# Patient Record
Sex: MALE | Employment: OTHER | URBAN - METROPOLITAN AREA
[De-identification: names, ages, dates, MRNs, and addresses within clinical notes are randomized per-mention and may not be internally consistent; named-entity substitution may affect disease eponyms.]

---

## 2024-03-05 ENCOUNTER — HOSPITAL ENCOUNTER (INPATIENT)
Facility: HOSPITAL | Age: 74
LOS: 7 days | Discharge: HOME HEALTH | DRG: 066 | End: 2024-03-13
Attending: EMERGENCY MEDICINE | Admitting: INTERNAL MEDICINE
Payer: COMMERCIAL

## 2024-03-05 ENCOUNTER — APPOINTMENT (OUTPATIENT)
Dept: NON INVASIVE DIAGNOSTICS | Facility: HOSPITAL | Age: 74
DRG: 066 | End: 2024-03-05
Payer: COMMERCIAL

## 2024-03-05 ENCOUNTER — APPOINTMENT (EMERGENCY)
Dept: RADIOLOGY | Facility: HOSPITAL | Age: 74
DRG: 066 | End: 2024-03-05
Payer: COMMERCIAL

## 2024-03-05 ENCOUNTER — APPOINTMENT (EMERGENCY)
Dept: CT IMAGING | Facility: HOSPITAL | Age: 74
DRG: 066 | End: 2024-03-05
Payer: COMMERCIAL

## 2024-03-05 DIAGNOSIS — R55 SYNCOPE: Primary | ICD-10-CM

## 2024-03-05 DIAGNOSIS — I63.9 CVA (CEREBRAL VASCULAR ACCIDENT) (HCC): ICD-10-CM

## 2024-03-05 DIAGNOSIS — I48.0 PAF (PAROXYSMAL ATRIAL FIBRILLATION) (HCC): ICD-10-CM

## 2024-03-05 PROBLEM — E11.9 DIABETES (HCC): Status: ACTIVE | Noted: 2024-03-05

## 2024-03-05 PROBLEM — D72.829 LEUKOCYTOSIS: Status: ACTIVE | Noted: 2024-03-05

## 2024-03-05 PROBLEM — R74.8 ABNORMAL CPK: Status: ACTIVE | Noted: 2024-03-05

## 2024-03-05 PROBLEM — R79.89 ELEVATED LACTIC ACID LEVEL: Status: ACTIVE | Noted: 2024-03-05

## 2024-03-05 LAB
2HR DELTA HS TROPONIN: 1 NG/L
4HR DELTA HS TROPONIN: 2 NG/L
ALBUMIN SERPL BCP-MCNC: 3.8 G/DL (ref 3.5–5)
ALP SERPL-CCNC: 80 U/L (ref 34–104)
ALT SERPL W P-5'-P-CCNC: 18 U/L (ref 7–52)
ANION GAP SERPL CALCULATED.3IONS-SCNC: 9 MMOL/L
AORTIC ROOT: 3.4 CM
APTT PPP: 29 SECONDS (ref 23–37)
ASCENDING AORTA: 2.7 CM
AST SERPL W P-5'-P-CCNC: 23 U/L (ref 13–39)
ATRIAL RATE: 72 BPM
BASOPHILS # BLD AUTO: 0.04 THOUSANDS/ÂΜL (ref 0–0.1)
BASOPHILS NFR BLD AUTO: 0 % (ref 0–1)
BILIRUB SERPL-MCNC: 0.8 MG/DL (ref 0.2–1)
BNP SERPL-MCNC: 49 PG/ML (ref 0–100)
BSA FOR ECHO PROCEDURE: 2.66 M2
BUN SERPL-MCNC: 16 MG/DL (ref 5–25)
CALCIUM SERPL-MCNC: 9.5 MG/DL (ref 8.4–10.2)
CARDIAC TROPONIN I PNL SERPL HS: 10 NG/L
CARDIAC TROPONIN I PNL SERPL HS: 11 NG/L
CARDIAC TROPONIN I PNL SERPL HS: 12 NG/L
CHLORIDE SERPL-SCNC: 105 MMOL/L (ref 96–108)
CK SERPL-CCNC: 355 U/L (ref 39–308)
CO2 SERPL-SCNC: 25 MMOL/L (ref 21–32)
CREAT SERPL-MCNC: 0.97 MG/DL (ref 0.6–1.3)
DOP CALC LVOT AREA: 3.46
DOP CALC LVOT DIAMETER: 2.1 CM
E WAVE DECELERATION TIME: 307 MS
E/A RATIO: 0.82
EOSINOPHIL # BLD AUTO: 0.06 THOUSAND/ÂΜL (ref 0–0.61)
EOSINOPHIL NFR BLD AUTO: 1 % (ref 0–6)
ERYTHROCYTE [DISTWIDTH] IN BLOOD BY AUTOMATED COUNT: 12.4 % (ref 11.6–15.1)
FLUAV RNA RESP QL NAA+PROBE: NEGATIVE
FLUBV RNA RESP QL NAA+PROBE: NEGATIVE
FRACTIONAL SHORTENING: 34 (ref 28–44)
GFR SERPL CREATININE-BSD FRML MDRD: 76 ML/MIN/1.73SQ M
GLUCOSE SERPL-MCNC: 140 MG/DL (ref 65–140)
GLUCOSE SERPL-MCNC: 151 MG/DL (ref 65–140)
GLUCOSE SERPL-MCNC: 160 MG/DL (ref 65–140)
HCT VFR BLD AUTO: 46.3 % (ref 36.5–49.3)
HGB BLD-MCNC: 15.5 G/DL (ref 12–17)
IMM GRANULOCYTES # BLD AUTO: 0.03 THOUSAND/UL (ref 0–0.2)
IMM GRANULOCYTES NFR BLD AUTO: 0 % (ref 0–2)
INR PPP: 1 (ref 0.84–1.19)
INTERVENTRICULAR SEPTUM IN DIASTOLE (PARASTERNAL SHORT AXIS VIEW): 1.3 CM
INTERVENTRICULAR SEPTUM: 1.3 CM (ref 0.6–1.1)
LA/AORTA RATIO 2D: 1.18
LAAS-AP2: 21.8 CM2
LACTATE SERPL-SCNC: 1.5 MMOL/L (ref 0.5–2)
LACTATE SERPL-SCNC: 2.1 MMOL/L (ref 0.5–2)
LEFT ATRIUM SIZE: 4 CM
LEFT INTERNAL DIMENSION IN SYSTOLE: 2.9 CM (ref 2.1–4)
LEFT VENTRICULAR INTERNAL DIMENSION IN DIASTOLE: 4.4 CM (ref 3.5–6)
LEFT VENTRICULAR POSTERIOR WALL IN END DIASTOLE: 1.4 CM
LEFT VENTRICULAR STROKE VOLUME: 53 ML
LVSV (TEICH): 53 ML
LYMPHOCYTES # BLD AUTO: 2.55 THOUSANDS/ÂΜL (ref 0.6–4.47)
LYMPHOCYTES NFR BLD AUTO: 24 % (ref 14–44)
MCH RBC QN AUTO: 31.8 PG (ref 26.8–34.3)
MCHC RBC AUTO-ENTMCNC: 33.5 G/DL (ref 31.4–37.4)
MCV RBC AUTO: 95 FL (ref 82–98)
MONOCYTES # BLD AUTO: 0.67 THOUSAND/ÂΜL (ref 0.17–1.22)
MONOCYTES NFR BLD AUTO: 6 % (ref 4–12)
MV E'TISSUE VEL-SEP: 9 CM/S
MV PEAK A VEL: 1 M/S
MV PEAK E VEL: 82 CM/S
MV STENOSIS PRESSURE HALF TIME: 90 MS
MV VALVE AREA P 1/2 METHOD: 2.44
NEUTROPHILS # BLD AUTO: 7.16 THOUSANDS/ÂΜL (ref 1.85–7.62)
NEUTS SEG NFR BLD AUTO: 69 % (ref 43–75)
NRBC BLD AUTO-RTO: 0 /100 WBCS
P AXIS: 65 DEGREES
PLATELET # BLD AUTO: 285 THOUSANDS/UL (ref 149–390)
PMV BLD AUTO: 10.4 FL (ref 8.9–12.7)
POTASSIUM SERPL-SCNC: 4.2 MMOL/L (ref 3.5–5.3)
PR INTERVAL: 176 MS
PROCALCITONIN SERPL-MCNC: 0.06 NG/ML
PROT SERPL-MCNC: 7.8 G/DL (ref 6.4–8.4)
PROTHROMBIN TIME: 13.8 SECONDS (ref 11.6–14.5)
QRS AXIS: 18 DEGREES
QRSD INTERVAL: 74 MS
QT INTERVAL: 386 MS
QTC INTERVAL: 422 MS
RBC # BLD AUTO: 4.88 MILLION/UL (ref 3.88–5.62)
RSV RNA RESP QL NAA+PROBE: NEGATIVE
SARS-COV-2 RNA RESP QL NAA+PROBE: NEGATIVE
SL CV LV EF: 65
SL CV PED ECHO LEFT VENTRICLE DIASTOLIC VOLUME (MOD BIPLANE) 2D: 85 ML
SL CV PED ECHO LEFT VENTRICLE SYSTOLIC VOLUME (MOD BIPLANE) 2D: 32 ML
SODIUM SERPL-SCNC: 139 MMOL/L (ref 135–147)
T WAVE AXIS: -5 DEGREES
TRICUSPID ANNULAR PLANE SYSTOLIC EXCURSION: 2.8 CM
VENTRICULAR RATE: 72 BPM
WBC # BLD AUTO: 10.51 THOUSAND/UL (ref 4.31–10.16)

## 2024-03-05 PROCEDURE — 71045 X-RAY EXAM CHEST 1 VIEW: CPT

## 2024-03-05 PROCEDURE — 83605 ASSAY OF LACTIC ACID: CPT | Performed by: EMERGENCY MEDICINE

## 2024-03-05 PROCEDURE — 84145 PROCALCITONIN (PCT): CPT | Performed by: EMERGENCY MEDICINE

## 2024-03-05 PROCEDURE — 83880 ASSAY OF NATRIURETIC PEPTIDE: CPT | Performed by: EMERGENCY MEDICINE

## 2024-03-05 PROCEDURE — 99285 EMERGENCY DEPT VISIT HI MDM: CPT

## 2024-03-05 PROCEDURE — 82550 ASSAY OF CK (CPK): CPT | Performed by: EMERGENCY MEDICINE

## 2024-03-05 PROCEDURE — 85025 COMPLETE CBC W/AUTO DIFF WBC: CPT | Performed by: EMERGENCY MEDICINE

## 2024-03-05 PROCEDURE — 96360 HYDRATION IV INFUSION INIT: CPT

## 2024-03-05 PROCEDURE — 85730 THROMBOPLASTIN TIME PARTIAL: CPT | Performed by: EMERGENCY MEDICINE

## 2024-03-05 PROCEDURE — 82948 REAGENT STRIP/BLOOD GLUCOSE: CPT

## 2024-03-05 PROCEDURE — C8929 TTE W OR WO FOL WCON,DOPPLER: HCPCS

## 2024-03-05 PROCEDURE — 93005 ELECTROCARDIOGRAM TRACING: CPT

## 2024-03-05 PROCEDURE — 85610 PROTHROMBIN TIME: CPT | Performed by: EMERGENCY MEDICINE

## 2024-03-05 PROCEDURE — 99285 EMERGENCY DEPT VISIT HI MDM: CPT | Performed by: EMERGENCY MEDICINE

## 2024-03-05 PROCEDURE — 99223 1ST HOSP IP/OBS HIGH 75: CPT | Performed by: STUDENT IN AN ORGANIZED HEALTH CARE EDUCATION/TRAINING PROGRAM

## 2024-03-05 PROCEDURE — 80053 COMPREHEN METABOLIC PANEL: CPT | Performed by: EMERGENCY MEDICINE

## 2024-03-05 PROCEDURE — 36415 COLL VENOUS BLD VENIPUNCTURE: CPT | Performed by: EMERGENCY MEDICINE

## 2024-03-05 PROCEDURE — 84484 ASSAY OF TROPONIN QUANT: CPT | Performed by: EMERGENCY MEDICINE

## 2024-03-05 PROCEDURE — 70450 CT HEAD/BRAIN W/O DYE: CPT

## 2024-03-05 PROCEDURE — 93010 ELECTROCARDIOGRAM REPORT: CPT | Performed by: INTERNAL MEDICINE

## 2024-03-05 PROCEDURE — 87040 BLOOD CULTURE FOR BACTERIA: CPT | Performed by: EMERGENCY MEDICINE

## 2024-03-05 PROCEDURE — 93306 TTE W/DOPPLER COMPLETE: CPT | Performed by: INTERNAL MEDICINE

## 2024-03-05 PROCEDURE — 0241U HB NFCT DS VIR RESP RNA 4 TRGT: CPT | Performed by: EMERGENCY MEDICINE

## 2024-03-05 RX ORDER — DAPAGLIFLOZIN 10 MG/1
TABLET, FILM COATED ORAL DAILY
COMMUNITY

## 2024-03-05 RX ORDER — INSULIN LISPRO 100 [IU]/ML
1-5 INJECTION, SOLUTION INTRAVENOUS; SUBCUTANEOUS
Status: DISCONTINUED | OUTPATIENT
Start: 2024-03-05 | End: 2024-03-13 | Stop reason: HOSPADM

## 2024-03-05 RX ORDER — LISINOPRIL 10 MG/1
10 TABLET ORAL DAILY
Status: DISCONTINUED | OUTPATIENT
Start: 2024-03-05 | End: 2024-03-13 | Stop reason: HOSPADM

## 2024-03-05 RX ORDER — GLIPIZIDE 10 MG/1
10 TABLET, FILM COATED, EXTENDED RELEASE ORAL DAILY
COMMUNITY

## 2024-03-05 RX ORDER — ENOXAPARIN SODIUM 100 MG/ML
40 INJECTION SUBCUTANEOUS DAILY
Status: DISCONTINUED | OUTPATIENT
Start: 2024-03-05 | End: 2024-03-10

## 2024-03-05 RX ADMIN — PERFLUTREN 0.6 ML/MIN: 6.52 INJECTION, SUSPENSION INTRAVENOUS at 15:35

## 2024-03-05 RX ADMIN — LISINOPRIL 10 MG: 10 TABLET ORAL at 16:14

## 2024-03-05 RX ADMIN — ENOXAPARIN SODIUM 40 MG: 40 INJECTION SUBCUTANEOUS at 16:14

## 2024-03-05 RX ADMIN — SODIUM CHLORIDE 1000 ML: 0.9 INJECTION, SOLUTION INTRAVENOUS at 14:01

## 2024-03-05 RX ADMIN — INSULIN LISPRO 1 UNITS: 100 INJECTION, SOLUTION INTRAVENOUS; SUBCUTANEOUS at 16:52

## 2024-03-05 NOTE — H&P
[facility]  H&P  Name: Drew Santos 73 y.o. male I MRN: [id number]  Unit/Bed#: ED 21 I Date of Admission: 3/5/2024   Date of Service: 3/5/2024 I Hospital Day: 0      Assessment/Plan   * Syncope  Assessment & Plan  73-year-old male past medical history of type 2 diabetes on glipizide was found down in the bathroom by his wife incontinent of urine and stool.  Patient remembers going to the bathroom but does not have anything afterwards  Bleeding injuries  CT head negative  Obs admit for syncope  Tele monitoring   Echo ordered   Will have neurology evaluate whether seizure workup needed     Abnormal CPK  Assessment & Plan  Mild CPK elevation to 355 in the setting of being found down  Was given 1L NS   Hold statin    Leukocytosis  Assessment & Plan  WBC 10.5 with normal diff   Covid/rvp negative   CXR negative   No other infectious signs or symptoms at this time    Diabetes (HCC)  Assessment & Plan    Lab Results   Component Value Date    HGBA1C 7.7 (H) 11/29/2023   Type 2 DM  on glipizide and dapagliflozin, hold  sliding scale TID     Elevated lactic acid level  Assessment & Plan  Lactate 2.1   Received 1 liter NS in the ED   Repeat until negative            VTE Pharmacologic Prophylaxis: VTE Score: 4 Moderate Risk (Score 3-4) - Pharmacological DVT Prophylaxis Ordered: enoxaparin (Lovenox).  Code Status: Level 1 - Full Code full  Discussion with family: Updated  (wife) at bedside.    Anticipated Length of Stay: Patient will be admitted on an inpatient basis with an anticipated length of stay of greater than 2 midnights secondary to syncope.    Total Time Spent on Date of Encounter in care of patient: 55 mins. This time was spent on one or more of the following: performing physical exam; counseling and coordination of care; obtaining or reviewing history; documenting in the medical record; reviewing/ordering tests, medications or procedures; communicating with other healthcare  professionals and discussing with patient's family/caregivers.    Chief Complaint: syncope    History of Present Illness:  Drew Santos is a 73 y.o. male with a PMH of diabetes type 2, HTN who presents with syncopal episode at home. Wife also at bedside to provide history. He felt in his usual state of health and woke up this morning but did not eat breakfast.  He went downstairs to the restroom and his wife did not hear from him for the next 3 hours and found him down on the floor.  She cannot help him up so they called EMS.  She did check his blood sugar and it was reportedly normal. He was found incontinent of bowel and bladder. Reported recent increase in his home glipizide otherwise no other home medication changes. No prodromal symptoms such as chest pain, palpitations, shortness of breath.  No previous episode of syncope or seizure. No tongue biting or other injuries reported. He does report new onset tremor in the last 24-48 hours but no other symptoms. Wife also described an episode one year ago where he had new onset L sided weakness (which resolved on its own with PT - reportedly no stroke hx.)    Review of Systems:  10 pt review of systems reviewed with patient and pertinent positive/negatives as per HPI.      Past Medical and Surgical History:   Past Medical History:   Diagnosis Date    Diabetes mellitus (HCC)     Hypertension        History reviewed. No pertinent surgical history.    Meds/Allergies:  Prior to Admission medications    Not on File     I have reviewed home medications with patient personally.    Allergies: No Known Allergies    Social History:  Marital Status: /Civil Union   Occupation: none  Patient Pre-hospital Living Situation: Home  Patient Pre-hospital Level of Mobility: walks  Patient Pre-hospital Diet Restrictions: none  Substance Use History:   Social History     Substance and Sexual Activity   Alcohol Use Not Currently     Social History     Tobacco Use   Smoking Status  Never   Smokeless Tobacco Never     Social History     Substance and Sexual Activity   Drug Use Never       Family History:  History reviewed. No pertinent family history.    Physical Exam:     Vitals:   Blood Pressure: 143/81 (03/05/24 1445)  Pulse: 81 (03/05/24 1445)  Temperature: 97.7 °F (36.5 °C) (03/05/24 1153)  Respirations: 22 (03/05/24 1445)  SpO2: 98 % (03/05/24 1445)    Physical Exam   NAD  Nonlabored on RA  RRR, no murmurs, no carotid bruit  NTND abd  Aaox3,no focal deficits or tremor noted.    Additional Data:     Lab Results:  Results from last 7 days   Lab Units 03/05/24  1228   WBC Thousand/uL 10.51*   HEMOGLOBIN g/dL 15.5   HEMATOCRIT % 46.3   PLATELETS Thousands/uL 285   NEUTROS PCT % 69   LYMPHS PCT % 24   MONOS PCT % 6   EOS PCT % 1     Results from last 7 days   Lab Units 03/05/24  1228   SODIUM mmol/L 139   POTASSIUM mmol/L 4.2   CHLORIDE mmol/L 105   CO2 mmol/L 25   BUN mg/dL 16   CREATININE mg/dL 0.97   ANION GAP mmol/L 9   CALCIUM mg/dL 9.5   ALBUMIN g/dL 3.8   TOTAL BILIRUBIN mg/dL 0.80   ALK PHOS U/L 80   ALT U/L 18   AST U/L 23   GLUCOSE RANDOM mg/dL 140     Results from last 7 days   Lab Units 03/05/24  1228   INR  1.00             Results from last 7 days   Lab Units 03/05/24  1228   LACTIC ACID mmol/L 2.1*   PROCALCITONIN ng/ml 0.06       Lines/Drains:  Invasive Devices       Peripheral Intravenous Line  Duration             Peripheral IV 03/05/24 Left Antecubital <1 day                        Imaging: Reviewed radiology reports from this admission including: CT head  XR chest 1 view portable   Final Result by Kb Shaver MD (03/05 1791)      No acute cardiopulmonary disease.            Workstation performed: [id number]         CT head without contrast   Final Result by Michael Hendricks MD (03/05 1410)      No acute intracranial abnormality.                  Workstation performed: [id number]             EKG and Other Studies Reviewed on Admission:   EKG: NSR. HR 80.    ** Please Note:  This note has been constructed using a voice recognition system. **

## 2024-03-05 NOTE — ASSESSMENT & PLAN NOTE
WBC 10.5 with normal diff   Covid/rvp negative   CXR negative   No other infectious signs or symptoms at this time

## 2024-03-05 NOTE — ASSESSMENT & PLAN NOTE
73-year-old male past medical history of type 2 diabetes on glipizide was found down in the bathroom by his wife incontinent of urine and stool.  Patient remembers going to the bathroom but does not have anything afterwards  Bleeding injuries  CT head negative  Obs admit for syncope  Tele monitoring   Echo ordered   Will have neurology evaluate whether seizure workup needed

## 2024-03-05 NOTE — ED PROVIDER NOTES
History  Chief Complaint   Patient presents with    Weakness - Generalized     Pt BIB EMS from home. Pt was found in basement bathroom on the floor by his wife. Pt stated he lowered himself on the ground and felt weak and defecated himself     HPI patient is a 73-year-old male reports well last night and well this morning.  Apparently was in the bathroom and then the next thing he knows he was on the floor.  Patient does not think he fell to the floor but does not know how he got to the floor.  Patient reports the next thing he knows he was on the floor and he felt wet apparently he was incontinent of stool and urine.  Patient denies any symptoms now.  Denies any headache.  Denies any focal weakness.  Patient reports he was weak on the floor and unable to get up.  Past medical history diabetes, ambulatory dysfunction  Family history noncontributory  Social history, lives with his wife at home, no history of drug abuse    None       Past Medical History:   Diagnosis Date    Diabetes mellitus (HCC)     Hypertension        History reviewed. No pertinent surgical history.    History reviewed. No pertinent family history.  I have reviewed and agree with the history as documented.    E-Cigarette/Vaping    E-Cigarette Use Never User      E-Cigarette/Vaping Substances    Nicotine No     THC No     CBD No     Flavoring No     Other No     Unknown No      Social History     Tobacco Use    Smoking status: Never    Smokeless tobacco: Never   Vaping Use    Vaping status: Never Used   Substance Use Topics    Alcohol use: Not Currently    Drug use: Never       Review of Systems   Constitutional:  Negative for diaphoresis, fatigue and fever.   HENT:  Negative for congestion, ear pain, nosebleeds and sore throat.    Eyes:  Negative for photophobia, pain, discharge and visual disturbance.   Respiratory:  Negative for cough, choking, chest tightness, shortness of breath and wheezing.    Cardiovascular:  Negative for chest pain and  palpitations.   Gastrointestinal:  Negative for abdominal distention, abdominal pain, diarrhea and vomiting.   Genitourinary:  Negative for dysuria, flank pain and frequency.   Musculoskeletal:  Negative for back pain, gait problem and joint swelling.   Skin:  Negative for color change and rash.   Neurological:  Positive for syncope. Negative for dizziness and headaches.   Psychiatric/Behavioral:  Negative for behavioral problems and confusion. The patient is not nervous/anxious.    All other systems reviewed and are negative.      Physical Exam  Physical Exam  Vitals and nursing note reviewed.   Constitutional:       Appearance: He is well-developed.   HENT:      Head: Normocephalic.      Right Ear: External ear normal.      Left Ear: External ear normal.      Nose: Nose normal.      Mouth/Throat:      Mouth: Mucous membranes are moist.      Pharynx: Oropharynx is clear.   Eyes:      General: Lids are normal.      Extraocular Movements: Extraocular movements intact.      Pupils: Pupils are equal, round, and reactive to light.   Cardiovascular:      Rate and Rhythm: Normal rate and regular rhythm.      Pulses: Normal pulses.      Heart sounds: Normal heart sounds.   Pulmonary:      Effort: Pulmonary effort is normal. No respiratory distress.      Breath sounds: Normal breath sounds.   Abdominal:      General: Abdomen is flat. Bowel sounds are normal.      Tenderness: There is no abdominal tenderness.   Musculoskeletal:         General: No deformity. Normal range of motion.      Cervical back: Normal range of motion and neck supple.   Skin:     General: Skin is warm and dry.   Neurological:      General: No focal deficit present.      Mental Status: He is alert and oriented to person, place, and time.   Psychiatric:         Mood and Affect: Mood normal.         Vital Signs  ED Triage Vitals [03/05/24 1153]   Temperature Pulse Respirations Blood Pressure SpO2   97.7 °F (36.5 °C) 78 20 132/94 91 %      Temp src Heart  Rate Source Patient Position - Orthostatic VS BP Location FiO2 (%)   -- Monitor Lying Right arm --      Pain Score       No Pain           Vitals:    03/05/24 1600 03/05/24 1614 03/05/24 1630 03/05/24 1700   BP: (!) 155/117 (!) 177/83 168/82 146/74   Pulse: 97  89 90   Patient Position - Orthostatic VS:             Visual Acuity  Visual Acuity      Flowsheet Row Most Recent Value   L Pupil Size (mm) 3   R Pupil Size (mm) 3            ED Medications  Medications   enoxaparin (LOVENOX) subcutaneous injection 40 mg (40 mg Subcutaneous Given 3/5/24 1614)   insulin lispro (HumALOG/ADMELOG) 100 units/mL subcutaneous injection 1-5 Units (1 Units Subcutaneous Given 3/5/24 1652)   lisinopril (ZESTRIL) tablet 10 mg (10 mg Oral Given 3/5/24 1614)   sodium chloride 0.9 % bolus 1,000 mL (0 mL Intravenous Stopped 3/5/24 1648)   perflutren lipid microsphere (DEFINITY) injection (0.6 mL/min Intravenous Given 3/5/24 1535)       Diagnostic Studies  Results Reviewed       Procedure Component Value Units Date/Time    HS Troponin I 4hr [[id number]]  (Normal) Collected: 03/05/24 1625    Lab Status: Final result Specimen: Blood from Arm, Left Updated: 03/05/24 1656     hs TnI 4hr 12 ng/L      Delta 4hr hsTnI 2 ng/L     Urinalysis with reflex to microscopic [[id number]]     Lab Status: No result Specimen: Urine     Fingerstick Glucose (POCT) [[id number]]  (Abnormal) Collected: 03/05/24 1646    Lab Status: Final result Specimen: Blood Updated: 03/05/24 1648     POC Glucose 151 mg/dl     Lactic acid 2 Hours [[id number]]  (Normal) Collected: 03/05/24 1547    Lab Status: Final result Specimen: Blood from Arm, Left Updated: 03/05/24 1620     LACTIC ACID 1.5 mmol/L     Narrative:      Result may be elevated if tourniquet was used during collection.    HS Troponin I 2hr [[id number]]  (Normal) Collected: 03/05/24 1425    Lab Status: Final result Specimen: Blood from Arm, Right Updated: 03/05/24 1457     hs TnI 2hr 11 ng/L      Delta 2hr hsTnI 1 ng/L      Blood culture #2 [[id number]] Collected: 03/05/24 1425    Lab Status: In process Specimen: Blood from Arm, Right Updated: 03/05/24 1429    FLU/RSV/COVID - if FLU/RSV clinically relevant [[id number]]  (Normal) Collected: 03/05/24 1228    Lab Status: Final result Specimen: Nares from Nose Updated: 03/05/24 1332     SARS-CoV-2 Negative     INFLUENZA A PCR Negative     INFLUENZA B PCR Negative     RSV PCR Negative    Narrative:      FOR PEDIATRIC PATIENTS - copy/paste COVID Guidelines URL to browser: [email or web address]    SARS-CoV-2 assay is a Nucleic Acid Amplification assay intended for the  qualitative detection of nucleic acid from SARS-CoV-2 in nasopharyngeal  swabs. Results are for the presumptive identification of SARS-CoV-2 RNA.    Positive results are indicative of infection with SARS-CoV-2, the virus  causing COVID-19, but do not rule out bacterial infection or co-infection  with other viruses. Laboratories within the [place] and its  territories are required to report all positive results to the appropriate  public health authorities. Negative results do not preclude SARS-CoV-2  infection and should not be used as the sole basis for treatment or other  patient management decisions. Negative results must be combined with  clinical observations, patient history, and epidemiological information.  This test has not been FDA cleared or approved.    This test has been authorized by FDA under an Emergency Use Authorization  (EUA). This test is only authorized for the duration of time the  declaration that circumstances exist justifying the authorization of the  emergency use of an in vitro diagnostic tests for detection of SARS-CoV-2  virus and/or diagnosis of COVID-19 infection under section 564(b)(1) of  the Act, 21 U.S.C. 360bbb-3(b)(1), unless the authorization is terminated  or revoked sooner. The test has been validated but independent review by  FDA  and CLIA is pending.    Test performed using [employer] GeneXpert: This RT-PCR assay targets N2,  a region unique to SARS-CoV-2. A conserved region in the E-gene was chosen  for pan-Sarbecovirus detection which includes SARS-CoV-2.    According to CMS-2020-01-R, this platform meets the definition of high-throughput technology.    Lactic acid [[id number]]  (Abnormal) Collected: 03/05/24 1228    Lab Status: Final result Specimen: Blood from Arm, Left Updated: 03/05/24 1321     LACTIC ACID 2.1 mmol/L     Narrative:      Result may be elevated if tourniquet was used during collection.    Procalcitonin [[id number]]  (Normal) Collected: 03/05/24 1228    Lab Status: Final result Specimen: Blood from Arm, Left Updated: 03/05/24 1320     Procalcitonin 0.06 ng/ml     HS Troponin 0hr (reflex protocol) [[id number]]  (Normal) Collected: 03/05/24 1228    Lab Status: Final result Specimen: Blood from Arm, Left Updated: 03/05/24 1317     hs TnI 0hr 10 ng/L     B-Type Natriuretic Peptide(BNP) [[id number]]  (Normal) Collected: 03/05/24 1228    Lab Status: Final result Specimen: Blood from Arm, Left Updated: 03/05/24 1315     BNP 49 pg/mL     Comprehensive metabolic panel [[id number]] Collected: 03/05/24 1228    Lab Status: Final result Specimen: Blood from Arm, Left Updated: 03/05/24 1312     Sodium 139 mmol/L      Potassium 4.2 mmol/L      Chloride 105 mmol/L      CO2 25 mmol/L      ANION GAP 9 mmol/L      BUN 16 mg/dL      Creatinine 0.97 mg/dL      Glucose 140 mg/dL      Calcium 9.5 mg/dL      AST 23 U/L      ALT 18 U/L      Alkaline Phosphatase 80 U/L      Total Protein 7.8 g/dL      Albumin 3.8 g/dL      Total Bilirubin 0.80 mg/dL      eGFR 76 ml/min/1.73sq m     Narrative:      National Kidney Disease Foundation guidelines for Chronic Kidney Disease (CKD):     Stage 1 with normal or high GFR (GFR > 90 mL/min/1.73 square meters)    Stage 2 Mild CKD (GFR = 60-89 mL/min/1.73 square meters)    Stage 3A Moderate CKD (GFR = 45-59  mL/min/1.73 square meters)    Stage 3B Moderate CKD (GFR = 30-44 mL/min/1.73 square meters)    Stage 4 Severe CKD (GFR = 15-29 mL/min/1.73 square meters)    Stage 5 End Stage CKD (GFR <15 mL/min/1.73 square meters)  Note: GFR calculation is accurate only with a steady state creatinine    CK [[id number]]  (Abnormal) Collected: 03/05/24 1228    Lab Status: Final result Specimen: Blood from Arm, Left Updated: 03/05/24 1311     Total  U/L     Protime-INR [[id number]]  (Normal) Collected: 03/05/24 1228    Lab Status: Final result Specimen: Blood from Arm, Left Updated: 03/05/24 1310     Protime 13.8 seconds      INR 1.00    APTT [[id number]]  (Normal) Collected: 03/05/24 1228    Lab Status: Final result Specimen: Blood from Arm, Left Updated: 03/05/24 1310     PTT 29 seconds     CBC and differential [[id number]]  (Abnormal) Collected: 03/05/24 1228    Lab Status: Final result Specimen: Blood from Arm, Left Updated: 03/05/24 1254     WBC 10.51 Thousand/uL      RBC 4.88 Million/uL      Hemoglobin 15.5 g/dL      Hematocrit 46.3 %      MCV 95 fL      MCH 31.8 pg      MCHC 33.5 g/dL      RDW 12.4 %      MPV 10.4 fL      Platelets 285 Thousands/uL      nRBC 0 /100 WBCs      Neutrophils Relative 69 %      Immat GRANS % 0 %      Lymphocytes Relative 24 %      Monocytes Relative 6 %      Eosinophils Relative 1 %      Basophils Relative 0 %      Neutrophils Absolute 7.16 Thousands/µL      Immature Grans Absolute 0.03 Thousand/uL      Lymphocytes Absolute 2.55 Thousands/µL      Monocytes Absolute 0.67 Thousand/µL      Eosinophils Absolute 0.06 Thousand/µL      Basophils Absolute 0.04 Thousands/µL     Blood culture #1 [[id number]] Collected: 03/05/24 1228    Lab Status: In process Specimen: Blood from Arm, Left Updated: 03/05/24 1251                   XR chest 1 view portable   Final Result by Kb Shaver MD (03/05 1509)      No acute cardiopulmonary disease.            Workstation performed: [id number]         CT head without  contrast   Final Result by Michael Hendricks MD (03/05 1410)      No acute intracranial abnormality.                  Workstation performed: [id number]         MRI inpatient order    (Results Pending)              Procedures  ECG 12 Lead Documentation Only    Date/Time: 3/5/2024 5:15 PM    Performed by: Boyd Bellamy MD  Authorized by: Boyd Bellamy MD    Indications / Diagnosis:  Possible syncope  ECG reviewed by me, the ED Provider: yes    Patient location:  ED  Previous ECG:     Previous ECG:  Unavailable  Interpretation:     Interpretation: non-specific    Quality:     Tracing quality:  Limited by artifact  Rate:     ECG rate:  72    ECG rate assessment: normal    Rhythm:     Rhythm: sinus rhythm    Comments:      Baseline artifact, nonspecific ST-T wave changes no ST elevations,           ED Course     Chest x-ray: Chest x-ray showed a normal cardiac silhouette, no pneumothorax no infiltrates, No sign of pathology, interpreted by me, I was the primary .          Identification of Seniors at Risk      Flowsheet Row Most Recent Value   (ISAR) Identification of Seniors at Risk    Before the illness or injury that brought you to the Emergency, did you need someone to help you on a regular basis? 0 Filed at: 03/05/2024 1208   In the last 24 hours, have you needed more help than usual? 0 Filed at: 03/05/2024 1208   Have you been hospitalized for one or more nights during the past 6 months? 0 Filed at: 03/05/2024 1208   In general, do you see well? 0 Filed at: 03/05/2024 1208   In general, do you have serious problems with your memory? 0 Filed at: 03/05/2024 1208   Do you take more than three different medications every day? 1 Filed at: 03/05/2024 1208   ISAR Score 1 Filed at: 03/05/2024 1208           Stroke Assessment       Row Name 03/05/24 1205             NIH Stroke Scale    Interval --      Level of Consciousness (1a.) 0      LOC Questions (1b.) 0      LOC Commands (1c.) 0      Best Gaze (2.) 0      Visual  (3.) 0      Facial Palsy (4.) 0      Motor Arm, Left (5a.) 0      Motor Arm, Right (5b.) 0      Motor Leg, Left (6a.) 0      Motor Leg, Right (6b.) 0      Limb Ataxia (7.) 0      Sensory (8.) 0      Best Language (9.) 0      Dysarthria (10.) 0      Extinction and Inattention (11.) (Formerly Neglect) 0      Total 0                    Flowsheet Row Most Recent Value   Thrombolytic Decision Options    Thrombolytic Decision Patient not a candidate.   Patient is not a candidate options Symptoms resolved/clearly non disabling.          Diagnostic testing showed a negative flu and negative RSV and negative COVID test  Lactate was minimally elevated at 2.1 I believe secondary The patient being on the floor and somewhat dehydrated, no source of infection was found  Prolactin was normal  Car troponin was 10 no sign of cardiac ischemia  BNP was 49 no sign of heart failure  Electrolytes within normal limits normal BUN/creatinine sign of renal dysfunction  CK was 355 consistent with the patient lying on the floor minimally elevated patient received IV hydration for this.  White count was minimally elevated 10.5 nonspecific finding hemoglobin was 15 no sign of anemia.    CT scan of the brain showed no acute pathology CT was required because The patient did fall in the bathroom and also had no recollection of how he got to the ground          SBIRT 20yo+      Flowsheet Row Most Recent Value   Initial Alcohol Screen: US AUDIT-C     1. How often do you have a drink containing alcohol? 0 Filed at: 03/05/2024 1208   2. How many drinks containing alcohol do you have on a typical day you are drinking?  0 Filed at: 03/05/2024 1208   3a. Male UNDER 65: How often do you have five or more drinks on one occasion? 0 Filed at: 03/05/2024 1208   Audit-C Score 0 Filed at: 03/05/2024 1208   NIR: How many times in the past year have you...    Used an illegal drug or used a prescription medication for non-medical reasons? Never Filed at:  03/05/2024 1208                      Medical Decision Making  Medical decision making 73-year-old male presents from EMS found on the floor in his bathroom by his wife incontinent of urine and stool.  Patient told EMS he did not know how he got to the floor they assumed he lowered himself down but the patient does not remember lowering himself to the floor.  Patient member standing in the bathroom and then being on the floor and being wet with urine and stool.  I believe the patient had a syncopal episode and then fell to the floor.  There is no obvious sign of trauma.  I believe the patient had syncope and will require further evaluation and monitoring.  No sign of anemia or blood loss no sign of acute trauma.  Normal CT of the brain no sign of injury to the brain or stroke.  At this point I believe the patient had syncope and requires cardiac monitoring and further evaluation.  Discussed with hospital service they agree.    Amount and/or Complexity of Data Reviewed  Labs: ordered.  Radiology: ordered.    Risk  Decision regarding hospitalization.             Disposition  Final diagnoses:   Syncope     Time reflects when diagnosis was documented in both MDM as applicable and the Disposition within this note       Time User Action Codes Description Comment    3/5/2024  1:51 PM Boyd Bellamy Add [R55] Syncope           ED Disposition       ED Disposition   Admit    Condition   Stable    Date/Time   Tue Mar 5, 2024 2926    Comment   Case was discussed with hospitalist and the patient's admission status was agreed to be Admission Status: observation status to the service of Dr. Borges .               Follow-up Information    None         Patient's Medications    No medications on file       No discharge procedures on file.    PDMP Review       None            ED Provider  Electronically Signed by             Boyd Bellamy MD  03/05/24 2226       Boyd Bellamy MD  03/05/24 6054

## 2024-03-05 NOTE — ASSESSMENT & PLAN NOTE
Lab Results   Component Value Date    HGBA1C 7.7 (H) 11/29/2023   Type 2 DM  on glipizide and dapagliflozin, hold  sliding scale TID

## 2024-03-06 ENCOUNTER — APPOINTMENT (OUTPATIENT)
Dept: CT IMAGING | Facility: HOSPITAL | Age: 74
DRG: 066 | End: 2024-03-06
Payer: COMMERCIAL

## 2024-03-06 LAB
ALBUMIN SERPL BCP-MCNC: 3.3 G/DL (ref 3.5–5)
ALP SERPL-CCNC: 64 U/L (ref 34–104)
ALT SERPL W P-5'-P-CCNC: 15 U/L (ref 7–52)
ANION GAP SERPL CALCULATED.3IONS-SCNC: 7 MMOL/L
AST SERPL W P-5'-P-CCNC: 21 U/L (ref 13–39)
BASOPHILS # BLD AUTO: 0.05 THOUSANDS/ÂΜL (ref 0–0.1)
BASOPHILS NFR BLD AUTO: 1 % (ref 0–1)
BILIRUB SERPL-MCNC: 0.73 MG/DL (ref 0.2–1)
BUN SERPL-MCNC: 17 MG/DL (ref 5–25)
CALCIUM ALBUM COR SERPL-MCNC: 9.3 MG/DL (ref 8.3–10.1)
CALCIUM SERPL-MCNC: 8.7 MG/DL (ref 8.4–10.2)
CHLORIDE SERPL-SCNC: 109 MMOL/L (ref 96–108)
CO2 SERPL-SCNC: 22 MMOL/L (ref 21–32)
CREAT SERPL-MCNC: 0.97 MG/DL (ref 0.6–1.3)
EOSINOPHIL # BLD AUTO: 0.2 THOUSAND/ÂΜL (ref 0–0.61)
EOSINOPHIL NFR BLD AUTO: 2 % (ref 0–6)
ERYTHROCYTE [DISTWIDTH] IN BLOOD BY AUTOMATED COUNT: 12.4 % (ref 11.6–15.1)
GFR SERPL CREATININE-BSD FRML MDRD: 76 ML/MIN/1.73SQ M
GLUCOSE P FAST SERPL-MCNC: 136 MG/DL (ref 65–99)
GLUCOSE SERPL-MCNC: 135 MG/DL (ref 65–140)
GLUCOSE SERPL-MCNC: 136 MG/DL (ref 65–140)
GLUCOSE SERPL-MCNC: 154 MG/DL (ref 65–140)
GLUCOSE SERPL-MCNC: 185 MG/DL (ref 65–140)
GLUCOSE SERPL-MCNC: 189 MG/DL (ref 65–140)
HCT VFR BLD AUTO: 40.1 % (ref 36.5–49.3)
HGB BLD-MCNC: 13.3 G/DL (ref 12–17)
IMM GRANULOCYTES # BLD AUTO: 0.03 THOUSAND/UL (ref 0–0.2)
IMM GRANULOCYTES NFR BLD AUTO: 0 % (ref 0–2)
LYMPHOCYTES # BLD AUTO: 3.12 THOUSANDS/ÂΜL (ref 0.6–4.47)
LYMPHOCYTES NFR BLD AUTO: 34 % (ref 14–44)
MAGNESIUM SERPL-MCNC: 1.8 MG/DL (ref 1.9–2.7)
MCH RBC QN AUTO: 31.2 PG (ref 26.8–34.3)
MCHC RBC AUTO-ENTMCNC: 33.2 G/DL (ref 31.4–37.4)
MCV RBC AUTO: 94 FL (ref 82–98)
MONOCYTES # BLD AUTO: 0.82 THOUSAND/ÂΜL (ref 0.17–1.22)
MONOCYTES NFR BLD AUTO: 9 % (ref 4–12)
NEUTROPHILS # BLD AUTO: 4.84 THOUSANDS/ÂΜL (ref 1.85–7.62)
NEUTS SEG NFR BLD AUTO: 54 % (ref 43–75)
NRBC BLD AUTO-RTO: 0 /100 WBCS
PLATELET # BLD AUTO: 235 THOUSANDS/UL (ref 149–390)
PMV BLD AUTO: 10 FL (ref 8.9–12.7)
POTASSIUM SERPL-SCNC: 4 MMOL/L (ref 3.5–5.3)
PROCALCITONIN SERPL-MCNC: 0.08 NG/ML
PROT SERPL-MCNC: 6.6 G/DL (ref 6.4–8.4)
RBC # BLD AUTO: 4.26 MILLION/UL (ref 3.88–5.62)
SODIUM SERPL-SCNC: 138 MMOL/L (ref 135–147)
WBC # BLD AUTO: 9.06 THOUSAND/UL (ref 4.31–10.16)

## 2024-03-06 PROCEDURE — 99222 1ST HOSP IP/OBS MODERATE 55: CPT | Performed by: STUDENT IN AN ORGANIZED HEALTH CARE EDUCATION/TRAINING PROGRAM

## 2024-03-06 PROCEDURE — 97163 PT EVAL HIGH COMPLEX 45 MIN: CPT

## 2024-03-06 PROCEDURE — 99232 SBSQ HOSP IP/OBS MODERATE 35: CPT | Performed by: INTERNAL MEDICINE

## 2024-03-06 PROCEDURE — 82948 REAGENT STRIP/BLOOD GLUCOSE: CPT

## 2024-03-06 PROCEDURE — 83735 ASSAY OF MAGNESIUM: CPT | Performed by: STUDENT IN AN ORGANIZED HEALTH CARE EDUCATION/TRAINING PROGRAM

## 2024-03-06 PROCEDURE — 97110 THERAPEUTIC EXERCISES: CPT

## 2024-03-06 PROCEDURE — 85025 COMPLETE CBC W/AUTO DIFF WBC: CPT | Performed by: STUDENT IN AN ORGANIZED HEALTH CARE EDUCATION/TRAINING PROGRAM

## 2024-03-06 PROCEDURE — 97167 OT EVAL HIGH COMPLEX 60 MIN: CPT

## 2024-03-06 PROCEDURE — 84145 PROCALCITONIN (PCT): CPT | Performed by: STUDENT IN AN ORGANIZED HEALTH CARE EDUCATION/TRAINING PROGRAM

## 2024-03-06 PROCEDURE — 70496 CT ANGIOGRAPHY HEAD: CPT

## 2024-03-06 PROCEDURE — 70498 CT ANGIOGRAPHY NECK: CPT

## 2024-03-06 PROCEDURE — 80053 COMPREHEN METABOLIC PANEL: CPT | Performed by: STUDENT IN AN ORGANIZED HEALTH CARE EDUCATION/TRAINING PROGRAM

## 2024-03-06 RX ADMIN — IOHEXOL 85 ML: 350 INJECTION, SOLUTION INTRAVENOUS at 12:56

## 2024-03-06 RX ADMIN — INSULIN LISPRO 1 UNITS: 100 INJECTION, SOLUTION INTRAVENOUS; SUBCUTANEOUS at 12:40

## 2024-03-06 RX ADMIN — ENOXAPARIN SODIUM 40 MG: 40 INJECTION SUBCUTANEOUS at 09:19

## 2024-03-06 RX ADMIN — LISINOPRIL 10 MG: 10 TABLET ORAL at 09:18

## 2024-03-06 NOTE — OCCUPATIONAL THERAPY NOTE
"          Occupational Therapy Evaluation        Patient Name: Drew Santos  Today's Date: 3/6/2024         03/06/24 0826   OT Last Visit   OT Visit Date 03/06/24   Note Type   Note type Evaluation   Pain Assessment   Pain Assessment Tool 0-10   Pain Score No Pain   Restrictions/Precautions   Weight Bearing Precautions Per Order No   Braces or Orthoses Other (Comment)  (none at baseline)   Other Precautions Chair Alarm;Bed Alarm;Telemetry;Fall Risk   Home Living   Type of Home House   Home Layout Two level;Bed/bath upstairs;Stairs to enter with rails  (6 BELLE/ full lfight to second floor)   Bathroom Shower/Tub Tub/shower unit   Bathroom Toilet Standard   Bathroom Equipment   (none at baseline)   Bathroom Accessibility Accessible   Home Equipment Other (Comment)   Additional Comments ambulatory without AD   Prior Function   Level of [place] Independent with ADLs;Independent with functional mobility   Lives With Spouse   Receives Help From Family   IADLs Independent with driving;Independent with meal prep;Independent with medication management   Falls in the last 6 months 1 to 4  (1 PTA)   Vocational Retired   Lifestyle   Autonomy Patient reported independent  with ADLs/ IADLs, ambulatory without AD, Patient lives with family in a 2 story house, 6 BELLE and full flight to second floor bedroom.   Reciprocal Relationships Supportive Family   Service to Others Retired   Intrinsic Gratification \"physically active\"   General   Family/Caregiver Present No   ADL   Where Assessed   (ADL levels based on functional performance during OT eval)   Eating Assistance 7  Independent   Grooming Assistance 5  Supervision/Setup   UB Bathing Assistance 5  Supervision/Setup   LB Bathing Assistance 4  Minimal Assistance   UB Dressing Assistance 5  Supervision/Setup   LB Dressing Assistance 4  Minimal Assistance   Toileting Assistance  4  Minimal Assistance   Functional Assistance 4  Minimal Assistance   Bed Mobility   Supine to Sit 3  " "Moderate assistance   Additional items Assist x 1;HOB elevated;Verbal cues;LE management   Transfers   Sit to Stand 3  Moderate assistance   Additional items Assist x 1;Increased time required;Verbal cues   Stand to Sit 3  Moderate assistance   Additional items Assist x 1;Armrests;Increased time required;Verbal cues   Functional Mobility   Functional Mobility 3  Moderate assistance   Additional Comments assist of 1/ unsteady/ no LOB/ ambulated short distance in room with assist of 1   Additional items Rolling walker   Balance   Static Sitting Fair +   Dynamic Sitting Fair   Static Standing Fair -   Dynamic Standing Poor +   Activity Tolerance   Activity Tolerance Patient limited by fatigue  (\"better than yesterday- when I couldnt walk\")   Medical Staff Made Aware Pt seen as a co-eval with PT due to the patient's co-morbidities, clinically unstable presentation, and present impairments which are a regression from the patient's baseline.   RUE Assessment   RUE Assessment WNL   LUE Assessment   LUE Assessment WNL   Hand Function   Gross Motor Coordination Functional   Fine Motor Coordination Functional   Sensation   Light Touch No apparent deficits  (BUEs)   Vision-Basic Assessment   Current Vision Wears glasses only for reading   Psychosocial   Psychosocial (WDL) X   Patient Behaviors/Mood Flat affect   Ability to Express Feelings Able to express   Ability to Express Needs Able to express   Ability to Express Thoughts Able to express   Ability to Understand Others Understands   Cognition   Overall Cognitive Status Impaired  (questionable)   Arousal/Participation Alert;Responsive   Attention Within functional limits   Orientation Level Oriented to person;Oriented to place;Oriented to situation;Disoriented to time   Memory Decreased recall of recent events   Following Commands Follows all commands and directions without difficulty   Assessment   Limitation Decreased ADL status;Decreased endurance;Decreased self-care " trans;Decreased high-level ADLs   Prognosis Good   Assessment Patient is a 73 y.o. male seen for OT evaluation s/p admit to [facility] on 3/5/2024 w/Syncope. Commorbidities affecting patient's functional performance at time of assessment include: Syncope, abnormal CPK, leukocytosis, Diabetes, elevated lactic acid level.  Orders placed for OT evaluation and treatment.  Performed at least two patient identifiers during session including name and wristband.  Prior to admission, Patient reported independent with ADLs/ IADLs, ambulatory without AD, Patient lives with family in a 2 story house, 6 BELLE and full flight to second floor bedroom.  Personal factors affecting patient at time of initial evaluation include: limited caregiver support, limited insight into deficits, decreased initiation and engagement, difficulty performing ADLs, and difficulty performing IADLs. Upon evaluation, patient requires minimal  assist for UB ADLs, maximal assist for LB ADLs, transfers and functional ambulation in room and bathroom with minimal  assist x 2,  with the use of Rolling Walker.  Occupational performance is affected by the following deficits: orientation, decreased functional use of BUEs, dynamic sit/ stand balance deficit with poor standing tolerance time for self care and functional mobility, decreased activity tolerance, and postural control and postural alignment deficit, requiring external assistance to complete transitional movements.  Patient to benefit from continued Occupational Therapy treatment while in the hospital to address deficits as defined above and maximize level of functional independence with ADLs and functional mobility. Occupational Performance areas to address include: bathing/ shower, dressing, toilet hygiene, transfer to all surfaces, functional mobility, health maintenance, and Leisure Participation. From OT standpoint, recommendation at time of d/c would be Level 2 Rehab.   Plan   Treatment  Interventions ADL retraining;Functional transfer training;Endurance training;Patient/family training;Equipment evaluation/education;Compensatory technique education;Continued evaluation;Energy conservation;Activityengagement   Goal Expiration Date 03/20/24   Discharge Recommendation   Rehab Resource Intensity Level, OT II (Moderate Resource Intensity)   AM-PAC Daily Activity Inpatient   Lower Body Dressing 2   Bathing 2   Toileting 3   Upper Body Dressing 3   Grooming 3   Eating 4   Daily Activity Raw Score 17   Daily Activity Standardized Score (Calc for Raw Score >=11) 37.26   AM-PAC Applied Cognition Inpatient   Following a Speech/Presentation 4   Understanding Ordinary Conversation 4   Taking Medications 2   Remembering Where Things Are Placed or Put Away 2   Remembering List of 4-5 Errands 2   Taking Care of Complicated Tasks 1   Applied Cognition Raw Score 15   Applied Cognition Standardized Score 33.54   Barthel Index   Feeding 10   Bathing 0   Grooming Score 0   Dressing Score 5   Bladder Score 5   Bowels Score 5   Toilet Use Score 5   Transfers (Bed/Chair) Score 5   Mobility (Level Surface) Score 0   Stairs Score 0   Barthel Index Score 35         1 - Patient will verbalize and demonstrate use of energy conservation/ deep breathing technique and work simplification skills during functional activity with no verbal cues.    2 - Patient will verbalize and demonstrate good body mechanics and joint protection techniques during  ADLs/ IADLs with no verbal cues.    3 - Patient will increase OOB/ sitting tolerance to 2-4 hours per day for increased participation in self care and leisure tasks with no s/s of exertion.    4 - Patient will increase standing tolerance time to 5  minutes with unilateral UE support to complete sink level ADLs@ mod I level.    5 - Patient will increase sitting tolerance at edge of bed to 20 minutes to complete UB ADLs @ set up assist level.    6 - Patient will transfer bed to Chair /  toilet at Set up assist level with AD as indicated.     7 - Patient will complete UB ADLs with set up assist.     8 - Patient will complete LB ADLs with min assist with the use of adaptive equipment.     9 - Patient will complete toileting hygiene with set up assist/ supervision for thoroughness    10 - Patient/ Family  will demonstrate competency with UE Home Exercise Program.

## 2024-03-06 NOTE — ASSESSMENT & PLAN NOTE
73-year-old male past medical history of type 2 diabetes on glipizide was found down in the bathroom by his wife incontinent of urine and stool.  EKG without any acute findings  Telemetry without any acute abnormalities  Echo revealed normal EF with no acute abnormalities  Neurology consulted  Planning for MRI brain, CTA head and neck  Continue telemetry monitoring

## 2024-03-06 NOTE — PHYSICAL THERAPY NOTE
Physical Therapy Evaluation     Patient's Name: Drew Santos    Admitting Diagnosis  Syncope [R55]  Weakness [R53.1]    Problem List  Patient Active Problem List   Diagnosis    Syncope    Elevated lactic acid level    Diabetes (HCC)    Leukocytosis    Abnormal CPK     Past Medical History  Past Medical History:   Diagnosis Date    Diabetes mellitus (HCC)     Hypertension      Past Surgical History  History reviewed. No pertinent surgical history.     03/06/24 0849   PT Last Visit   PT Visit Date 03/06/24   Note Type   Note type Evaluation and Treatment   Pain Assessment   Pain Assessment Tool 0-10   Pain Score No Pain   Restrictions/Precautions   Weight Bearing Precautions Per Order No   Braces or Orthoses Other (Comment)  (none per patient)   Other Precautions Cognitive;Chair Alarm;Bed Alarm;Fall Risk   Home Living   Type of Home House   Home Layout Two level;Bed/bath upstairs;Stairs to enter with rails  (6 BELLE; flight of stairs to the 2nd floor)   Bathroom Shower/Tub Tub/shower unit   Bathroom Toilet Standard   Bathroom Equipment Other (Comment)  (none per patient)   Bathroom Accessibility Accessible   Home Equipment Other (Comment)  (none per patient)   Additional Comments Pt ambulates without an AD.   Prior Function   Level of [place] Independent with functional mobility;Independent with ADLs;Independent with IADLS   Lives With Spouse   Receives Help From Family   IADLs Independent with driving;Independent with meal prep;Independent with medication management   Falls in the last 6 months 1 to 4  (1 fall, PTA)   Vocational Retired   General   Family/Caregiver Present No   Cognition   Overall Cognitive Status Impaired   Arousal/Participation Alert   Attention Within functional limits   Orientation Level Oriented to person;Oriented to place;Oriented to situation;Disoriented to time   Memory Decreased recall of recent events   Following Commands Follows one step commands without difficulty   Comments Pt  "agreeable to PT.   Subjective   Subjective \"I haven't walked yet.\"   RLE Assessment   RLE Assessment X   Strength RLE   RLE Overall Strength 4-/5   LLE Assessment   LLE Assessment X   Strength LLE   LLE Overall Strength 4-/5   Light Touch   RLE Light Touch Grossly intact   LLE Light Touch Grossly intact   Bed Mobility   Supine to Sit 3  Moderate assistance   Additional items Assist x 1;HOB elevated;Bedrails;Increased time required;Verbal cues;LE management   Transfers   Sit to Stand 3  Moderate assistance   Additional items Assist x 1;Increased time required;Verbal cues   Stand to Sit 3  Moderate assistance   Additional items Assist x 1;Increased time required;Verbal cues;Armrests   Ambulation/Elevation   Gait pattern Decreased toe off;Decreased heel strike;Decreased hip extension;Short stride;Shuffling   Gait Assistance 3  Moderate assist   Additional items Assist x 1;Verbal cues   Assistive Device Rolling walker   Distance 40 feet   Balance   Static Sitting Fair   Dynamic Sitting Fair -   Static Standing Poor +   Dynamic Standing Poor   Ambulatory Poor   Endurance Deficit   Endurance Deficit Yes   Endurance Deficit Description decreased activity tolerance   Activity Tolerance   Activity Tolerance Patient tolerated treatment well   Medical Staff Made Aware OT Joann  (Co-evaluation performed with OT secondary to complex medical condition of patient and regression of functional status from baseline. PT/OT goals were addressed separately.)   Nurse Made Aware SMITH Nava   Assessment   Prognosis Good   Problem List Decreased strength;Decreased endurance;Impaired balance;Decreased mobility;Decreased cognition   Assessment Pt is 73 year old male seen for PT evaluation s/p admit to [facility] on 3/5/2024 with Syncope. PT consulted to assess pt's functional mobility and discharge needs. Order placed for PT evaluation and treatment, with activity as tolerated order. Comorbidities affecting pt's physical performance at " time of assessment include elevated lactic acid level, diabetes, leukocytosis, and abnormal CPK. Prior to hospitalization, pt was independent with all functional mobility without an AD. Pt ambulates household and community distances. Pt resides with his spouse, in a two level house with six steps to enter. Personal factors affecting pt at time of initial evaluation include lives in a two story house, stairs to enter home, ambulating with an assistive device, difficulty ambulating household distances, inability to ambulate community distances, inability to navigate level surfaces without external assistance, unable to perform dynamic tasks in the community, positive fall history, difficulty performing ADLs, and inability to perform IADLs. Please find objective findings from PT assessment regarding body systems outlined above with impairments and limitations including weakness, impaired balance, decreased endurance, gait deviations, decreased activity tolerance, decreased functional mobility tolerance, fall risk, and decreased cognition. The following objective measures were performed on initial evaluation Barthel Index: 35/100, Modified Smith: 4 (moderate/severe disability), and AM-PAC 6-Clicks: 12/24. Pt's clinical presentation is currently unstable/unpredictable seen in pt's presentation of need for ongoing medical management/monitoring, pt is a fall risk, pt is currently requiring use of a RW for safe ambulation, and pt requires cues and assist for safety with functional mobility. Pt to benefit from continued PT treatment to address deficits as defined above and maximize pt's level of function and independence with mobility. From a PT standpoint, recommendation at time of discharge would be level 2, moderate resource intensity in order to facilitate return to prior level of function.   Barriers to Discharge Inaccessible home environment;Decreased caregiver support   Goals   STG Expiration Date 03/16/24   Short  Term Goal #1 In 10 days: Increase bilateral LE strength 1/2 grade to facilitate independent mobility, Perform all bed mobility tasks modified independent to decrease caregiver burden, Perform all transfers modified independent to improve independence, Ambulate > 150 ft. with least restrictive assistive device modified independent w/o LOB and w/ normalized gait pattern 100% of the time, Navigate 12 stairs modified independent with unilateral handrail to facilitate return to previous living environment, and Increase all balance 1/2 grade to decrease risk for falls   PT Treatment Day 1   Plan   Treatment/Interventions Functional transfer training;LE strengthening/ROM;Elevations;Therapeutic exercise;Endurance training;Cognitive reorientation;Patient/family training;Bed mobility;Gait training;Spoke to nursing;OT   PT Frequency 3-5x/wk   Discharge Recommendation   Rehab Resource Intensity Level, PT II (Moderate Resource Intensity)   AM-PAC Basic Mobility Inpatient   Turning in Flat Bed Without Bedrails 2   Lying on Back to Sitting on Edge of Flat Bed Without Bedrails 2   Moving Bed to Chair 2   Standing Up From Chair Using Arms 2   Walk in Room 2   Climb 3-5 Stairs With Railing 2   Basic Mobility Inpatient Raw Score 12   Basic Mobility Standardized Score 32.23   Highest Level Of Mobility   -[facility] Goal 4: Move to chair/commode   JH-HLM Achieved 7: Walk 25 feet or more   Modified Radha Scale   Modified [place] Scale 4   Barthel Index   Feeding 10   Bathing 0   Grooming Score 0   Dressing Score 5   Bladder Score 5   Bowels Score 5   Toilet Use Score 5   Transfers (Bed/Chair) Score 5   Mobility (Level Surface) Score 0   Stairs Score 0   Barthel Index Score 35   Additional Treatment Session   Start Time 0839   End Time 0849   Treatment Assessment Pt agreeable to PT treatment session following PT evaluation. Pt performed seated therapeutic exercise as indicated below. Pt required verbal cues for correct technique and form. Pt  tolerated therapeutic exercise well without complaints of pain. Pt continues to exhibit decreased lower extremity strength, impaired balance, decreased endurance, gait deviations, and decreased functional mobility. PT to continue to recommend level 2, moderate resource intensity. PT to continue to follow and treat as appropriate.   Exercises   Hip Flexion Sitting;20 reps;AROM;Bilateral   Hip Adduction Sitting;20 reps;AROM;Bilateral   Knee AROM Long Arc Quad Sitting;20 reps;AROM;Bilateral   Ankle Pumps Sitting;20 reps;AROM;Bilateral   End of Consult   Patient Position at End of Consult Bedside chair;Bed/Chair alarm activated;All needs within reach  (RN aware)     PT Evaluation Time: 0826-0838    PT Treatment Time: [id number]  10 minutes    Reva Valentine, PT, DPT

## 2024-03-06 NOTE — CONSULTS
Consultation - Neurology   Drew Santos 73 y.o. male MRN: [id number]  Unit/Bed#: -01 Encounter: [id number]      Assessment/Plan     * Syncope  Assessment & Plan  73 y.o. male with HTN and DM2 who presented to [facility] on 3/5/2024 due to unwitnessed syncopal episode with urinary and fecal incontinence.  Patient recalls going downstairs to the basement to play his drums.  The next thing he recalls is his wife trying to wake him up and he was lying on the floor.  He was incontinent of urine and stool, but no tongue bite.  Unclear how long patient was unresponsive.  No prodromal symptoms.  No history of seizures or syncopal episodes.    Upon arrival to the ED, /94.  CT head negative for acute intracranial abnormalities.  Labs revealed lactic acid 2.1, , and WBC 10.51.    Echo: EF 65%, normal atrial size bilaterally, normal wall motion    Patient has returned to baseline.  Etiology for the syncopal episode remains unclear.  Possible seizure versus vasovagal syncope.    Plan:  - MRI brain w/wo contrast pending (however there will be a delay as the machine is currently broken)  - CTA head/neck pending  - Routine EEG pending  - Check orthostatic BP  - Will hold off on starting AEDs at this time  - [employer] form completed and submitted online.  Will need to discuss driving restrictions with the patient.  - PT/OT  - Telemetry  - Frequent neuro checks. Continue to monitor and notify neurology with any changes.   - Medical management and supportive care per primary team. Correction of any metabolic or infectious disturbances            Recommendations for outpatient neurological follow up have yet to be determined.    History of Present Illness     Reason for Consult / Principal Problem: syncope  Hx and PE limited by: poor historian  HPI: Drew Santos is a 73 y.o. male with HTN and DM2 who presented to [facility] on 3/5/2024 due to unwitnessed syncopal episode with urinary and fecal incontinence.    History  obtained per patient and chart review.  Yesterday, patient woke up in his normal state, but did not eat breakfast.  He went downstairs to the basement to play his drums.  His wife did not hear from him for the next 3 hours and found him on the floor.    He remembers playing his drums and the next thing he recalls is his wife trying to wake him up and get him off of the floor.  He did not have any prodromal symptoms prior to loss of consciousness.  He was incontinent of stool and urine.  No tongue bite.  He had generalized weakness, so EMS was called.  His wife checked his blood sugar which was reportedly normal.  He had a recent increase in his home glipizide, but no other medication changes.  No history of syncopal episodes or seizures.    Upon arrival to the ED, /94.  CT head negative for acute intracranial abnormalities.  Labs revealed lactic acid 2.1, , and WBC 10.51.    On exam today, patient feels back to baseline.  No numbness/tingling.  He does have some weakness in the left leg, which he states began yesterday.  No family history of seizures.  No history of head trauma or meningitis.  No history of alcohol, tobacco, or illicit drug use.    Inpatient consult to Neurology  Consult performed by: Candie Pitts PA-C  Consult ordered by: Kit Borges MD          Review of Systems   Neurological:  Positive for syncope and weakness. Negative for seizures and numbness.   All other systems reviewed and are negative.      Historical Information   Past Medical History:   Diagnosis Date    Diabetes mellitus (HCC)     Hypertension      History reviewed. No pertinent surgical history.  Social History   Social History     Substance and Sexual Activity   Alcohol Use Not Currently     Social History     Substance and Sexual Activity   Drug Use Never     E-Cigarette/Vaping    E-Cigarette Use Never User      E-Cigarette/Vaping Substances    Nicotine No     THC No     CBD No     Flavoring No     Other No      "Unknown No      Social History     Tobacco Use   Smoking Status Never   Smokeless Tobacco Never     Family History: History reviewed. No pertinent family history.    Review of previous medical records was completed. Reviewed prior notes, labs, CT head    Meds/Allergies   all current active meds have been reviewed, current meds:   Current Facility-Administered Medications   Medication Dose Route Frequency    enoxaparin (LOVENOX) subcutaneous injection 40 mg  40 mg Subcutaneous Daily    insulin lispro (HumALOG/ADMELOG) 100 units/mL subcutaneous injection 1-5 Units  1-5 Units Subcutaneous TID AC    lisinopril (ZESTRIL) tablet 10 mg  10 mg Oral Daily   , and PTA meds:   Prior to Admission Medications   Prescriptions Last Dose Informant Patient Reported? Taking?   dapagliflozin (Farxiga) 10 MG tablet   Yes Yes   Sig: Take by mouth daily   glipiZIDE (glipiZIDE XL) 10 mg 24 hr tablet   Yes Yes   Sig: Take 10 mg by mouth daily      Facility-Administered Medications: None       No Known Allergies    Objective   Vitals:Blood pressure 127/71, pulse 70, temperature 98 °F (36.7 °C), resp. rate 18, height 6' 5\" (1.956 m), weight 130 kg (286 lb 13.1 oz), SpO2 96%.,Body mass index is 34.01 kg/m².    Intake/Output Summary (Last 24 hours) at 3/6/2024 0756  Last data filed at 3/6/2024 0554  Gross per 24 hour   Intake 1000 ml   Output 0 ml   Net 1000 ml       Invasive Devices:   Invasive Devices       Peripheral Intravenous Line  Duration             Peripheral IV 03/05/24 Left Antecubital <1 day                    Physical Exam  Vitals and nursing note reviewed.   Constitutional:       Appearance: Normal appearance.      Comments: Patient sitting comfortably in bedside chair in no acute distress   HENT:      Head: Normocephalic and atraumatic.      Mouth/Throat:      Mouth: Mucous membranes are moist.      Pharynx: Oropharynx is clear.   Eyes:      Extraocular Movements: Extraocular movements intact.      Conjunctiva/sclera: " Conjunctivae normal.      Pupils: Pupils are equal, round, and reactive to light.   Pulmonary:      Effort: Pulmonary effort is normal.   Skin:     General: Skin is warm and dry.   Neurological:      Mental Status: He is alert and oriented to person, place, and time.      Deep Tendon Reflexes:      Reflex Scores:       Bicep reflexes are 1+ on the right side and 1+ on the left side.       Brachioradialis reflexes are 1+ on the right side and 1+ on the left side.       Patellar reflexes are 1+ on the right side and 1+ on the left side.       Achilles reflexes are 1+ on the right side and 1+ on the left side.     Comments: See full neuro exam below       Neurologic Exam     Mental Status   Oriented to person, place, and time.   Patient awake and alert.  Oriented to person, place, month, and year.  No dysarthria or aphasia.  Able to follow simple midline and appendicular commands.     Cranial Nerves     CN III, IV, VI   Pupils are equal, round, and reactive to light.  EOMs intact without nystagmus.  No visual field deficits.  Facial sensation to light touch intact throughout.  Facial expressions full and symmetric.  Tongue midline.  Soft palate raises symmetrically.  Hearing grossly intact.     Motor Exam   Muscle bulk: normal  Overall muscle tone: normal  Right arm pronator drift: absent  Left arm pronator drift: absent  5/5 strength in bilateral upper extremities.  4+/5 L iliopsoas and 4+/5 R iliopsoas muscle.  5/5 bilateral dorsiflexion/plantarflexion.     Sensory Exam   Sensation to light touch intact throughout.     Gait, Coordination, and Reflexes     Reflexes   Right brachioradialis: 1+  Left brachioradialis: 1+  Right biceps: 1+  Left biceps: 1+  Right patellar: 1+  Left patellar: 1+  Right achilles: 1+  Left achilles: 1+  Right Fitzpatrick: absent  Left Fitzpatrick: absent  No ataxia with finger to nose or heel to shin bilaterally  No resting or action tremor       Lab Results: I have personally reviewed pertinent  "reports.  , CBC:   Results from last 7 days   Lab Units 03/06/24  0524 03/05/24  1228   WBC Thousand/uL 9.06 10.51*   RBC Million/uL 4.26 4.88   HEMOGLOBIN g/dL 13.3 15.5   HEMATOCRIT % 40.1 46.3   MCV fL 94 95   PLATELETS Thousands/uL 235 285   , BMP/CMP:   Results from last 7 days   Lab Units 03/06/24  0524 03/05/24  1228   SODIUM mmol/L 138 139   POTASSIUM mmol/L 4.0 4.2   CHLORIDE mmol/L 109* 105   CO2 mmol/L 22 25   BUN mg/dL 17 16   CREATININE mg/dL 0.97 0.97   CALCIUM mg/dL 8.7 9.5   AST U/L 21 23   ALT U/L 15 18   ALK PHOS U/L 64 80   EGFR ml/min/1.73sq m 76 76   , Vitamin B12:   , HgBA1C:   , TSH:   , Coagulation:   Results from last 7 days   Lab Units 03/05/24  1228   INR  1.00   , Lipid Profile:   , Ammonia:   , Urinalysis:       Invalid input(s): \"URIBILINOGEN\", Drug Screen:   , Medication Drug Levels:       Invalid input(s): \"CARBAMAZEPINE\", \"LACOSAMIDE\", \"OXCARBAZEPINE\"  Imaging Studies: I have personally reviewed pertinent reports.   and I have personally reviewed pertinent films in PACS  EKG, Pathology, and Other Studies: I have personally reviewed pertinent reports.   and I have personally reviewed pertinent films in PACS  VTE Prophylaxis: Sequential compression device (Venodyne)  and Enoxaparin (Lovenox)    Code Status: Level 1 - Full Code  Advance Directive and Living Will:      Power of :    POLST:        "

## 2024-03-06 NOTE — ASSESSMENT & PLAN NOTE
73 y.o. male with HTN and DM2 who presented to  [facility] on 3/5/2024 due to unwitnessed syncopal episode with urinary and fecal incontinence.  Patient recalled going downstairs to the basement to play his drums, but the next thing he recalled is his wife trying to wake him up and he was lying on the floor.  He was incontinent of urine and stool, but no tongue bite.  Unclear how long patient was unresponsive.  No prodromal symptoms.  No history of seizures or syncopal episodes.  BP on presentation 132/94.    Work up:  CT head negative for acute intracranial abnormalities.  CTA head and neck:  CT brain:  No acute intracranial abnormality. Chronic microangiopathic ischemic changes.  CTA head: Negative for large vessel intracranial occlusion. Areas of mild stenosis along the bilateral intracranial carotid arteries. Mild to moderate narrowing of the superior basilar artery.  CTA neck: Partially calcified atheromatous plaque along the bilateral carotid bulbs without a hemodynamically significant stenosis. The cervical vertebral arteries are patent, with moderate origin stenosis on the left.  MRI brain w/wo contrast: [place] territory subacute infarcts in the right NELL/MCA region.   Routine EEG: Mild diffuse cerebral dysfunction. No electrographic seizures or interictal epileptiform discharges were seen. No diagnostic clinical events were captured.   Initial labs revealed lactic acid 2.1, , and WBC 10.51.  , A1C 7.1, TSH 5.228  Echo: EF 65%, normal atrial size bilaterally, normal wall motion    Patient has returned to baseline.  Etiology for the syncopal episode remains unclear.  Possible seizure versus vasovagal syncope vs cardiogenic.    Plan:  - Stroke pathway was noted below  - Check orthostatic BPs  - Check labs: B12, folate, free T4  - Will hold off on starting AEDs at this time  - [employer] form completed and submitted online by previous neurology provider.  Attending discussed driving restrictions with  patient.  - Recommend Cardiology consult for further evaluation of possible cardiogenic etiology  - PT/OT  - Telemetry  - Frequent neuro checks. Continue to monitor and notify neurology with any changes.   - Medical management and supportive care per primary team. Correction of any metabolic or infectious disturbances

## 2024-03-06 NOTE — ASSESSMENT & PLAN NOTE
Mild CPK elevation to 355 in the setting of being found down  Received iv fluids   Will repat ck in the am

## 2024-03-06 NOTE — PLAN OF CARE
Problem: OCCUPATIONAL THERAPY ADULT  Goal: Performs self-care activities at highest level of function for planned discharge setting.  See evaluation for individualized goals.  Description: Treatment Interventions: ADL retraining, Functional transfer training, Endurance training, Patient/family training, Equipment evaluation/education, Compensatory technique education, Continued evaluation, Energy conservation, Activityengagement          See flowsheet documentation for full assessment, interventions and recommendations.   Note: Limitation: Decreased ADL status, Decreased endurance, Decreased self-care trans, Decreased high-level ADLs  Prognosis: Good  Assessment: Patient is a 73 y.o. male seen for OT evaluation s/p admit to [facility] on 3/5/2024 w/Syncope. Commorbidities affecting patient's functional performance at time of assessment include: Syncope, abnormal CPK, leukocytosis, Diabetes, elevated lactic acid level.  Orders placed for OT evaluation and treatment.  Performed at least two patient identifiers during session including name and wristband.  Prior to admission, Patient reported independent with ADLs/ IADLs, ambulatory without AD, Patient lives with family in a 2 story house, 6 BELLE and full flight to second floor bedroom.  Personal factors affecting patient at time of initial evaluation include: limited caregiver support, limited insight into deficits, decreased initiation and engagement, difficulty performing ADLs, and difficulty performing IADLs. Upon evaluation, patient requires minimal  assist for UB ADLs, maximal assist for LB ADLs, transfers and functional ambulation in room and bathroom with minimal  assist x 2,  with the use of Rolling Walker.  Occupational performance is affected by the following deficits: orientation, decreased functional use of BUEs, dynamic sit/ stand balance deficit with poor standing tolerance time for self care and functional mobility, decreased activity  tolerance, and postural control and postural alignment deficit, requiring external assistance to complete transitional movements.  Patient to benefit from continued Occupational Therapy treatment while in the hospital to address deficits as defined above and maximize level of functional independence with ADLs and functional mobility. Occupational Performance areas to address include: bathing/ shower, dressing, toilet hygiene, transfer to all surfaces, functional mobility, health maintenance, and Leisure Participation. From OT standpoint, recommendation at time of d/c would be Level 2 Rehab.     Rehab Resource Intensity Level, OT: II (Moderate Resource Intensity)

## 2024-03-06 NOTE — PROGRESS NOTES
[facility]  Progress Note  Name: Drew Santos I  MRN: [id number]  Unit/Bed#: -01 I Date of Admission: 3/5/2024   Date of Service: 3/6/2024 I Hospital Day: 0    Assessment/Plan   * Syncope  Assessment & Plan  73-year-old male past medical history of type 2 diabetes on glipizide was found down in the bathroom by his wife incontinent of urine and stool.  EKG without any acute findings  Telemetry without any acute abnormalities  Echo revealed normal EF with no acute abnormalities  Neurology consulted  Planning for MRI brain, CTA head and neck  Continue telemetry monitoring    Abnormal CPK  Assessment & Plan  Mild CPK elevation to 355 in the setting of being found down  Received iv fluids   Will repat ck in the am    Leukocytosis  Assessment & Plan  WBC 10.5 with normal diff   Covid/rvp negative   CXR negative   No other infectious signs or symptoms at this time    Diabetes (HCC)  Assessment & Plan  Hold p.o. agents  Sliding-scale insulin         VTE Pharmacologic Prophylaxis:   Pharmacologic: Enoxaparin (Lovenox)  Mechanical VTE Prophylaxis in Place: Yes    Patient Centered Rounds: I have performed bedside rounds with nursing staff today.    Discussions with Specialists or Other Care Team Provider: cm, nursing    Education and Discussions with Family / Patient: pt , wife    Time Spent for Care: 30 minutes.  More than 50% of total time spent on counseling and coordination of care as described above.    Current Length of Stay: 0 day(s)    Current Patient Status: Observation   Certification Statement: The patient will continue to require additional inpatient hospital stay due to see below    Discharge Plan: Pending MRI and CTA further evaluation of syncope anticipate 24 to 48 hours    Code Status: Level 1 - Full Code      Subjective:   Denies cp, sob, cough, fevers    Objective:     Vitals:   Temp (24hrs), Av.9 °F (36.6 °C), Min:97.7 °F (36.5 °C), Max:98.1 °F (36.7 °C)    Temp:  [97.7 °F  (36.5 °C)-98.1 °F (36.7 °C)] 98 °F (36.7 °C)  HR:  [70-97] 87  Resp:  [14-22] 18  BP: (112-177)/() 117/63  SpO2:  [91 %-98 %] 93 %  Body mass index is 34.01 kg/m².     Input and Output Summary (last 24 hours):       Intake/Output Summary (Last 24 hours) at 3/6/2024 1132  Last data filed at 3/6/2024 0700  Gross per 24 hour   Intake 1360 ml   Output 0 ml   Net 1360 ml       Physical Exam:     Physical Exam  Constitutional:       General: He is not in acute distress.     Appearance: He is well-developed. He is not diaphoretic.   HENT:      Head: Normocephalic and atraumatic.      Nose: Nose normal.      Mouth/Throat:      Pharynx: No oropharyngeal exudate.   Eyes:      General: No scleral icterus.     Conjunctiva/sclera: Conjunctivae normal.   Cardiovascular:      Rate and Rhythm: Normal rate and regular rhythm.      Heart sounds: Normal heart sounds. No murmur heard.     No friction rub. No gallop.   Pulmonary:      Effort: Pulmonary effort is normal. No respiratory distress.      Breath sounds: Normal breath sounds. No wheezing or rales.   Chest:      Chest wall: No tenderness.   Abdominal:      General: Bowel sounds are normal. There is no distension.      Palpations: Abdomen is soft.      Tenderness: There is no abdominal tenderness. There is no guarding.   Musculoskeletal:         General: No tenderness or deformity. Normal range of motion.      Cervical back: Normal range of motion and neck supple.   Skin:     General: Skin is warm and dry.      Findings: No erythema.   Neurological:      Mental Status: He is alert. Mental status is at baseline.       (   Additional Data:     Labs:    Results from last 7 days   Lab Units 03/06/24  0524   WBC Thousand/uL 9.06   HEMOGLOBIN g/dL 13.3   HEMATOCRIT % 40.1   PLATELETS Thousands/uL 235   NEUTROS PCT % 54   LYMPHS PCT % 34   MONOS PCT % 9   EOS PCT % 2     Results from last 7 days   Lab Units 03/06/24  0524   SODIUM mmol/L 138   POTASSIUM mmol/L 4.0   CHLORIDE  mmol/L 109*   CO2 mmol/L 22   BUN mg/dL 17   CREATININE mg/dL 0.97   ANION GAP mmol/L 7   CALCIUM mg/dL 8.7   ALBUMIN g/dL 3.3*   TOTAL BILIRUBIN mg/dL 0.73   ALK PHOS U/L 64   ALT U/L 15   AST U/L 21   GLUCOSE RANDOM mg/dL 136     Results from last 7 days   Lab Units 03/05/24  1228   INR  1.00     Results from last 7 days   Lab Units 03/06/24  1025 03/06/24  0549 03/05/24  2101 03/05/24  1646   POC GLUCOSE mg/dl 189* 135 160* 151*         Results from last 7 days   Lab Units 03/06/24  0524 03/05/24  1547 03/05/24  1228   LACTIC ACID mmol/L  --  1.5 2.1*   PROCALCITONIN ng/ml 0.08  --  0.06           * I Have Reviewed All Lab Data Listed Above.  * Additional Pertinent Lab Tests Reviewed: All Labs Within Last 24 Hours Reviewed    Imaging:    Imaging Reports Reviewed Today Include: na  Imaging Personally Reviewed by Myself Includes:  na    Recent Cultures (last 7 days):     Results from last 7 days   Lab Units 03/05/24  1425 03/05/24  1228   BLOOD CULTURE  Received in Microbiology Lab. Culture in Progress. Received in Microbiology Lab. Culture in Progress.       Last 24 Hours Medication List:   Current Facility-Administered Medications   Medication Dose Route Frequency Provider Last Rate    enoxaparin  40 mg Subcutaneous Daily Kit Borges MD      insulin lispro  1-5 Units Subcutaneous TID AC Kit Borges MD      lisinopril  10 mg Oral Daily Kit Borges MD          Today, Patient Was Seen By: Johnathan Sagastume MD    ** Please Note: Dictation voice to text software may have been used in the creation of this document. **

## 2024-03-06 NOTE — CASE MANAGEMENT
Case Management Assessment & Discharge Planning Note    Patient name Drew Santos  Location /-01 MRN [id number]  : 1950 Date 3/6/2024       Current Admission Date: 3/5/2024  Current Admission Diagnosis:Syncope   Patient Active Problem List    Diagnosis Date Noted    Syncope 2024    Elevated lactic acid level 2024    Diabetes (HCC) 2024    Leukocytosis 2024    Abnormal CPK 2024      LOS (days): 0  Geometric Mean LOS (GMLOS) (days): 2.4  Days to GMLOS:2.2     OBJECTIVE:    Risk of Unplanned Readmission Score: 7.44        Current admission status: Inpatient       Preferred Pharmacy:   [id number] [place], [place] - [address]  [facility] [address]  Phone: [phone] Fax: [phone]    Primary Care Provider: Sal Bowman MD    Primary Insurance: [employer] REP  Secondary Insurance:     ASSESSMENT:  Active Health Care Proxies       Kristi Santos Health Care Representative - Spouse   Primary Phone: [phone] (Mobile)  Home Phone: [phone]                 Advance Directives  Does patient have a Health Care POA?: Yes  Does patient have Advance Directives?: Yes  Advance Directives: Power of  for health care  Primary Contact: Kenisha Rosario      Readmission Root Cause  30 Day Readmission: No    Patient Information  Admitted from:: Home  Mental Status: Alert  During Assessment patient was accompanied by: Spouse  Assessment information provided by:: Spouse  Primary Caregiver: Self  Support Systems: Self, Spouse/significant other, Friend  County of Residence: [place]  What city do you live in?: [place]  Home entry access options. Select all that apply.: Stairs  Number of steps to enter home.: 5  Do the steps have railings?: Yes  Type of Current Residence: [address] [place] [address] home  Upon entering residence, is there a bedroom on the main floor (no further steps)?: No  A bedroom is located on the following floor levels of  residence (select all that apply):: 2nd Floor  Upon entering residence, is there a bathroom on the main floor (no further steps)?: Yes  Number of steps to 2nd floor from main floor: 7  Living Arrangements: Lives w/ Spouse/significant other  Is patient a ?: Yes  Is patient active with [facility] ([employer])?: Yes  Is patient service connected?: Yes    Activities of Daily Living Prior to Admission  Functional Status: Independent  Completes ADLs independently?: Yes  Ambulates independently?: Yes  Does patient use assisted devices?: No  Does patient currently own DME?: No  Does patient have a history of Outpatient Therapy (PT/OT)?: Yes  Does the patient have a history of Short-Term Rehab?: No  Does patient have a history of HHC?: No  Does patient currently have HHC?: No      Patient Information Continued  Income Source: Pension/[employer]  Does patient have prescription coverage?: Yes  Does patient receive dialysis treatments?: No  Does patient have a history of substance abuse?: No  Does patient have a history of Mental Health Diagnosis?: No      Means of Transportation  Means of Transport to Appts:: Drives Self      Social Determinants of Health (SDOH)      Flowsheet Row Most Recent Value   Housing Stability    In the last 12 months, was there a time when you were not able to pay the mortgage or rent on time? N   In the last 12 months, how many places have you lived? 1   In the last 12 months, was there a time when you did not have a steady place to sleep or slept in a shelter (including now)? N   Transportation Needs    In the past 12 months, has lack of transportation kept you from medical appointments or from getting medications? no   In the past 12 months, has lack of transportation kept you from meetings, work, or from getting things needed for daily living? No   Food Insecurity    Within the past 12 months, you worried that your food would run out before you got the money to buy more. Never true   Within  the past 12 months, the food you bought just didn't last and you didn't have money to get more. Never true   Utilities    In the past 12 months has the electric, gas, oil, or water company threatened to shut off services in your home? No            DISCHARGE DETAILS:    Discharge planning discussed with:: Patient and spouse  Freedom of Choice: Yes  Comments - Freedom of Choice: CM discussed discharge planning and freedom of choice if services are recommended by [facility]. Patient is requesting HHC/PT/OT upon discharge.  CM contacted family/caregiver?: Yes  Were Treatment Team discharge recommendations reviewed with patient/caregiver?: Yes  Did patient/caregiver verbalize understanding of patient care needs?: Yes  Were patient/caregiver advised of the risks associated with not following Treatment Team discharge recommendations?: Yes    Contacts  Patient Contacts: Kenisha Rosario  Relationship to Patient:: Family  Contact Method: In Person  Reason/Outcome: Continuity of Care, Discharge Planning    Requested Home Health Care         Is the patient interested in HHC at discharge?: Yes  Home Health Discipline requested:: Nursing, Physical Therapy, Occupational Therapy  Home Health Follow-Up Provider:: PCP  Home Health Services Needed:: Evaluate Functional Status and Safety, Gait/ADL Training, Strengthening/Theraputic Exercises to Improve Function    DME Referral Provided  Referral made for DME?: Yes  DME referral completed for the following items:: Walker, Shower Chair  DME Supplier Name:: [employer]    Other Referral/Resources/Interventions Provided:  Interventions: HHC    Would you like to participate in our [facility] service program?  : No - Declined    Treatment Team Recommendation: Home with Home Health Care  Discharge Destination Plan:: Home with Home Health Care  Transport at Discharge : Family     [place] [facility]  Walker and shower chair order placed in [place].

## 2024-03-06 NOTE — PLAN OF CARE
Problem: PHYSICAL THERAPY ADULT  Goal: Performs mobility at highest level of function for planned discharge setting.  See evaluation for individualized goals.  Description: Treatment/Interventions: Functional transfer training, LE strengthening/ROM, Elevations, Therapeutic exercise, Endurance training, Cognitive reorientation, Patient/family training, Bed mobility, Gait training, Spoke to nursing, OT          See flowsheet documentation for full assessment, interventions and recommendations.  Note: Prognosis: Good  Problem List: Decreased strength, Decreased endurance, Impaired balance, Decreased mobility, Decreased cognition  Assessment: Pt is 73 year old male seen for PT evaluation s/p admit to [facility] on 3/5/2024 with Syncope. PT consulted to assess pt's functional mobility and discharge needs. Order placed for PT evaluation and treatment, with activity as tolerated order. Comorbidities affecting pt's physical performance at time of assessment include elevated lactic acid level, diabetes, leukocytosis, and abnormal CPK. Prior to hospitalization, pt was independent with all functional mobility without an AD. Pt ambulates household and community distances. Pt resides with his spouse, in a two level house with six steps to enter. Personal factors affecting pt at time of initial evaluation include lives in a two story house, stairs to enter home, ambulating with an assistive device, difficulty ambulating household distances, inability to ambulate community distances, inability to navigate level surfaces without external assistance, unable to perform dynamic tasks in the community, positive fall history, difficulty performing ADLs, and inability to perform IADLs. Please find objective findings from PT assessment regarding body systems outlined above with impairments and limitations including weakness, impaired balance, decreased endurance, gait deviations, decreased activity tolerance, decreased functional  mobility tolerance, fall risk, and decreased cognition. The following objective measures were performed on initial evaluation Barthel Index: 35/100, Modified Radha: 4 (moderate/severe disability), and AM-PAC 6-Clicks: 12/24. Pt's clinical presentation is currently unstable/unpredictable seen in pt's presentation of need for ongoing medical management/monitoring, pt is a fall risk, pt is currently requiring use of a RW for safe ambulation, and pt requires cues and assist for safety with functional mobility. Pt to benefit from continued PT treatment to address deficits as defined above and maximize pt's level of function and independence with mobility. From a PT standpoint, recommendation at time of discharge would be level 2, moderate resource intensity in order to facilitate return to prior level of function.    Barriers to Discharge: Inaccessible home environment, Decreased caregiver support     Rehab Resource Intensity Level, PT: II (Moderate Resource Intensity)    See flowsheet documentation for full assessment.

## 2024-03-06 NOTE — PLAN OF CARE

## 2024-03-06 NOTE — UTILIZATION REVIEW
Initial Clinical Review      OBS order 3/5 1439 converted to IP on 3/6 1231 for workup of syncope     Admission: Date/Time/Statement:   Admission Orders (From admission, onward)       Ordered        03/06/24 1231  Inpatient Admission  Once            03/05/24 1439  Place in Observation  Once            03/05/24 1438  Place in Observation  Once                           Place in Observation     Standing Status:   Standing     Number of Occurrences:   1     Order Specific Question:   Level of Care     Answer:   Med Surg [16]     ED Arrival Information       Expected   -    Arrival   3/5/2024 11:48    Acuity   Emergent              Means of arrival   Ambulance    Escorted by   [facility]    Service   Hospitalist    Admission type   Emergency              Arrival complaint   Weakness             Chief Complaint   Patient presents with    Weakness - Generalized     Pt BIB EMS from home. Pt was found in basement bathroom on the floor by his wife. Pt stated he lowered himself on the ground and felt weak and defecated himself       Initial Presentation: 73 y.o. male to ED from home w/  PMH of diabetes type 2, HTN who presents with syncopal episode at home. He went downstairs to the restroom and his wife did not hear from him for the next 3 hours and found him down on the floor. She was unable to help him up and called EMS . She checked BS and was normal . Incontinent of bowel and bladder . Does report new onset of tremor last 24-48 hrs . Wife reports 1 yr ago had new onset L sided weakness , resolved on its own w/ PT .  and given 1l IVF . CT head wnl .  Admitted OBS status w/ syncope , abnormal CK . Plan for tele , echo , neuro eval , hold statin . DM SSI and monitor . Elevated lactic acid IVF and recheck until neg .     Day 2 :     3/6 IM Note   EKG w/o any acute findings . Neurology consulted . Planning for MRI , CTA head /neck . Tele .     3/6 Neuro Consult   Echo: EF 65%, normal atrial size bilaterally, normal  wall motion . MRI brain , routine EEG , CTA head pending . PT OT eval , tele , neuro checks . 4+/5 L iliopsoas and 4+/5 R iliopsoas muscle.     Date: 3/7    Day 3: Has surpassed a 2nd midnight with active treatments and services, which include f/u MRI  . Elevated CPK resolved w/ IVF . Wbc 8.09 . Vss , no further hypotension . Oriented x4 , follows commands . O2 sat 93-96 % on RA      1627 MRI remains pended     ED Triage Vitals   Temperature Pulse Respirations Blood Pressure SpO2   03/05/24 1153 03/05/24 1153 03/05/24 1153 03/05/24 1153 03/05/24 1153   97.7 °F (36.5 °C) 78 20 132/94 91 %      Temp Source Heart Rate Source Patient Position - Orthostatic VS BP Location FiO2 (%)   03/06/24 1539 03/05/24 1153 03/05/24 1153 03/05/24 1153 --   Oral Monitor Lying Right arm       Pain Score       03/05/24 1153       No Pain          Wt Readings from Last 1 Encounters:   03/05/24 130 kg (286 lb 13.1 oz)     Additional Vital Signs:   03/07/24 09:37:22 -- 91 -- 109/69 82 93 % -- --   03/07/24 07:17:01 -- 75 -- -- -- 94 % -- --   03/07/24 0717 98.1 °F (36.7 °C) -- 17 151/80 104 -- -- --   03/07/24 03:07:52 98 °F (36.7 °C) 79 18 151/80 104 93 % -- Lying   03/06/24 22:30:24 98.3 °F (36.8 °C) 69 18 125/73 90 96 % -- --   03/06/24 19:22:19 98.1 °F (36.7 °C) 78 19 113/65 81 95 % -- --   03/06/24 15:39:05 97.5 °F (36.4 °C) 64 18 122/69 87 95 % None (Room air) Lying   03/06/24 11:56:53 97.2 °F (36.2 °C) Abnormal  82 18 114/63 80 96 % -- --   03/06/24 09:17:25 -- 87 -- 117/63 81 93 % None (Room      Date/Time Temp Pulse Resp BP MAP (mmHg) SpO2 O2 Device Patient Position - Orthostatic VS   03/06/24 07:17:16 98 °F (36.7 °C) 70 18 127/71 90 96 % -- --   03/06/24 02:41:15 -- 78 18 112/56 75 95 % -- --   03/05/24 22:51:17 -- 74 18 137/72 94 96 % -- --   03/05/24 19:56:58 98.1 °F (36.7 °C) 91 16 118/63 81 95 % -- --   03/05/24 1800 -- 97 22 144/78 103 96 % -- --   03/05/24 1730 -- 93 19 137/70 95 98 % -- --   03/05/24 1700 -- 90 18 146/74 --  98 % None (Room air) --   03/05/24 1630 -- 89 14 168/82 114 97 % -- --   03/05/24 1614 -- -- -- 177/83 Abnormal  -- -- -- --   03/05/24 1600 -- 97 18 155/117 Abnormal  133 -- -- --   03/05/24 1530 -- 75 16 151/87 113 98 % -- --   03/05/24 1500 -- 77 -- 143/81 -- -- -- --   03/05/24 1445 -- 81 22 143/81 106 98 % -- --   03/05/24 1400 -- 72 22 165/92 122 97 % -- --   03/05/24 1345 -- 77 19 146/87 111 93 % -- --   03/05/24 1300 -- 91 18 144/80 103 98 % -- --   03/05/24 1245 -- 77 19 146/82 108 98 % --      Pertinent Labs/Diagnostic Test Results:   3/5 Echo   Technically difficult study limits the accuracy of the study.    Left Ventricle: Left ventricular cavity size is normal. Wall thickness is normal. The left ventricular ejection fraction is 65%. Systolic function is normal. Wall motion is normal. Diastolic function is mildly abnormal, consistent with grade I (abnormal) relaxation.    Aortic Valve: There is aortic valve sclerosis.  3/5 EKG  NSR   CTA head and neck w wo contrast   Final Result by Edinson Leger MD (03/06 1421)         CT brain:  No acute intracranial abnormality. Chronic microangiopathic ischemic changes.      CTA head: Negative for large vessel intracranial occlusion. Areas of mild stenosis along the bilateral intracranial carotid arteries. Mild to moderate narrowing of the superior basilar artery.      CTA neck: Partially calcified atheromatous plaque along the bilateral carotid bulbs without a hemodynamically significant stenosis. The cervical vertebral arteries are patent, with moderate origin stenosis on the left.               Workstation performed: [id number]         XR chest 1 view portable   Final Result by Kb Shaver MD (03/05 3464)      No acute cardiopulmonary disease.            Workstation performed: [id number]         CT head without contrast   Final Result by Michael Hendricks MD (03/05 1410)      No acute intracranial abnormality.                  Workstation performed:  [id number]         MRI brain w wo contrast    (Results Pending)     Results from last 7 days   Lab Units 03/05/24  1228   SARS-COV-2  Negative     Results from last 7 days   Lab Units 03/07/24  0504 03/06/24  0524 03/05/24  1228   WBC Thousand/uL 8.09 9.06 10.51*   HEMOGLOBIN g/dL 12.9 13.3 15.5   HEMATOCRIT % 38.7 40.1 46.3   PLATELETS Thousands/uL 257 235 285   NEUTROS ABS Thousands/µL 4.56 4.84 7.16         Results from last 7 days   Lab Units 03/07/24  0504 03/06/24  0524 03/05/24  1228   SODIUM mmol/L 141 138 139   POTASSIUM mmol/L 4.0 4.0 4.2   CHLORIDE mmol/L 111* 109* 105   CO2 mmol/L 25 22 25   ANION GAP mmol/L 5 7 9   BUN mg/dL 17 17 16   CREATININE mg/dL 0.95 0.97 0.97   EGFR ml/min/1.73sq m 77 76 76   CALCIUM mg/dL 9.2 8.7 9.5   MAGNESIUM mg/dL 1.9 1.8*  --      Results from last 7 days   Lab Units 03/06/24  0524 03/05/24  1228   AST U/L 21 23   ALT U/L 15 18   ALK PHOS U/L 64 80   TOTAL PROTEIN g/dL 6.6 7.8   ALBUMIN g/dL 3.3* 3.8   TOTAL BILIRUBIN mg/dL 0.73 0.80     Results from last 7 days   Lab Units 03/07/24  0646 03/06/24  2054 03/06/24  1644 03/06/24  1025 03/06/24  0549 03/05/24  2101 03/05/24  1646   POC GLUCOSE mg/dl 144* 185* 154* 189* 135 160* 151*     Results from last 7 days   Lab Units 03/07/24  0504 03/06/24  0524 03/05/24  1228   GLUCOSE RANDOM mg/dL 130 136 140         Results from last 7 days   Lab Units 03/07/24  0504 03/05/24  1228   CK TOTAL U/L 140 355*     Results from last 7 days   Lab Units 03/05/24  1625 03/05/24  1425 03/05/24  1228   HS TNI 0HR ng/L  --   --  10   HS TNI 2HR ng/L  --  11  --    HSTNI D2 ng/L  --  1  --    HS TNI 4HR ng/L 12  --   --    HSTNI D4 ng/L 2  --   --          Results from last 7 days   Lab Units 03/05/24  1228   PROTIME seconds 13.8   INR  1.00   PTT seconds 29         Results from last 7 days   Lab Units 03/06/24  0524 03/05/24  1228   PROCALCITONIN ng/ml 0.08 0.06     Results from last 7 days   Lab Units 03/05/24  1547 03/05/24  1228   LACTIC ACID  mmol/L 1.5 2.1*     Results from last 7 days   Lab Units 03/05/24  1228   BNP pg/mL 49     Results from last 7 days   Lab Units 03/05/24  1228   INFLUENZA A PCR  Negative   INFLUENZA B PCR  Negative   RSV PCR  Negative     Results from last 7 days   Lab Units 03/05/24  1425 03/05/24  1228   BLOOD CULTURE  No Growth at 24 hrs. No Growth at 24 hrs.       ED Treatment:   Medication Administration from 03/05/2024 1147 to 03/05/2024 1950         Date/Time Order Dose Route Action     03/05/2024 1401 EST sodium chloride 0.9 % bolus 1,000 mL 1,000 mL Intravenous New Bag     03/05/2024 1614 EST enoxaparin (LOVENOX) subcutaneous injection 40 mg 40 mg Subcutaneous Given     03/05/2024 1652 EST insulin lispro (HumALOG/ADMELOG) 100 units/mL subcutaneous injection 1-5 Units 1 Units Subcutaneous Given     03/05/2024 1614 EST lisinopril (ZESTRIL) tablet 10 mg 10 mg Oral Given     03/05/2024 1535 EST perflutren lipid microsphere (DEFINITY) injection 0.6 mL/min Intravenous Given          Past Medical History:   Diagnosis Date    Diabetes mellitus (HCC)     Hypertension      Present on Admission:  **None**      Admitting Diagnosis: Syncope [R55]  Weakness [R53.1]  Age/Sex: 73 y.o. male  Admission Orders:  Scheduled Medications:  enoxaparin, 40 mg, Subcutaneous, Daily  insulin lispro, 1-5 Units, Subcutaneous, TID AC  lisinopril, 10 mg, Oral, Daily      Continuous IV Infusions:     PRN Meds:   Fingerstick ac and hs   PT OT eval   Act as beni   Tele   Up and OOB     IP CONSULT TO NEUROLOGY    Network Utilization Review Department  ATTENTION: Please call with any questions or concerns to [phone] and carefully listen to the prompts so that you are directed to the right person. All voicemails are confidential.   For Discharge needs, contact Care Management DC Support Team at [phone] opt. [phone]  Send all requests for admission clinical reviews, approved or denied determinations and any other requests to dedicated fax number below  belonging to the campus where the patient is receiving treatment. List of dedicated fax numbers for the Facilities:  FACILITY NAME UR FAX NUMBER   ADMISSION DENIALS (Administrative/Medical Necessity) [phone]   DISCHARGE SUPPORT TEAM (NETWORK) [phone]   PARENT CHILD HEALTH (Maternity/NICU/Pediatrics) [phone]   [facility] [phone]   [facility] [phone]   [facility] [phone]   [facility] [phone]   [facility] [phone]   [facility] [phone]   [facility] [phone]   [facility] [phone]   [facility] [phone]   [facility] [phone]   [facility] [phone]   [facility] [phone]

## 2024-03-06 NOTE — PLAN OF CARE
Problem: Potential for Falls  Goal: Patient will remain free of falls  Description: INTERVENTIONS:  - Educate patient/family on patient safety including physical limitations  - Instruct patient to call for assistance with activity   - Consult OT/PT to assist with strengthening/mobility   - Keep Call bell within reach  - Keep bed low and locked with side rails adjusted as appropriate  - Keep care items and personal belongings within reach  - Initiate and maintain comfort rounds  - Make Fall Risk Sign visible to staff  - Offer Toileting every 2 Hours, in advance of need  - Initiate/Maintain bed alarm  - Obtain necessary fall risk management equipment: NA  - Apply yellow socks and bracelet for high fall risk patients  - Consider moving patient to room near nurses station  Outcome: Progressing

## 2024-03-07 ENCOUNTER — APPOINTMENT (INPATIENT)
Dept: MRI IMAGING | Facility: HOSPITAL | Age: 74
DRG: 066 | End: 2024-03-07
Payer: COMMERCIAL

## 2024-03-07 ENCOUNTER — APPOINTMENT (INPATIENT)
Dept: NEUROLOGY | Facility: HOSPITAL | Age: 74
DRG: 066 | End: 2024-03-07
Payer: COMMERCIAL

## 2024-03-07 LAB
ANION GAP SERPL CALCULATED.3IONS-SCNC: 5 MMOL/L
BASOPHILS # BLD AUTO: 0.03 THOUSANDS/ÂΜL (ref 0–0.1)
BASOPHILS NFR BLD AUTO: 0 % (ref 0–1)
BUN SERPL-MCNC: 17 MG/DL (ref 5–25)
CALCIUM SERPL-MCNC: 9.2 MG/DL (ref 8.4–10.2)
CHLORIDE SERPL-SCNC: 111 MMOL/L (ref 96–108)
CK SERPL-CCNC: 140 U/L (ref 39–308)
CO2 SERPL-SCNC: 25 MMOL/L (ref 21–32)
CREAT SERPL-MCNC: 0.95 MG/DL (ref 0.6–1.3)
DME PARACHUTE DELIVERY DATE REQUESTED: NORMAL
DME PARACHUTE DELIVERY DATE REQUESTED: NORMAL
DME PARACHUTE DELIVERY NOTE: NORMAL
DME PARACHUTE DELIVERY NOTE: NORMAL
DME PARACHUTE ITEM DESCRIPTION: NORMAL
DME PARACHUTE ORDER STATUS: NORMAL
DME PARACHUTE ORDER STATUS: NORMAL
DME PARACHUTE SUPPLIER NAME: NORMAL
DME PARACHUTE SUPPLIER NAME: NORMAL
DME PARACHUTE SUPPLIER PHONE: NORMAL
DME PARACHUTE SUPPLIER PHONE: NORMAL
EOSINOPHIL # BLD AUTO: 0.23 THOUSAND/ÂΜL (ref 0–0.61)
EOSINOPHIL NFR BLD AUTO: 3 % (ref 0–6)
ERYTHROCYTE [DISTWIDTH] IN BLOOD BY AUTOMATED COUNT: 12.2 % (ref 11.6–15.1)
GFR SERPL CREATININE-BSD FRML MDRD: 77 ML/MIN/1.73SQ M
GLUCOSE SERPL-MCNC: 130 MG/DL (ref 65–140)
GLUCOSE SERPL-MCNC: 144 MG/DL (ref 65–140)
GLUCOSE SERPL-MCNC: 166 MG/DL (ref 65–140)
GLUCOSE SERPL-MCNC: 184 MG/DL (ref 65–140)
GLUCOSE SERPL-MCNC: 222 MG/DL (ref 65–140)
HCT VFR BLD AUTO: 38.7 % (ref 36.5–49.3)
HGB BLD-MCNC: 12.9 G/DL (ref 12–17)
IMM GRANULOCYTES # BLD AUTO: 0.02 THOUSAND/UL (ref 0–0.2)
IMM GRANULOCYTES NFR BLD AUTO: 0 % (ref 0–2)
LYMPHOCYTES # BLD AUTO: 2.55 THOUSANDS/ÂΜL (ref 0.6–4.47)
LYMPHOCYTES NFR BLD AUTO: 32 % (ref 14–44)
MAGNESIUM SERPL-MCNC: 1.9 MG/DL (ref 1.9–2.7)
MCH RBC QN AUTO: 31.8 PG (ref 26.8–34.3)
MCHC RBC AUTO-ENTMCNC: 33.3 G/DL (ref 31.4–37.4)
MCV RBC AUTO: 95 FL (ref 82–98)
MONOCYTES # BLD AUTO: 0.7 THOUSAND/ÂΜL (ref 0.17–1.22)
MONOCYTES NFR BLD AUTO: 9 % (ref 4–12)
NEUTROPHILS # BLD AUTO: 4.56 THOUSANDS/ÂΜL (ref 1.85–7.62)
NEUTS SEG NFR BLD AUTO: 56 % (ref 43–75)
NRBC BLD AUTO-RTO: 0 /100 WBCS
PLATELET # BLD AUTO: 257 THOUSANDS/UL (ref 149–390)
PMV BLD AUTO: 10 FL (ref 8.9–12.7)
POTASSIUM SERPL-SCNC: 4 MMOL/L (ref 3.5–5.3)
RBC # BLD AUTO: 4.06 MILLION/UL (ref 3.88–5.62)
SODIUM SERPL-SCNC: 141 MMOL/L (ref 135–147)
WBC # BLD AUTO: 8.09 THOUSAND/UL (ref 4.31–10.16)

## 2024-03-07 PROCEDURE — 95816 EEG AWAKE AND DROWSY: CPT | Performed by: STUDENT IN AN ORGANIZED HEALTH CARE EDUCATION/TRAINING PROGRAM

## 2024-03-07 PROCEDURE — 82948 REAGENT STRIP/BLOOD GLUCOSE: CPT

## 2024-03-07 PROCEDURE — 70553 MRI BRAIN STEM W/O & W/DYE: CPT

## 2024-03-07 PROCEDURE — 83735 ASSAY OF MAGNESIUM: CPT | Performed by: INTERNAL MEDICINE

## 2024-03-07 PROCEDURE — 99232 SBSQ HOSP IP/OBS MODERATE 35: CPT | Performed by: INTERNAL MEDICINE

## 2024-03-07 PROCEDURE — 82550 ASSAY OF CK (CPK): CPT | Performed by: INTERNAL MEDICINE

## 2024-03-07 PROCEDURE — 0T9B70Z DRAINAGE OF BLADDER WITH DRAINAGE DEVICE, VIA NATURAL OR ARTIFICIAL OPENING: ICD-10-PCS | Performed by: INTERNAL MEDICINE

## 2024-03-07 PROCEDURE — 95816 EEG AWAKE AND DROWSY: CPT

## 2024-03-07 PROCEDURE — 85025 COMPLETE CBC W/AUTO DIFF WBC: CPT | Performed by: INTERNAL MEDICINE

## 2024-03-07 PROCEDURE — 80048 BASIC METABOLIC PNL TOTAL CA: CPT | Performed by: INTERNAL MEDICINE

## 2024-03-07 RX ADMIN — INSULIN LISPRO 1 UNITS: 100 INJECTION, SOLUTION INTRAVENOUS; SUBCUTANEOUS at 11:25

## 2024-03-07 RX ADMIN — ENOXAPARIN SODIUM 40 MG: 40 INJECTION SUBCUTANEOUS at 09:35

## 2024-03-07 RX ADMIN — INSULIN LISPRO 1 UNITS: 100 INJECTION, SOLUTION INTRAVENOUS; SUBCUTANEOUS at 16:03

## 2024-03-07 NOTE — PLAN OF CARE
Problem: Potential for Falls  Goal: Patient will remain free of falls  Description: INTERVENTIONS:  - Educate patient/family on patient safety including physical limitations  - Instruct patient to call for assistance with activity   - Consult OT/PT to assist with strengthening/mobility   - Keep Call bell within reach  - Keep bed low and locked with side rails adjusted as appropriate  - Keep care items and personal belongings within reach  - Initiate and maintain comfort rounds  - Make Fall Risk Sign visible to staff  - Offer Toileting every 2 Hours, in advance of need  - Initiate/Maintain bed alarm  - Obtain necessary fall risk management equipment: Walker  - Apply yellow socks and bracelet for high fall risk patients  - Consider moving patient to room near nurses station  Outcome: Progressing

## 2024-03-07 NOTE — PROGRESS NOTES
[facility]  Progress Note  Name: Drew Santos I  MRN: [id number]  Unit/Bed#: -01 I Date of Admission: 3/5/2024   Date of Service: 3/7/2024 I Hospital Day: 1    Assessment/Plan   * Syncope  Assessment & Plan  73-year-old male past medical history of type 2 diabetes on glipizide was found down in the bathroom by his wife incontinent of urine and stool.  EKG without any acute findings  Telemetry without any acute abnormalities  EKG without any acute abnormalities  Will obtain spot EEG  Follow-up MRI  CTA revealed no acute abnormalities  Appreciate neurology consultation    Abnormal CPK  Assessment & Plan  Mild CPK elevation to 355 in the setting of being found down  Since resolved with IV fluids    Leukocytosis  Assessment & Plan  WBC 10.5 with normal diff   Covid/rvp negative   CXR negative   No other infectious signs or symptoms at this time    Diabetes (HCC)  Assessment & Plan  Hold p.o. agents  Sliding-scale insulin  Monitor blood glucose           VTE Pharmacologic Prophylaxis:   Pharmacologic: Heparin  Mechanical VTE Prophylaxis in Place: Yes    Patient Centered Rounds: I have performed bedside rounds with nursing staff today.    Discussions with Specialists or Other Care Team Provider: cm, nursing    Education and Discussions with Family / Patient: pt    Time Spent for Care: 30 minutes.  More than 50% of total time spent on counseling and coordination of care as described above.    Current Length of Stay: 1 day(s)    Current Patient Status: Inpatient   Certification Statement: The patient will continue to require additional inpatient hospital stay due to cm, nursing    Discharge Plan: pt, wife    Code Status: Level 1 - Full Code      Subjective:   Denies chest pain, shortness breath, cough, fevers    Objective:     Vitals:   Temp (24hrs), Av.9 °F (36.6 °C), Min:97.2 °F (36.2 °C), Max:98.3 °F (36.8 °C)    Temp:  [97.2 °F (36.2 °C)-98.3 °F (36.8 °C)] 98.1 °F (36.7 °C)  HR:   [64-91] 91  Resp:  [17-19] 17  BP: (109-151)/(63-80) 109/69  SpO2:  [93 %-96 %] 93 %  Body mass index is 34.01 kg/m².     Input and Output Summary (last 24 hours):       Intake/Output Summary (Last 24 hours) at 3/7/2024 1032  Last data filed at 3/7/2024 0755  Gross per 24 hour   Intake 720 ml   Output 750 ml   Net -30 ml       Physical Exam:     Physical Exam  Constitutional:       General: He is not in acute distress.     Appearance: He is well-developed. He is not diaphoretic.   HENT:      Head: Normocephalic and atraumatic.      Nose: Nose normal.      Mouth/Throat:      Pharynx: No oropharyngeal exudate.   Eyes:      General: No scleral icterus.     Conjunctiva/sclera: Conjunctivae normal.   Cardiovascular:      Rate and Rhythm: Normal rate and regular rhythm.      Heart sounds: Normal heart sounds. No murmur heard.     No friction rub. No gallop.   Pulmonary:      Effort: Pulmonary effort is normal. No respiratory distress.      Breath sounds: Normal breath sounds. No wheezing or rales.   Chest:      Chest wall: No tenderness.   Abdominal:      General: Bowel sounds are normal. There is no distension.      Palpations: Abdomen is soft.      Tenderness: There is no abdominal tenderness. There is no guarding.   Musculoskeletal:         General: No tenderness or deformity. Normal range of motion.      Cervical back: Normal range of motion and neck supple.   Skin:     General: Skin is warm and dry.      Findings: No erythema.   Neurological:      Mental Status: He is alert.       (    Additional Data:     Labs:    Results from last 7 days   Lab Units 03/07/24  0504   WBC Thousand/uL 8.09   HEMOGLOBIN g/dL 12.9   HEMATOCRIT % 38.7   PLATELETS Thousands/uL 257   NEUTROS PCT % 56   LYMPHS PCT % 32   MONOS PCT % 9   EOS PCT % 3     Results from last 7 days   Lab Units 03/07/24  0504 03/06/24  0524   SODIUM mmol/L 141 138   POTASSIUM mmol/L 4.0 4.0   CHLORIDE mmol/L 111* 109*   CO2 mmol/L 25 22   BUN mg/dL 17 17    CREATININE mg/dL 0.95 0.97   ANION GAP mmol/L 5 7   CALCIUM mg/dL 9.2 8.7   ALBUMIN g/dL  --  3.3*   TOTAL BILIRUBIN mg/dL  --  0.73   ALK PHOS U/L  --  64   ALT U/L  --  15   AST U/L  --  21   GLUCOSE RANDOM mg/dL 130 136     Results from last 7 days   Lab Units 03/05/24  1228   INR  1.00     Results from last 7 days   Lab Units 03/07/24  0646 03/06/24  2054 03/06/24  1644 03/06/24  1025 03/06/24  0549 03/05/24  2101 03/05/24  1646   POC GLUCOSE mg/dl 144* 185* 154* 189* 135 160* 151*         Results from last 7 days   Lab Units 03/06/24  0524 03/05/24  1547 03/05/24  1228   LACTIC ACID mmol/L  --  1.5 2.1*   PROCALCITONIN ng/ml 0.08  --  0.06           * I Have Reviewed All Lab Data Listed Above.  * Additional Pertinent Lab Tests Reviewed: All Labs Within Last 24 Hours Reviewed    Imaging:    Imaging Reports Reviewed Today Include: na  Imaging Personally Reviewed by Myself Includes:  na    Recent Cultures (last 7 days):     Results from last 7 days   Lab Units 03/05/24  1425 03/05/24  1228   BLOOD CULTURE  No Growth at 24 hrs. No Growth at 24 hrs.       Last 24 Hours Medication List:   Current Facility-Administered Medications   Medication Dose Route Frequency Provider Last Rate    enoxaparin  40 mg Subcutaneous Daily Kit Borges MD      insulin lispro  1-5 Units Subcutaneous TID AC Kit Borges MD      lisinopril  10 mg Oral Daily Kit Borges MD          Today, Patient Was Seen By: Johnathan Sagastume MD    ** Please Note: Dictation voice to text software may have been used in the creation of this document. **

## 2024-03-07 NOTE — ASSESSMENT & PLAN NOTE
73-year-old male past medical history of type 2 diabetes on glipizide was found down in the bathroom by his wife incontinent of urine and stool.  EKG without any acute findings  Telemetry without any acute abnormalities  EKG without any acute abnormalities  Will obtain spot EEG  Follow-up MRI  CTA revealed no acute abnormalities  Appreciate neurology consultation

## 2024-03-07 NOTE — PLAN OF CARE
Problem: Potential for Falls  Goal: Patient will remain free of falls  Description: INTERVENTIONS:  - Educate patient/family on patient safety including physical limitations  - Instruct patient to call for assistance with activity   - Consult OT/PT to assist with strengthening/mobility   - Keep Call bell within reach  - Keep bed low and locked with side rails adjusted as appropriate  - Keep care items and personal belongings within reach  - Initiate and maintain comfort rounds  - Make Fall Risk Sign visible to staff  - Offer Toileting every 2 Hours, in advance of need  - Initiate/Maintain bed alarm  - Obtain necessary fall risk management equipment: call bell within reach  - Apply yellow socks and bracelet for high fall risk patients  - Consider moving patient to room near nurses station  Outcome: Progressing     Problem: PAIN - ADULT  Goal: Verbalizes/displays adequate comfort level or baseline comfort level  Description: Interventions:  - Encourage patient to monitor pain and request assistance  - Assess pain using appropriate pain scale  - Administer analgesics based on type and severity of pain and evaluate response  - Implement non-pharmacological measures as appropriate and evaluate response  - Consider cultural and social influences on pain and pain management  - Notify physician/advanced practitioner if interventions unsuccessful or patient reports new pain  Outcome: Progressing     Problem: INFECTION - ADULT  Goal: Absence or prevention of progression during hospitalization  Description: INTERVENTIONS:  - Assess and monitor for signs and symptoms of infection  - Monitor lab/diagnostic results  - Monitor all insertion sites, i.e. indwelling lines, tubes, and drains  - Monitor endotracheal if appropriate and nasal secretions for changes in amount and color  - [place] appropriate cooling/warming therapies per order  - Administer medications as ordered  - Instruct and encourage patient and family to use  good hand hygiene technique  - Identify and instruct in appropriate isolation precautions for identified infection/condition  Outcome: Progressing  Goal: Absence of fever/infection during neutropenic period  Description: INTERVENTIONS:  - Monitor WBC    Outcome: Progressing

## 2024-03-08 PROBLEM — R33.9 URINARY RETENTION: Status: ACTIVE | Noted: 2024-03-08

## 2024-03-08 PROBLEM — I63.9 STROKE (CEREBRUM) (HCC): Status: ACTIVE | Noted: 2024-03-08

## 2024-03-08 LAB
ANION GAP SERPL CALCULATED.3IONS-SCNC: 6 MMOL/L
BACTERIA UR QL AUTO: ABNORMAL /HPF
BASOPHILS # BLD AUTO: 0.04 THOUSANDS/ÂΜL (ref 0–0.1)
BASOPHILS NFR BLD AUTO: 0 % (ref 0–1)
BILIRUB UR QL STRIP: NEGATIVE
BUN SERPL-MCNC: 14 MG/DL (ref 5–25)
CALCIUM SERPL-MCNC: 9.6 MG/DL (ref 8.4–10.2)
CHLORIDE SERPL-SCNC: 107 MMOL/L (ref 96–108)
CHOLEST SERPL-MCNC: 196 MG/DL
CLARITY UR: ABNORMAL
CO2 SERPL-SCNC: 27 MMOL/L (ref 21–32)
COLOR UR: YELLOW
CREAT SERPL-MCNC: 0.97 MG/DL (ref 0.6–1.3)
EOSINOPHIL # BLD AUTO: 0.13 THOUSAND/ÂΜL (ref 0–0.61)
EOSINOPHIL NFR BLD AUTO: 1 % (ref 0–6)
ERYTHROCYTE [DISTWIDTH] IN BLOOD BY AUTOMATED COUNT: 12.1 % (ref 11.6–15.1)
EST. AVERAGE GLUCOSE BLD GHB EST-MCNC: 157 MG/DL
FOLATE SERPL-MCNC: 15.4 NG/ML
GFR SERPL CREATININE-BSD FRML MDRD: 76 ML/MIN/1.73SQ M
GLUCOSE SERPL-MCNC: 133 MG/DL (ref 65–140)
GLUCOSE SERPL-MCNC: 145 MG/DL (ref 65–140)
GLUCOSE SERPL-MCNC: 147 MG/DL (ref 65–140)
GLUCOSE SERPL-MCNC: 171 MG/DL (ref 65–140)
GLUCOSE SERPL-MCNC: 183 MG/DL (ref 65–140)
GLUCOSE UR STRIP-MCNC: NEGATIVE MG/DL
HBA1C MFR BLD: 7.1 %
HCT VFR BLD AUTO: 42.7 % (ref 36.5–49.3)
HDLC SERPL-MCNC: 42 MG/DL
HGB BLD-MCNC: 14.2 G/DL (ref 12–17)
HGB UR QL STRIP.AUTO: ABNORMAL
IMM GRANULOCYTES # BLD AUTO: 0.02 THOUSAND/UL (ref 0–0.2)
IMM GRANULOCYTES NFR BLD AUTO: 0 % (ref 0–2)
KETONES UR STRIP-MCNC: NEGATIVE MG/DL
LDLC SERPL CALC-MCNC: 132 MG/DL (ref 0–100)
LEUKOCYTE ESTERASE UR QL STRIP: ABNORMAL
LYMPHOCYTES # BLD AUTO: 2.68 THOUSANDS/ÂΜL (ref 0.6–4.47)
LYMPHOCYTES NFR BLD AUTO: 27 % (ref 14–44)
MCH RBC QN AUTO: 31.8 PG (ref 26.8–34.3)
MCHC RBC AUTO-ENTMCNC: 33.3 G/DL (ref 31.4–37.4)
MCV RBC AUTO: 96 FL (ref 82–98)
MONOCYTES # BLD AUTO: 0.78 THOUSAND/ÂΜL (ref 0.17–1.22)
MONOCYTES NFR BLD AUTO: 8 % (ref 4–12)
MUCOUS THREADS UR QL AUTO: ABNORMAL
NEUTROPHILS # BLD AUTO: 6.41 THOUSANDS/ÂΜL (ref 1.85–7.62)
NEUTS SEG NFR BLD AUTO: 64 % (ref 43–75)
NITRITE UR QL STRIP: NEGATIVE
NON-SQ EPI CELLS URNS QL MICRO: ABNORMAL /HPF
NRBC BLD AUTO-RTO: 0 /100 WBCS
PH UR STRIP.AUTO: 5.5 [PH]
PLATELET # BLD AUTO: 268 THOUSANDS/UL (ref 149–390)
PMV BLD AUTO: 9.8 FL (ref 8.9–12.7)
POTASSIUM SERPL-SCNC: 4.3 MMOL/L (ref 3.5–5.3)
PROT UR STRIP-MCNC: ABNORMAL MG/DL
RBC # BLD AUTO: 4.47 MILLION/UL (ref 3.88–5.62)
RBC #/AREA URNS AUTO: ABNORMAL /HPF
SODIUM SERPL-SCNC: 140 MMOL/L (ref 135–147)
SP GR UR STRIP.AUTO: 1.03 (ref 1–1.03)
T4 FREE SERPL-MCNC: 0.82 NG/DL (ref 0.61–1.12)
TRIGL SERPL-MCNC: 111 MG/DL
TSH SERPL DL<=0.05 MIU/L-ACNC: 5.23 UIU/ML (ref 0.45–4.5)
UROBILINOGEN UR STRIP-ACNC: <2 MG/DL
VIT B12 SERPL-MCNC: 585 PG/ML (ref 180–914)
WBC # BLD AUTO: 10.06 THOUSAND/UL (ref 4.31–10.16)
WBC #/AREA URNS AUTO: ABNORMAL /HPF

## 2024-03-08 PROCEDURE — 82746 ASSAY OF FOLIC ACID SERUM: CPT | Performed by: NURSE PRACTITIONER

## 2024-03-08 PROCEDURE — 97110 THERAPEUTIC EXERCISES: CPT

## 2024-03-08 PROCEDURE — 82948 REAGENT STRIP/BLOOD GLUCOSE: CPT

## 2024-03-08 PROCEDURE — 82607 VITAMIN B-12: CPT | Performed by: NURSE PRACTITIONER

## 2024-03-08 PROCEDURE — 99233 SBSQ HOSP IP/OBS HIGH 50: CPT | Performed by: STUDENT IN AN ORGANIZED HEALTH CARE EDUCATION/TRAINING PROGRAM

## 2024-03-08 PROCEDURE — 85025 COMPLETE CBC W/AUTO DIFF WBC: CPT | Performed by: INTERNAL MEDICINE

## 2024-03-08 PROCEDURE — 84439 ASSAY OF FREE THYROXINE: CPT | Performed by: NURSE PRACTITIONER

## 2024-03-08 PROCEDURE — 80061 LIPID PANEL: CPT | Performed by: PHYSICIAN ASSISTANT

## 2024-03-08 PROCEDURE — 81001 URINALYSIS AUTO W/SCOPE: CPT | Performed by: PHYSICIAN ASSISTANT

## 2024-03-08 PROCEDURE — 99232 SBSQ HOSP IP/OBS MODERATE 35: CPT | Performed by: INTERNAL MEDICINE

## 2024-03-08 PROCEDURE — 92610 EVALUATE SWALLOWING FUNCTION: CPT

## 2024-03-08 PROCEDURE — 84443 ASSAY THYROID STIM HORMONE: CPT | Performed by: PHYSICIAN ASSISTANT

## 2024-03-08 PROCEDURE — 97116 GAIT TRAINING THERAPY: CPT

## 2024-03-08 PROCEDURE — A9585 GADOBUTROL INJECTION: HCPCS | Performed by: INTERNAL MEDICINE

## 2024-03-08 PROCEDURE — 83036 HEMOGLOBIN GLYCOSYLATED A1C: CPT | Performed by: PHYSICIAN ASSISTANT

## 2024-03-08 PROCEDURE — 80048 BASIC METABOLIC PNL TOTAL CA: CPT | Performed by: INTERNAL MEDICINE

## 2024-03-08 RX ORDER — LANOLIN ALCOHOL/MO/W.PET/CERES
3 CREAM (GRAM) TOPICAL
Status: DISCONTINUED | OUTPATIENT
Start: 2024-03-08 | End: 2024-03-13 | Stop reason: HOSPADM

## 2024-03-08 RX ORDER — GADOBUTROL 604.72 MG/ML
13 INJECTION INTRAVENOUS
Status: COMPLETED | OUTPATIENT
Start: 2024-03-08 | End: 2024-03-08

## 2024-03-08 RX ORDER — ATORVASTATIN CALCIUM 40 MG/1
40 TABLET, FILM COATED ORAL EVERY EVENING
Status: DISCONTINUED | OUTPATIENT
Start: 2024-03-08 | End: 2024-03-13 | Stop reason: HOSPADM

## 2024-03-08 RX ORDER — ASPIRIN 81 MG/1
81 TABLET, CHEWABLE ORAL DAILY
Status: DISCONTINUED | OUTPATIENT
Start: 2024-03-08 | End: 2024-03-10

## 2024-03-08 RX ADMIN — LISINOPRIL 10 MG: 10 TABLET ORAL at 10:47

## 2024-03-08 RX ADMIN — ASPIRIN 81 MG: 81 TABLET, CHEWABLE ORAL at 10:47

## 2024-03-08 RX ADMIN — ENOXAPARIN SODIUM 40 MG: 40 INJECTION SUBCUTANEOUS at 10:47

## 2024-03-08 RX ADMIN — ATORVASTATIN CALCIUM 40 MG: 40 TABLET, FILM COATED ORAL at 18:12

## 2024-03-08 RX ADMIN — INSULIN LISPRO 1 UNITS: 100 INJECTION, SOLUTION INTRAVENOUS; SUBCUTANEOUS at 07:33

## 2024-03-08 RX ADMIN — GADOBUTROL 13 ML: 604.72 INJECTION INTRAVENOUS at 01:01

## 2024-03-08 RX ADMIN — Medication 3 MG: at 01:33

## 2024-03-08 NOTE — PLAN OF CARE
Problem: PHYSICAL THERAPY ADULT  Goal: Performs mobility at highest level of function for planned discharge setting.  See evaluation for individualized goals.  Description: Treatment/Interventions: Functional transfer training, LE strengthening/ROM, Elevations, Therapeutic exercise, Endurance training, Cognitive reorientation, Patient/family training, Bed mobility, Gait training, Spoke to nursing, OT          See flowsheet documentation for full assessment, interventions and recommendations.  Outcome: Progressing  Note: Prognosis: Good  Problem List: Decreased strength, Decreased endurance, Impaired balance, Decreased mobility, Decreased cognition  Assessment: Chart reviewed. Patient was received seated in recliner in [facility] and agreeable to PT session. Today's PT treatment session consisted of therapeutic activity for facilitation of transitional movements and safe performance of correct technique for sit to stand transfers, therapeutic exercise to increase lower extremity muscle strength, and gait training to promote safe and functional ambulation on level surfaces. In comparison to the previous session the patient has made progress as evident by requiring decreased assistance with all functional mobility. Pt was able to ambulate an increased distance and increased trials. When ambulating pt requires verbal cues for correct technique with walker. When ambulating pt exhibits decreased swetha, decreased stride length, and a slightly forward flexed trunk. Pt had two episodes of loss of balance and requiring moderate assist to recover. Pt tolerated all therapeutic exercise well without complaints. Overall, patient tolerated today's session well and continues to be making progress towards achieving his STG's. Patient's prognosis for achieving their STG's is good a evident by pt's motivation. PT intervention continues to be appropriate as the patient continues to be limited by decreased lower extremity strength,  impaired balance, decreased endurance, gait deviations, and decreased functional mobility. Continue to recommend level 2, moderate resource intensity. PT to continue to see patient in order to address the deficits listed above and provide interventions consistent with the POC in order to achieve STG's and optimize the patient's independence with functional mobility.    Barriers to Discharge: Inaccessible home environment     Rehab Resource Intensity Level, PT: II (Moderate Resource Intensity)    See flowsheet documentation for full assessment.

## 2024-03-08 NOTE — ASSESSMENT & PLAN NOTE
MRI noted CVA of NELL/MCA territory  Continue aspirin, statin  Pt/ot recommended possibly rehab  Plan as outlined below  Appreciate neurology recommendations

## 2024-03-08 NOTE — SPEECH THERAPY NOTE
Speech-Language Pathology Bedside Swallow Evaluation        Patient Name: Drew Santos    Today's Date: 3/8/2024     Problem List  Principal Problem:    Syncope  Active Problems:    Elevated lactic acid level    Diabetes (HCC)    Leukocytosis    Abnormal CPK    Stroke (cerebrum) (HCC)    Urinary retention         Past Medical History  Past Medical History:   Diagnosis Date    Diabetes mellitus (HCC)     Hypertension        Past Surgical History  History reviewed. No pertinent surgical history.    Summary/Impressions:   Bedside observations support grossly intact oropharyngeal swallow function across all consistencies tested.  Self-fed solid and liquid trials with no s/s of dysphagia or distress.  Patient denies difficulties or changes from baseline function.  Note, dentures not on side though pt able to eat without.      Recommendations:   Diet: regular diet and thin liquids   Meds: whole with liquid   Feeding assistance: tray se tup   Frequent Oral care: 2-4x/day  Aspiration precautions and compensatory swallowing strategies: upright posture and small bites/sips  Other Recommendations/ considerations: No further ST follow-up; please reorder should pt status change or concerns arise.      Current Medical Status  Pt is a 73 y.o. male who presented to [facility] with syncope; dysphagia evaluation ordered by neurology as per stroke pathway.  No nursing swallow screen.     Past medical history:  Please see H&P for details    Special Studies:  MRI 3/8/24:  Watershed territory subacute infarcts in the right NELL/MCA region.    Social/Education/Vocational Hx:  Pt lives with family    Swallow Information   Current Risks for Dysphagia & Aspiration: CVA  Current Symptoms/Concerns:  facial weakness  Current Diet: regular diet and thin liquids   Baseline Diet: regular diet and thin liquids    Baseline Assessment   Behavior/Cognition: alert  Speech/Language Status: able to participate in conversation  Patient Positioning:  upright in chair    Swallow Mechanism Exam   Facial: symmetrical  Labial: decreased strength  Lingual: decreased strength left side  Velum: unable to visualize  Mandible: adequate ROM  Dentition: limited dentition  Vocal quality:clear/adequate   Volitional Cough: strong/productive   Respiratory: RA    Consistencies Assessed and Performance   Consistencies Administered: thin liquids, puree, and hard solids    Oral Stage: WFL. Patient presents with adequate bolus acceptance, containment and manipulation.  Mastication judged to be prolonged yet complete.  Min oral residue.     Pharyngeal Stage: Appears functional.  Laryngeal rise noted upon palpation.  Swallow initiation appears timely.  No overt s/s of aspiration or distress.  Vocal quality remains clear and dry.     Esophageal Concerns: none reported     Plan  No additional follow-up at this time. Please re-order should pt exhibit change in status or concerns arise.     Results Reviewed with: patient, MD, and family       Delores Durand MS, CCC-SLP  Speech-Language Pathologist  [place] #[id number]  [place] #[id number]

## 2024-03-08 NOTE — NURSING NOTE
0442 received a tiger text message from overnight on call Hospitalist Eva Rangel PA-C . She notified me that pt had new orders initiating stroke pathway following results from MRI.   Floor charge nurse YOSEF Javier RN and Hospital supervisor DICK Torres notified. Pt will need to be moved to the 2nd floor per protocol d/t the new orders. Supervisor replied that 2nd floor was made aware   Pt VS remain stable - b/l strength is equal. , no visible deficits noted.

## 2024-03-08 NOTE — ASSESSMENT & PLAN NOTE
73-year-old male past medical history of type 2 diabetes on glipizide was found down in the bathroom by his wife incontinent of urine and stool.  EKG without any acute findings  Telemetry without any acute abnormalities  EKG without any acute abnormalities  Watershed infarcts noted MCA/NELL territory  Possible cardioembolic consult cardiology for possible loop recorder placement

## 2024-03-08 NOTE — QUICK NOTE
Notified by radiology of MRI results.  MRI brain revealing watershed territory subacute infarct in right NELL/MCA region.  No hemorrhage, midline shift    Will place on stroke pathway.  Neurology already following.  Continue telemetry.  Will initiate 81 mg aspirin daily, statin daily.  Check lipid panel, A1c, TSH

## 2024-03-08 NOTE — PROGRESS NOTES
Progress Note - Neurology   Drew Santos 73 y.o. male [id number]  Unit/Bed#: /-01    Assessment:    * Syncope  Assessment & Plan  73 y.o. male with HTN and DM2 who presented to [facility] on 3/5/2024 due to unwitnessed syncopal episode with urinary and fecal incontinence.  Patient recalled going downstairs to the basement to play his drums, but the next thing he recalled is his wife trying to wake him up and he was lying on the floor.  He was incontinent of urine and stool, but no tongue bite.  Unclear how long patient was unresponsive.  No prodromal symptoms.  No history of seizures or syncopal episodes.  BP on presentation 132/94.    Work up:  CT head negative for acute intracranial abnormalities.  CTA head and neck:  CT brain:  No acute intracranial abnormality. Chronic microangiopathic ischemic changes.  CTA head: Negative for large vessel intracranial occlusion. Areas of mild stenosis along the bilateral intracranial carotid arteries. Mild to moderate narrowing of the superior basilar artery.  CTA neck: Partially calcified atheromatous plaque along the bilateral carotid bulbs without a hemodynamically significant stenosis. The cervical vertebral arteries are patent, with moderate origin stenosis on the left.  MRI brain w/wo contrast: Watershed territory subacute infarcts in the right NELL/MCA region.   Routine EEG: Mild diffuse cerebral dysfunction. No electrographic seizures or interictal epileptiform discharges were seen. No diagnostic clinical events were captured.   Initial labs revealed lactic acid 2.1, , and WBC 10.51.  , A1C 7.1, TSH 5.228  Echo: EF 65%, normal atrial size bilaterally, normal wall motion    Patient has returned to baseline.  Etiology for the syncopal episode remains unclear.  Possible seizure versus vasovagal syncope vs cardiogenic.    Plan:  - Stroke pathway was noted below  - Check orthostatic BPs  - Check labs: B12, folate, free T4  - Will hold off on starting  AEDs at this time  - [employer] form completed and submitted online by previous neurology provider.  Attending discussed driving restrictions with patient.  - Recommend Cardiology consult for further evaluation of possible cardiogenic etiology  - PT/OT  - Telemetry  - Frequent neuro checks. Continue to monitor and notify neurology with any changes.   - Medical management and supportive care per primary team. Correction of any metabolic or infectious disturbances    Stroke (cerebrum) (HCC)  Assessment & Plan  - Unclear etiology but image demonstrates watershed appearance.  - Stroke pathway  Aspirin 81 mg   Atorvastatin 40 mg  Goal normotension; avoid hypotension  Continue telemetry  May need outpatient cardiac monitoring pending further work up  PT/OT/ST  Frequent neuro checks. Continue to monitor and notify neurology with any changes.        Recommendations for outpatient neurological follow up have yet to be determined.    Case and treatment plan reviewed with attending neurologist, Dr. Mccurdy. Please see attending attestation for any further recommendations.    Subjective:   Patient resting in bed. Wife at bedside. Patient seen and evaluated at the bedside with attending neurologist. Wife reports that she remembers asking patient if he wanted coffee while he was downstairs and he had responded yes. She made him coffee and left it on the counter and when she came back downstairs after about an hour, the coffee was still there. She called for him and he told her he was in the bathroom. She then said he said come here and when she went downstairs, he was on the ground but was awake, although he seemed to have L sided weakness as he was leaning to the left. He does not have any seizure history. He is not on any blood pressure medications at home. He did not feel any prodromal symptoms. Wife denies any cognitive impairment and is surprised that patient is having difficulty calculating.        Past Medical History:    Diagnosis Date    Diabetes mellitus (HCC)     Hypertension      History reviewed. No pertinent surgical history.  History reviewed. No pertinent family history.  Social History     Socioeconomic History    Marital status: /Civil Union     Spouse name: None    Number of children: None    Years of education: None    Highest education level: None   Occupational History    None   Tobacco Use    Smoking status: Never    Smokeless tobacco: Never   Vaping Use    Vaping status: Never Used   Substance and Sexual Activity    Alcohol use: Not Currently    Drug use: Never    Sexual activity: None   Other Topics Concern    None   Social History Narrative    None     Social Determinants of Health     Financial Resource Strain: High Risk (5/8/2023)    Received from [facility]    Overall Financial Resource Strain (CARDIA)     Difficulty of Paying Living Expenses: Hard   Food Insecurity: No Food Insecurity (3/5/2024)    Hunger Vital Sign     Worried About Running Out of Food in the Last Year: Never true     Ran Out of Food in the Last Year: Never true   Transportation Needs: No Transportation Needs (3/5/2024)    PRAPARE - Transportation     Lack of Transportation (Medical): No     Lack of Transportation (Non-Medical): No   Physical Activity: Not on file   Stress: Stress Concern Present (5/8/2023)    Received from [facility]    Citizen of [place] of Occupational Health - Occupational Stress Questionnaire     Feeling of Stress : To some extent   Social Connections: Unknown (5/8/2023)    Received from [facility]    Social Connection and Isolation Panel [NHANES]     Frequency of Communication with Friends and Family: Once a week     Frequency of Social Gatherings with Friends and Family: Patient declined     Attends [employer] Services: Patient declined     Active Member of Clubs or Organizations: Yes     Attends Club or Organization Meetings: Patient declined     Marital  "Status:    Intimate Partner Violence: Not At Risk (5/8/2023)    Received from [facility]    Humiliation, Afraid, Rape, and Kick questionnaire     Fear of Current or Ex-Partner: No     Emotionally Abused: No     Physically Abused: No     Sexually Abused: No   Housing Stability: Low Risk  (3/5/2024)    Housing Stability Vital Sign     Unable to Pay for Housing in the Last Year: No     Number of Places Lived in the Last Year: 1     Unstable Housing in the Last Year: No         Medications:  All current active meds have been reviewed and current meds:  Scheduled Meds:  Current Facility-Administered Medications   Medication Dose Route Frequency Provider Last Rate    aspirin  81 mg Oral Daily Lauren Rangel PA-C      atorvastatin  40 mg Oral QPM Lauren Rangel PA-C      enoxaparin  40 mg Subcutaneous Daily Kit Borges MD      insulin lispro  1-5 Units Subcutaneous TID AC Kit Borges MD      lisinopril  10 mg Oral Daily Kit Borges MD      melatonin  3 mg Oral HS PRN Lauren Rangel PA-C       Continuous Infusions:   PRN Meds:.  melatonin       ROS:   A 12 system ROS was completed. Other than the above mentioned complaints in the HPI and those commented on below, all remaining systems were negative.      Vitals:   BP (!) 164/133   Pulse 71   Temp 98.4 °F (36.9 °C)   Resp 17   Ht 6' 5\" (1.956 m)   Wt 125 kg (276 lb 0.3 oz)   SpO2 98%   BMI 32.73 kg/m²       Physical and neurologic exam performed by attending neurologist:   Physical Exam  Vitals and nursing note reviewed.   Constitutional:       General: He is not in acute distress.     Appearance: Normal appearance. He is not ill-appearing.   HENT:      Head: Normocephalic.      Mouth/Throat:      Mouth: Mucous membranes are moist.      Pharynx: Oropharynx is clear.   Eyes:      General: No scleral icterus.        Right eye: No discharge.         Left eye: No discharge.      Extraocular Movements: Extraocular movements intact and EOM normal. "      Conjunctiva/sclera: Conjunctivae normal.      Pupils: Pupils are equal, round, and reactive to light.   Cardiovascular:      Rate and Rhythm: Normal rate.   Pulmonary:      Effort: Pulmonary effort is normal. No respiratory distress.   Musculoskeletal:         General: Normal range of motion.   Skin:     General: Skin is warm and dry.      Coloration: Skin is not jaundiced or pale.   Neurological:      Mental Status: He is alert.      Coordination: Finger-Nose-Finger Test and Heel to Shin Test normal.   Psychiatric:         Mood and Affect: Mood normal.         Behavior: Behavior normal.       Neurologic Exam     Mental Status   Level of consciousness: alert  Hull to place and situation but unable to state current year. Able to follow commands appropriately. No aphasia or dysarthria noted. Has difficulty calculating how much would be a quarter, nickel, dime, and reyes.     Cranial Nerves     CN II   Right visual field deficit: none  Left visual field deficit: none     CN III, IV, VI   Pupils are equal, round, and reactive to light.  Extraocular motions are normal.     CN V   Facial sensation intact.     CN VII   Facial expression full, symmetric.     CN VIII   Hearing: intact    CN IX, X   Palate: symmetric    CN XI   CN XI normal.     CN XII   CN XII normal.     Motor Exam   Muscle bulk: normal  Full strength throughout bilateral UE/LE     Sensory Exam   Light touch normal.     Gait, Coordination, and Reflexes     Coordination   Finger to nose coordination: normal  Heel to shin coordination: normal    Tremor   Resting tremor: absent  Intention tremor: absent          Labs: I have personally reviewed pertinent reports.   Recent Results (from the past 24 hour(s))   Fingerstick Glucose (POCT)    Collection Time: 03/07/24  3:51 PM   Result Value Ref Range    POC Glucose 166 (H) 65 - 140 mg/dl   Fingerstick Glucose (POCT)    Collection Time: 03/07/24  8:49 PM   Result Value Ref Range    POC Glucose 184 (H) 65 -  140 mg/dl   Urinalysis with reflex to microscopic    Collection Time: 03/08/24  5:30 AM   Result Value Ref Range    Color, UA Yellow     Clarity, UA Turbid     Specific Gravity, UA 1.028 1.003 - 1.030    pH, UA 5.5 4.5, 5.0, 5.5, 6.0, 6.5, 7.0, 7.5, 8.0    Leukocytes, UA Small (A) Negative    Nitrite, UA Negative Negative    Protein,  (2+) (A) Negative mg/dl    Glucose, UA Negative Negative mg/dl    Ketones, UA Negative Negative mg/dl    Urobilinogen, UA <2.0 <2.0 mg/dl mg/dl    Bilirubin, UA Negative Negative    Occult Blood, UA Large (A) Negative   CBC and differential    Collection Time: 03/08/24  5:30 AM   Result Value Ref Range    WBC 10.06 4.31 - 10.16 Thousand/uL    RBC 4.47 3.88 - 5.62 Million/uL    Hemoglobin 14.2 12.0 - 17.0 g/dL    Hematocrit 42.7 36.5 - 49.3 %    MCV 96 82 - 98 fL    MCH 31.8 26.8 - 34.3 pg    MCHC 33.3 31.4 - 37.4 g/dL    RDW 12.1 11.6 - 15.1 %    MPV 9.8 8.9 - 12.7 fL    Platelets 268 149 - 390 Thousands/uL    nRBC 0 /100 WBCs    Neutrophils Relative 64 43 - 75 %    Immat GRANS % 0 0 - 2 %    Lymphocytes Relative 27 14 - 44 %    Monocytes Relative 8 4 - 12 %    Eosinophils Relative 1 0 - 6 %    Basophils Relative 0 0 - 1 %    Neutrophils Absolute 6.41 1.85 - 7.62 Thousands/µL    Immature Grans Absolute 0.02 0.00 - 0.20 Thousand/uL    Lymphocytes Absolute 2.68 0.60 - 4.47 Thousands/µL    Monocytes Absolute 0.78 0.17 - 1.22 Thousand/µL    Eosinophils Absolute 0.13 0.00 - 0.61 Thousand/µL    Basophils Absolute 0.04 0.00 - 0.10 Thousands/µL   Basic metabolic panel    Collection Time: 03/08/24  5:30 AM   Result Value Ref Range    Sodium 140 135 - 147 mmol/L    Potassium 4.3 3.5 - 5.3 mmol/L    Chloride 107 96 - 108 mmol/L    CO2 27 21 - 32 mmol/L    ANION GAP 6 mmol/L    BUN 14 5 - 25 mg/dL    Creatinine 0.97 0.60 - 1.30 mg/dL    Glucose 145 (H) 65 - 140 mg/dL    Calcium 9.6 8.4 - 10.2 mg/dL    eGFR 76 ml/min/1.73sq m   Lipid Panel with Direct LDL reflex    Collection Time: 03/08/24   5:30 AM   Result Value Ref Range    Cholesterol 196 See Comment mg/dL    Triglycerides 111 See Comment mg/dL    HDL, Direct 42 >=40 mg/dL    LDL Calculated 132 (H) 0 - 100 mg/dL   Hemoglobin A1c w/EAG Estimation    Collection Time: 03/08/24  5:30 AM   Result Value Ref Range    Hemoglobin A1C 7.1 (H) Normal 4.0-5.6%; PreDiabetic 5.7-6.4%; Diabetic >=6.5%; Glycemic control for adults with diabetes <7.0% %     mg/dl   TSH, 3rd generation    Collection Time: 03/08/24  5:30 AM   Result Value Ref Range    TSH 3RD GENERATON 5.228 (H) 0.450 - 4.500 uIU/mL   Urine Microscopic    Collection Time: 03/08/24  5:30 AM   Result Value Ref Range    RBC, UA Innumerable (A) None Seen, 1-2 /hpf    WBC, UA 20-30 (A) None Seen, 1-2 /hpf    Epithelial Cells Occasional None Seen, Occasional /hpf    Bacteria, UA None Seen None Seen, Occasional /hpf    MUCUS THREADS Occasional (A) None Seen   Fingerstick Glucose (POCT)    Collection Time: 03/08/24  6:41 AM   Result Value Ref Range    POC Glucose 171 (H) 65 - 140 mg/dl   Fingerstick Glucose (POCT)    Collection Time: 03/08/24 10:47 AM   Result Value Ref Range    POC Glucose 147 (H) 65 - 140 mg/dl       Imaging: I have personally reviewed pertinent imaging in PACS, and I have personally reviewed PACS reports.     EKG, Pathology, and Other Studies: I have personally reviewed pertinent reports.       VTE Prophylaxis: Enoxaparin (Lovenox)        Total time spent today 33 minutes. Greater than 50% of total time was spent with the patient and / or family counseling and / or coordination of care. A description of the counseling / coordination of care: Patient seen and evaluated. Case reviewed with attending. Chart thoroughly reviewed including imaging and labs. Coordination of care with primary team. Discussion of imaging and labs with patient.

## 2024-03-08 NOTE — ASSESSMENT & PLAN NOTE
- Unclear etiology but image demonstrates watershed appearance.  - Stroke pathway  Aspirin 81 mg   Atorvastatin 40 mg  Goal normotension; avoid hypotension  Continue telemetry  May need outpatient cardiac monitoring pending further work up  PT/OT/ST  Frequent neuro checks. Continue to monitor and notify neurology with any changes.

## 2024-03-08 NOTE — PLAN OF CARE
Problem: Potential for Falls  Goal: Patient will remain free of falls  Description: INTERVENTIONS:  - Educate patient/family on patient safety including physical limitations  - Instruct patient to call for assistance with activity   - Consult OT/PT to assist with strengthening/mobility   - Keep Call bell within reach  - Keep bed low and locked with side rails adjusted as appropriate  - Keep care items and personal belongings within reach  - Initiate and maintain comfort rounds  - Make Fall Risk Sign visible to staff  - Initiate/Maintain bed alarm  - Obtain necessary fall risk management equipment: Walker   - Apply yellow socks and bracelet for high fall risk patients  - Consider moving patient to room near nurses station  Outcome: Progressing     Problem: SAFETY ADULT  Goal: Patient will remain free of falls  Description: INTERVENTIONS:  - Educate patient/family on patient safety including physical limitations  - Instruct patient to call for assistance with activity   - Consult OT/PT to assist with strengthening/mobility   - Keep Call bell within reach  - Keep bed low and locked with side rails adjusted as appropriate  - Keep care items and personal belongings within reach  - Initiate and maintain comfort rounds  - Make Fall Risk Sign visible to staff  - Apply yellow socks and bracelet for high fall risk patients  - Consider moving patient to room near nurses station  Outcome: Progressing

## 2024-03-08 NOTE — PROGRESS NOTES
[facility]  Progress Note  Name: Drew Santos I  MRN: [id number]  Unit/Bed#: -01 I Date of Admission: 3/5/2024   Date of Service: 3/8/2024 I Hospital Day: 2    Assessment/Plan   * Syncope  Assessment & Plan  73-year-old male past medical history of type 2 diabetes on glipizide was found down in the bathroom by his wife incontinent of urine and stool.  EKG without any acute findings  Telemetry without any acute abnormalities  EKG without any acute abnormalities  Watershed infarcts noted MCA/NELL territory  Possible cardioembolic consult cardiology for possible loop recorder placement    Urinary retention  Assessment & Plan  Noted to have significant urinary retention likely in the setting of BPH  Parra catheter has since been placed  Will need to be continued on discharge  Follow-up outpatient with urology    Stroke (cerebrum) ([facility])  Assessment & Plan  MRI noted CVA of NELL/MCA territory  Continue aspirin, statin  Pt/ot recommended possibly rehab  Plan as outlined below  Appreciate neurology recommendations      Diabetes ([facility])  Assessment & Plan  Blood sugar currently controlled   continue sliding scale insulin             VTE Pharmacologic Prophylaxis:   Pharmacologic: enoxparin  Mechanical VTE Prophylaxis in Place: Yes    Patient Centered Rounds: I have performed bedside rounds with nursing staff today.    Discussions with Specialists or Other Care Team Provider:cm, nursing  Education and Discussions with Family / Patient: pt wife    Time Spent for Care: 30 minutes.  More than 50% of total time spent on counseling and coordination of care as described above.    Current Length of Stay: 2 day(s)    Current Patient Status: Inpatient   Certification Statement: The patient will continue to require additional inpatient hospital stay due to see below    Discharge Plan: Anticipate medical clearance 24 hours will likely need rehab    Code Status: Level 1 - Full Code      Subjective:   Denies  fevers, chills, cough, shortness of breath    Objective:     Vitals:   Temp (24hrs), Av.5 °F (36.9 °C), Min:98.4 °F (36.9 °C), Max:98.7 °F (37.1 °C)    Temp:  [98.4 °F (36.9 °C)-98.7 °F (37.1 °C)] 98.4 °F (36.9 °C)  HR:  [] 71  Resp:  [17-20] 17  BP: (139-168)/() 164/133  SpO2:  [93 %-98 %] 98 %  Body mass index is 32.73 kg/m².     Input and Output Summary (last 24 hours):       Intake/Output Summary (Last 24 hours) at 3/8/2024 1231  Last data filed at 3/8/2024 0801  Gross per 24 hour   Intake 360 ml   Output 1000 ml   Net -640 ml       Physical Exam:     Physical Exam  Constitutional:       General: He is not in acute distress.     Appearance: He is well-developed. He is not diaphoretic.   HENT:      Head: Normocephalic and atraumatic.      Nose: Nose normal.      Mouth/Throat:      Pharynx: No oropharyngeal exudate.   Eyes:      General: No scleral icterus.     Conjunctiva/sclera: Conjunctivae normal.   Cardiovascular:      Rate and Rhythm: Normal rate and regular rhythm.      Heart sounds: Normal heart sounds. No murmur heard.     No friction rub. No gallop.   Pulmonary:      Effort: Pulmonary effort is normal. No respiratory distress.      Breath sounds: Normal breath sounds. No wheezing or rales.   Chest:      Chest wall: No tenderness.   Abdominal:      General: Bowel sounds are normal. There is no distension.      Palpations: Abdomen is soft.      Tenderness: There is no abdominal tenderness. There is no guarding.   Musculoskeletal:         General: No tenderness or deformity. Normal range of motion.      Cervical back: Normal range of motion and neck supple.   Skin:     General: Skin is warm and dry.      Findings: No erythema.   Neurological:      Mental Status: He is alert. Mental status is at baseline.       (     Additional Data:     Labs:    Results from last 7 days   Lab Units 24  0530   WBC Thousand/uL 10.06   HEMOGLOBIN g/dL 14.2   HEMATOCRIT % 42.7   PLATELETS Thousands/uL 268    NEUTROS PCT % 64   LYMPHS PCT % 27   MONOS PCT % 8   EOS PCT % 1     Results from last 7 days   Lab Units 03/08/24  0530 03/07/24  0504 03/06/24  0524   SODIUM mmol/L 140   < > 138   POTASSIUM mmol/L 4.3   < > 4.0   CHLORIDE mmol/L 107   < > 109*   CO2 mmol/L 27   < > 22   BUN mg/dL 14   < > 17   CREATININE mg/dL 0.97   < > 0.97   ANION GAP mmol/L 6   < > 7   CALCIUM mg/dL 9.6   < > 8.7   ALBUMIN g/dL  --   --  3.3*   TOTAL BILIRUBIN mg/dL  --   --  0.73   ALK PHOS U/L  --   --  64   ALT U/L  --   --  15   AST U/L  --   --  21   GLUCOSE RANDOM mg/dL 145*   < > 136    < > = values in this interval not displayed.     Results from last 7 days   Lab Units 03/05/24  1228   INR  1.00     Results from last 7 days   Lab Units 03/08/24  1047 03/08/24  0641 03/07/24  2049 03/07/24  1551 03/07/24  1048 03/07/24  0646 03/06/24  2054 03/06/24  1644 03/06/24  1025 03/06/24  0549 03/05/24  2101 03/05/24  1646   POC GLUCOSE mg/dl 147* 171* 184* 166* 222* 144* 185* 154* 189* 135 160* 151*     Results from last 7 days   Lab Units 03/08/24  0530   HEMOGLOBIN A1C % 7.1*     Results from last 7 days   Lab Units 03/06/24  0524 03/05/24  1547 03/05/24  1228   LACTIC ACID mmol/L  --  1.5 2.1*   PROCALCITONIN ng/ml 0.08  --  0.06           * I Have Reviewed All Lab Data Listed Above.  * Additional Pertinent Lab Tests Reviewed:0na        Imaging:    Imaging Reports Reviewed Today Include: na  Imaging Personally Reviewed by Myself Includes:  na    Recent Cultures (last 7 days):     Results from last 7 days   Lab Units 03/05/24  1425 03/05/24  1228   BLOOD CULTURE  No Growth at 48 hrs. No Growth at 48 hrs.       Last 24 Hours Medication List:   Current Facility-Administered Medications   Medication Dose Route Frequency Provider Last Rate    aspirin  81 mg Oral Daily Lauren Rangel PA-C      atorvastatin  40 mg Oral QPM Lauren Rangel PA-C      enoxaparin  40 mg Subcutaneous Daily Kit Borges MD      insulin lispro  1-5 Units Subcutaneous  TID AC Kit Borges MD      lisinopril  10 mg Oral Daily Kit Borges MD      melatonin  3 mg Oral HS PRN Lauren Rangel PA-C          Today, Patient Was Seen By: Johnathan Sagastume MD    ** Please Note: Dictation voice to text software may have been used in the creation of this document. **

## 2024-03-08 NOTE — PHYSICAL THERAPY NOTE
"   Physical Therapy Treatment Note    Patient Name: Drew Santos    Diagnosis: Syncope [R55]  Weakness [R53.1]     03/08/24 0801   PT Last Visit   PT Visit Date 03/08/24   Note Type   Note Type Treatment   Pain Assessment   Pain Assessment Tool 0-10   Pain Score No Pain   Restrictions/Precautions   Weight Bearing Precautions Per Order No   Other Precautions Chair Alarm;Bed Alarm;Multiple lines;Fall Risk;Cognitive   General   Chart Reviewed Yes   Response to Previous Treatment Patient with no complaints from previous session.   Family/Caregiver Present No   Cognition   Overall Cognitive Status Impaired   Arousal/Participation Alert;Responsive;Cooperative   Attention Within functional limits   Orientation Level Oriented to person;Oriented to place;Oriented to situation   Memory Decreased recall of recent events   Following Commands Follows one step commands without difficulty   Comments Pt agreeable to PT.   Subjective   Subjective \"I'm better than yesterday.\"   Bed Mobility   Additional Comments Pt was received seated in recliner in NAD.   Transfers   Sit to Stand 4  Minimal assistance   Additional items Assist x 1;Armrests;Increased time required;Verbal cues   Stand to Sit 4  Minimal assistance   Additional items Assist x 1;Armrests;Increased time required;Verbal cues   Ambulation/Elevation   Gait pattern Decreased toe off;Decreased heel strike;Decreased hip extension;Excessively slow;Short stride;Shuffling  (slightly forward flexed trunk)   Gait Assistance 4  Minimal assist   Additional items Assist x 1;Verbal cues   Assistive Device Rolling walker   Distance 80 feet x 2 trials   Ambulation/Elevation Additional Comments Pt with 2 LOB requiring moderate assist to recover.   Balance   Static Sitting Fair +   Dynamic Sitting Fair   Static Standing Fair -   Dynamic Standing Poor +   Ambulatory Poor +   Endurance Deficit   Endurance Deficit Yes   Endurance Deficit Description decreased activity tolerance   Activity " Tolerance   Activity Tolerance Patient tolerated treatment well   Exercises   Hip Flexion Sitting;20 reps;AROM;Bilateral   Hip Abduction Sitting;20 reps;AROM;Bilateral  (long sitting)   Hip Adduction Sitting;20 reps;AROM;Bilateral   Knee AROM Long Arc Quad Sitting;20 reps;AROM;Bilateral   Ankle Pumps Sitting;20 reps;AROM;Bilateral   Assessment   Prognosis Good   Problem List Decreased strength;Decreased endurance;Impaired balance;Decreased mobility;Decreased cognition   Assessment Chart reviewed. Patient was received seated in recliner in [facility] and agreeable to PT session. Today's PT treatment session consisted of therapeutic activity for facilitation of transitional movements and safe performance of correct technique for sit to stand transfers, therapeutic exercise to increase lower extremity muscle strength, and gait training to promote safe and functional ambulation on level surfaces. In comparison to the previous session the patient has made progress as evident by requiring decreased assistance with all functional mobility. Pt was able to ambulate an increased distance and increased trials. When ambulating pt requires verbal cues for correct technique with walker. When ambulating pt exhibits decreased swetha, decreased stride length, and a slightly forward flexed trunk. Pt had two episodes of loss of balance and requiring moderate assist to recover. Pt tolerated all therapeutic exercise well without complaints. Overall, patient tolerated today's session well and continues to be making progress towards achieving his STG's. Patient's prognosis for achieving their STG's is good a evident by pt's motivation. PT intervention continues to be appropriate as the patient continues to be limited by decreased lower extremity strength, impaired balance, decreased endurance, gait deviations, and decreased functional mobility. Continue to recommend level 2, moderate resource intensity. PT to continue to see patient in order to  address the deficits listed above and provide interventions consistent with the POC in order to achieve STG's and optimize the patient's independence with functional mobility.   Barriers to Discharge Inaccessible home environment   Goals   STG Expiration Date 03/16/24   PT Treatment Day 2   Plan   Treatment/Interventions Functional transfer training;LE strengthening/ROM;Elevations;Therapeutic exercise;Endurance training;Cognitive reorientation;Patient/family training;Bed mobility;Gait training;Spoke to nursing;OT   Progress Progressing toward goals   PT Frequency 3-5x/wk   Discharge Recommendation   Rehab Resource Intensity Level, PT II (Moderate Resource Intensity)   AM-PAC Basic Mobility Inpatient   Turning in Flat Bed Without Bedrails 3   Lying on Back to Sitting on Edge of Flat Bed Without Bedrails 3   Moving Bed to Chair 3   Standing Up From Chair Using Arms 3   Walk in Room 3   Climb 3-5 Stairs With Railing 2   Basic Mobility Inpatient Raw Score 17   Basic Mobility Standardized Score 39.67   Highest Level Of Mobility   JH-HLM Goal 5: Stand one or more mins   JH-HLM Achieved 7: Walk 25 feet or more   Education   Education Provided Mobility training;Home exercise program;Assistive device   Patient Demonstrates acceptance/verbal understanding;Reinforcement needed   End of Consult   Patient Position at End of Consult Bedside chair;Bed/Chair alarm activated;All needs within reach     Reva Valentine, PT, DPT    Time of PT treatment session: 1483-8016  24 minutes

## 2024-03-08 NOTE — ASSESSMENT & PLAN NOTE
Noted to have significant urinary retention likely in the setting of BPH  Parra catheter has since been placed  Will need to be continued on discharge  Follow-up outpatient with urology

## 2024-03-09 LAB
ANION GAP SERPL CALCULATED.3IONS-SCNC: 7 MMOL/L
BASOPHILS # BLD AUTO: 0.04 THOUSANDS/ÂΜL (ref 0–0.1)
BASOPHILS NFR BLD AUTO: 0 % (ref 0–1)
BUN SERPL-MCNC: 12 MG/DL (ref 5–25)
CALCIUM SERPL-MCNC: 9.4 MG/DL (ref 8.4–10.2)
CHLORIDE SERPL-SCNC: 106 MMOL/L (ref 96–108)
CO2 SERPL-SCNC: 26 MMOL/L (ref 21–32)
CREAT SERPL-MCNC: 0.83 MG/DL (ref 0.6–1.3)
EOSINOPHIL # BLD AUTO: 0.13 THOUSAND/ÂΜL (ref 0–0.61)
EOSINOPHIL NFR BLD AUTO: 1 % (ref 0–6)
ERYTHROCYTE [DISTWIDTH] IN BLOOD BY AUTOMATED COUNT: 12 % (ref 11.6–15.1)
GFR SERPL CREATININE-BSD FRML MDRD: 86 ML/MIN/1.73SQ M
GLUCOSE SERPL-MCNC: 138 MG/DL (ref 65–140)
GLUCOSE SERPL-MCNC: 142 MG/DL (ref 65–140)
GLUCOSE SERPL-MCNC: 153 MG/DL (ref 65–140)
GLUCOSE SERPL-MCNC: 161 MG/DL (ref 65–140)
GLUCOSE SERPL-MCNC: 225 MG/DL (ref 65–140)
HCT VFR BLD AUTO: 42.2 % (ref 36.5–49.3)
HGB BLD-MCNC: 14 G/DL (ref 12–17)
IMM GRANULOCYTES # BLD AUTO: 0.02 THOUSAND/UL (ref 0–0.2)
IMM GRANULOCYTES NFR BLD AUTO: 0 % (ref 0–2)
LYMPHOCYTES # BLD AUTO: 2.47 THOUSANDS/ÂΜL (ref 0.6–4.47)
LYMPHOCYTES NFR BLD AUTO: 27 % (ref 14–44)
MCH RBC QN AUTO: 31.4 PG (ref 26.8–34.3)
MCHC RBC AUTO-ENTMCNC: 33.2 G/DL (ref 31.4–37.4)
MCV RBC AUTO: 95 FL (ref 82–98)
MONOCYTES # BLD AUTO: 0.87 THOUSAND/ÂΜL (ref 0.17–1.22)
MONOCYTES NFR BLD AUTO: 9 % (ref 4–12)
NEUTROPHILS # BLD AUTO: 5.72 THOUSANDS/ÂΜL (ref 1.85–7.62)
NEUTS SEG NFR BLD AUTO: 63 % (ref 43–75)
NRBC BLD AUTO-RTO: 0 /100 WBCS
PLATELET # BLD AUTO: 249 THOUSANDS/UL (ref 149–390)
PMV BLD AUTO: 10.2 FL (ref 8.9–12.7)
POTASSIUM SERPL-SCNC: 4.2 MMOL/L (ref 3.5–5.3)
RBC # BLD AUTO: 4.46 MILLION/UL (ref 3.88–5.62)
SODIUM SERPL-SCNC: 139 MMOL/L (ref 135–147)
WBC # BLD AUTO: 9.25 THOUSAND/UL (ref 4.31–10.16)

## 2024-03-09 PROCEDURE — 82948 REAGENT STRIP/BLOOD GLUCOSE: CPT

## 2024-03-09 PROCEDURE — 99222 1ST HOSP IP/OBS MODERATE 55: CPT | Performed by: INTERNAL MEDICINE

## 2024-03-09 PROCEDURE — 99232 SBSQ HOSP IP/OBS MODERATE 35: CPT | Performed by: INTERNAL MEDICINE

## 2024-03-09 PROCEDURE — 85025 COMPLETE CBC W/AUTO DIFF WBC: CPT | Performed by: INTERNAL MEDICINE

## 2024-03-09 PROCEDURE — 80048 BASIC METABOLIC PNL TOTAL CA: CPT | Performed by: INTERNAL MEDICINE

## 2024-03-09 RX ADMIN — LISINOPRIL 10 MG: 10 TABLET ORAL at 08:34

## 2024-03-09 RX ADMIN — INSULIN LISPRO 2 UNITS: 100 INJECTION, SOLUTION INTRAVENOUS; SUBCUTANEOUS at 17:00

## 2024-03-09 RX ADMIN — ATORVASTATIN CALCIUM 40 MG: 40 TABLET, FILM COATED ORAL at 17:42

## 2024-03-09 RX ADMIN — ASPIRIN 81 MG: 81 TABLET, CHEWABLE ORAL at 08:34

## 2024-03-09 RX ADMIN — ENOXAPARIN SODIUM 40 MG: 40 INJECTION SUBCUTANEOUS at 08:34

## 2024-03-09 NOTE — ASSESSMENT & PLAN NOTE
73-year-old male past medical history of type 2 diabetes on glipizide was found down in the bathroom by his wife incontinent of urine and stool.  EKG without any acute findings  Telemetry without any acute abnormalities  EKG without any acute abnormalities  Watershed infarcts noted MCA/NELL territory  Possibly cardioembolic  Follow-up cardiology consult  Will likely need loop recorder on discharge

## 2024-03-09 NOTE — CONSULTS
Consultation - Cardiology Team One  Drew Santos 73 y.o. male MRN: [id number]  Unit/Bed#: -01 Encounter: [id number]    Inpatient consult to Cardiology  Consult performed by: AVELINA Thomas  Consult ordered by: Johnathan Sagastume MD          Physician Requesting Consult: Johnathan Sagastume MD  Reason for Consult / Principal Problem: Syncope    Assessment/Plan:    Acute CVA  - Watershed territory subacute infarcts in the right NELL/MCA region noted on MRI of the brain  - Evaluated by neurology, unclear etiology  - Plan for outpatient cardiac event monitor   -Resume telemetry monitoring for the next 24 hours   - Continue aspirin, atorvastatin    2.  Syncope  - Seizure versus CVA related  - Further workup per neurology    3.  Hypertension  - Blood pressure overall controlled with occasional fluctuations  - Continue lisinopril    4. Diabetes type 2    HPI: Cardiologist Dr. Joyner    Drew Santos is a 73 y.o. year old male who has a history of diabetes type 2, hypertension who presented to the emergency room after syncopal event on 3/5/2024.  Reports he felt his usual health but did not eat his breakfast and when he went downstairs to the restroom he syncopized and was not found for at least 3 hours.  When he was discovered he was found to be incontinent of bowel and bladder with a normal blood sugar.  He was admitted and during his workup MRI of the brain showed pressure and infarcts in the MCA/NELL territory possibly cardio embolic in nature.      REVIEW OF SYSTEMS:  Constitutional:  Denies fever or chills, + syncope   Eyes:  Denies change in visual acuity   HENT:  Denies nasal congestion or sore throat   Respiratory:  Denies cough, orthopnea, PND or shortness of breath   Cardiovascular:  Denies chest pain, palpitations or edema   GI:  Denies abdominal pain, nausea, vomiting, bloody stools or diarrhea   :  Denies dysuria, frequency, difficulty in micturition and nocturia  Musculoskeletal:  Denies back pain or  joint pain   Neurologic:  Denies headache, focal weakness or sensory changes   Endocrine:  Denies polyuria or polydipsia   Lymphatic:  Denies swollen glands   Psychiatric:  Denies depression or anxiety     Historical Information   Past Medical History:   Diagnosis Date    Diabetes mellitus (HCC)     Hypertension      History reviewed. No pertinent surgical history.  Social History     Substance and Sexual Activity   Alcohol Use Not Currently     Social History     Substance and Sexual Activity   Drug Use Never     Social History     Tobacco Use   Smoking Status Never   Smokeless Tobacco Never       Family History: non-contributory    MEDS & ALLERGIES:  all current active meds have been reviewed and current meds:   Current Facility-Administered Medications   Medication Dose Route Frequency    aspirin chewable tablet 81 mg  81 mg Oral Daily    atorvastatin (LIPITOR) tablet 40 mg  40 mg Oral QPM    enoxaparin (LOVENOX) subcutaneous injection 40 mg  40 mg Subcutaneous Daily    insulin lispro (HumALOG/ADMELOG) 100 units/mL subcutaneous injection 1-5 Units  1-5 Units Subcutaneous TID AC    lisinopril (ZESTRIL) tablet 10 mg  10 mg Oral Daily    melatonin tablet 3 mg  3 mg Oral HS PRN        No Known Allergies    OBJECTIVE:  Vitals:   Vitals:    24 0700   BP: (!) 171/86   Pulse:    Resp: 14   Temp:    SpO2:      Body mass index is 32.73 kg/m².    Systolic (24hrs), Av , Min:134 , Max:171     Diastolic (24hrs), Av, Min:80, Max:133      Intake/Output Summary (Last 24 hours) at 3/9/2024 0929  Last data filed at 3/8/2024 1446  Gross per 24 hour   Intake 0 ml   Output 700 ml   Net -700 ml     Weight (last 2 days)       Date/Time Weight    24 0100 125 (276.02)          Invasive Devices       Peripheral Intravenous Line  Duration             Peripheral IV 24 Left Antecubital 3 days              Drain  Duration             Urethral Catheter 16 Fr. 1 day                    PHYSICAL EXAMS:  General:  Patient  is not in acute distress, laying in the bed comfortably, awake, alert   Head: Normocephalic, Atraumatic.   HEENT: White sclera, pink conjunctiva  Neck:  Supple, no neck vein distention  Respiratory: clear to auscultation   Cardiovascular:  PMI normal, S1-S2 normal, no murmurs, thrills, gallops, rubs, regular rhythm  GI:  Abdomen soft, non-tender, non-distended  Extremities: No edema, normal pulses  Integument:  No skin rashes or ulceration  Lymphatic:  No cervical or inguinal lymphadenopathy  Neurologic:  Patient is awake alert, responding to command, oriented to person, place and time     LABORATORY RESULTS:  Results from last 7 days   Lab Units 03/07/24  0504 03/05/24  1228   CK TOTAL U/L 140 355*     CBC with diff:   Results from last 7 days   Lab Units 03/09/24  0504 03/08/24  0530 03/07/24  0504   WBC Thousand/uL 9.25 10.06 8.09   HEMOGLOBIN g/dL 14.0 14.2 12.9   HEMATOCRIT % 42.2 42.7 38.7   MCV fL 95 96 95   PLATELETS Thousands/uL 249 268 257   RBC Million/uL 4.46 4.47 4.06   MCH pg 31.4 31.8 31.8   MCHC g/dL 33.2 33.3 33.3   RDW % 12.0 12.1 12.2   MPV fL 10.2 9.8 10.0   NRBC AUTO /100 WBCs 0 0 0       CMP:  Results from last 7 days   Lab Units 03/09/24  0504 03/08/24  0530 03/07/24  0504 03/06/24  0524 03/05/24  1228   POTASSIUM mmol/L 4.2 4.3 4.0 4.0 4.2   CHLORIDE mmol/L 106 107 111* 109* 105   CO2 mmol/L 26 27 25 22 25   BUN mg/dL 12 14 17 17 16   CREATININE mg/dL 0.83 0.97 0.95 0.97 0.97   CALCIUM mg/dL 9.4 9.6 9.2 8.7 9.5   AST U/L  --   --   --  21 23   ALT U/L  --   --   --  15 18   ALK PHOS U/L  --   --   --  64 80   EGFR ml/min/1.73sq m 86 76 77 76 76       BMP:  Results from last 7 days   Lab Units 03/09/24  0504 03/08/24  0530 03/07/24  0504   POTASSIUM mmol/L 4.2 4.3 4.0   CHLORIDE mmol/L 106 107 111*   CO2 mmol/L 26 27 25   BUN mg/dL 12 14 17   CREATININE mg/dL 0.83 0.97 0.95   CALCIUM mg/dL 9.4 9.6 9.2            Results from last 7 days   Lab Units 03/07/24  0504 03/06/24  0524   MAGNESIUM  "mg/dL 1.9 1.8*     Results from last 7 days   Lab Units 03/08/24  0530   HEMOGLOBIN A1C % 7.1*     Results from last 7 days   Lab Units 03/08/24  0530   TSH 3RD GENERATON uIU/mL 5.228*   FREE T4 ng/dL 0.82     Results from last 7 days   Lab Units 03/05/24  1228   INR  1.00       Lipid Profile:   No results found for: \"CHOL\"  Lab Results   Component Value Date    HDL 42 03/08/2024     Lab Results   Component Value Date    LDLCALC 132 (H) 03/08/2024     Lab Results   Component Value Date    TRIG 111 03/08/2024       Cardiac testing:   No results found for this or any previous visit.    No results found for this or any previous visit.    No valid procedures specified.  No results found for this or any previous visit.        Imaging:   I have personally reviewed pertinent reports.        EKG reviewed personally:  Normal sinus rhythm    Telemetry reviewed personally:   No telemetry, reordered      Code Status: Level 1 - Full Code      AVELINA Thomas  3/9/2024,9:29 AM      "

## 2024-03-09 NOTE — NURSING NOTE
Pt had blood tinged urine in Parra catheter at 2100, assessed and found the stat lock was non adhering to pt thigh.When pt was ambulating to the restroom it dislodged the stat lock out of place. Replaced with new stat lock. Consulted with Erick VALADEZ RN we assessed together.Pt has clear urine as of 2300 with adequate urine flow.

## 2024-03-09 NOTE — PROGRESS NOTES
[facility]  Progress Note  Name: Drew Santos I  MRN: [id number]  Unit/Bed#: -01 I Date of Admission: 3/5/2024   Date of Service: 3/9/2024 I Hospital Day: 3    Assessment/Plan   * Syncope  Assessment & Plan  73-year-old male past medical history of type 2 diabetes on glipizide was found down in the bathroom by his wife incontinent of urine and stool.  EKG without any acute findings  Telemetry without any acute abnormalities  EKG without any acute abnormalities  Watershed infarcts noted MCA/NELL territory  Possibly cardioembolic  Follow-up cardiology consult  Will likely need loop recorder on discharge    Urinary retention  Assessment & Plan  Noted to have significant urinary retention likely in the setting of BPH  Parra catheter has since been placed  Will need to be continued on discharge  Follow-up outpatient with urology    Stroke (cerebrum) ([facility])  Assessment & Plan  MRI noted CVA of NELL/MCA territory  Continue aspirin, statin  Pt/ot recommended possibly rehab  Appreciate neurology eval    Abnormal CPK  Assessment & Plan  Mild CPK elevation to 355 in the setting of being found down  Resolved with IV fluids    Diabetes ([facility])  Assessment & Plan  Blood sugar currently controlled  Continue sliding scale insulin         VTE Pharmacologic Prophylaxis:   Pharmacologic: Enoxaparin (Lovenox)  Mechanical VTE Prophylaxis in Place: Yes    Patient Centered Rounds: I have performed bedside rounds with nursing staff today.    Discussions with Specialists or Other Care Team Provider: cm, nursing    Education and Discussions with Family / Patient: pt, wife    Time Spent for Care: 30 minutes.  More than 50% of total time spent on counseling and coordination of care as described above.    Current Length of Stay: 3 day(s)    Current Patient Status: Inpatient   Certification Statement: The patient will continue to require additional inpatient hospital stay due to see below    Discharge Plan: Medically  clear in 24 hours will need rehab    Code Status: Level 1 - Full Code      Subjective:   Denies chest pain, shortness of breath, cough or fevers    Objective:     Vitals:   Temp (24hrs), Av.6 °F (37 °C), Min:98 °F (36.7 °C), Max:99.6 °F (37.6 °C)    Temp:  [98 °F (36.7 °C)-99.6 °F (37.6 °C)] 98.5 °F (36.9 °C)  HR:  [64-98] 69  Resp:  [14-18] 14  BP: (134-171)/() 171/86  SpO2:  [93 %-99 %] 93 %  Body mass index is 32.73 kg/m².     Input and Output Summary (last 24 hours):       Intake/Output Summary (Last 24 hours) at 3/9/2024 0940  Last data filed at 3/8/2024 1446  Gross per 24 hour   Intake 0 ml   Output 700 ml   Net -700 ml       Physical Exam:     Physical Exam  Constitutional:       General: He is not in acute distress.     Appearance: He is well-developed. He is not diaphoretic.   HENT:      Head: Normocephalic and atraumatic.      Nose: Nose normal.      Mouth/Throat:      Pharynx: No oropharyngeal exudate.   Eyes:      General: No scleral icterus.     Conjunctiva/sclera: Conjunctivae normal.   Cardiovascular:      Rate and Rhythm: Normal rate and regular rhythm.      Heart sounds: Normal heart sounds. No murmur heard.     No friction rub. No gallop.   Pulmonary:      Effort: Pulmonary effort is normal. No respiratory distress.      Breath sounds: Normal breath sounds. No wheezing or rales.   Chest:      Chest wall: No tenderness.   Abdominal:      General: Bowel sounds are normal. There is no distension.      Palpations: Abdomen is soft.      Tenderness: There is no abdominal tenderness. There is no guarding.   Musculoskeletal:         General: No tenderness or deformity. Normal range of motion.      Cervical back: Normal range of motion and neck supple.   Skin:     General: Skin is warm and dry.      Findings: No erythema.   Neurological:      Mental Status: He is alert. Mental status is at baseline.           Additional Data:     Labs:    Results from last 7 days   Lab Units 24  0504   WBC  Thousand/uL 9.25   HEMOGLOBIN g/dL 14.0   HEMATOCRIT % 42.2   PLATELETS Thousands/uL 249   NEUTROS PCT % 63   LYMPHS PCT % 27   MONOS PCT % 9   EOS PCT % 1     Results from last 7 days   Lab Units 03/09/24  0504 03/07/24  0504 03/06/24  0524   SODIUM mmol/L 139   < > 138   POTASSIUM mmol/L 4.2   < > 4.0   CHLORIDE mmol/L 106   < > 109*   CO2 mmol/L 26   < > 22   BUN mg/dL 12   < > 17   CREATININE mg/dL 0.83   < > 0.97   ANION GAP mmol/L 7   < > 7   CALCIUM mg/dL 9.4   < > 8.7   ALBUMIN g/dL  --   --  3.3*   TOTAL BILIRUBIN mg/dL  --   --  0.73   ALK PHOS U/L  --   --  64   ALT U/L  --   --  15   AST U/L  --   --  21   GLUCOSE RANDOM mg/dL 153*   < > 136    < > = values in this interval not displayed.     Results from last 7 days   Lab Units 03/05/24  1228   INR  1.00     Results from last 7 days   Lab Units 03/09/24  0739 03/08/24  2049 03/08/24  1608 03/08/24  1047 03/08/24  0641 03/07/24  2049 03/07/24  1551 03/07/24  1048 03/07/24  0646 03/06/24  2054 03/06/24  1644 03/06/24  1025   POC GLUCOSE mg/dl 142* 183* 133 147* 171* 184* 166* 222* 144* 185* 154* 189*     Results from last 7 days   Lab Units 03/08/24  0530   HEMOGLOBIN A1C % 7.1*     Results from last 7 days   Lab Units 03/06/24  0524 03/05/24  1547 03/05/24  1228   LACTIC ACID mmol/L  --  1.5 2.1*   PROCALCITONIN ng/ml 0.08  --  0.06           * I Have Reviewed All Lab Data Listed Above.  * Additional Pertinent Lab Tests Reviewed: All Labs Within Last 24 Hours Reviewed    Imaging:    Imaging Reports Reviewed Today Include: na  Imaging Personally Reviewed by Myself Includes:  na    Recent Cultures (last 7 days):     Results from last 7 days   Lab Units 03/05/24  1425 03/05/24  1228   BLOOD CULTURE  No Growth at 72 hrs. No Growth at 72 hrs.       Last 24 Hours Medication List:   Current Facility-Administered Medications   Medication Dose Route Frequency Provider Last Rate    aspirin  81 mg Oral Daily Lauren Rangel PA-C      atorvastatin  40 mg Oral QPM  Lauren Rangel PA-C      enoxaparin  40 mg Subcutaneous Daily Kit Borges MD      insulin lispro  1-5 Units Subcutaneous TID AC Kit Borges MD      lisinopril  10 mg Oral Daily Kit Borges MD      melatonin  3 mg Oral HS PRN Lauren Rangel PA-C          Today, Patient Was Seen By: Johnathan Sagastume MD    ** Please Note: Dictation voice to text software may have been used in the creation of this document. **

## 2024-03-09 NOTE — ASSESSMENT & PLAN NOTE
MRI noted CVA of NELL/MCA territory  Continue aspirin, statin  Pt/ot recommended possibly rehab  Appreciate neurology eval

## 2024-03-09 NOTE — PLAN OF CARE
Problem: PAIN - ADULT  Goal: Verbalizes/displays adequate comfort level or baseline comfort level  Description: Interventions:  - Encourage patient to monitor pain and request assistance  - Assess pain using appropriate pain scale  - Administer analgesics based on type and severity of pain and evaluate response  - Implement non-pharmacological measures as appropriate and evaluate response  - Consider cultural and social influences on pain and pain management  - Notify physician/advanced practitioner if interventions unsuccessful or patient reports new pain  Outcome: Progressing     Problem: INFECTION - ADULT  Goal: Absence or prevention of progression during hospitalization  Description: INTERVENTIONS:  - Assess and monitor for signs and symptoms of infection  - Monitor lab/diagnostic results  - Monitor all insertion sites, i.e. indwelling lines, tubes, and drains  - Monitor endotracheal if appropriate and nasal secretions for changes in amount and color  - [place] [facility] appropriate cooling/warming therapies per order  - Administer medications as ordered  - Instruct and encourage patient and family to use good hand hygiene technique  - Identify and instruct in appropriate isolation precautions for identified infection/condition  Outcome: Progressing  Goal: Absence of fever/infection during neutropenic period  Description: INTERVENTIONS:  - Monitor WBC    Outcome: Progressing

## 2024-03-09 NOTE — PLAN OF CARE
Problem: Potential for Falls  Goal: Patient will remain free of falls  Description: INTERVENTIONS:  - Educate patient/family on patient safety including physical limitations  - Instruct patient to call for assistance with activity   - Consult OT/PT to assist with strengthening/mobility   - Keep Call bell within reach  - Keep bed low and locked with side rails adjusted as appropriate  - Keep care items and personal belongings within reach  - Initiate and maintain comfort rounds  - Make Fall Risk Sign visible to staff  - Offer Toileting every 2 Hours, in advance of need  - Initiate/Maintain bed and chair alarm  - Obtain necessary fall risk management equipment: non skid socks  - Apply yellow socks and bracelet for high fall risk patients  - Consider moving patient to room near nurses station  Outcome: Progressing     Problem: PAIN - ADULT  Goal: Verbalizes/displays adequate comfort level or baseline comfort level  Description: Interventions:  - Encourage patient to monitor pain and request assistance  - Assess pain using appropriate pain scale  - Administer analgesics based on type and severity of pain and evaluate response  - Implement non-pharmacological measures as appropriate and evaluate response  - Consider cultural and social influences on pain and pain management  - Notify physician/advanced practitioner if interventions unsuccessful or patient reports new pain  Outcome: Progressing     Problem: INFECTION - ADULT  Goal: Absence or prevention of progression during hospitalization  Description: INTERVENTIONS:  - Assess and monitor for signs and symptoms of infection  - Monitor lab/diagnostic results  - Monitor all insertion sites, i.e. indwelling lines, tubes, and drains  - Monitor endotracheal if appropriate and nasal secretions for changes in amount and color  - [place] appropriate cooling/warming therapies per order  - Administer medications as ordered  - Instruct and encourage patient and family to use  good hand hygiene technique  - Identify and instruct in appropriate isolation precautions for identified infection/condition  Outcome: Progressing  Goal: Absence of fever/infection during neutropenic period  Description: INTERVENTIONS:  - Monitor WBC    Outcome: Progressing     Problem: SAFETY ADULT  Goal: Patient will remain free of falls  Description: INTERVENTIONS:  - Educate patient/family on patient safety including physical limitations  - Instruct patient to call for assistance with activity   - Consult OT/PT to assist with strengthening/mobility   - Keep Call bell within reach  - Keep bed low and locked with side rails adjusted as appropriate  - Keep care items and personal belongings within reach  - Initiate and maintain comfort rounds  - Make Fall Risk Sign visible to staff  - Offer Toileting every 2 Hours, in advance of need  - Initiate/Maintain bed and chair alarm  - Obtain necessary fall risk management equipment: non skid socks  - Apply yellow socks and bracelet for high fall risk patients  - Consider moving patient to room near nurses station  Outcome: Progressing  Goal: Maintain or return to baseline ADL function  Description: INTERVENTIONS:  -  Assess patient's ability to carry out ADLs; assess patient's baseline for ADL function and identify physical deficits which impact ability to perform ADLs (bathing, care of mouth/teeth, toileting, grooming, dressing, etc.)  - Assess/evaluate cause of self-care deficits   - Assess range of motion  - Assess patient's mobility; develop plan if impaired  - Assess patient's need for assistive devices and provide as appropriate  - Encourage maximum independence but intervene and supervise when necessary  - Involve family in performance of ADLs  - Assess for home care needs following discharge   - Consider OT consult to assist with ADL evaluation and planning for discharge  - Provide patient education as appropriate  Outcome: Progressing  Goal: Maintains/Returns to  pre admission functional level  Description: INTERVENTIONS:  - Perform AM-PAC 6 Click Basic Mobility/ Daily Activity assessment daily.  - Set and communicate daily mobility goal to care team and patient/family/caregiver.   - Collaborate with rehabilitation services on mobility goals if consulted  - Perform Range of Motion 4 times a day.  - Reposition patient every 2 hours.  - Dangle patient 4 times a day  - Stand patient 3 times a day  - Ambulate patient 3 times a day  - Out of bed to chair 3 times a day   - Out of bed for meals 3 times a day  - Out of bed for toileting  - Record patient progress and toleration of activity level   Outcome: Progressing     Problem: DISCHARGE PLANNING  Goal: Discharge to home or other facility with appropriate resources  Description: INTERVENTIONS:  - Identify barriers to discharge w/patient and caregiver  - Arrange for needed discharge resources and transportation as appropriate  - Identify discharge learning needs (meds, wound care, etc.)  - Arrange for interpretive services to assist at discharge as needed  - Refer to Case Management Department for coordinating discharge planning if the patient needs post-hospital services based on physician/advanced practitioner order or complex needs related to functional status, cognitive ability, or social support system  Outcome: Progressing     Problem: Knowledge Deficit  Goal: Patient/family/caregiver demonstrates understanding of disease process, treatment plan, medications, and discharge instructions  Description: Complete learning assessment and assess knowledge base.  Interventions:  - Provide teaching at level of understanding  - Provide teaching via preferred learning methods  Outcome: Progressing     Problem: Prexisting or High Potential for Compromised Skin Integrity  Goal: Skin integrity is maintained or improved  Description: INTERVENTIONS:  - Identify patients at risk for skin breakdown  - Assess and monitor skin integrity  -  Assess and monitor nutrition and hydration status  - Monitor labs   - Assess for incontinence   - Turn and reposition patient  - Assist with mobility/ambulation  - Relieve pressure over bony prominences  - Avoid friction and shearing  - Provide appropriate hygiene as needed including keeping skin clean and dry  - Evaluate need for skin moisturizer/barrier cream  - Collaborate with interdisciplinary team   - Patient/family teaching  - Consider wound care consult   Outcome: Progressing

## 2024-03-10 LAB
ANION GAP SERPL CALCULATED.3IONS-SCNC: 7 MMOL/L
BACTERIA BLD CULT: NORMAL
BACTERIA BLD CULT: NORMAL
BASOPHILS # BLD AUTO: 0.05 THOUSANDS/ÂΜL (ref 0–0.1)
BASOPHILS NFR BLD AUTO: 1 % (ref 0–1)
BUN SERPL-MCNC: 14 MG/DL (ref 5–25)
CALCIUM SERPL-MCNC: 9.4 MG/DL (ref 8.4–10.2)
CHLORIDE SERPL-SCNC: 108 MMOL/L (ref 96–108)
CO2 SERPL-SCNC: 26 MMOL/L (ref 21–32)
CREAT SERPL-MCNC: 0.91 MG/DL (ref 0.6–1.3)
EOSINOPHIL # BLD AUTO: 0.22 THOUSAND/ÂΜL (ref 0–0.61)
EOSINOPHIL NFR BLD AUTO: 2 % (ref 0–6)
ERYTHROCYTE [DISTWIDTH] IN BLOOD BY AUTOMATED COUNT: 12.1 % (ref 11.6–15.1)
GFR SERPL CREATININE-BSD FRML MDRD: 82 ML/MIN/1.73SQ M
GLUCOSE SERPL-MCNC: 152 MG/DL (ref 65–140)
GLUCOSE SERPL-MCNC: 153 MG/DL (ref 65–140)
GLUCOSE SERPL-MCNC: 158 MG/DL (ref 65–140)
GLUCOSE SERPL-MCNC: 166 MG/DL (ref 65–140)
GLUCOSE SERPL-MCNC: 234 MG/DL (ref 65–140)
HCT VFR BLD AUTO: 42.4 % (ref 36.5–49.3)
HGB BLD-MCNC: 14.2 G/DL (ref 12–17)
IMM GRANULOCYTES # BLD AUTO: 0.03 THOUSAND/UL (ref 0–0.2)
IMM GRANULOCYTES NFR BLD AUTO: 0 % (ref 0–2)
LYMPHOCYTES # BLD AUTO: 3.45 THOUSANDS/ÂΜL (ref 0.6–4.47)
LYMPHOCYTES NFR BLD AUTO: 37 % (ref 14–44)
MCH RBC QN AUTO: 31.4 PG (ref 26.8–34.3)
MCHC RBC AUTO-ENTMCNC: 33.5 G/DL (ref 31.4–37.4)
MCV RBC AUTO: 94 FL (ref 82–98)
MONOCYTES # BLD AUTO: 0.7 THOUSAND/ÂΜL (ref 0.17–1.22)
MONOCYTES NFR BLD AUTO: 7 % (ref 4–12)
NEUTROPHILS # BLD AUTO: 5.01 THOUSANDS/ÂΜL (ref 1.85–7.62)
NEUTS SEG NFR BLD AUTO: 53 % (ref 43–75)
NRBC BLD AUTO-RTO: 0 /100 WBCS
PLATELET # BLD AUTO: 269 THOUSANDS/UL (ref 149–390)
PMV BLD AUTO: 9.8 FL (ref 8.9–12.7)
POTASSIUM SERPL-SCNC: 4 MMOL/L (ref 3.5–5.3)
RBC # BLD AUTO: 4.52 MILLION/UL (ref 3.88–5.62)
SODIUM SERPL-SCNC: 141 MMOL/L (ref 135–147)
WBC # BLD AUTO: 9.46 THOUSAND/UL (ref 4.31–10.16)

## 2024-03-10 PROCEDURE — 85025 COMPLETE CBC W/AUTO DIFF WBC: CPT | Performed by: INTERNAL MEDICINE

## 2024-03-10 PROCEDURE — 82948 REAGENT STRIP/BLOOD GLUCOSE: CPT

## 2024-03-10 PROCEDURE — 99232 SBSQ HOSP IP/OBS MODERATE 35: CPT | Performed by: INTERNAL MEDICINE

## 2024-03-10 PROCEDURE — NC001 PR NO CHARGE: Performed by: STUDENT IN AN ORGANIZED HEALTH CARE EDUCATION/TRAINING PROGRAM

## 2024-03-10 PROCEDURE — 80048 BASIC METABOLIC PNL TOTAL CA: CPT | Performed by: INTERNAL MEDICINE

## 2024-03-10 RX ORDER — METOPROLOL SUCCINATE 25 MG/1
25 TABLET, EXTENDED RELEASE ORAL DAILY
Status: DISCONTINUED | OUTPATIENT
Start: 2024-03-10 | End: 2024-03-13 | Stop reason: HOSPADM

## 2024-03-10 RX ADMIN — ENOXAPARIN SODIUM 40 MG: 40 INJECTION SUBCUTANEOUS at 08:56

## 2024-03-10 RX ADMIN — INSULIN LISPRO 1 UNITS: 100 INJECTION, SOLUTION INTRAVENOUS; SUBCUTANEOUS at 08:56

## 2024-03-10 RX ADMIN — ASPIRIN 81 MG: 81 TABLET, CHEWABLE ORAL at 08:56

## 2024-03-10 RX ADMIN — METOPROLOL SUCCINATE 25 MG: 25 TABLET, EXTENDED RELEASE ORAL at 12:32

## 2024-03-10 RX ADMIN — INSULIN LISPRO 1 UNITS: 100 INJECTION, SOLUTION INTRAVENOUS; SUBCUTANEOUS at 17:48

## 2024-03-10 RX ADMIN — LISINOPRIL 10 MG: 10 TABLET ORAL at 08:56

## 2024-03-10 RX ADMIN — APIXABAN 5 MG: 5 TABLET, FILM COATED ORAL at 17:55

## 2024-03-10 RX ADMIN — ATORVASTATIN CALCIUM 40 MG: 40 TABLET, FILM COATED ORAL at 17:55

## 2024-03-10 RX ADMIN — INSULIN LISPRO 1 UNITS: 100 INJECTION, SOLUTION INTRAVENOUS; SUBCUTANEOUS at 12:00

## 2024-03-10 RX ADMIN — APIXABAN 5 MG: 5 TABLET, FILM COATED ORAL at 12:32

## 2024-03-10 NOTE — ASSESSMENT & PLAN NOTE
73-year-old male past medical history of type 2 diabetes on glipizide was found down in the bathroom by his wife incontinent of urine and stool.  EKG without any acute findings  Telemetry without any acute abnormalities  EKG without any acute abnormalities  Watershed infarcts noted MCA/NELL territory  Possibly cardioembolic  Will have outpatient event monitor placed  PT/OT recommending rehab

## 2024-03-10 NOTE — PLAN OF CARE
Problem: Potential for Falls  Goal: Patient will remain free of falls  Description: INTERVENTIONS:  - Educate patient/family on patient safety including physical limitations  - Instruct patient to call for assistance with activity   - Consult OT/PT to assist with strengthening/mobility   - Keep Call bell within reach  - Keep bed low and locked with side rails adjusted as appropriate  - Keep care items and personal belongings within reach  - Initiate and maintain comfort rounds  - Make Fall Risk Sign visible to staff  - Offer Toileting every 2 Hours, in advance of need  - Initiate/Maintain bed and chair alarm  - Obtain necessary fall risk management equipment: non skid socks  - Apply yellow socks and bracelet for high fall risk patients  - Consider moving patient to room near nurses station  Outcome: Progressing     Problem: PAIN - ADULT  Goal: Verbalizes/displays adequate comfort level or baseline comfort level  Description: Interventions:  - Encourage patient to monitor pain and request assistance  - Assess pain using appropriate pain scale  - Administer analgesics based on type and severity of pain and evaluate response  - Implement non-pharmacological measures as appropriate and evaluate response  - Consider cultural and social influences on pain and pain management  - Notify physician/advanced practitioner if interventions unsuccessful or patient reports new pain  Outcome: Progressing     Problem: INFECTION - ADULT  Goal: Absence or prevention of progression during hospitalization  Description: INTERVENTIONS:  - Assess and monitor for signs and symptoms of infection  - Monitor lab/diagnostic results  - Monitor all insertion sites, i.e. indwelling lines, tubes, and drains  - Monitor endotracheal if appropriate and nasal secretions for changes in amount and color  - [place] appropriate cooling/warming therapies per order  - Administer medications as ordered  - Instruct and encourage patient and family to use  good hand hygiene technique  - Identify and instruct in appropriate isolation precautions for identified infection/condition  Outcome: Progressing  Goal: Absence of fever/infection during neutropenic period  Description: INTERVENTIONS:  - Monitor WBC    Outcome: Progressing     Problem: SAFETY ADULT  Goal: Patient will remain free of falls  Description: INTERVENTIONS:  - Educate patient/family on patient safety including physical limitations  - Instruct patient to call for assistance with activity   - Consult OT/PT to assist with strengthening/mobility   - Keep Call bell within reach  - Keep bed low and locked with side rails adjusted as appropriate  - Keep care items and personal belongings within reach  - Initiate and maintain comfort rounds  - Make Fall Risk Sign visible to staff  - Offer Toileting every 2 Hours, in advance of need  - Initiate/Maintain bed and chair alarm  - Obtain necessary fall risk management equipment: non skid socks  - Apply yellow socks and bracelet for high fall risk patients  - Consider moving patient to room near nurses station  Outcome: Progressing  Goal: Maintain or return to baseline ADL function  Description: INTERVENTIONS:  - Educate patient/family on patient safety including physical limitations  - Instruct patient to call for assistance with activity   - Consult OT/PT to assist with strengthening/mobility   - Keep Call bell within reach  - Keep bed low and locked with side rails adjusted as appropriate  - Keep care items and personal belongings within reach  - Initiate and maintain comfort rounds  - Make Fall Risk Sign visible to staff  - Offer Toileting every 2 Hours, in advance of need  - Initiate/Maintain bed and chair alarm  - Obtain necessary fall risk management equipment: non skid socks   - Apply yellow socks and bracelet for high fall risk patients  - Consider moving patient to room near nurses station  Outcome: Progressing  Goal: Maintains/Returns to pre admission  functional level  Description: INTERVENTIONS:  -  Assess patient's ability to carry out ADLs; assess patient's baseline for ADL function and identify physical deficits which impact ability to perform ADLs (bathing, care of mouth/teeth, toileting, grooming, dressing, etc.)  - Assess/evaluate cause of self-care deficits   - Assess range of motion  - Assess patient's mobility; develop plan if impaired  - Assess patient's need for assistive devices and provide as appropriate  - Encourage maximum independence but intervene and supervise when necessary  - Involve family in performance of ADLs  - Assess for home care needs following discharge   - Consider OT consult to assist with ADL evaluation and planning for discharge  - Provide patient education as appropriate  Outcome: Progressing     Problem: DISCHARGE PLANNING  Goal: Discharge to home or other facility with appropriate resources  Description: INTERVENTIONS:  - Identify barriers to discharge w/patient and caregiver  - Arrange for needed discharge resources and transportation as appropriate  - Identify discharge learning needs (meds, wound care, etc.)  - Arrange for interpretive services to assist at discharge as needed  - Refer to Case Management Department for coordinating discharge planning if the patient needs post-hospital services based on physician/advanced practitioner order or complex needs related to functional status, cognitive ability, or social support system  Outcome: Progressing     Problem: Knowledge Deficit  Goal: Patient/family/caregiver demonstrates understanding of disease process, treatment plan, medications, and discharge instructions  Description: Complete learning assessment and assess knowledge base.  Interventions:  - Provide teaching at level of understanding  - Provide teaching via preferred learning methods  Outcome: Progressing     Problem: Prexisting or High Potential for Compromised Skin Integrity  Goal: Skin integrity is maintained or  improved  Description: INTERVENTIONS:  - Identify patients at risk for skin breakdown  - Assess and monitor skin integrity  - Assess and monitor nutrition and hydration status  - Monitor labs   - Assess for incontinence   - Turn and reposition patient  - Assist with mobility/ambulation  - Relieve pressure over bony prominences  - Avoid friction and shearing  - Provide appropriate hygiene as needed including keeping skin clean and dry  - Evaluate need for skin moisturizer/barrier cream  - Collaborate with interdisciplinary team   - Patient/family teaching  - Consider wound care consult   Outcome: Progressing

## 2024-03-10 NOTE — ASSESSMENT & PLAN NOTE
Noted to have significant urinary retention likely in the setting of BPH  Parra catheter has since been placed  Will need to continue Parra on discharge  Outpatient follow-up with urology

## 2024-03-10 NOTE — PROGRESS NOTES
General Cardiology   Progress Note   Drew Santos 73 y.o. male MRN: [id number]  Unit/Bed#: -01 Encounter: [id number]    Assessment/Plan:    Acute CVA  - [place] territory subacute infarcts in the right NELL/MCA region noted on MRI of the brain  - Telemetry review reveals paroxysmal atrial fibrillation with RVR  - Discussed with neurology  - Continue aspirin, atorvastatin     2.  New onset paroxysmal atrial fibrillation  - Noted on telemetry review last night's of RVR, patient denies any associated symptoms  - Start metoprolol succinate 25 mg daily  - Patient with a [id number] score of 4, will start Eliquis 5 mg twice daily  - Discussed with neurology and feel he is okay to start anticoagulation  -Continue to however monitor heart rate trends on telemetry    3. Syncope  - Seizure versus CVA related  - Further workup per neurology     4.  Hypertension  - Blood pressure overall controlled with occasional fluctuations  - Continue lisinopril     5. Diabetes type 2      Subjective:   Patient seen and examined. No significant events since the last encounter.     REVIEW OF SYSTEMS:  Constitutional:  Denies fever or chills   Eyes:  Denies change in visual acuity   HENT:  Denies nasal congestion or sore throat   Respiratory:  Denies cough, orthopnea, PND or shortness of breath   Cardiovascular:  Denies chest pain, palpitations or edema   GI:  Denies abdominal pain, nausea, vomiting, bloody stools, constipation or diarrhea   :  Denies dysuria, frequency, difficulty in urination or nocturia  Musculoskeletal:  Denies back pain or joint pain   Neurologic:  Denies headache, focal weakness or sensory changes   Endocrine:  Denies polyuria or polydipsia   Lymphatic:  Denies swollen glands   Psychiatric:  Denies depression or anxiety     Objective:   Vitals:  Vitals:    03/10/24 0716   BP: (!) 175/93   Pulse: 78   Resp:    Temp: 98.4 °F (36.9 °C)   SpO2: 93%       Body mass index is 32.73 kg/m².  Systolic (24hrs), Av ,  Min:123 , Max:175     Diastolic (24hrs), Av, Min:77, Max:93      Intake/Output Summary (Last 24 hours) at 3/10/2024 0808  Last data filed at 3/10/2024 0554  Gross per 24 hour   Intake --   Output 2000 ml   Net -2000 ml     Weight (last 2 days)       Date/Time Weight    24 0100 125 (276.02)            Telemetry Review: Paroxysmal atrial fibrillation noted on telemetry with periods of RVR      PHYSICAL EXAMS  General:  Patient is not in acute distress, laying in the bed comfortably, awake  Head: Normocephalic, Atraumatic.   HEENT: White sclera, pink conjunctiva  Neck:  Supple, no neck vein distention  Respiratory: clear to auscultation   Cardiovascular: S1-S2 normal, no murmurs, thrills, gallops, rubs, regular rhythm  GI:  Abdomen soft, nontender, non-distended  Extremities: No edema, normal pulses  Integument:  No skin rashes or ulceration  Neurologic:  Patient is awake alert, responding to command, oriented to person, place and time    LABORATORY RESULTS:  Results from last 7 days   Lab Units 24  0504 24  1228   CK TOTAL U/L 140 355*     CBC with diff:   Results from last 7 days   Lab Units 03/10/24  0545 24  05024  0530   WBC Thousand/uL 9.46 9.25 10.06   HEMOGLOBIN g/dL 14.2 14.0 14.2   HEMATOCRIT % 42.4 42.2 42.7   MCV fL 94 95 96   PLATELETS Thousands/uL 269 249 268   RBC Million/uL 4.52 4.46 4.47   MCH pg 31.4 31.4 31.8   MCHC g/dL 33.5 33.2 33.3   RDW % 12.1 12.0 12.1   MPV fL 9.8 10.2 9.8   NRBC AUTO /100 WBCs 0 0 0       CMP:  Results from last 7 days   Lab Units 03/10/24  0545 24  0504 24  0530 24  0504 24  0524 24  1228   POTASSIUM mmol/L 4.0 4.2 4.3   < > 4.0 4.2   CHLORIDE mmol/L 108 106 107   < > 109* 105   CO2 mmol/L 26 26 27   < > 22 25   BUN mg/dL 14 12 14   < > 17 16   CREATININE mg/dL 0.91 0.83 0.97   < > 0.97 0.97   CALCIUM mg/dL 9.4 9.4 9.6   < > 8.7 9.5   AST U/L  --   --   --   --  21 23   ALT U/L  --   --   --   --  15 18   ALK  "PHOS U/L  --   --   --   --  64 80   EGFR ml/min/1.73sq m 82 86 76   < > 76 76    < > = values in this interval not displayed.       BMP:  Results from last 7 days   Lab Units 03/10/24  0545 03/09/24  0504 03/08/24  0530   POTASSIUM mmol/L 4.0 4.2 4.3   CHLORIDE mmol/L 108 106 107   CO2 mmol/L 26 26 27   BUN mg/dL 14 12 14   CREATININE mg/dL 0.91 0.83 0.97   CALCIUM mg/dL 9.4 9.4 9.6              Results from last 7 days   Lab Units 03/07/24  0504 03/06/24  0524   MAGNESIUM mg/dL 1.9 1.8*     Results from last 7 days   Lab Units 03/08/24  0530   HEMOGLOBIN A1C % 7.1*     Results from last 7 days   Lab Units 03/08/24  0530   TSH 3RD GENERATON uIU/mL 5.228*   FREE T4 ng/dL 0.82     Results from last 7 days   Lab Units 03/05/24  1228   INR  1.00       Lipid Profile:   No results found for: \"CHOL\"  Lab Results   Component Value Date    HDL 42 03/08/2024     Lab Results   Component Value Date    LDLCALC 132 (H) 03/08/2024     Lab Results   Component Value Date    TRIG 111 03/08/2024       Cardiac testing:   No results found for this or any previous visit.    No results found for this or any previous visit.    No results found for this or any previous visit.    No valid procedures specified.  No results found for this or any previous visit.      Meds/Allergies   all current active meds have been reviewed  Medications Prior to Admission   Medication    dapagliflozin (Farxiga) 10 MG tablet    glipiZIDE (glipiZIDE XL) 10 mg 24 hr tablet              ** Please Note: Dragon 360 Dictation voice to text software may have been used in the creation of this document. **    "

## 2024-03-10 NOTE — CASE MANAGEMENT
Case Management Discharge Planning Note    Patient name Drew Santos  Location /-01 MRN [id number]  : 1950 Date 3/10/2024       Current Admission Date: 3/5/2024  Current Admission Diagnosis:Syncope   Patient Active Problem List    Diagnosis Date Noted    Stroke (cerebrum) (HCC) 2024    Urinary retention 2024    Syncope 2024    Elevated lactic acid level 2024    Diabetes (HCC) 2024    Leukocytosis 2024    Abnormal CPK 2024      LOS (days): 4  Geometric Mean LOS (GMLOS) (days): 2.4  Days to GMLOS:-1.6     OBJECTIVE:  Risk of Unplanned Readmission Score: 7.99         Current admission status: Inpatient   Preferred Pharmacy:   [id number] [place], [place] - [address] [facility]  [address] [place] [address]  Phone: [phone] Fax: [phone]    Primary Care Provider: Sal Bowman MD    Primary Insurance: [facility]  Secondary Insurance:     DISCHARGE DETAILS:                                          Other Referral/Resources/Interventions Provided:  Interventions: Prescription Price Check  Referral Comments: CM contacted [employer] to check the cost of Eliquis.  It is covered, but will still have an OOP cost of $94.00.  Bonnie Lazcano-cardiology informed.

## 2024-03-10 NOTE — ASSESSMENT & PLAN NOTE
MRI noted CVA of NELL/MCA territory  Continue aspirin, statin  Plan for rehab on discharge  Medically clear at this time  Appreciate neurology recommendations

## 2024-03-10 NOTE — PROGRESS NOTES
Brief Neurology Progress Note    Pt seen at bedside. Examination unrevealing and unchanged from prior. He states that he feels well. Has been ambulating without a walker according to him. No new issues/concerns. Cardiology noted that pt was found to be in A fib on telemetry. With minimal stroke burden and pt being close to 5 days out from the event, would be okay to start Eliquis from a neurologic perspective when weighing benefits/risks. In regard to etiology, can consider decreased blood flow to the affected areas due to a sudden drop in BP (ie hypotension) vs microemboli. 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 He does not have any epilepsy risk factors and history is not overtly suggestive of seizure, although unclear as to why pt had a syncopal episode, possibly related to A fib but unclear. He will plan to follow up with Neurology in the outpatient setting for further management. Routine EEG only showed some mild diffuse slowing, no seizure or seizure tendency.     No need for aspirin at this time from a neurologic perspective if starting Eliquis. After discussion with pt, we agreed to hold off on AED at this time as this event itself does not seem to be related to seizure at this time and he does not have any risk factors. Follow up with Neurology outpatient for further management. Rest of care per primary.    Should see general or epilepsy attending or AP in 4-6 weeks. No need for outpatient neurologic testing at this time.     Will follow as needed, please reach out with questions in the interim.

## 2024-03-10 NOTE — PROGRESS NOTES
[facility]  Progress Note  Name: Drew Santos I  MRN: [id number]  Unit/Bed#: -01 I Date of Admission: 3/5/2024   Date of Service: 3/10/2024 I Hospital Day: 4    Assessment/Plan   * Syncope  Assessment & Plan  73-year-old male past medical history of type 2 diabetes on glipizide was found down in the bathroom by his wife incontinent of urine and stool.  EKG without any acute findings  Telemetry without any acute abnormalities  EKG without any acute abnormalities  Watershed infarcts noted MCA/NELL territory  Possibly cardioembolic  Will have outpatient event monitor placed  PT/OT recommending rehab    Urinary retention  Assessment & Plan  Noted to have significant urinary retention likely in the setting of BPH  Parra catheter has since been placed  Will need to continue Parra on discharge  Outpatient follow-up with urology    Stroke (cerebrum) ([facility])  Assessment & Plan  MRI noted CVA of NELL/MCA territory  Continue aspirin, statin  Plan for rehab on discharge  Medically clear at this time  Appreciate neurology recommendations    Abnormal CPK  Assessment & Plan  Mild CPK elevation to 355 in the setting of being found down  Resolved with IV fluids    Leukocytosis  Assessment & Plan  Has since normalized  Likely reactive    Diabetes (HCC)  Assessment & Plan  Blood sugar currently controlled  Continue sliding-scale insulin       VTE Pharmacologic Prophylaxis:   Pharmacologic: Heparin  Mechanical VTE Prophylaxis in Place: Yes    Patient Centered Rounds: I have performed bedside rounds with nursing staff today.    Discussions with Specialists or Other Care Team Provider: cm, nursing    Education and Discussions with Family / Patient: pt, wife    Time Spent for Care: 30 minutes.  More than 50% of total time spent on counseling and coordination of care as described above.    Current Length of Stay: 4 day(s)    Current Patient Status: Inpatient   Certification Statement: The patient will continue to  require additional inpatient hospital stay due to see below    Discharge Plan: Medically clear awaiting placement to rehab    Code Status: Level 1 - Full Code      Subjective:   Denies chest pain, shortness breath, cough or fevers, chills    Objective:     Vitals:   Temp (24hrs), Av.2 °F (36.8 °C), Min:97.4 °F (36.3 °C), Max:98.6 °F (37 °C)    Temp:  [97.4 °F (36.3 °C)-98.6 °F (37 °C)] 98.4 °F (36.9 °C)  HR:  [] 86  Resp:  [18] 18  BP: (123-175)/(77-93) 130/80  SpO2:  [93 %-98 %] 95 %  Body mass index is 32.73 kg/m².     Input and Output Summary (last 24 hours):       Intake/Output Summary (Last 24 hours) at 3/10/2024 1017  Last data filed at 3/10/2024 0554  Gross per 24 hour   Intake --   Output 700 ml   Net -700 ml       Physical Exam:     Physical Exam  Constitutional:       General: He is not in acute distress.     Appearance: He is well-developed. He is not diaphoretic.   HENT:      Head: Normocephalic and atraumatic.      Nose: Nose normal.      Mouth/Throat:      Pharynx: No oropharyngeal exudate.   Eyes:      General: No scleral icterus.     Conjunctiva/sclera: Conjunctivae normal.   Cardiovascular:      Rate and Rhythm: Normal rate and regular rhythm.      Heart sounds: Normal heart sounds. No murmur heard.     No friction rub. No gallop.   Pulmonary:      Effort: Pulmonary effort is normal. No respiratory distress.      Breath sounds: Normal breath sounds. No wheezing or rales.   Chest:      Chest wall: No tenderness.   Abdominal:      General: Bowel sounds are normal. There is no distension.      Palpations: Abdomen is soft.      Tenderness: There is no abdominal tenderness. There is no guarding.   Musculoskeletal:         General: No tenderness or deformity. Normal range of motion.      Cervical back: Normal range of motion and neck supple.   Skin:     General: Skin is warm and dry.      Findings: No erythema.   Neurological:      Mental Status: He is alert. Mental status is at baseline.          Additional Data:     Labs:    Results from last 7 days   Lab Units 03/10/24  0545   WBC Thousand/uL 9.46   HEMOGLOBIN g/dL 14.2   HEMATOCRIT % 42.4   PLATELETS Thousands/uL 269   NEUTROS PCT % 53   LYMPHS PCT % 37   MONOS PCT % 7   EOS PCT % 2     Results from last 7 days   Lab Units 03/10/24  0545 03/07/24  0504 03/06/24  0524   SODIUM mmol/L 141   < > 138   POTASSIUM mmol/L 4.0   < > 4.0   CHLORIDE mmol/L 108   < > 109*   CO2 mmol/L 26   < > 22   BUN mg/dL 14   < > 17   CREATININE mg/dL 0.91   < > 0.97   ANION GAP mmol/L 7   < > 7   CALCIUM mg/dL 9.4   < > 8.7   ALBUMIN g/dL  --   --  3.3*   TOTAL BILIRUBIN mg/dL  --   --  0.73   ALK PHOS U/L  --   --  64   ALT U/L  --   --  15   AST U/L  --   --  21   GLUCOSE RANDOM mg/dL 152*   < > 136    < > = values in this interval not displayed.     Results from last 7 days   Lab Units 03/05/24  1228   INR  1.00     Results from last 7 days   Lab Units 03/10/24  0714 03/09/24  2054 03/09/24  1638 03/09/24  1101 03/09/24  0739 03/08/24  2049 03/08/24  1608 03/08/24  1047 03/08/24  0641 03/07/24  2049 03/07/24  1551 03/07/24  1048   POC GLUCOSE mg/dl 158* 161* 225* 138 142* 183* 133 147* 171* 184* 166* 222*     Results from last 7 days   Lab Units 03/08/24  0530   HEMOGLOBIN A1C % 7.1*     Results from last 7 days   Lab Units 03/06/24  0524 03/05/24  1547 03/05/24  1228   LACTIC ACID mmol/L  --  1.5 2.1*   PROCALCITONIN ng/ml 0.08  --  0.06           * I Have Reviewed All Lab Data Listed Above.  * Additional Pertinent Lab Tests Reviewed: All Labs Within Last 24 Hours Reviewed    Imaging:    Imaging Reports Reviewed Today Include: na  Imaging Personally Reviewed by Myself Includes:  na    Recent Cultures (last 7 days):     Results from last 7 days   Lab Units 03/05/24  1425 03/05/24  1228   BLOOD CULTURE  No Growth After 4 Days. No Growth After 4 Days.       Last 24 Hours Medication List:   Current Facility-Administered Medications   Medication Dose Route Frequency  Provider Last Rate    aspirin  81 mg Oral Daily Lauren Rangel PA-C      atorvastatin  40 mg Oral QPM Lauren Rangel PA-C      enoxaparin  40 mg Subcutaneous Daily Kit Borges MD      insulin lispro  1-5 Units Subcutaneous TID AC Kit Borges MD      lisinopril  10 mg Oral Daily Kit Borges MD      melatonin  3 mg Oral HS PRN Lauren Rangel PA-C          Today, Patient Was Seen By: Johnathan Sagastume MD    ** Please Note: Dictation voice to text software may have been used in the creation of this document. **

## 2024-03-11 PROBLEM — I48.91 ATRIAL FIBRILLATION (HCC): Status: ACTIVE | Noted: 2024-03-11

## 2024-03-11 LAB
ANION GAP SERPL CALCULATED.3IONS-SCNC: 7 MMOL/L
BASOPHILS # BLD AUTO: 0.06 THOUSANDS/ÂΜL (ref 0–0.1)
BASOPHILS NFR BLD AUTO: 1 % (ref 0–1)
BUN SERPL-MCNC: 15 MG/DL (ref 5–25)
CALCIUM SERPL-MCNC: 9.3 MG/DL (ref 8.4–10.2)
CHLORIDE SERPL-SCNC: 107 MMOL/L (ref 96–108)
CO2 SERPL-SCNC: 24 MMOL/L (ref 21–32)
CREAT SERPL-MCNC: 1.01 MG/DL (ref 0.6–1.3)
EOSINOPHIL # BLD AUTO: 0.17 THOUSAND/ÂΜL (ref 0–0.61)
EOSINOPHIL NFR BLD AUTO: 2 % (ref 0–6)
ERYTHROCYTE [DISTWIDTH] IN BLOOD BY AUTOMATED COUNT: 11.9 % (ref 11.6–15.1)
GFR SERPL CREATININE-BSD FRML MDRD: 72 ML/MIN/1.73SQ M
GLUCOSE SERPL-MCNC: 115 MG/DL (ref 65–140)
GLUCOSE SERPL-MCNC: 149 MG/DL (ref 65–140)
GLUCOSE SERPL-MCNC: 157 MG/DL (ref 65–140)
GLUCOSE SERPL-MCNC: 221 MG/DL (ref 65–140)
GLUCOSE SERPL-MCNC: 222 MG/DL (ref 65–140)
HCT VFR BLD AUTO: 40.8 % (ref 36.5–49.3)
HGB BLD-MCNC: 13.6 G/DL (ref 12–17)
IMM GRANULOCYTES # BLD AUTO: 0.02 THOUSAND/UL (ref 0–0.2)
IMM GRANULOCYTES NFR BLD AUTO: 0 % (ref 0–2)
LYMPHOCYTES # BLD AUTO: 2.81 THOUSANDS/ÂΜL (ref 0.6–4.47)
LYMPHOCYTES NFR BLD AUTO: 29 % (ref 14–44)
MCH RBC QN AUTO: 31.4 PG (ref 26.8–34.3)
MCHC RBC AUTO-ENTMCNC: 33.3 G/DL (ref 31.4–37.4)
MCV RBC AUTO: 94 FL (ref 82–98)
MONOCYTES # BLD AUTO: 0.71 THOUSAND/ÂΜL (ref 0.17–1.22)
MONOCYTES NFR BLD AUTO: 7 % (ref 4–12)
NEUTROPHILS # BLD AUTO: 5.94 THOUSANDS/ÂΜL (ref 1.85–7.62)
NEUTS SEG NFR BLD AUTO: 61 % (ref 43–75)
NRBC BLD AUTO-RTO: 0 /100 WBCS
PLATELET # BLD AUTO: 268 THOUSANDS/UL (ref 149–390)
PMV BLD AUTO: 9.7 FL (ref 8.9–12.7)
POTASSIUM SERPL-SCNC: 3.8 MMOL/L (ref 3.5–5.3)
RBC # BLD AUTO: 4.33 MILLION/UL (ref 3.88–5.62)
SODIUM SERPL-SCNC: 138 MMOL/L (ref 135–147)
WBC # BLD AUTO: 9.71 THOUSAND/UL (ref 4.31–10.16)

## 2024-03-11 PROCEDURE — 97110 THERAPEUTIC EXERCISES: CPT

## 2024-03-11 PROCEDURE — 80048 BASIC METABOLIC PNL TOTAL CA: CPT | Performed by: INTERNAL MEDICINE

## 2024-03-11 PROCEDURE — 85025 COMPLETE CBC W/AUTO DIFF WBC: CPT | Performed by: INTERNAL MEDICINE

## 2024-03-11 PROCEDURE — 99232 SBSQ HOSP IP/OBS MODERATE 35: CPT | Performed by: INTERNAL MEDICINE

## 2024-03-11 PROCEDURE — 82948 REAGENT STRIP/BLOOD GLUCOSE: CPT

## 2024-03-11 PROCEDURE — 97116 GAIT TRAINING THERAPY: CPT

## 2024-03-11 RX ADMIN — INSULIN LISPRO 1 UNITS: 100 INJECTION, SOLUTION INTRAVENOUS; SUBCUTANEOUS at 11:36

## 2024-03-11 RX ADMIN — APIXABAN 5 MG: 5 TABLET, FILM COATED ORAL at 08:53

## 2024-03-11 RX ADMIN — APIXABAN 5 MG: 5 TABLET, FILM COATED ORAL at 17:22

## 2024-03-11 RX ADMIN — LISINOPRIL 10 MG: 10 TABLET ORAL at 08:53

## 2024-03-11 RX ADMIN — METOPROLOL SUCCINATE 25 MG: 25 TABLET, EXTENDED RELEASE ORAL at 08:53

## 2024-03-11 RX ADMIN — ATORVASTATIN CALCIUM 40 MG: 40 TABLET, FILM COATED ORAL at 17:22

## 2024-03-11 NOTE — PROGRESS NOTES
[facility]  Progress Note  Name: Drew Santos I  MRN: [id number]  Unit/Bed#: -01 I Date of Admission: 3/5/2024   Date of Service: 3/11/2024 I Hospital Day: 5    Assessment/Plan   * Syncope  Assessment & Plan  73-year-old male past medical history of type 2 diabetes on glipizide was found down in the bathroom by his wife incontinent of urine and stool.  EKG without any acute findings  Telemetry without any acute abnormalities  EKG without any acute abnormalities  Watershed infarcts noted MCA/NELL territory  Continue Eliquis  PT/OT recommending rehab  Follow-up with case management    Atrial fibrillation ([facility])  Assessment & Plan  Noted to be in new onset atrial fibrillation  Currently rate controlled  Continue Eliquis  Appreciate neurology recommendations    Urinary retention  Assessment & Plan  Noted to have significant urinary retention likely in the setting of BPH  Parra catheter has since been placed  Will need to continue Parra on discharge  Outpatient follow-up with urology    Stroke (cerebrum) ([facility])  Assessment & Plan  MRI noted CVA of NELL/MCA territory  Discussed with neurology will discontinue aspirin at this time initiate Eliquis  Continue statin  Medical cleared at this time awaiting rehab placement    Abnormal CPK  Assessment & Plan  Mild CPK elevation to 355 in the setting of being found down  Since resolved with IV fluids    Leukocytosis  Assessment & Plan  Likely reactive  Is since resolved    Diabetes ([facility])  Assessment & Plan  Blood sugar remains controlled  Continue sliding-scale insulin       VTE Pharmacologic Prophylaxis:   Pharmacologic: Apixaban (Eliquis)  Mechanical VTE Prophylaxis in Place: Yes    Patient Centered Rounds: I have performed bedside rounds with nursing staff today.    Discussions with Specialists or Other Care Team Provider: cm, nursing    Education and Discussions with Family / Patient: pt    Time Spent for Care: 30 minutes.  More than 50% of total  time spent on counseling and coordination of care as described above.    Current Length of Stay: 5 day(s)    Current Patient Status: Inpatient   Certification Statement: The patient will continue to require additional inpatient hospital stay due to see below    Discharge Plan: Medically clear awaiting rehab placement    Code Status: Level 1 - Full Code      Subjective:   Denies chest pain, shortness of breath, cough, fevers, chills    Objective:     Vitals:   Temp (24hrs), Av.4 °F (36.9 °C), Min:98.1 °F (36.7 °C), Max:98.7 °F (37.1 °C)    Temp:  [98.1 °F (36.7 °C)-98.7 °F (37.1 °C)] 98.2 °F (36.8 °C)  HR:  [46-94] 81  Resp:  [18] 18  BP: (120-152)/(62-83) 131/69  SpO2:  [93 %-98 %] 95 %  Body mass index is 32.73 kg/m².     Input and Output Summary (last 24 hours):       Intake/Output Summary (Last 24 hours) at 3/11/2024 1053  Last data filed at 3/11/2024 0900  Gross per 24 hour   Intake --   Output 775 ml   Net -775 ml       Physical Exam:     Physical Exam  Constitutional:       General: He is not in acute distress.     Appearance: He is well-developed. He is not diaphoretic.   HENT:      Head: Normocephalic and atraumatic.      Nose: Nose normal.      Mouth/Throat:      Pharynx: No oropharyngeal exudate.   Eyes:      General: No scleral icterus.     Conjunctiva/sclera: Conjunctivae normal.   Cardiovascular:      Rate and Rhythm: Normal rate and regular rhythm.      Heart sounds: Normal heart sounds. No murmur heard.     No friction rub. No gallop.   Pulmonary:      Effort: Pulmonary effort is normal. No respiratory distress.      Breath sounds: Normal breath sounds. No wheezing or rales.   Chest:      Chest wall: No tenderness.   Abdominal:      General: Bowel sounds are normal. There is no distension.      Palpations: Abdomen is soft.      Tenderness: There is no abdominal tenderness. There is no guarding.   Musculoskeletal:         General: No tenderness or deformity. Normal range of motion.      Cervical  back: Normal range of motion and neck supple.   Skin:     General: Skin is warm and dry.      Findings: No erythema.   Neurological:      Mental Status: He is alert. Mental status is at baseline.           Additional Data:     Labs:    Results from last 7 days   Lab Units 03/11/24  0507   WBC Thousand/uL 9.71   HEMOGLOBIN g/dL 13.6   HEMATOCRIT % 40.8   PLATELETS Thousands/uL 268   NEUTROS PCT % 61   LYMPHS PCT % 29   MONOS PCT % 7   EOS PCT % 2     Results from last 7 days   Lab Units 03/11/24  0507 03/07/24  0504 03/06/24  0524   SODIUM mmol/L 138   < > 138   POTASSIUM mmol/L 3.8   < > 4.0   CHLORIDE mmol/L 107   < > 109*   CO2 mmol/L 24   < > 22   BUN mg/dL 15   < > 17   CREATININE mg/dL 1.01   < > 0.97   ANION GAP mmol/L 7   < > 7   CALCIUM mg/dL 9.3   < > 8.7   ALBUMIN g/dL  --   --  3.3*   TOTAL BILIRUBIN mg/dL  --   --  0.73   ALK PHOS U/L  --   --  64   ALT U/L  --   --  15   AST U/L  --   --  21   GLUCOSE RANDOM mg/dL 157*   < > 136    < > = values in this interval not displayed.     Results from last 7 days   Lab Units 03/05/24  1228   INR  1.00     Results from last 7 days   Lab Units 03/11/24  0721 03/10/24  2058 03/10/24  1649 03/10/24  1131 03/10/24  0714 03/09/24  2054 03/09/24  1638 03/09/24  1101 03/09/24  0739 03/08/24  2049 03/08/24  1608 03/08/24  1047   POC GLUCOSE mg/dl 149* 234* 166* 153* 158* 161* 225* 138 142* 183* 133 147*     Results from last 7 days   Lab Units 03/08/24  0530   HEMOGLOBIN A1C % 7.1*     Results from last 7 days   Lab Units 03/06/24  0524 03/05/24  1547 03/05/24  1228   LACTIC ACID mmol/L  --  1.5 2.1*   PROCALCITONIN ng/ml 0.08  --  0.06           * I Have Reviewed All Lab Data Listed Above.  * Additional Pertinent Lab Tests Reviewed: All Labs Within Last 24 Hours Reviewed    Imaging:    Imaging Reports Reviewed Today Include: na  Imaging Personally Reviewed by Myself Includes:  na    Recent Cultures (last 7 days):     Results from last 7 days   Lab Units 03/05/24  6923  03/05/24  1228   BLOOD CULTURE  No Growth After 5 Days. No Growth After 5 Days.       Last 24 Hours Medication List:   Current Facility-Administered Medications   Medication Dose Route Frequency Provider Last Rate    apixaban  5 mg Oral BID Johnathan Sagastume MD      atorvastatin  40 mg Oral QPM Lauren Rangel PA-C      insulin lispro  1-5 Units Subcutaneous TID AC Kit Borges MD      lisinopril  10 mg Oral Daily Kit Borges MD      melatonin  3 mg Oral HS PRN Lauren Rangel PA-C      metoprolol succinate  25 mg Oral Daily AVELINA Thomas          Today, Patient Was Seen By: Johnathan Sagastume MD    ** Please Note: Dictation voice to text software may have been used in the creation of this document. **

## 2024-03-11 NOTE — PLAN OF CARE
Problem: Potential for Falls  Goal: Patient will remain free of falls  Description: INTERVENTIONS:  - Educate patient/family on patient safety including physical limitations  - Instruct patient to call for assistance with activity   - Consult OT/PT to assist with strengthening/mobility   - Keep Call bell within reach  - Keep bed low and locked with side rails adjusted as appropriate  - Keep care items and personal belongings within reach  - Initiate and maintain comfort rounds  - Make Fall Risk Sign visible to staff  - Offer Toileting every 2 Hours, in advance of need  - Initiate/Maintain bed and chair alarm  - Obtain necessary fall risk management equipment: non skid socks  - Apply yellow socks and bracelet for high fall risk patients  - Consider moving patient to room near nurses station  Outcome: Progressing     Problem: PAIN - ADULT  Goal: Verbalizes/displays adequate comfort level or baseline comfort level  Description: Interventions:  - Encourage patient to monitor pain and request assistance  - Assess pain using appropriate pain scale  - Administer analgesics based on type and severity of pain and evaluate response  - Implement non-pharmacological measures as appropriate and evaluate response  - Consider cultural and social influences on pain and pain management  - Notify physician/advanced practitioner if interventions unsuccessful or patient reports new pain  Outcome: Progressing     Problem: INFECTION - ADULT  Goal: Absence or prevention of progression during hospitalization  Description: INTERVENTIONS:  - Assess and monitor for signs and symptoms of infection  - Monitor lab/diagnostic results  - Monitor all insertion sites, i.e. indwelling lines, tubes, and drains  - Monitor endotracheal if appropriate and nasal secretions for changes in amount and color  - [place] appropriate cooling/warming therapies per order  - Administer medications as ordered  - Instruct and encourage patient and family to use  good hand hygiene technique  - Identify and instruct in appropriate isolation precautions for identified infection/condition  Outcome: Progressing  Goal: Absence of fever/infection during neutropenic period  Description: INTERVENTIONS:  - Monitor WBC    Outcome: Progressing     Problem: SAFETY ADULT  Goal: Patient will remain free of falls  Description: INTERVENTIONS:  - Educate patient/family on patient safety including physical limitations  - Instruct patient to call for assistance with activity   - Consult OT/PT to assist with strengthening/mobility   - Keep Call bell within reach  - Keep bed low and locked with side rails adjusted as appropriate  - Keep care items and personal belongings within reach  - Initiate and maintain comfort rounds  - Make Fall Risk Sign visible to staff  - Offer Toileting every 2 Hours, in advance of need  - Initiate/Maintain bed and chair alarm  - Obtain necessary fall risk management equipment: non skid socks  - Apply yellow socks and bracelet for high fall risk patients  - Consider moving patient to room near nurses station  Outcome: Progressing  Goal: Maintain or return to baseline ADL function  Description: INTERVENTIONS:  -  Assess patient's ability to carry out ADLs; assess patient's baseline for ADL function and identify physical deficits which impact ability to perform ADLs (bathing, care of mouth/teeth, toileting, grooming, dressing, etc.)  - Assess/evaluate cause of self-care deficits   - Assess range of motion  - Assess patient's mobility; develop plan if impaired  - Assess patient's need for assistive devices and provide as appropriate  - Encourage maximum independence but intervene and supervise when necessary  - Involve family in performance of ADLs  - Assess for home care needs following discharge   - Consider OT consult to assist with ADL evaluation and planning for discharge  - Provide patient education as appropriate  Outcome: Progressing  Goal: Maintains/Returns to  pre admission functional level  Description: INTERVENTIONS:  - Perform AM-PAC 6 Click Basic Mobility/ Daily Activity assessment daily.  - Set and communicate daily mobility goal to care team and patient/family/caregiver.   - Collaborate with rehabilitation services on mobility goals if consulted  - Perform Range of Motion 4 times a day.  - Reposition patient every 2 hours.  - Dangle patient 3 times a day  - Stand patient 3 times a day  - Ambulate patient 3 times a day  - Out of bed to chair 3 times a day   - Out of bed for meals 3 times a day  - Out of bed for toileting  - Record patient progress and toleration of activity level   Outcome: Progressing     Problem: DISCHARGE PLANNING  Goal: Discharge to home or other facility with appropriate resources  Description: INTERVENTIONS:  - Identify barriers to discharge w/patient and caregiver  - Arrange for needed discharge resources and transportation as appropriate  - Identify discharge learning needs (meds, wound care, etc.)  - Arrange for interpretive services to assist at discharge as needed  - Refer to Case Management Department for coordinating discharge planning if the patient needs post-hospital services based on physician/advanced practitioner order or complex needs related to functional status, cognitive ability, or social support system  Outcome: Progressing     Problem: Knowledge Deficit  Goal: Patient/family/caregiver demonstrates understanding of disease process, treatment plan, medications, and discharge instructions  Description: Complete learning assessment and assess knowledge base.  Interventions:  - Provide teaching at level of understanding  - Provide teaching via preferred learning methods  Outcome: Progressing     Problem: Prexisting or High Potential for Compromised Skin Integrity  Goal: Skin integrity is maintained or improved  Description: INTERVENTIONS:  - Identify patients at risk for skin breakdown  - Assess and monitor skin integrity  -  Assess and monitor nutrition and hydration status  - Monitor labs   - Assess for incontinence   - Turn and reposition patient  - Assist with mobility/ambulation  - Relieve pressure over bony prominences  - Avoid friction and shearing  - Provide appropriate hygiene as needed including keeping skin clean and dry  - Evaluate need for skin moisturizer/barrier cream  - Collaborate with interdisciplinary team   - Patient/family teaching  - Consider wound care consult   Outcome: Progressing

## 2024-03-11 NOTE — ASSESSMENT & PLAN NOTE
MRI noted CVA of NELL/MCA territory  Discussed with neurology will discontinue aspirin at this time initiate Eliquis  Continue statin  Medical cleared at this time awaiting rehab placement

## 2024-03-11 NOTE — PHYSICAL THERAPY NOTE
PHYSICAL THERAPY TREATMENT  NAME:  Drew Santos  DATE: 03/11/24    AGE:   73 y.o.  Mrn:   [id number]  ADMIT DX:  Syncope [R55]  Weakness [R53.1]  Problem List:   Patient Active Problem List   Diagnosis    Syncope    Elevated lactic acid level    Diabetes (HCC)    Leukocytosis    Abnormal CPK    Stroke (cerebrum) (HCC)    Urinary retention    Atrial fibrillation (HCC)       Past Medical History  Past Medical History:   Diagnosis Date    Diabetes mellitus (HCC)     Hypertension        Past Surgical History  History reviewed. No pertinent surgical history.    Length Of Stay: 5  Performed at least 2 patient identifiers during session: Name and Birthday       03/11/24 0841   PT Last Visit   PT Visit Date 03/11/24   Note Type   Note Type Treatment   Pain Assessment   Pain Assessment Tool 0-10   Pain Score No Pain   Restrictions/Precautions   Weight Bearing Precautions Per Order No   Other Precautions Cognitive;Chair Alarm;Bed Alarm;Fall Risk;Multiple lines   General   Chart Reviewed Yes   Response to Previous Treatment Patient with no complaints from previous session.   Family/Caregiver Present No   Cognition   Overall Cognitive Status Impaired   Arousal/Participation Responsive;Cooperative   Attention Attends with cues to redirect   Orientation Level Oriented to person;Oriented to place;Disoriented to time   Memory Decreased recall of recent events;Decreased recall of precautions   Following Commands Follows one step commands without difficulty   Comments Pt agreeable to PT session   Bed Mobility   Supine to Sit 4  Minimal assistance  (side lying > sit)   Additional items Assist x 1;HOB elevated;Bedrails;Increased time required   Sit to Supine   (not tested as pt seated in bedside chair at end of session)   Additional Comments Pt without complaints of lightheadedness, SOB, chest pain, dizziness throughout session   Transfers   Sit to Stand 4  Minimal assistance   Additional items Assist x 1;Increased time required;Verbal  cues   Stand to Sit 4  Minimal assistance   Additional items Assist x 1;Increased time required;Verbal cues   Additional Comments RW used during transfers   Ambulation/Elevation   Gait pattern Decreased foot clearance;Shuffling;Narrow ALICIA;Improper Weight shift   Gait Assistance 4  Minimal assist   Additional items Assist x 1;Verbal cues   Assistive Device Rolling walker   Distance 10'   Stair Management Assistance Not tested   Ambulation/Elevation Additional Comments 2 LOB during ambulation with RW, Dominick to regain COG over base of support with stepping strategy. Ambulation distance limited by overall pt fatigue and pt requesting to have breakfast   Balance   Static Sitting Fair +   Dynamic Sitting Fair   Static Standing Fair -   Dynamic Standing Poor +   Ambulatory Poor +   Endurance Deficit   Endurance Deficit Yes   Endurance Deficit Description decreased activity tolerance, fatigue   Activity Tolerance   Activity Tolerance Patient limited by fatigue   Nurse Made Aware RN aware of session outcomes   Exercises   Hip Flexion Sitting;20 reps;AROM;Bilateral   Hip Abduction Sitting;20 reps;AROM;Bilateral   Knee AROM Long Arc Quad Sitting;20 reps;AROM;Bilateral   Assessment   Prognosis Good   Problem List Decreased strength;Decreased endurance;Impaired balance;Decreased mobility;Decreased cognition;Decreased safety awareness   Assessment Pt seen for PT treatment session this date, consisting of gt training on level surfaces to improve pt safety in household environment and therapeutic exercise focusing on LE strengthening . Since previous session, pt has made  fair  progress in terms of assist required for mobility and overall activity tolerance. Pt greeted side lying in bed reporting 0/10 pain at rest and with mobility and required Dominick to sit EOB, pt progressed to STS transfer to RW Dominick with min verbal cues for safety awareness. Pt ambulated 10' with Dominick and 2 LOB to the L and posterior with RW, pt able to regain COG  over base of support with Dominick and stepping strategy. Pertinent barriers during this session include cognitive status and fatigue . Current goals and POC remain appropriate, pt continues to have rehab potential and is making fair progress towards STGs. Pt prognosis for achieving goals is good, pending pt progress with hospitalization/medical status improvements, and indicated by motivation, supportive family/caregivers, previous response to intervention, and responsive to cues/strategies. Pt limited d/t avoidance behaviors. PT recommends level 2, moderate resource intensity upon discharge. Pt continues to be functioning below baseline level, and remains limited 2* factors listed above. PT will continue to see pt during current hospitalization in order to address the deficits listed above and provide interventions consistent w/ POC in effort to achieve STGs.   Barriers to Discharge Inaccessible home environment   Goals   STG Expiration Date 03/16/24   PT Treatment Day 3   Plan   Treatment/Interventions Functional transfer training;Endurance training;Therapeutic exercise;Patient/family training;Bed mobility;Gait training;Spoke to nursing   Progress Progressing toward goals   PT Frequency 3-5x/wk   Discharge Recommendation   Rehab Resource Intensity Level, PT II (Moderate Resource Intensity)   AM-PAC Basic Mobility Inpatient   Turning in Flat Bed Without Bedrails 3   Lying on Back to Sitting on Edge of Flat Bed Without Bedrails 3   Moving Bed to Chair 3   Standing Up From Chair Using Arms 3   Walk in Room 3   Climb 3-5 Stairs With Railing 1   Basic Mobility Inpatient Raw Score 16   Basic Mobility Standardized Score 38.32   Highest Level Of Mobility   JH-HLM Goal 5: Stand one or more mins   JH-HLM Achieved 6: Walk 10 steps or more   End of Consult   Patient Position at End of Consult All needs within reach;Bed/Chair alarm activated;Bedside chair       Time In: 0841  Time Out: 0906  Total Treatment Minutes:  25    Angy Rivera, PT

## 2024-03-11 NOTE — PROGRESS NOTES
"Cardiology Progress Note - Drew Santos 73 y.o. male MRN: [id number]    Unit/Bed#: -01 Encounter: [id number]      Assessment/Plan:  1.  Acute CVA, watershed territory subacute infarct in the right NELL/MCA region noted on MRI.  Telemetry showed A-fib with RVR.    2.  New onset paroxysmal atrial fibrillation, heart rates in the 80s.  Continue Toprol, Eliquis. Monitor Telemetry    3.  Syncope, seizure versus CVA, management per neurology    4.  Hypertension, stable.  Continue lisinopril, Toprol    5.  Diabetes, management per SLIM      Patient stable from a cardiac standpoint, will sign off.  Call if needed.      Subjective:   Patient seen and examined.  No significant events overnight. Im feeling great. Denies cp. Sitting in the chair, appears comfortable.     Objective:     Vitals: Blood pressure 131/70, pulse 84, temperature 98.2 °F (36.8 °C), temperature source Oral, resp. rate 18, height 6' 5\" (1.956 m), weight 125 kg (276 lb 0.3 oz), SpO2 95%., Body mass index is 32.73 kg/m².,   Orthostatic Blood Pressures      Flowsheet Row Most Recent Value   Blood Pressure 131/70 filed at 03/11/2024 1500   Patient Position - Orthostatic VS Sitting filed at 03/11/2024 1500              Intake/Output Summary (Last 24 hours) at 3/11/2024 1551  Last data filed at 3/11/2024 0900  Gross per 24 hour   Intake --   Output 775 ml   Net -775 ml         Physical Exam:  GEN: Alert and oriented x 3, in no acute distress.  Well appearing and well nourished.   HEENT: Sclera anicteric, conjunctivae pink, mucous membranes moist. Oropharynx clear.   NECK: Supple, no carotid bruits, no significant JVD. Trachea midline, no thyromegaly.   HEART: Regular rhythm, normal S1 and S2, no murmurs, clicks, gallops or rubs. PMI nondisplaced, no thrills.   LUNGS: Clear to auscultation bilaterally; no wheezes, rales, or rhonchi. No increased work of breathing or signs of respiratory distress.   ABDOMEN: Soft, nontender, nondistended, normoactive bowel " sounds.   EXTREMITIES: Skin warm and well perfused, no clubbing, cyanosis, or edema.  NEURO: No focal findings. Normal speech. Mood and affect normal.   SKIN: Normal without suspicious lesions on exposed skin.      Medications:      Current Facility-Administered Medications:     apixaban (ELIQUIS) tablet 5 mg, 5 mg, Oral, BID, Johnathan Sagastume MD, 5 mg at 03/11/24 0853    atorvastatin (LIPITOR) tablet 40 mg, 40 mg, Oral, QPM, Lauren Rangel PA-C, 40 mg at 03/10/24 1755    insulin lispro (HumALOG/ADMELOG) 100 units/mL subcutaneous injection 1-5 Units, 1-5 Units, Subcutaneous, TID AC, 1 Units at 03/11/24 1136 **AND** Fingerstick Glucose (POCT), , , TID AC, Kit Borges MD    lisinopril (ZESTRIL) tablet 10 mg, 10 mg, Oral, Daily, Kit Borges MD, 10 mg at 03/11/24 0853    melatonin tablet 3 mg, 3 mg, Oral, HS PRN, Lauren Rangel PA-C, 3 mg at 03/08/24 0133    metoprolol succinate (TOPROL-XL) 24 hr tablet 25 mg, 25 mg, Oral, Daily, AVELINA Thomas, 25 mg at 03/11/24 0853     Labs & Results:    Results from last 7 days   Lab Units 03/07/24  0504 03/05/24  1625 03/05/24  1425 03/05/24  1228   CK TOTAL U/L 140  --   --  355*   HS TNI 0HR ng/L  --   --   --  10   HS TNI 2HR ng/L  --   --  11  --    HS TNI 4HR ng/L  --  12  --   --    HSTNI D4 ng/L  --  2  --   --      Results from last 7 days   Lab Units 03/11/24  0507 03/10/24  0545 03/09/24  0504   WBC Thousand/uL 9.71 9.46 9.25   HEMOGLOBIN g/dL 13.6 14.2 14.0   HEMATOCRIT % 40.8 42.4 42.2   PLATELETS Thousands/uL 268 269 249     Results from last 7 days   Lab Units 03/08/24  0530   TRIGLYCERIDES mg/dL 111   HDL mg/dL 42     Results from last 7 days   Lab Units 03/11/24  0507 03/10/24  0545 03/09/24  0504 03/07/24  0504 03/06/24  0524 03/05/24  1228   POTASSIUM mmol/L 3.8 4.0 4.2   < > 4.0 4.2   CHLORIDE mmol/L 107 108 106   < > 109* 105   CO2 mmol/L 24 26 26   < > 22 25   BUN mg/dL 15 14 12   < > 17 16   CREATININE mg/dL 1.01 0.91 0.83   < > 0.97 0.97   CALCIUM mg/dL  9.3 9.4 9.4   < > 8.7 9.5   ALK PHOS U/L  --   --   --   --  64 80   ALT U/L  --   --   --   --  15 18   AST U/L  --   --   --   --  21 23    < > = values in this interval not displayed.     Results from last 7 days   Lab Units 03/05/24  1228   INR  1.00   PTT seconds 29     Results from last 7 days   Lab Units 03/07/24  0504 03/06/24  0524   MAGNESIUM mg/dL 1.9 1.8*

## 2024-03-11 NOTE — ASSESSMENT & PLAN NOTE
Noted to be in new onset atrial fibrillation  Currently rate controlled  Continue Eliquis  Appreciate neurology recommendations

## 2024-03-11 NOTE — PLAN OF CARE
Problem: PHYSICAL THERAPY ADULT  Goal: Performs mobility at highest level of function for planned discharge setting.  See evaluation for individualized goals.  Description: Treatment/Interventions: Functional transfer training, LE strengthening/ROM, Elevations, Therapeutic exercise, Endurance training, Cognitive reorientation, Patient/family training, Bed mobility, Gait training, Spoke to nursing, OT          See flowsheet documentation for full assessment, interventions and recommendations.  Outcome: Progressing  Note: Prognosis: Good  Problem List: Decreased strength, Decreased endurance, Impaired balance, Decreased mobility, Decreased cognition, Decreased safety awareness  Assessment: Pt seen for PT treatment session this date, consisting of gt training on level surfaces to improve pt safety in household environment and therapeutic exercise focusing on LE strengthening . Since previous session, pt has made  fair  progress in terms of assist required for mobility and overall activity tolerance. Pt greeted side lying in bed reporting 0/10 pain at rest and with mobility and required Dominick to sit EOB, pt progressed to STS transfer to RW Dominick with min verbal cues for safety awareness. Pt ambulated 10' with Dominick and 2 LOB to the L and posterior with RW, pt able to regain COG over base of support with Dominick and stepping strategy. Pertinent barriers during this session include cognitive status and fatigue . Current goals and POC remain appropriate, pt continues to have rehab potential and is making fair progress towards STGs. Pt prognosis for achieving goals is good, pending pt progress with hospitalization/medical status improvements, and indicated by motivation, supportive family/caregivers, previous response to intervention, and responsive to cues/strategies. Pt limited d/t avoidance behaviors. PT recommends level 2, moderate resource intensity upon discharge. Pt continues to be functioning below baseline level, and  remains limited 2* factors listed above. PT will continue to see pt during current hospitalization in order to address the deficits listed above and provide interventions consistent w/ POC in effort to achieve STGs.  Barriers to Discharge: Inaccessible home environment     Rehab Resource Intensity Level, PT: II (Moderate Resource Intensity)    See flowsheet documentation for full assessment.   Angy Rivera; PT, DPT

## 2024-03-11 NOTE — ASSESSMENT & PLAN NOTE
73-year-old male past medical history of type 2 diabetes on glipizide was found down in the bathroom by his wife incontinent of urine and stool.  EKG without any acute findings  Telemetry without any acute abnormalities  EKG without any acute abnormalities  Watershed infarcts noted MCA/NELL territory  Continue Eliquis  PT/OT recommending rehab  Follow-up with case management

## 2024-03-12 LAB
DME PARACHUTE DELIVERY DATE ACTUAL: NORMAL
DME PARACHUTE DELIVERY DATE REQUESTED: NORMAL
DME PARACHUTE DELIVERY NOTE: NORMAL
DME PARACHUTE ITEM DESCRIPTION: NORMAL
DME PARACHUTE ITEM DESCRIPTION: NORMAL
DME PARACHUTE ORDER STATUS: NORMAL
DME PARACHUTE SUPPLIER NAME: NORMAL
DME PARACHUTE SUPPLIER PHONE: NORMAL
GLUCOSE SERPL-MCNC: 123 MG/DL (ref 65–140)
GLUCOSE SERPL-MCNC: 145 MG/DL (ref 65–140)
GLUCOSE SERPL-MCNC: 149 MG/DL (ref 65–140)
GLUCOSE SERPL-MCNC: 178 MG/DL (ref 65–140)

## 2024-03-12 PROCEDURE — 82948 REAGENT STRIP/BLOOD GLUCOSE: CPT

## 2024-03-12 PROCEDURE — 99233 SBSQ HOSP IP/OBS HIGH 50: CPT | Performed by: INTERNAL MEDICINE

## 2024-03-12 RX ADMIN — INSULIN LISPRO 1 UNITS: 100 INJECTION, SOLUTION INTRAVENOUS; SUBCUTANEOUS at 18:31

## 2024-03-12 RX ADMIN — ATORVASTATIN CALCIUM 40 MG: 40 TABLET, FILM COATED ORAL at 18:30

## 2024-03-12 RX ADMIN — METOPROLOL SUCCINATE 25 MG: 25 TABLET, EXTENDED RELEASE ORAL at 09:05

## 2024-03-12 RX ADMIN — APIXABAN 5 MG: 5 TABLET, FILM COATED ORAL at 18:30

## 2024-03-12 RX ADMIN — LISINOPRIL 10 MG: 10 TABLET ORAL at 09:06

## 2024-03-12 RX ADMIN — APIXABAN 5 MG: 5 TABLET, FILM COATED ORAL at 09:05

## 2024-03-12 NOTE — NURSING NOTE
Patient refused blood work this morning despite education on importance of labs. Will try again later in the morning.

## 2024-03-12 NOTE — ASSESSMENT & PLAN NOTE
73-year-old male past medical history of type 2 diabetes on glipizide was found down in the bathroom by his wife incontinent of urine and stool.  EKG without any acute findings  Telemetry without any acute abnormalities  EKG without any acute abnormalities  Watershed infarcts noted MCA/NELL territory  Continue Eliquis  PT/OT recommending rehab - pending

## 2024-03-12 NOTE — PROGRESS NOTES
[facility]  Progress Note  Name: Drew Santos I  MRN: [id number]  Unit/Bed#: -01 I Date of Admission: 3/5/2024   Date of Service: 3/12/2024 I Hospital Day: 6    Assessment/Plan   * Syncope  Assessment & Plan  73-year-old male past medical history of type 2 diabetes on glipizide was found down in the bathroom by his wife incontinent of urine and stool.  EKG without any acute findings  Telemetry without any acute abnormalities  EKG without any acute abnormalities  Watershed infarcts noted MCA/NELL territory  Continue Eliquis  PT/OT recommending rehab - pending    Urinary retention  Assessment & Plan  Noted to have significant urinary retention likely in the setting of BPH  Parra catheter has since been placed  We can attempt a voiding trial prior to DC but if fails will need to be discharged with it    Atrial fibrillation ([facility])  Assessment & Plan  Noted to be in new onset atrial fibrillation  Currently rate controlled  Continue Eliquis  Monitor closely    Stroke (cerebrum) ([facility])  Assessment & Plan  MRI noted CVA of NELL/MCA territory  Discussed with neurology will discontinue aspirin at this time initiate Eliquis  Continue statin  Medical cleared at this time awaiting rehab placement - discussed with CM    Diabetes ([facility])  Assessment & Plan  Blood sugar remains controlled  Continue sliding-scale insulin       VTE Pharmacologic Prophylaxis:   Pharmacologic: Apixaban (Eliquis)  Mechanical VTE Prophylaxis in Place: Yes    Patient Centered Rounds: I have performed bedside rounds with nursing staff today.    Discussions with Specialists or Other Care Team Provider: cm, nursing    Education and Discussions with Family / Patient: pt    Time Spent for Care: 30 minutes.  More than 50% of total time spent on counseling and coordination of care as described above.    Current Length of Stay: 6 day(s)    Current Patient Status: Inpatient   Certification Statement: The patient will continue to require  additional inpatient hospital stay due to see below    Discharge Plan: Medically clear awaiting rehab placement    Code Status: Level 1 - Full Code      Subjective:   Denies chest pain, shortness of breath, cough, fevers, chills    Objective:     Vitals:   Temp (24hrs), Av.3 °F (36.8 °C), Min:97.9 °F (36.6 °C), Max:98.5 °F (36.9 °C)    Temp:  [97.9 °F (36.6 °C)-98.5 °F (36.9 °C)] 97.9 °F (36.6 °C)  HR:  [64-78] 78  Resp:  [18] 18  BP: (128-145)/(72-93) 143/75  SpO2:  [94 %-97 %] 97 %  Body mass index is 32.73 kg/m².     Input and Output Summary (last 24 hours):       Intake/Output Summary (Last 24 hours) at 3/12/2024 1529  Last data filed at 3/12/2024 1226  Gross per 24 hour   Intake --   Output 1250 ml   Net -1250 ml       Physical Exam:     Physical Exam  Constitutional:       General: He is not in acute distress.     Appearance: He is well-developed. He is not diaphoretic.   HENT:      Head: Normocephalic and atraumatic.      Nose: Nose normal.      Mouth/Throat:      Pharynx: No oropharyngeal exudate.   Eyes:      General: No scleral icterus.     Conjunctiva/sclera: Conjunctivae normal.   Cardiovascular:      Rate and Rhythm: Normal rate and regular rhythm.      Heart sounds: Normal heart sounds. No murmur heard.     No friction rub. No gallop.   Pulmonary:      Effort: Pulmonary effort is normal. No respiratory distress.      Breath sounds: Normal breath sounds. No wheezing or rales.   Chest:      Chest wall: No tenderness.   Abdominal:      General: Bowel sounds are normal. There is no distension.      Palpations: Abdomen is soft.      Tenderness: There is no abdominal tenderness. There is no guarding.   Musculoskeletal:         General: No tenderness or deformity. Normal range of motion.      Cervical back: Normal range of motion and neck supple.   Skin:     General: Skin is warm and dry.      Findings: No erythema.   Neurological:      Mental Status: He is alert. Mental status is at baseline.            Additional Data:     Labs:    Results from last 7 days   Lab Units 03/11/24  0507   WBC Thousand/uL 9.71   HEMOGLOBIN g/dL 13.6   HEMATOCRIT % 40.8   PLATELETS Thousands/uL 268   NEUTROS PCT % 61   LYMPHS PCT % 29   MONOS PCT % 7   EOS PCT % 2     Results from last 7 days   Lab Units 03/11/24  0507 03/07/24  0504 03/06/24  0524   SODIUM mmol/L 138   < > 138   POTASSIUM mmol/L 3.8   < > 4.0   CHLORIDE mmol/L 107   < > 109*   CO2 mmol/L 24   < > 22   BUN mg/dL 15   < > 17   CREATININE mg/dL 1.01   < > 0.97   ANION GAP mmol/L 7   < > 7   CALCIUM mg/dL 9.3   < > 8.7   ALBUMIN g/dL  --   --  3.3*   TOTAL BILIRUBIN mg/dL  --   --  0.73   ALK PHOS U/L  --   --  64   ALT U/L  --   --  15   AST U/L  --   --  21   GLUCOSE RANDOM mg/dL 157*   < > 136    < > = values in this interval not displayed.           Results from last 7 days   Lab Units 03/12/24  1115 03/12/24  0724 03/11/24  2106 03/11/24  1653 03/11/24  1133 03/11/24  0721 03/10/24  2058 03/10/24  1649 03/10/24  1131 03/10/24  0714 03/09/24  2054 03/09/24  1638   POC GLUCOSE mg/dl 145* 149* 221* 115 222* 149* 234* 166* 153* 158* 161* 225*     Results from last 7 days   Lab Units 03/08/24  0530   HEMOGLOBIN A1C % 7.1*     Results from last 7 days   Lab Units 03/06/24  0524 03/05/24  1547   LACTIC ACID mmol/L  --  1.5   PROCALCITONIN ng/ml 0.08  --            * I Have Reviewed All Lab Data Listed Above.  * Additional Pertinent Lab Tests Reviewed: All Labs Within Last 24 Hours Reviewed    Imaging:    Imaging Reports Reviewed Today Include: na  Imaging Personally Reviewed by Myself Includes:  na    Recent Cultures (last 7 days):             Last 24 Hours Medication List:   Current Facility-Administered Medications   Medication Dose Route Frequency Provider Last Rate    apixaban  5 mg Oral BID Johnathan Sagastume MD      atorvastatin  40 mg Oral QPM Lauren Rangel PA-C      insulin lispro  1-5 Units Subcutaneous TID AC Kit Borges MD      lisinopril  10 mg Oral Daily  Kit Borges MD      melatonin  3 mg Oral HS PRN Lauren Rangel PA-C      metoprolol succinate  25 mg Oral Daily AVELINA Thomas          Today, Patient Was Seen By: Chandler Reed MD    ** Please Note: Dictation voice to text software may have been used in the creation of this document. **

## 2024-03-12 NOTE — PLAN OF CARE
Problem: Potential for Falls  Goal: Patient will remain free of falls  Description: INTERVENTIONS:  - Educate patient/family on patient safety including physical limitations  - Instruct patient to call for assistance with activity   - Consult OT/PT to assist with strengthening/mobility   - Keep Call bell within reach  - Keep bed low and locked with side rails adjusted as appropriate  - Keep care items and personal belongings within reach  - Initiate and maintain comfort rounds  - Make Fall Risk Sign visible to staff  - Offer Toileting every 2 Hours, in advance of need  - Initiate/Maintain 2alarm  - Obtain necessary fall risk management equipment: 2  - Apply yellow socks and bracelet for high fall risk patients  - Consider moving patient to room near nurses station  Outcome: Progressing     Problem: PAIN - ADULT  Goal: Verbalizes/displays adequate comfort level or baseline comfort level  Description: Interventions:  - Encourage patient to monitor pain and request assistance  - Assess pain using appropriate pain scale  - Administer analgesics based on type and severity of pain and evaluate response  - Implement non-pharmacological measures as appropriate and evaluate response  - Consider cultural and social influences on pain and pain management  - Notify physician/advanced practitioner if interventions unsuccessful or patient reports new pain  Outcome: Progressing     Problem: INFECTION - ADULT  Goal: Absence or prevention of progression during hospitalization  Description: INTERVENTIONS:  - Assess and monitor for signs and symptoms of infection  - Monitor lab/diagnostic results  - Monitor all insertion sites, i.e. indwelling lines, tubes, and drains  - Monitor endotracheal if appropriate and nasal secretions for changes in amount and color  - [place] appropriate cooling/warming therapies per order  - Administer medications as ordered  - Instruct and encourage patient and family to use good hand hygiene  technique  - Identify and instruct in appropriate isolation precautions for identified infection/condition  Outcome: Progressing  Goal: Absence of fever/infection during neutropenic period  Description: INTERVENTIONS:  - Monitor WBC    Outcome: Progressing     Problem: SAFETY ADULT  Goal: Patient will remain free of falls  Description: INTERVENTIONS:  - Educate patient/family on patient safety including physical limitations  - Instruct patient to call for assistance with activity   - Consult OT/PT to assist with strengthening/mobility   - Keep Call bell within reach  - Keep bed low and locked with side rails adjusted as appropriate  - Keep care items and personal belongings within reach  - Initiate and maintain comfort rounds  - Make Fall Risk Sign visible to staff  - Offer Toileting every 2 Hours, in advance of need  - Initiate/Maintain 2alarm  - Obtain necessary fall risk management equipment: 2  - Apply yellow socks and bracelet for high fall risk patients  - Consider moving patient to room near nurses station  Outcome: Progressing  Goal: Maintain or return to baseline ADL function  Description: INTERVENTIONS:  -  Assess patient's ability to carry out ADLs; assess patient's baseline for ADL function and identify physical deficits which impact ability to perform ADLs (bathing, care of mouth/teeth, toileting, grooming, dressing, etc.)  - Assess/evaluate cause of self-care deficits   - Assess range of motion  - Assess patient's mobility; develop plan if impaired  - Assess patient's need for assistive devices and provide as appropriate  - Encourage maximum independence but intervene and supervise when necessary  - Involve family in performance of ADLs  - Assess for home care needs following discharge   - Consider OT consult to assist with ADL evaluation and planning for discharge  - Provide patient education as appropriate  Outcome: Progressing  Goal: Maintains/Returns to pre admission functional level  Description:  INTERVENTIONS:  - Perform AM-PAC 6 Click Basic Mobility/ Daily Activity assessment daily.  - Set and communicate daily mobility goal to care team and patient/family/caregiver.   - Collaborate with rehabilitation services on mobility goals if consulted  - Perform Range of Motion 2 times a day.  - Reposition patient every 2 hours.  - Dangle patient 2 times a day  - Stand patient 2 times a day  - Ambulate patient 2 times a day  - Out of bed to chair 2 times a day   - Out of bed for meals 2 times a day  - Out of bed for toileting  - Record patient progress and toleration of activity level   Outcome: Progressing     Problem: DISCHARGE PLANNING  Goal: Discharge to home or other facility with appropriate resources  Description: INTERVENTIONS:  - Identify barriers to discharge w/patient and caregiver  - Arrange for needed discharge resources and transportation as appropriate  - Identify discharge learning needs (meds, wound care, etc.)  - Arrange for interpretive services to assist at discharge as needed  - Refer to Case Management Department for coordinating discharge planning if the patient needs post-hospital services based on physician/advanced practitioner order or complex needs related to functional status, cognitive ability, or social support system  Outcome: Progressing     Problem: Knowledge Deficit  Goal: Patient/family/caregiver demonstrates understanding of disease process, treatment plan, medications, and discharge instructions  Description: Complete learning assessment and assess knowledge base.  Interventions:  - Provide teaching at level of understanding  - Provide teaching via preferred learning methods  Outcome: Progressing     Problem: Prexisting or High Potential for Compromised Skin Integrity  Goal: Skin integrity is maintained or improved  Description: INTERVENTIONS:  - Identify patients at risk for skin breakdown  - Assess and monitor skin integrity  - Assess and monitor nutrition and hydration  status  - Monitor labs   - Assess for incontinence   - Turn and reposition patient  - Assist with mobility/ambulation  - Relieve pressure over bony prominences  - Avoid friction and shearing  - Provide appropriate hygiene as needed including keeping skin clean and dry  - Evaluate need for skin moisturizer/barrier cream  - Collaborate with interdisciplinary team   - Patient/family teaching  - Consider wound care consult   Outcome: Progressing     Problem: GENITOURINARY - ADULT  Goal: Maintains or returns to baseline urinary function  Description: INTERVENTIONS:  - Assess urinary function  - Encourage oral fluids to ensure adequate hydration if ordered  - Administer IV fluids as ordered to ensure adequate hydration  - Administer ordered medications as needed  - Offer frequent toileting  - Follow urinary retention protocol if ordered  Outcome: Progressing  Goal: Absence of urinary retention  Description: INTERVENTIONS:  - Assess patient’s ability to void and empty bladder  - Monitor I/O  - Bladder scan as needed  - Discuss with physician/AP medications to alleviate retention as needed  - Discuss catheterization for long term situations as appropriate  Outcome: Progressing

## 2024-03-12 NOTE — ASSESSMENT & PLAN NOTE
Noted to be in new onset atrial fibrillation  Currently rate controlled  Continue Eliquis  Monitor closely

## 2024-03-12 NOTE — ASSESSMENT & PLAN NOTE
MRI noted CVA of NELL/MCA territory  Discussed with neurology will discontinue aspirin at this time initiate Eliquis  Continue statin  Medical cleared at this time awaiting rehab placement - discussed with CM

## 2024-03-12 NOTE — CASE MANAGEMENT
Case Management Discharge Planning Note    Patient name Drew Santos  Location /-01 MRN [id number]  : 1950 Date 3/12/2024       Current Admission Date: 3/5/2024  Current Admission Diagnosis:Syncope   Patient Active Problem List    Diagnosis Date Noted    Atrial fibrillation (HCC) 2024    Stroke (cerebrum) (HCC) 2024    Urinary retention 2024    Syncope 2024    Elevated lactic acid level 2024    Diabetes (HCC) 2024    Leukocytosis 2024    Abnormal CPK 2024      LOS (days): 6  Geometric Mean LOS (GMLOS) (days): 1.9  Days to GMLOS:-4.2     OBJECTIVE:  Risk of Unplanned Readmission Score: 8.25     Current admission status: Inpatient   Preferred Pharmacy:   [id number] [place], [place] - [address]  [facility] [address]  Phone: [phone] Fax: [phone]    Primary Care Provider: Sal Bowman MD  Primary Insurance: [employer]  Secondary Insurance:     DISCHARGE DETAILS:    Discharge planning discussed with:: Spouse via phone.  Freedom of Choice: Yes  Comments - Freedom of Choice: FOC maintained - CM reviewed DCP.  Level II rec for STR.  CM reviewed available options - [facility] and [facility].  Patient choice list emailed to spouse ([email or web address]) for review.  Preference is [facility] due to location.  CM reviewed auth process.  Updated OT note needed to initiate.  PT/OT aware.  CM contacted family/caregiver?: Yes  Were Treatment Team discharge recommendations reviewed with patient/caregiver?: Yes (As it pertains to d/c planning and CM role.)  Did patient/caregiver verbalize understanding of patient care needs?: N/A- going to facility  Were patient/caregiver advised of the risks associated with not following Treatment Team discharge recommendations?: Yes (As it pertains to d/c planning and CM role.)    Contacts  Patient Contacts: Toyin (spouse)  Relationship to Patient:: Family  Contact  Method: Phone  Phone Number: [phone]  Reason/Outcome: Continuity of Care, Discharge Planning    Requested Home Health Care         Is the patient interested in HHC at discharge?: No    DME Referral Provided  Referral made for DME?: No (Order to be cancelled due to d/c to facility.)    Other Referral/Resources/Interventions Provided:  Interventions: Short Term Rehab  Referral Comments: [facility] reserved for STR.  Facility contacts updated.  PASRR completed.  Patient will need auth tasked once updated OT note is available.    Treatment Team Recommendation: Short Term Rehab, Home with Home Health Care  Discharge Destination Plan:: Short Term Rehab     IMM Given (Date):: 03/12/24  IMM Given to:: Patient  Family notified:: Verbal review with spouse via phone.  Original to medical records.  Patient copy to bedside.     Accepting Facility Name, City & State : [facility] - [address] [place] [address]  Receiving Facility/Agency Phone Number: Phone: [phone]  Facility/Agency Fax Number: Fax: [phone]

## 2024-03-12 NOTE — ASSESSMENT & PLAN NOTE
Noted to have significant urinary retention likely in the setting of BPH  Parra catheter has since been placed  We can attempt a voiding trial prior to DC but if fails will need to be discharged with it

## 2024-03-12 NOTE — PLAN OF CARE
Problem: Potential for Falls  Goal: Patient will remain free of falls  Description: INTERVENTIONS:  - Educate patient/family on patient safety including physical limitations  - Instruct patient to call for assistance with activity   - Consult OT/PT to assist with strengthening/mobility   - Keep Call bell within reach  - Keep bed low and locked with side rails adjusted as appropriate  - Keep care items and personal belongings within reach  - Initiate and maintain comfort rounds  - Make Fall Risk Sign visible to staff  - Offer Toileting every 2  Hours, in advance of need  - Initiate/Maintain bed alarm  - Obtain necessary fall risk management equipment:   - Apply yellow socks and bracelet for high fall risk patients  - Consider moving patient to room near nurses station  Outcome: Progressing     Problem: PAIN - ADULT  Goal: Verbalizes/displays adequate comfort level or baseline comfort level  Description: Interventions:  - Encourage patient to monitor pain and request assistance  - Assess pain using appropriate pain scale  - Administer analgesics based on type and severity of pain and evaluate response  - Implement non-pharmacological measures as appropriate and evaluate response  - Consider cultural and social influences on pain and pain management  - Notify physician/advanced practitioner if interventions unsuccessful or patient reports new pain  Outcome: Progressing     Problem: SAFETY ADULT  Goal: Patient will remain free of falls  Description: INTERVENTIONS:  - Educate patient/family on patient safety including physical limitations  - Instruct patient to call for assistance with activity   - Consult OT/PT to assist with strengthening/mobility   - Keep Call bell within reach  - Keep bed low and locked with side rails adjusted as appropriate  - Keep care items and personal belongings within reach  - Initiate and maintain comfort rounds  - Make Fall Risk Sign visible to staff  - Offer Toileting every 2  Hours, in  advance of need  - Initiate/Maintain bed alarm  - Obtain necessary fall risk management equipment:   - Apply yellow socks and bracelet for high fall risk patients  - Consider moving patient to room near nurses station  Outcome: Progressing     Problem: Knowledge Deficit  Goal: Patient/family/caregiver demonstrates understanding of disease process, treatment plan, medications, and discharge instructions  Description: Complete learning assessment and assess knowledge base.  Interventions:  - Provide teaching at level of understanding  - Provide teaching via preferred learning methods  Outcome: Progressing

## 2024-03-12 NOTE — CASE MANAGEMENT
Case Management Progress Note    Patient name Drew Santos  Location /-01 MRN [id number]  : 1950 Date 3/12/2024       LOS (days): 6  Geometric Mean LOS (GMLOS) (days): 1.9  Days to GMLOS:-4.2        OBJECTIVE:    Current admission status: Inpatient  Preferred Pharmacy:   [id number] [place], [place] - [address]  [facility] [address]  Phone: [phone] Fax: [phone]    Primary Care Provider: Sal Bowman MD  Primary Insurance: [employer]  Secondary Insurance:     PROGRESS NOTE:  Patient reviewed during Interdisciplinary Rounds with SLIM as well as LOS with  .  Current LOS is 6 days.  Patient medically cleared for d/c pending placement and auth.  CM updated [facility] referral for STR.  [facility] accepted.  CM attempted to meet with patient at bedside for review.  Patient sleeping.  CM will return and leave choice list at bedside for review.  Patient will need an updated OT note prior to auth request.  CM requested in TT PT/OT Priority group.  Will continue to follow for planning.

## 2024-03-13 ENCOUNTER — HOME HEALTH ADMISSION (OUTPATIENT)
Dept: HOME HEALTH SERVICES | Facility: HOME HEALTHCARE | Age: 74
End: 2024-03-13
Payer: COMMERCIAL

## 2024-03-13 VITALS
SYSTOLIC BLOOD PRESSURE: 106 MMHG | TEMPERATURE: 98.2 F | DIASTOLIC BLOOD PRESSURE: 62 MMHG | HEIGHT: 77 IN | WEIGHT: 276.02 LBS | BODY MASS INDEX: 32.59 KG/M2 | RESPIRATION RATE: 20 BRPM | OXYGEN SATURATION: 97 % | HEART RATE: 83 BPM

## 2024-03-13 LAB
ANION GAP SERPL CALCULATED.3IONS-SCNC: 6 MMOL/L (ref 4–13)
BASOPHILS # BLD AUTO: 0.07 THOUSANDS/ÂΜL (ref 0–0.1)
BASOPHILS NFR BLD AUTO: 1 % (ref 0–1)
BUN SERPL-MCNC: 12 MG/DL (ref 5–25)
CALCIUM SERPL-MCNC: 9.6 MG/DL (ref 8.4–10.2)
CHLORIDE SERPL-SCNC: 106 MMOL/L (ref 96–108)
CO2 SERPL-SCNC: 27 MMOL/L (ref 21–32)
CREAT SERPL-MCNC: 0.93 MG/DL (ref 0.6–1.3)
DME PARACHUTE DELIVERY DATE REQUESTED: NORMAL
DME PARACHUTE ITEM DESCRIPTION: NORMAL
DME PARACHUTE ORDER STATUS: NORMAL
DME PARACHUTE SUPPLIER NAME: NORMAL
DME PARACHUTE SUPPLIER PHONE: NORMAL
EOSINOPHIL # BLD AUTO: 0.21 THOUSAND/ÂΜL (ref 0–0.61)
EOSINOPHIL NFR BLD AUTO: 2 % (ref 0–6)
ERYTHROCYTE [DISTWIDTH] IN BLOOD BY AUTOMATED COUNT: 11.8 % (ref 11.6–15.1)
GFR SERPL CREATININE-BSD FRML MDRD: 80 ML/MIN/1.73SQ M
GLUCOSE SERPL-MCNC: 148 MG/DL (ref 65–140)
GLUCOSE SERPL-MCNC: 155 MG/DL (ref 65–140)
GLUCOSE SERPL-MCNC: 209 MG/DL (ref 65–140)
HCT VFR BLD AUTO: 43.1 % (ref 36.5–49.3)
HGB BLD-MCNC: 14.5 G/DL (ref 12–17)
IMM GRANULOCYTES # BLD AUTO: 0.03 THOUSAND/UL (ref 0–0.2)
IMM GRANULOCYTES NFR BLD AUTO: 0 % (ref 0–2)
LYMPHOCYTES # BLD AUTO: 3.49 THOUSANDS/ÂΜL (ref 0.6–4.47)
LYMPHOCYTES NFR BLD AUTO: 35 % (ref 14–44)
MCH RBC QN AUTO: 31.5 PG (ref 26.8–34.3)
MCHC RBC AUTO-ENTMCNC: 33.6 G/DL (ref 31.4–37.4)
MCV RBC AUTO: 94 FL (ref 82–98)
MONOCYTES # BLD AUTO: 0.68 THOUSAND/ÂΜL (ref 0.17–1.22)
MONOCYTES NFR BLD AUTO: 7 % (ref 4–12)
NEUTROPHILS # BLD AUTO: 5.55 THOUSANDS/ÂΜL (ref 1.85–7.62)
NEUTS SEG NFR BLD AUTO: 55 % (ref 43–75)
NRBC BLD AUTO-RTO: 0 /100 WBCS
PLATELET # BLD AUTO: 296 THOUSANDS/UL (ref 149–390)
PMV BLD AUTO: 9.5 FL (ref 8.9–12.7)
POTASSIUM SERPL-SCNC: 4 MMOL/L (ref 3.5–5.3)
RBC # BLD AUTO: 4.6 MILLION/UL (ref 3.88–5.62)
SODIUM SERPL-SCNC: 139 MMOL/L (ref 135–147)
WBC # BLD AUTO: 10.03 THOUSAND/UL (ref 4.31–10.16)

## 2024-03-13 PROCEDURE — 85025 COMPLETE CBC W/AUTO DIFF WBC: CPT | Performed by: INTERNAL MEDICINE

## 2024-03-13 PROCEDURE — 80048 BASIC METABOLIC PNL TOTAL CA: CPT | Performed by: INTERNAL MEDICINE

## 2024-03-13 PROCEDURE — 97535 SELF CARE MNGMENT TRAINING: CPT

## 2024-03-13 PROCEDURE — 82948 REAGENT STRIP/BLOOD GLUCOSE: CPT

## 2024-03-13 PROCEDURE — 99239 HOSP IP/OBS DSCHRG MGMT >30: CPT | Performed by: INTERNAL MEDICINE

## 2024-03-13 RX ORDER — ATORVASTATIN CALCIUM 40 MG/1
40 TABLET, FILM COATED ORAL EVERY EVENING
Qty: 30 TABLET | Refills: 0 | Status: SHIPPED | OUTPATIENT
Start: 2024-03-13

## 2024-03-13 RX ORDER — LISINOPRIL 10 MG/1
10 TABLET ORAL DAILY
Qty: 30 TABLET | Refills: 0 | Status: SHIPPED | OUTPATIENT
Start: 2024-03-13

## 2024-03-13 RX ORDER — METOPROLOL SUCCINATE 25 MG/1
25 TABLET, EXTENDED RELEASE ORAL DAILY
Qty: 30 TABLET | Refills: 0 | Status: SHIPPED | OUTPATIENT
Start: 2024-03-13

## 2024-03-13 RX ADMIN — LISINOPRIL 10 MG: 10 TABLET ORAL at 10:06

## 2024-03-13 RX ADMIN — METOPROLOL SUCCINATE 25 MG: 25 TABLET, EXTENDED RELEASE ORAL at 10:07

## 2024-03-13 RX ADMIN — APIXABAN 5 MG: 5 TABLET, FILM COATED ORAL at 10:06

## 2024-03-13 RX ADMIN — INSULIN LISPRO 1 UNITS: 100 INJECTION, SOLUTION INTRAVENOUS; SUBCUTANEOUS at 10:17

## 2024-03-13 RX ADMIN — INSULIN LISPRO 1 UNITS: 100 INJECTION, SOLUTION INTRAVENOUS; SUBCUTANEOUS at 14:25

## 2024-03-13 NOTE — OCCUPATIONAL THERAPY NOTE
Occupational Therapy Progress Note     Patient Name: Drew Santos  Today's Date: 3/13/2024  Problem List  Principal Problem:    Syncope  Active Problems:    Diabetes (HCC)    Stroke (cerebrum) (HCC)    Urinary retention    Atrial fibrillation (HCC)        03/13/24 0941   OT Last Visit   OT Visit Date 03/13/24   Pain Assessment   Pain Assessment Tool 0-10   Pain Score No Pain   Restrictions/Precautions   Weight Bearing Precautions Per Order No   Other Precautions Cognitive;Chair Alarm;Bed Alarm;Fall Risk   ADL   Where Assessed Other (Comment)  (Assist levels for some self care tasks are based on functional assessment of performance skills and deficits observed during session.)   Eating Assistance 6  Modified independent   Eating Deficit Setup   Grooming Assistance 5  Supervision/Setup   Grooming Deficit Setup;Supervision/safety;Increased time to complete;Standing with assistive device;Wash/dry hands  (with at least unilateral UE support on sink for balance)   UB Dressing Assistance 5  Supervision/Setup   UB Dressing Deficit Setup;Supervision/safety;Increased time to complete  (seated, with set up)   LB Dressing Assistance 4  Minimal Assistance   LB Dressing Deficit Setup;Steadying;Supervision/safety;Increased time to complete  (to brittny garment over hips during standing with AD. Supervision with increased time to brittny/doff Lucas socks while seated EOB)   Toileting Assistance  4  Minimal Assistance   Toileting Deficit Setup;Steadying;Supervison/safety;Increased time to complete  (steadying during posterior hygiene)   Bed Mobility   Additional Comments pt received seated EOB, pt remained EOB at end of session   Transfers   Sit to Stand 4  Minimal assistance   Additional items Assist x 1;Increased time required;Verbal cues   Stand to Sit 4  Minimal assistance   Additional items Assist x 1;Increased time required;Verbal cues   Stand pivot 4  Minimal assistance   Additional items Assist x 1;Increased time required;Verbal  "cues   Toilet transfer 3  Moderate assistance   Additional items Assist x 1;Increased time required;Standard toilet  (grab bar)   Functional Mobility   Functional Mobility 4  Minimal assistance   Additional Comments assist x1   Additional items Rolling walker   Subjective   Subjective \"I'm feeling better.\" Pt agreeable to OT treatment   Cognition   Overall Cognitive Status Impaired   Arousal/Participation Alert;Cooperative   Attention Attends with cues to redirect   Orientation Level Oriented to person;Oriented to place;Oriented to situation  (oriented to \"March\" cues with choice for year)   Memory Decreased recall of recent events;Decreased recall of precautions   Following Commands Follows one step commands without difficulty   Activity Tolerance   Activity Tolerance Patient tolerated treatment well   Medical Staff Made Aware Mily RN   Assessment   Assessment Pt seen this date for skilled OT session focused on ADLs, functional transfers and mobility, safety education. The patient was received seated EOB, NAD, on RA, masimo. He participated in functional bed mobility, LB dressing ADL, grooming ADL at sink level, toilet transfer, and functional ambulation in the room. Pt required Minimal Assistance for most functional mobility with RW, Moderate Assistance for toilet transfer from standard toilet. Minimal Assistance for safety during LB dressing. At end of session the patient was located seated EOB with call bell in reach and all needs met. Bed alarm activated. Overall the patient remains below his functional baseline, and is primarily limited at this time due to impaired balance, decreased insight into deficits, and generalized deconditioning. OT will continue to follow while acute to address POC. At this time, recommend Level 2 Moderate Resource Intensity upon d/c.   Plan   Treatment Interventions ADL retraining;Functional transfer training;Endurance training;Cognitive reorientation;Patient/family training   Goal " Expiration Date 03/20/24   OT Treatment Day 1   OT Frequency 2-3x/wk   Discharge Recommendation   Rehab Resource Intensity Level, OT II (Moderate Resource Intensity)   AM-PAC Daily Activity Inpatient   Lower Body Dressing 3   Bathing 3   Toileting 3   Upper Body Dressing 3   Grooming 3   Eating 4   Daily Activity Raw Score 19   Daily Activity Standardized Score (Calc for Raw Score >=11) 40.22   AM-PAC Applied Cognition Inpatient   Following a Speech/Presentation 3   Understanding Ordinary Conversation 4   Taking Medications 2   Remembering Where Things Are Placed or Put Away 2   Remembering List of 4-5 Errands 2   Taking Care of Complicated Tasks 2   Applied Cognition Raw Score 15   Applied Cognition Standardized Score 33.54         Dejuan Townsend, OTR/L

## 2024-03-13 NOTE — CASE MANAGEMENT
Case Management Discharge Planning Note    Patient name Drew Santos  Location /-01 MRN [id number]  : 1950 Date 3/13/2024       Current Admission Date: 3/5/2024  Current Admission Diagnosis:Syncope   Patient Active Problem List    Diagnosis Date Noted    Atrial fibrillation (HCC) 2024    [facility]brum) ([facility]) 2024    Urinary retention 2024    Syncope 2024    Elevated lactic acid level 2024    Diabetes (HCC) 2024    Leukocytosis 2024    Abnormal CPK 2024      LOS (days): 7  Geometric Mean LOS (GMLOS) (days): 1.9  Days to GMLOS:-5.1     OBJECTIVE:  Risk of Unplanned Readmission Score: 8.68         Current admission status: Inpatient   [id number] [place], [place] - [address] [place] [address]  Phone: [phone] Fax: [phone]    Primary Care Provider: Sal Bowman MD    [facility] Insurance[facility]  Secondary Insurance:     DISCHARGE DETAILS:  [employer] approved for release from consignment.  CM to deliver to patient at bedside prior to d/c.  Signed delivery ticket to consignment.

## 2024-03-13 NOTE — PLAN OF CARE
Problem: SAFETY ADULT  Goal: Patient will remain free of falls  Description: INTERVENTIONS:  - Educate patient/family on patient safety including physical limitations  - Instruct patient to call for assistance with activity   - Consult OT/PT to assist with strengthening/mobility   - Keep Call bell within reach  - Keep bed low and locked with side rails adjusted as appropriate  - Keep care items and personal belongings within reach  - Initiate and maintain comfort rounds  - Make Fall Risk Sign visible to staff  - Offer Toileting every 4 Hours, in advance of need  - Initiate/Maintain bed alarm  - Obtain necessary fall risk management equipment:   - Apply yellow socks and bracelet for high fall risk patients  - Consider moving patient to room near nurses station  Outcome: Progressing     Problem: DISCHARGE PLANNING  Goal: Discharge to home or other facility with appropriate resources  Description: INTERVENTIONS:  - Identify barriers to discharge w/patient and caregiver  - Arrange for needed discharge resources and transportation as appropriate  - Identify discharge learning needs (meds, wound care, etc.)  - Arrange for interpretive services to assist at discharge as needed  - Refer to Case Management Department for coordinating discharge planning if the patient needs post-hospital services based on physician/advanced practitioner order or complex needs related to functional status, cognitive ability, or social support system  Outcome: Progressing

## 2024-03-13 NOTE — PLAN OF CARE
Problem: OCCUPATIONAL THERAPY ADULT  Goal: Performs self-care activities at highest level of function for planned discharge setting.  See evaluation for individualized goals.  Description: Treatment Interventions: ADL retraining, Functional transfer training, Endurance training, Cognitive reorientation, Patient/family training          See flowsheet documentation for full assessment, interventions and recommendations.   Outcome: Progressing  Note: Limitation: Decreased ADL status, Decreased endurance, Decreased self-care trans, Decreased high-level ADLs  Prognosis: Good  Assessment: Pt seen this date for skilled OT session focused on ADLs, functional transfers and mobility, safety education. The patient was received seated EOB, NAD, on RA, masimo. He participated in functional bed mobility, LB dressing ADL, grooming ADL at sink level, toilet transfer, and functional ambulation in the room. Pt required Minimal Assistance for most functional mobility with RW, Moderate Assistance for toilet transfer from standard toilet. Minimal Assistance for safety during LB dressing. At end of session the patient was located seated EOB with call bell in reach and all needs met. Bed alarm activated. Overall the patient remains below his functional baseline, and is primarily limited at this time due to impaired balance, decreased insight into deficits, and generalized deconditioning. OT will continue to follow while acute to address POC. At this time, recommend Level 2 Moderate Resource Intensity upon d/c.     Rehab Resource Intensity Level, OT: II (Moderate Resource Intensity)        SAVANNA Mojica/L

## 2024-03-13 NOTE — ASSESSMENT & PLAN NOTE
Noted to have significant urinary retention likely in the setting of BPH  Parra catheter has since been placed  We can attempt a voiding trial prior to DC but if fails will need to be discharged with it   no suicidal ideation/no depression/no anxiety

## 2024-03-13 NOTE — CASE MANAGEMENT
Case Management Progress Note    Patient name Drew Santos  Location /-01 MRN [id number]  : 1950 Date 3/13/2024       LOS (days): 7  Geometric Mean LOS (GMLOS) (days): 1.9  Days to GMLOS:-5.2        OBJECTIVE:    Current admission status: Inpatient  Preferred Pharmacy:   [id number] [place], [place] - [address]  [facility], [place], [facility] [address]  Phone: [phone] Fax: [phone]    Primary Care Provider: Sal Bowman MD  Primary Insurance: [facility] REP  Secondary Insurance:     PROGRESS NOTE:  Commode approved & delivered to bedside.  Delivery ticket to consignment bin.

## 2024-03-13 NOTE — CASE MANAGEMENT
Case Management Discharge Planning Note    Patient name Drew Santos  Location /-01 MRN [id number]  : 1950 Date 3/13/2024       Current Admission Date: 3/5/2024  Current Admission Diagnosis:Syncope   Patient Active Problem List    Diagnosis Date Noted    Atrial fibrillation (HCC) 2024    Stroke (cerebrum) (HCC) 2024    Urinary retention 2024    Syncope 2024    Elevated lactic acid level 2024    Diabetes (HCC) 2024    Leukocytosis 2024    Abnormal CPK 2024      LOS (days): 7  Geometric Mean LOS (GMLOS) (days): 1.9  Days to GMLOS:-5     OBJECTIVE:  Risk of Unplanned Readmission Score: 8.36      Current admission status: Inpatient   Preferred Pharmacy:   [id number] [place], [place] - [address] [facility] [address]  Phone: [phone] Fax: [phone]    Primary Care Provider: Sal Bowman MD  Primary Insurance: [employer] REP  Secondary Insurance:     DISCHARGE DETAILS:    Discharge planning discussed with:: Spouse via phone.  Freedom of Choice: Yes  Comments - Freedom of Choice: FOC maintained - 2 vm's received from spouse stating preference is for d/c home w/ HHC due to poor facility rating of available placement options.  STR referral cacelled in [facility].  [facility] reserved in existing HHC referral.  AVS updated.  Patient cleared for d/c today.  PT/OT recommending bedside commode, spouse agreeable to CM order.  States walker was delivered to the home yesterday.  Spouse plans to transport home.  CM contacted family/caregiver?: Yes  Were Treatment Team discharge recommendations reviewed with patient/caregiver?: Yes (As it pertains to d/c planning and CM role.)  Did patient/caregiver verbalize understanding of patient care needs?: Yes (As it pertains to d/c planning and CM role.)  Were patient/caregiver advised of the risks associated with not following Treatment Team discharge recommendations?: Yes (As  it pertains to d/c planning and CM role.)    Contacts  Patient Contacts: Toyin (spouse)  Relationship to Patient:: Family  Contact Method: Phone  Phone Number: [phone]  Reason/Outcome: Continuity of Care, Discharge Planning    Requested Home Health Care         Is the patient interested in HHC at discharge?: Yes  Home Health Discipline requested:: Nursing, Occupational Therapy, Physical Therapy  Home Health Agency Name:: [facility] Follow-Up Provider:: PCP (Sal Bowman MD)  Home Health Services Needed:: Evaluate Functional Status and Safety, Gait/ADL Training, Strengthening/Theraputic Exercises to Improve Function  Homebound Criteria Met:: Requires the Assistance of Another Person for Safe Ambulation or to Leave the Home  Supporting Clincal Findings:: Fatigues Easliy in Short Distances, Limited Endurance    DME Referral Provided  Referral made for DME?: Yes  DME referral completed for the following items:: Bedside Commode  DME Supplier Name:: [facility]    Other Referral/Resources/Interventions Provided:  Interventions: HHC, DME, Short Term Rehab  Referral Comments: See FOC section.    Treatment Team Recommendation: Short Term Rehab  Discharge Destination Plan:: Home with Home Health Care  Transport at Discharge : Family     ETA of Transport (Date): 03/13/24

## 2024-03-13 NOTE — DISCHARGE SUMMARY
[facility]  Discharge- Drew Santos 1950, 73 y.o. male MRN: [id number]  Unit/Bed#: MS [facility]01 Encounter: [id number]  Primary Care Provider: Sal Bowman MD   Date and time admitted to hospital: 3/5/2024 11:48 AM    Discharge Diagnosis:    Strokes  Urinary retention, resolved, episodic urinary incontinence  Atrial fibrillation  Diabetes    Discharging Physician / Practitioner: Chandler Reed MD  PCP: Sal Bowman MD  Admission Date:   Admission Orders (From admission, onward)       Ordered        03/06/24 1231  Inpatient Admission  Once            03/05/24 1439  Place in Observation  Once            03/05/24 1438  Place in Observation  Once                          Discharge Date: 03/13/24    Consultations During Hospital Stay:  Neurology        Significant Findings / Test Results:   MRI brain w wo contrast [[id number]] Collected: 03/08/24 0149   Order Status: Completed Updated: 03/08/24 0155   Narrative:     MRI BRAIN WITH AND WITHOUT CONTRAST    INDICATION: Seizure.    COMPARISON: 12/7/2016 and 3/6/2024.    TECHNIQUE:  Multiplanar, multisequence imaging of the brain was performed before and after gadolinium administration.      IV Contrast:  13 mL of Gadobutrol injection (SINGLE-DOSE)    IMAGE QUALITY:   Diagnostic.    FINDINGS:    BRAIN PARENCHYMA:    Punctate foci of restricted diffusion in the right frontoparietal subcortical and deep white matter with isointense signal on ADC map.    No intracranial hemorrhage, mass or mass effect.    Periventricular and subcortical T2/FLAIR hyperintense foci consistent with microangiopathic disease.    Chronic lacunar infarct in the right centrum semiovale..    VENTRICLES: Enlargement of the ventricles and sulci consistent with chronic volume loss. No hydrocephalus or extra-axial collection.    SELLA AND PITUITARY GLAND:  Normal.    ORBITS: Intact.    PARANASAL SINUSES: Prominent mucosal thickening in the left maxillary  sinus.    VASCULATURE:  Evaluation of the major intracranial vasculature demonstrates appropriate flow voids.    CALVARIUM AND SKULL BASE:  Normal.    EXTRACRANIAL SOFT TISSUES:  Normal.   Impression:         [place] subacute infarcts in the right NELL/MCA region.        Outpatient follow up Requested:  PCP  Urology    Complications:  None    Reason for Admission:Syncope    HPI:  Drew Santos is a 73 y.o. male with a PMH of diabetes type 2, HTN who presents with syncopal episode at home. Wife also at bedside to provide history. He felt in his usual state of health and woke up this morning but did not eat breakfast.  He went downstairs to the restroom and his wife did not hear from him for the next 3 hours and found him down on the floor.  She cannot help him up so they called EMS.  She did check his blood sugar and it was reportedly normal. He was found incontinent of bowel and bladder. Reported recent increase in his home glipizide otherwise no other home medication changes. No prodromal symptoms such as chest pain, palpitations, shortness of breath.  No previous episode of syncope or seizure. No tongue biting or other injuries reported. He does report new onset tremor in the last 24-48 hours but no other symptoms. Wife also described an episode one year ago where he had new onset L sided weakness (which resolved on its own with PT - reportedly no stroke hx.)       Hospital Course:     The patient was hospitalized with a syncopal event.  Further investigation did reveal strokes on MRI.  Patient was seen by neurology.  Patient was recommended to continue anticoagulation and statin.  Patient did have brief episode of urinary retention which improved.  He does have occasional urinary incontinence.  Otherwise appears to be much better, was recommended rehab but wishes to go home instead.  Patient otherwise in stable condition.  Does have episodic fogginess and episodic urinary incontinence but this is a  "consequence of the stroke at this time, may take time to improve.  Discussed with patient and wife in regards to the symptoms.  If urinary issues persist do recommend urology consultation.  Should continue on apixaban as well as statin.  Discharged in stable condition.    Condition at Discharge: good     Discharge Day Visit / Exam:     Subjective:    Patient valuated this morning.  Does have good p.o. intake.  Feeling well.  Denies any nausea vomiting.  No other events reported  Vitals: Blood Pressure: 106/62 (03/13/24 1009)  Pulse: 83 (03/13/24 1009)  Temperature: 98.2 °F (36.8 °C) (03/13/24 0732)  Temp Source: Oral (03/12/24 2300)  Respirations: 20 (03/12/24 2300)  Height: 6' 5\" (195.6 cm) (03/05/24 1600)  Weight - Scale: 125 kg (276 lb 0.3 oz) (03/08/24 0100)  SpO2: 97 % (03/13/24 1009)    Exam:   Physical Exam  Vitals and nursing note reviewed.   Constitutional:       Appearance: Normal appearance.      Comments: Male patient in bed, awake   HENT:      Head: Normocephalic and atraumatic.      Right Ear: External ear normal.      Left Ear: External ear normal.      Nose: Nose normal. No congestion or rhinorrhea.      Mouth/Throat:      Mouth: Mucous membranes are moist.      Pharynx: Oropharynx is clear. No oropharyngeal exudate or posterior oropharyngeal erythema.   Eyes:      General: No scleral icterus.        Right eye: No discharge.         Left eye: No discharge.      Pupils: Pupils are equal, round, and reactive to light.   Neck:      Vascular: No carotid bruit.   Cardiovascular:      Rate and Rhythm: Normal rate and regular rhythm.      Pulses: Normal pulses.      Heart sounds: No murmur heard.     No friction rub. No gallop.   Pulmonary:      Effort: Pulmonary effort is normal. No respiratory distress.      Breath sounds: Normal breath sounds. No stridor. No wheezing, rhonchi or rales.   Abdominal:      General: Abdomen is flat. Bowel sounds are normal. There is no distension.      Palpations: Abdomen is " soft. There is no mass.      Tenderness: There is no abdominal tenderness. There is no guarding or rebound.      Hernia: No hernia is present.   Musculoskeletal:         General: No swelling, tenderness, deformity or signs of injury. Normal range of motion.      Cervical back: Normal range of motion. No rigidity. No muscular tenderness.   Lymphadenopathy:      Cervical: No cervical adenopathy.   Skin:     General: Skin is warm and dry.      Capillary Refill: Capillary refill takes less than 2 seconds.      Coloration: Skin is not jaundiced or pale.      Findings: No bruising or erythema.   Neurological:      General: No focal deficit present.      Mental Status: He is alert and oriented to person, place, and time. Mental status is at baseline.      Cranial Nerves: No cranial nerve deficit.      Sensory: No sensory deficit.      Motor: No weakness.      Coordination: Coordination normal.      Gait: Gait normal.      Deep Tendon Reflexes: Reflexes normal.      Comments: AAOx4, GCS15, no facial mimic changes, no gross motor or sensory deficits noted   Psychiatric:         Mood and Affect: Mood normal.         Behavior: Behavior normal.         Thought Content: Thought content normal.         Judgment: Judgment normal.           Discussion with Family:   Discussed discharge planning with wife    Discharge instructions/Information to patient and family:   See after visit summary for information provided to patient and family.      Provisions for Follow-Up Care:  See after visit summary for information related to follow-up care and any pertinent home health orders.      Disposition:     Home    For Discharges to [facility] SNF:   Not Applicable to this Patient - Not Applicable to this Patient    Planned Readmission: No     Discharge Statement:  I spent 78 minutes discharging the patient. This time was spent on the day of discharge. I had direct contact with the patient on the day of discharge. Greater than 50%  of the total time was spent examining patient, answering all patient questions, arranging and discussing plan of care with patient as well as directly providing post-discharge instructions.  Additional time then spent on discharge activities.    Discharge Medications:  See after visit summary for reconciled discharge medications provided to patient and family.      ** Please Note: This note has been constructed using a voice recognition system **

## 2024-03-13 NOTE — PROGRESS NOTES
Patient:    MRN:  [id number]    Aidin Request ID:  [id number]    Level of care reserved:  Skilled Nursing Facility    Partner Reserved:  [facility], [place] [address] [phone]    Clinical needs requested:    Geography searched:  20 miles around [address]    Start of Service:    Request sent:  4:10pm EST on 3/8/2024 by Kelsey Diamond    Partner reserved:  3:41pm EDT on 3/12/2024 by Pedro Maguire    Choice list shared:  3:39pm EDT on 3/12/2024 by Pedro Maguire

## 2024-03-14 ENCOUNTER — HOME CARE VISIT (OUTPATIENT)
Dept: HOME HEALTH SERVICES | Facility: HOME HEALTHCARE | Age: 74
End: 2024-03-14

## 2024-03-15 ENCOUNTER — HOME CARE VISIT (OUTPATIENT)
Dept: HOME HEALTH SERVICES | Facility: HOME HEALTHCARE | Age: 74
End: 2024-03-15

## 2024-03-16 ENCOUNTER — HOME CARE VISIT (OUTPATIENT)
Dept: HOME HEALTH SERVICES | Facility: HOME HEALTHCARE | Age: 74
End: 2024-03-16
Payer: COMMERCIAL

## 2024-03-16 VITALS
DIASTOLIC BLOOD PRESSURE: 78 MMHG | SYSTOLIC BLOOD PRESSURE: 140 MMHG | TEMPERATURE: 98 F | OXYGEN SATURATION: 98 % | RESPIRATION RATE: 20 BRPM | HEART RATE: 80 BPM

## 2024-03-16 PROCEDURE — G0299 HHS/HOSPICE OF RN EA 15 MIN: HCPCS

## 2024-03-16 PROCEDURE — 400013 VN SOC

## 2024-03-16 NOTE — CASE COMMUNICATION
[facility] has admitted your patient to Home Health service with the following disciplines:      SN PT OT  Primary focus of home health care Neuro  Patient stated goals of care gain strength  Anticipated visit pattern and next visit date 3.19.24 [id number]  Significant clinical finding   Potential barriers to goal achievement pt forgetful  Other pertinent information    Thank you for allowing us to participate in the care of your patien brianMirna

## 2024-03-19 ENCOUNTER — HOME CARE VISIT (OUTPATIENT)
Dept: HOME HEALTH SERVICES | Facility: HOME HEALTHCARE | Age: 74
End: 2024-03-19
Payer: COMMERCIAL

## 2024-03-19 VITALS
HEART RATE: 68 BPM | OXYGEN SATURATION: 98 % | TEMPERATURE: 98.6 F | RESPIRATION RATE: 18 BRPM | DIASTOLIC BLOOD PRESSURE: 80 MMHG | SYSTOLIC BLOOD PRESSURE: 124 MMHG

## 2024-03-19 PROCEDURE — G0300 HHS/HOSPICE OF LPN EA 15 MIN: HCPCS

## 2024-03-19 NOTE — UTILIZATION REVIEW
NOTIFICATION OF ADMISSION DISCHARGE   This is a Notification of Discharge from [facility]. Please be advised that this patient has been discharge from our facility. Below you will find the admission and discharge date and time including the patient’s disposition.   UTILIZATION REVIEW CONTACT:  Jacquelin Mcguire  Utilization   Network Utilization Review Department  Phone: [phone] x carefully listen to the prompts. All voicemails are confidential.  Email: [email or web address]     ADMISSION INFORMATION  PRESENTATION DATE: 3/5/2024 11:48 AM    INPATIENT ADMISSION DATE: 3/6/24 12:31 PM   DISCHARGE DATE: 3/13/2024  4:02 PM   DISPOSITION:Home with Home Health Care    Health system Utilization Review Department  ATTENTION: Please call with any questions or concerns to [phone] and carefully listen to the prompts so that you are directed to the right person. All voicemails are confidential.   For Discharge needs, contact Care Management DC Support Team at [phone] opt. 2  Send all requests for admission clinical reviews, approved or denied determinations and any other requests to dedicated fax number below belonging to the campus where the patient is receiving treatment. List of dedicated fax numbers for the Facilities:  FACILITY NAME UR FAX NUMBER   ADMISSION DENIALS (Administrative/Medical Necessity) [phone]   DISCHARGE SUPPORT TEAM ([facility]) [phone]   PARENT CHILD HEALTH (Maternity/NICU/Pediatrics) [phone]   [facility] [phone]   [facility] [phone]   [facility] [phone]   [facility] [phone]   [facility] [phone]   [facility] [phone]   [facility] [phone]   [facility] [phone]   [facility]  [facility] [phone]   [facility] [phone]   General acute hospital [phone]   [facility] [phone]           Chandler Reed MD  Physician  Hospitalist     Discharge Summary      Signed     Date of Service: 3/13/2024  1:20 PM     Signed         [facility]  Discharge- Drew Santos 1950, 73 y.o. male MRN: [id number]  Unit/Bed#: -01 Encounter: [id number]  Primary Care Provider: Sal Bowman MD   Date and time admitted to hospital: 3/5/2024 11:48 AM     Discharge Diagnosis:     Strokes  Urinary retention, resolved, episodic urinary incontinence  Atrial fibrillation  Diabetes     Discharging Physician / Practitioner: Chandler Reed MD  PCP: Sal Bowman MD  Admission Date:   Admission Orders (From admission, onward)          Ordered         03/06/24 1231   Inpatient Admission  Once             03/05/24 1439   Place in Observation  Once             03/05/24 1438   Place in Observation  Once                               Discharge Date: 03/13/24     Consultations During Hospital Stay:  Neurology           Significant Findings / Test Results:   MRI brain w wo contrast [[id number]] Collected: 03/08/24 0149   Order Status: Completed Updated: 03/08/24 0155   Narrative:     MRI BRAIN WITH AND WITHOUT CONTRAST    INDICATION: Seizure.    COMPARISON: 12/7/2016 and 3/6/2024.    TECHNIQUE:  Multiplanar, multisequence imaging of the brain was performed before and after gadolinium administration.      IV Contrast:  13 mL of Gadobutrol injection (SINGLE-DOSE)    IMAGE QUALITY:   Diagnostic.    FINDINGS:    BRAIN PARENCHYMA:    Punctate foci of restricted diffusion in the right frontoparietal subcortical and deep white matter with isointense signal on ADC map.    No intracranial hemorrhage, mass or mass effect.    Periventricular and subcortical T2/FLAIR  hyperintense foci consistent with microangiopathic disease.    Chronic lacunar infarct in the right centrum semiovale..    VENTRICLES: Enlargement of the ventricles and sulci consistent with chronic volume loss. No hydrocephalus or extra-axial collection.    SELLA AND PITUITARY GLAND:  Normal.    ORBITS: Intact.    PARANASAL SINUSES: Prominent mucosal thickening in the left maxillary sinus.    VASCULATURE:  Evaluation of the major intracranial vasculature demonstrates appropriate flow voids.    CALVARIUM AND SKULL BASE:  Normal.    EXTRACRANIAL SOFT TISSUES:  Normal.   Impression:         [place] subacute infarcts in the right NELL/MCA region.         Outpatient follow up Requested:  PCP  Urology     Complications:  None     Reason for Admission:Syncope     HPI:  Drew Santos is a 73 y.o. male with a PMH of diabetes type 2, HTN who presents with syncopal episode at home. Wife also at bedside to provide history. He felt in his usual state of health and woke up this morning but did not eat breakfast.  He went downstairs to the restroom and his wife did not hear from him for the next 3 hours and found him down on the floor.  She cannot help him up so they called EMS.  She did check his blood sugar and it was reportedly normal. He was found incontinent of bowel and bladder. Reported recent increase in his home glipizide otherwise no other home medication changes. No prodromal symptoms such as chest pain, palpitations, shortness of breath.  No previous episode of syncope or seizure. No tongue biting or other injuries reported. He does report new onset tremor in the last 24-48 hours but no other symptoms. Wife also described an episode one year ago where he had new onset L sided weakness (which resolved on its own with PT - reportedly no stroke hx.)        Hospital Course:      The patient was hospitalized with a syncopal event.  Further investigation did reveal strokes on MRI.  Patient was seen by neurology.   "Patient was recommended to continue anticoagulation and statin.  Patient did have brief episode of urinary retention which improved.  He does have occasional urinary incontinence.  Otherwise appears to be much better, was recommended rehab but wishes to go home instead.  Patient otherwise in stable condition.  Does have episodic fogginess and episodic urinary incontinence but this is a consequence of the stroke at this time, may take time to improve.  Discussed with patient and wife in regards to the symptoms.  If urinary issues persist do recommend urology consultation.  Should continue on apixaban as well as statin.  Discharged in stable condition.     Condition at Discharge: good      Discharge Day Visit / Exam:      Subjective:    Patient valuated this morning.  Does have good p.o. intake.  Feeling well.  Denies any nausea vomiting.  No other events reported  Vitals: Blood Pressure: 106/62 (03/13/24 1009)  Pulse: 83 (03/13/24 1009)  Temperature: 98.2 °F (36.8 °C) (03/13/24 0732)  Temp Source: Oral (03/12/24 2300)  Respirations: 20 (03/12/24 2300)  Height: 6' 5\" (195.6 cm) (03/05/24 1600)  Weight - Scale: 125 kg (276 lb 0.3 oz) (03/08/24 0100)  SpO2: 97 % (03/13/24 1009)     Exam:   Physical Exam  Vitals and nursing note reviewed.   Constitutional:       Appearance: Normal appearance.      Comments: Male patient in bed, awake   HENT:      Head: Normocephalic and atraumatic.      Right Ear: External ear normal.      Left Ear: External ear normal.      Nose: Nose normal. No congestion or rhinorrhea.      Mouth/Throat:      Mouth: Mucous membranes are moist.      Pharynx: Oropharynx is clear. No oropharyngeal exudate or posterior oropharyngeal erythema.   Eyes:      General: No scleral icterus.        Right eye: No discharge.         Left eye: No discharge.      Pupils: Pupils are equal, round, and reactive to light.   Neck:      Vascular: No carotid bruit.   Cardiovascular:      Rate and Rhythm: Normal rate and " regular rhythm.      Pulses: Normal pulses.      Heart sounds: No murmur heard.     No friction rub. No gallop.   Pulmonary:      Effort: Pulmonary effort is normal. No respiratory distress.      Breath sounds: Normal breath sounds. No stridor. No wheezing, rhonchi or rales.   Abdominal:      General: Abdomen is flat. Bowel sounds are normal. There is no distension.      Palpations: Abdomen is soft. There is no mass.      Tenderness: There is no abdominal tenderness. There is no guarding or rebound.      Hernia: No hernia is present.   Musculoskeletal:         General: No swelling, tenderness, deformity or signs of injury. Normal range of motion.      Cervical back: Normal range of motion. No rigidity. No muscular tenderness.   Lymphadenopathy:      Cervical: No cervical adenopathy.   Skin:     General: Skin is warm and dry.      Capillary Refill: Capillary refill takes less than 2 seconds.      Coloration: Skin is not jaundiced or pale.      Findings: No bruising or erythema.   Neurological:      General: No focal deficit present.      Mental Status: He is alert and oriented to person, place, and time. Mental status is at baseline.      Cranial Nerves: No cranial nerve deficit.      Sensory: No sensory deficit.      Motor: No weakness.      Coordination: Coordination normal.      Gait: Gait normal.      Deep Tendon Reflexes: Reflexes normal.      Comments: AAOx4, GCS15, no facial mimic changes, no gross motor or sensory deficits noted   Psychiatric:         Mood and Affect: Mood normal.         Behavior: Behavior normal.         Thought Content: Thought content normal.         Judgment: Judgment normal.               Discussion with Family:   Discussed discharge planning with wife     Discharge instructions/Information to patient and family:   See after visit summary for information provided to patient and family.       Provisions for Follow-Up Care:  See after visit summary for information related to follow-up  care and any pertinent home health orders.       Disposition:      Home     For Discharges to [facility] SNF:   Not Applicable to this Patient - Not Applicable to this Patient     Planned Readmission: No     Discharge Statement:  I spent 78 minutes discharging the patient. This time was spent on the day of discharge. I had direct contact with the patient on the day of discharge. Greater than 50% of the total time was spent examining patient, answering all patient questions, arranging and discussing plan of care with patient as well as directly providing post-discharge instructions.  Additional time then spent on discharge activities.     Discharge Medications:  See after visit summary for reconciled discharge medications provided to patient and family.       ** Please Note: This note has been constructed using a voice recognition system **

## 2024-03-20 ENCOUNTER — HOME CARE VISIT (OUTPATIENT)
Dept: HOME HEALTH SERVICES | Facility: HOME HEALTHCARE | Age: 74
End: 2024-03-20
Payer: COMMERCIAL

## 2024-03-20 VITALS
TEMPERATURE: 97.9 F | DIASTOLIC BLOOD PRESSURE: 80 MMHG | SYSTOLIC BLOOD PRESSURE: 128 MMHG | HEART RATE: 71 BPM | OXYGEN SATURATION: 94 % | RESPIRATION RATE: 16 BRPM

## 2024-03-20 VITALS
RESPIRATION RATE: 16 BRPM | OXYGEN SATURATION: 94 % | HEART RATE: 71 BPM | SYSTOLIC BLOOD PRESSURE: 128 MMHG | DIASTOLIC BLOOD PRESSURE: 80 MMHG

## 2024-03-20 LAB
DME PARACHUTE DELIVERY DATE ACTUAL: NORMAL
DME PARACHUTE DELIVERY DATE REQUESTED: NORMAL
DME PARACHUTE ITEM DESCRIPTION: NORMAL
DME PARACHUTE ORDER STATUS: NORMAL
DME PARACHUTE SUPPLIER NAME: NORMAL
DME PARACHUTE SUPPLIER PHONE: NORMAL

## 2024-03-20 PROCEDURE — G0152 HHCP-SERV OF OT,EA 15 MIN: HCPCS

## 2024-03-20 PROCEDURE — G0151 HHCP-SERV OF PT,EA 15 MIN: HCPCS

## 2024-03-21 ENCOUNTER — HOME CARE VISIT (OUTPATIENT)
Dept: HOME HEALTH SERVICES | Facility: HOME HEALTHCARE | Age: 74
End: 2024-03-21
Payer: COMMERCIAL

## 2024-03-21 VITALS
SYSTOLIC BLOOD PRESSURE: 116 MMHG | TEMPERATURE: 97.6 F | HEART RATE: 68 BPM | RESPIRATION RATE: 18 BRPM | DIASTOLIC BLOOD PRESSURE: 66 MMHG | OXYGEN SATURATION: 96 %

## 2024-03-21 PROCEDURE — G0300 HHS/HOSPICE OF LPN EA 15 MIN: HCPCS

## 2024-03-22 ENCOUNTER — HOME CARE VISIT (OUTPATIENT)
Dept: HOME HEALTH SERVICES | Facility: HOME HEALTHCARE | Age: 74
End: 2024-03-22
Payer: COMMERCIAL

## 2024-03-22 VITALS
SYSTOLIC BLOOD PRESSURE: 118 MMHG | RESPIRATION RATE: 16 BRPM | DIASTOLIC BLOOD PRESSURE: 70 MMHG | HEART RATE: 78 BPM | OXYGEN SATURATION: 94 %

## 2024-03-22 PROCEDURE — G0151 HHCP-SERV OF PT,EA 15 MIN: HCPCS

## 2024-03-22 PROCEDURE — G0152 HHCP-SERV OF OT,EA 15 MIN: HCPCS

## 2024-03-23 VITALS
DIASTOLIC BLOOD PRESSURE: 70 MMHG | OXYGEN SATURATION: 97 % | HEART RATE: 74 BPM | SYSTOLIC BLOOD PRESSURE: 132 MMHG | TEMPERATURE: 97.2 F

## 2024-03-26 ENCOUNTER — HOME CARE VISIT (OUTPATIENT)
Dept: HOME HEALTH SERVICES | Facility: HOME HEALTHCARE | Age: 74
End: 2024-03-26
Payer: COMMERCIAL

## 2024-03-26 VITALS
SYSTOLIC BLOOD PRESSURE: 124 MMHG | OXYGEN SATURATION: 98 % | RESPIRATION RATE: 16 BRPM | TEMPERATURE: 96.1 F | HEART RATE: 72 BPM | DIASTOLIC BLOOD PRESSURE: 80 MMHG

## 2024-03-26 VITALS
TEMPERATURE: 99.3 F | DIASTOLIC BLOOD PRESSURE: 82 MMHG | OXYGEN SATURATION: 97 % | SYSTOLIC BLOOD PRESSURE: 128 MMHG | HEART RATE: 74 BPM

## 2024-03-26 PROCEDURE — G0153 HHCP-SVS OF S/L PATH,EA 15MN: HCPCS

## 2024-03-26 PROCEDURE — G0151 HHCP-SERV OF PT,EA 15 MIN: HCPCS

## 2024-03-26 PROCEDURE — G0299 HHS/HOSPICE OF RN EA 15 MIN: HCPCS

## 2024-03-27 ENCOUNTER — HOME CARE VISIT (OUTPATIENT)
Dept: HOME HEALTH SERVICES | Facility: HOME HEALTHCARE | Age: 74
End: 2024-03-27
Payer: COMMERCIAL

## 2024-03-27 VITALS
SYSTOLIC BLOOD PRESSURE: 142 MMHG | DIASTOLIC BLOOD PRESSURE: 80 MMHG | HEART RATE: 78 BPM | RESPIRATION RATE: 16 BRPM | OXYGEN SATURATION: 97 %

## 2024-03-27 PROCEDURE — G0152 HHCP-SERV OF OT,EA 15 MIN: HCPCS

## 2024-03-28 ENCOUNTER — HOME CARE VISIT (OUTPATIENT)
Dept: HOME HEALTH SERVICES | Facility: HOME HEALTHCARE | Age: 74
End: 2024-03-28
Payer: COMMERCIAL

## 2024-03-28 VITALS
HEART RATE: 73 BPM | SYSTOLIC BLOOD PRESSURE: 118 MMHG | TEMPERATURE: 98.6 F | DIASTOLIC BLOOD PRESSURE: 78 MMHG | OXYGEN SATURATION: 93 %

## 2024-03-28 VITALS
HEART RATE: 76 BPM | OXYGEN SATURATION: 95 % | TEMPERATURE: 97.8 F | RESPIRATION RATE: 18 BRPM | DIASTOLIC BLOOD PRESSURE: 82 MMHG | SYSTOLIC BLOOD PRESSURE: 136 MMHG

## 2024-03-28 PROCEDURE — G0300 HHS/HOSPICE OF LPN EA 15 MIN: HCPCS

## 2024-03-28 PROCEDURE — G0151 HHCP-SERV OF PT,EA 15 MIN: HCPCS

## 2024-04-01 ENCOUNTER — HOME CARE VISIT (OUTPATIENT)
Dept: HOME HEALTH SERVICES | Facility: HOME HEALTHCARE | Age: 74
End: 2024-04-01
Payer: COMMERCIAL

## 2024-04-03 ENCOUNTER — HOME CARE VISIT (OUTPATIENT)
Dept: HOME HEALTH SERVICES | Facility: HOME HEALTHCARE | Age: 74
End: 2024-04-03
Payer: COMMERCIAL

## 2024-04-03 VITALS
DIASTOLIC BLOOD PRESSURE: 60 MMHG | RESPIRATION RATE: 17 BRPM | TEMPERATURE: 97.3 F | OXYGEN SATURATION: 95 % | SYSTOLIC BLOOD PRESSURE: 118 MMHG | HEART RATE: 62 BPM

## 2024-04-03 PROCEDURE — G0151 HHCP-SERV OF PT,EA 15 MIN: HCPCS

## 2024-04-03 PROCEDURE — G0153 HHCP-SVS OF S/L PATH,EA 15MN: HCPCS

## 2024-04-04 ENCOUNTER — HOME CARE VISIT (OUTPATIENT)
Dept: HOME HEALTH SERVICES | Facility: HOME HEALTHCARE | Age: 74
End: 2024-04-04
Payer: COMMERCIAL

## 2024-04-04 ENCOUNTER — TELEPHONE (OUTPATIENT)
Dept: HOME HEALTH SERVICES | Facility: HOME HEALTHCARE | Age: 74
End: 2024-04-04

## 2024-04-04 PROCEDURE — G0153 HHCP-SVS OF S/L PATH,EA 15MN: HCPCS

## 2024-04-05 ENCOUNTER — HOME CARE VISIT (OUTPATIENT)
Dept: HOME HEALTH SERVICES | Facility: HOME HEALTHCARE | Age: 74
End: 2024-04-05
Payer: COMMERCIAL

## 2024-04-05 VITALS
HEART RATE: 78 BPM | TEMPERATURE: 97.7 F | SYSTOLIC BLOOD PRESSURE: 126 MMHG | DIASTOLIC BLOOD PRESSURE: 78 MMHG | OXYGEN SATURATION: 96 %

## 2024-04-05 PROCEDURE — G0151 HHCP-SERV OF PT,EA 15 MIN: HCPCS

## 2024-04-08 ENCOUNTER — HOME CARE VISIT (OUTPATIENT)
Dept: HOME HEALTH SERVICES | Facility: HOME HEALTHCARE | Age: 74
End: 2024-04-08
Payer: COMMERCIAL

## 2024-04-08 ENCOUNTER — TELEPHONE (OUTPATIENT)
Dept: CARDIOLOGY CLINIC | Facility: CLINIC | Age: 74
End: 2024-04-08

## 2024-04-08 ENCOUNTER — OFFICE VISIT (OUTPATIENT)
Dept: CARDIOLOGY CLINIC | Facility: CLINIC | Age: 74
End: 2024-04-08
Payer: COMMERCIAL

## 2024-04-08 VITALS
OXYGEN SATURATION: 98 % | BODY MASS INDEX: 33.49 KG/M2 | HEART RATE: 107 BPM | SYSTOLIC BLOOD PRESSURE: 130 MMHG | DIASTOLIC BLOOD PRESSURE: 88 MMHG | RESPIRATION RATE: 16 BRPM | WEIGHT: 275 LBS | HEIGHT: 76 IN

## 2024-04-08 DIAGNOSIS — E11.69 TYPE 2 DIABETES MELLITUS WITH OTHER SPECIFIED COMPLICATION, UNSPECIFIED WHETHER LONG TERM INSULIN USE (HCC): ICD-10-CM

## 2024-04-08 DIAGNOSIS — I63.9 CEREBROVASCULAR ACCIDENT (CVA), UNSPECIFIED MECHANISM (HCC): ICD-10-CM

## 2024-04-08 DIAGNOSIS — E78.2 MIXED HYPERLIPIDEMIA: ICD-10-CM

## 2024-04-08 DIAGNOSIS — I10 PRIMARY HYPERTENSION: ICD-10-CM

## 2024-04-08 DIAGNOSIS — I48.0 PAROXYSMAL ATRIAL FIBRILLATION (HCC): Primary | ICD-10-CM

## 2024-04-08 PROCEDURE — 99214 OFFICE O/P EST MOD 30 MIN: CPT

## 2024-04-08 PROCEDURE — 93000 ELECTROCARDIOGRAM COMPLETE: CPT

## 2024-04-08 NOTE — PROGRESS NOTES
[facility] Cardiology   Office Visit    Drew Santos 73 y.o. male MRN: [id number]    04/08/24        Assessment/Plan:  1.  Paroxysmal atrial fibrillation  Denies palpitations.  EKG shows sinus tachycardia.  TTE reviewed.  Continue Toprol-XL .  [id number] 6, continue Eliquis 5 mg bid.  Patient is interested in applying for Eliquis assistance program.  Discussed consideration for transition to Coumadin if Eliquis is unaffordable.  Advised to notify our office for any new/worsening symptoms.    Discussed causes, pathogenesis and natural history of A-fib/flutter. Discussed risk of stroke and cardioembolism with A-fib.  Discussed role of AC in preventing stroke. Discussed indications, risks, benefits and alternatives to AC. Following detailed discussion, patient agrees to AC. Patient demonstrates clear understanding and ability to follow recommendations.     2.  Hypertension  BP is well-controlled.  Continue lisinopril and Toprol-XL.  Encourage ambulatory monitoring and low-sodium diet.    3.  Hyperlipidemia  Continue atorvastatin, Zetia, and dietary control.    4.  Recent CVA  5.  T2DM, A1c 7.1  6.  Elevated TSH - recommend follow-up with PCP        HPI: Drew Santos is a 73 y.o. year old male with history of paroxysmal atrial fibrillation, hypertension, hyperlipidemia, recent CVA, and T2DM who presents for office visit.  Patient was recently evaluated by cardiology at [facility].  Patient initially presented with questionable syncope/seizure.  He was found to have experienced a CVA.  Subsequently noted to be in new onset paroxysmal atrial fibrillation on telemetry.  Patient was initiated on anticoagulation with Eliquis.  Patient reports he has been well overall from a cardiac standpoint since time of discharge.  Endorses strict compliance to all medications.  Denies adverse effects to current medications.  Patient notes Eliquis is expensive and expresses interest in applying for the Eliquis assistance  "program.  Denies any additional cardiac complaints at this time.      Cardiovascular imaging:   TTE (3/5/2024) - EF 65%, no RWMA, G1 DD, aortic valve sclerosis    Review of Systems:  Review of Systems   Constitutional:  Negative for chills and fever.   HENT:  Negative for ear pain and sore throat.    Eyes:  Negative for pain and visual disturbance.   Respiratory:  Negative for cough and shortness of breath.    Cardiovascular:  Negative for chest pain, palpitations and leg swelling.   Gastrointestinal:  Negative for abdominal pain and vomiting.   Genitourinary:  Negative for dysuria and hematuria.   Musculoskeletal:  Negative for arthralgias and back pain.   Skin:  Negative for color change and rash.   Neurological:  Negative for seizures and syncope.   All other systems reviewed and are negative.      PHYSICAL EXAM:  Vitals:   Vitals:    04/08/24 1456   BP: 130/88   BP Location: Left arm   Patient Position: Sitting   Cuff Size: Large   Pulse: (!) 107   Resp: 16   SpO2: 98%   Weight: 125 kg (275 lb)   Height: 6' 4\" (1.93 m)        Physical Exam:  GEN: Alert and oriented x 3, in no acute distress.  Well appearing and well nourished.   HEENT: Sclera anicteric, conjunctivae pink, mucous membranes moist. Oropharynx clear.   NECK: Supple, no carotid bruits, no significant JVD. Trachea midline, no thyromegaly.   HEART: Regular rhythm, normal S1 and S2, no murmurs, clicks, gallops or rubs. PMI nondisplaced, no thrills.   LUNGS: Clear to auscultation bilaterally; no wheezes, rales, or rhonchi. No increased work of breathing or signs of respiratory distress.   ABDOMEN: Soft, nontender, nondistended, normoactive bowel sounds.   EXTREMITIES: Skin warm and well perfused, no clubbing, cyanosis, or edema.  NEURO: No focal findings. Normal speech. Mood and affect normal.   SKIN: Normal without suspicious lesions on exposed skin.    Follow up: 3 months or sooner as needed    No Known Allergies      Current Outpatient Medications:    "  apixaban (Eliquis) 5 mg, Take 1 tablet (5 mg total) by mouth 2 (two) times a day, Disp: 180 tablet, Rfl: 3    atorvastatin (LIPITOR) 40 mg tablet, Take 1 tablet (40 mg total) by mouth every evening, Disp: 30 tablet, Rfl: 0    Cholecalciferol (Vitamin D3) 50 MCG (2000 UT) CAPS, Take 2,000 Units by mouth daily. Indications: Vitamin D Deficiency, Disp: , Rfl:     dapagliflozin (Farxiga) 10 MG tablet, Take by mouth daily, Disp: , Rfl:     ezetimibe (ZETIA) 10 mg tablet, Take 10 mg by mouth daily. Indications: High Amount of Fats in the Blood, Disp: , Rfl:     glipiZIDE (glipiZIDE XL) 10 mg 24 hr tablet, Take 10 mg by mouth 2 (two) times a day, Disp: , Rfl:     lisinopril (ZESTRIL) 10 mg tablet, Take 1 tablet (10 mg total) by mouth daily, Disp: 30 tablet, Rfl: 0    metoprolol succinate (TOPROL-XL) 25 mg 24 hr tablet, Take 1 tablet (25 mg total) by mouth daily, Disp: 30 tablet, Rfl: 0    MULTIPLE VITAMIN PO, Take 1 tablet by mouth in the morning. Indications: Vitamin A deficiency, Disp: , Rfl:     Past Medical History:   Diagnosis Date    Diabetes mellitus (HCC)     Hypertension        History reviewed. No pertinent family history.    Past Medical History:   Diagnosis Date    Diabetes mellitus (HCC)     Hypertension        History reviewed. No pertinent surgical history.    Social History     Socioeconomic History    Marital status: /Civil Union     Spouse name: Not on file    Number of children: Not on file    Years of education: Not on file    Highest education level: Not on file   Occupational History    Not on file   Tobacco Use    Smoking status: Never    Smokeless tobacco: Never   Vaping Use    Vaping status: Never Used   Substance and Sexual Activity    Alcohol use: Not Currently    Drug use: Never    Sexual activity: Not on file   Other Topics Concern    Not on file   Social History Narrative    Not on file     Social Determinants of Health     Financial Resource Strain: High Risk (5/8/2023)    Received  from [facility]    Overall Financial Resource Strain (CARDIA)     Difficulty of Paying Living Expenses: Hard   Food Insecurity: No Food Insecurity (3/5/2024)    Hunger Vital Sign     Worried About Running Out of Food in the Last Year: Never true     Ran Out of Food in the Last Year: Never true   Transportation Needs: No Transportation Needs (3/5/2024)    PRAPARE - Transportation     Lack of Transportation (Medical): No     Lack of Transportation (Non-Medical): No   Physical Activity: Not on file   Stress: Stress Concern Present (5/8/2023)    Received from [facility]    [employer] [place] [employer] - Occupational Stress Questionnaire     Feeling of Stress : To some extent   Social Connections: Unknown (5/8/2023)    Received from [facility]    Social Connection and Isolation Panel [NHANES]     Frequency of Communication with Friends and Family: Once a week     Frequency of Social Gatherings with Friends and Family: Patient declined     Attends Mosque Services: Patient declined     Active Member of Clubs or Organizations: Yes     Attends Club or Organization Meetings: Patient declined     Marital Status:    Intimate Partner Violence: Not At Risk (5/8/2023)    Received from [facility]    Humiliation, Afraid, Rape, and Kick questionnaire     Fear of Current or Ex-Partner: No     Emotionally Abused: No     Physically Abused: No     Sexually Abused: No   Housing Stability: Low Risk  (3/5/2024)    Housing Stability Vital Sign     Unable to Pay for Housing in the Last Year: No     Number of Places Lived in the Last Year: 1     Unstable Housing in the Last Year: No             LABORATORY RESULTS:    Lab Results   Component Value Date    WBC 10.03 03/13/2024    HGB 14.5 03/13/2024    HCT 43.1 03/13/2024    MCV 94 03/13/2024     03/13/2024     Lab Results   Component Value Date    CALCIUM 9.6 03/13/2024    K 4.0 03/13/2024     "CO2 27 03/13/2024     03/13/2024    BUN 12 03/13/2024    CREATININE 0.93 03/13/2024     Lab Results   Component Value Date    HGBA1C 7.1 (H) 03/08/2024       Lipid Profile:   No results found for: \"CHOL\"  Lab Results   Component Value Date    HDL 42 03/08/2024     Lab Results   Component Value Date    LDLCALC 132 (H) 03/08/2024     Lab Results   Component Value Date    TRIG 111 03/08/2024       The ASCVD Risk score (Destiny DK, et al., 2019) failed to calculate for the following reasons:    The patient has a prior MI or stroke diagnosis    1. Paroxysmal atrial fibrillation (HCC)  POCT ECG    apixaban (Eliquis) 5 mg      2. Primary hypertension        3. Mixed hyperlipidemia        4. Cerebrovascular accident (CVA), unspecified mechanism (HCC)        5. Type 2 diabetes mellitus with other specified complication, unspecified whether long term insulin use (HCC)            Imaging: I have personally reviewed pertinent reports.        EKG reviewed personally: Sinus tachycardia    Recommend aggressive risk factor modification and therapeutic lifestyle changes.  Low-salt, low-calorie, low-fat, low-cholesterol diet with regular exercise and to optimize weight.    Discussed concepts of atherosclerosis, including signs and symptoms of cardiac disease.    Medications reviewed and possible side effects discussed.  Previous studies were reviewed.    Safety measures were reviewed.  All questions and concerns addressed.  Patient was advised to report any problems requiring medical attention.    Follow-up with PCP and appropriate specialist and lab work as discussed.    Return for follow up visit as scheduled or earlier, if needed.  Thank you for allowing me to participate in the care and evaluation of your patient.  Should you have any questions, please feel free to contact me.    Wes Muhammad PA-C  4/8/2024,3:50 PM   "

## 2024-04-09 ENCOUNTER — HOME CARE VISIT (OUTPATIENT)
Dept: HOME HEALTH SERVICES | Facility: HOME HEALTHCARE | Age: 74
End: 2024-04-09
Payer: COMMERCIAL

## 2024-04-11 ENCOUNTER — TELEPHONE (OUTPATIENT)
Dept: NEUROLOGY | Facility: CLINIC | Age: 74
End: 2024-04-11

## 2024-04-11 ENCOUNTER — HOME CARE VISIT (OUTPATIENT)
Dept: HOME HEALTH SERVICES | Facility: HOME HEALTHCARE | Age: 74
End: 2024-04-11
Payer: COMMERCIAL

## 2024-04-11 VITALS
RESPIRATION RATE: 18 BRPM | TEMPERATURE: 98 F | DIASTOLIC BLOOD PRESSURE: 80 MMHG | OXYGEN SATURATION: 95 % | SYSTOLIC BLOOD PRESSURE: 134 MMHG | HEART RATE: 76 BPM

## 2024-04-11 PROCEDURE — G0300 HHS/HOSPICE OF LPN EA 15 MIN: HCPCS

## 2024-04-11 NOTE — TELEPHONE ENCOUNTER
1ST ATTEMPT,     Called pt no answer, LMOM.    LETTER MAILED     Thank you,     Joann KHANU / OSKAR MORIN/ SYNCOPE    DC- HOME- 3/13/2024    Should see general or epilepsy attending or AP in 4-6 weeks. No need for outpatient neurologic testing at this time.   
- - -

## 2024-04-15 ENCOUNTER — HOME CARE VISIT (OUTPATIENT)
Dept: HOME HEALTH SERVICES | Facility: HOME HEALTHCARE | Age: 74
End: 2024-04-15
Payer: COMMERCIAL

## 2024-04-15 VITALS
OXYGEN SATURATION: 94 % | DIASTOLIC BLOOD PRESSURE: 64 MMHG | HEART RATE: 72 BPM | SYSTOLIC BLOOD PRESSURE: 108 MMHG | TEMPERATURE: 98 F | RESPIRATION RATE: 20 BRPM

## 2024-04-15 PROCEDURE — G0299 HHS/HOSPICE OF RN EA 15 MIN: HCPCS

## 2024-04-16 ENCOUNTER — EVALUATION (OUTPATIENT)
Age: 74
End: 2024-04-16
Payer: COMMERCIAL

## 2024-04-16 DIAGNOSIS — I63.9 CEREBROVASCULAR ACCIDENT (CVA), UNSPECIFIED MECHANISM (HCC): Primary | ICD-10-CM

## 2024-04-16 DIAGNOSIS — E11.69 TYPE 2 DIABETES MELLITUS WITH OTHER SPECIFIED COMPLICATION, UNSPECIFIED WHETHER LONG TERM INSULIN USE (HCC): ICD-10-CM

## 2024-04-16 DIAGNOSIS — I10 PRIMARY HYPERTENSION: ICD-10-CM

## 2024-04-16 PROCEDURE — 97530 THERAPEUTIC ACTIVITIES: CPT | Performed by: PHYSICAL THERAPIST

## 2024-04-16 PROCEDURE — 97162 PT EVAL MOD COMPLEX 30 MIN: CPT | Performed by: PHYSICAL THERAPIST

## 2024-04-16 NOTE — PROGRESS NOTES
PT Evaluation          POC expires Unit limit Auth Expiration date PT/OT + Visit Limit? Co-Insurance   24 BOMN NA BOMN $30 Co-pay                               Visit/Unit Tracking  - IE                                                     Today's date: 2024  Patient name: Drew Santos  : 1950  MRN: [id number]  Referring provider: Chandler Azul*  Dx:   Encounter Diagnosis     ICD-10-CM    1. Cerebrovascular accident (CVA), unspecified mechanism (HCC)  I63.9       2. Type 2 diabetes mellitus with other specified complication, unspecified whether long term insulin use (HCC)  E11.69       3. Primary hypertension  I10             Assessment  Assessment details: Patient is a 73 y.o. Male who presents to skilled outpatient PT with after effects of CVA.  Upon examination, notable weakness at left lower extremity, secondary to CVA. Noted decrease in UE dexterity and  strength and would benefit from skilled OT assessment. Score increased risk for falls per 5xSTS score of 27.86, 10 MWT .73 m/s. pt is below age related norms for and is at significantly increase risk for falls as compared to his peers. Will complete miniBest test and FGA next session. Inability to fully tolerate 6 minute walk test with stopping test at 3:31 with distance walked of 450 feet. As walking progressed, noted increase lateral lean to left with positive Trenedenburg noted.  As a result, pt has decreased functional capacity which is limited his ability to ambulate safely and efficiently at home and within the community, has decreased independence, and decreased postural stability leading to overall activity intolerance and would benefit from interventions designed to address aforementioned deficits with hopes of restoring functional strength to lower extremities to improve biomechanical efficiency during functional activity and decrease risk of  injury.       Impairments: Abnormal coordination, Abnormal gait, Abnormal muscle tone, Abnormal or restricted ROM, Activity intolerance, Impaired balance, Impaired physical strength, Lacks appropriate HEP, Poor posture, Poor body mechanics, Pain with function, Safety issue, Weight-bearing intolerance, Abnormal movement, Difficulty understanding, Abnormal muscle firing  Understanding of Dx/Px/POC: Good  Prognosis: Good      Patient verbalized understanding of POC.         Please contact me if you have any questions or recommendations. Thank you for the referral and the opportunity to share in Drew Santos's care.        Plan  Patient would benefit from: Skilled PT  Planned modality interventions: Biofeedback, Cryotherapy, TENS, Thermotherapy  Planned therapy interventions: Abdominal trunk stabilization, ADL training, Balance, Balance/WB training, Breathing training, Body mechanics training, Coordination, Functional ROM exercises, Gait training, HEP, Joint Mobilization, Manual Therapy, Shin taping, Motor coordination training, Neuromuscular re-education, Patient education, Postural training, Strengthening, Stretching, Therapeutic activities, Therapeutic exercises, Therapeutic training, Transfer training, Activity modification, Work reintegration  Frequency: 2x/wk  Plan of Care beginning date: 4/16/2024  Plan of Care expiration date: 3 months - 7/16/2024  Treatment plan discussed with: Patient         Goals  Short Term Goals (4 weeks):    - Patient will improve scoring on DGI by 2.6 points to progress safety  - Patient will be independent in basic HEP 2-3 weeks  - Patient will improve 5xSTS score by 2.3 seconds from 27.86 sec to 25 sec to promote improved LE functional strength needed for ADLs  - Patient will improve gait speed by 0.18 m/s from .73 m/s to 1.0 to improve safety with community ambulation  - Patient will complete MiniBest test and score 14/28 or higher to reduce risk for falls   - patient will complete   minute walk test and score 800 feet or more to improve cardiovascular endurance   - patient will be able to perform floor transfer without physical assistance from therapist.       Long Term Goals (12 weeks):  - Patient will be independent in a comprehensive home exercise program  - Patient will improve gait speed by 0.18 m/s to improve safety with community ambulation  - Patient will improve 5xSTS score by 2.3 seconds from 25 sec to 20 sec to promote improved LE functional strength needed for ADLs  - Patient will complete MiniBest test and score 21/28 or higher to reduce risk for falls   - Patient will be able to demonstrate HT in gait without veering  - Patient will improve 6 Minute Walk Test score by 190 feet from 800 feet to 1000  to promote improved cardiovascular endurance  - Patient will report going on walks at least 3 days per week to promote independence and improved cardiovascular endurance  - Patient will be able to ascend/descend stairs reciprocally without UE assist to promote independence and safety with ADLs  - Patient will report 50% reduction in near falls when ambulating on uneven terrain      Cut off score    All date taken from APTA Neuro Section or Rehab Measures      Jarvis:  Armando et al., 2018  MDC: 2.7 pts    Davey et al., 2011  Cut-off score: 45/56    Chronic CVA  < 44/56 high risk for falls (Armando et al., 2018)  < 47.5/56 slow walker status (Inez et al., 2011) 5xSTS: Alen 2010  MDC: 2.3 sec  Age Norms:  60-69: 11.1 sec  70-79: 12.6 sec  80-89: 14.8 sec    Riley 2010, Chronic Stroke  Chronic CVA: 12 sec   TUG  John hummel al., 2005  MDC: 2.9 sec    Cut off score:  >13.5 sec community dwelling adults  >32.2 frail elderly  <20 I for basic transfers  >30 dependent on transfers 10 Meter Walk Test:   Ernesto et al., 2006  Small meaningful change: 0.06 m/s  Substantial meaningful change: 0.14 m/s  MCID: 0.16 m/s    < 0.4 m/s household ambulators  0.4 - 0.8 m/s limited community  ambulators  > 0.8 m/s community ambulators   FGA: Jessica et al., 2010  MCID: 4.2 pts  Geriatrics/community < 22/30 fall risk  Geriatrics/community < 20/30 unexplained falls DGI  MDC: vestibular - 4 pts  MDC: geriatric/community - 3 pts  Falls risk <19/24   6 Minute Walk Test  Ernesto et al., 2006  MDC: 60.98 m (200.01 ft)    Mónica Morris, & Louann, 2012  MCID: 34.4 m    Age Norms  60-69: M - 1876 ft   F - 1765 ft  70-79: M - 1729 ft   F - 1545 ft  80-89: M - 1368 ft   F - 1286 ft Modified [place]  0: No increase in tone  1: Catch and release or min resistance at end range  1+: Catch f/b min resistance throughout remainder (< half ROM)  2: Easily moved, but more marked tone throughout most ROM  3: Significant tone, PROM difficult  4: Rigid   MiniBest: Rosales et al., 2013  CVA < 17.5 fall risk Pass (Acute CVA)  MDC: 1.8 points (acute), 3.2 points (chronic)         Subjective    History of Present Illness  Mechanism of injury: Patient was in the hospital for 1 week for stroke care at [facility] on 3/13/2024. 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 Currently patient reports no issues with mobility. However, spouse notes shuffling, challenge with lower surfaces with standing, and carrying things around the house.   Primary AD: NA  Assist level at home: spouse as needed   WC usage: none   PLOF: Independent     Patient goal: get back to gardening     Pain  Current pain ratin/10  At best pain ratin/10  At worst pain ratin/10  Location: NA  Aggravating factors: NA    Social Support  Steps to enter house: 6 steps   Stairs in house: 13 steps    Lives in: 2 story   Lives with: spouse     Employment status: retired  Hand dominance: right     Treatments  Previous treatment: home health   Current treatment: na   Diagnostic Testing: na       Objective     Vitals  - BP: 144/85    HR Max  - 207 - (0.7x75)  = 154.5    LE MMT  - R Hip Flexion: 4-/5   - L Hip Flexion: 3+/5  - R  Hip Extension: 3+/5  - L Hip Extension: 3+/5  - R Hip Abduction: 4-/5  - L Hip abduction: 3+/5  - R Hip adduction: 4-/5  - L Hip adduction: 4-/5  - R Knee Extension: 4-/5  - L Knee Extension: 3+/5  - R Knee Flexion: 4-/5   - L Knee Flexion: 4-/5  - R Ankle DF: 4-/5   - L Ankle DF: 3+/5  - R Ankle PF: 4-/5   - L Ankle PF: 4-/5    Sensation  - Light touch: intact   - Deep pressure: intact     Coordination  - Dysdiadochokinesia: decreased on left side   - Alternating Toe Taps: decreased on left side     Modified [place]  - R knee extension: 0 - no increase in tone  - L knee extension: 0 - no increase in tone  - R knee flexion: 0 - no increase in tone  - L knee flexion: 0 - no increase in tone  - R ankle DF: 0 - no increase in tone   - L ankle DF: 0 - no increase in tone  - R ankle inversion: 0 - no increase in tone  - L ankle inversion: 0 - no increase in tone  - R ankle eversion: 0 - no increase in tone  - L ankle eversion: 0 - no increase in tone    Clonus  - L: No  - R: No  - Fatiguing: No  - Number of beats: NA    Vision  - Glasses: Yes  - Abnormalities prior to CVA: No, no change   - Abnormalities post CVA: Yes, light sensitivity and sitting in the dark     Neglect  - R sided: No  - L sided: No    Pusher's Syndrome  - R sided: No  - L sided: No    Gait abnormalities:  lateral lean to left with positive Trenedenburg, decrease foot clearance.       Outcome Measures Initial Eval  4/16/24   5xSTS 27.86 sec   TUG 12.06 sec   10 meter .73 m/s   GERBER    FGA NV/30   DGI    MiniBEST NV/28   PASS    6MWT 450 ft              Transformed Scale = [(Actual raw score - lowest possible raw score) / Possible raw score range] x 100  Precautions: Standard precautions, monitor BG,   Past Medical History:   Diagnosis Date    Diabetes mellitus (HCC)     Hypertension

## 2024-04-16 NOTE — LETTER
2024    Sal Bowman MD  [address] [facility]  Second Floor  [place] [address]    Patient: Drew Santos   YOB: 1950   Date of Visit: 2024     Encounter Diagnosis     ICD-10-CM    1. Cerebrovascular accident (CVA), unspecified mechanism (HCC)  I63.9       2. Type 2 diabetes mellitus with other specified complication, unspecified whether long term insulin use (HCC)  E11.69       3. Primary hypertension  I10           Dear Dr. Bowman:    Thank you for your recent referral of Drew Santos. Please review the attached evaluation summary from Drew's recent visit.     Please verify that you agree with the plan of care by signing the attached order.     If you have any questions or concerns, please do not hesitate to call.     I sincerely appreciate the opportunity to share in the care of one of your patients and hope to have another opportunity to work with you in the near future.       Sincerely,    Sukhdev Pierce, PT      Referring Provider:      I certify that I have read the below Plan of Care and certify the need for these services furnished under this plan of treatment while under my care.                    Sal Bowman MD  [address] [facility]  Second Floor  [place] [address]  Via Fax: [phone]                                                                             PT Evaluation          POC expires Unit limit Auth Expiration date PT/OT + Visit Limit? Co-Insurance   24 BOMN NA BOMN $30 Co-pay                               Visit/Unit Tracking  - IE                                                     Today's date: 2024  Patient name: Drew Santos  : 1950  MRN: [id number]  Referring provider: Chandler Azul*  Dx:   Encounter Diagnosis     ICD-10-CM    1. Cerebrovascular accident (CVA), unspecified mechanism (HCC)  I63.9       2. Type 2 diabetes mellitus with other specified complication, unspecified whether long term insulin use (HCC)   E11.69       3. Primary hypertension  I10             Assessment  Assessment details: Patient is a 73 y.o. Male who presents to skilled outpatient PT with after effects of CVA.  Upon examination, notable weakness at left lower extremity, secondary to CVA. Noted decrease in UE dexterity and  strength and would benefit from skilled OT assessment. Score increased risk for falls per 5xSTS score of 27.86, 10 MWT .73 m/s. pt is below age related norms for and is at significantly increase risk for falls as compared to his peers. Will complete miniBest test and FGA next session. Inability to fully tolerate 6 minute walk test with stopping test at 3:31 with distance walked of 450 feet. As walking progressed, noted increase lateral lean to left with positive Trenedenburg noted.  As a result, pt has decreased functional capacity which is limited his ability to ambulate safely and efficiently at home and within the community, has decreased independence, and decreased postural stability leading to overall activity intolerance and would benefit from interventions designed to address aforementioned deficits with hopes of restoring functional strength to lower extremities to improve biomechanical efficiency during functional activity and decrease risk of injury.       Impairments: Abnormal coordination, Abnormal gait, Abnormal muscle tone, Abnormal or restricted ROM, Activity intolerance, Impaired balance, Impaired physical strength, Lacks appropriate HEP, Poor posture, Poor body mechanics, Pain with function, Safety issue, Weight-bearing intolerance, Abnormal movement, Difficulty understanding, Abnormal muscle firing  Understanding of Dx/Px/POC: Good  Prognosis: Good      Patient verbalized understanding of POC.         Please contact me if you have any questions or recommendations. Thank you for the referral and the opportunity to share in Drew Santos's care.        Plan  Patient would benefit from: Skilled PT  Planned  modality interventions: Biofeedback, Cryotherapy, TENS, Thermotherapy  Planned therapy interventions: Abdominal trunk stabilization, ADL training, Balance, Balance/WB training, Breathing training, Body mechanics training, Coordination, Functional ROM exercises, Gait training, HEP, Joint Mobilization, Manual Therapy, Shin taping, Motor coordination training, Neuromuscular re-education, Patient education, Postural training, Strengthening, Stretching, Therapeutic activities, Therapeutic exercises, Therapeutic training, Transfer training, Activity modification, Work reintegration  Frequency: 2x/wk  Plan of Care beginning date: 4/16/2024  Plan of Care expiration date: 3 months - 7/16/2024  Treatment plan discussed with: Patient         Goals  Short Term Goals (4 weeks):    - Patient will improve scoring on DGI by 2.6 points to progress safety  - Patient will be independent in basic HEP 2-3 weeks  - Patient will improve 5xSTS score by 2.3 seconds from 27.86 sec to 25 sec to promote improved LE functional strength needed for ADLs  - Patient will improve gait speed by 0.18 m/s from .73 m/s to 1.0 to improve safety with community ambulation  - Patient will complete MiniBest test and score 14/28 or higher to reduce risk for falls   - patient will complete  minute walk test and score 800 feet or more to improve cardiovascular endurance   - patient will be able to perform floor transfer without physical assistance from therapist.       Long Term Goals (12 weeks):  - Patient will be independent in a comprehensive home exercise program  - Patient will improve gait speed by 0.18 m/s to improve safety with community ambulation  - Patient will improve 5xSTS score by 2.3 seconds from 25 sec to 20 sec to promote improved LE functional strength needed for ADLs  - Patient will complete MiniBest test and score 21/28 or higher to reduce risk for falls   - Patient will be able to demonstrate HT in gait without veering  - Patient will  improve 6 Minute Walk Test score by 190 feet from 800 feet to 1000  to promote improved cardiovascular endurance  - Patient will report going on walks at least 3 days per week to promote independence and improved cardiovascular endurance  - Patient will be able to ascend/descend stairs reciprocally without UE assist to promote independence and safety with ADLs  - Patient will report 50% reduction in near falls when ambulating on uneven terrain      Cut off score    All date taken from APTA Neuro Section or Rehab Measures      Jarvis:  Amrando et al., 2018  MDC: 2.7 pts    Davey et al., 2011  Cut-off score: 45/56    Chronic CVA  < 44/56 high risk for falls (Armando et al., 2018)  < 47.5/56 slow walker status (Inez et al., 2011) 5xSTS: Alen 2010  MDC: 2.3 sec  Age Norms:  60-69: 11.1 sec  70-79: 12.6 sec  80-89: 14.8 sec    Riley 2010, Chronic Stroke  Chronic CVA: 12 sec   TUG  Tj., 2005  MDC: 2.9 sec    Cut off score:  >13.5 sec community dwelling adults  >32.2 frail elderly  <20 I for basic transfers  >30 dependent on transfers 10 Meter Walk Test:   Ernesto et al., 2006  Small meaningful change: 0.06 m/s  Substantial meaningful change: 0.14 m/s  MCID: 0.16 m/s    < 0.4 m/s household ambulators  0.4 - 0.8 m/s limited community ambulators  > 0.8 m/s community ambulators   FGA: Jessica et al., 2010  MCID: 4.2 pts  Geriatrics/community < 22/30 fall risk  Geriatrics/community < 20/30 unexplained falls DGI  MDC: vestibular - 4 pts  MDC: geriatric/community - 3 pts  Falls risk <19/24   6 Minute Walk Test  Ernesto et al., 2006  MDC: 60.98 m (200.01 ft)    Mónica Morris, & Louann, 2012  MCID: 34.4 m    Age Norms  60-69: M - 1876 ft   F - 1765 ft  70-79: M - 1729 ft   F - 1545 ft  80-89: M - 1368 ft   F - 1286 ft Modified Paradise  0: No increase in tone  1: Catch and release or min resistance at end range  1+: Catch f/b min resistance throughout remainder (< half ROM)  2: Easily moved, but more marked tone  throughout most ROM  3: Significant tone, PROM difficult  4: Rigid   MiniBest: Rosales et al., 2013  CVA < 17.5 fall risk Pass (Acute CVA)  MDC: 1.8 points (acute), 3.2 points (chronic)         Subjective    History of Present Illness  Mechanism of injury: Patient was in the hospital for 1 week for stroke care at [facility] on 3/13/2024. 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 Currently patient reports no issues with mobility. However, spouse notes shuffling, challenge with lower surfaces with standing, and carrying things around the house.   Primary AD: NA  Assist level at home: spouse as needed   WC usage: none   PLOF: Independent     Patient goal: get back to gardening     Pain  Current pain ratin/10  At best pain ratin/10  At worst pain ratin/10  Location: NA  Aggravating factors: NA    Social Support  Steps to enter house: 6 steps   Stairs in house: 13 steps    Lives in: 2 story   Lives with: spouse     Employment status: retired  Hand dominance: right     Treatments  Previous treatment: home health   Current treatment: na   Diagnostic Testing: na       Objective     Vitals  - BP: 144/85    HR Max  - 207 - (0.7x75)  = 154.5    LE MMT  - R Hip Flexion: 4-/5   - L Hip Flexion: 3+/5  - R Hip Extension: 3+/5  - L Hip Extension: 3+/5  - R Hip Abduction: 4-/5  - L Hip abduction: 3+/5  - R Hip adduction: 4-/5  - L Hip adduction: 4-/5  - R Knee Extension: 4-/5  - L Knee Extension: 3+/5  - R Knee Flexion: 4-/5   - L Knee Flexion: 4-/5  - R Ankle DF: 4-/5   - L Ankle DF: 3+/5  - R Ankle PF: 4-/5   - L Ankle PF: 4-/5    Sensation  - Light touch: intact   - Deep pressure: intact     Coordination  - Dysdiadochokinesia: decreased on left side   - Alternating Toe Taps: decreased on left side     Modified Paradise  - R knee extension: 0 - no increase in tone  - L knee extension: 0 - no increase in tone  - R knee flexion: 0 - no increase in tone  - L knee  flexion: 0 - no increase in tone  - R ankle DF: 0 - no increase in tone   - L ankle DF: 0 - no increase in tone  - R ankle inversion: 0 - no increase in tone  - L ankle inversion: 0 - no increase in tone  - R ankle eversion: 0 - no increase in tone  - L ankle eversion: 0 - no increase in tone    Clonus  - L: No  - R: No  - Fatiguing: No  - Number of beats: NA    Vision  - Glasses: Yes  - Abnormalities prior to CVA: No, no change   - Abnormalities post CVA: Yes, light sensitivity and sitting in the dark     Neglect  - R sided: No  - L sided: No    Pusher's Syndrome  - R sided: No  - L sided: No    Gait abnormalities:  lateral lean to left with positive Trenedenburg, decrease foot clearance.       Outcome Measures Initial Eval  4/16/24        5xSTS 27.86 sec        TUG 12.06 sec        10 meter .73 m/s        GERBER         FGA NV/30        DGI         MiniBEST NV/28        PASS         6MWT 450 ft        CB&M /96                      Precautions: Standard precautions, monitor BG,   Past Medical History:   Diagnosis Date   • Diabetes mellitus (HCC)    • Hypertension

## 2024-04-19 ENCOUNTER — OFFICE VISIT (OUTPATIENT)
Age: 74
End: 2024-04-19
Payer: COMMERCIAL

## 2024-04-19 DIAGNOSIS — I63.9 CEREBROVASCULAR ACCIDENT (CVA), UNSPECIFIED MECHANISM (HCC): Primary | ICD-10-CM

## 2024-04-19 DIAGNOSIS — E11.69 TYPE 2 DIABETES MELLITUS WITH OTHER SPECIFIED COMPLICATION, UNSPECIFIED WHETHER LONG TERM INSULIN USE (HCC): ICD-10-CM

## 2024-04-19 DIAGNOSIS — I10 PRIMARY HYPERTENSION: ICD-10-CM

## 2024-04-19 PROCEDURE — 97112 NEUROMUSCULAR REEDUCATION: CPT | Performed by: PHYSICAL THERAPIST

## 2024-04-19 PROCEDURE — 97530 THERAPEUTIC ACTIVITIES: CPT | Performed by: PHYSICAL THERAPIST

## 2024-04-19 NOTE — PROGRESS NOTES
Daily Note     Today's date: 2024  Patient name: Drew Santos  : 1950  MRN: [id number]  Referring provider: Chandler Azul*  Dx:   Encounter Diagnosis     ICD-10-CM    1. Cerebrovascular accident (CVA), unspecified mechanism (HCC)  I63.9       2. Type 2 diabetes mellitus with other specified complication, unspecified whether long term insulin use (HCC)  E11.69       3. Primary hypertension  I10                      Subjective: Pt reports feeling ok today.      Objective: See treatment diary below  TA:  FGA   MiniBEST   Pt education    NMR:  FWD/BWD Ambulation w/ 10#TT in // bars, 3 laps  HKM in // bars 3 sec iso hold 3 laps, cue to contract glute max to decrease anterior lean  Alt step taps 8'' intermittent UE support x20 ea  Tandem ambulation on foam beam intermittent 1 UE support  SLS 4x30 sec    NT: NMR w/ SOLO      Assessment: Tolerated treatment well. Noted anterior lean during ambulation, cued pt to contract glute max during walking in order to increase hip ext and decrease anterior lean, requires intermittent UE support during balance activity as well as cues to take larger steps during backwards ambulation.  Educated pt on usage of SOLO step for purposes of NMR and increased confidence, pt agreeable to trial next visit.  Patient exhibited good technique with therapeutic exercises and would benefit from continued PT      Plan: Continue per plan of care.      POC expires Unit limit Auth Expiration date PT/OT + Visit Limit? Co-Insurance   24 BOMN NA BOMN $30 Co-pay                               Visit/Unit Tracking  - IE

## 2024-04-23 ENCOUNTER — OFFICE VISIT (OUTPATIENT)
Age: 74
End: 2024-04-23
Payer: COMMERCIAL

## 2024-04-23 DIAGNOSIS — I10 PRIMARY HYPERTENSION: ICD-10-CM

## 2024-04-23 DIAGNOSIS — E11.69 TYPE 2 DIABETES MELLITUS WITH OTHER SPECIFIED COMPLICATION, UNSPECIFIED WHETHER LONG TERM INSULIN USE (HCC): ICD-10-CM

## 2024-04-23 DIAGNOSIS — I63.9 CEREBROVASCULAR ACCIDENT (CVA), UNSPECIFIED MECHANISM (HCC): Primary | ICD-10-CM

## 2024-04-23 PROCEDURE — 97112 NEUROMUSCULAR REEDUCATION: CPT | Performed by: PHYSICAL THERAPIST

## 2024-04-23 NOTE — PROGRESS NOTES
Daily Note     Today's date: 2024  Patient name: Drew Santos  : 1950  MRN: [id number]  Referring provider: Chandler Azul*  Dx:   Encounter Diagnosis     ICD-10-CM    1. Cerebrovascular accident (CVA), unspecified mechanism (HCC)  I63.9       2. Type 2 diabetes mellitus with other specified complication, unspecified whether long term insulin use (HCC)  E11.69       3. Primary hypertension  I10                      Subjective: Pt reports feeling ok today.      Objective: See treatment diary below    NMR in SOLO:    HKM Ambulation w/ 10#TT and 6'' hurdles (5),  3 laps  LAT steps w/ 10# TT and 6'' hurles (5), 3 laps  STS w/ 10# TT 2x10  Alt step taps 8'' intermittent  x20 ea  BWD ambulation 10# TT, cue to take large steps  Tandem ambulation on 2x foam beam UE assist    Assessment: Tolerated treatment well. Initiated NMR in SOLO step today, pt requires repeated VC's for task accuracy and technique, pt demonstrating significant difficulty with DT activities, will attempt to challenger pt further with tasks involving cognitive load.  Cue to take larger steps during ambulation as well as to decrease speed in order to control momentum d/t anterior lean.  Patient demonstrated fatigue post treatment and would benefit from continued PT      Plan: Continue per plan of care.      POC expires Unit limit Auth Expiration date PT/OT + Visit Limit? Co-Insurance   24 BOMN NA BOMN $30 Co-pay                               Visit/Unit Tracking  - IE

## 2024-04-25 ENCOUNTER — OFFICE VISIT (OUTPATIENT)
Age: 74
End: 2024-04-25
Payer: COMMERCIAL

## 2024-04-25 DIAGNOSIS — I63.9 CEREBROVASCULAR ACCIDENT (CVA), UNSPECIFIED MECHANISM (HCC): Primary | ICD-10-CM

## 2024-04-25 DIAGNOSIS — E11.69 TYPE 2 DIABETES MELLITUS WITH OTHER SPECIFIED COMPLICATION, UNSPECIFIED WHETHER LONG TERM INSULIN USE (HCC): ICD-10-CM

## 2024-04-25 DIAGNOSIS — I10 PRIMARY HYPERTENSION: ICD-10-CM

## 2024-04-25 PROCEDURE — 97112 NEUROMUSCULAR REEDUCATION: CPT | Performed by: PHYSICAL THERAPIST

## 2024-04-25 NOTE — PROGRESS NOTES
Daily Note     Today's date: 2024  Patient name: Drew Santos  : 1950  MRN: [id number]  Referring provider: Chandler Azul*  Dx:   Encounter Diagnosis     ICD-10-CM    1. Cerebrovascular accident (CVA), unspecified mechanism (HCC)  I63.9       2. Type 2 diabetes mellitus with other specified complication, unspecified whether long term insulin use (HCC)  E11.69       3. Primary hypertension  I10                      Subjective: Pt reports doing ok today.      Objective: See treatment diary below    NMR in SOLO:  HKM Ambulation w/ 10#TT and 6'' hurdles (5),  3 laps  LAT steps w/ 10# TT and 6'' hurles (5), 3 laps  STS w/ 10# TT 2x10  Stepping on SM/MED RR one foot on each, 3 laps  Alt step taps 8'' intermittent  x20 ea w/ blazepod taps naming objects starting with each color  BWD ambulation 10# TT, naming foods A-Z  Tandem ambulation on 2x foam beam UE assist    Assessment: Tolerated treatment well. Continued w/ NMR in SOLO today, cued pt to increase hip flexion during hurdles in order to decrease compensatory circumduction, pt able to implement successfully.  Pt remains significantly challenged with activities involving dual task and cognitive load, unable to accurately and consistently name objects during exercise and requires increased processing time. Patient demonstrated fatigue post treatment and would benefit from continued PT      Plan: Continue per plan of care.      POC expires Unit limit Auth Expiration date PT/OT + Visit Limit? Co-Insurance   24 BOMN NA BOMN $30 Co-pay                               Visit/Unit Tracking  - IE

## 2024-04-30 ENCOUNTER — OFFICE VISIT (OUTPATIENT)
Age: 74
End: 2024-04-30
Payer: COMMERCIAL

## 2024-04-30 DIAGNOSIS — I63.9 CEREBROVASCULAR ACCIDENT (CVA), UNSPECIFIED MECHANISM (HCC): Primary | ICD-10-CM

## 2024-04-30 DIAGNOSIS — E11.69 TYPE 2 DIABETES MELLITUS WITH OTHER SPECIFIED COMPLICATION, UNSPECIFIED WHETHER LONG TERM INSULIN USE (HCC): ICD-10-CM

## 2024-04-30 DIAGNOSIS — I10 PRIMARY HYPERTENSION: ICD-10-CM

## 2024-04-30 PROCEDURE — 97112 NEUROMUSCULAR REEDUCATION: CPT

## 2024-04-30 NOTE — PROGRESS NOTES
Daily Note     Today's date: 2024  Patient name: Drew Santos  : 1950  MRN: [id number]  Referring provider: Chandler Azul*  Dx:   Encounter Diagnosis     ICD-10-CM    1. Cerebrovascular accident (CVA), unspecified mechanism (HCC)  I63.9       2. Type 2 diabetes mellitus with other specified complication, unspecified whether long term insulin use (HCC)  E11.69       3. Primary hypertension  I10                      Subjective: Patient reports that he feels okay.       Objective: See treatment diary below    NMR in SOLO:  HKM Ambulation w/ 10#TT and 6'' hurdles (5),  3 laps  LAT steps w/ 10# TT and 6'' hurles (5), 3 laps  STS w/ 10# TT 2x10  Stepping on SM/MED RR one foot on each, 3 laps  Alt step taps 8'' intermittent  x20 ea w/ blazepod taps naming objects starting with each color  BWD ambulation 10# TT, naming foods A-Z  Tandem ambulation on 2x foam beam UE assist    Assessment: Tolerated treatment fair. Patient had increased difficulty with clearing hurdles this session require cues to increase hip flexion. Patient required cues to take bigger steps backwards. Patient continues to have increased difficulty with dual tasking throughout session. Patient demonstrated fatigue post treatment, exhibited good technique with therapeutic exercises, and would benefit from continued PT      Plan: Continue per plan of care.      POC expires Unit limit Auth Expiration date PT/OT + Visit Limit? Co-Insurance   24 BOMN NA BOMN $30 Co-pay                               Visit/Unit Tracking  - IE

## 2024-05-02 ENCOUNTER — OFFICE VISIT (OUTPATIENT)
Age: 74
End: 2024-05-02
Payer: COMMERCIAL

## 2024-05-02 DIAGNOSIS — I10 PRIMARY HYPERTENSION: ICD-10-CM

## 2024-05-02 DIAGNOSIS — I63.9 CEREBROVASCULAR ACCIDENT (CVA), UNSPECIFIED MECHANISM (HCC): Primary | ICD-10-CM

## 2024-05-02 DIAGNOSIS — E11.69 TYPE 2 DIABETES MELLITUS WITH OTHER SPECIFIED COMPLICATION, UNSPECIFIED WHETHER LONG TERM INSULIN USE (HCC): ICD-10-CM

## 2024-05-02 PROCEDURE — 97112 NEUROMUSCULAR REEDUCATION: CPT

## 2024-05-02 NOTE — PROGRESS NOTES
Daily Note     Today's date: 2024  Patient name: Drew Santos  : 1950  MRN: [id number]  Referring provider: Chandler Azul*  Dx:   Encounter Diagnosis     ICD-10-CM    1. Cerebrovascular accident (CVA), unspecified mechanism (HCC)  I63.9       2. Type 2 diabetes mellitus with other specified complication, unspecified whether long term insulin use (HCC)  E11.69       3. Primary hypertension  I10                  Subjective: Patient reports working out prior to session.      Objective: See treatment diary below    NMR in SOLO:  HKM Ambulation w/ 10#TT and 6'' hurdles (5),  3 laps  LAT steps w/ 10# TT and 6'' hurles (5), 3 laps  STS w/ 10# TT 2x10  Side step on foam beam 3 laps  Tandem ambulation 3 laps  Alt step taps 8'' intermittent  x20 hits w/ blazepod taps naming objects starting with each color   Regress NV      Assessment: Tolerated treatment fair. Inc fatigue noted, as patient required more seated rest breaks during and after sets. Pt attributes differences to working out prior to session. Sig difficulty noted with dual task, resulting in impaired performance and LOB. Patient agrees that dual-task abilities have declined since CVA. Educated patient on implications for ST for cog; patient agreeable pending script. Patient demonstrated fatigue post treatment, exhibited good technique with therapeutic exercises, and would benefit from continued PT      Plan: Continue per plan of care.      POC expires Unit limit Auth Expiration date PT/OT + Visit Limit? Co-Insurance   24 BOMN NA BOMN $30 Co-pay                               Visit/Unit Tracking  - IE

## 2024-05-02 NOTE — LETTER
Dear Dr. Bowman,     We have a patient in common, Drew Santos. I'm currently treating Alfredo for CVA. At this time, I would recommend a referral to  for cognitive therapy for comprehensive care. If you have any questions or feedback at this time, please let me know.    Thank you so much for your time,  Mira Moya PT, DPT

## 2024-05-06 ENCOUNTER — OFFICE VISIT (OUTPATIENT)
Age: 74
End: 2024-05-06
Payer: COMMERCIAL

## 2024-05-06 DIAGNOSIS — E11.69 TYPE 2 DIABETES MELLITUS WITH OTHER SPECIFIED COMPLICATION, UNSPECIFIED WHETHER LONG TERM INSULIN USE (HCC): ICD-10-CM

## 2024-05-06 DIAGNOSIS — I10 PRIMARY HYPERTENSION: ICD-10-CM

## 2024-05-06 DIAGNOSIS — I63.9 CEREBROVASCULAR ACCIDENT (CVA), UNSPECIFIED MECHANISM (HCC): Primary | ICD-10-CM

## 2024-05-06 PROCEDURE — 97112 NEUROMUSCULAR REEDUCATION: CPT

## 2024-05-06 NOTE — PROGRESS NOTES
"Daily Note     Today's date: 2024  Patient name: Drew Santos  : 1950  MRN: [id number]  Referring provider: Chandler Azul*  Dx:   Encounter Diagnosis     ICD-10-CM    1. Cerebrovascular accident (CVA), unspecified mechanism (HCC)  I63.9       2. Type 2 diabetes mellitus with other specified complication, unspecified whether long term insulin use (HCC)  E11.69       3. Primary hypertension  I10                      Subjective: Pt reports doing ok today.      Objective: See treatment diary below  NMR in SOLO:  HKM Ambulation w/ 10#TT and 6'' hurdles (5),  3 laps  LAT steps w/ 10# TT and 6'' hurles (5), 3 laps  STS w/ 10# TT 2x10  Side step on foam beam 3 laps  Tandem ambulation 3 laps  Alt step taps 8'' intermittent  x20 hits, naming objects \"what would you find at ____?\"    Assessment: Tolerated treatment well. Continued with NMR in SOLO, cued pt to slow down pace during hurdles in order to allow for greater control of forward momentum.  Pt continues to require repeated cues for task accuracy and technique, and remains challenged during DT activities, will continue to implement and progress interventions as appropriate.  Patient demonstrated fatigue post treatment and would benefit from continued PT      Plan: Continue per plan of care.      POC expires Unit limit Auth Expiration date PT/OT + Visit Limit? Co-Insurance   24 BOMN NA BOMN $30 Co-pay                               Visit/Unit Tracking  - IE                                                      "

## 2024-05-09 ENCOUNTER — OFFICE VISIT (OUTPATIENT)
Age: 74
End: 2024-05-09
Payer: COMMERCIAL

## 2024-05-09 DIAGNOSIS — I63.9 CEREBROVASCULAR ACCIDENT (CVA), UNSPECIFIED MECHANISM (HCC): Primary | ICD-10-CM

## 2024-05-09 DIAGNOSIS — E11.69 TYPE 2 DIABETES MELLITUS WITH OTHER SPECIFIED COMPLICATION, UNSPECIFIED WHETHER LONG TERM INSULIN USE (HCC): ICD-10-CM

## 2024-05-09 DIAGNOSIS — I10 PRIMARY HYPERTENSION: ICD-10-CM

## 2024-05-09 PROCEDURE — 97112 NEUROMUSCULAR REEDUCATION: CPT | Performed by: PHYSICAL THERAPIST

## 2024-05-09 NOTE — PROGRESS NOTES
"Daily Note     Today's date: 2024  Patient name: Drew Santos  : 1950  MRN: [id number]  Referring provider: Chandler Azul*  Dx:   Encounter Diagnosis     ICD-10-CM    1. Cerebrovascular accident (CVA), unspecified mechanism (HCC)  I63.9       2. Type 2 diabetes mellitus with other specified complication, unspecified whether long term insulin use (HCC)  E11.69       3. Primary hypertension  I10                      Subjective: Pt reports doing ok today.      Objective: See treatment diary below  HKM Ambulation w/ 10#TT and 9'' hurdles (5),  3 laps  LAT steps w/ 10# TT and 9'' hurles (5), 3 laps  STS w/ 10# TT 2x10 and 2 foam pads focus on 3s ecc control  Stepping on uneven surface, AW under mat  Standing on incline wedge w/ blazepod taps 30 hits, try DT NV  Alt step taps 8'' intermittent  x20 hits, naming objects \"what would you find at ____?\"    Assessment: Tolerated treatment well. Challenged pt with ambulation over uneven surfaces, cued pt to slow down in order to control forward momentum during hurdles.  Pt does continue to require VC's for task accuracy and technique however able to implement successfully, continues to remain challenged during dual task activities.  Patient demonstrated fatigue post treatment and would benefit from continued PT      Plan: Continue per plan of care.      POC expires Unit limit Auth Expiration date PT/OT + Visit Limit? Co-Insurance   24 BOMN NA BOMN $30 Co-pay                               Visit/Unit Tracking  - IE  5                                                      "

## 2024-05-13 ENCOUNTER — OFFICE VISIT (OUTPATIENT)
Age: 74
End: 2024-05-13
Payer: COMMERCIAL

## 2024-05-13 DIAGNOSIS — I63.9 CEREBROVASCULAR ACCIDENT (CVA), UNSPECIFIED MECHANISM (HCC): Primary | ICD-10-CM

## 2024-05-13 DIAGNOSIS — I10 PRIMARY HYPERTENSION: ICD-10-CM

## 2024-05-13 DIAGNOSIS — E11.69 TYPE 2 DIABETES MELLITUS WITH OTHER SPECIFIED COMPLICATION, UNSPECIFIED WHETHER LONG TERM INSULIN USE (HCC): ICD-10-CM

## 2024-05-13 PROCEDURE — 97112 NEUROMUSCULAR REEDUCATION: CPT | Performed by: PHYSICAL THERAPIST

## 2024-05-13 NOTE — PROGRESS NOTES
Daily Note     Today's date: 2024  Patient name: Drew Santos  : 1950  MRN: [id number]  Referring provider: Chandler Azul*  Dx:   Encounter Diagnosis     ICD-10-CM    1. Cerebrovascular accident (CVA), unspecified mechanism (HCC)  I63.9       2. Type 2 diabetes mellitus with other specified complication, unspecified whether long term insulin use (HCC)  E11.69       3. Primary hypertension  I10                      Subjective: Pt reports doing ok today.      Objective: See treatment diary below  HKM Ambulation w/ 10#TT and 9'' hurdles (5), 3 laps  LAT steps w/ 10# TT and 9'' hurles (5), 3 laps  STS w/ 10# TT 2x10 and 2 foam pads focus on 3s ecc control  Stepping on uneven surface, AW under mat  Standing on incline wedge w/ blazepod taps 30 hits DT R=red, L=blue  Alt step taps 8'' intermittent  x20 hits, counting backwards from 100  FWD/BWD walking w/ 6# MB 4 laps    Assessment: Tolerated treatment well. Continued w/ NMR an balance training in SOLO, challenged pt by addition of FWD/BWD walking w/ 6#MB, incorporated DT into blazepod taps on incline, pt unable to accurately complete even with repeated verbal cues.  Pt remains challenged and fatigues quicker during activities involving greater cognitive load, does require frequent rest breaks throughout session.  Patient demonstrated fatigue post treatment and would benefit from continued PT      Plan: Continue per plan of care.      POC expires Unit limit Auth Expiration date PT/OT + Visit Limit? Co-Insurance   24 BOMN NA BOMN $30 Co-pay                               Visit/Unit Tracking  - IE

## 2024-05-16 ENCOUNTER — OFFICE VISIT (OUTPATIENT)
Age: 74
End: 2024-05-16
Payer: COMMERCIAL

## 2024-05-16 DIAGNOSIS — E11.69 TYPE 2 DIABETES MELLITUS WITH OTHER SPECIFIED COMPLICATION, UNSPECIFIED WHETHER LONG TERM INSULIN USE (HCC): ICD-10-CM

## 2024-05-16 DIAGNOSIS — I63.9 CEREBROVASCULAR ACCIDENT (CVA), UNSPECIFIED MECHANISM (HCC): Primary | ICD-10-CM

## 2024-05-16 DIAGNOSIS — I10 PRIMARY HYPERTENSION: ICD-10-CM

## 2024-05-16 PROCEDURE — 97112 NEUROMUSCULAR REEDUCATION: CPT | Performed by: PHYSICAL THERAPIST

## 2024-05-16 NOTE — PROGRESS NOTES
Daily Note     Today's date: 2024  Patient name: Drew Santos  : 1950  MRN: [id number]  Referring provider: Chandler Azul*  Dx:   Encounter Diagnosis     ICD-10-CM    1. Cerebrovascular accident (CVA), unspecified mechanism (HCC)  I63.9       2. Type 2 diabetes mellitus with other specified complication, unspecified whether long term insulin use (HCC)  E11.69       3. Primary hypertension  I10                      Subjective: Pt reports doing ok today.      Objective: See treatment diary below    NMR:  HKM Ambulation w/ 10#TT and 9'' hurdles (5), 3 laps  LAT steps w/ 10# TT and 9'' hurles (5), 3 laps  STS w/ 10# TT 2x10 and 2 foam pads focus on 3s ecc control  BWD walking w/ 6# MB and 5# AW  Stepping on uneven surface, AW under mat  Standing on incline wedge w/ blazepod taps 30 hits DT R=red, L=blue  Alt step taps 8'' intermittent  x20 hits, counting backwards from 100      Assessment: Tolerated treatment well. Continued with NMR in SOLO step, challenged pt w/ addition of 5# AW weights to backwards walking, cued to take larger amplitude steps.  Pt continues to have difficulty with tasks involving higher cognitive load, continues to require rest breaks for fatigue.  Patient demonstrated fatigue post treatment and would benefit from continued PT      Plan: Continue per plan of care.      POC expires Unit limit Auth Expiration date PT/OT + Visit Limit? Co-Insurance   24 BOMN NA BOMN $30 Co-pay                               Visit/Unit Tracking  - IE

## 2024-05-20 ENCOUNTER — OFFICE VISIT (OUTPATIENT)
Age: 74
End: 2024-05-20
Payer: COMMERCIAL

## 2024-05-20 DIAGNOSIS — E11.69 TYPE 2 DIABETES MELLITUS WITH OTHER SPECIFIED COMPLICATION, UNSPECIFIED WHETHER LONG TERM INSULIN USE (HCC): ICD-10-CM

## 2024-05-20 DIAGNOSIS — I10 PRIMARY HYPERTENSION: ICD-10-CM

## 2024-05-20 DIAGNOSIS — I63.9 CEREBROVASCULAR ACCIDENT (CVA), UNSPECIFIED MECHANISM (HCC): Primary | ICD-10-CM

## 2024-05-20 PROCEDURE — 97112 NEUROMUSCULAR REEDUCATION: CPT

## 2024-05-20 NOTE — PROGRESS NOTES
Daily Note     Today's date: 2024  Patient name: Drew Santos  : 1950  MRN: [id number]  Referring provider: Chandler Azul*  Dx:   Encounter Diagnosis     ICD-10-CM    1. Cerebrovascular accident (CVA), unspecified mechanism (HCC)  I63.9       2. Primary hypertension  I10       3. Type 2 diabetes mellitus with other specified complication, unspecified whether long term insulin use (HCC)  E11.69                      Subjective: Pt reports doing ok today.      Objective: See treatment diary below    NMR:  HKM Ambulation w/ 10#TT and 9'' hurdles (5), 2 laps  LAT steps w/ 10# TT and 9'' hurles (5), 2 laps  STS w/ 10# TT 2x10 and 2 foam pads focus on 3s ecc control  BWD walking w/ 6# MB and 5# AW 2 laps   Standing on incline wedge w/ blazepod taps 30 hits DT R=red, L=blue  Alt step taps 8'' intermittent  x20 hits, counting backwards from 100  Ambulating with ball toss 2 laps         Assessment: Tolerated treatment well. Patient requires cues for upright posture due to forward posture which caused patient to trip while ambulating. Patient fatigues quickly and is only able to perform 2 laps of each exercise before needing to sit. Trialed ambulating with ball toss to encourage patient to be more upright but patient still presented with forward posture.  Patient demonstrated fatigue post treatment and would benefit from continued PT      Plan: Continue per plan of care.  RE-eval NV      POC expires Unit limit Auth Expiration date PT/OT + Visit Limit? Co-Insurance   24 BOMN NA BOMN $30 Co-pay                               Visit/Unit Tracking  - IE  5

## 2024-05-23 ENCOUNTER — APPOINTMENT (OUTPATIENT)
Age: 74
End: 2024-05-23
Payer: COMMERCIAL

## 2024-05-28 ENCOUNTER — OFFICE VISIT (OUTPATIENT)
Age: 74
End: 2024-05-28
Payer: COMMERCIAL

## 2024-05-28 DIAGNOSIS — I10 PRIMARY HYPERTENSION: ICD-10-CM

## 2024-05-28 DIAGNOSIS — I63.9 CEREBROVASCULAR ACCIDENT (CVA), UNSPECIFIED MECHANISM (HCC): Primary | ICD-10-CM

## 2024-05-28 DIAGNOSIS — E11.69 TYPE 2 DIABETES MELLITUS WITH OTHER SPECIFIED COMPLICATION, UNSPECIFIED WHETHER LONG TERM INSULIN USE (HCC): ICD-10-CM

## 2024-05-28 PROCEDURE — 97112 NEUROMUSCULAR REEDUCATION: CPT | Performed by: PHYSICAL THERAPIST

## 2024-05-28 NOTE — PROGRESS NOTES
PT Re-Evaluation / Progress Report          POC expires Unit limit Auth Expiration date PT/OT + Visit Limit? Co-Insurance   24 BOMN NA BOMN $30 Co-pay                               Visit/Unit Tracking  - IE  5                                           Today's date: 2024  Patient name: Drew Santos  : 1950  MRN: [id number]  Referring provider: Chandler Azul*  Dx:   Encounter Diagnosis     ICD-10-CM    1. Cerebrovascular accident (CVA), unspecified mechanism (HCC)  I63.9       2. Primary hypertension  I10       3. Type 2 diabetes mellitus with other specified complication, unspecified whether long term insulin use (HCC)  E11.69             Assessment  Assessment details: Patient is a 73 y.o. Male who presents to skilled outpatient PT with after effects of CVA.  Upon progress report, patient has demonstrate improvement with sit to stand from UE to no UE with testing, TUG test improved from UE to no UE, noted increase in gait speed from .73 to .96 m/s. Despite gains,   Continues to have  notable weakness at left lower extremity, secondary to CVA. Noted decrease in UE dexterity and  strength and would benefit from skilled OT assessment. As a result, pt has decreased functional capacity which is limited his ability to ambulate safely and efficiently at home and within the community, has decreased independence, and decreased postural stability leading to overall activity intolerance. Patient will continue to  benefit from interventions designed to address aforementioned deficits with hopes of restoring functional strength to lower extremities to improve biomechanical efficiency during functional activity and decrease risk of injury.      Goals  Short Term Goals (4 weeks):    - Patient will improve scoring on DGI by 2.6 points to progress safety Progressing  - Patient will be  independent in basic HEP 2-3 weeks MET  - Patient will improve 5xSTS score by 2.3 seconds from 27.86 sec to 25 sec to promote improved LE functional strength needed for ADLs Progressing  - Patient will improve gait speed by 0.18 m/s from .73 m/s to 1.0 to improve safety with community ambulation Progressing  - Patient will complete MiniBest test and score 14/28 or higher to reduce risk for falls Progressing  - patient will complete  minute walk test and score 800 feet or more to improve cardiovascular endurance  MET  - patient will be able to perform floor transfer without physical assistance from therapist. Progressing     Impairments: Abnormal coordination, Abnormal gait, Abnormal muscle tone, Abnormal or restricted ROM, Activity intolerance, Impaired balance, Impaired physical strength, Lacks appropriate HEP, Poor posture, Poor body mechanics, Pain with function, Safety issue, Weight-bearing intolerance, Abnormal movement, Difficulty understanding, Abnormal muscle firing  Understanding of Dx/Px/POC: Good  Prognosis: Good      Patient verbalized understanding of POC.         Please contact me if you have any questions or recommendations. Thank you for the referral and the opportunity to share in Drew Santos's care.        Plan  Patient would benefit from: Skilled PT  Planned modality interventions: Biofeedback, Cryotherapy, TENS, Thermotherapy  Planned therapy interventions: Abdominal trunk stabilization, ADL training, Balance, Balance/WB training, Breathing training, Body mechanics training, Coordination, Functional ROM exercises, Gait training, HEP, Joint Mobilization, Manual Therapy, Shin taping, Motor coordination training, Neuromuscular re-education, Patient education, Postural training, Strengthening, Stretching, Therapeutic activities, Therapeutic exercises, Therapeutic training, Transfer training, Activity modification, Work reintegration  Frequency: 2x/wk  Plan of Care beginning date: 4/16/2024  Plan  of Care expiration date: 3 months - 7/16/2024  Treatment plan discussed with: Patient         Goals  Short Term Goals (4 weeks):    - Patient will improve scoring on DGI by 2.6 points to progress safety Progressing  - Patient will be independent in basic HEP 2-3 weeks MET  - Patient will improve 5xSTS score by 2.3 seconds from 27.86 sec to 25 sec to promote improved LE functional strength needed for ADLs Progressing  - Patient will improve gait speed by 0.18 m/s from .73 m/s to 1.0 to improve safety with community ambulation Progressing  - Patient will complete MiniBest test and score 14/28 or higher to reduce risk for falls Progressing  - patient will complete  minute walk test and score 800 feet or more to improve cardiovascular endurance  MET  - patient will be able to perform floor transfer without physical assistance from therapist. Progressing    Long Term Goals (12 weeks):  - Patient will be independent in a comprehensive home exercise program  - Patient will improve gait speed by 0.18 m/s to improve safety with community ambulation  - Patient will improve 5xSTS score by 2.3 seconds from 25 sec to 20 sec to promote improved LE functional strength needed for ADLs  - Patient will complete MiniBest test and score 21/28 or higher to reduce risk for falls   - Patient will be able to demonstrate HT in gait without veering  - Patient will improve 6 Minute Walk Test score by 190 feet from 800 feet to 1000  to promote improved cardiovascular endurance  - Patient will report going on walks at least 3 days per week to promote independence and improved cardiovascular endurance  - Patient will be able to ascend/descend stairs reciprocally without UE assist to promote independence and safety with ADLs  - Patient will report 50% reduction in near falls when ambulating on uneven terrain      Cut off score    All date taken from [facility] Neuro Section or Rehab Measures      Jarvis:  Armando et al., 2018  MDC: 2.7 pts    Davey hummel  al., 2011  Cut-off score: 45/56    Chronic CVA  < 44/56 high risk for falls (Armando et al., 2018)  < 47.5/56 slow walker status (Inez hummel al., 2011) 5xSTS: Alen 2010  MDC: 2.3 sec  Age Norms:  60-69: 11.1 sec  70-79: 12.6 sec  80-89: 14.8 sec    Riley 2010, Chronic Stroke  Chronic CVA: 12 sec   TUG  Tj., 2005  MDC: 2.9 sec    Cut off score:  >13.5 sec community dwelling adults  >32.2 frail elderly  <20 I for basic transfers  >30 dependent on transfers 10 Meter Walk Test:   Ernesto et al., 2006  Small meaningful change: 0.06 m/s  Substantial meaningful change: 0.14 m/s  MCID: 0.16 m/s    < 0.4 m/s household ambulators  0.4 - 0.8 m/s limited community ambulators  > 0.8 m/s community ambulators   FGA: Jessica et al., 2010  MCID: 4.2 pts  Geriatrics/community < 22/30 fall risk  Geriatrics/community < 20/30 unexplained falls DGI  MDC: vestibular - 4 pts  MDC: geriatric/community - 3 pts  Falls risk <19/24   6 Minute Walk Test  Ernesto et al., 2006  MDC: 60.98 m (200.01 ft)    Arturo, Mónica, & Louann, 2012  MCID: 34.4 m    Age Norms  60-69: M - 1876 ft   F - 1765 ft  70-79: M - 1729 ft   F - 1545 ft  80-89: M - 1368 ft   F - 1286 ft Modified [place]  0: No increase in tone  1: Catch and release or min resistance at end range  1+: Catch f/b min resistance throughout remainder (< half ROM)  2: Easily moved, but more marked tone throughout most ROM  3: Significant tone, PROM difficult  4: Rigid   MiniBest: Rosales et al., 2013  CVA < 17.5 fall risk Pass (Acute CVA)  MDC: 1.8 points (acute), 3.2 points (chronic)         Subjective    History of Present Illness  Mechanism of injury: Patient was in the hospital for 1 week for stroke care at [facility] on 3/13/2024. 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 Currently patient reports no issues with mobility. However, spouse notes shuffling, challenge with lower surfaces with standing, and carrying things around the  house.     Updated 24: Patient reports improvements with PT.     Primary AD: NA  Assist level at home: spouse as needed   WC usage: none   PLOF: Independent     Patient goal: get back to gardening     Pain  Current pain ratin/10  At best pain ratin/10  At worst pain ratin/10  Location: NA  Aggravating factors: NA    Social Support  Steps to enter house: 6 steps   Stairs in house: 13 steps    Lives in: 2 story   Lives with: spouse     Employment status: retired  Hand dominance: right     Treatments  Previous treatment: home health   Current treatment: na   Diagnostic Testing: na       Objective     Vitals  - BP: 144/85    HR Max  - 207 - (0.7x75)  = 154.5    LE MMT  - R Hip Flexion: 4-/5   - L Hip Flexion: 3+/5  - R Hip Extension: 3+/5  - L Hip Extension: 3+/5  - R Hip Abduction: 4-/5  - L Hip abduction: 3+/5  - R Hip adduction: 4-/5  - L Hip adduction: 4-/5  - R Knee Extension: 4-/5  - L Knee Extension: 3+/5  - R Knee Flexion: 4-/5   - L Knee Flexion: 4-/5  - R Ankle DF: 4-/5   - L Ankle DF: 3+/5  - R Ankle PF: 4-/5   - L Ankle PF: 4-/5    Sensation  - Light touch: intact   - Deep pressure: intact     Coordination  - Dysdiadochokinesia: decreased on left side   - Alternating Toe Taps: decreased on left side     Modified [place]  - R knee extension: 0 - no increase in tone  - L knee extension: 0 - no increase in tone  - R knee flexion: 0 - no increase in tone  - L knee flexion: 0 - no increase in tone  - R ankle DF: 0 - no increase in tone   - L ankle DF: 0 - no increase in tone  - R ankle inversion: 0 - no increase in tone  - L ankle inversion: 0 - no increase in tone  - R ankle eversion: 0 - no increase in tone  - L ankle eversion: 0 - no increase in tone    Clonus  - L: No  - R: No  - Fatiguing: No  - Number of beats: NA    Vision  - Glasses: Yes  - Abnormalities prior to CVA: No, no change   - Abnormalities post CVA: Yes, light sensitivity and sitting in the dark     Neglect  - R sided: No  - L  sided: No    Pusher's Syndrome  - R sided: No  - L sided: No    Gait abnormalities:  lateral lean to left with positive [place], decrease foot clearance.     Outcome Measures Initial Eval  4/16/24 PN 5/28   5xSTS 27.86 sec  w Arm rest 29.1 sec w/out arm rest    TUG 12.06 sec 12.32 sec    10 meter .73 m/s 10/10.33= .96 m/s   GERBER     FGA 25/30    DGI     MiniBEST 23/28    PASS     6MWT 450 ft 800 ft                 Transformed Scale = [(Actual raw score - lowest possible raw score) / Possible raw score range] x 100      Daily Interventions:  NMR:  HKM Ambulation w/ 10#TT and 9'' hurdles (5), 2 laps  LAT steps w/ 10# TT and 9'' hurles (5), 2 laps  STS w/ 10# TT 2x10 and 2 foam pads focus on 3s ecc control  BWD walking w/ 6# MB and 5# AW 2 laps   Alt step taps 8'' intermittent  x20 hits, counting backwards from 100    Standing on incline wedge w/ blazepod taps 30 hits DT R=red, L=blue  Ambulating with ball toss 2 laps   Precautions: Standard precautions, monitor BG,   Past Medical History:   Diagnosis Date    Diabetes mellitus (HCC)     Hypertension

## 2024-05-30 ENCOUNTER — OFFICE VISIT (OUTPATIENT)
Age: 74
End: 2024-05-30
Payer: COMMERCIAL

## 2024-05-30 DIAGNOSIS — I63.9 CEREBROVASCULAR ACCIDENT (CVA), UNSPECIFIED MECHANISM (HCC): Primary | ICD-10-CM

## 2024-05-30 DIAGNOSIS — I10 PRIMARY HYPERTENSION: ICD-10-CM

## 2024-05-30 DIAGNOSIS — E11.69 TYPE 2 DIABETES MELLITUS WITH OTHER SPECIFIED COMPLICATION, UNSPECIFIED WHETHER LONG TERM INSULIN USE (HCC): ICD-10-CM

## 2024-05-30 PROCEDURE — 97112 NEUROMUSCULAR REEDUCATION: CPT

## 2024-05-30 NOTE — PROGRESS NOTES
"Daily Note     Today's date: 2024  Patient name: Drew Santos  : 1950  MRN: [id number]  Referring provider: Chandler Azul*  Dx:   Encounter Diagnosis     ICD-10-CM    1. Cerebrovascular accident (CVA), unspecified mechanism (HCC)  I63.9       2. Primary hypertension  I10       3. Type 2 diabetes mellitus with other specified complication, unspecified whether long term insulin use (HCC)  E11.69             Start Time: 1106  Stop Time: 1145  Total time in clinic (min): 39 minutes    Subjective: Pt reports doing ok today, no big changes.No falls. Arrived a few minutes late to session, accommodated with shortened session.       Objective: See treatment diary below    NMR: SOLO throughout   HKM Ambulation w/ 10#TT and 9'' hurdles (5), 2 laps  LAT steps w/ 10# TT and 9'' hurles (5), 2 laps  STS w/ 10# TT 2x10 focus on 3s ecc control (two foam pads on seat to increase seat height).   BWD walking w/ 10# KB carry and 5# AW 2 laps   Alt step taps 8'' intermittent  x20 hits, counting backwards from 100 by 3s   Ambulating with ball toss 2 laps fwd and lat with las  Suitcase carry #10 weaving around cones 2x laps   Static Standing 3x30\" airex FAEO     Deferred   Standing on incline wedge w/ blazepod taps 30 hits DT R=red, L=blue    Assessment: Tolerated treatment well. Patient requires cues for upright posture due to forward posture which caused patient to trip while ambulating. Patient fatigues quickly and is only able to perform 2 laps of each exercise before needing to sit and catch his breath, Spo2% appropriate throughout session.. Continued to have limited power generation with sit > stand.. Does have pretty significant forward flexed posture, trialed goblet carry for trunk extensor endurance and cuing for upright posture, mild intra-session carry over. Continued components of dual tasking. Patient demonstrated fatigue post treatment and would benefit from continued PT      Plan: Continue per plan of " care.  RE-eval NV      POC expires Unit limit Auth Expiration date PT/OT + Visit Limit? Co-Insurance   7/16/24 BOMN NA BOMN $30 Co-pay                               Visit/Unit Tracking  4/16- IE 4/19 4/23 4/25 4/30 5/2 5/6 5/9 5/13 5/16 5/20 5/28 5/29

## 2024-06-03 ENCOUNTER — OFFICE VISIT (OUTPATIENT)
Age: 74
End: 2024-06-03
Payer: COMMERCIAL

## 2024-06-03 DIAGNOSIS — E11.69 TYPE 2 DIABETES MELLITUS WITH OTHER SPECIFIED COMPLICATION, UNSPECIFIED WHETHER LONG TERM INSULIN USE (HCC): ICD-10-CM

## 2024-06-03 DIAGNOSIS — I63.9 CEREBROVASCULAR ACCIDENT (CVA), UNSPECIFIED MECHANISM (HCC): Primary | ICD-10-CM

## 2024-06-03 DIAGNOSIS — I10 PRIMARY HYPERTENSION: ICD-10-CM

## 2024-06-03 PROCEDURE — 97112 NEUROMUSCULAR REEDUCATION: CPT

## 2024-06-03 NOTE — PROGRESS NOTES
"Daily Note     Today's date: 6/3/2024  Patient name: Drew Santos  : 1950  MRN: [id number]  Referring provider: Chandler Azul*  Dx:   Encounter Diagnosis     ICD-10-CM    1. Cerebrovascular accident (CVA), unspecified mechanism (HCC)  I63.9       2. Primary hypertension  I10       3. Type 2 diabetes mellitus with other specified complication, unspecified whether long term insulin use (HCC)  E11.69                        Subjective: Pt denies change in status and/or falls. LOB walking towards chair.       Objective: See treatment diary below    NMR: SOLO throughout   STS w/ 10# TT 2x12 focus on 3s ecc control (one foam pads on seat to increase seat height)   Fwd LOB req modA to maintain balance  R TB resisted bwd amb 2 laps  PT applied fwd/bwd perturbations via cane 2 min   Multiple small amplitude steps to maintain balance  Alt step taps 8'' intermittent 2x20 hits, \"what do you find at?\"  Ambulating with ball toss to PT 2 laps fwd   Fwd/bwd amb with 10# TT carry at chest 2 laps   Cues for upright posture  Stance on decline foam wedge, FA, with ball pass/throw 2 min    NOT TODAY-  HKM Ambulation w/ 10#TT and 9'' hurdles (5), 2 laps  LAT steps w/ 10# TT and 9'' hurles (5), 2 laps  BWD walking w/ 10# KB carry and 5# AW 2 laps   Suitcase carry #10 weaving around cones 2x laps       Assessment: Tolerated treatment well. Patient requires cues for upright posture due to forward posture which causes patient to trip while ambulating. Patient fatigues quickly and is only able to perform 2 laps of each exercise before needing to sit and catch his breath, Spo2% appropriate throughout session. Continued to have limited power generation with sit > stand. Significant forward flexed posture, cuing for upright posture, mild intra-session carry over. Continued components of dual tasking. Patient demonstrated fatigue post treatment and would benefit from continued PT      Plan: Continue per plan of care.  RE-eval NV "      POC expires Unit limit Auth Expiration date PT/OT + Visit Limit? Co-Insurance   7/16/24 BOMN NA BOMN $30 Co-pay                               Visit/Unit Tracking  4/16- IE 4/19 4/23 4/25 4/30 5/2 5/6 5/9 5/13 5/16 5/20 5/28    5/30 6/3

## 2024-06-06 ENCOUNTER — OFFICE VISIT (OUTPATIENT)
Age: 74
End: 2024-06-06
Payer: COMMERCIAL

## 2024-06-06 DIAGNOSIS — I63.9 CEREBROVASCULAR ACCIDENT (CVA), UNSPECIFIED MECHANISM (HCC): Primary | ICD-10-CM

## 2024-06-06 DIAGNOSIS — I10 PRIMARY HYPERTENSION: ICD-10-CM

## 2024-06-06 DIAGNOSIS — E11.69 TYPE 2 DIABETES MELLITUS WITH OTHER SPECIFIED COMPLICATION, UNSPECIFIED WHETHER LONG TERM INSULIN USE (HCC): ICD-10-CM

## 2024-06-06 PROCEDURE — 97112 NEUROMUSCULAR REEDUCATION: CPT

## 2024-06-06 NOTE — PROGRESS NOTES
Daily Note     Today's date: 2024  Patient name: Drew Santos  : 1950  MRN: [id number]  Referring provider: Chandler Azul*  Dx:   Encounter Diagnosis     ICD-10-CM    1. Cerebrovascular accident (CVA), unspecified mechanism (HCC)  I63.9       2. Primary hypertension  I10       3. Type 2 diabetes mellitus with other specified complication, unspecified whether long term insulin use (HCC)  E11.69             Start Time: 1123  Stop Time: 1146  Total time in clinic (min): 23 minutes    Subjective: Pt denies change in status and/or falls.      Objective: See treatment diary below    NMR: SOLO throughout   - STS w/ 10# TT 2x12 focus on 3s ecc control  - R TB resisted bwd amb 3 laps  - Alt step taps 8'' intermittent 2x20 hits  - Ambulating with ball toss to PT 2 laps fwd   - Fwd/bwd amb with 10# TT carry at chest 2 laps    NOT TODAY-  HKM Ambulation w/ 10#TT and 9'' hurdles (5), 2 laps  LAT steps w/ 10# TT and 9'' hurles (5), 2 laps  BWD walking w/ 10# KB carry and 5# AW 2 laps   Suitcase carry #10 weaving around cones 2x laps       Assessment: Tolerated treatment well. Patient requires cues for upright posture due to forward posture which causes patient to trip while ambulating. Cushion not used on chair. Continues to lean forward and shuffle when walking. No overt LOB this session, despite being generally unstable and using trunk sway and his arms to maintain balance. Patient demonstrated fatigue post treatment and would benefit from continued PT to maximize safety and function.      Plan: Continue per plan of care.  RE-eval NV      POC expires Unit limit Auth Expiration date PT/OT + Visit Limit? Co-Insurance   24 BOMN NA BOMN $30 Co-pay                               Visit/Unit Tracking  - IE  5 5/9 5/13 5/16 5/20 5/28    5/30 6/3 6/6

## 2024-06-10 ENCOUNTER — OFFICE VISIT (OUTPATIENT)
Age: 74
End: 2024-06-10
Payer: COMMERCIAL

## 2024-06-10 DIAGNOSIS — I10 PRIMARY HYPERTENSION: ICD-10-CM

## 2024-06-10 DIAGNOSIS — I63.9 CEREBROVASCULAR ACCIDENT (CVA), UNSPECIFIED MECHANISM (HCC): Primary | ICD-10-CM

## 2024-06-10 DIAGNOSIS — E11.69 TYPE 2 DIABETES MELLITUS WITH OTHER SPECIFIED COMPLICATION, UNSPECIFIED WHETHER LONG TERM INSULIN USE (HCC): ICD-10-CM

## 2024-06-10 PROCEDURE — 97112 NEUROMUSCULAR REEDUCATION: CPT

## 2024-06-10 NOTE — PROGRESS NOTES
"Daily Note     Today's date: 6/10/2024  Patient name: Drew Santos  : 1950  MRN: [id number]  Referring provider: Chandler Azul*  Dx:   Encounter Diagnosis     ICD-10-CM    1. Cerebrovascular accident (CVA), unspecified mechanism (HCC)  I63.9       2. Primary hypertension  I10       3. Type 2 diabetes mellitus with other specified complication, unspecified whether long term insulin use (HCC)  E11.69                        Subjective: Pt denies change in status and/or falls.      Objective: See treatment diary below    NMR: SOLO throughout   - STS w/ 10# TT 2x12 focus on 3s ecc control  - R TB resisted bwd amb + cog DT via \"I spy\" 3 laps  - Fwd/bwd amb with 10# TT carry at chest 4 laps  - Side step foam beam 5 cycles   Reports dizziness   BP: 142/90 seated, L arm   Unable to assess BG 2/2 monitor with wife (not currently present in clinic/parking lot)   Pt reported resolution within 2min seated rest   Refused simple carbs   - Alt step taps 8'' 2x20 hits    NOT TODAY-  HKM Ambulation w/ 10#TT and 9'' hurdles (5), 2 laps  LAT steps w/ 10# TT and 9'' hurles (5), 2 laps  BWD walking w/ 10# KB carry and 5# AW 2 laps   Suitcase carry #10 weaving around cones 2x laps       Assessment: Tolerated treatment well. Patient requires cues for upright posture due to forward posture which causes patient to trip while ambulating. Continues to lean forward and shuffle when walking. One overt LOB this session, but remains limited by being generally unstable and using trunk sway and his arms to maintain balance. Onset of dizziness with BP WFL; unable to assess BG and patient refused simple carbs. Pt reports resolution of 2/2 with 2 min seated rest. Modified remainder of session to balance interventions. Patient demonstrated fatigue post treatment and would benefit from continued PT to maximize safety and function.      Plan: Continue per plan of care.  RE-eval NV      POC expires Unit limit Auth Expiration date PT/OT + " Visit Limit? Co-Insurance   7/16/24 BOMN NA BOMN $30 Co-pay                               Visit/Unit Tracking  4/16- IE 4/19 4/23 4/25 4/30 5/2 5/6 5/9 5/13 5/16 5/20 5/28- TN   5/30 6/3 6/6 6/10

## 2024-06-13 ENCOUNTER — OFFICE VISIT (OUTPATIENT)
Age: 74
End: 2024-06-13
Payer: COMMERCIAL

## 2024-06-13 DIAGNOSIS — E11.69 TYPE 2 DIABETES MELLITUS WITH OTHER SPECIFIED COMPLICATION, UNSPECIFIED WHETHER LONG TERM INSULIN USE (HCC): ICD-10-CM

## 2024-06-13 DIAGNOSIS — I10 PRIMARY HYPERTENSION: ICD-10-CM

## 2024-06-13 DIAGNOSIS — I63.9 CEREBROVASCULAR ACCIDENT (CVA), UNSPECIFIED MECHANISM (HCC): Primary | ICD-10-CM

## 2024-06-13 PROCEDURE — 97112 NEUROMUSCULAR REEDUCATION: CPT | Performed by: PHYSICAL THERAPIST

## 2024-06-13 NOTE — PROGRESS NOTES
"Daily Note     Today's date: 2024  Patient name: Drew Santos  : 1950  MRN: [id number]  Referring provider: Chandler Azul*  Dx:   Encounter Diagnosis     ICD-10-CM    1. Cerebrovascular accident (CVA), unspecified mechanism (HCC)  I63.9       2. Primary hypertension  I10       3. Type 2 diabetes mellitus with other specified complication, unspecified whether long term insulin use (HCC)  E11.69                        Subjective: Pt denies change in status and/or falls.      Objective: See treatment diary below    NMR: SOLO throughout   - STS w/ 10# TT 2x10 focus on 3s ecc control  - R TB resisted bwd amb + cog DT via \"I spy\" 3 laps  - Fwd/bwd amb with 20# TT carry at chest 4 laps  - Side step foam beam 5 cycles  - Alt step taps 8'' 2x20 hits holding 10# kettle bell  - Suitcase carry #10 weaving around cones 2x laps   - farmer man carry alt kettle bell to hands with amb. 5 laps   HKM Ambulation w/ 10#TT and 9'' hurdles (5), 2 laps    NOT TODAY-    LAT steps w/ 10# TT and 9'' hurles (5), 2 laps  BWD walking w/ 10# KB carry and 5# AW 2 laps         Assessment: Tolerated treatment well. Patient requires cues for upright posture due to forward posture which causes patient to trip at times. Challenged with foot clearance with hurdles this date secondary to fatigue of LEs. Challenged with progression of heavier weight of 10# to 20#.  Patient demonstrated fatigue post treatment and would benefit from continued PT to maximize safety and function.      Plan: Continue per plan of care.       POC expires Unit limit Auth Expiration date PT/OT + Visit Limit? Co-Insurance   24 BOMN NA BOMN $30 Co-pay                               Visit/Unit Tracking  - IE - CO    6/3 6/6 6/10 6/13                                               "

## 2024-06-17 ENCOUNTER — OFFICE VISIT (OUTPATIENT)
Age: 74
End: 2024-06-17
Payer: COMMERCIAL

## 2024-06-17 DIAGNOSIS — I63.9 CEREBROVASCULAR ACCIDENT (CVA), UNSPECIFIED MECHANISM (HCC): Primary | ICD-10-CM

## 2024-06-17 DIAGNOSIS — I10 PRIMARY HYPERTENSION: ICD-10-CM

## 2024-06-17 DIAGNOSIS — E11.69 TYPE 2 DIABETES MELLITUS WITH OTHER SPECIFIED COMPLICATION, UNSPECIFIED WHETHER LONG TERM INSULIN USE (HCC): ICD-10-CM

## 2024-06-17 PROCEDURE — 97112 NEUROMUSCULAR REEDUCATION: CPT | Performed by: PHYSICAL THERAPIST

## 2024-06-17 NOTE — PROGRESS NOTES
"Daily Note     Today's date: 2024  Patient name: Drew Santos  : 1950  MRN: [id number]  Referring provider: Chandler Azul*  Dx:   Encounter Diagnosis     ICD-10-CM    1. Cerebrovascular accident (CVA), unspecified mechanism (HCC)  I63.9       2. Primary hypertension  I10       3. Type 2 diabetes mellitus with other specified complication, unspecified whether long term insulin use (HCC)  E11.69                        Subjective: Pt denies change in status and/or falls.      Objective: See treatment diary below    NMR: SOLO throughout   - STS w/ 10# TT 2x10 focus on 3s ecc control  - Fwd/bwd amb with 20# TT carry at chest 4 laps  - R TB resisted bwd amb + cog DT via \"I spy\" 3 laps  - Side step foam beam 5 cycles ( Mod LOB)   - Alt step taps 8'' 2x20 hits holding 10# kettle bell  - kettle bell hand weave behind back and in front holding #10 weaving around cones 2x laps   - farmer man carry alt kettle bell to hands with amb. 5 laps   - HKM Ambulation w/ 10#TT and 9'' hurdles (5), 2 laps            Assessment: Tolerated treatment well. Patient requires cues for upright posture due to forward posture which causes patient to trip at times. Challenged with foot clearance with hurdles this date secondary to fatigue of LEs. Challenged with adjustment to program with fine motor control and dual task activity with frequent pausing due to tasks.  Patient demonstrated fatigue post treatment and would benefit from continued PT to maximize safety and function.      Plan: Continue per plan of care.       POC expires Unit limit Auth Expiration date PT/OT + Visit Limit? Co-Insurance   24 BOMN NA BOMN $30 Co-pay                               Visit/Unit Tracking  - IE - [place]   5/30 6/3 6/6 6/10 6/13                                               "

## 2024-06-20 ENCOUNTER — OFFICE VISIT (OUTPATIENT)
Age: 74
End: 2024-06-20
Payer: COMMERCIAL

## 2024-06-20 DIAGNOSIS — I10 PRIMARY HYPERTENSION: ICD-10-CM

## 2024-06-20 DIAGNOSIS — E11.69 TYPE 2 DIABETES MELLITUS WITH OTHER SPECIFIED COMPLICATION, UNSPECIFIED WHETHER LONG TERM INSULIN USE (HCC): ICD-10-CM

## 2024-06-20 DIAGNOSIS — I63.9 CEREBROVASCULAR ACCIDENT (CVA), UNSPECIFIED MECHANISM (HCC): Primary | ICD-10-CM

## 2024-06-20 PROCEDURE — 97112 NEUROMUSCULAR REEDUCATION: CPT | Performed by: PHYSICAL THERAPIST

## 2024-06-20 NOTE — PROGRESS NOTES
"Daily Note     Today's date: 2024  Patient name: Drew Santos  : 1950  MRN: [id number]  Referring provider: Chandler Azul*  Dx:   Encounter Diagnosis     ICD-10-CM    1. Cerebrovascular accident (CVA), unspecified mechanism (HCC)  I63.9       2. Type 2 diabetes mellitus with other specified complication, unspecified whether long term insulin use (HCC)  E11.69       3. Primary hypertension  I10                        Subjective: Pt denies change in status and/or falls. Motivated to start treatment session.      Objective: See treatment diary below    NMR: SOLO throughout   - STS w/ 10# TT 2x10 focus on 3s ecc control  - Fwd/bwd amb with 20# TT carry at chest 4 laps  - R TB resisted bwd amb + cog DT via \"I spy\" 3 laps  - Side step foam beam 5 cycles ( Mod LOB)   - Alt step taps 8'' 2x20 hits holding 10# kettle bell  - HKM Ambulation w/ 10#TT and 9'' hurdles (5), 3 laps  - farmer man carry alt kettle bell to hands with amb. 5 laps   - kettle bell hand weave behind back and in front holding #10 weaving around cones 4x laps           Assessment:   Patient tolerated treatment well and required cues for proper form during dual task exercising and maintaining an upright posture. Kettlebell transfer behind the back showed increased difficulty and loss of focus because of dual task. Challenged during the program with dual task activities and strengthening. Due to fatigue, patient required breaks after two laps for exercises that required more endurance, but patient was still able to perform multiple laps. Patient demonstrated fatigue post treatment and would benefit from continued PT to address safety and function deficits.    Plan: Continue per plan of care.       POC expires Unit limit Auth Expiration date PT/OT + Visit Limit? Co-Insurance   24 BOMN NA BOMN $30 Co-pay                               Visit/Unit Tracking  - IE  5 5- [place]   5/30 6/3 6/6 " 6/10 6/13 6/17 6/0

## 2024-06-24 ENCOUNTER — OFFICE VISIT (OUTPATIENT)
Age: 74
End: 2024-06-24
Payer: COMMERCIAL

## 2024-06-24 DIAGNOSIS — I10 PRIMARY HYPERTENSION: ICD-10-CM

## 2024-06-24 DIAGNOSIS — I63.9 CEREBROVASCULAR ACCIDENT (CVA), UNSPECIFIED MECHANISM (HCC): ICD-10-CM

## 2024-06-24 DIAGNOSIS — E11.69 TYPE 2 DIABETES MELLITUS WITH OTHER SPECIFIED COMPLICATION, UNSPECIFIED WHETHER LONG TERM INSULIN USE (HCC): Primary | ICD-10-CM

## 2024-06-24 PROCEDURE — 97112 NEUROMUSCULAR REEDUCATION: CPT

## 2024-06-24 NOTE — PROGRESS NOTES
Daily Note     Today's date: 2024  Patient name: Drew Santos  : 1950  MRN: [id number]  Referring provider: Chandler Azul*  Dx:   Encounter Diagnosis     ICD-10-CM    1. Type 2 diabetes mellitus with other specified complication, unspecified whether long term insulin use (HCC)  E11.69       2. Cerebrovascular accident (CVA), unspecified mechanism (HCC)  I63.9       3. Primary hypertension  I10               Start Time: 1100  Stop Time: 1154  Total time in clinic (min): 54 minutes    Subjective: Pt denies change in status and/or falls. Motivated to start treatment session. Reports that his blood sugar levels have been good.       Objective: See treatment diary below    NMR: SOLO throughout   - STS w/ 10# TT 2x15 focus on 3s ecc control  - Fwd and lat hurdles med height with #10 TT carry x4 laps ea   - Fwd/bwd amb with 20# TT carry at chest 4 laps  - Maroon TB resisted bwd amb x3 laps + cog DT via A-Z state naming + variable resistance x3 laps, lat side stepping x3 laps   - Side step foam beam 5 cycles ( Mod LOB)   - HKM Ambulation w/ 10#TT and 9'' hurdles (5), 3 laps    Not Today   - Alt step taps 8'' 2x20 hits holding 10# kettle bell  - farmer man carry alt kettle bell to hands with amb. 5 laps   - kettle bell hand weave behind back and in front holding #10 weaving around cones 4x laps     122/64 mmHg post, 92 hr, 94 bpm       Assessment:   Patient tolerated treatment well and required cues for proper form during dual task exercising and maintaining an upright posture. Had some trouble with power generation that was evident with further fatigue. Dual tasking with resisted ambulation proving some challenge, would pause or slow to divert attention to cog task. Added some variable recovery balance strategies. Patient demonstrated fatigue post treatment and would benefit from continued PT to address safety and function deficits.    Plan: Continue per plan of care.       POC expires Unit limit Auth  Expiration date PT/OT + Visit Limit? Co-Insurance   7/16/24 BOMN NA BOMN $30 Co-pay                               Visit/Unit Tracking  4/16- IE 4/19 4/23 4/25 4/30 5/2 5/6 5/9 5/13 5/16 5/20 5/28- AR   5/30 6/3 6/6 6/10 6/13 6/17 6/0 6/24

## 2024-06-27 ENCOUNTER — EVALUATION (OUTPATIENT)
Age: 74
End: 2024-06-27
Payer: COMMERCIAL

## 2024-06-27 DIAGNOSIS — E11.69 TYPE 2 DIABETES MELLITUS WITH OTHER SPECIFIED COMPLICATION, UNSPECIFIED WHETHER LONG TERM INSULIN USE (HCC): Primary | ICD-10-CM

## 2024-06-27 DIAGNOSIS — I63.9 CEREBROVASCULAR ACCIDENT (CVA), UNSPECIFIED MECHANISM (HCC): ICD-10-CM

## 2024-06-27 DIAGNOSIS — I10 PRIMARY HYPERTENSION: ICD-10-CM

## 2024-06-27 PROCEDURE — 97112 NEUROMUSCULAR REEDUCATION: CPT

## 2024-06-27 PROCEDURE — 97530 THERAPEUTIC ACTIVITIES: CPT

## 2024-06-27 PROCEDURE — 97164 PT RE-EVAL EST PLAN CARE: CPT

## 2024-06-27 NOTE — PROGRESS NOTES
PT Re-Evaluation           POC expires Unit limit Auth Expiration date PT/OT + Visit Limit? Co-Insurance   24 BOMN NA BOMN $30 Co-pay   24                            Visit/Unit Tracking  - IE  5- [place]    6/3 6/6 6/10 6/13 6/17 6/ RE                               Today's date: 2024  Patient name: Drew Santos  : 1950  MRN: [id number]  Referring provider: Chandler Azul*  Dx:   Encounter Diagnosis     ICD-10-CM    1. Type 2 diabetes mellitus with other specified complication, unspecified whether long term insulin use (HCC)  E11.69       2. Cerebrovascular accident (CVA), unspecified mechanism (HCC)  I63.9       3. Primary hypertension  I10           Assessment  Assessment details: Patient is a 73 y.o. Male who presents to skilled outpatient PT with deficits resulting from chronic CVA. Upon re-evaluation, patient demonstrates significant improvements with MDC on 5xSTS, TUG, 10mWT and 6MWT indicating clinically significant change in functional transfers/mobility, gait speed, and CV endurance. Despite gains, patient continues to be limited by notable weakness in left lower extremity, gait abnormalities (sig fwd trunk lean, Trendelenburg, shuffling gait with fatigue), reduced activity tolerance (seated rest break during 6MWT for 45s), and functional transfers/mobility below age compared norms. Noted decrease in UE dexterity and  strength and would benefit from skilled OT assessment; cognitive deficits indicate patient would benefit from ST evaluation. Educated patient and wife on recommendations; advised them to contact PCP regarding recommendations. Patient remains with reduced functional abilities which limits his ability to ambulate safely and efficiently at home and within the community, has decreased independence, and decreased postural stability  leading to overall activity intolerance. Patient will continue to benefit from interventions designed to address aforementioned deficits with hopes of restoring functional strength to lower extremities to improve biomechanical efficiency during functional activity and decrease risk of injury. Patient will be seen 2x/wk for next month; re-assessment next month will determine change to POC if appropriate. Patient in agreement with PT POC. Goals updated to reflect patient current status.       Impairments: Abnormal coordination, Abnormal gait, Abnormal muscle tone, Abnormal or restricted ROM, Activity intolerance, Impaired balance, Impaired physical strength, Lacks appropriate HEP, Poor posture, Poor body mechanics, Pain with function, Safety issue, Weight-bearing intolerance, Abnormal movement, Difficulty understanding, Abnormal muscle firing  Understanding of Dx/Px/POC: Good  Prognosis: Good      Patient verbalized understanding of POC.         Please contact me if you have any questions or recommendations. Thank you for the referral and the opportunity to share in Drew Santos's care.    Plan  Patient would benefit from: Skilled PT and Speech Eval  Planned modality interventions: Biofeedback, Cryotherapy, TENS, Thermotherapy  Planned therapy interventions: Abdominal trunk stabilization, ADL training, Balance, Balance/WB training, Breathing training, Body mechanics training, Coordination, Functional ROM exercises, Gait training, HEP, Joint Mobilization, Manual Therapy, Shin taping, Motor coordination training, Neuromuscular re-education, Patient education, Postural training, Strengthening, Stretching, Therapeutic activities, Therapeutic exercises, Therapeutic training, Transfer training, Activity modification, Work reintegration  Frequency: 1-2x/wk  Plan of Care beginning date: 6/27/2024  Plan of Care expiration date: 3 months - 9/27/2024  Treatment plan discussed with: Patient       Updated Goals  Short Term  Goals (4 weeks):    - Patient will improve 5xSTS score by 2.3 seconds from to promote improved LE functional strength needed for ADLs   - patient will be able to perform floor transfer without physical assistance from therapist  - patient will perform 6MWT without breaks     Long Term Goals (12 weeks):  - Patient will be independent in a comprehensive home exercise program  - Patient will improve 5xSTS score in 12.6s or less to demonstrate improved LE functional strength needed for ADLs comparable to age-matched norms  - Patient will be able to demonstrate HT in gait without veering  - Patient will improve 6 Minute Walk Test score by 190 feet to promote improved cardiovascular endurance  - Patient will report going on walks at least 3 days per week to promote independence and improved cardiovascular endurance  - Patient will be able to ascend/descend stairs reciprocally, IND, without UE assist to promote independence and safety with ADLs  - Patient will report 50% reduction in near falls when ambulating on uneven terrain      Cut off score    All date taken from APTA Neuro Section or Rehab Measures      Jarvis:  Armando et al., 2018  MDC: 2.7 pts    Davey et al., 2011  Cut-off score: 45/56    Chronic CVA  < 44/56 high risk for falls (Armando et al., 2018)  < 47.5/56 slow walker status (Inez et al., 2011) 5xSTS: Riley et al 2010  MDC: 2.3 sec  Age Norms:  60-69: 11.1 sec  70-79: 12.6 sec  80-89: 14.8 sec    Riley 2010, Chronic Stroke  Chronic CVA: 12 sec   TUG  John hummel al., 2005  MDC: 2.9 sec    Cut off score:  >13.5 sec community dwelling adults  >32.2 frail elderly  <20 I for basic transfers  >30 dependent on transfers 10 Meter Walk Test:   Ernesto et al., 2006  Small meaningful change: 0.06 m/s  Substantial meaningful change: 0.14 m/s  MCID: 0.16 m/s    < 0.4 m/s household ambulators  0.4 - 0.8 m/s limited community ambulators  > 0.8 m/s community ambulators   FGA: Jessica et al., 2010  MCID: 4.2  pts  Geriatrics/community < 22/30 fall risk  Geriatrics/community < 20/30 unexplained falls DGI  MDC: vestibular - 4 pts  MDC: geriatric/community - 3 pts  Falls risk <19/24   6 Minute Walk Test  Ernesto et al., 2006  MDC: 60.98 m (200.01 ft)    Mónica Morris, & Louann, 2012  MCID: 34.4 m    Age Norms  60-69: M - 1876 ft   F - 1765 ft  70-79: M - 1729 ft   F - 1545 ft  80-89: M - 1368 ft   F - 1286 ft Modified [place]  0: No increase in tone  1: Catch and release or min resistance at end range  1+: Catch f/b min resistance throughout remainder (< half ROM)  2: Easily moved, but more marked tone throughout most ROM  3: Significant tone, PROM difficult  4: Rigid   MiniBest: Rosales et al., 2013  CVA < 17.5 fall risk Pass (Acute CVA)  MDC: 1.8 points (acute), 3.2 points (chronic)         Subjective    History of Present Illness  Mechanism of injury: Patient was in the hospital for 1 week for stroke care at [facility] on 3/13/2024. 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 Currently patient reports no issues with mobility. However, spouse notes shuffling, challenge with lower surfaces with standing, and carrying things around the house.     Updated 24: Patient reports improvements with PT.     Updated 24: Patient has returned to gardening, and denies difficulties with it. He denies falls in past month, but remains limited by balance deficits, reduced activity tolerance, and gait abnormalities.     Primary AD: NA  Assist level at home: spouse as needed   WC usage: none   PLOF: Independent     Patient goal: get back to gardening     Pain  Current pain ratin/10  At best pain ratin/10  At worst pain ratin/10  Location: NA  Aggravating factors: NA    Social Support  Steps to enter house: 6 steps   Stairs in house: 13 steps    Lives in: 2 story   Lives with: spouse     Employment status: retired  Hand dominance: right     Treatments  Previous treatment: home  "health   Current treatment: na   Diagnostic Testing: na       Objective     Vitals  - BP: 144/85    HR Max  - 207 - (0.7x75)  = 154.5    LE MMT  - R Hip Flexion: 4-/5   - L Hip Flexion: 3+/5  - R Hip Extension: 3+/5  - L Hip Extension: 3+/5  - R Hip Abduction: 4-/5  - L Hip abduction: 3+/5  - R Hip adduction: 4-/5  - L Hip adduction: 4-/5  - R Knee Extension: 4-/5  - L Knee Extension: 3+/5  - R Knee Flexion: 4-/5   - L Knee Flexion: 4-/5  - R Ankle DF: 4-/5   - L Ankle DF: 3+/5  - R Ankle PF: 4-/5   - L Ankle PF: 4-/5    Sensation  - Light touch: intact   - Deep pressure: intact     Coordination  - Dysdiadochokinesia: decreased on left side   - Alternating Toe Taps: decreased on left side     Modified [place]  - R knee extension: 0 - no increase in tone  - L knee extension: 0 - no increase in tone  - R knee flexion: 0 - no increase in tone  - L knee flexion: 0 - no increase in tone  - R ankle DF: 0 - no increase in tone   - L ankle DF: 0 - no increase in tone  - R ankle inversion: 0 - no increase in tone  - L ankle inversion: 0 - no increase in tone  - R ankle eversion: 0 - no increase in tone  - L ankle eversion: 0 - no increase in tone    Clonus  - L: No  - R: No  - Fatiguing: No  - Number of beats: NA    Vision  - Glasses: Yes  - Abnormalities prior to CVA: No, no change   - Abnormalities post CVA: Yes, light sensitivity and sitting in the dark     Neglect  - R sided: No  - L sided: No    Pusher's Syndrome  - R sided: No  - L sided: No    Gait abnormalities:  lateral lean to left with positive [place], decrease foot clearance.     Outcome Measures Initial Eval  4/16/24 PN 5/28 6/27   5xSTS 27.86 sec  w Arm rest 29.1 sec w/out arm rest  24.01s    TUG 12.06 sec 12.32 sec  9.15 sec   10 meter .73 m/s 10/10.33= .96 m/s 1.33 m/s   GERBER      FGA 25/30  26/30   DGI      MiniBEST 23/28     PASS      6MWT 450 ft 800 ft  1000 ft  Seated rest at 4'45\" for 45sec                    Daily Interventions:  NMR:  STS w/ 10# " TT 2x12 and 1 foam pad focus on 3s ecc control  Alt step taps 8'' intermittent  x20 hits, counting backwards from 100    Precautions: Standard precautions, monitor BG,   Past Medical History:   Diagnosis Date    Diabetes mellitus (HCC)     Hypertension

## 2024-08-16 ENCOUNTER — HOME HEALTH ADMISSION (OUTPATIENT)
Dept: HOME HEALTH SERVICES | Facility: HOME HEALTHCARE | Age: 74
End: 2024-08-16
Payer: COMMERCIAL

## 2024-08-18 ENCOUNTER — HOME CARE VISIT (OUTPATIENT)
Dept: HOME HEALTH SERVICES | Facility: HOME HEALTHCARE | Age: 74
End: 2024-08-18
Payer: COMMERCIAL

## 2024-08-18 VITALS
DIASTOLIC BLOOD PRESSURE: 75 MMHG | HEART RATE: 78 BPM | OXYGEN SATURATION: 93 % | TEMPERATURE: 97.7 F | SYSTOLIC BLOOD PRESSURE: 110 MMHG | RESPIRATION RATE: 18 BRPM

## 2024-08-18 PROCEDURE — G0299 HHS/HOSPICE OF RN EA 15 MIN: HCPCS

## 2024-08-18 PROCEDURE — 400013 VN SOC

## 2024-08-19 ENCOUNTER — HOME CARE VISIT (OUTPATIENT)
Dept: HOME HEALTH SERVICES | Facility: HOME HEALTHCARE | Age: 74
End: 2024-08-19
Payer: COMMERCIAL

## 2024-08-19 VITALS
TEMPERATURE: 98.2 F | RESPIRATION RATE: 20 BRPM | HEART RATE: 82 BPM | OXYGEN SATURATION: 95 % | SYSTOLIC BLOOD PRESSURE: 118 MMHG | DIASTOLIC BLOOD PRESSURE: 60 MMHG

## 2024-08-19 PROCEDURE — G0300 HHS/HOSPICE OF LPN EA 15 MIN: HCPCS

## 2024-08-20 ENCOUNTER — HOME CARE VISIT (OUTPATIENT)
Dept: HOME HEALTH SERVICES | Facility: HOME HEALTHCARE | Age: 74
End: 2024-08-20
Payer: COMMERCIAL

## 2024-08-20 VITALS
TEMPERATURE: 97 F | OXYGEN SATURATION: 92 % | DIASTOLIC BLOOD PRESSURE: 80 MMHG | HEART RATE: 78 BPM | SYSTOLIC BLOOD PRESSURE: 124 MMHG

## 2024-08-20 PROCEDURE — G0151 HHCP-SERV OF PT,EA 15 MIN: HCPCS

## 2024-08-21 ENCOUNTER — HOME CARE VISIT (OUTPATIENT)
Dept: HOME HEALTH SERVICES | Facility: HOME HEALTHCARE | Age: 74
End: 2024-08-21
Payer: COMMERCIAL

## 2024-08-21 VITALS
TEMPERATURE: 97.8 F | SYSTOLIC BLOOD PRESSURE: 104 MMHG | OXYGEN SATURATION: 93 % | DIASTOLIC BLOOD PRESSURE: 58 MMHG | HEART RATE: 86 BPM

## 2024-08-21 VITALS
DIASTOLIC BLOOD PRESSURE: 68 MMHG | TEMPERATURE: 98.8 F | OXYGEN SATURATION: 93 % | RESPIRATION RATE: 20 BRPM | SYSTOLIC BLOOD PRESSURE: 112 MMHG | HEART RATE: 112 BPM

## 2024-08-21 VITALS
RESPIRATION RATE: 18 BRPM | OXYGEN SATURATION: 97 % | DIASTOLIC BLOOD PRESSURE: 68 MMHG | HEART RATE: 114 BPM | SYSTOLIC BLOOD PRESSURE: 128 MMHG

## 2024-08-21 PROCEDURE — G0299 HHS/HOSPICE OF RN EA 15 MIN: HCPCS

## 2024-08-21 PROCEDURE — G0152 HHCP-SERV OF OT,EA 15 MIN: HCPCS

## 2024-08-21 PROCEDURE — G0155 HHCP-SVS OF CSW,EA 15 MIN: HCPCS

## 2024-08-21 PROCEDURE — G0151 HHCP-SERV OF PT,EA 15 MIN: HCPCS

## 2024-08-22 ENCOUNTER — HOME CARE VISIT (OUTPATIENT)
Dept: HOME HEALTH SERVICES | Facility: HOME HEALTHCARE | Age: 74
End: 2024-08-22
Payer: COMMERCIAL

## 2024-08-22 VITALS
SYSTOLIC BLOOD PRESSURE: 126 MMHG | HEART RATE: 114 BPM | OXYGEN SATURATION: 94 % | RESPIRATION RATE: 20 BRPM | TEMPERATURE: 100 F | DIASTOLIC BLOOD PRESSURE: 86 MMHG

## 2024-08-22 LAB
DME PARACHUTE DELIVERY DATE REQUESTED: NORMAL
DME PARACHUTE ITEM DESCRIPTION: NORMAL
DME PARACHUTE ITEM DESCRIPTION: NORMAL
DME PARACHUTE ORDER STATUS: NORMAL
DME PARACHUTE SUPPLIER NAME: NORMAL
DME PARACHUTE SUPPLIER PHONE: NORMAL

## 2024-08-22 PROCEDURE — G0299 HHS/HOSPICE OF RN EA 15 MIN: HCPCS

## 2024-08-23 ENCOUNTER — HOSPITAL ENCOUNTER (INPATIENT)
Facility: HOSPITAL | Age: 74
LOS: 5 days | Discharge: NURSING, REHAB OR LONG-TERM CARE | DRG: 871 | End: 2024-08-28
Admitting: STUDENT IN AN ORGANIZED HEALTH CARE EDUCATION/TRAINING PROGRAM
Payer: COMMERCIAL

## 2024-08-23 ENCOUNTER — HOME CARE VISIT (OUTPATIENT)
Dept: HOME HEALTH SERVICES | Facility: HOME HEALTHCARE | Age: 74
End: 2024-08-23
Payer: COMMERCIAL

## 2024-08-23 VITALS
SYSTOLIC BLOOD PRESSURE: 102 MMHG | RESPIRATION RATE: 12 BRPM | DIASTOLIC BLOOD PRESSURE: 72 MMHG | HEART RATE: 100 BPM | OXYGEN SATURATION: 94 % | TEMPERATURE: 99 F

## 2024-08-23 VITALS
SYSTOLIC BLOOD PRESSURE: 102 MMHG | DIASTOLIC BLOOD PRESSURE: 72 MMHG | HEART RATE: 100 BPM | OXYGEN SATURATION: 94 % | RESPIRATION RATE: 18 BRPM

## 2024-08-23 DIAGNOSIS — R33.9 URINARY RETENTION: ICD-10-CM

## 2024-08-23 DIAGNOSIS — L89.153 SACRAL DECUBITUS ULCER, STAGE III (HCC): Primary | ICD-10-CM

## 2024-08-23 DIAGNOSIS — Z97.8 FOLEY CATHETER IN PLACE: ICD-10-CM

## 2024-08-23 DIAGNOSIS — Z86.73 HISTORY OF CVA (CEREBROVASCULAR ACCIDENT): ICD-10-CM

## 2024-08-23 DIAGNOSIS — S31.000A WOUND OF SACRAL REGION, INITIAL ENCOUNTER: ICD-10-CM

## 2024-08-23 DIAGNOSIS — I63.9 CVA (CEREBRAL VASCULAR ACCIDENT) (HCC): ICD-10-CM

## 2024-08-23 LAB
ANION GAP SERPL CALCULATED.3IONS-SCNC: 7 MMOL/L (ref 4–13)
BASOPHILS # BLD AUTO: 0.05 THOUSANDS/ÂΜL (ref 0–0.1)
BASOPHILS NFR BLD AUTO: 0 % (ref 0–1)
BUN SERPL-MCNC: 15 MG/DL (ref 5–25)
CALCIUM SERPL-MCNC: 9.3 MG/DL (ref 8.4–10.2)
CHLORIDE SERPL-SCNC: 103 MMOL/L (ref 96–108)
CO2 SERPL-SCNC: 27 MMOL/L (ref 21–32)
CREAT SERPL-MCNC: 0.98 MG/DL (ref 0.6–1.3)
EOSINOPHIL # BLD AUTO: 0.12 THOUSAND/ÂΜL (ref 0–0.61)
EOSINOPHIL NFR BLD AUTO: 1 % (ref 0–6)
ERYTHROCYTE [DISTWIDTH] IN BLOOD BY AUTOMATED COUNT: 12.9 % (ref 11.6–15.1)
GFR SERPL CREATININE-BSD FRML MDRD: 75 ML/MIN/1.73SQ M
GLUCOSE SERPL-MCNC: 121 MG/DL (ref 65–140)
GLUCOSE SERPL-MCNC: 146 MG/DL (ref 65–140)
GLUCOSE SERPL-MCNC: 158 MG/DL (ref 65–140)
HCT VFR BLD AUTO: 36.9 % (ref 36.5–49.3)
HGB BLD-MCNC: 11.7 G/DL (ref 12–17)
IMM GRANULOCYTES # BLD AUTO: 0.06 THOUSAND/UL (ref 0–0.2)
IMM GRANULOCYTES NFR BLD AUTO: 0 % (ref 0–2)
LYMPHOCYTES # BLD AUTO: 2.47 THOUSANDS/ÂΜL (ref 0.6–4.47)
LYMPHOCYTES NFR BLD AUTO: 18 % (ref 14–44)
MCH RBC QN AUTO: 30.3 PG (ref 26.8–34.3)
MCHC RBC AUTO-ENTMCNC: 31.7 G/DL (ref 31.4–37.4)
MCV RBC AUTO: 96 FL (ref 82–98)
MONOCYTES # BLD AUTO: 0.89 THOUSAND/ÂΜL (ref 0.17–1.22)
MONOCYTES NFR BLD AUTO: 6 % (ref 4–12)
NEUTROPHILS # BLD AUTO: 10.4 THOUSANDS/ÂΜL (ref 1.85–7.62)
NEUTS SEG NFR BLD AUTO: 75 % (ref 43–75)
NRBC BLD AUTO-RTO: 0 /100 WBCS
PLATELET # BLD AUTO: 349 THOUSANDS/UL (ref 149–390)
PMV BLD AUTO: 8.9 FL (ref 8.9–12.7)
POTASSIUM SERPL-SCNC: 4.3 MMOL/L (ref 3.5–5.3)
RBC # BLD AUTO: 3.86 MILLION/UL (ref 3.88–5.62)
SODIUM SERPL-SCNC: 137 MMOL/L (ref 135–147)
WBC # BLD AUTO: 13.99 THOUSAND/UL (ref 4.31–10.16)

## 2024-08-23 PROCEDURE — 99223 1ST HOSP IP/OBS HIGH 75: CPT | Performed by: STUDENT IN AN ORGANIZED HEALTH CARE EDUCATION/TRAINING PROGRAM

## 2024-08-23 PROCEDURE — G0299 HHS/HOSPICE OF RN EA 15 MIN: HCPCS

## 2024-08-23 PROCEDURE — 99223 1ST HOSP IP/OBS HIGH 75: CPT | Performed by: PHYSICIAN ASSISTANT

## 2024-08-23 PROCEDURE — 99283 EMERGENCY DEPT VISIT LOW MDM: CPT

## 2024-08-23 PROCEDURE — 85025 COMPLETE CBC W/AUTO DIFF WBC: CPT

## 2024-08-23 PROCEDURE — 82948 REAGENT STRIP/BLOOD GLUCOSE: CPT

## 2024-08-23 PROCEDURE — 36415 COLL VENOUS BLD VENIPUNCTURE: CPT

## 2024-08-23 PROCEDURE — 96365 THER/PROPH/DIAG IV INF INIT: CPT

## 2024-08-23 PROCEDURE — 99285 EMERGENCY DEPT VISIT HI MDM: CPT

## 2024-08-23 PROCEDURE — G0152 HHCP-SERV OF OT,EA 15 MIN: HCPCS

## 2024-08-23 PROCEDURE — 80048 BASIC METABOLIC PNL TOTAL CA: CPT

## 2024-08-23 RX ORDER — ACETAMINOPHEN 325 MG/1
650 TABLET ORAL EVERY 6 HOURS PRN
Status: DISCONTINUED | OUTPATIENT
Start: 2024-08-23 | End: 2024-08-28 | Stop reason: HOSPADM

## 2024-08-23 RX ORDER — ASPIRIN 81 MG/1
81 TABLET, CHEWABLE ORAL DAILY
Status: DISCONTINUED | OUTPATIENT
Start: 2024-08-23 | End: 2024-08-28 | Stop reason: HOSPADM

## 2024-08-23 RX ORDER — ONDANSETRON 2 MG/ML
4 INJECTION INTRAMUSCULAR; INTRAVENOUS EVERY 6 HOURS PRN
Status: DISCONTINUED | OUTPATIENT
Start: 2024-08-23 | End: 2024-08-28 | Stop reason: HOSPADM

## 2024-08-23 RX ORDER — INSULIN LISPRO 100 [IU]/ML
1-6 INJECTION, SOLUTION INTRAVENOUS; SUBCUTANEOUS
Status: DISCONTINUED | OUTPATIENT
Start: 2024-08-23 | End: 2024-08-28 | Stop reason: HOSPADM

## 2024-08-23 RX ORDER — HEPARIN SODIUM 5000 [USP'U]/ML
5000 INJECTION, SOLUTION INTRAVENOUS; SUBCUTANEOUS EVERY 8 HOURS SCHEDULED
Status: DISCONTINUED | OUTPATIENT
Start: 2024-08-23 | End: 2024-08-23

## 2024-08-23 RX ORDER — CALCIUM CARBONATE 500 MG/1
1000 TABLET, CHEWABLE ORAL DAILY PRN
Status: DISCONTINUED | OUTPATIENT
Start: 2024-08-23 | End: 2024-08-28 | Stop reason: HOSPADM

## 2024-08-23 RX ORDER — ATORVASTATIN CALCIUM 40 MG/1
80 TABLET, FILM COATED ORAL EVERY EVENING
Status: DISCONTINUED | OUTPATIENT
Start: 2024-08-23 | End: 2024-08-28 | Stop reason: HOSPADM

## 2024-08-23 RX ADMIN — COLLAGENASE SANTYL 1 APPLICATION: 250 OINTMENT TOPICAL at 18:07

## 2024-08-23 RX ADMIN — ASPIRIN 81 MG: 81 TABLET, CHEWABLE ORAL at 17:34

## 2024-08-23 RX ADMIN — CEFTRIAXONE SODIUM 1000 MG: 10 INJECTION, POWDER, FOR SOLUTION INTRAVENOUS at 14:34

## 2024-08-23 RX ADMIN — APIXABAN 5 MG: 5 TABLET, FILM COATED ORAL at 17:34

## 2024-08-23 RX ADMIN — PIPERACILLIN AND TAZOBACTAM 4.5 G: 36; 4.5 INJECTION, POWDER, FOR SOLUTION INTRAVENOUS at 17:33

## 2024-08-23 RX ADMIN — PIPERACILLIN AND TAZOBACTAM 4.5 G: 36; 4.5 INJECTION, POWDER, FOR SOLUTION INTRAVENOUS at 21:17

## 2024-08-23 RX ADMIN — ATORVASTATIN CALCIUM 80 MG: 40 TABLET, FILM COATED ORAL at 17:34

## 2024-08-23 RX ADMIN — ACETAMINOPHEN 650 MG: 325 TABLET, FILM COATED ORAL at 21:17

## 2024-08-23 RX ADMIN — Medication 4.5 G: at 18:06

## 2024-08-23 NOTE — PLAN OF CARE
Problem: PAIN - ADULT  Goal: Verbalizes/displays adequate comfort level or baseline comfort level  Description: Interventions:  - Encourage patient to monitor pain and request assistance  - Assess pain using appropriate pain scale  - Administer analgesics based on type and severity of pain and evaluate response  - Implement non-pharmacological measures as appropriate and evaluate response  - Consider cultural and social influences on pain and pain management  - Notify physician/advanced practitioner if interventions unsuccessful or patient reports new pain  Outcome: Progressing     Problem: INFECTION - ADULT  Goal: Absence or prevention of progression during hospitalization  Description: INTERVENTIONS:  - Assess and monitor for signs and symptoms of infection  - Monitor lab/diagnostic results  - Monitor all insertion sites, i.e. indwelling lines, tubes, and drains  - Monitor endotracheal if appropriate and nasal secretions for changes in amount and color  - Bradford appropriate cooling/warming therapies per order  - Administer medications as ordered  - Instruct and encourage patient and family to use good hand hygiene technique  - Identify and instruct in appropriate isolation precautions for identified infection/condition  Outcome: Progressing     Problem: CARDIOVASCULAR - ADULT  Goal: Maintains optimal cardiac output and hemodynamic stability  Description: INTERVENTIONS:  - Monitor I/O, vital signs and rhythm  - Monitor for S/S and trends of decreased cardiac output  - Administer and titrate ordered vasoactive medications to optimize hemodynamic stability  - Assess quality of pulses, skin color and temperature  - Assess for signs of decreased coronary artery perfusion  - Instruct patient to report change in severity of symptoms  Outcome: Progressing

## 2024-08-23 NOTE — ASSESSMENT & PLAN NOTE
Has sacral decubitus ulcer that is deteriorating with concern for superimposed infection given leukocytosis  Stool is contaminating area of ulcer  Start zosyn, consult surgery for debridement, consult wound care  Insert rectal tube for better hygiene and healing

## 2024-08-23 NOTE — CASE MANAGEMENT
Case Management Assessment & Discharge Planning Note    Patient name Drew Santos  Location NAT Pool Room/NAT MRN [id number]  : 1950 Date 2024       Current Admission Date: 2024  Current Admission Diagnosis:Sacral wound   Patient Active Problem List    Diagnosis Date Noted Date Diagnosed    Sacral wound 2024     Primary hypertension 2024     Mixed hyperlipidemia 2024     Atrial fibrillation (HCC) 2024     Stroke (cerebrum) (HCC) 2024     Urinary retention 2024     Syncope 2024     Elevated lactic acid level 2024     Diabetes (HCC) 2024     Leukocytosis 2024     Abnormal CPK 2024       LOS (days): 0  Geometric Mean LOS (GMLOS) (days):   Days to GMLOS:     OBJECTIVE:              Current admission status: Inpatient       Preferred Pharmacy:   [facility] [address] [place], [place] - [address] [facility] [address] [address]  Phone: [phone] Fax: [phone]    Primary Care Provider: Joe Lyon MD    Primary Insurance: [employer]  Secondary Insurance:     ASSESSMENT:  Active Health Care Proxies       Kristi Santos Health Care Representative - Spouse   Primary Phone: [phone] (Mobile)  Home Phone: [phone]                 Advance Directives  Does patient have a Health Care POA?: Yes  Does patient have Advance Directives?: Yes  Advance Directives: Power of  for health care  Primary Contact: Kristi Santos, Spouse         Readmission Root Cause  30 Day Readmission: No    Patient Information  Admitted from:: Home  Mental Status: Alert  During Assessment patient was accompanied by: Not accompanied during assessment  Assessment information provided by:: Patient  Primary Caregiver: Self  Support Systems: Self, Spouse/significant other, Friend  County of Residence: [place]  What city do you live in?: [place]  Home entry access options. Select all that apply.: Stairs  Number of steps to enter  home.: 5  Do the steps have railings?: Yes  Type of Current Residence: 3 story home  Upon entering residence, is there a bedroom on the main floor (no further steps)?: No  A bedroom is located on the following floor levels of residence (select all that apply):: 2nd Floor  Upon entering residence, is there a bathroom on the main floor (no further steps)?: Yes  Number of steps to 2nd floor from main floor: 7  Living Arrangements: Lives w/ Spouse/significant other  Is patient a ?: Yes  [facility] ( [employer])?: Yes  Is patient service connected?: Yes    Activities of Daily Living Prior to Admission  Functional Status: Assistance  Completes ADLs independently?: Yes  Ambulates independently?: Yes  Does patient use assisted devices?: No  Does patient currently own DME?: No  Does patient have a history of Outpatient Therapy (PT/OT)?: No  Does the patient have a history of Short-Term Rehab?: Yes ([facility])  Does patient have a history of HHC?: No  Does patient currently have HHC?: No         Patient Information Continued  Income Source: Pension/penitentiary  Does patient have prescription coverage?: Yes  Does patient receive dialysis treatments?: No  Does patient have a history of substance abuse?: No  Does patient have a history of Mental Health Diagnosis?: No    PHQ 2/9 Screening   Reviewed PHQ 2/9 Depression Screening Score?: No    Means of Transportation  Means of Transport to Appts:: Drives Self      Social Determinants of Health (SDOH)      Flowsheet Row Most Recent Value   Housing Stability    In the past 12 months, how many times have you moved where you were living? 0   At any time in the past 12 months, were you homeless or living in a shelter (including now)? N   Transportation Needs    In the past 12 months, has lack of transportation kept you from medical appointments or from getting medications? no   In the past 12 months, has lack of transportation kept you from meetings, work, or from  getting things needed for daily living? No   Food Insecurity    Within the past 12 months, you worried that your food would run out before you got the money to buy more. Never true   Within the past 12 months, the food you bought just didn't last and you didn't have money to get more. Never true   Utilities    In the past 12 months has the electric, gas, oil, or water company threatened to shut off services in your home? No            DISCHARGE DETAILS:    Discharge planning discussed with:: Patient at bedside and spouse on the phone  Freedom of Choice: Yes  Comments - Freedom of Choice: CM discussed discharge planning and freedom of choice if services are recommended by [facility]. Spouse has requested that patient [facility] in [facility] and nowhere else as patient was at [facility] then [facility]. Patient is now admitted to [facility] for sacral wound and spouse asked that [facility] re[facility] be the [facility] of choice.  CM contacted family/caregiver?: Yes  Were Treatment Team discharge recommendations reviewed with patient/caregiver?: Yes  Did patient/caregiver verbalize understanding of patient care needs?: Yes  Were patient/caregiver advised of the risks associated with not following Treatment Team discharge recommendations?: Yes    Contacts  Patient Contacts: Toyin (spouse)  Relationship to Patient:: Family  Contact Method: Phone  Phone Number: [phone]  Reason/Outcome: Continuity of Care, Discharge Planning    Requested Home Health Care         Is the patient interested in HHC at discharge?: No    DME Referral Provided  Referral made for DME?: No    Other Referral/Resources/Interventions Provided:  Interventions: SNF    Would you like to participate in our [facility] service program?  : No - Declined    Treatment Team Recommendation: SNF  Discharge Destination Plan:: SNF  Transport at Discharge : Florencio arrieta CM reached [facility] to [facility] to inform of [facility] admission and request [facility] SNF upon discharge as per  spouse's request. [facility] intake took information to forward to RN who will contact CM to discuss discharge planning.    Patient was recently at [facility] then discharged to [facility] then discharged home where sacral wound was discovered by [facility] that recommended patient go to the ED.    CM received a call back from SMITH Perez at the [facility] who is patient's PCP (Efrain Redmond) nurse. RN took information and ask that clinicals be faxed to [facility] when available to [phone]. Dr. Efrain Redmond's number is [phone].    CM to f/u on discharge planning.

## 2024-08-23 NOTE — H&P
"[facility]  H&P  Name: Drew Santos 73 y.o. male I MRN: [id number]  Unit/Bed#: ED 17 I Date of Admission: 8/23/2024   Date of Service: 8/23/2024 I Hospital Day: 0      Assessment & Plan   * Sacral wound  Assessment & Plan  Has sacral decubitus ulcer that is deteriorating with concern for superimposed infection given leukocytosis  Stool is contaminating area of ulcer  Start zosyn, consult surgery for debridement, consult wound care  Insert rectal tube for better hygiene and healing     Primary hypertension  Assessment & Plan  Adequately controlled  Continue home meds    Atrial fibrillation ([facility])  Assessment & Plan  Rate controlled  Continue home meds  On eliquis for anticoagulation    Stroke (cerebrum) ([facility])  Assessment & Plan  With residual hemiparesis  Continue home statin, aspirin, eliquis    Diabetes ([facility])  Assessment & Plan  Lab Results   Component Value Date    HGBA1C 6.0 (H) 07/04/2024       No results for input(s): \"POCGLU\" in the last 72 hours.    Blood Sugar Average: Last 72 hrs:  Well controlled  Start sliding scale  Monitor glucose levels             VTE Pharmacologic Prophylaxis:   Moderate Risk (Score 3-4) - Pharmacological DVT Prophylaxis Ordered: apixaban (Eliquis).  Code Status: Level 1 - Full Code   Discussion with family: Patient declined call to .     Anticipated Length of Stay: Patient will be admitted on an inpatient basis with an anticipated length of stay of greater than 2 midnights secondary to sacral wound.    Total Time Spent on Date of Encounter in care of patient: 30+ mins. This time was spent on one or more of the following: performing physical exam; counseling and coordination of care; obtaining or reviewing history; documenting in the medical record; reviewing/ordering tests, medications or procedures; communicating with other healthcare professionals and discussing with patient's family/caregivers.    Chief Complaint: sacral wound    History of " Present Illness:  Drew Santos is a 73 y.o. male with a PMH of stroke with left sided hemiparesis, htn, afib, hld who presents with concern for worsening sacral wound and possible superimposed infection. Patient has visiting nursing who called EMS after noting his wound has been getting worse. Patient is bed bound in a hospital bed due to his CVA. In the ED, he was started on antibiotics and admitted to [facility] for further management.     Review of Systems:  Review of Systems   Constitutional:  Negative for chills and fever.   HENT:  Negative for ear pain and sore throat.    Eyes:  Negative for pain and visual disturbance.   Respiratory:  Negative for cough and shortness of breath.    Cardiovascular:  Negative for chest pain and palpitations.   Gastrointestinal:  Negative for abdominal pain and vomiting.   Genitourinary:  Negative for dysuria and hematuria.   Musculoskeletal:  Negative for arthralgias and back pain.   Skin:  Positive for wound. Negative for color change and rash.   Neurological:  Negative for seizures and syncope.   All other systems reviewed and are negative.      Past Medical and Surgical History:   Past Medical History:   Diagnosis Date    Diabetes mellitus (HCC)     Hypertension        No past surgical history on file.    Meds/Allergies:  Prior to Admission medications    Medication Sig Start Date End Date Taking? Authorizing Provider   acetaminophen (TYLENOL) 325 mg tablet Take 650 mg by mouth every 4 (four) hours as needed for fever or mild pain.    Historical Provider, MD   apixaban (Eliquis) 5 mg Take 1 tablet (5 mg total) by mouth 2 (two) times a day 4/8/24   Wes Muhammad PA-C   ASPIRIN 81 PO Take 81 mg by mouth daily.    Historical Provider, MD   atorvastatin (LIPITOR) 40 mg tablet Take 1 tablet (40 mg total) by mouth every evening  Patient taking differently: Take 80 mg by mouth every evening. Per AVS, 80mg daily 3/13/24   Chandler Azul MD   bisacodyl (Bisacodyl Laxative) 10  mg suppository Insert 10 mg into the rectum daily as needed for constipation (if no results from mom).    Historical Provider, MD   bisacodyl (FLEET) 10 MG/30ML ENEM Insert 10 mg into the rectum daily as needed for constipation.    Historical Provider, MD   Cholecalciferol (Vitamin D3) 50 MCG (2000 UT) CAPS Take 2,000 Units by mouth daily. Per 8/17 AVS, 25 mcg (1000 units) oral once per day  Indications: Vitamin D Deficiency    Historical Provider, MD   dapagliflozin (Farxiga) 10 MG tablet Take 10 mg by mouth daily    Historical Provider, MD   docusate sodium (COLACE) 100 mg capsule Take 100 mg by mouth 2 (two) times a day.    Historical Provider, MD   escitalopram (LEXAPRO) 10 mg tablet Take 10 mg by mouth daily.    Historical Provider, MD   ezetimibe (ZETIA) 10 mg tablet Take 10 mg by mouth daily. Indications: High Amount of Fats in the Blood    Historical Provider, MD   lisinopril (ZESTRIL) 10 mg tablet Take 1 tablet (10 mg total) by mouth daily  Patient taking differently: Take 10 mg by mouth daily. 2.5mg PO daily - per 8/17 AVS 3/13/24   Chandler Azul MD   magnesium hydroxide (Milk of Magnesia) 400 mg/5 mL oral suspension Take 30 mL by mouth daily as needed for constipation.    Historical Provider, MD   methylphenidate (RITALIN) 5 mg tablet Take 2.5 mg by mouth 2 (two) times a day.    Historical Provider, MD   metoprolol succinate (TOPROL-XL) 25 mg 24 hr tablet Take 1 tablet (25 mg total) by mouth daily  Patient not taking: Reported on 8/18/2024 3/13/24   Chandler Azul MD   MULTIPLE VITAMIN PO Take 1 tablet by mouth in the morning. Indications: Vitamin A deficiency    Historical Provider, MD   Polyethylene Glycol 1000 POWD Take 17 g by mouth daily as needed (constipation). Dissolve in 8oz of water, juice, coffee or tea    Historical Provider, MD   senna (Senna-Tabs) 8.6 MG tablet Take 2 tablets by mouth daily as needed for constipation.    Historical Provider, MD   Silver (SilvaSorb) GEL  Apply 1 Application topically daily. Apply to buttock wound bed daily.    Historical Provider, MD   tamsulosin (FLOMAX) 0.4 mg Take 0.4 mg by mouth daily.    Historical Provider, MD NEVAREZ have reviewed home medications using recent Epic encounter.    Allergies: No Known Allergies    Social History:  Marital Status: /Civil Union   Occupation: na  Patient Pre-hospital Living Situation: Home  Patient Pre-hospital Level of Mobility: unable to be assessed at time of evaluation  Patient Pre-hospital Diet Restrictions: diabetic  Substance Use History:   Social History     Substance and Sexual Activity   Alcohol Use Not Currently     Social History     Tobacco Use   Smoking Status Never   Smokeless Tobacco Never     Social History     Substance and Sexual Activity   Drug Use Never       Family History:  No family history on file.    Physical Exam:     Vitals:   Blood Pressure: 118/67 (08/23/24 1345)  Pulse: 89 (08/23/24 1345)  Temperature: 98.3 °F (36.8 °C) (08/23/24 1345)  Temp Source: Oral (08/23/24 1345)  Respirations: 16 (08/23/24 1345)  SpO2: 98 % (08/23/24 1345)    Physical Exam  Constitutional:       General: He is not in acute distress.     Appearance: Normal appearance. He is not toxic-appearing.   Cardiovascular:      Rate and Rhythm: Normal rate and regular rhythm.      Heart sounds: Normal heart sounds. No murmur heard.  Pulmonary:      Effort: Pulmonary effort is normal. No respiratory distress.      Breath sounds: Normal breath sounds. No wheezing.   Abdominal:      General: Abdomen is flat. There is no distension.      Palpations: Abdomen is soft.      Tenderness: There is no abdominal tenderness.   Neurological:      General: No focal deficit present.      Mental Status: He is alert. Mental status is at baseline.      Motor: Weakness present.          Additional Data:     Lab Results:  Results from last 7 days   Lab Units 08/23/24  1359   WBC Thousand/uL 13.99*   HEMOGLOBIN g/dL 11.7*   HEMATOCRIT %  36.9   PLATELETS Thousands/uL 349   SEGS PCT % 75   LYMPHO PCT % 18   MONO PCT % 6   EOS PCT % 1     Results from last 7 days   Lab Units 08/23/24  1359 08/22/24  0937   SODIUM mmol/L 137 139   POTASSIUM mmol/L 4.3 4.6   CHLORIDE mmol/L 103 103   CO2 mmol/L 27 27   BUN mg/dL 15 13   CREATININE mg/dL 0.98 0.78   ANION GAP mmol/L 7 9   CALCIUM mg/dL 9.3 8.9   ALBUMIN g/dL  --  2.8*   TOTAL BILIRUBIN mg/dL  --  0.9   ALK PHOS U/L  --  101   ALT U/L  --  22   AST U/L  --  20   GLUCOSE RANDOM mg/dL 158* 123*             Lab Results   Component Value Date    HGBA1C 6.0 (H) 07/04/2024    HGBA1C 7.1 (H) 03/08/2024    HGBA1C 7.7 (H) 11/29/2023           Lines/Drains:  Invasive Devices       Peripheral Intravenous Line  Duration             Peripheral IV 08/23/24 Distal;Right;Upper;Ventral (anterior) Arm <1 day                        Imaging: No pertinent imaging reviewed.  No orders to display       EKG and Other Studies Reviewed on Admission:   EKG: No EKG obtained.    ** Please Note: This note has been constructed using a voice recognition system. **

## 2024-08-23 NOTE — PLAN OF CARE
Problem: PAIN - ADULT  Goal: Verbalizes/displays adequate comfort level or baseline comfort level  Description: Interventions:  - Encourage patient to monitor pain and request assistance  - Assess pain using appropriate pain scale  - Administer analgesics based on type and severity of pain and evaluate response  - Implement non-pharmacological measures as appropriate and evaluate response  - Consider cultural and social influences on pain and pain management  - Notify physician/advanced practitioner if interventions unsuccessful or patient reports new pain  Outcome: Progressing     Problem: INFECTION - ADULT  Goal: Absence or prevention of progression during hospitalization  Description: INTERVENTIONS:  - Assess and monitor for signs and symptoms of infection  - Monitor lab/diagnostic results  - Monitor all insertion sites, i.e. indwelling lines, tubes, and drains  - Monitor endotracheal if appropriate and nasal secretions for changes in amount and color  - [place] appropriate cooling/warming therapies per order  - Administer medications as ordered  - Instruct and encourage patient and family to use good hand hygiene technique  - Identify and instruct in appropriate isolation precautions for identified infection/condition  Outcome: Progressing  Goal: Absence of fever/infection during neutropenic period  Description: INTERVENTIONS:  - Monitor WBC    Outcome: Progressing     Problem: DISCHARGE PLANNING  Goal: Discharge to home or other facility with appropriate resources  Description: INTERVENTIONS:  - Identify barriers to discharge w/patient and caregiver  - Arrange for needed discharge resources and transportation as appropriate  - Identify discharge learning needs (meds, wound care, etc.)  - Arrange for interpretive services to assist at discharge as needed  - Refer to Case Management Department for coordinating discharge planning if the patient needs post-hospital services based on physician/advanced  practitioner order or complex needs related to functional status, cognitive ability, or social support system  Outcome: Progressing     Problem: Knowledge Deficit  Goal: Patient/family/caregiver demonstrates understanding of disease process, treatment plan, medications, and discharge instructions  Description: Complete learning assessment and assess knowledge base.  Interventions:  - Provide teaching at level of understanding  - Provide teaching via preferred learning methods  Outcome: Progressing     Problem: Neurological Deficit  Goal: Neurological status is stable or improving  Description: Interventions:  - Monitor and assess patient's level of consciousness, motor function, sensory function, and level of assistance needed for ADLs.   - Monitor and report changes from baseline. Collaborate with interdisciplinary team to initiate plan and implement interventions as ordered.   - Provide and maintain a safe environment.  - Consider seizure precautions.  - Consider fall precautions.  - Consider aspiration precautions.  - Consider bleeding precautions.  Outcome: Progressing     Problem: Potential for Aspiration  Goal: Non-ventilated patient's risk of aspiration is minimized  Description: Assess and monitor vital signs, respiratory status, and labs (WBC).  Monitor for signs of aspiration (tachypnea, cough, rales, wheezing, cyanosis, fever).    - Assess and monitor patient's ability to swallow.  - Place patient up in chair to eat if possible.  - HOB up at 90 degrees to eat if unable to get patient up into chair.  - Supervise patient during oral intake.   - Instruct patient/ family to take small bites.  - Instruct patient/ family to take small single sips when taking liquids.  - Follow patient-specific strategies generated by speech pathologist.  Outcome: Progressing  Goal: Ventilated patient's risk of aspiration is minimized  Description: Assess and monitor vital signs, respiratory status, airway cuff pressure, and  labs (WBC).  Monitor for signs of aspiration (tachypnea, cough, rales, wheezing, cyanosis, fever).    - Elevate head of bed 30 degrees if patient has tube feeding.  - Monitor tube feeding.  Outcome: Progressing     Problem: Communication Impairment  Goal: Ability to express needs and understand communication  Description: Assess patient's communication skills and ability to understand information.  Patient will demonstrate use of effective communication techniques, alternative methods of communication and understanding even if not able to speak.     - Encourage communication and provide alternate methods of communication as needed.  - Collaborate with case management/ for discharge needs.  - Include patient/family/caregiver in decisions related to communication.  Outcome: Progressing

## 2024-08-23 NOTE — ED NOTES
Pt. Cleaned, flushed sacral pressure with sterile NS, applied zinc oxide paste , covered with Mepilex Border Sacrum.     Zahraa Mack RN  08/23/24 [id number]

## 2024-08-23 NOTE — ASSESSMENT & PLAN NOTE
"Lab Results   Component Value Date    HGBA1C 6.0 (H) 07/04/2024       No results for input(s): \"POCGLU\" in the last 72 hours.    Blood Sugar Average: Last 72 hrs:  Well controlled  Start sliding scale  Monitor glucose levels    "

## 2024-08-23 NOTE — CONSULTS
GENERAL SURGERY CONSULT                                                                                                                                               Drew Santos 73 y.o. male MRN:                                                                     [id number]  Unit/Bed#: ED 17                                                         Encounter: [id number]                                                        Assessment & Plan     Sacral Decubitus Ulcer   Non Ambulatory  S/p Left Sided Hemiparesis, CVA 3/24 On Eliquis   Leukocytosis 13.99  Hbg 11.7   DM  HTN    -cont Zosyn   -cont rectal tube  -off loading and change in position q 2 hours.  -wash with soap and water the area daily and when soiled with stool  -sponge dressing daily,   -Santyl nickel thickness ointment to area daily, after daily irrigation/soap and water cleanse      CHIEF COMPLAINT:      HPI: Drew Santos is a 73 y.o. year oldmale,PMH: DM, HTN, CVA 3/24, on Eliquis, Left sided hemiparesis, non ambulatory, with sacral decubitus ulcer on admission     consulted for sacral decubitus ulceration, present on admission, that per records indicate that is reported to be worsening per [facility]. EMS was called and transported to ED.   Leukocytosis on admission 13.99,     On exam:  sacral decubitus ulcer measures approx 10 x 8 cm. Irregular border with pink areas, Center is covered with slough. There is no drainage, or odor, no erythema of the carmen wound. No fluctuance.   No areas that require debridement at this time.            Imaging Studies: No results found.  Lab Results:   Lab Results   Component Value Date    WBC 13.99 (H) 08/23/2024    HGB 11.7 (L) 08/23/2024    HCT 36.9 08/23/2024     08/23/2024     Lab Results   Component Value Date    K 4.3 08/23/2024    K 4.6 08/22/2024     08/23/2024     08/22/2024    CO2 27 08/23/2024    CO2 27 08/22/2024    BUN 15 08/23/2024    BUN 13 08/22/2024    CREATININE 0.98 08/23/2024    CREATININE  0.78 08/22/2024     Lab Results   Component Value Date    CALCIUM 9.3 08/23/2024    CALCIUM 8.9 08/22/2024    MG 2.1 07/12/2024    PHOS 3.6 07/12/2024       Inpatient consult to Acute Care Surgery  Consult performed by: Mell Askew PA-C  Consult ordered by: Emir Antonio MD          Historical Information   Past Medical History:   Diagnosis Date    Diabetes mellitus (HCC)     Hypertension      No past surgical history on file.  Social History   Social History     Substance and Sexual Activity   Alcohol Use Not Currently     Social History     Substance and Sexual Activity   Drug Use Never     Social History     Tobacco Use   Smoking Status Never   Smokeless Tobacco Never     Family History: non-contributory    No Known Allergies    Meds/Allergies   current meds:   Current Facility-Administered Medications   Medication Dose Route Frequency    acetaminophen (TYLENOL) tablet 650 mg  650 mg Oral Q6H PRN    apixaban (ELIQUIS) tablet 5 mg  5 mg Oral BID    aspirin chewable tablet 81 mg  81 mg Oral Daily    atorvastatin (LIPITOR) tablet 80 mg  80 mg Oral QPM    calcium carbonate (TUMS) chewable tablet 1,000 mg  1,000 mg Oral Daily PRN    [START ON 8/24/2024] collagenase (SANTYL) ointment   Topical Daily    insulin lispro (HumALOG/ADMELOG) 100 units/mL subcutaneous injection 1-6 Units  1-6 Units Subcutaneous TID With Meals    insulin lispro (HumALOG/ADMELOG) 100 units/mL subcutaneous injection 1-6 Units  1-6 Units Subcutaneous HS    ondansetron (ZOFRAN) injection 4 mg  4 mg Intravenous Q6H PRN    piperacillin-tazobactam (ZOSYN) IVPB 4.5 g  4.5 g Intravenous Once    Followed by    piperacillin-tazobactam (ZOSYN) IVPB (EXTENDED INFUSION) 4.5 g  4.5 g Intravenous Q12H    piperacillin-tazobactam (ZOSYN) IVPB 4.5 g  4.5 g Intravenous Once              Objective   Vitals:  No intake or output data in the 24 hours ending 08/23/24 1521  Invasive Devices       Peripheral Intravenous Line  Duration             Peripheral IV  08/23/24 Distal;Right;Upper;Ventral (anterior) Arm <1 day                    Review of Systems  12 point ROS reviewed and Negative except::   What is noted in the HPI    Physical Exam    Blood pressure 118/67, pulse 89, temperature 98.3 °F (36.8 °C), temperature source Oral, resp. rate 16, SpO2 98%.  GEN: A & O x 3, cooperative, pt sleeping at time of exam aroused to voice,   HEENT: PERRLA EOMI, sclera anicterus  NECK: supple   LUNGS: clear throughout  COR: RRR no murmur    ABD: soft,     EXTREM: left sided pararesis     SKIN:sacral decubitus ulcer measures approx 10 x 5 cm. Center is covered with slough. There is no drainage, or odor, no erythema of the carmen wound. No fluctuance.     Media Information      Document Information    Clinical Image - Mobile Device      08/23/2024 13:41   Attached To:   Hospital Encounter on 8/23/24   Source Information    Jason Sellers DO  [facility]   Document History      NEURO: CN II -XII intact, left sided paresis, affect appropriate            Mell Askew PA-C  8/23/2024

## 2024-08-24 LAB
ANION GAP SERPL CALCULATED.3IONS-SCNC: 7 MMOL/L (ref 4–13)
BUN SERPL-MCNC: 20 MG/DL (ref 5–25)
CALCIUM SERPL-MCNC: 8.9 MG/DL (ref 8.4–10.2)
CHLORIDE SERPL-SCNC: 104 MMOL/L (ref 96–108)
CO2 SERPL-SCNC: 26 MMOL/L (ref 21–32)
CREAT SERPL-MCNC: 0.91 MG/DL (ref 0.6–1.3)
ERYTHROCYTE [DISTWIDTH] IN BLOOD BY AUTOMATED COUNT: 12.7 % (ref 11.6–15.1)
GFR SERPL CREATININE-BSD FRML MDRD: 82 ML/MIN/1.73SQ M
GLUCOSE SERPL-MCNC: 130 MG/DL (ref 65–140)
GLUCOSE SERPL-MCNC: 131 MG/DL (ref 65–140)
GLUCOSE SERPL-MCNC: 136 MG/DL (ref 65–140)
GLUCOSE SERPL-MCNC: 152 MG/DL (ref 65–140)
GLUCOSE SERPL-MCNC: 156 MG/DL (ref 65–140)
HCT VFR BLD AUTO: 35.7 % (ref 36.5–49.3)
HGB BLD-MCNC: 11.7 G/DL (ref 12–17)
MAGNESIUM SERPL-MCNC: 1.9 MG/DL (ref 1.9–2.7)
MCH RBC QN AUTO: 31.2 PG (ref 26.8–34.3)
MCHC RBC AUTO-ENTMCNC: 32.8 G/DL (ref 31.4–37.4)
MCV RBC AUTO: 95 FL (ref 82–98)
PLATELET # BLD AUTO: 339 THOUSANDS/UL (ref 149–390)
PMV BLD AUTO: 9 FL (ref 8.9–12.7)
POTASSIUM SERPL-SCNC: 3.7 MMOL/L (ref 3.5–5.3)
RBC # BLD AUTO: 3.75 MILLION/UL (ref 3.88–5.62)
SODIUM SERPL-SCNC: 137 MMOL/L (ref 135–147)
WBC # BLD AUTO: 12.73 THOUSAND/UL (ref 4.31–10.16)

## 2024-08-24 PROCEDURE — 83735 ASSAY OF MAGNESIUM: CPT | Performed by: STUDENT IN AN ORGANIZED HEALTH CARE EDUCATION/TRAINING PROGRAM

## 2024-08-24 PROCEDURE — 99233 SBSQ HOSP IP/OBS HIGH 50: CPT | Performed by: STUDENT IN AN ORGANIZED HEALTH CARE EDUCATION/TRAINING PROGRAM

## 2024-08-24 PROCEDURE — 97167 OT EVAL HIGH COMPLEX 60 MIN: CPT

## 2024-08-24 PROCEDURE — 97163 PT EVAL HIGH COMPLEX 45 MIN: CPT

## 2024-08-24 PROCEDURE — 80048 BASIC METABOLIC PNL TOTAL CA: CPT | Performed by: STUDENT IN AN ORGANIZED HEALTH CARE EDUCATION/TRAINING PROGRAM

## 2024-08-24 PROCEDURE — 82948 REAGENT STRIP/BLOOD GLUCOSE: CPT

## 2024-08-24 PROCEDURE — 85027 COMPLETE CBC AUTOMATED: CPT | Performed by: STUDENT IN AN ORGANIZED HEALTH CARE EDUCATION/TRAINING PROGRAM

## 2024-08-24 RX ADMIN — AMPICILLIN SODIUM AND SULBACTAM SODIUM 3 G: 100; 50 INJECTION, POWDER, FOR SOLUTION INTRAVENOUS at 12:47

## 2024-08-24 RX ADMIN — PIPERACILLIN AND TAZOBACTAM 4.5 G: 36; 4.5 INJECTION, POWDER, FOR SOLUTION INTRAVENOUS at 04:45

## 2024-08-24 RX ADMIN — APIXABAN 5 MG: 5 TABLET, FILM COATED ORAL at 08:27

## 2024-08-24 RX ADMIN — ASPIRIN 81 MG: 81 TABLET, CHEWABLE ORAL at 08:27

## 2024-08-24 RX ADMIN — INSULIN LISPRO 1 UNITS: 100 INJECTION, SOLUTION INTRAVENOUS; SUBCUTANEOUS at 17:00

## 2024-08-24 RX ADMIN — APIXABAN 5 MG: 5 TABLET, FILM COATED ORAL at 17:20

## 2024-08-24 RX ADMIN — AMPICILLIN SODIUM AND SULBACTAM SODIUM 3 G: 100; 50 INJECTION, POWDER, FOR SOLUTION INTRAVENOUS at 23:52

## 2024-08-24 RX ADMIN — AMPICILLIN SODIUM AND SULBACTAM SODIUM 3 G: 100; 50 INJECTION, POWDER, FOR SOLUTION INTRAVENOUS at 18:27

## 2024-08-24 RX ADMIN — COLLAGENASE SANTYL: 250 OINTMENT TOPICAL at 08:28

## 2024-08-24 RX ADMIN — INSULIN LISPRO 1 UNITS: 100 INJECTION, SOLUTION INTRAVENOUS; SUBCUTANEOUS at 12:49

## 2024-08-24 RX ADMIN — ATORVASTATIN CALCIUM 80 MG: 40 TABLET, FILM COATED ORAL at 17:20

## 2024-08-24 NOTE — CASE MANAGEMENT
Case Management Discharge Planning Note    Patient name Drew Santos  Location 2 [facility] 267/2 E 267-01 MRN [id number]  : 1950 Date 2024       Current Admission Date: 2024  Current Admission Diagnosis:Sacral wound   Patient Active Problem List    Diagnosis Date Noted Date Diagnosed    Sacral wound 2024     Primary hypertension 2024     Mixed hyperlipidemia 2024     Atrial fibrillation (HCC) 2024     Stroke (cerebrum) (HCC) 2024     Urinary retention 2024     Syncope 2024     Elevated lactic acid level 2024     Diabetes (HCC) 2024     Leukocytosis 2024     Abnormal CPK 2024       LOS (days): 1  Geometric Mean LOS (GMLOS) (days):   Days to GMLOS:     OBJECTIVE:  Risk of Unplanned Readmission Score: 10.79         Current admission status: Inpatient   Preferred Pharmacy:   [id number] [place], [place] - [address]  [address] [address]  Phone: [phone] Fax: [phone]    Primary Care Provider: Joe Lyon MD    [facility]  Secondary Insurance:     DISCHARGE DETAILS:                                          Other Referral/Resources/Interventions Provided:  Referral Comments: [facility]  [facility] for an update and to see if they needed any info from us prior to finding a facility.  Awaiting call back

## 2024-08-24 NOTE — ASSESSMENT & PLAN NOTE
Does have residual left trapezius, dysphagia, cognitive decline.  Currently on dysphagia diet with mechanical altered with nectar thick liquids.  Maintain on aspiration precaution since patient was pocketing food as per wife.  Follow-up with speech reevaluation.

## 2024-08-24 NOTE — PLAN OF CARE
Problem: OCCUPATIONAL THERAPY ADULT  Goal: Performs self-care activities at highest level of function for planned discharge setting.  See evaluation for individualized goals.  Description: Treatment Interventions: ADL retraining, Functional transfer training, UE strengthening/ROM, Endurance training, Cognitive reorientation, Patient/family training, Equipment evaluation/education, Compensatory technique education, Continued evaluation, Neuromuscular reeducation, Energy conservation          See flowsheet documentation for full assessment, interventions and recommendations.   Note: Limitation: Decreased ADL status, Decreased UE strength, Decreased Safe judgement during ADL, Decreased UE ROM, Decreased cognition, Decreased endurance, Decreased self-care trans, Decreased high-level ADLs, Non-func L UE  Prognosis: Good  Assessment: Patient is a 73 y.o. male seen for OT evaluation s/p admit to [facility] on 8/23/2024 w/Sacral wound. Commorbidities affecting patient's functional performance at time of assessment include: HTN, atrial fibrillation, h/o of stroke with residual left hemiparesis, Diabetes, patient presented to ED with concern for worsening sacral wound and possible superimposed infection.   Orders placed for OT evaluation and treatment.  Performed at least two patient identifiers during session including name and wristband.  Prior to admission, Patient unable to provide detailed history secondary to cognitive deficits. Chart review indicates he was independent prior to March / 2024, since then chart indicates he had a rehab stay in Acute rehab and [facility] facility, and has been non ambulatory. Further clarification is needed.  Personal factors affecting patient at time of initial evaluation include: steps to enter, limited insight into deficits, decreased initiation and engagement, difficulty performing ADLs, and difficulty performing IADLs. Upon evaluation, patient requires moderate and maximal  assist for UB ADLs, total assist for LB ADLs.  Occupational performance is affected by the following deficits: orientation, decreased functional use of BUEs, in hand manipulation deficit with impaired reach, grasp and coordination, limited active ROM, decreased muscle strength, impaired gross motor coordination, impaired fine motor coordination, dynamic sit/ stand balance deficit with poor standing tolerance time for self care and functional mobility, decreased activity tolerance, decreased safety awareness, delayed righting and equilibrium reactions, and postural control and postural alignment deficit, requiring external assistance to complete transitional movements.  Patient to benefit from continued Occupational Therapy treatment while in the hospital to address deficits as defined above and maximize level of functional independence with ADLs and functional mobility. Occupational Performance areas to address include: grooming , bathing/ shower, dressing, toilet hygiene, transfer to all surfaces, functional mobility, and Leisure Participation. From OT standpoint, recommendation at time of d/c would be Level 2.     Rehab Resource Intensity Level, OT: II (Moderate Resource Intensity)

## 2024-08-24 NOTE — PROGRESS NOTES
[facility]  Progress Note  Name: Drew Santos I  MRN: [id number]  Unit/Bed#: 2 E 267-01 I Date of Admission: 8/23/2024   Date of Service: 8/24/2024 I Hospital Day: 1    Assessment & Plan   * Sacral wound  Assessment & Plan  74 yo male pt with Pmhx of CVA, left hemiparesis, cognitive decline, dysphagia currently on mechanical altered diet with nectar thick liquids, patient was at [facility] rehab for 2 weeks subsequently evaluated seatbelts for 2 weeks and discharged home on 8/17/2024.  Wife reports that he had developed a wound while he was at local rehab however wound has progressed.  Has sacral decubitus ulcer that is appears to be worsening with superimposed infection given leukocytosis.  Patient was given IV Zosyn earlier.  Stool is contaminating area of ulcer and rectal tube was placed however it was removed since her stool is not watery as per nursing staff.      Currently he is afebrile, hemodynamically stable.  Surgery recommendation appreciated.  Transition to IV Unasyn day #1.  Continue with surgery recommendation as well as wound care recommendation.  Continue supportive care.  Maintain pressure offloading measures.      Primary hypertension  Assessment & Plan  Adequately controlled  Continue home meds    Atrial fibrillation (HCC)  Assessment & Plan  Rate controlled  Continue home meds  On eliquis for anticoagulation    Stroke (cerebrum) ([facility])  Assessment & Plan  Does have residual left trapezius, dysphagia, cognitive decline.  Currently on dysphagia diet with mechanical altered with nectar thick liquids.  Maintain on aspiration precaution since patient was pocketing food as per wife.  Follow-up with speech reevaluation.        Diabetes (HCC)  Assessment & Plan  Lab Results   Component Value Date    HGBA1C 6.0 (H) 07/04/2024       Recent Labs     08/23/24  1703 08/23/24  2119 08/24/24  0715 08/24/24  1107   POCGLU 146* 121 131 152*       Blood Sugar Average: Last 72 hrs:  (P) 137.5Well  controlled  Start sliding scale  Monitor glucose levels                 VTE Pharmacologic Prophylaxis:   Moderate Risk (Score 3-4) - Pharmacological DVT Prophylaxis Ordered: apixaban (Eliquis).    Mobility:   Basic Mobility Inpatient Raw Score: 9  -[facility] Goal: 3: Sit at edge of bed  -[facility] Achieved: 4: Move to chair/commode      Patient Centered Rounds: I performed bedside rounds with nursing staff today.   Discussions with Specialists or Other Care Team Provider: Surgery recommendations noted    Education and Discussions with Family / Patient: Updated  (wife) via phone.    Total Time Spent on Date of Encounter in care of patient: 50 mins. This time was spent on one or more of the following: performing physical exam; counseling and coordination of care; obtaining or reviewing history; documenting in the medical record; reviewing/ordering tests, medications or procedures; communicating with other healthcare professionals and discussing with patient's family/caregivers.    Current Length of Stay: 1 day(s)  Current Patient Status: Inpatient   Certification Statement: The patient will continue to require additional inpatient hospital stay due to currently on IV antibiotics.  Discharge Plan: Anticipate discharge in 48 hrs to discharge location to be determined pending rehab evaluations.    Code Status: Level 1 - Full Code    Subjective:   Seen during a.m. rounds.  Patient appears comfortable nondistressed.  Seen sitting in a chair.  Currently appears comfortable not in distress.  Poor historian.  Denies any new complaints.    Objective:     Vitals:   Temp (24hrs), Av.1 °F (36.7 °C), Min:97.9 °F (36.6 °C), Max:98.3 °F (36.8 °C)    Temp:  [97.9 °F (36.6 °C)-98.3 °F (36.8 °C)] 98.1 °F (36.7 °C)  HR:  [] 105  Resp:  [16-18] 18  BP: (108-132)/(63-82) 126/77  SpO2:  [92 %-99 %] 99 %  Body mass index is 32.75 kg/m².     Input and Output Summary (last 24 hours):     Intake/Output Summary (Last 24 hours)  at 8/24/2024 1302  Last data filed at 8/24/2024 0900  Gross per 24 hour   Intake 450 ml   Output 950 ml   Net -500 ml       Physical Exam:   Physical Exam  Constitutional:       General: He is not in acute distress.     Appearance: Normal appearance. He is not ill-appearing, toxic-appearing or diaphoretic.   HENT:      Head: Normocephalic and atraumatic.   Cardiovascular:      Rate and Rhythm: Normal rate.      Pulses: Normal pulses.   Pulmonary:      Effort: Pulmonary effort is normal. No respiratory distress.      Breath sounds: Normal breath sounds. No wheezing.   Abdominal:      General: Bowel sounds are normal. There is no distension.      Palpations: Abdomen is soft.      Tenderness: There is no abdominal tenderness.   Skin:     Comments: Sacral wound noted.   Neurological:      Mental Status: He is alert. Mental status is at baseline. He is disoriented.      Comments: Patient is awake, alert, oriented to self, place, poor insight.  He is cooperative with exam.  Does have residual left hemiparesis since recent stroke.   Psychiatric:         Mood and Affect: Mood normal.         Behavior: Behavior normal.          Additional Data:     Labs:  Results from last 7 days   Lab Units 08/24/24  0452 08/23/24  1359   WBC Thousand/uL 12.73* 13.99*   HEMOGLOBIN g/dL 11.7* 11.7*   HEMATOCRIT % 35.7* 36.9   PLATELETS Thousands/uL 339 349   SEGS PCT %  --  75   LYMPHO PCT %  --  18   MONO PCT %  --  6   EOS PCT %  --  1     Results from last 7 days   Lab Units 08/24/24  0452 08/23/24  1359 08/22/24  0937   SODIUM mmol/L 137   < > 139   POTASSIUM mmol/L 3.7   < > 4.6   CHLORIDE mmol/L 104   < > 103   CO2 mmol/L 26   < > 27   BUN mg/dL 20   < > 13   CREATININE mg/dL 0.91   < > 0.78   ANION GAP mmol/L 7   < > 9   CALCIUM mg/dL 8.9   < > 8.9   ALBUMIN g/dL  --   --  2.8*   TOTAL BILIRUBIN mg/dL  --   --  0.9   ALK PHOS U/L  --   --  101   ALT U/L  --   --  22   AST U/L  --   --  20   GLUCOSE RANDOM mg/dL 130   < > 123*    < > =  values in this interval not displayed.         Results from last 7 days   Lab Units 08/24/24  1107 08/24/24  0715 08/23/24  2119 08/23/24  1703   POC GLUCOSE mg/dl 152* 131 121 146*               Lines/Drains:  Invasive Devices       Peripheral Intravenous Line  Duration             Peripheral IV 08/23/24 Distal;Right;Upper;Ventral (anterior) Arm <1 day                          Recent Cultures (last 7 days):         Last 24 Hours Medication List:   Current Facility-Administered Medications   Medication Dose Route Frequency Provider Last Rate    acetaminophen  650 mg Oral Q6H PRN Emir Antonio MD      ampicillin-sulbactam  3 g Intravenous Q6H Bib SMITH MD 3 g (08/24/24 1247)    apixaban  5 mg Oral BID Emir Antonio MD      aspirin  81 mg Oral Daily Emir Antonio MD      atorvastatin  80 mg Oral QPM Emir Antonio MD      calcium carbonate  1,000 mg Oral Daily PRN Emir Antonio MD      collagenase   Topical Daily Mell Askew PA-C      insulin lispro  1-6 Units Subcutaneous TID With Meals Emir Antonio MD      insulin lispro  1-6 Units Subcutaneous HS Emir Antonio MD      ondansetron  4 mg Intravenous Q6H PRN Emir Antonio MD          Today, Patient Was Seen By: Bib Damian MD    **Please Note: This note may have been constructed using a voice recognition system.**

## 2024-08-24 NOTE — OCCUPATIONAL THERAPY NOTE
Occupational Therapy Evaluation        Patient Name: Drew Santos  Today's Date: 8/24/2024 08/24/24 0756   OT Last Visit   OT Visit Date 08/24/24   Note Type   Note type Evaluation   Pain Assessment   Pain Assessment Tool 0-10   Pain Score No Pain   Restrictions/Precautions   Weight Bearing Precautions Per Order No   Braces or Orthoses Other (Comment)  (none reported)   Other Precautions Chair Alarm;Bed Alarm;Cognitive;Fall Risk;Limb alert  (Left hemiparesis)   Home Living   Type of Home House   Home Layout Two level;Bed/bath upstairs;Stairs to enter with rails  (6 BELLE full flight to second floor bedroom)   Bathroom Shower/Tub Tub/shower unit   Bathroom Toilet Standard   Bathroom Equipment Other (Comment)  (none reported)   Bathroom Accessibility Accessible   Home Equipment   (Unknown- patient unable to articulate)   Prior Function   Level of [place] Needs assistance with ADLs;Needs assistance with IADLS   Lives With Spouse   Receives Help From Family;Home health   IADLs Family/Friend/Other provides transportation;Family/Friend/Other provides meals;Family/Friend/Other provides medication management   Falls in the last 6 months   (unknown)   Vocational Retired   Lifestyle   Autonomy Patient unable to provide detailed history secondary to cognitive deficits. Chart review indicates he was independent prior to March / 2024, since then chart indicates he had a rehab stay in [facility]hab and [facility] facility, and has been non ambulatory. Further clarification is needed.   Reciprocal Relationships Supportive wife and family   General   Family/Caregiver Present No   ADL   Eating Assistance 5  Supervision/Setup   Eating Deficit Setup;Increased time to complete  (Patient was positioned OOB to Recliner Chair and demonstrated ability to feed self post tray set up utilizing right hand.)   Grooming Assistance 4  Minimal Assistance   UB Bathing Assistance 2  Maximal Assistance   LB Bathing Assistance 1  Total  Assistance   UB Dressing Assistance 2  Maximal Assistance   LB Dressing Assistance 1  Total Assistance   Toileting Assistance  2  Maximal Assistance   Toileting Deficit Use of bedpan/urinal setup  (urinal use bedside)   Functional Assistance 2  Maximal Assistance   Bed Mobility   Supine to Sit 2  Maximal assistance   Additional items Assist x 2;HOB elevated;Bedrails;Increased time required;Verbal cues;LE management   Transfers   Sit to Stand 2  Maximal assistance   Additional items Assist x 2;Increased time required;Verbal cues   Stand to Sit 2  Maximal assistance   Additional items Assist x 2;Increased time required;Verbal cues   Functional Mobility   Functional Mobility 2  Maximal assistance   Additional Comments assist of 2 to stand and maintain supported standing position/ able to transfer OOB using Mariann stedy mechanical conveyance   Additional items   (Mariann stedy)   Balance   Static Sitting Fair -   Dynamic Sitting Poor +   Static Standing Poor -   Activity Tolerance   Activity Tolerance Patient limited by fatigue   Medical Staff Made Aware Pt seen as a co-eval with PT due to the patient's co-morbidities, clinically unstable presentation, and present impairments which are a regression from the patient's baseline.   RUE Assessment   RUE Assessment WFL   LUE Assessment   LUE Assessment X  (no active functional use of LUE/ PROM WFL/  (+) hypotonicity)   Hand Function   Gross Motor Coordination Impaired   Fine Motor Coordination Impaired   Hand Function Comments Right hand functional use   Vision-Basic Assessment   Current Vision Does not wear glasses   Psychosocial   Psychosocial (WDL) WDL   Cognition   Overall Cognitive Status Impaired   Arousal/Participation Alert;Responsive;Cooperative   Attention Attends with cues to redirect   Orientation Level Oriented to person;Oriented to place;Disoriented to time;Disoriented to situation   Memory Decreased short term memory;Decreased recall of biographical  information;Decreased recall of recent events   Following Commands Follows one step commands with increased time or repetition   Assessment   Limitation Decreased ADL status;Decreased UE strength;Decreased Safe judgement during ADL;Decreased UE ROM;Decreased cognition;Decreased endurance;Decreased self-care trans;Decreased high-level ADLs;Non-func L UE   Prognosis Good   Assessment Patient is a 73 y.o. male seen for OT evaluation s/p admit to [facility] on 8/23/2024 w/Sacral wound. Commorbidities affecting patient's functional performance at time of assessment include: HTN, atrial fibrillation, h/o of stroke with residual left hemiparesis, Diabetes, patient presented to ED with concern for worsening sacral wound and possible superimposed infection.   Orders placed for OT evaluation and treatment.  Performed at least two patient identifiers during session including name and wristband.  Prior to admission, Patient unable to provide detailed history secondary to cognitive deficits. Chart review indicates he was independent prior to March / 2024, since then chart indicates he had a rehab stay in Acute rehab and [facility] facility, and has been non ambulatory. Further clarification is needed.  Personal factors affecting patient at time of initial evaluation include: steps to enter, limited insight into deficits, decreased initiation and engagement, difficulty performing ADLs, and difficulty performing IADLs. Upon evaluation, patient requires moderate and maximal assist for UB ADLs, total assist for LB ADLs.  Occupational performance is affected by the following deficits: orientation, decreased functional use of BUEs, in hand manipulation deficit with impaired reach, grasp and coordination, limited active ROM, decreased muscle strength, impaired gross motor coordination, impaired fine motor coordination, dynamic sit/ stand balance deficit with poor standing tolerance time for self care and functional mobility,  decreased activity tolerance, decreased safety awareness, delayed righting and equilibrium reactions, and postural control and postural alignment deficit, requiring external assistance to complete transitional movements.  Patient to benefit from continued Occupational Therapy treatment while in the hospital to address deficits as defined above and maximize level of functional independence with ADLs and functional mobility. Occupational Performance areas to address include: grooming , bathing/ shower, dressing, toilet hygiene, transfer to all surfaces, functional mobility, and Leisure Participation. From OT standpoint, recommendation at time of d/c would be Level 2.   Plan   Treatment Interventions ADL retraining;Functional transfer training;UE strengthening/ROM;Endurance training;Cognitive reorientation;Patient/family training;Equipment evaluation/education;Compensatory technique education;Continued evaluation;Neuromuscular reeducation;Energy conservation   Goal Expiration Date 09/06/24   OT Frequency 3-5x/wk   Discharge Recommendation   Rehab Resource Intensity Level, OT II (Moderate Resource Intensity)   AM-PAC Daily Activity Inpatient   Lower Body Dressing 1   Bathing 1   Toileting 1   Upper Body Dressing 2   Grooming 2   Eating 3   Daily Activity Raw Score 10   AM-PAC Applied Cognition Inpatient   Following a Speech/Presentation 3   Understanding Ordinary Conversation 3   Taking Medications 1   Remembering Where Things Are Placed or Put Away 1   Remembering List of 4-5 Errands 1   Taking Care of Complicated Tasks 1   Applied Cognition Raw Score 10   Applied Cognition Standardized Score 24.98   Barthel Index   Feeding 5   Bathing 0   Grooming Score 0   Dressing Score 0   Bladder Score 5   Bowels Score 0   Toilet Use Score 0   Transfers (Bed/Chair) Score 5   Mobility (Level Surface) Score 0   Stairs Score 0   Barthel Index Score 15       Occupational Therapy goals:     1- Patient will complete self feeding task  with set up assist/ Mod I level  2- Patient will complete rolling to R/L with use of side rail and min assist x1.   3- Patient will increase OOB/ sitting tolerance to 2-4 hours per day for increased participation in self care and leisure tasks with no s/s of exertion.  4- Patient will verbalize and demonstrate weight shifting technique in bed and sitting position with 10% verbal cues  5- Patient will complete grooming task with set up assist.   6-Patient/ Family  will demonstrate competency with UE Home Exercise Program.  7- Patient will complete Interest checklist to explore participation in play/ leisure tasks for increased satisfaction and quality of life.

## 2024-08-24 NOTE — PLAN OF CARE
Problem: PHYSICAL THERAPY ADULT  Goal: Performs mobility at highest level of function for planned discharge setting.  See evaluation for individualized goals.  Description: Treatment/Interventions: ADL retraining, LE strengthening/ROM, Therapeutic exercise, Endurance training, Cognitive reorientation, Equipment eval/education, Patient/family training, Bed mobility, Gait training, Continued evaluation, Compensatory technique education, Spoke to nursing, OT          See flowsheet documentation for full assessment, interventions and recommendations.  Outcome: Progressing  Note: Prognosis: Fair  Problem List: Decreased strength, Decreased endurance, Impaired balance, Decreased mobility, Decreased coordination, Decreased cognition, Decreased safety awareness, Impaired tone  Assessment: Pt is 73 y.o. male seen for PT evaluation s/p admit to [facility] on 8/23/2024 w/ Sacral wound. PT consulted to assess pt's functional mobility and d/c needs. Order placed for PT eval and tx, w/ up as tolerated order. Comorbidities affecting pt's physical performance at time of assessment include: sacral wound, A-fib, stroke, diabetes, HTN. PTA, pt was requiring A for mobility, has 6 BELLE, and lives w/ family in two level house . Personal factors affecting pt at time of IE include: inaccessible home environment, lives in 2 story house, inability to ambulate household distances, inability to navigate level surfaces w/o external assistance, decreased cognition, decreased initiation and engagement, compliance, unable to perform physical activity, limited insight into impairments, inability to perform IADLs, inability to perform ADLs, and inability to live alone. Please find objective findings from PT assessment regarding body systems outlined above with impairments and limitations including weakness, decreased ROM, impaired balance, decreased endurance, impaired coordination, gait deviations, decreased activity tolerance, decreased  functional mobility tolerance, decreased safety awareness, impaired judgement, fall risk, and impaired tone. The following objective measures performed on IE also reveal limitations: Barthel Index: 15/100, Modified Radha: 4 (moderate/severe disability), and AM-PAC 6-Clicks: 9/24. Pt's clinical presentation is currently unstable/unpredictable seen in pt's presentation of of continued need of medical management and monitoring, decreased strength and balance, leading to an increased risk of falls, impaired tone on the L side, decreased endurance and activity tolerance limiting mobility. Pt to benefit from continued PT tx to address deficits as defined above and maximize level of functional independent mobility and consistency. From PT/mobility standpoint, recommendation at time of d/c would be Level 2 Moderate resource post acute rehabilitation services pending progress in order to facilitate return to PLOF.  Barriers to Discharge: Inaccessible home environment     Rehab Resource Intensity Level, PT: II (Moderate Resource Intensity)    See flowsheet documentation for full assessment.

## 2024-08-24 NOTE — PHYSICAL THERAPY NOTE
Physical Therapy Evaluation     Patient's Name: Drew Santos    Admitting Diagnosis  History of CVA (cerebrovascular accident) [Z86.73]  Visit for wound check [Z51.89]  Sacral decubitus ulcer, stage III (HCC) [L89.153]    Problem List  Patient Active Problem List   Diagnosis    Syncope    Elevated lactic acid level    Diabetes (HCC)    Leukocytosis    Abnormal CPK    Stroke (cerebrum) (HCC)    Urinary retention    Atrial fibrillation (HCC)    Primary hypertension    Mixed hyperlipidemia    Sacral wound       Past Medical History  Past Medical History:   Diagnosis Date    Diabetes mellitus (HCC)     Hypertension        Past Surgical History  No past surgical history on file.       08/24/24 0757   PT Last Visit   PT Visit Date 08/24/24   Note Type   Note type Evaluation   Pain Assessment   Pain Assessment Tool 0-10   Pain Score No Pain   [place] FACES Pain Rating 0   Pain Location/Orientation Location: Generalized   Restrictions/Precautions   Weight Bearing Precautions Per Order No   Braces or Orthoses Other (Comment)  (None reported)   Other Precautions Chair Alarm;Bed Alarm;Cognitive;Fall Risk;Limb alert  (Left hemiparesis)   Home Living   Type of Home House   Home Layout Two level;Bed/bath upstairs;Stairs to enter with rails  (6 BELLE, full flight to second bedroom)   Bathroom Shower/Tub Tub/shower unit   Bathroom Toilet Standard   Bathroom Equipment Other (Comment)  (none reported)   Bathroom Accessibility Accessible   Home Equipment Other (Comment)  (unknown patient unable to articulate)   Prior Function   Level of [place] Needs assistance with ADLs;Needs assistance with IADLS   Lives With Spouse   Receives Help From Family;Home health   IADLs Family/Friend/Other provides transportation;Family/Friend/Other provides meals;Family/Friend/Other provides medication management   Falls in the last 6 months   (unknown)   Vocational Retired   Cognition   Overall Cognitive Status Impaired   Arousal/Participation  Persistent stimuli required   Attention Attends with cues to redirect   Orientation Level Oriented to person;Oriented to place;Disoriented to time;Disoriented to situation   Memory Decreased short term memory;Decreased recall of biographical information;Decreased recall of recent events   Following Commands Follows one step commands with increased time or repetition   RLE Assessment   RLE Assessment WFL   LLE Assessment   LLE Assessment   (no active functional use of LUE/ PROM WFL/ (+) hypotonicity)   Vision-Basic Assessment   Current Vision Does not wear glasses   Bed Mobility   Supine to Sit 2  Maximal assistance   Additional items Assist x 2;HOB elevated   Transfers   Sit to Stand 1  Dependent   Additional items Assist x 2;Increased time required;Verbal cues   Stand to Sit 2  Maximal assistance   Additional items Assist x 2;Increased time required;Verbal cues   Additional Comments Mariann steady used for facilitating standing. PT/OT helped patient stand 3x.   Ambulation/Elevation   Gait pattern Not appropriate  (Pt was unable to stand fully so patient is unable to be assessed for ambulation.)   Balance   Static Sitting Fair -   Dynamic Sitting Poor +   Static Standing Poor -   Activity Tolerance   Activity Tolerance Patient limited by fatigue   Medical Staff Made Aware Pt seen as co-evaluation with OT Joann due to patient's co-morbidities, clinically unstable presentation, and present impairments which are a regression from the patient's baseline.   Nurse Made Aware RN Sunitha was made aware and told that patient should be brought back to bed in 2 hours.   Assessment   Prognosis Fair   Problem List Decreased strength;Decreased endurance;Impaired balance;Decreased mobility;Decreased coordination;Decreased cognition;Decreased safety awareness;Impaired tone   Assessment Pt is 73 y.o. male seen for PT evaluation s/p admit to [facility] on 8/23/2024 w/ Sacral wound. PT consulted to assess pt's functional mobility and  d/c needs. Order placed for PT eval and tx, w/ up as tolerated order. Comorbidities affecting pt's physical performance at time of assessment include: sacral wound, A-fib, stroke, diabetes, HTN. PTA, pt was requiring A for mobility, has 6 BELLE, and lives w/ family in two level house . Personal factors affecting pt at time of IE include: inaccessible home environment, lives in 2 story house, inability to ambulate household distances, inability to navigate level surfaces w/o external assistance, decreased cognition, decreased initiation and engagement, compliance, unable to perform physical activity, limited insight into impairments, inability to perform IADLs, inability to perform ADLs, and inability to live alone. Please find objective findings from PT assessment regarding body systems outlined above with impairments and limitations including weakness, decreased ROM, impaired balance, decreased endurance, impaired coordination, gait deviations, decreased activity tolerance, decreased functional mobility tolerance, decreased safety awareness, impaired judgement, fall risk, and impaired tone. The following objective measures performed on IE also reveal limitations: Barthel Index: 15/100, Modified [place]: 4 (moderate/severe disability), and AM-PAC 6-Clicks: 9/24. Pt's clinical presentation is currently unstable/unpredictable seen in pt's presentation of of continued need of medical management and monitoring, decreased strength and balance, leading to an increased risk of falls, impaired tone on the L side, decreased endurance and activity tolerance limiting mobility. Pt to benefit from continued PT tx to address deficits as defined above and maximize level of functional independent mobility and consistency. From PT/mobility standpoint, recommendation at time of d/c would be Level 2 Moderate resource post acute rehabilitation services pending progress in order to facilitate return to PLOF.   Barriers to Discharge  Inaccessible home environment   Goals   Patient Goals In 10 days: Increase bilateral LE strength 1/2 grade to facilitate independent mobility, Perform all bed mobility tasks with max A of 1 to decrease caregiver burden, Perform all transfers with max A of 1 to improve independence, Ambulate > 5 ft. with RW with max A of 1 w/o LOB and w/ normalized gait pattern 100% of the time, Navigate 6 stairs with max A of 1 with unilateral handrail to facilitate return to previous living environment, and Increase all balance 1 grade to decrease risk for falls   STG Expiration Date 09/03/24   Plan   Treatment/Interventions ADL retraining;LE strengthening/ROM;Therapeutic exercise;Endurance training;Cognitive reorientation;Equipment eval/education;Patient/family training;Bed mobility;Gait training;Continued evaluation;Compensatory technique education;Spoke to nursing;OT   PT Frequency 3-5x/wk   Discharge Recommendation   Rehab Resource Intensity Level, PT II (Moderate Resource Intensity)   AM-PAC Basic Mobility Inpatient   Turning in Flat Bed Without Bedrails 2   Lying on Back to Sitting on Edge of Flat Bed Without Bedrails 2   Moving Bed to Chair 1   Standing Up From Chair Using Arms 2   Walk in Room 1   Climb 3-5 Stairs With Railing 1   Basic Mobility Inpatient Raw Score 9   [facility] Highest Level Of Mobility   -HL Goal 3: Sit at edge of bed   -HL Achieved 4: Move to chair/commode   Modified Radha Scale   Modified [place] Scale 4   Barthel Index   Feeding 5   Bathing 0   Grooming Score 0   Dressing Score 0   Bladder Score 5   Bowels Score 0   Toilet Use Score 0   Transfers (Bed/Chair) Score 5   Mobility (Level Surface) Score 0   Stairs Score 0   Barthel Index Score 15         Venkat Carnes, PT

## 2024-08-24 NOTE — UTILIZATION REVIEW
Initial Clinical Review    Admission: Date/Time/Statement:   Admission Orders (From admission, onward)       Ordered        08/23/24 1450  INPATIENT ADMISSION  Once            08/23/24 1450  Inpatient Admission  Once                          Orders Placed This Encounter   Procedures    INPATIENT ADMISSION     Standing Status:   Standing     Number of Occurrences:   1     Order Specific Question:   Level of Care     Answer:   Med Surg [16]     Order Specific Question:   Estimated length of stay     Answer:   More than 2 Midnights     Order Specific Question:   Certification     Answer:   I certify that inpatient services are medically necessary for this patient for a duration of greater than two midnights. See H&P and MD Progress Notes for additional information about the patient's course of treatment.    Inpatient Admission     Standing Status:   Standing     Number of Occurrences:   1     Order Specific Question:   Level of Care     Answer:   Med Surg [16]     Order Specific Question:   Estimated length of stay     Answer:   More than 2 Midnights     Order Specific Question:   Certification     Answer:   I certify that inpatient services are medically necessary for this patient for a duration of greater than two midnights. See H&P and MD Progress Notes for additional information about the patient's course of treatment.     ED Arrival Information       Expected   -    Arrival   8/23/2024 13:31    Acuity   Urgent              Means of arrival   Ambulance    Escorted by   [facility]    Service   Hospitalist    Admission type   Emergency              Arrival complaint   wound check             Chief Complaint   Patient presents with    Wound Check     BIBA from home, Visiting nurse called EMS for pressure wound eval on the patient's sacral area. Noted a stage 3 wound on triage.       Initial Presentation: 73 y.o. male presents to ED  via  EMS  from home at   recommendation with worsening sacral wound, possible   superimposed infection.  PMH  is  bed bound due to CVA,  Left sided  hemiparesis, HTN,  DM, Afib.  Admit  Ip with  Sacral wound  and plan is   CONSUELO, surgery consult, wound care consult, monitor labs, rectal tube  for better hygiene and continue home meds.    Surgery consult  Continue  CONSUELO  and rectal tube.  Reposition  Q  2 hrs.   Need  daily wound care.        Anticipated Length of Stay/Certification Statement:  Patient will be admitted on an inpatient basis with an anticipated length of stay of greater than 2 midnights secondary to sacral wound.     Date:   8/24   Day 2:   Continue  PT/OT.   Remain on  CONSUELO.    Rectal tube removed.   Seems   nondistressed.  Awake, alert, oriented to self, place, poor insight.   Residual left hemiparesis.     Date:   8/25  Day 3: Has surpassed a 2nd midnight with active treatments and services.  Continue  CONSUELO.  Parra catheter placed  8/24  and draining clear yellow  urine.    Continue  PT/OT, needs rehab at  d/c.  Poor historian/poor insight.   Residual left sided hemiparesis.  Continue  current meds.        ED Triage Vitals   Temperature Pulse Respirations Blood Pressure SpO2 Pain Score   08/23/24 1345 08/23/24 1345 08/23/24 1345 08/23/24 1345 08/23/24 1345 08/23/24 1641   98.3 °F (36.8 °C) 89 16 118/67 98 % No Pain     Weight (last 2 days)       Date/Time Weight    08/23/24 1530 122 (268.96)            Vital Signs (last 3 days)       Date/Time Temp Pulse Resp BP MAP (mmHg) SpO2 O2 Device Patient Position - Orthostatic VS Pain    08/24/24 1408 -- -- -- -- -- 96 % None (Room air) -- --    08/24/24 0900 -- -- -- -- -- -- -- -- No Pain    08/24/24 0757 -- -- -- -- -- -- -- -- No Pain    08/24/24 0756 -- -- -- -- -- -- -- -- No Pain    08/24/24 0700 98.1 °F (36.7 °C) 105 -- 126/77 96 99 % None (Room air) Lying --    08/23/24 22:04:21 97.9 °F (36.6 °C) 93 18 108/63 78 95 % None (Room air) Lying --    08/23/24 2117 -- -- -- -- -- -- -- -- 3    08/23/24 1946 -- -- -- -- -- -- -- -- No Pain     08/23/24 19:18:50 98.2 °F (36.8 °C) 80 18 116/75 89 95 % None (Room air) Lying --    08/23/24 1641 98 °F (36.7 °C) 83 17 116/76 86 94 % None (Room air) Lying No Pain    08/23/24 1530 98.3 °F (36.8 °C) 82 16 132/79 100 94 % None (Room air) Lying --    08/23/24 1500 -- 53 16 118/82 96 92 % None (Room air) Lying --    08/23/24 1345 98.3 °F (36.8 °C) 89 16 118/67 84 98 % None (Room air) Lying --              Pertinent Labs/Diagnostic Test Results:   Radiology:        Results from last 7 days   Lab Units 08/24/24  0452 08/23/24  1359   WBC Thousand/uL 12.73* 13.99*   HEMOGLOBIN g/dL 11.7* 11.7*   HEMATOCRIT % 35.7* 36.9   PLATELETS Thousands/uL 339 349   TOTAL NEUT ABS Thousands/µL  --  10.40*         Results from last 7 days   Lab Units 08/24/24  0452 08/23/24  1359 08/22/24  0937   SODIUM mmol/L 137 137 139   POTASSIUM mmol/L 3.7 4.3 4.6   CHLORIDE mmol/L 104 103 103   CO2 mmol/L 26 27 27   ANION GAP mmol/L 7 7 9   BUN mg/dL 20 15 13   CREATININE mg/dL 0.91 0.98 0.78   EGFR ml/min/1.73sq m 82 75 93   CALCIUM mg/dL 8.9 9.3 8.9   MAGNESIUM mg/dL 1.9  --   --      Results from last 7 days   Lab Units 08/22/24  0937   AST U/L 20   ALT U/L 22   ALK PHOS U/L 101   TOTAL PROTEIN g/dL 6.2*   ALBUMIN g/dL 2.8*   TOTAL BILIRUBIN mg/dL 0.9     Results from last 7 days   Lab Units 08/24/24  1107 08/24/24  0715 08/23/24  2119 08/23/24  1703   POC GLUCOSE mg/dl 152* 131 121 146*     Results from last 7 days   Lab Units 08/24/24  0452 08/23/24  1359 08/22/24  0937   GLUCOSE RANDOM mg/dL 130 158* 123*                   ED Treatment-Medication Administration from 08/23/2024 1331 to 08/23/2024 1606         Date/Time Order Dose Route Action     08/23/2024 1434 ceftriaxone (ROCEPHIN) 1 g/50 mL in dextrose IVPB 1,000 mg Intravenous New Bag              Present on Admission:   Atrial fibrillation (HCC)   Diabetes (HCC)   Primary hypertension   Stroke (cerebrum) (HCC)      Admitting Diagnosis: History of CVA (cerebrovascular accident)  [Z86.73]  Visit for wound check [Z51.89]  Sacral decubitus ulcer, stage III (HCC) [L89.153]  Age/Sex: 73 y.o. male  Admission Orders:  Scheduled Medications:  ampicillin-sulbactam, 3 g, Intravenous, Q6H  apixaban, 5 mg, Oral, BID  aspirin, 81 mg, Oral, Daily  atorvastatin, 80 mg, Oral, QPM  collagenase, , Topical, Daily  insulin lispro, 1-6 Units, Subcutaneous, TID With Meals  insulin lispro, 1-6 Units, Subcutaneous, HS      Continuous IV Infusions:     PRN Meds:  acetaminophen, 650 mg, Oral, Q6H PRN  calcium carbonate, 1,000 mg, Oral, Daily PRN  ondansetron, 4 mg, Intravenous, Q6H PRN        IP CONSULT TO CASE MANAGEMENT  IP CONSULT TO ACUTE CARE SURGERY    Network Utilization Review Department  ATTENTION: Please call with any questions or concerns to [phone] and carefully listen to the prompts so that you are directed to the right person. All voicemails are confidential.   For Discharge needs, contact Care Management DC Support Team at [phone] opt. [phone]  Send all requests for admission clinical reviews, approved or denied determinations and any other requests to dedicated fax number below belonging to the campus where the patient is receiving treatment. List of dedicated fax numbers for the Facilities:  FACILITY NAME UR FAX NUMBER   ADMISSION DENIALS (Administrative/Medical Necessity) [phone]   DISCHARGE SUPPORT TEAM (NETWORK) [phone]   PARENT CHILD HEALTH (Maternity/NICU/Pediatrics) [phone]   [facility] [phone]   [facility] [phone]   [facility] [phone]   [facility] [phone]   [facility] [phone]   [facility] [phone]   [facility] [phone]   [facility] [phone]   [facility] [phone]   [facility]  [facility] [phone]   [facility] [phone]   [facility] [phone]

## 2024-08-24 NOTE — PLAN OF CARE
Problem: PAIN - ADULT  Goal: Verbalizes/displays adequate comfort level or baseline comfort level  Description: Interventions:  - Encourage patient to monitor pain and request assistance  - Assess pain using appropriate pain scale  - Administer analgesics based on type and severity of pain and evaluate response  - Implement non-pharmacological measures as appropriate and evaluate response  - Consider cultural and social influences on pain and pain management  - Notify physician/advanced practitioner if interventions unsuccessful or patient reports new pain  Outcome: Progressing     Problem: INFECTION - ADULT  Goal: Absence or prevention of progression during hospitalization  Description: INTERVENTIONS:  - Assess and monitor for signs and symptoms of infection  - Monitor lab/diagnostic results  - Monitor all insertion sites, i.e. indwelling lines, tubes, and drains  - Monitor endotracheal if appropriate and nasal secretions for changes in amount and color  - [place] appropriate cooling/warming therapies per order  - Administer medications as ordered  - Instruct and encourage patient and family to use good hand hygiene technique  - Identify and instruct in appropriate isolation precautions for identified infection/condition  Outcome: Progressing  Goal: Absence of fever/infection during neutropenic period  Description: INTERVENTIONS:  - Monitor WBC    Outcome: Progressing     Problem: SAFETY ADULT  Goal: Patient will remain free of falls  Description: INTERVENTIONS:  - Educate patient/family on patient safety including physical limitations  - Instruct patient to call for assistance with activity   - Consult OT/PT to assist with strengthening/mobility   - Keep Call bell within reach  - Keep bed low and locked with side rails adjusted as appropriate  - Keep care items and personal belongings within reach  - Initiate and maintain comfort rounds  - Make Fall Risk Sign visible to staff  - Apply yellow socks and bracelet  for high fall risk patients  - Consider moving patient to room near nurses station  Outcome: Progressing  Goal: Maintain or return to baseline ADL function  Description: INTERVENTIONS:  -  Assess patient's ability to carry out ADLs; assess patient's baseline for ADL function and identify physical deficits which impact ability to perform ADLs (bathing, care of mouth/teeth, toileting, grooming, dressing, etc.)  - Assess/evaluate cause of self-care deficits   - Assess range of motion  - Assess patient's mobility; develop plan if impaired  - Assess patient's need for assistive devices and provide as appropriate  - Encourage maximum independence but intervene and supervise when necessary  - Involve family in performance of ADLs  - Assess for home care needs following discharge   - Consider OT consult to assist with ADL evaluation and planning for discharge  - Provide patient education as appropriate  Outcome: Progressing  Goal: Maintains/Returns to pre admission functional level  Description: INTERVENTIONS:  - Perform AM-PAC 6 Click Basic Mobility/ Daily Activity assessment daily.  - Set and communicate daily mobility goal to care team and patient/family/caregiver.   - Collaborate with rehabilitation services on mobility goals if consulted  - Out of bed for toileting  - Record patient progress and toleration of activity level   Outcome: Progressing     Problem: DISCHARGE PLANNING  Goal: Discharge to home or other facility with appropriate resources  Description: INTERVENTIONS:  - Identify barriers to discharge w/patient and caregiver  - Arrange for needed discharge resources and transportation as appropriate  - Identify discharge learning needs (meds, wound care, etc.)  - Arrange for interpretive services to assist at discharge as needed  - Refer to Case Management Department for coordinating discharge planning if the patient needs post-hospital services based on physician/advanced practitioner order or complex needs  related to functional status, cognitive ability, or social support system  Outcome: Progressing     Problem: Knowledge Deficit  Goal: Patient/family/caregiver demonstrates understanding of disease process, treatment plan, medications, and discharge instructions  Description: Complete learning assessment and assess knowledge base.  Interventions:  - Provide teaching at level of understanding  - Provide teaching via preferred learning methods  Outcome: Progressing     Problem: Neurological Deficit  Goal: Neurological status is stable or improving  Description: Interventions:  - Monitor and assess patient's level of consciousness, motor function, sensory function, and level of assistance needed for ADLs.   - Monitor and report changes from baseline. Collaborate with interdisciplinary team to initiate plan and implement interventions as ordered.   - Provide and maintain a safe environment.  - Consider seizure precautions.  - Consider fall precautions.  - Consider aspiration precautions.  - Consider bleeding precautions.  Outcome: Progressing     Problem: Activity Intolerance/Impaired Mobility  Goal: Mobility/activity is maintained at optimum level for patient  Description: Interventions:  - Assess and monitor patient  barriers to mobility and need for assistive/adaptive devices.  - Assess patient's emotional response to limitations.  - Collaborate with interdisciplinary team and initiate plans and interventions as ordered.  - Encourage independent activity per ability.  - Maintain proper body alignment.  - Perform active/passive rom as tolerated/ordered.  - Plan activities to conserve energy.  - Turn patient as appropriate  Outcome: Progressing     Problem: Communication Impairment  Goal: Ability to express needs and understand communication  Description: Assess patient's communication skills and ability to understand information.  Patient will demonstrate use of effective communication techniques, alternative methods  of communication and understanding even if not able to speak.     - Encourage communication and provide alternate methods of communication as needed.  - Collaborate with case management/ for discharge needs.  - Include patient/family/caregiver in decisions related to communication.  Outcome: Progressing     Problem: Potential for Aspiration  Goal: Non-ventilated patient's risk of aspiration is minimized  Description: Assess and monitor vital signs, respiratory status, and labs (WBC).  Monitor for signs of aspiration (tachypnea, cough, rales, wheezing, cyanosis, fever).    - Assess and monitor patient's ability to swallow.  - Place patient up in chair to eat if possible.  - HOB up at 90 degrees to eat if unable to get patient up into chair.  - Supervise patient during oral intake.   - Instruct patient/ family to take small bites.  - Instruct patient/ family to take small single sips when taking liquids.  - Follow patient-specific strategies generated by speech pathologist.  Outcome: Progressing  Goal: Ventilated patient's risk of aspiration is minimized  Description: Assess and monitor vital signs, respiratory status, airway cuff pressure, and labs (WBC).  Monitor for signs of aspiration (tachypnea, cough, rales, wheezing, cyanosis, fever).    - Elevate head of bed 30 degrees if patient has tube feeding.  - Monitor tube feeding.  Outcome: Progressing     Problem: Nutrition  Goal: Nutrition/Hydration status is improving  Description: Monitor and assess patient's nutrition/hydration status for malnutrition (ex- brittle hair, bruises, dry skin, pale skin and conjunctiva, muscle wasting, smooth red tongue, and disorientation). Collaborate with interdisciplinary team and initiate plan and interventions as ordered.  Monitor patient's weight and dietary intake as ordered or per policy. Utilize nutrition screening tool and intervene per policy. Determine patient's food preferences and provide high-protein,  high-caloric foods as appropriate.     - Assist patient with eating.  - Allow adequate time for meals.  - Encourage patient to take dietary supplement as ordered.  - Collaborate with clinical nutritionist.  - Include patient/family/caregiver in decisions related to nutrition.  Outcome: Progressing     Problem: NEUROSENSORY - ADULT  Goal: Achieves stable or improved neurological status  Description: INTERVENTIONS  - Monitor and report changes in neurological status  - Monitor vital signs such as temperature, blood pressure, glucose, and any other labs ordered   - Initiate measures to prevent increased intracranial pressure  - Monitor for seizure activity and implement precautions if appropriate      Outcome: Progressing  Goal: Remains free of injury related to seizures activity  Description: INTERVENTIONS  - Maintain airway, patient safety  and administer oxygen as ordered  - Monitor patient for seizure activity, document and report duration and description of seizure to physician/advanced practitioner  - If seizure occurs,  ensure patient safety during seizure  - Reorient patient post seizure  - Seizure pads on all 4 side rails  - Instruct patient/family to notify RN of any seizure activity including if an aura is experienced  - Instruct patient/family to call for assistance with activity based on nursing assessment  - Administer anti-seizure medications if ordered    Outcome: Progressing  Goal: Achieves maximal functionality and self care  Description: INTERVENTIONS  - Monitor swallowing and airway patency with patient fatigue and changes in neurological status  - Encourage and assist patient to increase activity and self care.   - Encourage visually impaired, hearing impaired and aphasic patients to use assistive/communication devices  Outcome: Progressing     Problem: CARDIOVASCULAR - ADULT  Goal: Maintains optimal cardiac output and hemodynamic stability  Description: INTERVENTIONS:  - Monitor I/O, vital signs  and rhythm  - Monitor for S/S and trends of decreased cardiac output  - Administer and titrate ordered vasoactive medications to optimize hemodynamic stability  - Assess quality of pulses, skin color and temperature  - Assess for signs of decreased coronary artery perfusion  - Instruct patient to report change in severity of symptoms  Outcome: Progressing  Goal: Absence of cardiac dysrhythmias or at baseline rhythm  Description: INTERVENTIONS:  - Continuous cardiac monitoring, vital signs, obtain 12 lead EKG if ordered  - Administer antiarrhythmic and heart rate control medications as ordered  - Monitor electrolytes and administer replacement therapy as ordered  Outcome: Progressing     Problem: METABOLIC, FLUID AND ELECTROLYTES - ADULT  Goal: Electrolytes maintained within normal limits  Description: INTERVENTIONS:  - Monitor labs and assess patient for signs and symptoms of electrolyte imbalances  - Administer electrolyte replacement as ordered  - Monitor response to electrolyte replacements, including repeat lab results as appropriate  - Instruct patient on fluid and nutrition as appropriate  Outcome: Progressing  Goal: Fluid balance maintained  Description: INTERVENTIONS:  - Monitor labs   - Monitor I/O and WT  - Instruct patient on fluid and nutrition as appropriate  - Assess for signs & symptoms of volume excess or deficit  Outcome: Progressing  Goal: Glucose maintained within target range  Description: INTERVENTIONS:  - Monitor Blood Glucose as ordered  - Assess for signs and symptoms of hyperglycemia and hypoglycemia  - Administer ordered medications to maintain glucose within target range  - Assess nutritional intake and initiate nutrition service referral as needed  Outcome: Progressing     Problem: SKIN/TISSUE INTEGRITY - ADULT  Goal: Skin Integrity remains intact(Skin Breakdown Prevention)  Description: Assess:  -Assess extremities for adequate circulation and sensation     Bed Management:  -Have minimal  linens on bed & keep smooth, unwrinkled  -Change linens as needed when moist or perspiring  Toileting:  -Offer bedside commode    Activity:  -Encourage activity and walks on unit  -Encourage or provide ROM exercises   -Use appropriate equipment to lift or move patient in bed  Skin Care:  -Avoid use of baby powder, tape, friction and shearing, hot water or constrictive clothing  -Do not massage red bony areas    Next Steps:  Outcome: Progressing  Goal: Incision(s), wounds(s) or drain site(s) healing without S/S of infection  Description: INTERVENTIONS  - Assess and document dressing, incision, wound bed, drain sites and surrounding tissue  - Provide patient and family education  Outcome: Progressing  Goal: Pressure injury heals and does not worsen  Description: Interventions:  - Implement low air loss mattress or specialty surface (Criteria met)  - Apply silicone foam dressing  - Utilize friction reducing device or surface for transfers   - Consider nutrition services referral as needed  Outcome: Progressing     Problem: MUSCULOSKELETAL - ADULT  Goal: Maintain or return mobility to safest level of function  Description: INTERVENTIONS:  - Assess patient's ability to carry out ADLs; assess patient's baseline for ADL function and identify physical deficits which impact ability to perform ADLs (bathing, care of mouth/teeth, toileting, grooming, dressing, etc.)  - Assess/evaluate cause of self-care deficits   - Assess range of motion  - Assess patient's mobility  - Assess patient's need for assistive devices and provide as appropriate  - Encourage maximum independence but intervene and supervise when necessary  - Involve family in performance of ADLs  - Assess for home care needs following discharge   - Consider OT consult to assist with ADL evaluation and planning for discharge  - Provide patient education as appropriate  Outcome: Progressing  Goal: Maintain proper alignment of affected body part  Description:  INTERVENTIONS:  - Support, maintain and protect limb and body alignment  - Provide patient/ family with appropriate education  Outcome: Progressing     Problem: Prexisting or High Potential for Compromised Skin Integrity  Goal: Skin integrity is maintained or improved  Description: INTERVENTIONS:  - Identify patients at risk for skin breakdown  - Assess and monitor skin integrity  - Assess and monitor nutrition and hydration status  - Monitor labs   - Assess for incontinence   - Turn and reposition patient  - Assist with mobility/ambulation  - Relieve pressure over bony prominences  - Avoid friction and shearing  - Provide appropriate hygiene as needed including keeping skin clean and dry  - Evaluate need for skin moisturizer/barrier cream  - Collaborate with interdisciplinary team   - Patient/family teaching  - Consider wound care consult   Outcome: Progressing

## 2024-08-24 NOTE — ASSESSMENT & PLAN NOTE
72 yo male pt with Pmhx of CVA, left hemiparesis, cognitive decline, dysphagia currently on mechanical altered diet with nectar thick liquids, patient was at [facility] rehab for 2 weeks subsequently evaluated seatbelts for 2 weeks and discharged home on 8/17/2024.  Wife reports that he had developed a wound while he was at local rehab however wound has progressed.  Has sacral decubitus ulcer that is appears to be worsening with superimposed infection given leukocytosis.  Patient was given IV Zosyn earlier.  Stool is contaminating area of ulcer and rectal tube was placed however it was removed since her stool is not watery as per nursing staff.      Currently he is afebrile, hemodynamically stable.  Surgery recommendation appreciated.  Transition to IV Unasyn day #1.  Continue with surgery recommendation as well as wound care recommendation.  Continue supportive care.  Maintain pressure offloading measures.

## 2024-08-24 NOTE — ASSESSMENT & PLAN NOTE
Lab Results   Component Value Date    HGBA1C 6.0 (H) 07/04/2024       Recent Labs     08/23/24  1703 08/23/24  2119 08/24/24  0715 08/24/24  1107   POCGLU 146* 121 131 152*       Blood Sugar Average: Last 72 hrs:  (P) 137.5Well controlled  Start sliding scale  Monitor glucose levels

## 2024-08-25 LAB
ERYTHROCYTE [DISTWIDTH] IN BLOOD BY AUTOMATED COUNT: 12.7 % (ref 11.6–15.1)
GLUCOSE SERPL-MCNC: 135 MG/DL (ref 65–140)
GLUCOSE SERPL-MCNC: 140 MG/DL (ref 65–140)
GLUCOSE SERPL-MCNC: 142 MG/DL (ref 65–140)
GLUCOSE SERPL-MCNC: 98 MG/DL (ref 65–140)
HCT VFR BLD AUTO: 37.2 % (ref 36.5–49.3)
HGB BLD-MCNC: 11.9 G/DL (ref 12–17)
MCH RBC QN AUTO: 30.7 PG (ref 26.8–34.3)
MCHC RBC AUTO-ENTMCNC: 32 G/DL (ref 31.4–37.4)
MCV RBC AUTO: 96 FL (ref 82–98)
PLATELET # BLD AUTO: 376 THOUSANDS/UL (ref 149–390)
PMV BLD AUTO: 9.4 FL (ref 8.9–12.7)
RBC # BLD AUTO: 3.88 MILLION/UL (ref 3.88–5.62)
WBC # BLD AUTO: 13.97 THOUSAND/UL (ref 4.31–10.16)

## 2024-08-25 PROCEDURE — 99232 SBSQ HOSP IP/OBS MODERATE 35: CPT | Performed by: STUDENT IN AN ORGANIZED HEALTH CARE EDUCATION/TRAINING PROGRAM

## 2024-08-25 PROCEDURE — 82948 REAGENT STRIP/BLOOD GLUCOSE: CPT

## 2024-08-25 PROCEDURE — 85027 COMPLETE CBC AUTOMATED: CPT | Performed by: STUDENT IN AN ORGANIZED HEALTH CARE EDUCATION/TRAINING PROGRAM

## 2024-08-25 PROCEDURE — 92610 EVALUATE SWALLOWING FUNCTION: CPT | Performed by: SPEECH-LANGUAGE PATHOLOGIST

## 2024-08-25 RX ADMIN — COLLAGENASE SANTYL: 250 OINTMENT TOPICAL at 08:18

## 2024-08-25 RX ADMIN — AMPICILLIN SODIUM AND SULBACTAM SODIUM 3 G: 100; 50 INJECTION, POWDER, FOR SOLUTION INTRAVENOUS at 17:45

## 2024-08-25 RX ADMIN — AMPICILLIN SODIUM AND SULBACTAM SODIUM 3 G: 100; 50 INJECTION, POWDER, FOR SOLUTION INTRAVENOUS at 12:14

## 2024-08-25 RX ADMIN — AMPICILLIN SODIUM AND SULBACTAM SODIUM 3 G: 100; 50 INJECTION, POWDER, FOR SOLUTION INTRAVENOUS at 05:45

## 2024-08-25 RX ADMIN — APIXABAN 5 MG: 5 TABLET, FILM COATED ORAL at 08:08

## 2024-08-25 RX ADMIN — APIXABAN 5 MG: 5 TABLET, FILM COATED ORAL at 17:35

## 2024-08-25 RX ADMIN — ATORVASTATIN CALCIUM 80 MG: 40 TABLET, FILM COATED ORAL at 17:35

## 2024-08-25 RX ADMIN — ASPIRIN 81 MG: 81 TABLET, CHEWABLE ORAL at 08:08

## 2024-08-25 NOTE — QUICK NOTE
"Hospitalist Service   Quick Note    Per RN on 8/24 night \"patient with urine retention since 8/23- per protocol-we've staright cath him twice and just bladder scanned him again with reading of 375ml- patient states he's having difficulty starting stream\"     Current meds reviewed , nothing known to cause retention     Inserting hanks   Continue in and out   Urology consultation         Vitals:    08/24/24 0700 08/24/24 1408 08/24/24 1457 08/24/24 1933   BP: 126/77  118/74 132/74   BP Location: Left arm  Right arm    Pulse: 105  (!) 115    Resp:       Temp: 98.1 °F (36.7 °C)      TempSrc: Oral      SpO2: 99% 96% 92%    Weight:       Height:             Marshall Aiad, DO     "

## 2024-08-25 NOTE — PLAN OF CARE
Problem: PAIN - ADULT  Goal: Verbalizes/displays adequate comfort level or baseline comfort level  Description: Interventions:  - Encourage patient to monitor pain and request assistance  - Assess pain using appropriate pain scale  - Administer analgesics based on type and severity of pain and evaluate response  - Implement non-pharmacological measures as appropriate and evaluate response  - Consider cultural and social influences on pain and pain management  - Notify physician/advanced practitioner if interventions unsuccessful or patient reports new pain  Outcome: Progressing     Problem: INFECTION - ADULT  Goal: Absence or prevention of progression during hospitalization  Description: INTERVENTIONS:  - Assess and monitor for signs and symptoms of infection  - Monitor lab/diagnostic results  - Monitor all insertion sites, i.e. indwelling lines, tubes, and drains  - Monitor endotracheal if appropriate and nasal secretions for changes in amount and color  - [place] appropriate cooling/warming therapies per order  - Administer medications as ordered  - Instruct and encourage patient and family to use good hand hygiene technique  - Identify and instruct in appropriate isolation precautions for identified infection/condition  Outcome: Progressing     Problem: Communication Impairment  Goal: Ability to express needs and understand communication  Description: Assess patient's communication skills and ability to understand information.  Patient will demonstrate use of effective communication techniques, alternative methods of communication and understanding even if not able to speak.     - Encourage communication and provide alternate methods of communication as needed.  - Collaborate with case management/ for discharge needs.  - Include patient/family/caregiver in decisions related to communication.  Outcome: Progressing

## 2024-08-25 NOTE — ASSESSMENT & PLAN NOTE
Lab Results   Component Value Date    HGBA1C 6.0 (H) 07/04/2024       Recent Labs     08/24/24  1107 08/24/24  1547 08/24/24 2032 08/25/24  0707   POCGLU 152* 156* 136 142*         Blood Sugar Average: Last 72 hrs:  (P) 140.7867086668209318Gbyr controlled  Start sliding scale  Monitor glucose levels

## 2024-08-25 NOTE — SPEECH THERAPY NOTE
Speech-Language Pathology Bedside Swallow Evaluation        Patient Name: Drew Santos    Today's Date: 8/25/2024     Problem List  Patient Active Problem List   Diagnosis    Syncope    Elevated lactic acid level    Diabetes (HCC)    Leukocytosis    Abnormal CPK    Stroke (cerebrum) (HCC)    Urinary retention    Atrial fibrillation (HCC)    Primary hypertension    Mixed hyperlipidemia    Sacral wound       Past Medical History  Past Medical History:   Diagnosis Date    Diabetes mellitus (HCC)     Hypertension        Past Surgical History  No past surgical history on file.      Current Medical Status  Pt is a 73 y.o. male who presented to [facility] with left sided hemiparesis, htn, afib, hld who presents with concern for worsening sacral wound and possible superimposed infection. Patient has visiting nursing who called EMS after noting his wound has been getting worse. Patient is bed bound in a hospital bed due to his CVA. In the ED, he was started on antibiotics and admitted to [facility] for further management.     SLP consult was requested due to wife reporting pocketing po. Upon my arrival the patients wife admits that the patient was placed on a modified diet (soft solids and thick liquids) with recent CVA in July but due to the modification he has not been eating anything and lost approx 70lbs in the last month. She does admit to pocketing and occasional coughing with po.     Past medical history:   Please see H&P for details    Special Studies:  None during this admission.    Social/Education/Vocational Hx:  Pt lives with family    Swallow Information   Current Risks for Dysphagia & Aspiration: known history of dysphagia     Current Symptoms/Concerns: coughing with po and pocketing food    Current Diet: mechanically altered/level 2 diet and nectar thick liquids      Baseline Diet: wife reports he was not eating soft/NTL at home - providing with regular solids/thins    Baseline Assessment    Behavior/Cognition: alert and decreased attention    Speech/Language Status: able to participate in basic conversation and able to follow commands inconsistently + appears to have min dysarthria but at least mod cognitive-linguistic deficits    Patient Positioning: upright in bed    Swallow Mechanism Exam   Facial:  subtle L weakness  Labial: WFL  Lingual: bilateral decreased strength + black coating ( ?black hairy tongue)  Velum: symmetrical  Mandible: adequate ROM  Dentition:  lower natural anterior dentition present- patient with upper dentures but does not wear them per wife  Vocal quality:clear/adequate  (mildly wet)  Volitional Cough:  fair-    Resp:RA    Consistencies Assessed and Performance   Consistencies Administered: thin liquids and mixed consistency  Specific materials administered included: Watermelon, thin liquids    Oral Stage:   Mastication was prolonged- at times reduced- generally adequate with the materials administered today.  Bolus formation and transfer were prolonged/reduced with periods of oral retention requiring time and/or liq wash to clear. Cannot r/o premature spill.     Pharyngeal Stage:   Swallowing initiation appeared mildly reduced.  Laryngeal rise was palpated and judged to be within functional limits. Cough noted x1 with impulsive intake.  No  additional coughing, throat clearing, change in vocal quality or respiratory status noted today.     Esophageal Concerns: none reported    Summary   s/s suggestive of mild-moderate oral and suspected at least mild pharyngeal dysphagia. He would likely tolerate an advanced/regular diet if he was able to carryover slow rate of intake and ensure clearance of oral retention between presentations but his cognitive status appear to limit this. Furthermore, he has a high risk for ongoing malnutrition given recent 70lb weight loss. Discussed with wife diet options and these concerns- agreed to trial regular diet with thin liquids and external  pacing by family/staff as well as completion of VBS. Wife in agreement that if patient is unable to carryover strategies and/or if he has a coughing episode we will likely return to modified diet. Reciprocal comprehension was verbally expressed.     Consider addition of Vanilla Ensure supplement    Recommendations: regular diet and thin liquids     Recommended Form of Meds:  as tolerated      Aspiration precautions and compensatory swallowing strategies: upright posture, only feed when fully alert, slow rate of feeding, small bites/sips, alternating bites and sips, and ensure oral clearance between presentations    Results Reviewed with: patient, MD, and family     Dysphagia Goals: pt will tolerate regular/dysph 3 textures with thin liquids without s/s of aspiration x3, pt will demonstrate accurate use of low rate/vol of intake strategy with 80% accuracy given min cues., and pt will participate in VBS      Plan  Will f/u    Genia Perez M.S., CCC-SLP  Speech Language Pathologist   Available via [employer]  [place] #[id number]  [place] #[id number]

## 2024-08-25 NOTE — PLAN OF CARE
Recommendations: regular diet and thin liquids     Recommended Form of Meds: as tolerated     Aspiration precautions and compensatory swallowing strategies: upright posture, only feed when fully alert, slow rate of feeding, small bites/sips, alternating bites and sips, and ensure oral clearance between presentations

## 2024-08-25 NOTE — ASSESSMENT & PLAN NOTE
74 yo male pt with Pmhx of CVA, left hemiparesis, cognitive decline, dysphagia currently on mechanical altered diet with nectar thick liquids, patient was at [facility] rehab for 2 weeks subsequently evaluated seatbelts for 2 weeks and discharged home on 8/17/2024.  Wife reports that he had developed a wound while he was at local rehab however wound has progressed.  Has sacral decubitus ulcer that is appears to be worsening with superimposed infection given leukocytosis.  Patient was given IV Zosyn earlier.  Stool is contaminating area of ulcer and rectal tube was placed however it was removed since her stool is not watery as per nursing staff.      Currently he is afebrile, hemodynamically stable.  Surgery recommendation appreciated.  Transition to IV Unasyn day #2  Continue with surgery recommendation as well as wound care recommendation.  Continue supportive care.  Maintain pressure offloading measures.

## 2024-08-25 NOTE — PROGRESS NOTES
[facility]  Progress Note  Name: Drew Santos I  MRN: [id number]  Unit/Bed#: 2 E 267-01 I Date of Admission: 8/23/2024   Date of Service: 8/25/2024 I Hospital Day: 2    Assessment & Plan   * Sacral wound  Assessment & Plan  74 yo male pt with Pmhx of CVA, left hemiparesis, cognitive decline, dysphagia currently on mechanical altered diet with nectar thick liquids, patient was at [facility] rehab for 2 weeks subsequently evaluated seatbelts for 2 weeks and discharged home on 8/17/2024.  Wife reports that he had developed a wound while he was at local rehab however wound has progressed.  Has sacral decubitus ulcer that is appears to be worsening with superimposed infection given leukocytosis.  Patient was given IV Zosyn earlier.  Stool is contaminating area of ulcer and rectal tube was placed however it was removed since her stool is not watery as per nursing staff.      Currently he is afebrile, hemodynamically stable.  Surgery recommendation appreciated.  Transition to IV Unasyn day #2  Continue with surgery recommendation as well as wound care recommendation.  Continue supportive care.  Maintain pressure offloading measures.      Primary hypertension  Assessment & Plan  Adequately controlled  Continue home meds    Atrial fibrillation ([facility])  Assessment & Plan  Rate controlled  Continue home meds  On eliquis for anticoagulation    Urinary retention  Assessment & Plan  Patient had Parra catheter placed last night.  Currently noted with clear yellow urine.  Urology consult pending.      Stroke (cerebrum) ([facility])  Assessment & Plan  Does have residual left trapezius, dysphagia, cognitive decline.  Currently on dysphagia diet with mechanical altered with nectar thick liquids.  Maintain on aspiration precaution since patient was pocketing food as per wife.  Follow-up with speech reevaluation.        Diabetes ([facility])  Assessment & Plan  Lab Results   Component Value Date    HGBA1C 6.0 (H) 07/04/2024        Recent Labs     24  1107 24  1547 24  2032 24  0707   POCGLU 152* 156* 136 142*         Blood Sugar Average: Last 72 hrs:  (P) [id number] controlled  Start sliding scale  Monitor glucose levels                 VTE Pharmacologic Prophylaxis:   Moderate Risk (Score 3-4) - Pharmacological DVT Prophylaxis Ordered: apixaban (Eliquis).    Mobility:   Basic Mobility Inpatient Raw Score: 9  -[facility] Goal: 3: Sit at edge of bed  -[facility] Achieved: 1: Laying in bed      Patient Centered Rounds: I performed bedside rounds with nursing staff today.     Education and Discussions with Family / Patient: Updated  (wife) via phone.      Total Time Spent on Date of Encounter in care of patient: 35 mins. This time was spent on one or more of the following: performing physical exam; counseling and coordination of care; obtaining or reviewing history; documenting in the medical record; reviewing/ordering tests, medications or procedures; communicating with other healthcare professionals and discussing with patient's family/caregivers.    Current Length of Stay: 2 day(s)  Current Patient Status: Inpatient   Certification Statement: The patient will continue to require additional inpatient hospital stay due to IV abx, urine retention   Discharge Plan: Anticipate discharge in 48 hrs to discharge location to be determined pending rehab evaluations. [facility].     Code Status: Level 1 - Full Code    Subjective:   Seen during a.m. rounds.  Patient appears comfortable on discharge.  Overnight events reviewed and seems like patient had episode of urine retention and had Parra catheter placed last night.  Currently he is comfortable.  Poor historian with poor insight.  Denies any complaint at this time.  Tolerating antibiotics well.    Objective:     Vitals:   Temp (24hrs), Av °F (36.7 °C), Min:97.8 °F (36.6 °C), Max:98.1 °F (36.7 °C)    Temp:  [97.8 °F (36.6  °C)-98.1 °F (36.7 °C)] 98.1 °F (36.7 °C)  HR:  [] 118  Resp:  [18] 18  BP: (106-145)/(65-98) 145/98  SpO2:  [92 %-98 %] 98 %  Body mass index is 32.75 kg/m².     Input and Output Summary (last 24 hours):     Intake/Output Summary (Last 24 hours) at 8/25/2024 0915  Last data filed at 8/25/2024 0533  Gross per 24 hour   Intake 240 ml   Output 1650 ml   Net -1410 ml       Physical Exam:   Physical Exam  Constitutional:       General: He is not in acute distress.     Appearance: Normal appearance. He is not ill-appearing, toxic-appearing or diaphoretic.   HENT:      Head: Normocephalic and atraumatic.   Cardiovascular:      Rate and Rhythm: Normal rate.      Pulses: Normal pulses.   Pulmonary:      Effort: Pulmonary effort is normal. No respiratory distress.      Breath sounds: Normal breath sounds. No wheezing.   Abdominal:      General: Bowel sounds are normal. There is no distension.      Palpations: Abdomen is soft.      Tenderness: There is no abdominal tenderness.   Genitourinary:     Comments: Parra catheter noted with clear yellow urine.  Skin:     Comments: Sacral wound noted.   Neurological:      Mental Status: He is alert. Mental status is at baseline. He is disoriented.      Comments: Patient is awake, alert, oriented to self, place, poor insight.  He is cooperative with exam.  Does have residual left hemiparesis since recent stroke.   Psychiatric:         Mood and Affect: Mood normal.         Behavior: Behavior normal.          Additional Data:     Labs:  Results from last 7 days   Lab Units 08/25/24  0535 08/24/24  0452 08/23/24  1359   WBC Thousand/uL 13.97*   < > 13.99*   HEMOGLOBIN g/dL 11.9*   < > 11.7*   HEMATOCRIT % 37.2   < > 36.9   PLATELETS Thousands/uL 376   < > 349   SEGS PCT %  --   --  75   LYMPHO PCT %  --   --  18   MONO PCT %  --   --  6   EOS PCT %  --   --  1    < > = values in this interval not displayed.     Results from last 7 days   Lab Units 08/24/24  0452 08/23/24  2639  08/22/24  0937   SODIUM mmol/L 137   < > 139   POTASSIUM mmol/L 3.7   < > 4.6   CHLORIDE mmol/L 104   < > 103   CO2 mmol/L 26   < > 27   BUN mg/dL 20   < > 13   CREATININE mg/dL 0.91   < > 0.78   ANION GAP mmol/L 7   < > 9   CALCIUM mg/dL 8.9   < > 8.9   ALBUMIN g/dL  --   --  2.8*   TOTAL BILIRUBIN mg/dL  --   --  0.9   ALK PHOS U/L  --   --  101   ALT U/L  --   --  22   AST U/L  --   --  20   GLUCOSE RANDOM mg/dL 130   < > 123*    < > = values in this interval not displayed.         Results from last 7 days   Lab Units 08/25/24  0707 08/24/24  2032 08/24/24  1547 08/24/24  1107 08/24/24  0715 08/23/24  2119 08/23/24  1703   POC GLUCOSE mg/dl 142* 136 156* 152* 131 121 146*               Lines/Drains:  Invasive Devices       Peripheral Intravenous Line  Duration             Peripheral IV 08/23/24 Distal;Right;Upper;Ventral (anterior) Arm 1 day              Drain  Duration             Urethral Catheter Double-lumen 16 Fr. <1 day                  Urinary Catheter:  Goal for removal:  pending urology consult.                Recent Cultures (last 7 days):         Last 24 Hours Medication List:   Current Facility-Administered Medications   Medication Dose Route Frequency Provider Last Rate    acetaminophen  650 mg Oral Q6H PRN Emir Antonio MD      ampicillin-sulbactam  3 g Intravenous Q6H Bib SMITH MD 3 g (08/25/24 0545)    apixaban  5 mg Oral BID Emir Antonio MD      aspirin  81 mg Oral Daily Emir Antonio MD      atorvastatin  80 mg Oral QPM Emir Antonio MD      calcium carbonate  1,000 mg Oral Daily PRN Emir Antonio MD      collagenase   Topical Daily Mell Askew PA-C      insulin lispro  1-6 Units Subcutaneous TID With Meals Emir Antonio MD      insulin lispro  1-6 Units Subcutaneous HS Emir Antonio MD      ondansetron  4 mg Intravenous Q6H PRN Emir Antonio MD          Today, Patient Was Seen By: Bib Damian MD    **Please Note: This note may have been  constructed using a voice recognition system.**

## 2024-08-25 NOTE — ASSESSMENT & PLAN NOTE
Patient had Parra catheter placed last night.  Currently noted with clear yellow urine.  Urology consult pending.

## 2024-08-26 ENCOUNTER — HOME CARE VISIT (OUTPATIENT)
Dept: HOME HEALTH SERVICES | Facility: HOME HEALTHCARE | Age: 74
End: 2024-08-26
Payer: COMMERCIAL

## 2024-08-26 LAB
BASOPHILS # BLD AUTO: 0.04 THOUSANDS/ÂΜL (ref 0–0.1)
BASOPHILS NFR BLD AUTO: 0 % (ref 0–1)
EOSINOPHIL # BLD AUTO: 0.14 THOUSAND/ÂΜL (ref 0–0.61)
EOSINOPHIL NFR BLD AUTO: 1 % (ref 0–6)
ERYTHROCYTE [DISTWIDTH] IN BLOOD BY AUTOMATED COUNT: 12.8 % (ref 11.6–15.1)
GLUCOSE SERPL-MCNC: 137 MG/DL (ref 65–140)
GLUCOSE SERPL-MCNC: 139 MG/DL (ref 65–140)
GLUCOSE SERPL-MCNC: 152 MG/DL (ref 65–140)
GLUCOSE SERPL-MCNC: 159 MG/DL (ref 65–140)
HCT VFR BLD AUTO: 33.1 % (ref 36.5–49.3)
HGB BLD-MCNC: 10.9 G/DL (ref 12–17)
IMM GRANULOCYTES # BLD AUTO: 0.04 THOUSAND/UL (ref 0–0.2)
IMM GRANULOCYTES NFR BLD AUTO: 0 % (ref 0–2)
LYMPHOCYTES # BLD AUTO: 2.8 THOUSANDS/ÂΜL (ref 0.6–4.47)
LYMPHOCYTES NFR BLD AUTO: 27 % (ref 14–44)
MCH RBC QN AUTO: 31.5 PG (ref 26.8–34.3)
MCHC RBC AUTO-ENTMCNC: 32.9 G/DL (ref 31.4–37.4)
MCV RBC AUTO: 96 FL (ref 82–98)
MONOCYTES # BLD AUTO: 0.68 THOUSAND/ÂΜL (ref 0.17–1.22)
MONOCYTES NFR BLD AUTO: 7 % (ref 4–12)
NEUTROPHILS # BLD AUTO: 6.79 THOUSANDS/ÂΜL (ref 1.85–7.62)
NEUTS SEG NFR BLD AUTO: 65 % (ref 43–75)
NRBC BLD AUTO-RTO: 0 /100 WBCS
PLATELET # BLD AUTO: 370 THOUSANDS/UL (ref 149–390)
PMV BLD AUTO: 9.2 FL (ref 8.9–12.7)
RBC # BLD AUTO: 3.46 MILLION/UL (ref 3.88–5.62)
WBC # BLD AUTO: 10.49 THOUSAND/UL (ref 4.31–10.16)

## 2024-08-26 PROCEDURE — 99232 SBSQ HOSP IP/OBS MODERATE 35: CPT | Performed by: STUDENT IN AN ORGANIZED HEALTH CARE EDUCATION/TRAINING PROGRAM

## 2024-08-26 PROCEDURE — 99222 1ST HOSP IP/OBS MODERATE 55: CPT

## 2024-08-26 PROCEDURE — 82948 REAGENT STRIP/BLOOD GLUCOSE: CPT

## 2024-08-26 PROCEDURE — 85025 COMPLETE CBC W/AUTO DIFF WBC: CPT | Performed by: STUDENT IN AN ORGANIZED HEALTH CARE EDUCATION/TRAINING PROGRAM

## 2024-08-26 RX ORDER — POLYETHYLENE GLYCOL 3350 17 G/17G
17 POWDER, FOR SOLUTION ORAL DAILY PRN
Status: DISCONTINUED | OUTPATIENT
Start: 2024-08-26 | End: 2024-08-28 | Stop reason: HOSPADM

## 2024-08-26 RX ORDER — DOCUSATE SODIUM 100 MG/1
100 CAPSULE, LIQUID FILLED ORAL 2 TIMES DAILY PRN
Status: DISCONTINUED | OUTPATIENT
Start: 2024-08-26 | End: 2024-08-28 | Stop reason: HOSPADM

## 2024-08-26 RX ORDER — TAMSULOSIN HYDROCHLORIDE 0.4 MG/1
0.4 CAPSULE ORAL
Status: DISCONTINUED | OUTPATIENT
Start: 2024-08-26 | End: 2024-08-28 | Stop reason: HOSPADM

## 2024-08-26 RX ADMIN — APIXABAN 5 MG: 5 TABLET, FILM COATED ORAL at 09:04

## 2024-08-26 RX ADMIN — ASPIRIN 81 MG: 81 TABLET, CHEWABLE ORAL at 09:04

## 2024-08-26 RX ADMIN — AMPICILLIN SODIUM AND SULBACTAM SODIUM 3 G: 100; 50 INJECTION, POWDER, FOR SOLUTION INTRAVENOUS at 13:15

## 2024-08-26 RX ADMIN — AMPICILLIN SODIUM AND SULBACTAM SODIUM 3 G: 100; 50 INJECTION, POWDER, FOR SOLUTION INTRAVENOUS at 00:36

## 2024-08-26 RX ADMIN — COLLAGENASE SANTYL: 250 OINTMENT TOPICAL at 09:04

## 2024-08-26 RX ADMIN — AMPICILLIN SODIUM AND SULBACTAM SODIUM 3 G: 100; 50 INJECTION, POWDER, FOR SOLUTION INTRAVENOUS at 05:52

## 2024-08-26 RX ADMIN — AMPICILLIN SODIUM AND SULBACTAM SODIUM 3 G: 100; 50 INJECTION, POWDER, FOR SOLUTION INTRAVENOUS at 19:54

## 2024-08-26 NOTE — ASSESSMENT & PLAN NOTE
74 yo Pmhx of CVA, left hemiparesis, cognitive decline, dysphagia admitted for worsening sacral wound  New onset urinary retention 8/24, multiple straight caths- inserted hanks 8/24  [facility]  No prior urologic history  Diabetes, blood sugar control from primary  Start Flomax  Bowel regimen  Multiple contributing factors for urinary retention, BPH, constipation, diabetes, hx of CVA  Disposition is to rehab.  Trial of void at rehab, outpatient follow-up if fails  Urology will sign off at this time.  Please do not hesitate to reach out with any questions or concerns

## 2024-08-26 NOTE — DISCHARGE INSTR - OTHER ORDERS
Skin care Plan:  1-Cleanse sacro-buttocks with soap and water. Apply nickel thick layer of Santyl over wound bed and cover with Silicone bordered foam. Eduardo with T for treatment. Change daily and PRN for soilage/displacement.   2-Turn/reposition q2h for pressure re-distribution on skin .  3-Elevate heels to offload pressure  4-Moisturize skin daily with skin nourishing cream  5-Ehob cushion in chair when out of bed.  6-Hydraguard to bilateral heels BID and PRN.   7-P-500 low air loss mattress.

## 2024-08-26 NOTE — CASE MANAGEMENT
Case Management Progress Note    Patient name Drew Santos  Location 2 EAST 267/2 E 267-01 MRN [id number]  : 1950 Date 2024       LOS (days): 3  Geometric Mean LOS (GMLOS) (days):   Days to GMLOS:        OBJECTIVE:     Current admission status: Inpatient  Preferred Pharmacy:   [id number] [place], [place] - [address]   [facility] [address]  Phone: [phone] Fax: [phone]    Primary Care Provider: Joe Lyon MD  Primary Insurance: [employer]  Secondary Insurance:     PROGRESS NOTE:  CM met with patient and spouse at bedside.  Reviewed DCP.  Level II rec from PT/OT.  Patient/family [facility].  CM sent email to Reva from the [facility] ([email or web address]) for assistance.  Per SLIM rounds today, patient is anticipated to be medically ready for d/c in 48hrs.  CM will continue to follow-up.

## 2024-08-26 NOTE — PROGRESS NOTES
[facility]  Progress Note  Name: Drew Santos I  MRN: [id number]  Unit/Bed#: 2 E 267-01 I Date of Admission: 8/23/2024   Date of Service: 8/26/2024 I Hospital Day: 3    Assessment & Plan   * Sacral wound  Assessment & Plan  74 yo male pt with Pmhx of CVA, left hemiparesis, cognitive decline, dysphagia currently on mechanical altered diet with nectar thick liquids, patient was at [facility] rehab for 2 weeks subsequently evaluated seatbelts for 2 weeks and discharged home on 8/17/2024.  Wife reports that he had developed a wound while he was at local rehab however wound has progressed.  Has sacral decubitus ulcer that is appears to be worsening with superimposed infection given leukocytosis.  Patient was given IV Zosyn earlier.  Stool is contaminating area of ulcer and rectal tube was placed however it was removed since her stool is not watery as per nursing staff.      Currently he is afebrile, hemodynamically stable.  Surgery recommendation appreciated.  Transition to IV Unasyn day #3  Leukocytosis improving.  Tentative plan to transition to p.o. antibiotics and discharge in next 24 to 48 hours.  Continue with surgery recommendation as well as wound care recommendation.  Continue supportive care.  Maintain pressure offloading measures.  [facility]      Primary hypertension  Assessment & Plan  Adequately controlled  Continue home meds    Atrial fibrillation (HCC)  Assessment & Plan  Rate controlled  Continue home meds  On eliquis for anticoagulation    Urinary retention  Assessment & Plan  Patient had Parra catheter placed last night.  Currently noted with clear yellow urine.  Urology consult pending.      Stroke (cerebrum) (HCC)  Assessment & Plan  Does have residual left trapezius, dysphagia, cognitive decline.  Currently on dysphagia diet with mechanical altered with nectar thick liquids.  Seen by speech and currently on regular diet with thing liquids  Plan for VBS today per  Reason for Call:  Request for results:    Name of test or procedure: Urine Culture    Date of test of procedure: 8/26/2021    Location of the test or procedure: [facility]    OK to leave the result message on voice mail or with a family member? YES    Phone number Patient can be reached at:  Cell number on file:    Telephone Information:   Mobile [phone]       Additional comments: Zuri is patients PCA and is aware results are not back yet but would like a call when they are as she does not have access to [employer]    Call taken on 8/27/2021 at 8:52 AM by Tamara Hare   speech therapy         Diabetes (HCC)  Assessment & Plan  Lab Results   Component Value Date    HGBA1C 6.0 (H) 2024       Recent Labs     24  1611 24  2107 24  0722 24  1139   POCGLU 98 140 159* 139         Blood Sugar Average: Last 72 hrs:  (P) [id number] controlled  Start sliding scale  Monitor glucose levels                 VTE Pharmacologic Prophylaxis:   Moderate Risk (Score 3-4) - Pharmacological DVT Prophylaxis Ordered: apixaban (Eliquis).    Mobility:   Basic Mobility Inpatient Raw Score: 9  -[facility] Goal: 3: Sit at edge of bed  -[facility] Achieved: 2: Bed activities/Dependent transfer      Patient Centered Rounds: I performed bedside rounds with nursing staff today.     Education and Discussions with Family / Patient: Updated  (wife) at bedside.    Total Time Spent on Date of Encounter in care of patient: 35 mins. This time was spent on one or more of the following: performing physical exam; counseling and coordination of care; obtaining or reviewing history; documenting in the medical record; reviewing/ordering tests, medications or procedures; communicating with other healthcare professionals and discussing with patient's family/caregivers.    Current Length of Stay: 3 day(s)  Current Patient Status: Inpatient   Certification Statement: The patient will continue to require additional inpatient hospital stay due to on IV abx for sacral wound  Discharge Plan: Anticipate discharge in 48 hrs to [facility].    Code Status: Level 1 - Full Code    Subjective:   Seen during am rounds. Tolerating IV Unasyn well. Overnight events noted for urinary retention requiring hanks catheter and urology consult.  Currently patient appears comfortable on discharge.  Wife at bedside during my EM encounter.  No other events reported.    Objective:     Vitals:   Temp (24hrs), Av.4 °F (36.9 °C), Min:98.4 °F (36.9 °C), Max:98.4 °F (36.9 °C)    Temp:  [98.4 °F  (36.9 °C)] 98.4 °F (36.9 °C)  HR:  [] 108  Resp:  [18] 18  BP: (116-132)/(73-86) 132/86  SpO2:  [97 %-98 %] 98 %  Body mass index is 32.75 kg/m².     Input and Output Summary (last 24 hours):     Intake/Output Summary (Last 24 hours) at 8/26/2024 1343  Last data filed at 8/26/2024 0201  Gross per 24 hour   Intake --   Output 800 ml   Net -800 ml       Physical Exam:   Physical Exam  Constitutional:       General: He is not in acute distress.     Appearance: Normal appearance. He is not ill-appearing, toxic-appearing or diaphoretic.   HENT:      Head: Normocephalic and atraumatic.   Cardiovascular:      Rate and Rhythm: Normal rate.      Pulses: Normal pulses.   Pulmonary:      Effort: Pulmonary effort is normal. No respiratory distress.      Breath sounds: Normal breath sounds. No wheezing.   Abdominal:      General: Bowel sounds are normal. There is no distension.      Palpations: Abdomen is soft.      Tenderness: There is no abdominal tenderness.   Genitourinary:     Comments: Parra catheter noted with clear yellow urine.  Skin:     Comments: Sacral wound noted.   Neurological:      Mental Status: He is alert. Mental status is at baseline. He is disoriented.      Comments: Patient is awake, alert, oriented to self, place, poor insight.  He is cooperative with exam.  Does have residual left hemiparesis since recent stroke.   Psychiatric:         Mood and Affect: Mood normal.         Behavior: Behavior normal.          Additional Data:     Labs:  Results from last 7 days   Lab Units 08/26/24  0512   WBC Thousand/uL 10.49*   HEMOGLOBIN g/dL 10.9*   HEMATOCRIT % 33.1*   PLATELETS Thousands/uL 370   SEGS PCT % 65   LYMPHO PCT % 27   MONO PCT % 7   EOS PCT % 1     Results from last 7 days   Lab Units 08/24/24  0452 08/23/24  1359 08/22/24  0937   SODIUM mmol/L 137   < > 139   POTASSIUM mmol/L 3.7   < > 4.6   CHLORIDE mmol/L 104   < > 103   CO2 mmol/L 26   < > 27   BUN mg/dL 20   < > 13   CREATININE mg/dL 0.91   <  > 0.78   ANION GAP mmol/L 7   < > 9   CALCIUM mg/dL 8.9   < > 8.9   ALBUMIN g/dL  --   --  2.8*   TOTAL BILIRUBIN mg/dL  --   --  0.9   ALK PHOS U/L  --   --  101   ALT U/L  --   --  22   AST U/L  --   --  20   GLUCOSE RANDOM mg/dL 130   < > 123*    < > = values in this interval not displayed.         Results from last 7 days   Lab Units 08/26/24  1139 08/26/24  0722 08/25/24  2107 08/25/24  1611 08/25/24  1050 08/25/24  0707 08/24/24  2032 08/24/24  1547 08/24/24  1107 08/24/24  0715 08/23/24  2119 08/23/24  1703   POC GLUCOSE mg/dl 139 159* 140 98 135 142* 136 156* 152* 131 121 146*               Lines/Drains:  Invasive Devices       Peripheral Intravenous Line  Duration             Peripheral IV 08/26/24 Distal;Dorsal (posterior);Right Forearm <1 day              Drain  Duration             Urethral Catheter Double-lumen 16 Fr. 1 day                  Urinary Catheter:  Goal for removal: N/A - Chronic Parra             Recent Cultures (last 7 days):         Last 24 Hours Medication List:   Current Facility-Administered Medications   Medication Dose Route Frequency Provider Last Rate    acetaminophen  650 mg Oral Q6H PRN Emir Antonio MD      ampicillin-sulbactam  3 g Intravenous Q6H Bib SMITH MD 3 g (08/26/24 0552)    apixaban  5 mg Oral BID Emir Antonio MD      aspirin  81 mg Oral Daily Emir Antonio MD      atorvastatin  80 mg Oral QPM Emir Antonio MD      calcium carbonate  1,000 mg Oral Daily PRN Emir Antonio MD      collagenase   Topical Daily Mell Askew PA-C      insulin lispro  1-6 Units Subcutaneous TID With Meals Emir Antonio MD      insulin lispro  1-6 Units Subcutaneous HS Emir Antonio MD      ondansetron  4 mg Intravenous Q6H PRN Emir Antonio MD          Today, Patient Was Seen By: Bib Damian MD    **Please Note: This note may have been constructed using a voice recognition system.**

## 2024-08-26 NOTE — ASSESSMENT & PLAN NOTE
Lab Results   Component Value Date    HGBA1C 6.0 (H) 07/04/2024       Recent Labs     08/25/24  1611 08/25/24  2107 08/26/24  0722 08/26/24  1139   POCGLU 98 140 159* 139         Blood Sugar Average: Last 72 hrs:  (P) [id number] controlled  Start sliding scale  Monitor glucose levels

## 2024-08-26 NOTE — PLAN OF CARE
Problem: PAIN - ADULT  Goal: Verbalizes/displays adequate comfort level or baseline comfort level  Description: Interventions:  - Encourage patient to monitor pain and request assistance  - Assess pain using appropriate pain scale  - Administer analgesics based on type and severity of pain and evaluate response  - Implement non-pharmacological measures as appropriate and evaluate response  - Consider cultural and social influences on pain and pain management  - Notify physician/advanced practitioner if interventions unsuccessful or patient reports new pain  Outcome: Progressing     Problem: INFECTION - ADULT  Goal: Absence or prevention of progression during hospitalization  Description: INTERVENTIONS:  - Assess and monitor for signs and symptoms of infection  - Monitor lab/diagnostic results  - Monitor all insertion sites, i.e. indwelling lines, tubes, and drains  - Monitor endotracheal if appropriate and nasal secretions for changes in amount and color  - [place] appropriate cooling/warming therapies per order  - Administer medications as ordered  - Instruct and encourage patient and family to use good hand hygiene technique  - Identify and instruct in appropriate isolation precautions for identified infection/condition  Outcome: Progressing  Goal: Absence of fever/infection during neutropenic period  Description: INTERVENTIONS:  - Monitor WBC    Outcome: Progressing     Problem: SAFETY ADULT  Goal: Patient will remain free of falls  Description: INTERVENTIONS:  - Educate patient/family on patient safety including physical limitations  - Instruct patient to call for assistance with activity   - Consult OT/PT to assist with strengthening/mobility   - Keep Call bell within reach  - Keep bed low and locked with side rails adjusted as appropriate  - Keep care items and personal belongings within reach  - Initiate and maintain comfort rounds  - Make Fall Risk Sign visible to staff  - Offer Toileting every 2 Hours,  in advance of need  - Initiate/Maintain bed alarm  - Obtain necessary fall risk management equipment:   - Apply yellow socks and bracelet for high fall risk patients  - Consider moving patient to room near nurses station  Outcome: Progressing  Goal: Maintain or return to baseline ADL function  Description: INTERVENTIONS:  -  Assess patient's ability to carry out ADLs; assess patient's baseline for ADL function and identify physical deficits which impact ability to perform ADLs (bathing, care of mouth/teeth, toileting, grooming, dressing, etc.)  - Assess/evaluate cause of self-care deficits   - Assess range of motion  - Assess patient's mobility; develop plan if impaired  - Assess patient's need for assistive devices and provide as appropriate  - Encourage maximum independence but intervene and supervise when necessary  - Involve family in performance of ADLs  - Assess for home care needs following discharge   - Consider OT consult to assist with ADL evaluation and planning for discharge  - Provide patient education as appropriate  Outcome: Progressing  Goal: Maintains/Returns to pre admission functional level  Description: INTERVENTIONS:  - Perform AM-PAC 6 Click Basic Mobility/ Daily Activity assessment daily.  - Set and communicate daily mobility goal to care team and patient/family/caregiver.   - Collaborate with rehabilitation services on mobility goals if consulted  - Perform Range of Motion times a day.  - Reposition patient every  hours.  - Dangle patient  times a day  - Stand patient  times a day  - Ambulate patient  times a day  - Out of bed to chair  times a day   - Out of bed for meals  times a day  - Out of bed for toileting  - Record patient progress and toleration of activity level   Outcome: Progressing     Problem: Knowledge Deficit  Goal: Patient/family/caregiver demonstrates understanding of disease process, treatment plan, medications, and discharge instructions  Description: Complete learning  assessment and assess knowledge base.  Interventions:  - Provide teaching at level of understanding  - Provide teaching via preferred learning methods  Outcome: Progressing     Problem: Neurological Deficit  Goal: Neurological status is stable or improving  Description: Interventions:  - Monitor and assess patient's level of consciousness, motor function, sensory function, and level of assistance needed for ADLs.   - Monitor and report changes from baseline. Collaborate with interdisciplinary team to initiate plan and implement interventions as ordered.   - Provide and maintain a safe environment.  - Consider seizure precautions.  - Consider fall precautions.  - Consider aspiration precautions.  - Consider bleeding precautions.  Outcome: Progressing     Problem: Communication Impairment  Goal: Ability to express needs and understand communication  Description: Assess patient's communication skills and ability to understand information.  Patient will demonstrate use of effective communication techniques, alternative methods of communication and understanding even if not able to speak.     - Encourage communication and provide alternate methods of communication as needed.  - Collaborate with case management/ for discharge needs.  - Include patient/family/caregiver in decisions related to communication.  Outcome: Progressing     Problem: Potential for Aspiration  Goal: Non-ventilated patient's risk of aspiration is minimized  Description: Assess and monitor vital signs, respiratory status, and labs (WBC).  Monitor for signs of aspiration (tachypnea, cough, rales, wheezing, cyanosis, fever).    - Assess and monitor patient's ability to swallow.  - Place patient up in chair to eat if possible.  - HOB up at 90 degrees to eat if unable to get patient up into chair.  - Supervise patient during oral intake.   - Instruct patient/ family to take small bites.  - Instruct patient/ family to take small single sips  when taking liquids.  - Follow patient-specific strategies generated by speech pathologist.  Outcome: Progressing  Goal: Ventilated patient's risk of aspiration is minimized  Description: Assess and monitor vital signs, respiratory status, airway cuff pressure, and labs (WBC).  Monitor for signs of aspiration (tachypnea, cough, rales, wheezing, cyanosis, fever).    - Elevate head of bed 30 degrees if patient has tube feeding.  - Monitor tube feeding.  Outcome: Progressing     Problem: NEUROSENSORY - ADULT  Goal: Achieves stable or improved neurological status  Description: INTERVENTIONS  - Monitor and report changes in neurological status  - Monitor vital signs such as temperature, blood pressure, glucose, and any other labs ordered   - Initiate measures to prevent increased intracranial pressure  - Monitor for seizure activity and implement precautions if appropriate      Outcome: Progressing  Goal: Remains free of injury related to seizures activity  Description: INTERVENTIONS  - Maintain airway, patient safety  and administer oxygen as ordered  - Monitor patient for seizure activity, document and report duration and description of seizure to physician/advanced practitioner  - If seizure occurs,  ensure patient safety during seizure  - Reorient patient post seizure  - Seizure pads on all 4 side rails  - Instruct patient/family to notify RN of any seizure activity including if an aura is experienced  - Instruct patient/family to call for assistance with activity based on nursing assessment  - Administer anti-seizure medications if ordered    Outcome: Progressing  Goal: Achieves maximal functionality and self care  Description: INTERVENTIONS  - Monitor swallowing and airway patency with patient fatigue and changes in neurological status  - Encourage and assist patient to increase activity and self care.   - Encourage visually impaired, hearing impaired and aphasic patients to use assistive/communication  devices  Outcome: Progressing     Problem: CARDIOVASCULAR - ADULT  Goal: Maintains optimal cardiac output and hemodynamic stability  Description: INTERVENTIONS:  - Monitor I/O, vital signs and rhythm  - Monitor for S/S and trends of decreased cardiac output  - Administer and titrate ordered vasoactive medications to optimize hemodynamic stability  - Assess quality of pulses, skin color and temperature  - Assess for signs of decreased coronary artery perfusion  - Instruct patient to report change in severity of symptoms  Outcome: Progressing  Goal: Absence of cardiac dysrhythmias or at baseline rhythm  Description: INTERVENTIONS:  - Continuous cardiac monitoring, vital signs, obtain 12 lead EKG if ordered  - Administer antiarrhythmic and heart rate control medications as ordered  - Monitor electrolytes and administer replacement therapy as ordered  Outcome: Progressing     Problem: SKIN/TISSUE INTEGRITY - ADULT  Goal: Skin Integrity remains intact(Skin Breakdown Prevention)  Description: Assess:  -Perform Goldy assessment every   -Clean and moisturize skin every   -Inspect skin when repositioning, toileting, and assisting with ADLS  -Assess under medical devices such as every  -Assess extremities for adequate circulation and sensation     Bed Management:  -Have minimal linens on bed & keep smooth, unwrinkled  -Change linens as needed when moist or perspiring  -Avoid sitting or lying in one position for more than  hours while in bed  -Keep HOB atdegrees     Toileting:  -Offer bedside commode  -Assess for incontinence   -Use incontinent care products after each incontinent episode such as     Activity:  -Mobilize patient times a day  -Encourage activity and walks on unit  -Encourage or provide ROM exercises   -Turn and reposition patient every  Hours  -Use appropriate equipment to lift or move patient in bed  -Instruct/ Assist with weight shifting every  when out of bed in chair  -Consider limitation of chair time   hour intervals    Skin Care:  -Avoid use of baby powder, tape, friction and shearing, hot water or constrictive clothing  -Relieve pressure over bony prominences using   -Do not massage red bony areas    Next Steps:  -Teach patient strategies to minimize risks such as    -Consider consults to  interdisciplinary teams such as   Outcome: Progressing  Goal: Incision(s), wounds(s) or drain site(s) healing without S/S of infection  Description: INTERVENTIONS  - Assess and document dressing, incision, wound bed, drain sites and surrounding tissue  - Provide patient and family education  - Perform skin care/dressing changes every   Outcome: Progressing  Goal: Pressure injury heals and does not worsen  Description: Interventions:  - Implement low air loss mattress or specialty surface (Criteria met)  - Apply silicone foam dressing  - Instruct/assist with weight shifting every  minutes when in chair   - Limit chair time to hour intervals  - Use special pressure reducing interventions such as  when in chair   - Apply fecal or urinary incontinence containment device   - Perform passive or active ROM every   - Turn and reposition patient & offload bony prominences every hours   - Utilize friction reducing device or surface for transfers   - Consider consults to  interdisciplinary teams such as   - Use incontinent care products after each incontinent episode such as  - Consider nutrition services referral as needed  Outcome: Progressing     Problem: MUSCULOSKELETAL - ADULT  Goal: Maintain or return mobility to safest level of function  Description: INTERVENTIONS:  - Assess patient's ability to carry out ADLs; assess patient's baseline for ADL function and identify physical deficits which impact ability to perform ADLs (bathing, care of mouth/teeth, toileting, grooming, dressing, etc.)  - Assess/evaluate cause of self-care deficits   - Assess range of motion  - Assess patient's mobility  - Assess patient's need for assistive  devices and provide as appropriate  - Encourage maximum independence but intervene and supervise when necessary  - Involve family in performance of ADLs  - Assess for home care needs following discharge   - Consider OT consult to assist with ADL evaluation and planning for discharge  - Provide patient education as appropriate  Outcome: Progressing  Goal: Maintain proper alignment of affected body part  Description: INTERVENTIONS:  - Support, maintain and protect limb and body alignment  - Provide patient/ family with appropriate education  Outcome: Progressing     Problem: Nutrition/Hydration-ADULT  Goal: Nutrient/Hydration intake appropriate for improving, restoring or maintaining nutritional needs  Description: Monitor and assess patient's nutrition/hydration status for malnutrition. Collaborate with interdisciplinary team and initiate plan and interventions as ordered.  Monitor patient's weight and dietary intake as ordered or per policy. Utilize nutrition screening tool and intervene as necessary. Determine patient's food preferences and provide high-protein, high-caloric foods as appropriate.     INTERVENTIONS:  - Monitor oral intake, urinary output, labs, and treatment plans  - Assess nutrition and hydration status and recommend course of action  - Evaluate amount of meals eaten  - Assist patient with eating if necessary   - Allow adequate time for meals  - Recommend/ encourage appropriate diets, oral nutritional supplements, and vitamin/mineral supplements  - Order, calculate, and assess calorie counts as needed  - Recommend, monitor, and adjust tube feedings and TPN/PPN based on assessed needs  - Assess need for intravenous fluids  - Provide specific nutrition/hydration education as appropriate  - Include patient/family/caregiver in decisions related to nutrition  Outcome: Progressing

## 2024-08-26 NOTE — DISCHARGE INSTR - AVS FIRST PAGE
Hanks Cath Care instructions---Maintain hanks catheter to straight drainage. May use leg bag and shower. May flush TID prn using Myla syringe and 120 ml NSS. May use more saline ad brandon to prevent/treat cath obstruction. Remove catheter on 8th day rehab by 0600 hrs. Bladder scan if no void or less than 200ml in 6hrs. Straight cath for bladder scan >350ml and repeat process. If patient requires straight cath x3 , re-insert hanks and call for Urologist follow-up. Information above. Hanks can remain in place for up to 4 weeks at a time. Hanks placed at [facility] on 8/24/2024

## 2024-08-26 NOTE — WOUND OSTOMY CARE
Progress Note - Wound   Drew Santos 73 y.o. male MRN: [id number]  Unit/Bed#: 2 E 267-01 Encounter: [id number]      Assessment:   Patient admitted to [facility] due to sacral wound. History of diabetes and HTN. Wound care consulted for sacral wound. General surgery assessed wounds on 8/23. Patient is agreeable to assessment, alert and oriented x2, hanks catheter in place for urine management, incontinent of bowel, assist of 2 to turn for assessment, wedges in use for repositioning, heels elevated off of mattress, is a moderate assist with care. Primary nurse made aware of assessment.    1. Pressure injury coccyx, unstageable-POA- Wound is irregular in shape, with scattered aspects along bilateral sacral and buttock region, is full thickness, true depth unknown, approx. 20% pink tissue ad 80% yellow adhered slough, with scant amount of serosanguineous drainage noted. Cherelle-wounds are dry, intact, blanchable hypopigmented scar tissue.     2. Bilateral elbows, hips, and heels- skin is dry, intact, blanchable.    Educated patient on importance of frequent offloading of pressure via turning, repositioning, and weight-shifting. Verbalized understanding of plan of care.    No induration, fluctuance, odor, warmth, redness, or purulence noted to the above noted wound. New dressing applied. Patient tolerated well, denies pain to the wound. Primary nurse aware of plan of care. See flow sheets for more detailed assessment findings. Will follow along.    Skin care Plan:  1-Cleanse sacro-buttocks with soap and water. Apply nickel thick layer of Santyl over wound bed and cover with Silicone bordered foam. Eduardo with T for treatment. Change daily and PRN for soilage/displacement.   2-Turn/reposition q2h for pressure re-distribution on skin .  3-Elevate heels to offload pressure  4-Moisturize skin daily with skin nourishing cream  5-Ehob cushion in chair when out of bed.  6-Hydraguard to bilateral heels BID and PRN.   7-P-500 low air loss  mattress.      Wound 08/23/24 Pressure Injury Coccyx (Active)   Wound Image   08/26/24 0935   Wound Description Yellow;Slough;Pink 08/26/24 0935   Pressure Injury Stage U 08/26/24 0935   Cherelle-wound Assessment Dry;Intact;Scar Tissue 08/26/24 0935   Wound Length (cm) 12 cm 08/26/24 0935   Wound Width (cm) 7 cm 08/26/24 0935   Wound Depth (cm) 0.2 cm 08/26/24 0935   Wound Surface Area (cm^2) 84 cm^2 08/26/24 0935   Wound Volume (cm^3) 16.8 cm^3 08/26/24 0935   Calculated Wound Volume (cm^3) 16.8 cm^3 08/26/24 0935   Drainage Amount Scant 08/26/24 0935   Drainage Description Serosanguineous 08/26/24 0935   Non-staged Wound Description Full thickness 08/26/24 0935   Treatments Cleansed;Irrigation with NSS;Site care 08/26/24 0935   Dressing Enzymatic debrider;Foam, Silicon (eg. Allevyn, etc) 08/26/24 0935   Wound packed? No 08/26/24 0935   Dressing Changed New 08/26/24 0935   Patient Tolerance Tolerated well 08/26/24 0935   Dressing Status Clean;Dry;Intact 08/26/24 0935       Contact through [employer] Secure Chat with any questions  Wound Care will continue to follow    Oralia BURCIAGA RN CWON  Wound and Ostomy care

## 2024-08-26 NOTE — ASSESSMENT & PLAN NOTE
Does have residual left trapezius, dysphagia, cognitive decline.  Currently on dysphagia diet with mechanical altered with nectar thick liquids.  Seen by speech and currently on regular diet with thing liquids  Plan for VBS today per speech therapy

## 2024-08-26 NOTE — CONSULTS
[facility]  Consult  Name: Drew Santos 73 y.o. male I MRN: [id number]  Unit/Bed#: 2 E 267-01 I Date of Admission: 8/23/2024   Date of Service: 8/26/2024 I Hospital Day: 3    Inpatient consult to Urology  Consult performed by: Lorraine Walker PA-C  Consult ordered by: Bib SMITH MD          Assessment & Plan   Urinary retention  Assessment & Plan  72 yo Pmhx of CVA, left hemiparesis, cognitive decline, dysphagia admitted for worsening sacral wound  New onset urinary retention 8/24, multiple straight caths- inserted hanks 8/24  [facility]  No prior urologic history  Diabetes, blood sugar control from primary  Start Flomax  Bowel regimen  Multiple contributing factors for urinary retention, BPH, constipation, diabetes, hx of CVA  Disposition is to rehab.  Trial of void at rehab, outpatient follow-up if fails  Urology will sign off at this time.  Please do not hesitate to reach out with any questions or concerns         Subjective:   HPI: Casey is a 73-year-old male past with history of stroke, left-sided hemiparesis, HTN, A-fib who presented to the ED with worsening critical wound and possible infection.  Sacral wound has been evaluated by general surgery.  Patient is bedbound in hospital due to his history of CVA.  He is starting antibiotics admitted to [facility] for further management.  Patient was straight cathed multiple times on 8/24.  He had a Hanks catheter placed same day due to feeling urinary retention protocol.  Patient denies any past urologic history.  No issues with urine retention in the past.  Urology was consulted due to urinary retention    Review of Systems   Constitutional:  Negative for chills and fever.   Respiratory:  Negative for cough and shortness of breath.    Cardiovascular:  Negative for chest pain and palpitations.   Gastrointestinal:  Negative for abdominal pain.   Genitourinary:  Negative for dysuria and hematuria.   Musculoskeletal:  Negative for  "arthralgias and back pain.   All other systems reviewed and are negative.      Objective:    Vitals: Blood pressure 132/86, pulse (!) 108, temperature 98.4 °F (36.9 °C), temperature source Oral, resp. rate 18, height 6' 3.98\" (1.93 m), weight 122 kg (268 lb 15.4 oz), SpO2 98%.,Body mass index is 32.75 kg/m².    Physical Exam  Constitutional:       General: He is not in acute distress.     Appearance: Normal appearance. He is normal weight. He is not ill-appearing or toxic-appearing.   HENT:      Head: Normocephalic and atraumatic.      Right Ear: External ear normal.      Left Ear: External ear normal.      Nose: Nose normal.      Mouth/Throat:      Mouth: Mucous membranes are moist.   Eyes:      General: No scleral icterus.     Conjunctiva/sclera: Conjunctivae normal.   Cardiovascular:      Rate and Rhythm: Normal rate.      Pulses: Normal pulses.   Pulmonary:      Effort: Pulmonary effort is normal.   Abdominal:      General: Abdomen is flat. There is no distension.      Tenderness: There is no abdominal tenderness.   Genitourinary:     Comments: Parra draining clear yellow urine  Musculoskeletal:         General: Normal range of motion.      Cervical back: Normal range of motion.   Skin:     General: Skin is warm.      Findings: No rash.   Neurological:      General: No focal deficit present.      Mental Status: He is alert and oriented to person, place, and time. Mental status is at baseline.   Psychiatric:         Mood and Affect: Mood normal.         Behavior: Behavior normal.         Thought Content: Thought content normal.         Judgment: Judgment normal.       Labs:  Recent Labs     08/23/24  1359 08/24/24  0452 08/25/24  0535 08/26/24  0512   WBC 13.99* 12.73* 13.97* 10.49*       Recent Labs     08/23/24  1359 08/24/24  0452 08/25/24  0535 08/26/24  0512   HGB 11.7* 11.7* 11.9* 10.9*     Recent Labs     08/23/24  1359 08/24/24  0452 08/25/24  0535 08/26/24  0512   HCT 36.9 35.7* 37.2 33.1*     Recent " Labs     08/23/24  1359 08/24/24  0452   CREATININE 0.98 0.91         History:    Past Medical History:   Diagnosis Date    Diabetes mellitus (HCC)     Hypertension      Social History     Socioeconomic History    Marital status: /Civil Union     Spouse name: Not on file    Number of children: Not on file    Years of education: Not on file    Highest education level: Not on file   Occupational History    Not on file   Tobacco Use    Smoking status: Never    Smokeless tobacco: Never   Vaping Use    Vaping status: Never Used   Substance and Sexual Activity    Alcohol use: Not Currently    Drug use: Never    Sexual activity: Not on file   Other Topics Concern    Not on file   Social History Narrative    Not on file     Social Determinants of Health     Financial Resource Strain: Low Risk  (7/10/2024)    Received from [facility]    Overall Financial Resource Strain (CARDIA)     Difficulty of Paying Living Expenses: Not hard at all   Food Insecurity: No Food Insecurity (8/23/2024)    Hunger Vital Sign     Worried About Running Out of Food in the Last Year: Never true     Ran Out of Food in the Last Year: Never true   Transportation Needs: No Transportation Needs (8/23/2024)    PRAPARE - Transportation     Lack of Transportation (Medical): No     Lack of Transportation (Non-Medical): No   Physical Activity: Not on file   Stress: Stress Concern Present (5/8/2023)    [facility]    [employer] [employer] - Occupational Stress Questionnaire     Feeling of Stress : To some extent   Social Connections: Feeling Socially Integrated (7/25/2024)    Received from [facility]    OASIS : Social Isolation     How often do you feel lonely or isolated from those around you?: Rarely   Intimate Partner Violence: Not At Risk (7/10/2024)    Received from [facility]    Humiliation, Afraid, Rape, and  Everette questionnaire     Fear of Current or Ex-Partner: No     Emotionally Abused: No     Physically Abused: No     Sexually Abused: No   Housing Stability: Low Risk  (8/23/2024)    Housing Stability Vital Sign     Unable to Pay for Housing in the Last Year: No     Number of Times Moved in the Last Year: 0     Homeless in the Last Year: No     No past surgical history on file.  No family history on file.    Lorraine Walker PA-C  Date: 8/26/2024 Time: 9:57 AM

## 2024-08-26 NOTE — ASSESSMENT & PLAN NOTE
72 yo male pt with Pmhx of CVA, left hemiparesis, cognitive decline, dysphagia currently on mechanical altered diet with nectar thick liquids, patient was at [facility] rehab for 2 weeks subsequently evaluated seatbelts for 2 weeks and discharged home on 8/17/2024.  Wife reports that he had developed a wound while he was at local rehab however wound has progressed.  Has sacral decubitus ulcer that is appears to be worsening with superimposed infection given leukocytosis.  Patient was given IV Zosyn earlier.  Stool is contaminating area of ulcer and rectal tube was placed however it was removed since her stool is not watery as per nursing staff.      Currently he is afebrile, hemodynamically stable.  Surgery recommendation appreciated.  Transition to IV Unasyn day #3  Leukocytosis improving.  Tentative plan to transition to p.o. antibiotics and discharge in next 24 to 48 hours.  Continue with surgery recommendation as well as wound care recommendation.  Continue supportive care.  Maintain pressure offloading measures.  [facility]

## 2024-08-26 NOTE — PLAN OF CARE
Problem: PAIN - ADULT  Goal: Verbalizes/displays adequate comfort level or baseline comfort level  Description: Interventions:  - Encourage patient to monitor pain and request assistance  - Assess pain using appropriate pain scale  - Administer analgesics based on type and severity of pain and evaluate response  - Implement non-pharmacological measures as appropriate and evaluate response  - Consider cultural and social influences on pain and pain management  - Notify physician/advanced practitioner if interventions unsuccessful or patient reports new pain  Outcome: Progressing     Problem: INFECTION - ADULT  Goal: Absence or prevention of progression during hospitalization  Description: INTERVENTIONS:  - Assess and monitor for signs and symptoms of infection  - Monitor lab/diagnostic results  - Monitor all insertion sites, i.e. indwelling lines, tubes, and drains  - Monitor endotracheal if appropriate and nasal secretions for changes in amount and color  - Andrews appropriate cooling/warming therapies per order  - Administer medications as ordered  - Instruct and encourage patient and family to use good hand hygiene technique  - Identify and instruct in appropriate isolation precautions for identified infection/condition  Outcome: Progressing  Goal: Absence of fever/infection during neutropenic period  Description: INTERVENTIONS:  - Monitor WBC    Outcome: Progressing     Problem: SAFETY ADULT  Goal: Patient will remain free of falls  Description: INTERVENTIONS:  - Educate patient/family on patient safety including physical limitations  - Instruct patient to call for assistance with activity   - Consult OT/PT to assist with strengthening/mobility   - Keep Call bell within reach  - Keep bed low and locked with side rails adjusted as appropriate  - Keep care items and personal belongings within reach  - Initiate and maintain comfort rounds  - Make Fall Risk Sign visible to staff  - Offer Toileting every  Hours,  in advance of need  - Initiate/Maintain alarm  - Obtain necessary fall risk management equipment:   - Apply yellow socks and bracelet for high fall risk patients  - Consider moving patient to room near nurses station  Outcome: Progressing  Goal: Maintain or return to baseline ADL function  Description: INTERVENTIONS:  -  Assess patient's ability to carry out ADLs; assess patient's baseline for ADL function and identify physical deficits which impact ability to perform ADLs (bathing, care of mouth/teeth, toileting, grooming, dressing, etc.)  - Assess/evaluate cause of self-care deficits   - Assess range of motion  - Assess patient's mobility; develop plan if impaired  - Assess patient's need for assistive devices and provide as appropriate  - Encourage maximum independence but intervene and supervise when necessary  - Involve family in performance of ADLs  - Assess for home care needs following discharge   - Consider OT consult to assist with ADL evaluation and planning for discharge  - Provide patient education as appropriate  Outcome: Progressing  Goal: Maintains/Returns to pre admission functional level  Description: INTERVENTIONS:  - Perform AM-PAC 6 Click Basic Mobility/ Daily Activity assessment daily.  - Set and communicate daily mobility goal to care team and patient/family/caregiver.   - Collaborate with rehabilitation services on mobility goals if consulted  - Perform Range of Motion  times a day.  - Reposition patient every  hours.  - Dangle patient  times a day  - Stand patient  times a day  - Ambulate patient  times a day  - Out of bed to chair  times a day   - Out of bed for meals  times a day  - Out of bed for toileting  - Record patient progress and toleration of activity level   Outcome: Progressing     Problem: DISCHARGE PLANNING  Goal: Discharge to home or other facility with appropriate resources  Description: INTERVENTIONS:  - Identify barriers to discharge w/patient and caregiver  - Arrange for  needed discharge resources and transportation as appropriate  - Identify discharge learning needs (meds, wound care, etc.)  - Arrange for interpretive services to assist at discharge as needed  - Refer to Case Management Department for coordinating discharge planning if the patient needs post-hospital services based on physician/advanced practitioner order or complex needs related to functional status, cognitive ability, or social support system  Outcome: Progressing     Problem: Knowledge Deficit  Goal: Patient/family/caregiver demonstrates understanding of disease process, treatment plan, medications, and discharge instructions  Description: Complete learning assessment and assess knowledge base.  Interventions:  - Provide teaching at level of understanding  - Provide teaching via preferred learning methods  Outcome: Progressing     Problem: Neurological Deficit  Goal: Neurological status is stable or improving  Description: Interventions:  - Monitor and assess patient's level of consciousness, motor function, sensory function, and level of assistance needed for ADLs.   - Monitor and report changes from baseline. Collaborate with interdisciplinary team to initiate plan and implement interventions as ordered.   - Provide and maintain a safe environment.  - Consider seizure precautions.  - Consider fall precautions.  - Consider aspiration precautions.  - Consider bleeding precautions.  Outcome: Progressing     Problem: Activity Intolerance/Impaired Mobility  Goal: Mobility/activity is maintained at optimum level for patient  Description: Interventions:  - Assess and monitor patient  barriers to mobility and need for assistive/adaptive devices.  - Assess patient's emotional response to limitations.  - Collaborate with interdisciplinary team and initiate plans and interventions as ordered.  - Encourage independent activity per ability.  - Maintain proper body alignment.  - Perform active/passive rom as  tolerated/ordered.  - Plan activities to conserve energy.  - Turn patient as appropriate  Outcome: Progressing     Problem: Communication Impairment  Goal: Ability to express needs and understand communication  Description: Assess patient's communication skills and ability to understand information.  Patient will demonstrate use of effective communication techniques, alternative methods of communication and understanding even if not able to speak.     - Encourage communication and provide alternate methods of communication as needed.  - Collaborate with case management/ for discharge needs.  - Include patient/family/caregiver in decisions related to communication.  Outcome: Progressing     Problem: Potential for Aspiration  Goal: Non-ventilated patient's risk of aspiration is minimized  Description: Assess and monitor vital signs, respiratory status, and labs (WBC).  Monitor for signs of aspiration (tachypnea, cough, rales, wheezing, cyanosis, fever).    - Assess and monitor patient's ability to swallow.  - Place patient up in chair to eat if possible.  - HOB up at 90 degrees to eat if unable to get patient up into chair.  - Supervise patient during oral intake.   - Instruct patient/ family to take small bites.  - Instruct patient/ family to take small single sips when taking liquids.  - Follow patient-specific strategies generated by speech pathologist.  Outcome: Progressing  Goal: Ventilated patient's risk of aspiration is minimized  Description: Assess and monitor vital signs, respiratory status, airway cuff pressure, and labs (WBC).  Monitor for signs of aspiration (tachypnea, cough, rales, wheezing, cyanosis, fever).    - Elevate head of bed 30 degrees if patient has tube feeding.  - Monitor tube feeding.  Outcome: Progressing     Problem: Nutrition  Goal: Nutrition/Hydration status is improving  Description: Monitor and assess patient's nutrition/hydration status for malnutrition (ex- brittle  hair, bruises, dry skin, pale skin and conjunctiva, muscle wasting, smooth red tongue, and disorientation). Collaborate with interdisciplinary team and initiate plan and interventions as ordered.  Monitor patient's weight and dietary intake as ordered or per policy. Utilize nutrition screening tool and intervene per policy. Determine patient's food preferences and provide high-protein, high-caloric foods as appropriate.     - Assist patient with eating.  - Allow adequate time for meals.  - Encourage patient to take dietary supplement as ordered.  - Collaborate with clinical nutritionist.  - Include patient/family/caregiver in decisions related to nutrition.  Outcome: Progressing     Problem: NEUROSENSORY - ADULT  Goal: Achieves stable or improved neurological status  Description: INTERVENTIONS  - Monitor and report changes in neurological status  - Monitor vital signs such as temperature, blood pressure, glucose, and any other labs ordered   - Initiate measures to prevent increased intracranial pressure  - Monitor for seizure activity and implement precautions if appropriate      Outcome: Progressing  Goal: Remains free of injury related to seizures activity  Description: INTERVENTIONS  - Maintain airway, patient safety  and administer oxygen as ordered  - Monitor patient for seizure activity, document and report duration and description of seizure to physician/advanced practitioner  - If seizure occurs,  ensure patient safety during seizure  - Reorient patient post seizure  - Seizure pads on all 4 side rails  - Instruct patient/family to notify RN of any seizure activity including if an aura is experienced  - Instruct patient/family to call for assistance with activity based on nursing assessment  - Administer anti-seizure medications if ordered    Outcome: Progressing  Goal: Achieves maximal functionality and self care  Description: INTERVENTIONS  - Monitor swallowing and airway patency with patient fatigue and  changes in neurological status  - Encourage and assist patient to increase activity and self care.   - Encourage visually impaired, hearing impaired and aphasic patients to use assistive/communication devices  Outcome: Progressing     Problem: CARDIOVASCULAR - ADULT  Goal: Maintains optimal cardiac output and hemodynamic stability  Description: INTERVENTIONS:  - Monitor I/O, vital signs and rhythm  - Monitor for S/S and trends of decreased cardiac output  - Administer and titrate ordered vasoactive medications to optimize hemodynamic stability  - Assess quality of pulses, skin color and temperature  - Assess for signs of decreased coronary artery perfusion  - Instruct patient to report change in severity of symptoms  Outcome: Progressing  Goal: Absence of cardiac dysrhythmias or at baseline rhythm  Description: INTERVENTIONS:  - Continuous cardiac monitoring, vital signs, obtain 12 lead EKG if ordered  - Administer antiarrhythmic and heart rate control medications as ordered  - Monitor electrolytes and administer replacement therapy as ordered  Outcome: Progressing

## 2024-08-27 ENCOUNTER — APPOINTMENT (INPATIENT)
Dept: RADIOLOGY | Facility: HOSPITAL | Age: 74
DRG: 871 | End: 2024-08-27
Payer: COMMERCIAL

## 2024-08-27 PROBLEM — A41.9 SEPSIS (HCC): Status: ACTIVE | Noted: 2024-08-27

## 2024-08-27 PROBLEM — I48.0 PAROXYSMAL ATRIAL FIBRILLATION (HCC): Status: ACTIVE | Noted: 2024-03-11

## 2024-08-27 PROBLEM — I63.30 CEREBROVASCULAR ACCIDENT (CVA) DUE TO THROMBOSIS OF CEREBRAL ARTERY (HCC): Status: ACTIVE | Noted: 2024-03-08

## 2024-08-27 LAB
ANION GAP SERPL CALCULATED.3IONS-SCNC: 8 MMOL/L (ref 4–13)
BUN SERPL-MCNC: 13 MG/DL (ref 5–25)
CALCIUM SERPL-MCNC: 9 MG/DL (ref 8.4–10.2)
CHLORIDE SERPL-SCNC: 106 MMOL/L (ref 96–108)
CO2 SERPL-SCNC: 26 MMOL/L (ref 21–32)
CREAT SERPL-MCNC: 0.65 MG/DL (ref 0.6–1.3)
ERYTHROCYTE [DISTWIDTH] IN BLOOD BY AUTOMATED COUNT: 12.7 % (ref 11.6–15.1)
GFR SERPL CREATININE-BSD FRML MDRD: 95 ML/MIN/1.73SQ M
GLUCOSE SERPL-MCNC: 106 MG/DL (ref 65–140)
GLUCOSE SERPL-MCNC: 111 MG/DL (ref 65–140)
GLUCOSE SERPL-MCNC: 132 MG/DL (ref 65–140)
GLUCOSE SERPL-MCNC: 138 MG/DL (ref 65–140)
GLUCOSE SERPL-MCNC: 175 MG/DL (ref 65–140)
HCT VFR BLD AUTO: 37.6 % (ref 36.5–49.3)
HGB BLD-MCNC: 12.3 G/DL (ref 12–17)
LACTATE SERPL-SCNC: 1.1 MMOL/L (ref 0.5–2)
MCH RBC QN AUTO: 30.9 PG (ref 26.8–34.3)
MCHC RBC AUTO-ENTMCNC: 32.7 G/DL (ref 31.4–37.4)
MCV RBC AUTO: 95 FL (ref 82–98)
PLATELET # BLD AUTO: 392 THOUSANDS/UL (ref 149–390)
PMV BLD AUTO: 8.8 FL (ref 8.9–12.7)
POTASSIUM SERPL-SCNC: 3.1 MMOL/L (ref 3.5–5.3)
PROCALCITONIN SERPL-MCNC: 0.15 NG/ML
RBC # BLD AUTO: 3.98 MILLION/UL (ref 3.88–5.62)
SODIUM SERPL-SCNC: 140 MMOL/L (ref 135–147)
WBC # BLD AUTO: 10.64 THOUSAND/UL (ref 4.31–10.16)

## 2024-08-27 PROCEDURE — 99233 SBSQ HOSP IP/OBS HIGH 50: CPT | Performed by: INTERNAL MEDICINE

## 2024-08-27 PROCEDURE — 80048 BASIC METABOLIC PNL TOTAL CA: CPT | Performed by: STUDENT IN AN ORGANIZED HEALTH CARE EDUCATION/TRAINING PROGRAM

## 2024-08-27 PROCEDURE — 82948 REAGENT STRIP/BLOOD GLUCOSE: CPT

## 2024-08-27 PROCEDURE — 97530 THERAPEUTIC ACTIVITIES: CPT

## 2024-08-27 PROCEDURE — 92526 ORAL FUNCTION THERAPY: CPT

## 2024-08-27 PROCEDURE — 74230 X-RAY XM SWLNG FUNCJ C+: CPT

## 2024-08-27 PROCEDURE — 84145 PROCALCITONIN (PCT): CPT | Performed by: INTERNAL MEDICINE

## 2024-08-27 PROCEDURE — 83605 ASSAY OF LACTIC ACID: CPT | Performed by: INTERNAL MEDICINE

## 2024-08-27 PROCEDURE — 85027 COMPLETE CBC AUTOMATED: CPT | Performed by: STUDENT IN AN ORGANIZED HEALTH CARE EDUCATION/TRAINING PROGRAM

## 2024-08-27 PROCEDURE — 92611 MOTION FLUOROSCOPY/SWALLOW: CPT

## 2024-08-27 PROCEDURE — 97110 THERAPEUTIC EXERCISES: CPT

## 2024-08-27 RX ORDER — POTASSIUM CHLORIDE 1500 MG/1
40 TABLET, EXTENDED RELEASE ORAL
Status: DISCONTINUED | OUTPATIENT
Start: 2024-08-27 | End: 2024-08-27

## 2024-08-27 RX ORDER — POTASSIUM CHLORIDE 1500 MG/1
40 TABLET, EXTENDED RELEASE ORAL ONCE
Status: COMPLETED | OUTPATIENT
Start: 2024-08-27 | End: 2024-08-27

## 2024-08-27 RX ORDER — POTASSIUM CHLORIDE 1500 MG/1
40 TABLET, EXTENDED RELEASE ORAL
Status: COMPLETED | OUTPATIENT
Start: 2024-08-27 | End: 2024-08-27

## 2024-08-27 RX ADMIN — ACETAMINOPHEN 650 MG: 325 TABLET, FILM COATED ORAL at 07:59

## 2024-08-27 RX ADMIN — ATORVASTATIN CALCIUM 80 MG: 40 TABLET, FILM COATED ORAL at 17:23

## 2024-08-27 RX ADMIN — AMPICILLIN SODIUM AND SULBACTAM SODIUM 3 G: 100; 50 INJECTION, POWDER, FOR SOLUTION INTRAVENOUS at 00:24

## 2024-08-27 RX ADMIN — AMPICILLIN SODIUM AND SULBACTAM SODIUM 3 G: 100; 50 INJECTION, POWDER, FOR SOLUTION INTRAVENOUS at 05:56

## 2024-08-27 RX ADMIN — AMPICILLIN SODIUM AND SULBACTAM SODIUM 3 G: 100; 50 INJECTION, POWDER, FOR SOLUTION INTRAVENOUS at 23:59

## 2024-08-27 RX ADMIN — AMPICILLIN SODIUM AND SULBACTAM SODIUM 3 G: 100; 50 INJECTION, POWDER, FOR SOLUTION INTRAVENOUS at 19:11

## 2024-08-27 RX ADMIN — APIXABAN 5 MG: 5 TABLET, FILM COATED ORAL at 17:23

## 2024-08-27 RX ADMIN — POTASSIUM CHLORIDE 40 MEQ: 1500 TABLET, EXTENDED RELEASE ORAL at 06:19

## 2024-08-27 RX ADMIN — ONDANSETRON 4 MG: 2 INJECTION INTRAMUSCULAR; INTRAVENOUS at 08:00

## 2024-08-27 RX ADMIN — TAMSULOSIN HYDROCHLORIDE 0.4 MG: 0.4 CAPSULE ORAL at 17:23

## 2024-08-27 RX ADMIN — INSULIN LISPRO 1 UNITS: 100 INJECTION, SOLUTION INTRAVENOUS; SUBCUTANEOUS at 17:25

## 2024-08-27 RX ADMIN — CALCIUM CARBONATE (ANTACID) CHEW TAB 500 MG 1000 MG: 500 CHEW TAB at 08:00

## 2024-08-27 RX ADMIN — AMPICILLIN SODIUM AND SULBACTAM SODIUM 3 G: 100; 50 INJECTION, POWDER, FOR SOLUTION INTRAVENOUS at 12:46

## 2024-08-27 RX ADMIN — COLLAGENASE SANTYL 1 APPLICATION: 250 OINTMENT TOPICAL at 09:50

## 2024-08-27 RX ADMIN — POTASSIUM CHLORIDE 40 MEQ: 1500 TABLET, EXTENDED RELEASE ORAL at 09:48

## 2024-08-27 RX ADMIN — APIXABAN 5 MG: 5 TABLET, FILM COATED ORAL at 08:00

## 2024-08-27 RX ADMIN — ASPIRIN 81 MG: 81 TABLET, CHEWABLE ORAL at 08:00

## 2024-08-27 NOTE — OCCUPATIONAL THERAPY NOTE
Occupational Therapy Treatment Note      Drew Santos    8/27/2024    Principal Problem:    Sacral wound  Active Problems:    Type 2 diabetes mellitus without complication, without long-term current use of insulin (HCC)    Cerebrovascular accident (CVA) due to thrombosis of cerebral artery (HCC)    Urinary retention    Paroxysmal atrial fibrillation (HCC)    Primary hypertension      Past Medical History:   Diagnosis Date    Diabetes mellitus (HCC)     Hypertension        No past surgical history on file.    08/27/24 1124   OT Last Visit   OT Visit Date 08/27/24   Note Type   Note Type Treatment   Pain Assessment   Pain Assessment Tool 0-10   Pain Score No Pain   Restrictions/Precautions   Weight Bearing Precautions Per Order No   Braces or Orthoses Other (Comment)  (None reported)   Other Precautions Chair Alarm;Bed Alarm;Cognitive;Fall Risk;Limb alert  (Left hemiparesis)   Lifestyle   Autonomy Patient unable to provide detailed history secondary to cognitive deficits. Chart review indicates he was independent prior to March / 2024, since then chart indicates he had a rehab stay in Acute rehab and STR facility, and has been non ambulatory. Further clarification is needed.   Reciprocal Relationships Supportive wife and family   ADL   Eating Assistance 5  Supervision/Setup   Eating Deficit Increased time to complete;Supervision/safety;Setup   Grooming Assistance 4  Minimal Assistance   Grooming Deficit Increased time to complete;Supervision/safety;Verbal cueing;Setup;Steadying   UB Bathing Assistance 2  Maximal Assistance   UB Bathing Deficit Increased time to complete;Supervision/safety;Verbal cueing;Setup;Steadying   LB Bathing Assistance 1  Total Assistance   LB Bathing Deficit Increased time to complete;Supervision/safety;Verbal cueing;Setup;Steadying   UB Dressing Assistance 2  Maximal Assistance   UB Dressing Deficit Increased time to complete;Supervision/safety;Verbal cueing;Setup;Steadying   LB Dressing  "Assistance 1  Total Assistance   LB Dressing Deficit Increased time to complete;Supervision/safety;Verbal cueing;Setup;Steadying   Toileting Assistance  1  Total Assistance   Toileting Deficit Increased time to complete;Supervison/safety;Verbal cueing;Steadying;Setup   Bed Mobility   Supine to Sit 2  Maximal assistance   Additional items Assist x 2;Bedrails;Increased time required;Verbal cues;LE management;HOB elevated   Transfers   Sit to Stand 2  Maximal assistance   Additional items Assist x 2;Increased time required;Verbal cues;Armrests   Stand to Sit 2  Maximal assistance   Additional items Assist x 2;Increased time required;Verbal cues;Armrests   Additional Comments Mariann Steady used as well   Cognition   Overall Cognitive Status Impaired   Arousal/Participation Alert;Responsive;Cooperative   Attention Attends with cues to redirect   Orientation Level Oriented to person;Oriented to place;Disoriented to time;Disoriented to situation  (\"1948... 1950\")   Memory Decreased short term memory;Decreased recall of biographical information;Decreased recall of recent events   Following Commands Follows one step commands with increased time or repetition   Comments Pt was agreeable to OT session   Activity Tolerance   Activity Tolerance Patient tolerated treatment well   Assessment   Assessment Pt participated in skilled OT session today addressing the following interventions Patient / Family Education, Transfer Training, Therapeutic Activity, Safety Awareness, Fall Prevention, Back safety education & training, Activity tolerance training, and Sitting/ standing balance task to increase EOB/ OOB georgette performance tolerance. Upon arrival, OT completed 2 pt identifiers.  Pt agreeable to OT treatment session, upon arrival patient was found alert, responsive and in no apparent distress. Pt completed bed mobility with max A of 2.  Pt completed sit to stand with max A of 2.  Pt completed xfer to chair with Mariann Steady. Pt requiring " VCs and A throughout and edu in safety and weight shifting techniques. Pt continues to be functioning below baseline level, occupational performance remains limited secondary to factors listed above and increased risk for falls and injury. From OT standpoint, recommendation at time of d/c would be Level 2 (Mod Resource Intensity).    Pt to benefit from continued Occupational Therapy treatment while in the hospital to address deficits as defined above and maximize level of functional independence with ADLs and functional mobility.   Plan   Treatment Interventions Functional transfer training;Cognitive reorientation;Patient/family training;Compensatory technique education;Continued evaluation;Cardiac education;Energy conservation;Activityengagement   Goal Expiration Date 09/06/24   OT Treatment Day 1   OT Frequency 3-5x/wk   Discharge Recommendation   Rehab Resource Intensity Level, OT II (Moderate Resource Intensity)   AM-PAC Daily Activity Inpatient   Lower Body Dressing 1   Bathing 1   Toileting 1   Upper Body Dressing 2   Grooming 3   Eating 3   Daily Activity Raw Score 11   Daily Activity Standardized Score (Calc for Raw Score >=11) 29.04   AM-PAC Applied Cognition Inpatient   Following a Speech/Presentation 3   Understanding Ordinary Conversation 3   Taking Medications 1   Remembering Where Things Are Placed or Put Away 1   Remembering List of 4-5 Errands 1   Taking Care of Complicated Tasks 1   Applied Cognition Raw Score 10   Applied Cognition Standardized Score 24.98   Barthel Index   Grooming Score 0   Dressing Score 0   Toilet Use Score 0   Transfers (Bed/Chair) Score 5   Mobility (Level Surface) Score 0     Ale Santamaria MS OTR/L

## 2024-08-27 NOTE — CASE MANAGEMENT
Case Management Progress Note    Patient name Drew Santos  Location 2 [facility] 267/2 E 267-01 MRN [id number]  : 1950 Date 2024       LOS (days): 4  Geometric Mean LOS (GMLOS) (days):   Days to GMLOS:        OBJECTIVE:     Current admission status: Inpatient  Preferred Pharmacy:   [id number] - [place], [place] - [address]   [facility] [address]  Phone: [phone] Fax: [phone]    Primary Care Provider: Joe Lyon MD  Primary Insurance: [employer]  Secondary Insurance:     PROGRESS NOTE:  [facility]  [facility] up to 365 days in SNF for PT/OT, wound care, and IV abx if needed.  CM sent AIDIN referral to facilities on list provided by Reva.  ([facility], [facility], [facility], [facility], [facility], [facility], [facility], [facility], [facility], and [facility].)    Per spouse, [facility] is to utilize [facility] facility due to ongoing care needs.     @ 0133 - CM met with patient at bedside and provided list of [facility] facilities referred to.

## 2024-08-27 NOTE — PLAN OF CARE
Problem: OCCUPATIONAL THERAPY ADULT  Goal: Performs self-care activities at highest level of function for planned discharge setting.  See evaluation for individualized goals.  Description: Treatment Interventions: Functional transfer training, Cognitive reorientation, Patient/family training, Compensatory technique education, Continued evaluation, Cardiac education, Energy conservation, Activityengagement          See flowsheet documentation for full assessment, interventions and recommendations.   Note: Limitation: Decreased ADL status, Decreased UE strength, Decreased Safe judgement during ADL, Decreased UE ROM, Decreased cognition, Decreased endurance, Decreased self-care trans, Decreased high-level ADLs, Non-func L UE  Prognosis: Good  Assessment: Pt participated in skilled OT session today addressing the following interventions Patient / Family Education, Transfer Training, Therapeutic Activity, Safety Awareness, Fall Prevention, Back safety education & training, Activity tolerance training, and Sitting/ standing balance task to increase EOB/ OOB georgette performance tolerance. Upon arrival, OT completed 2 pt identifiers.  Pt agreeable to OT treatment session, upon arrival patient was found alert, responsive and in no apparent distress. Pt completed bed mobility with max A of 2.  Pt completed sit to stand with max A of 2.  Pt completed xfer to chair with Mariann Steady. Pt requiring VCs and A throughout and edu in safety and weight shifting techniques. Pt continues to be functioning below baseline level, occupational performance remains limited secondary to factors listed above and increased risk for falls and injury. From OT standpoint, recommendation at time of d/c would be Level 2 (Mod Resource Intensity).    Pt to benefit from continued Occupational Therapy treatment while in the hospital to address deficits as defined above and maximize level of functional independence with ADLs and functional mobility.      Rehab Resource Intensity Level, OT: II (Moderate Resource Intensity)

## 2024-08-27 NOTE — PLAN OF CARE
Problem: PHYSICAL THERAPY ADULT  Goal: Performs mobility at highest level of function for planned discharge setting.  See evaluation for individualized goals.  Description: Treatment/Interventions: Functional transfer training, LE strengthening/ROM, Therapeutic exercise, Endurance training, Cognitive reorientation, Patient/family training, Bed mobility, Continued evaluation, Spoke to nursing, OT          See flowsheet documentation for full assessment, interventions and recommendations.  8/27/2024 1429 by Reva Valentine PT  Note: Prognosis: Good  Problem List: Decreased strength, Decreased endurance, Impaired balance, Decreased mobility, Decreased cognition  Assessment: Chart reviewed. Patient was received supine in bed in NAD and agreeable to PT session. Today's PT treatment session consisted of therapeutic activity for facilitation of transitional movements and safe performance of correct technique for bed mobility and sit to stand transfers and therapeutic exercise to increase lower extremity muscle strength. In comparison to the previous session the patient has made progress as evident by requiring a decrease in assist when performing sit to stand transfer. Pt performed two sit to stand transfers with use of mikhail steady. The mikhail steady was utilized to transfer patient from bed to chair. The patient continues to require assist of two for all functional mobility. Pt noted with increased left lateral trunk lean in standing. Pt tolerated all therapeutic exercise well without complaints of pain. Overall, patient tolerated today's session well. Pt is improving as expected towards achieving his STG's. Pt's STG's were updated this session. Patient's prognosis for achieving their STG's is good as evident by pt's motivation. Co-treatment was performed with OT secondary to complex medical condition of pt. Pt requires assist of two to achieve and maintain transitional movements; requiring the need of skilled therapeutic  intervention of two therapists to achieve the delivery of services. PT/OT goals were addressed separately. PT intervention continues to be appropriate as the patient continues to be limited by decreased lower extremity strength, impaired balance, decreased endurance, and decreased functional mobility. Continue to recommend level 2, moderate resource intensity. PT to continue to see patient in order to address the deficits listed above and provide interventions consistent with the POC in order to achieve STG's and optimize the patient's independence with functional mobility.    Barriers to Discharge: Inaccessible home environment, Decreased caregiver support     Rehab Resource Intensity Level, PT: II (Moderate Resource Intensity)    See flowsheet documentation for full assessment.

## 2024-08-27 NOTE — PROGRESS NOTES
Critical access hospital   Progress Note  Name: Drew Santos I  MRN: [id number]  Unit/Bed#: 2 E 267-01 I Date of Admission: 8/23/2024   Date of Service: 8/27/2024 I Hospital Day: 4    * Sacral wound  Assessment & Plan  74 yo male pt with Pmhx of CVA, left hemiparesis, cognitive decline, dysphagia currently on mechanical altered diet with nectar thick liquids, patient was at [facility] rehab for 2 weeks subsequently evaluated seatbelts for 2 weeks and discharged home on 8/17/2024.  Wife reports that he had developed a wound while he was at local rehab however wound has progressed.  Has sacral decubitus ulcer that is appears to be worsening with superimposed infection given leukocytosis.  Patient was given IV Zosyn earlier.  Stool is contaminating area of ulcer and rectal tube was placed however it was removed since her stool is not watery as per nursing staff    Recent Labs     08/25/24  0535 08/26/24  0512 08/27/24  0437   WBC 13.97* 10.49* 10.64*     Currently he is afebrile, hemodynamically stable.  Surgery recommendation appreciated.  IV Unasyn day #4  Tentative plan to transition to p.o. antibiotics and discharge in next 24 to 48 hours  Continue with surgery recommendation as well as wound care recommendation.  Continue supportive care  Maintain pressure offloading measures.  [facility]      Cerebrovascular accident (CVA) due to thrombosis of cerebral artery (HCC)  Assessment & Plan  Does have residual left trapezius, dysphagia, cognitive decline.  Currently on dysphagia diet with mechanical altered with nectar thick liquids.  Seen by speech and currently on regular diet with thing liquids  Plan for VBS per speech therapy        Type 2 diabetes mellitus without complication, without long-term current use of insulin ([facility])  Assessment & Plan  Lab Results   Component Value Date    HGBA1C 6.0 (H) 07/04/2024       Recent Labs     08/26/24  1640 08/26/24  2050 08/27/24  0437 08/27/24  0746    POCGLU 152* 137  --  111   GLUC  --   --  106  --        Blood Sugar Average: Last 72 hrs:  (P) [id number]    Hold home regimen while inpatient and resume on discharge   Diabetic diet  Insulin regimen  Glucose checks and Insulin correction ACHS  Goal -180 while admitted, adjusting insulin regimen as appropriate  Monitor for hypoglycemia and treat per protocol    Primary hypertension  Assessment & Plan  Blood Pressure: 131/72    Adequately controlled  Continue home meds    Paroxysmal atrial fibrillation (HCC)  Assessment & Plan  Pulse: 76   Rate controlled  Continue home meds  On eliquis for anticoagulation    Urinary retention  Assessment & Plan  Patient had Parra catheter placed  Urology consulted - appreciate reccs  Plan for DC with Parra; voiding trial at rehab - if fails, then OP follow up up          VTE Pharmacologic Prophylaxis:   High Risk (Score >/= 5) - Pharmacological DVT Prophylaxis Ordered: apixaban (Eliquis). Sequential Compression Devices Ordered.    Mobility:   Basic Mobility Inpatient Raw Score: 9  -[facility] Goal: 3: Sit at edge of bed  JH-HLM Achieved: 2: Bed activities/Dependent transfer  JH-HLM Goal achieved. Continue to encourage appropriate mobility.    Patient Centered Rounds: I performed bedside rounds with nursing staff today.  Discussions with Specialists or Other Care Team Provider:  IP CONSULT TO CASE MANAGEMENT  IP CONSULT TO ACUTE CARE SURGERY  IP CONSULT TO UROLOGY      Education and Discussions with Family / Patient: patient and wife    Total Time Spent on Date of Encounter in care of patient: 30+ mins. This time was spent on one or more of the following: performing physical exam; counseling and coordination of care; obtaining or reviewing history; documenting in the medical record; reviewing/ordering tests, medications or procedures; communicating with other healthcare professionals and discussing with patient's family/caregivers.    Current Length of Stay: 4  day(s)  Current Patient Status: Inpatient   Certification Statement: The patient will continue to require additional inpatient hospital stay due to plan as noted above  Discharge Plan: Anticipate discharge in 24-48 hrs to [facility]    Code Status: Level 1 - Full Code    Subjective:   Offers no new complaints at this time. No acute events reported overnight. Understanding of plan.  All questions answered. [facility].    Objective:     Vitals:   Temp (24hrs), Av.5 °F (36.9 °C), Min:98 °F (36.7 °C), Max:99.3 °F (37.4 °C)    Temp:  [98 °F (36.7 °C)-99.3 °F (37.4 °C)] 99.3 °F (37.4 °C)  HR:  [] 76  Resp:  [18-20] 18  BP: ()/(54-72) 131/72  SpO2:  [97 %-98 %] 98 %  Body mass index is 32.75 kg/m².     Input and Output Summary (last 24 hours):     Intake/Output Summary (Last 24 hours) at 2024 1333  Last data filed at 2024 0212  Gross per 24 hour   Intake 240 ml   Output 1075 ml   Net -835 ml       Physical Exam:   Physical Exam  Vitals and nursing note reviewed.   Constitutional:       General: He is not in acute distress.     Appearance: Normal appearance. He is not ill-appearing, toxic-appearing or diaphoretic.   HENT:      Head: Normocephalic and atraumatic.   Eyes:      General: No scleral icterus.     Conjunctiva/sclera: Conjunctivae normal.   Cardiovascular:      Rate and Rhythm: Normal rate.      Pulses: Normal pulses.   Pulmonary:      Effort: Pulmonary effort is normal. No respiratory distress.      Breath sounds: Normal breath sounds. No wheezing.   Abdominal:      General: Bowel sounds are normal. There is no distension.      Palpations: Abdomen is soft.      Tenderness: There is no abdominal tenderness.   Genitourinary:     Comments: Parra catheter noted with clear yellow urine.  Skin:     Findings: Lesion (sacral) present.   Neurological:      Mental Status: He is alert. Mental status is at baseline. He is disoriented.      Comments: Noted residual  left hemiparesis   Psychiatric:         Mood and Affect: Mood normal.         Behavior: Behavior normal.        Additional Data:     Labs:  Results from last 7 days   Lab Units 08/27/24  0437 08/26/24  0512   WBC Thousand/uL 10.64* 10.49*   HEMOGLOBIN g/dL 12.3 10.9*   HEMATOCRIT % 37.6 33.1*   PLATELETS Thousands/uL 392* 370   SEGS PCT %  --  65   LYMPHO PCT %  --  27   MONO PCT %  --  7   EOS PCT %  --  1     Results from last 7 days   Lab Units 08/27/24  0437 08/23/24  1359 08/22/24  0937   SODIUM mmol/L 140   < > 139   POTASSIUM mmol/L 3.1*   < > 4.6   CHLORIDE mmol/L 106   < > 103   CO2 mmol/L 26   < > 27   BUN mg/dL 13   < > 13   CREATININE mg/dL 0.65   < > 0.78   ANION GAP mmol/L 8   < > 9   CALCIUM mg/dL 9.0   < > 8.9   ALBUMIN g/dL  --   --  2.8*   TOTAL BILIRUBIN mg/dL  --   --  0.9   ALK PHOS U/L  --   --  101   ALT U/L  --   --  22   AST U/L  --   --  20   GLUCOSE RANDOM mg/dL 106   < > 123*    < > = values in this interval not displayed.         Results from last 7 days   Lab Units 08/27/24  1154 08/27/24  0746 08/26/24  2050 08/26/24  1640 08/26/24  1139 08/26/24  0722 08/25/24  2107 08/25/24  1611 08/25/24  1050 08/25/24  0707 08/24/24  2032 08/24/24  1547   POC GLUCOSE mg/dl 138 111 137 152* 139 159* 140 98 135 142* 136 156*               Lines/Drains:  Invasive Devices       Peripheral Intravenous Line  Duration             Peripheral IV 08/26/24 Distal;Dorsal (posterior);Right Forearm 1 day              Drain  Duration             Urethral Catheter Double-lumen 16 Fr. 2 days                  Urinary Catheter:  Goal for removal: N/A- Discharging with Parra               Imaging: Reviewed radiology reports from this admission including:   No results found.    No Chest XR results available for this patient.     Results for orders placed during the hospital encounter of 03/05/24    Echo complete w/ contrast if indicated    Interpretation Summary    Technically difficult study limits the accuracy of the  study.    Left Ventricle: Left ventricular cavity size is normal. Wall thickness is normal. The left ventricular ejection fraction is 65%. Systolic function is normal. Wall motion is normal. Diastolic function is mildly abnormal, consistent with grade I (abnormal) relaxation.    Aortic Valve: There is aortic valve sclerosis.      Recent Cultures (last 7 days):         Last 24 Hours Medication List:   Current Facility-Administered Medications   Medication Dose Route Frequency Provider Last Rate    acetaminophen  650 mg Oral Q6H PRN Emir Antonio MD      ampicillin-sulbactam  3 g Intravenous Q6H Bib SMITH MD 3 g (08/27/24 1246)    apixaban  5 mg Oral BID Emir Antonio MD      aspirin  81 mg Oral Daily Emir Antonio MD      atorvastatin  80 mg Oral QPM Emir Antonio MD      calcium carbonate  1,000 mg Oral Daily PRN Emir Antonio MD      collagenase   Topical Daily Mell Askew PA-C      docusate sodium  100 mg Oral BID PRN Bib SMITH MD      insulin lispro  1-6 Units Subcutaneous TID With Meals Emir Antonio MD      insulin lispro  1-6 Units Subcutaneous HS Emir Antonio MD      ondansetron  4 mg Intravenous Q6H PRN Emir Antonio MD      polyethylene glycol  17 g Oral Daily PRN Bib SMITH MD      tamsulosin  0.4 mg Oral Daily With Dinner Bib SMITH MD          Today, Patient Was Seen By: Brendan Nuno DO    **Please Note: This note may have been constructed using a voice recognition system.**

## 2024-08-27 NOTE — PHYSICAL THERAPY NOTE
"   Physical Therapy Treatment Note    Patient Name: Drew Santos    Diagnosis: History of CVA (cerebrovascular accident) [Z86.73]  Visit for wound check [Z51.89]  Sacral decubitus ulcer, stage III (HCC) [L89.153]     08/27/24 1151   PT Last Visit   PT Visit Date 08/27/24   Note Type   Note Type Treatment for insurance authorization   Pain Assessment   Pain Assessment Tool 0-10   Pain Score No Pain   Restrictions/Precautions   Weight Bearing Precautions Per Order No   Other Precautions Cognitive;Chair Alarm;Bed Alarm;Multiple lines;Fall Risk  (left hemiparesis)   General   Chart Reviewed Yes   Response to Previous Treatment Patient with no complaints from previous session.   Family/Caregiver Present No   Cognition   Overall Cognitive Status Impaired   Arousal/Participation Alert;Responsive;Cooperative   Attention Attends with cues to redirect   Orientation Level Oriented to person;Oriented to place;Disoriented to situation  (oriented to month; not year)   Memory Decreased short term memory;Decreased recall of recent events   Following Commands Follows one step commands with increased time or repetition   Comments Pt agreeable to PT.   Subjective   Subjective \"I'll get up.\"   Bed Mobility   Supine to Sit 2  Maximal assistance   Additional items Assist x 2;HOB elevated;Bedrails;Increased time required;Verbal cues;LE management   Transfers   Sit to Stand 2  Maximal assistance   Additional items Assist x 2;Increased time required;Verbal cues   Stand to Sit 2  Maximal assistance   Additional items Assist x 2;Increased time required;Verbal cues   Additional Comments Mariann steady was utilized to perform 2 sit to stand transfers and to transfer from bed to chair. Pt with increased left lateral trunk lean in standing   Balance   Static Sitting Fair -   Dynamic Sitting Poor +   Static Standing Poor -   Endurance Deficit   Endurance Deficit Yes   Endurance Deficit Description decreased activity tolerance   Activity Tolerance "   Activity Tolerance Patient tolerated treatment well   Medical Staff Made Aware OT Ale   Nurse Made Aware RN Tiana   Exercises   Quad Sets Sitting;10 reps;AROM;Right  (long sitting; tapping at L quad for facilitation; no muscle contraction noted)   Hip Flexion Sitting;10 reps;AROM;Right;PROM;AAROM;Left   Hip Abduction Sitting;10 reps;AROM;Right;PROM;AAROM;Left  (long sitting)   Knee AROM Long Arc Quad Sitting;10 reps;AROM;Right;PROM;AAROM;Left   Ankle Pumps Sitting;10 reps;AROM;Bilateral  (limited ROM on left ankle)   Assessment   Prognosis Good   Problem List Decreased strength;Decreased endurance;Impaired balance;Decreased mobility;Decreased cognition   Assessment Chart reviewed. Patient was received supine in bed in NAD and agreeable to PT session. Today's PT treatment session consisted of therapeutic activity for facilitation of transitional movements and safe performance of correct technique for bed mobility and sit to stand transfers and therapeutic exercise to increase lower extremity muscle strength. In comparison to the previous session the patient has made progress as evident by requiring a decrease in assist when performing sit to stand transfer. Pt performed two sit to stand transfers with use of mikhail steady. The mikhail steady was utilized to transfer patient from bed to chair. The patient continues to require assist of two for all functional mobility. Pt noted with increased left lateral trunk lean in standing. Pt tolerated all therapeutic exercise well without complaints of pain. Overall, patient tolerated today's session well. Pt is improving as expected towards achieving his STG's. Pt's STG's were updated this session. Patient's prognosis for achieving their STG's is good as evident by pt's motivation. Co-treatment was performed with OT secondary to complex medical condition of pt. Pt requires assist of two to achieve and maintain transitional movements; requiring the need of skilled therapeutic  intervention of two therapists to achieve the delivery of services. PT/OT goals were addressed separately. PT intervention continues to be appropriate as the patient continues to be limited by decreased lower extremity strength, impaired balance, decreased endurance, and decreased functional mobility. Continue to recommend level 2, moderate resource intensity. PT to continue to see patient in order to address the deficits listed above and provide interventions consistent with the POC in order to achieve STG's and optimize the patient's independence with functional mobility.   Barriers to Discharge Inaccessible home environment;Decreased caregiver support   Goals   STG Expiration Date 09/06/24   Short Term Goal #1 In 10 days: Increase bilateral LE strength 1/2 grade to facilitate independent mobility, Perform all bed mobility tasks with min A of 1 to decrease caregiver burden, Perform all transfers with min A of 1 to improve independence, Increase static sitting, dynamic sitting, and static standing balance 1/2 grade to decrease risk for falls, and PT to see and establish goals for gait and dynamic standing balance when appropriate   PT Treatment Day 1   Plan   Treatment/Interventions Functional transfer training;LE strengthening/ROM;Therapeutic exercise;Endurance training;Cognitive reorientation;Patient/family training;Bed mobility;Continued evaluation;Spoke to nursing;OT   Progress Improving as expected   PT Frequency 3-5x/wk   Discharge Recommendation   Rehab Resource Intensity Level, PT II (Moderate Resource Intensity)   AM-PAC Basic Mobility Inpatient   Turning in Flat Bed Without Bedrails 1   Lying on Back to Sitting on Edge of Flat Bed Without Bedrails 1   Moving Bed to Chair 1   Standing Up From Chair Using Arms 1   Walk in Room 1   Climb 3-5 Stairs With Railing 1   Basic Mobility Inpatient Raw Score 6   Turning Head Towards Sound 4   Follow Simple Instructions 3   Low Function Basic Mobility Raw Score  13    Low Function Basic Mobility Standardized Score  20.14   [facility] Highest Level Of Mobility   -HL Goal 2: Bed activities/Dependent transfer   -HL Achieved 3: Sit at edge of bed   Education   Education Provided Mobility training;Home exercise program;Assistive device   Patient Demonstrates acceptance/verbal understanding;Reinforcement needed   End of Consult   Patient Position at End of Consult Bedside chair;Bed/Chair alarm activated;All needs within reach  (RN aware)     Reva Valentine, PT, DPT    Time of PT treatment session: 2031-7270  24 minutes

## 2024-08-27 NOTE — PROCEDURES
Speech Pathology - Modified Barium Swallow Study    Patient Name: Drew Santos    Today's Date: 8/27/2024     Problem List  Principal Problem:    Sacral wound  Active Problems:    Type 2 diabetes mellitus without complication, without long-term current use of insulin (HCC)    Cerebrovascular accident (CVA) due to thrombosis of cerebral artery (HCC)    Urinary retention    Paroxysmal atrial fibrillation (HCC)    Primary hypertension      Past Medical History  Past Medical History:   Diagnosis Date    Diabetes mellitus (HCC)     Hypertension        Past Surgical History  No past surgical history on file.    Assessment Summary:    Pt presents with mild oropharyngeal dysphagia characterized by prolonged/incomplete mastication and oral transfer. Reduced bolus formation and control.  Posterior spill over the base of tongue evident with pooling in the valleculae (mostly during mastication of solids).  Pocketing evident as well, requiring verbal cues for swallow initiation.  Transient laryngeal penetration when taking thin liquids by successive straw sip.  No aspiration observed during the present study.  Given decreased bolus formation w/ posterior spill - may consider avoiding mixed consistencies.     Note: Images are available for review in PACS as desired.    Recommendations:   Recommended Diet: regular diet and thin liquids - avoid mixed consistencies   Recommended Form of Medications: 1 at a time with liquid, if pocketing whole with puree cut/crush larger   Aspiration precautions and compensatory swallowing strategies: upright posture, slow rate of feeding, small bites/sips, and alternating bites and sips  Consider referral to:  [facility]  SLP Dysphagia therapy recommended: ST will continue to follow while in the acute care setting x1-3    Results Reviewed with: patient, RN, and MD   Pt/Family Education: initiated. Pt and caregivers would benefit from continued education.     General Information;  See initial evaluation.      Pt was viewed sitting upright in the lateral and AP positions. Due to concerns for patient safety / patient refusal, trials provided deviated from the [facility] Validated Protocol. Pt was given 20-mL cup sip thin, 20-mL cup sip nectar thick, 5-mL pudding, and ½ cookie coated with 3-mL pudding. Pt was also given thin liquids by straw.    Initial view observations/comments: Clear view of the upper airway      8-Point Penetration-Aspiration Scale   Thin liquid 2 - Material enters the airway, remains above the vocal folds, and is ejected  from the airway- via successive straw sip    Nectar thick liquid 1 - Material does not enter the airway   Honey thick liquid NA   Puree (pudding) 1 - Material does not enter the airway   Solid 1 - Material does not enter the airway     Strategies and Efficacy: Liquid wash aides in clearing oral residue     Aspiration Response and Efficacy:  NA    [facility] Rating    ORAL Impairment  Compinent 1--Lip Closure  Judged at any point during the swallow.  1 - Interlabial escape; no progression to anterior lip    Component 2--Tongue Control During Bolus Hold  Judged on held liquid boluses only and prior to productive tongue movement.   2 - Posterior escape of less than half of bolus    Component 3--Bolus Preparation/Mastication  Judged only during presentation of 1/2 shortbread cookie coated in pudding.   2 - Disorganized chewing/mashing with solid pieces of bolus unchewed    Component 4--Bolus Transport/Lingual Motion  Judged after first productive tongue movement for oral bolus transport.  3 - Repetitive/disorganized tongue motion    Component 5--Oral Residue  Judged after first swallow or after the last swallow of the sequential swallow task.  2 - Residue collection on oral structures   Location   A - Floor of Mouth, C - Tongue, and D - Lateral Sulci    Component 6--Initiation of Pharyngeal Swallow  Judged at first movement of the brisk superior-anterior hyoid trajectory.  3 - Bolus head in  pyriforms      PHARYNGEAL Impairment  Component 7--Soft Palate Elevation  Judged during maximum displacement of soft palate.  0 - No bolus between the soft palate (SP)/pharyngeal wall (PW)    Component 8--Laryngeal Elevation  Judged when epiglottis is in its most horizontal position.  0 - Complete superior movement of thyroid cartilage with complete approximation of arytenoids to epiglottic petiole    Component 9--Anterior Hyoid Excursion  Judged at height of swallow/maximal anterior hyoid displacement.  0 - Complete anterior movement    Component 10--Epiglottic Movement  Judged at height of swallow/maximal anterior hyoid displacement.  0 - Complete inversion    Component 11--Laryngeal Vestibular Closure  Judged at height of swallow/maximal anterior hyoid displacement.  1 - Incomplete; narrow column air/contrast in laryngeal vestibule    Component 12--Pharyngeal Stripping Wave  Judged during the full duration of the pharyngeal swallow.  0 - Present - complete    Component 13--Pharyngeal Contraction  Judged in AP view at rest and throughout maximum movement of structures.  NA    Component 14--Pharyngoesophageal Segment Opening  Judged during maximum distension of PES and throughout opening and closure.  0 - Complete distension and complete duration; no obstruction of flow    Component 15--Tongue Base (TB) Retraction  Judged during maximum retraction of the tongue base.  1 - Trace column of contrast or air between TB and PW    Component 16--Pharyngeal Residue  Judged after first swallow or after the last swallow of the sequential swallow task.  1 - Trace residue within or on pharyngeal structures   Location   A - Tongue Base, B - Valleculae, and E - Pyriform sinuses      ESOPHAGEAL Impairment  Component 17--Esophageal Clearance Upright Position  Judged in AP view during bolus transit through the oral cavity to the LES  0 - Complete clearance; esophageal coating- limited view (laterally)       Delores Durand MS,  [facility]-SLP  Speech-Language Pathologist  [place] #[id number]  [place] #[id number]

## 2024-08-27 NOTE — ASSESSMENT & PLAN NOTE
Lab Results   Component Value Date    HGBA1C 6.0 (H) 07/04/2024       Recent Labs     08/26/24  1640 08/26/24 2050 08/27/24  0437 08/27/24  0746   POCGLU 152* 137  --  111   GLUC  --   --  106  --        Blood Sugar Average: Last 72 hrs:  (P) [id number]    Hold home regimen while inpatient and resume on discharge   Diabetic diet  Insulin regimen  Glucose checks and Insulin correction ACHS  Goal -180 while admitted, adjusting insulin regimen as appropriate  Monitor for hypoglycemia and treat per protocol

## 2024-08-27 NOTE — PLAN OF CARE
Problem: PAIN - ADULT  Goal: Verbalizes/displays adequate comfort level or baseline comfort level  Description: Interventions:  - Encourage patient to monitor pain and request assistance  - Assess pain using appropriate pain scale  - Administer analgesics based on type and severity of pain and evaluate response  - Implement non-pharmacological measures as appropriate and evaluate response  - Consider cultural and social influences on pain and pain management  - Notify physician/advanced practitioner if interventions unsuccessful or patient reports new pain  Outcome: Progressing     Problem: INFECTION - ADULT  Goal: Absence or prevention of progression during hospitalization  Description: INTERVENTIONS:  - Assess and monitor for signs and symptoms of infection  - Monitor lab/diagnostic results  - Monitor all insertion sites, i.e. indwelling lines, tubes, and drains  - Monitor endotracheal if appropriate and nasal secretions for changes in amount and color  - [place] appropriate cooling/warming therapies per order  - Administer medications as ordered  - Instruct and encourage patient and family to use good hand hygiene technique  - Identify and instruct in appropriate isolation precautions for identified infection/condition  Outcome: Progressing  Goal: Absence of fever/infection during neutropenic period  Description: INTERVENTIONS:  - Monitor WBC    Outcome: Progressing     Problem: SAFETY ADULT  Goal: Patient will remain free of falls  Description: INTERVENTIONS:  - Educate patient/family on patient safety including physical limitations  - Instruct patient to call for assistance with activity   - Consult OT/PT to assist with strengthening/mobility   - Keep Call bell within reach  - Keep bed low and locked with side rails adjusted as appropriate  - Keep care items and personal belongings within reach  - Initiate and maintain comfort rounds  - Make Fall Risk Sign visible to staff  - Offer Toileting every 2 Hours,  in advance of need  - Initiate/Maintain bed alarm  - Obtain necessary fall risk management equipment:   - Apply yellow socks and bracelet for high fall risk patint  - Consider moving patient to room near nurses station  Outcome: Progressing  Goal: Maintain or return to baseline ADL function  Description: INTERVENTIONS:  -  Assess patient's ability to carry out ADLs; assess patient's baseline for ADL function and identify physical deficits which impact ability to perform ADLs (bathing, care of mouth/teeth, toileting, grooming, dressing, etc.)  - Assess/evaluate cause of self-care deficits   - Assess range of motion  - Assess patient's mobility; develop plan if impaired  - Assess patient's need for assistive devices and provide as appropriate  - Encourage maximum independence but intervene and supervise when necessary  - Involve family in performance of ADLs  - Assess for home care needs following discharge   - Consider OT consult to assist with ADL evaluation and planning for discharge  - Provide patient education as appropriate  Outcome: Progressing  Goal: Maintains/Returns to pre admission functional level  Description: INTERVENTIONS:  - Perform AM-PAC 6 Click Basic Mobility/ Daily Activity assessment daily.  - Set and communicate daily mobility goal to care team and patient/family/caregiver.   - Collaborate with rehabilitation services on mobility goals if consulted  - Perform Range of Motion  times a day.  - Reposition patient every  hours.  - Dangle patient  times a day  - Stand patient  times a day  - Ambulate patient  times a day  - Out of bed to chair  times a day   - Out of bed for meals  times a day  - Out of bed for toileting  - Record patient progress and toleration of activity level   Outcome: Progressing     Problem: DISCHARGE PLANNING  Goal: Discharge to home or other facility with appropriate resources  Description: INTERVENTIONS:  - Identify barriers to discharge w/patient and caregiver  - Arrange  for needed discharge resources and transportation as appropriate  - Identify discharge learning needs (meds, wound care, etc.)  - Arrange for interpretive services to assist at discharge as needed  - Refer to Case Management Department for coordinating discharge planning if the patient needs post-hospital services based on physician/advanced practitioner order or complex needs related to functional status, cognitive ability, or social support system  Outcome: Progressing     Problem: Knowledge Deficit  Goal: Patient/family/caregiver demonstrates understanding of disease process, treatment plan, medications, and discharge instructions  Description: Complete learning assessment and assess knowledge base.  Interventions:  - Provide teaching at level of understanding  - Provide teaching via preferred learning methods  Outcome: Progressing     Problem: Neurological Deficit  Goal: Neurological status is stable or improving  Description: Interventions:  - Monitor and assess patient's level of consciousness, motor function, sensory function, and level of assistance needed for ADLs.   - Monitor and report changes from baseline. Collaborate with interdisciplinary team to initiate plan and implement interventions as ordered.   - Provide and maintain a safe environment.  - Consider seizure precautions.  - Consider fall precautions.  - Consider aspiration precautions.  - Consider bleeding precautions.  Outcome: Progressing     Problem: Activity Intolerance/Impaired Mobility  Goal: Mobility/activity is maintained at optimum level for patient  Description: Interventions:  - Assess and monitor patient  barriers to mobility and need for assistive/adaptive devices.  - Assess patient's emotional response to limitations.  - Collaborate with interdisciplinary team and initiate plans and interventions as ordered.  - Encourage independent activity per ability.  - Maintain proper body alignment.  - Perform active/passive rom as  tolerated/ordered.  - Plan activities to conserve energy.  - Turn patient as appropriate  Outcome: Progressing     Problem: Communication Impairment  Goal: Ability to express needs and understand communication  Description: Assess patient's communication skills and ability to understand information.  Patient will demonstrate use of effective communication techniques, alternative methods of communication and understanding even if not able to speak.     - Encourage communication and provide alternate methods of communication as needed.  - Collaborate with case management/ for discharge needs.  - Include patient/family/caregiver in decisions related to communication.  Outcome: Progressing     Problem: Potential for Aspiration  Goal: Non-ventilated patient's risk of aspiration is minimized  Description: Assess and monitor vital signs, respiratory status, and labs (WBC).  Monitor for signs of aspiration (tachypnea, cough, rales, wheezing, cyanosis, fever).    - Assess and monitor patient's ability to swallow.  - Place patient up in chair to eat if possible.  - HOB up at 90 degrees to eat if unable to get patient up into chair.  - Supervise patient during oral intake.   - Instruct patient/ family to take small bites.  - Instruct patient/ family to take small single sips when taking liquids.  - Follow patient-specific strategies generated by speech pathologist.  Outcome: Progressing  Goal: Ventilated patient's risk of aspiration is minimized  Description: Assess and monitor vital signs, respiratory status, airway cuff pressure, and labs (WBC).  Monitor for signs of aspiration (tachypnea, cough, rales, wheezing, cyanosis, fever).    - Elevate head of bed 30 degrees if patient has tube feeding.  - Monitor tube feeding.  Outcome: Progressing     Problem: Nutrition  Goal: Nutrition/Hydration status is improving  Description: Monitor and assess patient's nutrition/hydration status for malnutrition (ex- brittle  hair, bruises, dry skin, pale skin and conjunctiva, muscle wasting, smooth red tongue, and disorientation). Collaborate with interdisciplinary team and initiate plan and interventions as ordered.  Monitor patient's weight and dietary intake as ordered or per policy. Utilize nutrition screening tool and intervene per policy. Determine patient's food preferences and provide high-protein, high-caloric foods as appropriate.     - Assist patient with eating.  - Allow adequate time for meals.  - Encourage patient to take dietary supplement as ordered.  - Collaborate with clinical nutritionist.  - Include patient/family/caregiver in decisions related to nutrition.  Outcome: Progressing     Problem: NEUROSENSORY - ADULT  Goal: Achieves stable or improved neurological status  Description: INTERVENTIONS  - Monitor and report changes in neurological status  - Monitor vital signs such as temperature, blood pressure, glucose, and any other labs ordered   - Initiate measures to prevent increased intracranial pressure  - Monitor for seizure activity and implement precautions if appropriate      Outcome: Progressing  Goal: Remains free of injury related to seizures activity  Description: INTERVENTIONS  - Maintain airway, patient safety  and administer oxygen as ordered  - Monitor patient for seizure activity, document and report duration and description of seizure to physician/advanced practitioner  - If seizure occurs,  ensure patient safety during seizure  - Reorient patient post seizure  - Seizure pads on all 4 side rails  - Instruct patient/family to notify RN of any seizure activity including if an aura is experienced  - Instruct patient/family to call for assistance with activity based on nursing assessment  - Administer anti-seizure medications if ordered    Outcome: Progressing  Goal: Achieves maximal functionality and self care  Description: INTERVENTIONS  - Monitor swallowing and airway patency with patient fatigue and  changes in neurological status  - Encourage and assist patient to increase activity and self care.   - Encourage visually impaired, hearing impaired and aphasic patients to use assistive/communication devices  Outcome: Progressing     Problem: CARDIOVASCULAR - ADULT  Goal: Maintains optimal cardiac output and hemodynamic stability  Description: INTERVENTIONS:  - Monitor I/O, vital signs and rhythm  - Monitor for S/S and trends of decreased cardiac output  - Administer and titrate ordered vasoactive medications to optimize hemodynamic stability  - Assess quality of pulses, skin color and temperature  - Assess for signs of decreased coronary artery perfusion  - Instruct patient to report change in severity of symptoms  Outcome: Progressing  Goal: Absence of cardiac dysrhythmias or at baseline rhythm  Description: INTERVENTIONS:  - Continuous cardiac monitoring, vital signs, obtain 12 lead EKG if ordered  - Administer antiarrhythmic and heart rate control medications as ordered  - Monitor electrolytes and administer replacement therapy as ordered  Outcome: Progressing     Problem: METABOLIC, FLUID AND ELECTROLYTES - ADULT  Goal: Electrolytes maintained within normal limits  Description: INTERVENTIONS:  - Monitor labs and assess patient for signs and symptoms of electrolyte imbalances  - Administer electrolyte replacement as ordered  - Monitor response to electrolyte replacements, including repeat lab results as appropriate  - Instruct patient on fluid and nutrition as appropriate  Outcome: Progressing  Goal: Fluid balance maintained  Description: INTERVENTIONS:  - Monitor labs   - Monitor I/O and WT  - Instruct patient on fluid and nutrition as appropriate  - Assess for signs & symptoms of volume excess or deficit  Outcome: Progressing  Goal: Glucose maintained within target range  Description: INTERVENTIONS:  - Monitor Blood Glucose as ordered  - Assess for signs and symptoms of hyperglycemia and hypoglycemia  -  Administer ordered medications to maintain glucose within target range  - Assess nutritional intake and initiate nutrition service referral as needed  Outcome: Progressing     Problem: SKIN/TISSUE INTEGRITY - ADULT  Goal: Skin Integrity remains intact(Skin Breakdown Prevention)  Description: Assess:  -Perform [place] assessment every   -Clean and moisturize skin every   -Inspect skin when repositioning, toileting, and assisting with ADLS  -Assess under medical devices such as every   -Assess extremities for adequate circulation and sensation     Bed Management:  -Have minimal linens on bed & keep smooth, unwrinkled  -Change linens as needed when moist or perspiring  -Avoid sitting or lying in one position for more than  hours while in bed  -Keep HOB at degrees     Toileting:  -Offer bedside commode  -Assess for incontinence every   -Use incontinent care products after each incontinent episode such as     Activity:  -Mobilize patient  times a day  -Encourage activity and walks on unit  -Encourage or provide ROM exercises   -Turn and reposition patient every  Hours  -Use appropriate equipment to lift or move patient in bed  -Instruct/ Assist with weight shifting every  when out of bed in chair  -Consider limitation of chair time  hour intervals    Skin Care:  -Avoid use of baby powder, tape, friction and shearing, hot water or constrictive clothing  -Relieve pressure over bony prominences using   -Do not massage red bony areas    Next Steps:  -Teach patient strategies to minimize risks such as   -Consider consults to  interdisciplinary teams such as   Outcome: Progressing  Goal: Incision(s), wounds(s) or drain site(s) healing without S/S of infection  Description: INTERVENTIONS  - Assess and document dressing, incision, wound bed, drain sites and surrounding tissue  - Provide patient and family education  - Perform skin care/dressing changes every  Outcome: Progressing  Goal: Pressure injury heals and does not  worsen  Description: Interventions:  - Implement low air loss mattress or specialty surface (Criteria met)  - Apply silicone foam dressing  - Instruct/assist with weight shifting every  minutes when in chair   - Limit chair time to  hour intervals  - Use special pressure reducing interventions such as when in chair   - Apply fecal or urinary incontinence containment device   - Perform passive or active ROM every   - Turn and reposition patient & offload bony prominences every  hours   - Utilize friction reducing device or surface for transfers   - Consider consults to  interdisciplinary teams such as   - Use incontinent care products after each incontinent episode such as   - Consider nutrition services referral as needed  Outcome: Progressing     Problem: MUSCULOSKELETAL - ADULT  Goal: Maintain or return mobility to safest level of function  Description: INTERVENTIONS:  - Assess patient's ability to carry out ADLs; assess patient's baseline for ADL function and identify physical deficits which impact ability to perform ADLs (bathing, care of mouth/teeth, toileting, grooming, dressing, etc.)  - Assess/evaluate cause of self-care deficits   - Assess range of motion  - Assess patient's mobility  - Assess patient's need for assistive devices and provide as appropriate  - Encourage maximum independence but intervene and supervise when necessary  - Involve family in performance of ADLs  - Assess for home care needs following discharge   - Consider OT consult to assist with ADL evaluation and planning for discharge  - Provide patient education as appropriate  Outcome: Progressing  Goal: Maintain proper alignment of affected body part  Description: INTERVENTIONS:  - Support, maintain and protect limb and body alignment  - Provide patient/ family with appropriate education  Outcome: Progressing     Problem: Prexisting or High Potential for Compromised Skin Integrity  Goal: Skin integrity is maintained or  improved  Description: INTERVENTIONS:  - Identify patients at risk for skin breakdown  - Assess and monitor skin integrity  - Assess and monitor nutrition and hydration status  - Monitor labs   - Assess for incontinence   - Turn and reposition patient  - Assist with mobility/ambulation  - Relieve pressure over bony prominences  - Avoid friction and shearing  - Provide appropriate hygiene as needed including keeping skin clean and dry  - Evaluate need for skin moisturizer/barrier cream  - Collaborate with interdisciplinary team   - Patient/family teaching  - Consider wound care consult   Outcome: Progressing     Problem: Nutrition/Hydration-ADULT  Goal: Nutrient/Hydration intake appropriate for improving, restoring or maintaining nutritional needs  Description: Monitor and assess patient's nutrition/hydration status for malnutrition. Collaborate with interdisciplinary team and initiate plan and interventions as ordered.  Monitor patient's weight and dietary intake as ordered or per policy. Utilize nutrition screening tool and intervene as necessary. Determine patient's food preferences and provide high-protein, high-caloric foods as appropriate.     INTERVENTIONS:  - Monitor oral intake, urinary output, labs, and treatment plans  - Assess nutrition and hydration status and recommend course of action  - Evaluate amount of meals eaten  - Assist patient with eating if necessary   - Allow adequate time for meals  - Recommend/ encourage appropriate diets, oral nutritional supplements, and vitamin/mineral supplements  - Order, calculate, and assess calorie counts as needed  - Recommend, monitor, and adjust tube feedings and TPN/PPN based on assessed needs  - Assess need for intravenous fluids  - Provide specific nutrition/hydration education as appropriate  - Include patient/family/caregiver in decisions related to nutrition  Outcome: Progressing

## 2024-08-27 NOTE — SPEECH THERAPY NOTE
Speech Language/Pathology     Speech/Language Pathology Progress Note     Patient Name: Drew Santos    Today's Date: 8/27/2024     Problem List  Principal Problem:    Sacral wound  Active Problems:    Type 2 diabetes mellitus without complication, without long-term current use of insulin (HCC)    Cerebrovascular accident (CVA) due to thrombosis of cerebral artery (HCC)    Urinary retention    Paroxysmal atrial fibrillation (HCC)    Primary hypertension      Recommendations:   Diet: regular diet and thin liquids - choose softer   Meds: whole with puree   Feeding Assistance: tray set up w/ assist   Frequent Oral care: 2-4x/day  Aspiration precautions and compensatory swallowing strategies: upright posture, only feed when fully alert, slow rate of feeding, small bites/sips, alternating bites and sips, and avoid straws, avoid mixed consistencies   Other Recommendations/ considerations: ST follow-up x1-3      Subjective:  Patient received upright in chair, no complaints     Previous/current diet: reg/thin     Objective:  The following consistencies were tested thin liquids by cup and straw.  MBS results reviewed with patient, continues to express dislike for thickened liquids.  Pt reassured offerings were thin liquids only.   Sips liquids from straw, delayed cough evident.  No s/s of aspiration w/ removal of straw.  Transient penetration w/ straw noted on video, explained to patient.  Cannot r/o deeper penetration with larger quantities/preferred liquids.  Explained posterior spill w/ mastication and increased risk of same when attempting mixed texture (spill of liquid portion).  Pt agreeable to choose softer food items.    While understanding was verbalized; pt will benefit from further reinforcement/carryover.       Assessment:  Mild oral/ oropharyngeal dysphagia; will benefit from further reinforcement of safe strategy use.       Plan:  ST follow-up x1-3  Will attempt to do same w/ family present       Delores Durand  MS, CCC-SLP  Speech-Language Pathologist  PA #[id number]  [place] #[id number]

## 2024-08-27 NOTE — PLAN OF CARE
Problem: PAIN - ADULT  Goal: Verbalizes/displays adequate comfort level or baseline comfort level  Description: Interventions:  - Encourage patient to monitor pain and request assistance  - Assess pain using appropriate pain scale  - Administer analgesics based on type and severity of pain and evaluate response  - Implement non-pharmacological measures as appropriate and evaluate response  - Consider cultural and social influences on pain and pain management  - Notify physician/advanced practitioner if interventions unsuccessful or patient reports new pain  Outcome: Progressing     Problem: INFECTION - ADULT  Goal: Absence or prevention of progression during hospitalization  Description: INTERVENTIONS:  - Assess and monitor for signs and symptoms of infection  - Monitor lab/diagnostic results  - Monitor all insertion sites, i.e. indwelling lines, tubes, and drains  - Monitor endotracheal if appropriate and nasal secretions for changes in amount and color  - [place] appropriate cooling/warming therapies per order  - Administer medications as ordered  - Instruct and encourage patient and family to use good hand hygiene technique  - Identify and instruct in appropriate isolation precautions for identified infection/condition  Outcome: Progressing  Goal: Absence of fever/infection during neutropenic period  Description: INTERVENTIONS:  - Monitor WBC    Outcome: Progressing     Problem: SAFETY ADULT  Goal: Patient will remain free of falls  Description: INTERVENTIONS:  - Educate patient/family on patient safety including physical limitations  - Instruct patient to call for assistance with activity   - Consult OT/PT to assist with strengthening/mobility   - Keep Call bell within reach  - Keep bed low and locked with side rails adjusted as appropriate  - Keep care items and personal belongings within reach  - Initiate and maintain comfort rounds  - Make Fall Risk Sign visible to staff  - Offer Toileting every  Hours,  in advance of need  - Initiate/Maintain alarm  - Obtain necessary fall risk management equipment:   - Apply yellow socks and bracelet for high fall risk patients  - Consider moving patient to room near nurses station  Outcome: Progressing  Goal: Maintain or return to baseline ADL function  Description: INTERVENTIONS:  -  Assess patient's ability to carry out ADLs; assess patient's baseline for ADL function and identify physical deficits which impact ability to perform ADLs (bathing, care of mouth/teeth, toileting, grooming, dressing, etc.)  - Assess/evaluate cause of self-care deficits   - Assess range of motion  - Assess patient's mobility; develop plan if impaired  - Assess patient's need for assistive devices and provide as appropriate  - Encourage maximum independence but intervene and supervise when necessary  - Involve family in performance of ADLs  - Assess for home care needs following discharge   - Consider OT consult to assist with ADL evaluation and planning for discharge  - Provide patient education as appropriate  Outcome: Progressing  Goal: Maintains/Returns to pre admission functional level  Description: INTERVENTIONS:  - Perform AM-PAC 6 Click Basic Mobility/ Daily Activity assessment daily.  - Set and communicate daily mobility goal to care team and patient/family/caregiver.   - Collaborate with rehabilitation services on mobility goals if consulted  - Perform Range of Motion  times a day.  - Reposition patient every  hours.  - Dangle patient  times a day  - Stand patient  times a day  - Ambulate patient  times a day  - Out of bed to chair  times a day   - Out of bed for meal times a day  - Out of bed for toileting  - Record patient progress and toleration of activity level   Outcome: Progressing     Problem: DISCHARGE PLANNING  Goal: Discharge to home or other facility with appropriate resources  Description: INTERVENTIONS:  - Identify barriers to discharge w/patient and caregiver  - Arrange for  needed discharge resources and transportation as appropriate  - Identify discharge learning needs (meds, wound care, etc.)  - Arrange for interpretive services to assist at discharge as needed  - Refer to Case Management Department for coordinating discharge planning if the patient needs post-hospital services based on physician/advanced practitioner order or complex needs related to functional status, cognitive ability, or social support system  Outcome: Progressing     Problem: Knowledge Deficit  Goal: Patient/family/caregiver demonstrates understanding of disease process, treatment plan, medications, and discharge instructions  Description: Complete learning assessment and assess knowledge base.  Interventions:  - Provide teaching at level of understanding  - Provide teaching via preferred learning methods  Outcome: Progressing     Problem: Neurological Deficit  Goal: Neurological status is stable or improving  Description: Interventions:  - Monitor and assess patient's level of consciousness, motor function, sensory function, and level of assistance needed for ADLs.   - Monitor and report changes from baseline. Collaborate with interdisciplinary team to initiate plan and implement interventions as ordered.   - Provide and maintain a safe environment.  - Consider seizure precautions.  - Consider fall precautions.  - Consider aspiration precautions.  - Consider bleeding precautions.  Outcome: Progressing     Problem: Activity Intolerance/Impaired Mobility  Goal: Mobility/activity is maintained at optimum level for patient  Description: Interventions:  - Assess and monitor patient  barriers to mobility and need for assistive/adaptive devices.  - Assess patient's emotional response to limitations.  - Collaborate with interdisciplinary team and initiate plans and interventions as ordered.  - Encourage independent activity per ability.  - Maintain proper body alignment.  - Perform active/passive rom as  tolerated/ordered.  - Plan activities to conserve energy.  - Turn patient as appropriate  Outcome: Progressing     Problem: Communication Impairment  Goal: Ability to express needs and understand communication  Description: Assess patient's communication skills and ability to understand information.  Patient will demonstrate use of effective communication techniques, alternative methods of communication and understanding even if not able to speak.     - Encourage communication and provide alternate methods of communication as needed.  - Collaborate with case management/ for discharge needs.  - Include patient/family/caregiver in decisions related to communication.  Outcome: Progressing     Problem: Potential for Aspiration  Goal: Non-ventilated patient's risk of aspiration is minimized  Description: Assess and monitor vital signs, respiratory status, and labs (WBC).  Monitor for signs of aspiration (tachypnea, cough, rales, wheezing, cyanosis, fever).    - Assess and monitor patient's ability to swallow.  - Place patient up in chair to eat if possible.  - HOB up at 90 degrees to eat if unable to get patient up into chair.  - Supervise patient during oral intake.   - Instruct patient/ family to take small bites.  - Instruct patient/ family to take small single sips when taking liquids.  - Follow patient-specific strategies generated by speech pathologist.  Outcome: Progressing  Goal: Ventilated patient's risk of aspiration is minimized  Description: Assess and monitor vital signs, respiratory status, airway cuff pressure, and labs (WBC).  Monitor for signs of aspiration (tachypnea, cough, rales, wheezing, cyanosis, fever).    - Elevate head of bed 30 degrees if patient has tube feeding.  - Monitor tube feeding.  Outcome: Progressing     Problem: Nutrition  Goal: Nutrition/Hydration status is improving  Description: Monitor and assess patient's nutrition/hydration status for malnutrition (ex- brittle  hair, bruises, dry skin, pale skin and conjunctiva, muscle wasting, smooth red tongue, and disorientation). Collaborate with interdisciplinary team and initiate plan and interventions as ordered.  Monitor patient's weight and dietary intake as ordered or per policy. Utilize nutrition screening tool and intervene per policy. Determine patient's food preferences and provide high-protein, high-caloric foods as appropriate.     - Assist patient with eating.  - Allow adequate time for meals.  - Encourage patient to take dietary supplement as ordered.  - Collaborate with clinical nutritionist.  - Include patient/family/caregiver in decisions related to nutrition.  Outcome: Progressing     Problem: NEUROSENSORY - ADULT  Goal: Achieves stable or improved neurological status  Description: INTERVENTIONS  - Monitor and report changes in neurological status  - Monitor vital signs such as temperature, blood pressure, glucose, and any other labs ordered   - Initiate measures to prevent increased intracranial pressure  - Monitor for seizure activity and implement precautions if appropriate      Outcome: Progressing  Goal: Remains free of injury related to seizures activity  Description: INTERVENTIONS  - Maintain airway, patient safety  and administer oxygen as ordered  - Monitor patient for seizure activity, document and report duration and description of seizure to physician/advanced practitioner  - If seizure occurs,  ensure patient safety during seizure  - Reorient patient post seizure  - Seizure pads on all 4 side rails  - Instruct patient/family to notify RN of any seizure activity including if an aura is experienced  - Instruct patient/family to call for assistance with activity based on nursing assessment  - Administer anti-seizure medications if ordered    Outcome: Progressing  Goal: Achieves maximal functionality and self care  Description: INTERVENTIONS  - Monitor swallowing and airway patency with patient fatigue and  changes in neurological status  - Encourage and assist patient to increase activity and self care.   - Encourage visually impaired, hearing impaired and aphasic patients to use assistive/communication devices  Outcome: Progressing     Problem: CARDIOVASCULAR - ADULT  Goal: Maintains optimal cardiac output and hemodynamic stability  Description: INTERVENTIONS:  - Monitor I/O, vital signs and rhythm  - Monitor for S/S and trends of decreased cardiac output  - Administer and titrate ordered vasoactive medications to optimize hemodynamic stability  - Assess quality of pulses, skin color and temperature  - Assess for signs of decreased coronary artery perfusion  - Instruct patient to report change in severity of symptoms  Outcome: Progressing  Goal: Absence of cardiac dysrhythmias or at baseline rhythm  Description: INTERVENTIONS:  - Continuous cardiac monitoring, vital signs, obtain 12 lead EKG if ordered  - Administer antiarrhythmic and heart rate control medications as ordered  - Monitor electrolytes and administer replacement therapy as ordered  Outcome: Progressing     Problem: METABOLIC, FLUID AND ELECTROLYTES - ADULT  Goal: Electrolytes maintained within normal limits  Description: INTERVENTIONS:  - Monitor labs and assess patient for signs and symptoms of electrolyte imbalances  - Administer electrolyte replacement as ordered  - Monitor response to electrolyte replacements, including repeat lab results as appropriate  - Instruct patient on fluid and nutrition as appropriate  Outcome: Progressing  Goal: Fluid balance maintained  Description: INTERVENTIONS:  - Monitor labs   - Monitor I/O and WT  - Instruct patient on fluid and nutrition as appropriate  - Assess for signs & symptoms of volume excess or deficit  Outcome: Progressing  Goal: Glucose maintained within target range  Description: INTERVENTIONS:  - Monitor Blood Glucose as ordered  - Assess for signs and symptoms of hyperglycemia and hypoglycemia  -  Administer ordered medications to maintain glucose within target range  - Assess nutritional intake and initiate nutrition service referral as needed  Outcome: Progressing

## 2024-08-27 NOTE — ASSESSMENT & PLAN NOTE
Does have residual left trapezius, dysphagia, cognitive decline.  Currently on dysphagia diet with mechanical altered with nectar thick liquids.  Seen by speech and currently on regular diet with thing liquids  Plan for VBS per speech therapy

## 2024-08-27 NOTE — ASSESSMENT & PLAN NOTE
72 yo male pt with Pmhx of CVA, left hemiparesis, cognitive decline, dysphagia currently on mechanical altered diet with nectar thick liquids, patient was at [facility] rehab for 2 weeks subsequently evaluated seatbelts for 2 weeks and discharged home on 8/17/2024.  Wife reports that he had developed a wound while he was at local rehab however wound has progressed.  Has sacral decubitus ulcer that is appears to be worsening with superimposed infection given leukocytosis.  Patient was given IV Zosyn earlier.  Stool is contaminating area of ulcer and rectal tube was placed however it was removed since her stool is not watery as per nursing staff    Recent Labs     08/25/24  0535 08/26/24  0512 08/27/24  0437   WBC 13.97* 10.49* 10.64*     Currently he is afebrile, hemodynamically stable.  Surgery recommendation appreciated.  IV Unasyn day #4  Tentative plan to transition to p.o. antibiotics and discharge in next 24 to 48 hours  Continue with surgery recommendation as well as wound care recommendation.  Continue supportive care  Maintain pressure offloading measures.  [facility]

## 2024-08-27 NOTE — ASSESSMENT & PLAN NOTE
Patient had Parra catheter placed  Urology consulted - appreciate reccs  Plan for DC with Parra; voiding trial at rehab - if fails, then OP follow up up

## 2024-08-28 VITALS
BODY MASS INDEX: 32.75 KG/M2 | TEMPERATURE: 98 F | HEART RATE: 79 BPM | SYSTOLIC BLOOD PRESSURE: 131 MMHG | WEIGHT: 268.96 LBS | RESPIRATION RATE: 18 BRPM | OXYGEN SATURATION: 98 % | HEIGHT: 76 IN | DIASTOLIC BLOOD PRESSURE: 87 MMHG

## 2024-08-28 LAB
ALBUMIN SERPL BCG-MCNC: 2.8 G/DL (ref 3.5–5)
ALP SERPL-CCNC: 88 U/L (ref 34–104)
ALT SERPL W P-5'-P-CCNC: 19 U/L (ref 7–52)
ANION GAP SERPL CALCULATED.3IONS-SCNC: 7 MMOL/L (ref 4–13)
AST SERPL W P-5'-P-CCNC: 18 U/L (ref 13–39)
BASOPHILS # BLD MANUAL: 0.09 THOUSAND/UL (ref 0–0.1)
BASOPHILS NFR MAR MANUAL: 1 % (ref 0–1)
BILIRUB SERPL-MCNC: 0.65 MG/DL (ref 0.2–1)
BUN SERPL-MCNC: 9 MG/DL (ref 5–25)
CALCIUM ALBUM COR SERPL-MCNC: 9.8 MG/DL (ref 8.3–10.1)
CALCIUM SERPL-MCNC: 8.8 MG/DL (ref 8.4–10.2)
CHLORIDE SERPL-SCNC: 107 MMOL/L (ref 96–108)
CO2 SERPL-SCNC: 27 MMOL/L (ref 21–32)
CREAT SERPL-MCNC: 0.7 MG/DL (ref 0.6–1.3)
EOSINOPHIL # BLD MANUAL: 0 THOUSAND/UL (ref 0–0.4)
EOSINOPHIL NFR BLD MANUAL: 0 % (ref 0–6)
ERYTHROCYTE [DISTWIDTH] IN BLOOD BY AUTOMATED COUNT: 12.9 % (ref 11.6–15.1)
FLUAV RNA RESP QL NAA+PROBE: NEGATIVE
FLUBV RNA RESP QL NAA+PROBE: NEGATIVE
GFR SERPL CREATININE-BSD FRML MDRD: 92 ML/MIN/1.73SQ M
GLUCOSE SERPL-MCNC: 104 MG/DL (ref 65–140)
GLUCOSE SERPL-MCNC: 125 MG/DL (ref 65–140)
GLUCOSE SERPL-MCNC: 133 MG/DL (ref 65–140)
HCT VFR BLD AUTO: 31.7 % (ref 36.5–49.3)
HGB BLD-MCNC: 10.1 G/DL (ref 12–17)
LYMPHOCYTES # BLD AUTO: 3.25 THOUSAND/UL (ref 0.6–4.47)
LYMPHOCYTES # BLD AUTO: 35 % (ref 14–44)
MAGNESIUM SERPL-MCNC: 1.7 MG/DL (ref 1.9–2.7)
MCH RBC QN AUTO: 30.5 PG (ref 26.8–34.3)
MCHC RBC AUTO-ENTMCNC: 31.9 G/DL (ref 31.4–37.4)
MCV RBC AUTO: 96 FL (ref 82–98)
MONOCYTES # BLD AUTO: 0.65 THOUSAND/UL (ref 0–1.22)
MONOCYTES NFR BLD: 7 % (ref 4–12)
NEUTROPHILS # BLD MANUAL: 5.3 THOUSAND/UL (ref 1.85–7.62)
NEUTS SEG NFR BLD AUTO: 57 % (ref 43–75)
PLATELET # BLD AUTO: 396 THOUSANDS/UL (ref 149–390)
PLATELET BLD QL SMEAR: ABNORMAL
PMV BLD AUTO: 9.3 FL (ref 8.9–12.7)
POTASSIUM SERPL-SCNC: 2.8 MMOL/L (ref 3.5–5.3)
PROCALCITONIN SERPL-MCNC: 0.1 NG/ML
PROT SERPL-MCNC: 6.8 G/DL (ref 6.4–8.4)
RBC # BLD AUTO: 3.31 MILLION/UL (ref 3.88–5.62)
RBC MORPH BLD: NORMAL
RSV RNA RESP QL NAA+PROBE: NEGATIVE
SARS-COV-2 RNA RESP QL NAA+PROBE: NEGATIVE
SODIUM SERPL-SCNC: 141 MMOL/L (ref 135–147)
WBC # BLD AUTO: 9.29 THOUSAND/UL (ref 4.31–10.16)

## 2024-08-28 PROCEDURE — 83735 ASSAY OF MAGNESIUM: CPT | Performed by: INTERNAL MEDICINE

## 2024-08-28 PROCEDURE — 92526 ORAL FUNCTION THERAPY: CPT

## 2024-08-28 PROCEDURE — 85007 BL SMEAR W/DIFF WBC COUNT: CPT | Performed by: INTERNAL MEDICINE

## 2024-08-28 PROCEDURE — 99239 HOSP IP/OBS DSCHRG MGMT >30: CPT | Performed by: INTERNAL MEDICINE

## 2024-08-28 PROCEDURE — 80053 COMPREHEN METABOLIC PANEL: CPT | Performed by: INTERNAL MEDICINE

## 2024-08-28 PROCEDURE — 82948 REAGENT STRIP/BLOOD GLUCOSE: CPT

## 2024-08-28 PROCEDURE — 84145 PROCALCITONIN (PCT): CPT | Performed by: INTERNAL MEDICINE

## 2024-08-28 PROCEDURE — 0241U HB NFCT DS VIR RESP RNA 4 TRGT: CPT | Performed by: INTERNAL MEDICINE

## 2024-08-28 PROCEDURE — 85027 COMPLETE CBC AUTOMATED: CPT | Performed by: INTERNAL MEDICINE

## 2024-08-28 RX ORDER — ATORVASTATIN CALCIUM 40 MG/1
80 TABLET, FILM COATED ORAL EVERY EVENING
Status: ON HOLD
Start: 2024-08-28

## 2024-08-28 RX ORDER — POTASSIUM CHLORIDE 1500 MG/1
40 TABLET, EXTENDED RELEASE ORAL
Status: DISCONTINUED | OUTPATIENT
Start: 2024-08-28 | End: 2024-08-28 | Stop reason: HOSPADM

## 2024-08-28 RX ORDER — MAGNESIUM SULFATE HEPTAHYDRATE 40 MG/ML
2 INJECTION, SOLUTION INTRAVENOUS ONCE
Status: COMPLETED | OUTPATIENT
Start: 2024-08-28 | End: 2024-08-28

## 2024-08-28 RX ORDER — LISINOPRIL 10 MG/1
10 TABLET ORAL DAILY
Qty: 30 TABLET | Refills: 0 | Status: ON HOLD
Start: 2024-08-28

## 2024-08-28 RX ADMIN — POTASSIUM CHLORIDE 40 MEQ: 1500 TABLET, EXTENDED RELEASE ORAL at 13:59

## 2024-08-28 RX ADMIN — APIXABAN 5 MG: 5 TABLET, FILM COATED ORAL at 09:22

## 2024-08-28 RX ADMIN — AMPICILLIN SODIUM AND SULBACTAM SODIUM 3 G: 100; 50 INJECTION, POWDER, FOR SOLUTION INTRAVENOUS at 12:49

## 2024-08-28 RX ADMIN — AMPICILLIN SODIUM AND SULBACTAM SODIUM 3 G: 100; 50 INJECTION, POWDER, FOR SOLUTION INTRAVENOUS at 06:41

## 2024-08-28 RX ADMIN — ASPIRIN 81 MG: 81 TABLET, CHEWABLE ORAL at 09:22

## 2024-08-28 RX ADMIN — COLLAGENASE SANTYL: 250 OINTMENT TOPICAL at 09:25

## 2024-08-28 RX ADMIN — MAGNESIUM SULFATE HEPTAHYDRATE 2 G: 40 INJECTION, SOLUTION INTRAVENOUS at 13:59

## 2024-08-28 NOTE — CASE MANAGEMENT
Case Management Discharge Planning Note    Patient name Drew Santos  Location 2 EAST 267/2 E 267-01 MRN [id number]  : 1950 Date 2024       Current Admission Date: 2024  Current Admission Diagnosis:Sacral wound   Patient Active Problem List    Diagnosis Date Noted Date Diagnosed    Sepsis (HCC) 2024     Sacral wound 2024     Primary hypertension 2024     Mixed hyperlipidemia 2024     Paroxysmal atrial fibrillation (HCC) 2024     Cerebrovascular accident (CVA) due to thrombosis of cerebral artery (HCC) 2024     Urinary retention 2024     Syncope 2024     Elevated lactic acid level 2024     Type 2 diabetes mellitus without complication, without long-term current use of insulin (HCC) 2024     Leukocytosis 2024     Abnormal CPK 2024       LOS (days): 5  Geometric Mean LOS (GMLOS) (days): 4.3  Days to GMLOS:-0.7     OBJECTIVE:  Risk of Unplanned Readmission Score: 11.74      Current admission status: Inpatient   Preferred Pharmacy:   [id number] [place], [place] - [address]  [address], [place] [address]  Phone: [phone] Fax: [phone]    Primary Care Provider: Joe Lyon MD  Primary Insurance: [employer] REP  Secondary Insurance:     DISCHARGE DETAILS:  [facility] ([phone], ext [phone]).  Per dispatch, [facility] is not able to provide [facility] as they were not notified of hospital admission.  CM reviewed with Reva (see prior note for email address) who noted same and advised that transport would need to be set up for patient and billed to insurance.  If not covered, patient can submit transport bill to the [facility] for reimbursement.  Once patient is at SNF, [facility] and any [facility] will be covered.  CM initiated stretcher van request via [employer].  PCS to binder. 1600 p/u requested.     Accepting Facility Name, City & [place] : [place] [address]  Receiving Facility/Agency Phone Number: Phone: [phone]  Facility/Agency Fax Number: Fax: [phone]     Report: [phone], orders faxed to [phone].

## 2024-08-28 NOTE — PROGRESS NOTES
Patient:    MRN:  [id number]    Alanis Request ID:  [id number]    Level of care reserved:  Skilled Nursing Facility    Partner Reserved:  [facility], [place] [address] [phone]    Clinical needs requested:    Geography searched:  10 miles around [address]    Start of Service:    Request sent:  10:03am EDT on 8/27/2024 by Pedro Maguire    Partner reserved:  3:24pm EDT on 8/27/2024 by Pedro Maguire    Choice list shared:  3:23pm EDT on 8/27/2024 by Pedro Maguire

## 2024-08-28 NOTE — DISCHARGE SUMMARY
[facility]   Discharge - Name: Drew Santos I  MRN: [id number]  Unit/Bed#: 2 E 267-01 I Date of Admission: 8/23/2024   Date of Service: 8/28/2024 I Hospital Day: 5    Principal Problem:    Sacral wound  Active Problems:    Cerebrovascular accident (CVA) due to thrombosis of cerebral artery (HCC)    Type 2 diabetes mellitus without complication, without long-term current use of insulin (HCC)    Urinary retention    Paroxysmal atrial fibrillation (HCC)    Primary hypertension    Sepsis (HCC)      Medical Problems       Resolved Problems  Date Reviewed: 8/26/2024   None       Discharging Physician / Practitioner: Brendan Nuno DO  PCP: Joe Lyon MD  Admission Date:   Admission Orders (From admission, onward)       Ordered        08/23/24 1450  INPATIENT ADMISSION  Once            08/23/24 1450  Inpatient Admission  Once                          Discharge Date: 08/28/24    [facility]    Discharge Diagnoses:   Please see assessment and plan section above for further details regarding discharge diagnoses.     Consultations During Hospital Stay:  IP CONSULT TO CASE MANAGEMENT  IP CONSULT TO ACUTE CARE SURGERY  IP CONSULT TO UROLOGY    Procedures Performed:   None    Significant Findings / Test Results:   No results found.    No Chest XR results available for this patient.       Incidental Findings:   None other than noted above. I reviewed the above mentioned incidental findings with the patient and/or family and they expressed understanding    Test Results Pending at Discharge (will require follow up):   None      Outpatient Tests Requested:  None     Complications:  none     Reason for Admission:   Chief Complaint   Patient presents with    Wound Check     BIBA from home, Visiting nurse called EMS for pressure wound eval on the patient's sacral area. Noted a stage 3 wound on triage.       Hospital Course:      Drew Santos is a 73 y.o. male patient who originally presented  "to the hospital on 8/23/2024 with history of A-fib on Eliquis hospitalized due to sacral wound infection/cellulitis. Started on abx and was improving with IV Unasyn. Surgery and wound care consulted for wound care recommendations. Patient was noted to have retention and had Parra catheter placed with urology reccs to attempt voiding trial at rehab or to have OP follow up if unable to void without catheter at rehab. Per wife request [facility].IV abx was changed to augmentin to complete a 14 day course of Abx for sacral wound.    Condition at Discharge: stable    Discharge Day Visit / Exam:   Subjective: Offers no new complaints at this time. No acute events reported overnight. Understanding of plan.  All questions answered. Eager for discharge. Wife at bedside.    Vitals: Blood Pressure: 131/87 (08/28/24 0700)  Pulse: 79 (08/28/24 0700)  Temperature: 98 °F (36.7 °C) (08/28/24 0700)  Temp Source: Oral (08/28/24 0700)  Respirations: 18 (08/28/24 0700)  Height: 6' 3.98\" (193 cm) (08/23/24 1530)  Weight - Scale: 122 kg (268 lb 15.4 oz) (08/23/24 1530)  SpO2: 98 % (08/28/24 0700)  Exam:   Physical Exam  Vitals and nursing note reviewed.   Constitutional:       General: He is not in acute distress.     Appearance: Normal appearance. He is not ill-appearing, toxic-appearing or diaphoretic.   HENT:      Head: Normocephalic and atraumatic.   Eyes:      General: No scleral icterus.     Conjunctiva/sclera: Conjunctivae normal.   Cardiovascular:      Rate and Rhythm: Normal rate.      Pulses: Normal pulses.   Pulmonary:      Effort: Pulmonary effort is normal. No respiratory distress.      Breath sounds: Normal breath sounds. No wheezing.   Abdominal:      General: Bowel sounds are normal. There is no distension.      Palpations: Abdomen is soft.      Tenderness: There is no abdominal tenderness.   Genitourinary:     Comments: Parra catheter noted with clear yellow urine.  Skin:     " Findings: Lesion (sacral) present.   Neurological:      Mental Status: He is alert. Mental status is at baseline. He is disoriented.      Comments: Noted residual left hemiparesis   Psychiatric:         Mood and Affect: Mood normal.         Behavior: Behavior normal.       Discussion with Family: wife    Medication Adjustments and Discharge Medications:  Discharge Medication List: See after visit summary for reconciled discharge medications.   Medication Dosing Tapers - Please refer to Discharge Medication List for details on any medication dosing tapers (if applicable to patient).   Summary of Medication Adjustments made as a result of this hospitalization: as noted on med rec  Medications being temporarily held (include recommended restart time): as noted on med rec    Wound Care Recommendations:  When applicable, please see wound care section of After Visit Summary.    Instructions for any Catheters / Lines Present at Discharge (including removal date, if applicable): attempt to remove hanks at rehab; if unable to, then f/u with OP urology    Diet Recommendations at Discharge:  Diet -        Diet Orders   (From admission, onward)                 Start     Ordered    08/26/24 0856  Diet Mata/CHO Controlled; Consistent Carbohydrate Diet Level 2 (5 carb servings/75 grams CHO/meal)  Diet effective now        References:    Adult Nutrition Support Algorithm    RD Therapeutic Diet Order Protocol   Question Answer Comment   Diet Type Mata/CHO Controlled    Mata/CHO Controlled Consistent Carbohydrate Diet Level 2 (5 carb servings/75 grams CHO/meal)    RD to adjust diet per protocol? Yes        08/26/24 0855    08/24/24 1513  Dietary nutrition supplements  Once        Question Answer Comment   Select Supplement: MightyShake - Vanilla    Frequency Breakfast, Dinner        08/24/24 1512                    Mobility at time of Discharge:   Basic Mobility Inpatient Raw Score: 6  -[facility] Goal: 2: Bed activities/Dependent  transfer  JH-HLM Achieved: 4: Move to chair/commode  HLM Goal achieved. Continue to encourage appropriate mobility.    Goals of Care Discussions:  Code Status at Discharge: Level 1 - Full Code  Goals of care were not discussed during this admission.    Discharge instructions/Information to patient and family:   See after visit summary section titled Discharge Instructions for information provided to patient and family.      Planned Readmission: none      Discharge Statement:  I spent 41 minutes discharging the patient. This time was spent on the day of discharge. I had direct contact with the patient on the day of discharge. Greater than 50% of the total time was spent examining patient, answering all patient questions, arranging and discussing plan of care with patient as well as directly providing post-discharge instructions.  Additional time then spent on discharge activities.    **Please Note: This note may have been constructed using a voice recognition system.**

## 2024-08-28 NOTE — SPEECH THERAPY NOTE
Speech Language/Pathology     Speech/Language Pathology Progress Note     Patient Name: Drew Santos    Today's Date: 8/28/2024     Problem List  Principal Problem:    Sacral wound  Active Problems:    Type 2 diabetes mellitus without complication, without long-term current use of insulin (HCC)    Cerebrovascular accident (CVA) due to thrombosis of cerebral artery (HCC)    Urinary retention    Paroxysmal atrial fibrillation (HCC)    Primary hypertension    Sepsis (HCC)     Subjective:  Patient received awake, agreeable to very few PO trials from breakfast tray.      Previous/current diet: reg/thin     Objective:  The following consistencies were tested thin liquids by straw, single sip provided by clinician w/ gradual transition to self-feeding.  Noted to take single and consecuitve sips today w/ no overt s/s of aspiration.  Mastication remains significantly prolonged.  Improved oral efficiency w/ pre-cut/bite-sized pieces and added moisture (syrup).  Pt sips water to clear oral retention w/o need for verbal cue.  O2 saturation remains WNL.  Again, no s/s of aspiration.  PO intake remains limited - suspect cognitive status to be contributing factor.     Wife present - MBS results reviewed, SLP recommendations and safe feeding strategies.  Understanding verbalized.     Assessment:  Oropharyngeal swallow function appears same; pt follows cues for safe feeding strategies.  Requires encouragement for oral intake and meal tray setup w/ feeding assist.       Plan:  Reg/thin - added moisture when possible.  Pre cut solids.  Consider magic cup   Alternate solids and liquids  Tray set up  ST follow-up x1-3      Delores Durand MS, CCC-SLP  Speech-Language Pathologist  [place] #[id number]  [place] #[id number]

## 2024-08-28 NOTE — PLAN OF CARE
Reg/thin - added moisture when possible.  Pre cut solids.  Consider magic cup   Alternate solids and liquids  Tray set up  ST follow-up x1-3

## 2024-08-28 NOTE — PLAN OF CARE
Problem: PAIN - ADULT  Goal: Verbalizes/displays adequate comfort level or baseline comfort level  Description: Interventions:  - Encourage patient to monitor pain and request assistance  - Assess pain using appropriate pain scale  - Administer analgesics based on type and severity of pain and evaluate response  - Implement non-pharmacological measures as appropriate and evaluate response  - Consider cultural and social influences on pain and pain management  - Notify physician/advanced practitioner if interventions unsuccessful or patient reports new pain  Outcome: Progressing     Problem: INFECTION - ADULT  Goal: Absence or prevention of progression during hospitalization  Description: INTERVENTIONS:  - Assess and monitor for signs and symptoms of infection  - Monitor lab/diagnostic results  - Monitor all insertion sites, i.e. indwelling lines, tubes, and drains  - Monitor endotracheal if appropriate and nasal secretions for changes in amount and color  - [place] appropriate cooling/warming therapies per order  - Administer medications as ordered  - Instruct and encourage patient and family to use good hand hygiene technique  - Identify and instruct in appropriate isolation precautions for identified infection/condition  Outcome: Progressing  Goal: Absence of fever/infection during neutropenic period  Description: INTERVENTIONS:  - Monitor WBC    Outcome: Progressing     Problem: SAFETY ADULT  Goal: Patient will remain free of falls  Description: INTERVENTIONS:  - Educate patient/family on patient safety including physical limitations  - Instruct patient to call for assistance with activity   - Consult OT/PT to assist with strengthening/mobility   - Keep Call bell within reach  - Keep bed low and locked with side rails adjusted as appropriate  - Keep care items and personal belongings within reach  - Initiate and maintain comfort rounds  - Make Fall Risk Sign visible to staff  - Offer Toileting every 2 Hours,  in advance of need  - Initiate/Maintain bed alarm  - Obtain necessary fall risk management equipment:   - Apply yellow socks and bracelet for high fall risk patients  - Consider moving patient to room near nurses station  Outcome: Progressing  Goal: Maintain or return to baseline ADL function  Description: INTERVENTIONS:  -  Assess patient's ability to carry out ADLs; assess patient's baseline for ADL function and identify physical deficits which impact ability to perform ADLs (bathing, care of mouth/teeth, toileting, grooming, dressing, etc.)  - Assess/evaluate cause of self-care deficits   - Assess range of motion  - Assess patient's mobility; develop plan if impaired  - Assess patient's need for assistive devices and provide as appropriate  - Encourage maximum independence but intervene and supervise when necessary  - Involve family in performance of ADLs  - Assess for home care needs following discharge   - Consider OT consult to assist with ADL evaluation and planning for discharge  - Provide patient education as appropriate  Outcome: Progressing  Goal: Maintains/Returns to pre admission functional level  Description: INTERVENTIONS:  - Perform AM-PAC 6 Click Basic Mobility/ Daily Activity assessment daily.  - Set and communicate daily mobility goal to care team and patient/family/caregiver.   - Collaborate with rehabilitation services on mobility goals if consulted  - Perform Range of Motion  times a day.  - Reposition patient every  hours.  - Dangle patient  times a day  - Stand patient  times a day  - Ambulate patient times a day  - Out of bed to chair  times a day   - Out of bed for meals  times a day  - Out of bed for toileting  - Record patient progress and toleration of activity level   Outcome: Progressing     Problem: DISCHARGE PLANNING  Goal: Discharge to home or other facility with appropriate resources  Description: INTERVENTIONS:  - Identify barriers to discharge w/patient and caregiver  - Arrange  for needed discharge resources and transportation as appropriate  - Identify discharge learning needs (meds, wound care, etc.)  - Arrange for interpretive services to assist at discharge as needed  - Refer to Case Management Department for coordinating discharge planning if the patient needs post-hospital services based on physician/advanced practitioner order or complex needs related to functional status, cognitive ability, or social support system  Outcome: Progressing     Problem: Knowledge Deficit  Goal: Patient/family/caregiver demonstrates understanding of disease process, treatment plan, medications, and discharge instructions  Description: Complete learning assessment and assess knowledge base.  Interventions:  - Provide teaching at level of understanding  - Provide teaching via preferred learning methods  Outcome: Progressing     Problem: Neurological Deficit  Goal: Neurological status is stable or improving  Description: Interventions:  - Monitor and assess patient's level of consciousness, motor function, sensory function, and level of assistance needed for ADLs.   - Monitor and report changes from baseline. Collaborate with interdisciplinary team to initiate plan and implement interventions as ordered.   - Provide and maintain a safe environment.  - Consider seizure precautions.  - Consider fall precautions.  - Consider aspiration precautions.  - Consider bleeding precautions.  Outcome: Progressing     Problem: Activity Intolerance/Impaired Mobility  Goal: Mobility/activity is maintained at optimum level for patient  Description: Interventions:  - Assess and monitor patient  barriers to mobility and need for assistive/adaptive devices.  - Assess patient's emotional response to limitations.  - Collaborate with interdisciplinary team and initiate plans and interventions as ordered.  - Encourage independent activity per ability.  - Maintain proper body alignment.  - Perform active/passive rom as  tolerated/ordered.  - Plan activities to conserve energy.  - Turn patient as appropriate  Outcome: Progressing     Problem: Communication Impairment  Goal: Ability to express needs and understand communication  Description: Assess patient's communication skills and ability to understand information.  Patient will demonstrate use of effective communication techniques, alternative methods of communication and understanding even if not able to speak.     - Encourage communication and provide alternate methods of communication as needed.  - Collaborate with case management/ for discharge needs.  - Include patient/family/caregiver in decisions related to communication.  Outcome: Progressing     Problem: Potential for Aspiration  Goal: Non-ventilated patient's risk of aspiration is minimized  Description: Assess and monitor vital signs, respiratory status, and labs (WBC).  Monitor for signs of aspiration (tachypnea, cough, rales, wheezing, cyanosis, fever).    - Assess and monitor patient's ability to swallow.  - Place patient up in chair to eat if possible.  - HOB up at 90 degrees to eat if unable to get patient up into chair.  - Supervise patient during oral intake.   - Instruct patient/ family to take small bites.  - Instruct patient/ family to take small single sips when taking liquids.  - Follow patient-specific strategies generated by speech pathologist.  Outcome: Progressing  Goal: Ventilated patient's risk of aspiration is minimized  Description: Assess and monitor vital signs, respiratory status, airway cuff pressure, and labs (WBC).  Monitor for signs of aspiration (tachypnea, cough, rales, wheezing, cyanosis, fever).    - Elevate head of bed 30 degrees if patient has tube feeding.  - Monitor tube feeding.  Outcome: Progressing     Problem: Nutrition  Goal: Nutrition/Hydration status is improving  Description: Monitor and assess patient's nutrition/hydration status for malnutrition (ex- brittle  hair, bruises, dry skin, pale skin and conjunctiva, muscle wasting, smooth red tongue, and disorientation). Collaborate with interdisciplinary team and initiate plan and interventions as ordered.  Monitor patient's weight and dietary intake as ordered or per policy. Utilize nutrition screening tool and intervene per policy. Determine patient's food preferences and provide high-protein, high-caloric foods as appropriate.     - Assist patient with eating.  - Allow adequate time for meals.  - Encourage patient to take dietary supplement as ordered.  - Collaborate with clinical nutritionist.  - Include patient/family/caregiver in decisions related to nutrition.  Outcome: Progressing     Problem: Nutrition  Goal: Nutrition/Hydration status is improving  Description: Monitor and assess patient's nutrition/hydration status for malnutrition (ex- brittle hair, bruises, dry skin, pale skin and conjunctiva, muscle wasting, smooth red tongue, and disorientation). Collaborate with interdisciplinary team and initiate plan and interventions as ordered.  Monitor patient's weight and dietary intake as ordered or per policy. Utilize nutrition screening tool and intervene per policy. Determine patient's food preferences and provide high-protein, high-caloric foods as appropriate.     - Assist patient with eating.  - Allow adequate time for meals.  - Encourage patient to take dietary supplement as ordered.  - Collaborate with clinical nutritionist.  - Include patient/family/caregiver in decisions related to nutrition.  Outcome: Progressing     Problem: NEUROSENSORY - ADULT  Goal: Achieves stable or improved neurological status  Description: INTERVENTIONS  - Monitor and report changes in neurological status  - Monitor vital signs such as temperature, blood pressure, glucose, and any other labs ordered   - Initiate measures to prevent increased intracranial pressure  - Monitor for seizure activity and implement precautions if appropriate       Outcome: Progressing  Goal: Remains free of injury related to seizures activity  Description: INTERVENTIONS  - Maintain airway, patient safety  and administer oxygen as ordered  - Monitor patient for seizure activity, document and report duration and description of seizure to physician/advanced practitioner  - If seizure occurs,  ensure patient safety during seizure  - Reorient patient post seizure  - Seizure pads on all 4 side rails  - Instruct patient/family to notify RN of any seizure activity including if an aura is experienced  - Instruct patient/family to call for assistance with activity based on nursing assessment  - Administer anti-seizure medications if ordered    Outcome: Progressing  Goal: Achieves maximal functionality and self care  Description: INTERVENTIONS  - Monitor swallowing and airway patency with patient fatigue and changes in neurological status  - Encourage and assist patient to increase activity and self care.   - Encourage visually impaired, hearing impaired and aphasic patients to use assistive/communication devices  Outcome: Progressing

## 2024-08-28 NOTE — CASE MANAGEMENT
Case Management Progress Note    Patient name Drew Santos  Location 2 EAST 267/2 E 267-01 MRN [id number]  : 1950 Date 2024       LOS (days): 5  Geometric Mean LOS (GMLOS) (days): 4.3  Days to GMLOS:-0.5        OBJECTIVE:     Current admission status: Inpatient  Preferred Pharmacy:   [id number] [place], [place] - [address]   [facility] [address]  Phone: [phone] Fax: [phone]    Primary Care Provider: Joe Lyon MD  Primary Insurance: [employer] REP  Secondary Insurance:     PROGRESS NOTE:  [facility] stating [facility] cannot [facility] until his auth is completed on their end.  Currently it is being held up by the PCP as he wants appt made with patient before he will place consults.  Reva is following up with the PCP this morning and will advise CM.      @ 1312 - [facility] is still [facility] at this time.  CM contacted spouse via phone to provide update.     @ 1347 - auth received.  Spouse contacted via phone for update.  Anticipating transport today by 1600.  (Pending transport availability)

## 2024-08-29 NOTE — UTILIZATION REVIEW
NOTIFICATION OF ADMISSION DISCHARGE   This is a Notification of Discharge from [facility]. Please be advised that this patient has been discharge from our facility. Below you will find the admission and discharge date and time including the patient’s disposition.   UTILIZATION REVIEW CONTACT:  Nadia Campuzano  Utilization   Network Utilization Review Department  Phone: [phone] x carefully listen to the prompts. All voicemails are confidential.  Email: [email or web address]     ADMISSION INFORMATION  PRESENTATION DATE: 8/23/2024  1:32 PM  OBERVATION ADMISSION DATE: N/A  INPATIENT ADMISSION DATE: 8/23/24  2:50 PM   DISCHARGE DATE: 8/28/2024  4:30 PM   DISPOSITION:Non [facility] SNF/TCU/SNU    Network Utilization Review Department  ATTENTION: Please call with any questions or concerns to [phone] and carefully listen to the prompts so that you are directed to the right person. All voicemails are confidential.   For Discharge needs, contact Care Management DC Support Team at [phone] opt. 2  Send all requests for admission clinical reviews, approved or denied determinations and any other requests to dedicated fax number below belonging to the campus where the patient is receiving treatment. List of dedicated fax numbers for the Facilities:  FACILITY NAME UR FAX NUMBER   ADMISSION DENIALS (Administrative/Medical Necessity) [phone]   DISCHARGE SUPPORT TEAM ([facility]) [phone]   PARENT CHILD HEALTH (Maternity/NICU/Pediatrics) [phone]   [facility] [phone]   [facility] [phone]   [facility] [phone]   [facility] [phone]   [facility] [phone]   [facility] [phone]   [facility] [phone]   [facility]  [phone]   [facility] [phone]   [facility] [phone]   [facility] [phone]   [facility] [phone]

## 2024-08-31 ENCOUNTER — HOSPITAL ENCOUNTER (INPATIENT)
Facility: HOSPITAL | Age: 74
LOS: 3 days | Discharge: NURSING, REHAB OR LONG-TERM CARE | DRG: 696 | End: 2024-09-04
Attending: EMERGENCY MEDICINE | Admitting: STUDENT IN AN ORGANIZED HEALTH CARE EDUCATION/TRAINING PROGRAM
Payer: COMMERCIAL

## 2024-08-31 ENCOUNTER — APPOINTMENT (EMERGENCY)
Dept: CT IMAGING | Facility: HOSPITAL | Age: 74
DRG: 696 | End: 2024-08-31
Payer: COMMERCIAL

## 2024-08-31 DIAGNOSIS — S31.000D WOUND OF SACRAL REGION, SUBSEQUENT ENCOUNTER: ICD-10-CM

## 2024-08-31 DIAGNOSIS — R31.9 HEMATURIA: Primary | ICD-10-CM

## 2024-08-31 DIAGNOSIS — E87.6 HYPOKALEMIA: ICD-10-CM

## 2024-08-31 DIAGNOSIS — R31.0 GROSS HEMATURIA: ICD-10-CM

## 2024-08-31 DIAGNOSIS — F32.A DEPRESSION: ICD-10-CM

## 2024-08-31 PROBLEM — Z86.73 HISTORY OF CVA (CEREBROVASCULAR ACCIDENT): Status: ACTIVE | Noted: 2024-03-08

## 2024-08-31 LAB
ALBUMIN SERPL BCG-MCNC: 2.8 G/DL (ref 3.5–5)
ALP SERPL-CCNC: 102 U/L (ref 34–104)
ALT SERPL W P-5'-P-CCNC: 15 U/L (ref 7–52)
ANION GAP SERPL CALCULATED.3IONS-SCNC: 7 MMOL/L (ref 4–13)
APTT PPP: 38 SECONDS (ref 23–34)
AST SERPL W P-5'-P-CCNC: 24 U/L (ref 13–39)
BACTERIA UR QL AUTO: ABNORMAL /HPF
BASOPHILS # BLD AUTO: 0.06 THOUSANDS/ÂΜL (ref 0–0.1)
BASOPHILS NFR BLD AUTO: 1 % (ref 0–1)
BILIRUB SERPL-MCNC: 0.62 MG/DL (ref 0.2–1)
BILIRUB UR QL STRIP: NEGATIVE
BUN SERPL-MCNC: 8 MG/DL (ref 5–25)
CALCIUM ALBUM COR SERPL-MCNC: 10 MG/DL (ref 8.3–10.1)
CALCIUM SERPL-MCNC: 9 MG/DL (ref 8.4–10.2)
CHLORIDE SERPL-SCNC: 107 MMOL/L (ref 96–108)
CLARITY UR: CLEAR
CO2 SERPL-SCNC: 28 MMOL/L (ref 21–32)
COLOR UR: COLORLESS
CREAT SERPL-MCNC: 0.76 MG/DL (ref 0.6–1.3)
EOSINOPHIL # BLD AUTO: 0.13 THOUSAND/ÂΜL (ref 0–0.61)
EOSINOPHIL NFR BLD AUTO: 1 % (ref 0–6)
ERYTHROCYTE [DISTWIDTH] IN BLOOD BY AUTOMATED COUNT: 13.1 % (ref 11.6–15.1)
GFR SERPL CREATININE-BSD FRML MDRD: 89 ML/MIN/1.73SQ M
GLUCOSE SERPL-MCNC: 102 MG/DL (ref 65–140)
GLUCOSE SERPL-MCNC: 132 MG/DL (ref 65–140)
GLUCOSE UR STRIP-MCNC: ABNORMAL MG/DL
HCT VFR BLD AUTO: 32.9 % (ref 36.5–49.3)
HGB BLD-MCNC: 10.8 G/DL (ref 12–17)
HGB UR QL STRIP.AUTO: ABNORMAL
IMM GRANULOCYTES # BLD AUTO: 0.04 THOUSAND/UL (ref 0–0.2)
IMM GRANULOCYTES NFR BLD AUTO: 0 % (ref 0–2)
INR PPP: 1.39 (ref 0.85–1.19)
KETONES UR STRIP-MCNC: NEGATIVE MG/DL
LEUKOCYTE ESTERASE UR QL STRIP: NEGATIVE
LYMPHOCYTES # BLD AUTO: 2.46 THOUSANDS/ÂΜL (ref 0.6–4.47)
LYMPHOCYTES NFR BLD AUTO: 24 % (ref 14–44)
MCH RBC QN AUTO: 31 PG (ref 26.8–34.3)
MCHC RBC AUTO-ENTMCNC: 32.8 G/DL (ref 31.4–37.4)
MCV RBC AUTO: 95 FL (ref 82–98)
MONOCYTES # BLD AUTO: 0.67 THOUSAND/ÂΜL (ref 0.17–1.22)
MONOCYTES NFR BLD AUTO: 7 % (ref 4–12)
MUCOUS THREADS UR QL AUTO: ABNORMAL
NEUTROPHILS # BLD AUTO: 7.01 THOUSANDS/ÂΜL (ref 1.85–7.62)
NEUTS SEG NFR BLD AUTO: 67 % (ref 43–75)
NITRITE UR QL STRIP: NEGATIVE
NON-SQ EPI CELLS URNS QL MICRO: ABNORMAL /HPF
NRBC BLD AUTO-RTO: 0 /100 WBCS
PH UR STRIP.AUTO: 5 [PH]
PLATELET # BLD AUTO: 440 THOUSANDS/UL (ref 149–390)
PMV BLD AUTO: 9 FL (ref 8.9–12.7)
POTASSIUM SERPL-SCNC: 4.1 MMOL/L (ref 3.5–5.3)
PROT SERPL-MCNC: 7.3 G/DL (ref 6.4–8.4)
PROT UR STRIP-MCNC: NEGATIVE MG/DL
PROTHROMBIN TIME: 17.1 SECONDS (ref 12.3–15)
RBC # BLD AUTO: 3.48 MILLION/UL (ref 3.88–5.62)
RBC #/AREA URNS AUTO: ABNORMAL /HPF
SODIUM SERPL-SCNC: 142 MMOL/L (ref 135–147)
SP GR UR STRIP.AUTO: 1 (ref 1–1.03)
UROBILINOGEN UR STRIP-ACNC: <2 MG/DL
WBC # BLD AUTO: 10.37 THOUSAND/UL (ref 4.31–10.16)
WBC #/AREA URNS AUTO: ABNORMAL /HPF

## 2024-08-31 PROCEDURE — 0T2BX0Z CHANGE DRAINAGE DEVICE IN BLADDER, EXTERNAL APPROACH: ICD-10-PCS | Performed by: EMERGENCY MEDICINE

## 2024-08-31 PROCEDURE — 85025 COMPLETE CBC W/AUTO DIFF WBC: CPT | Performed by: EMERGENCY MEDICINE

## 2024-08-31 PROCEDURE — 80053 COMPREHEN METABOLIC PANEL: CPT | Performed by: EMERGENCY MEDICINE

## 2024-08-31 PROCEDURE — 82948 REAGENT STRIP/BLOOD GLUCOSE: CPT

## 2024-08-31 PROCEDURE — 85610 PROTHROMBIN TIME: CPT | Performed by: EMERGENCY MEDICINE

## 2024-08-31 PROCEDURE — 99285 EMERGENCY DEPT VISIT HI MDM: CPT | Performed by: EMERGENCY MEDICINE

## 2024-08-31 PROCEDURE — 99284 EMERGENCY DEPT VISIT MOD MDM: CPT

## 2024-08-31 PROCEDURE — 85730 THROMBOPLASTIN TIME PARTIAL: CPT | Performed by: EMERGENCY MEDICINE

## 2024-08-31 PROCEDURE — 81001 URINALYSIS AUTO W/SCOPE: CPT | Performed by: EMERGENCY MEDICINE

## 2024-08-31 PROCEDURE — 99223 1ST HOSP IP/OBS HIGH 75: CPT | Performed by: INTERNAL MEDICINE

## 2024-08-31 PROCEDURE — 74176 CT ABD & PELVIS W/O CONTRAST: CPT

## 2024-08-31 PROCEDURE — 36415 COLL VENOUS BLD VENIPUNCTURE: CPT | Performed by: EMERGENCY MEDICINE

## 2024-08-31 RX ORDER — DOCUSATE SODIUM 100 MG/1
100 CAPSULE, LIQUID FILLED ORAL 2 TIMES DAILY
Status: DISCONTINUED | OUTPATIENT
Start: 2024-08-31 | End: 2024-09-04 | Stop reason: HOSPADM

## 2024-08-31 RX ORDER — ONDANSETRON 2 MG/ML
4 INJECTION INTRAMUSCULAR; INTRAVENOUS EVERY 4 HOURS PRN
Status: DISCONTINUED | OUTPATIENT
Start: 2024-08-31 | End: 2024-09-04 | Stop reason: HOSPADM

## 2024-08-31 RX ORDER — ESCITALOPRAM OXALATE 10 MG/1
10 TABLET ORAL DAILY
Status: DISCONTINUED | OUTPATIENT
Start: 2024-09-01 | End: 2024-09-04 | Stop reason: HOSPADM

## 2024-08-31 RX ORDER — MIRTAZAPINE 7.5 MG/1
7.5 TABLET, FILM COATED ORAL
Status: DISCONTINUED | OUTPATIENT
Start: 2024-08-31 | End: 2024-09-03

## 2024-08-31 RX ORDER — MIRTAZAPINE 7.5 MG/1
7.5 TABLET, FILM COATED ORAL
COMMUNITY
End: 2024-09-04

## 2024-08-31 RX ORDER — SILVER SULFADIAZINE 10 MG/G
1 CREAM TOPICAL DAILY
Status: DISCONTINUED | OUTPATIENT
Start: 2024-09-01 | End: 2024-09-01

## 2024-08-31 RX ORDER — ATORVASTATIN CALCIUM 80 MG/1
80 TABLET, FILM COATED ORAL EVERY EVENING
Status: DISCONTINUED | OUTPATIENT
Start: 2024-08-31 | End: 2024-09-04 | Stop reason: HOSPADM

## 2024-08-31 RX ORDER — INSULIN LISPRO 100 [IU]/ML
1-6 INJECTION, SOLUTION INTRAVENOUS; SUBCUTANEOUS
Status: DISCONTINUED | OUTPATIENT
Start: 2024-08-31 | End: 2024-09-04 | Stop reason: HOSPADM

## 2024-08-31 RX ORDER — ACETAMINOPHEN 325 MG/1
650 TABLET ORAL EVERY 4 HOURS PRN
Status: DISCONTINUED | OUTPATIENT
Start: 2024-08-31 | End: 2024-09-04 | Stop reason: HOSPADM

## 2024-08-31 RX ORDER — TAMSULOSIN HYDROCHLORIDE 0.4 MG/1
0.4 CAPSULE ORAL
Status: DISCONTINUED | OUTPATIENT
Start: 2024-08-31 | End: 2024-09-04 | Stop reason: HOSPADM

## 2024-08-31 RX ORDER — ASPIRIN 81 MG/1
81 TABLET, CHEWABLE ORAL DAILY
Status: DISCONTINUED | OUTPATIENT
Start: 2024-09-01 | End: 2024-09-04 | Stop reason: HOSPADM

## 2024-08-31 RX ORDER — LISINOPRIL 10 MG/1
10 TABLET ORAL DAILY
Status: DISCONTINUED | OUTPATIENT
Start: 2024-09-01 | End: 2024-09-04 | Stop reason: HOSPADM

## 2024-08-31 RX ORDER — EZETIMIBE 10 MG/1
10 TABLET ORAL DAILY
Status: DISCONTINUED | OUTPATIENT
Start: 2024-09-01 | End: 2024-09-04 | Stop reason: HOSPADM

## 2024-08-31 RX ADMIN — TAMSULOSIN HYDROCHLORIDE 0.4 MG: 0.4 CAPSULE ORAL at 22:03

## 2024-08-31 RX ADMIN — DOCUSATE SODIUM 100 MG: 100 CAPSULE, LIQUID FILLED ORAL at 22:04

## 2024-08-31 RX ADMIN — MIRTAZAPINE 7.5 MG: 7.5 TABLET, FILM COATED ORAL at 22:04

## 2024-08-31 RX ADMIN — AMOXICILLIN AND CLAVULANATE POTASSIUM 1 TABLET: 875; 125 TABLET, COATED ORAL at 22:03

## 2024-08-31 RX ADMIN — ATORVASTATIN CALCIUM 80 MG: 80 TABLET, FILM COATED ORAL at 22:04

## 2024-08-31 NOTE — Clinical Note
Case was discussed with Dr. Reddy and the patient's admission status was agreed to be Admission Status: inpatient status to the service of [facility].

## 2024-08-31 NOTE — H&P
"[facility]  H&P  Name: Drew Santos 73 y.o. male I MRN: [id number]  Unit/Bed#: ED-24 I Date of Admission: 8/31/2024   Date of Service: 8/31/2024 I Hospital Day: 0      Assessment & Plan   * Gross hematuria  Assessment & Plan  History of diabetes mellitus atrial fibrillation hypertension and stroke presents from SNF at [facility] for hematuria  During recent hospitalization at [facility] for sacral wound he had urinary catheter placement for retention.  Currently on CBI per recommendations from urology.  Will hold Eliquis for now    Depression  Assessment & Plan  Continue escitalopram.  Mirtazapine was recently added.    Sacral wound  Assessment & Plan  Recently hospitalized at [facility].  Discharged with Augmentin end date 9/5/2024    Primary hypertension  Assessment & Plan  Continue lisinopril    Paroxysmal atrial fibrillation (HCC)  Assessment & Plan  Not on any AV william blocking agents.  Anticoagulation: Holding Eliquis due to gross hematuria    History of CVA (cerebrovascular accident)  Assessment & Plan  History of hospitalization at [facility] for CVA with left-sided weakness July 2024  Currently rehabbing at [facility]  Will continue aspirin but hold Eliquis due to gross hematuria    Type 2 diabetes mellitus without complication, without long-term current use of insulin ([facility])  Assessment & Plan  Lab Results   Component Value Date    HGBA1C 6.0 (H) 07/04/2024     No results for input(s): \"POCGLU\" in the last 72 hours.    Prior to admission on Farxiga.  Will be placed on sliding scale insulin during hospitalization    VTE Pharmacologic Prophylaxis:   Moderate Risk (Score 3-4) - Pharmacological DVT Prophylaxis Contraindicated. Sequential Compression Devices Ordered.  Code Status: Level 1 - Full Code  Discussion with family: Attempted to update  (wife) via phone. Left voicemail.  [phone] was not working.  Left a voicemail on " [phone]    Anticipated Length of Stay: Patient will be admitted on an observation basis with an anticipated length of stay of less than 2 midnights secondary to gross hematuria.    Total Time Spent on Date of Encounter in care of patient: This time was spent on one or more of the following: performing physical exam; counseling and coordination of care; obtaining or reviewing history; documenting in the medical record; reviewing/ordering tests, medications or procedures; communicating with other healthcare professionals and discussing with patient's family/caregivers.    Chief Complaint:     Blood in Urine (Brought by EMS from [facility].  Pt sent here for blood in urine/hanks catheter)    History of Present Illness:    Drew Santos is a 73 y.o. male with a past medical history of atrial fibrillation diabetes hypertension CVA 7/24 who presents with hematuria.  The patient was hospitalized at [facility] July for CVA and went to rehab.  He was then hospitalized at [facility] discharged on 8/28/2024 for sacral wound.  During this hospitalization he had urinary retention and the catheter was placed.  Today he was brought over from SNF for hematuria.  The patient otherwise denies any other complaints.  No chest pain shortness of breath abdominal pain or pressure.  He was started on a CBI after discussion by ED with urology and admission has been requested.    Review of Systems:  Review of Systems   Constitutional:  Negative for chills and fever.   HENT:  Negative for facial swelling.    Eyes:  Negative for visual disturbance.   Respiratory:  Negative for shortness of breath.    Cardiovascular:  Negative for chest pain and palpitations.   Gastrointestinal:  Negative for diarrhea, nausea and vomiting.   Genitourinary:  Positive for hematuria. Negative for dysuria.   Musculoskeletal:  Negative for myalgias.   Skin:  Negative for rash.   Neurological:  Negative for seizures and speech  difficulty.   Psychiatric/Behavioral:  The patient is not nervous/anxious.    All other systems reviewed and are negative.        Past Medical and Surgical History:   Past Medical History:   Diagnosis Date    Atherosclerotic heart disease of native coronary artery without angina pectoris     Atrial fibrillation (HCC)     Cerebral infarction (HCC)     Constipation     Depression     Diabetes mellitus (HCC)     Hemiplegia and hemiparesis following cerebral infarction affecting left non-dominant side (HCC)     Hyperlipidemia     Hypertension     Pressure ulcer of sacral region, stage 3 (HCC)     Prostatic hyperplasia     Sepsis (HCC)     Stroke (HCC)     TIA (transient ischemic attack)     Urinary retention     Vitamin D deficiency      History reviewed. No pertinent surgical history.  Meds/Allergies:  Allergies:   Allergies   Allergen Reactions    Primaquine Other (See Comments) and Hives     Prior to Admission Medications   Prescriptions Last Dose Informant Patient Reported? Taking?   ASPIRIN 81 PO   Yes Yes   Sig: Take 81 mg by mouth daily.   Cholecalciferol (Vitamin D3) 50 MCG (2000 UT) CAPS  Spouse/Significant Other, Self Yes Yes   Sig: Take 2,000 Units by mouth daily. Per 8/17 AVS, 25 mcg (1000 units) oral once per day  Indications: Vitamin D Deficiency   MULTIPLE VITAMIN PO  Spouse/Significant Other, Self Yes Yes   Sig: Take 1 tablet by mouth in the morning. Indications: Vitamin A deficiency   Polyethylene Glycol 1000 POWD   Yes No   Sig: Take 17 g by mouth daily as needed (constipation). Dissolve in 8oz of water, juice, coffee or tea   Silver (SilvaSorb) GEL   Yes No   Sig: Apply 1 Application topically daily. Apply to buttock wound bed daily.   acetaminophen (TYLENOL) 325 mg tablet   Yes Yes   Sig: Take 650 mg by mouth every 4 (four) hours as needed for fever or mild pain.   amoxicillin-clavulanate (AUGMENTIN) 875-125 mg per tablet   No Yes   Sig: Take 1 tablet by mouth every 12 (twelve) hours for 7 days    apixaban (Eliquis) 5 mg   No Yes   Sig: Take 1 tablet (5 mg total) by mouth 2 (two) times a day   atorvastatin (LIPITOR) 40 mg tablet   No Yes   Sig: Take 2 tablets (80 mg total) by mouth every evening Per AVS, 80mg daily   bisacodyl (Bisacodyl Laxative) 10 mg suppository   Yes No   Sig: Insert 10 mg into the rectum daily as needed for constipation (if no results from mom).   bisacodyl (FLEET) 10 MG/30ML ENEM   Yes Yes   Sig: Insert 10 mg into the rectum daily as needed for constipation.   dapagliflozin (Farxiga) 10 MG tablet  Spouse/Significant Other, Self Yes No   Sig: Take 10 mg by mouth daily   docusate sodium (COLACE) 100 mg capsule   Yes Yes   Sig: Take 100 mg by mouth 2 (two) times a day.   escitalopram (LEXAPRO) 10 mg tablet   Yes Yes   Sig: Take 10 mg by mouth daily.   ezetimibe (ZETIA) 10 mg tablet  Spouse/Significant Other, Self Yes Yes   Sig: Take 10 mg by mouth daily. Indications: High Amount of Fats in the Blood   lisinopril (ZESTRIL) 10 mg tablet   No Yes   Sig: Take 1 tablet (10 mg total) by mouth daily   magnesium hydroxide (Milk of Magnesia) 400 mg/5 mL oral suspension   Yes No   Sig: Take 30 mL by mouth daily as needed for constipation.   mirtazapine (REMERON) 7.5 MG tablet   Yes Yes   Sig: Take 7.5 mg by mouth daily at bedtime   senna (Senna-Tabs) 8.6 MG tablet   Yes Yes   Sig: Take 2 tablets by mouth daily as needed for constipation.   tamsulosin (FLOMAX) 0.4 mg   Yes Yes   Sig: Take 0.4 mg by mouth daily.      Facility-Administered Medications: None     Social History:     Social History     Socioeconomic History    Marital status: /Civil Union     Spouse name: Not on file    Number of children: Not on file    Years of education: Not on file    Highest education level: Not on file   Occupational History    Not on file   Tobacco Use    Smoking status: Never    Smokeless tobacco: Never   Vaping Use    Vaping status: Never Used   Substance and Sexual Activity    Alcohol use: Not Currently     Drug use: Never    Sexual activity: Not on file   Other Topics Concern    Not on file   Social History Narrative    Not on file     Social Determinants of Health     Financial Resource Strain: Low Risk  (7/10/2024)    Received from [facility]    Overall Financial Resource Strain (CARDIA)     Difficulty of Paying Living Expenses: Not hard at all   Food Insecurity: No Food Insecurity (8/27/2024)    Hunger Vital Sign     Worried About Running Out of Food in the Last Year: Never true     Ran Out of Food in the Last Year: Never true   Transportation Needs: No Transportation Needs (8/27/2024)    PRAPARE - Transportation     Lack of Transportation (Medical): No     Lack of Transportation (Non-Medical): No   Physical Activity: Not on file   Stress: Stress Concern Present (5/8/2023)    [facility], [facility]    [employer] - Occupational Stress Questionnaire     Feeling of Stress : To some extent   Social Connections: Feeling Socially Integrated (7/25/2024)    Received from [facility]    OASIS : Social Isolation     How often do you feel lonely or isolated from those around you?: Rarely   Intimate Partner Violence: Not At Risk (7/10/2024)    Received from [facility]    Humiliation, Afraid, Rape, and Kick questionnaire     Fear of Current or Ex-Partner: No     Emotionally Abused: No     Physically Abused: No     Sexually Abused: No   Housing Stability: Low Risk  (8/27/2024)    Housing Stability Vital Sign     Unable to Pay for Housing in the Last Year: No     Number of Times Moved in the Last Year: 0     Homeless in the Last Year: No     Patient Pre-hospital Living Situation:   Patient Pre-hospital Level of Mobility:   Patient Pre-hospital Diet Restrictions:     Family History:  History reviewed. No pertinent family history.  Physical Exam:   Vitals:   Blood Pressure: 133/60 (08/31/24  1940)  Pulse: 64 (08/31/24 1940)  Respirations: 18 (08/31/24 1940)  Weight - Scale: 109 kg (240 lb 8.4 oz) (08/31/24 1719)  SpO2: 98 % (08/31/24 1940)    Physical Exam  Vitals reviewed.   Constitutional:       General: He is not in acute distress.  HENT:      Head: Atraumatic.   Eyes:      General: No scleral icterus.     Extraocular Movements: Extraocular movements intact.   Cardiovascular:      Rate and Rhythm: Regular rhythm.      Heart sounds: Normal heart sounds.   Pulmonary:      Effort: Pulmonary effort is normal.      Breath sounds: No wheezing.   Abdominal:      General: Bowel sounds are normal. There is no distension.      Palpations: Abdomen is soft.      Tenderness: There is no abdominal tenderness.   Musculoskeletal:         General: No swelling or tenderness.   Skin:     General: Skin is warm and dry.   Neurological:      General: No focal deficit present.      Mental Status: He is alert.      Motor: No weakness.   Psychiatric:         Mood and Affect: Mood normal.       Lab Results: I have personally reviewed pertinent reports.    Results from last 7 days   Lab Units 08/31/24  1816   WBC Thousand/uL 10.37*   HEMOGLOBIN g/dL 10.8*   HEMATOCRIT % 32.9*   PLATELETS Thousands/uL 440*   SEGS PCT % 67   LYMPHO PCT % 24   MONO PCT % 7   EOS PCT % 1     Results from last 7 days   Lab Units 08/31/24  1816 08/28/24  0526 08/27/24  0437   SODIUM mmol/L 142 141 140   POTASSIUM mmol/L 4.1 2.8* 3.1*   CHLORIDE mmol/L 107 107 106   CO2 mmol/L 28 27 26   ANION GAP mmol/L 7 7 8   BUN mg/dL 8 9 13   CREATININE mg/dL 0.76 0.70 0.65   CALCIUM mg/dL 9.0 8.8 9.0   ALBUMIN g/dL 2.8* 2.8*  --    TOTAL BILIRUBIN mg/dL 0.62 0.65  --    ALK PHOS U/L 102 88  --    ALT U/L 15 19  --    AST U/L 24 18  --    EGFR ml/min/1.73sq m 89 92 95   GLUCOSE RANDOM mg/dL 132 104 106     Results from last 7 days   Lab Units 08/31/24  1816   INR  1.39*             Results from last 7 days   Lab Units 08/27/24  1433   LACTIC ACID mmol/L 1.1               Results from last 7 days   Lab Units 08/31/24  1941   COLOR UA  Colorless   CLARITY UA  Clear   SPEC GRAV UA  1.004   PH UA  5.0   LEUKOCYTES UA  Negative   NITRITE UA  Negative   GLUCOSE UA mg/dl 100 (1/10%)*   KETONES UA mg/dl Negative   BILIRUBIN UA  Negative   BLOOD UA  Large*      Results from last 7 days   Lab Units 08/31/24  1941   RBC UA /hpf Innumerable*   WBC UA /hpf 1-2   EPITHELIAL CELLS WET PREP /hpf Occasional   BACTERIA UA /hpf None Seen      Results from last 7 days   Lab Units 08/28/24  1505   SARS-COV-2  Negative   INFLUENZA A PCR  Negative   INFLUENZA B PCR  Negative   RSV PCR  Negative       Lines/Drains  Invasive Devices       Peripheral Intravenous Line  Duration             Peripheral IV 08/31/24 Right Antecubital <1 day              Drain  Duration             Urethral Catheter Three way 22 Fr. <1 day                    Imaging: I have personally reviewed pertinent films in PACS  CT renal stone study abdomen pelvis without contrast    Result Date: 8/31/2024  Impression: Small nonobstructing renal collecting system calculi in the lower pole of the left kidney. No hydronephrosis or renal colic. No renal parenchymal mass visible on this noncontrast study. There is mild diffuse bilateral perinephric edema however. Urinary bladder collapsed by Parra catheter. Urinary bladder wall thickening likely the result of a combination of trabeculation and incomplete distention. The urine is intermediate attenuation suggesting bloody components as seen clinically. Recommend hematuria/renal protocol contrast-enhanced CT if hematuria persists without cause. Cholelithiasis without CT evidence of cholecystitis. The study was marked in EPIC for immediate notification. Workstation performed: [id number]       EKG, Pathology, and Other Studies Reviewed on Admission:   Reviewed [facility] records as well as recent hospitalization records.  Reconciled medications with list from  facility    ** Please Note: This note has been constructed using a voice recognition system. **

## 2024-08-31 NOTE — ED PROVIDER NOTES
History  Chief Complaint   Patient presents with    Blood in Urine     Brought by EMS from [facility].  Pt sent here for blood in urine/hanks catheter       History provided by:  Patient   used: No    Blood in Urine  This is a new problem. The current episode started today. The problem is unchanged. He describes the hematuria as gross hematuria. The hematuria occurs throughout his entire urinary stream. He reports no clotting in his urine stream. He is experiencing no pain. He describes his urine color as dark red. Not able to specify. Patient with recent urine retention during hospital stay, indwelling Hanks, started with hematuria on Eliquis. Pertinent negatives include no abdominal pain, chills, dysuria, facial swelling, fever, flank pain, nausea or vomiting. His sexual activity is non-contributory to the current illness. His past medical history is significant for BPH. Risk factors include anticoagulant.       Prior to Admission Medications   Prescriptions Last Dose Informant Patient Reported? Taking?   ASPIRIN 81 PO   Yes Yes   Sig: Take 81 mg by mouth daily.   Cholecalciferol (Vitamin D3) 50 MCG (2000 UT) CAPS  Spouse/Significant Other, Self Yes Yes   Sig: Take 2,000 Units by mouth daily. Per 8/17 AVS, 25 mcg (1000 units) oral once per day  Indications: Vitamin D Deficiency   MULTIPLE VITAMIN PO  Spouse/Significant Other, Self Yes Yes   Sig: Take 1 tablet by mouth in the morning. Indications: Vitamin A deficiency   Polyethylene Glycol 1000 POWD   Yes No   Sig: Take 17 g by mouth daily as needed (constipation). Dissolve in 8oz of water, juice, coffee or tea   Silver (SilvaSorb) GEL   Yes No   Sig: Apply 1 Application topically daily. Apply to buttock wound bed daily.   acetaminophen (TYLENOL) 325 mg tablet Unknown  Yes No   Sig: Take 650 mg by mouth every 4 (four) hours as needed for fever or mild pain.   amoxicillin-clavulanate (AUGMENTIN) 875-125 mg per tablet 8/31/2024  No  Yes   Sig: Take 1 tablet by mouth every 12 (twelve) hours for 7 days   apixaban (Eliquis) 5 mg   No Yes   Sig: Take 1 tablet (5 mg total) by mouth 2 (two) times a day   atorvastatin (LIPITOR) 40 mg tablet   No Yes   Sig: Take 2 tablets (80 mg total) by mouth every evening Per AVS, 80mg daily   bisacodyl (Bisacodyl Laxative) 10 mg suppository   Yes No   Sig: Insert 10 mg into the rectum daily as needed for constipation (if no results from mom).   bisacodyl (FLEET) 10 MG/30ML ENEM   Yes Yes   Sig: Insert 10 mg into the rectum daily as needed for constipation.   dapagliflozin (Farxiga) 10 MG tablet  Spouse/Significant Other, Self Yes No   Sig: Take 10 mg by mouth daily   docusate sodium (COLACE) 100 mg capsule   Yes Yes   Sig: Take 100 mg by mouth 2 (two) times a day.   escitalopram (LEXAPRO) 10 mg tablet   Yes Yes   Sig: Take 10 mg by mouth daily.   ezetimibe (ZETIA) 10 mg tablet  Spouse/Significant Other, Self Yes Yes   Sig: Take 10 mg by mouth daily. Indications: High Amount of Fats in the Blood   lisinopril (ZESTRIL) 10 mg tablet   No Yes   Sig: Take 1 tablet (10 mg total) by mouth daily   magnesium hydroxide (Milk of Magnesia) 400 mg/5 mL oral suspension   Yes No   Sig: Take 30 mL by mouth daily as needed for constipation.   mirtazapine (REMERON) 7.5 MG tablet   Yes Yes   Sig: Take 7.5 mg by mouth daily at bedtime   senna (Senna-Tabs) 8.6 MG tablet   Yes Yes   Sig: Take 2 tablets by mouth daily as needed for constipation.   tamsulosin (FLOMAX) 0.4 mg   Yes Yes   Sig: Take 0.4 mg by mouth daily.      Facility-Administered Medications: None       Past Medical History:   Diagnosis Date    Atherosclerotic heart disease of native coronary artery without angina pectoris     Atrial fibrillation (HCC)     Cerebral infarction (HCC)     Constipation     Depression     Diabetes mellitus (HCC)     Hemiplegia and hemiparesis following cerebral infarction affecting left non-dominant side (HCC)     Hyperlipidemia      Hypertension     Pressure ulcer of sacral region, stage 3 (HCC)     Prostatic hyperplasia     Sepsis (HCC)     Stroke (HCC)     TIA (transient ischemic attack)     Urinary retention     Vitamin D deficiency        History reviewed. No pertinent surgical history.    History reviewed. No pertinent family history.  I have reviewed and agree with the history as documented.    E-Cigarette/Vaping    E-Cigarette Use Never User      E-Cigarette/Vaping Substances    Nicotine No     THC No     CBD No     Flavoring No     Other No     Unknown No      Social History     Tobacco Use    Smoking status: Never    Smokeless tobacco: Never   Vaping Use    Vaping status: Never Used   Substance Use Topics    Alcohol use: Not Currently    Drug use: Never       Review of Systems   Constitutional:  Negative for chills and fever.   HENT:  Negative for facial swelling, sore throat and trouble swallowing.    Eyes:  Negative for pain and visual disturbance.   Respiratory:  Negative for cough, chest tightness and shortness of breath.    Cardiovascular:  Negative for chest pain and leg swelling.   Gastrointestinal:  Negative for abdominal pain, diarrhea, nausea and vomiting.   Genitourinary:  Positive for hematuria. Negative for dysuria and flank pain.   Musculoskeletal:  Negative for back pain, neck pain and neck stiffness.   Skin:  Negative for pallor and rash.   Allergic/Immunologic: Negative for environmental allergies and immunocompromised state.   Neurological:  Negative for dizziness and headaches.   Hematological:  Negative for adenopathy. Does not bruise/bleed easily.   Psychiatric/Behavioral:  Negative for agitation and behavioral problems.    All other systems reviewed and are negative.      Physical Exam  Physical Exam  Vitals and nursing note reviewed.   Constitutional:       General: He is not in acute distress.     Appearance: He is well-developed.   HENT:      Head: Normocephalic and atraumatic.   Eyes:      Extraocular  Movements: Extraocular movements intact.   Cardiovascular:      Rate and Rhythm: Normal rate and regular rhythm.   Pulmonary:      Effort: Pulmonary effort is normal. No respiratory distress.   Abdominal:      Palpations: Abdomen is soft.      Tenderness: There is no abdominal tenderness. There is no guarding or rebound.   Genitourinary:     Comments: Dick hematuria noted, draining in the bag  Musculoskeletal:         General: Normal range of motion.      Cervical back: Normal range of motion and neck supple.   Skin:     General: Skin is warm and dry.   Neurological:      General: No focal deficit present.      Mental Status: He is alert and oriented to person, place, and time.   Psychiatric:         Mood and Affect: Mood normal.         Behavior: Behavior normal.         Vital Signs  ED Triage Vitals   Temperature Pulse Respirations Blood Pressure SpO2   08/31/24 2128 08/31/24 1719 08/31/24 1719 08/31/24 1719 08/31/24 1719   98.6 °F (37 °C) 71 18 139/62 98 %      Temp src Heart Rate Source Patient Position - Orthostatic VS BP Location FiO2 (%)   -- -- 08/31/24 1940 08/31/24 1940 --     Lying Right arm       Pain Score       08/31/24 1719       No Pain           Vitals:    08/31/24 1719 08/31/24 1940 08/31/24 2128 08/31/24 2257   BP: 139/62 133/60 154/76 (!) 174/101   Pulse: 71 64 (!) 114 (!) 136   Patient Position - Orthostatic VS:  Lying           Visual Acuity      ED Medications  Medications   insulin lispro (HumALOG/ADMELOG) 100 units/mL subcutaneous injection 1-6 Units ( Subcutaneous Not Given 8/31/24 2235)   amoxicillin-clavulanate (AUGMENTIN) 875-125 mg per tablet 1 tablet (1 tablet Oral Given 8/31/24 2203)   aspirin chewable tablet 81 mg (has no administration in time range)   atorvastatin (LIPITOR) tablet 80 mg (80 mg Oral Given 8/31/24 2204)   Cholecalciferol (VITAMIN D3) tablet 1,000 Units (has no administration in time range)   escitalopram (LEXAPRO) tablet 10 mg (has no administration in time range)    docusate sodium (COLACE) capsule 100 mg (100 mg Oral Given 8/31/24 2204)   ezetimibe (ZETIA) tablet 10 mg (has no administration in time range)   lisinopril (ZESTRIL) tablet 10 mg (has no administration in time range)   mirtazapine (REMERON) tablet 7.5 mg (7.5 mg Oral Given 8/31/24 2204)   silver sulfadiazine (SILVADENE,SSD) 1 % cream 1 Application (has no administration in time range)   tamsulosin (FLOMAX) capsule 0.4 mg (0.4 mg Oral Given 8/31/24 2203)   acetaminophen (TYLENOL) tablet 650 mg (has no administration in time range)   ondansetron (ZOFRAN) injection 4 mg (has no administration in time range)       Diagnostic Studies  Results Reviewed       Procedure Component Value Units Date/Time    Urine Microscopic [[id number]]  (Abnormal) Collected: 08/31/24 1941    Lab Status: Final result Specimen: Urine, Indwelling Parra Catheter Updated: 08/31/24 2008     RBC, UA Innumerable /hpf      WBC, UA 1-2 /hpf      Epithelial Cells Occasional /hpf      Bacteria, UA None Seen /hpf      MUCUS THREADS Occasional    UA w Reflex to Microscopic w Reflex to Culture [[id number]]  (Abnormal) Collected: 08/31/24 1941    Lab Status: Final result Specimen: Urine, Indwelling Parra Catheter Updated: 08/31/24 2002     Color, UA Colorless     Clarity, UA Clear     Specific Gravity, UA 1.004     pH, UA 5.0     Leukocytes, UA Negative     Nitrite, UA Negative     Protein, UA Negative mg/dl      Glucose,  (1/10%) mg/dl      Ketones, UA Negative mg/dl      Urobilinogen, UA <2.0 mg/dl      Bilirubin, UA Negative     Occult Blood, UA Large    Comprehensive metabolic panel [[id number]]  (Abnormal) Collected: 08/31/24 1816    Lab Status: Final result Specimen: Blood from Arm, Right Updated: 08/31/24 1841     Sodium 142 mmol/L      Potassium 4.1 mmol/L      Chloride 107 mmol/L      CO2 28 mmol/L      ANION GAP 7 mmol/L      BUN 8 mg/dL      Creatinine 0.76 mg/dL      Glucose 132 mg/dL      Calcium 9.0 mg/dL      Corrected Calcium 10.0  mg/dL      AST 24 U/L      ALT 15 U/L      Alkaline Phosphatase 102 U/L      Total Protein 7.3 g/dL      Albumin 2.8 g/dL      Total Bilirubin 0.62 mg/dL      eGFR 89 ml/min/1.73sq m     Narrative:      National Kid[employer] guidelines for Chronic Kidney Disease (CKD):     Stage 1 with normal or high GFR (GFR > 90 mL/min/1.73 square meters)    Stage 2 Mild CKD (GFR = 60-89 mL/min/1.73 square meters)    Stage 3A Moderate CKD (GFR = 45-59 mL/min/1.73 square meters)    Stage 3B Moderate CKD (GFR = 30-44 mL/min/1.73 square meters)    Stage 4 Severe CKD (GFR = 15-29 mL/min/1.73 square meters)    Stage 5 End Stage CKD (GFR <15 mL/min/1.73 square meters)  Note: GFR calculation is accurate only with a steady state creatinine    Protime-INR [[id number]]  (Abnormal) Collected: 08/31/24 1816    Lab Status: Final result Specimen: Blood from Arm, Right Updated: 08/31/24 1838     Protime 17.1 seconds      INR 1.39    Narrative:      INR Therapeutic Range    Indication                                             INR Range      Atrial Fibrillation                                               2.0-3.0  Hypercoagulable State                                    2.0.2.3  Left Ventricular Asist Device                            2.0-3.0  Mechanical Heart Valve                                  -    Aortic(with afib, MI, embolism, HF, LA enlargement,    and/or coagulopathy)                                     2.0-3.0 (2.5-3.5)     Mitral                                                             2.5-3.5  Prosthetic/Bioprosthetic Heart Valve               2.0-3.0  Venous thromboembolism (VTE: VT, PE        2.0-3.0    APTT [[id number]]  (Abnormal) Collected: 08/31/24 1816    Lab Status: Final result Specimen: Blood from Arm, Right Updated: 08/31/24 1838     PTT 38 seconds     CBC and differential [[id number]]  (Abnormal) Collected: 08/31/24 1816    Lab Status: Final result Specimen: Blood from Arm, Right Updated: 08/31/24 1833      WBC 10.37 Thousand/uL      RBC 3.48 Million/uL      Hemoglobin 10.8 g/dL      Hematocrit 32.9 %      MCV 95 fL      MCH 31.0 pg      MCHC 32.8 g/dL      RDW 13.1 %      MPV 9.0 fL      Platelets 440 Thousands/uL      nRBC 0 /100 WBCs      Segmented % 67 %      Immature Grans % 0 %      Lymphocytes % 24 %      Monocytes % 7 %      Eosinophils Relative 1 %      Basophils Relative 1 %      Absolute Neutrophils 7.01 Thousands/µL      Absolute Immature Grans 0.04 Thousand/uL      Absolute Lymphocytes 2.46 Thousands/µL      Absolute Monocytes 0.67 Thousand/µL      Eosinophils Absolute 0.13 Thousand/µL      Basophils Absolute 0.06 Thousands/µL                    CT renal stone study abdomen pelvis without contrast   Final Result by Daniel Avina DO (08/31 1911)      Small nonobstructing renal collecting system calculi in the lower pole of the left kidney. No hydronephrosis or renal colic. No renal parenchymal mass visible on this noncontrast study. There is mild diffuse bilateral perinephric edema however. Urinary    bladder collapsed by Parra catheter. Urinary bladder wall thickening likely the result of a combination of trabeculation and incomplete distention. The urine is intermediate attenuation suggesting bloody components as seen clinically.      Recommend hematuria/renal protocol contrast-enhanced CT if hematuria persists without cause.      Cholelithiasis without CT evidence of cholecystitis.         The study was marked in EPIC for immediate notification.      Workstation performed: [id number]                    Procedures  Procedures         ED Course  ED Course as of 09/01/24 0118   Sat Aug 31, 2024   1758 Case discussed with Coco Flynn, Urology on call, agree with placing 3-way Parra and start CBI.   1838 WBC(!): 10.37   1838 Hemoglobin(!): 10.8   1838 Platelet Count(!): 440  CBC at baseline.   1852 Sodium: 142   1852 Potassium: 4.1   1852 BUN: 8   1852 Creatinine: 0.76   1852 GLUCOSE: 132  CMP wnl.    1852 POCT INR(!): 1.39  INR noted.   1926 CT renal stone study abdomen pelvis without contrast  IMPRESSION:     Small nonobstructing renal collecting system calculi in the lower pole of the left kidney. No hydronephrosis or renal colic. No renal parenchymal mass visible on this noncontrast study. There is mild diffuse bilateral perinephric edema however. Urinary   bladder collapsed by Parra catheter. Urinary bladder wall thickening likely the result of a combination of trabeculation and incomplete distention. The urine is intermediate attenuation suggesting bloody components as seen clinically.     Recommend hematuria/renal protocol contrast-enhanced CT if hematuria persists without cause.     Cholelithiasis without CT evidence of cholecystitis.     1936 Case discussed with urology again, CT findings conveyed, advised to admit to medical service, will see in consult in the morning.                                               Medical Decision Making  Patient is a 73-year-old male, history of stroke on Eliquis, urinary retention, indwelling Parra, comes in with complaints of hematuria started today noticed at the facility, patient denies abdominal or flank pain, no fever.  On exam, patient is conscious, alert, vital signs are stable, abdomen is soft, nontender, nondistended, right hematuria noted in Parra catheter draining in the bag.  Differential diagnosis: Cystitis, renal colic, bladder mass, bleeding associated with Eliquis, we will check labs, urine, get CT renal protocol, start CBI, contact urology for further recommendations.    Problems Addressed:  Hematuria: acute illness or injury    Amount and/or Complexity of Data Reviewed  Labs: ordered. Decision-making details documented in ED Course.  Radiology: ordered. Decision-making details documented in ED Course.    Risk  Decision regarding hospitalization.                 Disposition  Final diagnoses:   Hematuria     Time reflects when diagnosis was  documented in both MDM as applicable and the Disposition within this note       Time User Action Codes Description Comment    8/31/2024  5:48 PM Yoshi Lomax Add [R31.9] Hematuria           ED Disposition       ED Disposition   Admit    Condition   Stable    Date/Time   Sat Aug 31, 2024  7:54 PM    Comment   Case was discussed with Dr. Reddy and the patient's admission status was agreed to be Admission Status: observation status to the service of [facility].               Follow-up Information    None         Current Discharge Medication List        CONTINUE these medications which have NOT CHANGED    Details   amoxicillin-clavulanate (AUGMENTIN) 875-125 mg per tablet Take 1 tablet by mouth every 12 (twelve) hours for 7 days    Associated Diagnoses: Wound of sacral region, initial encounter      apixaban (Eliquis) 5 mg Take 1 tablet (5 mg total) by mouth 2 (two) times a day  Qty: 180 tablet, Refills: 3    Associated Diagnoses: Paroxysmal atrial fibrillation (HCC)      ASPIRIN 81 PO Take 81 mg by mouth daily.      atorvastatin (LIPITOR) 40 mg tablet Take 2 tablets (80 mg total) by mouth every evening Per AVS, 80mg daily    Associated Diagnoses: CVA (cerebral vascular accident) (HCC)      bisacodyl (FLEET) 10 MG/30ML ENEM Insert 10 mg into the rectum daily as needed for constipation.    Comments: pt not taking at this time      Cholecalciferol (Vitamin D3) 50 MCG (2000 UT) CAPS Take 2,000 Units by mouth daily. Per 8/17 AVS, 25 mcg (1000 units) oral once per day  Indications: Vitamin D Deficiency      docusate sodium (COLACE) 100 mg capsule Take 100 mg by mouth 2 (two) times a day.      escitalopram (LEXAPRO) 10 mg tablet Take 10 mg by mouth daily.    Comments: depression      ezetimibe (ZETIA) 10 mg tablet Take 10 mg by mouth daily. Indications: High Amount of Fats in the Blood      lisinopril (ZESTRIL) 10 mg tablet Take 1 tablet (10 mg total) by mouth daily  Qty: 30 tablet, Refills: 0    Associated Diagnoses: CVA  (cerebral vascular accident) (HCC)      mirtazapine (REMERON) 7.5 MG tablet Take 7.5 mg by mouth daily at bedtime      MULTIPLE VITAMIN PO Take 1 tablet by mouth in the morning. Indications: Vitamin A deficiency      senna (Senna-Tabs) 8.6 MG tablet Take 2 tablets by mouth daily as needed for constipation.      tamsulosin (FLOMAX) 0.4 mg Take 0.4 mg by mouth daily.      acetaminophen (TYLENOL) 325 mg tablet Take 650 mg by mouth every 4 (four) hours as needed for fever or mild pain.      bisacodyl (Bisacodyl Laxative) 10 mg suppository Insert 10 mg into the rectum daily as needed for constipation (if no results from mom).    Comments: pt not taking at this time      dapagliflozin (Farxiga) 10 MG tablet Take 10 mg by mouth daily      magnesium hydroxide (Milk of Magnesia) 400 mg/5 mL oral suspension Take 30 mL by mouth daily as needed for constipation.      Polyethylene Glycol 1000 POWD Take 17 g by mouth daily as needed (constipation). Dissolve in 8oz of water, juice, coffee or tea      Silver (SilvaSorb) GEL Apply 1 Application topically daily. Apply to buttock wound bed daily.             No discharge procedures on file.    PDMP Review         Value Time User    PDMP Reviewed  Yes 8/28/2024  2:49 PM Brendan Nuno DO            ED Provider  Electronically Signed by             Yoshi Lomax MD  09/01/24 0118

## 2024-09-01 LAB
ALBUMIN SERPL BCG-MCNC: 2.8 G/DL (ref 3.5–5)
ALP SERPL-CCNC: 95 U/L (ref 34–104)
ALT SERPL W P-5'-P-CCNC: 13 U/L (ref 7–52)
ANION GAP SERPL CALCULATED.3IONS-SCNC: 7 MMOL/L (ref 4–13)
AST SERPL W P-5'-P-CCNC: 14 U/L (ref 13–39)
BILIRUB SERPL-MCNC: 0.67 MG/DL (ref 0.2–1)
BUN SERPL-MCNC: 8 MG/DL (ref 5–25)
CALCIUM ALBUM COR SERPL-MCNC: 9.9 MG/DL (ref 8.3–10.1)
CALCIUM SERPL-MCNC: 8.9 MG/DL (ref 8.4–10.2)
CHLORIDE SERPL-SCNC: 107 MMOL/L (ref 96–108)
CO2 SERPL-SCNC: 28 MMOL/L (ref 21–32)
CREAT SERPL-MCNC: 0.66 MG/DL (ref 0.6–1.3)
ERYTHROCYTE [DISTWIDTH] IN BLOOD BY AUTOMATED COUNT: 13 % (ref 11.6–15.1)
GFR SERPL CREATININE-BSD FRML MDRD: 94 ML/MIN/1.73SQ M
GLUCOSE P FAST SERPL-MCNC: 89 MG/DL (ref 65–99)
GLUCOSE SERPL-MCNC: 112 MG/DL (ref 65–140)
GLUCOSE SERPL-MCNC: 149 MG/DL (ref 65–140)
GLUCOSE SERPL-MCNC: 151 MG/DL (ref 65–140)
GLUCOSE SERPL-MCNC: 89 MG/DL (ref 65–140)
GLUCOSE SERPL-MCNC: 99 MG/DL (ref 65–140)
HCT VFR BLD AUTO: 33.5 % (ref 36.5–49.3)
HGB BLD-MCNC: 11 G/DL (ref 12–17)
MCH RBC QN AUTO: 31.1 PG (ref 26.8–34.3)
MCHC RBC AUTO-ENTMCNC: 32.8 G/DL (ref 31.4–37.4)
MCV RBC AUTO: 95 FL (ref 82–98)
PLATELET # BLD AUTO: 467 THOUSANDS/UL (ref 149–390)
PMV BLD AUTO: 9.4 FL (ref 8.9–12.7)
POTASSIUM SERPL-SCNC: 3.1 MMOL/L (ref 3.5–5.3)
PROT SERPL-MCNC: 7.3 G/DL (ref 6.4–8.4)
RBC # BLD AUTO: 3.54 MILLION/UL (ref 3.88–5.62)
SODIUM SERPL-SCNC: 142 MMOL/L (ref 135–147)
WBC # BLD AUTO: 11.4 THOUSAND/UL (ref 4.31–10.16)

## 2024-09-01 PROCEDURE — 92610 EVALUATE SWALLOWING FUNCTION: CPT

## 2024-09-01 PROCEDURE — 99222 1ST HOSP IP/OBS MODERATE 55: CPT | Performed by: UROLOGY

## 2024-09-01 PROCEDURE — 82948 REAGENT STRIP/BLOOD GLUCOSE: CPT

## 2024-09-01 PROCEDURE — 99233 SBSQ HOSP IP/OBS HIGH 50: CPT | Performed by: STUDENT IN AN ORGANIZED HEALTH CARE EDUCATION/TRAINING PROGRAM

## 2024-09-01 PROCEDURE — 80053 COMPREHEN METABOLIC PANEL: CPT | Performed by: INTERNAL MEDICINE

## 2024-09-01 PROCEDURE — 85027 COMPLETE CBC AUTOMATED: CPT | Performed by: INTERNAL MEDICINE

## 2024-09-01 RX ORDER — POTASSIUM CHLORIDE 20MEQ/15ML
40 LIQUID (ML) ORAL ONCE
Status: COMPLETED | OUTPATIENT
Start: 2024-09-01 | End: 2024-09-01

## 2024-09-01 RX ORDER — FINASTERIDE 5 MG/1
5 TABLET, FILM COATED ORAL DAILY
Status: DISCONTINUED | OUTPATIENT
Start: 2024-09-02 | End: 2024-09-04 | Stop reason: HOSPADM

## 2024-09-01 RX ADMIN — Medication 1000 UNITS: at 09:16

## 2024-09-01 RX ADMIN — ESCITALOPRAM OXALATE 10 MG: 10 TABLET ORAL at 09:16

## 2024-09-01 RX ADMIN — COLLAGENASE SANTYL: 250 OINTMENT TOPICAL at 13:00

## 2024-09-01 RX ADMIN — AMOXICILLIN AND CLAVULANATE POTASSIUM 1 TABLET: 875; 125 TABLET, COATED ORAL at 09:16

## 2024-09-01 RX ADMIN — POTASSIUM CHLORIDE 40 MEQ: 1.5 SOLUTION ORAL at 09:16

## 2024-09-01 RX ADMIN — TAMSULOSIN HYDROCHLORIDE 0.4 MG: 0.4 CAPSULE ORAL at 21:05

## 2024-09-01 RX ADMIN — ATORVASTATIN CALCIUM 80 MG: 80 TABLET, FILM COATED ORAL at 21:05

## 2024-09-01 RX ADMIN — INSULIN LISPRO 1 UNITS: 100 INJECTION, SOLUTION INTRAVENOUS; SUBCUTANEOUS at 16:40

## 2024-09-01 RX ADMIN — MIRTAZAPINE 7.5 MG: 7.5 TABLET, FILM COATED ORAL at 21:05

## 2024-09-01 RX ADMIN — DOCUSATE SODIUM 100 MG: 100 CAPSULE, LIQUID FILLED ORAL at 21:06

## 2024-09-01 RX ADMIN — EZETIMIBE 10 MG: 10 TABLET ORAL at 09:16

## 2024-09-01 RX ADMIN — AMOXICILLIN AND CLAVULANATE POTASSIUM 1 TABLET: 875; 125 TABLET, COATED ORAL at 21:05

## 2024-09-01 RX ADMIN — ASPIRIN 81 MG CHEWABLE TABLET 81 MG: 81 TABLET CHEWABLE at 09:16

## 2024-09-01 RX ADMIN — LISINOPRIL 10 MG: 10 TABLET ORAL at 09:16

## 2024-09-01 NOTE — ASSESSMENT & PLAN NOTE
Recently hospitalized at [facility].  Discharged with Augmentin end date 9/5/2024

## 2024-09-01 NOTE — CONSULTS
CONSULT    Patient Name: Drew Santos  Patient MRN: [id number]  Date of Service: 9/1/2024   Date / Time Note Created: 9/1/2024 4:55 PM   Referring Provider: Alma Rice *    Provider Creating Note: AVELINA Sánchez    PCP: Joe Lyon  Attending Provider:  Alma Rice *    Reason for Consult: Hematuria    HPI --Drew Santos is a 73-year-old male seen in urologic consultation for urinary retention and mild gross hematuria 8/26 at [facility] after sustaining CVA.  He has history of left hemiparesis, dysphagia, sacral decubiti and functional/cognitive decline.  He was transferred post admission for rehabilitation.  [facility] complaint of gross hematuria while on Eliquis.  This is currently held.  Three-way Parra was inserted by emergency room staff for aggressive CBI.  Patient remains afebrile, hemodynamically stable and although chronically ill-appearing in no apparent distress.  Hemoglobin is 11.  Renal function within normal limits.  Urine analysis demonstrated innumerable RBCs but without pyuria or bacteria detection.    CT of the abdomen and pelvis demonstrated several small nonobstructing intrarenal calculi without evidence of perinephric/RP bleeding, discrete fluid collection, or hydronephrosis.  Lower tract was negative for Parra malposition, bladder distention, organized clot or bladder calculi.    Parra is patent at this time and urine has cleared significantly.  Urologic consultation requested for further management recommendations.    Source:the patient spouse & chart           Active Problems:    Patient Active Problem List   Diagnosis    Syncope    Elevated lactic acid level    Type 2 diabetes mellitus without complication, without long-term current use of insulin (HCC)    Leukocytosis    Abnormal CPK    History of CVA (cerebrovascular accident)    Urinary retention    Paroxysmal atrial fibrillation (HCC)    Primary  hypertension    Mixed hyperlipidemia    Sacral wound    Sepsis (HCC)    Gross hematuria    Depression         Assessment & Plan   * Gross hematuria  Assessment & Plan  Without evidence of infection, obstructing nephroureterolithiasis or active  tract hemorrhage.  Against background of recent CVA with requirement for anticoagulation.  Eliquis currently on hold.  Aspirin remains on medication profile.  Get  Hemoglobin stable.  Three-way Parra-for CBI initiated by emergency room staff.  Urine clearing overnight substantially.    Plan:  Continue CBI--weaning overnight.    Clamp CBI in the a.m.  Continue Flomax  Add Proscar  After 24 hours.  Should patient rebleed, consider changing agent.  Given patient has not experienced hematuria with clot burden or ABLA, can consider outpatient cystoscopic examination for completeness of workup.              Past Medical History:   Diagnosis Date    Atherosclerotic heart disease of native coronary artery without angina pectoris     Atrial fibrillation (HCC)     Cerebral infarction (HCC)     Constipation     Depression     Diabetes mellitus (HCC)     Hemiplegia and hemiparesis following cerebral infarction affecting left non-dominant side (HCC)     Hyperlipidemia     Hypertension     Pressure ulcer of sacral region, stage 3 (HCC)     Prostatic hyperplasia     Sepsis (HCC)     Stroke (HCC)     TIA (transient ischemic attack)     Urinary retention     Vitamin D deficiency        History reviewed. No pertinent surgical history.    History reviewed. No pertinent family history.    Social History     Socioeconomic History    Marital status: /Civil Union     Spouse name: Not on file    Number of children: Not on file    Years of education: Not on file    Highest education level: Not on file   Occupational History    Not on file   Tobacco Use    Smoking status: Never     Passive exposure: Never    Smokeless tobacco: Never   Vaping Use    Vaping status: Never Used   Substance and  Sexual Activity    Alcohol use: Not Currently    Drug use: Never    Sexual activity: Not on file   Other Topics Concern    Not on file   Social History Narrative    Not on file     Social Determinants of Health     Financial Resource Strain: Low Risk  (7/10/2024)    Received from [facility]    Overall Financial Resource Strain (CARDIA)     Difficulty of Paying Living Expenses: Not hard at all   Food Insecurity: No Food Insecurity (8/27/2024)    Hunger Vital Sign     Worried About Running Out of Food in the Last Year: Never true     Ran Out of Food in the Last Year: Never true   Transportation Needs: No Transportation Needs (8/27/2024)    PRAPARE - Transportation     Lack of Transportation (Medical): No     Lack of Transportation (Non-Medical): No   Physical Activity: Not on file   Stress: Stress Concern Present (5/8/2023)    Received from [facility], [facility] of Occupational Health - Occupational Stress Questionnaire     Feeling of Stress : To some extent   Social Connections: Feeling Socially Integrated (7/25/2024)    Received from [facility]    OASIS : Social Isolation     How often do you feel lonely or isolated from those around you?: Rarely   Intimate Partner Violence: Not At Risk (7/10/2024)    Received from [facility]    Humiliation, Afraid, Rape, and Kick questionnaire     Fear of Current or Ex-Partner: No     Emotionally Abused: No     Physically Abused: No     Sexually Abused: No   Housing Stability: Low Risk  (8/27/2024)    Housing Stability Vital Sign     Unable to Pay for Housing in the Last Year: No     Number of Times Moved in the Last Year: 0     Homeless in the Last Year: No       Allergies   Allergen Reactions    Primaquine Other (See Comments) and Hives       Review of Systems  Review of Systems - History obtained from unobtainable from patient due to mental status       Chart  Review   Allergies, current medications, history, problem list    Vital Signs  /85 (BP Location: Right arm)   Pulse 92   Temp 97.7 °F (36.5 °C) (Oral)   Resp 15   Wt 109 kg (240 lb 8.4 oz)   SpO2 97%   BMI 29.29 kg/m²     Physical Exam  General appearance: alert, appears older than stated age, cooperative, no distress, and slowed mentation  Head: Normocephalic, without obvious abnormality, atraumatic  Neck: no JVD and supple, symmetrical, trachea midline  Lungs: diminished breath sounds  Heart: regular rate and rhythm, S1, S2 normal, no murmur, click, rub or gallop  Abdomen: soft, non-tender; bowel sounds normal; no masses,  no organomegaly  Extremities: extremities normal, warm and well-perfused; no cyanosis, clubbing, or edema  Pulses: 2+ and symmetric  Neurologic: Mental status: alertness: alert, orientation: person     Laboratory Studies  Lab Results   Component Value Date    HGBA1C 6.0 (H) 07/04/2024    K 3.1 (L) 09/01/2024    K 4.6 08/22/2024     09/01/2024     08/22/2024    CO2 28 09/01/2024    CO2 27 08/22/2024    CREATININE 0.66 09/01/2024    CREATININE 0.78 08/22/2024    BUN 8 09/01/2024    BUN 13 08/22/2024    MG 1.7 (L) 08/28/2024    MG 2.1 07/12/2024    PHOS 3.6 07/12/2024     Lab Results   Component Value Date    WBC 11.40 (H) 09/01/2024    RBC 3.54 (L) 09/01/2024    HGB 11.0 (L) 09/01/2024    HCT 33.5 (L) 09/01/2024    MCV 95 09/01/2024    MCH 31.1 09/01/2024    RDW 13.0 09/01/2024     (H) 09/01/2024       Imaging and Other Studies  )CT renal stone study abdomen pelvis without contrast    Result Date: 8/31/2024  Narrative: CT ABDOMEN AND PELVIS WITHOUT IV CONTRAST - LOW DOSE RENAL STONE INDICATION: Hematuria. COMPARISON: None. TECHNIQUE: Low radiation dose thin section CT examination of the abdomen and pelvis was performed without intravenous or oral contrast according to a protocol specifically designed to evaluate for urinary tract calculus. Axial, sagittal, and  coronal 2D reformatted images were created from the source data and submitted for interpretation. Evaluation for pathology in the abdomen and pelvis that is unrelated to urinary tract calculi is limited. Radiation dose length product (DLP) for this visit: 792 mGy-cm . This examination, like all CT scans performed in the [facility], was performed utilizing techniques to minimize radiation dose exposure, including the use of iterative reconstruction and automated exposure control. URINARY TRACT FINDINGS: RIGHT KIDNEY AND URETER: No urinary tract calculi. No hydronephrosis or hydroureter. Mild perirenal edema. LEFT KIDNEY AND URETER: Nonobstructing stone lower pole measuring for mm. Nonobstructing stone lower pole measuring 5 mm. No hydronephrosis. Mild perirenal edema. URINARY BLADDER: Mostly collapsed around Parra catheter balloon. Diffuse bladder wall thickening likely exaggerated by underdistention and trabeculation. No perivesicular inflammation. ADDITIONAL FINDINGS: LOWER CHEST: No clinically significant abnormality in the visualized lower chest. SOLID VISCERA: Limited low radiation dose noncontrast CT evaluation demonstrates no clinically significant abnormality of the imaged portions of the liver, spleen, pancreas, or adrenal glands. GALLBLADDER/BILIARY TREE: There appear to be numerous small partially calcified stones or layering high attenuation bile. STOMACH AND BOWEL: Unremarkable. APPENDIX: No findings to suggest appendicitis. ABDOMINOPELVIC CAVITY: No ascites. No pneumoperitoneum. No lymphadenopathy. VESSELS: Unremarkable for patient's age. REPRODUCTIVE ORGANS: Unremarkable for patient's age. ABDOMINAL WALL/INGUINAL REGIONS: Small fat-containing umbilical hernia. BONES: Multilevel degenerative changes.     Impression: Small nonobstructing renal collecting system calculi in the lower pole of the left kidney. No hydronephrosis or renal colic. No renal parenchymal mass visible on this  noncontrast study. There is mild diffuse bilateral perinephric edema however. Urinary bladder collapsed by Parra catheter. Urinary bladder wall thickening likely the result of a combination of trabeculation and incomplete distention. The urine is intermediate attenuation suggesting bloody components as seen clinically. Recommend hematuria/renal protocol contrast-enhanced CT if hematuria persists without cause. Cholelithiasis without CT evidence of cholecystitis. The study was marked in EPIC for immediate notification. Workstation performed: [id number]     FL barium swallow video w speech    Result Date: 8/27/2024  Narrative: A video barium swallow study was performed by the Department of Speech Pathology. Please refer to the report for the official interpretation. The images are stored for archival purposes only. Study images were not formally reviewed by the Radiology Department.        Medications   Current Facility-Administered Medications   Medication Dose Route Frequency Provider Last Rate    acetaminophen  650 mg Oral Q4H PRN Christiano Reddy, DO      amoxicillin-clavulanate  1 tablet Oral Q12H [facility] Christiano Reddy, DO      aspirin  81 mg Oral Daily Christiano Reddy, DO      atorvastatin  80 mg Oral QPM Christiano Reddy, DO      Cholecalciferol  1,000 Units Oral Daily Christiano Reddy, DO      collagenase   Topical Daily Alma Vargas MD      docusate sodium  100 mg Oral BID Christiano Reddy, DO      escitalopram  10 mg Oral Daily Christiano Reddy, DO      ezetimibe  10 mg Oral Daily Christiano Reddy, DO      insulin lispro  1-6 Units Subcutaneous 4x Daily (AC & HS) Christiano Reddy, DO      lisinopril  10 mg Oral Daily Christiano Reddy, DO      mirtazapine  7.5 mg Oral HS Christiano Reddy, DO      ondansetron  4 mg Intravenous Q4H PRN Christiano Reddy, DO      tamsulosin  0.4 mg Oral HS Christiano Reddy, DO               AVELINA Sánchez

## 2024-09-01 NOTE — PROGRESS NOTES
[facility]  Progress Note  Name: Drew Santos I  MRN: [id number]  Unit/Bed#: [place] [address] -01 I Date of Admission: 8/31/2024   Date of Service: 9/1/2024 I Hospital Day: 0    Assessment & Plan   * Gross hematuria  Assessment & Plan  History of diabetes mellitus atrial fibrillation hypertension and stroke presents from SNF at [facility] for hematuria  During recent hospitalization at [facility] for sacral wound he had urinary catheter placement for retention.  Currently on CBI per recommendations from urology.  Will hold Eliquis for now  Urology eval and recommendations pending    Depression  Assessment & Plan  Continue escitalopram.  Mirtazapine was recently added.    Sacral wound  Assessment & Plan  Recently hospitalized at [facility].  Discharged with Augmentin end date 9/5/2024  Continue local wound care, q2h turns, santyl ordered  Wound care nurse consulted    Primary hypertension  Assessment & Plan  Continue lisinopril    Paroxysmal atrial fibrillation (HCC)  Assessment & Plan  Not on any AV william blocking agents.  Anticoagulation: Holding Eliquis due to gross hematuria    History of CVA (cerebrovascular accident)  Assessment & Plan  History of hospitalization at [facility] for CVA with left-sided weakness July 2024  Currently rehabbing at [facility]  Will continue aspirin but hold Eliquis due to gross hematuria    Type 2 diabetes mellitus without complication, without long-term current use of insulin (HCC)  Assessment & Plan  Lab Results   Component Value Date    HGBA1C 6.0 (H) 07/04/2024     Recent Labs     08/31/24  2212 09/01/24  0806 09/01/24  1057   POCGLU 102 99 112         Prior to admission on Farxiga.  Will be placed on sliding scale insulin during hospitalization           VTE Pharmacologic Prophylaxis:   Moderate Risk (Score 3-4) - Pharmacological DVT Prophylaxis Contraindicated. Sequential Compression Devices Ordered.    Mobility:    Basic Mobility Inpatient Raw Score: 6  JH-HLM Goal: 2: Bed activities/Dependent transfer  JH-HLM Achieved: 1: Laying in bed  JH-HLM Goal NOT achieved. Continue with multidisciplinary rounding and encourage appropriate mobility to improve upon JH-HLM goals.    Patient Centered Rounds: I performed bedside rounds with nursing staff today.   Discussions with Specialists or Other Care Team Provider: Urology however formal recs pending    Education and Discussions with Family / Patient:  will call spouse.     Current Length of Stay: 0 day(s)  Current Patient Status: Observation   Certification Statement: The patient will continue to require additional inpatient hospital stay due to ongoing work-up and monitoring of gross hematuria  Discharge Plan: Anticipate discharge in 24-48 hrs to rehab facility.    Code Status: Level 1 - Full Code    Subjective:   Patient seen and examined at bedside. Denies any complaints at this time. Denies pain, shortness of breath. Denies abdominal pain.     Objective:     Vitals:   Temp (24hrs), Av.3 °F (36.8 °C), Min:97.7 °F (36.5 °C), Max:99.3 °F (37.4 °C)    Temp:  [97.7 °F (36.5 °C)-99.3 °F (37.4 °C)] 97.7 °F (36.5 °C)  HR:  [] 92  Resp:  [15-22] 15  BP: (104-174)/() 104/85  SpO2:  [97 %-100 %] 97 %  Body mass index is 29.29 kg/m².     Input and Output Summary (last 24 hours):     Intake/Output Summary (Last 24 hours) at 2024 1536  Last data filed at 2024 1518  Gross per 24 hour   Intake 340 ml   Output 3700 ml   Net -3360 ml       Physical Exam:   Physical Exam  Vitals and nursing note reviewed.   Constitutional:       General: He is not in acute distress.     Appearance: He is well-developed.   HENT:      Head: Normocephalic and atraumatic.   Eyes:      Conjunctiva/sclera: Conjunctivae normal.   Cardiovascular:      Rate and Rhythm: Normal rate and regular rhythm.      Heart sounds: No murmur heard.  Pulmonary:      Effort: Pulmonary effort is normal. No  respiratory distress.      Breath sounds: Normal breath sounds.   Abdominal:      Palpations: Abdomen is soft.      Tenderness: There is no abdominal tenderness.   Genitourinary:     Comments: Unstageable sacral ulcer noted, no purulent drainage, surrounding areas with no fluctuance, no tenderness  Musculoskeletal:         General: No swelling.      Cervical back: Neck supple.   Skin:     General: Skin is warm and dry.      Capillary Refill: Capillary refill takes less than 2 seconds.   Neurological:      Mental Status: He is alert.   Psychiatric:         Mood and Affect: Mood normal.            Additional Data:     Labs:  Results from last 7 days   Lab Units 09/01/24  0516 08/31/24  1816   WBC Thousand/uL 11.40* 10.37*   HEMOGLOBIN g/dL 11.0* 10.8*   HEMATOCRIT % 33.5* 32.9*   PLATELETS Thousands/uL 467* 440*   SEGS PCT %  --  67   LYMPHO PCT %  --  24   MONO PCT %  --  7   EOS PCT %  --  1     Results from last 7 days   Lab Units 09/01/24  0516   SODIUM mmol/L 142   POTASSIUM mmol/L 3.1*   CHLORIDE mmol/L 107   CO2 mmol/L 28   BUN mg/dL 8   CREATININE mg/dL 0.66   ANION GAP mmol/L 7   CALCIUM mg/dL 8.9   ALBUMIN g/dL 2.8*   TOTAL BILIRUBIN mg/dL 0.67   ALK PHOS U/L 95   ALT U/L 13   AST U/L 14   GLUCOSE RANDOM mg/dL 89     Results from last 7 days   Lab Units 08/31/24  1816   INR  1.39*     Results from last 7 days   Lab Units 09/01/24  1057 09/01/24  0806 08/31/24  2212 08/28/24  1147 08/28/24  0758 08/27/24  2056 08/27/24  1640 08/27/24  1154 08/27/24  0746 08/26/24  2050 08/26/24  1640 08/26/24  1139   POC GLUCOSE mg/dl 112 99 102 125 133 132 175* 138 111 137 152* 139         Results from last 7 days   Lab Units 08/28/24  0526 08/27/24  1433 08/27/24  0437   LACTIC ACID mmol/L  --  1.1  --    PROCALCITONIN ng/ml 0.10  --  0.15       Lines/Drains:  Invasive Devices       Peripheral Intravenous Line  Duration             Peripheral IV 08/31/24 Right Antecubital <1 day              Drain  Duration              Continuous Bladder Irrigation Three-way <1 day    Urethral Catheter Three way 22 Fr. <1 day                  Urinary Catheter:  Goal for removal:  currently maintained for retention and gross hematuria, undergoing CBI    Imaging: Reviewed radiology reports from this admission including: abdominal/pelvic CT    Recent Cultures (last 7 days):         Last 24 Hours Medication List:   Current Facility-Administered Medications   Medication Dose Route Frequency Provider Last Rate    acetaminophen  650 mg Oral Q4H PRN Christiano Reddy, DO      amoxicillin-clavulanate  1 tablet Oral Q12H [facility] Christiano Reddy, DO      aspirin  81 mg Oral Daily Christiano Reddy, DO      atorvastatin  80 mg Oral QPM Christiano Reddy, DO      Cholecalciferol  1,000 Units Oral Daily Christiano Reddy, DO      collagenase   Topical Daily Alma Vargas MD      docusate sodium  100 mg Oral BID Christiano Reddy, DO      escitalopram  10 mg Oral Daily Christiano Reddy, DO      ezetimibe  10 mg Oral Daily Christiano Reddy, DO      insulin lispro  1-6 Units Subcutaneous 4x Daily (AC & HS) Christiano Reddy, DO      lisinopril  10 mg Oral Daily Christiano Reddy, DO      mirtazapine  7.5 mg Oral HS Christiano Reddy, DO      ondansetron  4 mg Intravenous Q4H PRN Christiano Reddy, DO      tamsulosin  0.4 mg Oral HS Christiano Reddy, DO          Today, Patient Was Seen By: Alma Carl MD    **Please Note: This note may have been constructed using a voice recognition system.**

## 2024-09-01 NOTE — ASSESSMENT & PLAN NOTE
Without evidence of infection, obstructing nephroureterolithiasis or active  tract hemorrhage.  Against background of recent CVA with requirement for anticoagulation.  Hgb remains stable   CBI clamped 0600 on 9/2/2024  Continue Flomax and Proscar   Eliquis restart overnight   Urine jaguar in color   No blood or clots appreciated   Recommend outpatient cystoscopic and CT urogram for completeness of gross hematuria workup  Currently no indication for acute  surgical intervention  Office notified to schedule cystoscopy and CT urogram   Okay to discharge from a urological standpoint

## 2024-09-01 NOTE — ASSESSMENT & PLAN NOTE
History of hospitalization at [facility] for CVA with left-sided weakness July 2024  Currently rehabbing at [facility]  Will continue aspirin but hold Eliquis due to gross hematuria

## 2024-09-01 NOTE — ASSESSMENT & PLAN NOTE
History of hospitalization at [facility] for CVA with left-sided weakness July 2024  Currently rehabbing at [facility]  Will continue aspirin but hold Eliquis due to gross hematuria

## 2024-09-01 NOTE — ASSESSMENT & PLAN NOTE
Recently hospitalized at [facility].  Discharged with Augmentin end date 9/5/2024  Continue local wound care, q2h vimal, santyl ordered  Wound care nurse consulted

## 2024-09-01 NOTE — DISCHARGE INSTR - DIET
"Dysphagia 2 mech soft and thin liquids. Medications as tolerated. (Pt refused to eat w/ ST 9/2 and 9/3, \"not hungry\"). F/u at facility.   "

## 2024-09-01 NOTE — ASSESSMENT & PLAN NOTE
Recently hospitalized at [facility].  Discharged with Augmentin end date 9/5/2024

## 2024-09-01 NOTE — ASSESSMENT & PLAN NOTE
History of diabetes mellitus atrial fibrillation hypertension and stroke presents from SNF at [facility] for hematuria  During recent hospitalization at [facility] for sacral wound he had urinary catheter placement for retention.  Currently on CBI per recommendations from urology.  Will hold Eliquis for now

## 2024-09-01 NOTE — PLAN OF CARE
Problem: PAIN - ADULT  Goal: Verbalizes/displays adequate comfort level or baseline comfort level  Description: Interventions:  - Encourage patient to monitor pain and request assistance  - Assess pain using appropriate pain scale  - Administer analgesics based on type and severity of pain and evaluate response  - Implement non-pharmacological measures as appropriate and evaluate response  - Consider cultural and social influences on pain and pain management  - Notify physician/advanced practitioner if interventions unsuccessful or patient reports new pain  Outcome: Progressing     Problem: INFECTION - ADULT  Goal: Absence or prevention of progression during hospitalization  Description: INTERVENTIONS:  - Assess and monitor for signs and symptoms of infection  - Monitor lab/diagnostic results  - Monitor all insertion sites, i.e. indwelling lines, tubes, and drains  - Monitor endotracheal if appropriate and nasal secretions for changes in amount and color  - [place] appropriate cooling/warming therapies per order  - Administer medications as ordered  - Instruct and encourage patient and family to use good hand hygiene technique  - Identify and instruct in appropriate isolation precautions for identified infection/condition  Outcome: Progressing  Goal: Absence of fever/infection during neutropenic period  Description: INTERVENTIONS:  - Monitor WBC    Outcome: Progressing     Problem: SAFETY ADULT  Goal: Patient will remain free of falls  Description: INTERVENTIONS:  - Educate patient/family on patient safety including physical limitations  - Instruct patient to call for assistance with activity   - Consult OT/PT to assist with strengthening/mobility   - Keep Call bell within reach  - Keep bed low and locked with side rails adjusted as appropriate  - Keep care items and personal belongings within reach  - Initiate and maintain comfort rounds  - Make Fall Risk Sign visible to staff  - Offer Toileting every 2 Hours,  in advance of need  - Initiate/Maintain bed alarm  - Apply yellow socks and bracelet for high fall risk patients  - Consider moving patient to room near nurses station  Outcome: Progressing  Goal: Maintain or return to baseline ADL function  Description: INTERVENTIONS:  -  Assess patient's ability to carry out ADLs; assess patient's baseline for ADL function and identify physical deficits which impact ability to perform ADLs (bathing, care of mouth/teeth, toileting, grooming, dressing, etc.)  - Assess/evaluate cause of self-care deficits   - Assess range of motion  - Assess patient's mobility; develop plan if impaired  - Assess patient's need for assistive devices and provide as appropriate  - Encourage maximum independence but intervene and supervise when necessary  - Involve family in performance of ADLs  - Assess for home care needs following discharge   - Consider OT consult to assist with ADL evaluation and planning for discharge  - Provide patient education as appropriate  Outcome: Progressing  Goal: Maintains/Returns to pre admission functional level  Description: INTERVENTIONS:  - Perform AM-PAC 6 Click Basic Mobility/ Daily Activity assessment daily.  - Set and communicate daily mobility goal to care team and patient/family/caregiver.   - Collaborate with rehabilitation services on mobility goals if consulted  - Perform Range of Motion 3 times a day.  - Reposition patient every 2 hours.  - Dangle patient 3 times a day  - Stand patient 3 times a day  - Out of bed for toileting  - Record patient progress and toleration of activity level   Outcome: Progressing     Problem: DISCHARGE PLANNING  Goal: Discharge to home or other facility with appropriate resources  Description: INTERVENTIONS:  - Identify barriers to discharge w/patient and caregiver  - Arrange for needed discharge resources and transportation as appropriate  - Identify discharge learning needs (meds, wound care, etc.)  - Arrange for interpretive  services to assist at discharge as needed  - Refer to Case Management Department for coordinating discharge planning if the patient needs post-hospital services based on physician/advanced practitioner order or complex needs related to functional status, cognitive ability, or social support system  Outcome: Progressing     Problem: Knowledge Deficit  Goal: Patient/family/caregiver demonstrates understanding of disease process, treatment plan, medications, and discharge instructions  Description: Complete learning assessment and assess knowledge base.  Interventions:  - Provide teaching at level of understanding  - Provide teaching via preferred learning methods  Outcome: Progressing     Problem: CARDIOVASCULAR - ADULT  Goal: Maintains optimal cardiac output and hemodynamic stability  Description: INTERVENTIONS:  - Monitor I/O, vital signs and rhythm  - Monitor for S/S and trends of decreased cardiac output  - Administer and titrate ordered vasoactive medications to optimize hemodynamic stability  - Assess quality of pulses, skin color and temperature  - Assess for signs of decreased coronary artery perfusion  - Instruct patient to report change in severity of symptoms  Outcome: Progressing  Goal: Absence of cardiac dysrhythmias or at baseline rhythm  Description: INTERVENTIONS:  - Continuous cardiac monitoring, vital signs, obtain 12 lead EKG if ordered  - Administer antiarrhythmic and heart rate control medications as ordered  - Monitor electrolytes and administer replacement therapy as ordered  Outcome: Progressing     Problem: GENITOURINARY - ADULT  Goal: Urinary catheter remains patent  Description: INTERVENTIONS:  - Assess patency of urinary catheter  - If patient has a chronic hanks, consider changing catheter if non-functioning  - Follow guidelines for intermittent irrigation of non-functioning urinary catheter  Outcome: Progressing     Problem: Prexisting or High Potential for Compromised Skin  Integrity  Goal: Skin integrity is maintained or improved  Description: INTERVENTIONS:  - Identify patients at risk for skin breakdown  - Assess and monitor skin integrity  - Assess and monitor nutrition and hydration status  - Monitor labs   - Assess for incontinence   - Turn and reposition patient  - Assist with mobility/ambulation  - Relieve pressure over bony prominences  - Avoid friction and shearing  - Provide appropriate hygiene as needed including keeping skin clean and dry  - Evaluate need for skin moisturizer/barrier cream  - Collaborate with interdisciplinary team   - Patient/family teaching  - Consider wound care consult   Outcome: Progressing     Problem: SKIN/TISSUE INTEGRITY - ADULT  Goal: Pressure injury heals and does not worsen  Description: Interventions:  - Implement low air loss mattress or specialty surface (Criteria met)  - Apply silicone foam dressing  - Instruct/assist with weight shifting every 30 minutes when in chair   - Limit chair time to 2 hour intervals  - Use special pressure reducing interventions such as cushion when in chair   - Apply fecal or urinary incontinence containment device   - Perform passive or active ROM every shift  - Turn and reposition patient & offload bony prominences every 2 hours   - Utilize friction reducing device or surface for transfers   - Consider consults to  interdisciplinary teams  - Use incontinent care products after each incontinent episode such as hydroguard  - Consider nutrition services referral as needed  Outcome: Progressing

## 2024-09-01 NOTE — ASSESSMENT & PLAN NOTE
History of diabetes mellitus atrial fibrillation hypertension and stroke presents from SNF at [facility] for hematuria  During recent hospitalization at [facility] for sacral wound he had urinary catheter placement for retention.  Currently on CBI per recommendations from urology.  Eliquis on hold, will resume tomorrow and monitor for bleeding  Urology following

## 2024-09-01 NOTE — ASSESSMENT & PLAN NOTE
"Lab Results   Component Value Date    HGBA1C 6.0 (H) 07/04/2024     No results for input(s): \"POCGLU\" in the last 72 hours.    Prior to admission on Farxiga.  Will be placed on sliding scale insulin during hospitalization  "

## 2024-09-01 NOTE — ASSESSMENT & PLAN NOTE
Lab Results   Component Value Date    HGBA1C 6.0 (H) 07/04/2024     Recent Labs     08/31/24  2212 09/01/24  0806 09/01/24  1057   POCGLU 102 99 112       Prior to admission on Farxiga.  Will be placed on sliding scale insulin during hospitalization

## 2024-09-01 NOTE — ED NOTES
RN assessed pressure injury on sacrum at this time and placed Allyven pad at this time.      Ivet Peña RN  08/31/24 2019

## 2024-09-01 NOTE — SPEECH THERAPY NOTE
Speech Language/Pathology  Speech/Language Pathology  Assessment    Patient Name: Drew Santos  Today's Date: 9/1/2024     Problem List  Principal Problem:    Gross hematuria  Active Problems:    Type 2 diabetes mellitus without complication, without long-term current use of insulin (HCC)    History of CVA (cerebrovascular accident)    Paroxysmal atrial fibrillation (HCC)    Primary hypertension    Sacral wound    Depression    Past Medical History  Past Medical History:   Diagnosis Date    Atherosclerotic heart disease of native coronary artery without angina pectoris     Atrial fibrillation (HCC)     Cerebral infarction (HCC)     Constipation     Depression     Diabetes mellitus (HCC)     Hemiplegia and hemiparesis following cerebral infarction affecting left non-dominant side (HCC)     Hyperlipidemia     Hypertension     Pressure ulcer of sacral region, stage 3 (HCC)     Prostatic hyperplasia     Sepsis (HCC)     Stroke (HCC)     TIA (transient ischemic attack)     Urinary retention     Vitamin D deficiency      Past Surgical History  History reviewed. No pertinent surgical history.     Bedside Swallow Evaluation:    Summary:  Pt presented w/ mild oral and suspect pharyngeal stage of swallow WNL. Positioned upright and alert. Pt agreeable to trials of banana cut into bite sized pieces by ST and thin liquids via straw with single/successive sips. Mastication was mildly prolonged and appeared min disorganized. Suspect due to limited dentition. Pt did report have upper partial, however not with belongings. Bolus control, formation, and transfer were WNL. Swallows appeared prompt. Laryngeal rise upon palpation adequate. No overt s/s of aspiration. Pt denied difficulties with swallowing. Reported his appetite is so-so. Questioned if he utilizes dietary supplements, pt denied. ST reviewed diet recommendations and safe swallow strategies, pt understood.     Recommendations:  Diet:Dysphagia 2 mech soft    Liquid:thin  Meds:As tolerated   Supervision: Assist   Positioning:Upright  Strategies: slow rate, alternate liquids with solids, swallow prior to additional po   Pt to take PO/Meds only when fully alert and upright.   Oral care  Aspiration precautions  Reflux precautions  Therapy Prognosis:fair   Prognosis considerations:age, medical status  Frequency:1-3 times weekly as indicated     Consider consult w/:  Rehab  Nutrition    Goal(s):  Dysphagia LTG  -Patient will demonstrate safe and effective oral intake (without overt s/s significant oral/pharyngeal dysphagia including s/s penetration or aspiration) for the highest appropriate diet level.     1.Pt will tolerate least restrictive diet w/out s/s aspiration or oral/pharyngeal difficulties.   2.Pt will will effectively manipulate/masticate and transfer purees/solids w/out s/s dysphagia/aspiration.   3.Pt will tolerate thin liquids w/out s/s aspiration.   -If indicated, patient will comply with a Video/Modified Barium Swallow study for more complete assessment of swallowing anatomy/physiology/aspiration risk and to assess efficacy of treatment techniques so as to best guide treatment plan     H&P/Admit info/ pertinent provider notes: (PMH noted above)  Drew Santos is a 73 y.o. male with a past medical history of atrial fibrillation diabetes hypertension CVA 7/24 who presents with hematuria.  The patient was hospitalized at [facility] July for CVA and went to rehab.  He was then hospitalized at [facility] discharged on 8/28/2024 for sacral wound.  During this hospitalization he had urinary retention and the catheter was placed.  Today he was brought over from SNF for hematuria.  The patient otherwise denies any other complaints.  No chest pain shortness of breath abdominal pain or pressure.  He was started on a CBI after discussion by ED with urology and admission has been requested.     Procalcitonin:    WBC: 11.40                    09/01/2024  "    Code Status     Previous MBS:08/27/2024 at [facility]  Assessment Summary:    Pt presents with mild oropharyngeal dysphagia characterized by prolonged/incomplete mastication and oral transfer. Reduced bolus formation and control.  Posterior spill over the base of tongue evident with pooling in the valleculae (mostly during mastication of solids).  Pocketing evident as well, requiring verbal cues for swallow initiation.  Transient laryngeal penetration when taking thin liquids by successive straw sip.  No aspiration observed during the present study.  Given decreased bolus formation w/ posterior spill - may consider avoiding mixed consistencies.   Note: Images are available for review in PACS as desired.  Recommendations:   Recommended Diet: regular diet and thin liquids - avoid mixed consistencies   Recommended Form of Medications: 1 at a time with liquid, if pocketing whole with puree cut/crush larger   Aspiration precautions and compensatory swallowing strategies: upright posture, slow rate of feeding, small bites/sips, and alternating bites and sips  Consider referral to:  [facility]  [facility] Dysphagia therapy recommended: ST will continue to follow while in the acute care setting x1-3     Patient's goal:\"sounds good \"    Did the pt report pain? no  If yes, was nursing notified/was it addressed? N/a    Reason for consult:  R/o aspiration  Determine safest and least restrictive diet  h/o dysphagia     Precautions:  N/a     Food Allergies: No known    Current Diet: Dysphagia 2 mech soft and ntl    Premorbid diet: Dysphagia 2 mech soft and ntl   O2 requirement: Room air    Social/Prior living SNF   Voice/Speech: WNL   Follows commands: Basic    Cognitive status: Alert      Oral [facility] exam:  Dentition:Missing most few remaining, reported upper partial at facility   Lips (VII):WNL  Tongue (XII):midline   Mandible (V):adequate ROM  Face/oral sensation (V):WNL  Velum (X):WNL    Items administered:  Banana, and thin " liquids via straw with single/successive sips    Oral stage:  Lip closure:WNL  Mastication: prolonged appeared min disorganized  Bolus formation:WNL  Bolus control:WNL  Transfer:WNL    Pharyngeal stage:  Swallow promptness:prompt  Laryngeal rise:  No overt s/s aspiration    Esophageal stage:  No s/s reported    Aspiration precautions posted    Results d/w:  Pt, nursing, physician,

## 2024-09-01 NOTE — UTILIZATION REVIEW
Initial Clinical Review    Admission: Date/Time/Statement:   Admission Orders (From admission, onward)       Ordered        08/31/24 1955  Place in Observation  Once                          Orders Placed This Encounter   Procedures    Place in Observation     Standing Status:   Standing     Number of Occurrences:   1     Order Specific Question:   Level of Care     Answer:   Med Surg [16]     ED Arrival Information       Expected   -    Arrival   8/31/2024 17:09    Acuity   Urgent              Means of arrival   Ambulance    Escorted by   [facility]    Service   Hospitalist    Admission type   Emergency              Arrival complaint   Blood in urine             Chief Complaint   Patient presents with    Blood in Urine     Brought by EMS from [facility].  Pt sent here for blood in urine/hanks catheter       Initial Presentation: 73 y.o. male to ED by ems admitted observation d/t Gross hematuria. History of diabetes mellitus atrial fibrillation hypertension and stroke presents from SNF at [facility] for hematuria. hospitalized at [facility] July for CVA and went to rehab. recent hospitalization at [facility] for sacral wound he had urinary catheter placement for retention.Currently on CBI. PT 17.1, INR 1.39. PTT 38.  WBC 10.37.HGB 10.8, HCT 32.9.-136.hold Eliquis     Anticipated Length of Stay/Certification Statement: Patient will be admitted on an observation basis with an anticipated length of stay of less than 2 midnights secondary to gross hematuria.     9/1: Denies any complaints at this time. Denies pain, shortness of breath. Denies abdominal pain. WBC 11.40. HGB 11, HCT 33.5.K 3.1.hold Eliquis.Currently on CBI.    ED Triage Vitals   Temperature Pulse Respirations Blood Pressure SpO2 Pain Score   08/31/24 2128 08/31/24 1719 08/31/24 1719 08/31/24 1719 08/31/24 1719 08/31/24 1719   98.6 °F (37 °C) 71 18 139/62 98 % No Pain     Weight (last 2 days)        Date/Time Weight    08/31/24 1719 109 (240.52)            Vital Signs (last 3 days)       Date/Time Temp Pulse Resp BP MAP (mmHg) SpO2 O2 Device Patient Position - Orthostatic VS Swapnil Coma Scale Score Pain    09/01/24 08:10:37 98.1 °F (36.7 °C) 73 16 142/82 102 97 % None (Room air) Lying -- --    08/31/24 2331 -- -- -- -- -- -- -- -- -- No Pain    08/31/24 2300 -- -- -- -- -- -- None (Room air) -- 14 No Pain    08/31/24 22:57:12 99.3 °F (37.4 °C) 136 -- 174/101 125 100 % -- -- -- --    08/31/24 21:28:42 98.6 °F (37 °C) 114 22 154/76 102 100 % -- -- -- --    08/31/24 1940 -- 64 18 133/60 -- 98 % None (Room air) Lying -- --    08/31/24 1740 -- -- -- -- -- -- -- -- 15 --    08/31/24 1719 -- 71 18 139/62 -- 98 % -- -- -- No Pain              Pertinent Labs/Diagnostic Test Results:   Radiology:  CT renal stone study abdomen pelvis without contrast   Final Interpretation by Daniel Avina DO (08/31 1911)      Small nonobstructing renal collecting system calculi in the lower pole of the left kidney. No hydronephrosis or renal colic. No renal parenchymal mass visible on this noncontrast study. There is mild diffuse bilateral perinephric edema however. Urinary    bladder collapsed by Parra catheter. Urinary bladder wall thickening likely the result of a combination of trabeculation and incomplete distention. The urine is intermediate attenuation suggesting bloody components as seen clinically.      Recommend hematuria/renal protocol contrast-enhanced CT if hematuria persists without cause.      Cholelithiasis without CT evidence of cholecystitis.         The study was marked in EPIC for immediate notification.      Workstation performed: [id number]               Results from last 7 days   Lab Units 08/28/24  1505   SARS-COV-2  Negative     Results from last 7 days   Lab Units 09/01/24  0516 08/31/24  1816 08/28/24  0526 08/27/24  0437 08/26/24  0512   WBC Thousand/uL 11.40* 10.37* 9.29 10.64* 10.49*   HEMOGLOBIN  g/dL 11.0* 10.8* 10.1* 12.3 10.9*   HEMATOCRIT % 33.5* 32.9* 31.7* 37.6 33.1*   PLATELETS Thousands/uL 467* 440* 396* 392* 370   TOTAL NEUT ABS Thousands/µL  --  7.01  --   --  6.79         Results from last 7 days   Lab Units 09/01/24  0516 08/31/24 1816 08/28/24  0526 08/27/24  0437   SODIUM mmol/L 142 142 141 140   POTASSIUM mmol/L 3.1* 4.1 2.8* 3.1*   CHLORIDE mmol/L 107 107 107 106   CO2 mmol/L 28 28 27 26   ANION GAP mmol/L 7 7 7 8   BUN mg/dL 8 8 9 13   CREATININE mg/dL 0.66 0.76 0.70 0.65   EGFR ml/min/1.73sq m 94 89 92 95   CALCIUM mg/dL 8.9 9.0 8.8 9.0   MAGNESIUM mg/dL  --   --  1.7*  --      Results from last 7 days   Lab Units 09/01/24 0516 08/31/24 1816 08/28/24  0526   AST U/L 14 24 18   ALT U/L 13 15 19   ALK PHOS U/L 95 102 88   TOTAL PROTEIN g/dL 7.3 7.3 6.8   ALBUMIN g/dL 2.8* 2.8* 2.8*   TOTAL BILIRUBIN mg/dL 0.67 0.62 0.65     Results from last 7 days   Lab Units 09/01/24  0806 08/31/24  2212 08/28/24  1147 08/28/24  0758 08/27/24 2056 08/27/24 1640 08/27/24  1154 08/27/24  0746 08/26/24 2050 08/26/24 1640 08/26/24  1139 08/26/24  0722   POC GLUCOSE mg/dl 99 102 125 133 132 175* 138 111 137 152* 139 159*     Results from last 7 days   Lab Units 09/01/24  0516 08/31/24 1816 08/28/24  0526 08/27/24  0437   GLUCOSE RANDOM mg/dL 89 132 104 106       Results from last 7 days   Lab Units 08/31/24 1816   PROTIME seconds 17.1*   INR  1.39*   PTT seconds 38*         Results from last 7 days   Lab Units 08/28/24  0526 08/27/24  0437   PROCALCITONIN ng/ml 0.10 0.15     Results from last 7 days   Lab Units 08/27/24  1433   LACTIC ACID mmol/L 1.1       Results from last 7 days   Lab Units 08/31/24  1941   CLARITY UA  Clear   COLOR UA  Colorless   SPEC GRAV UA  1.004   PH UA  5.0   GLUCOSE UA mg/dl 100 (1/10%)*   KETONES UA mg/dl Negative   BLOOD UA  Large*   PROTEIN UA mg/dl Negative   NITRITE UA  Negative   BILIRUBIN UA  Negative   UROBILINOGEN UA (BE) mg/dl <2.0   LEUKOCYTES UA  Negative   WBC UA  /hpf 1-2   RBC UA /hpf Innumerable*   BACTERIA UA /hpf None Seen   EPITHELIAL CELLS WET PREP /hpf Occasional   MUCUS THREADS  Occasional*     Results from last 7 days   Lab Units 08/28/24  1505   INFLUENZA A PCR  Negative   INFLUENZA B PCR  Negative   RSV PCR  Negative       Past Medical History:   Diagnosis Date    Atherosclerotic heart disease of native coronary artery without angina pectoris     Atrial fibrillation (HCC)     Cerebral infarction (HCC)     Constipation     Depression     Diabetes mellitus (HCC)     Hemiplegia and hemiparesis following cerebral infarction affecting left non-dominant side (HCC)     Hyperlipidemia     Hypertension     Pressure ulcer of sacral region, stage 3 (HCC)     Prostatic hyperplasia     Sepsis (HCC)     Stroke (HCC)     TIA (transient ischemic attack)     Urinary retention     Vitamin D deficiency      Present on Admission:   Paroxysmal atrial fibrillation (HCC)   Primary hypertension   Sacral wound   Type 2 diabetes mellitus without complication, without long-term current use of insulin (HCC)      Admitting Diagnosis: Blood in urine [R31.9]  Hematuria [R31.9]  Age/Sex: 73 y.o. male  Admission Orders:  Scheduled Medications:  amoxicillin-clavulanate, 1 tablet, Oral, Q12H ELISEO  aspirin, 81 mg, Oral, Daily  atorvastatin, 80 mg, Oral, QPM  Cholecalciferol, 1,000 Units, Oral, Daily  docusate sodium, 100 mg, Oral, BID  escitalopram, 10 mg, Oral, Daily  ezetimibe, 10 mg, Oral, Daily  insulin lispro, 1-6 Units, Subcutaneous, 4x Daily (AC & HS)  lisinopril, 10 mg, Oral, Daily  mirtazapine, 7.5 mg, Oral, HS  silver sulfadiazine, 1 Application, Topical, Daily  tamsulosin, 0.4 mg, Oral, HS      Continuous IV Infusions:     PRN Meds:  acetaminophen, 650 mg, Oral, Q4H PRN  ondansetron, 4 mg, Intravenous, Q4H PRN    CARDIAC DYSPHAGIA DIET  AMBULATE  PT/OT/ST  SCD  OOB  I&O  TELE  BLADDER IRRIGATION      IP CONSULT TO UROLOGY    Network Utilization Review Department  ATTENTION: Please call  with any questions or concerns to [phone] and carefully listen to the prompts so that you are directed to the right person. All voicemails are confidential.   For Discharge needs, contact Care Management DC Support Team at [phone] opt. [phone]  Send all requests for admission clinical reviews, approved or denied determinations and any other requests to dedicated fax number below belonging to the campus where the patient is receiving treatment. List of dedicated fax numbers for the Facilities:  FACILITY NAME UR FAX NUMBER   ADMISSION DENIALS (Administrative/Medical Necessity) [phone]   DISCHARGE SUPPORT TEAM (NETWORK) [phone]   PARENT CHILD HEALTH (Maternity/NICU/Pediatrics) [phone]   [facility] [phone]   [facility] [phone]   [facility] [phone]   [facility] [phone]   [facility] [phone]   [facility] [phone]   [facility] [phone]   [facility] [phone]   [facility] [phone]   [facility] [phone]   [facility] [phone]   [facility] [phone]

## 2024-09-01 NOTE — ASSESSMENT & PLAN NOTE
History of diabetes mellitus atrial fibrillation hypertension and stroke presents from SNF at [facility] for hematuria  During recent hospitalization at [facility] for sacral wound he had urinary catheter placement for retention.  Currently on CBI per recommendations from urology.  Will hold Eliquis for now  Urology eval and recommendations pending

## 2024-09-01 NOTE — ASSESSMENT & PLAN NOTE
History of hospitalization at [facility] for CVA with left-sided weakness July 2024  Currently rehabbing at [facility]  Will continue aspirin but hold Eliquis due to gross hematuria

## 2024-09-01 NOTE — PLAN OF CARE
Problem: PAIN - ADULT  Goal: Verbalizes/displays adequate comfort level or baseline comfort level  Description: Interventions:  - Encourage patient to monitor pain and request assistance  - Assess pain using appropriate pain scale  - Administer analgesics based on type and severity of pain and evaluate response  - Implement non-pharmacological measures as appropriate and evaluate response  - Consider cultural and social influences on pain and pain management  - Notify physician/advanced practitioner if interventions unsuccessful or patient reports new pain  Outcome: Progressing     Problem: INFECTION - ADULT  Goal: Absence or prevention of progression during hospitalization  Description: INTERVENTIONS:  - Assess and monitor for signs and symptoms of infection  - Monitor lab/diagnostic results  - Monitor all insertion sites, i.e. indwelling lines, tubes, and drains  - Monitor endotracheal if appropriate and nasal secretions for changes in amount and color  - [place] appropriate cooling/warming therapies per order  - Administer medications as ordered  - Instruct and encourage patient and family to use good hand hygiene technique  - Identify and instruct in appropriate isolation precautions for identified infection/condition  Outcome: Progressing  Goal: Absence of fever/infection during neutropenic period  Description: INTERVENTIONS:  - Monitor WBC    Outcome: Progressing     Problem: SAFETY ADULT  Goal: Patient will remain free of falls  Description: INTERVENTIONS:  - Educate patient/family on patient safety including physical limitations  - Instruct patient to call for assistance with activity   - Consult OT/PT to assist with strengthening/mobility   - Keep Call bell within reach  - Keep bed low and locked with side rails adjusted as appropriate  - Keep care items and personal belongings within reach  - Initiate and maintain comfort rounds  - Make Fall Risk Sign visible to staff  - Offer Toileting every  Hours,  in advance of need  - Initiate/Maintain alarm  - Obtain necessary fall risk management equipment:   - Apply yellow socks and bracelet for high fall risk patients  - Consider moving patient to room near nurses station  Outcome: Progressing  Goal: Maintain or return to baseline ADL function  Description: INTERVENTIONS:  -  Assess patient's ability to carry out ADLs; assess patient's baseline for ADL function and identify physical deficits which impact ability to perform ADLs (bathing, care of mouth/teeth, toileting, grooming, dressing, etc.)  - Assess/evaluate cause of self-care deficits   - Assess range of motion  - Assess patient's mobility; develop plan if impaired  - Assess patient's need for assistive devices and provide as appropriate  - Encourage maximum independence but intervene and supervise when necessary  - Involve family in performance of ADLs  - Assess for home care needs following discharge   - Consider OT consult to assist with ADL evaluation and planning for discharge  - Provide patient education as appropriate  Outcome: Progressing  Goal: Maintains/Returns to pre admission functional level  Description: INTERVENTIONS:  - Perform AM-PAC 6 Click Basic Mobility/ Daily Activity assessment daily.  - Set and communicate daily mobility goal to care team and patient/family/caregiver.   - Collaborate with rehabilitation services on mobility goals if consulted  - Perform Range of Motion  times a day.  - Reposition patient every  hours.  - Dangle patient  times a day  - Stand patient  times a day  - Ambulate patient  times a day  - Out of bed to chair  times a day   - Out of bed for meals  times a day  - Out of bed for toileting  - Record patient progress and toleration of activity level   Outcome: Progressing     Problem: DISCHARGE PLANNING  Goal: Discharge to home or other facility with appropriate resources  Description: INTERVENTIONS:  - Identify barriers to discharge w/patient and caregiver  - Arrange for  needed discharge resources and transportation as appropriate  - Identify discharge learning needs (meds, wound care, etc.)  - Arrange for interpretive services to assist at discharge as needed  - Refer to Case Management Department for coordinating discharge planning if the patient needs post-hospital services based on physician/advanced practitioner order or complex needs related to functional status, cognitive ability, or social support system  Outcome: Progressing     Problem: Knowledge Deficit  Goal: Patient/family/caregiver demonstrates understanding of disease process, treatment plan, medications, and discharge instructions  Description: Complete learning assessment and assess knowledge base.  Interventions:  - Provide teaching at level of understanding  - Provide teaching via preferred learning methods  Outcome: Progressing     Problem: CARDIOVASCULAR - ADULT  Goal: Maintains optimal cardiac output and hemodynamic stability  Description: INTERVENTIONS:  - Monitor I/O, vital signs and rhythm  - Monitor for S/S and trends of decreased cardiac output  - Administer and titrate ordered vasoactive medications to optimize hemodynamic stability  - Assess quality of pulses, skin color and temperature  - Assess for signs of decreased coronary artery perfusion  - Instruct patient to report change in severity of symptoms  Outcome: Progressing  Goal: Absence of cardiac dysrhythmias or at baseline rhythm  Description: INTERVENTIONS:  - Continuous cardiac monitoring, vital signs, obtain 12 lead EKG if ordered  - Administer antiarrhythmic and heart rate control medications as ordered  - Monitor electrolytes and administer replacement therapy as ordered  Outcome: Progressing     Problem: GENITOURINARY - ADULT  Goal: Urinary catheter remains patent  Description: INTERVENTIONS:  - Assess patency of urinary catheter  - If patient has a chronic hanks, consider changing catheter if non-functioning  - Follow guidelines for  intermittent irrigation of non-functioning urinary catheter  Outcome: Progressing

## 2024-09-02 ENCOUNTER — TELEPHONE (OUTPATIENT)
Dept: OTHER | Facility: HOSPITAL | Age: 74
End: 2024-09-02

## 2024-09-02 LAB
ANION GAP SERPL CALCULATED.3IONS-SCNC: 5 MMOL/L (ref 4–13)
BASOPHILS # BLD AUTO: 0.03 THOUSANDS/ÂΜL (ref 0–0.1)
BASOPHILS NFR BLD AUTO: 0 % (ref 0–1)
BUN SERPL-MCNC: 9 MG/DL (ref 5–25)
CALCIUM SERPL-MCNC: 8.7 MG/DL (ref 8.4–10.2)
CHLORIDE SERPL-SCNC: 109 MMOL/L (ref 96–108)
CO2 SERPL-SCNC: 29 MMOL/L (ref 21–32)
CREAT SERPL-MCNC: 0.69 MG/DL (ref 0.6–1.3)
EOSINOPHIL # BLD AUTO: 0.17 THOUSAND/ÂΜL (ref 0–0.61)
EOSINOPHIL NFR BLD AUTO: 2 % (ref 0–6)
ERYTHROCYTE [DISTWIDTH] IN BLOOD BY AUTOMATED COUNT: 12.9 % (ref 11.6–15.1)
GFR SERPL CREATININE-BSD FRML MDRD: 92 ML/MIN/1.73SQ M
GLUCOSE SERPL-MCNC: 110 MG/DL (ref 65–140)
GLUCOSE SERPL-MCNC: 120 MG/DL (ref 65–140)
GLUCOSE SERPL-MCNC: 126 MG/DL (ref 65–140)
GLUCOSE SERPL-MCNC: 130 MG/DL (ref 65–140)
GLUCOSE SERPL-MCNC: 194 MG/DL (ref 65–140)
HCT VFR BLD AUTO: 35.6 % (ref 36.5–49.3)
HGB BLD-MCNC: 11.4 G/DL (ref 12–17)
IMM GRANULOCYTES # BLD AUTO: 0.03 THOUSAND/UL (ref 0–0.2)
IMM GRANULOCYTES NFR BLD AUTO: 0 % (ref 0–2)
LYMPHOCYTES # BLD AUTO: 2.84 THOUSANDS/ÂΜL (ref 0.6–4.47)
LYMPHOCYTES NFR BLD AUTO: 29 % (ref 14–44)
MAGNESIUM SERPL-MCNC: 1.8 MG/DL (ref 1.9–2.7)
MCH RBC QN AUTO: 31.2 PG (ref 26.8–34.3)
MCHC RBC AUTO-ENTMCNC: 32 G/DL (ref 31.4–37.4)
MCV RBC AUTO: 98 FL (ref 82–98)
MONOCYTES # BLD AUTO: 0.6 THOUSAND/ÂΜL (ref 0.17–1.22)
MONOCYTES NFR BLD AUTO: 6 % (ref 4–12)
NEUTROPHILS # BLD AUTO: 6.21 THOUSANDS/ÂΜL (ref 1.85–7.62)
NEUTS SEG NFR BLD AUTO: 63 % (ref 43–75)
NRBC BLD AUTO-RTO: 0 /100 WBCS
PLATELET # BLD AUTO: 463 THOUSANDS/UL (ref 149–390)
PMV BLD AUTO: 9.4 FL (ref 8.9–12.7)
POTASSIUM SERPL-SCNC: 3.2 MMOL/L (ref 3.5–5.3)
RBC # BLD AUTO: 3.65 MILLION/UL (ref 3.88–5.62)
SODIUM SERPL-SCNC: 143 MMOL/L (ref 135–147)
WBC # BLD AUTO: 9.88 THOUSAND/UL (ref 4.31–10.16)

## 2024-09-02 PROCEDURE — 99232 SBSQ HOSP IP/OBS MODERATE 35: CPT | Performed by: PHYSICIAN ASSISTANT

## 2024-09-02 PROCEDURE — 83735 ASSAY OF MAGNESIUM: CPT | Performed by: STUDENT IN AN ORGANIZED HEALTH CARE EDUCATION/TRAINING PROGRAM

## 2024-09-02 PROCEDURE — 97535 SELF CARE MNGMENT TRAINING: CPT

## 2024-09-02 PROCEDURE — 99232 SBSQ HOSP IP/OBS MODERATE 35: CPT | Performed by: NURSE PRACTITIONER

## 2024-09-02 PROCEDURE — 80048 BASIC METABOLIC PNL TOTAL CA: CPT | Performed by: STUDENT IN AN ORGANIZED HEALTH CARE EDUCATION/TRAINING PROGRAM

## 2024-09-02 PROCEDURE — 82948 REAGENT STRIP/BLOOD GLUCOSE: CPT

## 2024-09-02 PROCEDURE — 97163 PT EVAL HIGH COMPLEX 45 MIN: CPT

## 2024-09-02 PROCEDURE — 85025 COMPLETE CBC W/AUTO DIFF WBC: CPT | Performed by: STUDENT IN AN ORGANIZED HEALTH CARE EDUCATION/TRAINING PROGRAM

## 2024-09-02 PROCEDURE — 97167 OT EVAL HIGH COMPLEX 60 MIN: CPT

## 2024-09-02 RX ORDER — CLOTRIMAZOLE 10 MG/1
10 LOZENGE ORAL
Status: DISCONTINUED | OUTPATIENT
Start: 2024-09-02 | End: 2024-09-04 | Stop reason: HOSPADM

## 2024-09-02 RX ORDER — CHLORHEXIDINE GLUCONATE ORAL RINSE 1.2 MG/ML
15 SOLUTION DENTAL EVERY 12 HOURS SCHEDULED
Status: DISCONTINUED | OUTPATIENT
Start: 2024-09-02 | End: 2024-09-04 | Stop reason: HOSPADM

## 2024-09-02 RX ORDER — POTASSIUM CHLORIDE 1500 MG/1
40 TABLET, EXTENDED RELEASE ORAL ONCE
Status: COMPLETED | OUTPATIENT
Start: 2024-09-02 | End: 2024-09-02

## 2024-09-02 RX ORDER — MAGNESIUM SULFATE HEPTAHYDRATE 40 MG/ML
2 INJECTION, SOLUTION INTRAVENOUS ONCE
Status: COMPLETED | OUTPATIENT
Start: 2024-09-02 | End: 2024-09-02

## 2024-09-02 RX ADMIN — MIRTAZAPINE 7.5 MG: 7.5 TABLET, FILM COATED ORAL at 21:53

## 2024-09-02 RX ADMIN — CLOTRIMAZOLE 10 MG: 10 LOZENGE ORAL; TOPICAL at 18:07

## 2024-09-02 RX ADMIN — DOCUSATE SODIUM 100 MG: 100 CAPSULE, LIQUID FILLED ORAL at 21:53

## 2024-09-02 RX ADMIN — MAGNESIUM SULFATE HEPTAHYDRATE 2 G: 40 INJECTION, SOLUTION INTRAVENOUS at 15:17

## 2024-09-02 RX ADMIN — CLOTRIMAZOLE 10 MG: 10 LOZENGE ORAL; TOPICAL at 21:54

## 2024-09-02 RX ADMIN — ESCITALOPRAM OXALATE 10 MG: 10 TABLET ORAL at 08:32

## 2024-09-02 RX ADMIN — AMOXICILLIN AND CLAVULANATE POTASSIUM 1 TABLET: 875; 125 TABLET, COATED ORAL at 21:53

## 2024-09-02 RX ADMIN — FINASTERIDE 5 MG: 5 TABLET, FILM COATED ORAL at 08:32

## 2024-09-02 RX ADMIN — TAMSULOSIN HYDROCHLORIDE 0.4 MG: 0.4 CAPSULE ORAL at 21:53

## 2024-09-02 RX ADMIN — CHLORHEXIDINE GLUCONATE 15 ML: 1.2 RINSE ORAL at 22:00

## 2024-09-02 RX ADMIN — LISINOPRIL 10 MG: 10 TABLET ORAL at 08:32

## 2024-09-02 RX ADMIN — Medication 1000 UNITS: at 08:32

## 2024-09-02 RX ADMIN — INSULIN LISPRO 2 UNITS: 100 INJECTION, SOLUTION INTRAVENOUS; SUBCUTANEOUS at 17:03

## 2024-09-02 RX ADMIN — CLOTRIMAZOLE 10 MG: 10 LOZENGE ORAL; TOPICAL at 15:17

## 2024-09-02 RX ADMIN — POTASSIUM CHLORIDE 40 MEQ: 1500 TABLET, EXTENDED RELEASE ORAL at 15:17

## 2024-09-02 RX ADMIN — DOCUSATE SODIUM 100 MG: 100 CAPSULE, LIQUID FILLED ORAL at 08:32

## 2024-09-02 RX ADMIN — EZETIMIBE 10 MG: 10 TABLET ORAL at 08:32

## 2024-09-02 RX ADMIN — ASPIRIN 81 MG CHEWABLE TABLET 81 MG: 81 TABLET CHEWABLE at 08:32

## 2024-09-02 RX ADMIN — ATORVASTATIN CALCIUM 80 MG: 80 TABLET, FILM COATED ORAL at 21:53

## 2024-09-02 RX ADMIN — AMOXICILLIN AND CLAVULANATE POTASSIUM 1 TABLET: 875; 125 TABLET, COATED ORAL at 08:32

## 2024-09-02 RX ADMIN — COLLAGENASE SANTYL: 250 OINTMENT TOPICAL at 09:48

## 2024-09-02 NOTE — PHYSICAL THERAPY NOTE
PHYSICAL THERAPY EVALUATION  NAME:  Drew Santos  DATE: 09/02/24    AGE:   73 y.o.  Mrn:   [id number]  ADMIT DX:  Blood in urine [R31.9]  Hematuria [R31.9]    Past Medical History:   Diagnosis Date    Atherosclerotic heart disease of native coronary artery without angina pectoris     Atrial fibrillation (HCC)     Cerebral infarction (HCC)     Constipation     Depression     Diabetes mellitus (HCC)     Hemiplegia and hemiparesis following cerebral infarction affecting left non-dominant side (HCC)     Hyperlipidemia     Hypertension     Pressure ulcer of sacral region, stage 3 (HCC)     Prostatic hyperplasia     Sepsis (HCC)     Stroke (HCC)     TIA (transient ischemic attack)     Urinary retention     Vitamin D deficiency      Length Of Stay: 1  Performed at least 2 patient identifiers during session: Name and ID bracelet    PHYSICAL THERAPY EVALUATION :    09/02/24 1420   PT Last Visit   PT Visit Date 09/02/24   Note Type   Note type Evaluation   Pain Assessment   Pain Assessment Tool 0-10   Pain Score No Pain   Restrictions/Precautions   Weight Bearing Precautions Per Order No   Other Precautions Cognitive;Chair Alarm;Bed Alarm;Telemetry;Multiple lines;Fall Risk;Visual impairment   Home Living   Type of Home House   Home Layout Two level;Stairs to enter with rails;Other (Comment)  (6 BELLE)   Bathroom Shower/Tub Tub/shower unit   Bathroom Toilet Standard   Bathroom Accessibility Accessible   Additional Comments pt at [facility] for rehab currently prior to stroke in July was home and independent; reports standing in parrallel bars at rehab and SPT w/ assist x2 to w/c and assist ADLs   Prior Function   Level of [place] Independent with ADLs;Independent with functional mobility;Independent with IADLS  (prior to July 2024)   Lives With Spouse   Receives Help From Personal care attendant;Family   IADLs Independent with medication management;Independent with meal prep;Independent with driving  (prior to  CVA in July 2024)   Falls in the last 6 months 1 to 4   Vocational Retired   Comments Patient prior level and home setup obtained through chart review.   General   Additional Pertinent History Drew Santos is a 73-year-old male seen in urologic consultation for urinary retention and mild gross hematuria 8/26 at [facility] after sustaining CVA 7/10. Also a history of CVA 3/13/24 which he rehabbed from and returned to [facility] in June 2024. He has history of left hemiparesis, dysphagia, sacral decubiti and functional/cognitive decline.  He was transferred post admission for rehabilitation to [facility] [place].   Mr. Santos [facility] for chief complaint of gross hematuria while on Eliquis. This is currently held.. Urine analysis demonstrated innumerable RBCs but without pyuria or bacteria detection. CT of the abdomen and pelvis demonstrated several small nonobstructing intrarenal calculi without evidence of perinephric/RP bleeding, discrete fluid collection, or hydronephrosis. Lower tract was negative for Parra malposition, bladder distention, organized clot or bladder calculi.   Parra is patent at this time and urine has cleared significantly. Urologic consultation 9/2: Without evidence of infection, obstructing nephroureterolithiasis or active  tract hemorrhage. Against background of recent CVA with requirement for anticoagulation.  Eliquis currently on hold x 5 days,On aspirin,Hemoglobin stable; 11.4, Urine clear yellow with cbi clamped, Continue Flomax, continue Proscar,  Resume Eliquis. If After 24 hours.  Should patient rebleed, consider changing agent. There is no indication for acute  surgical intervention at this time.   Family/Caregiver Present No   Cognition   Overall Cognitive Status Impaired   Arousal/Participation Alert   Attention Attends with cues to redirect   Orientation Level Oriented to person;Oriented to place;Disoriented to time;Disoriented to  situation  (unable to state month when given cues of Labor Day holiday)   Memory Decreased recall of precautions;Decreased recall of recent events;Decreased short term memory   Following Commands Follows one step commands with increased time or repetition   Subjective   Subjective I had flowers and vegtables in my garden.   RUE Assessment   RUE Assessment WFL  (4-/5)   LUE Assessment   LUE Assessment X  (Per OT Eval)   LUE Overall AROM   L Shoulder Flexion no active flexion, able to shrug shoulder   LUE Strength   LUE Overall Strength Deficits   L Shoulder Flexion 2-/5   L Elbow Flexion 2-/5   L Elbow Extension 2-/5   L Wrist Flexion 2/5   L Wrist Extension 2-/5   RLE Assessment   RLE Assessment X   Strength RLE   RLE Overall Strength 3/5   LLE Assessment   LLE Assessment X   Strength LLE   LLE Overall Strength 3/5   Vision-Basic Assessment   Current Vision   (cues to attend to Left side, slight left inattention)   Coordination   Movements are Fluid and Coordinated 1   Bed Mobility   Rolling R 2  Maximal assistance   Additional items Assist x 2;Increased time required;LE management;Verbal cues   Rolling L 2  Maximal assistance   Additional items Assist x 2;Increased time required;Verbal cues;Bedrails   Supine to Sit 2  Maximal assistance   Additional items Assist x 2;Increased time required;Verbal cues;LE management;Bedrails   Sit to Supine 1  Dependent   Additional items Assist x 2;Increased time required;LE management;Verbal cues;Bedrails   Transfers   Sit to Stand 2  Maximal assistance   Additional items Assist x 2;Increased time required;Verbal cues   Stand to Sit 2  Maximal assistance   Additional items Assist x 2;Increased time required;Verbal cues   Ambulation/Elevation   Gait Assistance Not tested   Additional items Not tested   Ambulation/Elevation Additional Comments Patient able to stand with Max A of 2 with Chair in front of him for support for 60 seconds prior to fatigue and needing to sit. Patient did  "not  an upright posture, but was slightly forward flexed. and needed cues to stand straight and \"look out the window\"   Balance   Static Sitting Fair +   Dynamic Sitting Poor +   Static Standing Poor -   Dynamic Standing Poor   Ambulatory   (Not tested)   Activity Tolerance   Activity Tolerance Patient limited by fatigue;Treatment limited secondary to medical complications (Comment)   Medical Staff Made Aware Gabriel:Pt seen for co-evaluation/treatment with skilled Occupational Therapist 2* clinically unstable/unpredictable presentation, medical complexity, fall risk, cognitive impairments, functional/physical limitations, impaired functional balance, decreased safety awareness, limited activity tolerance which is decline from PLOF and may impact overall functional mobility/mobility safety.   Nurse Made Aware SMITH Alegre present for part of the session and provided clearance for activity.   Assessment   Prognosis Fair   Problem List Decreased strength;Decreased endurance;Impaired balance;Decreased mobility;Impaired vision;Decreased skin integrity   Barriers to Discharge Decreased caregiver support;Inaccessible home environment   Goals   Patient Goals \" to garden\"   STG Expiration Date 09/08/24   Short Term Goal #1 7 days: 1).  Pt will perform bed mobility with Max A of 1 demonstrating appropriate technique 100% of the time in order to improve function.2)  Perform all transfers with mod A of 2 demonstrating safe and appropriate technique 100% of the time in order to improve ability to negotiate safely in home environment. 3)  Improve overall strength and balance 1/2 grade in order to optimize ability to perform functional tasks and reduce fall risk.4) Increase activity tolerance to 45 minutes in order to improve endurance to functional tasks. 5) PT for ongoing patient education, DME needs and d/c planning in order to promote highest level of function in least restrictive environment.   PT Treatment Day 0 "   Plan   Treatment/Interventions Functional transfer training;LE strengthening/ROM;Therapeutic exercise;Endurance training;Patient/family training;Equipment eval/education;Bed mobility;Gait training;OT   PT Frequency 3-5x/wk   Discharge Recommendation   Rehab Resource Intensity Level, PT II (Moderate Resource Intensity)   AM-PAC Basic Mobility Inpatient   Turning in Flat Bed Without Bedrails 1   Lying on Back to Sitting on Edge of Flat Bed Without Bedrails 1   Moving Bed to Chair 2   Standing Up From Chair Using Arms 2   Walk in Room 1   Climb 3-5 Stairs With Railing 1   Basic Mobility Inpatient Raw Score 8   Turning Head Towards Sound 3   Follow Simple Instructions 3   Low Function Basic Mobility Raw Score  14   Low Function Basic Mobility Standardized Score  22.01   [facility] Highest Level Of Mobility   -HLM Goal 3: Sit at edge of bed   -HL Achieved 5: Stand (1 or more minutes)   Modified Radha Scale   Modified Radha Scale 4   End of Consult   Patient Position at End of Consult Supine;Other (comment)  (Nursing at the bedside providing care.)       (Please find full objective findings from PT assessment regarding body systems outlined above).     Assessment: Pt is a 73 y.o. male seen for PT evaluation s/p admit to [facility] 8/[facility]24 w/ Gross hematuria.  Order placed for PT.  Prior to admission, pt required A for mobility and was in rehab due to recent CVA in July 2024 . Upon evaluation: Pt requires  max of 2 assistance for bed mobility and transfers and able to stand for 60 seconds with Max A of 2 and UE support .  Pt's clinical presentation is currently unstable/unpredictable given the functional mobility deficits above, especially weakness, decreased skin integrity, decreased endurance, decreased activity tolerance, decreased functional mobility tolerance, and impaired tone, coupled with fall risks including impaired balance, decreased safety awareness, and altered vision, and combined  with medical complications of abnormal H&H, readmission to hospital, and abnormal potassium values.  Pt to benefit from continued skilled PT tx while in hospital and upon DC to address deficits as defined above and maximize level of functional mobility. The patient's AM-PAC Basic Mobility Inpatient Short Form Raw Score is 8. A Raw score of less than or equal to 16 suggests the patient may benefit from discharge to post-acute rehabilitation services. From PT/mobility standpoint, recommendation at time of d/c would be  Level 2 services with return to [facility] to finish Stroke rehab  pending progress in order to maximize pt's functional independence and consistency w/ mobility in order to facilitate return to PLOF.  Recommend trial with cane next 1-2 sessions, ther ex next 1-2 sessions, and case management consult.      The following objective measures were performed on IE: Modified Radha 4 (moderate/severe disability); -PAC 6-Clicks: 8/24.   Comorbidities affecting pt's physical performance at time of assessment include: DM, HTN, obesity, CVA, and limited vision. Personal factors affecting pt at time of IE include: steps to enter environment, multi-level environment, inability to perform IADLs, inability to perform ADLs, and inability to ambulate household distances.     Sandy Pabon, PT, DPT, GCS

## 2024-09-02 NOTE — PLAN OF CARE
Problem: PAIN - ADULT  Goal: Verbalizes/displays adequate comfort level or baseline comfort level  Description: Interventions:  - Encourage patient to monitor pain and request assistance  - Assess pain using appropriate pain scale  - Administer analgesics based on type and severity of pain and evaluate response  - Implement non-pharmacological measures as appropriate and evaluate response  - Consider cultural and social influences on pain and pain management  - Notify physician/advanced practitioner if interventions unsuccessful or patient reports new pain  Outcome: Progressing     Problem: INFECTION - ADULT  Goal: Absence or prevention of progression during hospitalization  Description: INTERVENTIONS:  - Assess and monitor for signs and symptoms of infection  - Monitor lab/diagnostic results  - Monitor all insertion sites, i.e. indwelling lines, tubes, and drains  - Monitor endotracheal if appropriate and nasal secretions for changes in amount and color  - [place] appropriate cooling/warming therapies per order  - Administer medications as ordered  - Instruct and encourage patient and family to use good hand hygiene technique  - Identify and instruct in appropriate isolation precautions for identified infection/condition  Outcome: Progressing  Goal: Absence of fever/infection during neutropenic period  Description: INTERVENTIONS:  - Monitor WBC    Outcome: Progressing     Problem: SAFETY ADULT  Goal: Patient will remain free of falls  Description: INTERVENTIONS:  - Educate patient/family on patient safety including physical limitations  - Instruct patient to call for assistance with activity   - Consult OT/PT to assist with strengthening/mobility   - Keep Call bell within reach  - Keep bed low and locked with side rails adjusted as appropriate  - Keep care items and personal belongings within reach  - Initiate and maintain comfort rounds  - Make Fall Risk Sign visible to staff  - Offer Toileting every 2 Hours,  in advance of need  - Initiate/Maintain bed alarm  - Apply yellow socks and bracelet for high fall risk patients  - Consider moving patient to room near nurses station  Outcome: Progressing  Goal: Maintain or return to baseline ADL function  Description: INTERVENTIONS:  -  Assess patient's ability to carry out ADLs; assess patient's baseline for ADL function and identify physical deficits which impact ability to perform ADLs (bathing, care of mouth/teeth, toileting, grooming, dressing, etc.)  - Assess/evaluate cause of self-care deficits   - Assess range of motion  - Assess patient's mobility; develop plan if impaired  - Assess patient's need for assistive devices and provide as appropriate  - Encourage maximum independence but intervene and supervise when necessary  - Involve family in performance of ADLs  - Assess for home care needs following discharge   - Consider OT consult to assist with ADL evaluation and planning for discharge  - Provide patient education as appropriate  Outcome: Progressing  Goal: Maintains/Returns to pre admission functional level  Description: INTERVENTIONS:  - Perform AM-PAC 6 Click Basic Mobility/ Daily Activity assessment daily.  - Set and communicate daily mobility goal to care team and patient/family/caregiver.   - Collaborate with rehabilitation services on mobility goals if consulted  - Perform Range of Motion 3 times a day.  - Reposition patient every 2 hours.  - Dangle patient 3 times a day  - Stand patient 3 times a day  - Out of bed for toileting  - Record patient progress and toleration of activity level   Outcome: Progressing     Problem: DISCHARGE PLANNING  Goal: Discharge to home or other facility with appropriate resources  Description: INTERVENTIONS:  - Identify barriers to discharge w/patient and caregiver  - Arrange for needed discharge resources and transportation as appropriate  - Identify discharge learning needs (meds, wound care, etc.)  - Arrange for interpretive  services to assist at discharge as needed  - Refer to Case Management Department for coordinating discharge planning if the patient needs post-hospital services based on physician/advanced practitioner order or complex needs related to functional status, cognitive ability, or social support system  Outcome: Progressing     Problem: Knowledge Deficit  Goal: Patient/family/caregiver demonstrates understanding of disease process, treatment plan, medications, and discharge instructions  Description: Complete learning assessment and assess knowledge base.  Interventions:  - Provide teaching at level of understanding  - Provide teaching via preferred learning methods  Outcome: Progressing     Problem: CARDIOVASCULAR - ADULT  Goal: Maintains optimal cardiac output and hemodynamic stability  Description: INTERVENTIONS:  - Monitor I/O, vital signs and rhythm  - Monitor for S/S and trends of decreased cardiac output  - Administer and titrate ordered vasoactive medications to optimize hemodynamic stability  - Assess quality of pulses, skin color and temperature  - Assess for signs of decreased coronary artery perfusion  - Instruct patient to report change in severity of symptoms  Outcome: Progressing  Goal: Absence of cardiac dysrhythmias or at baseline rhythm  Description: INTERVENTIONS:  - Continuous cardiac monitoring, vital signs, obtain 12 lead EKG if ordered  - Administer antiarrhythmic and heart rate control medications as ordered  - Monitor electrolytes and administer replacement therapy as ordered  Outcome: Progressing     Problem: GENITOURINARY - ADULT  Goal: Urinary catheter remains patent  Description: INTERVENTIONS:  - Assess patency of urinary catheter  - If patient has a chronic hanks, consider changing catheter if non-functioning  - Follow guidelines for intermittent irrigation of non-functioning urinary catheter  Outcome: Progressing     Problem: Prexisting or High Potential for Compromised Skin  Integrity  Goal: Skin integrity is maintained or improved  Description: INTERVENTIONS:  - Identify patients at risk for skin breakdown  - Assess and monitor skin integrity  - Assess and monitor nutrition and hydration status  - Monitor labs   - Assess for incontinence   - Turn and reposition patient  - Assist with mobility/ambulation  - Relieve pressure over bony prominences  - Avoid friction and shearing  - Provide appropriate hygiene as needed including keeping skin clean and dry  - Evaluate need for skin moisturizer/barrier cream  - Collaborate with interdisciplinary team   - Patient/family teaching  - Consider wound care consult   Outcome: Progressing     Problem: SKIN/TISSUE INTEGRITY - ADULT  Goal: Pressure injury heals and does not worsen  Description: Interventions:  - Implement low air loss mattress or specialty surface (Criteria met)  - Apply silicone foam dressing  - Instruct/assist with weight shifting every 30 minutes when in chair   - Limit chair time to 2 hour intervals  - Use special pressure reducing interventions such as cushion when in chair   - Apply fecal or urinary incontinence containment device   - Perform passive or active ROM every shift  - Turn and reposition patient & offload bony prominences every 2 hours   - Utilize friction reducing device or surface for transfers   - Consider consults to  interdisciplinary teams  - Use incontinent care products after each incontinent episode such as hydroguard  - Consider nutrition services referral as needed  Outcome: Progressing

## 2024-09-02 NOTE — OCCUPATIONAL THERAPY NOTE
Occupational Therapy Evaluation     Patient Name: Drew Santos  Today's Date: 9/2/2024  Problem List  Principal Problem:    Gross hematuria  Active Problems:    Type 2 diabetes mellitus without complication, without long-term current use of insulin (HCC)    History of CVA (cerebrovascular accident)    Paroxysmal atrial fibrillation (HCC)    Primary hypertension    Sacral wound    Depression    Past Medical History  Past Medical History:   Diagnosis Date    Atherosclerotic heart disease of native coronary artery without angina pectoris     Atrial fibrillation (HCC)     Cerebral infarction (HCC)     Constipation     Depression     Diabetes mellitus (HCC)     Hemiplegia and hemiparesis following cerebral infarction affecting left non-dominant side (HCC)     Hyperlipidemia     Hypertension     Pressure ulcer of sacral region, stage 3 (HCC)     Prostatic hyperplasia     Sepsis (HCC)     Stroke (HCC)     TIA (transient ischemic attack)     Urinary retention     Vitamin D deficiency      Past Surgical History  History reviewed. No pertinent surgical history.      09/02/24 1419   OT Last Visit   OT Visit Date 09/02/24   Note Type   Note type Evaluation   Pain Assessment   Pain Assessment Tool 0-10   Pain Score No Pain   Restrictions/Precautions   Weight Bearing Precautions Per Order No   Other Precautions Cognitive;Chair Alarm;Bed Alarm;Fall Risk;Telemetry;Multiple lines;Visual impairment   Home Living   Type of Home House   Home Layout Two level;Stairs to enter with rails  (6 BELLE)   Bathroom Shower/Tub Tub/shower unit   Bathroom Toilet Standard   Bathroom Accessibility Accessible   Additional Comments pt at [facility] for rehab currently prior to stroke in July was home and independent; reports standing in parrallel bars at rehab and SPT w/ assist x2 to w/c and assist ADLs   Prior Function   Level of [place] Independent with ADLs;Independent with functional mobility;Independent with IADLS  (prior to  July 2024)   Lives With Spouse   Receives Help From Personal care attendant;Family   IADLs Independent with medication management;Independent with meal prep;Independent with driving  (prior to CVA in July 2024)   Falls in the last 6 months 1 to 4   Vocational Retired   Comments pt information taken from chart; prior to July 2024 and CVA was home w/ wife and now needing assist at rehab w/ all tasks   Lifestyle   Autonomy per pt prior to July 2024 pt independent w/ ADLs, independent w/ functional transfers and mobility w/ no AD, independent w/ IADLs, since CVA AX2 for SPT and assist w/ ADLS   Reciprocal Relationships spouse   Service to Others retired   Intrinsic Gratification watch tv   ADL   Where Assessed Edge of bed   Eating Assistance 4  Minimal Assistance   Grooming Assistance 4  Minimal Assistance   UB Bathing Assistance 3  Moderate Assistance   LB Bathing Assistance 2  Maximal Assistance   UB Dressing Assistance 2  Maximal Assistance   UB Dressing Deficit Setup;Steadying;Increased time to complete;Verbal cueing;Supervision/safety;Thread LUE  [facility]   LB Dressing Assistance 1  Total Assistance   Toileting Assistance  1  Total Assistance   Toileting Deficit Setup;Steadying;Increased time to complete;Supervison/safety;Perineal hygiene  (incontinent of stool)   Functional Assistance 3  Moderate Assistance   Bed Mobility   Rolling R 2  Maximal assistance   Additional items Assist x 2;Increased time required;LE management;Verbal cues;HOB elevated;Bedrails   Rolling L 2  Maximal assistance   Additional items Assist x 2;Increased time required;Verbal cues;LE management;Bedrails;HOB elevated   Supine to Sit 2  Maximal assistance   Additional items Assist x 2;Increased time required;Verbal cues;LE management;HOB elevated;Bedrails   Sit to Supine 1  Dependent   Additional items Assist x 2;Increased time required;Verbal cues;LE management;Bedrails   Additional Comments increased time to complete; pt  initially mod-max assist support to maitain balance w/ posterior lean to the Right side and then able to support self for up to 2minutes before fatigue and posterior lean   Transfers   Sit to Stand 2  Maximal assistance   Additional items Assist x 2;Increased time required;Verbal cues  (b/l knees blocked)   Stand to Sit 2  Maximal assistance   Additional items Assist x 2;Increased time required;Verbal cues;Armrests   Additional Comments pt w/ trials of sit<>stand x6 w/ increased time to achieve upright stance, unable to maintain grasp w/ Left hand on RW w/ increased time and increased fatigue and lean to the left in stance, max standing tolerance 1min   Functional Mobility   Additional Comments unable to advance L LE to take steps, recommend quick move device   Balance   Static Sitting Fair -   Dynamic Sitting Poor +   Static Standing Poor -   Activity Tolerance   Activity Tolerance Patient limited by fatigue;Treatment limited secondary to medical complications (Comment)   Medical Staff Made Aware PT Sandy assisting 2nf half of session; Pt seen for co-session with skilled Physical therapist 2* clinically unstable presentation, medical complexity, new precautions, performance deficits/functional limitations, impaired cognition/safety awareness, limited activity tolerance and present impairments which are a regression from patient patient's baseline and impacting overall occupational performance   Nurse Made Aware SMITH Alegre present retirement through to assist OT and PCA as pt w/ bowel movement and requiring assist x2 for steadying support and assist of 3rd to assist w/ hygiene cleanup   RUE Assessment   RUE Assessment WFL  (4-/5)   LUE Assessment   LUE Assessment X   LUE Overall AROM   L Shoulder Flexion no active flexion, able to shrug shoulder   L Elbow Flexion 10   L Elbow Extension 15   L Wrist Extension 5   L Mass Grasp 2/5   LUE Overall PROM   L Shoulder Flexion WFL   L Shoulder Extension WFL   L Shoulder  ABduction wFL   L Shoulder ADduction WFL   L Elbow Flexion WFL   L Elbow Extension wFL   L Elbow Supination WFL   L Elbow Pronation WFL   LUE Strength   LUE Overall Strength Deficits   L Shoulder Flexion 2-/5   L Elbow Flexion 2-/5   L Elbow Extension 2-/5   L Wrist Flexion 2/5   L Wrist Extension 2-/5   Hand Function   Gross Motor Coordination Impaired  (R UE WFL, L UE impaired)   Fine Motor Coordination Impaired  (R UE WFL)   Hand Function Comments limited grasp in left hand, unable to maintain grasp on RW   Sensation   Light Touch Partial deficits in the LUE;Partial deficits in the LLE   Vision-Basic Assessment   Current Vision   (cues to attend to Left side, slight left inattention)   Vision - Complex Assessment   Ocular Range of Motion Intact   Acuity Able to read clock/calendar on wall without difficulty   Perception   Inattention/Neglect Appears intact   Cognition   Overall Cognitive Status Impaired   Arousal/Participation Responsive;Cooperative   Attention Attends with cues to redirect   Orientation Level Oriented to person;Oriented to place;Disoriented to time;Disoriented to situation  (unable to state month when given cues of Labor Day holiday)   Memory Decreased recall of precautions;Decreased recall of recent events;Decreased short term memory   Following Commands Follows one step commands with increased time or repetition   Comments pleasant and cooperative, flat affect, motivated to get better   Assessment   Limitation Decreased UE strength;Decreased UE ROM;Decreased ADL status;Decreased endurance;Decreased cognition;Decreased fine motor control;Decreased self-care trans;Decreased high-level ADLs;Non-func L UE;Decreased Safe judgement during ADL   Prognosis Good   Assessment Pt is a 73 y.o. male seen for OT evaluation s/p admit to [facility] on 8/31/2024 w/ Gross hematuria.  Comorbidities affecting pt's functional performance at time of assessment include: sacral wound, h/o CVA w/ left sided weakness  "July2024, DM II, HTN, primary atrial fibrillation. Personal factors affecting pt at time of IE include:limited home support, difficulty performing ADLS, difficulty performing IADLS , limited insight into deficits, flat affect, and decreased initiation and engagement . Prior to admission, pt was at rehab at [facility] and reports assist x2 for SPT, assist w/ ADLs, standing in parallel bars; prior to stroke in July was independent w/ ADLs, independent w/ functional transfers and mobility, independent w/ IADLs. Upon evaluation: Pt requires MAX assist x2 supine<>sit bed mobility, MAX assist x2 sit<>stand x6 trials w/ standing tolerance less than 1minute and max support as lateral lean to left and cues for upright posture, MAX assist UB ADLs, MOD assist grooming, MAX assist toileting hygiene 2* the following deficits impacting occupational performance: Left hemiparesis, decreased  L hand, impaired sitting balance (retropulsive to Right), impaired standing tolerance and standing balance, impaired activity tolerance, fall risk, impaired insight and safety awareness, decreased initiation, impaired functional reach, impaired cognition, multiple lines, decreased strength and endurance, slight inattention to left side. Pt to benefit from continued skilled OT tx while in the hospital to address deficits as defined above and maximize level of functional independence w ADL's and functional mobility. Occupational Performance areas to address include: eating, grooming, bathing/shower, toilet hygiene, dressing, health maintenance, functional mobility, clothing management, cleaning, and meal prep, L UE exercises From OT standpoint, recommendation at time of d/c would be level II moderate resources.   Goals   Patient Goals \"to sit up\"   LTG Time Frame 10-14   Long Term Goal please see below goals   Plan   Treatment Interventions ADL retraining;Endurance training;UE strengthening/ROM;Functional transfer " training;Cognitive reorientation;Equipment evaluation/education;Patient/family training;Neuromuscular reeducation;Compensatory technique education;Fine motor coordination activities;Activityengagement;Energy conservation   Goal Expiration Date 09/16/24   OT Frequency 3-5x/wk   Discharge Recommendation   Rehab Resource Intensity Level, OT II (Moderate Resource Intensity)   Additional Comments  The patient's raw score on the AM-PAC Daily Activity Inpatient Short Form is 11. A raw score of less than 19 suggests the patient may benefit from discharge to post-acute rehabilitation services. Please refer to the recommendation of the Occupational Therapist for safe discharge planning.  .   -PAC Daily Activity Inpatient   Lower Body Dressing 1   Bathing 2   Toileting 1   Upper Body Dressing 2   Grooming 2   Eating 3   Daily Activity Raw Score 11   Daily Activity Standardized Score (Calc for Raw Score >=11) 29.04   AM-[facility] Applied Cognition Inpatient   Following a Speech/Presentation 3   Understanding Ordinary Conversation 3   Taking Medications 1   Remembering Where Things Are Placed or Put Away 1   Remembering List of 4-5 Errands 1   Taking Care of Complicated Tasks 1   Applied Cognition Raw Score 10   Applied Cognition Standardized Score 24.98   Modified [place] Scale   Modified [place] Scale 4   Additional Treatment Session   Start Time 1409   End Time 1419   Treatment Assessment Pt seen for skilled OT session focused on ADLs, functional transfers. Pt continued w/ MAX assist x2 sit<>stand from bed and removal of bed pads Pt w/ decreased standing tolerance to change soiled lines. Pt then MAX assist x2 to lateral scoot to head of bed and dependent assist x2 sit>supine bed mobility w/ increased time to complete. Pt w/ w/ MAX assist x2 rolling in bed to change out pads and sheets and reposition turtle positioning system. Pt w/ max assist x2 to reposition upright in bed w/ all needs met and heels offloaded and L UE support. Pt  continues to be limited due to: Left hemiparesis, decreased  L hand, impaired sitting balance (retropulsive to Right), impaired standing tolerance and standing balance, impaired activity tolerance, fall risk, impaired insight and safety awareness, decreased initiation, impaired functional reach, impaired cognition, multiple lines, decreased strength and endurance, slight inattention to left side. Recommend level II moderate resources. Will continue to follow.   Additional Treatment Day 1   End of Consult   Education Provided Yes   Patient Position at End of Consult Supine;Bed/Chair alarm activated;All needs within reach   Nurse Communication Nurse aware of consult     Occupational Therapy Goals to be met in 10-14 days:  1) Pt will improve activity tolerance to F for 20 min txment sessions to enhance ADLs  2) Pt will complete UB ADLs/self care w/ min assist and mod assist LB ADLs  3) Pt will complete toileting w/ mod  assist w/ G hygiene/thoroughness using DME PRN  4) Pt will improve functional transfers on/off all surfaces using DME PRN w/ G balance/safety including toileting w/ mod assist  5) OTR to assess functional mobility and establish appropriate goals  6) Pt will engage in ongoing cognitive assessment w/ G participation to A w/ safe d/c planning/recommendations  7) Pt will demonstrate G carryover of pt/caregiver education and training as appropriate w/ mod I  w/ G tolerance  8) Pt will engage in depression screen/leisure interest checklist w/ G participation to monitor s/s depression and ID 3 positive coping strategies to A w/ emotional regulation and management  9) Pt will demonstrate 100% carryover of E.C. techniques w/ mod I t/o fx'l I/ADL/leisure tasks w/o cues s/p skilled education  10) Pt will tolerate bed mobility and EOB seated tasks w/ min A for 20 mins to engage in fx'l I/ADL/leisure tasks w/ min A w/ min cues  11) Pt will engage in activity configuration activity w/ G participation and mod I to  increase time management skills and improve participation in a structured routine to improve overall quality of life  12) Pt will demonstrate improved standing tolerance to 3-5 minutes during functional tasks w/ no LOB to enhance ADL performance  13) Pt will demonstrate improved LUE strength by 1 MMT grade to enhance ADLS and functional transfers  Documentation completed by: Roxanne Pruitt MS, OTR/L

## 2024-09-02 NOTE — PROGRESS NOTES
Progress Note - Urology  Drew Santos 1950, 73 y.o. male MRN: [id number]    Unit/Bed#: [place] [address] -01 Encounter: [id number]    * Gross hematuria  Assessment & Plan  Without evidence of infection, obstructing nephroureterolithiasis or active  tract hemorrhage.  Against background of recent CVA with requirement for anticoagulation.  Eliquis currently on hold x 5 days  On aspirin  Hemoglobin stable; 11.4  Urine clear yellow with cbi clamped    Plan:    Continue Flomax  continue Proscar  Resume eliquis  If After 24 hours.  Should patient rebleed, consider changing agent.  Given patient has not experienced hematuria with clot burden or ABLA, can consider outpatient cystoscopic examination for completeness of workup.  However there is no indication for acute  surgical intervention.              Subjective: s/p clamping of CBI at 6 am urine still yellow.         Objective:  Nursing Rounds: Sis  Vitals: Blood pressure 132/74, pulse 68, temperature 98.4 °F (36.9 °C), resp. rate 17, weight 109 kg (240 lb 8.4 oz), SpO2 98%.,Body mass index is 29.29 kg/m².  INS & OUTS:  I/O last 24 hours:  In: 637 [P.O.:637]  Out: 350 [Urine:350]    Physical Exam  Vitals and nursing note reviewed.   Constitutional:       General: He is not in acute distress.     Appearance: He is not ill-appearing, toxic-appearing or diaphoretic.   HENT:      Head: Normocephalic.   Cardiovascular:      Rate and Rhythm: Normal rate.   Pulmonary:      Effort: Pulmonary effort is normal. No respiratory distress.   Abdominal:      General: Abdomen is flat. There is no distension.   Genitourinary:     Comments: Parra draining yellow urine with cbi clamped  Musculoskeletal:         General: No swelling.        Media Information    Document Information    Clinical Image - Mobile Device      09/02/2024 11:06   Attached To:   Hospital Encounter on 8/31/24   Source Information    AVELINA Stover [place] [address] Med Surg   Document History         Imaging:  CT ABDOMEN AND PELVIS WITHOUT IV CONTRAST - LOW DOSE RENAL STONE     INDICATION: Hematuria.     COMPARISON: None.     TECHNIQUE: Low radiation dose thin section CT examination of the abdomen and pelvis was performed without intravenous or oral contrast according to a protocol specifically designed to evaluate for urinary tract calculus. Axial, sagittal, and coronal 2D   reformatted images were created from the source data and submitted for interpretation. Evaluation for pathology in the abdomen and pelvis that is unrelated to urinary tract calculi is limited.     Radiation dose length product (DLP) for this visit: 792 mGy-cm . This examination, like all CT scans performed in the [facility], was performed utilizing techniques to minimize radiation dose exposure, including the use of iterative   reconstruction and automated exposure control.     URINARY TRACT FINDINGS:     RIGHT KIDNEY AND URETER: No urinary tract calculi. No hydronephrosis or hydroureter. Mild perirenal edema.     LEFT KIDNEY AND URETER: Nonobstructing stone lower pole measuring for mm. Nonobstructing stone lower pole measuring 5 mm. No hydronephrosis. Mild perirenal edema.     URINARY BLADDER: Mostly collapsed around Parra catheter balloon. Diffuse bladder wall thickening likely exaggerated by underdistention and trabeculation. No perivesicular inflammation.        ADDITIONAL FINDINGS:     LOWER CHEST: No clinically significant abnormality in the visualized lower chest.     SOLID VISCERA: Limited low radiation dose noncontrast CT evaluation demonstrates no clinically significant abnormality of the imaged portions of the liver, spleen, pancreas, or adrenal glands.     GALLBLADDER/BILIARY TREE: There appear to be numerous small partially calcified stones or layering high attenuation bile.     STOMACH AND BOWEL: Unremarkable.     APPENDIX: No findings to suggest appendicitis.     ABDOMINOPELVIC CAVITY: No ascites. No  pneumoperitoneum. No lymphadenopathy.     VESSELS: Unremarkable for patient's age.     REPRODUCTIVE ORGANS: Unremarkable for patient's age.     ABDOMINAL WALL/INGUINAL REGIONS: Small fat-containing umbilical hernia.     BONES: Multilevel degenerative changes.        IMPRESSION:     Small nonobstructing renal collecting system calculi in the lower pole of the left kidney. No hydronephrosis or renal colic. No renal parenchymal mass visible on this noncontrast study. There is mild diffuse bilateral perinephric edema however. Urinary   bladder collapsed by Parra catheter. Urinary bladder wall thickening likely the result of a combination of trabeculation and incomplete distention. The urine is intermediate attenuation suggesting bloody components as seen clinically.     Recommend hematuria/renal protocol contrast-enhanced CT if hematuria persists without cause.     Cholelithiasis without CT evidence of cholecystitis.        The study was marked in EPIC for immediate notification.     Workstation performed: [id number]  Imaging reviewed - both report and images personally reviewed.     Labs:  Recent Labs     08/31/24 1816 09/01/24  0516 09/02/24  0506   WBC 10.37* 11.40* 9.88       Recent Labs     08/31/24 1816 09/01/24  0516 09/02/24  0506   HGB 10.8* 11.0* 11.4*     Recent Labs     08/31/24 1816 09/01/24  0516 09/02/24  0506   HCT 32.9* 33.5* 35.6*     Recent Labs     08/31/24 1816 09/01/24  0516 09/02/24  0506   CREATININE 0.76 0.66 0.69     Lab Results   Component Value Date    HGB 11.4 (L) 09/02/2024    HCT 35.6 (L) 09/02/2024    WBC 9.88 09/02/2024     (H) 09/02/2024     Lab Results   Component Value Date    K 3.2 (L) 09/02/2024     (H) 09/02/2024    CO2 29 09/02/2024    BUN 9 09/02/2024    CREATININE 0.69 09/02/2024    CALCIUM 8.7 09/02/2024       Urinalysis: no pyuria or bacteria, did not reflex to culture    History:    Past Medical History:   Diagnosis Date    Atherosclerotic heart disease of  native coronary artery without angina pectoris     Atrial fibrillation (HCC)     Cerebral infarction (HCC)     Constipation     Depression     Diabetes mellitus (HCC)     Hemiplegia and hemiparesis following cerebral infarction affecting left non-dominant side (HCC)     Hyperlipidemia     Hypertension     Pressure ulcer of sacral region, stage 3 (HCC)     Prostatic hyperplasia     Sepsis (HCC)     Stroke (HCC)     TIA (transient ischemic attack)     Urinary retention     Vitamin D deficiency      History reviewed. No pertinent surgical history.  History reviewed. No pertinent family history.  Social History     Socioeconomic History    Marital status: /Civil Union     Spouse name: None    Number of children: None    Years of education: None    Highest education level: None   Occupational History    None   Tobacco Use    Smoking status: Never     Passive exposure: Never    Smokeless tobacco: Never   Vaping Use    Vaping status: Never Used   Substance and Sexual Activity    Alcohol use: Not Currently    Drug use: Never    Sexual activity: None   Other Topics Concern    None   Social History Narrative    None     Social Determinants of Health     Financial Resource Strain: Low Risk  (7/10/2024)    Received from [facility]    Overall Financial Resource Strain (CARDIA)     Difficulty of Paying Living Expenses: Not hard at all   Food Insecurity: No Food Insecurity (8/27/2024)    Hunger Vital Sign     Worried About Running Out of Food in the Last Year: Never true     Ran Out of Food in the Last Year: Never true   Transportation Needs: No Transportation Needs (8/27/2024)    PRAPARE - Transportation     Lack of Transportation (Medical): No     Lack of Transportation (Non-Medical): No   Physical Activity: Not on file   Stress: Stress Concern Present (5/8/2023)    [facility] from [facility], [facility]    [facility] [place] [facility] - Occupational Stress  Questionnaire     Feeling of Stress : To some extent   Social Connections: Feeling Socially Integrated (7/25/2024)    Received from [facility]    OASIS : Social Isolation     How often do you feel lonely or isolated from those around you?: Rarely   Intimate Partner Violence: Not At Risk (7/10/2024)    Received from [facility]    Humiliation, Afraid, Rape, and Kick questionnaire     Fear of Current or Ex-Partner: No     Emotionally Abused: No     Physically Abused: No     Sexually Abused: No   Housing Stability: Low Risk  (8/27/2024)    Housing Stability Vital Sign     Unable to Pay for Housing in the Last Year: No     Number of Times Moved in the Last Year: 0     Homeless in the Last Year: No       The following portions of the patient's history were reviewed and updated as appropriate: allergies, current medications, past family history, past medical history, past social history, past surgical history and problem list    AVELINA Stover  Date: 9/2/2024 Time: 11:19 AM

## 2024-09-02 NOTE — UTILIZATION REVIEW
Continued Stay Review    Observation on 08/31 @ 1955 upgraded on 09/01 @ 1733. Pt requiring continued stay d/t continued management of gross hematuria, continue weaning CBI ovn, clamp in am.    Admission Orders (From admission, onward)       Ordered        09/01/24 1733  INPATIENT ADMISSION  Once            08/31/24 1955  Place in Observation  Once                          Orders Placed This Encounter   Procedures    INPATIENT ADMISSION     Standing Status:   Standing     Number of Occurrences:   1     Order Specific Question:   Level of Care     Answer:   Med Surg [16]     Order Specific Question:   Estimated length of stay     Answer:   More than 2 Midnights     Order Specific Question:   Certification     Answer:   I certify that inpatient services are medically necessary for this patient for a duration of greater than two midnights. See H&P and MD Progress Notes for additional information about the patient's course of treatment.     SEE INITIAL REVIEW AT BOTTOM    Date: 09/02                          Current Patient Class: Inpatient  Current Level of Care: MS    HPI:73 y.o. male initially admitted on 09/01     Assessment/Plan:   09/01 Urology Consult: Gross Hematuria:  Pt on 3 way Parra-for CBI initiated in the ED. Urine clearing overnight substantially. Cont CBI, wean overnight. Clamp CBI in am. Cont Flomax. Add proscar.     Date: 09/02  Day 3: Has surpassed a 2nd midnight with active treatments and services.  Pt's hematuria has improved. Still appears dark with some sediment. Urine clear yellow with cbi clamped.  Continue Flomax. continue Proscar. Eliquis on hold, will resume tomorrow and monitor for bleeding.  no indication for acute  surgical intervention.

## 2024-09-02 NOTE — TELEPHONE ENCOUNTER
Seen in consultation for gross hematuria on eliquis. Should have follow up and cysto for completion of hematuria workup

## 2024-09-02 NOTE — PLAN OF CARE
Problem: OCCUPATIONAL THERAPY ADULT  Goal: Performs self-care activities at highest level of function for planned discharge setting.  See evaluation for individualized goals.  Description: Treatment Interventions: ADL retraining, Endurance training, UE strengthening/ROM, Functional transfer training, Cognitive reorientation, Equipment evaluation/education, Patient/family training, Neuromuscular reeducation, Compensatory technique education, Fine motor coordination activities, Activityengagement, Energy conservation          See flowsheet documentation for full assessment, interventions and recommendations.   Note: Limitation: Decreased UE strength, Decreased UE ROM, Decreased ADL status, Decreased endurance, Decreased cognition, Decreased fine motor control, Decreased self-care trans, Decreased high-level ADLs, Non-func L UE, Decreased Safe judgement during ADL  Prognosis: Good  Assessment: Pt is a 73 y.o. male seen for OT evaluation s/p admit to [facility] on 8/31/2024 w/ Gross hematuria.  Comorbidities affecting pt's functional performance at time of assessment include: sacral wound, h/o CVA w/ left sided weakness July2024, DM II, HTN, primary atrial fibrillation. Personal factors affecting pt at time of IE include:limited home support, difficulty performing ADLS, difficulty performing IADLS , limited insight into deficits, flat affect, and decreased initiation and engagement . Prior to admission, pt was at rehab at [facility] [place] and reports assist x2 for SPT, assist w/ ADLs, standing in parallel bars; prior to stroke in July was independent w/ ADLs, independent w/ functional transfers and mobility, independent w/ IADLs. Upon evaluation: Pt requires MAX assist x2 supine<>sit bed mobility, MAX assist x2 sit<>stand x6 trials w/ standing tolerance less than 1minute and max support as lateral lean to left and cues for upright posture, MAX assist UB ADLs, MOD assist grooming, MAX assist toileting hygiene 2* the  following deficits impacting occupational performance: Left hemiparesis, decreased  L hand, impaired sitting balance (retropulsive to Right), impaired standing tolerance and standing balance, impaired activity tolerance, fall risk, impaired insight and safety awareness, decreased initiation, impaired functional reach, impaired cognition, multiple lines, decreased strength and endurance, slight inattention to left side. Pt to benefit from continued skilled OT tx while in the hospital to address deficits as defined above and maximize level of functional independence w ADL's and functional mobility. Occupational Performance areas to address include: eating, grooming, bathing/shower, toilet hygiene, dressing, health maintenance, functional mobility, clothing management, cleaning, and meal prep, L UE exercises From OT standpoint, recommendation at time of d/c would be level II moderate resources.     Rehab Resource Intensity Level, OT: II (Moderate Resource Intensity)

## 2024-09-02 NOTE — PROGRESS NOTES
[facility]  Progress Note  Name: Drew Santos I  MRN: [id number]  Unit/Bed#: [place] [address] -01 I Date of Admission: 8/31/2024   Date of Service: 9/2/2024 I Hospital Day: 1    Assessment & Plan   Depression  Assessment & Plan  Continue escitalopram.  Mirtazapine was recently added.    Sacral wound  Assessment & Plan  Recently hospitalized at [facility].  Discharged with Augmentin end date 9/5/2024  Continue local wound care, q2h turns, santyl ordered  Wound care nurse consulted    Primary hypertension  Assessment & Plan  Continue lisinopril    Paroxysmal atrial fibrillation (HCC)  Assessment & Plan  Not on any AV william blocking agents.  Anticoagulation: Holding Eliquis due to gross hematuria    History of CVA (cerebrovascular accident)  Assessment & Plan  History of hospitalization at [facility] for CVA with left-sided weakness July 2024  Currently rehabbing at [facility]  Will continue aspirin but hold Eliquis due to gross hematuria    Type 2 diabetes mellitus without complication, without long-term current use of insulin ([facility])  Assessment & Plan  Lab Results   Component Value Date    HGBA1C 6.0 (H) 07/04/2024     Recent Labs     08/31/24  2212 09/01/24  0806 09/01/24  1057   POCGLU 102 99 112       Prior to admission on Farxiga.  Will be placed on sliding scale insulin during hospitalization    * Gross hematuria  Assessment & Plan  History of diabetes mellitus atrial fibrillation hypertension and stroke presents from SNF at [facility] for hematuria  During recent hospitalization at [facility] for sacral wound he had urinary catheter placement for retention.  Currently on CBI per recommendations from urology.  Eliquis on hold, will resume tomorrow and monitor for bleeding  Urology following               VTE Pharmacologic Prophylaxis:   Moderate Risk (Score 3-4) - Pharmacological DVT Prophylaxis Contraindicated. Sequential Compression Devices  Ordered.    Mobility:   Basic Mobility Inpatient Raw Score: 6  JH-HLM Goal: 2: Bed activities/Dependent transfer  JH-HLM Achieved: 2: Bed activities/Dependent transfer  JH-HLM Goal achieved. Continue to encourage appropriate mobility.    Patient Centered Rounds: I performed bedside rounds with nursing staff today.   Discussions with Specialists or Other Care Team Provider: Reviewed urologys note    Education and Discussions with Family / Patient: Updated  (wife) at bedside.    Total Time Spent on Date of Encounter in care of patient: 45 mins. This time was spent on one or more of the following: performing physical exam; counseling and coordination of care; obtaining or reviewing history; documenting in the medical record; reviewing/ordering tests, medications or procedures; communicating with other healthcare professionals and discussing with patient's family/caregivers.    Current Length of Stay: 1 day(s)  Current Patient Status: Inpatient   Certification Statement: The patient will continue to require additional inpatient hospital stay due to hematuria monitoring  Discharge Plan: Anticipate discharge in 24-48 hrs to rehab facility.    Code Status: Level 1 - Full Code    Subjective:   No acute events overnight.  Patient's hematuria has improved.  Still appears dark with some sediment.  CBI currently on hold.    Objective:     Vitals:   Temp (24hrs), Av.4 °F (36.9 °C), Min:97.7 °F (36.5 °C), Max:98.7 °F (37.1 °C)    Temp:  [97.7 °F (36.5 °C)-98.7 °F (37.1 °C)] 98.4 °F (36.9 °C)  HR:  [68-93] 68  Resp:  [15-18] 17  BP: (104-139)/(58-85) 132/74  SpO2:  [96 %-99 %] 98 %  Body mass index is 29.29 kg/m².     Input and Output Summary (last 24 hours):     Intake/Output Summary (Last 24 hours) at 2024 1415  Last data filed at 2024 1314  Gross per 24 hour   Intake 597 ml   Output 350 ml   Net 247 ml       Physical Exam:   Physical Exam  Vitals and nursing note reviewed.   Constitutional:        Appearance: Normal appearance.   HENT:      Head: Normocephalic and atraumatic.      Mouth/Throat:      Mouth: Mucous membranes are moist.      Pharynx: Oropharynx is clear. No oropharyngeal exudate.   Eyes:      Extraocular Movements: Extraocular movements intact.   Cardiovascular:      Rate and Rhythm: Normal rate and regular rhythm.      Pulses: Normal pulses.      Heart sounds: Normal heart sounds. No murmur heard.     No friction rub. No gallop.   Pulmonary:      Effort: Pulmonary effort is normal. No respiratory distress.      Breath sounds: Normal breath sounds. No stridor. No wheezing or rales.   Abdominal:      General: Abdomen is flat. Bowel sounds are normal. There is no distension.      Palpations: Abdomen is soft.      Tenderness: There is no abdominal tenderness.   Genitourinary:     Comments: Dark urine with sediment in Parra bag  Musculoskeletal:      Right lower leg: No edema.      Left lower leg: No edema.   Skin:     General: Skin is warm and dry.   Neurological:      General: No focal deficit present.      Mental Status: He is alert and oriented to person, place, and time.          Additional Data:     Labs:  Results from last 7 days   Lab Units 09/02/24  0506   WBC Thousand/uL 9.88   HEMOGLOBIN g/dL 11.4*   HEMATOCRIT % 35.6*   PLATELETS Thousands/uL 463*   SEGS PCT % 63   LYMPHO PCT % 29   MONO PCT % 6   EOS PCT % 2     Results from last 7 days   Lab Units 09/02/24  0506 09/01/24  0516   SODIUM mmol/L 143 142   POTASSIUM mmol/L 3.2* 3.1*   CHLORIDE mmol/L 109* 107   CO2 mmol/L 29 28   BUN mg/dL 9 8   CREATININE mg/dL 0.69 0.66   ANION GAP mmol/L 5 7   CALCIUM mg/dL 8.7 8.9   ALBUMIN g/dL  --  2.8*   TOTAL BILIRUBIN mg/dL  --  0.67   ALK PHOS U/L  --  95   ALT U/L  --  13   AST U/L  --  14   GLUCOSE RANDOM mg/dL 120 89     Results from last 7 days   Lab Units 08/31/24  1816   INR  1.39*     Results from last 7 days   Lab Units 09/02/24  1116 09/02/24  0713 09/01/24  2059 09/01/24  1610  09/01/24  1057 09/01/24  0806 08/31/24  2212 08/28/24  1147 08/28/24  0758 08/27/24  2056 08/27/24  1640 08/27/24  1154   POC GLUCOSE mg/dl 126 130 149* 151* 112 99 102 125 133 132 175* 138         Results from last 7 days   Lab Units 08/28/24  0526 08/27/24  1433 08/27/24  0437   LACTIC ACID mmol/L  --  1.1  --    PROCALCITONIN ng/ml 0.10  --  0.15       Lines/Drains:  Invasive Devices       Peripheral Intravenous Line  Duration             Peripheral IV 08/31/24 Right Antecubital 1 day              Drain  Duration             Continuous Bladder Irrigation Three-way 1 day    Urethral Catheter Three way 22 Fr. 1 day                  Urinary Catheter:  Goal for removal: N/A- Discharging with Parra           Telemetry:  Telemetry Orders (From admission, onward)               24 Hour Telemetry Monitoring  Continuous x 24 Hours (Telem)        Question:  Reason for 24 Hour Telemetry  Answer:  Arrhythmias requiring acute medical intervention / PPM or ICD malfunction                            Imaging: No pertinent imaging reviewed.    Recent Cultures (last 7 days):         Last 24 Hours Medication List:   Current Facility-Administered Medications   Medication Dose Route Frequency Provider Last Rate    acetaminophen  650 mg Oral Q4H PRN Christiano Reddy, DO      amoxicillin-clavulanate  1 tablet Oral Q12H [facility] Christiano Reddy, DO      aspirin  81 mg Oral Daily Christiano Reddy, DO      atorvastatin  80 mg Oral QPM Christiano Reddy, DO      Cholecalciferol  1,000 Units Oral Daily Christiano Reddy, DO      collagenase   Topical Daily Alma Vargas MD      docusate sodium  100 mg Oral BID Christiano Reddy, DO      escitalopram  10 mg Oral Daily Christiano Reddy, DO      ezetimibe  10 mg Oral Daily Christiano Reddy, DO      finasteride  5 mg Oral Daily AVELINA Sánchez      insulin lispro  1-6 Units Subcutaneous 4x Daily (AC & HS) Christiano Reddy, DO      lisinopril  10 mg Oral Daily Christiano Reddy, DO      mirtazapine  7.5  mg Oral HS Christiano Reddy DO      ondansetron  4 mg Intravenous Q4H PRN Christiano Reddy,       tamsulosin  0.4 mg Oral HS Christiano Reddy DO          Today, Patient Was Seen By: Ron Parson PA-C    **Please Note: This note may have been constructed using a voice recognition system.**

## 2024-09-02 NOTE — PLAN OF CARE
Problem: PAIN - ADULT  Goal: Verbalizes/displays adequate comfort level or baseline comfort level  Description: Interventions:  - Encourage patient to monitor pain and request assistance  - Assess pain using appropriate pain scale  - Administer analgesics based on type and severity of pain and evaluate response  - Implement non-pharmacological measures as appropriate and evaluate response  - Consider cultural and social influences on pain and pain management  - Notify physician/advanced practitioner if interventions unsuccessful or patient reports new pain  Outcome: Progressing     Problem: INFECTION - ADULT  Goal: Absence or prevention of progression during hospitalization  Description: INTERVENTIONS:  - Assess and monitor for signs and symptoms of infection  - Monitor lab/diagnostic results  - Monitor all insertion sites, i.e. indwelling lines, tubes, and drains  - Monitor endotracheal if appropriate and nasal secretions for changes in amount and color  - Fletcher appropriate cooling/warming therapies per order  - Administer medications as ordered  - Instruct and encourage patient and family to use good hand hygiene technique  - Identify and instruct in appropriate isolation precautions for identified infection/condition  Outcome: Progressing  Goal: Absence of fever/infection during neutropenic period  Description: INTERVENTIONS:  - Monitor WBC    Outcome: Progressing     Problem: SAFETY ADULT  Goal: Patient will remain free of falls  Description: INTERVENTIONS:  - Educate patient/family on patient safety including physical limitations  - Instruct patient to call for assistance with activity   - Consult OT/PT to assist with strengthening/mobility   - Keep Call bell within reach  - Keep bed low and locked with side rails adjusted as appropriate  - Keep care items and personal belongings within reach  - Initiate and maintain comfort rounds  - Make Fall Risk Sign visible to staff  - Offer Toileting every 2 Hours,  in advance of need  - Initiate/Maintain bed alarm  - Apply yellow socks and bracelet for high fall risk patients  - Consider moving patient to room near nurses station  Outcome: Progressing  Goal: Maintain or return to baseline ADL function  Description: INTERVENTIONS:  -  Assess patient's ability to carry out ADLs; assess patient's baseline for ADL function and identify physical deficits which impact ability to perform ADLs (bathing, care of mouth/teeth, toileting, grooming, dressing, etc.)  - Assess/evaluate cause of self-care deficits   - Assess range of motion  - Assess patient's mobility; develop plan if impaired  - Assess patient's need for assistive devices and provide as appropriate  - Encourage maximum independence but intervene and supervise when necessary  - Involve family in performance of ADLs  - Assess for home care needs following discharge   - Consider OT consult to assist with ADL evaluation and planning for discharge  - Provide patient education as appropriate  Outcome: Progressing  Goal: Maintains/Returns to pre admission functional level  Description: INTERVENTIONS:  - Perform AM-PAC 6 Click Basic Mobility/ Daily Activity assessment daily.  - Set and communicate daily mobility goal to care team and patient/family/caregiver.   - Collaborate with rehabilitation services on mobility goals if consulted  - Perform Range of Motion 3 times a day.  - Reposition patient every 2 hours.  - Dangle patient 3 times a day  - Out of bed for toileting  - Record patient progress and toleration of activity level   Outcome: Progressing     Problem: DISCHARGE PLANNING  Goal: Discharge to home or other facility with appropriate resources  Description: INTERVENTIONS:  - Identify barriers to discharge w/patient and caregiver  - Arrange for needed discharge resources and transportation as appropriate  - Identify discharge learning needs (meds, wound care, etc.)  - Arrange for interpretive services to assist at discharge  as needed  - Refer to Case Management Department for coordinating discharge planning if the patient needs post-hospital services based on physician/advanced practitioner order or complex needs related to functional status, cognitive ability, or social support system  Outcome: Progressing     Problem: Knowledge Deficit  Goal: Patient/family/caregiver demonstrates understanding of disease process, treatment plan, medications, and discharge instructions  Description: Complete learning assessment and assess knowledge base.  Interventions:  - Provide teaching at level of understanding  - Provide teaching via preferred learning methods  Outcome: Progressing     Problem: CARDIOVASCULAR - ADULT  Goal: Maintains optimal cardiac output and hemodynamic stability  Description: INTERVENTIONS:  - Monitor I/O, vital signs and rhythm  - Monitor for S/S and trends of decreased cardiac output  - Administer and titrate ordered vasoactive medications to optimize hemodynamic stability  - Assess quality of pulses, skin color and temperature  - Assess for signs of decreased coronary artery perfusion  - Instruct patient to report change in severity of symptoms  Outcome: Progressing  Goal: Absence of cardiac dysrhythmias or at baseline rhythm  Description: INTERVENTIONS:  - Continuous cardiac monitoring, vital signs, obtain 12 lead EKG if ordered  - Administer antiarrhythmic and heart rate control medications as ordered  - Monitor electrolytes and administer replacement therapy as ordered  Outcome: Progressing     Problem: GENITOURINARY - ADULT  Goal: Urinary catheter remains patent  Description: INTERVENTIONS:  - Assess patency of urinary catheter  - If patient has a chronic hanks, consider changing catheter if non-functioning  - Follow guidelines for intermittent irrigation of non-functioning urinary catheter  Outcome: Progressing     Problem: Prexisting or High Potential for Compromised Skin Integrity  Goal: Skin integrity is maintained  or improved  Description: INTERVENTIONS:  - Identify patients at risk for skin breakdown  - Assess and monitor skin integrity  - Assess and monitor nutrition and hydration status  - Monitor labs   - Assess for incontinence   - Turn and reposition patient  - Assist with mobility/ambulation  - Relieve pressure over bony prominences  - Avoid friction and shearing  - Provide appropriate hygiene as needed including keeping skin clean and dry  - Evaluate need for skin moisturizer/barrier cream  - Collaborate with interdisciplinary team   - Patient/family teaching  - Consider wound care consult   Outcome: Progressing     Problem: SKIN/TISSUE INTEGRITY - ADULT  Goal: Pressure injury heals and does not worsen  Description: Interventions:  - Implement low air loss mattress or specialty surface (Criteria met)  - Apply silicone foam dressing  - Instruct/assist with weight shifting every 30 minutes when in chair   - Limit chair time to 2 hour intervals  - Use special pressure reducing interventions such as cushion when in chair   - Apply fecal or urinary incontinence containment device   - Perform passive or active ROM every shift  - Turn and reposition patient & offload bony prominences every 2 hours   - Utilize friction reducing device or surface for transfers   - Consider consults to  interdisciplinary teams  - Use incontinent care products after each incontinent episode such as hydroguard  - Consider nutrition services referral as needed  Outcome: Progressing

## 2024-09-02 NOTE — PLAN OF CARE
Problem: PHYSICAL THERAPY ADULT  Goal: Performs mobility at highest level of function for planned discharge setting.  See evaluation for individualized goals.  Description: Treatment/Interventions: Functional transfer training, LE strengthening/ROM, Therapeutic exercise, Endurance training, Patient/family training, Equipment eval/education, Bed mobility, Gait training, OT          See flowsheet documentation for full assessment, interventions and recommendations.  Note: Prognosis: Fair  Problem List: Decreased strength, Decreased endurance, Impaired balance, Decreased mobility, Impaired vision, Decreased skin integrity  Assessment: Pt is a 73 y.o. male seen for PT evaluation s/p admit to [facility] on 8/31/2024 w/ Gross hematuria.  Order placed for PT.  Prior to admission, pt required A for mobility and was in rehab due to recent CVA in July 2024 . Upon evaluation: Pt requires  max of 2 assistance for bed mobility and transfers and able to stand for 60 seconds with Max A of 2 and UE support .  Pt's clinical presentation is currently unstable/unpredictable given the functional mobility deficits above, especially weakness, decreased skin integrity, decreased endurance, decreased activity tolerance, decreased functional mobility tolerance, and impaired tone, coupled with fall risks including impaired balance, decreased safety awareness, and altered vision, and combined with medical complications of abnormal H&H, readmission to hospital, and abnormal potassium values.  Pt to benefit from continued skilled PT tx while in hospital and upon DC to address deficits as defined above and maximize level of functional mobility. The patient's AM-PAC Basic Mobility Inpatient Short Form Raw Score is 8. A Raw score of less than or equal to 16 suggests the patient may benefit from discharge to post-acute rehabilitation services. From PT/mobility standpoint, recommendation at time of d/c would be  Level 2 services with  return to complete Care to finish Stroke rehab  pending progress in order to maximize pt's functional independence and consistency w/ mobility in order to facilitate return to PLOF.  Recommend trial with cane next 1-2 sessions, ther ex next 1-2 sessions, and case management consult.  Barriers to Discharge: Decreased caregiver support, Inaccessible home environment     Rehab Resource Intensity Level, PT: II (Moderate Resource Intensity)    See flowsheet documentation for full assessment.

## 2024-09-02 NOTE — SPEECH THERAPY NOTE
Speech Language/Pathology    Speech/Language Pathology Progress Note    Patient Name: Drew Santos  Today's Date: 9/2/2024     Problem List  Principal Problem:    Gross hematuria  Active Problems:    Type 2 diabetes mellitus without complication, without long-term current use of insulin (HCC)    History of CVA (cerebrovascular accident)    Paroxysmal atrial fibrillation (HCC)    Primary hypertension    Sacral wound    Depression       Past Medical History  Past Medical History:   Diagnosis Date    Atherosclerotic heart disease of native coronary artery without angina pectoris     Atrial fibrillation (HCC)     Cerebral infarction (HCC)     Constipation     Depression     Diabetes mellitus (HCC)     Hemiplegia and hemiparesis following cerebral infarction affecting left non-dominant side (HCC)     Hyperlipidemia     Hypertension     Pressure ulcer of sacral region, stage 3 (HCC)     Prostatic hyperplasia     Sepsis (HCC)     Stroke (HCC)     TIA (transient ischemic attack)     Urinary retention     Vitamin D deficiency         Past Surgical History  History reviewed. No pertinent surgical history.    Pt refused all food and liquid at lunch. Will f/u as able.

## 2024-09-03 PROBLEM — R43.2 DYSGEUSIA: Status: ACTIVE | Noted: 2024-09-03

## 2024-09-03 LAB
GLUCOSE SERPL-MCNC: 126 MG/DL (ref 65–140)
GLUCOSE SERPL-MCNC: 133 MG/DL (ref 65–140)
GLUCOSE SERPL-MCNC: 171 MG/DL (ref 65–140)
GLUCOSE SERPL-MCNC: 176 MG/DL (ref 65–140)

## 2024-09-03 PROCEDURE — 82948 REAGENT STRIP/BLOOD GLUCOSE: CPT

## 2024-09-03 PROCEDURE — 99232 SBSQ HOSP IP/OBS MODERATE 35: CPT | Performed by: UROLOGY

## 2024-09-03 PROCEDURE — 99232 SBSQ HOSP IP/OBS MODERATE 35: CPT | Performed by: PHYSICIAN ASSISTANT

## 2024-09-03 RX ORDER — MIRTAZAPINE 15 MG/1
15 TABLET, FILM COATED ORAL
Status: DISCONTINUED | OUTPATIENT
Start: 2024-09-03 | End: 2024-09-04 | Stop reason: HOSPADM

## 2024-09-03 RX ADMIN — INSULIN LISPRO 1 UNITS: 100 INJECTION, SOLUTION INTRAVENOUS; SUBCUTANEOUS at 12:26

## 2024-09-03 RX ADMIN — LISINOPRIL 10 MG: 10 TABLET ORAL at 09:24

## 2024-09-03 RX ADMIN — Medication 1000 UNITS: at 09:24

## 2024-09-03 RX ADMIN — INSULIN LISPRO 1 UNITS: 100 INJECTION, SOLUTION INTRAVENOUS; SUBCUTANEOUS at 22:10

## 2024-09-03 RX ADMIN — ESCITALOPRAM OXALATE 10 MG: 10 TABLET ORAL at 09:24

## 2024-09-03 RX ADMIN — COLLAGENASE SANTYL: 250 OINTMENT TOPICAL at 10:16

## 2024-09-03 RX ADMIN — AMOXICILLIN AND CLAVULANATE POTASSIUM 1 TABLET: 875; 125 TABLET, COATED ORAL at 09:24

## 2024-09-03 RX ADMIN — TAMSULOSIN HYDROCHLORIDE 0.4 MG: 0.4 CAPSULE ORAL at 22:09

## 2024-09-03 RX ADMIN — DOCUSATE SODIUM 100 MG: 100 CAPSULE, LIQUID FILLED ORAL at 09:24

## 2024-09-03 RX ADMIN — MIRTAZAPINE 15 MG: 15 TABLET, FILM COATED ORAL at 22:09

## 2024-09-03 RX ADMIN — EZETIMIBE 10 MG: 10 TABLET ORAL at 09:24

## 2024-09-03 RX ADMIN — CHLORHEXIDINE GLUCONATE 15 ML: 1.2 RINSE ORAL at 21:33

## 2024-09-03 RX ADMIN — DOCUSATE SODIUM 100 MG: 100 CAPSULE, LIQUID FILLED ORAL at 22:09

## 2024-09-03 RX ADMIN — ASPIRIN 81 MG CHEWABLE TABLET 81 MG: 81 TABLET CHEWABLE at 09:24

## 2024-09-03 RX ADMIN — CHLORHEXIDINE GLUCONATE 15 ML: 1.2 RINSE ORAL at 10:16

## 2024-09-03 RX ADMIN — CLOTRIMAZOLE 10 MG: 10 LOZENGE ORAL; TOPICAL at 12:00

## 2024-09-03 RX ADMIN — ATORVASTATIN CALCIUM 80 MG: 80 TABLET, FILM COATED ORAL at 22:08

## 2024-09-03 RX ADMIN — CLOTRIMAZOLE 10 MG: 10 LOZENGE ORAL; TOPICAL at 06:21

## 2024-09-03 RX ADMIN — APIXABAN 5 MG: 5 TABLET, FILM COATED ORAL at 22:09

## 2024-09-03 RX ADMIN — CLOTRIMAZOLE 10 MG: 10 LOZENGE ORAL; TOPICAL at 16:54

## 2024-09-03 RX ADMIN — AMOXICILLIN AND CLAVULANATE POTASSIUM 1 TABLET: 875; 125 TABLET, COATED ORAL at 22:09

## 2024-09-03 RX ADMIN — CLOTRIMAZOLE 10 MG: 10 LOZENGE ORAL; TOPICAL at 22:09

## 2024-09-03 RX ADMIN — FINASTERIDE 5 MG: 5 TABLET, FILM COATED ORAL at 09:24

## 2024-09-03 NOTE — PROGRESS NOTES
Progress Note - Urology  Drew Santos 1950, 73 y.o. male MRN: [id number]    Unit/Bed#: [place] [address] -02 Encounter: [id number]      * Gross hematuria  Assessment & Plan  Without evidence of infection, obstructing nephroureterolithiasis or active  tract hemorrhage.  Against background of recent CVA with requirement for anticoagulation.  Hgb remains stable at 11.4 (9/2)   CBI clamped 0600 on 9/2/2024 -- remains clear and orange   Continue Flomax and Proscar   Eliquis currently on hold x 5 days -- restart today   Monitor urine closely   Flush hanks catheter PRN signs of obstruction  Intermittent bladder scans as patients urine output is decreased -- 500 cc in last 24 hours    If after 24 hours.  Should patient rebleed, consider changing agent  Recommend outpatient cystoscopic and CT urogram for completeness of gross hematuria workup  Currently no indication for acute  surgical intervention      Subjective:   CBI clamped at 0600 on 9/2/2024.  Hanks catheter remains inserted draining clear orange urine.  Patient continues to deny pain.  Labs remain overall stable.  No signs of urinary tract infection.  Cleared to resume Eliquis today, monitor urine closely.  Recommend outpatient cystoscopy and CT urogram to complete gross hematuria workup.  No indication for further  intervention at this time.     Review of Systems   Constitutional:  Positive for appetite change (decreased). Negative for activity change, chills, fatigue and fever.   Respiratory:  Negative for apnea, cough and shortness of breath.    Cardiovascular:  Negative for chest pain and leg swelling.   Gastrointestinal:  Negative for abdominal distention, abdominal pain, constipation, diarrhea, nausea and vomiting.   Genitourinary:  Positive for decreased urine volume and hematuria (orange). Negative for difficulty urinating, dysuria, flank pain, frequency, penile pain, testicular pain and urgency.   Neurological:  Negative for dizziness and headaches.    Psychiatric/Behavioral: Negative.         Objective:  Vitals: Blood pressure 121/63, pulse 70, temperature 98 °F (36.7 °C), resp. rate 16, weight 109 kg (240 lb 8.4 oz), SpO2 98%.,Body mass index is 29.29 kg/m².    Intake/Output Summary (Last 24 hours) at 9/3/2024 1255  Last data filed at 9/3/2024 0549  Gross per 24 hour   Intake 140 ml   Output 500 ml   Net -360 ml     Invasive Devices       Peripheral Intravenous Line  Duration             Peripheral IV 08/31/24 Right Antecubital 2 days              Drain  Duration             Continuous Bladder Irrigation Three-way 2 days    Urethral Catheter Three way 22 Fr. 2 days                    Physical Exam    Labs:  Recent Labs     08/31/24 1816 09/01/24  0516 09/02/24  0506   WBC 10.37* 11.40* 9.88     Recent Labs     08/31/24 1816 09/01/24  0516 09/02/24  0506   HGB 10.8* 11.0* 11.4*       Recent Labs     08/31/24 1816 09/01/24  0516 09/02/24  0506   CREATININE 0.76 0.66 0.69       History:    Past Medical History:   Diagnosis Date    Atherosclerotic heart disease of native coronary artery without angina pectoris     Atrial fibrillation (HCC)     Cerebral infarction (HCC)     Constipation     Depression     Diabetes mellitus (HCC)     Hemiplegia and hemiparesis following cerebral infarction affecting left non-dominant side (HCC)     Hyperlipidemia     Hypertension     Pressure ulcer of sacral region, stage 3 (HCC)     Prostatic hyperplasia     Sepsis (HCC)     Stroke (HCC)     TIA (transient ischemic attack)     Urinary retention     Vitamin D deficiency      History reviewed. No pertinent surgical history.  History reviewed. No pertinent family history.  Social History     Socioeconomic History    Marital status: /Civil Union     Spouse name: None    Number of children: None    Years of education: None    Highest education level: None   Occupational History    None   Tobacco Use    Smoking status: Never     Passive exposure: Never    Smokeless tobacco:  Never   Vaping Use    Vaping status: Never Used   Substance and Sexual Activity    Alcohol use: Not Currently    Drug use: Never    Sexual activity: None   Other Topics Concern    None   Social History Narrative    None     Social Determinants of Health     Financial Resource Strain: Low Risk  (7/10/2024)    Received from [facility]    Overall Financial Resource Strain (CARDIA)     Difficulty of Paying Living Expenses: Not hard at all   Food Insecurity: No Food Insecurity (8/27/2024)    Hunger Vital Sign     Worried About Running Out of Food in the Last Year: Never true     Ran Out of Food in the Last Year: Never true   Transportation Needs: No Transportation Needs (8/27/2024)    PRAPARE - Transportation     Lack of Transportation (Medical): No     Lack of Transportation (Non-Medical): No   Physical Activity: Not on file   Stress: Stress Concern Present (5/8/2023)    [facility] from [facility], [facility]    [place] [employer] - Occupational Stress Questionnaire     Feeling of Stress : To some extent   Social Connections: Feeling Socially Integrated (7/25/2024)    Received from [facility]    OASIS : Social Isolation     How often do you feel lonely or isolated from those around you?: Rarely   Intimate Partner Violence: Not At Risk (7/10/2024)    Received from [facility]    Humiliation, Afraid, Rape, and Kick questionnaire     Fear of Current or Ex-Partner: No     Emotionally Abused: No     Physically Abused: No     Sexually Abused: No   Housing Stability: Low Risk  (8/27/2024)    Housing Stability Vital Sign     Unable to Pay for Housing in the Last Year: No     Number of Times Moved in the Last Year: 0     Homeless in the Last Year: No         Stephany Salinas PA-C  Date: 9/3/2024 Time: 12:55 PM

## 2024-09-03 NOTE — UTILIZATION REVIEW
Sandy Mccarthy, SMITH   Registered Nurse  Specialty: Utilization Review     Utilization Review      Addendum     Date of Service: 8/31/2024 10:09 AM     Expand All Collapse All    Initial Clinical Review     Admission: Date/Time/Statement:   Admission Orders (From admission, onward)          Ordered         08/31/24 1955   Place in Observation  Once                                     Orders Placed This Encounter   Procedures    Place in Observation       Standing Status:   Standing       Number of Occurrences:   1       Order Specific Question:   Level of Care       Answer:   Med Surg [16]      ED Arrival Information         Expected   -    Arrival   8/31/2024 17:09    Acuity   Urgent                 Means of arrival   Ambulance    Escorted by   [facility]    Service   Hospitalist    Admission type   Emergency                 Arrival complaint   Blood in urine                     Chief Complaint   Patient presents with    Blood in Urine       Brought by EMS from [facility].  Pt sent here for blood in urine/hanks catheter         Initial Presentation: 73 y.o. male to ED by ems admitted observation d/t Gross hematuria. History of diabetes mellitus atrial fibrillation hypertension and stroke presents from SNF at [facility] for hematuria. hospitalized at [facility] July for CVA and went to rehab. recent hospitalization at [facility] for sacral wound he had urinary catheter placement for retention.Currently on CBI. PT 17.1, INR 1.39. PTT 38.  WBC 10.37.HGB 10.8, HCT 32.9.-136.hold Eliquis      Anticipated Length of Stay/Certification Statement: Patient will be admitted on an observation basis with an anticipated length of stay of less than 2 midnights secondary to gross hematuria.      9/1: Denies any complaints at this time. Denies pain, shortness of breath. Denies abdominal pain. WBC 11.40. HGB 11, HCT 33.5.K 3.1.hold Eliquis.Currently on CBI.              ED Triage Vitals   Temperature Pulse Respirations Blood Pressure SpO2 Pain Score   08/31/24 2128 08/31/24 1719 08/31/24 1719 08/31/24 1719 08/31/24 1719 08/31/24 1719   98.6 °F (37 °C) 71 18 139/62 98 % No Pain      Weight (last 2 days)         Date/Time Weight     08/31/24 1719 109 (240.52)                Vital Signs (last 3 days)         Date/Time Temp Pulse Resp BP MAP (mmHg) SpO2 O2 Device Patient Position - Orthostatic VS Swapnil Coma Scale Score Pain     09/01/24 08:10:37 98.1 °F (36.7 °C) 73 16 142/82 102 97 % None (Room air) Lying -- --     08/31/24 2331 -- -- -- -- -- -- -- -- -- No Pain     08/31/24 2300 -- -- -- -- -- -- None (Room air) -- 14 No Pain     08/31/24 22:57:12 99.3 °F (37.4 °C) 136 -- 174/101 125 100 % -- -- -- --     08/31/24 21:28:42 98.6 °F (37 °C) 114 22 154/76 102 100 % -- -- -- --     08/31/24 1940 -- 64 18 133/60 -- 98 % None (Room air) Lying -- --     08/31/24 1740 -- -- -- -- -- -- -- -- 15 --     08/31/24 1719 -- 71 18 139/62 -- 98 % -- -- -- No Pain                   Pertinent Labs/Diagnostic Test Results:   Radiology:  CT renal stone study abdomen pelvis without contrast   Final Interpretation by Daniel Avina DO (08/31 1911)       Small nonobstructing renal collecting system calculi in the lower pole of the left kidney. No hydronephrosis or renal colic. No renal parenchymal mass visible on this noncontrast study. There is mild diffuse bilateral perinephric edema however. Urinary    bladder collapsed by Parra catheter. Urinary bladder wall thickening likely the result of a combination of trabeculation and incomplete distention. The urine is intermediate attenuation suggesting bloody components as seen clinically.       Recommend hematuria/renal protocol contrast-enhanced CT if hematuria persists without cause.       Cholelithiasis without CT evidence of cholecystitis.           The study was marked in EPIC for immediate notification.       Workstation performed: [id number]                          Results from last 7 days   Lab Units 08/28/24  1505   SARS-COV-2   Negative               Results from last 7 days   Lab Units 09/01/24  0516 08/31/24 1816 08/28/24  0526 08/27/24  0437 08/26/24  0512   WBC Thousand/uL 11.40* 10.37* 9.29 10.64* 10.49*   HEMOGLOBIN g/dL 11.0* 10.8* 10.1* 12.3 10.9*   HEMATOCRIT % 33.5* 32.9* 31.7* 37.6 33.1*   PLATELETS Thousands/uL 467* 440* 396* 392* 370   TOTAL NEUT ABS Thousands/µL  --  7.01  --   --  6.79                  Results from last 7 days   Lab Units 09/01/24  0516 08/31/24 1816 08/28/24 0526 08/27/24  0437   SODIUM mmol/L 142 142 141 140   POTASSIUM mmol/L 3.1* 4.1 2.8* 3.1*   CHLORIDE mmol/L 107 107 107 106   CO2 mmol/L 28 28 27 26   ANION GAP mmol/L 7 7 7 8   BUN mg/dL 8 8 9 13   CREATININE mg/dL 0.66 0.76 0.70 0.65   EGFR ml/min/1.73sq m 94 89 92 95   CALCIUM mg/dL 8.9 9.0 8.8 9.0   MAGNESIUM mg/dL  --   --  1.7*  --              Results from last 7 days   Lab Units 09/01/24  0516 08/31/24 1816 08/28/24  0526   AST U/L 14 24 18   ALT U/L 13 15 19   ALK PHOS U/L 95 102 88   TOTAL PROTEIN g/dL 7.3 7.3 6.8   ALBUMIN g/dL 2.8* 2.8* 2.8*   TOTAL BILIRUBIN mg/dL 0.67 0.62 0.65                      Results from last 7 days   Lab Units 09/01/24  0806 08/31/24  2212 08/28/24  1147 08/28/24  0758 08/27/24 2056 08/27/24 1640 08/27/24  1154 08/27/24  0746 08/26/24 2050 08/26/24 1640 08/26/24  1139 08/26/24  0722   POC GLUCOSE mg/dl 99 102 125 133 132 175* 138 111 137 152* 139 159*              Results from last 7 days   Lab Units 09/01/24  0516 08/31/24  1816 08/28/24  0526 08/27/24  0437   GLUCOSE RANDOM mg/dL 89 132 104 106              Results from last 7 days   Lab Units 08/31/24  1816   PROTIME seconds 17.1*   INR   1.39*   PTT seconds 38*                Results from last 7 days   Lab Units 08/28/24  0526 08/27/24  0437   PROCALCITONIN ng/ml 0.10 0.15           Results from last 7 days   Lab Units 08/27/24  1433   LACTIC ACID mmol/L 1.1               Results from last 7 days   Lab Units 08/31/24  1941   CLARITY UA   Clear   COLOR UA   Colorless   SPEC GRAV UA   1.004   PH UA   5.0   GLUCOSE UA mg/dl 100 (1/10%)*   KETONES UA mg/dl Negative   BLOOD UA   Large*   PROTEIN UA mg/dl Negative   NITRITE UA   Negative   BILIRUBIN UA   Negative   UROBILINOGEN UA (BE) mg/dl <2.0   LEUKOCYTES UA   Negative   WBC UA /hpf 1-2   RBC UA /hpf Innumerable*   BACTERIA UA /hpf None Seen   EPITHELIAL CELLS WET PREP /hpf Occasional   MUCUS THREADS   Occasional*           Results from last 7 days   Lab Units 08/28/24  1505   INFLUENZA A PCR   Negative   INFLUENZA B PCR   Negative   RSV PCR   Negative         Medical History        Past Medical History:   Diagnosis Date    Atherosclerotic heart disease of native coronary artery without angina pectoris      Atrial fibrillation (HCC)      Cerebral infarction (HCC)      Constipation      Depression      Diabetes mellitus (HCC)      Hemiplegia and hemiparesis following cerebral infarction affecting left non-dominant side (HCC)      Hyperlipidemia      Hypertension      Pressure ulcer of sacral region, stage 3 (HCC)      Prostatic hyperplasia      Sepsis (HCC)      Stroke (HCC)      TIA (transient ischemic attack)      Urinary retention      Vitamin D deficiency           Present on Admission:   Paroxysmal atrial fibrillation (HCC)   Primary hypertension   Sacral wound   Type 2 diabetes mellitus without complication, without long-term current use of insulin (HCC)        Admitting Diagnosis: Blood in urine [R31.9]  Hematuria [R31.9]  Age/Sex: 73 y.o. male  Admission Orders:  Scheduled Medications:  amoxicillin-clavulanate, 1 tablet, Oral, Q12H ELISEO  aspirin, 81 mg, Oral, Daily  atorvastatin, 80 mg, Oral, QPM  Cholecalciferol, 1,000 Units, Oral, Daily  docusate sodium, 100 mg, Oral, BID  escitalopram, 10 mg, Oral, Daily  ezetimibe, 10 mg, Oral, Daily  insulin lispro, 1-6 Units, Subcutaneous, 4x Daily (AC & HS)  lisinopril, 10 mg, Oral,  Daily  mirtazapine, 7.5 mg, Oral, HS  silver sulfadiazine, 1 Application, Topical, Daily  tamsulosin, 0.4 mg, Oral, HS        Continuous IV Infusions:  Infusions Meds - Displays dose, route, & frequency only         PRN Meds:  acetaminophen, 650 mg, Oral, Q4H PRN  ondansetron, 4 mg, Intravenous, Q4H PRN     CARDIAC DYSPHAGIA DIET  AMBULATE  PT/OT/ST  SCD  OOB  I&O  TELE  BLADDER IRRIGATION        IP CONSULT TO UROLOGY     Network Utilization Review Department  ATTENTION: Please call with any questions or concerns to [phone] and carefully listen to the prompts so that you are directed to the right person. All voicemails are confidential.   For Discharge needs, contact Care Management DC Support Team at [phone] opt. [phone]  Send all requests for admission clinical reviews, approved or denied determinations and any other requests to dedicated fax number below belonging to the [place] where the patient is receiving treatment. List of dedicated fax numbers for the Facilities:  FACILITY NAME UR FAX NUMBER   ADMISSION DENIALS (Administrative/Medical Necessity) [phone]   DISCHARGE SUPPORT TEAM (NETWORK) [phone]   PARENT CHILD HEALTH (Maternity/NICU/Pediatrics) [phone]   [facility] [phone]   [facility] [phone]   [facility] [phone]   [facility] [phone]   [facility] [phone]   [facility] [phone]   [facility] [phone]   [facility] [phone]   [facility] [phone]   [facility] [phone]   [facility] [phone]   [facility] [phone]                  Revision History

## 2024-09-03 NOTE — ASSESSMENT & PLAN NOTE
Recently hospitalized at [facility].  Discharged with Augmentin end date 9/5/2024  Continue local wound care, q2h vimal, santyl ordered  Wound care nurse consulted

## 2024-09-03 NOTE — PROGRESS NOTES
[facility]  Progress Note  Name: Drew Santos I  MRN: [id number]  Unit/Bed#: [place] [address] -01 I Date of Admission: 8/31/2024   Date of Service: 9/3/2024 I Hospital Day: 2    Assessment & Plan   Dysgeusia  Assessment & Plan  Started on salt water rinses, chlorhexidine mouthwash, and clotrimazole troches with improvement  Will continue    Depression  Assessment & Plan  Continue escitalopram.  Mirtazapine increased 15mg.    Sacral wound  Assessment & Plan  Recently hospitalized at [facility].  Discharged with Augmentin end date 9/5/2024  Continue local wound care, q2h turns, santyl ordered  Wound care nurse consulted    Primary hypertension  Assessment & Plan  Continue lisinopril    Paroxysmal atrial fibrillation (HCC)  Assessment & Plan  Not on any AV william blocking agents.  Anticoagulation: Holding Eliquis due to gross hematuria    History of CVA (cerebrovascular accident)  Assessment & Plan  History of hospitalization at [facility] for CVA with left-sided weakness July 2024  Currently rehabbing at [facility]  On aspirin, restarting Eliquis tonight    Type 2 diabetes mellitus without complication, without long-term current use of insulin (HCC)  Assessment & Plan  Lab Results   Component Value Date    HGBA1C 6.0 (H) 07/04/2024     Recent Labs     09/02/24  1612 09/02/24  2132 09/03/24  0728 09/03/24  1125   POCGLU 194* 110 126 176*       Prior to admission on Farxiga.  Will be placed on sliding scale insulin during hospitalization    * Gross hematuria  Assessment & Plan  History of diabetes mellitus atrial fibrillation hypertension and stroke presents from SNF at [facility] for hematuria  During recent hospitalization at [facility] for sacral wound he had urinary catheter placement for retention.  Currently on CBI per recommendations from urology.  Resume Eliquis tonight, monitor for bleeding  Urology following               VTE Pharmacologic  Prophylaxis:   Moderate Risk (Score 3-4) - Pharmacological DVT Prophylaxis Ordered: apixaban (Eliquis).    Mobility:   Basic Mobility Inpatient Raw Score: 8  JH-HLM Goal: 3: Sit at edge of bed  JH-HLM Achieved: 1: Laying in bed  JH-HLM Goal NOT achieved. Continue with multidisciplinary rounding and encourage appropriate mobility to improve upon JH-HLM goals.    Patient Centered Rounds: I performed bedside rounds with nursing staff today.   Discussions with Specialists or Other Care Team Provider: Urology    Education and Discussions with Family / Patient: Updated  (wife) via phone.    Total Time Spent on Date of Encounter in care of patient: 45 mins. This time was spent on one or more of the following: performing physical exam; counseling and coordination of care; obtaining or reviewing history; documenting in the medical record; reviewing/ordering tests, medications or procedures; communicating with other healthcare professionals and discussing with patient's family/caregivers.    Current Length of Stay: 2 day(s)  Current Patient Status: Inpatient   Certification Statement: The patient will continue to require additional inpatient hospital stay due to hematuria, lack of appetite  Discharge Plan: Anticipate discharge in 24-48 hrs to rehab facility.    Code Status: Level 1 - Full Code    Subjective:   No acute events overnight.  Patient reports improvement in his vinegar taste in his mouth.  Additionally, he is eating watermelon this morning.    Objective:     Vitals:   Temp (24hrs), Av.2 °F (36.8 °C), Min:98 °F (36.7 °C), Max:98.5 °F (36.9 °C)    Temp:  [98 °F (36.7 °C)-98.5 °F (36.9 °C)] 98 °F (36.7 °C)  HR:  [70-77] 71  Resp:  [16-18] 16  BP: (102-130)/(63-70) 130/68  SpO2:  [95 %-99 %] 99 %  Body mass index is 29.29 kg/m².     Input and Output Summary (last 24 hours):     Intake/Output Summary (Last 24 hours) at 9/3/2024 6940  Last data filed at 9/3/2024 0595  Gross per 24 hour   Intake 60 ml    Output 500 ml   Net -440 ml       Physical Exam:   Physical Exam  Vitals and nursing note reviewed.   Constitutional:       Appearance: Normal appearance.   HENT:      Head: Normocephalic and atraumatic.      Mouth/Throat:      Mouth: Mucous membranes are moist.      Pharynx: Oropharynx is clear. No oropharyngeal exudate.   Eyes:      Extraocular Movements: Extraocular movements intact.   Cardiovascular:      Rate and Rhythm: Normal rate and regular rhythm.      Pulses: Normal pulses.      Heart sounds: Normal heart sounds. No murmur heard.     No friction rub. No gallop.   Pulmonary:      Effort: Pulmonary effort is normal. No respiratory distress.      Breath sounds: Normal breath sounds. No stridor. No wheezing or rales.   Abdominal:      General: Abdomen is flat. Bowel sounds are normal. There is no distension.      Palpations: Abdomen is soft.      Tenderness: There is no abdominal tenderness.   Musculoskeletal:      Right lower leg: No edema.      Left lower leg: No edema.   Skin:     General: Skin is warm and dry.   Neurological:      General: No focal deficit present.      Mental Status: He is alert and oriented to person, place, and time.          Additional Data:     Labs:  Results from last 7 days   Lab Units 09/02/24  0506   WBC Thousand/uL 9.88   HEMOGLOBIN g/dL 11.4*   HEMATOCRIT % 35.6*   PLATELETS Thousands/uL 463*   SEGS PCT % 63   LYMPHO PCT % 29   MONO PCT % 6   EOS PCT % 2     Results from last 7 days   Lab Units 09/02/24  0506 09/01/24  0516   SODIUM mmol/L 143 142   POTASSIUM mmol/L 3.2* 3.1*   CHLORIDE mmol/L 109* 107   CO2 mmol/L 29 28   BUN mg/dL 9 8   CREATININE mg/dL 0.69 0.66   ANION GAP mmol/L 5 7   CALCIUM mg/dL 8.7 8.9   ALBUMIN g/dL  --  2.8*   TOTAL BILIRUBIN mg/dL  --  0.67   ALK PHOS U/L  --  95   ALT U/L  --  13   AST U/L  --  14   GLUCOSE RANDOM mg/dL 120 89     Results from last 7 days   Lab Units 08/31/24  1816   INR  1.39*     Results from last 7 days   Lab Units  09/03/24  1125 09/03/24  0728 09/02/24  2132 09/02/24  1612 09/02/24  1116 09/02/24  0713 09/01/24  2059 09/01/24  1610 09/01/24  1057 09/01/24  0806 08/31/24  2212 08/28/24  1147   POC GLUCOSE mg/dl 176* 126 110 194* 126 130 149* 151* 112 99 102 125         Results from last 7 days   Lab Units 08/28/24  0526   PROCALCITONIN ng/ml 0.10       Lines/Drains:  Invasive Devices       Peripheral Intravenous Line  Duration             Peripheral IV 08/31/24 Right Antecubital 2 days              Drain  Duration             Continuous Bladder Irrigation Three-way 2 days    Urethral Catheter Three way 22 Fr. 2 days                  Urinary Catheter:  Goal for removal: N/A- Discharging with Parra               Imaging: No pertinent imaging reviewed.    Recent Cultures (last 7 days):         Last 24 Hours Medication List:   Current Facility-Administered Medications   Medication Dose Route Frequency Provider Last Rate    acetaminophen  650 mg Oral Q4H PRN Christiano Reddy, DO      amoxicillin-clavulanate  1 tablet Oral Q12H [facility] Christiano Reddy, DO      apixaban  5 mg Oral BID Ron Parson PA-C      aspirin  81 mg Oral Daily Christiano Reddy, DO      atorvastatin  80 mg Oral QPM Christiano Reddy, DO      chlorhexidine  15 mL Swish & Spit Q12H [facility] Ron Parson PA-C      Cholecalciferol  1,000 Units Oral Daily Christiano Reddy, DO      clotrimazole  10 mg Oral 5x Daily Ron Parson PA-C      collagenase   Topical Daily Alma Vargas MD      docusate sodium  100 mg Oral BID Christiano Reddy, DO      escitalopram  10 mg Oral Daily Christiano Reddy, DO      ezetimibe  10 mg Oral Daily Christiano Reddy, DO      finasteride  5 mg Oral Daily AVELINA Sánchez      insulin lispro  1-6 Units Subcutaneous 4x Daily (AC & HS) Christiano Reddy, DO      lisinopril  10 mg Oral Daily Christiano Reddy, DO      mirtazapine  15 mg Oral HS Ron Parson PA-C      ondansetron  4 mg Intravenous Q4H PRN Christiano Reddy, DO       tamsulosin  0.4 mg Oral HS Christiano Reddy DO          Today, Patient Was Seen By: Ron Parson PA-C    **Please Note: This note may have been constructed using a voice recognition system.**

## 2024-09-03 NOTE — ASSESSMENT & PLAN NOTE
History of diabetes mellitus atrial fibrillation hypertension and stroke presents from SNF at [facility] for hematuria  During recent hospitalization at [facility] for sacral wound he had urinary catheter placement for retention.  Currently on CBI per recommendations from urology.  Resume Eliquis tonight, monitor for bleeding  Urology following

## 2024-09-03 NOTE — TELEPHONE ENCOUNTER
This patient is still hospitalized.      Samantha saw him yesterday and Stephany saw him today.      It is recommended that the patient have a follow up appt and possible cystoscopy. He lives in [place].  Once patient is released, could you maybe schedule him at [place] since it would be more convenient for him?    Thank you.

## 2024-09-03 NOTE — ASSESSMENT & PLAN NOTE
Lab Results   Component Value Date    HGBA1C 6.0 (H) 07/04/2024     Recent Labs     09/02/24  1612 09/02/24  2132 09/03/24  0728 09/03/24  1125   POCGLU 194* 110 126 176*       Prior to admission on Farxiga.  Will be placed on sliding scale insulin during hospitalization

## 2024-09-03 NOTE — ASSESSMENT & PLAN NOTE
Started on salt water rinses, chlorhexidine mouthwash, and clotrimazole troches with improvement  Will continue

## 2024-09-03 NOTE — SPEECH THERAPY NOTE
Speech Language/Pathology    Attempted to see pt at lunch. He stated he wasn't hungry and he wasn't eating. Will f/u as able.

## 2024-09-03 NOTE — CASE MANAGEMENT
Case Management Assessment & Discharge Planning Note    Patient name Drew Santos  Location [facility] M* MRN [id number]  : 1950 Date 9/3/2024       Current Admission Date: 2024  Current Admission Diagnosis:Gross hematuria   Patient Active Problem List    Diagnosis Date Noted Date Diagnosed    Dysgeusia 2024     Gross hematuria 2024     Depression 2024     Sepsis (HCC) 2024     Sacral wound 2024     Primary hypertension 2024     Mixed hyperlipidemia 2024     Paroxysmal atrial fibrillation (HCC) 2024     History of CVA (cerebrovascular accident) 2024     Urinary retention 2024     Syncope 2024     Elevated lactic acid level 2024     Type 2 diabetes mellitus without complication, without long-term current use of insulin (HCC) 2024     Leukocytosis 2024     Abnormal CPK 2024       LOS (days): 2  Geometric Mean LOS (GMLOS) (days): 2.3  Days to GMLOS:0.3     OBJECTIVE:  PATIENT READMITTED TO HOSPITAL  Risk of Unplanned Readmission Score: 16.16         Current admission status: Inpatient       Preferred Pharmacy:   [id number] [place], [place] - [address]  [address] [place] [id number]  Phone: [phone] Fax: [phone]    Primary Care Provider: Joe Lyon MD    Primary Insurance: [facility]  Secondary Insurance:     ASSESSMENT:  Active Health Care Proxies       Santos, Kristi Health Care Representative - Spouse   Primary Phone: [phone] (Mobile)                 Advance Directives  Does patient have a Health Care POA?: Yes (not in EHR)  Does patient currently have a Health Care decision maker?: Yes, please see Health Care Proxy section  Does patient have Advance Directives?: Yes (Not in EHR)  Advance Directives: Living will, Power of  for health care  Primary Contact: Kristi Danielle-wife         Readmission Root Cause  30 Day Readmission: Yes  Who directed you  to return to the hospital?: Other (comment) (facility)  Did you understand whom to contact if you had questions or problems?: Yes  Did you get your prescriptions before you left the hospital?: No  Reason:: Not preferred pharmacy  Were you able to get your prescriptions filled when you left the hospital?: Yes (went to an SNF)  Did you take your medications as prescribed?: Yes  Were you able to get to your follow-up appointments?: No  Reason:: Readmitted prior to appointment  During previous admission, was a post-acute recommendation made?: Yes  What post-acute resources were offered?: STR (and d/c to [facility])  Patient was readmitted due to: Previously admitted with CVA going to STR thereafter now returns with hematuria while on Eliquis  Action Plan: Pt to [facility]    Patient Information  Admitted from:: Facility  Mental Status: Alert  During Assessment patient was accompanied by: Not accompanied during assessment  Assessment information provided by:: Other - please comment, Patient (recent social admission assessment and pt)  Primary Caregiver: Self (Prior to last admission however since that time was a max asst of 2)  Support Systems: Spouse/significant other, Family members, Friend  What city do you live in[place]  Home entry access options. Select all that apply.: Stairs  Number of steps to enter home.: 5  Do the steps have railings?: Yes  Type of Current Residence: 2 story home  Upon entering residence, is there a bedroom on the main floor (no further steps)?: No  A bedroom is located on the following floor levels of residence (select all that apply):: 2nd Floor  Upon entering residence, is there a bathroom on the main floor (no further steps)?: No  Indicate which floors of current residence have a bathroom (select all the apply):: 2nd Floor  Number of steps to 2nd floor from main floor: 7  Living Arrangements: Lives w/ Spouse/significant  other  Is patient a ?: Yes  Is [facility] with [facility] ([employer])?: Yes (Sees Dr Efrain Redmond [phone] (F) [phone])  Is patient service connected?: Yes    Activities of Daily Living Prior to Admission  Functional Status: Independent (Prior to last admission now being Max asst x2 for ADL's and likely dependent for IADL's)  Completes ADLs independently?: Yes (Prior to last admission now being Max asst x2)  Ambulates independently?: No (Prior to last admission was independent and currently non-ambulatory needing max asst x2 to transfer)  Does patient use assisted devices?: No (none Prior to last admission- has been using wheelchair at rehab)  Does patient have a history of Outpatient Therapy (PT/OT)?: No  Does the patient have a history of Short-Term Rehab?: Yes (has been at [facility] and [facility])  Does patient have a history of HHC?: No  Does patient currently have HHC?: No         Patient Information Continued  Income Source: Pension/group home  Does patient have prescription coverage?: Yes (uses [facility] in [place])  Does patient have a history of substance abuse?: No  Does patient have a history of Mental Health Diagnosis?: No         Means of Transportation  Means of Transport to Appts:: Drives Self (prior to last admission- currently would be unable to drive due to (L) weakness)      Social Determinants of Health (SDOH)      Flowsheet Row Most Recent Value   Housing Stability    In the last 12 months, was there a time when you were not able to pay the mortgage or rent on time? N   In the past 12 months, how many times have you moved where you were living? 0   At any time in the past 12 months, were you homeless or living in a shelter (including now)? N   Transportation Needs    In the past 12 months, has lack of transportation kept you from medical appointments or from getting medications? no   In the past 12 months, has lack of transportation kept you from  meetings, work, or from getting things needed for daily living? No   Food Insecurity    Within the past 12 months, you worried that your food would run out before you got the money to buy more. Never true   Within the past 12 months, the food you bought just didn't last and you didn't have money to get more. Never true   Utilities    In the past 12 months has the electric, gas, oil, or water company threatened to shut off services in your home? No            DISCHARGE DETAILS:    Discharge planning discussed with:: pt (voicemail left for pt's wife Kristi)  Freedom of Choice: Yes  Comments - Freedom of Choice: Pt acknowledges was at rehab at [facility] and agrees to return- would like wife Kristi informed of same of anticipated d/c Wednesday vs Thursday- same done via VM  CM contacted family/caregiver?: Yes  Were Treatment Team discharge recommendations reviewed with patient/caregiver?: Yes  Did patient/caregiver verbalize understanding of patient care needs?: Yes       Contacts  Patient Contacts: Toyin (spouse)  Relationship to Patient:: Family  Contact Method: Phone  Phone Number: [phone]  Reason/Outcome: Emergency Contact, Referral, Discharge Planning    Requested Home Health Care         Is the patient interested in HHC at discharge?: No    DME Referral Provided  Referral made for DME?: No    Other Referral/Resources/Interventions Provided:  Interventions: Other (Specify) (Noted last admission pt went to [facility] as wife requested pt go to a [facility]- at that was stated pt had 365D of SNF [facility] with  to be Reva Dueñas [phone] ext [phone] (off until Thurs covering person Daria LONG ext [phone]))  Referral Comments: Call to Daria made with  left re: re-auth of pt going to [facility]- awaiting call back so to send therapy/clinical for approval of SNF    Would you like to participate in our [facility] service program?  : No - Declined    Treatment Team  Recommendation: Short Term Rehab  Discharge Destination Plan:: Short Term Rehab, SNF  Transport at Discharge : BLS Ambulance                             IMM Given (Date):: 09/03/24  IMM Given to:: Patient (verbal to pt asking to inform wife/POA of same)

## 2024-09-03 NOTE — PLAN OF CARE
Problem: PAIN - ADULT  Goal: Verbalizes/displays adequate comfort level or baseline comfort level  Description: Interventions:  - Encourage patient to monitor pain and request assistance  - Assess pain using appropriate pain scale  - Administer analgesics based on type and severity of pain and evaluate response  - Implement non-pharmacological measures as appropriate and evaluate response  - Consider cultural and social influences on pain and pain management  - Notify physician/advanced practitioner if interventions unsuccessful or patient reports new pain  Outcome: Progressing     Problem: INFECTION - ADULT  Goal: Absence or prevention of progression during hospitalization  Description: INTERVENTIONS:  - Assess and monitor for signs and symptoms of infection  - Monitor lab/diagnostic results  - Monitor all insertion sites, i.e. indwelling lines, tubes, and drains  - Monitor endotracheal if appropriate and nasal secretions for changes in amount and color  - [place] appropriate cooling/warming therapies per order  - Administer medications as ordered  - Instruct and encourage patient and family to use good hand hygiene technique  - Identify and instruct in appropriate isolation precautions for identified infection/condition  Outcome: Progressing  Goal: Absence of fever/infection during neutropenic period  Description: INTERVENTIONS:  - Monitor WBC    Outcome: Progressing     Problem: SAFETY ADULT  Goal: Patient will remain free of falls  Description: INTERVENTIONS:  - Educate patient/family on patient safety including physical limitations  - Instruct patient to call for assistance with activity   - Consult OT/PT to assist with strengthening/mobility   - Keep Call bell within reach  - Keep bed low and locked with side rails adjusted as appropriate  - Keep care items and personal belongings within reach  - Initiate and maintain comfort rounds  - Make Fall Risk Sign visible to staff  - Offer Toileting every 2 Hours,  in advance of need  - Initiate/Maintain bed alarm  - Apply yellow socks and bracelet for high fall risk patients  - Consider moving patient to room near nurses station  Outcome: Progressing  Goal: Maintain or return to baseline ADL function  Description: INTERVENTIONS:  -  Assess patient's ability to carry out ADLs; assess patient's baseline for ADL function and identify physical deficits which impact ability to perform ADLs (bathing, care of mouth/teeth, toileting, grooming, dressing, etc.)  - Assess/evaluate cause of self-care deficits   - Assess range of motion  - Assess patient's mobility; develop plan if impaired  - Assess patient's need for assistive devices and provide as appropriate  - Encourage maximum independence but intervene and supervise when necessary  - Involve family in performance of ADLs  - Assess for home care needs following discharge   - Consider OT consult to assist with ADL evaluation and planning for discharge  - Provide patient education as appropriate  Outcome: Progressing  Goal: Maintains/Returns to pre admission functional level  Description: INTERVENTIONS:  - Perform AM-PAC 6 Click Basic Mobility/ Daily Activity assessment daily.  - Set and communicate daily mobility goal to care team and patient/family/caregiver.   - Collaborate with rehabilitation services on mobility goals if consulted  - Perform Range of Motion 3 times a day.  - Reposition patient every 2 hours.  - Dangle patient 3 times a day  - Out of bed for toileting  - Record patient progress and toleration of activity level   Outcome: Progressing     Problem: DISCHARGE PLANNING  Goal: Discharge to home or other facility with appropriate resources  Description: INTERVENTIONS:  - Identify barriers to discharge w/patient and caregiver  - Arrange for needed discharge resources and transportation as appropriate  - Identify discharge learning needs (meds, wound care, etc.)  - Arrange for interpretive services to assist at discharge  as needed  - Refer to Case Management Department for coordinating discharge planning if the patient needs post-hospital services based on physician/advanced practitioner order or complex needs related to functional status, cognitive ability, or social support system  Outcome: Progressing     Problem: Knowledge Deficit  Goal: Patient/family/caregiver demonstrates understanding of disease process, treatment plan, medications, and discharge instructions  Description: Complete learning assessment and assess knowledge base.  Interventions:  - Provide teaching at level of understanding  - Provide teaching via preferred learning methods  Outcome: Progressing     Problem: CARDIOVASCULAR - ADULT  Goal: Maintains optimal cardiac output and hemodynamic stability  Description: INTERVENTIONS:  - Monitor I/O, vital signs and rhythm  - Monitor for S/S and trends of decreased cardiac output  - Administer and titrate ordered vasoactive medications to optimize hemodynamic stability  - Assess quality of pulses, skin color and temperature  - Assess for signs of decreased coronary artery perfusion  - Instruct patient to report change in severity of symptoms  Outcome: Progressing  Goal: Absence of cardiac dysrhythmias or at baseline rhythm  Description: INTERVENTIONS:  - Continuous cardiac monitoring, vital signs, obtain 12 lead EKG if ordered  - Administer antiarrhythmic and heart rate control medications as ordered  - Monitor electrolytes and administer replacement therapy as ordered  Outcome: Progressing     Problem: GENITOURINARY - ADULT  Goal: Urinary catheter remains patent  Description: INTERVENTIONS:  - Assess patency of urinary catheter  - If patient has a chronic hanks, consider changing catheter if non-functioning  - Follow guidelines for intermittent irrigation of non-functioning urinary catheter  Outcome: Progressing     Problem: Prexisting or High Potential for Compromised Skin Integrity  Goal: Skin integrity is maintained  or improved  Description: INTERVENTIONS:  - Identify patients at risk for skin breakdown  - Assess and monitor skin integrity  - Assess and monitor nutrition and hydration status  - Monitor labs   - Assess for incontinence   - Turn and reposition patient  - Assist with mobility/ambulation  - Relieve pressure over bony prominences  - Avoid friction and shearing  - Provide appropriate hygiene as needed including keeping skin clean and dry  - Evaluate need for skin moisturizer/barrier cream  - Collaborate with interdisciplinary team   - Patient/family teaching  - Consider wound care consult   Outcome: Progressing     Problem: SKIN/TISSUE INTEGRITY - ADULT  Goal: Pressure injury heals and does not worsen  Description: Interventions:  - Implement low air loss mattress or specialty surface (Criteria met)  - Apply silicone foam dressing  - Instruct/assist with weight shifting every 30 minutes when in chair   - Limit chair time to 2 hour intervals  - Use special pressure reducing interventions such as cushion when in chair   - Apply fecal or urinary incontinence containment device   - Perform passive or active ROM every shift  - Turn and reposition patient & offload bony prominences every 2 hours   - Utilize friction reducing device or surface for transfers   - Consider consults to  interdisciplinary teams  - Use incontinent care products after each incontinent episode such as hydroguard  - Consider nutrition services referral as needed  Outcome: Progressing  Goal: Skin Integrity remains intact(Skin Breakdown Prevention)  Description: Assess:  -Perform Goldy assessment every   -Clean and moisturize skin every  -Inspect skin when repositioning, toileting, and assisting with ADLS  -Assess under medical devices such as  every  -Assess extremities for adequate circulation and sensation     Bed Management:  -Have minimal linens on bed & keep smooth, unwrinkled  -Change linens as needed when moist or perspiring  -Avoid sitting  or lying in one position for more than  hours while in bed  -Keep HOB at degrees     Toileting:  -Offer bedside commode  -Assess for incontinence every  -Use incontinent care products after each incontinent episode such as     Activity:  -Mobilize patient  times a day  -Encourage activity and walks on unit  -Encourage or provide ROM exercises   -Turn and reposition patient every  Hours  -Use appropriate equipment to lift or move patient in bed  -Instruct/ Assist with weight shifting every  when out of bed in chair  -Consider limitation of chair time  hour intervals    Skin Care:  -Avoid use of baby powder, tape, friction and shearing, hot water or constrictive clothing  -Relieve pressure over bony prominences using   -Do not massage red bony areas    Next Steps:  -Teach patient strategies to minimize risks such as    -Consider consults to  interdisciplinary teams such as   Outcome: Progressing  Goal: Incision(s), wounds(s) or drain site(s) healing without S/S of infection  Description: INTERVENTIONS  - Assess and document dressing, incision, wound bed, drain sites and surrounding tissue  - Provide patient and family education  - Perform skin care/dressing changes every   Outcome: Progressing     Problem: METABOLIC, FLUID AND ELECTROLYTES - ADULT  Goal: Electrolytes maintained within normal limits  Description: INTERVENTIONS:  - Monitor labs and assess patient for signs and symptoms of electrolyte imbalances  - Administer electrolyte replacement as ordered  - Monitor response to electrolyte replacements, including repeat lab results as appropriate  - Instruct patient on fluid and nutrition as appropriate  Outcome: Progressing     Problem: HEMATOLOGIC - ADULT  Goal: Maintains hematologic stability  Description: INTERVENTIONS  - Assess for signs and symptoms of bleeding or hemorrhage  - Monitor labs  - Administer supportive blood products/factors as ordered and appropriate  Outcome: Progressing

## 2024-09-03 NOTE — PLAN OF CARE
Problem: PAIN - ADULT  Goal: Verbalizes/displays adequate comfort level or baseline comfort level  Description: Interventions:  - Encourage patient to monitor pain and request assistance  - Assess pain using appropriate pain scale  - Administer analgesics based on type and severity of pain and evaluate response  - Implement non-pharmacological measures as appropriate and evaluate response  - Consider cultural and social influences on pain and pain management  - Notify physician/advanced practitioner if interventions unsuccessful or patient reports new pain  Outcome: Progressing     Problem: INFECTION - ADULT  Goal: Absence or prevention of progression during hospitalization  Description: INTERVENTIONS:  - Assess and monitor for signs and symptoms of infection  - Monitor lab/diagnostic results  - Monitor all insertion sites, i.e. indwelling lines, tubes, and drains  - Monitor endotracheal if appropriate and nasal secretions for changes in amount and color  - [place] appropriate cooling/warming therapies per order  - Administer medications as ordered  - Instruct and encourage patient and family to use good hand hygiene technique  - Identify and instruct in appropriate isolation precautions for identified infection/condition  Outcome: Progressing  Goal: Absence of fever/infection during neutropenic period  Description: INTERVENTIONS:  - Monitor WBC    Outcome: Progressing     Problem: SAFETY ADULT  Goal: Patient will remain free of falls  Description: INTERVENTIONS:  - Educate patient/family on patient safety including physical limitations  - Instruct patient to call for assistance with activity   - Consult OT/PT to assist with strengthening/mobility   - Keep Call bell within reach  - Keep bed low and locked with side rails adjusted as appropriate  - Keep care items and personal belongings within reach  - Initiate and maintain comfort rounds  - Make Fall Risk Sign visible to staff  - Offer Toileting every 2 Hours,  in advance of need  - Initiate/Maintain bed alarm  - Apply yellow socks and bracelet for high fall risk patients  - Consider moving patient to room near nurses station  Outcome: Progressing  Goal: Maintain or return to baseline ADL function  Description: INTERVENTIONS:  -  Assess patient's ability to carry out ADLs; assess patient's baseline for ADL function and identify physical deficits which impact ability to perform ADLs (bathing, care of mouth/teeth, toileting, grooming, dressing, etc.)  - Assess/evaluate cause of self-care deficits   - Assess range of motion  - Assess patient's mobility; develop plan if impaired  - Assess patient's need for assistive devices and provide as appropriate  - Encourage maximum independence but intervene and supervise when necessary  - Involve family in performance of ADLs  - Assess for home care needs following discharge   - Consider OT consult to assist with ADL evaluation and planning for discharge  - Provide patient education as appropriate  Outcome: Progressing  Goal: Maintains/Returns to pre admission functional level  Description: INTERVENTIONS:  - Perform AM-PAC 6 Click Basic Mobility/ Daily Activity assessment daily.  - Set and communicate daily mobility goal to care team and patient/family/caregiver.   - Collaborate with rehabilitation services on mobility goals if consulted  - Perform Range of Motion 3 times a day.  - Reposition patient every 2 hours.  - Dangle patient 3 times a day  - Out of bed for toileting  - Record patient progress and toleration of activity level   Outcome: Progressing     Problem: DISCHARGE PLANNING  Goal: Discharge to home or other facility with appropriate resources  Description: INTERVENTIONS:  - Identify barriers to discharge w/patient and caregiver  - Arrange for needed discharge resources and transportation as appropriate  - Identify discharge learning needs (meds, wound care, etc.)  - Arrange for interpretive services to assist at discharge  as needed  - Refer to Case Management Department for coordinating discharge planning if the patient needs post-hospital services based on physician/advanced practitioner order or complex needs related to functional status, cognitive ability, or social support system  Outcome: Progressing     Problem: Knowledge Deficit  Goal: Patient/family/caregiver demonstrates understanding of disease process, treatment plan, medications, and discharge instructions  Description: Complete learning assessment and assess knowledge base.  Interventions:  - Provide teaching at level of understanding  - Provide teaching via preferred learning methods  Outcome: Progressing     Problem: CARDIOVASCULAR - ADULT  Goal: Maintains optimal cardiac output and hemodynamic stability  Description: INTERVENTIONS:  - Monitor I/O, vital signs and rhythm  - Monitor for S/S and trends of decreased cardiac output  - Administer and titrate ordered vasoactive medications to optimize hemodynamic stability  - Assess quality of pulses, skin color and temperature  - Assess for signs of decreased coronary artery perfusion  - Instruct patient to report change in severity of symptoms  Outcome: Progressing  Goal: Absence of cardiac dysrhythmias or at baseline rhythm  Description: INTERVENTIONS:  - Continuous cardiac monitoring, vital signs, obtain 12 lead EKG if ordered  - Administer antiarrhythmic and heart rate control medications as ordered  - Monitor electrolytes and administer replacement therapy as ordered  Outcome: Progressing     Problem: GENITOURINARY - ADULT  Goal: Urinary catheter remains patent  Description: INTERVENTIONS:  - Assess patency of urinary catheter  - If patient has a chronic hanks, consider changing catheter if non-functioning  - Follow guidelines for intermittent irrigation of non-functioning urinary catheter  Outcome: Progressing     Problem: Prexisting or High Potential for Compromised Skin Integrity  Goal: Skin integrity is maintained  or improved  Description: INTERVENTIONS:  - Identify patients at risk for skin breakdown  - Assess and monitor skin integrity  - Assess and monitor nutrition and hydration status  - Monitor labs   - Assess for incontinence   - Turn and reposition patient  - Assist with mobility/ambulation  - Relieve pressure over bony prominences  - Avoid friction and shearing  - Provide appropriate hygiene as needed including keeping skin clean and dry  - Evaluate need for skin moisturizer/barrier cream  - Collaborate with interdisciplinary team   - Patient/family teaching  - Consider wound care consult   Outcome: Progressing     Problem: SKIN/TISSUE INTEGRITY - ADULT  Goal: Pressure injury heals and does not worsen  Description: Interventions:  - Implement low air loss mattress or specialty surface (Criteria met)  - Apply silicone foam dressing  - Instruct/assist with weight shifting every 30 minutes when in chair   - Limit chair time to 2 hour intervals  - Use special pressure reducing interventions such as cushion when in chair   - Apply fecal or urinary incontinence containment device   - Perform passive or active ROM every shift  - Turn and reposition patient & offload bony prominences every 2 hours   - Utilize friction reducing device or surface for transfers   - Consider consults to  interdisciplinary teams  - Use incontinent care products after each incontinent episode such as hydroguard  - Consider nutrition services referral as needed  Outcome: Progressing  Goal: Skin Integrity remains intact(Skin Breakdown Prevention)  Description: Assess:  -Perform Goldy assessment every   -Clean and moisturize skin every   -Inspect skin when repositioning, toileting, and assisting with ADLS  -Assess under medical devices such as  every   -Assess extremities for adequate circulation and sensation     Bed Management:  -Have minimal linens on bed & keep smooth, unwrinkled  -Change linens as needed when moist or perspiring  -Avoid sitting  or lying in one position for more than  hours while in bed  -Keep HOB at degrees     Toileting:  -Offer bedside commode  -Assess for incontinence every   -Use incontinent care products after each incontinent episode such as     Activity:  -Mobilize patient  times a day  -Encourage activity and walks on unit  -Encourage or provide ROM exercises   -Turn and reposition patient every  Hours  -Use appropriate equipment to lift or move patient in bed  -Instruct/ Assist with weight shifting every  when out of bed in chair  -Consider limitation of chair time  hour intervals    Skin Care:  -Avoid use of baby powder, tape, friction and shearing, hot water or constrictive clothing  -Relieve pressure over bony prominences using   -Do not massage red bony areas    Next Steps:  -Teach patient strategies to minimize risks such as    -Consider consults to  interdisciplinary teams such as   Outcome: Progressing  Goal: Incision(s), wounds(s) or drain site(s) healing without S/S of infection  Description: INTERVENTIONS  - Assess and document dressing, incision, wound bed, drain sites and surrounding tissue  - Provide patient and family education  - Perform skin care/dressing changes every   Outcome: Progressing     Problem: METABOLIC, FLUID AND ELECTROLYTES - ADULT  Goal: Electrolytes maintained within normal limits  Description: INTERVENTIONS:  - Monitor labs and assess patient for signs and symptoms of electrolyte imbalances  - Administer electrolyte replacement as ordered  - Monitor response to electrolyte replacements, including repeat lab results as appropriate  - Instruct patient on fluid and nutrition as appropriate  Outcome: Progressing     Problem: HEMATOLOGIC - ADULT  Goal: Maintains hematologic stability  Description: INTERVENTIONS  - Assess for signs and symptoms of bleeding or hemorrhage  - Monitor labs  - Administer supportive blood products/factors as ordered and appropriate  Outcome: Progressing

## 2024-09-03 NOTE — ASSESSMENT & PLAN NOTE
History of hospitalization at [facility] for CVA with left-sided weakness July 2024  Currently rehabbing at [facility] [place], [place]  On aspirin, restarting Eliquis tonight

## 2024-09-03 NOTE — DISCHARGE INSTR - OTHER ORDERS
Wound Care Plan:   1-Apply silicone bordered foam dressings to bilateral heels for prevention.  Change dressings every 3 days and as needed.  2-Elevate heels off of bed/chair surface--offloading heel boots.  3-Offloading air cushion in chair when out of bed.  4-Apply lotion to body daily and as needed.  5-Turn/reposition every 2 hours while in bed and weight shift frequently while in chair for pressure re-distribution on skin.   6-P500 low air-loss mattress.  7-Sacrum--cleanse with normal saline, pat dry.  Apply skin prep to carmen-wound skin.  Then apply Santyl to wound bed in nickel thick layer.  Fluff a 2x2 slightly moistened with normal saline into wound and cover entire affected area with silicone bordered foam dressing for treatment.  Change dressing daily and as needed with soilage/dislodgement.  8-Penis--cleanse with soap and water, pat dry.  Apply silicone cream/Hydraguard to erosion at catheter entry site two times daily and as needed.    Follow-up at the [facility]--[phone].

## 2024-09-03 NOTE — WOUND OSTOMY CARE
Consult Note - Wound   Drew Santos 73 y.o. male MRN: [id number]  Unit/Bed#: [place] [address] -02 Encounter: [id number]      History and Present Illness:  73 year old male presented to the hospital hematuria.  Patient's history significant for sacral ulcer, CVA, HTN, DM, atrial fibrillation.    Assessment Findings:   Patient agreeable to assessment.  He is on Virdia positioning system--awaiting delivery of P500 low air-loss mattress.  Requires assist x 2-3 to turn in bed.  Parra catheter in place, occasionally incontinent of stool.  Bilateral heels intact and blanchable with preventative foam dressings in place, offloading heel boots applied.  Skin folds intact.  Nutrition consultation pending.   Present on admission unstageable pressure injury to sacrum--wound bed with 15% pink and 85% adherent yellow slough.  Scant serous drainage.  Cherelle-wound intact and fragile with blanchable erythema and hyperpigmentation.  Some scar tissue noted. There is no induration or fluctuance at this time.  Patient reports discomfort when laying on his wound.    Wound was evaluated by surgery during recent hospital admission at [facility] on 8/23/24--no sharp wound debridement at that time.  Patient was treated for wound infection, currently on Augmentin through 9/4/24.    Recommend continuing with Santyl for debridement.  Patient should follow-up at the wound center on discharge, as may require debridement in the near future.    2.   Penile erosion--small erosion at catheter insertion site with pink and tan tissue.  No drainage.  Cherelle-wound intact without redness.  Patient denies pain to the area.  Catheter is stabilized with stat lock device.      See flowsheet for wound details.    Wound Care Plan:   1-Apply silicone bordered foam dressings to bilateral heels for prevention.  Eduardo with P.  Peel back for skin assessments at least daily and re-apply.  Change dressings every 3 days and as needed.  2-Elevate heels off of bed/chair  surface--offloading heel boots.  3-Offloading air cushion in chair when out of bed.  4-Apply moisturizing skin cream to body daily and as needed.  5-Turn/reposition every 2 hours while in bed and weight shift frequently while in chair for pressure re-distribution on skin.   6-P500 low air-loss mattress.  7-Sacrum--cleanse with normal saline, pat dry.  Apply skin prep to carmen-wound skin.  Then apply Santyl to wound bed in nickel thick layer.  Fluff a 2x2 slightly moistened with normal saline into wound and cover entire affected area with silicone bordered foam dressing for treatment.  Change dressing daily and as needed with soilage/dislodgement.  8-Penis--cleanse with soap and water, pat dry.  Apply silicone cream/Hydraguard to erosion at catheter entry site two times daily and as needed.    Wound care team to follow.  Plan of care reviewed with primary RN.    Wound 08/23/24 Pressure Injury Coccyx (Active)   Wound Image   09/03/24 1103   Wound Description Pink;Yellow;Slough 09/03/24 1103   Pressure Injury Stage U 09/03/24 1103   Carmen-wound Assessment Hyperpigmented;Scar Tissue 09/03/24 1103   Wound Length (cm) 10 cm 09/03/24 1103   Wound Width (cm) 9.5 cm 09/03/24 1103   Wound Depth (cm) 0.5 cm 09/03/24 1103   Wound Surface Area (cm^2) 95 cm^2 09/03/24 1103   Wound Volume (cm^3) 47.5 cm^3 09/03/24 1103   Calculated Wound Volume (cm^3) 47.5 cm^3 09/03/24 1103   Change in Wound Size % -182.74 09/03/24 1103   Drainage Amount Scant 09/03/24 1103   Drainage Description Serous 09/03/24 1103   Non-staged Wound Description Full thickness 09/03/24 1103   Treatments Cleansed 09/03/24 1103   Dressing Enzymatic debrider;Foam, Silicon (eg. Allevyn, etc) 09/03/24 1103   Dressing Changed Changed 09/03/24 1103   Patient Tolerance Tolerated well 09/03/24 1103   Dressing Status Clean;Dry;Intact 09/03/24 1103       Wound 09/02/24 Penis Mid (Active)   Wound Image   09/03/24 1115   Wound Description Pink;[place] 09/03/24 1115   Carmen-wound  Assessment Intact 09/03/24 1115   Wound Length (cm) 0.6 cm 09/03/24 1115   Wound Width (cm) 0.4 cm 09/03/24 1115   Wound Depth (cm) 0.1 cm 09/03/24 1115   Wound Surface Area (cm^2) 0.24 cm^2 09/03/24 1115   Wound Volume (cm^3) 0.024 cm^3 09/03/24 1115   Calculated Wound Volume (cm^3) 0.02 cm^3 09/03/24 1115   Drainage Amount None 09/03/24 1115   Treatments Cleansed 09/03/24 1115   Dressing Open to air 09/03/24 1115       Genevieve Mcdonald RN, BSN, CWON

## 2024-09-04 VITALS
BODY MASS INDEX: 29.29 KG/M2 | OXYGEN SATURATION: 96 % | TEMPERATURE: 98.2 F | WEIGHT: 240.52 LBS | SYSTOLIC BLOOD PRESSURE: 143 MMHG | RESPIRATION RATE: 20 BRPM | HEART RATE: 71 BPM | DIASTOLIC BLOOD PRESSURE: 76 MMHG

## 2024-09-04 DIAGNOSIS — R31.0 GROSS HEMATURIA: Primary | ICD-10-CM

## 2024-09-04 LAB
ANION GAP SERPL CALCULATED.3IONS-SCNC: 6 MMOL/L (ref 4–13)
BUN SERPL-MCNC: 8 MG/DL (ref 5–25)
CALCIUM SERPL-MCNC: 8.4 MG/DL (ref 8.4–10.2)
CHLORIDE SERPL-SCNC: 108 MMOL/L (ref 96–108)
CO2 SERPL-SCNC: 26 MMOL/L (ref 21–32)
CREAT SERPL-MCNC: 0.59 MG/DL (ref 0.6–1.3)
ERYTHROCYTE [DISTWIDTH] IN BLOOD BY AUTOMATED COUNT: 13 % (ref 11.6–15.1)
GFR SERPL CREATININE-BSD FRML MDRD: 99 ML/MIN/1.73SQ M
GLUCOSE SERPL-MCNC: 114 MG/DL (ref 65–140)
GLUCOSE SERPL-MCNC: 117 MG/DL (ref 65–140)
GLUCOSE SERPL-MCNC: 122 MG/DL (ref 65–140)
HCT VFR BLD AUTO: 30.5 % (ref 36.5–49.3)
HGB BLD-MCNC: 9.7 G/DL (ref 12–17)
MCH RBC QN AUTO: 31 PG (ref 26.8–34.3)
MCHC RBC AUTO-ENTMCNC: 31.8 G/DL (ref 31.4–37.4)
MCV RBC AUTO: 97 FL (ref 82–98)
PLATELET # BLD AUTO: 377 THOUSANDS/UL (ref 149–390)
PMV BLD AUTO: 9.1 FL (ref 8.9–12.7)
POTASSIUM SERPL-SCNC: 3.2 MMOL/L (ref 3.5–5.3)
RBC # BLD AUTO: 3.13 MILLION/UL (ref 3.88–5.62)
SARS-COV-2 AG UPPER RESP QL IA: NEGATIVE
SARS-COV-2 AG UPPER RESP QL IA: NORMAL
SODIUM SERPL-SCNC: 140 MMOL/L (ref 135–147)
WBC # BLD AUTO: 10.27 THOUSAND/UL (ref 4.31–10.16)

## 2024-09-04 PROCEDURE — 97530 THERAPEUTIC ACTIVITIES: CPT

## 2024-09-04 PROCEDURE — 82948 REAGENT STRIP/BLOOD GLUCOSE: CPT

## 2024-09-04 PROCEDURE — 99239 HOSP IP/OBS DSCHRG MGMT >30: CPT | Performed by: INTERNAL MEDICINE

## 2024-09-04 PROCEDURE — 99232 SBSQ HOSP IP/OBS MODERATE 35: CPT | Performed by: PHYSICIAN ASSISTANT

## 2024-09-04 PROCEDURE — 97535 SELF CARE MNGMENT TRAINING: CPT

## 2024-09-04 PROCEDURE — 85027 COMPLETE CBC AUTOMATED: CPT | Performed by: PHYSICIAN ASSISTANT

## 2024-09-04 PROCEDURE — 80048 BASIC METABOLIC PNL TOTAL CA: CPT | Performed by: PHYSICIAN ASSISTANT

## 2024-09-04 PROCEDURE — 97112 NEUROMUSCULAR REEDUCATION: CPT

## 2024-09-04 RX ORDER — POTASSIUM CHLORIDE 1500 MG/1
40 TABLET, EXTENDED RELEASE ORAL ONCE
Status: COMPLETED | OUTPATIENT
Start: 2024-09-04 | End: 2024-09-04

## 2024-09-04 RX ORDER — FINASTERIDE 5 MG/1
5 TABLET, FILM COATED ORAL DAILY
Start: 2024-09-05

## 2024-09-04 RX ORDER — MIRTAZAPINE 15 MG/1
15 TABLET, FILM COATED ORAL
Start: 2024-09-04

## 2024-09-04 RX ORDER — POTASSIUM CHLORIDE 750 MG/1
20 CAPSULE, EXTENDED RELEASE ORAL DAILY
Start: 2024-09-04 | End: 2024-09-06

## 2024-09-04 RX ADMIN — CLOTRIMAZOLE 10 MG: 10 LOZENGE ORAL; TOPICAL at 13:30

## 2024-09-04 RX ADMIN — CHLORHEXIDINE GLUCONATE 15 ML: 1.2 RINSE ORAL at 11:17

## 2024-09-04 RX ADMIN — CLOTRIMAZOLE 10 MG: 10 LOZENGE ORAL; TOPICAL at 08:27

## 2024-09-04 RX ADMIN — FINASTERIDE 5 MG: 5 TABLET, FILM COATED ORAL at 08:26

## 2024-09-04 RX ADMIN — LISINOPRIL 10 MG: 10 TABLET ORAL at 08:27

## 2024-09-04 RX ADMIN — DOCUSATE SODIUM 100 MG: 100 CAPSULE, LIQUID FILLED ORAL at 08:26

## 2024-09-04 RX ADMIN — CLOTRIMAZOLE 10 MG: 10 LOZENGE ORAL; TOPICAL at 11:17

## 2024-09-04 RX ADMIN — POTASSIUM CHLORIDE 40 MEQ: 1500 TABLET, EXTENDED RELEASE ORAL at 11:17

## 2024-09-04 RX ADMIN — EZETIMIBE 10 MG: 10 TABLET ORAL at 08:26

## 2024-09-04 RX ADMIN — ESCITALOPRAM OXALATE 10 MG: 10 TABLET ORAL at 08:32

## 2024-09-04 RX ADMIN — APIXABAN 5 MG: 5 TABLET, FILM COATED ORAL at 08:26

## 2024-09-04 RX ADMIN — CLOTRIMAZOLE 10 MG: 10 LOZENGE ORAL; TOPICAL at 00:14

## 2024-09-04 RX ADMIN — Medication 1000 UNITS: at 08:26

## 2024-09-04 RX ADMIN — AMOXICILLIN AND CLAVULANATE POTASSIUM 1 TABLET: 875; 125 TABLET, COATED ORAL at 08:26

## 2024-09-04 RX ADMIN — COLLAGENASE SANTYL: 250 OINTMENT TOPICAL at 08:32

## 2024-09-04 RX ADMIN — ASPIRIN 81 MG CHEWABLE TABLET 81 MG: 81 TABLET CHEWABLE at 08:26

## 2024-09-04 NOTE — PLAN OF CARE
Problem: PAIN - ADULT  Goal: Verbalizes/displays adequate comfort level or baseline comfort level  Description: Interventions:  - Encourage patient to monitor pain and request assistance  - Assess pain using appropriate pain scale  - Administer analgesics based on type and severity of pain and evaluate response  - Implement non-pharmacological measures as appropriate and evaluate response  - Consider cultural and social influences on pain and pain management  - Notify physician/advanced practitioner if interventions unsuccessful or patient reports new pain  Outcome: Progressing     Problem: INFECTION - ADULT  Goal: Absence or prevention of progression during hospitalization  Description: INTERVENTIONS:  - Assess and monitor for signs and symptoms of infection  - Monitor lab/diagnostic results  - Monitor all insertion sites, i.e. indwelling lines, tubes, and drains  - Monitor endotracheal if appropriate and nasal secretions for changes in amount and color  - [place] appropriate cooling/warming therapies per order  - Administer medications as ordered  - Instruct and encourage patient and family to use good hand hygiene technique  - Identify and instruct in appropriate isolation precautions for identified infection/condition  Outcome: Progressing  Goal: Absence of fever/infection during neutropenic period  Description: INTERVENTIONS:  - Monitor WBC     Outcome: Progressing     Problem: SAFETY ADULT  Goal: Patient will remain free of falls  Description: INTERVENTIONS:  - Educate patient/family on patient safety including physical limitations  - Instruct patient to call for assistance with activity   - Consult OT/PT to assist with strengthening/mobility   - Keep Call bell within reach  - Keep bed low and locked with side rails adjusted as appropriate  - Keep care items and personal belongings within reach  - Initiate and maintain comfort rounds  - Make Fall Risk Sign visible to staff  - Offer Toileting every 2 Hours,  in advance of need  - Initiate/Maintain bed alarm  - Apply yellow socks and bracelet for high fall risk patients  - Consider moving patient to room near nurses station  Outcome: Progressing  Goal: Maintain or return to baseline ADL function  Description: INTERVENTIONS:  -  Assess patient's ability to carry out ADLs; assess patient's baseline for ADL function and identify physical deficits which impact ability to perform ADLs (bathing, care of mouth/teeth, toileting, grooming, dressing, etc.)  - Assess/evaluate cause of self-care deficits   - Assess range of motion  - Assess patient's mobility; develop plan if impaired  - Assess patient's need for assistive devices and provide as appropriate  - Encourage maximum independence but intervene and supervise when necessary  - Involve family in performance of ADLs  - Assess for home care needs following discharge   - Consider OT consult to assist with ADL evaluation and planning for discharge  - Provide patient education as appropriate  Outcome: Progressing  Goal: Maintains/Returns to pre admission functional level  Description: INTERVENTIONS:  - Perform AM-PAC 6 Click Basic Mobility/ Daily Activity assessment daily.  - Set and communicate daily mobility goal to care team and patient/family/caregiver.   - Collaborate with rehabilitation services on mobility goals if consulted  - Perform Range of Motion 3 times a day.  - Reposition patient every 2 hours.  - Dangle patient 3 times a day  - Out of bed for toileting  - Record patient progress and toleration of activity level   Outcome: Progressing     Problem: DISCHARGE PLANNING  Goal: Discharge to home or other facility with appropriate resources  Description: INTERVENTIONS:  - Identify barriers to discharge w/patient and caregiver  - Arrange for needed discharge resources and transportation as appropriate  - Identify discharge learning needs (meds, wound care, etc.)  - Arrange for interpretive services to assist at discharge  as needed  - Refer to Case Management Department for coordinating discharge planning if the patient needs post-hospital services based on physician/advanced practitioner order or complex needs related to functional status, cognitive ability, or social support system  Outcome: Progressing     Problem: Knowledge Deficit  Goal: Patient/family/caregiver demonstrates understanding of disease process, treatment plan, medications, and discharge instructions  Description: Complete learning assessment and assess knowledge base.  Interventions:  - Provide teaching at level of understanding  - Provide teaching via preferred learning methods  Outcome: Progressing     Problem: CARDIOVASCULAR - ADULT  Goal: Maintains optimal cardiac output and hemodynamic stability  Description: INTERVENTIONS:  - Monitor I/O, vital signs and rhythm  - Monitor for S/S and trends of decreased cardiac output  - Administer and titrate ordered vasoactive medications to optimize hemodynamic stability  - Assess quality of pulses, skin color and temperature  - Assess for signs of decreased coronary artery perfusion  - Instruct patient to report change in severity of symptoms  Outcome: Progressing  Goal: Absence of cardiac dysrhythmias or at baseline rhythm  Description: INTERVENTIONS:  - Continuous cardiac monitoring, vital signs, obtain 12 lead EKG if ordered  - Administer antiarrhythmic and heart rate control medications as ordered  - Monitor electrolytes and administer replacement therapy as ordered  Outcome: Progressing     Problem: GENITOURINARY - ADULT  Goal: Urinary catheter remains patent  Description: INTERVENTIONS:  - Assess patency of urinary catheter  - If patient has a chronic hanks, consider changing catheter if non-functioning  - Follow guidelines for intermittent irrigation of non-functioning urinary catheter  Outcome: Progressing     Problem: Prexisting or High Potential for Compromised Skin Integrity  Goal: Skin integrity is maintained  or improved  Description: INTERVENTIONS:  - Identify patients at risk for skin breakdown  - Assess and monitor skin integrity  - Assess and monitor nutrition and hydration status  - Monitor labs   - Assess for incontinence   - Turn and reposition patient  - Assist with mobility/ambulation  - Relieve pressure over bony prominences  - Avoid friction and shearing  - Provide appropriate hygiene as needed including keeping skin clean and dry  - Evaluate need for skin moisturizer/barrier cream  - Collaborate with interdisciplinary team   - Patient/family teaching  - Consider wound care consult   Outcome: Progressing     Problem: SKIN/TISSUE INTEGRITY - ADULT  Goal: Pressure injury heals and does not worsen  Description: Interventions:  - Implement low air loss mattress or specialty surface (Criteria met)  - Apply silicone foam dressing  - Instruct/assist with weight shifting every 30 minutes when in chair   - Limit chair time to 2 hour intervals  - Use special pressure reducing interventions such as cushion when in chair   - Apply fecal or urinary incontinence containment device   - Perform passive or active ROM every shift  - Turn and reposition patient & offload bony prominences every 2 hours   - Utilize friction reducing device or surface for transfers   - Consider consults to  interdisciplinary teams  - Use incontinent care products after each incontinent episode such as hydroguard  - Consider nutrition services referral as needed  Outcome: Progressing  Goal: Skin Integrity remains intact(Skin Breakdown Prevention)  Description: Assess:  -Perform Goldy assessment every   -Clean and moisturize skin every   -Inspect skin when repositioning, toileting, and assisting with ADLS  -Assess under medical devices such as  every   -Assess extremities for adequate circulation and sensation     Bed Management:  -Have minimal linens on bed & keep smooth, unwrinkled  -Change linens as needed when moist or perspiring  -Avoid sitting  or lying in one position for more than  hours while in bed  -Keep HOB at degrees     Toileting:  -Offer bedside commode  -Assess for incontinence every   -Use incontinent care products after each incontinent episode such as     Activity:  -Mobilize patient  times a day  -Encourage activity and walks on unit  -Encourage or provide ROM exercises   -Turn and reposition patient every  Hours  -Use appropriate equipment to lift or move patient in bed  -Instruct/ Assist with weight shifting every  when out of bed in chair  -Consider limitation of chair time  hour intervals    Skin Care:  -Avoid use of baby powder, tape, friction and shearing, hot water or constrictive clothing  -Relieve pressure over bony prominences using   -Do not massage red bony areas    Next Steps:  -Teach patient strategies to minimize risks such as    -Consider consults to  interdisciplinary teams such as   Outcome: Progressing  Goal: Incision(s), wounds(s) or drain site(s) healing without S/S of infection  Description: INTERVENTIONS  - Assess and document dressing, incision, wound bed, drain sites and surrounding tissue  - Provide patient and family education  - Perform skin care/dressing changes every   Outcome: Progressing     Problem: METABOLIC, FLUID AND ELECTROLYTES - ADULT  Goal: Electrolytes maintained within normal limits  Description: INTERVENTIONS:  - Monitor labs and assess patient for signs and symptoms of electrolyte imbalances  - Administer electrolyte replacement as ordered  - Monitor response to electrolyte replacements, including repeat lab results as appropriate  - Instruct patient on fluid and nutrition as appropriate  Outcome: Progressing     Problem: HEMATOLOGIC - ADULT  Goal: Maintains hematologic stability  Description: INTERVENTIONS  - Assess for signs and symptoms of bleeding or hemorrhage  - Monitor labs  - Administer supportive blood products/factors as ordered and appropriate  Outcome: Progressing     Problem:  Nutrition/Hydration-ADULT  Goal: Nutrient/Hydration intake appropriate for improving, restoring or maintaining nutritional needs  Description: Monitor and assess patient's nutrition/hydration status for malnutrition. Collaborate with interdisciplinary team and initiate plan and interventions as ordered.  Monitor patient's weight and dietary intake as ordered or per policy. Utilize nutrition screening tool and intervene as necessary. Determine patient's food preferences and provide high-protein, high-caloric foods as appropriate.     INTERVENTIONS:  - Monitor oral intake, urinary output, labs, and treatment plans  - Assess nutrition and hydration status and recommend course of action  - Evaluate amount of meals eaten  - Assist patient with eating if necessary   - Allow adequate time for meals  - Recommend/ encourage appropriate diets, oral nutritional supplements, and vitamin/mineral supplements  - Order, calculate, and assess calorie counts as needed  - Recommend, monitor, and adjust tube feedings and TPN/PPN based on assessed needs  - Assess need for intravenous fluids  - Provide specific nutrition/hydration education as appropriate  - Include patient/family/caregiver in decisions related to nutrition  Outcome: Progressing

## 2024-09-04 NOTE — PLAN OF CARE
Problem: PAIN - ADULT  Goal: Verbalizes/displays adequate comfort level or baseline comfort level  Description: Interventions:  - Encourage patient to monitor pain and request assistance  - Assess pain using appropriate pain scale  - Administer analgesics based on type and severity of pain and evaluate response  - Implement non-pharmacological measures as appropriate and evaluate response  - Consider cultural and social influences on pain and pain management  - Notify physician/advanced practitioner if interventions unsuccessful or patient reports new pain  Outcome: Progressing     Problem: INFECTION - ADULT  Goal: Absence or prevention of progression during hospitalization  Description: INTERVENTIONS:  - Assess and monitor for signs and symptoms of infection  - Monitor lab/diagnostic results  - Monitor all insertion sites, i.e. indwelling lines, tubes, and drains  - Monitor endotracheal if appropriate and nasal secretions for changes in amount and color  - Rodeo appropriate cooling/warming therapies per order  - Administer medications as ordered  - Instruct and encourage patient and family to use good hand hygiene technique  - Identify and instruct in appropriate isolation precautions for identified infection/condition  Outcome: Progressing  Goal: Absence of fever/infection during neutropenic period  Description: INTERVENTIONS:  - Monitor WBC    Outcome: Progressing     Problem: SAFETY ADULT  Goal: Patient will remain free of falls  Description: INTERVENTIONS:  - Educate patient/family on patient safety including physical limitations  - Instruct patient to call for assistance with activity   - Consult OT/PT to assist with strengthening/mobility   - Keep Call bell within reach  - Keep bed low and locked with side rails adjusted as appropriate  - Keep care items and personal belongings within reach  - Initiate and maintain comfort rounds  - Make Fall Risk Sign visible to staff  - Offer Toileting every 2 Hours,  in advance of need  - Initiate/Maintain bed alarm  - Apply yellow socks and bracelet for high fall risk patients  - Consider moving patient to room near nurses station  Outcome: Progressing  Goal: Maintain or return to baseline ADL function  Description: INTERVENTIONS:  -  Assess patient's ability to carry out ADLs; assess patient's baseline for ADL function and identify physical deficits which impact ability to perform ADLs (bathing, care of mouth/teeth, toileting, grooming, dressing, etc.)  - Assess/evaluate cause of self-care deficits   - Assess range of motion  - Assess patient's mobility; develop plan if impaired  - Assess patient's need for assistive devices and provide as appropriate  - Encourage maximum independence but intervene and supervise when necessary  - Involve family in performance of ADLs  - Assess for home care needs following discharge   - Consider OT consult to assist with ADL evaluation and planning for discharge  - Provide patient education as appropriate  Outcome: Progressing  Goal: Maintains/Returns to pre admission functional level  Description: INTERVENTIONS:  - Perform AM-PAC 6 Click Basic Mobility/ Daily Activity assessment daily.  - Set and communicate daily mobility goal to care team and patient/family/caregiver.   - Collaborate with rehabilitation services on mobility goals if consulted  - Perform Range of Motion 3 times a day.  - Reposition patient every 2 hours.  - Dangle patient 3 times a day  - Out of bed for toileting  - Record patient progress and toleration of activity level   Outcome: Progressing     Problem: DISCHARGE PLANNING  Goal: Discharge to home or other facility with appropriate resources  Description: INTERVENTIONS:  - Identify barriers to discharge w/patient and caregiver  - Arrange for needed discharge resources and transportation as appropriate  - Identify discharge learning needs (meds, wound care, etc.)  - Arrange for interpretive services to assist at discharge  as needed  - Refer to Case Management Department for coordinating discharge planning if the patient needs post-hospital services based on physician/advanced practitioner order or complex needs related to functional status, cognitive ability, or social support system  Outcome: Progressing     Problem: Knowledge Deficit  Goal: Patient/family/caregiver demonstrates understanding of disease process, treatment plan, medications, and discharge instructions  Description: Complete learning assessment and assess knowledge base.  Interventions:  - Provide teaching at level of understanding  - Provide teaching via preferred learning methods  Outcome: Progressing     Problem: CARDIOVASCULAR - ADULT  Goal: Maintains optimal cardiac output and hemodynamic stability  Description: INTERVENTIONS:  - Monitor I/O, vital signs and rhythm  - Monitor for S/S and trends of decreased cardiac output  - Administer and titrate ordered vasoactive medications to optimize hemodynamic stability  - Assess quality of pulses, skin color and temperature  - Assess for signs of decreased coronary artery perfusion  - Instruct patient to report change in severity of symptoms  Outcome: Progressing  Goal: Absence of cardiac dysrhythmias or at baseline rhythm  Description: INTERVENTIONS:  - Continuous cardiac monitoring, vital signs, obtain 12 lead EKG if ordered  - Administer antiarrhythmic and heart rate control medications as ordered  - Monitor electrolytes and administer replacement therapy as ordered  Outcome: Progressing     Problem: GENITOURINARY - ADULT  Goal: Urinary catheter remains patent  Description: INTERVENTIONS:  - Assess patency of urinary catheter  - If patient has a chronic hanks, consider changing catheter if non-functioning  - Follow guidelines for intermittent irrigation of non-functioning urinary catheter  Outcome: Progressing     Problem: Prexisting or High Potential for Compromised Skin Integrity  Goal: Skin integrity is maintained  or improved  Description: INTERVENTIONS:  - Identify patients at risk for skin breakdown  - Assess and monitor skin integrity  - Assess and monitor nutrition and hydration status  - Monitor labs   - Assess for incontinence   - Turn and reposition patient  - Assist with mobility/ambulation  - Relieve pressure over bony prominences  - Avoid friction and shearing  - Provide appropriate hygiene as needed including keeping skin clean and dry  - Evaluate need for skin moisturizer/barrier cream  - Collaborate with interdisciplinary team   - Patient/family teaching  - Consider wound care consult   Outcome: Progressing     Problem: SKIN/TISSUE INTEGRITY - ADULT  Goal: Pressure injury heals and does not worsen  Description: Interventions:  - Implement low air loss mattress or specialty surface (Criteria met)  - Apply silicone foam dressing  - Instruct/assist with weight shifting every 30 minutes when in chair   - Limit chair time to 2 hour intervals  - Use special pressure reducing interventions such as cushion when in chair   - Apply fecal or urinary incontinence containment device   - Perform passive or active ROM every shift  - Turn and reposition patient & offload bony prominences every 2 hours   - Utilize friction reducing device or surface for transfers   - Consider consults to  interdisciplinary teams  - Use incontinent care products after each incontinent episode such as hydroguard  - Consider nutrition services referral as needed  Outcome: Progressing  Goal: Skin Integrity remains intact(Skin Breakdown Prevention)  Description: Assess:  -Perform Goldy assessment every   -Clean and moisturize skin every   -Inspect skin when repositioning, toileting, and assisting with ADLS  -Assess under medical devices such as  every   -Assess extremities for adequate circulation and sensation     Bed Management:  -Have minimal linens on bed & keep smooth, unwrinkled  -Change linens as needed when moist or perspiring  -Avoid sitting  or lying in one position for more than  hours while in bed  -Keep HOB at degrees     Toileting:  -Offer bedside commode  -Assess for incontinence every   -Use incontinent care products after each incontinent episode such as     Activity:  -Mobilize patient  times a day  -Encourage activity and walks on unit  -Encourage or provide ROM exercises   -Turn and reposition patient every  Hours  -Use appropriate equipment to lift or move patient in bed  -Instruct/ Assist with weight shifting every  when out of bed in chair  -Consider limitation of chair time  hour intervals    Skin Care:  -Avoid use of baby powder, tape, friction and shearing, hot water or constrictive clothing  -Relieve pressure over bony prominences using   -Do not massage red bony areas    Next Steps:  -Teach patient strategies to minimize risks such as    -Consider consults to  interdisciplinary teams such as   Outcome: Progressing  Goal: Incision(s), wounds(s) or drain site(s) healing without S/S of infection  Description: INTERVENTIONS  - Assess and document dressing, incision, wound bed, drain sites and surrounding tissue  - Provide patient and family education  - Perform skin care/dressing changes every   Outcome: Progressing     Problem: METABOLIC, FLUID AND ELECTROLYTES - ADULT  Goal: Electrolytes maintained within normal limits  Description: INTERVENTIONS:  - Monitor labs and assess patient for signs and symptoms of electrolyte imbalances  - Administer electrolyte replacement as ordered  - Monitor response to electrolyte replacements, including repeat lab results as appropriate  - Instruct patient on fluid and nutrition as appropriate  Outcome: Progressing     Problem: HEMATOLOGIC - ADULT  Goal: Maintains hematologic stability  Description: INTERVENTIONS  - Assess for signs and symptoms of bleeding or hemorrhage  - Monitor labs  - Administer supportive blood products/factors as ordered and appropriate  Outcome: Progressing     Problem:  Nutrition/Hydration-ADULT  Goal: Nutrient/Hydration intake appropriate for improving, restoring or maintaining nutritional needs  Description: Monitor and assess patient's nutrition/hydration status for malnutrition. Collaborate with interdisciplinary team and initiate plan and interventions as ordered.  Monitor patient's weight and dietary intake as ordered or per policy. Utilize nutrition screening tool and intervene as necessary. Determine patient's food preferences and provide high-protein, high-caloric foods as appropriate.     INTERVENTIONS:  - Monitor oral intake, urinary output, labs, and treatment plans  - Assess nutrition and hydration status and recommend course of action  - Evaluate amount of meals eaten  - Assist patient with eating if necessary   - Allow adequate time for meals  - Recommend/ encourage appropriate diets, oral nutritional supplements, and vitamin/mineral supplements  - Order, calculate, and assess calorie counts as needed  - Recommend, monitor, and adjust tube feedings and TPN/PPN based on assessed needs  - Assess need for intravenous fluids  - Provide specific nutrition/hydration education as appropriate  - Include patient/family/caregiver in decisions related to nutrition  Outcome: Progressing

## 2024-09-04 NOTE — ASSESSMENT & PLAN NOTE
Lab Results   Component Value Date    HGBA1C 6.0 (H) 07/04/2024     Recent Labs     09/03/24  1125 09/03/24  1602 09/03/24  2113 09/04/24  0719   POCGLU 176* 133 171* 114       Continue home diabetic regimen

## 2024-09-04 NOTE — CASE MANAGEMENT
Case Management Discharge Planning Note    Patient name Drew Santos  Location [facility] 2 /South 2 M* MRN [id number]  : 1950 Date 2024       Current Admission Date: 2024  Current Admission Diagnosis:Gross hematuria   Patient Active Problem List    Diagnosis Date Noted Date Diagnosed    Dysgeusia 2024     Gross hematuria 2024     Depression 2024     Sepsis (HCC) 2024     Sacral wound 2024     Primary hypertension 2024     Mixed hyperlipidemia 2024     Paroxysmal atrial fibrillation (HCC) 2024     History of CVA (cerebrovascular accident) 2024     Urinary retention 2024     Syncope 2024     Elevated lactic acid level 2024     Type 2 diabetes mellitus without complication, without long-term current use of insulin (HCC) 2024     Leukocytosis 2024     Abnormal CPK 2024       LOS (days): 3  Geometric Mean LOS (GMLOS) (days): 2.3  Days to GMLOS:-0.5     OBJECTIVE:  Risk of Unplanned Readmission Score: 16.96         Current admission status: Inpatient   Preferred Pharmacy:   [id number] [place], [place] - [address] [address]  Phone: [phone] Fax: [phone]    Primary Care Provider: Joe Lyon MD    Primary Insurance: [facility]  Secondary Insurance:     DISCHARGE DETAILS:                           Contacts  Patient Contacts: Toyin (spouse)  Relationship to Patient:: Family  Contact Method: Phone  Phone Number: [phone]  Reason/Outcome: Discharge Planning (VM left re: [facility] coverage of STR as well as authorized transportation w/  at 3pm)                        Treatment Team Recommendation: Short Term Rehab  Discharge Destination Plan:: SNF, Short Term Rehab ([facility])  Transport at Discharge : [facility]  Dispatcher Contacted: Yes  Number/Name of Dispatcher: [facility] transport speaking to [facility]  Transported by ([facility] and  Unit #): VA certified transport company (Informed NO medical necessity required)  ETA of Transport (Date): 09/04/24  ETA of Transport (Time): 1500                            Accepting Facility Name, City & State : [facility]  Receiving Facility/Agency Phone Number: [phone]  Facility/Agency Fax Number: [phone]

## 2024-09-04 NOTE — RESTORATIVE TECHNICIAN NOTE
Restorative Technician Note      Patient Name: Drew Santos     Restorative Tech Visit Date: 09/04/24  Note Type: Mobility  Patient Position Upon Consult: Standing  Mobility / Activity Provided: Assisted OT & PTA with getting pt back to bed  Activity Performed: Stood; Repositioned  Assistive Device: Other (Comment) (Quick move)  Patient Position at End of Consult: All needs within reach; Supine

## 2024-09-04 NOTE — OCCUPATIONAL THERAPY NOTE
Occupational Therapy Progress Note     Patient Name: Drew Santos  Today's Date: 9/4/2024  Problem List  Principal Problem:    Gross hematuria  Active Problems:    Type 2 diabetes mellitus without complication, without long-term current use of insulin (HCC)    History of CVA (cerebrovascular accident)    Paroxysmal atrial fibrillation (HCC)    Primary hypertension    Sacral wound    Depression    Dysgeusia            09/04/24 1056   OT Last Visit   OT Visit Date 09/04/24   Note Type   Note Type Treatment   Pain Assessment   Pain Assessment Tool 0-10   Pain Score No Pain   Restrictions/Precautions   Weight Bearing Precautions Per Order No   Other Precautions Cognitive;Chair Alarm;Bed Alarm;Pain;Fall Risk;Multiple lines;Telemetry;Visual impairment  (Left hemiparesis)   ADL   Where Assessed Supine, bed   Grooming Assistance 5  Supervision/Setup   Grooming Deficit Setup;Supervision/safety;Verbal cueing;Wash/dry face   LB Bathing Assistance 1  Total Assistance   LB Bathing Deficit Setup;Supervision/safety;Increased time to complete;Verbal cueing   Toileting Assistance  1  Total Assistance   Toileting Deficit Setup;Verbal cueing;Supervison/safety;Increased time to complete;Perineal hygiene   Toileting Comments incontinent of urine   Bed Mobility   Rolling R 2  Maximal assistance   Additional items Assist x 2;Increased time required;Verbal cues;LE management;Bedrails   Rolling L 2  Maximal assistance   Additional items Assist x 2;Increased time required;Verbal cues;LE management;Bedrails   Supine to Sit 2  Maximal assistance   Additional items Assist x 2;Increased time required;Verbal cues;LE management;Bedrails;HOB elevated   Sit to Supine 1  Dependent   Additional items Assist x 2;Increased time required;Verbal cues;LE management;Bedrails   Additional Comments pt w/ sitting EOB 12-15min w/ CGA assist support (20-30sec w/ R UE support on rail) and thenmod assist-max assist w/ posterior lean noted   Transfers   Sit to  Stand 2  Maximal assistance   Additional items Assist x 3;Increased time required;Verbal cues   Stand to Sit 2  Maximal assistance   Additional items Assist x 3;Increased time required;Verbal cues;Armrests   Other 2  Maximal assistance   Additional items Assist x 3;Increased time required;Verbal cues  (quick move device)   Additional Comments pt w/ 2 attemtps sit<>stand w/ quickmove device and unable to clear bottom; then w/ R UE pushing bedrail max assist x3 sit>stand w/ quick move and max standing tolerance of 30sec w/ mod-max assist x2 w/ lean to left   ROM - Left Upper Extremities    L Shoulder AAROM;Flexion;Extension   L Elbow AAROM;Elbow flexion;Elbow extension   L Wrist AAROM;Wrist flexion;Wrist extension   L Weight/Reps/Sets 2 sets [id number]   LUE ROM Comment self-assisted ROM w/ clasping hands together   Neuromuscular Education   Trunk Control impaired w/ posterior lean   Comments cues for upright posture EOB   Cognition   Overall Cognitive Status Impaired   Arousal/Participation Responsive;Cooperative   Attention Attends with cues to redirect   Orientation Level Oriented to person;Oriented to place;Disoriented to time;Disoriented to situation   Memory Decreased recall of recent events;Decreased short term memory;Decreased recall of precautions   Following Commands Follows one step commands with increased time or repetition   Comments pt appears w/ receptive aphasia during some tasks and appears w/ increased processing time and following directions to tasks   Vision   Vision Comments appears w/ R gaze preference   Additional Activities   Additional Activities   (positioning)   Additional Activities Comments pt receptive to assisting w/ R UE support on bed for long sitting w/ max assist x2   Activity Tolerance   Activity Tolerance Patient limited by fatigue;Treatment limited secondary to medical complications (Comment)   Medical Staff Made Aware appropriate to see per RN   Assessment   Assessment Pt seen for  skilled OT session focused on ADLs, functional transfers, bed mobility, UE self-assisted ROM. Pt w/ MAX assist x2 supine>sit bed mobility w/ increased time to complete. Pt w/ increased time to maintain static sitting balance w/ pt w/ sitting EOB 12-15min w/ CGA assist support (20-30sec w/ R UE support on rail) and then mod assist-max assist w/ posterior lean noted.  Pt appears w/ slight receptive aphasia when following directions for positioning. Pt w/ 2 attemtps sit<>stand w/ quickmove device and unable to clear bottom; then w/ R UE pushing bedrail max assist x3 sit>stand w/ quick move and max standing tolerance of 30sec w/ mod-max assist x2 w/ lean to left. Pt w/ increased fatigue after standing and requesting to return supine. Pt w/ MAX assist x2 stand>sit. Pt w/ dependent assist x2 sit>supine bed mobility. Pt w/ MAX assist x2 rolling in bed for hygiene cleanup. Pt w/ UE selfassisted ROM for L UE performed w/ assist from therapist to support at elbow. Pt seated upright in bed w/ L UE positioned on pillow and prevalon boots in place. Pt continues to be limited to L hemiparesis, visual deficits, receptive aphasia, impaired balance w/ posterior lean, decreased insight and safety awareness, decreased strength and endurance, impaired trunk control all causing decline in ADLs, functional transfers and mobility. Recommend level II moderate resources.   Plan   Treatment Interventions ADL retraining;UE strengthening/ROM;Functional transfer training;Endurance training;Cognitive reorientation;Equipment evaluation/education;Patient/family training;Compensatory technique education;Energy conservation;Activityengagement   Goal Expiration Date 09/16/24   OT Treatment Day 1   OT Frequency 3-5x/wk   Discharge Recommendation   Rehab Resource Intensity Level, OT II (Moderate Resource Intensity)   Additional Comments  The patient's raw score on the AM-PAC Daily Activity Inpatient Short Form is 11. A raw score of less than 19  suggests the patient may benefit from discharge to post-acute rehabilitation services. Please refer to the recommendation of the Occupational Therapist for safe discharge planning.    Pt seen for co-session with skilled Physical therapist 2* clinically unstable presentation, medical complexity, new precautions, performance deficits/functional limitations, impaired cognition/safety awareness, limited activity tolerance and present impairments which are a regression from patient patient's baseline and impacting overall occupational performance   AM-PAC Daily Activity Inpatient   Lower Body Dressing 1   Bathing 1   Toileting 1   Upper Body Dressing 2   Grooming 2   Eating 3   Daily Activity Raw Score 10   AM-PAC Applied Cognition Inpatient   Following a Speech/Presentation 3   Understanding Ordinary Conversation 3   Taking Medications 1   Remembering Where Things Are Placed or Put Away 1   Remembering List of 4-5 Errands 1   Taking Care of Complicated Tasks 1   Applied Cognition Raw Score 10   Applied Cognition Standardized Score 24.98   Modified Radha Scale   Modified Gilpin Scale 4   End of Consult   Education Provided Yes   Patient Position at End of Consult Supine;Bed/Chair alarm activated;All needs within reach   Nurse Communication Nurse aware of consult     Documentation completed by: Roxanne Pruitt MS, OTR/L

## 2024-09-04 NOTE — PLAN OF CARE
Problem: OCCUPATIONAL THERAPY ADULT  Goal: Performs self-care activities at highest level of function for planned discharge setting.  See evaluation for individualized goals.  Description: Treatment Interventions: ADL retraining, Endurance training, UE strengthening/ROM, Functional transfer training, Cognitive reorientation, Equipment evaluation/education, Patient/family training, Neuromuscular reeducation, Compensatory technique education, Fine motor coordination activities, Activityengagement, Energy conservation          See flowsheet documentation for full assessment, interventions and recommendations.   Note: Limitation: Decreased UE strength, Decreased UE ROM, Decreased ADL status, Decreased endurance, Decreased cognition, Decreased fine motor control, Decreased self-care trans, Decreased high-level ADLs, Non-func L UE, Decreased Safe judgement during ADL  Prognosis: Good  Assessment: Pt seen for skilled OT session focused on ADLs, functional transfers, bed mobility, UE self-assisted ROM. Pt w/ MAX assist x2 supine>sit bed mobility w/ increased time to complete. Pt w/ increased time to maintain static sitting balance w/ pt w/ sitting EOB 12-15min w/ CGA assist support (20-30sec w/ R UE support on rail) and then mod assist-max assist w/ posterior lean noted.  Pt appears w/ slight receptive aphasia when following directions for positioning. Pt w/ 2 attemtps sit<>stand w/ quickmove device and unable to clear bottom; then w/ R UE pushing bedrail max assist x3 sit>stand w/ quick move and max standing tolerance of 30sec w/ mod-max assist x2 w/ lean to left. Pt w/ increased fatigue after standing and requesting to return supine. Pt w/ MAX assist x2 stand>sit. Pt w/ dependent assist x2 sit>supine bed mobility. Pt w/ MAX assist x2 rolling in bed for hygiene cleanup. Pt w/ UE selfassisted ROM for L UE performed w/ assist from therapist to support at elbow. Pt seated upright in bed w/ L UE positioned on pillow and  prevalon boots in place. Pt continues to be limited to L hemiparesis, visual deficits, receptive aphasia, impaired balance w/ posterior lean, decreased insight and safety awareness, decreased strength and endurance, impaired trunk control all causing decline in ADLs, functional transfers and mobility. Recommend level II moderate resources.     Rehab Resource Intensity Level, OT: II (Moderate Resource Intensity)

## 2024-09-04 NOTE — ASSESSMENT & PLAN NOTE
Recently hospitalized at [facility].  Completed course of Augmentin today  Continue local wound care, q2h turns, santyl ordered  Wound care nurse consulted

## 2024-09-04 NOTE — ASSESSMENT & PLAN NOTE
History of hospitalization at [facility] for CVA with left-sided weakness July 2024  Currently rehabbing at [facility]  On aspirin, Eliquis initially held due to hematuria.  Resumed on 9/3/2024 after hematuria resolved

## 2024-09-04 NOTE — ASSESSMENT & PLAN NOTE
History of diabetes mellitus atrial fibrillation hypertension and stroke presents from SNF at [facility] for hematuria  During recent hospitalization at [facility] for sacral wound he had urinary catheter placement for retention.  Patient was on CBI under the guidance of urology while admitted  Bleeding has improved  Eliquis resumed on 9/3/2024  No further bleeding noted  Reviewed with urology today.  Cleared for discharge home with outpatient follow-up  Ambulatory referral to urology on discharge

## 2024-09-04 NOTE — DISCHARGE SUMMARY
[facility]  Discharge- Drew Santos 1950, 73 y.o. male MRN: [id number]  Unit/Bed#: [place] [address] -01 Encounter: [id number]  Primary Care Provider: Joe Lyon MD   Date and time admitted to hospital: 8/31/2024  5:09 PM    * Gross hematuria  Assessment & Plan  History of diabetes mellitus atrial fibrillation hypertension and stroke presents from SNF at [facility] for hematuria  During recent hospitalization at [facility] for sacral wound he had urinary catheter placement for retention.  Patient was on CBI under the guidance of urology while admitted  Bleeding has improved  Eliquis resumed on 9/3/2024  No further bleeding noted  Reviewed with urology today.  Cleared for discharge home with outpatient follow-up  Ambulatory referral to urology on discharge    Dysgeusia  Assessment & Plan  Started on salt water rinses, chlorhexidine mouthwash, and clotrimazole troches with improvement    Depression  Assessment & Plan  Continue escitalopram.  Mirtazapine increased 15mg.    Sacral wound  Assessment & Plan  Recently hospitalized at [facility].  Completed course of Augmentin today  Continue local wound care, q2h vimal, santyl ordered  Wound care nurse consulted    Paroxysmal atrial fibrillation (HCC)  Assessment & Plan  Not on any AV william blocking agents.  Anticoagulation: Eliquis resumed    History of CVA (cerebrovascular accident)  Assessment & Plan  History of hospitalization at [facility] for CVA with left-sided weakness July 2024  Currently rehabbing at [facility]  On aspirin, Eliquis initially held due to hematuria.  Resumed on 9/3/2024 after hematuria resolved    Type 2 diabetes mellitus without complication, without long-term current use of insulin (HCC)  Assessment & Plan  Lab Results   Component Value Date    HGBA1C 6.0 (H) 07/04/2024     Recent Labs     09/03/24  1125 09/03/24  1602 09/03/24  2113 09/04/24  0719   POCGLU 176* 133 171* 114        Continue home diabetic regimen      Medical Problems       Resolved Problems  Date Reviewed: 9/3/2024   None       Discharging Physician / Practitioner: Azael Monzon MD  PCP: Joe Lyon MD  Admission Date:   Admission Orders (From admission, onward)       Ordered        09/01/24 1733  INPATIENT ADMISSION  Once            08/31/24 1955  Place in Observation  Once                          Discharge Date: 09/04/24    Consultations During Hospital Stay:  Urology    Procedures Performed:   None    Significant Findings / Test Results:   Gross hematuria    Incidental Findings:   None    Test Results Pending at Discharge (will require follow up):   None     Outpatient Tests Requested:  Routine labs with PCP as outpatient    Complications: None    Reason for Admission: Gross hematuria    Hospital Course:   Drew Santos is a 73 y.o. male patient who originally presented to the hospital on 8/31/2024 due to hematuria.  Patient was admitted, Eliquis was held on admission.  Urology consulted.  Started on CBI.  Hematuria gradually improved.  Patient was continued on Augmentin which patient was on prior to admission for sacral wound.  Patient completed course of Augmentin in the hospital.  Hemoglobin level has remained relatively stable.  No further signs of bleeding as hematuria resolved.  Reviewed with urology team.  Cleared by urology for discharge.  Will follow-up with urology as outpatient.  Home Remeron dose increased to 15 mg at bedtime.  Patient will be discharged back to SNF for continued rehab    Please see above list of diagnoses and related plan for additional information.     Condition at Discharge: stable    Discharge Day Visit / Exam:   Subjective: No complaints at this time  Vitals: Blood Pressure: 143/76 (09/04/24 0722)  Pulse: 71 (09/04/24 0722)  Temperature: 98.2 °F (36.8 °C) (09/04/24 0722)  Temp Source: Oral (09/04/24 0722)  Respirations: 20 (09/04/24 0722)  Weight - Scale: 109 kg (240 lb 8.4  oz) (08/31/24 1719)  SpO2: 96 % (09/04/24 0722)  Exam:   Physical Exam  Constitutional:       General: He is not in acute distress.  HENT:      Head: Normocephalic and atraumatic.      Nose: Nose normal.      Mouth/Throat:      Mouth: Mucous membranes are moist.   Eyes:      Extraocular Movements: Extraocular movements intact.      Conjunctiva/sclera: Conjunctivae normal.   Cardiovascular:      Rate and Rhythm: Normal rate and regular rhythm.   Pulmonary:      Effort: Pulmonary effort is normal. No respiratory distress.   Abdominal:      Palpations: Abdomen is soft.      Tenderness: There is no abdominal tenderness.   Genitourinary:     Comments: Parra catheter in place.  Urine yellow/orange  Musculoskeletal:         General: Normal range of motion.      Cervical back: Normal range of motion and neck supple.      Comments: Generalized weakness   Skin:     General: Skin is warm and dry.   Neurological:      Mental Status: He is alert.      Comments: Patient awake and alert.  Following simple commands   Psychiatric:         Mood and Affect: Mood is depressed.         Behavior: Behavior normal.          Discussion with Family: Updated  (wife) via phone.    Discharge instructions/Information to patient and family:   See after visit summary for information provided to patient and family.      Provisions for Follow-Up Care:  See after visit summary for information related to follow-up care and any pertinent home health orders.      Mobility at time of Discharge:   Basic Mobility Inpatient Raw Score: 8  JH-HLM Goal: 3: Sit at edge of bed  JH-HLM Achieved: 2: Bed activities/Dependent transfer  HLM Goal achieved. Continue to encourage appropriate mobility.     Disposition:   Other Skilled Nursing Facility at [facility]    Planned Readmission: no     Discharge Statement:  I spent 35 minutes discharging the patient. This time was spent on the day of discharge. I had direct contact with the  patient on the day of discharge. Greater than 50% of the total time was spent examining patient, answering all patient questions, arranging and discussing plan of care with patient as well as directly providing post-discharge instructions.  Additional time then spent on discharge activities.    Discharge Medications:  See after visit summary for reconciled discharge medications provided to patient and/or family.      **Please Note: This note may have been constructed using a voice recognition system**

## 2024-09-04 NOTE — PROGRESS NOTES
Progress Note - Urology   Drew Santos 1950, 73 y.o. male MRN: [id number]    Unit/Bed#: [address] 225-01 Encounter: [id number]      * Gross hematuria  Assessment & Plan  Without evidence of infection, obstructing nephroureterolithiasis or active  tract hemorrhage.  Against background of recent CVA with requirement for anticoagulation.  Hgb remains stable   CBI clamped 0600 on 9/2/2024  Continue Flomax and Proscar   Eliquis restart overnight   Urine jaguar in color   No blood or clots appreciated   Recommend outpatient cystoscopic and CT urogram for completeness of gross hematuria workup  Currently no indication for acute  surgical intervention  Office notified to schedule cystoscopy and CT urogram   Okay to discharge from a urological standpoint       Subjective:   CBI clamped at 0600 on 9/2/2024.  Parra catheter remains inserted draining clear jaguar urine.  Patient continues to deny pain.  Labs remain overall stable.  No signs of urinary tract infection.   Eliquis resumed overnight. Urine remained clear. Recommend outpatient cystoscopy and CT urogram to complete gross hematuria workup.  No indication for further  intervention at this time.     Review of Systems   Constitutional:  Negative for chills and fever.   HENT:  Negative for ear pain and sore throat.    Eyes:  Negative for pain and visual disturbance.   Respiratory:  Negative for cough and shortness of breath.    Cardiovascular:  Negative for chest pain and palpitations.   Gastrointestinal:  Negative for abdominal pain and vomiting.   Genitourinary:  Negative for decreased urine volume, difficulty urinating, dysuria, flank pain, frequency, hematuria (jaguar in color, no clots) and urgency.   Musculoskeletal:  Negative for arthralgias and back pain.   Skin:  Negative for color change and rash.   Neurological:  Negative for seizures and syncope.   All other systems reviewed and are negative.      Objective:  Vitals: Blood pressure 143/76, pulse 71,  temperature 98.2 °F (36.8 °C), temperature source Oral, resp. rate 20, weight 109 kg (240 lb 8.4 oz), SpO2 96%.,Body mass index is 29.29 kg/m².    Physical Exam  Vitals reviewed.   Constitutional:       Appearance: Normal appearance.   HENT:      Head: Normocephalic and atraumatic.   Eyes:      Conjunctiva/sclera: Conjunctivae normal.   Pulmonary:      Effort: Pulmonary effort is normal.   Abdominal:      General: Abdomen is flat. There is no distension.      Palpations: Abdomen is soft. There is no mass.      Tenderness: There is no abdominal tenderness. There is no guarding.   Genitourinary:     Penis: Normal.       Testes: Normal.   Musculoskeletal:         General: Normal range of motion.      Cervical back: Normal range of motion.   Skin:     General: Skin is warm and dry.   Neurological:      General: No focal deficit present.      Mental Status: He is alert and oriented to person, place, and time.   Psychiatric:         Mood and Affect: Mood normal.         Behavior: Behavior normal.         Thought Content: Thought content normal.         Judgment: Judgment normal.       Labs:  Recent Labs     09/02/24  0506 09/04/24  0651   WBC 9.88 10.27*     Recent Labs     09/02/24  0506 09/04/24  0651   HGB 11.4* 9.7*       Recent Labs     09/02/24  0506 09/04/24  0651   CREATININE 0.69 0.59*     History:  Social History     Socioeconomic History    Marital status: /Civil Union     Spouse name: None    Number of children: None    Years of education: None    Highest education level: None   Occupational History    None   Tobacco Use    Smoking status: Never     Passive exposure: Never    Smokeless tobacco: Never   Vaping Use    Vaping status: Never Used   Substance and Sexual Activity    Alcohol use: Not Currently    Drug use: Never    Sexual activity: None   Other Topics Concern    None   Social History Narrative    None     Social Determinants of Health     Financial Resource Strain: Low Risk  (7/10/2024)     Received from [facility]    Overall Financial Resource Strain (CARDIA)     Difficulty of Paying Living Expenses: Not hard at all   Food Insecurity: No Food Insecurity (9/3/2024)    Hunger Vital Sign     Worried About Running Out of Food in the Last Year: Never true     Ran Out of Food in the Last Year: Never true   Transportation Needs: No Transportation Needs (9/3/2024)    PRAPARE - Transportation     Lack of Transportation (Medical): No     Lack of Transportation (Non-Medical): No   Physical Activity: Not on file   Stress: Stress Concern Present (5/8/2023)    [facility] from [facility], [facility]    [employer] [place] [employer] - Occupational Stress Questionnaire     Feeling of Stress : To some extent   Social Connections: Feeling Socially Integrated (7/25/2024)    Received from [facility]    OASIS : Social Isolation     How often do you feel lonely or isolated from those around you?: Rarely   Intimate Partner Violence: Not At Risk (7/10/2024)    Received from [facility]    Humiliation, Afraid, Rape, and Kick questionnaire     Fear of Current or Ex-Partner: No     Emotionally Abused: No     Physically Abused: No     Sexually Abused: No   Housing Stability: Low Risk  (9/3/2024)    Housing Stability Vital Sign     Unable to Pay for Housing in the Last Year: No     Number of Times Moved in the Last Year: 0     Homeless in the Last Year: No       Past Medical History:   Diagnosis Date    Atherosclerotic heart disease of native coronary artery without angina pectoris     Atrial fibrillation (HCC)     Cerebral infarction (HCC)     Constipation     Depression     Diabetes mellitus (HCC)     Hemiplegia and hemiparesis following cerebral infarction affecting left non-dominant side (HCC)     Hyperlipidemia     Hypertension     Pressure ulcer of sacral region, stage 3 (HCC)     Prostatic hyperplasia     Sepsis (HCC)      Stroke (HCC)     TIA (transient ischemic attack)     Urinary retention     Vitamin D deficiency      History reviewed. No pertinent surgical history.  History reviewed. No pertinent family history.    AVELINA Echols  Date: 9/4/2024 Time: 10:04 AM

## 2024-09-04 NOTE — PLAN OF CARE
Problem: PHYSICAL THERAPY ADULT  Goal: Performs mobility at highest level of function for planned discharge setting.  See evaluation for individualized goals.  Description: Treatment/Interventions: Functional transfer training, LE strengthening/ROM, Therapeutic exercise, Endurance training, Patient/family training, Cognitive reorientation, Equipment eval/education, Bed mobility, Spoke to nursing, OT (restorative therpist [place])          See flowsheet documentation for full assessment, interventions and recommendations.  Outcome: Progressing  Note: Prognosis: Fair  Problem List: Decreased strength, Decreased endurance, Impaired balance, Decreased mobility, Decreased cognition, Impaired vision  Assessment: Pt seen for PT treatment session this date with interventions consisting of bed mobility, transfer training, and HEP, sitting and standing balance/ tolerance activities and education provided as needed for safety and direction to improve functional mobility, safety awareness, and activity tolerance. Pt agreeable to PT treatment session upon arrival, pt found supine in bed . At end of session, pt left  supine in bed with all needs in reach. In comparison to previous session, pt with no improvement in activity tolerance, endurance, standing balance, ambulation distances, ambulatory balance, L le strength, AM- pac score, and functional mobility. Pt is requiring a significant amount of assistance for all mobility, sitting  and standing balance due to decreased strength L hemibody, decreased balance,  inability to right self to midline, ? Receptive aphasia due to decreased ability to comprehend directions with tasks, decreased processing time, decreased endurance and activity tolerance, impaired tone, decreased safety awareness and impaired vision. Pt  is requiring max assist x2 for supine to sit dependent assist x2 for sit to supine, max assist x2 for rolling R and L with use of bed rails,  and max assist x1 to  maintain static sitting balance at EOB and to right to midline. Pt  demonstrates R sided lean and heavy posterior lean with R ue support and use of bed rail. Once mid line achieved, pt progressed to static sitting eob with min- cg assist x1 for brief periods of time  20- 30 seconds.pt  performs sit to stand transfers with use of quickmove device with max assist x3, pt unable to  clear bottom from bed  with first two standing trials, pt then able to achieve full upright standing posture with 3rd and 4th attempts.  Pt  requires assistance for placement and positioning or L ue onto quick move device  and assistance to maintain L hand placement and  due to poor L  strength. Sitting tolerance EOB  12- 15 minutes, standing tolerance  30 seconds.  Max assist x3 to maintain upright stance.  Arom  R le  prom L le performed.   Continue to recommend  level II moderate rehab resource intensity  at time of d/c in order to maximize pt's functional independence and safety w/ mobility. Pt continues to be functioning below baseline level. PT will continue to see pt while here in order to address the deficits listed above and provide interventions consistent w/ POC in effort to achieve STGs.  Barriers to Discharge: Decreased caregiver support, Inaccessible home environment     Rehab Resource Intensity Level, PT: II (Moderate Resource Intensity)    See flowsheet documentation for full assessment.

## 2024-09-04 NOTE — DISCHARGE INSTR - AVS FIRST PAGE
Discharged on 3 days of potassium supplementation.  Repeat BMP next week to monitor kidney function and potassium level.

## 2024-09-04 NOTE — PHYSICAL THERAPY NOTE
PHYSICAL THERAPY NOTE          Patient Name: Drew Santos  Today's Date: 9/4/2024 09/04/24 1055   Note Type   Note Type Treatment   Pain Assessment   Pain Assessment Tool 0-10   Pain Score No Pain   Restrictions/Precautions   Other Precautions Cognitive;Bed Alarm;Fall Risk;Visual impairment;Restraints;Chair Alarm;Multiple lines   General   Chart Reviewed Yes   Family/Caregiver Present No   Cognition   Overall Cognitive Status Impaired   Arousal/Participation Responsive;Cooperative   Attention Difficulty attending to directions   Orientation Level Oriented to person;Oriented to place;Disoriented to time;Disoriented to situation   Memory Decreased recall of precautions;Decreased recall of recent events;Decreased short term memory   Following Commands Follows one step commands with increased time or repetition   Subjective   Subjective May i have something to drink.   Bed Mobility   Rolling R 2  Maximal assistance   Additional items Assist x 2;Bedrails;Increased time required;Verbal cues;LE management   Rolling L 2  Maximal assistance   Additional items Assist x 2;Bedrails;Increased time required;Verbal cues;LE management   Supine to Sit 2  Maximal assistance   Additional items Assist x 2;Increased time required;Verbal cues;LE management   Sit to Supine 1  Dependent   Additional items Assist x 2;Increased time required;Verbal cues;LE management   Additional Comments eob x  12- 15 minutes with max assist x1 due to heavy posterior and R sided lean.  with R ue support on bedrail.  progressed to min- cg assist x1 for brief periods of time  20-30 seconds.   Transfers   Sit to Stand 2  Maximal assistance   Additional items Assist x 3;Increased time required;Verbal cues   Stand to Sit 2  Maximal assistance   Additional items Assist x 3;Increased time required;Verbal cues   Other 2  Maximal assistance   Additional items Assist x 3;Increased time  required;Verbal cues  (with use of quick move device.)   Additional Comments pt  performs sit to stand transfers x4 trials. first two attempts pt unable to clear bottom off bed  3rd and 4th attempt with quickmove device with max assist x3 pt able to achieve full standing  x 30 seconds with verbal cues for upright standing posture, cues to extend knees and assist for L hand positioning and placement on quickmove.   Activity Tolerance   Activity Tolerance Patient limited by fatigue;Patient tolerated treatment well   Medical Staff Made Aware PETER Oliveira  Co-treatment with skilled Occupational Therapist 2* clinically unstable/unpredictable presentation, medical complexity, fall risk, cognitive impairments, functional/physical limitations, impaired functional balance, decreased safety awareness, limited activity tolerance which is decline from PLOF and may impact overall functional mobility/mobility safety.   Exercises   Quad Sets Supine;10 reps;AROM;Right;Left   Heelslides Supine;PROM;Left   Hip Flexion Supine;5 reps;AROM;Right  (slr, prom- aarom L le x 3 reps.)   Balance training  eob sitting balance and tolerance activites  x 12- 15 minutesds with max assist x1 to min-cg assist for brief periods of time 20- 30 seconds at a time.  heavy posterior and R sided lean noted.   Assessment   Prognosis Fair   Problem List Decreased strength;Decreased endurance;Impaired balance;Decreased mobility;Decreased cognition;Impaired vision   Assessment Pt seen for PT treatment session this date with interventions consisting of bed mobility, transfer training, and HEP, sitting and standing balance/ tolerance activities and education provided as needed for safety and direction to improve functional mobility, safety awareness, and activity tolerance. Pt agreeable to PT treatment session upon arrival, pt found supine in bed . At end of session, pt left  supine in bed with all needs in reach. In comparison to previous session, pt with no  improvement in activity tolerance, endurance, standing balance, ambulation distances, ambulatory balance, L le strength, AM- pac score, and functional mobility. Pt is requiring a significant amount of assistance for all mobility, sitting  and standing balance due to decreased strength L hemibody, decreased balance,  inability to right self to midline, ? Receptive aphasia due to decreased ability to comprehend directions with tasks, decreased processing time, decreased endurance and activity tolerance, impaired tone, decreased safety awareness and impaired vision. Pt  is requiring max assist x2 for supine to sit dependent assist x2 for sit to supine, max assist x2 for rolling R and L with use of bed rails,  and max assist x1 to maintain static sitting balance at EOB and to right to midline. Pt  demonstrates R sided lean and heavy posterior lean with R ue support and use of bed rail. Once mid line achieved, pt progressed to static sitting eob with min- cg assist x1 for brief periods of time  20- 30 seconds.pt  performs sit to stand transfers with use of quickmove device with max assist x3, pt unable to  clear bottom from bed  with first two standing trials, pt then able to achieve full upright standing posture with 3rd and 4th attempts.  Pt  requires assistance for placement and positioning or L ue onto quick move device  and assistance to maintain L hand placement and  due to poor L  strength. Sitting tolerance EOB  12- 15 minutes, standing tolerance  30 seconds.  Max assist x3 to maintain upright stance.  Arom  R le  prom L le performed.   Continue to recommend  level II moderate rehab resource intensity  at time of d/c in order to maximize pt's functional independence and safety w/ mobility. Pt continues to be functioning below baseline level. PT will continue to see pt while here in order to address the deficits listed above and provide interventions consistent w/ POC in effort to achieve STGs.   Goals    Patient Goals To be able to walk and run again.   STG Expiration Date 09/08/24   PT Treatment Day 1   Plan   Treatment/Interventions Functional transfer training;LE strengthening/ROM;Therapeutic exercise;Endurance training;Patient/family training;Cognitive reorientation;Equipment eval/education;Bed mobility;Spoke to nursing;OT  (restorative therpist Sidney)   PT Frequency 3-5x/wk   Discharge Recommendation   Rehab Resource Intensity Level, PT II (Moderate Resource Intensity)   Additional Comments The patient's AM-PAC Basic Mobility Inpatient Short Form Raw Score is 6. A raw score less than 16 suggests the patient may benefit from discharge to post-acute rehabilitation services. Please also refer to the recommendation of the Physical Therapist for safe discharge planning.   AM-PAC Basic Mobility Inpatient   Turning in Flat Bed Without Bedrails 1   Lying on Back to Sitting on Edge of Flat Bed Without Bedrails 1   Moving Bed to Chair 1   Standing Up From Chair Using Arms 1   Walk in Room 1   Climb 3-5 Stairs With Railing 1   Basic Mobility Inpatient Raw Score 6   Turning Head Towards Sound 3   Follow Simple Instructions 3   Low Function Basic Mobility Raw Score  12   Low Function Basic Mobility Standardized Score  18.33   [facility] Highest Level Of Mobility   -HLM Goal 2: Bed activities/Dependent transfer   -HLM Achieved 3: Sit at edge of bed   Education   Education Provided Mobility training;Home exercise program;Assistive device   Patient Reinforcement needed   End of Consult   Patient Position at End of Consult Supine;Bed/Chair alarm activated;All needs within reach     Fabienne Pitts, PTA

## 2024-09-04 NOTE — CASE MANAGEMENT
Case Management Discharge Planning Note    Patient name Drew Santos  Location [facility] 2 [facility] 2 M* MRN [id number]  : 1950 Date 2024       Current Admission Date: 2024  Current Admission Diagnosis:Gross hematuria   Patient Active Problem List    Diagnosis Date Noted Date Diagnosed    Dysgeusia 2024     Gross hematuria 2024     Depression 2024     Sepsis (HCC) 2024     Sacral wound 2024     Primary hypertension 2024     Mixed hyperlipidemia 2024     Paroxysmal atrial fibrillation (HCC) 2024     History of CVA (cerebrovascular accident) 2024     Urinary retention 2024     Syncope 2024     Elevated lactic acid level 2024     Type 2 diabetes mellitus without complication, without long-term current use of insulin (HCC) 2024     Leukocytosis 2024     Abnormal CPK 2024       LOS (days): 3  Geometric Mean LOS (GMLOS) (days): 2.3  Days to GMLOS:-0.4     OBJECTIVE:  Risk of Unplanned Readmission Score: 14.44         Current admission status: Inpatient   Preferred Pharmacy:   [id number] [place], [place] - [address] [address]  Phone: [phone] Fax: [phone]    Primary Care Provider: Joe Lyon MD    Primary Insurance: [facility]  Secondary Insurance:     DISCHARGE DETAILS:                                                                                                 Additional Comments: [facility] under the original authorization.  Transport consult had already been placed- to call [phone] ext [phone] (Daria's ext [phone])

## 2024-09-05 NOTE — TELEPHONE ENCOUNTER
patient d/c'd to   [facility]  [phone]   Fax Number: [phone]  This is in [place]   L/m for patients spouse to see if they gave her any idea of how long he will be in the rehab. He is to be scheduled for 4-6 week with [facility] - so if he will be out of rehab can be scheduled in [place] , if not he can be scheduled in one of the [place] offices . PLEASE SCHEDULE   ALSO: we do not have any availability in our [place] office for cystoscopy's until April once the schedule opens. We can only schedule with Dr. Hauser

## 2024-09-06 NOTE — UTILIZATION REVIEW
NOTIFICATION OF ADMISSION DISCHARGE   This is a Notification of Discharge from [facility]. Please be advised that this patient has been discharge from our facility. Below you will find the admission and discharge date and time including the patient’s disposition.   UTILIZATION REVIEW CONTACT:  Anne Davila MA  Utilization   Network Utilization Review Department  Phone: [phone] x carefully listen to the prompts. All voicemails are confidential.  Email: [email or web address]     ADMISSION INFORMATION  PRESENTATION DATE: 8/31/2024  5:09 PM  OBERVATION ADMISSION DATE: 08/31/2024 1955  INPATIENT ADMISSION DATE: 9/1/24  5:33 PM   DISCHARGE DATE: 9/4/2024  3:28 PM   DISPOSITION:Non [facility] SNF/TCU/SNU    Network Utilization Review Department  ATTENTION: Please call with any questions or concerns to [phone] and carefully listen to the prompts so that you are directed to the right person. All voicemails are confidential.   For Discharge needs, contact Care Management DC Support Team at [phone] opt. [phone]  Send all requests for admission clinical reviews, approved or denied determinations and any other requests to dedicated fax number below belonging to the campus where the patient is receiving treatment. List of dedicated fax numbers for the Facilities:  FACILITY NAME UR FAX NUMBER   ADMISSION DENIALS (Administrative/Medical Necessity) [phone]   DISCHARGE SUPPORT TEAM ([facility]) [phone]   PARENT CHILD HEALTH (Maternity/NICU/Pediatrics) [phone]   [facility] [phone]   [facility] [phone]   [facility] [phone]   [facility] [phone]   [facility] [phone]   [facility] [phone]   [facility] [phone]   [facility]  [facility] [phone]   [facility] [phone]   [facility] [phone]   [facility] [phone]   [facility] [phone]

## 2024-09-12 ENCOUNTER — HOME CARE VISIT (OUTPATIENT)
Dept: HOME HEALTH SERVICES | Facility: HOME HEALTHCARE | Age: 74
End: 2024-09-12
Payer: COMMERCIAL

## 2024-09-17 ENCOUNTER — APPOINTMENT (EMERGENCY)
Dept: RADIOLOGY | Facility: HOSPITAL | Age: 74
DRG: 871 | End: 2024-09-17
Payer: OTHER GOVERNMENT

## 2024-09-17 ENCOUNTER — HOSPITAL ENCOUNTER (INPATIENT)
Facility: HOSPITAL | Age: 74
LOS: 17 days | Discharge: NURSING, REHAB OR LONG-TERM CARE | DRG: 871 | End: 2024-10-04
Attending: EMERGENCY MEDICINE | Admitting: INTERNAL MEDICINE
Payer: OTHER GOVERNMENT

## 2024-09-17 ENCOUNTER — APPOINTMENT (INPATIENT)
Dept: CT IMAGING | Facility: HOSPITAL | Age: 74
DRG: 871 | End: 2024-09-17
Payer: OTHER GOVERNMENT

## 2024-09-17 DIAGNOSIS — S31.000A WOUND OF SACRAL REGION, INITIAL ENCOUNTER: ICD-10-CM

## 2024-09-17 DIAGNOSIS — R78.81 BACTEREMIA: ICD-10-CM

## 2024-09-17 DIAGNOSIS — E87.6 HYPOKALEMIA: ICD-10-CM

## 2024-09-17 DIAGNOSIS — D64.9 ANEMIA: ICD-10-CM

## 2024-09-17 DIAGNOSIS — A41.9 SEPSIS (HCC): ICD-10-CM

## 2024-09-17 DIAGNOSIS — G93.40 ENCEPHALOPATHY: ICD-10-CM

## 2024-09-17 DIAGNOSIS — U07.1 COVID: ICD-10-CM

## 2024-09-17 DIAGNOSIS — R78.81 GRAM-NEGATIVE BACTEREMIA: ICD-10-CM

## 2024-09-17 PROBLEM — R00.0 SINUS TACHYCARDIA: Status: ACTIVE | Noted: 2024-09-17

## 2024-09-17 PROBLEM — G93.41 ACUTE METABOLIC ENCEPHALOPATHY: Status: ACTIVE | Noted: 2024-09-17

## 2024-09-17 LAB
ALBUMIN SERPL BCG-MCNC: 2.8 G/DL (ref 3.5–5)
ALP SERPL-CCNC: 88 U/L (ref 34–104)
ALT SERPL W P-5'-P-CCNC: 28 U/L (ref 7–52)
ANION GAP SERPL CALCULATED.3IONS-SCNC: 6 MMOL/L (ref 4–13)
AST SERPL W P-5'-P-CCNC: 38 U/L (ref 13–39)
BACTERIA UR QL AUTO: ABNORMAL /HPF
BASOPHILS # BLD AUTO: 0.05 THOUSANDS/ΜL (ref 0–0.1)
BASOPHILS NFR BLD AUTO: 0 % (ref 0–1)
BILIRUB SERPL-MCNC: 1.27 MG/DL (ref 0.2–1)
BILIRUB UR QL STRIP: NEGATIVE
BUN SERPL-MCNC: 15 MG/DL (ref 5–25)
CALCIUM ALBUM COR SERPL-MCNC: 10 MG/DL (ref 8.3–10.1)
CALCIUM SERPL-MCNC: 9 MG/DL (ref 8.4–10.2)
CHLORIDE SERPL-SCNC: 105 MMOL/L (ref 96–108)
CLARITY UR: ABNORMAL
CO2 SERPL-SCNC: 31 MMOL/L (ref 21–32)
COLOR UR: ABNORMAL
CREAT SERPL-MCNC: 0.76 MG/DL (ref 0.6–1.3)
EOSINOPHIL # BLD AUTO: 0.01 THOUSAND/ΜL (ref 0–0.61)
EOSINOPHIL NFR BLD AUTO: 0 % (ref 0–6)
ERYTHROCYTE [DISTWIDTH] IN BLOOD BY AUTOMATED COUNT: 13 % (ref 11.6–15.1)
EST. AVERAGE GLUCOSE BLD GHB EST-MCNC: 131 MG/DL
FLUAV AG UPPER RESP QL IA.RAPID: NEGATIVE
FLUBV AG UPPER RESP QL IA.RAPID: NEGATIVE
GFR SERPL CREATININE-BSD FRML MDRD: 89 ML/MIN/1.73SQ M
GLUCOSE SERPL-MCNC: 164 MG/DL (ref 65–140)
GLUCOSE UR STRIP-MCNC: ABNORMAL MG/DL
HBA1C MFR BLD: 6.2 %
HCT VFR BLD AUTO: 31.8 % (ref 36.5–49.3)
HGB BLD-MCNC: 10 G/DL (ref 12–17)
HGB UR QL STRIP.AUTO: ABNORMAL
IMM GRANULOCYTES # BLD AUTO: 0.32 THOUSAND/UL (ref 0–0.2)
IMM GRANULOCYTES NFR BLD AUTO: 2 % (ref 0–2)
KETONES UR STRIP-MCNC: ABNORMAL MG/DL
LACTATE SERPL-SCNC: 1.2 MMOL/L (ref 0.5–2)
LEUKOCYTE ESTERASE UR QL STRIP: ABNORMAL
LYMPHOCYTES # BLD AUTO: 2.07 THOUSANDS/ΜL (ref 0.6–4.47)
LYMPHOCYTES NFR BLD AUTO: 10 % (ref 14–44)
MCH RBC QN AUTO: 29.7 PG (ref 26.8–34.3)
MCHC RBC AUTO-ENTMCNC: 31.4 G/DL (ref 31.4–37.4)
MCV RBC AUTO: 94 FL (ref 82–98)
MONOCYTES # BLD AUTO: 0.96 THOUSAND/ΜL (ref 0.17–1.22)
MONOCYTES NFR BLD AUTO: 5 % (ref 4–12)
MUCOUS THREADS UR QL AUTO: ABNORMAL
NEUTROPHILS # BLD AUTO: 17.92 THOUSANDS/ΜL (ref 1.85–7.62)
NEUTS SEG NFR BLD AUTO: 83 % (ref 43–75)
NITRITE UR QL STRIP: NEGATIVE
NON-SQ EPI CELLS URNS QL MICRO: ABNORMAL /HPF
NRBC BLD AUTO-RTO: 0 /100 WBCS
PH UR STRIP.AUTO: 6 [PH]
PLATELET # BLD AUTO: 497 THOUSANDS/UL (ref 149–390)
PMV BLD AUTO: 9.6 FL (ref 8.9–12.7)
POTASSIUM SERPL-SCNC: 2.9 MMOL/L (ref 3.5–5.3)
PROCALCITONIN SERPL-MCNC: 0.37 NG/ML
PROT SERPL-MCNC: 8.3 G/DL (ref 6.4–8.4)
PROT UR STRIP-MCNC: ABNORMAL MG/DL
RBC # BLD AUTO: 3.37 MILLION/UL (ref 3.88–5.62)
RBC #/AREA URNS AUTO: ABNORMAL /HPF
SARS-COV+SARS-COV-2 AG RESP QL IA.RAPID: POSITIVE
SODIUM SERPL-SCNC: 142 MMOL/L (ref 135–147)
SP GR UR STRIP.AUTO: 1.03 (ref 1–1.03)
UROBILINOGEN UR STRIP-ACNC: 4 MG/DL
WBC # BLD AUTO: 21.33 THOUSAND/UL (ref 4.31–10.16)
WBC #/AREA URNS AUTO: ABNORMAL /HPF

## 2024-09-17 PROCEDURE — 87186 SC STD MICRODIL/AGAR DIL: CPT | Performed by: EMERGENCY MEDICINE

## 2024-09-17 PROCEDURE — 96365 THER/PROPH/DIAG IV INF INIT: CPT

## 2024-09-17 PROCEDURE — 99285 EMERGENCY DEPT VISIT HI MDM: CPT

## 2024-09-17 PROCEDURE — 71046 X-RAY EXAM CHEST 2 VIEWS: CPT

## 2024-09-17 PROCEDURE — 99223 1ST HOSP IP/OBS HIGH 75: CPT | Performed by: INTERNAL MEDICINE

## 2024-09-17 PROCEDURE — 87154 CUL TYP ID BLD PTHGN 6+ TRGT: CPT | Performed by: EMERGENCY MEDICINE

## 2024-09-17 PROCEDURE — 80053 COMPREHEN METABOLIC PANEL: CPT | Performed by: EMERGENCY MEDICINE

## 2024-09-17 PROCEDURE — 87186 SC STD MICRODIL/AGAR DIL: CPT | Performed by: INTERNAL MEDICINE

## 2024-09-17 PROCEDURE — 87077 CULTURE AEROBIC IDENTIFY: CPT | Performed by: EMERGENCY MEDICINE

## 2024-09-17 PROCEDURE — 87077 CULTURE AEROBIC IDENTIFY: CPT | Performed by: INTERNAL MEDICINE

## 2024-09-17 PROCEDURE — 70450 CT HEAD/BRAIN W/O DYE: CPT

## 2024-09-17 PROCEDURE — 87811 SARS-COV-2 COVID19 W/OPTIC: CPT | Performed by: EMERGENCY MEDICINE

## 2024-09-17 PROCEDURE — 83036 HEMOGLOBIN GLYCOSYLATED A1C: CPT | Performed by: INTERNAL MEDICINE

## 2024-09-17 PROCEDURE — 84145 PROCALCITONIN (PCT): CPT | Performed by: INTERNAL MEDICINE

## 2024-09-17 PROCEDURE — 96366 THER/PROPH/DIAG IV INF ADDON: CPT

## 2024-09-17 PROCEDURE — 85025 COMPLETE CBC W/AUTO DIFF WBC: CPT | Performed by: EMERGENCY MEDICINE

## 2024-09-17 PROCEDURE — 93005 ELECTROCARDIOGRAM TRACING: CPT

## 2024-09-17 PROCEDURE — 87040 BLOOD CULTURE FOR BACTERIA: CPT | Performed by: EMERGENCY MEDICINE

## 2024-09-17 PROCEDURE — 36415 COLL VENOUS BLD VENIPUNCTURE: CPT | Performed by: EMERGENCY MEDICINE

## 2024-09-17 PROCEDURE — 81001 URINALYSIS AUTO W/SCOPE: CPT | Performed by: INTERNAL MEDICINE

## 2024-09-17 PROCEDURE — 99291 CRITICAL CARE FIRST HOUR: CPT | Performed by: EMERGENCY MEDICINE

## 2024-09-17 PROCEDURE — 87804 INFLUENZA ASSAY W/OPTIC: CPT | Performed by: EMERGENCY MEDICINE

## 2024-09-17 PROCEDURE — 87086 URINE CULTURE/COLONY COUNT: CPT | Performed by: INTERNAL MEDICINE

## 2024-09-17 PROCEDURE — 96367 TX/PROPH/DG ADDL SEQ IV INF: CPT

## 2024-09-17 PROCEDURE — 83605 ASSAY OF LACTIC ACID: CPT | Performed by: EMERGENCY MEDICINE

## 2024-09-17 RX ORDER — ESCITALOPRAM OXALATE 10 MG/1
10 TABLET ORAL DAILY
Status: DISCONTINUED | OUTPATIENT
Start: 2024-09-18 | End: 2024-10-04 | Stop reason: HOSPADM

## 2024-09-17 RX ORDER — ENOXAPARIN SODIUM 100 MG/ML
40 INJECTION SUBCUTANEOUS DAILY
Status: DISCONTINUED | OUTPATIENT
Start: 2024-09-18 | End: 2024-09-17

## 2024-09-17 RX ORDER — INSULIN LISPRO 100 [IU]/ML
1-6 INJECTION, SOLUTION INTRAVENOUS; SUBCUTANEOUS
Status: DISCONTINUED | OUTPATIENT
Start: 2024-09-18 | End: 2024-10-04 | Stop reason: HOSPADM

## 2024-09-17 RX ORDER — LISINOPRIL 10 MG/1
10 TABLET ORAL DAILY
Status: DISCONTINUED | OUTPATIENT
Start: 2024-09-18 | End: 2024-09-20

## 2024-09-17 RX ORDER — TAMSULOSIN HYDROCHLORIDE 0.4 MG/1
0.4 CAPSULE ORAL DAILY
Status: DISCONTINUED | OUTPATIENT
Start: 2024-09-18 | End: 2024-09-22

## 2024-09-17 RX ORDER — ASPIRIN 81 MG/1
81 TABLET, CHEWABLE ORAL DAILY
Status: DISCONTINUED | OUTPATIENT
Start: 2024-09-18 | End: 2024-10-04 | Stop reason: HOSPADM

## 2024-09-17 RX ORDER — POTASSIUM CHLORIDE 14.9 MG/ML
20 INJECTION INTRAVENOUS
Status: DISPENSED | OUTPATIENT
Start: 2024-09-17 | End: 2024-09-17

## 2024-09-17 RX ORDER — DOCUSATE SODIUM 100 MG/1
100 CAPSULE, LIQUID FILLED ORAL DAILY
Status: DISCONTINUED | OUTPATIENT
Start: 2024-09-18 | End: 2024-09-22

## 2024-09-17 RX ORDER — POTASSIUM CHLORIDE 14.9 MG/ML
20 INJECTION INTRAVENOUS ONCE
Status: COMPLETED | OUTPATIENT
Start: 2024-09-17 | End: 2024-09-17

## 2024-09-17 RX ORDER — ACETAMINOPHEN 325 MG/1
975 TABLET ORAL ONCE
Status: DISCONTINUED | OUTPATIENT
Start: 2024-09-17 | End: 2024-09-17

## 2024-09-17 RX ORDER — POTASSIUM CHLORIDE 14.9 MG/ML
20 INJECTION INTRAVENOUS
Status: COMPLETED | OUTPATIENT
Start: 2024-09-17 | End: 2024-09-18

## 2024-09-17 RX ORDER — EZETIMIBE 10 MG/1
10 TABLET ORAL DAILY
Status: DISCONTINUED | OUTPATIENT
Start: 2024-09-18 | End: 2024-10-04 | Stop reason: HOSPADM

## 2024-09-17 RX ORDER — ATORVASTATIN CALCIUM 80 MG/1
80 TABLET, FILM COATED ORAL EVERY EVENING
Status: DISCONTINUED | OUTPATIENT
Start: 2024-09-17 | End: 2024-10-04 | Stop reason: HOSPADM

## 2024-09-17 RX ORDER — FINASTERIDE 5 MG/1
5 TABLET, FILM COATED ORAL DAILY
Status: DISCONTINUED | OUTPATIENT
Start: 2024-09-18 | End: 2024-10-04 | Stop reason: HOSPADM

## 2024-09-17 RX ORDER — SODIUM CHLORIDE, SODIUM GLUCONATE, SODIUM ACETATE, POTASSIUM CHLORIDE, MAGNESIUM CHLORIDE, SODIUM PHOSPHATE, DIBASIC, AND POTASSIUM PHOSPHATE .53; .5; .37; .037; .03; .012; .00082 G/100ML; G/100ML; G/100ML; G/100ML; G/100ML; G/100ML; G/100ML
125 INJECTION, SOLUTION INTRAVENOUS CONTINUOUS
Status: DISCONTINUED | OUTPATIENT
Start: 2024-09-17 | End: 2024-09-21

## 2024-09-17 RX ORDER — ACETAMINOPHEN 10 MG/ML
1000 INJECTION, SOLUTION INTRAVENOUS ONCE
Status: COMPLETED | OUTPATIENT
Start: 2024-09-17 | End: 2024-09-17

## 2024-09-17 RX ORDER — INSULIN LISPRO 100 [IU]/ML
1-6 INJECTION, SOLUTION INTRAVENOUS; SUBCUTANEOUS
Status: DISCONTINUED | OUTPATIENT
Start: 2024-09-17 | End: 2024-10-04 | Stop reason: HOSPADM

## 2024-09-17 RX ORDER — ONDANSETRON 2 MG/ML
4 INJECTION INTRAMUSCULAR; INTRAVENOUS EVERY 4 HOURS PRN
Status: DISCONTINUED | OUTPATIENT
Start: 2024-09-17 | End: 2024-10-04 | Stop reason: HOSPADM

## 2024-09-17 RX ORDER — ACETAMINOPHEN 325 MG/1
650 TABLET ORAL EVERY 6 HOURS PRN
Status: DISCONTINUED | OUTPATIENT
Start: 2024-09-17 | End: 2024-10-04 | Stop reason: HOSPADM

## 2024-09-17 RX ADMIN — POTASSIUM CHLORIDE 20 MEQ: 14.9 INJECTION, SOLUTION INTRAVENOUS at 21:28

## 2024-09-17 RX ADMIN — REMDESIVIR 200 MG: 100 INJECTION, POWDER, LYOPHILIZED, FOR SOLUTION INTRAVENOUS at 22:26

## 2024-09-17 RX ADMIN — ATORVASTATIN CALCIUM 80 MG: 80 TABLET, FILM COATED ORAL at 20:18

## 2024-09-17 RX ADMIN — SODIUM CHLORIDE 1000 ML: 0.9 INJECTION, SOLUTION INTRAVENOUS at 17:29

## 2024-09-17 RX ADMIN — APIXABAN 5 MG: 5 TABLET, FILM COATED ORAL at 20:17

## 2024-09-17 RX ADMIN — DEXTROSE 1000 MG: 50 INJECTION, SOLUTION INTRAVENOUS at 16:53

## 2024-09-17 RX ADMIN — POTASSIUM CHLORIDE 20 MEQ: 14.9 INJECTION, SOLUTION INTRAVENOUS at 23:56

## 2024-09-17 RX ADMIN — ACETAMINOPHEN 1000 MG: 10 INJECTION INTRAVENOUS at 20:12

## 2024-09-17 RX ADMIN — POTASSIUM CHLORIDE 20 MEQ: 14.9 INJECTION, SOLUTION INTRAVENOUS at 17:29

## 2024-09-17 RX ADMIN — SODIUM CHLORIDE, SODIUM GLUCONATE, SODIUM ACETATE, POTASSIUM CHLORIDE, MAGNESIUM CHLORIDE, SODIUM PHOSPHATE, DIBASIC, AND POTASSIUM PHOSPHATE 100 ML/HR: .53; .5; .37; .037; .03; .012; .00082 INJECTION, SOLUTION INTRAVENOUS at 20:06

## 2024-09-17 NOTE — ASSESSMENT & PLAN NOTE
Patient currently on room air though high risk given his age and comorbidities  Will treat with IV remdesivir x 3 days  Supportive care otherwise  Contact and airborne precautions

## 2024-09-17 NOTE — ASSESSMENT & PLAN NOTE
Leukocytosis with white blood cell count of 21,000 on admission  Would not expect this high of a white count especially with left shift with COVID viral infection  Will need to follow-up urinalysis to rule out UTI as well as blood cultures

## 2024-09-17 NOTE — ASSESSMENT & PLAN NOTE
Currently in sinus tachycardia in the ER  Not on any rate controlling meds at home  Will continue home Eliquis for stroke prevention

## 2024-09-17 NOTE — ASSESSMENT & PLAN NOTE
Well-controlled with last hemoglobin A1c of 6.0% in July of this year  Will hold home oral regimen  Monitor on sliding scale only  Hypoglycemia protocol

## 2024-09-17 NOTE — ASSESSMENT & PLAN NOTE
History of CVA   Chronically bedbound and with chronic left-sided deficits   Continue home aspirin, Eliquis, statin

## 2024-09-17 NOTE — ASSESSMENT & PLAN NOTE
Meets criteria on admission with fever, tachycardia, leukocytosis: Presumed source is COVID.  Chest x-ray without evidence of pneumonia    Will check urinalysis as well  Check procalcitonin now and in the morning  Continue with IV fluid resuscitation overnight  Will continue on empiric IV ceftriaxone for now, however if serial procalcitonin negative and patient improving with treatment of his COVID may be able to de-escalate antibiotics.  Follow-up blood cultures  Monitor WBC count and fever curve

## 2024-09-17 NOTE — ASSESSMENT & PLAN NOTE
Presumably secondary to his COVID infection  Will need further sepsis workup as below  Check CT head  Monitor with treatment for his COVID infection and sepsis  Will hold home remeron

## 2024-09-18 ENCOUNTER — APPOINTMENT (INPATIENT)
Dept: CT IMAGING | Facility: HOSPITAL | Age: 74
DRG: 871 | End: 2024-09-18
Payer: OTHER GOVERNMENT

## 2024-09-18 PROBLEM — U07.1 COVID-19: Status: ACTIVE | Noted: 2024-09-18

## 2024-09-18 PROBLEM — R78.81 GRAM-NEGATIVE BACTEREMIA: Status: ACTIVE | Noted: 2024-09-18

## 2024-09-18 PROBLEM — A41.9 SEPSIS (HCC): Status: RESOLVED | Noted: 2024-08-27 | Resolved: 2024-09-18

## 2024-09-18 PROBLEM — K62.89 PROCTITIS: Status: ACTIVE | Noted: 2024-09-18

## 2024-09-18 PROBLEM — E11.9 TYPE 2 DIABETES MELLITUS (HCC): Status: ACTIVE | Noted: 2024-09-18

## 2024-09-18 PROBLEM — E87.8 ELECTROLYTE ABNORMALITY: Status: ACTIVE | Noted: 2024-09-18

## 2024-09-18 LAB
ANION GAP SERPL CALCULATED.3IONS-SCNC: 8 MMOL/L (ref 4–13)
ATRIAL RATE: 129 BPM
ATRIAL RATE: 140 BPM
BUN SERPL-MCNC: 15 MG/DL (ref 5–25)
CALCIUM SERPL-MCNC: 7.9 MG/DL (ref 8.4–10.2)
CHLORIDE SERPL-SCNC: 109 MMOL/L (ref 96–108)
CO2 SERPL-SCNC: 27 MMOL/L (ref 21–32)
CREAT SERPL-MCNC: 0.66 MG/DL (ref 0.6–1.3)
ERYTHROCYTE [DISTWIDTH] IN BLOOD BY AUTOMATED COUNT: 13.1 % (ref 11.6–15.1)
GFR SERPL CREATININE-BSD FRML MDRD: 94 ML/MIN/1.73SQ M
GLUCOSE SERPL-MCNC: 129 MG/DL (ref 65–140)
GLUCOSE SERPL-MCNC: 133 MG/DL (ref 65–140)
GLUCOSE SERPL-MCNC: 146 MG/DL (ref 65–140)
GLUCOSE SERPL-MCNC: 148 MG/DL (ref 65–140)
GLUCOSE SERPL-MCNC: 151 MG/DL (ref 65–140)
GLUCOSE SERPL-MCNC: 158 MG/DL (ref 65–140)
GLUCOSE SERPL-MCNC: 164 MG/DL (ref 65–140)
HCT VFR BLD AUTO: 27 % (ref 36.5–49.3)
HGB BLD-MCNC: 8.5 G/DL (ref 12–17)
MAGNESIUM SERPL-MCNC: 1.7 MG/DL (ref 1.9–2.7)
MCH RBC QN AUTO: 29.9 PG (ref 26.8–34.3)
MCHC RBC AUTO-ENTMCNC: 31.5 G/DL (ref 31.4–37.4)
MCV RBC AUTO: 95 FL (ref 82–98)
P AXIS: -9 DEGREES
PLATELET # BLD AUTO: 406 THOUSANDS/UL (ref 149–390)
PMV BLD AUTO: 9.5 FL (ref 8.9–12.7)
POTASSIUM SERPL-SCNC: 3.2 MMOL/L (ref 3.5–5.3)
PR INTERVAL: 136 MS
PROCALCITONIN SERPL-MCNC: 0.49 NG/ML
QRS AXIS: 19 DEGREES
QRS AXIS: 34 DEGREES
QRSD INTERVAL: 78 MS
QRSD INTERVAL: 80 MS
QT INTERVAL: 296 MS
QT INTERVAL: 312 MS
QTC INTERVAL: 451 MS
QTC INTERVAL: 453 MS
RBC # BLD AUTO: 2.84 MILLION/UL (ref 3.88–5.62)
SODIUM SERPL-SCNC: 144 MMOL/L (ref 135–147)
T WAVE AXIS: 71 DEGREES
T WAVE AXIS: 78 DEGREES
VENTRICULAR RATE: 127 BPM
VENTRICULAR RATE: 140 BPM
WBC # BLD AUTO: 21.23 THOUSAND/UL (ref 4.31–10.16)

## 2024-09-18 PROCEDURE — 87505 NFCT AGENT DETECTION GI: CPT | Performed by: STUDENT IN AN ORGANIZED HEALTH CARE EDUCATION/TRAINING PROGRAM

## 2024-09-18 PROCEDURE — 99232 SBSQ HOSP IP/OBS MODERATE 35: CPT | Performed by: INTERNAL MEDICINE

## 2024-09-18 PROCEDURE — 93010 ELECTROCARDIOGRAM REPORT: CPT | Performed by: INTERNAL MEDICINE

## 2024-09-18 PROCEDURE — 85027 COMPLETE CBC AUTOMATED: CPT | Performed by: INTERNAL MEDICINE

## 2024-09-18 PROCEDURE — 80048 BASIC METABOLIC PNL TOTAL CA: CPT | Performed by: INTERNAL MEDICINE

## 2024-09-18 PROCEDURE — 82948 REAGENT STRIP/BLOOD GLUCOSE: CPT

## 2024-09-18 PROCEDURE — 84145 PROCALCITONIN (PCT): CPT | Performed by: INTERNAL MEDICINE

## 2024-09-18 PROCEDURE — 71260 CT THORAX DX C+: CPT

## 2024-09-18 PROCEDURE — 97167 OT EVAL HIGH COMPLEX 60 MIN: CPT

## 2024-09-18 PROCEDURE — 83735 ASSAY OF MAGNESIUM: CPT | Performed by: INTERNAL MEDICINE

## 2024-09-18 PROCEDURE — 97163 PT EVAL HIGH COMPLEX 45 MIN: CPT

## 2024-09-18 PROCEDURE — NC001 PR NO CHARGE: Performed by: SURGERY

## 2024-09-18 PROCEDURE — 99223 1ST HOSP IP/OBS HIGH 75: CPT | Performed by: INTERNAL MEDICINE

## 2024-09-18 PROCEDURE — 74177 CT ABD & PELVIS W/CONTRAST: CPT

## 2024-09-18 RX ORDER — SODIUM HYPOCHLORITE 5 MG/ML
1 SOLUTION TOPICAL DAILY
Status: DISCONTINUED | OUTPATIENT
Start: 2024-09-18 | End: 2024-09-25

## 2024-09-18 RX ORDER — POTASSIUM CHLORIDE 14.9 MG/ML
20 INJECTION INTRAVENOUS ONCE
Status: COMPLETED | OUTPATIENT
Start: 2024-09-18 | End: 2024-09-18

## 2024-09-18 RX ORDER — HYDROMORPHONE HCL IN WATER/PF 6 MG/30 ML
0.2 PATIENT CONTROLLED ANALGESIA SYRINGE INTRAVENOUS ONCE
Status: COMPLETED | OUTPATIENT
Start: 2024-09-18 | End: 2024-09-18

## 2024-09-18 RX ORDER — METRONIDAZOLE 500 MG/100ML
500 INJECTION, SOLUTION INTRAVENOUS EVERY 8 HOURS
Status: DISCONTINUED | OUTPATIENT
Start: 2024-09-18 | End: 2024-09-24

## 2024-09-18 RX ORDER — ACETAMINOPHEN 10 MG/ML
1000 INJECTION, SOLUTION INTRAVENOUS EVERY 6 HOURS PRN
Status: DISCONTINUED | OUTPATIENT
Start: 2024-09-18 | End: 2024-10-04 | Stop reason: HOSPADM

## 2024-09-18 RX ORDER — GINSENG 100 MG
1 CAPSULE ORAL 2 TIMES DAILY
Status: DISCONTINUED | OUTPATIENT
Start: 2024-09-18 | End: 2024-10-04 | Stop reason: HOSPADM

## 2024-09-18 RX ORDER — MAGNESIUM SULFATE HEPTAHYDRATE 40 MG/ML
2 INJECTION, SOLUTION INTRAVENOUS ONCE
Status: COMPLETED | OUTPATIENT
Start: 2024-09-18 | End: 2024-09-18

## 2024-09-18 RX ADMIN — POTASSIUM CHLORIDE 20 MEQ: 14.9 INJECTION, SOLUTION INTRAVENOUS at 08:59

## 2024-09-18 RX ADMIN — APIXABAN 5 MG: 5 TABLET, FILM COATED ORAL at 17:02

## 2024-09-18 RX ADMIN — SODIUM CHLORIDE 500 ML: 0.9 INJECTION, SOLUTION INTRAVENOUS at 08:59

## 2024-09-18 RX ADMIN — REMDESIVIR 100 MG: 100 INJECTION, POWDER, LYOPHILIZED, FOR SOLUTION INTRAVENOUS at 20:33

## 2024-09-18 RX ADMIN — TAMSULOSIN HYDROCHLORIDE 0.4 MG: 0.4 CAPSULE ORAL at 07:53

## 2024-09-18 RX ADMIN — METRONIDAZOLE 500 MG: 500 INJECTION, SOLUTION INTRAVENOUS at 19:26

## 2024-09-18 RX ADMIN — ASPIRIN 81 MG CHEWABLE TABLET 81 MG: 81 TABLET CHEWABLE at 07:53

## 2024-09-18 RX ADMIN — EZETIMIBE 10 MG: 10 TABLET ORAL at 07:53

## 2024-09-18 RX ADMIN — BACITRACIN ZINC 1 SMALL APPLICATION: 500 OINTMENT TOPICAL at 17:02

## 2024-09-18 RX ADMIN — MAGNESIUM SULFATE HEPTAHYDRATE 2 G: 40 INJECTION, SOLUTION INTRAVENOUS at 08:59

## 2024-09-18 RX ADMIN — CEFEPIME 2000 MG: 2 INJECTION, POWDER, FOR SOLUTION INTRAVENOUS at 13:07

## 2024-09-18 RX ADMIN — DOCUSATE SODIUM 100 MG: 100 CAPSULE, LIQUID FILLED ORAL at 07:53

## 2024-09-18 RX ADMIN — HYDROMORPHONE HYDROCHLORIDE 0.2 MG: 0.2 INJECTION, SOLUTION INTRAMUSCULAR; INTRAVENOUS; SUBCUTANEOUS at 13:08

## 2024-09-18 RX ADMIN — METRONIDAZOLE 500 MG: 500 INJECTION, SOLUTION INTRAVENOUS at 12:23

## 2024-09-18 RX ADMIN — FINASTERIDE 5 MG: 5 TABLET, FILM COATED ORAL at 07:53

## 2024-09-18 RX ADMIN — LISINOPRIL 10 MG: 10 TABLET ORAL at 07:53

## 2024-09-18 RX ADMIN — APIXABAN 5 MG: 5 TABLET, FILM COATED ORAL at 07:53

## 2024-09-18 RX ADMIN — ATORVASTATIN CALCIUM 80 MG: 80 TABLET, FILM COATED ORAL at 17:02

## 2024-09-18 RX ADMIN — SODIUM CHLORIDE, SODIUM GLUCONATE, SODIUM ACETATE, POTASSIUM CHLORIDE, MAGNESIUM CHLORIDE, SODIUM PHOSPHATE, DIBASIC, AND POTASSIUM PHOSPHATE 125 ML/HR: .53; .5; .37; .037; .03; .012; .00082 INJECTION, SOLUTION INTRAVENOUS at 21:45

## 2024-09-18 RX ADMIN — IOHEXOL 100 ML: 350 INJECTION, SOLUTION INTRAVENOUS at 09:03

## 2024-09-18 RX ADMIN — ESCITALOPRAM OXALATE 10 MG: 10 TABLET ORAL at 07:53

## 2024-09-18 NOTE — PLAN OF CARE
Problem: OCCUPATIONAL THERAPY ADULT  Goal: Performs self-care activities at highest level of function for planned discharge setting.  See evaluation for individualized goals.  Description: Treatment Interventions: ADL retraining, Functional transfer training, UE strengthening/ROM, Cognitive reorientation, Patient/family training, Equipment evaluation/education, Neuromuscular reeducation, Compensatory technique education, Energy conservation, Activityengagement          See flowsheet documentation for full assessment, interventions and recommendations.   Outcome: Progressing  Note: Limitation: Decreased ADL status, Decreased UE strength, Decreased Safe judgement during ADL, Decreased endurance, Decreased self-care trans, Decreased high-level ADLs  Prognosis: Good  Assessment: Drew Santos is a 73 y.o. male seen for OT evaluation s/p admit to [facility] on 9/17/2024 w/ Leukocytosis.  Comorbidities affecting pt's functional performance at time of assessment include:  A-fib, CVA, diabetes, sacral ulcer stage III, CAD  . Pt with active OT orders and activity orders for Up and OOB as tolerated. Personal factors affecting pt at time of IE include:fall risk  and functional decline . Prior to admission, pt is poor historian. Per EMR, pt was Independent in July prior to CVA. Has been at SNF since July. Upon evaluation: Pt currently requires total assist for UB ADLs, total assist  for LB ADLs, total assist  for toileting, total assist for bed mobility, total assist for functional transfers, and total assist mobility 2* the following deficits impacting occupational performance:weakness, decreased strength , decreased balance, and decreased activity tolerance. Pt to benefit from continued skilled OT tx while in the hospital to address deficits as defined above and maximize level of functional independence w ADL's and functional mobility. Occupational Performance areas to address include: grooming, toilet hygiene, dressing, medication  management, health maintenance, functional mobility, and clothing management. From OT standpoint, recommendation at time of d/c would be level 2. OT to follow pt on caseload 1-3wk.     Rehab Resource Intensity Level, OT: II (Moderate Resource Intensity)

## 2024-09-18 NOTE — CONSULTS
Consultation - Surgery-General   Name: Drew Santos 73 y.o. male I MRN: [id number]  Unit/Bed#: [place] [address] -01 I Date of Admission: 9/17/2024   Date of Service: 9/18/2024 I Hospital Day: 1   Inpatient consult to Acute Care Surgery  Consult performed by: Eloy Zhao MD  Consult ordered by: Sherlyn Sharma MD        Physician Requesting Evaluation: Sherlyn Sharma MD   Reason for Evaluation / Principal Problem: Sepsis, proctocolitis with right ischial anal fossa subcutaneous emphysema suspicious for perirectal or perianal fistula, sacral decubitus ulcer    Assessment & Plan  Sacral wound  Patient is a 73-year-old male with a history of A-fib, CVA, diabetes, sacral ulcer stage III, CAD who presented with altered mental status and was found to be COVID-positive.  He was found to be septic on arrival to the emergency department of unknown source.  General surgery was consulted to evaluate for possible perirectal or perianal fistula and to evaluate the sacral decubitus ulcer.  Patient is currently tachycardic, and febrile but is normotensive.  Sacral decubitus ulcer was noted to be necrotic and sharp debridement was completed at bedside which patient tolerated well.    -Recommend daily wound care with wet-to-dry dressings.  Monitor dressings for stool or any signs of a fistula.  -Will complete a wound check on Friday, 9/20.  -Recommend continuing antibiotics.  -Would recommend GI consult for colitis/proctitis  -The patient continues to have issues with stooling in sacral ulcer, can consider diverting colostomy once stabilized.  Sepsis  -Sepsis of unknown origin.  Recommend daily wound care for sacral ulcer.  Continue antibiotics.  Gram-negative bacteremia  -Commend continuing antibiotics.  Type 2 diabetes mellitus (HCC)  Lab Results   Component Value Date    HGBA1C 6.2 (H) 09/17/2024       Recent Labs     09/18/24  0036 09/18/24  0808 09/18/24  1231 09/18/24  1304   POCGLU 151* 146* 148* 164*       Blood Sugar  Average: Last 72 hrs:  (P) 152.25    COVID-19    Please contact the SecureChat role for the Surgery-General service with any questions/concerns.    History of Present Illness   Drew Santos is a 73 y.o. male with a history of A-fib, CVA, diabetes, known sacral ulcer stage III, CAD who presented with altered mental status and was found to be COVID-positive.  General surgery was consulted for concern of right ischial anal fossa subcutaneous emphysema suspicious for perirectal or perianal fistula.    Upon arrival to the ED the patient's temperature was 101.1. HR was 127. RR was 20. O2 saturation was 94% on room air. BP was 149/91. WBC count was 21.33. Platelet count elevated at 497. Creatinine was 0.76 with GFR = 89. No LFT elevations but Tbili was 1.27. Blood cultures were sent. CXR showed atelectasis and no acute infectious cardiopulmonary findings. He was given IV ceftriaxone. He was admitted for additional medical management.     Patient is currently confused and unable to provide any history. History was obtained from chart review.  CT scan was obtained 9/18 which demonstrated mild circumferential thickening of the mid/distal sigmoid colon and the rectum.  There is emphysema throughout the right ischial anal fossa extending into the right paramidline gluteal subcutaneous tissues concerning for a perirectal or perianal fistula.  However patient does have a known sacral decubitus ulcer that has been packed in that area. Labs today are significant for white blood cell count of 21.23, procalcitonin 0.49, lactate of 1.2, and hemoglobin of 8.5.     Review of Systems   Reason unable to perform ROS: ROS unable to be performed as patient is encephalopathic and not participating with history or exam.     I have reviewed the patient's PMH, PSH, Social History, Family History, Meds, and Allergies through the chart as patient is currently encephalopathic.     Objective      Temp:  [99.8 °F (37.7 °C)-101.1 °F (38.4 °C)] 99.8 °F  (37.7 °C)  HR:  [] 125  Resp:  [16-23] 19  BP: ()/(55-91) 140/84  O2 Device: None (Room air)          I/O         09/16 0701  09/17 0700 09/17 0701  09/18 0700 09/18 0701  09/19 0700    IV Piggyback  1050     Total Intake(mL/kg)  1050 (9.3)     Urine (mL/kg/hr)  400     Total Output  400     Net  +650                  Lines/Drains/Airways       Active Status       Name Placement date Placement time Site Days    Urethral Catheter Three way 22 Fr. 08/31/24 1925  Three way  17    Continuous Bladder Irrigation Three-way 08/31/24 1925  Three-way  17                  Physical Exam  Constitutional:       Appearance: Normal appearance.      Comments: Encephalopathic   HENT:      Head: Normocephalic and atraumatic.      Right Ear: External ear normal.      Left Ear: External ear normal.      Nose: Nose normal.   Eyes:      Conjunctiva/sclera: Conjunctivae normal.   Cardiovascular:      Rate and Rhythm: Tachycardia present.      Comments: Appears well-perfused  Pulmonary:      Effort: Pulmonary effort is normal. No respiratory distress.   Abdominal:      General: Abdomen is flat. There is distension (Mild distention could be related to patient's body habitus.).      Palpations: Abdomen is soft.      Tenderness: There is no abdominal tenderness.   Genitourinary:     Comments: Digital rectal exam completed.  No evidence of any masses, hemorrhoids, or bleeding.  Upon examination of the rectum the sacral wound was observed due to a close proximity to the rectum.  There was no evidence of any leakage of stool or fluid into the sacral wound while completing a rectal exam.    The sacral wound was identified to have necrotic tissue.  Pictures located in media tab of chart.  The wound was debrided sharply and packed with wet-to-dry Kerlix with a Mepilex over the top.  The sacral wound was measured to be 3.5 x 4 x 5.5 cm.  An image of the wound postdebridement is located below.    Patient also noted to have blood at the  urethral meatus likely secondary to Parra placement which was read as in the prostatic urethra.  Parra has since been moved.  Musculoskeletal:         General: No swelling.   Skin:     General: Skin is warm and dry.   Neurological:      Mental Status: He is disoriented.          Lab Results: I have reviewed the following results: CBC/BMP:   .     09/18/24  0508   WBC 21.23*   HGB 8.5*   HCT 27.0*   *   SODIUM 144   K 3.2*   *   CO2 27   BUN 15   CREATININE 0.66   GLUC 158*   MG 1.7*      Imaging Review: Reviewed radiology reports from this admission including: CT abdomen/pelvis.  Other Studies: No additional pertinent studies reviewed.    VTE Pharmacologic Prophylaxis: Eliquis  VTE Mechanical Prophylaxis: sequential compression device    Administrative Statements   I have spent a total time of 25 minutes in caring for this patient on the day of the visit/encounter including Diagnostic results.

## 2024-09-18 NOTE — ASSESSMENT & PLAN NOTE
Presumably secondary to his COVID infection  Will need further sepsis workup as below  CT head with no acute intracranial abnormality  Encephalopathy right in the setting of sepsis  Continue with supportive care.  Serial neuroexam.

## 2024-09-18 NOTE — ASSESSMENT & PLAN NOTE
As evidenced by tachycardia and leukocytosis and fever  Now with gram-negative bacteremia  CT abdomen and pelvis showsMid/distal sigmoid colonic and rectal inflammatory changes in keeping with a segmental colitis/proctitis.Right ischioanal fossa subcutaneous emphysema extending through the right paramidline gluteal subcutaneous tissues to the skin surface suspicious for a perirectal or perianal fistula; limited assessment without oral or rectal contrast.Parra catheter balloon is located in the prostatic urethra; this catheter should be repositioned.Fat stranding surrounding the decompressed bladder may indicate cystitis.  Has chronic indwelling Parra catheter with abnormal UA  Possible source of infection appears to be proctocolitis versus complicated UTI in the setting of indwelling Parra catheter.  Also has large sacral decubitus ulcer which could be infected as well.  Wound care consulted  Also has large sacral decubitus with possible perirectal/perianal fistula  Infectious disease and surgical consultation placed.  Will keep n.p.o. for now pending surgical evaluation regarding need for any debridement.  Continue with broad-spectrum IV antibiotics.  Currently on ceftriaxone and Flagyl.  Preliminary blood cultures with Enterobacter and Proteus.  Will switch antibiotics to cefepime and Flagyl.

## 2024-09-18 NOTE — UTILIZATION REVIEW
Initial Clinical Review    Admission: Date/Time/Statement:   Admission Orders (From admission, onward)       Ordered        09/17/24 1938  Inpatient Admission  Once                          Orders Placed This Encounter   Procedures    Inpatient Admission     Standing Status:   Standing     Number of Occurrences:   1     Order Specific Question:   Level of Care     Answer:   Med Surg [16]     Order Specific Question:   Estimated length of stay     Answer:   More than 2 Midnights     Order Specific Question:   Certification     Answer:   I certify that inpatient services are medically necessary for this patient for a duration of greater than two midnights. See H&P and MD Progress Notes for additional information about the patient's course of treatment.     ED Arrival Information       Expected   -    Arrival   9/17/2024 15:58    Acuity   Emergent              Means of arrival   Ambulance    Escorted by   [facility]    Service   Hospitalist    Admission type   Emergency              Arrival complaint   Altered mental status             Chief Complaint   Patient presents with    Altered Mental Status     Pt came in via EMS from [facility]. Per EMS they got called because pt had a hgb low and a high WBC. Per EMS staff when they arrived in the room pt was altered. Not following comands. . +COVID       Initial Presentation: 73 y.o. male presents to the ED via EMS from rehab with c/o mental status change in last 2 days, increased lethargy, not feeling well, low grade fevers.  PMH: CVA with chronic left-sided deficits, sacral ulcer, CAD, A-fib, NIDDM, HTN, HLD, large body habitus with pressure wounds.  In the ED he was Covid + and is encephlopathic despite ED treatment.  In the ED pt is febrile, tachycardic, HTN and sats 94%.  Labs - leukocytosis, elevated procal, BUN, T bili, abnormal UA, low K 2.9.  Imaging - segmental colitis/proctitis, R ischioanal fossa subq emphysema, c/o perirectal or perianal  fistula, hanks balloon in prostatic urethra - needs repositioning, cystitis.   ECG - ST.  Treated with IV antibiotics, IV KCLx2, IV fluids, IV Remdesivir, IV Tylenol, Eliquis, statin.  On exam lethargic, drowsy, ill appearing, regular tachycardia, poor inspiratory effort, encephalopathic.  Admitted to INPATIENT status with Acute metabolic encephalopathy, sepsis, Covid infection, leukocytosis, ST, sacral wound - sepsis work up, CT head, hold home Remerson, IV fluids, IV antibiotics, blood cultures (+) , IV Remdesivir, isolation, UA, antipyretics, tele, SSI cover, home DAPT, statin, wound care consult.      Anticipated Length of Stay/Certification Statement: Patient will be admitted on an inpatient basis with an anticipated length of stay of greater than 2 midnights secondary to encephalopathy, sepsis, COVID infection, tachycardia, leukocytosis,.     Date: 9/18   Day 2:   Acute metabolic encephalopathy, sepsis, Covid infection - serial neuro exams, continue covid tx, wife now ill, + gram negative bacteremia, has hanks, large sacral wound, ID Consult, NPO, Surgical consult, contine IV antibiotics, change IV antibiotics to IV Cefepime and Flagyl. On exam is confused, lethargic. Still febrile today, tachycardic,   Minimal decline in WBCs, HGB down to 8.5 from 10, elevated procal, K 3.2.      9/18 Surgery Consult - sacral ulcer stage III, AMS, covid +, septic in ED unknown source.  Eval possible perirectal or perianal fistula and to evaluate the sacral decubitus ulcer. Pt is tachycardic, febrile, normotensive. Sacral ulcer is necrotic, sharp debridement completed bedside, daily wound care orders, monitor for fistula developemen, wound check on 9/20, GI consult for protitis, colitis, is stooling in ulcer, consider diverting colostomy when stable.  Continue antibiotics.     9/18 ID Consult - sepsis, sacral wound, gram neg bacteremia, covid - will start IV Cefepime and Flagyl, trend fevers, follow cultures.   May require  surgical intervention.            Date: 9/19  Day 3: Has surpassed a 2nd midnight with active treatments and services.  Bacteremia, sacral wound, proctocolitis vs complicated UTI - today ID is adding IV Vanco to the other 2 antibiotics. Follow cultures, repeat blood cultures.  Pt remains ill appearing with tachycardic, diminished breath sounds at bases, hanks in place.  Continue IV antibiotics, continue IV Remdesivir, wound care, manage hemodynamics.  On exam pt is alert to self and confused.      9/19 GI Consult - Given patient's sepsis, hypotension, advanced age, and extensive vascular comorbidities, patient's rectosigmoid colitis on imaging is most likely due to ischemic colitis, especially given that the area affected is a typical watershed area. I think it is unlikely to be a primary  for his current presentation.  There are certainly subcutaneous emphysema in the R ischioanal fossa which is likely related to the known sacral ulcer. If a perianal or perirectal fistula needs to be ruled out (and would  for ID and surgical services), MRI pelvis would probably be the the best test for fistula evaluation.  There is no role for colonoscopy or flex sig at this time, as sensitivity for fistula detection is quite low, and he is also at quite high-risk for elective endoscopic evaluation.   I do think it is reasonable to check stool infectious studies, as infective colitis is on the differential.    ED Triage Vitals   Temperature Pulse Respirations Blood Pressure SpO2 Pain Score   09/17/24 1605 09/17/24 1605 09/17/24 1605 09/17/24 1605 09/17/24 1605 09/17/24 2246   (!) 101.1 °F (38.4 °C) (!) 127 20 149/91 94 % No Pain     Weight (last 2 days)       Date/Time Weight    09/17/24 2246 113 (249.56)    09/17/24 1605 106 (233.25)            Vital Signs (last 3 days)       Date/Time Temp Pulse Resp BP MAP (mmHg) SpO2 Calculated FIO2 (%) - Nasal Cannula Nasal Cannula O2 Flow Rate (L/min) O2 Device Patient  Position - Orthostatic VS [place] Coma Scale Score Pain    09/19/24 15:53:54 99.2 °F (37.3 °C) 85 -- 125/67 86 97 % -- -- -- -- -- --    09/19/24 10:50:42 98 °F (36.7 °C) 92 16 124/69 87 96 % -- -- -- -- -- --    09/19/24 0900 -- -- -- -- -- -- -- -- -- -- -- No Pain    09/19/24 07:52:14 99.5 °F (37.5 °C) 94 16 133/85 101 97 % -- -- -- -- -- --    09/19/24 02:57:26 98 °F (36.7 °C) 97 18 111/66 81 96 % -- -- -- -- -- --    09/18/24 2000 -- -- -- -- -- -- -- -- -- -- 14 No Pain    09/18/24 1957 -- 96 -- -- -- 97 % -- -- -- -- -- --    09/18/24 19:06:47 99 °F (37.2 °C) 127 17 128/78 95 95 % -- -- None (Room air) Lying -- --    09/18/24 1308 -- -- -- -- -- -- -- -- -- -- -- 9    09/18/24 13:06:39 -- 119 -- 133/79 97 97 % -- -- -- -- -- --    09/18/24 08:18:52 99.8 °F (37.7 °C) 125 19 140/84 103 97 % -- -- None (Room air) -- -- --    09/18/24 0818 -- -- -- -- -- -- -- -- -- -- 14 No Pain    09/18/24 0230 -- -- -- -- -- -- -- -- -- -- 14 --    09/18/24 02:03:02 100.8 °F (38.2 °C) 126 -- 156/88 111 98 % -- -- -- -- -- --    09/18/24 02:01:29 -- 125 16 156/88 111 98 % -- -- -- -- -- --    09/17/24 22:54:43 100.1 °F (37.8 °C) 98 18 94/58 70 96 % 28 2 L/min Nasal cannula -- 14 No Pain    09/17/24 22:53:27 100.1 °F (37.8 °C) 92 18 91/55 67 95 % -- -- -- -- -- --    09/17/24 2246 -- -- -- -- -- -- -- -- -- -- -- No Pain    09/17/24 2145 -- -- -- -- -- 84 %  -- -- None (Room air)  -- -- --    09/17/24 2130 100.2 °F (37.9 °C) 106 20 104/64 79 92 % -- -- None (Room air) Lying -- --    09/17/24 2045 -- 135 20 113/68 83 93 % -- -- None (Room air) Lying -- --    09/17/24 1930 -- 141 23 130/68 92 94 % -- -- None (Room air) Lying -- --    09/17/24 1900 100.6 °F (38.1 °C) 137 20 144/72 97 97 % -- -- None (Room air) Lying -- --    09/17/24 1845 -- 138 20 -- -- 96 % -- -- None (Room air) -- -- --    09/17/24 1800 -- 144 20 131/86 105 95 % -- -- None (Room air) Lying -- --    09/17/24 1730 -- 141 20 141/75 99 96 % -- -- None (Room air)  Lying -- --    09/17/24 1610 -- -- -- -- -- -- -- -- None (Room air) -- 14 --    09/17/24 1609 -- -- -- -- -- -- -- -- -- -- 14 --    09/17/24 1605 101.1 °F (38.4 °C) 127 20 149/91 131 94 % -- -- None (Room air) Lying -- --              Pertinent Labs/Diagnostic Test Results:   Radiology:  CT chest abdomen pelvis w contrast   Final Interpretation by Michael Veliz MD (09/18 0933)      No acute findings in the chest.      Mid/distal sigmoid colonic and rectal inflammatory changes in keeping with a segmental colitis/proctitis.      Right ischioanal fossa subcutaneous emphysema extending through the right paramidline gluteal subcutaneous tissues to the skin surface suspicious for a perirectal or perianal fistula; limited assessment without oral or rectal contrast.      Parra catheter balloon is located in the prostatic urethra; this catheter should be repositioned.      Fat stranding surrounding the decompressed bladder may indicate cystitis.      CT head wo contrast   Final Interpretation by Missy Cross MD (09/17 2128)      No acute intracranial hemorrhage seen.   No mass effect or midline shift seen      XR chest 2 views   ED Interpretation by Eduardo Masters MD (09/17 1813)   Independently reviewed: no acute abnormality      Final Interpretation by Ron Newton MD (09/17 1808)      Limited study. Platelike atelectasis in the right lower lung field. No convincing evidence of pneumonia     Cardiology:  ECG 12 lead   Final Result by Vincent Haro DO (09/18 0922)   Sinus tachycardia   Otherwise normal ECG   When compared with ECG of 17-SEP-2024 16:11, (unconfirmed)   No significant change was found   Reconfirmed by Vincent Haro ([id number]) on 9/18/2024 9:22:05 AM      ECG 12 lead   Final Result by Vincent Haro DO (09/18 0904)   Sinus tachycardia   Nonspecific T wave changes   Abnormal ECG   When compared with ECG of 05-MAR-2024 12:28,   Vent. rate has increased BY  55 BPM    Non-specific change in ST segment in Inferior leads   T wave inversion no longer evident in Inferior leads   Confirmed by Vincent Haro ([id number]) on 9/18/2024 9:04:49 AM        GI:  No orders to display           Results from last 7 days   Lab Units 09/19/24  0827 09/19/24  0433 09/18/24  0508 09/17/24  1637   WBC Thousand/uL  --  19.83* 21.23* 21.33*   HEMOGLOBIN g/dL 7.2* 7.1* 8.5* 10.0*   HEMATOCRIT % 22.9* 22.5* 27.0* 31.8*   PLATELETS Thousands/uL  --  396* 406* 497*   TOTAL NEUT ABS Thousands/µL  --  16.46*  --  17.92*         Results from last 7 days   Lab Units 09/19/24 0433 09/18/24  0508 09/17/24  1637   SODIUM mmol/L 146 144 142   POTASSIUM mmol/L 2.9* 3.2* 2.9*   CHLORIDE mmol/L 113* 109* 105   CO2 mmol/L 26 27 31   ANION GAP mmol/L 7 8 6   BUN mg/dL 15 15 15   CREATININE mg/dL 0.64 0.66 0.76   EGFR ml/min/1.73sq m 95 94 89   CALCIUM mg/dL 7.6* 7.9* 9.0   MAGNESIUM mg/dL 2.0 1.7*  --      Results from last 7 days   Lab Units 09/17/24  1637   AST U/L 38   ALT U/L 28   ALK PHOS U/L 88   TOTAL PROTEIN g/dL 8.3   ALBUMIN g/dL 2.8*   TOTAL BILIRUBIN mg/dL 1.27*     Results from last 7 days   Lab Units 09/19/24  1035 09/19/24  0622 09/18/24  1908 09/18/24  1716 09/18/24  1304 09/18/24  1231 09/18/24  0808 09/18/24  0036   POC GLUCOSE mg/dl 124 123 129 133 164* 148* 146* 151*     Results from last 7 days   Lab Units 09/19/24  0433 09/18/24  0508 09/17/24  1637   GLUCOSE RANDOM mg/dL 110 158* 164*         Results from last 7 days   Lab Units 09/17/24  1957   HEMOGLOBIN A1C % 6.2*   EAG mg/dl 131       Results from last 7 days   Lab Units 09/18/24  0508 09/17/24  1637   PROCALCITONIN ng/ml 0.49* 0.37*     Results from last 7 days   Lab Units 09/17/24  1655   LACTIC ACID mmol/L 1.2           Results from last 7 days   Lab Units 09/17/24  1954   CLARITY UA  Turbid   COLOR UA  [place]   SPEC GRAV UA  1.033*   PH UA  6.0   GLUCOSE UA mg/dl 500 (1/2%)*   KETONES UA mg/dl Trace*   BLOOD UA  Moderate*   PROTEIN UA mg/dl  300 (3+)*   NITRITE UA  Negative   BILIRUBIN UA  Negative   UROBILINOGEN UA (BE) mg/dl 4.0*   LEUKOCYTES UA  Moderate*   WBC UA /hpf Innumerable*   RBC UA /hpf 30-50*   BACTERIA UA /hpf None Seen   EPITHELIAL CELLS WET PREP /hpf Occasional   MUCUS THREADS  Moderate*       Results from last 7 days   Lab Units 09/17/24  1954 09/17/24  1831 09/17/24  1637   BLOOD CULTURE   --  Enterococcus faecalis* Enterobacter cloacae*  Proteus mirabilis*   GRAM STAIN RESULT   --  Gram positive cocci in pairs and chains* Gram negative rods*   URINE CULTURE  Culture results to follow.  --   --          ED Treatment-Medication Administration from 09/17/2024 1558 to 09/17/2024 2245         Date/Time Order Dose Route Action     09/17/2024 1653 ceftriaxone (ROCEPHIN) 1 g/50 mL in dextrose IVPB 1,000 mg Intravenous New Bag     09/17/2024 1729 potassium chloride 20 mEq IVPB (premix) 20 mEq Intravenous New Bag     09/17/2024 1729 sodium chloride 0.9 % bolus 1,000 mL 1,000 mL Intravenous New Bag     09/17/2024 2006 multi-electrolyte (PLASMALYTE-A/ISOLYTE-S PH 7.4) IV solution 100 mL/hr Intravenous New Bag     09/17/2024 2226 remdesivir (Veklury) 200 mg in sodium chloride 0.9 % 290 mL IVPB 200 mg Intravenous New Bag     09/17/2024 2017 apixaban (ELIQUIS) tablet 5 mg 5 mg Oral Given     09/17/2024 2018 atorvastatin (LIPITOR) tablet 80 mg 80 mg Oral Given     09/17/2024 2012 acetaminophen (Ofirmev) injection 1,000 mg 1,000 mg Intravenous New Bag     09/17/2024 2128 potassium chloride 20 mEq IVPB (premix) 20 mEq Intravenous New Bag            Past Medical History:   Diagnosis Date    Atherosclerotic heart disease of native coronary artery without angina pectoris     Atrial fibrillation (HCC)     Cerebral infarction (HCC)     Constipation     Depression     Diabetes mellitus (HCC)     Hemiplegia and hemiparesis following cerebral infarction affecting left non-dominant side (HCC)     Hyperlipidemia     Hypertension     Pressure ulcer of sacral  region, stage 3 (HCC)     Prostatic hyperplasia     Sepsis (HCC)     Stroke (HCC)     TIA (transient ischemic attack)     Urinary retention     Vitamin D deficiency      Present on Admission:   (Resolved) Sepsis (HCC)   Sepsis   Sacral wound      Admitting Diagnosis: Hypokalemia [E87.6]  Anemia [D64.9]  Pain [R52]  Encephalopathy [G93.40]  Sepsis (HCC) [A41.9]  COVID [U07.1]  Age/Sex: 73 y.o. male  Admission Orders:  Scheduled Medications:  apixaban, 5 mg, Oral, BID  aspirin, 81 mg, Oral, Daily  atorvastatin, 80 mg, Oral, QPM  bacitracin, 1 small application, Topical, BID  cefepime, 2,000 mg, Intravenous, Q12H  Dakins (full strength), 1 Application, Irrigation, Daily  docusate sodium, 100 mg, Oral, Daily  escitalopram, 10 mg, Oral, Daily  ezetimibe, 10 mg, Oral, Daily  finasteride, 5 mg, Oral, Daily  insulin lispro, 1-6 Units, Subcutaneous, TID AC  insulin lispro, 1-6 Units, Subcutaneous, HS  lisinopril, 10 mg, Oral, Daily  metroNIDAZOLE, 500 mg, Intravenous, Q8H  potassium chloride, 20 mEq, Intravenous, BID (diuretic)  remdesivir, 100 mg, Intravenous, Q24H  tamsulosin, 0.4 mg, Oral, Daily  vancomycin, 1,500 mg, Intravenous, Q12H      Continuous IV Infusions:  multi-electrolyte, 125 mL/hr, Intravenous, Continuous      PRN Meds:  acetaminophen, 1,000 mg, Intravenous, Q6H PRN  acetaminophen, 650 mg, Oral, Q6H PRN  ondansetron, 4 mg, Intravenous, Q4H PRN    URINE CULTURE  Covid treatment  IV fluids   POC GLUCOSE AC/HS WITH SSI COVERAGE   Tele  Specialty bed for wounds  Wound care consult   NPO w/ sips   IP CONSULT TO INFECTIOUS DISEASES  IP CONSULT TO ACUTE CARE SURGERY  IP CONSULT TO GASTROENTEROLOGY  IP CONSULT TO NUTRITION SERVICES  IP CONSULT TO PHARMACY    Network Utilization Review Department  ATTENTION: Please call with any questions or concerns to [phone] and carefully listen to the prompts so that you are directed to the right person. All voicemails are confidential.   For Discharge needs, contact Care  Management DC Support Team at [phone] opt. 2  Send all requests for admission clinical reviews, approved or denied determinations and any other requests to dedicated fax number below belonging to the campus where the patient is receiving treatment. List of dedicated fax numbers for the Facilities:  FACILITY NAME UR FAX NUMBER   ADMISSION DENIALS (Administrative/Medical Necessity) [phone]   DISCHARGE SUPPORT TEAM (NETWORK) [phone]   PARENT CHILD HEALTH (Maternity/NICU/Pediatrics) [phone]   [facility] [phone]   [facility] [phone]   [facility] [phone]   [facility] [phone]   [facility] [phone]   [facility] [phone]   [facility] [phone]   [facility] [phone]   [facility] [phone]   [facility] [phone]   [facility] [phone]   [facility] [phone]

## 2024-09-18 NOTE — PHYSICAL THERAPY NOTE
PHYSICAL THERAPY EVALUATION          Patient Name: Drew Santos  Today's Date: 9/18/2024 09/18/24 1445   PT Last Visit   PT Visit Date 09/18/24   Note Type   Note type Evaluation   Pain Assessment   Pain Assessment Tool FLACC   Pain Rating: FLACC (Rest) - Face 0   Pain Rating: FLACC (Rest) - Legs 0   Pain Rating: FLACC (Rest) - Activity 0   Pain Rating: FLACC (Rest) - Cry 0   Pain Rating: FLACC (Rest) - Consolability 0   Score: FLACC (Rest) 0   Pain Rating: FLACC (Activity) - Face 0   Pain Rating: FLACC (Activity) - Legs 0   Pain Rating: FLACC (Activity) - Activity 0   Pain Rating: FLACC (Activity) - Cry 0   Pain Rating: FLACC (Activity) - Consolability 0   Score: FLACC (Activity) 0   Restrictions/Precautions   Other Precautions Contact/isolation;Airborne/isolation;Cognitive;Chair Alarm;Bed Alarm;Multiple lines;Fall Risk   Home Living   Additional Comments per chart review pt was living at home as of July; comes to [facility] this admission from [facility] ?rehab   Prior Function   Comments was indep prior to hospitalization in July; per chart bedbound at SNF   General   Additional Pertinent History pt admitted 9/17/24 for sepsis. up and oob orders. PMHx significant for T2DM, covid, depression, stroke w residual L side deficits   Cognition   Overall Cognitive Status Impaired   Arousal/Participation Alert   Orientation Level Unable to assess   Memory Unable to assess   Following Commands Follows one step commands inconsistently   Comments non verbal during exam   RLE Assessment   RLE Assessment X  (2+ DF. unable to hold knee ext in anti gravity position)   LLE Assessment   LLE Assessment X   Bed Mobility   Rolling R 1  Dependent   Additional items Assist x 1;Assist x 2   Rolling L 1  Dependent   Additional items Assist x 1;Assist x 2   Supine to Sit 1  Dependent   Additional items Assist x 2   Sit to Supine 1  Dependent   Additional items Assist x 2   Balance   Static Sitting Fair -  (regressing to poor + w fatigue)    Dynamic Sitting Poor +   Endurance Deficit   Endurance Deficit Yes   Endurance Deficit Description easily fatigued. EOB sitting tolerance about 2-3 min   Activity Tolerance   Activity Tolerance Patient limited by fatigue;Treatment limited secondary to medical complications (Comment)  (cognition, weakness)   Medical Staff Made Aware Leelee OT   Nurse Made Aware Sergey RN   Assessment   Prognosis Guarded   Problem List Decreased strength;Decreased range of motion;Decreased endurance;Impaired balance;Decreased mobility;Decreased cognition;Impaired judgement;Decreased safety awareness;Obesity   Assessment Drew Santos is a 73 y.o. male admitted to [facility] on 9/17/2024 for Leukocytosis. PT was consulted and pt was seen on 9/18/2024 for mobility assessment and d/c planning. Pt presents w contact and airborne isolation, high fall risk, readmission, multiple lines. Poor historian; unclear LOF at [facility] however per chart review pt is bedbound. Pt is currently functioning at a [facility] Ax2 for bed mobility including rolling and supine<>sit transfers. Pt demonstrated deficits related to cognition, strength, balance, endurance. Unsupported sitting balance around 2-3 mins. Defer standing transf at this time dt significant BLE weakness, decreased participation and poor EOB balance (R lean). Will maintain on caseload to monitor pt function and progress mobility as tolerated. The patient's AM-PAC Basic Mobility Inpatient Short Form Raw Score is 6. A Raw score of less than or equal to 16 suggests the patient may benefit from discharge to post-acute rehabilitation services. Will likely benefit from transition to LTC.   Barriers to Discharge Decreased caregiver support;Inaccessible home environment  (unsafe to return to home environment at current LOF, questionable caregiver support)   Goals   Patient Goals nonverbal   STG Expiration Date 09/28/24   Short Term Goal #1 1).  Pt will perform bed mobility with max Ax2 demonstrating appropriate  technique 100% of the time in order to improve function.2)  Perform all transfers with max Ax2 demonstrating safe and appropriate technique 100% of the time in order to improve ability to negotiate safely in home environment.3) Improve overall strength and balance 1/2 grade in order to optimize ability to perform functional tasks and reduce fall risk. 4) Increase activity tolerance to 25 minutes in order to improve endurance to functional tasks.   Plan   Treatment/Interventions Functional transfer training;LE strengthening/ROM;Therapeutic exercise;Cognitive reorientation;Equipment eval/education;Patient/family training;Endurance training;Bed mobility;Continued evaluation;Spoke to nursing;OT   PT Frequency 1-2x/wk   Discharge Recommendation   Rehab Resource Intensity Level, PT II (Moderate Resource Intensity)   Additional Comments alexander kebede   AM-PAC Basic Mobility Inpatient   Turning in Flat Bed Without Bedrails 1   Lying on Back to Sitting on Edge of Flat Bed Without Bedrails 1   Moving Bed to Chair 1   Standing Up From Chair Using Arms 1   Walk in Room 1   Climb 3-5 Stairs With Railing 1   Basic Mobility Inpatient Raw Score 6   Turning Head Towards Sound 1   Follow Simple Instructions 1   Low Function Basic Mobility Raw Score  8   Low Function Basic Mobility Standardized Score  10.37   [facility] Highest Level Of Mobility   -HL Goal 2: Bed activities/Dependent transfer   -HL Achieved 3: Sit at edge of bed   End of Consult   Patient Position at End of Consult Supine;Bed/Chair alarm activated;All needs within reach   History: co - morbidities including age, social background, past experience, dependent mobility, assist for adl's, cognition, current experience including fall risk, multiple lines, airborne isolation  Exam: impairments in systems including multiple body structures involved; musculoskeletal (C/s in R rotated and flexed position, strength, BMI), neuromuscular (balance, bedlevel transf), cognition;  activity limitations (difficulties executing an action); participation restrictions (problems associated w involvement in life situations), AM-PAC  Clinical: unstable/unpredictable  Complexity: high    Traci Minaya, PT

## 2024-09-18 NOTE — ASSESSMENT & PLAN NOTE
I personally reviewed patient's CT C/A/P which showed mild distal sigmoid and rectal inflammatory changes, R ischioanal fossa emphysema, and possible fistulous connection to skin surface. This is likely source of patient's bacteremia above. Family reports patient has very infrequent bowel movements in past few weeks. General surgery is following for wound. GI has been consulted.  -antibiotics as above  -serial abdominal/rectal exams  -monitor stool output  -continue follow up with general surgery  -follow up gastroenterology consult

## 2024-09-18 NOTE — ASSESSMENT & PLAN NOTE
Fever, tachycardia, tachypnea, and leukocytosis. Secondary to gram negative bacteremia, likely from colitis/proctitis with possible fistulous connection to sacral ulcer. Also consider role of COVID-19. No other clear source appreciated. Chest imaging with no opacities/groundglass/consolidations suggesting a bacterial process. Urine was abnormal, culture is pending. Head CT with no acute intracranial findings. Despite being systemically ill he remains hemodynamically stable. This afternoon he is afebrile with persistently elevated WBC count. He has been transitioned to IV cefepime and IV flagyl which he is tolerating without difficulty. I will continue this regimen for now, keeping the flagyl IV as it is unclear if patient will need to be NPO due to bowel findings.  -continue IV cefepime  -continue IV flagyl, anticipate eventual transition to PO when he is cleared from bowel standpoint  -check CBCD and BMP tomorrow  -follow up blood cultures   -monitor vitals  -supportive care

## 2024-09-18 NOTE — ASSESSMENT & PLAN NOTE
Patient currently on room air though high risk given his age and comorbidities  Will treat with IV remdesivir x 3 days  Supportive care otherwise  Contact and airborne precautions  Currently on mild COVID pathway.  Not requiring supplemental oxygen.  Wife also sick with COVID.

## 2024-09-18 NOTE — ASSESSMENT & PLAN NOTE
Lab Results   Component Value Date    HGBA1C 6.2 (H) 09/17/2024       Recent Labs     09/18/24  0036 09/18/24  0808 09/18/24  1231 09/18/24  1304   POCGLU 151* 146* 148* 164*       Blood Sugar Average: Last 72 hrs:  (P) 152.25

## 2024-09-18 NOTE — ASSESSMENT & PLAN NOTE
Significantly tachycardic in the emergency room with heart rate in the 130s and 140s; sinus tachycardia on EKG and telemetry  Suspect this is secondary to sepsis and fever.  Could consider pulmonary embolism, however stable on room air, and is chronically on Eliquis for PE so this is felt to be less likely  Continue with IV fluids as needed acetaminophen for fever.

## 2024-09-18 NOTE — DISCHARGE INSTR - OTHER ORDERS
Wound Care Plan:   1-Silicone Cream/Hydraguard lotion to bilateral heels twice daily and as needed.  2-Elevate heels off of bed/chair surface--offloading heel boots.  3-P500 low air-loss mattress.   4-Apply lotion to body daily and as needed.  5-Turn/reposition every 2 hours while in bed for pressure re-distribution on skin.   6-Urethra--cleanse, pat dry. Apply Bacitrain twice daily.    7-Sacrum: Cleanse wound with normal saline and pat dry. Apply skin prep to intact carmen-wound skin and allow to dry. Lightly pack wound and undermining with 0.25% Dakins Moistened Lazarus. Cover any exposed yellow slough that is unable to be packed with silver alginate. Cover wound with large sacral silicone bordered foam dressing. Change dressing daily and as needed.     Follow-up at the [facility]--[phone].

## 2024-09-18 NOTE — PROGRESS NOTES
Progress Note - Hospitalist   Name: Drew Santos 73 y.o. male I MRN: [id number]  Unit/Bed#: [place] [address] -01 I Date of Admission: 9/17/2024   Date of Service: 9/18/2024 I Hospital Day: 1    Assessment & Plan  Acute metabolic encephalopathy  Presumably secondary to his COVID infection  Will need further sepsis workup as below  CT head with no acute intracranial abnormality  Encephalopathy right in the setting of sepsis  Continue with supportive care.  Serial neuroexam.  COVID-19 virus infection  Patient currently on room air though high risk given his age and comorbidities  Will treat with IV remdesivir x 3 days  Supportive care otherwise  Contact and airborne precautions  Currently on mild COVID pathway.  Not requiring supplemental oxygen.  Wife also sick with COVID.  Sepsis  As evidenced by tachycardia and leukocytosis and fever  Now with gram-negative bacteremia  CT abdomen and pelvis showsMid/distal sigmoid colonic and rectal inflammatory changes in keeping with a segmental colitis/proctitis.Right ischioanal fossa subcutaneous emphysema extending through the right paramidline gluteal subcutaneous tissues to the skin surface suspicious for a perirectal or perianal fistula; limited assessment without oral or rectal contrast.Parra catheter balloon is located in the prostatic urethra; this catheter should be repositioned.Fat stranding surrounding the decompressed bladder may indicate cystitis.  Has chronic indwelling Parra catheter with abnormal UA  Possible source of infection appears to be proctocolitis versus complicated UTI in the setting of indwelling Parra catheter.  Also has large sacral decubitus ulcer which could be infected as well.  Wound care consulted  Also has large sacral decubitus with possible perirectal/perianal fistula  Infectious disease and surgical consultation placed.  Will keep n.p.o. for now pending surgical evaluation regarding need for any debridement.  Continue with broad-spectrum IV  antibiotics.  Currently on ceftriaxone and Flagyl.  Preliminary blood cultures with Enterobacter and Proteus.  Will switch antibiotics to cefepime and Flagyl.  Sinus tachycardia  Significantly tachycardic in the emergency room with heart rate in the 130s and 140s; sinus tachycardia on EKG and telemetry  Suspect this is secondary to sepsis and fever.  Could consider pulmonary embolism, however stable on room air, and is chronically on Eliquis for PE so this is felt to be less likely  Continue with IV fluids as needed acetaminophen for fever.  Type 2 diabetes mellitus without complication, without long-term current use of insulin (HCC)  Well-controlled with last hemoglobin A1c of 6.0% in July of this year  Will hold home oral regimen  Monitor on sliding scale only  Hypoglycemia protocol    History of CVA (cerebrovascular accident)  History of CVA   Chronically bedbound and with chronic left-sided deficits   Continue home aspirin, Eliquis, statin  Paroxysmal atrial fibrillation (HCC)  Currently in sinus tachycardia in the ER  Not on any rate controlling meds at home  Will continue home Eliquis for stroke prevention  Sacral wound  Chronic sacral decubitus ulcer; present on admission  Wound care consultation  Gram-negative bacteremia  Blood cultures 1 of 2 bottles positive for gram-negative rods.  Await blood culture identification panel.  Continue with ceftriaxone and Flagyl.  Follow-up on culture and sensitivity results.  CT chest abdomen pelvis ordered.  Infectious disease consulted.  Electrolyte abnormality  Orders placed for replacement of potassium and magnesium.  Continue to follow BMP closely.    VTE Pharmacologic Prophylaxis:   High Risk (Score >/= 5) - Pharmacological DVT Prophylaxis Ordered: enoxaparin (Lovenox). Sequential Compression Devices Ordered.    Mobility:   Basic Mobility Inpatient Raw Score: 6  -[facility] Goal: 2: Bed activities/Dependent transfer  -HL Achieved: 1: Laying in bed  -HLM Goal  achieved. Continue to encourage appropriate mobility.    Patient Centered Rounds: I performed bedside rounds with nursing staff today.   Discussions with Specialists or Other Care Team Provider: Discussed with nursing    Education and Discussions with Family / Patient: Updated  (wife) via phone.    Current Length of Stay: 1 day(s)  Current Patient Status: Inpatient   Certification Statement: The patient will continue to require additional inpatient hospital stay due to ongoing management as documented above  Discharge Plan: Anticipate discharge in 24-48 hrs to home with home services.    Code Status: Level 1 - Full Code    Subjective   Seen and examined at bedside.  Tmax 101.  Remains confused and lethargic.  Denies any abdominal discomfort.    Objective     Vitals:   Temp (24hrs), Av.4 °F (38 °C), Min:99.8 °F (37.7 °C), Max:101.1 °F (38.4 °C)    Temp:  [99.8 °F (37.7 °C)-101.1 °F (38.4 °C)] 99.8 °F (37.7 °C)  HR:  [] 125  Resp:  [16-23] 19  BP: ()/(55-91) 140/84  SpO2:  [84 %-98 %] 97 %  Body mass index is 29.59 kg/m².     Input and Output Summary (last 24 hours):     Intake/Output Summary (Last 24 hours) at 2024 1218  Last data filed at 2024 0537  Gross per 24 hour   Intake 1050 ml   Output 400 ml   Net 650 ml       Physical Exam  Constitutional:       Appearance: He is ill-appearing and toxic-appearing.      Comments: Lethargic and oriented to self only.  Dry mucous membranes.   HENT:      Head: Normocephalic and atraumatic.      Mouth/Throat:      Mouth: Mucous membranes are dry.   Eyes:      Extraocular Movements: Extraocular movements intact.      Pupils: Pupils are equal, round, and reactive to light.   Cardiovascular:      Rate and Rhythm: Regular rhythm. Tachycardia present.   Pulmonary:      Comments: Poor respiratory effort with diminished breath sounds at bases.  Abdominal:      General: Bowel sounds are normal. There is no distension.      Palpations: Abdomen is  soft.      Tenderness: There is no abdominal tenderness.   Genitourinary:     Comments: Indwelling Parra catheter without hematuria.  Musculoskeletal:      Cervical back: Neck supple.      Right lower leg: No edema.      Left lower leg: No edema.   Neurological:      Comments: Oriented to self only.  Not following commands.          Lines/Drains:  Lines/Drains/Airways       Active Status       Name Placement date Placement time Site Days    Urethral Catheter Three way 22 Fr. 08/31/24 1925  Three way  17    Continuous Bladder Irrigation Three-way 08/31/24 1925  Three-way  17                  Urinary Catheter:  Goal for removal: N/A - Chronic Parra           Telemetry:  Telemetry Orders (From admission, onward)               24 Hour Telemetry Monitoring  Continuous x 24 Hours (Telem)        Question:  Reason for 24 Hour Telemetry  Answer:  Arrhythmias requiring acute medical intervention / PPM or ICD malfunction                     Telemetry Reviewed: Sinus Tachycardia  Indication for Continued Telemetry Use: Arrthymias requiring medical therapy               Lab Results: I have reviewed the following results:    Results from last 7 days   Lab Units 09/18/24  0508 09/17/24  1637   WBC Thousand/uL 21.23* 21.33*   HEMOGLOBIN g/dL 8.5* 10.0*   HEMATOCRIT % 27.0* 31.8*   PLATELETS Thousands/uL 406* 497*   SEGS PCT %  --  83*   LYMPHO PCT %  --  10*   MONO PCT %  --  5   EOS PCT %  --  0     Results from last 7 days   Lab Units 09/18/24  0508 09/17/24  1637   SODIUM mmol/L 144 142   POTASSIUM mmol/L 3.2* 2.9*   CHLORIDE mmol/L 109* 105   CO2 mmol/L 27 31   BUN mg/dL 15 15   CREATININE mg/dL 0.66 0.76   ANION GAP mmol/L 8 6   CALCIUM mg/dL 7.9* 9.0   ALBUMIN g/dL  --  2.8*   TOTAL BILIRUBIN mg/dL  --  1.27*   ALK PHOS U/L  --  88   ALT U/L  --  28   AST U/L  --  38   GLUCOSE RANDOM mg/dL 158* 164*         Results from last 7 days   Lab Units 09/18/24  0808 09/18/24  0036   POC GLUCOSE mg/dl 146* 151*     Results from last  7 days   Lab Units 09/17/24  1957   HEMOGLOBIN A1C % 6.2*     Results from last 7 days   Lab Units 09/18/24  0508 09/17/24  1655 09/17/24  1637   LACTIC ACID mmol/L  --  1.2  --    PROCALCITONIN ng/ml 0.49*  --  0.37*       Recent Cultures (last 7 days):   Results from last 7 days   Lab Units 09/17/24  1831 09/17/24  1637   BLOOD CULTURE  Received in Microbiology Lab. Culture in Progress.  --    GRAM STAIN RESULT   --  Gram negative rods*       Imaging Review: Reviewed radiology reports from this admission including: CT abdomen/pelvis.  Other Studies: EKG was reviewed.     Last 24 Hours Medication List:     Current Facility-Administered Medications:     acetaminophen (Ofirmev) injection 1,000 mg, Q6H PRN    acetaminophen (TYLENOL) tablet 650 mg, Q6H PRN    apixaban (ELIQUIS) tablet 5 mg, BID    aspirin chewable tablet 81 mg, Daily    atorvastatin (LIPITOR) tablet 80 mg, QPM    cefTRIAXone (ROCEPHIN) 2,000 mg in dextrose 5 % 50 mL IVPB, Q24H    docusate sodium (COLACE) capsule 100 mg, Daily    escitalopram (LEXAPRO) tablet 10 mg, Daily    ezetimibe (ZETIA) tablet 10 mg, Daily    finasteride (PROSCAR) tablet 5 mg, Daily    insulin lispro (HumALOG/ADMELOG) 100 units/mL subcutaneous injection 1-6 Units, TID AC **AND** Fingerstick Glucose (POCT), TID AC    insulin lispro (HumALOG/ADMELOG) 100 units/mL subcutaneous injection 1-6 Units, HS    lisinopril (ZESTRIL) tablet 10 mg, Daily    metroNIDAZOLE (FLAGYL) IVPB (premix) 500 mg 100 mL, Q8H    multi-electrolyte (PLASMALYTE-A/ISOLYTE-S PH 7.4) IV solution, Continuous, Last Rate: 125 mL/hr (09/18/24 0847)    ondansetron (ZOFRAN) injection 4 mg, Q4H PRN    [COMPLETED] remdesivir (Veklury) 200 mg in sodium chloride 0.9 % 290 mL IVPB, Q24H, Last Rate: Stopped (09/18/24 0059) **FOLLOWED BY** remdesivir (Veklury) 100 mg in sodium chloride 0.9 % 270 mL IVPB, Q24H    tamsulosin (FLOMAX) capsule 0.4 mg, Daily    Administrative Statements   Today, Patient Was Seen By: Sherlyn Sharma,  MD      **Please Note: This note may have been constructed using a voice recognition system.**

## 2024-09-18 NOTE — CONSULTS
Consultation - Infectious Disease   Name: Drew Santos 73 y.o. male I MRN: [id number]  Unit/Bed#: [place] [address] -01 I Date of Admission: 9/17/2024   Date of Service: 9/18/2024 I Hospital Day: 1   Inpatient consult to Infectious Diseases  Consult performed by: AVELINA Zamora  Consult ordered by: Sherlyn Sharma MD      Physician Requesting Evaluation: Sherlyn Sharma MD   Reason for Evaluation / Principal Problem: Sepsis     Assessment & Plan  Sepsis  Fever, tachycardia, tachypnea, and leukocytosis. Secondary to gram negative bacteremia, likely from colitis/proctitis with possible fistulous connection to sacral ulcer. Also consider role of COVID-19. No other clear source appreciated. Chest imaging with no opacities/groundglass/consolidations suggesting a bacterial process. Urine was abnormal, culture is pending. Head CT with no acute intracranial findings. Despite being systemically ill he remains hemodynamically stable. This afternoon he is afebrile with persistently elevated WBC count. He has been transitioned to IV cefepime and IV flagyl which he is tolerating without difficulty. I will continue this regimen for now, keeping the flagyl IV as it is unclear if patient will need to be NPO due to bowel findings.  -continue IV cefepime  -continue IV flagyl, anticipate eventual transition to PO when he is cleared from bowel standpoint  -check CBCD and BMP tomorrow  -follow up blood cultures   -monitor vitals  -supportive care  Gram-negative bacteremia  One set of admission blood cultures showing GNR with BCI suggesting enterobacter and proteus. Likely secondary to proctitis/colitis with possible fistulous connection to the sacral wound. No other source appreciated. Patient has COVID-19 but no opacities/groundglass/consolidations on chest imaging suggesting a bacterial process. Urine was abnormal, culture is pending.   -antibiotics as above  -check CBCD and BMP tomorrow  -follow up blood cultures  -monitor  vitals  Sacral wound  Per patient's family wound initially started during patient's stay in a skilled nursing facility for rehab. Wound imaging present in our system since prior hematuria hospitalization. Wound now with significant depth. Concern for possible fistulous connection to the rectum given evidence of colitis/proctitis and extensive tissue emphysema on CT. This is likely playing a role in bacteremia above. General surgery has debrided and will continue to follow up with wound. He may require diverting colostomy. GI has been consulted.  -antibiotic as above  -serial skin exams  -frequent turning/repositioning to offload pressure from the wound  -local wound care per general surgery  -continue follow up with general surgery  -follow up GI consult  Proctitis  I personally reviewed patient's CT C/A/P which showed mild distal sigmoid and rectal inflammatory changes, R ischioanal fossa emphysema, and possible fistulous connection to skin surface. This is likely source of patient's bacteremia above. Family reports patient has very infrequent bowel movements in past few weeks. General surgery is following for wound. GI has been consulted.  -antibiotics as above  -serial abdominal/rectal exams  -monitor stool output  -continue follow up with general surgery  -follow up gastroenterology consult  Type 2 diabetes mellitus (HCC)  Lab Results   Component Value Date    HGBA1C 6.2 (H) 09/17/2024   This is risk factor for wounds and infection.  -recommend tight glycemic control  -blood glucose management per primary service  COVID-19  PCR positive on 9/17/2024 although per patient's family he tested positive prior to admission. Patient's wife also positive. I personally reviewed his chest imaging which showed no airspace opacities or groundglass. He has been started on mild disease protocol and is receiving IV Remdesivir.  -management per primary service  -monitor vitals  -monitor respiratory status    We will follow along  "with the patient.  Above plan was discussed in detail with patient and his grandson at the bedside.  Above plan was discussed in detail with JACK who agrees with plan for ongoing IV antibiotic.    HISTORY OF PRESENT ILLNESS:  Drew Santos is a 73 y.o. year old male who presented to the [facility] ED on 9/17/2024 due to \"not feeling well\" and having noted changed in mental status. Facility noted he was febrile. Patient reported he tested positive for COVID the day prior.     Upon arrival to the ED the patient's temperature was 101.1. HR was 127. RR was 20. O2 saturation was 94% on room air. BP was 149/91. WBC count was 21.33. Platelet count elevated at 497. Creatinine was 0.76 with GFR = 89. No LFT elevations but Tbili was 1.27. Blood cultures were sent.  CXR showed atelectasis and no acute infectious cardiopulmonary findings. He was given IV ceftriaxone. He was admitted for additional medical management.    After his admission he was continued on IV ceftriaxone. UA was checked and was abnormal and sent for formal culture. He completed a head CT which showed no acute intracranial findings. Patient was started on IV Remdesivir with plan to treat x3 days. Wound care was consulted for his chronic sacral ulcer.     Now today patient's admission blood cultures are showing GNR in one set with BCI suggesting enterobacter and proteus. He was sent for CT C/A/P which showed mild distal sigmoid and rectal inflammatory changes, R ischioanal fossa emphysema, fat stranding around the bladder, and malpositioned hanks catheter. Flagyl was added to antibiotic regimen. General surgery has been consulted. We have been asked for formal consult for sepsis.    The patient has HTN, urinary retention, a fib, depression, hyperlipidemia, BPH, constipation, atherosclerotic heart disease, vitamin D deficiency, hemiplegia and hemiparesis after CVA, and chronic sacral ulcer. He has a history of CVA, TIA, dysgeusia, gross hematuria, and abnormal " STEPHAN. He has an allergy to Primaquine.    REVIEW OF SYSTEMS:  Limited ROS from patient as he is minimally verbal. He was able to tell me he was uncomfortable in bed and felt better after I repositioned his wedge pillows. He denies difficulty breathing, chest pain, and abdominal pain. Family at bedside reports his mental status is very different from baseline, usually he's very interactive and even tells jokes. They are concerned about wound appearing different, provided photos during my visit to show what wound was like in August after patient returned home from rehab. They report he's been having some blood in his urine which started during a recent hospitalization. A complete 12 point system-based review of systems is otherwise negative.    PAST MEDICAL HISTORY:  Past Medical History:   Diagnosis Date    Atherosclerotic heart disease of native coronary artery without angina pectoris     Atrial fibrillation (HCC)     Cerebral infarction (HCC)     Constipation     Depression     Diabetes mellitus (HCC)     Hemiplegia and hemiparesis following cerebral infarction affecting left non-dominant side (HCC)     Hyperlipidemia     Hypertension     Pressure ulcer of sacral region, stage 3 (HCC)     Prostatic hyperplasia     Sepsis (HCC)     Stroke (HCC)     TIA (transient ischemic attack)     Urinary retention     Vitamin D deficiency      No past surgical history on file.    FAMILY HISTORY:  Non-contributory    SOCIAL HISTORY:  Social History   Social History     Substance and Sexual Activity   Alcohol Use Not Currently     Social History     Substance and Sexual Activity   Drug Use Never     Social History     Tobacco Use   Smoking Status Never    Passive exposure: Never   Smokeless Tobacco Never     ALLERGIES:  Allergies   Allergen Reactions    Primaquine Other (See Comments) and Hives     MEDICATIONS:  All current active medications have been reviewed.    ANTIBIOTICS:  Cefepime 1  Flagyl 1  Antibiotics 2    PHYSICAL  EXAM:  Temp:  [99.8 °F (37.7 °C)-101.1 °F (38.4 °C)] 99.8 °F (37.7 °C)  HR:  [] 119  Resp:  [16-23] 19  BP: ()/(55-91) 133/79  SpO2:  [84 %-98 %] 97 %  Temp (24hrs), Av.4 °F (38 °C), Min:99.8 °F (37.7 °C), Max:101.1 °F (38.4 °C)  Current: Temperature: 99.8 °F (37.7 °C)    Intake/Output Summary (Last 24 hours) at 2024 1327  Last data filed at 2024 1320  Gross per 24 hour   Intake 1050 ml   Output 750 ml   Net 300 ml     General Appearance:  Appearing chronically debilitated with L sided CVA residual weakness. He appears somewhat comfortable sitting up in bed after repositioning, nontoxic, and in no acute distress.   Head:  Normocephalic, without obvious abnormality, atraumatic.   Eyes:  Conjunctiva pink and sclera anicteric, both eyes.   Nose: Nares normal, mucosa normal, no drainage.   Throat: Oropharynx moist without lesions.   Back:   Symmetric, stiff requiring assist x2 to role. Sacral ulcer with mild bleeding after debridement, packed with saline soaked gauze, packing removed and replaced during exam, wound is deep with friable base, no expressible pus or stool, periwound is hypopigmented.   Lungs:   Clear to auscultation bilaterally, respirations unlabored on room air.   Chest Wall:  No tenderness or deformity.   Heart:  Tachycardic; no murmur, rub or gallop.   Abdomen:   Soft, non-tender, non-distended, positive bowel sounds throughout.   Genitourinary: Parra intact, bleeding at meatus, urine output also blood tinged.   Extremities: No cyanosis or clubbing, trace peripheral edema. LUE somewhat flaccid with minimal hand grasp.   Skin: No rashes noted on exposed skin.   Lymph nodes: Cervical, supraclavicular nodes normal.   Neurologic: Alert and oriented times 3, follows commands, speech is organized and appropriate, symmetric facial movement.     LABS, IMAGING, & OTHER STUDIES:  Lab Results:  I have personally reviewed pertinent labs.  Results from last 7 days   Lab Units  09/18/24  0508 09/17/24  1637   WBC Thousand/uL 21.23* 21.33*   HEMOGLOBIN g/dL 8.5* 10.0*   PLATELETS Thousands/uL 406* 497*     Results from last 7 days   Lab Units 09/18/24  0508 09/17/24  1637   POTASSIUM mmol/L 3.2* 2.9*   CHLORIDE mmol/L 109* 105   CO2 mmol/L 27 31   BUN mg/dL 15 15   CREATININE mg/dL 0.66 0.76   EGFR ml/min/1.73sq m 94 89   CALCIUM mg/dL 7.9* 9.0   AST U/L  --  38   ALT U/L  --  28   ALK PHOS U/L  --  88     Results from last 7 days   Lab Units 09/17/24  1831 09/17/24  1637   BLOOD CULTURE  Received in Microbiology Lab. Culture in Progress.  --    GRAM STAIN RESULT   --  Gram negative rods*     Results from last 7 days   Lab Units 09/18/24  0508 09/17/24  1637   PROCALCITONIN ng/ml 0.49* 0.37*     Imaging Studies:   I have personally reviewed pertinent imaging study reports and images in PACS.    XR CHEST 2 VIEWS 9/17/2024 - I personally reviewed this study which showed R lower lobe atelectasis. Imaging limited due to arms overlapping the chest.    CT HEAD WO CONTRAST 9/17/2024 - I personally reviewed this study which showed no acute intracranial abnormalities. No mass, shift, acute infarct, or acute hemorrhage.    CT CHEST ABDOMEN PELVIS W CONTRAST 9/18/23024 -I personally reviewed this study which showed bibasilar linear atelectasis, cholelithiasis, unremarkable liver/spleen/pancreas, no hydronephrosis, punctate non-obstructing L renal calculi, circumferential thickening of the distal sigmoid and rectum, and emphysema throughout the r ischioanal fossa extending in the R para midline gluteal subcutaneous tissues and skin surface suspicious for fistula.     Other Studies:   Urine culture is pending.  I have personally reviewed pertinent reports:    09/17/2024 1651 09/17/2024 1721 FLU/COVID Rapid Antigen (30 min. TAT) - Preferred screening test in ED [[id number]]    (Abnormal)   Nares from Nose    Final result [facility] This test has been performed using the [employer]  Ana 2 FLU+SARS Antigen test under the Emergency Use Authorization (EUA). This test has been validated by the  and verified by the performing laboratory. The Ana uses lateral flow immunofluorescent sandwich assay to detect SARS-COV, Influenza A and Influenza B Antigen.       The [employer] Ana 2 SARS Antigen test does not differentiate between SARS-CoV and SARS-CoV-2.       Negative results are presumptive and may be confirmed with a molecular assay, if necessary, for patient management. Negative results do not rule out SARS-CoV-2 or influenza infection and should not be used as the sole basis for treatment or patient management decisions. A negative test result may occur if the level of antigen in a sample is below the limit of detection of this test.       Positive results are indicative of the presence of viral antigens, but do not rule out bacterial infection or co-infection with other viruses.       All test results should be used as an adjunct to clinical observations and other information available to the provider.   FOR PEDIATRIC PATIENTS - copy/paste COVID Guidelines URL to browser: [email or web address]    Component Value   SARS COV Rapid Antigen Positive Abnormal    Influenza A Rapid Antigen Negative   Influenza B Rapid Antigen Negative

## 2024-09-18 NOTE — PLAN OF CARE
Problem: Potential for Falls  Goal: Patient will remain free of falls  Description: INTERVENTIONS:  - Educate patient/family on patient safety including physical limitations  - Instruct patient to call for assistance with activity   - Consult OT/PT to assist with strengthening/mobility   - Keep Call bell within reach  - Keep bed low and locked with side rails adjusted as appropriate  - Keep care items and personal belongings within reach  - Initiate and maintain comfort rounds  - Make Fall Risk Sign visible to staff  - Offer Toileting every 2 Hours, in advance of need  - Initiate/Maintain bed alarm  - Obtain necessary fall risk management equipment: bed alarm call bell  - Apply yellow socks and bracelet for high fall risk patients  - Consider moving patient to room near nurses station  Outcome: Progressing     Problem: PAIN - ADULT  Goal: Verbalizes/displays adequate comfort level or baseline comfort level  Description: Interventions:  - Encourage patient to monitor pain and request assistance  - Assess pain using appropriate pain scale  - Administer analgesics based on type and severity of pain and evaluate response  - Implement non-pharmacological measures as appropriate and evaluate response  - Consider cultural and social influences on pain and pain management  - Notify physician/advanced practitioner if interventions unsuccessful or patient reports new pain  Outcome: Progressing     Problem: INFECTION - ADULT  Goal: Absence or prevention of progression during hospitalization  Description: INTERVENTIONS:  - Assess and monitor for signs and symptoms of infection  - Monitor lab/diagnostic results  - Monitor all insertion sites, i.e. indwelling lines, tubes, and drains  - Monitor endotracheal if appropriate and nasal secretions for changes in amount and color  - [place] appropriate cooling/warming therapies per order  - Administer medications as ordered  - Instruct and encourage patient and family to use good  hand hygiene technique  - Identify and instruct in appropriate isolation precautions for identified infection/condition  Outcome: Progressing  Goal: Absence of fever/infection during neutropenic period  Description: INTERVENTIONS:  - Monitor WBC    Outcome: Progressing     Problem: SAFETY ADULT  Goal: Patient will remain free of falls  Description: INTERVENTIONS:  - Educate patient/family on patient safety including physical limitations  - Instruct patient to call for assistance with activity   - Consult OT/PT to assist with strengthening/mobility   - Keep Call bell within reach  - Keep bed low and locked with side rails adjusted as appropriate  - Keep care items and personal belongings within reach  - Initiate and maintain comfort rounds  - Make Fall Risk Sign visible to staff  - Offer Toileting every 2 Hours, in advance of need  - Initiate/Maintain bed alarm  - Obtain necessary fall risk management equipment: bed alarm call bell  - Apply yellow socks and bracelet for high fall risk patients  - Consider moving patient to room near nurses station  Outcome: Progressing  Goal: Maintain or return to baseline ADL function  Description: INTERVENTIONS:  -  Assess patient's ability to carry out ADLs; assess patient's baseline for ADL function and identify physical deficits which impact ability to perform ADLs (bathing, care of mouth/teeth, toileting, grooming, dressing, etc.)  - Assess/evaluate cause of self-care deficits   - Assess range of motion  - Assess patient's mobility; develop plan if impaired  - Assess patient's need for assistive devices and provide as appropriate  - Encourage maximum independence but intervene and supervise when necessary  - Involve family in performance of ADLs  - Assess for home care needs following discharge   - Consider OT consult to assist with ADL evaluation and planning for discharge  - Provide patient education as appropriate  Outcome: Progressing  Goal: Maintains/Returns to pre  admission functional level  Description: INTERVENTIONS:  - Perform AM-PAC 6 Click Basic Mobility/ Daily Activity assessment daily.  - Set and communicate daily mobility goal to care team and patient/family/caregiver.   - Collaborate with rehabilitation services on mobility goals if consulted  - Perform Range of Motion 4 times a day.  - Reposition patient every 2 hours.  - Dangle patient 3 times a day  - Stand patient 3 times a day  - Ambulate patient 3 times a day  - Out of bed to chair 3 times a day   - Out of bed for meals 3 times a day  - Out of bed for toileting  - Record patient progress and toleration of activity level   Outcome: Progressing     Problem: DISCHARGE PLANNING  Goal: Discharge to home or other facility with appropriate resources  Description: INTERVENTIONS:  - Identify barriers to discharge w/patient and caregiver  - Arrange for needed discharge resources and transportation as appropriate  - Identify discharge learning needs (meds, wound care, etc.)  - Arrange for interpretive services to assist at discharge as needed  - Refer to Case Management Department for coordinating discharge planning if the patient needs post-hospital services based on physician/advanced practitioner order or complex needs related to functional status, cognitive ability, or social support system  Outcome: Progressing     Problem: Knowledge Deficit  Goal: Patient/family/caregiver demonstrates understanding of disease process, treatment plan, medications, and discharge instructions  Description: Complete learning assessment and assess knowledge base.  Interventions:  - Provide teaching at level of understanding  - Provide teaching via preferred learning methods  Outcome: Progressing     Problem: Prexisting or High Potential for Compromised Skin Integrity  Goal: Skin integrity is maintained or improved  Description: INTERVENTIONS:  - Identify patients at risk for skin breakdown  - Assess and monitor skin integrity  - Assess  and monitor nutrition and hydration status  - Monitor labs   - Assess for incontinence   - Turn and reposition patient  - Assist with mobility/ambulation  - Relieve pressure over bony prominences  - Avoid friction and shearing  - Provide appropriate hygiene as needed including keeping skin clean and dry  - Evaluate need for skin moisturizer/barrier cream  - Collaborate with interdisciplinary team   - Patient/family teaching  - Consider wound care consult   Outcome: Progressing

## 2024-09-18 NOTE — NURSING NOTE
Patient's hanks advanced about 5cm after balloon deflated, per SLIM request in regard to CT results. Upon advancement, blood seen leaking from penile meatus. Dressed, per SLIM continue to monitor site for increased drainage and urine for color changes.

## 2024-09-18 NOTE — PLAN OF CARE
Problem: PHYSICAL THERAPY ADULT  Goal: Performs mobility at highest level of function for planned discharge setting.  See evaluation for individualized goals.  Description: Treatment/Interventions: Functional transfer training, LE strengthening/ROM, Therapeutic exercise, Cognitive reorientation, Equipment eval/education, Patient/family training, Endurance training, Bed mobility, Continued evaluation, Spoke to nursing, OT          See flowsheet documentation for full assessment, interventions and recommendations.  Note: Prognosis: Guarded  Problem List: Decreased strength, Decreased range of motion, Decreased endurance, Impaired balance, Decreased mobility, Decreased cognition, Impaired judgement, Decreased safety awareness, Obesity  Assessment: Drew Santos is a 73 y.o. male admitted to [facility] on 9/17/2024 for Leukocytosis. PT was consulted and pt was seen on 9/18/2024 for mobility assessment and d/c planning. Pt presents w contact and airborne isolation, high fall risk, readmission, multiple lines. Poor historian; unclear LOF at SNF however per chart review pt is bedbound. Pt is currently functioning at a [facility] Ax2 for bed mobility including rolling and supine<>sit transfers. Pt demonstrated deficits related to cognition, strength, balance, endurance. Unsupported sitting balance around 2-3 mins. Defer standing transf at this time dt significant BLE weakness, decreased participation and poor EOB balance (R lean). Will maintain on caseload to monitor pt function and progress mobility as tolerated. The patient's AM-PAC Basic Mobility Inpatient Short Form Raw Score is 6. A Raw score of less than or equal to 16 suggests the patient may benefit from discharge to post-acute rehabilitation services. Will likely benefit from transition to LTC.  Barriers to Discharge: Decreased caregiver support, Inaccessible home environment (unsafe to return to home environment at current LOF, questionable caregiver support)     Rehab Resource  Intensity Level, PT: II (Moderate Resource Intensity)    See flowsheet documentation for full assessment.

## 2024-09-18 NOTE — ASSESSMENT & PLAN NOTE
Per patient's family wound initially started during patient's stay in a skilled nursing facility for rehab. Wound imaging present in our system since prior hematuria hospitalization. Wound now with significant depth. Concern for possible fistulous connection to the rectum given evidence of colitis/proctitis and extensive tissue emphysema on CT. This is likely playing a role in bacteremia above. General surgery has debrided and will continue to follow up with wound. He may require diverting colostomy. GI has been consulted.  -antibiotic as above  -serial skin exams  -frequent turning/repositioning to offload pressure from the wound  -local wound care per general surgery  -continue follow up with general surgery  -follow up GI consult

## 2024-09-18 NOTE — ED NOTES
Contact patient. We have openings on November 30 and I like to get her in for evaluation so that we can get her some treatment for her back pain   Pt turned to assess pressure wound. While turning pt had large, liquid bowel movement. Pt cleaned up, sheets changed, new brief placed. Meliplex placed on pressure wound. HOB elevated. Pt resting comfortably.     Carito Montes De Oca RN  09/17/24 [id number]

## 2024-09-18 NOTE — ASSESSMENT & PLAN NOTE
One set of admission blood cultures showing GNR with BCI suggesting enterobacter and proteus. Likely secondary to proctitis/colitis with possible fistulous connection to the sacral wound. No other source appreciated. Patient has COVID-19 but no opacities/groundglass/consolidations on chest imaging suggesting a bacterial process. Urine was abnormal, culture is pending.   -antibiotics as above  -check CBCD and BMP tomorrow  -follow up blood cultures  -monitor vitals

## 2024-09-18 NOTE — OCCUPATIONAL THERAPY NOTE
Occupational Therapy Evaluation     Patient Name: Drew Santos  Today's Date: 9/18/2024  Problem List  Principal Problem:    Sepsis  Active Problems:    Sacral wound    Gram-negative bacteremia    Type 2 diabetes mellitus (HCC)    COVID-19    Proctitis    Past Medical History  Past Medical History:   Diagnosis Date    Atherosclerotic heart disease of native coronary artery without angina pectoris     Atrial fibrillation (HCC)     Cerebral infarction (HCC)     Constipation     Depression     Diabetes mellitus (HCC)     Hemiplegia and hemiparesis following cerebral infarction affecting left non-dominant side (HCC)     Hyperlipidemia     Hypertension     Pressure ulcer of sacral region, stage 3 (HCC)     Prostatic hyperplasia     Sepsis (HCC)     Stroke (HCC)     TIA (transient ischemic attack)     Urinary retention     Vitamin D deficiency      Past Surgical History  No past surgical history on file.        09/18/24 1431   OT Last Visit   OT Visit Date 09/18/24   Note Type   Note type Evaluation   Additional Comments greeted in supine.   Pain Assessment   Pain Assessment Tool FLACC   Pain Rating: FLACC (Rest) - Face 0   Pain Rating: FLACC (Rest) - Legs 0   Pain Rating: FLACC (Rest) - Activity 0   Pain Rating: FLACC (Rest) - Cry 0   Pain Rating: FLACC (Rest) - Consolability 0   Score: FLACC (Rest) 0   Pain Rating: FLACC (Activity) - Face 0   Pain Rating: FLACC (Activity) - Legs 0   Pain Rating: FLACC (Activity) - Activity 0   Pain Rating: FLACC (Activity) - Cry 0   Pain Rating: FLACC (Activity) - Consolability 0   Score: FLACC (Activity) 0   Restrictions/Precautions   Weight Bearing Precautions Per Order No   Other Precautions Contact/isolation;Airborne/isolation;Cognitive;Chair Alarm;Bed Alarm;Multiple lines;Fall Risk   Home Living   Additional Comments pt is poor historian. Per EMR, pt was Independent in July prior to CVA. Has been at SNF since July.   ADL   Where Assessed Edge of bed   Eating Assistance 1  Total  Assistance   Grooming Assistance 1  Total Assistance   UB Bathing Assistance 1  Total Assistance   LB Bathing Assistance 1  Total Assistance   UB Dressing Assistance 1  Total Assistance   LB Dressing Assistance 1  Total Assistance   Toileting Assistance  1  Total Assistance   Functional Assistance 1  Total Assistance   Bed Mobility   Rolling R 1  Dependent   Additional items Assist x 2;Assist x 1   Rolling L 1  Dependent   Additional items Assist x 1;Assist x 2   Supine to Sit 1  Dependent   Additional items Assist x 2   Sit to Supine 1  Dependent   Additional items Assist x 2   Transfers   Sit to Stand Unable to assess   Balance   Static Sitting Fair -   Dynamic Sitting Poor +   Activity Tolerance   Activity Tolerance Treatment limited secondary to medical complications (Comment)   Medical Staff Made Aware PT Traci   Nurse Made Aware SMITH Rojas   RUCRISPIN Assessment   RUE Assessment WFL   LUE Assessment   LUE Assessment X  (no functional use.)   Hand Function   Gross Motor Coordination Impaired   Fine Motor Coordination Impaired   Psychosocial   Psychosocial (WDL) WDL   Cognition   Overall Cognitive Status Impaired   Assessment   Limitation Decreased ADL status;Decreased UE strength;Decreased Safe judgement during ADL;Decreased endurance;Decreased self-care trans;Decreased high-level ADLs   Prognosis Good   Assessment Drew Santos is a 73 y.o. male seen for OT evaluation s/p admit to [facility] on 9/17/2024 w/ Leukocytosis.  Comorbidities affecting pt's functional performance at time of assessment include:  A-fib, CVA, diabetes, sacral ulcer stage III, CAD  . Pt with active OT orders and activity orders for Up and OOB as tolerated. Personal factors affecting pt at time of IE include:fall risk  and functional decline . Prior to admission, pt is poor historian. Per EMR, pt was Independent in July prior to CVA. Has been at SNF since July. Upon evaluation: Pt currently requires total assist for UB ADLs, total assist  for LB ADLs,  total assist  for toileting, total assist for bed mobility, total assist for functional transfers, and total assist mobility 2* the following deficits impacting occupational performance:weakness, decreased strength , decreased balance, and decreased activity tolerance. Pt to benefit from continued skilled OT tx while in the hospital to address deficits as defined above and maximize level of functional independence w ADL's and functional mobility. Occupational Performance areas to address include: grooming, toilet hygiene, dressing, medication management, health maintenance, functional mobility, and clothing management. From OT standpoint, recommendation at time of d/c would be level 2. OT to follow pt on caseload 1-3wk.   Goals   STG Time Frame 3-5   Short Term Goal #1 Pt will improve activity tolerance to G for min 30 min txment sessions for increase engagement in functional tasks   Short Term Goal #2 Pt will complete bed mobility at a maxAx2 level w/ G balance/safety demonstrated to decrease caregiver assistance required   LTG Time Frame 10-14   Long Term Goal #1 Pt will demonstrate 100% carryover of energy conservation techniques t/o functional I/ADL/leisure tasks w/o cues s/p skilled education to increase endurance during functional tasks   Long Term Goal #2 Pt will improve functional transfers to maxx2 on/off all surfaces using DME as needed w/ G balance/safety   Plan   Treatment Interventions ADL retraining;Functional transfer training;UE strengthening/ROM;Cognitive reorientation;Patient/family training;Equipment evaluation/education;Neuromuscular reeducation;Compensatory technique education;Energy conservation;Activityengagement   Goal Expiration Date 10/02/24   OT Treatment Day 0   OT Frequency 1-2x/wk;2-3x/wk   Discharge Recommendation   Rehab Resource Intensity Level, OT II (Moderate Resource Intensity)   AM-PAC Daily Activity Inpatient   Lower Body Dressing 1   Bathing 1   Toileting 1   Upper Body  Dressing 1   Grooming 1   Eating 1   Daily Activity Raw Score 6   Turning Head Towards Sound 2   Follow Simple Instructions 1   Low Function Daily Activity Raw Score 9   Low Function Daily Activity Standardized Score  14.9   AM-PAC Applied Cognition Inpatient   Following a Speech/Presentation 2   Understanding Ordinary Conversation 2   Taking Medications 1   Remembering Where Things Are Placed or Put Away 1   Remembering List of 4-5 Errands 1   Taking Care of Complicated Tasks 1   Applied Cognition Raw Score 8   Applied Cognition Standardized Score 19.32   Rosina Nair, OT

## 2024-09-18 NOTE — PLAN OF CARE

## 2024-09-18 NOTE — ASSESSMENT & PLAN NOTE
Lab Results   Component Value Date    HGBA1C 6.2 (H) 09/17/2024   This is risk factor for wounds and infection.  -recommend tight glycemic control  -blood glucose management per primary service

## 2024-09-18 NOTE — ASSESSMENT & PLAN NOTE
PCR positive on 9/17/2024 although per patient's family he tested positive prior to admission. Patient's wife also positive. I personally reviewed his chest imaging which showed no airspace opacities or groundglass. He has been started on mild disease protocol and is receiving IV Remdesivir.  -management per primary service  -monitor vitals  -monitor respiratory status

## 2024-09-18 NOTE — ASSESSMENT & PLAN NOTE
Patient is a 73-year-old male with a history of A-fib, CVA, diabetes, sacral ulcer stage III, CAD who presented with altered mental status and was found to be COVID-positive.  He was found to be septic on arrival to the emergency department of unknown source.  General surgery was consulted to evaluate for possible perirectal or perianal fistula and to evaluate the sacral decubitus ulcer.  Patient is currently tachycardic, and febrile but is normotensive.  Sacral decubitus ulcer was noted to be necrotic and sharp debridement was completed at bedside which patient tolerated well.    -Recommend daily wound care with wet-to-dry dressings.  Monitor dressings for stool or any signs of a fistula.  -Will complete a wound check on Friday, 9/20.  -Recommend continuing antibiotics.  -Would recommend GI consult for colitis/proctitis  -The patient continues to have issues with stooling in sacral ulcer, can consider diverting colostomy once stabilized.

## 2024-09-18 NOTE — H&P
H&P - Hospitalist   Name: Drew Santos 73 y.o. male I MRN: [id number]  Unit/Bed#: ED-05 I Date of Admission: 9/17/2024   Date of Service: 9/17/2024 I Hospital Day: 0     Assessment & Plan  Acute metabolic encephalopathy  Presumably secondary to his COVID infection  Will need further sepsis workup as below  Check CT head  Monitor with treatment for his COVID infection and sepsis  Will hold home remeron  Sepsis (HCC)  Meets criteria on admission with fever, tachycardia, leukocytosis: Presumed source is COVID.  Chest x-ray without evidence of pneumonia    Will check urinalysis as well  Check procalcitonin now and in the morning  Continue with IV fluid resuscitation overnight  Will continue on empiric IV ceftriaxone for now, however if serial procalcitonin negative and patient improving with treatment of his COVID may be able to de-escalate antibiotics.  Follow-up blood cultures  Monitor WBC count and fever curve  COVID-19 virus infection  Patient currently on room air though high risk given his age and comorbidities  Will treat with IV remdesivir x 3 days  Supportive care otherwise  Contact and airborne precautions  Leukocytosis  Leukocytosis with white blood cell count of 21,000 on admission  Would not expect this high of a white count especially with left shift with COVID viral infection  Will need to follow-up urinalysis to rule out UTI as well as blood cultures  Sinus tachycardia  Significantly tachycardic in the emergency room with heart rate in the 130s and 140s; sinus tachycardia on EKG and telemetry  Suspect this is just physiologic in the setting of fever, COVID infection  Could consider pulmonary embolism, however stable on room air, and is chronically on Eliquis for PE so this is felt to be less likely    Will place on IV fluid overnight  IV Tylenol to try to break fever  Monitor on telemetry  Monitor vital signs closely  Type 2 diabetes mellitus without complication, without long-term current use of insulin  "(HCC)  Well-controlled with last hemoglobin A1c of 6.0% in July of this year  Will hold home oral regimen  Monitor on sliding scale only  Hypoglycemia protocol    History of CVA (cerebrovascular accident)  History of CVA   Chronically bedbound and with chronic left-sided deficits   Continue home aspirin, Eliquis, statin  Paroxysmal atrial fibrillation (HCC)  Currently in sinus tachycardia in the ER  Not on any rate controlling meds at home  Will continue home Eliquis for stroke prevention  Sacral wound  Chronic sacral decubitus ulcer; present on admission  Wound care consultation    VTE Pharmacologic Prophylaxis:   High Risk (Score >/= 5) - Pharmacological DVT Prophylaxis Ordered: apixaban (Eliquis). Sequential Compression Devices Ordered.  Code Status: Level 1 - Full Code   Discussion with family: Updated  (wife) at bedside.    Anticipated Length of Stay: Patient will be admitted on an inpatient basis with an anticipated length of stay of greater than 2 midnights secondary to encephalopathy, sepsis, COVID infection, tachycardia, leukocytosis,.    History of Present Illness   Chief Complaint: Altered mental status    Drew Santos is a 73 y.o. male with a PMH of CVA with chronic left-sided deficits, sacral ulcer, CAD, A-fib, diabetes, HTN, HLD, who presents with altered mental status.  Patient has a history of CVA with chronic left-sided deficits.  He is bedbound at baseline.  He was recently hospitalized and was sent to a SNF for rehab after his hospitalization.  Due to the patient's current mental status he is unable to recall any history and really does not communicate at all.  This is far from his baseline per his wife at bedside.  She reports that symptoms began approximately 2 days ago when he came more lethargic.  Yesterday he stated that \"he does not feel well \"which he apparently never says.  He has been less interactive and grown more lethargic.  He has been spiking low-grade fevers at the " nursing facility.  He was brought to the emergency room for evaluation where he again was noted to be febrile, tachycardic, and tested positive for COVID.  He was given IV fluids and empiric IV antibiotics.  Unfortunately he has not improved symptomatically and remains encephalopathic.  He has been referred to the hospital service for ongoing management.  Extensive discussion with wife at bedside regarding plan of care and prognosis.  All questions and concerns addressed.  He is confirmed level of care 1.    Review of Systems: Unable to obtain given encephalopathy    I have reviewed the patient's PMH, PSH, Social History, Family History, Meds, and Allergies  Social History:  Marital Status: /Civil Union   Occupation: retired  Patient Pre-hospital Living Situation: With spouse  Patient Pre-hospital Level of Mobility: non-ambulatory/bed bound  Patient Pre-hospital Diet Restrictions: none    Objective     Vitals:   Blood Pressure: 130/68 (09/17/24 1930)  Pulse: (!) 141 (09/17/24 1930)  Temperature: (!) 100.6 °F (38.1 °C) (09/17/24 1900)  Temp Source: Oral (09/17/24 1900)  Respirations: (!) 23 (09/17/24 1930)  Weight - Scale: 106 kg (233 lb 4 oz) (09/17/24 1605)  SpO2: 94 % (09/17/24 1930)  PHYSICAL EXAM:    Vitals signs reviewed  Constitutional Lethargic and drowsy.  Ill-appearing.   Head/Neck   Normocephalic. Atraumatic.   HEENT   No scleral icterus. EOMI.   Heart Tachycardic.  Regular.   Lungs Occult state given body habitus.  Poor inspiratory effort.  Overall respirations unlabored.   Abdomen   Soft. Nontender. Nondistended.    Skin   Skin color normal. No rashes.   Extremities   No deformities. No peripheral edema.   Neuro Encephalopathic.  Does not participate in neuroexam.  Spontaneously moves his right upper extremity.  EOMIs intact.  Does not participate in conversation or follow commands.   Psych Encephalopathic         Lines/Drains:  Lines/Drains/Airways       Active Status       Name Placement date  "Placement time Site Days    Urethral Catheter Three way 22 Fr. 08/31/24 1925  Three way  17    Continuous Bladder Irrigation Three-way 08/31/24 1925  Three-way  17                  Urinary Catheter:  Goal for removal: N/A - Chronic Parra             Additional Data:   Lab Results: I have reviewed the following results: Troponin,BNP:No new results in last 24 hours. , Procalcitonin: No results found for: \"PROCALCITONI\"  Results from last 7 days   Lab Units 09/17/24  1637   WBC Thousand/uL 21.33*   HEMOGLOBIN g/dL 10.0*   HEMATOCRIT % 31.8*   PLATELETS Thousands/uL 497*   SEGS PCT % 83*   LYMPHO PCT % 10*   MONO PCT % 5   EOS PCT % 0     Results from last 7 days   Lab Units 09/17/24  1637   SODIUM mmol/L 142   POTASSIUM mmol/L 2.9*   CHLORIDE mmol/L 105   CO2 mmol/L 31   BUN mg/dL 15   CREATININE mg/dL 0.76   ANION GAP mmol/L 6   CALCIUM mg/dL 9.0   ALBUMIN g/dL 2.8*   TOTAL BILIRUBIN mg/dL 1.27*   ALK PHOS U/L 88   ALT U/L 28   AST U/L 38   GLUCOSE RANDOM mg/dL 164*             Lab Results   Component Value Date    HGBA1C 6.0 (H) 07/04/2024    HGBA1C 7.1 (H) 03/08/2024    HGBA1C 7.7 (H) 11/29/2023     Results from last 7 days   Lab Units 09/17/24  1655   LACTIC ACID mmol/L 1.2       Imaging Review: Reviewed radiology reports from this admission including: chest xray.  Other Studies: EKG was reviewed.     Administrative Statements   I have spent a total time of 75 minutes in caring for this patient on the day of the visit/encounter including Diagnostic results, Prognosis, Patient and family education, Impressions, Counseling / Coordination of care, Documenting in the medical record, Reviewing / ordering tests, medicine, procedures  , Obtaining or reviewing history  , and Communicating with other healthcare professionals .    ** Please Note: This note has been constructed using a voice recognition system. **    "

## 2024-09-18 NOTE — WOUND OSTOMY CARE
Consult Note - Wound   Drew Santos 73 y.o. male MRN: [id number]  Unit/Bed#: [place] [address] -01 Encounter: [id number]      History and Present Illness:  73 year old male presented to the hospital with altered mental status.  Patient's history significant for CVA with left sided weakness, CAD, atrial fibrillation, DM, HTN, sacral wound.    Assessment Findings:   Patient non-verbal during assessment.  Assessed along with surgery team.  Parra catheter in place.  Bilateral heels intact.  Skin folds intact. Nutrition consultation placed.  Low air-loss mattress ordered.  Recommend ATR positioning system.  Urethra--small urethral erosion with small bloody drainage.  Cherelle-wound intact. Catheter secured with stat lock.   Present on admission wound to coccyx/sacrum--surgery team performing bedside debridement.  Likely stage 4.  However, no exposed bone at this time.  Wound bed with large central opening that has 90% black/brown slough and 10% beefy red tissue, circumferential undermining, deepest at 12 o'clock.  There is superficial tissue loss with pink and yellow slough surrounding the wound.  Cherelle-wound otherwise intact with hypo and hyperpigmentation.  Wound with small serosanguinous and tan drainage.  Concern for possible infection.  Recommendation for Dakin's wet to dry dressing.    See flowsheet for wound details.    Wound Care Plan:   1-Silicone Cream/Hydraguard lotion to bilateral heels twice daily and as needed.  2-Elevate heels off of bed/chair surface--offloading heel boots.  3-P500 low air-loss mattress.   4-Apply moisturizing skin cream to body daily and as needed.  5-Turn/reposition every 2 hours while in bed for pressure re-distribution on skin.   6-Urethra--cleanse, pat dry. Apply Bacitrain twice daily.    7-Sacrum (per surgery team)--irrigate with Dakin's solution, pat dry.  Pack wound with normal saline wet to dry dressing.  Cover with silicone bordered foam dressing.  Change dressing daily and as needed with  soilage.      Wound care team to follow.  Plan of care reviewed with primary RN.    Patient will likely require follow-up at the wound center on discharge.    Wound 08/23/24 Pressure Injury Coccyx (Active)   Wound Image   09/18/24 1252   Wound Description Brown;Black;Necrotic;Beefy red;Pink;Yellow 09/18/24 1252   Cherelle-wound Assessment Scar Tissue;Fragile 09/18/24 1252   Wound Length (cm) 3.5 cm 09/18/24 1252   Wound Width (cm) 4 cm 09/18/24 1252   Wound Depth (cm) 5.5 cm 09/18/24 1252   Wound Surface Area (cm^2) 14 cm^2 09/18/24 1252   Wound Volume (cm^3) 77 cm^3 09/18/24 1252   Calculated Wound Volume (cm^3) 77 cm^3 09/18/24 1252   Change in Wound Size % -358.33 09/18/24 1252   Number of underminings 1 09/18/24 1252   Undermining 1 2 09/18/24 1252   Undermining 1 is depth extending from circumferential, greatest at 12 o'clock 09/18/24 1252   Drainage Amount Small 09/18/24 1252   Drainage Description Bloody;Serous;Tan 09/18/24 1252   Non-staged Wound Description Full thickness 09/18/24 1252   Treatments Irrigation with NSS;Cleansed 09/18/24 1252   Dressing Packings;Foam, Silicon (eg. Allevyn, etc) 09/18/24 1252   Patient Tolerance Tolerated well 09/18/24 1252   Dressing Status Clean;Dry;Intact 09/18/24 1252       Wound 09/02/24 Penis Mid (Active)   Wound Image   09/18/24 1300   Wound Description Beefy red;Bleeding 09/18/24 1300   Cherelle-wound Assessment Intact 09/18/24 1300   Wound Length (cm) 0.4 cm 09/18/24 1300   Wound Width (cm) 1 cm 09/18/24 1300   Wound Depth (cm) 0.1 cm 09/18/24 1300   Wound Surface Area (cm^2) 0.4 cm^2 09/18/24 1300   Wound Volume (cm^3) 0.04 cm^3 09/18/24 1300   Calculated Wound Volume (cm^3) 0.04 cm^3 09/18/24 1300   Change in Wound Size % -100 09/18/24 1300   Drainage Amount Small 09/18/24 1300   Drainage Description Bloody 09/18/24 1300   Treatments Cleansed 09/18/24 1300   Dressing Dry dressing 09/18/24 1300   Dressing Changed Changed 09/18/24 1300   Patient Tolerance Tolerated well  09/18/24 1300   Dressing Status Clean;Dry;Intact 09/18/24 1300       Genevieve Mcdonald RN, BSN, CWON

## 2024-09-18 NOTE — ASSESSMENT & PLAN NOTE
Significantly tachycardic in the emergency room with heart rate in the 130s and 140s; sinus tachycardia on EKG and telemetry  Suspect this is just physiologic in the setting of fever, COVID infection  Could consider pulmonary embolism, however stable on room air, and is chronically on Eliquis for PE so this is felt to be less likely    Will place on IV fluid overnight  IV Tylenol to try to break fever  Monitor on telemetry  Monitor vital signs closely

## 2024-09-18 NOTE — ASSESSMENT & PLAN NOTE
Blood cultures 1 of 2 bottles positive for gram-negative rods.  Await blood culture identification panel.  Continue with ceftriaxone and Flagyl.  Follow-up on culture and sensitivity results.  CT chest abdomen pelvis ordered.  Infectious disease consulted.

## 2024-09-18 NOTE — CONSULTS
Consultation -  [facility]  Patient: Drew Santos 73 y.o. male   MRN: [id number]  PCP: Joe Lyon MD  Unit/Bed#: [place] [address] -01 Encounter: [id number]  Length of Stay: 1 days        Inpatient consult to gastroenterology     Date/Time  9/17/2024 3:58 PM     Performed by  Tuan Oneill MD   Authorized by  Sherlyn Sharma MD           Assessment/Plan:  Drew Santos is a 73 y.o. male with a PMH of Afib on eliquis, CVA, HTN, HLD, CKD3, chronic sacral ulcer who presents from his facility for AMS and fevers.    # GNR bacteremia  # COVID+  # distal colitis  # chronic stage 3 sacral ulcer   - CTAP findings noted. Some mild wall thickening of the rectosigmoid. Nearby sacral ulcer with subQ emphysema again seen. His last CT on 08/31, his enteric contrast had no evidence of extravasation c/f fistulization with the sacral ulcer. Ddx of the findings include ischemia, infectious, SCAD, IBD, reactive etc. He has no clinical evidence of colitis or diarrhea at this point.   - initial Bcx growing proteus but he is now growing enterococcus. Source likely 2/2 stool contamination of the sacral ulcer given his chronic bedbound state rather than colonic translocation or fistulization.    - check infectious and inflammatory stool studies.   - wound care csx   - Abx per ID   - no endoscopic interventions at this time.   - GOC discussions given poor performance and neurologic status without any anticipated meaningful recovery.   - consider outpatient colonoscopy for CRC screening purposes    History of Present Illness:  Drew Santos is a 73 y.o. male with a PMH of Afib on eliquis, CVA, HTN, HLD, CKD3, chronic sacral ulcer who presents from his facility for AMS and fevers.    No prior colon. Cologuard 01/23/24 neg.    Admitted @ [facility] 07/10 for massive stroke R-sided stroke now with L hemiparesis. He was sent to rehab afterwards. He has had 2 admission in August for his sacral ulcer and hematuria.    Denies diarrhea, fevers or  chills. No complaints at this time. He denies any prior colonoscopy.    Unable to provide any meaningful ROS.      Past Medical History:   Diagnosis Date    Atherosclerotic heart disease of native coronary artery without angina pectoris     Atrial fibrillation (HCC)     Cerebral infarction (HCC)     Constipation     Depression     Diabetes mellitus (HCC)     Hemiplegia and hemiparesis following cerebral infarction affecting left non-dominant side (HCC)     Hyperlipidemia     Hypertension     Pressure ulcer of sacral region, stage 3 (HCC)     Prostatic hyperplasia     Sepsis (HCC)     Stroke (HCC)     TIA (transient ischemic attack)     Urinary retention     Vitamin D deficiency      No past surgical history on file.  Prior to Admission medications    Medication Sig Start Date End Date Taking? Authorizing Provider   acetaminophen (TYLENOL) 325 mg tablet Take 650 mg by mouth every 4 (four) hours as needed for fever or mild pain.   Yes Historical Provider, MD   apixaban (Eliquis) 5 mg Take 1 tablet (5 mg total) by mouth 2 (two) times a day 4/8/24  Yes Wes Muhammad PA-C   ASPIRIN 81 PO Take 81 mg by mouth daily   Yes Historical Provider, MD   atorvastatin (LIPITOR) 40 mg tablet Take 2 tablets (80 mg total) by mouth every evening Per AVS, 80mg daily  Patient taking differently: Take 80 mg by mouth every evening Per AVS, 80mg daily 8/28/24  Yes Brendan Nuno DO   bisacodyl (Bisacodyl Laxative) 10 mg suppository Insert 10 mg into the rectum daily as needed for constipation (if no results from mom).   Yes Historical Provider, MD   bisacodyl (FLEET) 10 MG/30ML ENEM Insert 10 mg into the rectum daily as needed for constipation Only use if no response from Dulcolax after 2 hours   Yes Historical Provider, MD   Cholecalciferol (Vitamin D3) 50 MCG (2000 UT) CAPS Take 2,000 Units by mouth daily   Yes Historical Provider, MD   dapagliflozin (Farxiga) 10 MG tablet Take 10 mg by mouth daily   Yes Historical Provider, MD    docusate sodium (COLACE) 100 mg capsule Take 100 mg by mouth daily Per OSR 9/17, take 2 capsules by mouth once daily.   Yes Historical Provider, MD   escitalopram (LEXAPRO) 10 mg tablet Take 10 mg by mouth daily   Yes Historical Provider, MD   ezetimibe (ZETIA) 10 mg tablet Take 10 mg by mouth daily. Indications: High Amount of Fats in the Blood   Yes Historical Provider, MD   finasteride (PROSCAR) 5 mg tablet Take 1 tablet (5 mg total) by mouth daily  Patient taking differently: Take 5 mg by mouth daily 9/5/24  Yes Azael Monzon MD   lisinopril (ZESTRIL) 10 mg tablet Take 1 tablet (10 mg total) by mouth daily  Patient taking differently: Take 10 mg by mouth daily 8/28/24  Yes Brendan Nuno,    magnesium hydroxide (Milk of Magnesia) 400 mg/5 mL oral suspension Take 30 mL by mouth daily as needed for constipation Use if no bowel movement x 3 days. Give at bedtime. Separate from other medications x 2 hours.   Yes Historical Provider, MD   mirtazapine (REMERON) 15 mg tablet Take 1 tablet (15 mg total) by mouth daily at bedtime 9/4/24  Yes Azael Monzon MD   MULTIPLE VITAMIN PO Take 1 tablet by mouth in the morning. Indications: Vitamin A deficiency   Yes Historical Provider, MD   Polyethylene Glycol 1000 POWD Take 17 g by mouth daily as needed (constipation) Dissolve in 4-8oz of water, juice, coffee or tea   Yes Historical Provider, MD   senna (Senna-Tabs) 8.6 MG tablet Take 2 tablets by mouth daily as needed for constipation.   Yes Historical Provider, MD   tamsulosin (FLOMAX) 0.4 mg Take 0.4 mg by mouth daily   Yes Historical Provider, MD   potassium chloride (MICRO-K) 10 MEQ CR capsule Take 2 capsules (20 mEq total) by mouth daily for 2 days 9/4/24 9/6/24  Azael Monzon MD   Silver (SilvaSorb) GEL Apply 1 Application topically daily. Apply to buttock wound bed daily.    Historical Provider, MD     Allergies   Allergen Reactions    Primaquine Other (See Comments) and Hives  "    Social History     Tobacco Use    Smoking status: Never     Passive exposure: Never    Smokeless tobacco: Never   Vaping Use    Vaping status: Never Used   Substance Use Topics    Alcohol use: Not Currently    Drug use: Never     No family history on file.    Objective:  /79   Pulse (!) 119   Temp 99.8 °F (37.7 °C)   Resp 19   Ht 6' 5\" (1.956 m)   Wt 113 kg (249 lb 9 oz)   SpO2 97%   BMI 29.59 kg/m²     Intake/Output Summary (Last 24 hours) at 9/18/2024 1422  Last data filed at 9/18/2024 1320  Gross per 24 hour   Intake 1050 ml   Output 750 ml   Net 300 ml     Body mass index is 29.59 kg/m².  Physical Exam  Constitutional:       General: He is not in acute distress.     Appearance: Normal appearance.   HENT:      Head: Normocephalic and atraumatic.      Nose: Nose normal.      Mouth/Throat:      Mouth: Mucous membranes are moist.   Eyes:      General: No scleral icterus.     Extraocular Movements: Extraocular movements intact.      Conjunctiva/sclera: Conjunctivae normal.      Pupils: Pupils are equal, round, and reactive to light.   Cardiovascular:      Rate and Rhythm: Normal rate and regular rhythm.   Pulmonary:      Effort: Pulmonary effort is normal.   Abdominal:      General: Abdomen is flat. There is no distension.      Palpations: There is no mass.      Tenderness: There is no abdominal tenderness.   Musculoskeletal:         General: No swelling. Normal range of motion.   Skin:     General: Skin is warm and dry.      Capillary Refill: Capillary refill takes less than 2 seconds.   Neurological:      Mental Status: He is alert. Mental status is at baseline.      Comments: L hemiparesis   Psychiatric:         Mood and Affect: Mood normal.         Labs:  I have reviewed all available labs and imaging results in Epic.  Results from last 7 days   Lab Units 09/18/24  0508 09/17/24  1637   SODIUM mmol/L 144 142   POTASSIUM mmol/L 3.2* 2.9*   CHLORIDE mmol/L 109* 105   CO2 mmol/L 27 31   BUN mg/dL 15 " 15   CREATININE mg/dL 0.66 0.76   GLUCOSE RANDOM mg/dL 158* 164*   CALCIUM mg/dL 7.9* 9.0   ALBUMIN g/dL  --  2.8*   ALK PHOS U/L  --  88   ALT U/L  --  28   AST U/L  --  38   EGFR ml/min/1.73sq m 94 89     Results from last 7 days   Lab Units 09/18/24  0508 09/17/24  1637   WBC Thousand/uL 21.23* 21.33*   HEMOGLOBIN g/dL 8.5* 10.0*   HEMATOCRIT % 27.0* 31.8*   PLATELETS Thousands/uL 406* 497*   SEGS PCT %  --  83*   LYMPHO PCT %  --  10*   MONO PCT %  --  5   EOS PCT %  --  0   BASOS PCT %  --  0   TOTAL NEUT ABS Thousands/µL  --  17.92*   LYMPHS ABS Thousands/µL  --  2.07   MONOS ABS Thousand/µL  --  0.96   EOS ABS Thousand/µL  --  0.01   BASOS ABS Thousands/µL  --  0.05               Additional Labs:  Results from last 7 days   Lab Units 09/18/24  0508 09/17/24  1655   LACTIC ACID mmol/L  --  1.2   MAGNESIUM mg/dL 1.7*  --           Results from last 7 days   Lab Units 09/17/24  1957   HEMOGLOBIN A1C % 6.2*       Imaging:  CT chest abdomen pelvis w contrast   Final Result by Michael Veliz MD (09/18 0933)      No acute findings in the chest.      Mid/distal sigmoid colonic and rectal inflammatory changes in keeping with a segmental colitis/proctitis.      Right ischioanal fossa subcutaneous emphysema extending through the right paramidline gluteal subcutaneous tissues to the skin surface suspicious for a perirectal or perianal fistula; limited assessment without oral or rectal contrast.      Parra catheter balloon is located in the prostatic urethra; this catheter should be repositioned.      Fat stranding surrounding the decompressed bladder may indicate cystitis.      The study was marked in EPIC for immediate notification.            Workstation performed: [id number]         CT head wo contrast   Final Result by Missy Cross MD (09/17 2128)      No acute intracranial hemorrhage seen.   No mass effect or midline shift seen                  Workstation performed: [id number]         XR chest 2  views   ED Interpretation by Eduardo Masters MD (09/17 1813)   Independently reviewed: no acute abnormality      Final Result by Ron Netwon MD (09/17 1808)      Limited study. Platelike atelectasis in the right lower lung field. No convincing evidence of pneumonia            Workstation performed: [id number]             Medications:   Current Facility-Administered Medications   Medication Dose Route Frequency Provider Last Rate    acetaminophen  1,000 mg Intravenous Q6H PRN Willem Gutierrez PA-C      acetaminophen  650 mg Oral Q6H PRN Xavier Castro, DO      apixaban  5 mg Oral BID Xavier Castro, DO      aspirin  81 mg Oral Daily Xavier Castro, DO      atorvastatin  80 mg Oral QPM Xavier Castro, DO      cefepime  2,000 mg Intravenous Q12H Sherlyn Sharma MD 2,000 mg (09/18/24 1307)    Dakins (full strength)  1 Application Irrigation Daily Eloy Zhao MD      docusate sodium  100 mg Oral Daily Xavier Castro, DO      escitalopram  10 mg Oral Daily Xavier Castro, DO      ezetimibe  10 mg Oral Daily Xavier Castro, DO      finasteride  5 mg Oral Daily Xavier Castro, DO      insulin lispro  1-6 Units Subcutaneous TID AC Xavier Castro, DO      insulin lispro  1-6 Units Subcutaneous HS Xavier Castro, DO      lisinopril  10 mg Oral Daily Xavier Castro, DO      metroNIDAZOLE  500 mg Intravenous Q8H Sherlyn Sharma  mg (09/18/24 1223)    multi-electrolyte  125 mL/hr Intravenous Continuous Sherlyn Sharma  mL/hr (09/18/24 0847)    ondansetron  4 mg Intravenous Q4H PRN Xavier Castro, DO      remdesivir  100 mg Intravenous Q24H Xavier Castro, DO      tamsulosin  0.4 mg Oral Daily Xavier Castro DO         Problem List:  Patient Active Problem List   Diagnosis    Syncope    Elevated lactic acid level    Sepsis    Abnormal CPK    Urinary retention    Primary hypertension    Mixed  hyperlipidemia    Sacral wound    Gross hematuria    Depression    Dysgeusia    Gram-negative bacteremia    Type 2 diabetes mellitus (HCC)    COVID-19       Case discussed with attending physician Dr. Law. All recs are preliminary pending final attestation.    Tuan Oneill, PGY-5

## 2024-09-19 ENCOUNTER — APPOINTMENT (INPATIENT)
Dept: NON INVASIVE DIAGNOSTICS | Facility: HOSPITAL | Age: 74
DRG: 871 | End: 2024-09-19
Payer: OTHER GOVERNMENT

## 2024-09-19 PROBLEM — I48.0 PAROXYSMAL ATRIAL FIBRILLATION (HCC): Status: ACTIVE | Noted: 2024-09-19

## 2024-09-19 PROBLEM — R78.81 GRAM-NEGATIVE BACTEREMIA: Status: RESOLVED | Noted: 2024-09-18 | Resolved: 2024-09-19

## 2024-09-19 PROBLEM — Z86.73 HISTORY OF CVA (CEREBROVASCULAR ACCIDENT): Status: ACTIVE | Noted: 2024-09-19

## 2024-09-19 PROBLEM — D64.9 ANEMIA: Status: ACTIVE | Noted: 2024-09-19

## 2024-09-19 PROBLEM — G93.41 ACUTE METABOLIC ENCEPHALOPATHY: Status: ACTIVE | Noted: 2024-09-19

## 2024-09-19 PROBLEM — R78.81 BACTEREMIA: Status: ACTIVE | Noted: 2024-09-19

## 2024-09-19 LAB
ABO GROUP BLD: NORMAL
ABO GROUP BLD: NORMAL
ANION GAP SERPL CALCULATED.3IONS-SCNC: 7 MMOL/L (ref 4–13)
BACTERIA UR CULT: ABNORMAL
BASOPHILS # BLD AUTO: 0.05 THOUSANDS/ΜL (ref 0–0.1)
BASOPHILS NFR BLD AUTO: 0 % (ref 0–1)
BLD GP AB SCN SERPL QL: NEGATIVE
BUN SERPL-MCNC: 15 MG/DL (ref 5–25)
C COLI+JEJUNI TUF STL QL NAA+PROBE: NEGATIVE
CALCIUM SERPL-MCNC: 7.6 MG/DL (ref 8.4–10.2)
CHLORIDE SERPL-SCNC: 113 MMOL/L (ref 96–108)
CO2 SERPL-SCNC: 26 MMOL/L (ref 21–32)
CREAT SERPL-MCNC: 0.64 MG/DL (ref 0.6–1.3)
EC STX1+STX2 GENES STL QL NAA+PROBE: NEGATIVE
EOSINOPHIL # BLD AUTO: 0.04 THOUSAND/ΜL (ref 0–0.61)
EOSINOPHIL NFR BLD AUTO: 0 % (ref 0–6)
ERYTHROCYTE [DISTWIDTH] IN BLOOD BY AUTOMATED COUNT: 13.4 % (ref 11.6–15.1)
GFR SERPL CREATININE-BSD FRML MDRD: 95 ML/MIN/1.73SQ M
GLUCOSE SERPL-MCNC: 110 MG/DL (ref 65–140)
GLUCOSE SERPL-MCNC: 123 MG/DL (ref 65–140)
GLUCOSE SERPL-MCNC: 124 MG/DL (ref 65–140)
GLUCOSE SERPL-MCNC: 203 MG/DL (ref 65–140)
GLUCOSE SERPL-MCNC: 208 MG/DL (ref 65–140)
HCT VFR BLD AUTO: 22.5 % (ref 36.5–49.3)
HCT VFR BLD AUTO: 22.9 % (ref 36.5–49.3)
HGB BLD-MCNC: 7.1 G/DL (ref 12–17)
HGB BLD-MCNC: 7.2 G/DL (ref 12–17)
IMM GRANULOCYTES # BLD AUTO: 0.22 THOUSAND/UL (ref 0–0.2)
IMM GRANULOCYTES NFR BLD AUTO: 1 % (ref 0–2)
LYMPHOCYTES # BLD AUTO: 2.04 THOUSANDS/ΜL (ref 0.6–4.47)
LYMPHOCYTES NFR BLD AUTO: 10 % (ref 14–44)
MAGNESIUM SERPL-MCNC: 2 MG/DL (ref 1.9–2.7)
MCH RBC QN AUTO: 31 PG (ref 26.8–34.3)
MCHC RBC AUTO-ENTMCNC: 31.6 G/DL (ref 31.4–37.4)
MCV RBC AUTO: 98 FL (ref 82–98)
MONOCYTES # BLD AUTO: 1.02 THOUSAND/ΜL (ref 0.17–1.22)
MONOCYTES NFR BLD AUTO: 5 % (ref 4–12)
NEUTROPHILS # BLD AUTO: 16.46 THOUSANDS/ΜL (ref 1.85–7.62)
NEUTS SEG NFR BLD AUTO: 84 % (ref 43–75)
NRBC BLD AUTO-RTO: 0 /100 WBCS
PLATELET # BLD AUTO: 396 THOUSANDS/UL (ref 149–390)
PMV BLD AUTO: 10.1 FL (ref 8.9–12.7)
POTASSIUM SERPL-SCNC: 2.9 MMOL/L (ref 3.5–5.3)
RBC # BLD AUTO: 2.29 MILLION/UL (ref 3.88–5.62)
RH BLD: POSITIVE
RH BLD: POSITIVE
SALMONELLA SP SPAO STL QL NAA+PROBE: NEGATIVE
SHIGELLA SP+EIEC IPAH STL QL NAA+PROBE: NEGATIVE
SODIUM SERPL-SCNC: 146 MMOL/L (ref 135–147)
SPECIMEN EXPIRATION DATE: NORMAL
WBC # BLD AUTO: 19.83 THOUSAND/UL (ref 4.31–10.16)

## 2024-09-19 PROCEDURE — 86901 BLOOD TYPING SEROLOGIC RH(D): CPT | Performed by: INTERNAL MEDICINE

## 2024-09-19 PROCEDURE — 86923 COMPATIBILITY TEST ELECTRIC: CPT

## 2024-09-19 PROCEDURE — 99233 SBSQ HOSP IP/OBS HIGH 50: CPT | Performed by: INTERNAL MEDICINE

## 2024-09-19 PROCEDURE — 99223 1ST HOSP IP/OBS HIGH 75: CPT | Performed by: STUDENT IN AN ORGANIZED HEALTH CARE EDUCATION/TRAINING PROGRAM

## 2024-09-19 PROCEDURE — 30233N1 TRANSFUSION OF NONAUTOLOGOUS RED BLOOD CELLS INTO PERIPHERAL VEIN, PERCUTANEOUS APPROACH: ICD-10-PCS | Performed by: HOSPITALIST

## 2024-09-19 PROCEDURE — 85014 HEMATOCRIT: CPT | Performed by: INTERNAL MEDICINE

## 2024-09-19 PROCEDURE — 86900 BLOOD TYPING SEROLOGIC ABO: CPT | Performed by: INTERNAL MEDICINE

## 2024-09-19 PROCEDURE — P9016 RBC LEUKOCYTES REDUCED: HCPCS

## 2024-09-19 PROCEDURE — 85018 HEMOGLOBIN: CPT | Performed by: INTERNAL MEDICINE

## 2024-09-19 PROCEDURE — 99232 SBSQ HOSP IP/OBS MODERATE 35: CPT | Performed by: INTERNAL MEDICINE

## 2024-09-19 PROCEDURE — 80048 BASIC METABOLIC PNL TOTAL CA: CPT | Performed by: INTERNAL MEDICINE

## 2024-09-19 PROCEDURE — 83993 ASSAY FOR CALPROTECTIN FECAL: CPT | Performed by: STUDENT IN AN ORGANIZED HEALTH CARE EDUCATION/TRAINING PROGRAM

## 2024-09-19 PROCEDURE — 82948 REAGENT STRIP/BLOOD GLUCOSE: CPT

## 2024-09-19 PROCEDURE — 85025 COMPLETE CBC W/AUTO DIFF WBC: CPT | Performed by: INTERNAL MEDICINE

## 2024-09-19 PROCEDURE — 86850 RBC ANTIBODY SCREEN: CPT | Performed by: INTERNAL MEDICINE

## 2024-09-19 PROCEDURE — 83735 ASSAY OF MAGNESIUM: CPT | Performed by: INTERNAL MEDICINE

## 2024-09-19 RX ORDER — POTASSIUM CHLORIDE 14.9 MG/ML
20 INJECTION INTRAVENOUS
Status: COMPLETED | OUTPATIENT
Start: 2024-09-19 | End: 2024-09-21

## 2024-09-19 RX ORDER — POTASSIUM CHLORIDE 20MEQ/15ML
40 LIQUID (ML) ORAL ONCE
Status: COMPLETED | OUTPATIENT
Start: 2024-09-19 | End: 2024-09-19

## 2024-09-19 RX ADMIN — LISINOPRIL 10 MG: 10 TABLET ORAL at 09:01

## 2024-09-19 RX ADMIN — METRONIDAZOLE 500 MG: 500 INJECTION, SOLUTION INTRAVENOUS at 21:18

## 2024-09-19 RX ADMIN — METRONIDAZOLE 500 MG: 500 INJECTION, SOLUTION INTRAVENOUS at 11:30

## 2024-09-19 RX ADMIN — ESCITALOPRAM OXALATE 10 MG: 10 TABLET ORAL at 09:01

## 2024-09-19 RX ADMIN — SODIUM HYPOCHLORITE 1 APPLICATION: 5 SOLUTION TOPICAL at 12:00

## 2024-09-19 RX ADMIN — BACITRACIN ZINC 1 SMALL APPLICATION: 500 OINTMENT TOPICAL at 09:01

## 2024-09-19 RX ADMIN — BACITRACIN ZINC 1 SMALL APPLICATION: 500 OINTMENT TOPICAL at 17:42

## 2024-09-19 RX ADMIN — INSULIN LISPRO 2 UNITS: 100 INJECTION, SOLUTION INTRAVENOUS; SUBCUTANEOUS at 22:00

## 2024-09-19 RX ADMIN — INSULIN LISPRO 2 UNITS: 100 INJECTION, SOLUTION INTRAVENOUS; SUBCUTANEOUS at 17:42

## 2024-09-19 RX ADMIN — REMDESIVIR 100 MG: 100 INJECTION, POWDER, LYOPHILIZED, FOR SOLUTION INTRAVENOUS at 21:59

## 2024-09-19 RX ADMIN — ASPIRIN 81 MG CHEWABLE TABLET 81 MG: 81 TABLET CHEWABLE at 09:01

## 2024-09-19 RX ADMIN — APIXABAN 5 MG: 5 TABLET, FILM COATED ORAL at 09:01

## 2024-09-19 RX ADMIN — POTASSIUM CHLORIDE 40 MEQ: 20 SOLUTION ORAL at 09:01

## 2024-09-19 RX ADMIN — ATORVASTATIN CALCIUM 80 MG: 80 TABLET, FILM COATED ORAL at 17:42

## 2024-09-19 RX ADMIN — SODIUM CHLORIDE, SODIUM GLUCONATE, SODIUM ACETATE, POTASSIUM CHLORIDE, MAGNESIUM CHLORIDE, SODIUM PHOSPHATE, DIBASIC, AND POTASSIUM PHOSPHATE 125 ML/HR: .53; .5; .37; .037; .03; .012; .00082 INJECTION, SOLUTION INTRAVENOUS at 06:52

## 2024-09-19 RX ADMIN — VANCOMYCIN HYDROCHLORIDE 2000 MG: 1 INJECTION, POWDER, LYOPHILIZED, FOR SOLUTION INTRAVENOUS at 09:05

## 2024-09-19 RX ADMIN — EZETIMIBE 10 MG: 10 TABLET ORAL at 09:01

## 2024-09-19 RX ADMIN — VANCOMYCIN HYDROCHLORIDE 1500 MG: 5 INJECTION, POWDER, LYOPHILIZED, FOR SOLUTION INTRAVENOUS at 22:41

## 2024-09-19 RX ADMIN — FINASTERIDE 5 MG: 5 TABLET, FILM COATED ORAL at 09:01

## 2024-09-19 RX ADMIN — APIXABAN 5 MG: 5 TABLET, FILM COATED ORAL at 17:42

## 2024-09-19 RX ADMIN — CEFEPIME 2000 MG: 2 INJECTION, POWDER, FOR SOLUTION INTRAVENOUS at 13:30

## 2024-09-19 RX ADMIN — TAMSULOSIN HYDROCHLORIDE 0.4 MG: 0.4 CAPSULE ORAL at 09:01

## 2024-09-19 RX ADMIN — SODIUM CHLORIDE, SODIUM GLUCONATE, SODIUM ACETATE, POTASSIUM CHLORIDE, MAGNESIUM CHLORIDE, SODIUM PHOSPHATE, DIBASIC, AND POTASSIUM PHOSPHATE 125 ML/HR: .53; .5; .37; .037; .03; .012; .00082 INJECTION, SOLUTION INTRAVENOUS at 22:42

## 2024-09-19 RX ADMIN — METRONIDAZOLE 500 MG: 500 INJECTION, SOLUTION INTRAVENOUS at 03:15

## 2024-09-19 RX ADMIN — DOCUSATE SODIUM 100 MG: 100 CAPSULE, LIQUID FILLED ORAL at 09:01

## 2024-09-19 RX ADMIN — POTASSIUM CHLORIDE 20 MEQ: 14.9 INJECTION, SOLUTION INTRAVENOUS at 16:00

## 2024-09-19 RX ADMIN — CEFEPIME 2000 MG: 2 INJECTION, POWDER, FOR SOLUTION INTRAVENOUS at 00:31

## 2024-09-19 NOTE — CASE MANAGEMENT
Case Management Discharge Planning Note    Patient name Drew Santos  Location /-01 MRN [id number]  : 1950 Date 3/8/2024       Current Admission Date: 3/5/2024  Current Admission Diagnosis:Syncope   Patient Active Problem List    Diagnosis Date Noted    Stroke (cerebrum) (HCC) 2024    Urinary retention 2024    Syncope 2024    Elevated lactic acid level 2024    Diabetes (HCC) 2024    Leukocytosis 2024    Abnormal CPK 2024      LOS (days): 2  Geometric Mean LOS (GMLOS) (days): 2.4  Days to GMLOS:0.3     OBJECTIVE:  Risk of Unplanned Readmission Score: 7.74         Current admission status: Inpatient   Preferred Pharmacy:   [id number] - [place], [place] - [address]  [address] [address]  Phone: [phone] Fax: [phone]    Primary Care Provider: Sal Bowman MD    Primary Insurance: [employer]  Secondary Insurance:     DISCHARGE DETAILS:        IMM Given to:: Patient  Family notified:: Spouse - Kristi Santos  Additional Comments: CM left IMM at bedside with patient who was dozing off and asked that spouse be contacted. CM contacted spouse and explained details of IMM. Original placed in MR.    As per SLIM rounds, patient had stroke and rehab recommended. Patient and spouse requested HHC instead.     Patient and spouse to choose HHC from list and to be reserved in Aidin[facility]A         Quality 226: Preventive Care And Screening: Tobacco Use: Screening And Cessation Intervention: Patient screened for tobacco use and is an ex/non-smoker Detail Level: Detailed

## 2024-09-19 NOTE — ASSESSMENT & PLAN NOTE
Low blood counts noted today.  Has chronic anemia likely secondary to poor nutritional status underlying chronic illness.  Hemoglobin declined to 7.1 today.  Likely in the setting of sacral decubitus debridement and some bleeding from urethral meatus.  Telephonic consent obtained from patient's wife at bedside.  Transfusions for Enterobacter BC.  Continue to follow H&H closely.  No overt bleeding reported by nursing today.

## 2024-09-19 NOTE — ASSESSMENT & PLAN NOTE
Per patient's family wound initially started during patient's stay in a skilled nursing facility for rehab. Wound imaging present in our system since prior hematuria hospitalization. Wound now with significant depth. Concern for possible fistulous connection to the rectum given evidence of colitis/proctitis and extensive tissue emphysema on CT. This is likely playing a role in bacteremia above. General surgery has debrided and will continue to follow up with wound. He may require diverting colostomy.  -antibiotic as above  -serial skin exams  -frequent turning/repositioning to offload pressure from the wound  -local wound care per general surgery  -continue follow up with general surgery

## 2024-09-19 NOTE — ASSESSMENT & PLAN NOTE
One set of admission blood cultures showing GNR with BCI suggesting enterobacter and proteus. Now second set BCI is suggesting enterococcus faecalis. Likely secondary to proctitis/colitis with possible fistulous connection to the sacral wound. No other source appreciated. Patient has COVID-19 but no opacities/groundglass/consolidations on chest imaging suggesting a bacterial process. Urine was abnormal, culture is pending.  -antibiotics as above  -check CBCD and BMP tomorrow  -follow up blood cultures  -check repeat blood cultures tomorrow  -check TTE  -monitor vitals

## 2024-09-19 NOTE — ASSESSMENT & PLAN NOTE
Presumably secondary to sepsis, COVID infection  Will need further sepsis workup as below  CT head with no acute intracranial abnormality  Encephalopathy right in the setting of sepsis  Continue with supportive care.  Serial neuroexam.

## 2024-09-19 NOTE — ASSESSMENT & PLAN NOTE
Lab Results   Component Value Date    HGBA1C 6.2 (H) 09/17/2024       Recent Labs     09/18/24  1716 09/18/24  1908 09/19/24  0622 09/19/24  1035   POCGLU 133 129 123 124       Blood Sugar Average: Last 72 hrs:  (P) 139.75  Well-controlled with last hemoglobin A1c of 6.0% in July of this year  Will hold home oral regimen  Monitor on sliding scale only  Hypoglycemia protocol

## 2024-09-19 NOTE — PROGRESS NOTES
Progress Note - Infectious Disease   Name: Drew Santos 73 y.o. male I MRN: [id number]  Unit/Bed#: [place] [address] -01 I Date of Admission: 9/17/2024   Date of Service: 9/19/2024 I Hospital Day: 2     Assessment & Plan  Sepsis  Fever, tachycardia, tachypnea, and leukocytosis. Secondary to polymicrobial bacteremia, likely from colitis/proctitis with possible fistulous connection to sacral ulcer. Blood cultures now updated with growth of enterobacter, enterococcus faecalis, proteus. Also consider role of COVID-19. No other clear source appreciated. Chest imaging with no opacities/groundglass/consolidations suggesting a bacterial process. Urine was abnormal, culture is pending. Head CT with no acute intracranial findings. Despite being systemically ill he remains hemodynamically stable. This morning he is afebrile. WBC count is slightly downtrending. He has been transitioned to IV cefepime and IV flagyl which he is tolerating without difficulty. Given updated blood culture results I recommend adding vancomycin to his regimen now.  -continue IV cefepime  -continue IV flagyl  -start IV vancomycin  -consult pharmacy for guidance on vancomycin dosage  -check CBCD and BMP tomorrow  -follow up blood cultures   -monitor vitals  -supportive care  Bacteremia  One set of admission blood cultures showing GNR with BCI suggesting enterobacter and proteus. Now second set BCI is suggesting enterococcus faecalis. Likely secondary to proctitis/colitis with possible fistulous connection to the sacral wound. No other source appreciated. Patient has COVID-19 but no opacities/groundglass/consolidations on chest imaging suggesting a bacterial process. Urine was abnormal, culture is pending.  -antibiotics as above  -check CBCD and BMP tomorrow  -follow up blood cultures  -check repeat blood cultures tomorrow  -check TTE  -monitor vitals  Sacral wound  Per patient's family wound initially started during patient's stay in a skilled nursing  facility for rehab. Wound imaging present in our system since prior hematuria hospitalization. Wound now with significant depth. Concern for possible fistulous connection to the rectum given evidence of colitis/proctitis and extensive tissue emphysema on CT. This is likely playing a role in bacteremia above. General surgery has debrided and will continue to follow up with wound. He may require diverting colostomy.  -antibiotic as above  -serial skin exams  -frequent turning/repositioning to offload pressure from the wound  -local wound care per general surgery  -continue follow up with general surgery  Proctitis  CT C/A/P showed mild distal sigmoid and rectal inflammatory changes, R ischioanal fossa emphysema, and possible fistulous connection to skin surface. This is likely source of patient's bacteremia above. Family reports patient has very infrequent bowel movements in past few weeks. General surgery is following for wound. GI has been consulted and orders for stool studies were placed.  -antibiotics as above  -serial abdominal/rectal exams  -monitor stool output  -follow up stool studies  -continue follow up with general surgery  -follow up gastroenterology consult  Type 2 diabetes mellitus (HCC)  Lab Results   Component Value Date    HGBA1C 6.2 (H) 09/17/2024   This is risk factor for wounds and infection.  -recommend tight glycemic control  -blood glucose management per primary service  COVID-19  PCR positive on 9/17/2024 although per patient's family he tested positive prior to admission. Patient's wife also positive. I personally reviewed his chest imaging which showed no airspace opacities or groundglass. He has been started on mild disease protocol and is receiving IV Remdesivir.  -management per primary service  -monitor vitals  -monitor respiratory status    Above plan was discussed in detail with patient at the bedside.  Above plan was discussed in detail with JACK who agrees with plan for ongoing IV  antibiotic treatment.    Antibiotics:  Cefepime 2  Flagyl 2  Vancomycin 1  Antibiotics 3    Antivirals:  Remdesivir 3    Subjective:  Patient with minimal verbal interaction but he was able to tell us his full name and birthdate, and he does answer yes/no questions. He denies headache, neck pain, fever, chills, sweats, shakes, nausea, vomiting, abdominal pain, diarrhea, cough, shortness of breath, and chest pain. He offers no new symptoms.    Objective:  Vitals:  Temp:  [98 °F (36.7 °C)-99.8 °F (37.7 °C)] 99.5 °F (37.5 °C)  HR:  [] 94  Resp:  [16-19] 16  BP: (111-140)/(66-85) 133/85  SpO2:  [95 %-97 %] 97 %  Temp (24hrs), Av.1 °F (37.3 °C), Min:98 °F (36.7 °C), Max:99.8 °F (37.7 °C)  Current: Temperature: 99.5 °F (37.5 °C)    Physical Exam:   General Appearance:  Alert, more interactive today, nontoxic, no acute distress. He appears comfortable sitting up in bed. He appears chronically debilitated. L side CVA residual.   Throat: Oropharynx dry without lesions.    Lungs:   Clear to auscultation bilaterally; no wheezes, rhonchi or rales; respirations unlabored on room air.   Heart:  Tachycardic; no murmur, rub or gallop.   Abdomen:   Soft, non-tender, non-distended, positive bowel sounds.     Genitourinary: Parra intact.   Extremities: No clubbing or cyanosis, trace peripheral edema. Offloading boots intact.   Skin: No new rashes noted on exposed skin.     Labs, Imaging, & Other studies:   All pertinent labs and imaging studies were personally reviewed  Results from last 7 days   Lab Units 24  0433 24  0508 24  1637   WBC Thousand/uL 19.83* 21.23* 21.33*   HEMOGLOBIN g/dL 7.1* 8.5* 10.0*   PLATELETS Thousands/uL 396* 406* 497*     Results from last 7 days   Lab Units 24  0433 24  0508 24  1637   POTASSIUM mmol/L 2.9*   < > 2.9*   CHLORIDE mmol/L 113*   < > 105   CO2 mmol/L 26   < > 31   BUN mg/dL 15   < > 15   CREATININE mg/dL 0.64   < > 0.76   EGFR ml/min/1.73sq m 95   <  > 89   CALCIUM mg/dL 7.6*   < > 9.0   AST U/L  --   --  38   ALT U/L  --   --  28   ALK PHOS U/L  --   --  88    < > = values in this interval not displayed.     Results from last 7 days   Lab Units 09/17/24  1954 09/17/24  1831 09/17/24  1637   BLOOD CULTURE   --  Received in Microbiology Lab. Culture in Progress.  --    GRAM STAIN RESULT   --   --  Gram negative rods*   URINE CULTURE  Culture results to follow.  --   --      Results from last 7 days   Lab Units 09/18/24  0508 09/17/24  1637   PROCALCITONIN ng/ml 0.49* 0.37*

## 2024-09-19 NOTE — ASSESSMENT & PLAN NOTE
Fever, tachycardia, tachypnea, and leukocytosis. Secondary to polymicrobial bacteremia, likely from colitis/proctitis with possible fistulous connection to sacral ulcer. Blood cultures now updated with growth of enterobacter, enterococcus faecalis, proteus. Also consider role of COVID-19. No other clear source appreciated. Chest imaging with no opacities/groundglass/consolidations suggesting a bacterial process. Urine was abnormal, culture is pending. Head CT with no acute intracranial findings. Despite being systemically ill he remains hemodynamically stable. This morning he is afebrile. WBC count is slightly downtrending. He has been transitioned to IV cefepime and IV flagyl which he is tolerating without difficulty. Given updated blood culture results I recommend adding vancomycin to his regimen now.  -continue IV cefepime  -continue IV flagyl  -start IV vancomycin  -consult pharmacy for guidance on vancomycin dosage  -check CBCD and BMP tomorrow  -follow up blood cultures   -monitor vitals  -supportive care

## 2024-09-19 NOTE — ASSESSMENT & PLAN NOTE
Chronic sacral decubitus ulcer; present on admission  Status post debridement at bedside per surgical team.  Continue with wound care as tolerated.  Reevaluation by surgery in a.m. to assess need for further debridement at bedside versus OR.  Wound care consultation

## 2024-09-19 NOTE — MALNUTRITION/BMI
This medical record reflects one or more clinical indicators suggestive of malnutrition and/or morbid obesity.    Malnutrition Findings:   Adult Malnutrition type: Chronic illness  Adult Degree of Malnutrition: Other severe protein calorie malnutrition  Malnutrition Characteristics: Inadequate energy, Weight loss                360 Statement: Severe protein calorie malnutrition in context of chronic illness r/t poor appetite, inadequate PO intake as evidance by energy intake less than 75% compared to estimated needs>1 month, 7.5% wt loss x 1 month ( 122kg 8/23/24-> 113kg 9/17) treated with PO diet and oral supplements    BMI Findings:           Body mass index is 29.59 kg/m².     See Nutrition note dated 9/19/24 for additional details.  Completed nutrition assessment is viewable in the nutrition documentation.

## 2024-09-19 NOTE — PLAN OF CARE
Problem: Potential for Falls  Goal: Patient will remain free of falls  Description: INTERVENTIONS:  - Educate patient/family on patient safety including physical limitations  - Instruct patient to call for assistance with activity   - Consult OT/PT to assist with strengthening/mobility   - Keep Call bell within reach  - Keep bed low and locked with side rails adjusted as appropriate  - Keep care items and personal belongings within reach  - Initiate and maintain comfort rounds  - Make Fall Risk Sign visible to staff  - Offer Toileting every 2 Hours, in advance of need  - Initiate/Maintain bed alarm  - Obtain necessary fall risk management equipment: bed alarm call bell  - Apply yellow socks and bracelet for high fall risk patients  - Consider moving patient to room near nurses station  Outcome: Progressing     Problem: PAIN - ADULT  Goal: Verbalizes/displays adequate comfort level or baseline comfort level  Description: Interventions:  - Encourage patient to monitor pain and request assistance  - Assess pain using appropriate pain scale  - Administer analgesics based on type and severity of pain and evaluate response  - Implement non-pharmacological measures as appropriate and evaluate response  - Consider cultural and social influences on pain and pain management  - Notify physician/advanced practitioner if interventions unsuccessful or patient reports new pain  Outcome: Progressing     Problem: INFECTION - ADULT  Goal: Absence or prevention of progression during hospitalization  Description: INTERVENTIONS:  - Assess and monitor for signs and symptoms of infection  - Monitor lab/diagnostic results  - Monitor all insertion sites, i.e. indwelling lines, tubes, and drains  - Monitor endotracheal if appropriate and nasal secretions for changes in amount and color  - Pinon appropriate cooling/warming therapies per order  - Administer medications as ordered  - Instruct and encourage patient and family to use good  hand hygiene technique  - Identify and instruct in appropriate isolation precautions for identified infection/condition  Outcome: Progressing  Goal: Absence of fever/infection during neutropenic period  Description: INTERVENTIONS:  - Monitor WBC    Outcome: Progressing     Problem: SAFETY ADULT  Goal: Patient will remain free of falls  Description: INTERVENTIONS:  - Educate patient/family on patient safety including physical limitations  - Instruct patient to call for assistance with activity   - Consult OT/PT to assist with strengthening/mobility   - Keep Call bell within reach  - Keep bed low and locked with side rails adjusted as appropriate  - Keep care items and personal belongings within reach  - Initiate and maintain comfort rounds  - Make Fall Risk Sign visible to staff  - Offer Toileting every 2 Hours, in advance of need  - Initiate/Maintain bed alarm  - Obtain necessary fall risk management equipment: bed alarm call bell  - Apply yellow socks and bracelet for high fall risk patients  - Consider moving patient to room near nurses station  Outcome: Progressing  Goal: Maintain or return to baseline ADL function  Description: INTERVENTIONS:  -  Assess patient's ability to carry out ADLs; assess patient's baseline for ADL function and identify physical deficits which impact ability to perform ADLs (bathing, care of mouth/teeth, toileting, grooming, dressing, etc.)  - Assess/evaluate cause of self-care deficits   - Assess range of motion  - Assess patient's mobility; develop plan if impaired  - Assess patient's need for assistive devices and provide as appropriate  - Encourage maximum independence but intervene and supervise when necessary  - Involve family in performance of ADLs  - Assess for home care needs following discharge   - Consider OT consult to assist with ADL evaluation and planning for discharge  - Provide patient education as appropriate  Outcome: Progressing  Goal: Maintains/Returns to pre  admission functional level  Description: INTERVENTIONS:  - Perform AM-PAC 6 Click Basic Mobility/ Daily Activity assessment daily.  - Set and communicate daily mobility goal to care team and patient/family/caregiver.   - Collaborate with rehabilitation services on mobility goals if consulted  - Perform Range of Motion 4 times a day.  - Reposition patient every 2 hours.  - Dangle patient 3 times a day  - Stand patient 3 times a day  - Ambulate patient 3 times a day  - Out of bed to chair 3 times a day   - Out of bed for meals 3 times a day  - Out of bed for toileting  - Record patient progress and toleration of activity level   Outcome: Progressing     Problem: DISCHARGE PLANNING  Goal: Discharge to home or other facility with appropriate resources  Description: INTERVENTIONS:  - Identify barriers to discharge w/patient and caregiver  - Arrange for needed discharge resources and transportation as appropriate  - Identify discharge learning needs (meds, wound care, etc.)  - Arrange for interpretive services to assist at discharge as needed  - Refer to Case Management Department for coordinating discharge planning if the patient needs post-hospital services based on physician/advanced practitioner order or complex needs related to functional status, cognitive ability, or social support system  Outcome: Progressing     Problem: Knowledge Deficit  Goal: Patient/family/caregiver demonstrates understanding of disease process, treatment plan, medications, and discharge instructions  Description: Complete learning assessment and assess knowledge base.  Interventions:  - Provide teaching at level of understanding  - Provide teaching via preferred learning methods  Outcome: Progressing     Problem: Prexisting or High Potential for Compromised Skin Integrity  Goal: Skin integrity is maintained or improved  Description: INTERVENTIONS:  - Identify patients at risk for skin breakdown  - Assess and monitor skin integrity  - Assess  and monitor nutrition and hydration status  - Monitor labs   - Assess for incontinence   - Turn and reposition patient  - Assist with mobility/ambulation  - Relieve pressure over bony prominences  - Avoid friction and shearing  - Provide appropriate hygiene as needed including keeping skin clean and dry  - Evaluate need for skin moisturizer/barrier cream  - Collaborate with interdisciplinary team   - Patient/family teaching  - Consider wound care consult   Outcome: Progressing     Problem: GASTROINTESTINAL - ADULT  Goal: Maintains or returns to baseline bowel function  Description: INTERVENTIONS:  - Assess bowel function  - Encourage oral fluids to ensure adequate hydration  - Administer IV fluids if ordered to ensure adequate hydration  - Administer ordered medications as needed  - Encourage mobilization and activity  - Consider nutritional services referral to assist patient with adequate nutrition and appropriate food choices  Outcome: Progressing     Problem: GENITOURINARY - ADULT  Goal: Urinary catheter remains patent  Description: INTERVENTIONS:  - Assess patency of urinary catheter  - If patient has a chronic hanks, consider changing catheter if non-functioning  - Follow guidelines for intermittent irrigation of non-functioning urinary catheter  Outcome: Progressing     Problem: METABOLIC, FLUID AND ELECTROLYTES - ADULT  Goal: Electrolytes maintained within normal limits  Description: INTERVENTIONS:  - Monitor labs and assess patient for signs and symptoms of electrolyte imbalances  - Administer electrolyte replacement as ordered  - Monitor response to electrolyte replacements, including repeat lab results as appropriate  - Instruct patient on fluid and nutrition as appropriate  Outcome: Progressing     Problem: SKIN/TISSUE INTEGRITY - ADULT  Goal: Skin Integrity remains intact(Skin Breakdown Prevention)  Description: Assess:  -Perform Goldy assessment every   -Clean and moisturize skin every   -Inspect skin  when repositioning, toileting, and assisting with ADLS  -Assess under medical devices such as  every   -Assess extremities for adequate circulation and sensation     Bed Management:  -Have minimal linens on bed & keep smooth, unwrinkled  -Change linens as needed when moist or perspiring  -Avoid sitting or lying in one position for more than  hours while in bed  -Keep HOB at degrees     Toileting:  -Offer bedside commode  -Assess for incontinence every   -Use incontinent care products after each incontinent episode such as     Activity:  -Mobilize patient  times a day  -Encourage activity and walks on unit  -Encourage or provide ROM exercises   -Turn and reposition patient every  Hours  -Use appropriate equipment to lift or move patient in bed  -Instruct/ Assist with weight shifting every  when out of bed in chair  -Consider limitation of chair time  hour intervals    Skin Care:  -Avoid use of baby powder, tape, friction and shearing, hot water or constrictive clothing  -Relieve pressure over bony prominences using   -Do not massage red bony areas    Next Steps:  -Teach patient strategies to minimize risks such as   -Consider consults to  interdisciplinary teams such as   Outcome: Progressing  Goal: Incision(s), wounds(s) or drain site(s) healing without S/S of infection  Description: INTERVENTIONS  - Assess and document dressing, incision, wound bed, drain sites and surrounding tissue  - Provide patient and family education  - Perform skin care/dressing changes every   Outcome: Progressing  Goal: Pressure injury heals and does not worsen  Description: Interventions:  - Implement low air loss mattress or specialty surface (Criteria met)  - Apply silicone foam dressing  - Instruct/assist with weight shifting every  minutes when in chair   - Limit chair time to  hour intervals  - Use special pressure reducing interventions such as  when in chair   - Apply fecal or urinary incontinence containment device   - Perform  passive or active ROM every   - Turn and reposition patient & offload bony prominences every  hours   - Utilize friction reducing device or surface for transfers   - Consider consults to  interdisciplinary teams such as   - Use incontinent care products after each incontinent episode such as   - Consider nutrition services referral as needed  Outcome: Progressing     Problem: HEMATOLOGIC - ADULT  Goal: Maintains hematologic stability  Description: INTERVENTIONS  - Assess for signs and symptoms of bleeding or hemorrhage  - Monitor labs  - Administer supportive blood products/factors as ordered and appropriate  Outcome: Progressing

## 2024-09-19 NOTE — PROGRESS NOTES
Progress Note - Hospitalist   Name: Drew Santos 73 y.o. male I MRN: [id number]  Unit/Bed#: [place] [address] -01 I Date of Admission: 9/17/2024   Date of Service: 9/19/2024 I Hospital Day: 2    Assessment & Plan  Sepsis  As evidenced by tachycardia and leukocytosis and fever  Now with gram-negative bacteremia  CT abdomen and pelvis showsMid/distal sigmoid colonic and rectal inflammatory changes in keeping with a segmental colitis/proctitis.Right ischioanal fossa subcutaneous emphysema extending through the right paramidline gluteal subcutaneous tissues to the skin surface suspicious for a perirectal or perianal fistula; limited assessment without oral or rectal contrast.Parra catheter balloon is located in the prostatic urethra; this catheter should be repositioned.Fat stranding surrounding the decompressed bladder may indicate cystitis.  Has chronic indwelling Parra catheter with abnormal UA  Possible source of infection appears to be proctocolitis versus complicated UTI in the setting of indwelling Parra catheter.  Also has large sacral decubitus ulcer which could be infected as well.  Wound care consulted  Infectious disease and surgical consultation appreciated.    Patient is s/p bedside debridement of sacral decubitus ulcer.  Continue with dressing changes and further debridements per surgical team.    Continue with broad-spectrum IV antibiotics.  Currently on cefepime and Flagyl.  Vancomycin added today for Enterococcus growing in 1 of 2 sets of admission blood cultures.  Previously 1 set of preliminary blood cultures with Enterobacter and Proteus.  Follow-up on repeat blood cultures in AM.  Follow-up on transthoracic echocardiogram.  Appreciate infectious disease, surgical and GI follow-up  Sacral wound  Chronic sacral decubitus ulcer; present on admission  Status post debridement at bedside per surgical team.  Continue with wound care as tolerated.  Reevaluation by surgery in a.m. to assess need for further  debridement at bedside versus OR.  Wound care consultation  Type 2 diabetes mellitus without complication, without long-term current use of insulin ([facility])  Lab Results   Component Value Date    HGBA1C 6.2 (H) 09/17/2024       Recent Labs     09/18/24  1716 09/18/24  1908 09/19/24  0622 09/19/24  1035   POCGLU 133 129 123 124       Blood Sugar Average: Last 72 hrs:  (P) 139.75  Well-controlled with last hemoglobin A1c of 6.0% in July of this year  Will hold home oral regimen  Monitor on sliding scale only  Hypoglycemia protocol     COVID-19  Patient currently on room air though high risk given his age and comorbidities  Will treat with IV remdesivir x 3 days  Supportive care otherwise  Contact and airborne precautions  Currently on mild COVID pathway.  Not requiring supplemental oxygen.  Wife also sick with COVID.  Proctitis  Noted on CT scan.  No diarrhea.  Continue with cefepime and Flagyl.  GI input appreciated.  Polymicrobial bacteremia  Patient met sepsis criteria on admission.  Admission blood cultures showing GNR with BCI suggesting enterobacter and proteus. Now second set BCI is suggesting enterococcus faecalis.  Bacteremia likely  secondary to proctitis/colitis with possible fistulous connection to the sacral wound. No other source appreciated.   Patient has COVID-19 but no escalating oxygen requirement and is on mild COVID pathway   Has chronic indwelling Parra catheter.  Urine was abnormal, culture is pending.  Infectious disease input noted and appreciated.  Was on cefepime and Flagyl and started today on vancomycin for positive blood culture showing Enterococcus.  Follow-up on repeat blood cultures in 24 hours  Follow-up on transthoracic echocardiogram  Infectious disease input noted and appreciated  -  Acute metabolic encephalopathy  Presumably secondary to sepsis, COVID infection  Will need further sepsis workup as below  CT head with no acute intracranial abnormality  Encephalopathy right in the  setting of sepsis  Continue with supportive care.  Serial neuroexam.  History of CVA (cerebrovascular accident)  History of CVA   Chronically bedbound and with chronic left-sided deficits   Continue home aspirin, Eliquis, statin  Paroxysmal atrial fibrillation (HCC)  Currently in sinus tachycardia in the ER  Not on any rate controlling meds at home  Will continue home Eliquis for stroke prevention  Anemia  Low blood counts noted today.  Has chronic anemia likely secondary to poor nutritional status underlying chronic illness.  Hemoglobin declined to 7.1 today.  Likely in the setting of sacral decubitus debridement and some bleeding from urethral meatus.  Telephonic consent obtained from patient's wife at bedside.  Transfusions for Enterobacter BC.  Continue to follow H&H closely.  No overt bleeding reported by nursing today.    VTE Pharmacologic Prophylaxis:   High Risk (Score >/= 5) - Pharmacological DVT Prophylaxis Ordered: apixaban (Eliquis). Sequential Compression Devices Ordered.    Mobility:   Basic Mobility Inpatient Raw Score: 6  -HL Goal: 2: Bed activities/Dependent transfer  JH-HLM Achieved: 1: Laying in bed  JH-HLM Goal achieved. Continue to encourage appropriate mobility.    Patient Centered Rounds: I performed bedside rounds with nursing staff today.   Discussions with Specialists or Other Care Team Provider: Discussed with infectious disease and gastroenterology    Education and Discussions with Family / Patient: Updated  (wife) via phone.    Current Length of Stay: 2 day(s)  Current Patient Status: Inpatient   Certification Statement: The patient will continue to require additional inpatient hospital stay due to management of conditions as mentioned above  Discharge Plan: Anticipate discharge in 48-72 hrs to home with home services.    Code Status: Level 1 - Full Code    Subjective   Seen and examined at bedside.  Tmax 99.5.  Remains confused.  Does not offer any  complaints.    Objective     Vitals:   Temp (24hrs), Av.6 °F (37 °C), Min:98 °F (36.7 °C), Max:99.5 °F (37.5 °C)    Temp:  [98 °F (36.7 °C)-99.5 °F (37.5 °C)] 98 °F (36.7 °C)  HR:  [] 92  Resp:  [16-18] 16  BP: (111-133)/(66-85) 124/69  SpO2:  [95 %-97 %] 96 %  Body mass index is 29.59 kg/m².     Input and Output Summary (last 24 hours):     Intake/Output Summary (Last 24 hours) at 2024 1153  Last data filed at 2024 0652  Gross per 24 hour   Intake 2000 ml   Output 750 ml   Net 1250 ml       Physical Exam  Constitutional:       Appearance: He is ill-appearing.   HENT:      Head: Normocephalic.      Mouth/Throat:      Mouth: Mucous membranes are dry.   Eyes:      Pupils: Pupils are equal, round, and reactive to light.   Cardiovascular:      Rate and Rhythm: Regular rhythm. Tachycardia present.   Pulmonary:      Comments: Diminished breath sounds at bases.  Abdominal:      General: Bowel sounds are normal.      Palpations: Abdomen is soft.   Genitourinary:     Comments: Indwelling Parra catheter in place.  Musculoskeletal:      Cervical back: Neck supple.      Right lower leg: No edema.      Left lower leg: No edema.   Neurological:      Comments: Confused.  Oriented to self only.  Left-sided deficits at baseline.          Lines/Drains:  Lines/Drains/Airways       Active Status       Name Placement date Placement time Site Days    Urethral Catheter Three way 22 Fr. 24  Three way  18    Continuous Bladder Irrigation Three-way 24  Three-way  18                  Urinary Catheter:  Goal for removal: N/A - Chronic Parra           Telemetry:  Telemetry Orders (From admission, onward)               24 Hour Telemetry Monitoring  Continuous x 24 Hours (Telem)        Question:  Reason for 24 Hour Telemetry  Answer:  Metabolic/electrolyte disturbance with high probability of dysrhythmia. K level <3 or >6 OR KCL infusion >10mEq/hr                     Telemetry Reviewed: Sinus  Tachycardia  Indication for Continued Telemetry Use: Metabolic/electrolyte disturbance with high probability of dysrhythmia               Lab Results: I have reviewed the following results:    Results from last 7 days   Lab Units 09/19/24  0827 09/19/24  0433   WBC Thousand/uL  --  19.83*   HEMOGLOBIN g/dL 7.2* 7.1*   HEMATOCRIT % 22.9* 22.5*   PLATELETS Thousands/uL  --  396*   SEGS PCT %  --  84*   LYMPHO PCT %  --  10*   MONO PCT %  --  5   EOS PCT %  --  0     Results from last 7 days   Lab Units 09/19/24  0433 09/18/24  0508 09/17/24  1637   SODIUM mmol/L 146   < > 142   POTASSIUM mmol/L 2.9*   < > 2.9*   CHLORIDE mmol/L 113*   < > 105   CO2 mmol/L 26   < > 31   BUN mg/dL 15   < > 15   CREATININE mg/dL 0.64   < > 0.76   ANION GAP mmol/L 7   < > 6   CALCIUM mg/dL 7.6*   < > 9.0   ALBUMIN g/dL  --   --  2.8*   TOTAL BILIRUBIN mg/dL  --   --  1.27*   ALK PHOS U/L  --   --  88   ALT U/L  --   --  28   AST U/L  --   --  38   GLUCOSE RANDOM mg/dL 110   < > 164*    < > = values in this interval not displayed.         Results from last 7 days   Lab Units 09/19/24  1035 09/19/24  0622 09/18/24  1908 09/18/24  1716 09/18/24  1304 09/18/24  1231 09/18/24  0808 09/18/24  0036   POC GLUCOSE mg/dl 124 123 129 133 164* 148* 146* 151*     Results from last 7 days   Lab Units 09/17/24  1957   HEMOGLOBIN A1C % 6.2*     Results from last 7 days   Lab Units 09/18/24  0508 09/17/24  1655 09/17/24  1637   LACTIC ACID mmol/L  --  1.2  --    PROCALCITONIN ng/ml 0.49*  --  0.37*       Recent Cultures (last 7 days):   Results from last 7 days   Lab Units 09/17/24  1954 09/17/24  1831 09/17/24  1637   BLOOD CULTURE   --  Received in Microbiology Lab. Culture in Progress.  --    GRAM STAIN RESULT   --   --  Gram negative rods*   URINE CULTURE  Culture results to follow.  --   --        Imaging Review: No pertinent imaging studies reviewed.  Other Studies: No additional pertinent studies reviewed.    Last 24 Hours Medication List:      Current Facility-Administered Medications:     acetaminophen (Ofirmev) injection 1,000 mg, Q6H PRN    acetaminophen (TYLENOL) tablet 650 mg, Q6H PRN    apixaban (ELIQUIS) tablet 5 mg, BID    aspirin chewable tablet 81 mg, Daily    atorvastatin (LIPITOR) tablet 80 mg, QPM    bacitracin topical ointment 1 small application, BID    ceFEPime (MAXIPIME) 2,000 mg in dextrose 5 % 50 mL IVPB, Q12H, Last Rate: 2,000 mg (09/19/24 0031)    Dakins (full strength) (DAKIN'S) 0.5 percent topical solution 1 Application, Daily    docusate sodium (COLACE) capsule 100 mg, Daily    escitalopram (LEXAPRO) tablet 10 mg, Daily    ezetimibe (ZETIA) tablet 10 mg, Daily    finasteride (PROSCAR) tablet 5 mg, Daily    insulin lispro (HumALOG/ADMELOG) 100 units/mL subcutaneous injection 1-6 Units, TID AC **AND** Fingerstick Glucose (POCT), TID AC    insulin lispro (HumALOG/ADMELOG) 100 units/mL subcutaneous injection 1-6 Units, HS    lisinopril (ZESTRIL) tablet 10 mg, Daily    metroNIDAZOLE (FLAGYL) IVPB (premix) 500 mg 100 mL, Q8H, Last Rate: 500 mg (09/19/24 0315)    multi-electrolyte (PLASMALYTE-A/ISOLYTE-S PH 7.4) IV solution, Continuous, Last Rate: 125 mL/hr (09/19/24 0652)    ondansetron (ZOFRAN) injection 4 mg, Q4H PRN    potassium chloride 20 mEq IVPB (premix), BID (diuretic)    [COMPLETED] remdesivir (Veklury) 200 mg in sodium chloride 0.9 % 290 mL IVPB, Q24H, Last Rate: Stopped (09/18/24 0059) **FOLLOWED BY** remdesivir (Veklury) 100 mg in sodium chloride 0.9 % 270 mL IVPB, Q24H    tamsulosin (FLOMAX) capsule 0.4 mg, Daily    vancomycin (VANCOCIN) 1500 mg in sodium chloride 0.9% 250 mL IVPB, Q12H    Administrative Statements   Today, Patient Was Seen By: Sherlyn Sharma MD      **Please Note: This note may have been constructed using a voice recognition system.**

## 2024-09-19 NOTE — ASSESSMENT & PLAN NOTE
Patient met sepsis criteria on admission.  Admission blood cultures showing GNR with BCI suggesting enterobacter and proteus. Now second set BCI is suggesting enterococcus faecalis.  Bacteremia likely  secondary to proctitis/colitis with possible fistulous connection to the sacral wound. No other source appreciated.   Patient has COVID-19 but no escalating oxygen requirement and is on mild COVID pathway   Has chronic indwelling Parra catheter.  Urine was abnormal, culture is pending.  Infectious disease input noted and appreciated.  Was on cefepime and Flagyl and started today on vancomycin for positive blood culture showing Enterococcus.  Follow-up on repeat blood cultures in 24 hours  Follow-up on transthoracic echocardiogram  Infectious disease input noted and appreciated  -

## 2024-09-19 NOTE — PLAN OF CARE
Problem: Potential for Falls  Goal: Patient will remain free of falls  Description: INTERVENTIONS:  - Educate patient/family on patient safety including physical limitations  - Instruct patient to call for assistance with activity   - Consult OT/PT to assist with strengthening/mobility   - Keep Call bell within reach  - Keep bed low and locked with side rails adjusted as appropriate  - Keep care items and personal belongings within reach  - Initiate and maintain comfort rounds  - Make Fall Risk Sign visible to staff  - Offer Toileting every 2 Hours, in advance of need  - Initiate/Maintain bed alarm  - Obtain necessary fall risk management equipment: bed alarm call bell  - Apply yellow socks and bracelet for high fall risk patients  - Consider moving patient to room near nurses station  Outcome: Progressing     Problem: SAFETY ADULT  Goal: Patient will remain free of falls  Description: INTERVENTIONS:  - Educate patient/family on patient safety including physical limitations  - Instruct patient to call for assistance with activity   - Consult OT/PT to assist with strengthening/mobility   - Keep Call bell within reach  - Keep bed low and locked with side rails adjusted as appropriate  - Keep care items and personal belongings within reach  - Initiate and maintain comfort rounds  - Make Fall Risk Sign visible to staff  - Offer Toileting every 2 Hours, in advance of need  - Initiate/Maintain bed alarm  - Obtain necessary fall risk management equipment: bed alarm call bell  - Apply yellow socks and bracelet for high fall risk patients  - Consider moving patient to room near nurses station  Outcome: Progressing

## 2024-09-19 NOTE — PROGRESS NOTES
Drew Santos is a 73 y.o. male who is currently ordered Vancomycin IV with management by the Pharmacy Consult service.  Relevant clinical data and objective / subjective history reviewed.  Vancomycin Assessment:  Indication and Goal AUC/Trough: Bacteremia (goal -600, trough >10)  Clinical Status:  new  Micro:     Renal Function:  SCr: 0.64 mg/dL  CrCl: 139.2 mL/min  Renal replacement: Not on dialysis  Days of Therapy: 1  Current Dose: 2000mg IV load  Vancomycin Plan:  New Dosinmg IV Q12H starting at 2100  Estimated AUC: 456 mcg*hr/mL  Estimated Trough: 12.2 mcg/mL  Next Level: Random level  with AM labs  Renal Function Monitoring: Daily BMP and UOP  Pharmacy will continue to follow closely for s/sx of nephrotoxicity, infusion reactions and appropriateness of therapy.  BMP and CBC will be ordered per protocol. We will continue to follow the patient’s culture results and clinical progress daily.    Natasha Quesada, PlacidoD

## 2024-09-19 NOTE — ASSESSMENT & PLAN NOTE
CT C/A/P showed mild distal sigmoid and rectal inflammatory changes, R ischioanal fossa emphysema, and possible fistulous connection to skin surface. This is likely source of patient's bacteremia above. Family reports patient has very infrequent bowel movements in past few weeks. General surgery is following for wound. GI has been consulted and orders for stool studies were placed.  -antibiotics as above  -serial abdominal/rectal exams  -monitor stool output  -follow up stool studies  -continue follow up with general surgery  -follow up gastroenterology consult

## 2024-09-19 NOTE — ASSESSMENT & PLAN NOTE
As evidenced by tachycardia and leukocytosis and fever  Now with gram-negative bacteremia  CT abdomen and pelvis showsMid/distal sigmoid colonic and rectal inflammatory changes in keeping with a segmental colitis/proctitis.Right ischioanal fossa subcutaneous emphysema extending through the right paramidline gluteal subcutaneous tissues to the skin surface suspicious for a perirectal or perianal fistula; limited assessment without oral or rectal contrast.Parra catheter balloon is located in the prostatic urethra; this catheter should be repositioned.Fat stranding surrounding the decompressed bladder may indicate cystitis.  Has chronic indwelling Parra catheter with abnormal UA  Possible source of infection appears to be proctocolitis versus complicated UTI in the setting of indwelling Parra catheter.  Also has large sacral decubitus ulcer which could be infected as well.  Wound care consulted  Infectious disease and surgical consultation appreciated.    Patient is s/p bedside debridement of sacral decubitus ulcer.  Continue with dressing changes and further debridements per surgical team.    Continue with broad-spectrum IV antibiotics.  Currently on cefepime and Flagyl.  Vancomycin added today for Enterococcus growing in 1 of 2 sets of admission blood cultures.  Previously 1 set of preliminary blood cultures with Enterobacter and Proteus.  Follow-up on repeat blood cultures in AM.  Follow-up on transthoracic echocardiogram.  Appreciate infectious disease, surgical and GI follow-up

## 2024-09-20 ENCOUNTER — APPOINTMENT (INPATIENT)
Dept: NON INVASIVE DIAGNOSTICS | Facility: HOSPITAL | Age: 74
DRG: 871 | End: 2024-09-20
Payer: OTHER GOVERNMENT

## 2024-09-20 PROBLEM — E43 SEVERE PROTEIN-CALORIE MALNUTRITION (HCC): Status: ACTIVE | Noted: 2024-09-20

## 2024-09-20 LAB
ABO GROUP BLD BPU: NORMAL
ANION GAP SERPL CALCULATED.3IONS-SCNC: 5 MMOL/L (ref 4–13)
BACTERIA BLD CULT: ABNORMAL
BASOPHILS # BLD AUTO: 0.06 THOUSANDS/ΜL (ref 0–0.1)
BASOPHILS NFR BLD AUTO: 0 % (ref 0–1)
BPU ID: NORMAL
BUN SERPL-MCNC: 12 MG/DL (ref 5–25)
CALCIUM SERPL-MCNC: 7.4 MG/DL (ref 8.4–10.2)
CALPROTECTIN STL-MCNC: 36.3 ΜG/G
CHLORIDE SERPL-SCNC: 114 MMOL/L (ref 96–108)
CO2 SERPL-SCNC: 27 MMOL/L (ref 21–32)
CREAT SERPL-MCNC: 0.65 MG/DL (ref 0.6–1.3)
CROSSMATCH: NORMAL
E FAECALIS DNA BLD POS QL NAA+NON-PROBE: DETECTED
EOSINOPHIL # BLD AUTO: 0.22 THOUSAND/ΜL (ref 0–0.61)
EOSINOPHIL NFR BLD AUTO: 1 % (ref 0–6)
ERYTHROCYTE [DISTWIDTH] IN BLOOD BY AUTOMATED COUNT: 13.9 % (ref 11.6–15.1)
GFR SERPL CREATININE-BSD FRML MDRD: 95 ML/MIN/1.73SQ M
GLUCOSE SERPL-MCNC: 121 MG/DL (ref 65–140)
GLUCOSE SERPL-MCNC: 126 MG/DL (ref 65–140)
GLUCOSE SERPL-MCNC: 127 MG/DL (ref 65–140)
GLUCOSE SERPL-MCNC: 167 MG/DL (ref 65–140)
GLUCOSE SERPL-MCNC: 184 MG/DL (ref 65–140)
GRAM STN SPEC: ABNORMAL
HCT VFR BLD AUTO: 25.5 % (ref 36.5–49.3)
HGB BLD-MCNC: 8.1 G/DL (ref 12–17)
IMM GRANULOCYTES # BLD AUTO: 0.36 THOUSAND/UL (ref 0–0.2)
IMM GRANULOCYTES NFR BLD AUTO: 2 % (ref 0–2)
LYMPHOCYTES # BLD AUTO: 2.42 THOUSANDS/ΜL (ref 0.6–4.47)
LYMPHOCYTES NFR BLD AUTO: 12 % (ref 14–44)
MAGNESIUM SERPL-MCNC: 1.9 MG/DL (ref 1.9–2.7)
MCH RBC QN AUTO: 29.7 PG (ref 26.8–34.3)
MCHC RBC AUTO-ENTMCNC: 31.8 G/DL (ref 31.4–37.4)
MCV RBC AUTO: 93 FL (ref 82–98)
MONOCYTES # BLD AUTO: 0.95 THOUSAND/ΜL (ref 0.17–1.22)
MONOCYTES NFR BLD AUTO: 5 % (ref 4–12)
NEUTROPHILS # BLD AUTO: 15.86 THOUSANDS/ΜL (ref 1.85–7.62)
NEUTS SEG NFR BLD AUTO: 80 % (ref 43–75)
NRBC BLD AUTO-RTO: 0 /100 WBCS
PLATELET # BLD AUTO: 398 THOUSANDS/UL (ref 149–390)
PMV BLD AUTO: 9.2 FL (ref 8.9–12.7)
POTASSIUM SERPL-SCNC: 2.9 MMOL/L (ref 3.5–5.3)
RBC # BLD AUTO: 2.73 MILLION/UL (ref 3.88–5.62)
SODIUM SERPL-SCNC: 146 MMOL/L (ref 135–147)
UNIT DISPENSE STATUS: NORMAL
UNIT PRODUCT CODE: NORMAL
UNIT PRODUCT VOLUME: 350 ML
UNIT RH: NORMAL
WBC # BLD AUTO: 19.87 THOUSAND/UL (ref 4.31–10.16)

## 2024-09-20 PROCEDURE — 99233 SBSQ HOSP IP/OBS HIGH 50: CPT | Performed by: INTERNAL MEDICINE

## 2024-09-20 PROCEDURE — 99232 SBSQ HOSP IP/OBS MODERATE 35: CPT | Performed by: PHYSICIAN ASSISTANT

## 2024-09-20 PROCEDURE — 82948 REAGENT STRIP/BLOOD GLUCOSE: CPT

## 2024-09-20 PROCEDURE — 93306 TTE W/DOPPLER COMPLETE: CPT | Performed by: STUDENT IN AN ORGANIZED HEALTH CARE EDUCATION/TRAINING PROGRAM

## 2024-09-20 PROCEDURE — 85025 COMPLETE CBC W/AUTO DIFF WBC: CPT | Performed by: INTERNAL MEDICINE

## 2024-09-20 PROCEDURE — 87040 BLOOD CULTURE FOR BACTERIA: CPT | Performed by: NURSE PRACTITIONER

## 2024-09-20 PROCEDURE — 93306 TTE W/DOPPLER COMPLETE: CPT

## 2024-09-20 PROCEDURE — 83735 ASSAY OF MAGNESIUM: CPT | Performed by: INTERNAL MEDICINE

## 2024-09-20 PROCEDURE — 80048 BASIC METABOLIC PNL TOTAL CA: CPT | Performed by: INTERNAL MEDICINE

## 2024-09-20 RX ORDER — POTASSIUM CHLORIDE 20MEQ/15ML
40 LIQUID (ML) ORAL 2 TIMES DAILY
Status: DISCONTINUED | OUTPATIENT
Start: 2024-09-20 | End: 2024-09-22

## 2024-09-20 RX ORDER — METOPROLOL SUCCINATE 25 MG/1
25 TABLET, EXTENDED RELEASE ORAL DAILY
Status: DISCONTINUED | OUTPATIENT
Start: 2024-09-20 | End: 2024-09-21

## 2024-09-20 RX ORDER — LISINOPRIL 2.5 MG/1
2.5 TABLET ORAL DAILY
Status: DISCONTINUED | OUTPATIENT
Start: 2024-09-21 | End: 2024-10-04 | Stop reason: HOSPADM

## 2024-09-20 RX ORDER — METOPROLOL SUCCINATE 25 MG/1
25 TABLET, EXTENDED RELEASE ORAL DAILY
COMMUNITY

## 2024-09-20 RX ADMIN — BACITRACIN ZINC 1 SMALL APPLICATION: 500 OINTMENT TOPICAL at 09:02

## 2024-09-20 RX ADMIN — BACITRACIN ZINC 1 SMALL APPLICATION: 500 OINTMENT TOPICAL at 17:17

## 2024-09-20 RX ADMIN — TAMSULOSIN HYDROCHLORIDE 0.4 MG: 0.4 CAPSULE ORAL at 09:02

## 2024-09-20 RX ADMIN — FINASTERIDE 5 MG: 5 TABLET, FILM COATED ORAL at 09:03

## 2024-09-20 RX ADMIN — POTASSIUM CHLORIDE 40 MEQ: 1.5 SOLUTION ORAL at 17:17

## 2024-09-20 RX ADMIN — INSULIN LISPRO 1 UNITS: 100 INJECTION, SOLUTION INTRAVENOUS; SUBCUTANEOUS at 17:17

## 2024-09-20 RX ADMIN — DOCUSATE SODIUM 100 MG: 100 CAPSULE, LIQUID FILLED ORAL at 09:04

## 2024-09-20 RX ADMIN — APIXABAN 5 MG: 5 TABLET, FILM COATED ORAL at 17:17

## 2024-09-20 RX ADMIN — VANCOMYCIN HYDROCHLORIDE 1500 MG: 5 INJECTION, POWDER, LYOPHILIZED, FOR SOLUTION INTRAVENOUS at 11:49

## 2024-09-20 RX ADMIN — VANCOMYCIN HYDROCHLORIDE 1500 MG: 5 INJECTION, POWDER, LYOPHILIZED, FOR SOLUTION INTRAVENOUS at 22:27

## 2024-09-20 RX ADMIN — LISINOPRIL 10 MG: 10 TABLET ORAL at 09:04

## 2024-09-20 RX ADMIN — SODIUM CHLORIDE, SODIUM GLUCONATE, SODIUM ACETATE, POTASSIUM CHLORIDE, MAGNESIUM CHLORIDE, SODIUM PHOSPHATE, DIBASIC, AND POTASSIUM PHOSPHATE 125 ML/HR: .53; .5; .37; .037; .03; .012; .00082 INJECTION, SOLUTION INTRAVENOUS at 16:22

## 2024-09-20 RX ADMIN — EZETIMIBE 10 MG: 10 TABLET ORAL at 09:03

## 2024-09-20 RX ADMIN — SODIUM CHLORIDE, SODIUM GLUCONATE, SODIUM ACETATE, POTASSIUM CHLORIDE, MAGNESIUM CHLORIDE, SODIUM PHOSPHATE, DIBASIC, AND POTASSIUM PHOSPHATE 125 ML/HR: .53; .5; .37; .037; .03; .012; .00082 INJECTION, SOLUTION INTRAVENOUS at 06:36

## 2024-09-20 RX ADMIN — METOPROLOL SUCCINATE 25 MG: 25 TABLET, EXTENDED RELEASE ORAL at 15:21

## 2024-09-20 RX ADMIN — ESCITALOPRAM OXALATE 10 MG: 10 TABLET ORAL at 09:01

## 2024-09-20 RX ADMIN — CEFEPIME 2000 MG: 2 INJECTION, POWDER, FOR SOLUTION INTRAVENOUS at 13:10

## 2024-09-20 RX ADMIN — CEFEPIME 2000 MG: 2 INJECTION, POWDER, FOR SOLUTION INTRAVENOUS at 00:38

## 2024-09-20 RX ADMIN — ASPIRIN 81 MG CHEWABLE TABLET 81 MG: 81 TABLET CHEWABLE at 09:01

## 2024-09-20 RX ADMIN — INSULIN LISPRO 1 UNITS: 100 INJECTION, SOLUTION INTRAVENOUS; SUBCUTANEOUS at 21:45

## 2024-09-20 RX ADMIN — SODIUM HYPOCHLORITE 1 APPLICATION: 5 SOLUTION TOPICAL at 09:04

## 2024-09-20 RX ADMIN — POTASSIUM CHLORIDE 40 MEQ: 1.5 SOLUTION ORAL at 09:07

## 2024-09-20 RX ADMIN — METRONIDAZOLE 500 MG: 500 INJECTION, SOLUTION INTRAVENOUS at 12:09

## 2024-09-20 RX ADMIN — METRONIDAZOLE 500 MG: 500 INJECTION, SOLUTION INTRAVENOUS at 04:15

## 2024-09-20 RX ADMIN — METRONIDAZOLE 500 MG: 500 INJECTION, SOLUTION INTRAVENOUS at 20:17

## 2024-09-20 RX ADMIN — POTASSIUM CHLORIDE 20 MEQ: 14.9 INJECTION, SOLUTION INTRAVENOUS at 09:16

## 2024-09-20 RX ADMIN — APIXABAN 5 MG: 5 TABLET, FILM COATED ORAL at 09:03

## 2024-09-20 RX ADMIN — ATORVASTATIN CALCIUM 80 MG: 80 TABLET, FILM COATED ORAL at 17:17

## 2024-09-20 NOTE — ASSESSMENT & PLAN NOTE
Low blood counts noted 9/19/24.   Has chronic anemia likely secondary to poor nutritional status underlying chronic illness.  Hemoglobin declined to 7.1 prompting 1 unit PRBC infusion.  Likely in the setting of sacral decubitus debridement and some bleeding from urethral meatus.    Repeat CBC with improved hemoglobin to 8.1  Repeat in AM

## 2024-09-20 NOTE — ASSESSMENT & PLAN NOTE
CT C/A/P showed mild distal sigmoid and rectal inflammatory changes, R ischioanal fossa emphysema, and possible fistulous connection to skin surface. This is likely source of patient's bacteremia above. Family reports patient has very infrequent bowel movements in past few weeks. General surgery is following for wound. GI has been consulted and they have concern for ischemic colitis. MRI has been recommended for further evaluation for fistula.  -antibiotics as above  -serial abdominal/rectal exams  -monitor stool output  -follow up stool studies  -continue follow up with general surgery  -continue follow up with gastroenterology

## 2024-09-20 NOTE — CASE MANAGEMENT
Case Management Assessment & Discharge Planning Note    Patient name Drew Santos  Location South 2 /South 2 M* MRN [id number]  : 1950 Date 2024       Current Admission Date: 2024  Current Admission Diagnosis:Sepsis   Patient Active Problem List    Diagnosis Date Noted Date Diagnosed    Severe protein-calorie malnutrition (HCC) 2024     Polymicrobial bacteremia 2024     Acute metabolic encephalopathy 2024     History of CVA (cerebrovascular accident) 2024     Paroxysmal atrial fibrillation (HCC) 2024     Anemia 2024     Type 2 diabetes mellitus without complication, without long-term current use of insulin (HCC) 2024     COVID-19 2024     Proctitis 2024     Dysgeusia 2024     Gross hematuria 2024     Depression 2024     Sacral wound 2024     Primary hypertension 2024     Mixed hyperlipidemia 2024     Urinary retention 2024     Syncope 2024     Elevated lactic acid level 2024     Sepsis 2024     Abnormal CPK 2024       LOS (days): 3  Geometric Mean LOS (GMLOS) (days): 5.1  Days to GMLOS:2.3     OBJECTIVE:  PATIENT READMITTED TO HOSPITAL  Risk of Unplanned Readmission Score: 22.25         Current admission status: Inpatient       [id number] [place], [place] - [address] [address]  Phone: [phone] Fax: [phone]    Primary Care Provider: Joe Lyon MD    Primary Insurance: [employer]  Secondary Insurance:     ASSESSMENT:  Active Health Care Proxies       Kristi Santos Health Care Representative - Spouse   Primary Phone: [phone] (Mobile)                 Advance Directives  Does patient have a Health Care POA?: Yes (Patient's spouse, Kirsti Santos)  Does patient have Advance Directives?: Yes  Advance Directives: Living will  Primary Contact: Kristi Santos (Spouse)  [phone]  (Mobile)    Readmission Root Cause  30 Day Readmission: Yes  Who directed you to return to the hospital?: Other (comment), Family ([facility], [facility])  Did you understand whom to contact if you had questions or problems?: Yes  Did you get your prescriptions before you left the hospital?: Yes  Were you able to get your prescriptions filled when you left the hospital?: Yes  Did you take your medications as prescribed?: Yes  Were you able to get to your follow-up appointments?: Yes  During previous admission, was a post-acute recommendation made?: Yes  What post-acute resources were offered?: STR  Patient was readmitted due to: Wound care, Sepsis  Action Plan: STR, resumption of care-- [facility]    Patient Information  Admitted from:: Facility ([facility])  Mental Status: Confused  During Assessment patient was accompanied by: Not accompanied during assessment  Assessment information provided by:: Spouse (Spouse, Kristi Santos)  Primary Caregiver: Other (Comment)  Caregiver's Name:: Patient's spouse and STR staff - [facility]  Caregiver's Relationship to Patient:: Other (Specify) (Home Care Staff)  Support Systems: Spouse/significant other, Family members, Friend  County of Residence: [place]  What [facility] do you live in?: [place]  Home entry access options. Select all that apply.: Stairs  Number of steps to enter home.: 4  Type of Current Residence: Facility ([facility])  Living Arrangements: Other (Comment) (Currently resides at [facility])  Is patient a ?: Yes  Is [facility] active with [facility])?: Yes  Is patient service connected?: Yes ([facility], A[facility])    Activities of Daily Living Prior to Admission  Functional Status: Total dependent (Patient's spouse, patient has alexander lift / SHELDON with STR at [facility])  Completes ADLs independently?: No  Level of ADL dependence: Total Dependent  Assisted Devices  (DME) used: Wheelchair, [place] lift  What DME does the patient currently own?: Wheelchair, [place] lift  Does patient have a history of Outpatient Therapy (PT/OT)?: No  Does the patient have a history of Short-Term Rehab?: Yes ([facility] [place])  Does patient have a history of HHC?: Yes ([employer])  Does patient currently have HHC?: No    Patient Information Continued  Income Source: SSI/SSD  Does patient have prescription coverage?: Yes  Does patient receive dialysis treatments?: No  Does patient have a history of substance abuse?: No  Does patient have a history of Mental Health Diagnosis?: Yes (PTSD)  Is patient receiving treatment for mental health?: Yes (Follow up provided by patient's PCP)  Has patient received inpatient treatment related to mental health in the last 2 years?: No    Means of Transportation  [facility]      Social Determinants of Health (SDOH)      Flowsheet Row Most Recent Value   Housing Stability    In the last 12 months, was there a time when you were not able to pay the mortgage or rent on time? N   In the past 12 months, how many times have you moved where you were living? 0   At any time in the past 12 months, were you homeless or living in a shelter (including now)? N   Transportation Needs    In the past 12 months, has lack of transportation kept you from medical appointments or from getting medications? no   In the past 12 months, has lack of transportation kept you from meetings, work, or from getting things needed for daily living? No   Food Insecurity    Within the past 12 months, you worried that your food would run out before you got the money to buy more. Never true   Within the past 12 months, the food you bought just didn't last and you didn't have money to get more. Never true   Utilities    In the past 12 months has the electric, gas, oil, or water company threatened to shut  off services in your home? No            DISCHARGE DETAILS:    Discharge planning discussed with:: Patient's spouse, Kristi Santos  Freedom of Choice: Yes  Comments - Freedom of Choice: Patient's spouse chose SHELDON at [facility],  CM sent referral in A[place]. Patient denied expanded STR referrals.  CM contacted family/caregiver?: Yes (Patient's spouse, Kristi)  Were Treatment Team discharge recommendations reviewed with patient/caregiver?: Yes  Did patient/caregiver verbalize understanding of patient care needs?: N/A- going to facility  Were patient/caregiver advised of the risks associated with not following Treatment Team discharge recommendations?: Yes    Contacts  Patient Contacts: DanielleKristi (Spouse)  Relationship to Patient:: Family  Contact Method: Phone  Phone Number: [phone] (Mobile)  Reason/Outcome: Discharge Planning, Emergency Contact, Continuity of Care    Requested Home Health Care         Is the patient interested in HHC at discharge?: No    DME Referral Provided  Referral made for DME?: No    Other Referral/Resources/Interventions Provided:  Interventions: Short Term Rehab  Referral Comments: STR- [facility]    Treatment Team Recommendation: Short Term Rehab (STR- [facility])  Discharge Destination Plan:: Short Term Rehab (STR - [facility])  Transport at Discharge : Other (Comment) ([facility])     Additional Comments: CM spoke with patient's spouse, Kristi Santos to complete assessment. Patient's spouse, Kristi confirmed patient admitted for STR- [facility]. Patient resides with spouse in home.  Patient's spouse confirmed patient has wheelchair and alexander lift at home. Patient's spouse denies use of oxygen. Patient reportedly has HHC with [facility]. Patient has ; patient has transportation with [employer]. Patient reportedly has MH diagnosis of PTSD. Patient's spouse denies use of D+A. Patient's spouse confirmed has PCP and  pharmacy. Patient's spouse, confirmed patient was admitted from STR- [facility] ; CM offered to provide STR referral expanded in Aidin. Patient's spouse confirmed and agreeable to STR at [facility]; CM sent referral for Resumption of Care (SHELDON), in Aidin. STR- [facility] confirmed patient is [facility] day [facility] bed [facility]. (October 2nd 2024). Patient's spouse requesting follow up for patient's wound, CM confirmed and sent follow up message to [facility]. [facility] department to follow for further discharge planning.

## 2024-09-20 NOTE — PROGRESS NOTES
Drew Santos is a 73 y.o. male who is currently ordered Vancomycin IV with management by the Pharmacy Consult service.  Relevant clinical data and objective / subjective history reviewed.  Vancomycin Assessment:  Indication and Goal AUC/Trough: Bacteremia (goal -600, trough >10)  Clinical Status: stable  Micro: Enterobacter, Proteus and enterococcal bacteremia   Renal Function:  SCr: 0.65 mg/dL  CrCl: 137.1 mL/min  Renal replacement: Not on dialysis  Days of Therapy: 2  Current Dose: 1500mg IV Q12H  Vancomycin Plan:  New Dosing: Continue 1500mg IV Q12H  Estimated AUC: 470 mcg*hr/mL  Estimated Trough: 12.8 mcg/mL  Next Level: Random level 9/21 with AM labs  Renal Function Monitoring: Daily BMP and UOP  Pharmacy will continue to follow closely for s/sx of nephrotoxicity, infusion reactions and appropriateness of therapy.  BMP and CBC will be ordered per protocol. We will continue to follow the patient’s culture results and clinical progress daily.    Natasha Quesada, PlacidoD

## 2024-09-20 NOTE — ASSESSMENT & PLAN NOTE
Fever, tachycardia, tachypnea, and leukocytosis. Secondary to polymicrobial bacteremia, likely from colitis/proctitis with possible fistulous connection to sacral ulcer. Blood cultures now updated with growth of enterobacter, enterococcus faecalis, proteus. Also consider role of COVID-19. No other clear source appreciated. Chest imaging with no opacities/groundglass/consolidations suggesting a bacterial process. Urine was abnormal, culture showed growth of enterobacter. Head CT with no acute intracranial findings. Despite being systemically ill he remains hemodynamically stable. This morning he is afebrile. WBC count has increased. He has been transitioned to IV cefepime, IV flagyl, and IV Vancomycin which he is tolerating without difficulty. Repeat blood cultures are pending.  -continue IV cefepime  -continue IV flagyl  -continue IV vancomycin  -continue pharmacy consult for guidance on vancomycin dosage  -check CBCD and BMP tomorrow  -follow up repeat blood cultures   -monitor vitals  -supportive care

## 2024-09-20 NOTE — ASSESSMENT & PLAN NOTE
Lab Results   Component Value Date    HGBA1C 6.2 (H) 09/17/2024       Recent Labs     09/19/24  1605 09/19/24  2130 09/20/24  0900 09/20/24  1147   POCGLU 208* 203* 121 126       Blood Sugar Average: Last 72 hrs:  (P) 148  Well-controlled with last hemoglobin A1c of 6.0% in July of this year  Will hold home oral regimen  Monitor on sliding scale only  Hypoglycemia protocol

## 2024-09-20 NOTE — ASSESSMENT & PLAN NOTE
Blood cultures with growth of enterobacter and proteus in one set, and  second set growing enterococcus faecalis. Likely secondary to proctitis/colitis with possible fistulous connection to the sacral wound. No other source appreciated. Patient has COVID-19 but no opacities/groundglass/consolidations on chest imaging suggesting a bacterial process. He will need a TTE. Repeat blood cultures are pending.  -antibiotics as above  -check CBCD and BMP tomorrow  -follow up repeat blood cultures  -check TTE  -monitor vitals

## 2024-09-20 NOTE — PROGRESS NOTES
Progress Note - Infectious Disease   Name: Drew Santos 73 y.o. male I MRN: [id number]  Unit/Bed#: [place] [address] -01 I Date of Admission: 9/17/2024   Date of Service: 9/20/2024 I Hospital Day: 3     Assessment & Plan  Sepsis  Fever, tachycardia, tachypnea, and leukocytosis. Secondary to polymicrobial bacteremia, likely from colitis/proctitis with possible fistulous connection to sacral ulcer. Blood cultures now updated with growth of enterobacter, enterococcus faecalis, proteus. Also consider role of COVID-19. No other clear source appreciated. Chest imaging with no opacities/groundglass/consolidations suggesting a bacterial process. Urine was abnormal, culture showed growth of enterobacter. Head CT with no acute intracranial findings. Despite being systemically ill he remains hemodynamically stable. This morning he is afebrile. WBC count has increased. He has been transitioned to IV cefepime, IV flagyl, and IV Vancomycin which he is tolerating without difficulty. Repeat blood cultures are pending.  -continue IV cefepime  -continue IV flagyl  -continue IV vancomycin  -continue pharmacy consult for guidance on vancomycin dosage  -check CBCD and BMP tomorrow  -follow up repeat blood cultures   -monitor vitals  -supportive care  Polymicrobial bacteremia  Blood cultures with growth of enterobacter and proteus in one set, and  second set growing enterococcus faecalis. Likely secondary to proctitis/colitis with possible fistulous connection to the sacral wound. No other source appreciated. Patient has COVID-19 but no opacities/groundglass/consolidations on chest imaging suggesting a bacterial process. He will need a TTE. Repeat blood cultures are pending.  -antibiotics as above  -check CBCD and BMP tomorrow  -follow up repeat blood cultures  -check TTE  -monitor vitals  Sacral wound  Per patient's family wound initially started during patient's stay in a skilled nursing facility for rehab. Wound imaging present in our  system since prior hematuria hospitalization. Wound now with significant depth. Concern for possible fistulous connection to the rectum given evidence of colitis/proctitis and extensive tissue emphysema on CT. This is likely playing a role in bacteremia above. General surgery has debrided and will continue to follow up with wound. He may require diverting colostomy.  -antibiotic as above  -serial skin exams  -frequent turning/repositioning to offload pressure from the wound  -local wound care per general surgery  -continue follow up with general surgery  Proctitis  CT C/A/P showed mild distal sigmoid and rectal inflammatory changes, R ischioanal fossa emphysema, and possible fistulous connection to skin surface. This is likely source of patient's bacteremia above. Family reports patient has very infrequent bowel movements in past few weeks. General surgery is following for wound. GI has been consulted and they have concern for ischemic colitis. MRI has been recommended for further evaluation for fistula.  -antibiotics as above  -serial abdominal/rectal exams  -monitor stool output  -follow up stool studies  -continue follow up with general surgery  -continue follow up with gastroenterology  Type 2 diabetes mellitus without complication, without long-term current use of insulin ([facility])  Lab Results   Component Value Date    HGBA1C 6.2 (H) 09/17/2024   This is risk factor for wounds and infection.  -recommend tight glycemic control  -blood glucose management per primary service  COVID-19  PCR positive on 9/17/2024 although per patient's family he tested positive prior to admission. Patient's wife also positive. Chest imaging showed no airspace opacities or groundglass. He has been started on mild disease protocol and received IV Remdesivir x3 days.  -management per primary service  -monitor vitals  -monitor respiratory status    ID will continue to follow along. The patient will be formally reassessed by the infectious  disease team on Monday, 2024. Please call sooner with new questions or concerns.  Above plan was discussed in detail with patient at the bedside.  Above plan was discussed in detail with SLIM who agree with plan for ongoing antibiotic treatment.    Antibiotics:  Vancomycin 2  Cefepime 3  Flagyl 3  Antibiotics 4    Subjective:  Patient answering yes/no questions again today. He denies pain in his chest, abdomen, extremities, and sacral wounds. He denies chills and shakes. He denies abdominal pain, nausea, and vomiting. He offers no new symptoms.    Objective:  Vitals:  Temp:  [98 °F (36.7 °C)-99.5 °F (37.5 °C)] 98.4 °F (36.9 °C)  HR:  [] 103  Resp:  [15-18] 17  BP: (122-143)/(67-85) 141/83  SpO2:  [94 %-98 %] 96 %  Temp (24hrs), Av.8 °F (37.1 °C), Min:98 °F (36.7 °C), Max:99.5 °F (37.5 °C)  Current: Temperature: 98.4 °F (36.9 °C)    Physical Exam:   General Appearance:  Alert, somewhat interactive, nontoxic, in no acute distress. He appears chronically debilitated with L sided CVA residual. He appears comfortable sitting up in bed.   Throat: Oropharynx moist without lesions.    Lungs:   Clear to auscultation bilaterally; no wheezes, rhonchi or rales; respirations unlabored on room air.   Heart:  Tachycardic; no murmur, rub or gallop.   Abdomen:   Soft, non-tender, non-distended, positive bowel sounds.     Extremities: No clubbing or cyanosis, trace peripheral edema. B/L offloading boots intact.   Skin: No new rashes or lesions noted on exposed skin.     Labs, Imaging, & Other studies:   All pertinent labs and imaging studies were personally reviewed  Results from last 7 days   Lab Units 24  0555 24  0827 24  0433 24  0508   WBC Thousand/uL 19.87*  --  19.83* 21.23*   HEMOGLOBIN g/dL 8.1* 7.2* 7.1* 8.5*   PLATELETS Thousands/uL 398*  --  396* 406*     Results from last 7 days   Lab Units 24  0555 24  0508 24  1637   POTASSIUM mmol/L 2.9*   < > 2.9*   CHLORIDE  mmol/L 114*   < > 105   CO2 mmol/L 27   < > 31   BUN mg/dL 12   < > 15   CREATININE mg/dL 0.65   < > 0.76   EGFR ml/min/1.73sq m 95   < > 89   CALCIUM mg/dL 7.4*   < > 9.0   AST U/L  --   --  38   ALT U/L  --   --  28   ALK PHOS U/L  --   --  88    < > = values in this interval not displayed.     Results from last 7 days   Lab Units 09/17/24  1954 09/17/24  1831 09/17/24  1637   BLOOD CULTURE   --  Enterococcus faecalis* Enterobacter cloacae*  Proteus mirabilis*   GRAM STAIN RESULT   --  Gram positive cocci in pairs and chains* Gram negative rods*   URINE CULTURE  10,000-19,000 cfu/ml Enterobacter cloacae*  --   --      Results from last 7 days   Lab Units 09/18/24  0508 09/17/24  1637   PROCALCITONIN ng/ml 0.49* 0.37*

## 2024-09-20 NOTE — ASSESSMENT & PLAN NOTE
Chronic sacral decubitus ulcer; present on admission  Status post debridement at bedside per surgical team.  Continue with wound care as tolerated.  Reevaluation by surgery today, continue dressing changes and serial exams  Wound care consultation

## 2024-09-20 NOTE — PROGRESS NOTES
Deterioration Index Critical Care Recommendations  Room #: 203  Deterioration index score: 60.88%    Critical Care recommends further potassium repletion    Spoke with Shadia Padilla RN  from primary team    Brief summary:   Critical care was notified via deterioration index alert. The alert was concerning for tachycardia with a heart rate at 108 bpm.  Speaking to the registered nurse she informs me that she is going to recheck his vital signs and contact us if we are needed.    Please contact critical care via [place] [facility] with any questions or concerns.

## 2024-09-20 NOTE — ASSESSMENT & PLAN NOTE
PCR positive on 9/17/2024 although per patient's family he tested positive prior to admission. Patient's wife also positive. Chest imaging showed no airspace opacities or groundglass. He has been started on mild disease protocol and received IV Remdesivir x3 days.  -management per primary service  -monitor vitals  -monitor respiratory status

## 2024-09-20 NOTE — ASSESSMENT & PLAN NOTE
Malnutrition Findings:   Adult Malnutrition type: Chronic illness  Adult Degree of Malnutrition: Other severe protein calorie malnutrition  Malnutrition Characteristics: Inadequate energy, Weight loss                  360 Statement: Severe protein calorie malnutrition in context of chronic illness r/t poor appetite, inadequate PO intake as evidance by energy intake less than 75% compared to estimated needs>1 month, 7.5% wt loss x 1 month ( 122kg 8/23/24-> 113kg 9/17) treated with PO diet and oral supplements    BMI Findings:           Body mass index is 29.59 kg/m².

## 2024-09-20 NOTE — PROGRESS NOTES
Progress Note - Hospitalist   Name: Drew Santos 73 y.o. male I MRN: [id number]  Unit/Bed#: [place] [address] -01 I Date of Admission: 9/17/2024   Date of Service: 9/20/2024 I Hospital Day: 3    Assessment & Plan  Sepsis  As evidenced by tachycardia and leukocytosis and fever  Now with gram-negative bacteremia- enterbacter, enterococcus and proetus   CT abdomen and pelvis showsMid/distal sigmoid colonic and rectal inflammatory changes in keeping with a segmental colitis/proctitis.Right ischioanal fossa subcutaneous emphysema extending through the right paramidline gluteal subcutaneous tissues to the skin surface suspicious for a perirectal or perianal fistula; limited assessment without oral or rectal contrast.Parra catheter balloon is located in the prostatic urethra; this catheter should be repositioned.Fat stranding surrounding the decompressed bladder may indicate cystitis.  Has chronic indwelling Parra catheter with abnormal UA  Possible source of infection appears to be proctocolitis versus complicated UTI in the setting of indwelling Parra catheter.  Also has large sacral decubitus ulcer which could be infected as well.   Infectious disease and surgical consultation appreciated.    Patient is s/p bedside debridement of sacral decubitus ulcer.  Continue with dressing changes and further debridements per surgical team.  Surgery reevaluated wound today, continue dressing changes   Continue with broad-spectrum IV antibiotics.  Currently on cefepime and Flagyl.  Vancomycin added for Enterococcus growing in 1 of 2 sets of admission blood cultures.  Previously 1 set of preliminary blood cultures with Enterobacter and Proteus.  Follow-up on repeat blood cultures this AM.  Follow-up on transthoracic echocardiogram today   Appreciate infectious disease, surgical and GI follow-up  Sacral wound  Chronic sacral decubitus ulcer; present on admission  Status post debridement at bedside per surgical team.  Continue with wound  care as tolerated.  Reevaluation by surgery today, continue dressing changes and serial exams  Wound care consultation  Type 2 diabetes mellitus without complication, without long-term current use of insulin ([facility])  Lab Results   Component Value Date    HGBA1C 6.2 (H) 09/17/2024       Recent Labs     09/19/24  1605 09/19/24  2130 09/20/24  0900 09/20/24  1147   POCGLU 208* 203* 121 126       Blood Sugar Average: Last 72 hrs:  (P) 148  Well-controlled with last hemoglobin A1c of 6.0% in July of this year  Will hold home oral regimen  Monitor on sliding scale only  Hypoglycemia protocol     COVID-19  Patient currently on room air though high risk given his age and comorbidities  Completed three days of IV remdesivir   Supportive care otherwise  Contact and airborne precautions  Currently on mild COVID pathway.  Not requiring supplemental oxygen.  Wife also sick with COVID.  Proctitis  Noted on CT scan.  No diarrhea or clinical evidence of colitis.  Continue with cefepime and Flagyl.  GI input appreciated.  Stool studies negative  Likely ischemic colitis given area affected, continue supportive measures   Polymicrobial bacteremia  Patient met sepsis criteria on admission.  Admission blood cultures showing GNR with BCI suggesting enterobacter and proteus. Now second set BCI is suggesting enterococcus faecalis.  Bacteremia likely secondary sacral wound with proctitis with possible fistulous connection   Patient has COVID-19 but no escalating oxygen requirement and is on mild COVID pathway   Has chronic indwelling Parra catheter.  Urine was abnormal, culture growing enterobacter   Infectious disease input noted and appreciated.  Was on cefepime and Flagyl and started on vancomycin for positive blood culture showing Enterococcus.  Follow-up on repeat blood cultures obtained today  Follow-up on transthoracic echocardiogram  Infectious disease input noted and appreciated    Acute metabolic encephalopathy  Presumably secondary  to sepsis, COVID infection  Will need further sepsis workup as below  CT head with no acute intracranial abnormality  Encephalopathy right in the setting of sepsis  Continue with supportive care.  Serial neuroexam.  History of CVA (cerebrovascular accident)  History of CVA   Chronically bedbound and with chronic left-sided deficits   Continue home aspirin, Eliquis, statin  Paroxysmal atrial fibrillation (HCC)  Currently in sinus tachycardia in the ER  Restart Toprol XL 25 mg daily today   Will continue home Eliquis for stroke prevention  Anemia  Low blood counts noted 9/19/24.   Has chronic anemia likely secondary to poor nutritional status underlying chronic illness.  Hemoglobin declined to 7.1 prompting 1 unit PRBC infusion.  Likely in the setting of sacral decubitus debridement and some bleeding from urethral meatus.    Repeat CBC with improved hemoglobin to 8.1  Repeat in AM  Severe protein-calorie malnutrition (HCC)  Malnutrition Findings:   Adult Malnutrition type: Chronic illness  Adult Degree of Malnutrition: Other severe protein calorie malnutrition  Malnutrition Characteristics: Inadequate energy, Weight loss                  360 Statement: Severe protein calorie malnutrition in context of chronic illness r/t poor appetite, inadequate PO intake as evidance by energy intake less than 75% compared to estimated needs>1 month, 7.5% wt loss x 1 month ( 122kg 8/23/24-> 113kg 9/17) treated with PO diet and oral supplements    BMI Findings:           Body mass index is 29.59 kg/m².       VTE Pharmacologic Prophylaxis:   High Risk (Score >/= 5) - Pharmacological DVT Prophylaxis Ordered: apixaban (Eliquis). Sequential Compression Devices Ordered.    Mobility:   Basic Mobility Inpatient Raw Score: 6  JH-HLM Goal: 2: Bed activities/Dependent transfer  JH-HLM Achieved: 1: Laying in bed  JH-HLM Goal NOT achieved. Continue with multidisciplinary rounding and encourage appropriate mobility to improve upon JH-HLM  goals.    Patient Centered Rounds: I performed bedside rounds with nursing staff today.   Discussions with Specialists or Other Care Team Provider: ID    Education and Discussions with Family / Patient: Updated  (wife) via phone.    Current Length of Stay: 3 day(s)  Current Patient Status: Inpatient   Certification Statement: The patient will continue to require additional inpatient hospital stay due to sepsis due to bacteremia pending JOSEFINA and repeat cultures   Discharge Plan: Anticipate discharge in >72 hrs to rehab facility.    Code Status: Level 1 - Full Code    Subjective   Patient seen and examined at bedside. Notes he is doing okay today. Feels about the same as he did at time of admission. Denies any significant pain in the area of his wound.    Objective     Vitals:   Temp (24hrs), Av.9 °F (37.2 °C), Min:98.2 °F (36.8 °C), Max:99.3 °F (37.4 °C)    Temp:  [98.2 °F (36.8 °C)-99.3 °F (37.4 °C)] 98.4 °F (36.9 °C)  HR:  [] 108  Resp:  [15-18] 17  BP: (122-143)/(67-90) 143/90  SpO2:  [94 %-98 %] 94 %  Body mass index is 29.59 kg/m².     Input and Output Summary (last 24 hours):     Intake/Output Summary (Last 24 hours) at 2024 1215  Last data filed at 2024 0557  Gross per 24 hour   Intake 772.67 ml   Output 950 ml   Net -177.33 ml       Physical Exam  Vitals reviewed.   Constitutional:       General: He is not in acute distress.  HENT:      Head: Normocephalic and atraumatic.      Mouth/Throat:      Mouth: Oropharynx is clear and moist.   Eyes:      General: No scleral icterus.     Extraocular Movements: EOM normal.      Conjunctiva/sclera: Conjunctivae normal.   Cardiovascular:      Rate and Rhythm: Normal rate and regular rhythm.      Heart sounds: Normal heart sounds. No murmur heard.  Pulmonary:      Effort: Pulmonary effort is normal. No respiratory distress.      Breath sounds: Normal breath sounds. No wheezing.   Abdominal:      General: Bowel sounds are normal. There is no  distension.      Palpations: Abdomen is soft.      Tenderness: There is no abdominal tenderness.   Musculoskeletal:         General: No edema.      Cervical back: Neck supple.      Right lower leg: No edema.      Left lower leg: No edema.   Skin:     General: Skin is warm and dry.      Comments: Sacral wound    Neurological:      Mental Status: He is alert. Mental status is at baseline.      Motor: Weakness (chronic left sided deficits) present.      Comments: Interactive, answers simple questions   Psychiatric:         Mood and Affect: Mood and affect normal.         Behavior: Behavior normal.         Thought Content: Thought content normal.          Lines/Drains:  Lines/Drains/Airways       Active Status       Name Placement date Placement time Site Days    Urethral Catheter Three way 22 Fr. 08/31/24 1925  Three way  19    Continuous Bladder Irrigation Three-way 08/31/24 1925  Three-way  19                  Urinary Catheter:  Goal for removal: N/A - Chronic Parra           Telemetry:  Telemetry Orders (From admission, onward)               24 Hour Telemetry Monitoring  Continuous x 24 Hours (Telem)        Question:  Reason for 24 Hour Telemetry  Answer:  Metabolic/electrolyte disturbance with high probability of dysrhythmia. K level <3 or >6 OR KCL infusion >10mEq/hr                     Telemetry Reviewed: Sinus Tachycardia  Indication for Continued Telemetry Use: Metabolic/electrolyte disturbance with high probability of dysrhythmia               Lab Results: I have reviewed the following results:    Results from last 7 days   Lab Units 09/20/24  0555   WBC Thousand/uL 19.87*   HEMOGLOBIN g/dL 8.1*   HEMATOCRIT % 25.5*   PLATELETS Thousands/uL 398*   SEGS PCT % 80*   LYMPHO PCT % 12*   MONO PCT % 5   EOS PCT % 1     Results from last 7 days   Lab Units 09/20/24  0555 09/18/24  0508 09/17/24  1637   SODIUM mmol/L 146   < > 142   POTASSIUM mmol/L 2.9*   < > 2.9*   CHLORIDE mmol/L 114*   < > 105   CO2 mmol/L 27    < > 31   BUN mg/dL 12   < > 15   CREATININE mg/dL 0.65   < > 0.76   ANION GAP mmol/L 5   < > 6   CALCIUM mg/dL 7.4*   < > 9.0   ALBUMIN g/dL  --   --  2.8*   TOTAL BILIRUBIN mg/dL  --   --  1.27*   ALK PHOS U/L  --   --  88   ALT U/L  --   --  28   AST U/L  --   --  38   GLUCOSE RANDOM mg/dL 127   < > 164*    < > = values in this interval not displayed.         Results from last 7 days   Lab Units 09/20/24  1147 09/20/24  0900 09/19/24  2130 09/19/24  1605 09/19/24  1035 09/19/24  0622 09/18/24  1908 09/18/24  1716 09/18/24  1304 09/18/24  1231 09/18/24  0808 09/18/24  0036   POC GLUCOSE mg/dl 126 121 203* 208* 124 123 129 133 164* 148* 146* 151*     Results from last 7 days   Lab Units 09/17/24  1957   HEMOGLOBIN A1C % 6.2*     Results from last 7 days   Lab Units 09/18/24  0508 09/17/24  1655 09/17/24  1637   LACTIC ACID mmol/L  --  1.2  --    PROCALCITONIN ng/ml 0.49*  --  0.37*       Recent Cultures (last 7 days):   Results from last 7 days   Lab Units 09/17/24  1954 09/17/24  1831 09/17/24  1637   BLOOD CULTURE   --  Enterococcus faecalis* Enterobacter cloacae*  Proteus mirabilis*   GRAM STAIN RESULT   --  Gram positive cocci in pairs and chains* Gram negative rods*   URINE CULTURE  10,000-19,000 cfu/ml Enterobacter cloacae*  --   --        Imaging Review: Reviewed radiology reports from this admission including: CT chest, CT abdomen/pelvis, and CT head.  Other Studies: No additional pertinent studies reviewed.    Last 24 Hours Medication List:     Current Facility-Administered Medications:     acetaminophen (Ofirmev) injection 1,000 mg, Q6H PRN    acetaminophen (TYLENOL) tablet 650 mg, Q6H PRN    apixaban (ELIQUIS) tablet 5 mg, BID    aspirin chewable tablet 81 mg, Daily    atorvastatin (LIPITOR) tablet 80 mg, QPM    bacitracin topical ointment 1 small application, BID    ceFEPime (MAXIPIME) 2,000 mg in dextrose 5 % 50 mL IVPB, Q12H, Last Rate: 2,000 mg (09/20/24 0038)    Dakins (full strength) (DAKIN'S) 0.5  percent topical solution 1 Application, Daily    docusate sodium (COLACE) capsule 100 mg, Daily    escitalopram (LEXAPRO) tablet 10 mg, Daily    ezetimibe (ZETIA) tablet 10 mg, Daily    finasteride (PROSCAR) tablet 5 mg, Daily    insulin lispro (HumALOG/ADMELOG) 100 units/mL subcutaneous injection 1-6 Units, TID AC **AND** Fingerstick Glucose (POCT), TID AC    insulin lispro (HumALOG/ADMELOG) 100 units/mL subcutaneous injection 1-6 Units, HS    lisinopril (ZESTRIL) tablet 10 mg, Daily    metroNIDAZOLE (FLAGYL) IVPB (premix) 500 mg 100 mL, Q8H, Last Rate: 500 mg (09/20/24 7835)    multi-electrolyte (PLASMALYTE-A/ISOLYTE-S PH 7.4) IV solution, Continuous, Last Rate: 125 mL/hr (09/20/24 0636)    ondansetron (ZOFRAN) injection 4 mg, Q4H PRN    potassium chloride oral solution 40 mEq, BID    tamsulosin (FLOMAX) capsule 0.4 mg, Daily    vancomycin (VANCOCIN) 1500 mg in sodium chloride 0.9% 250 mL IVPB, Q12H    Administrative Statements   Today, Patient Was Seen By: Esperanza Arizmendi PA-C      **Please Note: This note may have been constructed using a voice recognition system.**

## 2024-09-20 NOTE — PLAN OF CARE
Problem: Potential for Falls  Goal: Patient will remain free of falls  Description: INTERVENTIONS:  - Educate patient/family on patient safety including physical limitations  - Instruct patient to call for assistance with activity   - Consult OT/PT to assist with strengthening/mobility   - Keep Call bell within reach  - Keep bed low and locked with side rails adjusted as appropriate  - Keep care items and personal belongings within reach  - Initiate and maintain comfort rounds  - Make Fall Risk Sign visible to staff  - Offer Toileting every 2 Hours, in advance of need  - Initiate/Maintain bed alarm  - Obtain necessary fall risk management equipment: bed alarm call bell  - Apply yellow socks and bracelet for high fall risk patients  - Consider moving patient to room near nurses station  Outcome: Progressing     Problem: PAIN - ADULT  Goal: Verbalizes/displays adequate comfort level or baseline comfort level  Description: Interventions:  - Encourage patient to monitor pain and request assistance  - Assess pain using appropriate pain scale  - Administer analgesics based on type and severity of pain and evaluate response  - Implement non-pharmacological measures as appropriate and evaluate response  - Consider cultural and social influences on pain and pain management  - Notify physician/advanced practitioner if interventions unsuccessful or patient reports new pain  Outcome: Progressing     Problem: INFECTION - ADULT  Goal: Absence or prevention of progression during hospitalization  Description: INTERVENTIONS:  - Assess and monitor for signs and symptoms of infection  - Monitor lab/diagnostic results  - Monitor all insertion sites, i.e. indwelling lines, tubes, and drains  - Monitor endotracheal if appropriate and nasal secretions for changes in amount and color  - Hillman appropriate cooling/warming therapies per order  - Administer medications as ordered  - Instruct and encourage patient and family to use good  hand hygiene technique  - Identify and instruct in appropriate isolation precautions for identified infection/condition  Outcome: Progressing  Goal: Absence of fever/infection during neutropenic period  Description: INTERVENTIONS:  - Monitor WBC    Outcome: Progressing     Problem: SAFETY ADULT  Goal: Patient will remain free of falls  Description: INTERVENTIONS:  - Educate patient/family on patient safety including physical limitations  - Instruct patient to call for assistance with activity   - Consult OT/PT to assist with strengthening/mobility   - Keep Call bell within reach  - Keep bed low and locked with side rails adjusted as appropriate  - Keep care items and personal belongings within reach  - Initiate and maintain comfort rounds  - Make Fall Risk Sign visible to staff  - Offer Toileting every 2 Hours, in advance of need  - Initiate/Maintain bed alarm  - Obtain necessary fall risk management equipment: bed alarm call bell  - Apply yellow socks and bracelet for high fall risk patients  - Consider moving patient to room near nurses station  Outcome: Progressing  Goal: Maintain or return to baseline ADL function  Description: INTERVENTIONS:  -  Assess patient's ability to carry out ADLs; assess patient's baseline for ADL function and identify physical deficits which impact ability to perform ADLs (bathing, care of mouth/teeth, toileting, grooming, dressing, etc.)  - Assess/evaluate cause of self-care deficits   - Assess range of motion  - Assess patient's mobility; develop plan if impaired  - Assess patient's need for assistive devices and provide as appropriate  - Encourage maximum independence but intervene and supervise when necessary  - Involve family in performance of ADLs  - Assess for home care needs following discharge   - Consider OT consult to assist with ADL evaluation and planning for discharge  - Provide patient education as appropriate  Outcome: Progressing  Goal: Maintains/Returns to pre  admission functional level  Description: INTERVENTIONS:  - Perform AM-PAC 6 Click Basic Mobility/ Daily Activity assessment daily.  - Set and communicate daily mobility goal to care team and patient/family/caregiver.   - Collaborate with rehabilitation services on mobility goals if consulted  - Perform Range of Motion 4 times a day.  - Reposition patient every 2 hours.  - Dangle patient 3 times a day  - Stand patient 3 times a day  - Ambulate patient 3 times a day  - Out of bed to chair 3 times a day   - Out of bed for meals 3 times a day  - Out of bed for toileting  - Record patient progress and toleration of activity level   Outcome: Progressing     Problem: DISCHARGE PLANNING  Goal: Discharge to home or other facility with appropriate resources  Description: INTERVENTIONS:  - Identify barriers to discharge w/patient and caregiver  - Arrange for needed discharge resources and transportation as appropriate  - Identify discharge learning needs (meds, wound care, etc.)  - Arrange for interpretive services to assist at discharge as needed  - Refer to Case Management Department for coordinating discharge planning if the patient needs post-hospital services based on physician/advanced practitioner order or complex needs related to functional status, cognitive ability, or social support system  Outcome: Progressing     Problem: Knowledge Deficit  Goal: Patient/family/caregiver demonstrates understanding of disease process, treatment plan, medications, and discharge instructions  Description: Complete learning assessment and assess knowledge base.  Interventions:  - Provide teaching at level of understanding  - Provide teaching via preferred learning methods  Outcome: Progressing     Problem: Prexisting or High Potential for Compromised Skin Integrity  Goal: Skin integrity is maintained or improved  Description: INTERVENTIONS:  - Identify patients at risk for skin breakdown  - Assess and monitor skin integrity  - Assess  and monitor nutrition and hydration status  - Monitor labs   - Assess for incontinence   - Turn and reposition patient  - Assist with mobility/ambulation  - Relieve pressure over bony prominences  - Avoid friction and shearing  - Provide appropriate hygiene as needed including keeping skin clean and dry  - Evaluate need for skin moisturizer/barrier cream  - Collaborate with interdisciplinary team   - Patient/family teaching  - Consider wound care consult   Outcome: Progressing     Problem: GASTROINTESTINAL - ADULT  Goal: Maintains or returns to baseline bowel function  Description: INTERVENTIONS:  - Assess bowel function  - Encourage oral fluids to ensure adequate hydration  - Administer IV fluids if ordered to ensure adequate hydration  - Administer ordered medications as needed  - Encourage mobilization and activity  - Consider nutritional services referral to assist patient with adequate nutrition and appropriate food choices  Outcome: Progressing     Problem: GENITOURINARY - ADULT  Goal: Urinary catheter remains patent  Description: INTERVENTIONS:  - Assess patency of urinary catheter  - If patient has a chronic hanks, consider changing catheter if non-functioning  - Follow guidelines for intermittent irrigation of non-functioning urinary catheter  Outcome: Progressing     Problem: METABOLIC, FLUID AND ELECTROLYTES - ADULT  Goal: Electrolytes maintained within normal limits  Description: INTERVENTIONS:  - Monitor labs and assess patient for signs and symptoms of electrolyte imbalances  - Administer electrolyte replacement as ordered  - Monitor response to electrolyte replacements, including repeat lab results as appropriate  - Instruct patient on fluid and nutrition as appropriate  Outcome: Progressing     Problem: SKIN/TISSUE INTEGRITY - ADULT  Goal: Skin Integrity remains intact(Skin Breakdown Prevention)  Description: Assess:  -Perform Goldy assessment every shift  -Clean and moisturize skin every  shift  -Inspect skin when repositioning, toileting, and assisting with ADLS  -Assess under medical devices such as iv every shift  -Assess extremities for adequate circulation and sensation     Bed Management:  -Have minimal linens on bed & keep smooth, unwrinkled  -Change linens as needed when moist or perspiring  -Avoid sitting or lying in one position for more than 2 hours while in bed  -Keep HOB at 30 degrees     Toileting:  -Offer bedside commode  -Assess for incontinence every shift  -Use incontinent care products after each incontinent episode such as wound care     Activity:  -Mobilize patient 3 times a day  -Encourage activity and walks on unit  -Encourage or provide ROM exercises   -Turn and reposition patient every 2 Hours  -Use appropriate equipment to lift or move patient in bed  -Instruct/ Assist with weight shifting every shift when out of bed in chair  -Consider limitation of chair time 2 hour intervals    Skin Care:  -Avoid use of baby powder, tape, friction and shearing, hot water or constrictive clothing  -Relieve pressure over bony prominences using pillows   -Do not massage red bony areas    Next Steps:  -Teach patient strategies to minimize risks such as wounds    -Consider consults to  interdisciplinary teams such as wound care   Outcome: Progressing  Goal: Incision(s), wounds(s) or drain site(s) healing without S/S of infection  Description: INTERVENTIONS  - Assess and document dressing, incision, wound bed, drain sites and surrounding tissue  - Provide patient and family education  - Perform skin care/dressing changes every shift   Outcome: Progressing  Goal: Pressure injury heals and does not worsen  Description: Interventions:  - Implement low air loss mattress or specialty surface (Criteria met)  - Apply silicone foam dressing  - Instruct/assist with weight shifting every 60  minutes when in chair   - Limit chair time to 2 hour intervals  - Use special pressure reducing interventions such  as pillows when in chair   - Apply fecal or urinary incontinence containment device   - Perform passive or active ROM every shift  - Turn and reposition patient & offload bony prominences every 2 hours   - Utilize friction reducing device or surface for transfers   - Consider consults to  interdisciplinary teams such as wound care   - Use incontinent care products after each incontinent episode such as foam cleanser   - Consider nutrition services referral as needed  Outcome: Progressing     Problem: HEMATOLOGIC - ADULT  Goal: Maintains hematologic stability  Description: INTERVENTIONS  - Assess for signs and symptoms of bleeding or hemorrhage  - Monitor labs  - Administer supportive blood products/factors as ordered and appropriate  Outcome: Progressing     Problem: Nutrition/Hydration-ADULT  Goal: Nutrient/Hydration intake appropriate for improving, restoring or maintaining nutritional needs  Description: Monitor and assess patient's nutrition/hydration status for malnutrition. Collaborate with interdisciplinary team and initiate plan and interventions as ordered.  Monitor patient's weight and dietary intake as ordered or per policy. Utilize nutrition screening tool and intervene as necessary. Determine patient's food preferences and provide high-protein, high-caloric foods as appropriate.     INTERVENTIONS:  - Monitor oral intake, urinary output, labs, and treatment plans  - Assess nutrition and hydration status and recommend course of action  - Evaluate amount of meals eaten  - Assist patient with eating if necessary   - Allow adequate time for meals  - Recommend/ encourage appropriate diets, oral nutritional supplements, and vitamin/mineral supplements  - Order, calculate, and assess calorie counts as needed  - Recommend, monitor, and adjust tube feedings and TPN/PPN based on assessed needs  - Assess need for intravenous fluids  - Provide specific nutrition/hydration education as appropriate  - Include  patient/family/caregiver in decisions related to nutrition  Outcome: Progressing

## 2024-09-20 NOTE — ASSESSMENT & PLAN NOTE
Patient currently on room air though high risk given his age and comorbidities  Completed three days of IV remdesivir   Supportive care otherwise  Contact and airborne precautions  Currently on mild COVID pathway.  Not requiring supplemental oxygen.  Wife also sick with COVID.

## 2024-09-20 NOTE — ASSESSMENT & PLAN NOTE
Noted on CT scan.  No diarrhea or clinical evidence of colitis.  Continue with cefepime and Flagyl.  GI input appreciated.  Stool studies negative  Likely ischemic colitis given area affected, continue supportive measures

## 2024-09-20 NOTE — PLAN OF CARE
Problem: Potential for Falls  Goal: Patient will remain free of falls  Description: INTERVENTIONS:  - Educate patient/family on patient safety including physical limitations  - Instruct patient to call for assistance with activity   - Consult OT/PT to assist with strengthening/mobility   - Keep Call bell within reach  - Keep bed low and locked with side rails adjusted as appropriate  - Keep care items and personal belongings within reach  - Initiate and maintain comfort rounds  - Make Fall Risk Sign visible to staff  - Offer Toileting every 2 Hours, in advance of need  - Initiate/Maintain bed alarm  - Obtain necessary fall risk management equipment: bed alarm call bell  - Apply yellow socks and bracelet for high fall risk patients  - Consider moving patient to room near nurses station  Outcome: Progressing     Problem: PAIN - ADULT  Goal: Verbalizes/displays adequate comfort level or baseline comfort level  Description: Interventions:  - Encourage patient to monitor pain and request assistance  - Assess pain using appropriate pain scale  - Administer analgesics based on type and severity of pain and evaluate response  - Implement non-pharmacological measures as appropriate and evaluate response  - Consider cultural and social influences on pain and pain management  - Notify physician/advanced practitioner if interventions unsuccessful or patient reports new pain  Outcome: Progressing     Problem: INFECTION - ADULT  Goal: Absence or prevention of progression during hospitalization  Description: INTERVENTIONS:  - Assess and monitor for signs and symptoms of infection  - Monitor lab/diagnostic results  - Monitor all insertion sites, i.e. indwelling lines, tubes, and drains  - Monitor endotracheal if appropriate and nasal secretions for changes in amount and color  - Melvin appropriate cooling/warming therapies per order  - Administer medications as ordered  - Instruct and encourage patient and family to use good  hand hygiene technique  - Identify and instruct in appropriate isolation precautions for identified infection/condition  Outcome: Progressing  Goal: Absence of fever/infection during neutropenic period  Description: INTERVENTIONS:  - Monitor WBC    Outcome: Progressing     Problem: SAFETY ADULT  Goal: Patient will remain free of falls  Description: INTERVENTIONS:  - Educate patient/family on patient safety including physical limitations  - Instruct patient to call for assistance with activity   - Consult OT/PT to assist with strengthening/mobility   - Keep Call bell within reach  - Keep bed low and locked with side rails adjusted as appropriate  - Keep care items and personal belongings within reach  - Initiate and maintain comfort rounds  - Make Fall Risk Sign visible to staff  - Offer Toileting every 2 Hours, in advance of need  - Initiate/Maintain bed alarm  - Obtain necessary fall risk management equipment: bed alarm call bell  - Apply yellow socks and bracelet for high fall risk patients  - Consider moving patient to room near nurses station  Outcome: Progressing  Goal: Maintain or return to baseline ADL function  Description: INTERVENTIONS:  -  Assess patient's ability to carry out ADLs; assess patient's baseline for ADL function and identify physical deficits which impact ability to perform ADLs (bathing, care of mouth/teeth, toileting, grooming, dressing, etc.)  - Assess/evaluate cause of self-care deficits   - Assess range of motion  - Assess patient's mobility; develop plan if impaired  - Assess patient's need for assistive devices and provide as appropriate  - Encourage maximum independence but intervene and supervise when necessary  - Involve family in performance of ADLs  - Assess for home care needs following discharge   - Consider OT consult to assist with ADL evaluation and planning for discharge  - Provide patient education as appropriate  Outcome: Progressing  Goal: Maintains/Returns to pre  admission functional level  Description: INTERVENTIONS:  - Perform AM-PAC 6 Click Basic Mobility/ Daily Activity assessment daily.  - Set and communicate daily mobility goal to care team and patient/family/caregiver.   - Collaborate with rehabilitation services on mobility goals if consulted  - Perform Range of Motion 4 times a day.  - Reposition patient every 2 hours.  - Dangle patient 3 times a day  - Stand patient 3 times a day  - Ambulate patient 3 times a day  - Out of bed to chair 3 times a day   - Out of bed for meals 3 times a day  - Out of bed for toileting  - Record patient progress and toleration of activity level   Outcome: Progressing     Problem: DISCHARGE PLANNING  Goal: Discharge to home or other facility with appropriate resources  Description: INTERVENTIONS:  - Identify barriers to discharge w/patient and caregiver  - Arrange for needed discharge resources and transportation as appropriate  - Identify discharge learning needs (meds, wound care, etc.)  - Arrange for interpretive services to assist at discharge as needed  - Refer to Case Management Department for coordinating discharge planning if the patient needs post-hospital services based on physician/advanced practitioner order or complex needs related to functional status, cognitive ability, or social support system  Outcome: Progressing     Problem: Knowledge Deficit  Goal: Patient/family/caregiver demonstrates understanding of disease process, treatment plan, medications, and discharge instructions  Description: Complete learning assessment and assess knowledge base.  Interventions:  - Provide teaching at level of understanding  - Provide teaching via preferred learning methods  Outcome: Progressing     Problem: Prexisting or High Potential for Compromised Skin Integrity  Goal: Skin integrity is maintained or improved  Description: INTERVENTIONS:  - Identify patients at risk for skin breakdown  - Assess and monitor skin integrity  - Assess  and monitor nutrition and hydration status  - Monitor labs   - Assess for incontinence   - Turn and reposition patient  - Assist with mobility/ambulation  - Relieve pressure over bony prominences  - Avoid friction and shearing  - Provide appropriate hygiene as needed including keeping skin clean and dry  - Evaluate need for skin moisturizer/barrier cream  - Collaborate with interdisciplinary team   - Patient/family teaching  - Consider wound care consult   Outcome: Progressing     Problem: GASTROINTESTINAL - ADULT  Goal: Maintains or returns to baseline bowel function  Description: INTERVENTIONS:  - Assess bowel function  - Encourage oral fluids to ensure adequate hydration  - Administer IV fluids if ordered to ensure adequate hydration  - Administer ordered medications as needed  - Encourage mobilization and activity  - Consider nutritional services referral to assist patient with adequate nutrition and appropriate food choices  Outcome: Progressing     Problem: GENITOURINARY - ADULT  Goal: Urinary catheter remains patent  Description: INTERVENTIONS:  - Assess patency of urinary catheter  - If patient has a chronic hanks, consider changing catheter if non-functioning  - Follow guidelines for intermittent irrigation of non-functioning urinary catheter  Outcome: Progressing     Problem: METABOLIC, FLUID AND ELECTROLYTES - ADULT  Goal: Electrolytes maintained within normal limits  Description: INTERVENTIONS:  - Monitor labs and assess patient for signs and symptoms of electrolyte imbalances  - Administer electrolyte replacement as ordered  - Monitor response to electrolyte replacements, including repeat lab results as appropriate  - Instruct patient on fluid and nutrition as appropriate  Outcome: Progressing     Problem: SKIN/TISSUE INTEGRITY - ADULT  Goal: Skin Integrity remains intact(Skin Breakdown Prevention)  Description: Assess:  -Perform Goldy assessment every shift  -Clean and moisturize skin every 2  hours  -Inspect skin when repositioning, toileting, and assisting with ADLS  -Assess under medical devices such as masimo every shift  -Assess extremities for adequate circulation and sensation     Bed Management:  -Have minimal linens on bed & keep smooth, unwrinkled  -Change linens as needed when moist or perspiring  -Avoid sitting or lying in one position for more than 2 hours while in bed  -Keep HOB at 30 degrees     Toileting:  -Offer bedside commode  -Assess for incontinence every 2 hours  -Use incontinent care products after each incontinent episode such as lotion    Activity:  -Mobilize patient 3 times a day  -Encourage activity and walks on unit  -Encourage or provide ROM exercises   -Turn and reposition patient every 2 Hours  -Use appropriate equipment to lift or move patient in bed  -Instruct/ Assist with weight shifting every 2 hrs when out of bed in chair  -Consider limitation of chair time 1 hour intervals    Skin Care:  -Avoid use of baby powder, tape, friction and shearing, hot water or constrictive clothing  -Relieve pressure over bony prominences using allevyn  -Do not massage red bony areas    Next Steps:  -Teach patient strategies to minimize risks such as repositioning   -Consider consults to  interdisciplinary teams such as PTOT  Outcome: Progressing  Goal: Incision(s), wounds(s) or drain site(s) healing without S/S of infection  Description: INTERVENTIONS  - Assess and document dressing, incision, wound bed, drain sites and surrounding tissue  - Provide patient and family education  - Perform skin care/dressing changes as ordered  Outcome: Progressing  Goal: Pressure injury heals and does not worsen  Description: Interventions:  - Implement low air loss mattress or specialty surface (Criteria met)  - Apply silicone foam dressing  - Instruct/assist with weight shifting every 30 minutes when in chair   - Limit chair time to 1 hour intervals  - Use special pressure reducing interventions such as  mary when in chair   - Apply fecal or urinary incontinence containment device   - Perform passive or active ROM every shift  - Turn and reposition patient & offload bony prominences every 2 hours   - Utilize friction reducing device or surface for transfers   - Consider consults to  interdisciplinary teams such as PTOT  - Use incontinent care products after each incontinent episode such as lotion  - Consider nutrition services referral as needed  Outcome: Progressing     Problem: HEMATOLOGIC - ADULT  Goal: Maintains hematologic stability  Description: INTERVENTIONS  - Assess for signs and symptoms of bleeding or hemorrhage  - Monitor labs  - Administer supportive blood products/factors as ordered and appropriate  Outcome: Progressing     Problem: Nutrition/Hydration-ADULT  Goal: Nutrient/Hydration intake appropriate for improving, restoring or maintaining nutritional needs  Description: Monitor and assess patient's nutrition/hydration status for malnutrition. Collaborate with interdisciplinary team and initiate plan and interventions as ordered.  Monitor patient's weight and dietary intake as ordered or per policy. Utilize nutrition screening tool and intervene as necessary. Determine patient's food preferences and provide high-protein, high-caloric foods as appropriate.     INTERVENTIONS:  - Monitor oral intake, urinary output, labs, and treatment plans  - Assess nutrition and hydration status and recommend course of action  - Evaluate amount of meals eaten  - Assist patient with eating if necessary   - Allow adequate time for meals  - Recommend/ encourage appropriate diets, oral nutritional supplements, and vitamin/mineral supplements  - Order, calculate, and assess calorie counts as needed  - Recommend, monitor, and adjust tube feedings and TPN/PPN based on assessed needs  - Assess need for intravenous fluids  - Provide specific nutrition/hydration education as appropriate  - Include patient/family/caregiver  in decisions related to nutrition  Outcome: Progressing

## 2024-09-20 NOTE — QUICK NOTE
General Surgery Wound Check:    Name: Drew Santos  : 1950  MRN: [id number]  Date: 24    Evaluated patient's wound at bedside today.  Patient reports appropriate pain control without acute concerns of his sacral wound at this time.  Patient denies nausea/vomiting, GI intolerance, or diarrhea/constipation.  Tolerating daily dressing changes well without reported pain.    On exam today sacral wound appears to be healing appropriately with healthy granulation tissue at the wound bed. No erythema, edema, or tenderness surrounding the wound.  Edges free of maceration.  Wound packed with clean saline-soaked gauze with absorptive sacral dressing overlying wound.  Although stool identified within gluteal cleft surrounding anus, no wound identified under dressing or in wound bed.    Recommend continuing wet-to-dry dressing changes with alleviation of direct sacral pressure as able and turns every 2 hours.  Continue diligence to keep stool from contaminating wound.  Remainder of care per primary team.    Peter Holland MD  General Surgery  24  2:08 PM

## 2024-09-20 NOTE — ASSESSMENT & PLAN NOTE
As evidenced by tachycardia and leukocytosis and fever  Now with gram-negative bacteremia- enterbacter, enterococcus and proetus   CT abdomen and pelvis showsMid/distal sigmoid colonic and rectal inflammatory changes in keeping with a segmental colitis/proctitis.Right ischioanal fossa subcutaneous emphysema extending through the right paramidline gluteal subcutaneous tissues to the skin surface suspicious for a perirectal or perianal fistula; limited assessment without oral or rectal contrast.Parra catheter balloon is located in the prostatic urethra; this catheter should be repositioned.Fat stranding surrounding the decompressed bladder may indicate cystitis.  Has chronic indwelling Parra catheter with abnormal UA  Possible source of infection appears to be proctocolitis versus complicated UTI in the setting of indwelling Parra catheter.  Also has large sacral decubitus ulcer which could be infected as well.   Infectious disease and surgical consultation appreciated.    Patient is s/p bedside debridement of sacral decubitus ulcer.  Continue with dressing changes and further debridements per surgical team.  Surgery reevaluated wound today, continue dressing changes   Continue with broad-spectrum IV antibiotics.  Currently on cefepime and Flagyl.  Vancomycin added for Enterococcus growing in 1 of 2 sets of admission blood cultures.  Previously 1 set of preliminary blood cultures with Enterobacter and Proteus.  Follow-up on repeat blood cultures this AM.  Follow-up on transthoracic echocardiogram today   Appreciate infectious disease, surgical and GI follow-up

## 2024-09-20 NOTE — ASSESSMENT & PLAN NOTE
Currently in sinus tachycardia in the ER  Restart Toprol XL 25 mg daily today   Will continue home Eliquis for stroke prevention

## 2024-09-20 NOTE — ASSESSMENT & PLAN NOTE
Patient met sepsis criteria on admission.  Admission blood cultures showing GNR with BCI suggesting enterobacter and proteus. Now second set BCI is suggesting enterococcus faecalis.  Bacteremia likely secondary sacral wound with proctitis with possible fistulous connection   Patient has COVID-19 but no escalating oxygen requirement and is on mild COVID pathway   Has chronic indwelling Parra catheter.  Urine was abnormal, culture growing enterobacter   Infectious disease input noted and appreciated.  Was on cefepime and Flagyl and started on vancomycin for positive blood culture showing Enterococcus.  Follow-up on repeat blood cultures obtained today  Follow-up on transthoracic echocardiogram  Infectious disease input noted and appreciated

## 2024-09-21 LAB
ANION GAP SERPL CALCULATED.3IONS-SCNC: 2 MMOL/L (ref 4–13)
AORTIC ROOT: 3.4 CM
AORTIC VALVE MEAN VELOCITY: 10.3 M/S
APICAL FOUR CHAMBER EJECTION FRACTION: 54 %
ASCENDING AORTA: 3.5 CM
AV AREA BY CONTINUOUS VTI: 2.8 CM2
AV AREA PEAK VELOCITY: 3.2 CM2
AV LVOT MEAN GRADIENT: 2 MMHG
AV LVOT PEAK GRADIENT: 4 MMHG
AV MEAN GRADIENT: 5 MMHG
AV PEAK GRADIENT: 8 MMHG
AV VALVE AREA: 2.83 CM2
AV VELOCITY RATIO: 0.72
BACTERIA BLD CULT: ABNORMAL
BACTERIA BLD CULT: ABNORMAL
BSA FOR ECHO PROCEDURE: 2.45 M2
BUN SERPL-MCNC: 10 MG/DL (ref 5–25)
CALCIUM SERPL-MCNC: 7.4 MG/DL (ref 8.4–10.2)
CHLORIDE SERPL-SCNC: 116 MMOL/L (ref 96–108)
CO2 SERPL-SCNC: 26 MMOL/L (ref 21–32)
CREAT SERPL-MCNC: 0.6 MG/DL (ref 0.6–1.3)
DOP CALC AO PEAK VEL: 1.42 M/S
DOP CALC AO VTI: 25.12 CM
DOP CALC LVOT AREA: 4.52 CM2
DOP CALC LVOT CARDIAC INDEX: 3.07 L/MIN/M2
DOP CALC LVOT CARDIAC OUTPUT: 7.54 L/MIN
DOP CALC LVOT DIAMETER: 2.4 CM
DOP CALC LVOT PEAK VEL VTI: 15.74 CM
DOP CALC LVOT PEAK VEL: 1.02 M/S
DOP CALC LVOT STROKE INDEX: 28 ML/M2
DOP CALC LVOT STROKE VOLUME: 71.17
E CLOAC COMP DNA BLD POS NAA+NON-PROBE: DETECTED
ERYTHROCYTE [DISTWIDTH] IN BLOOD BY AUTOMATED COUNT: 14.4 % (ref 11.6–15.1)
FRACTIONAL SHORTENING: 20 (ref 28–44)
GFR SERPL CREATININE-BSD FRML MDRD: 98 ML/MIN/1.73SQ M
GLUCOSE SERPL-MCNC: 138 MG/DL (ref 65–140)
GLUCOSE SERPL-MCNC: 139 MG/DL (ref 65–140)
GLUCOSE SERPL-MCNC: 146 MG/DL (ref 65–140)
GLUCOSE SERPL-MCNC: 148 MG/DL (ref 65–140)
GLUCOSE SERPL-MCNC: 186 MG/DL (ref 65–140)
GRAM STN SPEC: ABNORMAL
HCT VFR BLD AUTO: 24.5 % (ref 36.5–49.3)
HGB BLD-MCNC: 7.5 G/DL (ref 12–17)
INTERVENTRICULAR SEPTUM IN DIASTOLE (PARASTERNAL SHORT AXIS VIEW): 1.3 CM
INTERVENTRICULAR SEPTUM: 1.3 CM (ref 0.6–1.1)
LAAS-AP2: 22.2 CM2
LAAS-AP4: 18.5 CM2
LEFT ATRIUM SIZE: 3.4 CM
LEFT ATRIUM VOLUME (MOD BIPLANE): 63 ML
LEFT ATRIUM VOLUME INDEX (MOD BIPLANE): 25.6 ML/M2
LEFT INTERNAL DIMENSION IN SYSTOLE: 3.3 CM (ref 2.1–4)
LEFT VENTRICULAR INTERNAL DIMENSION IN DIASTOLE: 4.1 CM (ref 3.5–6)
LEFT VENTRICULAR POSTERIOR WALL IN END DIASTOLE: 1.3 CM
LEFT VENTRICULAR STROKE VOLUME: 30 ML
LVSV (TEICH): 30 ML
MAGNESIUM SERPL-MCNC: 1.9 MG/DL (ref 1.9–2.7)
MCH RBC QN AUTO: 29.1 PG (ref 26.8–34.3)
MCHC RBC AUTO-ENTMCNC: 30.6 G/DL (ref 31.4–37.4)
MCV RBC AUTO: 95 FL (ref 82–98)
PHOSPHATE SERPL-MCNC: 1.9 MG/DL (ref 2.3–4.1)
PLATELET # BLD AUTO: 434 THOUSANDS/UL (ref 149–390)
PMV BLD AUTO: 9.5 FL (ref 8.9–12.7)
POTASSIUM SERPL-SCNC: 3.2 MMOL/L (ref 3.5–5.3)
PROTEUS SP DNA BLD POS QL NAA+NON-PROBE: DETECTED
RBC # BLD AUTO: 2.58 MILLION/UL (ref 3.88–5.62)
RIGHT ATRIUM AREA SYSTOLE A4C: 18.2 CM2
RIGHT VENTRICLE ID DIMENSION: 3.6 CM
SINOTUBULAR JUNCTION: 3.1 CM
SL CV LEFT ATRIUM LENGTH A2C: 4.9 CM
SL CV LV EF: 55
SL CV PED ECHO LEFT VENTRICLE DIASTOLIC VOLUME (MOD BIPLANE) 2D: 76 ML
SL CV PED ECHO LEFT VENTRICLE SYSTOLIC VOLUME (MOD BIPLANE) 2D: 46 ML
SL CV SINUS OF VALSALVA 2D: 3.7 CM
SODIUM SERPL-SCNC: 144 MMOL/L (ref 135–147)
STJ: 3.1 CM
TRICUSPID ANNULAR PLANE SYSTOLIC EXCURSION: 1.8 CM
VANCOMYCIN SERPL-MCNC: 17.6 UG/ML (ref 10–20)
WBC # BLD AUTO: 16.78 THOUSAND/UL (ref 4.31–10.16)

## 2024-09-21 PROCEDURE — 80202 ASSAY OF VANCOMYCIN: CPT | Performed by: NURSE PRACTITIONER

## 2024-09-21 PROCEDURE — 83735 ASSAY OF MAGNESIUM: CPT | Performed by: PHYSICIAN ASSISTANT

## 2024-09-21 PROCEDURE — 92610 EVALUATE SWALLOWING FUNCTION: CPT

## 2024-09-21 PROCEDURE — 80048 BASIC METABOLIC PNL TOTAL CA: CPT | Performed by: PHYSICIAN ASSISTANT

## 2024-09-21 PROCEDURE — 85027 COMPLETE CBC AUTOMATED: CPT | Performed by: PHYSICIAN ASSISTANT

## 2024-09-21 PROCEDURE — 82948 REAGENT STRIP/BLOOD GLUCOSE: CPT

## 2024-09-21 PROCEDURE — 99232 SBSQ HOSP IP/OBS MODERATE 35: CPT | Performed by: PHYSICIAN ASSISTANT

## 2024-09-21 PROCEDURE — 84100 ASSAY OF PHOSPHORUS: CPT | Performed by: PHYSICIAN ASSISTANT

## 2024-09-21 RX ORDER — METOPROLOL SUCCINATE 50 MG/1
50 TABLET, EXTENDED RELEASE ORAL DAILY
Status: DISCONTINUED | OUTPATIENT
Start: 2024-09-21 | End: 2024-09-21

## 2024-09-21 RX ORDER — METOPROLOL SUCCINATE 25 MG/1
25 TABLET, EXTENDED RELEASE ORAL DAILY
Status: DISCONTINUED | OUTPATIENT
Start: 2024-09-21 | End: 2024-09-22

## 2024-09-21 RX ORDER — LANOLIN ALCOHOL/MO/W.PET/CERES
1 CREAM (GRAM) TOPICAL 2 TIMES DAILY WITH MEALS
Status: DISCONTINUED | OUTPATIENT
Start: 2024-09-21 | End: 2024-10-04 | Stop reason: HOSPADM

## 2024-09-21 RX ADMIN — Medication 1 TABLET: at 18:21

## 2024-09-21 RX ADMIN — VANCOMYCIN HYDROCHLORIDE 1250 MG: 5 INJECTION, POWDER, LYOPHILIZED, FOR SOLUTION INTRAVENOUS at 15:22

## 2024-09-21 RX ADMIN — METRONIDAZOLE 500 MG: 500 INJECTION, SOLUTION INTRAVENOUS at 13:55

## 2024-09-21 RX ADMIN — APIXABAN 5 MG: 5 TABLET, FILM COATED ORAL at 18:20

## 2024-09-21 RX ADMIN — ATORVASTATIN CALCIUM 80 MG: 80 TABLET, FILM COATED ORAL at 18:20

## 2024-09-21 RX ADMIN — SODIUM CHLORIDE, SODIUM GLUCONATE, SODIUM ACETATE, POTASSIUM CHLORIDE, MAGNESIUM CHLORIDE, SODIUM PHOSPHATE, DIBASIC, AND POTASSIUM PHOSPHATE 125 ML/HR: .53; .5; .37; .037; .03; .012; .00082 INJECTION, SOLUTION INTRAVENOUS at 02:22

## 2024-09-21 RX ADMIN — POTASSIUM CHLORIDE 40 MEQ: 1.5 SOLUTION ORAL at 18:20

## 2024-09-21 RX ADMIN — BACITRACIN ZINC 1 SMALL APPLICATION: 500 OINTMENT TOPICAL at 18:20

## 2024-09-21 RX ADMIN — Medication 1 TABLET: at 10:25

## 2024-09-21 RX ADMIN — METRONIDAZOLE 500 MG: 500 INJECTION, SOLUTION INTRAVENOUS at 03:51

## 2024-09-21 RX ADMIN — ESCITALOPRAM OXALATE 10 MG: 10 TABLET ORAL at 10:24

## 2024-09-21 RX ADMIN — VANCOMYCIN HYDROCHLORIDE 1250 MG: 5 INJECTION, POWDER, LYOPHILIZED, FOR SOLUTION INTRAVENOUS at 23:43

## 2024-09-21 RX ADMIN — CEFEPIME 2000 MG: 2 INJECTION, POWDER, FOR SOLUTION INTRAVENOUS at 00:27

## 2024-09-21 RX ADMIN — METOPROLOL SUCCINATE 25 MG: 25 TABLET, EXTENDED RELEASE ORAL at 10:27

## 2024-09-21 RX ADMIN — SODIUM HYPOCHLORITE 1 APPLICATION: 5 SOLUTION TOPICAL at 10:29

## 2024-09-21 RX ADMIN — BACITRACIN ZINC 1 SMALL APPLICATION: 500 OINTMENT TOPICAL at 11:22

## 2024-09-21 RX ADMIN — POTASSIUM PHOSPHATE 21 MMOL: 236; 224 INJECTION, SOLUTION INTRAVENOUS at 10:35

## 2024-09-21 RX ADMIN — METRONIDAZOLE 500 MG: 500 INJECTION, SOLUTION INTRAVENOUS at 21:12

## 2024-09-21 RX ADMIN — ASPIRIN 81 MG CHEWABLE TABLET 81 MG: 81 TABLET CHEWABLE at 10:24

## 2024-09-21 RX ADMIN — CEFEPIME 2000 MG: 2 INJECTION, POWDER, FOR SOLUTION INTRAVENOUS at 15:21

## 2024-09-21 RX ADMIN — EZETIMIBE 10 MG: 10 TABLET ORAL at 10:25

## 2024-09-21 RX ADMIN — TAMSULOSIN HYDROCHLORIDE 0.4 MG: 0.4 CAPSULE ORAL at 10:24

## 2024-09-21 RX ADMIN — POTASSIUM CHLORIDE 40 MEQ: 1.5 SOLUTION ORAL at 10:23

## 2024-09-21 RX ADMIN — DOCUSATE SODIUM 100 MG: 100 CAPSULE, LIQUID FILLED ORAL at 10:26

## 2024-09-21 RX ADMIN — INSULIN LISPRO 1 UNITS: 100 INJECTION, SOLUTION INTRAVENOUS; SUBCUTANEOUS at 22:14

## 2024-09-21 RX ADMIN — LISINOPRIL 2.5 MG: 2.5 TABLET ORAL at 10:24

## 2024-09-21 RX ADMIN — FINASTERIDE 5 MG: 5 TABLET, FILM COATED ORAL at 10:24

## 2024-09-21 NOTE — ASSESSMENT & PLAN NOTE
As evidenced by tachycardia and leukocytosis and fever  Now with gram-negative bacteremia- enterbacter, enterococcus and proetus   CT abdomen and pelvis showsMid/distal sigmoid colonic and rectal inflammatory changes in keeping with a segmental colitis/proctitis.Right ischioanal fossa subcutaneous emphysema extending through the right paramidline gluteal subcutaneous tissues to the skin surface suspicious for a perirectal or perianal fistula; limited assessment without oral or rectal contrast. Parra catheter balloon is located in the prostatic urethra; this catheter should be repositioned. Fat stranding surrounding the decompressed bladder may indicate cystitis.  Has chronic indwelling Parra catheter with abnormal UA  Possible source of infection appears to be proctocolitis versus complicated UTI in the setting of indwelling Parra catheter.  Also has large sacral decubitus ulcer which could be infected as well.   Patient is s/p bedside debridement of sacral decubitus ulcer.  Continue with dressing changes and further debridements per surgical team.  Surgery reevaluated wound, continue dressing changes   Continue with broad-spectrum IV antibiotics.  Currently on cefepime and Flagyl.  Vancomycin added for Enterococcus growing in 1 of 2 sets of admission blood cultures.  Previously 1 set of preliminary blood cultures with Enterobacter and Proteus.   Follow-up on transthoracic echocardiogram read   Appreciate infectious disease, surgical and GI follow-up  Follow up repeat cultures

## 2024-09-21 NOTE — PLAN OF CARE
Problem: Potential for Falls  Goal: Patient will remain free of falls  Description: INTERVENTIONS:  - Educate patient/family on patient safety including physical limitations  - Instruct patient to call for assistance with activity   - Consult OT/PT to assist with strengthening/mobility   - Keep Call bell within reach  - Keep bed low and locked with side rails adjusted as appropriate  - Keep care items and personal belongings within reach  - Initiate and maintain comfort rounds  - Make Fall Risk Sign visible to staff  - Offer Toileting every 2 Hours, in advance of need  - Initiate/Maintain bed alarm  - Obtain necessary fall risk management equipment: bed alarm call bell  - Apply yellow socks and bracelet for high fall risk patients  - Consider moving patient to room near nurses station  Outcome: Progressing     Problem: PAIN - ADULT  Goal: Verbalizes/displays adequate comfort level or baseline comfort level  Description: Interventions:  - Encourage patient to monitor pain and request assistance  - Assess pain using appropriate pain scale  - Administer analgesics based on type and severity of pain and evaluate response  - Implement non-pharmacological measures as appropriate and evaluate response  - Consider cultural and social influences on pain and pain management  - Notify physician/advanced practitioner if interventions unsuccessful or patient reports new pain  Outcome: Progressing     Problem: INFECTION - ADULT  Goal: Absence or prevention of progression during hospitalization  Description: INTERVENTIONS:  - Assess and monitor for signs and symptoms of infection  - Monitor lab/diagnostic results  - Monitor all insertion sites, i.e. indwelling lines, tubes, and drains  - Monitor endotracheal if appropriate and nasal secretions for changes in amount and color  - [place] appropriate cooling/warming therapies per order  - Administer medications as ordered  - Instruct and encourage patient and family to use good  hand hygiene technique  - Identify and instruct in appropriate isolation precautions for identified infection/condition  Outcome: Progressing  Goal: Absence of fever/infection during neutropenic period  Description: INTERVENTIONS:  - Monitor WBC    Outcome: Progressing     Problem: SAFETY ADULT  Goal: Patient will remain free of falls  Description: INTERVENTIONS:  - Educate patient/family on patient safety including physical limitations  - Instruct patient to call for assistance with activity   - Consult OT/PT to assist with strengthening/mobility   - Keep Call bell within reach  - Keep bed low and locked with side rails adjusted as appropriate  - Keep care items and personal belongings within reach  - Initiate and maintain comfort rounds  - Make Fall Risk Sign visible to staff  - Offer Toileting every 2 Hours, in advance of need  - Initiate/Maintain bed alarm  - Obtain necessary fall risk management equipment: bed alarm call bell  - Apply yellow socks and bracelet for high fall risk patients  - Consider moving patient to room near nurses station  Outcome: Progressing  Goal: Maintain or return to baseline ADL function  Description: INTERVENTIONS:  -  Assess patient's ability to carry out ADLs; assess patient's baseline for ADL function and identify physical deficits which impact ability to perform ADLs (bathing, care of mouth/teeth, toileting, grooming, dressing, etc.)  - Assess/evaluate cause of self-care deficits   - Assess range of motion  - Assess patient's mobility; develop plan if impaired  - Assess patient's need for assistive devices and provide as appropriate  - Encourage maximum independence but intervene and supervise when necessary  - Involve family in performance of ADLs  - Assess for home care needs following discharge   - Consider OT consult to assist with ADL evaluation and planning for discharge  - Provide patient education as appropriate  Outcome: Progressing  Goal: Maintains/Returns to pre  admission functional level  Description: INTERVENTIONS:  - Perform AM-PAC 6 Click Basic Mobility/ Daily Activity assessment daily.  - Set and communicate daily mobility goal to care team and patient/family/caregiver.   - Collaborate with rehabilitation services on mobility goals if consulted  - Perform Range of Motion 4 times a day.  - Reposition patient every 2 hours.  - Dangle patient 3 times a day  - Stand patient 3 times a day  - Ambulate patient 3 times a day  - Out of bed to chair 3 times a day   - Out of bed for meals 3 times a day  - Out of bed for toileting  - Record patient progress and toleration of activity level   Outcome: Progressing     Problem: DISCHARGE PLANNING  Goal: Discharge to home or other facility with appropriate resources  Description: INTERVENTIONS:  - Identify barriers to discharge w/patient and caregiver  - Arrange for needed discharge resources and transportation as appropriate  - Identify discharge learning needs (meds, wound care, etc.)  - Arrange for interpretive services to assist at discharge as needed  - Refer to Case Management Department for coordinating discharge planning if the patient needs post-hospital services based on physician/advanced practitioner order or complex needs related to functional status, cognitive ability, or social support system  Outcome: Progressing     Problem: Knowledge Deficit  Goal: Patient/family/caregiver demonstrates understanding of disease process, treatment plan, medications, and discharge instructions  Description: Complete learning assessment and assess knowledge base.  Interventions:  - Provide teaching at level of understanding  - Provide teaching via preferred learning methods  Outcome: Progressing     Problem: Prexisting or High Potential for Compromised Skin Integrity  Goal: Skin integrity is maintained or improved  Description: INTERVENTIONS:  - Identify patients at risk for skin breakdown  - Assess and monitor skin integrity  - Assess  and monitor nutrition and hydration status  - Monitor labs   - Assess for incontinence   - Turn and reposition patient  - Assist with mobility/ambulation  - Relieve pressure over bony prominences  - Avoid friction and shearing  - Provide appropriate hygiene as needed including keeping skin clean and dry  - Evaluate need for skin moisturizer/barrier cream  - Collaborate with interdisciplinary team   - Patient/family teaching  - Consider wound care consult   Outcome: Progressing     Problem: GASTROINTESTINAL - ADULT  Goal: Maintains or returns to baseline bowel function  Description: INTERVENTIONS:  - Assess bowel function  - Encourage oral fluids to ensure adequate hydration  - Administer IV fluids if ordered to ensure adequate hydration  - Administer ordered medications as needed  - Encourage mobilization and activity  - Consider nutritional services referral to assist patient with adequate nutrition and appropriate food choices  Outcome: Progressing     Problem: GENITOURINARY - ADULT  Goal: Urinary catheter remains patent  Description: INTERVENTIONS:  - Assess patency of urinary catheter  - If patient has a chronic hanks, consider changing catheter if non-functioning  - Follow guidelines for intermittent irrigation of non-functioning urinary catheter  Outcome: Progressing     Problem: METABOLIC, FLUID AND ELECTROLYTES - ADULT  Goal: Electrolytes maintained within normal limits  Description: INTERVENTIONS:  - Monitor labs and assess patient for signs and symptoms of electrolyte imbalances  - Administer electrolyte replacement as ordered  - Monitor response to electrolyte replacements, including repeat lab results as appropriate  - Instruct patient on fluid and nutrition as appropriate  Outcome: Progressing     Problem: SKIN/TISSUE INTEGRITY - ADULT  Goal: Skin Integrity remains intact(Skin Breakdown Prevention)  Description: Assess:  -Perform Goldy assessment every shift  -Clean and moisturize skin every  shift  -Inspect skin when repositioning, toileting, and assisting with ADLS  -Assess under medical devices such as masimo every shift  -Assess extremities for adequate circulation and sensation     Bed Management:  -Have minimal linens on bed & keep smooth, unwrinkled  -Change linens as needed when moist or perspiring  -Avoid sitting or lying in one position for more than 2 hours while in bed  -Keep HOB at 30 degrees     Toileting:  -Offer bedside commode  -Assess for incontinence every 2 hours  -Use incontinent care products after each incontinent episode such as lotion    Activity:  -Mobilize patient 3 times a day  -Encourage activity and walks on unit  -Encourage or provide ROM exercises   -Turn and reposition patient every 2 Hours  -Use appropriate equipment to lift or move patient in bed  -Instruct/ Assist with weight shifting every 2 hours when out of bed in chair  -Consider limitation of chair time 2 hour intervals    Skin Care:  -Avoid use of baby powder, tape, friction and shearing, hot water or constrictive clothing  -Relieve pressure over bony prominences using allevyn  -Do not massage red bony areas    Next Steps:  -Teach patient strategies to minimize risks such as repositioning   -Consider consults to  interdisciplinary teams such as PTOT  Outcome: Progressing  Goal: Incision(s), wounds(s) or drain site(s) healing without S/S of infection  Description: INTERVENTIONS  - Assess and document dressing, incision, wound bed, drain sites and surrounding tissue  - Provide patient and family education  - Perform skin care/dressing changes as needed/ordered  Outcome: Progressing  Goal: Pressure injury heals and does not worsen  Description: Interventions:  - Implement low air loss mattress or specialty surface (Criteria met)  - Apply silicone foam dressing  - Instruct/assist with weight shifting every 30 minutes when in chair   - Limit chair time to 1 hour intervals  - Use special pressure reducing interventions  such as allevyn when in chair   - Apply fecal or urinary incontinence containment device   - Perform passive or active ROM every shift  - Turn and reposition patient & offload bony prominences every 2 hours   - Utilize friction reducing device or surface for transfers   - Consider consults to  interdisciplinary teams such as PTOT  - Use incontinent care products after each incontinent episode such as lotion  - Consider nutrition services referral as needed  Outcome: Progressing     Problem: HEMATOLOGIC - ADULT  Goal: Maintains hematologic stability  Description: INTERVENTIONS  - Assess for signs and symptoms of bleeding or hemorrhage  - Monitor labs  - Administer supportive blood products/factors as ordered and appropriate  Outcome: Progressing     Problem: Nutrition/Hydration-ADULT  Goal: Nutrient/Hydration intake appropriate for improving, restoring or maintaining nutritional needs  Description: Monitor and assess patient's nutrition/hydration status for malnutrition. Collaborate with interdisciplinary team and initiate plan and interventions as ordered.  Monitor patient's weight and dietary intake as ordered or per policy. Utilize nutrition screening tool and intervene as necessary. Determine patient's food preferences and provide high-protein, high-caloric foods as appropriate.     INTERVENTIONS:  - Monitor oral intake, urinary output, labs, and treatment plans  - Assess nutrition and hydration status and recommend course of action  - Evaluate amount of meals eaten  - Assist patient with eating if necessary   - Allow adequate time for meals  - Recommend/ encourage appropriate diets, oral nutritional supplements, and vitamin/mineral supplements  - Order, calculate, and assess calorie counts as needed  - Recommend, monitor, and adjust tube feedings and TPN/PPN based on assessed needs  - Assess need for intravenous fluids  - Provide specific nutrition/hydration education as appropriate  - Include  patient/family/caregiver in decisions related to nutrition  Outcome: Progressing

## 2024-09-21 NOTE — SPEECH THERAPY NOTE
Speech Language/Pathology  Speech/Language Pathology  Assessment    Patient Name: Drew Santos  Today's Date: 9/21/2024     Problem List  Principal Problem:    Sepsis  Active Problems:    Sacral wound    Type 2 diabetes mellitus without complication, without long-term current use of insulin (HCC)    COVID-19    Proctitis    Polymicrobial bacteremia    Acute metabolic encephalopathy    History of CVA (cerebrovascular accident)    Paroxysmal atrial fibrillation (HCC)    Anemia    Severe protein-calorie malnutrition (HCC)    Past Medical History  Past Medical History:   Diagnosis Date    Atherosclerotic heart disease of native coronary artery without angina pectoris     Atrial fibrillation (HCC)     Cerebral infarction (HCC)     Constipation     Depression     Diabetes mellitus (HCC)     Hemiplegia and hemiparesis following cerebral infarction affecting left non-dominant side (HCC)     Hyperlipidemia     Hypertension     Pressure ulcer of sacral region, stage 3 (HCC)     Prostatic hyperplasia     Sepsis (HCC)     Stroke (HCC)     TIA (transient ischemic attack)     Urinary retention     Vitamin D deficiency      Past Surgical History  No past surgical history on file.       Bedside Swallow Evaluation:    Summary:  Pt knownto me from previous admit at which time he refused PO x 2 visits. Presented w/ refusal for breakfast w/ PCA x supplement. Seen by me, agreeable to a small amount of cream of wheat and some of the supplement. Closed mouth abruptly on the spoon w/ hot cereal. Oral manipulation, transfer, and swallow were prompt. No cough or wet vocal quality w/ cereal or liquids. I asked him several times if there was something he wanted/was hungry for/I could get him. He nodded no. Limited eval due to acceptance. ? Mild/mod oral stage. No pharyngeal s/s.     Recommendations:  Diet: dysphagia 2 mech soft for now. F/u for upgrade as sepsis resolves and pt is willing.   Liquid:thin  Meds: crush if necessary  Supervision:   "intermittent  Positioning:Upright  Strategies:set up tray, encourage intake  Pt to take PO/Meds only when fully alert and upright.   Oral care  Aspiration precautions  Reflux precautions  Therapy Prognosis: fair/favorable  Prognosis considerations: adm w/ sepsis. Suspect improvement as it resolves.     Consider consult w/:  Rehab  Nutrition    Goal(s):  Dysphagia LTG  -Patient will demonstrate safe and effective oral intake (without overt s/s significant oral/pharyngeal dysphagia including s/s penetration or aspiration) for the highest appropriate diet level.     1.Pt will tolerate least restrictive diet w/out s/s aspiration or oral/pharyngeal difficulties.   2.Pt will will effectively manipulate/masticate and transfer purees/solids w/out s/s dysphagia/aspiration.   3.Pt will tolerate thin liquids w/out s/s aspiration.   -If indicated, patient will comply with a Video/Modified Barium Swallow study for more complete assessment of swallowing anatomy/physiology/aspiration risk and to assess efficacy of treatment techniques so as to best guide treatment plan     H&P/Admit info/ pertinent provider notes: (PMH noted above)  Chief Complaint: Altered mental status  Drew Santos is a 73 y.o. male with a PMH of CVA with chronic left-sided deficits, sacral ulcer, CAD, A-fib, diabetes, HTN, HLD, who presents with altered mental status.  Patient has a history of CVA with chronic left-sided deficits.  He is bedbound at baseline.  He was recently hospitalized and was sent to a SNF for rehab after his hospitalization.  Due to the patient's current mental status he is unable to recall any history and really does not communicate at all.  This is far from his baseline per his wife at bedside.  She reports that symptoms began approximately 2 days ago when he came more lethargic.  Yesterday he stated that \"he does not feel well \"which he apparently never says.  He has been less interactive and grown more lethargic.  He has been spiking " low-grade fevers at the nursing facility.  He was brought to the emergency room for evaluation where he again was noted to be febrile, tachycardic, and tested positive for COVID.  He was given IV fluids and empiric IV antibiotics.  Unfortunately he has not improved symptomatically and remains encephalopathic.  He has been referred to the hospital service for ongoing management.  Extensive discussion with wife at bedside regarding plan of care and prognosis.  All questions and concerns addressed.  He is confirmed level of care 1.    Per GI  9/19/24:  Assessment/Plan:  Given patient's sepsis, hypotension, advanced age, and extensive vascular comorbidities, patient's rectosigmoid colitis on imaging is most likely due to ischemic colitis, especially given that the area affected is a typical watershed area. I think it is unlikely to be a primary  for his current presentation.  There are certainly subcutaneous emphysema in the R ischioanal fossa which is likely related to the known sacral ulcer. If a perianal or perirectal fistula needs to be ruled out (and would  for ID and surgical services), MRI pelvis would probably be the the best test for fistula evaluation.  There is no role for colonoscopy or flex sig at this time, as sensitivity for fistula detection is quite low, and he is also at quite high-risk for elective endoscopic evaluation.  I do think it is reasonable to check stool infectious studies, as infective colitis is on the differential.  - No plan for endoscopic evaluation  - Continue to support hemodynamics and treat with IV antibiotics per ID  - Stool infectious studies  - Consider MR pelvis for fistula evaluation if it changes management. However, I agree with GI fellow that overall prognosis is poor without a clear path to meaningful improvements in quality of life. Goals of care discussions should be considered    Special Studies:  Ct chest/abd/pelvis:9/18  No acute findings in the  chest.  Mid/distal sigmoid colonic and rectal inflammatory changes in keeping with a segmental colitis/proctitis.  Right ischioanal fossa subcutaneous emphysema extending through the right paramidline gluteal subcutaneous tissues to the skin surface suspicious for a perirectal or perianal fistula; limited assessment without oral or rectal contrast.  Parra catheter balloon is located in the prostatic urethra; this catheter should be repositioned.  Fat stranding surrounding the decompressed bladder may indicate cystitis.  Ct head: 9/17  No acute intracranial hemorrhage seen.  No mass effect or midline shift seen    Procalcitonin<=0.25ng/ml    WBC  4.31-10.16 Thousand/uL   16.78  9/21     Previous MBS:  Done at [facility] 8/27/24. Noted.     Patient's goal:none stated    Did the pt report pain? no  If yes, was nursing notified/was it addressed? N/a    Reason for consult:  R/o aspiration  Determine safest and least restrictive diet  Change in mental status  poor intake  H/o dysphagia  Reportedly holding meds in his mouth yesterday. (Adm w/ sepsis)    Precautions:  Contact  Airborne    Food Allergies: nkfa   Current Diet: Dysphagia 2 mechanical soft, thin   Premorbid diet: Dysphagia advanced, no pork products.  Thin.   O2 requirement: none   Social/Prior living Complete care at [facility] for rehab   Voice/Speech: Speech was clear w/ minimal verbalizations.    Follows commands:  inconsistent   Cognitive status:  alert     Oral [facility] exam:  Dentition:partial natural, has an upper partial  Lips (VII):wnl  Tongue (XII):grossly wnl  Secretion management:wnl    Esophageal stage:  No s/s reported    Aspiration precautions posted    Results d/w:  Pt, nursing,PA

## 2024-09-21 NOTE — ASSESSMENT & PLAN NOTE
Patient met sepsis criteria on admission.  Admission blood cultures showing GNR with BCI suggesting enterobacter and proteus. Now second set BCI is suggesting enterococcus faecalis.  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  Urine was abnormal, culture growing enterobacter   Infectious disease input noted and appreciated.  Was on cefepime and Flagyl and started on vancomycin for positive blood culture showing Enterococcus.  Follow-up on repeat blood cultures from 9/20/24   Follow-up on transthoracic echocardiogram  Infectious disease input noted and appreciated

## 2024-09-21 NOTE — ASSESSMENT & PLAN NOTE
Lab Results   Component Value Date    HGBA1C 6.2 (H) 09/17/2024       Recent Labs     09/20/24  1147 09/20/24  1644 09/20/24  2130 09/21/24  0836   POCGLU 126 184* 167* 139       Blood Sugar Average: Last 72 hrs:  (P) [id number]  Well-controlled with last hemoglobin A1c of 6.0% in July of this year  Will hold home oral regimen  Monitor on sliding scale only  Hypoglycemia protocol

## 2024-09-21 NOTE — PROGRESS NOTES
Progress Note - Hospitalist   Name: Drew Santos 73 y.o. male I MRN: [id number]  Unit/Bed#: [place] [address] -01 I Date of Admission: 9/17/2024   Date of Service: 9/21/2024 I Hospital Day: 4    Assessment & Plan  Sepsis  As evidenced by tachycardia and leukocytosis and fever  Now with gram-negative bacteremia- enterbacter, enterococcus and proetus   CT abdomen and pelvis showsMid/distal sigmoid colonic and rectal inflammatory changes in keeping with a segmental colitis/proctitis.Right ischioanal fossa subcutaneous emphysema extending through the right paramidline gluteal subcutaneous tissues to the skin surface suspicious for a perirectal or perianal fistula; limited assessment without oral or rectal contrast. Parra catheter balloon is located in the prostatic urethra; this catheter should be repositioned. Fat stranding surrounding the decompressed bladder may indicate cystitis.  Has chronic indwelling Parra catheter with abnormal UA  Possible source of infection appears to be proctocolitis versus complicated UTI in the setting of indwelling Parra catheter.  Also has large sacral decubitus ulcer which could be infected as well.   Patient is s/p bedside debridement of sacral decubitus ulcer.  Continue with dressing changes and further debridements per surgical team.  Surgery reevaluated wound, continue dressing changes   Continue with broad-spectrum IV antibiotics.  Currently on cefepime and Flagyl.  Vancomycin added for Enterococcus growing in 1 of 2 sets of admission blood cultures.  Previously 1 set of preliminary blood cultures with Enterobacter and Proteus.   Follow-up on transthoracic echocardiogram read   Appreciate infectious disease, surgical and GI follow-up  Follow up repeat cultures   Sacral wound  Chronic sacral decubitus ulcer; present on admission  Status post debridement at bedside per surgical team.  Continue with wound care as tolerated.  Reevaluation by surgery today, continue dressing changes and  serial exams  Wound care consultation  Type 2 diabetes mellitus without complication, without long-term current use of insulin ([facility])  Lab Results   Component Value Date    HGBA1C 6.2 (H) 09/17/2024       Recent Labs     09/20/24  1147 09/20/24  1644 09/20/24  2130 09/21/24  0836   POCGLU 126 184* 167* 139       Blood Sugar Average: Last 72 hrs:  (P) [id number]  Well-controlled with last hemoglobin A1c of 6.0% in July of this year  Will hold home oral regimen  Monitor on sliding scale only  Hypoglycemia protocol     COVID-19  Patient currently on room air though high risk given his age and comorbidities  Completed three days of IV remdesivir   Supportive care otherwise  Contact and airborne precautions  Currently on mild COVID pathway.  Not requiring supplemental oxygen.  Wife also sick with COVID.  Proctitis  Noted on CT scan.  No diarrhea or clinical evidence of colitis.  Continue with cefepime and Flagyl.  GI input appreciated.  Stool studies negative  Likely ischemic colitis given area affected, continue supportive measures   Polymicrobial bacteremia  Patient met sepsis criteria on admission.  Admission blood cultures showing GNR with BCI suggesting enterobacter and proteus. Now second set BCI is suggesting enterococcus faecalis.  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  Urine was abnormal, culture growing enterobacter   Infectious disease input noted and appreciated.  Was on cefepime and Flagyl and started on vancomycin for positive blood culture showing Enterococcus.  Follow-up on repeat blood cultures from 9/20/24   Follow-up on transthoracic echocardiogram  Infectious disease input noted and appreciated    Acute metabolic encephalopathy  Presumably secondary to sepsis, COVID infection  Will need further sepsis workup as  below  CT head with no acute intracranial abnormality  Encephalopathy likely in the setting of sepsis  Continue with supportive care.  History of CVA (cerebrovascular accident)  History of CVA   Chronically bedbound and with chronic left-sided deficits   Continue home aspirin, Eliquis, statin  Paroxysmal atrial fibrillation (HCC)  Currently in sinus tachycardia in the ER  Restart Toprol XL 25 mg daily  Will continue home Eliquis for stroke prevention  Anemia  Low blood counts noted 9/19/24.   Has chronic anemia likely secondary to poor nutritional status underlying chronic illness.  Hemoglobin declined to 7.1 prompting 1 unit PRBC infusion.  Likely in the setting of sacral decubitus debridement and some bleeding from urethral meatus.    Hemoglobin 7.5 today, recheck in AM  Encourage oral intake, cannot utilize IV iron due to bacteremia   Severe protein-calorie malnutrition (HCC)  Malnutrition Findings:   Adult Malnutrition type: Chronic illness  Adult Degree of Malnutrition: Other severe protein calorie malnutrition  Malnutrition Characteristics: Inadequate energy, Weight loss                  360 Statement: Severe protein calorie malnutrition in context of chronic illness r/t poor appetite, inadequate PO intake as evidance by energy intake less than 75% compared to estimated needs>1 month, 7.5% wt loss x 1 month ( 122kg 8/23/24-> 113kg 9/17) treated with PO diet and oral supplements    BMI Findings:           Body mass index is 29.53 kg/m².       VTE Pharmacologic Prophylaxis:   High Risk (Score >/= 5) - Pharmacological DVT Prophylaxis Ordered: apixaban (Eliquis). Sequential Compression Devices Ordered.    Mobility:   Basic Mobility Inpatient Raw Score: 6  JH-HLM Goal: 2: Bed activities/Dependent transfer  JH-HLM Achieved: 1: Laying in bed  JH-HLM Goal NOT achieved. Continue with multidisciplinary rounding and encourage appropriate mobility to improve upon JH-HLM goals.    Patient Centered Rounds: I performed  bedside rounds with nursing staff today.   Discussions with Specialists or Other Care Team Provider: speech therapy     Current Length of Stay: 4 day(s)  Current Patient Status: Inpatient   Certification Statement: The patient will continue to require additional inpatient hospital stay due to sepsis due to bacteremia   Discharge Plan: Anticipate discharge in >72 hrs to rehab facility.    Code Status: Level 1 - Full Code    Subjective   Patient seen and examined at bedside. Intermittently answering questions. Does not verbalize any pain or discomfort. Does not want to eat breakfast.    Objective     Vitals:   Temp (24hrs), Av °F (37.2 °C), Min:98.4 °F (36.9 °C), Max:100 °F (37.8 °C)    Temp:  [98.4 °F (36.9 °C)-100 °F (37.8 °C)] 98.4 °F (36.9 °C)  HR:  [104-121] 104  Resp:  [15-18] 15  BP: (107-132)/(68-92) 130/83  SpO2:  [93 %-97 %] 97 %  Body mass index is 29.53 kg/m².     Input and Output Summary (last 24 hours):     Intake/Output Summary (Last 24 hours) at 2024 0914  Last data filed at 2024 0616  Gross per 24 hour   Intake 660 ml   Output 900 ml   Net -240 ml       Physical Exam  Vitals reviewed.   Constitutional:       General: He is not in acute distress.  HENT:      Head: Normocephalic and atraumatic.      Mouth/Throat:      Mouth: Oropharynx is clear and moist.   Eyes:      General: No scleral icterus.     Extraocular Movements: EOM normal.      Conjunctiva/sclera: Conjunctivae normal.   Cardiovascular:      Rate and Rhythm: Normal rate and regular rhythm.      Heart sounds: Normal heart sounds. No murmur heard.  Pulmonary:      Effort: Pulmonary effort is normal. No respiratory distress.      Breath sounds: Normal breath sounds. No wheezing.   Abdominal:      General: Bowel sounds are normal. There is no distension.      Palpations: Abdomen is soft.      Tenderness: There is no abdominal tenderness.   Musculoskeletal:         General: No edema.      Cervical back: Neck supple.      Right lower  leg: No edema.      Left lower leg: No edema.   Skin:     General: Skin is warm and dry.   Neurological:      Mental Status: He is alert.      Motor: Weakness (chronic left sided) present.   Psychiatric:         Mood and Affect: Mood and affect and mood normal.         Behavior: Behavior normal.          Lines/Drains:  Lines/Drains/Airways       Active Status       Name Placement date Placement time Site Days    Urethral Catheter Three way 22 Fr. 08/31/24 1925  Three way  20    Continuous Bladder Irrigation Three-way 08/31/24 1925  Three-way  20                  Urinary Catheter:  Goal for removal: N/A - Chronic Parra           Telemetry:  Telemetry Orders (From admission, onward)               24 Hour Telemetry Monitoring  Continuous x 24 Hours (Telem)        Question:  Reason for 24 Hour Telemetry  Answer:  Metabolic/electrolyte disturbance with high probability of dysrhythmia. K level <3 or >6 OR KCL infusion >10mEq/hr                     Telemetry Reviewed: Normal Sinus Rhythm  Indication for Continued Telemetry Use: Metabolic/electrolyte disturbance with high probability of dysrhythmia               Lab Results: I have reviewed the following results:    Results from last 7 days   Lab Units 09/21/24  0616 09/20/24  0555   WBC Thousand/uL 16.78* 19.87*   HEMOGLOBIN g/dL 7.5* 8.1*   HEMATOCRIT % 24.5* 25.5*   PLATELETS Thousands/uL 434* 398*   SEGS PCT %  --  80*   LYMPHO PCT %  --  12*   MONO PCT %  --  5   EOS PCT %  --  1     Results from last 7 days   Lab Units 09/21/24  0616 09/18/24  0508 09/17/24  1637   SODIUM mmol/L 144   < > 142   POTASSIUM mmol/L 3.2*   < > 2.9*   CHLORIDE mmol/L 116*   < > 105   CO2 mmol/L 26   < > 31   BUN mg/dL 10   < > 15   CREATININE mg/dL 0.60   < > 0.76   ANION GAP mmol/L 2*   < > 6   CALCIUM mg/dL 7.4*   < > 9.0   ALBUMIN g/dL  --   --  2.8*   TOTAL BILIRUBIN mg/dL  --   --  1.27*   ALK PHOS U/L  --   --  88   ALT U/L  --   --  28   AST U/L  --   --  38   GLUCOSE RANDOM mg/dL  138   < > 164*    < > = values in this interval not displayed.         Results from last 7 days   Lab Units 09/21/24  0836 09/20/24  2130 09/20/24  1644 09/20/24  1147 09/20/24  0900 09/19/24  2130 09/19/24  1605 09/19/24  1035 09/19/24  0622 09/18/24  1908 09/18/24  1716 09/18/24  1304   POC GLUCOSE mg/dl 139 167* 184* 126 121 203* 208* 124 123 129 133 164*     Results from last 7 days   Lab Units 09/17/24  1957   HEMOGLOBIN A1C % 6.2*     Results from last 7 days   Lab Units 09/18/24  0508 09/17/24  1655 09/17/24  1637   LACTIC ACID mmol/L  --  1.2  --    PROCALCITONIN ng/ml 0.49*  --  0.37*       Recent Cultures (last 7 days):   Results from last 7 days   Lab Units 09/20/24  0555 09/17/24  1954 09/17/24  1831 09/17/24  1637   BLOOD CULTURE  Received in Microbiology Lab. Culture in Progress.  Received in Microbiology Lab. Culture in Progress.  --  Enterococcus faecalis* Enterobacter cloacae*  Proteus mirabilis*   GRAM STAIN RESULT   --   --  Gram positive cocci in pairs and chains* Gram negative rods*   URINE CULTURE   --  10,000-19,000 cfu/ml Enterobacter cloacae*  --   --        Imaging Review: No pertinent imaging studies reviewed.  Other Studies: No additional pertinent studies reviewed.    Last 24 Hours Medication List:     Current Facility-Administered Medications:     acetaminophen (Ofirmev) injection 1,000 mg, Q6H PRN    acetaminophen (TYLENOL) tablet 650 mg, Q6H PRN    apixaban (ELIQUIS) tablet 5 mg, BID    aspirin chewable tablet 81 mg, Daily    atorvastatin (LIPITOR) tablet 80 mg, QPM    bacitracin topical ointment 1 small application, BID    calcium carbonate-vitamin D 500 mg-5 mcg tablet 1 tablet, BID With Meals    ceFEPime (MAXIPIME) 2,000 mg in dextrose 5 % 50 mL IVPB, Q12H, Last Rate: 2,000 mg (09/21/24 0027)    Dakins (full strength) (DAKIN'S) 0.5 percent topical solution 1 Application, Daily    docusate sodium (COLACE) capsule 100 mg, Daily    escitalopram (LEXAPRO) tablet 10 mg, Daily     ezetimibe (ZETIA) tablet 10 mg, Daily    finasteride (PROSCAR) tablet 5 mg, Daily    insulin lispro (HumALOG/ADMELOG) 100 units/mL subcutaneous injection 1-6 Units, TID AC **AND** Fingerstick Glucose (POCT), TID AC    insulin lispro (HumALOG/ADMELOG) 100 units/mL subcutaneous injection 1-6 Units, HS    lisinopril (ZESTRIL) tablet 2.5 mg, Daily    metoprolol succinate (TOPROL-XL) 24 hr tablet 25 mg, Daily    metroNIDAZOLE (FLAGYL) IVPB (premix) 500 mg 100 mL, Q8H, Last Rate: 500 mg (09/21/24 0351)    ondansetron (ZOFRAN) injection 4 mg, Q4H PRN    potassium chloride oral solution 40 mEq, BID    potassium phosphates 21 mmol in sodium chloride 0.9 % 250 mL infusion, Once    tamsulosin (FLOMAX) capsule 0.4 mg, Daily    vancomycin (VANCOCIN) 1,250 mg in sodium chloride 0.9 % 250 mL IVPB, Q12H    Administrative Statements   Today, Patient Was Seen By: Esperanza Arizmendi PA-C      **Please Note: This note may have been constructed using a voice recognition system.**

## 2024-09-21 NOTE — ASSESSMENT & PLAN NOTE
Low blood counts noted 9/19/24.   Has chronic anemia likely secondary to poor nutritional status underlying chronic illness.  Hemoglobin declined to 7.1 prompting 1 unit PRBC infusion.  Likely in the setting of sacral decubitus debridement and some bleeding from urethral meatus.    Hemoglobin 7.5 today, recheck in AM  Encourage oral intake, cannot utilize IV iron due to bacteremia

## 2024-09-21 NOTE — ASSESSMENT & PLAN NOTE
Currently in sinus tachycardia in the ER  Restart Toprol XL 25 mg daily  Will continue home Eliquis for stroke prevention

## 2024-09-21 NOTE — PLAN OF CARE
Problem: Potential for Falls  Goal: Patient will remain free of falls  Description: INTERVENTIONS:  - Educate patient/family on patient safety including physical limitations  - Instruct patient to call for assistance with activity   - Consult OT/PT to assist with strengthening/mobility   - Keep Call bell within reach  - Keep bed low and locked with side rails adjusted as appropriate  - Keep care items and personal belongings within reach  - Initiate and maintain comfort rounds  - Make Fall Risk Sign visible to staff  - Offer Toileting every 2 Hours, in advance of need  - Initiate/Maintain bed alarm  - Obtain necessary fall risk management equipment: bed alarm call bell  - Apply yellow socks and bracelet for high fall risk patients  - Consider moving patient to room near nurses station  Outcome: Progressing     Problem: PAIN - ADULT  Goal: Verbalizes/displays adequate comfort level or baseline comfort level  Description: Interventions:  - Encourage patient to monitor pain and request assistance  - Assess pain using appropriate pain scale  - Administer analgesics based on type and severity of pain and evaluate response  - Implement non-pharmacological measures as appropriate and evaluate response  - Consider cultural and social influences on pain and pain management  - Notify physician/advanced practitioner if interventions unsuccessful or patient reports new pain  Outcome: Progressing     Problem: INFECTION - ADULT  Goal: Absence or prevention of progression during hospitalization  Description: INTERVENTIONS:  - Assess and monitor for signs and symptoms of infection  - Monitor lab/diagnostic results  - Monitor all insertion sites, i.e. indwelling lines, tubes, and drains  - Monitor endotracheal if appropriate and nasal secretions for changes in amount and color  - [place] appropriate cooling/warming therapies per order  - Administer medications as ordered  - Instruct and encourage patient and family to use good  hand hygiene technique  - Identify and instruct in appropriate isolation precautions for identified infection/condition  Outcome: Progressing  Goal: Absence of fever/infection during neutropenic period  Description: INTERVENTIONS:  - Monitor WBC    Outcome: Progressing     Problem: SAFETY ADULT  Goal: Patient will remain free of falls  Description: INTERVENTIONS:  - Educate patient/family on patient safety including physical limitations  - Instruct patient to call for assistance with activity   - Consult OT/PT to assist with strengthening/mobility   - Keep Call bell within reach  - Keep bed low and locked with side rails adjusted as appropriate  - Keep care items and personal belongings within reach  - Initiate and maintain comfort rounds  - Make Fall Risk Sign visible to staff  - Offer Toileting every 2 Hours, in advance of need  - Initiate/Maintain bed alarm  - Obtain necessary fall risk management equipment: bed alarm call bell  - Apply yellow socks and bracelet for high fall risk patients  - Consider moving patient to room near nurses station  Outcome: Progressing  Goal: Maintain or return to baseline ADL function  Description: INTERVENTIONS:  -  Assess patient's ability to carry out ADLs; assess patient's baseline for ADL function and identify physical deficits which impact ability to perform ADLs (bathing, care of mouth/teeth, toileting, grooming, dressing, etc.)  - Assess/evaluate cause of self-care deficits   - Assess range of motion  - Assess patient's mobility; develop plan if impaired  - Assess patient's need for assistive devices and provide as appropriate  - Encourage maximum independence but intervene and supervise when necessary  - Involve family in performance of ADLs  - Assess for home care needs following discharge   - Consider OT consult to assist with ADL evaluation and planning for discharge  - Provide patient education as appropriate  Outcome: Progressing  Goal: Maintains/Returns to pre  admission functional level  Description: INTERVENTIONS:  - Perform AM-PAC 6 Click Basic Mobility/ Daily Activity assessment daily.  - Set and communicate daily mobility goal to care team and patient/family/caregiver.   - Collaborate with rehabilitation services on mobility goals if consulted  - Perform Range of Motion 4 times a day.  - Reposition patient every 2 hours.  - Dangle patient 3 times a day  - Stand patient 3 times a day  - Ambulate patient 3 times a day  - Out of bed to chair 3 times a day   - Out of bed for meals 3 times a day  - Out of bed for toileting  - Record patient progress and toleration of activity level   Outcome: Progressing     Problem: DISCHARGE PLANNING  Goal: Discharge to home or other facility with appropriate resources  Description: INTERVENTIONS:  - Identify barriers to discharge w/patient and caregiver  - Arrange for needed discharge resources and transportation as appropriate  - Identify discharge learning needs (meds, wound care, etc.)  - Arrange for interpretive services to assist at discharge as needed  - Refer to Case Management Department for coordinating discharge planning if the patient needs post-hospital services based on physician/advanced practitioner order or complex needs related to functional status, cognitive ability, or social support system  Outcome: Progressing     Problem: Knowledge Deficit  Goal: Patient/family/caregiver demonstrates understanding of disease process, treatment plan, medications, and discharge instructions  Description: Complete learning assessment and assess knowledge base.  Interventions:  - Provide teaching at level of understanding  - Provide teaching via preferred learning methods  Outcome: Progressing     Problem: Prexisting or High Potential for Compromised Skin Integrity  Goal: Skin integrity is maintained or improved  Description: INTERVENTIONS:  - Identify patients at risk for skin breakdown  - Assess and monitor skin integrity  - Assess  and monitor nutrition and hydration status  - Monitor labs   - Assess for incontinence   - Turn and reposition patient  - Assist with mobility/ambulation  - Relieve pressure over bony prominences  - Avoid friction and shearing  - Provide appropriate hygiene as needed including keeping skin clean and dry  - Evaluate need for skin moisturizer/barrier cream  - Collaborate with interdisciplinary team   - Patient/family teaching  - Consider wound care consult   Outcome: Progressing     Problem: GASTROINTESTINAL - ADULT  Goal: Maintains or returns to baseline bowel function  Description: INTERVENTIONS:  - Assess bowel function  - Encourage oral fluids to ensure adequate hydration  - Administer IV fluids if ordered to ensure adequate hydration  - Administer ordered medications as needed  - Encourage mobilization and activity  - Consider nutritional services referral to assist patient with adequate nutrition and appropriate food choices  Outcome: Progressing     Problem: GENITOURINARY - ADULT  Goal: Urinary catheter remains patent  Description: INTERVENTIONS:  - Assess patency of urinary catheter  - If patient has a chronic hanks, consider changing catheter if non-functioning  - Follow guidelines for intermittent irrigation of non-functioning urinary catheter  Outcome: Progressing     Problem: METABOLIC, FLUID AND ELECTROLYTES - ADULT  Goal: Electrolytes maintained within normal limits  Description: INTERVENTIONS:  - Monitor labs and assess patient for signs and symptoms of electrolyte imbalances  - Administer electrolyte replacement as ordered  - Monitor response to electrolyte replacements, including repeat lab results as appropriate  - Instruct patient on fluid and nutrition as appropriate  Outcome: Progressing     Problem: SKIN/TISSUE INTEGRITY - ADULT  Goal: Skin Integrity remains intact(Skin Breakdown Prevention)  Description: Assess:  -Clean and moisturize skin every shift  -Inspect skin when repositioning,  toileting, and assisting with ADLS  -Assess extremities for adequate circulation and sensation     Bed Management:  -Have minimal linens on bed & keep smooth, unwrinkled  -Change linens as needed when moist or perspiring  -Avoid sitting or lying in one position for more than 2 hours while in bed  -Keep HOB at 30degrees     Toileting:  -Offer bedside commode    Activity:  -Mobilize patient 3 times a day  -Encourage activity and walks on unit  -Encourage or provide ROM exercises   -Turn and reposition patient every 2 Hours  -Use appropriate equipment to lift or move patient in bed    Skin Care:  -Avoid use of baby powder, tape, friction and shearing, hot water or constrictive clothing  -Do not massage red bony areas    Next Steps:  -Outcome: Progressing  Goal: Incision(s), wounds(s) or drain site(s) healing without S/S of infection  Description: INTERVENTIONS  - Assess and document dressing, incision, wound bed, drain sites and surrounding tissue  - Provide patient and family education  Outcome: Progressing  Goal: Pressure injury heals and does not worsen  Description: Interventions:  - Implement low air loss mattress or specialty surface (Criteria met)  - Apply silicone foam dressing  - Apply fecal or urinary incontinence containment device   - Utilize friction reducing device or surface for transfers   - Consider nutrition services referral as needed  Outcome: Progressing     Problem: HEMATOLOGIC - ADULT  Goal: Maintains hematologic stability  Description: INTERVENTIONS  - Assess for signs and symptoms of bleeding or hemorrhage  - Monitor labs  - Administer supportive blood products/factors as ordered and appropriate  Outcome: Progressing     Problem: Nutrition/Hydration-ADULT  Goal: Nutrient/Hydration intake appropriate for improving, restoring or maintaining nutritional needs  Description: Monitor and assess patient's nutrition/hydration status for malnutrition. Collaborate with interdisciplinary team and initiate  plan and interventions as ordered.  Monitor patient's weight and dietary intake as ordered or per policy. Utilize nutrition screening tool and intervene as necessary. Determine patient's food preferences and provide high-protein, high-caloric foods as appropriate.     INTERVENTIONS:  - Monitor oral intake, urinary output, labs, and treatment plans  - Assess nutrition and hydration status and recommend course of action  - Evaluate amount of meals eaten  - Assist patient with eating if necessary   - Allow adequate time for meals  - Recommend/ encourage appropriate diets, oral nutritional supplements, and vitamin/mineral supplements  - Order, calculate, and assess calorie counts as needed  - Recommend, monitor, and adjust tube feedings and TPN/PPN based on assessed needs  - Assess need for intravenous fluids  - Provide specific nutrition/hydration education as appropriate  - Include patient/family/caregiver in decisions related to nutrition  Outcome: Progressing

## 2024-09-21 NOTE — PROGRESS NOTES
Drew Santos is a 73 y.o. male who is currently ordered Vancomycin IV with management by the Pharmacy Consult service.  Relevant clinical data and objective / subjective history reviewed.  Vancomycin Assessment:  Indication and Goal AUC/Trough: Bacteremia (goal -600, trough >10)  Clinical Status: stable  Micro:       Renal Function:  SCr: 0.6 mg/dL  CrCl: 148 mL/min  Renal replacement: Not on dialysis  Days of Therapy: 3  Current Dose: 1500mg IV Q12H    Vancomycin Plan:  New Dosing: decrease to 1,250 mg IV q12h  Estimated AUC: 432 mcg*hr/mL  Estimated Trough: 12 mcg/mL  Next Level: 9/28/24 if pt still on vanco  Renal Function Monitoring: Daily BMP and UOP  Pharmacy will continue to follow closely for s/sx of nephrotoxicity, infusion reactions and appropriateness of therapy.  BMP and CBC will be ordered per protocol. We will continue to follow the patient’s culture results and clinical progress daily.    Lynnette Shepherd, Pharmacist

## 2024-09-21 NOTE — ASSESSMENT & PLAN NOTE
Malnutrition Findings:   Adult Malnutrition type: Chronic illness  Adult Degree of Malnutrition: Other severe protein calorie malnutrition  Malnutrition Characteristics: Inadequate energy, Weight loss                  360 Statement: Severe protein calorie malnutrition in context of chronic illness r/t poor appetite, inadequate PO intake as evidance by energy intake less than 75% compared to estimated needs>1 month, 7.5% wt loss x 1 month ( 122kg 8/23/24-> 113kg 9/17) treated with PO diet and oral supplements    BMI Findings:           Body mass index is 29.53 kg/m².

## 2024-09-21 NOTE — ASSESSMENT & PLAN NOTE
Presumably secondary to sepsis, COVID infection  Will need further sepsis workup as below  CT head with no acute intracranial abnormality  Encephalopathy likely in the setting of sepsis  Continue with supportive care.

## 2024-09-22 LAB
ANION GAP SERPL CALCULATED.3IONS-SCNC: 2 MMOL/L (ref 4–13)
BUN SERPL-MCNC: 9 MG/DL (ref 5–25)
CALCIUM SERPL-MCNC: 7.5 MG/DL (ref 8.4–10.2)
CHLORIDE SERPL-SCNC: 115 MMOL/L (ref 96–108)
CO2 SERPL-SCNC: 25 MMOL/L (ref 21–32)
CREAT SERPL-MCNC: 0.61 MG/DL (ref 0.6–1.3)
ERYTHROCYTE [DISTWIDTH] IN BLOOD BY AUTOMATED COUNT: 14.2 % (ref 11.6–15.1)
GFR SERPL CREATININE-BSD FRML MDRD: 97 ML/MIN/1.73SQ M
GLUCOSE SERPL-MCNC: 128 MG/DL (ref 65–140)
GLUCOSE SERPL-MCNC: 128 MG/DL (ref 65–140)
GLUCOSE SERPL-MCNC: 148 MG/DL (ref 65–140)
GLUCOSE SERPL-MCNC: 160 MG/DL (ref 65–140)
GLUCOSE SERPL-MCNC: 241 MG/DL (ref 65–140)
HCT VFR BLD AUTO: 24.3 % (ref 36.5–49.3)
HGB BLD-MCNC: 7.6 G/DL (ref 12–17)
MCH RBC QN AUTO: 30.3 PG (ref 26.8–34.3)
MCHC RBC AUTO-ENTMCNC: 31.3 G/DL (ref 31.4–37.4)
MCV RBC AUTO: 97 FL (ref 82–98)
PHOSPHATE SERPL-MCNC: 2.6 MG/DL (ref 2.3–4.1)
PLATELET # BLD AUTO: 445 THOUSANDS/UL (ref 149–390)
PMV BLD AUTO: 9.1 FL (ref 8.9–12.7)
POTASSIUM SERPL-SCNC: 3.6 MMOL/L (ref 3.5–5.3)
RBC # BLD AUTO: 2.51 MILLION/UL (ref 3.88–5.62)
SODIUM SERPL-SCNC: 142 MMOL/L (ref 135–147)
WBC # BLD AUTO: 15.98 THOUSAND/UL (ref 4.31–10.16)

## 2024-09-22 PROCEDURE — 80048 BASIC METABOLIC PNL TOTAL CA: CPT | Performed by: PHYSICIAN ASSISTANT

## 2024-09-22 PROCEDURE — 82948 REAGENT STRIP/BLOOD GLUCOSE: CPT

## 2024-09-22 PROCEDURE — 85027 COMPLETE CBC AUTOMATED: CPT | Performed by: PHYSICIAN ASSISTANT

## 2024-09-22 PROCEDURE — 84100 ASSAY OF PHOSPHORUS: CPT | Performed by: PHYSICIAN ASSISTANT

## 2024-09-22 PROCEDURE — 99232 SBSQ HOSP IP/OBS MODERATE 35: CPT | Performed by: PHYSICIAN ASSISTANT

## 2024-09-22 RX ORDER — POTASSIUM CHLORIDE 20MEQ/15ML
20 LIQUID (ML) ORAL 2 TIMES DAILY
Status: DISCONTINUED | OUTPATIENT
Start: 2024-09-22 | End: 2024-10-04 | Stop reason: HOSPADM

## 2024-09-22 RX ORDER — AMOXICILLIN 250 MG
1 CAPSULE ORAL
Status: DISCONTINUED | OUTPATIENT
Start: 2024-09-22 | End: 2024-10-04 | Stop reason: HOSPADM

## 2024-09-22 RX ADMIN — APIXABAN 5 MG: 5 TABLET, FILM COATED ORAL at 17:53

## 2024-09-22 RX ADMIN — CEFEPIME 2000 MG: 2 INJECTION, POWDER, FOR SOLUTION INTRAVENOUS at 01:41

## 2024-09-22 RX ADMIN — VANCOMYCIN HYDROCHLORIDE 1250 MG: 5 INJECTION, POWDER, LYOPHILIZED, FOR SOLUTION INTRAVENOUS at 23:08

## 2024-09-22 RX ADMIN — EZETIMIBE 10 MG: 10 TABLET ORAL at 09:44

## 2024-09-22 RX ADMIN — METRONIDAZOLE 500 MG: 500 INJECTION, SOLUTION INTRAVENOUS at 12:11

## 2024-09-22 RX ADMIN — METRONIDAZOLE 500 MG: 500 INJECTION, SOLUTION INTRAVENOUS at 19:04

## 2024-09-22 RX ADMIN — ATORVASTATIN CALCIUM 80 MG: 80 TABLET, FILM COATED ORAL at 17:53

## 2024-09-22 RX ADMIN — Medication 1 TABLET: at 09:44

## 2024-09-22 RX ADMIN — BACITRACIN ZINC 1 SMALL APPLICATION: 500 OINTMENT TOPICAL at 17:53

## 2024-09-22 RX ADMIN — CEFEPIME 2000 MG: 2 INJECTION, POWDER, FOR SOLUTION INTRAVENOUS at 13:12

## 2024-09-22 RX ADMIN — TAMSULOSIN HYDROCHLORIDE 0.4 MG: 0.4 CAPSULE ORAL at 09:44

## 2024-09-22 RX ADMIN — VANCOMYCIN HYDROCHLORIDE 1250 MG: 5 INJECTION, POWDER, LYOPHILIZED, FOR SOLUTION INTRAVENOUS at 11:30

## 2024-09-22 RX ADMIN — Medication 12.5 MG: at 21:16

## 2024-09-22 RX ADMIN — BACITRACIN ZINC 1 SMALL APPLICATION: 500 OINTMENT TOPICAL at 09:44

## 2024-09-22 RX ADMIN — Medication 12.5 MG: at 12:16

## 2024-09-22 RX ADMIN — POTASSIUM CHLORIDE 20 MEQ: 1.5 SOLUTION ORAL at 17:53

## 2024-09-22 RX ADMIN — INSULIN LISPRO 3 UNITS: 100 INJECTION, SOLUTION INTRAVENOUS; SUBCUTANEOUS at 21:16

## 2024-09-22 RX ADMIN — METRONIDAZOLE 500 MG: 500 INJECTION, SOLUTION INTRAVENOUS at 04:22

## 2024-09-22 RX ADMIN — SODIUM HYPOCHLORITE 1 APPLICATION: 5 SOLUTION TOPICAL at 09:44

## 2024-09-22 RX ADMIN — LISINOPRIL 2.5 MG: 2.5 TABLET ORAL at 09:44

## 2024-09-22 RX ADMIN — ASPIRIN 81 MG CHEWABLE TABLET 81 MG: 81 TABLET CHEWABLE at 09:44

## 2024-09-22 RX ADMIN — FINASTERIDE 5 MG: 5 TABLET, FILM COATED ORAL at 09:44

## 2024-09-22 RX ADMIN — METOPROLOL SUCCINATE 25 MG: 25 TABLET, EXTENDED RELEASE ORAL at 09:44

## 2024-09-22 RX ADMIN — POTASSIUM CHLORIDE 20 MEQ: 1.5 SOLUTION ORAL at 09:44

## 2024-09-22 RX ADMIN — Medication 1 TABLET: at 17:54

## 2024-09-22 RX ADMIN — ESCITALOPRAM OXALATE 10 MG: 10 TABLET ORAL at 09:44

## 2024-09-22 RX ADMIN — INSULIN LISPRO 1 UNITS: 100 INJECTION, SOLUTION INTRAVENOUS; SUBCUTANEOUS at 12:30

## 2024-09-22 RX ADMIN — APIXABAN 5 MG: 5 TABLET, FILM COATED ORAL at 09:44

## 2024-09-22 RX ADMIN — SENNOSIDES AND DOCUSATE SODIUM 1 TABLET: 8.6; 5 TABLET ORAL at 21:16

## 2024-09-22 RX ADMIN — DOCUSATE SODIUM 100 MG: 100 CAPSULE, LIQUID FILLED ORAL at 09:44

## 2024-09-22 NOTE — ASSESSMENT & PLAN NOTE
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

## 2024-09-22 NOTE — ASSESSMENT & PLAN NOTE
Patient met sepsis criteria on admission.  Admission blood cultures showing GNR with BCI suggesting enterobacter and proteus. Now second set BCI is suggesting enterococcus faecalis.  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  Urine was abnormal, culture growing enterobacter   Infectious disease input noted and appreciated.  Continue IV cefepime, flagyl and vancomycin   Repeat cultures negative at 24 hours   Echo completed, no evidence of large vegetations   Infectious disease input noted and appreciated

## 2024-09-22 NOTE — PLAN OF CARE
Problem: Potential for Falls  Goal: Patient will remain free of falls  Description: INTERVENTIONS:  - Educate patient/family on patient safety including physical limitations  - Instruct patient to call for assistance with activity   - Consult OT/PT to assist with strengthening/mobility   - Keep Call bell within reach  - Keep bed low and locked with side rails adjusted as appropriate  - Keep care items and personal belongings within reach  - Initiate and maintain comfort rounds  - Make Fall Risk Sign visible to staff  - Offer Toileting every 2 Hours, in advance of need  - Initiate/Maintain bed alarm  - Obtain necessary fall risk management equipment: bed alarm call bell  - Apply yellow socks and bracelet for high fall risk patients  - Consider moving patient to room near nurses station  Outcome: Progressing     Problem: PAIN - ADULT  Goal: Verbalizes/displays adequate comfort level or baseline comfort level  Description: Interventions:  - Encourage patient to monitor pain and request assistance  - Assess pain using appropriate pain scale  - Administer analgesics based on type and severity of pain and evaluate response  - Implement non-pharmacological measures as appropriate and evaluate response  - Consider cultural and social influences on pain and pain management  - Notify physician/advanced practitioner if interventions unsuccessful or patient reports new pain  Outcome: Progressing     Problem: INFECTION - ADULT  Goal: Absence or prevention of progression during hospitalization  Description: INTERVENTIONS:  - Assess and monitor for signs and symptoms of infection  - Monitor lab/diagnostic results  - Monitor all insertion sites, i.e. indwelling lines, tubes, and drains  - Monitor endotracheal if appropriate and nasal secretions for changes in amount and color  - Sidney appropriate cooling/warming therapies per order  - Administer medications as ordered  - Instruct and encourage patient and family to use good  hand hygiene technique  - Identify and instruct in appropriate isolation precautions for identified infection/condition  Outcome: Progressing  Goal: Absence of fever/infection during neutropenic period  Description: INTERVENTIONS:  - Monitor WBC    Outcome: Progressing     Problem: SAFETY ADULT  Goal: Patient will remain free of falls  Description: INTERVENTIONS:  - Educate patient/family on patient safety including physical limitations  - Instruct patient to call for assistance with activity   - Consult OT/PT to assist with strengthening/mobility   - Keep Call bell within reach  - Keep bed low and locked with side rails adjusted as appropriate  - Keep care items and personal belongings within reach  - Initiate and maintain comfort rounds  - Make Fall Risk Sign visible to staff  - Offer Toileting every 2 Hours, in advance of need  - Initiate/Maintain bed alarm  - Obtain necessary fall risk management equipment: bed alarm call bell  - Apply yellow socks and bracelet for high fall risk patients  - Consider moving patient to room near nurses station  Outcome: Progressing  Goal: Maintain or return to baseline ADL function  Description: INTERVENTIONS:  -  Assess patient's ability to carry out ADLs; assess patient's baseline for ADL function and identify physical deficits which impact ability to perform ADLs (bathing, care of mouth/teeth, toileting, grooming, dressing, etc.)  - Assess/evaluate cause of self-care deficits   - Assess range of motion  - Assess patient's mobility; develop plan if impaired  - Assess patient's need for assistive devices and provide as appropriate  - Encourage maximum independence but intervene and supervise when necessary  - Involve family in performance of ADLs  - Assess for home care needs following discharge   - Consider OT consult to assist with ADL evaluation and planning for discharge  - Provide patient education as appropriate  Outcome: Progressing  Goal: Maintains/Returns to pre  admission functional level  Description: INTERVENTIONS:  - Perform AM-PAC 6 Click Basic Mobility/ Daily Activity assessment daily.  - Set and communicate daily mobility goal to care team and patient/family/caregiver.   - Collaborate with rehabilitation services on mobility goals if consulted  - Perform Range of Motion 4 times a day.  - Reposition patient every 2 hours.  - Dangle patient 3 times a day  - Stand patient 3 times a day  - Ambulate patient 3 times a day  - Out of bed to chair 3 times a day   - Out of bed for meals 3 times a day  - Out of bed for toileting  - Record patient progress and toleration of activity level   Outcome: Progressing     Problem: DISCHARGE PLANNING  Goal: Discharge to home or other facility with appropriate resources  Description: INTERVENTIONS:  - Identify barriers to discharge w/patient and caregiver  - Arrange for needed discharge resources and transportation as appropriate  - Identify discharge learning needs (meds, wound care, etc.)  - Arrange for interpretive services to assist at discharge as needed  - Refer to Case Management Department for coordinating discharge planning if the patient needs post-hospital services based on physician/advanced practitioner order or complex needs related to functional status, cognitive ability, or social support system  Outcome: Progressing     Problem: Knowledge Deficit  Goal: Patient/family/caregiver demonstrates understanding of disease process, treatment plan, medications, and discharge instructions  Description: Complete learning assessment and assess knowledge base.  Interventions:  - Provide teaching at level of understanding  - Provide teaching via preferred learning methods  Outcome: Progressing     Problem: Prexisting or High Potential for Compromised Skin Integrity  Goal: Skin integrity is maintained or improved  Description: INTERVENTIONS:  - Identify patients at risk for skin breakdown  - Assess and monitor skin integrity  - Assess  and monitor nutrition and hydration status  - Monitor labs   - Assess for incontinence   - Turn and reposition patient  - Assist with mobility/ambulation  - Relieve pressure over bony prominences  - Avoid friction and shearing  - Provide appropriate hygiene as needed including keeping skin clean and dry  - Evaluate need for skin moisturizer/barrier cream  - Collaborate with interdisciplinary team   - Patient/family teaching  - Consider wound care consult   Outcome: Progressing     Problem: GASTROINTESTINAL - ADULT  Goal: Maintains or returns to baseline bowel function  Description: INTERVENTIONS:  - Assess bowel function  - Encourage oral fluids to ensure adequate hydration  - Administer IV fluids if ordered to ensure adequate hydration  - Administer ordered medications as needed  - Encourage mobilization and activity  - Consider nutritional services referral to assist patient with adequate nutrition and appropriate food choices  Outcome: Progressing     Problem: GENITOURINARY - ADULT  Goal: Urinary catheter remains patent  Description: INTERVENTIONS:  - Assess patency of urinary catheter  - If patient has a chronic hanks, consider changing catheter if non-functioning  - Follow guidelines for intermittent irrigation of non-functioning urinary catheter  Outcome: Progressing     Problem: METABOLIC, FLUID AND ELECTROLYTES - ADULT  Goal: Electrolytes maintained within normal limits  Description: INTERVENTIONS:  - Monitor labs and assess patient for signs and symptoms of electrolyte imbalances  - Administer electrolyte replacement as ordered  - Monitor response to electrolyte replacements, including repeat lab results as appropriate  - Instruct patient on fluid and nutrition as appropriate  Outcome: Progressing     Problem: SKIN/TISSUE INTEGRITY - ADULT  Goal: Skin Integrity remains intact(Skin Breakdown Prevention)  Description: Assess:  -Perform Goldy assessment  -Clean and moisturize skin  -Inspect skin when  repositioning, toileting, and assisting with ADLS  -Assess under medical devices  -Assess extremities for adequate circulation and sensation     Bed Management:  -Have minimal linens on bed & keep smooth, unwrinkled  -Change linens as needed when moist or perspiring  -Avoid sitting or lying in one position for more than 2 hours while in bed  -Keep HOB at 45 degrees     Toileting:  -Offer bedside commode  -Assess for incontinence  -Use incontinent care products after each incontinent episode     Activity:  -Mobilize patient 3 times a day  -Encourage activity and walks on unit  -Encourage or provide ROM exercises   -Turn and reposition patient every 2 Hours  -Use appropriate equipment to lift or move patient in bed  -Instruct/ Assist with weight shifting every 2 when out of bed in chair  -Consider limitation of chair time 2 hour intervals    Skin Care:  -Avoid use of baby powder, tape, friction and shearing, hot water or constrictive clothing  -Relieve pressure over bony prominences  -Do not massage red bony areas    Next Steps:  -Teach patient strategies to minimize risks    -Consider consults to  interdisciplinary teams  Outcome: Progressing  Goal: Incision(s), wounds(s) or drain site(s) healing without S/S of infection  Description: INTERVENTIONS  - Assess and document dressing, incision, wound bed, drain sites and surrounding tissue  - Provide patient and family education  - Perform skin care/dressing changes   Outcome: Progressing  Goal: Pressure injury heals and does not worsen  Description: Interventions:  - Implement low air loss mattress or specialty surface (Criteria met)  - Apply silicone foam dressing  - Instruct/assist with weight shifting every 30 minutes when in chair   - Limit chair time to 2 hour intervals  - Use special pressure reducing interventions when in chair   - Apply fecal or urinary incontinence containment device   - Perform passive or active ROM  - Turn and reposition patient & offload  bony prominences every 2 hours   - Utilize friction reducing device or surface for transfers   - Consider consults to  interdisciplinary teams  - Use incontinent care products after each incontinent episode  - Consider nutrition services referral as needed  Outcome: Progressing     Problem: HEMATOLOGIC - ADULT  Goal: Maintains hematologic stability  Description: INTERVENTIONS  - Assess for signs and symptoms of bleeding or hemorrhage  - Monitor labs  - Administer supportive blood products/factors as ordered and appropriate  Outcome: Progressing     Problem: Nutrition/Hydration-ADULT  Goal: Nutrient/Hydration intake appropriate for improving, restoring or maintaining nutritional needs  Description: Monitor and assess patient's nutrition/hydration status for malnutrition. Collaborate with interdisciplinary team and initiate plan and interventions as ordered.  Monitor patient's weight and dietary intake as ordered or per policy. Utilize nutrition screening tool and intervene as necessary. Determine patient's food preferences and provide high-protein, high-caloric foods as appropriate.     INTERVENTIONS:  - Monitor oral intake, urinary output, labs, and treatment plans  - Assess nutrition and hydration status and recommend course of action  - Evaluate amount of meals eaten  - Assist patient with eating if necessary   - Allow adequate time for meals  - Recommend/ encourage appropriate diets, oral nutritional supplements, and vitamin/mineral supplements  - Order, calculate, and assess calorie counts as needed  - Recommend, monitor, and adjust tube feedings and TPN/PPN based on assessed needs  - Assess need for intravenous fluids  - Provide specific nutrition/hydration education as appropriate  - Include patient/family/caregiver in decisions related to nutrition  Outcome: Progressing

## 2024-09-22 NOTE — PROGRESS NOTES
Drew Santos is a 73 y.o. male who is currently ordered Vancomycin IV with management by the Pharmacy Consult service.  Relevant clinical data and objective / subjective history reviewed.  Vancomycin Assessment:  Indication and Goal AUC/Trough: Bacteremia (goal -600, trough >10)  Clinical Status: stable  Micro:       Renal Function:  SCr: 0.61 mg/dL  CrCl: 146.1 mL/min  Renal replacement: Not on dialysis  Days of Therapy: 4  Current Dose: 1250 mg IV q12h   Vancomycin Plan:  New Dosing: Continue 1250 mg IV q12h   Estimated AUC: 421 mcg*hr/mL  Estimated Trough: 11.6 mcg/mL  Next Level: 09/28 with AM labs   Renal Function Monitoring: Daily BMP and UOP  Pharmacy will continue to follow closely for s/sx of nephrotoxicity, infusion reactions and appropriateness of therapy.  BMP and CBC will be ordered per protocol. We will continue to follow the patient’s culture results and clinical progress daily.    Anne Ledezma, Pharmacist

## 2024-09-22 NOTE — PROGRESS NOTES
Progress Note - Hospitalist   Name: Drew Santos 73 y.o. male I MRN: [id number]  Unit/Bed#: [place] [address] -01 I Date of Admission: 9/17/2024   Date of Service: 9/22/2024 I Hospital Day: 5    Assessment & Plan  Sepsis  As evidenced by tachycardia and leukocytosis and fever  Now with gram-negative bacteremia- enterbacter, enterococcus and proetus   CT abdomen and pelvis showsMid/distal sigmoid colonic and rectal inflammatory changes in keeping with a segmental colitis/proctitis.Right ischioanal fossa subcutaneous emphysema extending through the right paramidline gluteal subcutaneous tissues to the skin surface suspicious for a perirectal or perianal fistula; limited assessment without oral or rectal contrast. Parra catheter balloon is located in the prostatic urethra; this catheter should be repositioned. Fat stranding surrounding the decompressed bladder may indicate cystitis.  Has chronic indwelling Parra catheter with abnormal UA  Possible source of infection appears to be proctocolitis versus complicated UTI in the setting of indwelling Parra catheter.  Also has large sacral decubitus ulcer which could be infected as well.   Patient is s/p bedside debridement of sacral decubitus ulcer.  Continue with dressing changes and further debridements per surgical team.  Surgery reevaluated wound, continue dressing changes and frequent turns  Continue IV cefepime, flagyl and vanco per ID   Repeat blood cultures negative at 24 hours   TTE without evidence of large vegetations although images suboptimal   Appreciate infectious disease, surgical and GI follow-up  Sacral wound  Chronic sacral decubitus ulcer; present on admission  Status post debridement at bedside per surgical team.  Continue with wound care as tolerated.  Reevaluation by surgery today, continue dressing changes and serial exams  Wound care consultation  Type 2 diabetes mellitus without complication, without long-term current use of insulin (HCC)  Lab Results    Component Value Date    HGBA1C 6.2 (H) 09/17/2024       Recent Labs     09/21/24  0836 09/21/24  1236 09/21/24  1739 09/21/24 2049   POCGLU 139 148* 146* 186*       Blood Sugar Average: Last 72 hrs:  (P) 156.25  Well-controlled with last hemoglobin A1c of 6.0% in July of this year  Will hold home oral regimen  Monitor on sliding scale only  Hypoglycemia protocol     COVID-19  Patient currently on room air though high risk given his age and comorbidities  Completed three days of IV remdesivir   Supportive care otherwise  Contact and airborne precautions  Currently on mild COVID pathway.  Not requiring supplemental oxygen.  Wife also sick with COVID.  Proctitis  Noted on CT scan.  No diarrhea or clinical evidence of colitis.  Continue with cefepime and Flagyl.  GI input appreciated.  Stool studies negative  Likely ischemic colitis given area affected, continue supportive measures   Polymicrobial bacteremia  Patient met sepsis criteria on admission.  Admission blood cultures showing GNR with BCI suggesting enterobacter and proteus. Now second set BCI is suggesting enterococcus faecalis.  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  Urine was abnormal, culture growing enterobacter   Infectious disease input noted and appreciated.  Continue IV cefepime, flagyl and vancomycin   Repeat cultures negative at 24 hours   Echo completed, no evidence of large vegetations   Infectious disease input noted and appreciated    Acute metabolic encephalopathy  Presumably secondary to sepsis, COVID infection  Will need further sepsis workup as below  CT head with no acute intracranial abnormality  Encephalopathy likely in the setting of sepsis  Continue with supportive care.  History of CVA (cerebrovascular accident)  History of CVA   Chronically bedbound and  with chronic left-sided deficits   Continue home aspirin, Eliquis, statin  Continue dietary modifications, speech therapy consulted  Paroxysmal atrial fibrillation (HCC)  Intermittent sinus tachycardia during admission  Restarted Toprol XL 25 mg daily  Will continue home Eliquis for stroke prevention  Anemia  Low blood counts noted 9/19/24.   Has chronic anemia likely secondary to poor nutritional status underlying chronic illness.  Hemoglobin declined to 7.1 prompting 1 unit PRBC infusion.  Likely in the setting of sacral decubitus debridement and some bleeding from urethral meatus.    Hemoglobin stable at 7.6 post transfusion   Encourage oral intake, cannot utilize IV iron due to bacteremia   Severe protein-calorie malnutrition (HCC)  Malnutrition Findings:   Adult Malnutrition type: Chronic illness  Adult Degree of Malnutrition: Other severe protein calorie malnutrition  Malnutrition Characteristics: Inadequate energy, Weight loss                  360 Statement: Severe protein calorie malnutrition in context of chronic illness r/t poor appetite, inadequate PO intake as evidance by energy intake less than 75% compared to estimated needs>1 month, 7.5% wt loss x 1 month ( 122kg 8/23/24-> 113kg 9/17) treated with PO diet and oral supplements    BMI Findings:           Body mass index is 29.53 kg/m².       VTE Pharmacologic Prophylaxis:   High Risk (Score >/= 5) - Pharmacological DVT Prophylaxis Ordered: apixaban (Eliquis). Sequential Compression Devices Ordered.    Mobility:   Basic Mobility Inpatient Raw Score: 6  JH-HLM Goal: 2: Bed activities/Dependent transfer  JH-HLM Achieved: 2: Bed activities/Dependent transfer  JH-HLM Goal achieved. Continue to encourage appropriate mobility.    Patient Centered Rounds: I performed bedside rounds with nursing staff today.   Discussions with Specialists or Other Care Team Provider: none    Education and Discussions with Family / Patient: Updated  (wife) via  phone.    Current Length of Stay: 5 day(s)  Current Patient Status: Inpatient   Certification Statement: The patient will continue to require additional inpatient hospital stay due to sepsis due to bacteremia requiring IV abx   Discharge Plan: Anticipate discharge in 48-72 hrs to rehab facility.    Code Status: Level 1 - Full Code    Subjective   Patient seen and examined at bedside. Does not verbalized any complaints today.     Objective     Vitals:   Temp (24hrs), Av.7 °F (37.1 °C), Min:98.2 °F (36.8 °C), Max:99.9 °F (37.7 °C)    Temp:  [98.2 °F (36.8 °C)-99.9 °F (37.7 °C)] 98.6 °F (37 °C)  HR:  [] 103  Resp:  [15-18] 18  BP: (112-145)/(67-91) 131/84  SpO2:  [94 %-98 %] 95 %  Body mass index is 29.53 kg/m².     Input and Output Summary (last 24 hours):     Intake/Output Summary (Last 24 hours) at 2024 0741  Last data filed at 2024 0601  Gross per 24 hour   Intake 380 ml   Output 1000 ml   Net -620 ml       Physical Exam  Vitals reviewed.   Constitutional:       General: He is not in acute distress.  HENT:      Head: Normocephalic and atraumatic.      Mouth/Throat:      Mouth: Oropharynx is clear and moist.   Eyes:      General: No scleral icterus.     Extraocular Movements: EOM normal.   Cardiovascular:      Rate and Rhythm: Normal rate and regular rhythm.      Heart sounds: Normal heart sounds. No murmur heard.  Pulmonary:      Effort: Pulmonary effort is normal. No respiratory distress.      Breath sounds: Normal breath sounds. No wheezing.   Abdominal:      General: Bowel sounds are normal. There is no distension.      Palpations: Abdomen is soft.      Tenderness: There is no abdominal tenderness.   Musculoskeletal:         General: No edema.      Cervical back: Neck supple.      Right lower leg: No edema.      Left lower leg: No edema.   Skin:     General: Skin is warm and dry.   Neurological:      Mental Status: Mental status is at baseline.      Motor: Weakness (chronic left sided)  present.   Psychiatric:         Mood and Affect: Mood and affect and mood normal.          Lines/Drains:  Lines/Drains/Airways       Active Status       Name Placement date Placement time Site Days    Urethral Catheter Three way 22 Fr. 08/31/24 1925  Three way  21    Continuous Bladder Irrigation Three-way 08/31/24 1925  Three-way  21                  Urinary Catheter:  Goal for removal: N/A - Chronic Parra           Telemetry:  Telemetry Orders (From admission, onward)               24 Hour Telemetry Monitoring  Continuous x 24 Hours (Telem)        Expiring   Question:  Reason for 24 Hour Telemetry  Answer:  Metabolic/electrolyte disturbance with high probability of dysrhythmia. K level <3 or >6 OR KCL infusion >10mEq/hr                     Telemetry Reviewed: Normal Sinus Rhythm  Indication for Continued Telemetry Use: No indication for continued use. Will discontinue.                Lab Results: I have reviewed the following results:    Results from last 7 days   Lab Units 09/22/24  0604 09/21/24  0616 09/20/24  0555   WBC Thousand/uL 15.98*   < > 19.87*   HEMOGLOBIN g/dL 7.6*   < > 8.1*   HEMATOCRIT % 24.3*   < > 25.5*   PLATELETS Thousands/uL 445*   < > 398*   SEGS PCT %  --   --  80*   LYMPHO PCT %  --   --  12*   MONO PCT %  --   --  5   EOS PCT %  --   --  1    < > = values in this interval not displayed.     Results from last 7 days   Lab Units 09/22/24  0604 09/18/24  0508 09/17/24  1637   SODIUM mmol/L 142   < > 142   POTASSIUM mmol/L 3.6   < > 2.9*   CHLORIDE mmol/L 115*   < > 105   CO2 mmol/L 25   < > 31   BUN mg/dL 9   < > 15   CREATININE mg/dL 0.61   < > 0.76   ANION GAP mmol/L 2*   < > 6   CALCIUM mg/dL 7.5*   < > 9.0   ALBUMIN g/dL  --   --  2.8*   TOTAL BILIRUBIN mg/dL  --   --  1.27*   ALK PHOS U/L  --   --  88   ALT U/L  --   --  28   AST U/L  --   --  38   GLUCOSE RANDOM mg/dL 148*   < > 164*    < > = values in this interval not displayed.         Results from last 7 days   Lab Units  09/21/24  2049 09/21/24  1739 09/21/24  1236 09/21/24  0836 09/20/24  2130 09/20/24  1644 09/20/24  1147 09/20/24  0900 09/19/24  2130 09/19/24  1605 09/19/24  1035 09/19/24  0622   POC GLUCOSE mg/dl 186* 146* 148* 139 167* 184* 126 121 203* 208* 124 123     Results from last 7 days   Lab Units 09/17/24 1957   HEMOGLOBIN A1C % 6.2*     Results from last 7 days   Lab Units 09/18/24  0508 09/17/24  1655 09/17/24  1637   LACTIC ACID mmol/L  --  1.2  --    PROCALCITONIN ng/ml 0.49*  --  0.37*       Recent Cultures (last 7 days):   Results from last 7 days   Lab Units 09/20/24  0555 09/17/24  1954 09/17/24  1831 09/17/24  1637   BLOOD CULTURE  No Growth at 24 hrs.  No Growth at 24 hrs.  --  Enterococcus faecalis* Enterobacter cloacae*  Proteus mirabilis*   GRAM STAIN RESULT   --   --  Gram positive cocci in pairs and chains* Gram negative rods*   URINE CULTURE   --  10,000-19,000 cfu/ml Enterobacter cloacae*  --   --        Imaging Review: No pertinent imaging studies reviewed.  Other Studies: No additional pertinent studies reviewed.    Last 24 Hours Medication List:     Current Facility-Administered Medications:     acetaminophen (Ofirmev) injection 1,000 mg, Q6H PRN    acetaminophen (TYLENOL) tablet 650 mg, Q6H PRN    apixaban (ELIQUIS) tablet 5 mg, BID    aspirin chewable tablet 81 mg, Daily    atorvastatin (LIPITOR) tablet 80 mg, QPM    bacitracin topical ointment 1 small application, BID    calcium carbonate-vitamin D 500 mg-5 mcg tablet 1 tablet, BID With Meals    ceFEPime (MAXIPIME) 2,000 mg in dextrose 5 % 50 mL IVPB, Q12H, Last Rate: 2,000 mg (09/22/24 0141)    Dakins (full strength) (DAKIN'S) 0.5 percent topical solution 1 Application, Daily    docusate sodium (COLACE) capsule 100 mg, Daily    escitalopram (LEXAPRO) tablet 10 mg, Daily    ezetimibe (ZETIA) tablet 10 mg, Daily    finasteride (PROSCAR) tablet 5 mg, Daily    insulin lispro (HumALOG/ADMELOG) 100 units/mL subcutaneous injection 1-6 Units, TID AC  **AND** Fingerstick Glucose (POCT), TID AC    insulin lispro (HumALOG/ADMELOG) 100 units/mL subcutaneous injection 1-6 Units, HS    lisinopril (ZESTRIL) tablet 2.5 mg, Daily    metoprolol succinate (TOPROL-XL) 24 hr tablet 25 mg, Daily    metroNIDAZOLE (FLAGYL) IVPB (premix) 500 mg 100 mL, Q8H, Last Rate: 500 mg (09/22/24 0422)    ondansetron (ZOFRAN) injection 4 mg, Q4H PRN    potassium chloride oral solution 20 mEq, BID    tamsulosin (FLOMAX) capsule 0.4 mg, Daily    vancomycin (VANCOCIN) 1,250 mg in sodium chloride 0.9 % 250 mL IVPB, Q12H, Last Rate: 1,250 mg (09/21/24 2343)    Administrative Statements   Today, Patient Was Seen By: Esperanza Arizmendi PA-C      **Please Note: This note may have been constructed using a voice recognition system.**

## 2024-09-22 NOTE — ASSESSMENT & PLAN NOTE
As evidenced by tachycardia and leukocytosis and fever  Now with gram-negative bacteremia- enterbacter, enterococcus and proetus   CT abdomen and pelvis showsMid/distal sigmoid colonic and rectal inflammatory changes in keeping with a segmental colitis/proctitis.Right ischioanal fossa subcutaneous emphysema extending through the right paramidline gluteal subcutaneous tissues to the skin surface suspicious for a perirectal or perianal fistula; limited assessment without oral or rectal contrast. Parra catheter balloon is located in the prostatic urethra; this catheter should be repositioned. Fat stranding surrounding the decompressed bladder may indicate cystitis.  Has chronic indwelling Parra catheter with abnormal UA  Possible source of infection appears to be proctocolitis versus complicated UTI in the setting of indwelling Parra catheter.  Also has large sacral decubitus ulcer which could be infected as well.   Patient is s/p bedside debridement of sacral decubitus ulcer.  Continue with dressing changes and further debridements per surgical team.  Surgery reevaluated wound, continue dressing changes and frequent turns  Continue IV cefepime, flagyl and vanco per ID   Repeat blood cultures negative at 24 hours   TTE without evidence of large vegetations although images suboptimal   Appreciate infectious disease, surgical and GI follow-up

## 2024-09-22 NOTE — ASSESSMENT & PLAN NOTE
Lab Results   Component Value Date    HGBA1C 6.2 (H) 09/17/2024       Recent Labs     09/21/24  0836 09/21/24  1236 09/21/24  1739 09/21/24 2049   POCGLU 139 148* 146* 186*       Blood Sugar Average: Last 72 hrs:  (P) 156.25  Well-controlled with last hemoglobin A1c of 6.0% in July of this year  Will hold home oral regimen  Monitor on sliding scale only  Hypoglycemia protocol

## 2024-09-22 NOTE — ASSESSMENT & PLAN NOTE
Intermittent sinus tachycardia during admission  Restarted Toprol XL 25 mg daily  Will continue home Eliquis for stroke prevention

## 2024-09-22 NOTE — ASSESSMENT & PLAN NOTE
Low blood counts noted 9/19/24.   Has chronic anemia likely secondary to poor nutritional status underlying chronic illness.  Hemoglobin declined to 7.1 prompting 1 unit PRBC infusion.  Likely in the setting of sacral decubitus debridement and some bleeding from urethral meatus.    Hemoglobin stable at 7.6 post transfusion   Encourage oral intake, cannot utilize IV iron due to bacteremia

## 2024-09-23 LAB
ANION GAP SERPL CALCULATED.3IONS-SCNC: 3 MMOL/L (ref 4–13)
BUN SERPL-MCNC: 8 MG/DL (ref 5–25)
CALCIUM SERPL-MCNC: 7.8 MG/DL (ref 8.4–10.2)
CHLORIDE SERPL-SCNC: 114 MMOL/L (ref 96–108)
CO2 SERPL-SCNC: 22 MMOL/L (ref 21–32)
CREAT SERPL-MCNC: 0.65 MG/DL (ref 0.6–1.3)
ERYTHROCYTE [DISTWIDTH] IN BLOOD BY AUTOMATED COUNT: 14.7 % (ref 11.6–15.1)
GFR SERPL CREATININE-BSD FRML MDRD: 95 ML/MIN/1.73SQ M
GLUCOSE SERPL-MCNC: 113 MG/DL (ref 65–140)
GLUCOSE SERPL-MCNC: 124 MG/DL (ref 65–140)
GLUCOSE SERPL-MCNC: 132 MG/DL (ref 65–140)
GLUCOSE SERPL-MCNC: 149 MG/DL (ref 65–140)
GLUCOSE SERPL-MCNC: 178 MG/DL (ref 65–140)
HCT VFR BLD AUTO: 27.1 % (ref 36.5–49.3)
HGB BLD-MCNC: 7.6 G/DL (ref 12–17)
MCH RBC QN AUTO: 30.3 PG (ref 26.8–34.3)
MCHC RBC AUTO-ENTMCNC: 28 G/DL (ref 31.4–37.4)
MCV RBC AUTO: 108 FL (ref 82–98)
PLATELET # BLD AUTO: 310 THOUSANDS/UL (ref 149–390)
PMV BLD AUTO: 9.5 FL (ref 8.9–12.7)
POTASSIUM SERPL-SCNC: 4.1 MMOL/L (ref 3.5–5.3)
RBC # BLD AUTO: 2.51 MILLION/UL (ref 3.88–5.62)
SODIUM SERPL-SCNC: 139 MMOL/L (ref 135–147)
WBC # BLD AUTO: 13.79 THOUSAND/UL (ref 4.31–10.16)

## 2024-09-23 PROCEDURE — 85027 COMPLETE CBC AUTOMATED: CPT | Performed by: PHYSICIAN ASSISTANT

## 2024-09-23 PROCEDURE — 80048 BASIC METABOLIC PNL TOTAL CA: CPT | Performed by: PHYSICIAN ASSISTANT

## 2024-09-23 PROCEDURE — 99232 SBSQ HOSP IP/OBS MODERATE 35: CPT | Performed by: PHYSICIAN ASSISTANT

## 2024-09-23 PROCEDURE — 99222 1ST HOSP IP/OBS MODERATE 55: CPT | Performed by: INTERNAL MEDICINE

## 2024-09-23 PROCEDURE — 99233 SBSQ HOSP IP/OBS HIGH 50: CPT | Performed by: INTERNAL MEDICINE

## 2024-09-23 PROCEDURE — 82948 REAGENT STRIP/BLOOD GLUCOSE: CPT

## 2024-09-23 RX ADMIN — SENNOSIDES AND DOCUSATE SODIUM 1 TABLET: 8.6; 5 TABLET ORAL at 21:51

## 2024-09-23 RX ADMIN — ASPIRIN 81 MG CHEWABLE TABLET 81 MG: 81 TABLET CHEWABLE at 10:09

## 2024-09-23 RX ADMIN — Medication 12.5 MG: at 22:01

## 2024-09-23 RX ADMIN — APIXABAN 5 MG: 5 TABLET, FILM COATED ORAL at 10:08

## 2024-09-23 RX ADMIN — INSULIN LISPRO 1 UNITS: 100 INJECTION, SOLUTION INTRAVENOUS; SUBCUTANEOUS at 21:51

## 2024-09-23 RX ADMIN — Medication 12.5 MG: at 10:08

## 2024-09-23 RX ADMIN — Medication 1 TABLET: at 10:08

## 2024-09-23 RX ADMIN — EZETIMIBE 10 MG: 10 TABLET ORAL at 10:09

## 2024-09-23 RX ADMIN — POTASSIUM CHLORIDE 20 MEQ: 1.5 SOLUTION ORAL at 18:23

## 2024-09-23 RX ADMIN — LISINOPRIL 2.5 MG: 2.5 TABLET ORAL at 10:08

## 2024-09-23 RX ADMIN — Medication 1 TABLET: at 18:23

## 2024-09-23 RX ADMIN — ESCITALOPRAM OXALATE 10 MG: 10 TABLET ORAL at 10:08

## 2024-09-23 RX ADMIN — BACITRACIN ZINC 1 SMALL APPLICATION: 500 OINTMENT TOPICAL at 18:24

## 2024-09-23 RX ADMIN — METRONIDAZOLE 500 MG: 500 INJECTION, SOLUTION INTRAVENOUS at 18:24

## 2024-09-23 RX ADMIN — FINASTERIDE 5 MG: 5 TABLET, FILM COATED ORAL at 10:09

## 2024-09-23 RX ADMIN — APIXABAN 5 MG: 5 TABLET, FILM COATED ORAL at 18:24

## 2024-09-23 RX ADMIN — SODIUM HYPOCHLORITE 1 APPLICATION: 5 SOLUTION TOPICAL at 10:09

## 2024-09-23 RX ADMIN — METRONIDAZOLE 500 MG: 500 INJECTION, SOLUTION INTRAVENOUS at 13:24

## 2024-09-23 RX ADMIN — CEFEPIME 2000 MG: 2 INJECTION, POWDER, FOR SOLUTION INTRAVENOUS at 13:24

## 2024-09-23 RX ADMIN — POTASSIUM CHLORIDE 20 MEQ: 1.5 SOLUTION ORAL at 10:08

## 2024-09-23 RX ADMIN — VANCOMYCIN HYDROCHLORIDE 1250 MG: 5 INJECTION, POWDER, LYOPHILIZED, FOR SOLUTION INTRAVENOUS at 10:08

## 2024-09-23 RX ADMIN — BACITRACIN ZINC 1 SMALL APPLICATION: 500 OINTMENT TOPICAL at 10:09

## 2024-09-23 RX ADMIN — ATORVASTATIN CALCIUM 80 MG: 80 TABLET, FILM COATED ORAL at 18:24

## 2024-09-23 RX ADMIN — CEFEPIME 2000 MG: 2 INJECTION, POWDER, FOR SOLUTION INTRAVENOUS at 01:00

## 2024-09-23 RX ADMIN — VANCOMYCIN HYDROCHLORIDE 1250 MG: 5 INJECTION, POWDER, LYOPHILIZED, FOR SOLUTION INTRAVENOUS at 23:24

## 2024-09-23 RX ADMIN — METRONIDAZOLE 500 MG: 500 INJECTION, SOLUTION INTRAVENOUS at 03:23

## 2024-09-23 NOTE — QUICK NOTE
Sacral wound check    Sacral wound with stable, healthy granulating tissue, some areas of fibrinous tissue.  About 5 cm in diameter, with significant depth with similar tissue at the base.  Wet-to-dry dressing was reapplied, covered with a Mepilex sacral dressing.

## 2024-09-23 NOTE — ASSESSMENT & PLAN NOTE
Presumably secondary to sepsis, bacteremia, COVID infection  CT head with no acute intracranial abnormality  Continue with supportive care  Reorientation, delirium precautions   Oriented to person and place today

## 2024-09-23 NOTE — CONSULTS
Cardiology Consultation  MD Beka Yip MD, [facility]  Vincent Haro DO, [facility]  MD Ellyn Pugh DO, [facility]  Dereje Santos DO, [facility]  ----------------------------------------------------------------  [address] [address]    Drew Santos 73 y.o. male MRN: [id number]  Unit/Bed#: [address] 203-01 Encounter: [id number]      Reason for Consultation: Bacteremia      ASSESSMENT:   Sepsis secondary to bacteremia with Proteus mirabilis, Enterobacter cloacae and Enterococcus faecalis  Paroxysmal atrial fibrillation on Eliquis  COVID-19 infection  Sacral wound  Hypertension  LVEF 55%, indeterminate diastolic function, mild to moderate MAC, trace TR, September 2024  Dyslipidemia  Type 2 diabetes mellitus  History of CVA  Severe protein calorie malnutrition    PLAN:  Patient was spoken to at length regarding his clinical presentation and bacteremia  Risks, benefits and alternatives to JOSEFINA have been discussed at length with the patient including the risk of oral trauma, esophageal rupture, gastric rupture, pneumonia, ventilator dependence and death; patient understands these risks  After extensive discussion, the patient was reluctant to say yes to the procedure and wished to think about it  Also discussed the above with the patient's wife Kristi extensively.  Both the patient and his wife will talk on the procedure and decide if they wish to proceed  Given the patient's history of CVA, sacral wound, debility and severe protein calorie malnutrition, he is not a candidate for surgical intervention to any endocarditis to the heart valve  Transthoracic echocardiogram showed no evidence of endocarditis  JOSEFINA would be for duration of antibiotics only  Will defer to patient and wife if they wish to proceed with a JOSEFINA, but given his COVID infection, he is at mildly higher risk for the procedure; no absolute contraindications  Eliquis for thromboembolic  prophylaxis  Continue high intensity statin, Zetia and metoprolol  Antibiotics as per ID  Awaiting patient and family decision    Signed: Vincent Haro DO, FACC, FASE, FASNC, FACP      History of Present Illness:  Drew Santos is a 73 y.o. male with atrial fibrillation, hypertension, sacral decubitus wound, dyslipidemia, CVA and severe protein calorie malnutrition presented with feeling not well with altered mental status testing COVID-positive.  The patient presented to [facility] with fever of 101.1, elevated white blood cell count of 21 and was empirically placed on antibiotics pending the results of a urine culture.  Patient's blood cultures were sent away showing evidence of Proteus mirabilis, Enterococcus faecalis and Enterobacter cloacae.  The patient was managed on antibiotics per ID.  At baseline since the patient's CVA, he has been very debilitated and has known sacral decubitus wound.  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    Due to the patient's bacteremia, we were consulted for the possibility of a JOSEFINA on the patient.    Denies any chest pain, pressure, tightness or squeezing.  Denies lightheadedness, dizziness or palpitations.  Denies lower extremity swelling, orthopnea or paroxysmal nocturnal dyspnea.      Review of Systems:  Review of Systems   Constitutional: Negative for decreased appetite, fever, weight gain and weight loss.   HENT:  Negative for congestion and sore throat.    Eyes:  Negative for visual disturbance.   Cardiovascular:  Negative for chest pain, dyspnea on exertion, leg swelling, near-syncope and palpitations.   Respiratory:  Negative for cough and shortness of breath.    Hematologic/Lymphatic: Negative for bleeding problem.   Skin:  Negative for rash.   Musculoskeletal:  Negative for myalgias and neck pain.   Gastrointestinal:  Negative for abdominal pain and nausea.   Neurological:  Negative for light-headedness and  weakness.   Psychiatric/Behavioral:  Negative for depression.          Past Medical History:   Diagnosis Date    Atherosclerotic heart disease of native coronary artery without angina pectoris     Atrial fibrillation (HCC)     Cerebral infarction (HCC)     Constipation     Depression     Diabetes mellitus (HCC)     Hemiplegia and hemiparesis following cerebral infarction affecting left non-dominant side (HCC)     Hyperlipidemia     Hypertension     Pressure ulcer of sacral region, stage 3 (HCC)     Prostatic hyperplasia     Sepsis (HCC)     Stroke (HCC)     TIA (transient ischemic attack)     Urinary retention     Vitamin D deficiency        No past surgical history on file.    Social History     Socioeconomic History    Marital status: /Civil Union     Spouse name: Not on file    Number of children: Not on file    Years of education: Not on file    Highest education level: Not on file   Occupational History    Not on file   Tobacco Use    Smoking status: Never     Passive exposure: Never    Smokeless tobacco: Never   Vaping Use    Vaping status: Never Used   Substance and Sexual Activity    Alcohol use: Not Currently    Drug use: Never    Sexual activity: Not on file   Other Topics Concern    Not on file   Social History Narrative    Not on file     Social Determinants of Health     Financial Resource Strain: Low Risk  (7/10/2024)    Received from [facility]    Overall Financial Resource Strain (CARDIA)     Difficulty of Paying Living Expenses: Not hard at all   Food Insecurity: No Food Insecurity (9/20/2024)    Hunger Vital Sign     Worried About Running Out of Food in the Last Year: Never true     Ran Out of Food in the Last Year: Never true   Transportation Needs: No Transportation Needs (9/20/2024)    PRAPARE - Transportation     Lack of Transportation (Medical): No     Lack of Transportation (Non-Medical): No   Physical Activity: Not on file   Stress: Stress Concern Present (5/8/2023)     [facility]    [employer] - Occupational Stress Questionnaire     Feeling of Stress : To some extent   Social Connections: Feeling Socially Integrated (7/25/2024)    Received from [facility]    OASIS : Social Isolation     How often do you feel lonely or isolated from those around you?: Rarely   Intimate Partner Violence: Not At Risk (7/10/2024)    Received from [facility]    Humiliation, Afraid, Rape, and Kick questionnaire     Fear of Current or Ex-Partner: No     Emotionally Abused: No     Physically Abused: No     Sexually Abused: No   Housing Stability: Low Risk  (9/20/2024)    Housing Stability Vital Sign     Unable to Pay for Housing in the Last Year: No     Number of Times Moved in the Last Year: 0     Homeless in the Last Year: No       No family history on file.    Allergies   Allergen Reactions    Primaquine Other (See Comments) and Hives       No current facility-administered medications on file prior to encounter.     Current Outpatient Medications on File Prior to Encounter   Medication Sig    acetaminophen (TYLENOL) 325 mg tablet Take 650 mg by mouth every 4 (four) hours as needed for fever or mild pain.    apixaban (Eliquis) 5 mg Take 1 tablet (5 mg total) by mouth 2 (two) times a day    ASPIRIN 81 PO Take 81 mg by mouth daily    atorvastatin (LIPITOR) 40 mg tablet Take 2 tablets (80 mg total) by mouth every evening Per AVS, 80mg daily (Patient taking differently: Take 80 mg by mouth every evening Per AVS, 80mg daily)    bisacodyl (Bisacodyl Laxative) 10 mg suppository Insert 10 mg into the rectum daily as needed for constipation (if no results from mom).    bisacodyl (FLEET) 10 MG/30ML ENEM Insert 10 mg into the rectum daily as needed for constipation Only use if no response from Dulcolax after 2 hours    Cholecalciferol (Vitamin D3) 50 MCG (2000 UT) CAPS Take 2,000  Units by mouth daily    dapagliflozin (Farxiga) 10 MG tablet Take 10 mg by mouth daily    docusate sodium (COLACE) 100 mg capsule Take 100 mg by mouth daily Per OSR 9/17, take 2 capsules by mouth once daily.    escitalopram (LEXAPRO) 10 mg tablet Take 10 mg by mouth daily    ezetimibe (ZETIA) 10 mg tablet Take 10 mg by mouth daily. Indications: High Amount of Fats in the Blood    finasteride (PROSCAR) 5 mg tablet Take 1 tablet (5 mg total) by mouth daily (Patient taking differently: Take 5 mg by mouth daily)    lisinopril (ZESTRIL) 10 mg tablet Take 1 tablet (10 mg total) by mouth daily (Patient taking differently: Take 10 mg by mouth daily)    magnesium hydroxide (Milk of Magnesia) 400 mg/5 mL oral suspension Take 30 mL by mouth daily as needed for constipation Use if no bowel movement x 3 days. Give at bedtime. Separate from other medications x 2 hours.    metoprolol succinate (TOPROL-XL) 25 mg 24 hr tablet Take 25 mg by mouth daily    mirtazapine (REMERON) 15 mg tablet Take 1 tablet (15 mg total) by mouth daily at bedtime    MULTIPLE VITAMIN PO Take 1 tablet by mouth in the morning. Indications: Vitamin A deficiency    Polyethylene Glycol 1000 POWD Take 17 g by mouth daily as needed (constipation) Dissolve in 4-8oz of water, juice, coffee or tea    senna (Senna-Tabs) 8.6 MG tablet Take 2 tablets by mouth daily as needed for constipation.    tamsulosin (FLOMAX) 0.4 mg Take 0.4 mg by mouth daily    potassium chloride (MICRO-K) 10 MEQ CR capsule Take 2 capsules (20 mEq total) by mouth daily for 2 days    Silver (SilvaSorb) GEL Apply 1 Application topically daily. Apply to buttock wound bed daily.        Current Facility-Administered Medications   Medication Dose Route Frequency Provider Last Rate    acetaminophen  1,000 mg Intravenous Q6H PRN Willem Gutierrez PA-C      acetaminophen  650 mg Oral Q6H PRN Xavier Castro, DO      apixaban  5 mg Oral BID Xavier Castro, DO      aspirin  81 mg Oral Daily  Xavier Castro DO      atorvastatin  80 mg Oral QPM Xavier Castro DO      bacitracin  1 small application Topical BID Sherlyn Sharma MD      calcium carbonate-vitamin D  1 tablet Oral BID With Meals Esperanza Arizmendi PA-C      cefepime  2,000 mg Intravenous Q12H Rossy Navarro MD 2,000 mg (09/23/24 0100)    Dakins (full strength)  1 Application Irrigation Daily Eloy Zhao MD      escitalopram  10 mg Oral Daily Xavier Castro DO      ezetimibe  10 mg Oral Daily Xavier Castro, DO      finasteride  5 mg Oral Daily Xavier Castro, DO      insulin lispro  1-6 Units Subcutaneous TID AC Xavier Castro DO      insulin lispro  1-6 Units Subcutaneous HS Xavier Castro DO      lisinopril  2.5 mg Oral Daily Esperanza Arizmendi PA-C      metoprolol tartrate  12.5 mg Oral Q12H ELISEO Esperanza Arizmendi PA-C      metroNIDAZOLE  500 mg Intravenous Q8H Rossy Navarro  mg (09/23/24 0323)    ondansetron  4 mg Intravenous Q4H PRN Xavier Castro DO      potassium chloride  20 mEq Oral BID Esperanza Arizmendi PA-C      senna-docusate sodium  1 tablet Oral HS Esperanza Arizmendi PA-C      vancomycin  10 mg/kg Intravenous Q12H Esperanza Arizmendi PA-C 1,250 mg (09/23/24 1008)            Vitals:    09/22/24 0836 09/22/24 1648 09/22/24 2041 09/23/24 0722   BP: 125/77 119/73 129/71 130/76   BP Location:  Left arm Left arm Left arm   Pulse: 77 77 88 77   Resp: 15 15 18 18   Temp: 98.2 °F (36.8 °C) 97.7 °F (36.5 °C) 98.5 °F (36.9 °C) 98.2 °F (36.8 °C)   TempSrc:  Oral Oral Oral   SpO2: 95% 95% 96% 96%   Weight:       Height:           I/O last 3 completed shifts:  In: 1470 [P.O.:950; I.V.:120; IV Piggyback:400]  Out: 2100 [Urine:2100]      Intake/Output Summary (Last 24 hours) at 9/23/2024 1048  Last data filed at 9/23/2024 0150  Gross per 24 hour   Intake 920 ml   Output 1050 ml   Net -130 ml       Weight change:     PHYSICAL EXAMINATION:  Gen: Awake, Alert, NAD  Head/eyes:  "AT/NC, pupils equal and round, Anicteric  ENT: mmm  Neck: Supple, No elevated JVP, trachea midline  Resp: CTA bilaterally no w/r/r  CV: RRR +S1, S2, No m/r/g  Abd: Soft, NT/ND + BS  Ext: no LE edema bilaterally  Neuro: Follows commands, moves all extermities  Psych: Appropriate affect, normal mood, pleasant attitude, non-combative  Skin: warm; no rash, erythema or venous stasis changes on exposed skin    Lab Results:  Results from last 7 days   Lab Units 09/23/24  0525   WBC Thousand/uL 13.79*   HEMOGLOBIN g/dL 7.6*   HEMATOCRIT % 27.1*   PLATELETS Thousands/uL 310     Results from last 7 days   Lab Units 09/23/24  0525 09/18/24  0508 09/17/24  1637   POTASSIUM mmol/L 4.1   < > 2.9*   CHLORIDE mmol/L 114*   < > 105   CO2 mmol/L 22   < > 31   BUN mg/dL 8   < > 15   CREATININE mg/dL 0.65   < > 0.76   CALCIUM mg/dL 7.8*   < > 9.0   ALK PHOS U/L  --   --  88   ALT U/L  --   --  28   AST U/L  --   --  38    < > = values in this interval not displayed.     No results found for: \"TROPONINT\"                        No results found for this or any previous visit.    No results found for this or any previous visit.    No results found for this or any previous visit.    No results found for this or any previous visit.      CXR: Results for orders placed during the hospital encounter of 09/17/24    XR chest 2 views    Narrative  XR CHEST PA AND LATERAL    INDICATION: sepsis.    COMPARISON: 3/5/2024    FINDINGS: Both of the lateral views are quite limited due to overlapping of the arms with the chest.    Linear density in the right lower lung field has appearance favoring platelike atelectasis. No convincing evidence of pneumonia. No pneumothorax or pleural effusion.    Normal cardiomediastinal silhouette.    Bones are unremarkable for age.    Normal upper abdomen.    Impression  Limited study. Platelike atelectasis in the right lower lung field. No convincing evidence of pneumonia        Workstation performed: " [id number]    Results for orders placed during the hospital encounter of 03/05/24    XR chest 1 view portable    Narrative  XR CHEST PORTABLE    INDICATION: Weakness.    COMPARISON: None    FINDINGS:    Clear lungs. No pneumothorax or pleural effusion.    Normal cardiomediastinal silhouette.    Bones are unremarkable for age.    Normal upper abdomen.    Impression  No acute cardiopulmonary disease.        Workstation performed: [id number]      ECG: Sinus tachycardia, otherwise normal ECG    This note was completed in part utilizing M-Modal Fluency Direct Software.  Grammatical errors, random word insertions, spelling mistakes, and incomplete sentences may be an occasional consequence of this system secondary to software limitations, ambient noise, and hardware issues.  If you have any questions or concerns about the content, text, or information contained within the body of this dictation, please contact the provider for clarification.

## 2024-09-23 NOTE — ASSESSMENT & PLAN NOTE
Chronic sacral decubitus ulcer; present on admission  Status post debridement at bedside per surgical team.  Continue with wound care as tolerated.   continue dressing changes and serial exams  Wound care consultation  Offload pressure

## 2024-09-23 NOTE — PROGRESS NOTES
Drew Santos is a 73 y.o. male who is currently ordered Vancomycin IV with management by the Pharmacy Consult service.  Relevant clinical data and objective / subjective history reviewed.  Vancomycin Assessment:  Indication and Goal AUC/Trough: Bacteremia (goal -600, trough >10)  Clinical Status: stable  Micro:     Renal Function:  SCr: 0.65 mg/dL  CrCl: 137.1 mL/min  Renal replacement: Not on dialysis  Days of Therapy: 5  Current Dose: 1250 mg IV q12h  Vancomycin Plan:  New Dosing: continue 1250 mg IV 12h  Estimated AUC: 449 mcg*hr/mL  Estimated Trough: 12.7 mcg/mL  Next Level: 9/28 with AM labs  Renal Function Monitoring: Daily BMP and UOP  Pharmacy will continue to follow closely for s/sx of nephrotoxicity, infusion reactions and appropriateness of therapy.  BMP and CBC will be ordered per protocol. We will continue to follow the patient’s culture results and clinical progress daily.    Darren Lewis, Pharmacist

## 2024-09-23 NOTE — ASSESSMENT & PLAN NOTE
Blood cultures with growth of enterobacter and proteus in one set, and  second set growing enterococcus faecalis. Likely secondary to proctitis/colitis with possible fistulous connection to the sacral wound. No other source appreciated. Patient has COVID-19 but no opacities/groundglass/consolidations on chest imaging suggesting a bacterial process. TTE without evidence of valvular vegetations but was a technically difficult study. Repeat blood cultures are negative >48 hours.  -antibiotics as above  -check CBCD and BMP tomorrow  -follow up repeat blood cultures  -monitor vitals

## 2024-09-23 NOTE — ASSESSMENT & PLAN NOTE
As evidenced by tachycardia and leukocytosis and fever  Now with polymicrobial bacteremia- enterbacter, enterococcus and proetus   CT abdomen and pelvis showed Mid/distal sigmoid colonic and rectal inflammatory changes in keeping with a segmental colitis/proctitis.Right ischioanal fossa subcutaneous emphysema extending through the right paramidline gluteal subcutaneous tissues to the skin surface suspicious for a perirectal or perianal fistula; Parra catheter balloon is located in the prostatic urethra; this catheter should be repositioned. Fat stranding surrounding the decompressed bladder may indicate cystitis.  Has chronic indwelling Parra catheter with abnormal UA  Possible source of infection appears to be proctocolitis versus complicated UTI in the setting of indwelling Parra catheter.  Also has large sacral decubitus ulcer which could be infected as well. Also with +Covid   Seen by GI imaging likely due to ischemic colitis but likely not primary cause of his presentation - no role for colonoscopy or flex sig curently   Patient is s/p bedside debridement of sacral decubitus ulcer 9/18  Continue with dressing changes and further debridements per surgical team.    Continue IV cefepime, flagyl and vanco per ID   Repeat blood cultures negative to date   TTE without evidence of large vegetations although images suboptimal   Spoke with ID would like to pursue JOSEFINA, will consult cardiology   Appreciate infectious disease, surgical and GI follow-up

## 2024-09-23 NOTE — ASSESSMENT & PLAN NOTE
Fever, tachycardia, tachypnea, and leukocytosis. Secondary to polymicrobial bacteremia, likely from colitis/proctitis with possible fistulous connection to sacral ulcer. Blood cultures now updated with growth of enterobacter, enterococcus faecalis, proteus. Also consider role of COVID-19. No other clear source appreciated. Chest imaging with no opacities/groundglass/consolidations suggesting a bacterial process. Urine was abnormal, culture showed growth of enterobacter. Head CT with no acute intracranial findings. Despite being systemically ill he remains hemodynamically stable. This morning he is afebrile. WBC count is trending down. He is receiving IV cefepime, IV flagyl, and IV Vancomycin which he is tolerating without difficulty. I will continue this regimen for now. Repeat blood cultures are negative >48 hours.  -continue IV cefepime  -continue IV flagyl, transition to PO once patient is accepting routine PO intake without difficulty  -continue IV vancomycin  -continue pharmacy consult for guidance on vancomycin dosage  -check CBCD and BMP tomorrow  -follow up repeat blood cultures   -monitor vitals  -supportive care

## 2024-09-23 NOTE — ASSESSMENT & PLAN NOTE
History of CVA   Chronically bedbound and with chronic left-sided deficits   Continue home aspirin, Eliquis, statin  Continue dietary modifications, speech therapy consulted - dysphagia 2 mechanical soft, thin liquids

## 2024-09-23 NOTE — ASSESSMENT & PLAN NOTE
Lab Results   Component Value Date    HGBA1C 6.2 (H) 09/17/2024       Recent Labs     09/22/24  1221 09/22/24  1649 09/22/24  2040 09/23/24  0720   POCGLU 160* 128 241* 124       Blood Sugar Average: Last 72 hrs:  (P) [id number]  Well-controlled with last hemoglobin A1c of 6.2%  Will hold home oral regimen  Monitor on sliding scale only  Hypoglycemia protocol

## 2024-09-23 NOTE — PLAN OF CARE
Problem: Potential for Falls  Goal: Patient will remain free of falls  Description: INTERVENTIONS:  - Educate patient/family on patient safety including physical limitations  - Instruct patient to call for assistance with activity   - Consult OT/PT to assist with strengthening/mobility   - Keep Call bell within reach  - Keep bed low and locked with side rails adjusted as appropriate  - Keep care items and personal belongings within reach  - Initiate and maintain comfort rounds  - Make Fall Risk Sign visible to staff  - Offer Toileting every 2 Hours, in advance of need  - Initiate/Maintain bed alarm  - Obtain necessary fall risk management equipment: bed alarm call bell  - Apply yellow socks and bracelet for high fall risk patients  - Consider moving patient to room near nurses station  Outcome: Progressing     Problem: PAIN - ADULT  Goal: Verbalizes/displays adequate comfort level or baseline comfort level  Description: Interventions:  - Encourage patient to monitor pain and request assistance  - Assess pain using appropriate pain scale  - Administer analgesics based on type and severity of pain and evaluate response  - Implement non-pharmacological measures as appropriate and evaluate response  - Consider cultural and social influences on pain and pain management  - Notify physician/advanced practitioner if interventions unsuccessful or patient reports new pain  Outcome: Progressing     Problem: INFECTION - ADULT  Goal: Absence or prevention of progression during hospitalization  Description: INTERVENTIONS:  - Assess and monitor for signs and symptoms of infection  - Monitor lab/diagnostic results  - Monitor all insertion sites, i.e. indwelling lines, tubes, and drains  - Monitor endotracheal if appropriate and nasal secretions for changes in amount and color  - [place] appropriate cooling/warming therapies per order  - Administer medications as ordered  - Instruct and encourage patient and family to use good  hand hygiene technique  - Identify and instruct in appropriate isolation precautions for identified infection/condition  Outcome: Progressing  Goal: Absence of fever/infection during neutropenic period  Description: INTERVENTIONS:  - Monitor WBC    Outcome: Progressing     Problem: SAFETY ADULT  Goal: Patient will remain free of falls  Description: INTERVENTIONS:  - Educate patient/family on patient safety including physical limitations  - Instruct patient to call for assistance with activity   - Consult OT/PT to assist with strengthening/mobility   - Keep Call bell within reach  - Keep bed low and locked with side rails adjusted as appropriate  - Keep care items and personal belongings within reach  - Initiate and maintain comfort rounds  - Make Fall Risk Sign visible to staff  - Offer Toileting every 2 Hours, in advance of need  - Initiate/Maintain bed alarm  - Obtain necessary fall risk management equipment: bed alarm call bell  - Apply yellow socks and bracelet for high fall risk patients  - Consider moving patient to room near nurses station  Outcome: Progressing  Goal: Maintain or return to baseline ADL function  Description: INTERVENTIONS:  -  Assess patient's ability to carry out ADLs; assess patient's baseline for ADL function and identify physical deficits which impact ability to perform ADLs (bathing, care of mouth/teeth, toileting, grooming, dressing, etc.)  - Assess/evaluate cause of self-care deficits   - Assess range of motion  - Assess patient's mobility; develop plan if impaired  - Assess patient's need for assistive devices and provide as appropriate  - Encourage maximum independence but intervene and supervise when necessary  - Involve family in performance of ADLs  - Assess for home care needs following discharge   - Consider OT consult to assist with ADL evaluation and planning for discharge  - Provide patient education as appropriate  Outcome: Progressing  Goal: Maintains/Returns to pre  admission functional level  Description: INTERVENTIONS:  - Perform AM-PAC 6 Click Basic Mobility/ Daily Activity assessment daily.  - Set and communicate daily mobility goal to care team and patient/family/caregiver.   - Collaborate with rehabilitation services on mobility goals if consulted  - Perform Range of Motion 4 times a day.  - Reposition patient every 2 hours.  - Dangle patient 3 times a day  - Stand patient 3 times a day  - Ambulate patient 3 times a day  - Out of bed to chair 3 times a day   - Out of bed for meals 3 times a day  - Out of bed for toileting  - Record patient progress and toleration of activity level   Outcome: Progressing     Problem: DISCHARGE PLANNING  Goal: Discharge to home or other facility with appropriate resources  Description: INTERVENTIONS:  - Identify barriers to discharge w/patient and caregiver  - Arrange for needed discharge resources and transportation as appropriate  - Identify discharge learning needs (meds, wound care, etc.)  - Arrange for interpretive services to assist at discharge as needed  - Refer to Case Management Department for coordinating discharge planning if the patient needs post-hospital services based on physician/advanced practitioner order or complex needs related to functional status, cognitive ability, or social support system  Outcome: Progressing     Problem: Knowledge Deficit  Goal: Patient/family/caregiver demonstrates understanding of disease process, treatment plan, medications, and discharge instructions  Description: Complete learning assessment and assess knowledge base.  Interventions:  - Provide teaching at level of understanding  - Provide teaching via preferred learning methods  Outcome: Progressing     Problem: Prexisting or High Potential for Compromised Skin Integrity  Goal: Skin integrity is maintained or improved  Description: INTERVENTIONS:  - Identify patients at risk for skin breakdown  - Assess and monitor skin integrity  - Assess  and monitor nutrition and hydration status  - Monitor labs   - Assess for incontinence   - Turn and reposition patient  - Assist with mobility/ambulation  - Relieve pressure over bony prominences  - Avoid friction and shearing  - Provide appropriate hygiene as needed including keeping skin clean and dry  - Evaluate need for skin moisturizer/barrier cream  - Collaborate with interdisciplinary team   - Patient/family teaching  - Consider wound care consult   Outcome: Progressing     Problem: GASTROINTESTINAL - ADULT  Goal: Maintains or returns to baseline bowel function  Description: INTERVENTIONS:  - Assess bowel function  - Encourage oral fluids to ensure adequate hydration  - Administer IV fluids if ordered to ensure adequate hydration  - Administer ordered medications as needed  - Encourage mobilization and activity  - Consider nutritional services referral to assist patient with adequate nutrition and appropriate food choices  Outcome: Progressing     Problem: GENITOURINARY - ADULT  Goal: Urinary catheter remains patent  Description: INTERVENTIONS:  - Assess patency of urinary catheter  - If patient has a chronic hanks, consider changing catheter if non-functioning  - Follow guidelines for intermittent irrigation of non-functioning urinary catheter  Outcome: Progressing     Problem: METABOLIC, FLUID AND ELECTROLYTES - ADULT  Goal: Electrolytes maintained within normal limits  Description: INTERVENTIONS:  - Monitor labs and assess patient for signs and symptoms of electrolyte imbalances  - Administer electrolyte replacement as ordered  - Monitor response to electrolyte replacements, including repeat lab results as appropriate  - Instruct patient on fluid and nutrition as appropriate  Outcome: Progressing     Problem: SKIN/TISSUE INTEGRITY - ADULT  Goal: Skin Integrity remains intact(Skin Breakdown Prevention)  Description: Assess:  -Perform Goldy assessment every   -Clean and moisturize skin every   -Inspect skin  when repositioning, toileting, and assisting with ADLS  -Assess under medical devices such as  every   -Assess extremities for adequate circulation and sensation     Bed Management:  -Have minimal linens on bed & keep smooth, unwrinkled  -Change linens as needed when moist or perspiring  -Avoid sitting or lying in one position for more than  hours while in bed  -Keep HOB at degrees     Toileting:  -Offer bedside commode  -Assess for incontinence every   -Use incontinent care products after each incontinent episode such as     Activity:  -Mobilize patient  times a day  -Encourage activity and walks on unit  -Encourage or provide ROM exercises   -Turn and reposition patient every  Hours  -Use appropriate equipment to lift or move patient in bed  -Instruct/ Assist with weight shifting every  when out of bed in chair  -Consider limitation of chair time  hour intervals    Skin Care:  -Avoid use of baby powder, tape, friction and shearing, hot water or constrictive clothing  -Relieve pressure over bony prominences using   -Do not massage red bony areas    Next Steps:  -Teach patient strategies to minimize risks such as    -Consider consults to  interdisciplinary teams such as   Outcome: Progressing  Goal: Incision(s), wounds(s) or drain site(s) healing without S/S of infection  Description: INTERVENTIONS  - Assess and document dressing, incision, wound bed, drain sites and surrounding tissue  - Provide patient and family education  - Perform skin care/dressing changes every   Outcome: Progressing  Goal: Pressure injury heals and does not worsen  Description: Interventions:  - Implement low air loss mattress or specialty surface (Criteria met)  - Apply silicone foam dressing  - Instruct/assist with weight shifting every  minutes when in chair   - Limit chair time to  hour intervals  - Use special pressure reducing interventions such as  when in chair   - Apply fecal or urinary incontinence containment device   - Perform  passive or active ROM every   - Turn and reposition patient & offload bony prominences every  hours   - Utilize friction reducing device or surface for transfers   - Consider consults to  interdisciplinary teams such as   - Use incontinent care products after each incontinent episode such as   - Consider nutrition services referral as needed  Outcome: Progressing     Problem: HEMATOLOGIC - ADULT  Goal: Maintains hematologic stability  Description: INTERVENTIONS  - Assess for signs and symptoms of bleeding or hemorrhage  - Monitor labs  - Administer supportive blood products/factors as ordered and appropriate  Outcome: Progressing     Problem: Nutrition/Hydration-ADULT  Goal: Nutrient/Hydration intake appropriate for improving, restoring or maintaining nutritional needs  Description: Monitor and assess patient's nutrition/hydration status for malnutrition. Collaborate with interdisciplinary team and initiate plan and interventions as ordered.  Monitor patient's weight and dietary intake as ordered or per policy. Utilize nutrition screening tool and intervene as necessary. Determine patient's food preferences and provide high-protein, high-caloric foods as appropriate.     INTERVENTIONS:  - Monitor oral intake, urinary output, labs, and treatment plans  - Assess nutrition and hydration status and recommend course of action  - Evaluate amount of meals eaten  - Assist patient with eating if necessary   - Allow adequate time for meals  - Recommend/ encourage appropriate diets, oral nutritional supplements, and vitamin/mineral supplements  - Order, calculate, and assess calorie counts as needed  - Recommend, monitor, and adjust tube feedings and TPN/PPN based on assessed needs  - Assess need for intravenous fluids  - Provide specific nutrition/hydration education as appropriate  - Include patient/family/caregiver in decisions related to nutrition  Outcome: Progressing

## 2024-09-23 NOTE — ASSESSMENT & PLAN NOTE
Patient met sepsis criteria on admission.  Admission blood cultures showing GNR with BCI suggesting enterobacter and proteus. Now second set BCI is enterococcus faecalis.  Polymicrobial bacteremia likely secondary sacral wound with possible fistulous connection  Infectious disease input noted and appreciated.  Continue IV cefepime, flagyl and vancomycin   Repeat cultures negative to date   ID recommending JOSEFINA, will consult cardiology

## 2024-09-23 NOTE — ASSESSMENT & PLAN NOTE
Noted on CT scan.  No diarrhea or clinical evidence of colitis.   GI input appreciated.  Stool studies negative  Likely ischemic colitis given area affected, continue supportive measures   No plan for any inpatient scope

## 2024-09-23 NOTE — PLAN OF CARE
Problem: Potential for Falls  Goal: Patient will remain free of falls  Description: INTERVENTIONS:  - Educate patient/family on patient safety including physical limitations  - Instruct patient to call for assistance with activity   - Consult OT/PT to assist with strengthening/mobility   - Keep Call bell within reach  - Keep bed low and locked with side rails adjusted as appropriate  - Keep care items and personal belongings within reach  - Initiate and maintain comfort rounds  - Make Fall Risk Sign visible to staff  - Offer Toileting every 2 Hours, in advance of need  - Initiate/Maintain bed alarm  - Obtain necessary fall risk management equipment: bed alarm call bell  - Apply yellow socks and bracelet for high fall risk patients  - Consider moving patient to room near nurses station  Outcome: Progressing     Problem: PAIN - ADULT  Goal: Verbalizes/displays adequate comfort level or baseline comfort level  Description: Interventions:  - Encourage patient to monitor pain and request assistance  - Assess pain using appropriate pain scale  - Administer analgesics based on type and severity of pain and evaluate response  - Implement non-pharmacological measures as appropriate and evaluate response  - Consider cultural and social influences on pain and pain management  - Notify physician/advanced practitioner if interventions unsuccessful or patient reports new pain  Outcome: Progressing     Problem: INFECTION - ADULT  Goal: Absence or prevention of progression during hospitalization  Description: INTERVENTIONS:  - Assess and monitor for signs and symptoms of infection  - Monitor lab/diagnostic results  - Monitor all insertion sites, i.e. indwelling lines, tubes, and drains  - Monitor endotracheal if appropriate and nasal secretions for changes in amount and color  - [place] appropriate cooling/warming therapies per order  - Administer medications as ordered  - Instruct and encourage patient and family to use good  hand hygiene technique  - Identify and instruct in appropriate isolation precautions for identified infection/condition  Outcome: Progressing  Goal: Absence of fever/infection during neutropenic period  Description: INTERVENTIONS:  - Monitor WBC    Outcome: Progressing     Problem: SAFETY ADULT  Goal: Patient will remain free of falls  Description: INTERVENTIONS:  - Educate patient/family on patient safety including physical limitations  - Instruct patient to call for assistance with activity   - Consult OT/PT to assist with strengthening/mobility   - Keep Call bell within reach  - Keep bed low and locked with side rails adjusted as appropriate  - Keep care items and personal belongings within reach  - Initiate and maintain comfort rounds  - Make Fall Risk Sign visible to staff  - Offer Toileting every 2 Hours, in advance of need  - Initiate/Maintain bed alarm  - Obtain necessary fall risk management equipment: bed alarm call bell  - Apply yellow socks and bracelet for high fall risk patients  - Consider moving patient to room near nurses station  Outcome: Progressing  Goal: Maintain or return to baseline ADL function  Description: INTERVENTIONS:  -  Assess patient's ability to carry out ADLs; assess patient's baseline for ADL function and identify physical deficits which impact ability to perform ADLs (bathing, care of mouth/teeth, toileting, grooming, dressing, etc.)  - Assess/evaluate cause of self-care deficits   - Assess range of motion  - Assess patient's mobility; develop plan if impaired  - Assess patient's need for assistive devices and provide as appropriate  - Encourage maximum independence but intervene and supervise when necessary  - Involve family in performance of ADLs  - Assess for home care needs following discharge   - Consider OT consult to assist with ADL evaluation and planning for discharge  - Provide patient education as appropriate  Outcome: Progressing  Goal: Maintains/Returns to pre  admission functional level  Description: INTERVENTIONS:  - Perform AM-PAC 6 Click Basic Mobility/ Daily Activity assessment daily.  - Set and communicate daily mobility goal to care team and patient/family/caregiver.   - Collaborate with rehabilitation services on mobility goals if consulted  - Perform Range of Motion 4 times a day.  - Reposition patient every 2 hours.  - Dangle patient 3 times a day  - Stand patient 3 times a day  - Ambulate patient 3 times a day  - Out of bed to chair 3 times a day   - Out of bed for meals 3 times a day  - Out of bed for toileting  - Record patient progress and toleration of activity level   Outcome: Progressing     Problem: DISCHARGE PLANNING  Goal: Discharge to home or other facility with appropriate resources  Description: INTERVENTIONS:  - Identify barriers to discharge w/patient and caregiver  - Arrange for needed discharge resources and transportation as appropriate  - Identify discharge learning needs (meds, wound care, etc.)  - Arrange for interpretive services to assist at discharge as needed  - Refer to Case Management Department for coordinating discharge planning if the patient needs post-hospital services based on physician/advanced practitioner order or complex needs related to functional status, cognitive ability, or social support system  Outcome: Progressing     Problem: Knowledge Deficit  Goal: Patient/family/caregiver demonstrates understanding of disease process, treatment plan, medications, and discharge instructions  Description: Complete learning assessment and assess knowledge base.  Interventions:  - Provide teaching at level of understanding  - Provide teaching via preferred learning methods  Outcome: Progressing     Problem: Prexisting or High Potential for Compromised Skin Integrity  Goal: Skin integrity is maintained or improved  Description: INTERVENTIONS:  - Identify patients at risk for skin breakdown  - Assess and monitor skin integrity  - Assess  and monitor nutrition and hydration status  - Monitor labs   - Assess for incontinence   - Turn and reposition patient  - Assist with mobility/ambulation  - Relieve pressure over bony prominences  - Avoid friction and shearing  - Provide appropriate hygiene as needed including keeping skin clean and dry  - Evaluate need for skin moisturizer/barrier cream  - Collaborate with interdisciplinary team   - Patient/family teaching  - Consider wound care consult   Outcome: Progressing     Problem: GASTROINTESTINAL - ADULT  Goal: Maintains or returns to baseline bowel function  Description: INTERVENTIONS:  - Assess bowel function  - Encourage oral fluids to ensure adequate hydration  - Administer IV fluids if ordered to ensure adequate hydration  - Administer ordered medications as needed  - Encourage mobilization and activity  - Consider nutritional services referral to assist patient with adequate nutrition and appropriate food choices  Outcome: Progressing     Problem: GENITOURINARY - ADULT  Goal: Urinary catheter remains patent  Description: INTERVENTIONS:  - Assess patency of urinary catheter  - If patient has a chronic hanks, consider changing catheter if non-functioning  - Follow guidelines for intermittent irrigation of non-functioning urinary catheter  Outcome: Progressing     Problem: METABOLIC, FLUID AND ELECTROLYTES - ADULT  Goal: Electrolytes maintained within normal limits  Description: INTERVENTIONS:  - Monitor labs and assess patient for signs and symptoms of electrolyte imbalances  - Administer electrolyte replacement as ordered  - Monitor response to electrolyte replacements, including repeat lab results as appropriate  - Instruct patient on fluid and nutrition as appropriate  Outcome: Progressing     Problem: SKIN/TISSUE INTEGRITY - ADULT  Goal: Skin Integrity remains intact(Skin Breakdown Prevention)  Description: Assess:  -Perform Goldy assessment e  -Clean and moisturize skin  -Inspect skin when  repositioning, toileting, and assisting with ADLS  -Assess under medical devices  -Assess extremities for adequate circulation and sensation     Bed Management:  -Have minimal linens on bed & keep smooth, unwrinkled  -Change linens as needed when moist or perspiring  -Avoid sitting or lying in one position for more than 2 hours while in bed  -Keep HOB at 45 degrees     Toileting:  -Offer bedside commode  -Assess for incontinence  -Use incontinent care products after each incontinent episode    Activity:  -Mobilize patient 3 times a day  -Encourage activity and walks on unit  -Encourage or provide ROM exercises   -Turn and reposition patient every 2 Hours  -Use appropriate equipment to lift or move patient in bed  -Instruct/ Assist with weight shifting every 2 when out of bed in chair  -Consider limitation of chair time 2 hour intervals    Skin Care:  -Avoid use of baby powder, tape, friction and shearing, hot water or constrictive clothing  -Relieve pressure over bony prominences  -Do not massage red bony areas    Next Steps:  -Teach patient strategies to minimize risks   -Consider consults to  interdisciplinary teams  Outcome: Progressing  Goal: Incision(s), wounds(s) or drain site(s) healing without S/S of infection  Description: INTERVENTIONS  - Assess and document dressing, incision, wound bed, drain sites and surrounding tissue  - Provide patient and family education  - Perform skin care/dressing changes  Outcome: Progressing  Goal: Pressure injury heals and does not worsen  Description: Interventions:  - Implement low air loss mattress or specialty surface (Criteria met)  - Apply silicone foam dressing  - Instruct/assist with weight shifting every 30 minutes when in chair   - Limit chair time to 2 hour intervals  - Use special pressure reducing interventions when in chair   - Apply fecal or urinary incontinence containment device   - Perform passive or active ROM  - Turn and reposition patient & offload bony  prominences every 2 hours   - Utilize friction reducing device or surface for transfers   - Consider consults to  interdisciplinary teams  - Use incontinent care products after each incontinent episode  - Consider nutrition services referral as needed  Outcome: Progressing     Problem: HEMATOLOGIC - ADULT  Goal: Maintains hematologic stability  Description: INTERVENTIONS  - Assess for signs and symptoms of bleeding or hemorrhage  - Monitor labs  - Administer supportive blood products/factors as ordered and appropriate  Outcome: Progressing     Problem: Nutrition/Hydration-ADULT  Goal: Nutrient/Hydration intake appropriate for improving, restoring or maintaining nutritional needs  Description: Monitor and assess patient's nutrition/hydration status for malnutrition. Collaborate with interdisciplinary team and initiate plan and interventions as ordered.  Monitor patient's weight and dietary intake as ordered or per policy. Utilize nutrition screening tool and intervene as necessary. Determine patient's food preferences and provide high-protein, high-caloric foods as appropriate.     INTERVENTIONS:  - Monitor oral intake, urinary output, labs, and treatment plans  - Assess nutrition and hydration status and recommend course of action  - Evaluate amount of meals eaten  - Assist patient with eating if necessary   - Allow adequate time for meals  - Recommend/ encourage appropriate diets, oral nutritional supplements, and vitamin/mineral supplements  - Order, calculate, and assess calorie counts as needed  - Recommend, monitor, and adjust tube feedings and TPN/PPN based on assessed needs  - Assess need for intravenous fluids  - Provide specific nutrition/hydration education as appropriate  - Include patient/family/caregiver in decisions related to nutrition  Outcome: Progressing

## 2024-09-23 NOTE — PROGRESS NOTES
Progress Note - Infectious Disease   Name: Drew Santos 73 y.o. male I MRN: [id number]  Unit/Bed#: [place] [address] -01 I Date of Admission: 9/17/2024   Date of Service: 9/23/2024 I Hospital Day: 6     Assessment & Plan  Sepsis  Fever, tachycardia, tachypnea, and leukocytosis. Secondary to polymicrobial bacteremia, likely from colitis/proctitis with possible fistulous connection to sacral ulcer. Blood cultures now updated with growth of enterobacter, enterococcus faecalis, proteus. Also consider role of COVID-19. No other clear source appreciated. Chest imaging with no opacities/groundglass/consolidations suggesting a bacterial process. Urine was abnormal, culture showed growth of enterobacter. Head CT with no acute intracranial findings. Despite being systemically ill he remains hemodynamically stable. This morning he is afebrile. WBC count is trending down. He is receiving IV cefepime, IV flagyl, and IV Vancomycin which he is tolerating without difficulty. I will continue this regimen for now. Repeat blood cultures are negative >48 hours.  -continue IV cefepime  -continue IV flagyl, transition to PO once patient is accepting routine PO intake without difficulty  -continue IV vancomycin  -continue pharmacy consult for guidance on vancomycin dosage  -check CBCD and BMP tomorrow  -follow up repeat blood cultures   -monitor vitals  -supportive care  Polymicrobial bacteremia  Blood cultures with growth of enterobacter and proteus in one set, and  second set growing enterococcus faecalis. Likely secondary to proctitis/colitis with possible fistulous connection to the sacral wound. No other source appreciated. Patient has COVID-19 but no opacities/groundglass/consolidations on chest imaging suggesting a bacterial process. TTE without evidence of valvular vegetations but was a technically difficult study. Repeat blood cultures are negative >48 hours.  -antibiotics as above  -check CBCD and BMP tomorrow  -follow up repeat  blood cultures  -monitor vitals  Sacral wound  Per patient's family wound initially started during patient's stay in a skilled nursing facility for rehab. Wound imaging present in our system since prior hematuria hospitalization. Wound now with significant depth. Concern for possible fistulous connection to the rectum given evidence of colitis/proctitis and extensive tissue emphysema on CT. This is likely playing a role in bacteremia above. General surgery has debrided and will continue to follow up with wound. He may require diverting colostomy.  -antibiotic as above  -serial skin exams  -frequent turning/repositioning to offload pressure from the wound  -local wound care per general surgery  -continue follow up with general surgery  Proctitis  CT C/A/P showed mild distal sigmoid and rectal inflammatory changes, R ischioanal fossa emphysema, and possible fistulous connection to skin surface. This is likely source of patient's bacteremia above. Family reports patient has very infrequent bowel movements in past few weeks. General surgery is following for wound. GI has been consulted and they have concern for ischemic colitis. MRI has been recommended for further evaluation for fistula.  -antibiotics as above  -serial abdominal/rectal exams  -monitor stool output  -follow up stool studies  -continue follow up with general surgery  -continue follow up with gastroenterology  Type 2 diabetes mellitus without complication, without long-term current use of insulin ([facility])  Lab Results   Component Value Date    HGBA1C 6.2 (H) 09/17/2024   This is risk factor for wounds and infection.  -recommend tight glycemic control  -blood glucose management per primary service  COVID-19  PCR positive on 9/17/2024 although per patient's family he tested positive prior to admission. Patient's wife also positive. Chest imaging showed no airspace opacities or groundglass. He has been started on mild disease protocol and received IV Remdesivir  "x3 days.  -management per primary service  -monitor vitals  -monitor respiratory status    Above plan was discussed in detail with patient at the bedside.  Above plan was discussed in detail with SLIM who agrees with antibiotic plan.  Above plan was discussed in detail with cardiology regarding request for possible JOSEFINA.    Antibiotics:  Cefepime 6  Vancomycin 5  Flagyl 6  Antibiotics 7    Subjective:  Patient reports \"I'm okay.\" He is speaking in full sentences today and is asking for more flavor on his breakfast. Tells me he's really not that hungry today. He denies chills, sweats, nausea, abdominal pain, cough, difficulty breathing, and chest pain. He denies pain in his sacral wound. No new symptoms.    Objective:  Vitals:  Temp:  [97.7 °F (36.5 °C)-98.5 °F (36.9 °C)] 98.5 °F (36.9 °C)  HR:  [77-88] 88  Resp:  [15-18] 18  BP: (119-129)/(71-77) 129/71  SpO2:  [95 %-96 %] 96 %  Temp (24hrs), Av.1 °F (36.7 °C), Min:97.7 °F (36.5 °C), Max:98.5 °F (36.9 °C)  Current: Temperature: 98.5 °F (36.9 °C)    Physical Exam:   General Appearance:  Alert, interactive, nontoxic, no acute distress. He appears comfortable sitting up in bed having breakfast.    Throat: Oropharynx moist without lesions.    Lungs:   Clear to auscultation bilaterally; no wheezes, rhonchi or rales; respirations unlabored on room air.   Heart:  RRR; no murmur, rub or gallop.   Abdomen:   Soft, non-tender, non-distended, positive bowel sounds.     Extremities: L side CVA residual.   Skin: No new rashes noted on exposed skin.     Labs, Imaging, & Other studies:   All pertinent labs and imaging studies were personally reviewed  Results from last 7 days   Lab Units 24  0525 24  0604 24  0616   WBC Thousand/uL 13.79* 15.98* 16.78*   HEMOGLOBIN g/dL 7.6* 7.6* 7.5*   PLATELETS Thousands/uL 310 445* 434*     Results from last 7 days   Lab Units 24  0525 24  0508 24  1637   POTASSIUM mmol/L 4.1   < > 2.9*   CHLORIDE mmol/L " 114*   < > 105   CO2 mmol/L 22   < > 31   BUN mg/dL 8   < > 15   CREATININE mg/dL 0.65   < > 0.76   EGFR ml/min/1.73sq m 95   < > 89   CALCIUM mg/dL 7.8*   < > 9.0   AST U/L  --   --  38   ALT U/L  --   --  28   ALK PHOS U/L  --   --  88    < > = values in this interval not displayed.     Results from last 7 days   Lab Units 09/20/24  0555 09/17/24  1954 09/17/24  1831 09/17/24  1637   BLOOD CULTURE  No Growth at 48 hrs.  No Growth at 48 hrs.  --  Enterococcus faecalis* Enterobacter cloacae*  Proteus mirabilis*   GRAM STAIN RESULT   --   --  Gram positive cocci in pairs and chains* Gram negative rods*   URINE CULTURE   --  10,000-19,000 cfu/ml Enterobacter cloacae*  --   --

## 2024-09-23 NOTE — ASSESSMENT & PLAN NOTE
Patient currently on room air though high risk given his age and comorbidities  Completed three days of IV remdesivir   Supportive care otherwise  Contact and airborne precautions  Currently on mild COVID pathway.  Not requiring supplemental oxygen.  10 days of isolation through 9/27

## 2024-09-23 NOTE — ASSESSMENT & PLAN NOTE
Low blood counts noted 9/19/24.   Has chronic anemia likely secondary to poor nutritional status underlying chronic illness.  Hemoglobin declined to 7.1 given  1 unit PRBC infusion  Hemoglobin stable at 7.6 post transfusion   Encourage oral intake, cannot utilize IV iron due to bacteremia   Lab Results   Component Value Date    HGB 7.6 (L) 09/23/2024    HGB 7.6 (L) 09/22/2024    HGB 7.5 (L) 09/21/2024    HGB 8.1 (L) 09/20/2024    HGB 7.2 (L) 09/19/2024

## 2024-09-23 NOTE — PROGRESS NOTES
Progress Note - Hospitalist   Name: Drew Santos 73 y.o. male I MRN: [id number]  Unit/Bed#: [place] [address] -01 I Date of Admission: 9/17/2024   Date of Service: 9/23/2024 I Hospital Day: 6    Assessment & Plan  Sepsis  As evidenced by tachycardia and leukocytosis and fever  Now with polymicrobial bacteremia- enterbacter, enterococcus and proetus   CT abdomen and pelvis showed Mid/distal sigmoid colonic and rectal inflammatory changes in keeping with a segmental colitis/proctitis.Right ischioanal fossa subcutaneous emphysema extending through the right paramidline gluteal subcutaneous tissues to the skin surface suspicious for a perirectal or perianal fistula; Parra catheter balloon is located in the prostatic urethra; this catheter should be repositioned. Fat stranding surrounding the decompressed bladder may indicate cystitis.  Has chronic indwelling Parra catheter with abnormal UA  Possible source of infection appears to be proctocolitis versus complicated UTI in the setting of indwelling Parra catheter.  Also has large sacral decubitus ulcer which could be infected as well. Also with +Covid   Seen by GI imaging likely due to ischemic colitis but likely not primary cause of his presentation - no role for colonoscopy or flex sig curently   Patient is s/p bedside debridement of sacral decubitus ulcer 9/18  Continue with dressing changes and further debridements per surgical team.    Continue IV cefepime, flagyl and vanco per ID   Repeat blood cultures negative to date   TTE without evidence of large vegetations although images suboptimal   Spoke with ID would like to pursue JOSEFINA, will consult cardiology   Appreciate infectious disease, surgical and GI follow-up  Polymicrobial bacteremia  Patient met sepsis criteria on admission.  Admission blood cultures showing GNR with BCI suggesting enterobacter and proteus. Now second set BCI is enterococcus faecalis.  Polymicrobial bacteremia likely secondary sacral wound with  possible fistulous connection  Infectious disease input noted and appreciated.  Continue IV cefepime, flagyl and vancomycin   Repeat cultures negative to date   ID recommending JOSEFINA, will consult cardiology     Sacral wound  Chronic sacral decubitus ulcer; present on admission  Status post debridement at bedside per surgical team.  Continue with wound care as tolerated.   continue dressing changes and serial exams  Wound care consultation  Offload pressure   Type 2 diabetes mellitus without complication, without long-term current use of insulin ([facility])  Lab Results   Component Value Date    HGBA1C 6.2 (H) 09/17/2024       Recent Labs     09/22/24  1221 09/22/24  1649 09/22/24  2040 09/23/24  0720   POCGLU 160* 128 241* 124       Blood Sugar Average: Last 72 hrs:  (P) [id number]  Well-controlled with last hemoglobin A1c of 6.2%  Will hold home oral regimen  Monitor on sliding scale only  Hypoglycemia protocol     COVID-19  Patient currently on room air though high risk given his age and comorbidities  Completed three days of IV remdesivir   Supportive care otherwise  Contact and airborne precautions  Currently on mild COVID pathway.  Not requiring supplemental oxygen.  10 days of isolation through 9/27   Proctitis  Noted on CT scan.  No diarrhea or clinical evidence of colitis.   GI input appreciated.  Stool studies negative  Likely ischemic colitis given area affected, continue supportive measures   No plan for any inpatient scope   Acute metabolic encephalopathy  Presumably secondary to sepsis, bacteremia, COVID infection  CT head with no acute intracranial abnormality  Continue with supportive care  Reorientation, delirium precautions   Oriented to person and place today   History of CVA (cerebrovascular accident)  History of CVA   Chronically bedbound and with chronic left-sided deficits   Continue home aspirin, Eliquis, statin  Continue dietary modifications, speech therapy consulted - dysphagia 2  mechanical soft, thin liquids   Paroxysmal atrial fibrillation (HCC)  Continue Toprol XL 25 mg daily  continue home Eliquis for stroke prevention  Anemia  Low blood counts noted 9/19/24.   Has chronic anemia likely secondary to poor nutritional status underlying chronic illness.  Hemoglobin declined to 7.1 given  1 unit PRBC infusion  Hemoglobin stable at 7.6 post transfusion   Encourage oral intake, cannot utilize IV iron due to bacteremia   Lab Results   Component Value Date    HGB 7.6 (L) 09/23/2024    HGB 7.6 (L) 09/22/2024    HGB 7.5 (L) 09/21/2024    HGB 8.1 (L) 09/20/2024    HGB 7.2 (L) 09/19/2024       Severe protein-calorie malnutrition (HCC)  Malnutrition Findings:   Adult Malnutrition type: Chronic illness  Adult Degree of Malnutrition: Other severe protein calorie malnutrition  Malnutrition Characteristics: Inadequate energy, Weight loss                  360 Statement: Severe protein calorie malnutrition in context of chronic illness r/t poor appetite, inadequate PO intake as evidance by energy intake less than 75% compared to estimated needs>1 month, 7.5% wt loss x 1 month ( 122kg 8/23/24-> 113kg 9/17) treated with PO diet and oral supplements    BMI Findings:           Body mass index is 29.53 kg/m².         VTE Pharmacologic Prophylaxis:   Moderate Risk (Score 3-4) - Pharmacological DVT Prophylaxis Ordered: apixaban (Eliquis).    Mobility:   Basic Mobility Inpatient Raw Score: 6  JH-HLM Goal: 2: Bed activities/Dependent transfer  JH-HLM Achieved: 2: Bed activities/Dependent transfer  JH-HLM Goal achieved. Continue to encourage appropriate mobility.    Patient Centered Rounds: I performed bedside rounds with nursing staff today.   Discussions with Specialists or Other Care Team Provider: cardiology and ID      Education and Discussions with Family / Patient: patient and wife over th ephone      Current Length of Stay: 6 day(s)  Current Patient Status: Inpatient   Certification Statement: The patient  will continue to require additional inpatient hospital stay due to sepsis, bacteremia   Discharge Plan: Anticipate discharge in 48-72 hrs to rehab facility.    Code Status: Level 1 - Full Code    Subjective   Patient lying in bed. Denies pain. States it is  but he knows he is in the hospital. Speaks minimally to me today     Objective     Vitals:   Temp (24hrs), Av.1 °F (36.7 °C), Min:97.7 °F (36.5 °C), Max:98.5 °F (36.9 °C)    Temp:  [97.7 °F (36.5 °C)-98.5 °F (36.9 °C)] 98.2 °F (36.8 °C)  HR:  [77-88] 77  Resp:  [15-18] 18  BP: (119-130)/(71-76) 130/76  SpO2:  [95 %-96 %] 96 %  Body mass index is 29.53 kg/m².     Input and Output Summary (last 24 hours):     Intake/Output Summary (Last 24 hours) at 2024 1012  Last data filed at 2024 0150  Gross per 24 hour   Intake 920 ml   Output 1050 ml   Net -130 ml       Physical Exam  Vitals and nursing note reviewed.   Constitutional:       General: He is not in acute distress.  Cardiovascular:      Rate and Rhythm: Normal rate and regular rhythm.   Pulmonary:      Effort: Pulmonary effort is normal. No respiratory distress.   Abdominal:      General: Bowel sounds are normal.      Palpations: Abdomen is soft.      Tenderness: There is no abdominal tenderness.   Genitourinary:     Comments: Parra in place draining clear yellow urine   Musculoskeletal:         General: Swelling (left hand) and deformity present.   Skin:     Comments: Prevalon boots b/l    Neurological:      Mental Status: He is alert.      Comments: Flaccid left UE  ,oriented to person, place    Psychiatric:         Cognition and Memory: Cognition is impaired. Memory is impaired.        Lines/Drains:  Lines/Drains/Airways       Active Status       Name Placement date Placement time Site Days    Urethral Catheter Three way 22 Fr. 24  Three way  22    Continuous Bladder Irrigation Three-way 24  Three-way  22                  Urinary Catheter:  Goal for removal:  hanks placed here once ambulation improves can consider removal              Lab Results: I have reviewed the following results:    Results from last 7 days   Lab Units 09/23/24  0525 09/21/24  0616 09/20/24  0555   WBC Thousand/uL 13.79*   < > 19.87*   HEMOGLOBIN g/dL 7.6*   < > 8.1*   HEMATOCRIT % 27.1*   < > 25.5*   PLATELETS Thousands/uL 310   < > 398*   SEGS PCT %  --   --  80*   LYMPHO PCT %  --   --  12*   MONO PCT %  --   --  5   EOS PCT %  --   --  1    < > = values in this interval not displayed.     Results from last 7 days   Lab Units 09/23/24  0525 09/18/24  0508 09/17/24  1637   SODIUM mmol/L 139   < > 142   POTASSIUM mmol/L 4.1   < > 2.9*   CHLORIDE mmol/L 114*   < > 105   CO2 mmol/L 22   < > 31   BUN mg/dL 8   < > 15   CREATININE mg/dL 0.65   < > 0.76   ANION GAP mmol/L 3*   < > 6   CALCIUM mg/dL 7.8*   < > 9.0   ALBUMIN g/dL  --   --  2.8*   TOTAL BILIRUBIN mg/dL  --   --  1.27*   ALK PHOS U/L  --   --  88   ALT U/L  --   --  28   AST U/L  --   --  38   GLUCOSE RANDOM mg/dL 132   < > 164*    < > = values in this interval not displayed.         Results from last 7 days   Lab Units 09/23/24  0720 09/22/24  2040 09/22/24  1649 09/22/24  1221 09/22/24  0838 09/21/24  2049 09/21/24  1739 09/21/24  1236 09/21/24  0836 09/20/24  2130 09/20/24  1644 09/20/24  1147   POC GLUCOSE mg/dl 124 241* 128 160* 128 186* 146* 148* 139 167* 184* 126     Results from last 7 days   Lab Units 09/17/24  1957   HEMOGLOBIN A1C % 6.2*     Results from last 7 days   Lab Units 09/18/24  0508 09/17/24  1655 09/17/24  1637   LACTIC ACID mmol/L  --  1.2  --    PROCALCITONIN ng/ml 0.49*  --  0.37*       Recent Cultures (last 7 days):   Results from last 7 days   Lab Units 09/20/24  0555 09/17/24  1954 09/17/24  1831 09/17/24  1637   BLOOD CULTURE  No Growth at 48 hrs.  No Growth at 48 hrs.  --  Enterococcus faecalis* Enterobacter cloacae*  Proteus mirabilis*   GRAM STAIN RESULT   --   --  Gram positive cocci in pairs and chains*  Gram negative rods*   URINE CULTURE   --  10,000-19,000 cfu/ml Enterobacter cloacae*  --   --        Imaging Review: reviewed all since admission   Other Studies: echo     Last 24 Hours Medication List:     Current Facility-Administered Medications:     acetaminophen (Ofirmev) injection 1,000 mg, Q6H PRN    acetaminophen (TYLENOL) tablet 650 mg, Q6H PRN    apixaban (ELIQUIS) tablet 5 mg, BID    aspirin chewable tablet 81 mg, Daily    atorvastatin (LIPITOR) tablet 80 mg, QPM    bacitracin topical ointment 1 small application, BID    calcium carbonate-vitamin D 500 mg-5 mcg tablet 1 tablet, BID With Meals    ceFEPime (MAXIPIME) 2,000 mg in dextrose 5 % 50 mL IVPB, Q12H, Last Rate: 2,000 mg (09/23/24 0100)    Dakins (full strength) (DAKIN'S) 0.5 percent topical solution 1 Application, Daily    escitalopram (LEXAPRO) tablet 10 mg, Daily    ezetimibe (ZETIA) tablet 10 mg, Daily    finasteride (PROSCAR) tablet 5 mg, Daily    insulin lispro (HumALOG/ADMELOG) 100 units/mL subcutaneous injection 1-6 Units, TID AC **AND** Fingerstick Glucose (POCT), TID AC    insulin lispro (HumALOG/ADMELOG) 100 units/mL subcutaneous injection 1-6 Units, HS    lisinopril (ZESTRIL) tablet 2.5 mg, Daily    metoprolol tartrate (LOPRESSOR) partial tablet 12.5 mg, Q12H ELISEO    metroNIDAZOLE (FLAGYL) IVPB (premix) 500 mg 100 mL, Q8H, Last Rate: 500 mg (09/23/24 0323)    ondansetron (ZOFRAN) injection 4 mg, Q4H PRN    potassium chloride oral solution 20 mEq, BID    senna-docusate sodium (SENOKOT S) 8.6-50 mg per tablet 1 tablet, HS    vancomycin (VANCOCIN) 1,250 mg in sodium chloride 0.9 % 250 mL IVPB, Q12H, Last Rate: 1,250 mg (09/23/24 1008)    Administrative Statements   Today, Patient Was Seen By: Laurel Valles PA-C  I have spent a total time of 25 minutes in caring for this patient on the day of the visit/encounter including Documenting in the medical record, Reviewing / ordering tests, medicine, procedures  , Obtaining or reviewing history   , and Communicating with other healthcare professionals .    **Please Note: This note may have been constructed using a voice recognition system.**

## 2024-09-24 ENCOUNTER — APPOINTMENT (INPATIENT)
Dept: CT IMAGING | Facility: HOSPITAL | Age: 74
DRG: 871 | End: 2024-09-24
Payer: OTHER GOVERNMENT

## 2024-09-24 LAB
ANION GAP SERPL CALCULATED.3IONS-SCNC: 2 MMOL/L (ref 4–13)
ANISOCYTOSIS BLD QL SMEAR: PRESENT
BASOPHILS # BLD MANUAL: 0 THOUSAND/UL (ref 0–0.1)
BASOPHILS NFR MAR MANUAL: 0 % (ref 0–1)
BUN SERPL-MCNC: 6 MG/DL (ref 5–25)
CALCIUM SERPL-MCNC: 7.7 MG/DL (ref 8.4–10.2)
CHLORIDE SERPL-SCNC: 110 MMOL/L (ref 96–108)
CO2 SERPL-SCNC: 26 MMOL/L (ref 21–32)
CREAT SERPL-MCNC: 0.56 MG/DL (ref 0.6–1.3)
EOSINOPHIL # BLD MANUAL: 0.39 THOUSAND/UL (ref 0–0.4)
EOSINOPHIL NFR BLD MANUAL: 3 % (ref 0–6)
ERYTHROCYTE [DISTWIDTH] IN BLOOD BY AUTOMATED COUNT: 14.5 % (ref 11.6–15.1)
GFR SERPL CREATININE-BSD FRML MDRD: 101 ML/MIN/1.73SQ M
GLUCOSE SERPL-MCNC: 113 MG/DL (ref 65–140)
GLUCOSE SERPL-MCNC: 118 MG/DL (ref 65–140)
GLUCOSE SERPL-MCNC: 127 MG/DL (ref 65–140)
GLUCOSE SERPL-MCNC: 129 MG/DL (ref 65–140)
GLUCOSE SERPL-MCNC: 183 MG/DL (ref 65–140)
HCT VFR BLD AUTO: 24.6 % (ref 36.5–49.3)
HGB BLD-MCNC: 7.6 G/DL (ref 12–17)
LG PLATELETS BLD QL SMEAR: PRESENT
LYMPHOCYTES # BLD AUTO: 26 % (ref 14–44)
LYMPHOCYTES # BLD AUTO: 3.4 THOUSAND/UL (ref 0.6–4.47)
MCH RBC QN AUTO: 29.3 PG (ref 26.8–34.3)
MCHC RBC AUTO-ENTMCNC: 30.9 G/DL (ref 31.4–37.4)
MCV RBC AUTO: 97 FL (ref 82–98)
METAMYELOCYTE ABSOLUTE CT: 0.52 THOUSAND/UL (ref 0–0.1)
METAMYELOCYTES NFR BLD MANUAL: 4 % (ref 0–1)
MONOCYTES # BLD AUTO: 0.65 THOUSAND/UL (ref 0–1.22)
MONOCYTES NFR BLD: 5 % (ref 4–12)
NEUTROPHILS # BLD MANUAL: 8.12 THOUSAND/UL (ref 1.85–7.62)
NEUTS SEG NFR BLD AUTO: 62 % (ref 43–75)
PLATELET # BLD AUTO: 468 THOUSANDS/UL (ref 149–390)
PLATELET BLD QL SMEAR: ABNORMAL
PMV BLD AUTO: 8.9 FL (ref 8.9–12.7)
POLYCHROMASIA BLD QL SMEAR: PRESENT
POTASSIUM SERPL-SCNC: 3.9 MMOL/L (ref 3.5–5.3)
RBC # BLD AUTO: 2.59 MILLION/UL (ref 3.88–5.62)
RBC MORPH BLD: PRESENT
SODIUM SERPL-SCNC: 138 MMOL/L (ref 135–147)
WBC # BLD AUTO: 13.09 THOUSAND/UL (ref 4.31–10.16)

## 2024-09-24 PROCEDURE — 74177 CT ABD & PELVIS W/CONTRAST: CPT

## 2024-09-24 PROCEDURE — 97112 NEUROMUSCULAR REEDUCATION: CPT

## 2024-09-24 PROCEDURE — 85027 COMPLETE CBC AUTOMATED: CPT | Performed by: PHYSICIAN ASSISTANT

## 2024-09-24 PROCEDURE — 99233 SBSQ HOSP IP/OBS HIGH 50: CPT | Performed by: INTERNAL MEDICINE

## 2024-09-24 PROCEDURE — 97530 THERAPEUTIC ACTIVITIES: CPT

## 2024-09-24 PROCEDURE — 97535 SELF CARE MNGMENT TRAINING: CPT

## 2024-09-24 PROCEDURE — 80048 BASIC METABOLIC PNL TOTAL CA: CPT | Performed by: PHYSICIAN ASSISTANT

## 2024-09-24 PROCEDURE — 82948 REAGENT STRIP/BLOOD GLUCOSE: CPT

## 2024-09-24 PROCEDURE — 92526 ORAL FUNCTION THERAPY: CPT

## 2024-09-24 PROCEDURE — 99232 SBSQ HOSP IP/OBS MODERATE 35: CPT | Performed by: INTERNAL MEDICINE

## 2024-09-24 PROCEDURE — 97110 THERAPEUTIC EXERCISES: CPT

## 2024-09-24 PROCEDURE — 85007 BL SMEAR W/DIFF WBC COUNT: CPT | Performed by: PHYSICIAN ASSISTANT

## 2024-09-24 PROCEDURE — 99232 SBSQ HOSP IP/OBS MODERATE 35: CPT | Performed by: PHYSICIAN ASSISTANT

## 2024-09-24 RX ORDER — METRONIDAZOLE 500 MG/1
500 TABLET ORAL EVERY 12 HOURS SCHEDULED
Status: COMPLETED | OUTPATIENT
Start: 2024-09-24 | End: 2024-09-25

## 2024-09-24 RX ADMIN — INSULIN LISPRO 1 UNITS: 100 INJECTION, SOLUTION INTRAVENOUS; SUBCUTANEOUS at 16:54

## 2024-09-24 RX ADMIN — POTASSIUM CHLORIDE 20 MEQ: 1.5 SOLUTION ORAL at 16:54

## 2024-09-24 RX ADMIN — BACITRACIN ZINC 1 SMALL APPLICATION: 500 OINTMENT TOPICAL at 09:34

## 2024-09-24 RX ADMIN — VANCOMYCIN HYDROCHLORIDE 1500 MG: 5 INJECTION, POWDER, LYOPHILIZED, FOR SOLUTION INTRAVENOUS at 22:27

## 2024-09-24 RX ADMIN — ATORVASTATIN CALCIUM 80 MG: 80 TABLET, FILM COATED ORAL at 16:54

## 2024-09-24 RX ADMIN — FINASTERIDE 5 MG: 5 TABLET, FILM COATED ORAL at 09:34

## 2024-09-24 RX ADMIN — METRONIDAZOLE 500 MG: 500 INJECTION, SOLUTION INTRAVENOUS at 03:16

## 2024-09-24 RX ADMIN — ESCITALOPRAM OXALATE 10 MG: 10 TABLET ORAL at 09:34

## 2024-09-24 RX ADMIN — LISINOPRIL 2.5 MG: 2.5 TABLET ORAL at 09:34

## 2024-09-24 RX ADMIN — CEFEPIME 2000 MG: 2 INJECTION, POWDER, FOR SOLUTION INTRAVENOUS at 01:17

## 2024-09-24 RX ADMIN — VANCOMYCIN HYDROCHLORIDE 1500 MG: 5 INJECTION, POWDER, LYOPHILIZED, FOR SOLUTION INTRAVENOUS at 11:28

## 2024-09-24 RX ADMIN — APIXABAN 5 MG: 5 TABLET, FILM COATED ORAL at 16:54

## 2024-09-24 RX ADMIN — IOHEXOL 100 ML: 350 INJECTION, SOLUTION INTRAVENOUS at 08:53

## 2024-09-24 RX ADMIN — APIXABAN 5 MG: 5 TABLET, FILM COATED ORAL at 09:34

## 2024-09-24 RX ADMIN — EZETIMIBE 10 MG: 10 TABLET ORAL at 09:34

## 2024-09-24 RX ADMIN — SENNOSIDES AND DOCUSATE SODIUM 1 TABLET: 8.6; 5 TABLET ORAL at 21:33

## 2024-09-24 RX ADMIN — Medication 12.5 MG: at 21:33

## 2024-09-24 RX ADMIN — CEFEPIME 2000 MG: 2 INJECTION, POWDER, FOR SOLUTION INTRAVENOUS at 13:18

## 2024-09-24 RX ADMIN — BACITRACIN ZINC 1 SMALL APPLICATION: 500 OINTMENT TOPICAL at 16:54

## 2024-09-24 RX ADMIN — ASPIRIN 81 MG CHEWABLE TABLET 81 MG: 81 TABLET CHEWABLE at 09:34

## 2024-09-24 RX ADMIN — POTASSIUM CHLORIDE 20 MEQ: 1.5 SOLUTION ORAL at 09:34

## 2024-09-24 RX ADMIN — Medication 1 TABLET: at 09:34

## 2024-09-24 RX ADMIN — METRONIDAZOLE 500 MG: 500 TABLET ORAL at 15:17

## 2024-09-24 RX ADMIN — Medication 1 TABLET: at 16:54

## 2024-09-24 RX ADMIN — Medication 12.5 MG: at 09:34

## 2024-09-24 NOTE — ASSESSMENT & PLAN NOTE
Blood cultures with growth of enterobacter and proteus in one set, and  second set growing enterococcus faecalis. Likely secondary to proctitis/colitis with possible fistulous connection to the sacral wound. No other source appreciated. Patient has COVID-19 but no opacities/groundglass/consolidations on chest imaging suggesting a bacterial process. TTE without evidence of valvular vegetations but was a technically difficult study. Recommend he have JOSEFINA for further evaluation and to establish shorted antibiotic treatment length. Repeat blood cultures are negative >72 hours.  -antibiotics as above  -check CBCD and BMP tomorrow  -follow up repeat blood cultures  -recommend JOSEFINA  -monitor vitals

## 2024-09-24 NOTE — ASSESSMENT & PLAN NOTE
Presumably secondary to sepsis, bacteremia, COVID infection  CT head with no acute intracranial abnormality  Continue with supportive care  Reorientation, delirium precautions   Oriented to person and place

## 2024-09-24 NOTE — PHYSICAL THERAPY NOTE
PHYSICAL THERAPY NOTE          Patient Name: Drew Santos  Today's Date: 9/24/2024 09/24/24 8248   Note Type   Note Type Treatment   Pain Assessment   Pain Assessment Tool FLACC   Pain Rating: FLACC (Rest) - Face 0   Pain Rating: FLACC (Rest) - Legs 0   Pain Rating: FLACC (Rest) - Activity 0   Pain Rating: FLACC (Rest) - Cry 0   Pain Rating: FLACC (Rest) - Consolability 0   Score: FLACC (Rest) 0   Pain Rating: FLACC (Activity) - Face 0   Pain Rating: FLACC (Activity) - Legs 0   Pain Rating: FLACC (Activity) - Activity 0   Pain Rating: FLACC (Activity) - Cry 0   Pain Rating: FLACC (Activity) - Consolability 0   Score: FLACC (Activity) 0   Restrictions/Precautions   Other Precautions Contact/isolation;Airborne/isolation;Cognitive;Chair Alarm;Bed Alarm;Multiple lines;Fall Risk   General   Chart Reviewed Yes   Family/Caregiver Present   (wife enterred 1/2 through PT session.)   Cognition   Overall Cognitive Status Impaired   Arousal/Participation Alert;Persistent stimuli required;Cooperative   Attention Difficulty attending to directions   Orientation Level Oriented to person;Disoriented to time;Disoriented to situation  (general to place with choices, pt unable to identify name of Hospital with choices.)   Following Commands Follows one step commands inconsistently   Subjective   Subjective PT agreeable to PT.   Bed Mobility   Rolling R 1  Dependent   Additional items Assist x 2   Rolling L 1  Dependent   Additional items Assist x 2   Supine to Sit 1  Dependent   Additional items Assist x 2;HOB elevated   Sit to Supine 1  Dependent   Additional items Assist x 2   Additional Comments sitt eob x 12- 15 minutes with mod assist x1 to close supervision.   Transfers   Sit to Stand Unable to assess   Additional items   (deferred given L le weakness and impaired sitting balance.)   Balance   Static Sitting Fair -  (regressing to poor with  fatigue)   Dynamic Sitting Poor +   Endurance Deficit   Endurance Deficit Description fatigue   Activity Tolerance   Activity Tolerance Patient limited by fatigue  (cognition)   Medical Staff Made Aware [facility] OT   Nurse Made Aware Sergey MTZ   Exercises   Hip Flexion Sitting;10 reps;AROM;Right   Knee AROM Long Arc Quad Sitting;10 reps;AROM;Right   Balance training  PRom  L le  in supine and sitting EOB,  tapping techniques used tofacilitate movement Lle  however no active movement noted.  sitting balance and tolerance activites EOB  x 12- 15 mintues with mod assist x1 to close supervision x1, min- mod assist x1 and verbal and tactile cues to right to midline  noted posterior lean and l sided lean.   Assessment   Prognosis Guarded   Problem List Decreased strength;Decreased endurance;Impaired balance;Decreased mobility;Decreased cognition;Impaired judgement;Decreased safety awareness;Decreased skin integrity   Assessment Pt seen for PT treatment session this date with interventions consisting of bed mobility and HEP, sitting balance, righting activities and tolerance activities at EOB and education provided as needed for safety and direction to improve functional mobility, safety awareness, and activity tolerance. Pt agreeable to PT treatment session upon arrival, pt found supine in bed . At end of session, pt left  in L side lying position in bed with all needs in reach. In comparison to previous session, pt with improvement in activity tolerance, endurance, static sitting balance, and  dynamic sitting balance. Noted increased verbal responses this session. Pt paul to track cross midline to the left but unable to sustain. Pt continues with significant L neglect.  Pt  rec'd PROM to L le in supine and sitting with tapping to hamstrings, quads hip abd./adductors to facilitate movement> NO active movement noted. Pt  perform sitting EOB  x 12- 15 minutes with mod assist x1 to close supervision.  Noted posteior and lateral  leaning while seated at EOB. Pt performs dynamic balance activities of reaching in all directions/ planes of motion with cg- close supervision. Pt requires mod assist x1 to right to midline with verbal and tactile cues for upright posture, and to maintain midline for head and neck. Pt  is able to    maintain midline for short periods of time however with fatigue pt  requires increased assistance to maintain.  Spoke with pt spouse regarding current level of function,  positioning of pt to assist in further skin break down and promote healing of wounds, purpose of PT's role while pt is in hospital and recommendations for STR upon d/c.  Continue to recommend  level II moderate rehab resource intensity  at time of d/c in order to maximize pt's functional independence and safety w/ mobility. Pt continues to be functioning below baseline level. PT will continue to see pt while here in order to address the deficits listed above and provide interventions consistent w/ POC in effort to achieve STGs.    [facility] Short Form Raw Score is 6. A raw score less than 16 suggests the patient may benefit from discharge to post-acute rehabilitation services. Please also refer to the recommendation of the Physical Therapist for safe discharge planning.   Goals   Patient Goals To walk.   STG Expiration Date 09/28/24   PT Treatment Day 1   Plan   Treatment/Interventions LE strengthening/ROM;Therapeutic exercise;Endurance training;Cognitive reorientation;Patient/family training;Equipment eval/education;Bed mobility;Compensatory technique education;Spoke to nursing;OT;Family   Progress Slow progress, decreased activity tolerance   PT Frequency 1-2x/wk   Discharge Recommendation   Rehab Resource Intensity Level, PT II (Moderate Resource Intensity)   AM-[facility] Basic Mobility Inpatient   Turning in Flat Bed Without Bedrails 1   Lying on Back to Sitting on Edge of Flat Bed Without Bedrails 1   Moving Bed to  Chair 1   Standing Up From Chair Using Arms 1   Walk in Room 1   Climb 3-5 Stairs With Railing 1   Basic Mobility Inpatient Raw Score 6   Turning Head Towards Sound 2   Follow Simple Instructions 2   Low Function Basic Mobility Raw Score  10   Low Function Basic Mobility Standardized Score  14.65   [facility] Highest Level Of Mobility   -HL Goal 2: Bed activities/Dependent transfer   -HL Achieved 3: Sit at edge of bed   Education   Education Provided Mobility training;Home exercise program   Patient Reinforcement needed   End of Consult   Patient Position at End of Consult Supine;Bed/Chair alarm activated;All needs within reach   End of Consult Comments pt repositioned on to L side with foal wedge pillows under R side for off loading of sacral area.   Fabienne Pitts, PTA

## 2024-09-24 NOTE — ASSESSMENT & PLAN NOTE
History of CVA   Chronically bedbound and with chronic left-sided deficits   Continue home aspirin, Eliquis, statin  dysphagia 2 mechanical soft, thin liquids

## 2024-09-24 NOTE — PLAN OF CARE
Problem: OCCUPATIONAL THERAPY ADULT  Goal: Performs self-care activities at highest level of function for planned discharge setting.  See evaluation for individualized goals.  Description: Treatment Interventions: ADL retraining, Functional transfer training, UE strengthening/ROM, Cognitive reorientation, Patient/family training, Equipment evaluation/education, Neuromuscular reeducation, Compensatory technique education, Energy conservation, Activityengagement          See flowsheet documentation for full assessment, interventions and recommendations.   Outcome: Progressing  Note: Limitation: Decreased ADL status, Decreased UE strength, Decreased Safe judgement during ADL, Decreased endurance, Decreased self-care trans, Decreased high-level ADLs  Prognosis: Good  Assessment: Pt participated in skilled OT f/u tx session per POC. Unfortunately, pt continues to require depAx2 for all bed mobility. Pt able to tolerate sitting EOB x12-15 min w/ Fair- to Poor seated balance, w/ posterior and lateral leaning. Provided cuing/tactile input for righting to midline. Initially, pt able to assist however w fatigue requires assistance w task. Pt able to functionally reach w R UE in all visual planes x4. Pt w significant L neglect. Tasked pt w finding L UE w/ R hand w verbal/tactile/auditory cuing - able to complete 2/5 times, other times requires hand over hand. Multimodal cuing for posture and for maintaining midline, for neck/head control. Spouse entered room and pt able to look towards pt to R side. Pt returned to supine, boosted in bed w/ Ax3. Offloaded to L side w/ LUE propped on pillow. Did spend time at end of session w/ spouse re: CLOF, positioning to assist w managing wounds/decrease further skin breakdown, ongoing need for STR, purpose of OT in the hospital setting. Will continue to see pt per POC.     Rehab Resource Intensity Level, OT: II (Moderate Resource Intensity)

## 2024-09-24 NOTE — SPEECH THERAPY NOTE
Speech Language/Pathology    Speech/Language Pathology Progress Note    Patient Name: Drew Santos  Today's Date: 9/24/2024     Problem List  Principal Problem:    Sepsis  Active Problems:    Sacral wound    Type 2 diabetes mellitus without complication, without long-term current use of insulin (HCC)    COVID-19    Proctitis    Polymicrobial bacteremia    Acute metabolic encephalopathy    History of CVA (cerebrovascular accident)    Paroxysmal atrial fibrillation (HCC)    Anemia    Severe protein-calorie malnutrition (HCC)       Past Medical History  Past Medical History:   Diagnosis Date    Atherosclerotic heart disease of native coronary artery without angina pectoris     Atrial fibrillation (HCC)     Cerebral infarction (HCC)     Constipation     Depression     Diabetes mellitus (HCC)     Hemiplegia and hemiparesis following cerebral infarction affecting left non-dominant side (HCC)     Hyperlipidemia     Hypertension     Pressure ulcer of sacral region, stage 3 (HCC)     Prostatic hyperplasia     Sepsis (HCC)     Stroke (HCC)     TIA (transient ischemic attack)     Urinary retention     Vitamin D deficiency         Past Surgical History  No past surgical history on file.      Subjective:  Patient alert.seen at lunch.  Objective:  Patient seen for follow-up dysphagia therapy and potential diet advancement.  PCA reported patient ate all of his cream of wheat this morning but would not feed himself.  Patient had chopped penne pasta and chopped beef for lunch.  Appeared closer to a level 3 dental than a dysphagia 2 mechanical.  Given 1 teaspoon of the noodles patient chewed the same bite for 1 to 2 minutes without transferring.  I gave him vanilla pudding and he finally transferred the noodles.  Did not present with the meat as the meat was tougher than the noodles.  He was agreeable to the vanilla pudding and finished 100% with prompt transfer and swallow.  Noted patient's right hand was behind his head when I  "entered.  I reminded the patient that if he can lift his arm and hand up behind his head he can feed himself.  He then reached for the drink and took it on his own. Had a small amount of the supplement again with prompt transfer and swallow.  No cough or wet vocal quality with food or liquid. I asked the patient multiple times if there was anything that I could get him or he wanted or what his favorite foods were but he did not respond.  Only verbalization was \"bye\" response when I was leaving.  Assessment:  Reduced intake.  Question if he may be depressed. Noted 10 mg Lexapro was ordered on 9/18.  Given quite lengthy mastication of noodles today we will recommend continuing current diet.  Plan/Recommendations:  Continue dysphagia 2 mechanical soft and thin liquids.  Encourage intake and offer preferences if patient will provide.  If he does request an item that is not on his current diet please notify speech. F/u as able and indicated.      "

## 2024-09-24 NOTE — PLAN OF CARE
Problem: Potential for Falls  Goal: Patient will remain free of falls  Description: INTERVENTIONS:  - Educate patient/family on patient safety including physical limitations  - Instruct patient to call for assistance with activity   - Consult OT/PT to assist with strengthening/mobility   - Keep Call bell within reach  - Keep bed low and locked with side rails adjusted as appropriate  - Keep care items and personal belongings within reach  - Initiate and maintain comfort rounds  - Make Fall Risk Sign visible to staff  - Offer Toileting every 2 Hours, in advance of need  - Initiate/Maintain bed alarm  - Obtain necessary fall risk management equipment: bed alarm call bell  - Apply yellow socks and bracelet for high fall risk patients  - Consider moving patient to room near nurses station  9/23/2024 2214 by Genevieve Santillan  Outcome: Progressing     Problem: PAIN - ADULT  Goal: Verbalizes/displays adequate comfort level or baseline comfort level  Description: Interventions:  - Encourage patient to monitor pain and request assistance  - Assess pain using appropriate pain scale  - Administer analgesics based on type and severity of pain and evaluate response  - Implement non-pharmacological measures as appropriate and evaluate response  - Consider cultural and social influences on pain and pain management  - Notify physician/advanced practitioner if interventions unsuccessful or patient reports new pain  9/23/2024 2214 by Genevieve Santillan  Outcome: Progressing     Problem: INFECTION - ADULT  Goal: Absence or prevention of progression during hospitalization  Description: INTERVENTIONS:  - Assess and monitor for signs and symptoms of infection  - Monitor lab/diagnostic results  - Monitor all insertion sites, i.e. indwelling lines, tubes, and drains  - Monitor endotracheal if appropriate and nasal secretions for changes in amount and color  - [place] appropriate cooling/warming therapies per order  - Administer medications as  ordered  - Instruct and encourage patient and family to use good hand hygiene technique  - Identify and instruct in appropriate isolation precautions for identified infection/condition  9/23/2024 2214 by Genevieve Santillan  Outcome: Progressing     Problem: INFECTION - ADULT  Goal: Absence of fever/infection during neutropenic period  Description: INTERVENTIONS:  - Monitor WBC    9/23/2024 2214 by Genevieve Santillan  Outcome: Progressing     Problem: SAFETY ADULT  Goal: Patient will remain free of falls  Description: INTERVENTIONS:  - Educate patient/family on patient safety including physical limitations  - Instruct patient to call for assistance with activity   - Consult OT/PT to assist with strengthening/mobility   - Keep Call bell within reach  - Keep bed low and locked with side rails adjusted as appropriate  - Keep care items and personal belongings within reach  - Initiate and maintain comfort rounds  - Make Fall Risk Sign visible to staff  - Offer Toileting every 2 Hours, in advance of need  - Initiate/Maintain bed alarm  - Obtain necessary fall risk management equipment: bed alarm call bell  - Apply yellow socks and bracelet for high fall risk patients  - Consider moving patient to room near nurses station  9/23/2024 2214 by Genevieve Santillan  Outcome: Progressing     Problem: SAFETY ADULT  Goal: Maintain or return to baseline ADL function  Description: INTERVENTIONS:  - Educate patient/family on patient safety including physical limitations  - Instruct patient to call for assistance with activity   - Consult OT/PT to assist with strengthening/mobility   - Keep Call bell within reach  - Keep bed low and locked with side rails adjusted as appropriate  - Keep care items and personal belongings within reach  - Initiate and maintain comfort rounds  - Make Fall Risk Sign visible to staff  - Offer Toileting every 2 Hours, in advance of need  - Initiate/Maintain bed alarm  - Obtain necessary fall risk management equipment: bed  alarm call bell  - Apply yellow socks and bracelet for high fall risk patients  - Consider moving patient to room near nurses station  9/23/2024 2214 by Genevieve Santillan  Outcome: Progressing     Problem: SAFETY ADULT  Goal: Maintains/Returns to pre admission functional level  Description: INTERVENTIONS:  -  Assess patient's ability to carry out ADLs; assess patient's baseline for ADL function and identify physical deficits which impact ability to perform ADLs (bathing, care of mouth/teeth, toileting, grooming, dressing, etc.)  - Assess/evaluate cause of self-care deficits   - Assess range of motion  - Assess patient's mobility; develop plan if impaired  - Assess patient's need for assistive devices and provide as appropriate  - Encourage maximum independence but intervene and supervise when necessary  - Involve family in performance of ADLs  - Assess for home care needs following discharge   - Consider OT consult to assist with ADL evaluation and planning for discharge  - Provide patient education as appropriate  9/23/2024 2214 by Genevieve Santillan  Outcome: Progressing     Problem: DISCHARGE PLANNING  Goal: Discharge to home or other facility with appropriate resources  Description: INTERVENTIONS:  - Identify barriers to discharge w/patient and caregiver  - Arrange for needed discharge resources and transportation as appropriate  - Identify discharge learning needs (meds, wound care, etc.)  - Arrange for interpretive services to assist at discharge as needed  - Refer to Case Management Department for coordinating discharge planning if the patient needs post-hospital services based on physician/advanced practitioner order or complex needs related to functional status, cognitive ability, or social support system  9/23/2024 2214 by Genevieve Santillan  Outcome: Progressing     Problem: Knowledge Deficit  Goal: Patient/family/caregiver demonstrates understanding of disease process, treatment plan, medications, and discharge  instructions  Description: Complete learning assessment and assess knowledge base.  Interventions:  - Provide teaching at level of understanding  - Provide teaching via preferred learning methods  9/23/2024 2214 by Genevieve Santillan  Outcome: Progressing     Problem: Prexisting or High Potential for Compromised Skin Integrity  Goal: Skin integrity is maintained or improved  Description: INTERVENTIONS:  - Identify patients at risk for skin breakdown  - Assess and monitor skin integrity  - Assess and monitor nutrition and hydration status  - Monitor labs   - Assess for incontinence   - Turn and reposition patient  - Assist with mobility/ambulation  - Relieve pressure over bony prominences  - Avoid friction and shearing  - Provide appropriate hygiene as needed including keeping skin clean and dry  - Evaluate need for skin moisturizer/barrier cream  - Collaborate with interdisciplinary team   - Patient/family teaching  - Consider wound care consult   9/23/2024 2214 by Genevieve Santillan  Outcome: Progressing     Problem: GASTROINTESTINAL - ADULT  Goal: Maintains or returns to baseline bowel function  Description: INTERVENTIONS:  - Assess bowel function  - Encourage oral fluids to ensure adequate hydration  - Administer IV fluids if ordered to ensure adequate hydration  - Administer ordered medications as needed  - Encourage mobilization and activity  - Consider nutritional services referral to assist patient with adequate nutrition and appropriate food choices  9/23/2024 2214 by Genevieve Santillan  Outcome: Progressing     Problem: GENITOURINARY - ADULT  Goal: Urinary catheter remains patent  Description: INTERVENTIONS:  - Assess patency of urinary catheter  - If patient has a chronic hanks, consider changing catheter if non-functioning  - Follow guidelines for intermittent irrigation of non-functioning urinary catheter  9/23/2024 2214 by Genevieve Santillan  Outcome: Progressing     Problem: METABOLIC, FLUID AND ELECTROLYTES - ADULT  Goal:  Electrolytes maintained within normal limits  Description: INTERVENTIONS:  - Monitor labs and assess patient for signs and symptoms of electrolyte imbalances  - Administer electrolyte replacement as ordered  - Monitor response to electrolyte replacements, including repeat lab results as appropriate  - Instruct patient on fluid and nutrition as appropriate  9/23/2024 2214 by Genevieve Santillan  Outcome: Progressing     Problem: SKIN/TISSUE INTEGRITY - ADULT  Goal: Skin Integrity remains intact(Skin Breakdown Prevention)  Description: Assess:  -Perform Goldy assessment every shift  -Clean and moisturize skin every shift  -Inspect skin when repositioning, toileting, and assisting with ADLS  -Assess under medical devices such as Masimo every shift  -Assess extremities for adequate circulation and sensation     Bed Management:  -Have minimal linens on bed & keep smooth, unwrinkled  -Change linens as needed when moist or perspiring  -Avoid sitting or lying in one position for more than 1 hours while in bed  -Keep HOB at 30 degrees     Toileting:  -Offer bedside commode  -Assess for incontinence every shift  -Use incontinent care products after each incontinent episode such as foam cleanser     Activity:  -Mobilize patient 4 times a day  -Encourage activity and walks on unit  -Encourage or provide ROM exercises   -Turn and reposition patient every 2 Hours  -Use appropriate equipment to lift or move patient in bed  -Instruct/ Assist with weight shifting every 1 hour when out of bed in chair  -Consider limitation of chair time 1 hour intervals    Skin Care:  -Avoid use of baby powder, tape, friction and shearing, hot water or constrictive clothing  -Relieve pressure over bony prominences using wedges  -Do not massage red bony areas    Next Steps:  -Teach patient strategies to minimize risks such as skin breakdown   -Consider consults to  interdisciplinary teams such as wound care nurse   9/23/2024 2214 by Genevieve Santillan  Outcome:  Progressing     Problem: SKIN/TISSUE INTEGRITY - ADULT  Goal: Incision(s), wounds(s) or drain site(s) healing without S/S of infection  Description: INTERVENTIONS  - Assess and document dressing, incision, wound bed, drain sites and surrounding tissue  - Provide patient and family education  - Perform skin care/dressing changes every shift   9/23/2024 2214 by Genevieve Santillan  Outcome: Progressing     Problem: SKIN/TISSUE INTEGRITY - ADULT  Goal: Pressure injury heals and does not worsen  Description: Interventions:  - Implement low air loss mattress or specialty surface (Criteria met)  - Apply silicone foam dressing  - Instruct/assist with weight shifting every 30 minutes when in chair   - Limit chair time to 1 hour intervals  - Use special pressure reducing interventions such as cushion when in chair   - Apply fecal or urinary incontinence containment device   - Perform passive or active ROM every shift  - Turn and reposition patient & offload bony prominences every 1 hours   - Utilize friction reducing device or surface for transfers   - Consider consults to  interdisciplinary teams such as wound care nurse   - Use incontinent care products after each incontinent episode such as foam cleanser   - Consider nutrition services referral as needed  9/23/2024 2214 by Genevieve Santillan  Outcome: Progressing     Problem: HEMATOLOGIC - ADULT  Goal: Maintains hematologic stability  Description: INTERVENTIONS  - Assess for signs and symptoms of bleeding or hemorrhage  - Monitor labs  - Administer supportive blood products/factors as ordered and appropriate  9/23/2024 2214 by Genevieve Santillan  Outcome: Progressing     Problem: Nutrition/Hydration-ADULT  Goal: Nutrient/Hydration intake appropriate for improving, restoring or maintaining nutritional needs  Description: Monitor and assess patient's nutrition/hydration status for malnutrition. Collaborate with interdisciplinary team and initiate plan and interventions as ordered.  Monitor  patient's weight and dietary intake as ordered or per policy. Utilize nutrition screening tool and intervene as necessary. Determine patient's food preferences and provide high-protein, high-caloric foods as appropriate.     INTERVENTIONS:  - Monitor oral intake, urinary output, labs, and treatment plans  - Assess nutrition and hydration status and recommend course of action  - Evaluate amount of meals eaten  - Assist patient with eating if necessary   - Allow adequate time for meals  - Recommend/ encourage appropriate diets, oral nutritional supplements, and vitamin/mineral supplements  - Order, calculate, and assess calorie counts as needed  - Recommend, monitor, and adjust tube feedings and TPN/PPN based on assessed needs  - Assess need for intravenous fluids  - Provide specific nutrition/hydration education as appropriate  - Include patient/family/caregiver in decisions related to nutrition  9/23/2024 2214 by Genevieve Santillan  Outcome: Progressing

## 2024-09-24 NOTE — PROGRESS NOTES
Cardiology Progress Note   MD Beka Yip MD, FACC  Vincent Haro DO, [facility]  MD Ellyn Pugh DO, [facility]  Dereje Santos DO, [facility]  ----------------------------------------------------------------  [address] [address]    Drew Santos 73 y.o. male MRN: [id number]  Unit/Bed#: [address] 203-01 Encounter: [id number]      ASSESSMENT:   Sepsis secondary to bacteremia with Proteus mirabilis, Enterobacter cloacae and Enterococcus faecalis  Paroxysmal atrial fibrillation on Eliquis  COVID-19 infection  Sacral wound  Hypertension  LVEF 55%, indeterminate diastolic function, mild to moderate MAC, trace TR, September 2024  Dyslipidemia  Type 2 diabetes mellitus  History of CVA  Severe protein calorie malnutrition    PLAN:  Due to scheduling issues, the patient's JOSEFINA could not be accommodated today  After discussing with the patient and his wife, the patient has elected for transesophageal echocardiogram  N.p.o. after midnight and plan for JOSEFINA tomorrow  Eliquis for thromboembolic prophylaxis  Continue high intensity statin, Zetia and metoprolol  Antibiotics as per infectious disease  Further recommendations to follow JOSEFINA    Signed: Vincent Haro DO, [facility], CARLOTTA, RAZA, FACP      History of Present Illness:  Patient seen and examined.  Denies chest pain, pressure, tightness or squeezing.  Denies lightheadedness, dizziness or palpitations.  Denies lower extremity swelling orthopnea or paroxysmal nocturnal dyspnea.  Resting in bed comfortably.      Review of Systems:  Review of Systems   Constitutional: Negative for decreased appetite, fever, weight gain and weight loss.   HENT:  Negative for congestion and sore throat.    Eyes:  Negative for visual disturbance.   Cardiovascular:  Negative for chest pain, dyspnea on exertion, leg swelling, near-syncope and palpitations.   Respiratory:  Negative for cough and shortness of breath.     Hematologic/Lymphatic: Negative for bleeding problem.   Skin:  Negative for rash.   Musculoskeletal:  Negative for myalgias and neck pain.   Gastrointestinal:  Negative for abdominal pain and nausea.   Neurological:  Negative for light-headedness and weakness.   Psychiatric/Behavioral:  Negative for depression.        Allergies   Allergen Reactions    Primaquine Other (See Comments) and Hives       No current facility-administered medications on file prior to encounter.     Current Outpatient Medications on File Prior to Encounter   Medication Sig    acetaminophen (TYLENOL) 325 mg tablet Take 650 mg by mouth every 4 (four) hours as needed for fever or mild pain.    apixaban (Eliquis) 5 mg Take 1 tablet (5 mg total) by mouth 2 (two) times a day    ASPIRIN 81 PO Take 81 mg by mouth daily    atorvastatin (LIPITOR) 40 mg tablet Take 2 tablets (80 mg total) by mouth every evening Per AVS, 80mg daily (Patient taking differently: Take 80 mg by mouth every evening Per AVS, 80mg daily)    bisacodyl (Bisacodyl Laxative) 10 mg suppository Insert 10 mg into the rectum daily as needed for constipation (if no results from mom).    bisacodyl (FLEET) 10 MG/30ML ENEM Insert 10 mg into the rectum daily as needed for constipation Only use if no response from Dulcolax after 2 hours    Cholecalciferol (Vitamin D3) 50 MCG (2000 UT) CAPS Take 2,000 Units by mouth daily    dapagliflozin (Farxiga) 10 MG tablet Take 10 mg by mouth daily    docusate sodium (COLACE) 100 mg capsule Take 100 mg by mouth daily Per OSR 9/17, take 2 capsules by mouth once daily.    escitalopram (LEXAPRO) 10 mg tablet Take 10 mg by mouth daily    ezetimibe (ZETIA) 10 mg tablet Take 10 mg by mouth daily. Indications: High Amount of Fats in the Blood    finasteride (PROSCAR) 5 mg tablet Take 1 tablet (5 mg total) by mouth daily (Patient taking differently: Take 5 mg by mouth daily)    lisinopril (ZESTRIL) 10 mg tablet Take 1 tablet (10 mg total) by mouth daily  (Patient taking differently: Take 10 mg by mouth daily)    magnesium hydroxide (Milk of Magnesia) 400 mg/5 mL oral suspension Take 30 mL by mouth daily as needed for constipation Use if no bowel movement x 3 days. Give at bedtime. Separate from other medications x 2 hours.    metoprolol succinate (TOPROL-XL) 25 mg 24 hr tablet Take 25 mg by mouth daily    mirtazapine (REMERON) 15 mg tablet Take 1 tablet (15 mg total) by mouth daily at bedtime    MULTIPLE VITAMIN PO Take 1 tablet by mouth in the morning. Indications: Vitamin A deficiency    Polyethylene Glycol 1000 POWD Take 17 g by mouth daily as needed (constipation) Dissolve in 4-8oz of water, juice, coffee or tea    senna (Senna-Tabs) 8.6 MG tablet Take 2 tablets by mouth daily as needed for constipation.    tamsulosin (FLOMAX) 0.4 mg Take 0.4 mg by mouth daily    potassium chloride (MICRO-K) 10 MEQ CR capsule Take 2 capsules (20 mEq total) by mouth daily for 2 days    Silver (SilvaSorb) GEL Apply 1 Application topically daily. Apply to buttock wound bed daily.        Current Facility-Administered Medications   Medication Dose Route Frequency Provider Last Rate    acetaminophen  1,000 mg Intravenous Q6H PRN Willem Gutierrez PA-C      acetaminophen  650 mg Oral Q6H PRN Xavier Castro DO      apixaban  5 mg Oral BID Xavier Castro DO      aspirin  81 mg Oral Daily Xavier Castro, DO      atorvastatin  80 mg Oral QPM Xavier Castro DO      bacitracin  1 small application Topical BID Sherlyn Sharma MD      calcium carbonate-vitamin D  1 tablet Oral BID With Meals Esperanza Arizmendi PA-C      cefepime  2,000 mg Intravenous Q12H Rossy Navarro MD 2,000 mg (09/24/24 0117)    Dakins (full strength)  1 Application Irrigation Daily Eloy Zhao MD      escitalopram  10 mg Oral Daily Xavier Castro DO      ezetimibe  10 mg Oral Daily Xavier Castro DO      finasteride  5 mg Oral Daily Xavier Castro DO      insulin  lispro  1-6 Units Subcutaneous TID AC Xavier Castro, DO      insulin lispro  1-6 Units Subcutaneous HS Xavier Castro, DO      lisinopril  2.5 mg Oral Daily Esperanza Arizmendi PA-C      metoprolol tartrate  12.5 mg Oral Q12H ELISEO Esperanza Arizmendi PA-C      metroNIDAZOLE  500 mg Oral Q12H [facility] Zaynab AVELINA Bobo      ondansetron  4 mg Intravenous Q4H PRN Xavier Castro DO      potassium chloride  20 mEq Oral BID Esperanza Arizmendi PA-C      senna-docusate sodium  1 tablet Oral HS Esperanza Arizmendi PA-C      vancomycin  1,500 mg Intravenous Q12H Taye Rodriguez, DO              Vitals:    09/23/24 2058 09/23/24 2200 09/23/24 2201 09/24/24 0808   BP: 127/74 123/75 123/75 137/73   BP Location:    Right arm   Pulse: 81 79 79 74   Resp:    18   Temp: 98.5 °F (36.9 °C)   98.9 °F (37.2 °C)   TempSrc:    Oral   SpO2: 96% 98%  97%   Weight:       Height:         Body mass index is 29.53 kg/m².      Intake/Output Summary (Last 24 hours) at 9/24/2024 0901  Last data filed at 9/24/2024 0631  Gross per 24 hour   Intake 480 ml   Output 2375 ml   Net -1895 ml       Weight change:     PHYSICAL EXAMINATION:  Gen: Awake, Alert, NAD  Head/eyes: AT/NC, pupils equal and round, Anicteric  ENT: mmm  Neck: Supple, No elevated JVP, trachea midline  Resp: CTA bilaterally no w/r/r  CV: RRR +S1, S2, No m/r/g  Abd: Soft, NT/ND + BS  Ext: no LE edema bilaterally  Neuro: Follows commands, moves all extermities  Psych: Appropriate affect, normal mood, pleasant attitude, non-combative  Skin: warm; no rash, erythema or venous stasis changes on exposed skin    Lab Results:  Results from last 7 days   Lab Units 09/24/24  0543   WBC Thousand/uL 13.09*   HEMOGLOBIN g/dL 7.6*   HEMATOCRIT % 24.6*   PLATELETS Thousands/uL 468*     Results from last 7 days   Lab Units 09/24/24  0543 09/18/24  0508 09/17/24  1637   POTASSIUM mmol/L 3.9   < > 2.9*   CHLORIDE mmol/L 110*   < > 105   CO2 mmol/L 26   < > 31   BUN mg/dL 6   < >  "15   CREATININE mg/dL 0.56*   < > 0.76   CALCIUM mg/dL 7.7*   < > 9.0   ALK PHOS U/L  --   --  88   ALT U/L  --   --  28   AST U/L  --   --  38    < > = values in this interval not displayed.     No results found for: \"TROPONINT\"                This note was completed in part utilizing M-Modal Fluency Direct Software.  Grammatical errors, random word insertions, spelling mistakes, and incomplete sentences may be an occasional consequence of this system secondary to software limitations, ambient noise, and hardware issues.  If you have any questions or concerns about the content, text, or information contained within the body of this dictation, please contact the provider for clarification.      "

## 2024-09-24 NOTE — PROGRESS NOTES
Progress Note - Infectious Disease   Name: Drew Santos 73 y.o. male I MRN: [id number]  Unit/Bed#: [place] [address] -01 I Date of Admission: 9/17/2024   Date of Service: 9/24/2024 I Hospital Day: 7     Assessment & Plan  Sepsis  Fever, tachycardia, tachypnea, and leukocytosis. Secondary to polymicrobial bacteremia, likely from colitis/proctitis with possible fistulous connection to sacral ulcer. Blood cultures now updated with growth of enterobacter, enterococcus faecalis, proteus. Also consider role of COVID-19. No other clear source appreciated. Chest imaging with no opacities/groundglass/consolidations suggesting a bacterial process. Urine was abnormal, culture showed growth of enterobacter. Head CT with no acute intracranial findings. Despite being systemically ill he remains hemodynamically stable. This morning he is afebrile. WBC count is trending down. He is receiving IV cefepime, IV flagyl, and IV Vancomycin which he is tolerating without difficulty. I will continue this regimen for now. Repeat blood cultures are negative >72 hours.  -continue IV cefepime through 9/25/2024 for 7 days of GNR coverage  -continue flagyl but transition to q12 dosing and PO administration, through 9/25/2024 for 7 days of GNR coverage  -continue IV vancomycin  -continue pharmacy consult for guidance on vancomycin dosage  -monitor CBCD and BMP  -follow up repeat blood cultures   -monitor vitals  -supportive care  Polymicrobial bacteremia  Blood cultures with growth of enterobacter and proteus in one set, and  second set growing enterococcus faecalis. Likely secondary to proctitis/colitis with possible fistulous connection to the sacral wound. No other source appreciated. Patient has COVID-19 but no opacities/groundglass/consolidations on chest imaging suggesting a bacterial process. TTE without evidence of valvular vegetations but was a technically difficult study. Recommend he have JOSEFINA for further evaluation and to establish shorted  antibiotic treatment length. Repeat blood cultures are negative >72 hours.  -antibiotics as above  -check CBCD and BMP tomorrow  -follow up repeat blood cultures  -recommend JOSEFINA  -monitor vitals  Sacral wound  Per patient's family wound initially started during patient's stay in a skilled nursing facility for rehab. Wound imaging present in our system since prior hematuria hospitalization. Wound now with significant depth. Concern for possible fistulous connection to the rectum given evidence of colitis/proctitis and extensive tissue emphysema on CT. This is likely playing a role in bacteremia above. General surgery has debrided and will continue to follow up with wound. He may require diverting colostomy. He is planned for repeat CT imaging today to re-evaluate wound and to assess for fistula.  -antibiotic as above  -follow up repeat CT A/P  -serial skin exams  -frequent turning/repositioning to offload pressure from the wound  -local wound care per general surgery  -continue follow up with general surgery  Proctitis  CT C/A/P showed mild distal sigmoid and rectal inflammatory changes, R ischioanal fossa emphysema, and possible fistulous connection to skin surface. This is likely source of patient's bacteremia above. Family reports patient has very infrequent bowel movements in past few weeks. General surgery is following for wound. GI has been consulted and they have concern for ischemic colitis. He is planned for repeat CT imaging today to re-evaluate wound and to assess for fistula.  -antibiotics as above  -follow up repeat CT A/P  -serial abdominal/rectal exams  -monitor stool output  -follow up stool studies  -continue follow up with general surgery  -continue follow up with gastroenterology  Type 2 diabetes mellitus without complication, without long-term current use of insulin ([facility])  Lab Results   Component Value Date    HGBA1C 6.2 (H) 09/17/2024   This is risk factor for wounds and infection.  -recommend  tight glycemic control  -blood glucose management per primary service  COVID-19  PCR positive on 2024 although per patient's family he tested positive prior to admission. Patient's wife also positive. Chest imaging showed no airspace opacities or groundglass. He has been started on mild disease protocol and received IV Remdesivir x3 days.  -management per primary service  -monitor vitals  -monitor respiratory status    Above plan was discussed in detail with patient at the bedside.  Above plan was discussed in detail with JACK who agrees with plan for ongoing IV antibiotic. Cardiology has ordered the JOSEFINA.    Antibiotics:  Cefepime 7  Vancomycin 6  Flagyl 7  Antibiotics 8    Subjective:  Patient reports no changes today. He denies pain in his chest, abdomen, and back. He denies nausea, vomiting, cough, and difficulty breathing. Patient denies pain at site of sacral wound. He denies pain from his hanks. He offers no other symptoms.    Objective:  Vitals:  Temp:  [98.2 °F (36.8 °C)-98.7 °F (37.1 °C)] 98.5 °F (36.9 °C)  HR:  [74-81] 79  Resp:  [18] 18  BP: (123-134)/(74-79) 123/75  SpO2:  [96 %-98 %] 98 %  Temp (24hrs), Av.5 °F (36.9 °C), Min:98.2 °F (36.8 °C), Max:98.7 °F (37.1 °C)  Current: Temperature: 98.5 °F (36.9 °C)    Physical Exam:   General Appearance:  Alert, somewhat interactive but speech is sometimes limited, nontoxic, in no acute distress. He appears chronically debilitated. He appears comfortable sitting up in bed.   Throat: Oropharynx moist without lesions.    Lungs:   Clear to auscultation bilaterally; no wheezes, rhonchi or rales; respirations unlabored on room air.   Heart:  RRR; no murmur, rub or gallop.   Abdomen:   Soft, non-tender, non-distended, positive bowel sounds.     Genitourinary: Hanks intact.   Extremities: B/L offloading boots intact. Mild peripheral edema.   Skin: No new rashes noted on exposed skin.     Labs, Imaging, & Other studies:   All pertinent labs and imaging studies  were personally reviewed  Results from last 7 days   Lab Units 09/23/24  0525 09/22/24  0604 09/21/24  0616   WBC Thousand/uL 13.79* 15.98* 16.78*   HEMOGLOBIN g/dL 7.6* 7.6* 7.5*   PLATELETS Thousands/uL 310 445* 434*     Results from last 7 days   Lab Units 09/24/24  0543 09/18/24  0508 09/17/24  1637   POTASSIUM mmol/L 3.9   < > 2.9*   CHLORIDE mmol/L 110*   < > 105   CO2 mmol/L 26   < > 31   BUN mg/dL 6   < > 15   CREATININE mg/dL 0.56*   < > 0.76   EGFR ml/min/1.73sq m 101   < > 89   CALCIUM mg/dL 7.7*   < > 9.0   AST U/L  --   --  38   ALT U/L  --   --  28   ALK PHOS U/L  --   --  88    < > = values in this interval not displayed.     Results from last 7 days   Lab Units 09/20/24  0555 09/17/24  1954 09/17/24  1831 09/17/24  1637   BLOOD CULTURE  No Growth at 72 hrs.  No Growth at 72 hrs.  --  Enterococcus faecalis* Enterobacter cloacae*  Proteus mirabilis*   GRAM STAIN RESULT   --   --  Gram positive cocci in pairs and chains* Gram negative rods*   URINE CULTURE   --  10,000-19,000 cfu/ml Enterobacter cloacae*  --   --

## 2024-09-24 NOTE — PLAN OF CARE
Problem: Potential for Falls  Goal: Patient will remain free of falls  Description: INTERVENTIONS:  - Educate patient/family on patient safety including physical limitations  - Instruct patient to call for assistance with activity   - Consult OT/PT to assist with strengthening/mobility   - Keep Call bell within reach  - Keep bed low and locked with side rails adjusted as appropriate  - Keep care items and personal belongings within reach  - Initiate and maintain comfort rounds  - Make Fall Risk Sign visible to staff  - Offer Toileting every 2 Hours, in advance of need  - Initiate/Maintain bed alarm  - Obtain necessary fall risk management equipment: bed alarm call bell  - Apply yellow socks and bracelet for high fall risk patients  - Consider moving patient to room near nurses station  Outcome: Progressing     Problem: PAIN - ADULT  Goal: Verbalizes/displays adequate comfort level or baseline comfort level  Description: Interventions:  - Encourage patient to monitor pain and request assistance  - Assess pain using appropriate pain scale  - Administer analgesics based on type and severity of pain and evaluate response  - Implement non-pharmacological measures as appropriate and evaluate response  - Consider cultural and social influences on pain and pain management  - Notify physician/advanced practitioner if interventions unsuccessful or patient reports new pain  Outcome: Progressing     Problem: INFECTION - ADULT  Goal: Absence or prevention of progression during hospitalization  Description: INTERVENTIONS:  - Assess and monitor for signs and symptoms of infection  - Monitor lab/diagnostic results  - Monitor all insertion sites, i.e. indwelling lines, tubes, and drains  - Monitor endotracheal if appropriate and nasal secretions for changes in amount and color  - [place] appropriate cooling/warming therapies per order  - Administer medications as ordered  - Instruct and encourage patient and family to use good  hand hygiene technique  - Identify and instruct in appropriate isolation precautions for identified infection/condition  Outcome: Progressing  Goal: Absence of fever/infection during neutropenic period  Description: INTERVENTIONS:  - Monitor WBC    Outcome: Progressing     Problem: SAFETY ADULT  Goal: Patient will remain free of falls  Description: INTERVENTIONS:  - Educate patient/family on patient safety including physical limitations  - Instruct patient to call for assistance with activity   - Consult OT/PT to assist with strengthening/mobility   - Keep Call bell within reach  - Keep bed low and locked with side rails adjusted as appropriate  - Keep care items and personal belongings within reach  - Initiate and maintain comfort rounds  - Make Fall Risk Sign visible to staff  - Offer Toileting every 2 Hours, in advance of need  - Initiate/Maintain bed alarm  - Obtain necessary fall risk management equipment: bed alarm call bell  - Apply yellow socks and bracelet for high fall risk patients  - Consider moving patient to room near nurses station  Outcome: Progressing  Goal: Maintain or return to baseline ADL function  Description: INTERVENTIONS:  -  Assess patient's ability to carry out ADLs; assess patient's baseline for ADL function and identify physical deficits which impact ability to perform ADLs (bathing, care of mouth/teeth, toileting, grooming, dressing, etc.)  - Assess/evaluate cause of self-care deficits   - Assess range of motion  - Assess patient's mobility; develop plan if impaired  - Assess patient's need for assistive devices and provide as appropriate  - Encourage maximum independence but intervene and supervise when necessary  - Involve family in performance of ADLs  - Assess for home care needs following discharge   - Consider OT consult to assist with ADL evaluation and planning for discharge  - Provide patient education as appropriate  Outcome: Progressing  Goal: Maintains/Returns to pre  admission functional level  Description: INTERVENTIONS:  - Perform AM-PAC 6 Click Basic Mobility/ Daily Activity assessment daily.  - Set and communicate daily mobility goal to care team and patient/family/caregiver.   - Collaborate with rehabilitation services on mobility goals if consulted  - Perform Range of Motion 4 times a day.  - Reposition patient every 2 hours.  - Dangle patient 3 times a day  - Stand patient 3 times a day  - Ambulate patient 3 times a day  - Out of bed to chair 3 times a day   - Out of bed for meals 3 times a day  - Out of bed for toileting  - Record patient progress and toleration of activity level   Outcome: Progressing     Problem: DISCHARGE PLANNING  Goal: Discharge to home or other facility with appropriate resources  Description: INTERVENTIONS:  - Identify barriers to discharge w/patient and caregiver  - Arrange for needed discharge resources and transportation as appropriate  - Identify discharge learning needs (meds, wound care, etc.)  - Arrange for interpretive services to assist at discharge as needed  - Refer to Case Management Department for coordinating discharge planning if the patient needs post-hospital services based on physician/advanced practitioner order or complex needs related to functional status, cognitive ability, or social support system  Outcome: Progressing     Problem: Knowledge Deficit  Goal: Patient/family/caregiver demonstrates understanding of disease process, treatment plan, medications, and discharge instructions  Description: Complete learning assessment and assess knowledge base.  Interventions:  - Provide teaching at level of understanding  - Provide teaching via preferred learning methods  Outcome: Progressing     Problem: Prexisting or High Potential for Compromised Skin Integrity  Goal: Skin integrity is maintained or improved  Description: INTERVENTIONS:  - Identify patients at risk for skin breakdown  - Assess and monitor skin integrity  - Assess  and monitor nutrition and hydration status  - Monitor labs   - Assess for incontinence   - Turn and reposition patient  - Assist with mobility/ambulation  - Relieve pressure over bony prominences  - Avoid friction and shearing  - Provide appropriate hygiene as needed including keeping skin clean and dry  - Evaluate need for skin moisturizer/barrier cream  - Collaborate with interdisciplinary team   - Patient/family teaching  - Consider wound care consult   Outcome: Progressing     Problem: GASTROINTESTINAL - ADULT  Goal: Maintains or returns to baseline bowel function  Description: INTERVENTIONS:  - Assess bowel function  - Encourage oral fluids to ensure adequate hydration  - Administer IV fluids if ordered to ensure adequate hydration  - Administer ordered medications as needed  - Encourage mobilization and activity  - Consider nutritional services referral to assist patient with adequate nutrition and appropriate food choices  Outcome: Progressing     Problem: GENITOURINARY - ADULT  Goal: Urinary catheter remains patent  Description: INTERVENTIONS:  - Assess patency of urinary catheter  - If patient has a chronic hanks, consider changing catheter if non-functioning  - Follow guidelines for intermittent irrigation of non-functioning urinary catheter  Outcome: Progressing     Problem: METABOLIC, FLUID AND ELECTROLYTES - ADULT  Goal: Electrolytes maintained within normal limits  Description: INTERVENTIONS:  - Monitor labs and assess patient for signs and symptoms of electrolyte imbalances  - Administer electrolyte replacement as ordered  - Monitor response to electrolyte replacements, including repeat lab results as appropriate  - Instruct patient on fluid and nutrition as appropriate  Outcome: Progressing     Problem: SKIN/TISSUE INTEGRITY - ADULT  Goal: Skin Integrity remains intact(Skin Breakdown Prevention)  Description: Assess:  -Perform Goldy assessment every shift  -Clean and moisturize skin every  shift  -Inspect skin when repositioning, toileting, and assisting with ADLS  -Assess under medical devices such as Masimo every shift  -Assess extremities for adequate circulation and sensation     Bed Management:  -Have minimal linens on bed & keep smooth, unwrinkled  -Change linens as needed when moist or perspiring  -Avoid sitting or lying in one position for more than 1 hours while in bed  -Keep HOB at 30 degrees     Toileting:  -Offer bedside commode  -Assess for incontinence every shift  -Use incontinent care products after each incontinent episode such as foam cleanser     Activity:  -Mobilize patient 4 times a day  -Encourage activity and walks on unit  -Encourage or provide ROM exercises   -Turn and reposition patient every 2 Hours  -Use appropriate equipment to lift or move patient in bed  -Instruct/ Assist with weight shifting every 1 hour when out of bed in chair  -Consider limitation of chair time 1 hour intervals    Skin Care:  -Avoid use of baby powder, tape, friction and shearing, hot water or constrictive clothing  -Relieve pressure over bony prominences using wedges  -Do not massage red bony areas    Next Steps:  -Teach patient strategies to minimize risks such as skin breakdown   -Consider consults to  interdisciplinary teams such as wound care nurse   Outcome: Progressing  Goal: Incision(s), wounds(s) or drain site(s) healing without S/S of infection  Description: INTERVENTIONS  - Assess and document dressing, incision, wound bed, drain sites and surrounding tissue  - Provide patient and family education  - Perform skin care/dressing changes every shift   Outcome: Progressing  Goal: Pressure injury heals and does not worsen  Description: Interventions:  - Implement low air loss mattress or specialty surface (Criteria met)  - Apply silicone foam dressing  - Instruct/assist with weight shifting every 30 minutes when in chair   - Limit chair time to 1 hour intervals  - Use special pressure reducing  interventions such as cushion when in chair   - Apply fecal or urinary incontinence containment device   - Perform passive or active ROM every shift  - Turn and reposition patient & offload bony prominences every 1 hours   - Utilize friction reducing device or surface for transfers   - Consider consults to  interdisciplinary teams such as wound care nurse   - Use incontinent care products after each incontinent episode such as foam cleanser   - Consider nutrition services referral as needed  Outcome: Progressing     Problem: HEMATOLOGIC - ADULT  Goal: Maintains hematologic stability  Description: INTERVENTIONS  - Assess for signs and symptoms of bleeding or hemorrhage  - Monitor labs  - Administer supportive blood products/factors as ordered and appropriate  Outcome: Progressing     Problem: Nutrition/Hydration-ADULT  Goal: Nutrient/Hydration intake appropriate for improving, restoring or maintaining nutritional needs  Description: Monitor and assess patient's nutrition/hydration status for malnutrition. Collaborate with interdisciplinary team and initiate plan and interventions as ordered.  Monitor patient's weight and dietary intake as ordered or per policy. Utilize nutrition screening tool and intervene as necessary. Determine patient's food preferences and provide high-protein, high-caloric foods as appropriate.     INTERVENTIONS:  - Monitor oral intake, urinary output, labs, and treatment plans  - Assess nutrition and hydration status and recommend course of action  - Evaluate amount of meals eaten  - Assist patient with eating if necessary   - Allow adequate time for meals  - Recommend/ encourage appropriate diets, oral nutritional supplements, and vitamin/mineral supplements  - Order, calculate, and assess calorie counts as needed  - Recommend, monitor, and adjust tube feedings and TPN/PPN based on assessed needs  - Assess need for intravenous fluids  - Provide specific nutrition/hydration education as  appropriate  - Include patient/family/caregiver in decisions related to nutrition  Outcome: Progressing

## 2024-09-24 NOTE — PLAN OF CARE
Problem: PHYSICAL THERAPY ADULT  Goal: Performs mobility at highest level of function for planned discharge setting.  See evaluation for individualized goals.  Description: Treatment/Interventions: Functional transfer training, LE strengthening/ROM, Therapeutic exercise, Cognitive reorientation, Equipment eval/education, Patient/family training, Endurance training, Bed mobility, Continued evaluation, Spoke to nursing, OT          See flowsheet documentation for full assessment, interventions and recommendations.  Outcome: Progressing  Note: Prognosis: Guarded  Problem List: Decreased strength, Decreased endurance, Impaired balance, Decreased mobility, Decreased cognition, Impaired judgement, Decreased safety awareness, Decreased skin integrity  Assessment: Pt seen for PT treatment session this date with interventions consisting of bed mobility and HEP, sitting balance, righting activities and tolerance activities at EOB and education provided as needed for safety and direction to improve functional mobility, safety awareness, and activity tolerance. Pt agreeable to PT treatment session upon arrival, pt found supine in bed . At end of session, pt left  in L side lying position in bed with all needs in reach. In comparison to previous session, pt with improvement in activity tolerance, endurance, static sitting balance, and  dynamic sitting balance. Noted increased verbal responses this session. Pt paul to track cross midline to the left but unable to sustain. Pt continues with significant L neglect.  Pt  rec'd PROM to L le in supine and sitting with tapping to hamstrings, quads hip abd./adductors to facilitate movement> NO active movement noted. Pt  perform sitting EOB  x 12- 15 minutes with mod assist x1 to close supervision.  Noted posteior and lateral leaning while seated at EOB. Pt performs dynamic balance activities of reaching in all directions/ planes of motion with cg- close supervision. Pt requires mod  assist x1 to right to midline with verbal and tactile cues for upright posture, and to maintain midline for head and neck. Pt  is able to    maintain midline for short periods of time however with fatigue pt  requires increased assistance to maintain.  Spoke with pt spouse regarding current level of function,  positioning of pt to assist in further skin break down and promote healing of wounds, purpose of PT's role while pt is in hospital and recommendations for STR upon d/c.  Continue to recommend  level II moderate rehab resource intensity  at time of d/c in order to maximize pt's functional independence and safety w/ mobility. Pt continues to be functioning below baseline level. PT will continue to see pt while here in order to address the deficits listed above and provide interventions consistent w/ POC in effort to achieve STGs.    The patient's AM-PAC Basic Mobility Inpatient Short Form Raw Score is 6. A raw score less than 16 suggests the patient may benefit from discharge to post-acute rehabilitation services. Please also refer to the recommendation of the Physical Therapist for safe discharge planning.  Barriers to Discharge: Decreased caregiver support, Inaccessible home environment (unsafe to return to home environment at current LOF, questionable caregiver support)     Rehab Resource Intensity Level, PT: II (Moderate Resource Intensity)    See flowsheet documentation for full assessment.

## 2024-09-24 NOTE — ASSESSMENT & PLAN NOTE
Lab Results   Component Value Date    HGBA1C 6.2 (H) 09/17/2024       Recent Labs     09/23/24  1112 09/23/24  1635 09/23/24 2056 09/24/24  0806   POCGLU 149* 113 178* 118       Blood Sugar Average: Last 72 hrs:  (P) [id number]  Well-controlled with last hemoglobin A1c of 6.2%  hold home oral regimen  Monitor on sliding scale only  Hypoglycemia protocol

## 2024-09-24 NOTE — PROGRESS NOTES
Progress Note - Hospitalist   Name: Drew Santos 73 y.o. male I MRN: [id number]  Unit/Bed#: [place] [address] -01 I Date of Admission: 9/17/2024   Date of Service: 9/24/2024 I Hospital Day: 7    Assessment & Plan  Sepsis  As evidenced by tachycardia and leukocytosis and fever  Now with polymicrobial bacteremia- enterbacter, enterococcus and proetus   CT abdomen and pelvis showed Mid/distal sigmoid colonic and rectal inflammatory changes in keeping with a segmental colitis/proctitis.Right ischioanal fossa subcutaneous emphysema extending through the right paramidline gluteal subcutaneous tissues to the skin surface suspicious for a perirectal or perianal fistula    has large sacral decubitus ulcer which appears to be his primary issue, status post bedside debridement of sacral ulcer 9/18 by general surgery, continue with dressing changes and wound care management by general surgery  Repeating CT abdomen and pelvis today to evaluate for any fistula connection versus abscess due to ongoing leukocytosis  Seen by GI imaging likely due to ischemic colitis but likely not primary cause of his presentation - no role for colonoscopy or flex sig curently    Continue IV cefepime, flagyl and vanco per ID   Repeat blood cultures negative to date   TTE without evidence of large vegetations although images suboptimal   Spoke with wife okay to pursue JOSEFINA, n.p.o. after midnight  Appreciate ongoing recommendations per consultants  Polymicrobial bacteremia  Polymicrobial bacteremia likely secondary sacral wound with possible fistulous connection  Infectious disease input noted and appreciated.  Continue IV cefepime, flagyl and vancomycin   Repeat cultures negative to date   Plan for JOSEFINA tomorrow  Repeating CAT scan abdomen pelvis this morning    Sacral wound  Chronic sacral decubitus ulcer; present on admission  Status post debridement at bedside per surgical team.  Continue with wound care as tolerated.   continue dressing changes and  serial exams  Wound care consultation  Offload pressure   I did breach the topic with wife about palliative care but seems overwhelmed at this thought at this time, continue ongoing goals of care discussion  She is not sure if she wants him back at rehab versus home at time of discharge  Type 2 diabetes mellitus without complication, without long-term current use of insulin (HCC)  Lab Results   Component Value Date    HGBA1C 6.2 (H) 09/17/2024       Recent Labs     09/23/24  1112 09/23/24  1635 09/23/24  2056 09/24/24  0806   POCGLU 149* 113 178* 118       Blood Sugar Average: Last 72 hrs:  (P) [id number]  Well-controlled with last hemoglobin A1c of 6.2%  hold home oral regimen  Monitor on sliding scale only  Hypoglycemia protocol     COVID-19  Patient currently on room air though high risk given his age and comorbidities  Completed three days of IV remdesivir   Supportive care otherwise  Contact and airborne precautions  Currently on mild COVID pathway.  Not requiring supplemental oxygen.  10 days of isolation through 9/27   Proctitis  Noted on CT scan.  No diarrhea or clinical evidence of colitis.   GI input appreciated.  Stool studies negative  Likely ischemic colitis given area affected, continue supportive measures   No plan for any inpatient scope   Acute metabolic encephalopathy  Presumably secondary to sepsis, bacteremia, COVID infection  CT head with no acute intracranial abnormality  Continue with supportive care  Reorientation, delirium precautions   Oriented to person and place   History of CVA (cerebrovascular accident)  History of CVA   Chronically bedbound and with chronic left-sided deficits   Continue home aspirin, Eliquis, statin  dysphagia 2 mechanical soft, thin liquids   Paroxysmal atrial fibrillation (HCC)  Continue Toprol XL 25 mg daily  continue home Eliquis for stroke prevention  Anemia  Low blood counts noted 9/19/24.   Has chronic anemia likely secondary to poor nutritional  status underlying chronic illness.  Hemoglobin declined to 7.1 given  1 unit PRBC infusion  Hemoglobin stable at 7.6 post transfusion   Encourage oral intake, cannot utilize IV iron due to bacteremia   Lab Results   Component Value Date    HGB 7.6 (L) 09/24/2024    HGB 7.6 (L) 09/23/2024    HGB 7.6 (L) 09/22/2024    HGB 7.5 (L) 09/21/2024    HGB 8.1 (L) 09/20/2024       Severe protein-calorie malnutrition (HCC)  Malnutrition Findings:   Adult Malnutrition type: Chronic illness  Adult Degree of Malnutrition: Other severe protein calorie malnutrition  Malnutrition Characteristics: Inadequate energy, Weight loss                  360 Statement: Severe protein calorie malnutrition in context of chronic illness r/t poor appetite, inadequate PO intake as evidance by energy intake less than 75% compared to estimated needs>1 month, 7.5% wt loss x 1 month ( 122kg 8/23/24-> 113kg 9/17) treated with PO diet and oral supplements    BMI Findings:           Body mass index is 29.53 kg/m².       VTE Pharmacologic Prophylaxis:   High Risk (Score >/= 5) - Pharmacological DVT Prophylaxis Ordered: apixaban (Eliquis). Sequential Compression Devices Ordered.    Mobility:   Basic Mobility Inpatient Raw Score: 6  JH-HLM Goal: 2: Bed activities/Dependent transfer  JH-HLM Achieved: 2: Bed activities/Dependent transfer  JH-HLM Goal achieved. Continue to encourage appropriate mobility.    Patient Centered Rounds: I performed bedside rounds with nursing staff today.   Discussions with Specialists or Other Care Team Provider:     Education and Discussions with Family / Patient: patient and wife over the phone     Current Length of Stay: 7 day(s)  Current Patient Status: Inpatient   Certification Statement: The patient will continue to require additional inpatient hospital stay due to bacteremia, sacral wound   Discharge Plan: Anticipate discharge in 48-72 hrs to rehab vs home with vna     Code Status: Level 1 - Full Code    Subjective   Patient  lying in bed.  Denies to be in any pain.  Answers to minimal questions.  Oriented to person and place.  Wife states she had a really good conversation with them last night and is okay with pursuing JOSEFINA.  I tried to bridge the conversation of palliative care today with wife but she seems very overwhelmed by this.  She is not sure she wants him back at rehab at this time.    Objective     Vitals:   Temp (24hrs), Av.7 °F (37.1 °C), Min:98.5 °F (36.9 °C), Max:98.9 °F (37.2 °C)    Temp:  [98.5 °F (36.9 °C)-98.9 °F (37.2 °C)] 98.9 °F (37.2 °C)  HR:  [74-81] 74  Resp:  [18] 18  BP: (123-137)/(73-79) 137/73  SpO2:  [96 %-98 %] 97 %  Body mass index is 29.53 kg/m².     Input and Output Summary (last 24 hours):     Intake/Output Summary (Last 24 hours) at 2024 0908  Last data filed at 2024 0631  Gross per 24 hour   Intake 480 ml   Output 2375 ml   Net -1895 ml       Physical Exam  Vitals and nursing note reviewed.   Constitutional:       Appearance: Ill appearance: chronically ill appearing.   Cardiovascular:      Rate and Rhythm: Normal rate and regular rhythm.   Pulmonary:      Effort: Pulmonary effort is normal. No respiratory distress.   Abdominal:      General: Bowel sounds are normal.      Palpations: Abdomen is soft.   Genitourinary:     Comments: Chronic hanks cathter draining clear yellow urine   Musculoskeletal:         General: Swelling (left hand) and deformity present.      Right lower leg: Edema present.      Left lower leg: Edema present.   Neurological:      Mental Status: He is alert.      Comments: Flaccid LUE   Psychiatric:         Cognition and Memory: Cognition is impaired. Memory is impaired.      Comments: Oriented to person and place           Lines/Drains:  Lines/Drains/Airways       Active Status       Name Placement date Placement time Site Days    Urethral Catheter Three way 22 Fr. 24  Three way  23    Continuous Bladder Irrigation Three-way 24  Three-way  23                   Urinary Catheter:  Goal for removal: N/A - Chronic Parra                 Lab Results: I have reviewed the following results:    Results from last 7 days   Lab Units 09/24/24  0543 09/21/24  0616 09/20/24  0555   WBC Thousand/uL 13.09*   < > 19.87*   HEMOGLOBIN g/dL 7.6*   < > 8.1*   HEMATOCRIT % 24.6*   < > 25.5*   PLATELETS Thousands/uL 468*   < > 398*   SEGS PCT %  --   --  80*   LYMPHO PCT % 26  --  12*   MONO PCT % 5  --  5   EOS PCT % 3  --  1    < > = values in this interval not displayed.     Results from last 7 days   Lab Units 09/24/24  0543 09/18/24  0508 09/17/24  1637   SODIUM mmol/L 138   < > 142   POTASSIUM mmol/L 3.9   < > 2.9*   CHLORIDE mmol/L 110*   < > 105   CO2 mmol/L 26   < > 31   BUN mg/dL 6   < > 15   CREATININE mg/dL 0.56*   < > 0.76   ANION GAP mmol/L 2*   < > 6   CALCIUM mg/dL 7.7*   < > 9.0   ALBUMIN g/dL  --   --  2.8*   TOTAL BILIRUBIN mg/dL  --   --  1.27*   ALK PHOS U/L  --   --  88   ALT U/L  --   --  28   AST U/L  --   --  38   GLUCOSE RANDOM mg/dL 127   < > 164*    < > = values in this interval not displayed.         Results from last 7 days   Lab Units 09/24/24  0806 09/23/24  2056 09/23/24  1635 09/23/24  1112 09/23/24  0720 09/22/24  2040 09/22/24  1649 09/22/24  1221 09/22/24  0838 09/21/24  2049 09/21/24  1739 09/21/24  1236   POC GLUCOSE mg/dl 118 178* 113 149* 124 241* 128 160* 128 186* 146* 148*     Results from last 7 days   Lab Units 09/17/24  1957   HEMOGLOBIN A1C % 6.2*     Results from last 7 days   Lab Units 09/18/24  0508 09/17/24  1655 09/17/24  1637   LACTIC ACID mmol/L  --  1.2  --    PROCALCITONIN ng/ml 0.49*  --  0.37*       Recent Cultures (last 7 days):   Results from last 7 days   Lab Units 09/20/24  0555 09/17/24  1954 09/17/24  1831 09/17/24  1637   BLOOD CULTURE  No Growth at 72 hrs.  No Growth at 72 hrs.  --  Enterococcus faecalis* Enterobacter cloacae*  Proteus mirabilis*   GRAM STAIN RESULT   --   --  Gram positive cocci in pairs and  chains* Gram negative rods*   URINE CULTURE   --  10,000-19,000 cfu/ml Enterobacter cloacae*  --   --        Imaging Review: awaiting repeat CT abdomen pelvis   Other Studies:     Last 24 Hours Medication List:     Current Facility-Administered Medications:     acetaminophen (Ofirmev) injection 1,000 mg, Q6H PRN    acetaminophen (TYLENOL) tablet 650 mg, Q6H PRN    apixaban (ELIQUIS) tablet 5 mg, BID    aspirin chewable tablet 81 mg, Daily    atorvastatin (LIPITOR) tablet 80 mg, QPM    bacitracin topical ointment 1 small application, BID    calcium carbonate-vitamin D 500 mg-5 mcg tablet 1 tablet, BID With Meals    ceFEPime (MAXIPIME) 2,000 mg in dextrose 5 % 50 mL IVPB, Q12H, Last Rate: 2,000 mg (09/24/24 0117)    Dakins (full strength) (DAKIN'S) 0.5 percent topical solution 1 Application, Daily    escitalopram (LEXAPRO) tablet 10 mg, Daily    ezetimibe (ZETIA) tablet 10 mg, Daily    finasteride (PROSCAR) tablet 5 mg, Daily    insulin lispro (HumALOG/ADMELOG) 100 units/mL subcutaneous injection 1-6 Units, TID AC **AND** Fingerstick Glucose (POCT), TID AC    insulin lispro (HumALOG/ADMELOG) 100 units/mL subcutaneous injection 1-6 Units, HS    lisinopril (ZESTRIL) tablet 2.5 mg, Daily    metoprolol tartrate (LOPRESSOR) partial tablet 12.5 mg, Q12H ELISEO    metroNIDAZOLE (FLAGYL) tablet 500 mg, Q12H ELISEO    ondansetron (ZOFRAN) injection 4 mg, Q4H PRN    potassium chloride oral solution 20 mEq, BID    senna-docusate sodium (SENOKOT S) 8.6-50 mg per tablet 1 tablet, HS    vancomycin (VANCOCIN) 1500 mg in sodium chloride 0.9% 250 mL IVPB, Q12H    Administrative Statements   Today, Patient Was Seen By: Laurel Valles PA-C  I have spent a total time of 30 minutes in caring for this patient on the day of the visit/encounter including Diagnostic results, Patient and family education, Importance of tx compliance, Documenting in the medical record, Reviewing / ordering tests, medicine, procedures  , Obtaining or reviewing  history  , and Communicating with other healthcare professionals .    **Please Note: This note may have been constructed using a voice recognition system.**

## 2024-09-24 NOTE — ASSESSMENT & PLAN NOTE
Fever, tachycardia, tachypnea, and leukocytosis. Secondary to polymicrobial bacteremia, likely from colitis/proctitis with possible fistulous connection to sacral ulcer. Blood cultures now updated with growth of enterobacter, enterococcus faecalis, proteus. Also consider role of COVID-19. No other clear source appreciated. Chest imaging with no opacities/groundglass/consolidations suggesting a bacterial process. Urine was abnormal, culture showed growth of enterobacter. Head CT with no acute intracranial findings. Despite being systemically ill he remains hemodynamically stable. This morning he is afebrile. WBC count is trending down. He is receiving IV cefepime, IV flagyl, and IV Vancomycin which he is tolerating without difficulty. I will continue this regimen for now. Repeat blood cultures are negative >72 hours.  -continue IV cefepime through 9/25/2024 for 7 days of GNR coverage  -continue flagyl but transition to q12 dosing and PO administration, through 9/25/2024 for 7 days of GNR coverage  -continue IV vancomycin  -continue pharmacy consult for guidance on vancomycin dosage  -monitor CBCD and BMP  -follow up repeat blood cultures   -monitor vitals  -supportive care

## 2024-09-24 NOTE — QUICK NOTE
His wife Kristi called to be back and wanted to hear more about palliative care services and after further discussion was in agreement with consultation. Consult placed

## 2024-09-24 NOTE — ASSESSMENT & PLAN NOTE
Polymicrobial bacteremia likely secondary sacral wound with possible fistulous connection  Infectious disease input noted and appreciated.  Continue IV cefepime, flagyl and vancomycin   Repeat cultures negative to date   Plan for JOSEFINA tomorrow  Repeating CAT scan abdomen pelvis this morning

## 2024-09-24 NOTE — OCCUPATIONAL THERAPY NOTE
Occupational Therapy Progress Note     Patient Name: Drew Santos  Today's Date: 9/24/2024  Problem List  Principal Problem:    Sepsis  Active Problems:    Sacral wound    Type 2 diabetes mellitus without complication, without long-term current use of insulin (HCC)    COVID-19    Proctitis    Polymicrobial bacteremia    Acute metabolic encephalopathy    History of CVA (cerebrovascular accident)    Paroxysmal atrial fibrillation (HCC)    Anemia    Severe protein-calorie malnutrition (HCC)        09/24/24 1339   OT Last Visit   OT Visit Date 09/24/24   Note Type   Note Type Treatment   Pain Assessment   Pain Assessment Tool FLACC   Pain Score No Pain   Pain Rating: FLACC (Rest) - Face 0   Pain Rating: FLACC (Rest) - Legs 0   Pain Rating: FLACC (Rest) - Activity 0   Pain Rating: FLACC (Rest) - Cry 0   Pain Rating: FLACC (Rest) - Consolability 0   Score: FLACC (Rest) 0   Pain Rating: FLACC (Activity) - Face 0   Pain Rating: FLACC (Activity) - Legs 0   Pain Rating: FLACC (Activity) - Activity 0   Pain Rating: FLACC (Activity) - Cry 0   Pain Rating: FLACC (Activity) - Consolability 0   Score: FLACC (Activity) 0   Restrictions/Precautions   Weight Bearing Precautions Per Order No   Other Precautions Contact/isolation;Airborne/isolation;Cognitive;Chair Alarm;Bed Alarm;Multiple lines;Fall Risk   ADL   LB Dressing Assistance 1  Total Assistance   LB Dressing Deficit Don/doff R sock;Don/doff L sock   Bed Mobility   Rolling R 1  Dependent   Additional items Assist x 2   Rolling L 1  Dependent   Additional items Assist x 2   Supine to Sit 1  Dependent   Additional items Assist x 2   Sit to Supine 1  Dependent   Additional items Assist x 2   Additional Comments Able to sit EOB w RUE support on bed. Able to tolerate EOB 12-15 min fluctuating between Fair- to Poor balance.   Transfers   Sit to Stand Unable to assess  (Deferred given limited LE weakness, impaired sitting balance/tolerance)   Functional Mobility   Additional Comments  "N/A   Therapeutic Exercise - ROM   UE-ROM Yes   ROM- Right Upper Extremities   R Shoulder AROM;Flexion;Extension   R Elbow Elbow flexion;Elbow extension   R Weight/Reps/Sets 1x10 rep. Requires max cuing to complete.   ROM - Left Upper Extremities    L Shoulder PROM;Flexion;Extension;ABduction;Horizontal ABduction   L Elbow PROM;Elbow flexion;Elbow extension   L Weight/Reps/Sets 1x10 rep   Subjective   Subjective \"No pain\" Agreeable to OT/PT co-tx.   Cognition   Overall Cognitive Status Impaired   Arousal/Participation Alert;Persistent stimuli required;Cooperative   Attention Difficulty attending to directions   Orientation Level Oriented to person;Disoriented to time;Disoriented to situation  (General to hospital with options. Unable to report which hospital, unable to identify with choices.)   Following Commands Follows one step commands inconsistently   Comments Increased verbal responses, ability to track.   Activity Tolerance   Activity Tolerance Patient tolerated treatment well;Treatment limited secondary to medical complications (Comment);Patient limited by fatigue   Medical Staff Made Aware PTA AGUILA Loving   Assessment   Assessment Pt participated in skilled OT f/u tx session per POC. Unfortunately, pt continues to require depAx2 for all bed mobility. Pt able to tolerate sitting EOB x12-15 min w/ Fair- to Poor seated balance, w/ posterior and lateral leaning. Provided cuing/tactile input for righting to midline. Initially, pt able to assist however w fatigue requires assistance w task. Pt able to functionally reach w R UE in all visual planes x4. Pt w significant L neglect. Tasked pt w finding L UE w/ R hand w verbal/tactile/auditory cuing - able to complete 2/5 times, other times requires hand over hand. Multimodal cuing for posture and for maintaining midline, for neck/head control. Spouse entered room and pt able to look towards pt to R side. Pt returned to supine, boosted in bed w/ Ax3. Offloaded to L side " w/ LUE propped on pillow. Did spend time at end of session w/ spouse re: CLOF, positioning to assist w managing wounds/decrease further skin breakdown, ongoing need for STR, purpose of OT in the hospital setting. Will continue to see pt per POC.   Plan   Treatment Interventions ADL retraining;Functional transfer training;UE strengthening/ROM;Cognitive reorientation;Patient/family training;Equipment evaluation/education;Neuromuscular reeducation;Compensatory technique education;Energy conservation;Activityengagement   Goal Expiration Date 10/02/24   OT Treatment Day 1   OT Frequency 1-2x/wk;2-3x/wk   Discharge Recommendation   Rehab Resource Intensity Level, OT II (Moderate Resource Intensity)   AM-PAC Daily Activity Inpatient   Lower Body Dressing 1   Bathing 1   Toileting 1   Upper Body Dressing 1   Grooming 1   Eating 2   Daily Activity Raw Score 7   AM-PAC Applied Cognition Inpatient   Following a Speech/Presentation 2   Understanding Ordinary Conversation 2   Taking Medications 1   Remembering Where Things Are Placed or Put Away 1   Remembering List of 4-5 Errands 1   Taking Care of Complicated Tasks 1   Applied Cognition Raw Score 8   Applied Cognition Standardized Score 19.32     Mine Wiseman

## 2024-09-24 NOTE — PROGRESS NOTES
Drew Santos is a 73 y.o. male who is currently ordered Vancomycin IV with management by the Pharmacy Consult service.  Relevant clinical data and objective / subjective history reviewed.  Vancomycin Assessment:  Indication and Goal AUC/Trough: Bacteremia (goal -600, trough >10)  Clinical Status: stable  Micro:     Renal Function:  SCr: 0.56 mg/dL  CrCl: 159.1 mL/min  Renal replacement: Not on dialysis  Days of Therapy: 6  Current Dose: 1250 mg IV q12h  Vancomycin Plan:  New Dosin mg IV q12h  Estimated AUC: 484 mcg*hr/mL  Estimated Trough: 13.3 mcg/mL  Next Level:  with AM labs  Renal Function Monitoring: Daily BMP and UOP  Pharmacy will continue to follow closely for s/sx of nephrotoxicity, infusion reactions and appropriateness of therapy.  BMP and CBC will be ordered per protocol. We will continue to follow the patient’s culture results and clinical progress daily.    Darren Lewis, Pharmacist

## 2024-09-24 NOTE — ASSESSMENT & PLAN NOTE
Chronic sacral decubitus ulcer; present on admission  Status post debridement at bedside per surgical team.  Continue with wound care as tolerated.   continue dressing changes and serial exams  Wound care consultation  Offload pressure   I did breach the topic with wife about palliative care but seems overwhelmed at this thought at this time, continue ongoing goals of care discussion  She is not sure if she wants him back at rehab versus home at time of discharge   Patient noncompliant with home medications, blood pressure improved after re-initiation of p o  Meds  Patient requires nursing and rehab placement  [employer] authorized pt to go to SNF on discharge  Case managment has made referrals, so far patient has not been accepted to any facilities

## 2024-09-24 NOTE — ASSESSMENT & PLAN NOTE
Low blood counts noted 9/19/24.   Has chronic anemia likely secondary to poor nutritional status underlying chronic illness.  Hemoglobin declined to 7.1 given  1 unit PRBC infusion  Hemoglobin stable at 7.6 post transfusion   Encourage oral intake, cannot utilize IV iron due to bacteremia   Lab Results   Component Value Date    HGB 7.6 (L) 09/24/2024    HGB 7.6 (L) 09/23/2024    HGB 7.6 (L) 09/22/2024    HGB 7.5 (L) 09/21/2024    HGB 8.1 (L) 09/20/2024

## 2024-09-24 NOTE — ASSESSMENT & PLAN NOTE
Per patient's family wound initially started during patient's stay in a skilled nursing facility for rehab. Wound imaging present in our system since prior hematuria hospitalization. Wound now with significant depth. Concern for possible fistulous connection to the rectum given evidence of colitis/proctitis and extensive tissue emphysema on CT. This is likely playing a role in bacteremia above. General surgery has debrided and will continue to follow up with wound. He may require diverting colostomy. He is planned for repeat CT imaging today to re-evaluate wound and to assess for fistula.  -antibiotic as above  -follow up repeat CT A/P  -serial skin exams  -frequent turning/repositioning to offload pressure from the wound  -local wound care per general surgery  -continue follow up with general surgery

## 2024-09-24 NOTE — ASSESSMENT & PLAN NOTE
CT C/A/P showed mild distal sigmoid and rectal inflammatory changes, R ischioanal fossa emphysema, and possible fistulous connection to skin surface. This is likely source of patient's bacteremia above. Family reports patient has very infrequent bowel movements in past few weeks. General surgery is following for wound. GI has been consulted and they have concern for ischemic colitis. He is planned for repeat CT imaging today to re-evaluate wound and to assess for fistula.  -antibiotics as above  -follow up repeat CT A/P  -serial abdominal/rectal exams  -monitor stool output  -follow up stool studies  -continue follow up with general surgery  -continue follow up with gastroenterology

## 2024-09-24 NOTE — ASSESSMENT & PLAN NOTE
As evidenced by tachycardia and leukocytosis and fever  Now with polymicrobial bacteremia- enterbacter, enterococcus and proetus   CT abdomen and pelvis showed Mid/distal sigmoid colonic and rectal inflammatory changes in keeping with a segmental colitis/proctitis.Right ischioanal fossa subcutaneous emphysema extending through the right paramidline gluteal subcutaneous tissues to the skin surface suspicious for a perirectal or perianal fistula    has large sacral decubitus ulcer which appears to be his primary issue, status post bedside debridement of sacral ulcer 9/18 by general surgery, continue with dressing changes and wound care management by general surgery  Repeating CT abdomen and pelvis today to evaluate for any fistula connection versus abscess due to ongoing leukocytosis  Seen by GI imaging likely due to ischemic colitis but likely not primary cause of his presentation - no role for colonoscopy or flex sig curently    Continue IV cefepime, flagyl and vanco per ID   Repeat blood cultures negative to date   TTE without evidence of large vegetations although images suboptimal   Spoke with wife okay to pursue JOSEFINA, n.p.o. after midnight  Appreciate ongoing recommendations per consultants

## 2024-09-25 ENCOUNTER — ANESTHESIA (INPATIENT)
Dept: NON INVASIVE DIAGNOSTICS | Facility: HOSPITAL | Age: 74
DRG: 871 | End: 2024-09-25
Payer: COMMERCIAL

## 2024-09-25 ENCOUNTER — APPOINTMENT (OUTPATIENT)
Dept: NON INVASIVE DIAGNOSTICS | Facility: HOSPITAL | Age: 74
DRG: 871 | End: 2024-09-25
Attending: INTERNAL MEDICINE
Payer: OTHER GOVERNMENT

## 2024-09-25 PROBLEM — Z71.89 ADVANCED CARE PLANNING/COUNSELING DISCUSSION: Status: ACTIVE | Noted: 2024-09-25

## 2024-09-25 LAB
ALBUMIN SERPL BCG-MCNC: 1.9 G/DL (ref 3.5–5)
ALP SERPL-CCNC: 51 U/L (ref 34–104)
ALT SERPL W P-5'-P-CCNC: 19 U/L (ref 7–52)
ANION GAP SERPL CALCULATED.3IONS-SCNC: 2 MMOL/L (ref 4–13)
AST SERPL W P-5'-P-CCNC: 28 U/L (ref 13–39)
BACTERIA BLD CULT: NORMAL
BACTERIA BLD CULT: NORMAL
BASOPHILS # BLD AUTO: 0.09 THOUSANDS/ΜL (ref 0–0.1)
BASOPHILS NFR BLD AUTO: 1 % (ref 0–1)
BILIRUB SERPL-MCNC: 0.46 MG/DL (ref 0.2–1)
BUN SERPL-MCNC: 6 MG/DL (ref 5–25)
CALCIUM ALBUM COR SERPL-MCNC: 9.7 MG/DL (ref 8.3–10.1)
CALCIUM SERPL-MCNC: 8 MG/DL (ref 8.4–10.2)
CHLORIDE SERPL-SCNC: 108 MMOL/L (ref 96–108)
CO2 SERPL-SCNC: 29 MMOL/L (ref 21–32)
CREAT SERPL-MCNC: 0.6 MG/DL (ref 0.6–1.3)
EOSINOPHIL # BLD AUTO: 0.28 THOUSAND/ΜL (ref 0–0.61)
EOSINOPHIL NFR BLD AUTO: 3 % (ref 0–6)
ERYTHROCYTE [DISTWIDTH] IN BLOOD BY AUTOMATED COUNT: 14.6 % (ref 11.6–15.1)
GFR SERPL CREATININE-BSD FRML MDRD: 98 ML/MIN/1.73SQ M
GLUCOSE SERPL-MCNC: 103 MG/DL (ref 65–140)
GLUCOSE SERPL-MCNC: 104 MG/DL (ref 65–140)
GLUCOSE SERPL-MCNC: 159 MG/DL (ref 65–140)
GLUCOSE SERPL-MCNC: 92 MG/DL (ref 65–140)
GLUCOSE SERPL-MCNC: 98 MG/DL (ref 65–140)
HCT VFR BLD AUTO: 26.4 % (ref 36.5–49.3)
HGB BLD-MCNC: 8.3 G/DL (ref 12–17)
IMM GRANULOCYTES # BLD AUTO: 0.36 THOUSAND/UL (ref 0–0.2)
IMM GRANULOCYTES NFR BLD AUTO: 3 % (ref 0–2)
LYMPHOCYTES # BLD AUTO: 3.04 THOUSANDS/ΜL (ref 0.6–4.47)
LYMPHOCYTES NFR BLD AUTO: 28 % (ref 14–44)
MCH RBC QN AUTO: 29.6 PG (ref 26.8–34.3)
MCHC RBC AUTO-ENTMCNC: 31.4 G/DL (ref 31.4–37.4)
MCV RBC AUTO: 94 FL (ref 82–98)
MONOCYTES # BLD AUTO: 0.79 THOUSAND/ΜL (ref 0.17–1.22)
MONOCYTES NFR BLD AUTO: 7 % (ref 4–12)
NEUTROPHILS # BLD AUTO: 6.36 THOUSANDS/ΜL (ref 1.85–7.62)
NEUTS SEG NFR BLD AUTO: 58 % (ref 43–75)
NRBC BLD AUTO-RTO: 1 /100 WBCS
PLATELET # BLD AUTO: 543 THOUSANDS/UL (ref 149–390)
PMV BLD AUTO: 9.1 FL (ref 8.9–12.7)
POTASSIUM SERPL-SCNC: 3.7 MMOL/L (ref 3.5–5.3)
PROT SERPL-MCNC: 6.4 G/DL (ref 6.4–8.4)
RBC # BLD AUTO: 2.8 MILLION/UL (ref 3.88–5.62)
SL CV LV EF: 60
SODIUM SERPL-SCNC: 139 MMOL/L (ref 135–147)
WBC # BLD AUTO: 10.92 THOUSAND/UL (ref 4.31–10.16)

## 2024-09-25 PROCEDURE — 93320 DOPPLER ECHO COMPLETE: CPT | Performed by: STUDENT IN AN ORGANIZED HEALTH CARE EDUCATION/TRAINING PROGRAM

## 2024-09-25 PROCEDURE — 82948 REAGENT STRIP/BLOOD GLUCOSE: CPT

## 2024-09-25 PROCEDURE — 93312 ECHO TRANSESOPHAGEAL: CPT | Performed by: STUDENT IN AN ORGANIZED HEALTH CARE EDUCATION/TRAINING PROGRAM

## 2024-09-25 PROCEDURE — 93325 DOPPLER ECHO COLOR FLOW MAPG: CPT | Performed by: STUDENT IN AN ORGANIZED HEALTH CARE EDUCATION/TRAINING PROGRAM

## 2024-09-25 PROCEDURE — 99232 SBSQ HOSP IP/OBS MODERATE 35: CPT | Performed by: PHYSICIAN ASSISTANT

## 2024-09-25 PROCEDURE — 99233 SBSQ HOSP IP/OBS HIGH 50: CPT | Performed by: INTERNAL MEDICINE

## 2024-09-25 PROCEDURE — 85027 COMPLETE CBC AUTOMATED: CPT | Performed by: PHYSICIAN ASSISTANT

## 2024-09-25 PROCEDURE — 99223 1ST HOSP IP/OBS HIGH 75: CPT | Performed by: INTERNAL MEDICINE

## 2024-09-25 PROCEDURE — NC001 PR NO CHARGE: Performed by: SURGERY

## 2024-09-25 PROCEDURE — 93312 ECHO TRANSESOPHAGEAL: CPT

## 2024-09-25 PROCEDURE — 80053 COMPREHEN METABOLIC PANEL: CPT | Performed by: PHYSICIAN ASSISTANT

## 2024-09-25 PROCEDURE — 0JB73ZZ EXCISION OF BACK SUBCUTANEOUS TISSUE AND FASCIA, PERCUTANEOUS APPROACH: ICD-10-PCS | Performed by: SURGERY

## 2024-09-25 PROCEDURE — B24BZZ4 ULTRASONOGRAPHY OF HEART WITH AORTA, TRANSESOPHAGEAL: ICD-10-PCS | Performed by: STUDENT IN AN ORGANIZED HEALTH CARE EDUCATION/TRAINING PROGRAM

## 2024-09-25 RX ORDER — LIDOCAINE HYDROCHLORIDE AND EPINEPHRINE 10; 10 MG/ML; UG/ML
20 INJECTION, SOLUTION INFILTRATION; PERINEURAL ONCE
Status: COMPLETED | OUTPATIENT
Start: 2024-09-25 | End: 2024-09-25

## 2024-09-25 RX ORDER — SODIUM HYPOCHLORITE 1.25 MG/ML
SOLUTION TOPICAL DAILY
Status: DISCONTINUED | OUTPATIENT
Start: 2024-09-26 | End: 2024-10-01

## 2024-09-25 RX ORDER — LIDOCAINE HYDROCHLORIDE 20 MG/ML
INJECTION, SOLUTION EPIDURAL; INFILTRATION; INTRACAUDAL; PERINEURAL AS NEEDED
Status: DISCONTINUED | OUTPATIENT
Start: 2024-09-25 | End: 2024-09-25

## 2024-09-25 RX ORDER — PROPOFOL 10 MG/ML
INJECTION, EMULSION INTRAVENOUS AS NEEDED
Status: DISCONTINUED | OUTPATIENT
Start: 2024-09-25 | End: 2024-09-25

## 2024-09-25 RX ORDER — ONDANSETRON 2 MG/ML
4 INJECTION INTRAMUSCULAR; INTRAVENOUS EVERY 6 HOURS PRN
Status: DISCONTINUED | OUTPATIENT
Start: 2024-09-25 | End: 2024-09-26 | Stop reason: SDUPTHER

## 2024-09-25 RX ORDER — SODIUM CHLORIDE 9 MG/ML
INJECTION, SOLUTION INTRAVENOUS CONTINUOUS PRN
Status: DISCONTINUED | OUTPATIENT
Start: 2024-09-25 | End: 2024-09-25

## 2024-09-25 RX ORDER — SODIUM CHLORIDE, SODIUM LACTATE, POTASSIUM CHLORIDE, CALCIUM CHLORIDE 600; 310; 30; 20 MG/100ML; MG/100ML; MG/100ML; MG/100ML
INJECTION, SOLUTION INTRAVENOUS CONTINUOUS PRN
Status: DISCONTINUED | OUTPATIENT
Start: 2024-09-25 | End: 2024-09-25

## 2024-09-25 RX ORDER — SODIUM CHLORIDE 9 MG/ML
125 INJECTION, SOLUTION INTRAVENOUS CONTINUOUS
Status: CANCELLED | OUTPATIENT
Start: 2024-09-25

## 2024-09-25 RX ADMIN — SENNOSIDES AND DOCUSATE SODIUM 1 TABLET: 8.6; 5 TABLET ORAL at 21:52

## 2024-09-25 RX ADMIN — Medication 12.5 MG: at 21:52

## 2024-09-25 RX ADMIN — METRONIDAZOLE 500 MG: 500 TABLET ORAL at 08:58

## 2024-09-25 RX ADMIN — VANCOMYCIN HYDROCHLORIDE 1500 MG: 5 INJECTION, POWDER, LYOPHILIZED, FOR SOLUTION INTRAVENOUS at 13:26

## 2024-09-25 RX ADMIN — PROPOFOL 100 MG: 10 INJECTION, EMULSION INTRAVENOUS at 11:25

## 2024-09-25 RX ADMIN — METRONIDAZOLE 500 MG: 500 TABLET ORAL at 21:52

## 2024-09-25 RX ADMIN — BACITRACIN ZINC 1 SMALL APPLICATION: 500 OINTMENT TOPICAL at 08:57

## 2024-09-25 RX ADMIN — ATORVASTATIN CALCIUM 80 MG: 80 TABLET, FILM COATED ORAL at 17:37

## 2024-09-25 RX ADMIN — LIDOCAINE HYDROCHLORIDE,EPINEPHRINE BITARTRATE 20 ML: 10; .01 INJECTION, SOLUTION INFILTRATION; PERINEURAL at 16:41

## 2024-09-25 RX ADMIN — PROPOFOL 20 MG: 10 INJECTION, EMULSION INTRAVENOUS at 11:31

## 2024-09-25 RX ADMIN — CEFEPIME 2000 MG: 2 INJECTION, POWDER, FOR SOLUTION INTRAVENOUS at 00:09

## 2024-09-25 RX ADMIN — APIXABAN 5 MG: 5 TABLET, FILM COATED ORAL at 17:37

## 2024-09-25 RX ADMIN — BACITRACIN ZINC 1 SMALL APPLICATION: 500 OINTMENT TOPICAL at 17:37

## 2024-09-25 RX ADMIN — PROPOFOL 20 MG: 10 INJECTION, EMULSION INTRAVENOUS at 11:27

## 2024-09-25 RX ADMIN — LIDOCAINE HYDROCHLORIDE 100 MG: 20 INJECTION, SOLUTION EPIDURAL; INFILTRATION; INTRACAUDAL at 11:25

## 2024-09-25 RX ADMIN — POTASSIUM CHLORIDE 20 MEQ: 1.5 SOLUTION ORAL at 17:37

## 2024-09-25 RX ADMIN — INSULIN LISPRO 1 UNITS: 100 INJECTION, SOLUTION INTRAVENOUS; SUBCUTANEOUS at 21:52

## 2024-09-25 RX ADMIN — Medication 1 TABLET: at 17:37

## 2024-09-25 RX ADMIN — CEFEPIME 2000 MG: 2 INJECTION, POWDER, FOR SOLUTION INTRAVENOUS at 12:14

## 2024-09-25 RX ADMIN — SODIUM CHLORIDE, SODIUM LACTATE, POTASSIUM CHLORIDE, AND CALCIUM CHLORIDE: .6; .31; .03; .02 INJECTION, SOLUTION INTRAVENOUS at 11:17

## 2024-09-25 RX ADMIN — VANCOMYCIN HYDROCHLORIDE 1500 MG: 5 INJECTION, POWDER, LYOPHILIZED, FOR SOLUTION INTRAVENOUS at 23:16

## 2024-09-25 NOTE — WOUND OSTOMY CARE
Progress Note - Wound   Drew Santos 73 y.o. male MRN: [id number]  Unit/Bed#: [place] [address] -01 Encounter: [id number]        Assessment:   Patient is seen for wound care follow-up. Patient seen lying on p-500 low air loss mattress with ATR turning and repositioning system in place. Mod/max assist for turning and repositioning. Palliative care is following patient. Incontinent of bowel and bladder. Offloading wedges in place.    Findings:  B/L heels are dry intact and luis with no skin loss or wounds present. Recommend preventative Hydraguard Cream and proper offloading/ repositioning.      Urethra-  urethral erosion appears improved. At least partial thickness skin loss. Scant sanguinous drainage noted. Recommend continuing with Bacitracin.    POA Sacrum Wound: appears like an unstageable due to amount of devitalized tissue obscuring wound bed. When devitalized tissue is removed from wound bed, will likely present as a stage 4. Irregular in shape area of significant full thickness skin loss. Wound bed with mix of stringy yellow slough tissue with scattered black/brown moist slough tissue. Cherelle-wounds are fragile, pink, with scar tissue. Large foul smelling serosanguineous/yellow drainage noted. Wound measurements are increased on assessment.  Undermining present circumferentially with deepest point being at 1-2 o'clock. Recommend dakins moistened packing and foam dressing. Surgery team made aware of recommendations and need for further debridement.       No induration, fluctuance, odor, warmth/temperature differences, redness, or purulence noted to the above noted wounds and skin areas assessed. New dressings applied per orders listed below. Patient tolerated well- no s/s of non-verbal pain or discomfort observed during the encounter. Bedside nurse aware of plan of care. See flow sheets for more detailed assessment findings.      Orders listed below and wound care will continue to follow, call or Secure Chat  Message with questions.       Wound Care Plan:   1-Silicone Cream/Hydraguard lotion to bilateral heels twice daily and as needed.  2-Elevate heels off of bed/chair surface--offloading heel boots.  3-P500 low air-loss mattress.   4-Apply moisturizing skin cream to body daily and as needed.  5-Turn/reposition every 2 hours while in bed for pressure re-distribution on skin.   6-Urethra--cleanse, pat dry. Apply Bacitrain twice daily.    7-Sacrum: Cleanse wound with NSS and pat dry. Apply skin prep to intact cherelle-wound skin and allow to dry. Lightly pack wound and undermining with 0.25% Dakins Moistened Lazarus. Cover any exposed yellow slough that is unable to be packed with silver alginate. Cover wound with large sacral silicone bordered foam dressing. Eduardo with T for Treatment and change every day or PRN.         WOUNDS:  Wound 08/23/24 Pressure Injury Coccyx (Active)   Wound Image   09/25/24 1427   Wound Description Yellow;Slough;Brown;Black 09/25/24 1427   Pressure Injury Stage U 09/25/24 1427   Cherelle-wound Assessment Pink;Scar Tissue;Fragile 09/25/24 1427   Wound Length (cm) 10 cm 09/25/24 1427   Wound Width (cm) 6.5 cm 09/25/24 1427   Wound Depth (cm) 7 cm 09/25/24 1427   Wound Surface Area (cm^2) 65 cm^2 09/25/24 1427   Wound Volume (cm^3) 455 cm^3 09/25/24 1427   Calculated Wound Volume (cm^3) 455 cm^3 09/25/24 1427   Change in Wound Size % -2608.33 09/25/24 1427   Number of underminings 1 09/25/24 1427   Undermining 1 4 09/25/24 1427   Undermining 1 is depth extending from circumfrential, deepest a 1-2 o'clock 09/25/24 1427   Wound Site Closure AUBRIE 09/25/24 1427   Drainage Amount Large 09/25/24 1427   Drainage Description Yellow;Foul smelling 09/25/24 1427   Non-staged Wound Description Full thickness 09/25/24 1427   Treatments Cleansed;Irrigation with NSS;Site care 09/25/24 1427   Dressing Packings;Foam, Silicon (eg. Allevyn, etc);Calcium Alginate with Silver 09/25/24 1427   Wound packed? Yes 09/25/24 1425    Packing- # removed 1 09/25/24 1427   Packing- # inserted 1 09/25/24 1427   Dressing Changed New 09/25/24 1427   Patient Tolerance Tolerated well 09/25/24 1427   Dressing Status Clean;Dry;Intact 09/25/24 1427       Wound 09/02/24 Penis Mid (Active)   Wound Image   09/25/24 1424   Wound Description Beefy red 09/25/24 1424   Cherelle-wound Assessment Fragile 09/25/24 1424   Wound Length (cm) 0.3 cm 09/25/24 1424   Wound Width (cm) 0.7 cm 09/25/24 1424   Wound Depth (cm) 0.1 cm 09/25/24 1424   Wound Surface Area (cm^2) 0.21 cm^2 09/25/24 1424   Wound Volume (cm^3) 0.021 cm^3 09/25/24 1424   Calculated Wound Volume (cm^3) 0.02 cm^3 09/25/24 1424   Change in Wound Size % 0 09/25/24 1424   Wound Site Closure AUBRIE 09/25/24 1424   Drainage Amount Scant 09/25/24 1424   Drainage Description Sanguineous 09/25/24 1424   Non-staged Wound Description Partial thickness 09/25/24 1424   Treatments Cleansed;Irrigation with NSS;Site care 09/25/24 1424   Dressing Other (Comment) 09/25/24 1424   Wound packed? No 09/25/24 1424   Dressing Changed Changed 09/25/24 1424   Patient Tolerance Tolerated well 09/25/24 1424   Dressing Status Intact 09/25/24 1424                Jaki Turcios RN, BSN, CWOCN

## 2024-09-25 NOTE — ASSESSMENT & PLAN NOTE
Low blood counts noted 9/19/24.   Has chronic anemia likely secondary to poor nutritional status underlying chronic illness.    Hemoglobin declined to 7.1 given -1 unit PRBCs given 9/19/2024  Encourage oral intake, cannot utilize IV iron due to bacteremia   Lab Results   Component Value Date    HGB 8.3 (L) 09/25/2024    HGB 7.6 (L) 09/24/2024    HGB 7.6 (L) 09/23/2024    HGB 7.6 (L) 09/22/2024    HGB 7.5 (L) 09/21/2024   Ongoing monitoring of hemoglobin and continue to transfuse if less than 7

## 2024-09-25 NOTE — PROGRESS NOTES
Progress Note - Hospitalist   Name: Drew Santos 73 y.o. male I MRN: [id number]  Unit/Bed#: [place] [address] -01 I Date of Admission: 9/17/2024   Date of Service: 9/25/2024 I Hospital Day: 8    Assessment & Plan  Sepsis  As evidenced by tachycardia and leukocytosis and fever  Now with polymicrobial bacteremia- enterbacter, enterococcus and proetus   CT abdomen and pelvis showed Mid/distal sigmoid colonic and rectal inflammatory changes in keeping with a segmental colitis/proctitis. Right ischioanal fossa subcutaneous emphysema extending through the right paramidline gluteal subcutaneous tissues to the skin surface suspicious for a perirectal or perianal fistula   Has large sacral decubitus ulcer which appears to be his primary issue, status post bedside debridement of sacral ulcer 9/18/24 by general surgery, continue with dressing changes and wound care management by general surgery  Repeating CT abdomen and pelvis 9/24 to evaluate for any fistula connection versus abscess due to ongoing leukocytosis  Seen by GI imaging likely due to ischemic colitis but likely not primary cause of his presentation - no role for colonoscopy or flex sig curently    Continue IV antibiotics being managed by infectious disease  Was on cefepime, flagyl and vanco -- being adjusted  Repeat blood cultures negative to date - greater than 4 days  TTE without evidence of large vegetations although images suboptimal   Going for transesophageal echocardiogram today  ID transitioning the patient to ampicillin 2 g every 6 hours to begin tomorrow to complete a total of 2 weeks of antibiotic therapy for enterococcal bacteremia through 10/3/24  Polymicrobial bacteremia  Polymicrobial bacteremia likely secondary sacral wound with possible fistulous connection  Infectious disease input noted and appreciated.  Continue IV cefepime, flagyl and vancomycin   ID transitioning the patient to ampicillin 2 g every 6 hours to begin tomorrow to complete a total of 2  weeks of antibiotic therapy for enterococcal bacteremia through 10/3   Repeat cultures negative to date   Plan for JOSEFINA today  Sacral wound  Chronic sacral decubitus ulcer; present on admission  Status post debridement at bedside per surgical team.   Continue with wound care as tolerated.     Continue dressing changes and serial exams  Wound care consultation  Offload pressure   Concern for emphysematous changes tracking to sacral wound/developing fistula from site on repeat abdominal imaging on 924  I did breach the topic with wife about palliative care but seems overwhelmed at this thought at this time 9/24  She is not sure if she wants him back at rehab versus home at time of discharge  Official palliative care consult placed for goals of care  Type 2 diabetes mellitus without complication, without long-term current use of insulin ([facility])  Lab Results   Component Value Date    HGBA1C 6.2 (H) 09/17/2024     Recent Labs     09/24/24  1614 09/24/24  2120 09/25/24  0726 09/25/24  1100   POCGLU 183* 129 103 92   Well-controlled with last hemoglobin A1c of 6.2%  Holding home oral regimen  Monitor on sliding scale    Hypoglycemia protocol  COVID-19  Tested positive for COVID on 9/17/2024  Patient currently on room air though high risk given his age and comorbidities  Completed three days of IV remdesivir   Supportive care otherwise  Contact and airborne precautions  Currently on mild COVID pathway.    Not requiring supplemental oxygen- remains on room air  To complete 10 days of isolation through 9/27   Isolation to be removed 9/28  Proctitis  Noted on CT scan.  No diarrhea or clinical evidence of colitis.   Stool studies negative  Likely ischemic colitis given area affected, continue supportive measures   No plan for any inpatient scope   Repeat CAT scan without signs of ongoing abscess.  Acute metabolic encephalopathy  Presumably secondary to sepsis, bacteremia, COVID infection  CT head with no acute intracranial  abnormality  Continue with supportive care  Reorientation, delirium precautions   History of CVA (cerebrovascular accident)  History of CVA   Chronically bedbound and with chronic left-sided deficits   Continue home aspirin, Eliquis, statin  Dysphagia 2 mechanical soft, thin liquids   Paroxysmal atrial fibrillation (HCC)  Continue Toprol XL 25 mg daily  Continue home Eliquis for stroke prevention  Anemia  Low blood counts noted 9/19/24.   Has chronic anemia likely secondary to poor nutritional status underlying chronic illness.    Hemoglobin declined to 7.1 given -1 unit PRBCs given 9/19/2024  Encourage oral intake, cannot utilize IV iron due to bacteremia   Lab Results   Component Value Date    HGB 8.3 (L) 09/25/2024    HGB 7.6 (L) 09/24/2024    HGB 7.6 (L) 09/23/2024    HGB 7.6 (L) 09/22/2024    HGB 7.5 (L) 09/21/2024   Ongoing monitoring of hemoglobin and continue to transfuse if less than 7  Severe protein-calorie malnutrition (HCC)  Malnutrition Findings:   Adult Malnutrition type: Chronic illness  Adult Degree of Malnutrition: Other severe protein calorie malnutrition  Malnutrition Characteristics: Inadequate energy, Weight loss  360 Statement: Severe protein calorie malnutrition in context of chronic illness r/t poor appetite, inadequate PO intake as evidance by energy intake less than 75% compared to estimated needs>1 month, 7.5% wt loss x 1 month ( 122kg 8/23/24-> 113kg 9/17) treated with PO diet and oral supplements  BMI Findings:  Body mass index is 29.53 kg/m².     VTE Pharmacologic Prophylaxis:   eliquis    Mobility:   Basic Mobility Inpatient Raw Score: 6  -[facility] Goal: 2: Bed activities/Dependent transfer  -[facility] Achieved: 2: Bed activities/Dependent transfer    Patient Centered Rounds: I performed bedside rounds with nursing staff today.   Discussions with Specialists or Other Care Team Provider: nursing    Education and Discussions with Family / Patient: patient    Current Length of Stay: 8  day(s)  Current Patient Status: Inpatient   Discharge Plan: Need for continued stay ongoing treatment of polymicrobial bacteremia with JOSEFINA planned for today    Code Status: Level 1 - Full Code    Subjective   Resting in bed.  Denies any pain.  Parra catheter in place draining.  Going for JOSEFINA today.    Objective     Vitals:   Temp (24hrs), Av.6 °F (37 °C), Min:98.1 °F (36.7 °C), Max:99.6 °F (37.6 °C)    Temp:  [98.1 °F (36.7 °C)-99.6 °F (37.6 °C)] 98.1 °F (36.7 °C)  HR:  [67-87] 70  Resp:  [14-18] 18  BP: (113-157)/(58-91) 155/89  SpO2:  [95 %-100 %] 95 %  Body mass index is 29.53 kg/m².     Input and Output Summary (last 24 hours):     Intake/Output Summary (Last 24 hours) at 2024 1405  Last data filed at 2024 1140  Gross per 24 hour   Intake 440 ml   Output 4335 ml   Net -3895 ml       Physical Exam  Vitals and nursing note reviewed.   Constitutional:       General: He is not in acute distress.     Appearance: He is obese. He is not ill-appearing, toxic-appearing or diaphoretic.   HENT:      Head: Normocephalic and atraumatic.   Eyes:      General: No scleral icterus.  Cardiovascular:      Rate and Rhythm: Normal rate and regular rhythm.   Pulmonary:      Effort: Pulmonary effort is normal. No respiratory distress.      Breath sounds: Normal breath sounds. No stridor. No wheezing or rhonchi.   Abdominal:      General: Bowel sounds are normal. There is no distension.      Palpations: There is no mass.      Tenderness: There is no abdominal tenderness.      Hernia: No hernia is present.   Musculoskeletal:         General: No swelling.      Cervical back: Neck supple.   Skin:     General: Skin is warm and dry.   Neurological:      Mental Status: He is alert and oriented to person, place, and time. Mental status is at baseline.      Comments: Flaccid left side   Psychiatric:         Mood and Affect: Mood normal.         Behavior: Behavior normal.         Lines/Drains:  Lines/Drains/Airways       Active  Status       Name Placement date Placement time Site Days    Urethral Catheter Three way 22 Fr. 08/31/24 1925  Three way  24    Continuous Bladder Irrigation Three-way 08/31/24 1925  Three-way  24                  Urinary Catheter:  Goal for removal: N/A - Chronic Parra                 Lab Results: I have reviewed the following results:    Results from last 7 days   Lab Units 09/25/24  0439   WBC Thousand/uL 10.92*   HEMOGLOBIN g/dL 8.3*   HEMATOCRIT % 26.4*   PLATELETS Thousands/uL 543*   SEGS PCT % 58   LYMPHO PCT % 28   MONO PCT % 7   EOS PCT % 3     Results from last 7 days   Lab Units 09/25/24  0439   SODIUM mmol/L 139   POTASSIUM mmol/L 3.7   CHLORIDE mmol/L 108   CO2 mmol/L 29   BUN mg/dL 6   CREATININE mg/dL 0.60   ANION GAP mmol/L 2*   CALCIUM mg/dL 8.0*   ALBUMIN g/dL 1.9*   TOTAL BILIRUBIN mg/dL 0.46   ALK PHOS U/L 51   ALT U/L 19   AST U/L 28   GLUCOSE RANDOM mg/dL 98         Results from last 7 days   Lab Units 09/25/24  1100 09/25/24  0726 09/24/24  2120 09/24/24  1614 09/24/24  1137 09/24/24  0806 09/23/24  2056 09/23/24  1635 09/23/24  1112 09/23/24  0720 09/22/24  2040 09/22/24  1649   POC GLUCOSE mg/dl 92 103 129 183* 113 118 178* 113 149* 124 241* 128               Recent Cultures (last 7 days):   Results from last 7 days   Lab Units 09/20/24  0555   BLOOD CULTURE  No Growth After 5 Days.  No Growth After 5 Days.         Last 24 Hours Medication List:     Current Facility-Administered Medications:     acetaminophen (Ofirmev) injection 1,000 mg, Q6H PRN    acetaminophen (TYLENOL) tablet 650 mg, Q6H PRN    [START ON 9/26/2024] ampicillin (OMNIPEN) 2,000 mg in sodium chloride 0.9 % 100 mL IVPB, Q6H    apixaban (ELIQUIS) tablet 5 mg, BID    aspirin chewable tablet 81 mg, Daily    atorvastatin (LIPITOR) tablet 80 mg, QPM    bacitracin topical ointment 1 small application, BID    calcium carbonate-vitamin D 500 mg-5 mcg tablet 1 tablet, BID With Meals    Dakins (full strength) (DAKIN'S) 0.5 percent  topical solution 1 Application, Daily    escitalopram (LEXAPRO) tablet 10 mg, Daily    ezetimibe (ZETIA) tablet 10 mg, Daily    finasteride (PROSCAR) tablet 5 mg, Daily    insulin lispro (HumALOG/ADMELOG) 100 units/mL subcutaneous injection 1-6 Units, TID AC **AND** Fingerstick Glucose (POCT), TID AC    insulin lispro (HumALOG/ADMELOG) 100 units/mL subcutaneous injection 1-6 Units, HS    lisinopril (ZESTRIL) tablet 2.5 mg, Daily    metoprolol tartrate (LOPRESSOR) partial tablet 12.5 mg, Q12H ELISEO    metroNIDAZOLE (FLAGYL) tablet 500 mg, Q12H ELISEO    ondansetron (ZOFRAN) injection 4 mg, Q4H PRN    ondansetron (ZOFRAN) injection 4 mg, Q6H PRN    potassium chloride oral solution 20 mEq, BID    senna-docusate sodium (SENOKOT S) 8.6-50 mg per tablet 1 tablet, HS    vancomycin (VANCOCIN) 1500 mg in sodium chloride 0.9% 250 mL IVPB, Q12H, Last Rate: 1,500 mg (09/24/24 2227)    Administrative Statements   Today, Patient Was Seen By: Whit Kimble PA-C      **Please Note: This note may have been constructed using a voice recognition system.**

## 2024-09-25 NOTE — ASSESSMENT & PLAN NOTE
ID on board, on IV antibiotics  Tomorrow, 9/26, ID is transition to PO meds for total 2 weeks therapy

## 2024-09-25 NOTE — PLAN OF CARE
Problem: Potential for Falls  Goal: Patient will remain free of falls  Description: INTERVENTIONS:  - Educate patient/family on patient safety including physical limitations  - Instruct patient to call for assistance with activity   - Consult OT/PT to assist with strengthening/mobility   - Keep Call bell within reach  - Keep bed low and locked with side rails adjusted as appropriate  - Keep care items and personal belongings within reach  - Initiate and maintain comfort rounds  - Make Fall Risk Sign visible to staff  - Offer Toileting every 2 Hours, in advance of need  - Initiate/Maintain bed alarm  - Obtain necessary fall risk management equipment: bed alarm call bell  - Apply yellow socks and bracelet for high fall risk patients  - Consider moving patient to room near nurses station  Outcome: Progressing     Problem: PAIN - ADULT  Goal: Verbalizes/displays adequate comfort level or baseline comfort level  Description: Interventions:  - Encourage patient to monitor pain and request assistance  - Assess pain using appropriate pain scale  - Administer analgesics based on type and severity of pain and evaluate response  - Implement non-pharmacological measures as appropriate and evaluate response  - Consider cultural and social influences on pain and pain management  - Notify physician/advanced practitioner if interventions unsuccessful or patient reports new pain  Outcome: Progressing     Problem: INFECTION - ADULT  Goal: Absence or prevention of progression during hospitalization  Description: INTERVENTIONS:  - Assess and monitor for signs and symptoms of infection  - Monitor lab/diagnostic results  - Monitor all insertion sites, i.e. indwelling lines, tubes, and drains  - Monitor endotracheal if appropriate and nasal secretions for changes in amount and color  - [place] appropriate cooling/warming therapies per order  - Administer medications as ordered  - Instruct and encourage patient and family to use good  hand hygiene technique  - Identify and instruct in appropriate isolation precautions for identified infection/condition  Outcome: Progressing  Goal: Absence of fever/infection during neutropenic period  Description: INTERVENTIONS:  - Monitor WBC    Outcome: Progressing     Problem: SAFETY ADULT  Goal: Patient will remain free of falls  Description: INTERVENTIONS:  - Educate patient/family on patient safety including physical limitations  - Instruct patient to call for assistance with activity   - Consult OT/PT to assist with strengthening/mobility   - Keep Call bell within reach  - Keep bed low and locked with side rails adjusted as appropriate  - Keep care items and personal belongings within reach  - Initiate and maintain comfort rounds  - Make Fall Risk Sign visible to staff  - Offer Toileting every 2 Hours, in advance of need  - Initiate/Maintain bed alarm  - Obtain necessary fall risk management equipment: bed alarm call bell  - Apply yellow socks and bracelet for high fall risk patients  - Consider moving patient to room near nurses station  Outcome: Progressing  Goal: Maintain or return to baseline ADL function  Description: INTERVENTIONS:  -  Assess patient's ability to carry out ADLs; assess patient's baseline for ADL function and identify physical deficits which impact ability to perform ADLs (bathing, care of mouth/teeth, toileting, grooming, dressing, etc.)  - Assess/evaluate cause of self-care deficits   - Assess range of motion  - Assess patient's mobility; develop plan if impaired  - Assess patient's need for assistive devices and provide as appropriate  - Encourage maximum independence but intervene and supervise when necessary  - Involve family in performance of ADLs  - Assess for home care needs following discharge   - Consider OT consult to assist with ADL evaluation and planning for discharge  - Provide patient education as appropriate  Outcome: Progressing  Goal: Maintains/Returns to pre  admission functional level  Description: INTERVENTIONS:  - Perform AM-PAC 6 Click Basic Mobility/ Daily Activity assessment daily.  - Set and communicate daily mobility goal to care team and patient/family/caregiver.   - Collaborate with rehabilitation services on mobility goals if consulted  - Perform Range of Motion 4 times a day.  - Reposition patient every 2 hours.  - Dangle patient 3 times a day  - Stand patient 3 times a day  - Ambulate patient 3 times a day  - Out of bed to chair 3 times a day   - Out of bed for meals 3 times a day  - Out of bed for toileting  - Record patient progress and toleration of activity level   Outcome: Progressing     Problem: DISCHARGE PLANNING  Goal: Discharge to home or other facility with appropriate resources  Description: INTERVENTIONS:  - Identify barriers to discharge w/patient and caregiver  - Arrange for needed discharge resources and transportation as appropriate  - Identify discharge learning needs (meds, wound care, etc.)  - Arrange for interpretive services to assist at discharge as needed  - Refer to Case Management Department for coordinating discharge planning if the patient needs post-hospital services based on physician/advanced practitioner order or complex needs related to functional status, cognitive ability, or social support system  Outcome: Progressing     Problem: Knowledge Deficit  Goal: Patient/family/caregiver demonstrates understanding of disease process, treatment plan, medications, and discharge instructions  Description: Complete learning assessment and assess knowledge base.  Interventions:  - Provide teaching at level of understanding  - Provide teaching via preferred learning methods  Outcome: Progressing     Problem: Prexisting or High Potential for Compromised Skin Integrity  Goal: Skin integrity is maintained or improved  Description: INTERVENTIONS:  - Identify patients at risk for skin breakdown  - Assess and monitor skin integrity  - Assess  and monitor nutrition and hydration status  - Monitor labs   - Assess for incontinence   - Turn and reposition patient  - Assist with mobility/ambulation  - Relieve pressure over bony prominences  - Avoid friction and shearing  - Provide appropriate hygiene as needed including keeping skin clean and dry  - Evaluate need for skin moisturizer/barrier cream  - Collaborate with interdisciplinary team   - Patient/family teaching  - Consider wound care consult   Outcome: Progressing     Problem: GASTROINTESTINAL - ADULT  Goal: Maintains or returns to baseline bowel function  Description: INTERVENTIONS:  - Assess bowel function  - Encourage oral fluids to ensure adequate hydration  - Administer IV fluids if ordered to ensure adequate hydration  - Administer ordered medications as needed  - Encourage mobilization and activity  - Consider nutritional services referral to assist patient with adequate nutrition and appropriate food choices  Outcome: Progressing     Problem: GENITOURINARY - ADULT  Goal: Urinary catheter remains patent  Description: INTERVENTIONS:  - Assess patency of urinary catheter  - If patient has a chronic hanks, consider changing catheter if non-functioning  - Follow guidelines for intermittent irrigation of non-functioning urinary catheter  Outcome: Progressing     Problem: METABOLIC, FLUID AND ELECTROLYTES - ADULT  Goal: Electrolytes maintained within normal limits  Description: INTERVENTIONS:  - Monitor labs and assess patient for signs and symptoms of electrolyte imbalances  - Administer electrolyte replacement as ordered  - Monitor response to electrolyte replacements, including repeat lab results as appropriate  - Instruct patient on fluid and nutrition as appropriate  Outcome: Progressing     Problem: SKIN/TISSUE INTEGRITY - ADULT  Goal: Skin Integrity remains intact(Skin Breakdown Prevention)  Description: Assess:  -Perform Goldy assessment every shift  -Clean and moisturize skin every  shift  -Inspect skin when repositioning, toileting, and assisting with ADLS  -Assess under medical devices such as Masimo every shift  -Assess extremities for adequate circulation and sensation     Bed Management:  -Have minimal linens on bed & keep smooth, unwrinkled  -Change linens as needed when moist or perspiring  -Avoid sitting or lying in one position for more than 1 hours while in bed  -Keep HOB at 30 degrees     Toileting:  -Offer bedside commode  -Assess for incontinence every shift  -Use incontinent care products after each incontinent episode such as foam cleanser     Activity:  -Mobilize patient 4 times a day  -Encourage activity and walks on unit  -Encourage or provide ROM exercises   -Turn and reposition patient every 2 Hours  -Use appropriate equipment to lift or move patient in bed  -Instruct/ Assist with weight shifting every 1 hour when out of bed in chair  -Consider limitation of chair time 1 hour intervals    Skin Care:  -Avoid use of baby powder, tape, friction and shearing, hot water or constrictive clothing  -Relieve pressure over bony prominences using wedges  -Do not massage red bony areas    Next Steps:  -Teach patient strategies to minimize risks such as skin breakdown   -Consider consults to  interdisciplinary teams such as wound care nurse   Outcome: Progressing  Goal: Incision(s), wounds(s) or drain site(s) healing without S/S of infection  Description: INTERVENTIONS  - Assess and document dressing, incision, wound bed, drain sites and surrounding tissue  - Provide patient and family education  - Perform skin care/dressing changes every shift   Outcome: Progressing  Goal: Pressure injury heals and does not worsen  Description: Interventions:  - Implement low air loss mattress or specialty surface (Criteria met)  - Apply silicone foam dressing  - Instruct/assist with weight shifting every 30 minutes when in chair   - Limit chair time to 1 hour intervals  - Use special pressure reducing  interventions such as cushion when in chair   - Apply fecal or urinary incontinence containment device   - Perform passive or active ROM every shift  - Turn and reposition patient & offload bony prominences every 1 hours   - Utilize friction reducing device or surface for transfers   - Consider consults to  interdisciplinary teams such as wound care nurse   - Use incontinent care products after each incontinent episode such as foam cleanser   - Consider nutrition services referral as needed  Outcome: Progressing     Problem: HEMATOLOGIC - ADULT  Goal: Maintains hematologic stability  Description: INTERVENTIONS  - Assess for signs and symptoms of bleeding or hemorrhage  - Monitor labs  - Administer supportive blood products/factors as ordered and appropriate  Outcome: Progressing     Problem: Nutrition/Hydration-ADULT  Goal: Nutrient/Hydration intake appropriate for improving, restoring or maintaining nutritional needs  Description: Monitor and assess patient's nutrition/hydration status for malnutrition. Collaborate with interdisciplinary team and initiate plan and interventions as ordered.  Monitor patient's weight and dietary intake as ordered or per policy. Utilize nutrition screening tool and intervene as necessary. Determine patient's food preferences and provide high-protein, high-caloric foods as appropriate.     INTERVENTIONS:  - Monitor oral intake, urinary output, labs, and treatment plans  - Assess nutrition and hydration status and recommend course of action  - Evaluate amount of meals eaten  - Assist patient with eating if necessary   - Allow adequate time for meals  - Recommend/ encourage appropriate diets, oral nutritional supplements, and vitamin/mineral supplements  - Order, calculate, and assess calorie counts as needed  - Recommend, monitor, and adjust tube feedings and TPN/PPN based on assessed needs  - Assess need for intravenous fluids  - Provide specific nutrition/hydration education as  appropriate  - Include patient/family/caregiver in decisions related to nutrition  Outcome: Progressing

## 2024-09-25 NOTE — PLAN OF CARE
Problem: Potential for Falls  Goal: Patient will remain free of falls  Description: INTERVENTIONS:  - Educate patient/family on patient safety including physical limitations  - Instruct patient to call for assistance with activity   - Consult OT/PT to assist with strengthening/mobility   - Keep Call bell within reach  - Keep bed low and locked with side rails adjusted as appropriate  - Keep care items and personal belongings within reach  - Initiate and maintain comfort rounds  - Make Fall Risk Sign visible to staff  - Offer Toileting every 2 Hours, in advance of need  - Initiate/Maintain bed alarm  - Obtain necessary fall risk management equipment: bed alarm call bell  - Apply yellow socks and bracelet for high fall risk patients  - Consider moving patient to room near nurses station  Outcome: Progressing     Problem: PAIN - ADULT  Goal: Verbalizes/displays adequate comfort level or baseline comfort level  Description: Interventions:  - Encourage patient to monitor pain and request assistance  - Assess pain using appropriate pain scale  - Administer analgesics based on type and severity of pain and evaluate response  - Implement non-pharmacological measures as appropriate and evaluate response  - Consider cultural and social influences on pain and pain management  - Notify physician/advanced practitioner if interventions unsuccessful or patient reports new pain  Outcome: Progressing     Problem: INFECTION - ADULT  Goal: Absence or prevention of progression during hospitalization  Description: INTERVENTIONS:  - Assess and monitor for signs and symptoms of infection  - Monitor lab/diagnostic results  - Monitor all insertion sites, i.e. indwelling lines, tubes, and drains  - Monitor endotracheal if appropriate and nasal secretions for changes in amount and color  - [place] appropriate cooling/warming therapies per order  - Administer medications as ordered  - Instruct and encourage patient and family to use good  hand hygiene technique  - Identify and instruct in appropriate isolation precautions for identified infection/condition  Outcome: Progressing  Goal: Absence of fever/infection during neutropenic period  Description: INTERVENTIONS:  - Monitor WBC    Outcome: Progressing     Problem: SAFETY ADULT  Goal: Patient will remain free of falls  Description: INTERVENTIONS:  - Educate patient/family on patient safety including physical limitations  - Instruct patient to call for assistance with activity   - Consult OT/PT to assist with strengthening/mobility   - Keep Call bell within reach  - Keep bed low and locked with side rails adjusted as appropriate  - Keep care items and personal belongings within reach  - Initiate and maintain comfort rounds  - Make Fall Risk Sign visible to staff  - Offer Toileting every 2 Hours, in advance of need  - Initiate/Maintain bed alarm  - Obtain necessary fall risk management equipment: bed alarm call bell  - Apply yellow socks and bracelet for high fall risk patients  - Consider moving patient to room near nurses station  Outcome: Progressing  Goal: Maintain or return to baseline ADL function  Description: INTERVENTIONS:  -  Assess patient's ability to carry out ADLs; assess patient's baseline for ADL function and identify physical deficits which impact ability to perform ADLs (bathing, care of mouth/teeth, toileting, grooming, dressing, etc.)  - Assess/evaluate cause of self-care deficits   - Assess range of motion  - Assess patient's mobility; develop plan if impaired  - Assess patient's need for assistive devices and provide as appropriate  - Encourage maximum independence but intervene and supervise when necessary  - Involve family in performance of ADLs  - Assess for home care needs following discharge   - Consider OT consult to assist with ADL evaluation and planning for discharge  - Provide patient education as appropriate  Outcome: Progressing  Goal: Maintains/Returns to pre  admission functional level  Description: INTERVENTIONS:  - Perform AM-PAC 6 Click Basic Mobility/ Daily Activity assessment daily.  - Set and communicate daily mobility goal to care team and patient/family/caregiver.   - Collaborate with rehabilitation services on mobility goals if consulted  - Perform Range of Motion 4 times a day.  - Reposition patient every 2 hours.  - Dangle patient 3 times a day  - Stand patient 3 times a day  - Ambulate patient 3 times a day  - Out of bed to chair 3 times a day   - Out of bed for meals 3 times a day  - Out of bed for toileting  - Record patient progress and toleration of activity level   Outcome: Progressing     Problem: DISCHARGE PLANNING  Goal: Discharge to home or other facility with appropriate resources  Description: INTERVENTIONS:  - Identify barriers to discharge w/patient and caregiver  - Arrange for needed discharge resources and transportation as appropriate  - Identify discharge learning needs (meds, wound care, etc.)  - Arrange for interpretive services to assist at discharge as needed  - Refer to Case Management Department for coordinating discharge planning if the patient needs post-hospital services based on physician/advanced practitioner order or complex needs related to functional status, cognitive ability, or social support system  Outcome: Progressing     Problem: Knowledge Deficit  Goal: Patient/family/caregiver demonstrates understanding of disease process, treatment plan, medications, and discharge instructions  Description: Complete learning assessment and assess knowledge base.  Interventions:  - Provide teaching at level of understanding  - Provide teaching via preferred learning methods  Outcome: Progressing     Problem: Prexisting or High Potential for Compromised Skin Integrity  Goal: Skin integrity is maintained or improved  Description: INTERVENTIONS:  - Identify patients at risk for skin breakdown  - Assess and monitor skin integrity  - Assess  and monitor nutrition and hydration status  - Monitor labs   - Assess for incontinence   - Turn and reposition patient  - Assist with mobility/ambulation  - Relieve pressure over bony prominences  - Avoid friction and shearing  - Provide appropriate hygiene as needed including keeping skin clean and dry  - Evaluate need for skin moisturizer/barrier cream  - Collaborate with interdisciplinary team   - Patient/family teaching  - Consider wound care consult   Outcome: Progressing     Problem: GASTROINTESTINAL - ADULT  Goal: Maintains or returns to baseline bowel function  Description: INTERVENTIONS:  - Assess bowel function  - Encourage oral fluids to ensure adequate hydration  - Administer IV fluids if ordered to ensure adequate hydration  - Administer ordered medications as needed  - Encourage mobilization and activity  - Consider nutritional services referral to assist patient with adequate nutrition and appropriate food choices  Outcome: Progressing     Problem: GENITOURINARY - ADULT  Goal: Urinary catheter remains patent  Description: INTERVENTIONS:  - Assess patency of urinary catheter  - If patient has a chronic hanks, consider changing catheter if non-functioning  - Follow guidelines for intermittent irrigation of non-functioning urinary catheter  Outcome: Progressing     Problem: METABOLIC, FLUID AND ELECTROLYTES - ADULT  Goal: Electrolytes maintained within normal limits  Description: INTERVENTIONS:  - Monitor labs and assess patient for signs and symptoms of electrolyte imbalances  - Administer electrolyte replacement as ordered  - Monitor response to electrolyte replacements, including repeat lab results as appropriate  - Instruct patient on fluid and nutrition as appropriate  Outcome: Progressing     Problem: SKIN/TISSUE INTEGRITY - ADULT  Goal: Skin Integrity remains intact(Skin Breakdown Prevention)  Description: Assess:  -Perform Goldy assessment every shift  -Clean and moisturize skin every  shift  -Inspect skin when repositioning, toileting, and assisting with ADLS  -Assess under medical devices such as Masimo every shift  -Assess extremities for adequate circulation and sensation     Bed Management:  -Have minimal linens on bed & keep smooth, unwrinkled  -Change linens as needed when moist or perspiring  -Avoid sitting or lying in one position for more than 1 hours while in bed  -Keep HOB at 30 degrees     Toileting:  -Offer bedside commode  -Assess for incontinence every shift  -Use incontinent care products after each incontinent episode such as foam cleanser     Activity:  -Mobilize patient 4 times a day  -Encourage activity and walks on unit  -Encourage or provide ROM exercises   -Turn and reposition patient every 2 Hours  -Use appropriate equipment to lift or move patient in bed  -Instruct/ Assist with weight shifting every 1 hour when out of bed in chair  -Consider limitation of chair time 1 hour intervals    Skin Care:  -Avoid use of baby powder, tape, friction and shearing, hot water or constrictive clothing  -Relieve pressure over bony prominences using wedges  -Do not massage red bony areas    Next Steps:  -Teach patient strategies to minimize risks such as skin breakdown   -Consider consults to  interdisciplinary teams such as wound care nurse   Outcome: Progressing  Goal: Incision(s), wounds(s) or drain site(s) healing without S/S of infection  Description: INTERVENTIONS  - Assess and document dressing, incision, wound bed, drain sites and surrounding tissue  - Provide patient and family education  - Perform skin care/dressing changes every shift   Outcome: Progressing  Goal: Pressure injury heals and does not worsen  Description: Interventions:  - Implement low air loss mattress or specialty surface (Criteria met)  - Apply silicone foam dressing  - Instruct/assist with weight shifting every 30 minutes when in chair   - Limit chair time to 1 hour intervals  - Use special pressure reducing  interventions such as cushion when in chair   - Apply fecal or urinary incontinence containment device   - Perform passive or active ROM every shift  - Turn and reposition patient & offload bony prominences every 1 hours   - Utilize friction reducing device or surface for transfers   - Consider consults to  interdisciplinary teams such as wound care nurse   - Use incontinent care products after each incontinent episode such as foam cleanser   - Consider nutrition services referral as needed  Outcome: Progressing     Problem: HEMATOLOGIC - ADULT  Goal: Maintains hematologic stability  Description: INTERVENTIONS  - Assess for signs and symptoms of bleeding or hemorrhage  - Monitor labs  - Administer supportive blood products/factors as ordered and appropriate  Outcome: Progressing     Problem: Nutrition/Hydration-ADULT  Goal: Nutrient/Hydration intake appropriate for improving, restoring or maintaining nutritional needs  Description: Monitor and assess patient's nutrition/hydration status for malnutrition. Collaborate with interdisciplinary team and initiate plan and interventions as ordered.  Monitor patient's weight and dietary intake as ordered or per policy. Utilize nutrition screening tool and intervene as necessary. Determine patient's food preferences and provide high-protein, high-caloric foods as appropriate.     INTERVENTIONS:  - Monitor oral intake, urinary output, labs, and treatment plans  - Assess nutrition and hydration status and recommend course of action  - Evaluate amount of meals eaten  - Assist patient with eating if necessary   - Allow adequate time for meals  - Recommend/ encourage appropriate diets, oral nutritional supplements, and vitamin/mineral supplements  - Order, calculate, and assess calorie counts as needed  - Recommend, monitor, and adjust tube feedings and TPN/PPN based on assessed needs  - Assess need for intravenous fluids  - Provide specific nutrition/hydration education as  appropriate  - Include patient/family/caregiver in decisions related to nutrition  Outcome: Progressing

## 2024-09-25 NOTE — ASSESSMENT & PLAN NOTE
Per patient's family wound initially started during patient's stay in a skilled nursing facility for rehab. Wound imaging present in our system since prior hematuria hospitalization. Wound now with significant depth. Concern for possible fistulous connection to the rectum given evidence of colitis/proctitis and extensive tissue emphysema on CT. This is likely playing a role in bacteremia above. General surgery has debrided and will continue to follow up with wound. He may require diverting colostomy. He complete repeat CT imaging yesterday. I personally reviewed the study which showed ongoing possibility of fistula.  -antibiotic as above  -serial skin exams  -frequent turning/repositioning to offload pressure from the wound  -local wound care per general surgery  -continue follow up with general surgery

## 2024-09-25 NOTE — ASSESSMENT & PLAN NOTE
Multifactorial but from infection as above  Appears better today per chart review but patient is still not very interactive/conversant

## 2024-09-25 NOTE — ASSESSMENT & PLAN NOTE
As evidenced by tachycardia and leukocytosis and fever  Now with polymicrobial bacteremia- enterbacter, enterococcus and proetus   CT abdomen and pelvis showed Mid/distal sigmoid colonic and rectal inflammatory changes in keeping with a segmental colitis/proctitis. Right ischioanal fossa subcutaneous emphysema extending through the right paramidline gluteal subcutaneous tissues to the skin surface suspicious for a perirectal or perianal fistula   Has large sacral decubitus ulcer which appears to be his primary issue, status post bedside debridement of sacral ulcer 9/18/24 by general surgery, continue with dressing changes and wound care management by general surgery  Repeating CT abdomen and pelvis 9/24 to evaluate for any fistula connection versus abscess due to ongoing leukocytosis  Seen by GI imaging likely due to ischemic colitis but likely not primary cause of his presentation - no role for colonoscopy or flex sig curently    Continue IV antibiotics being managed by infectious disease  Was on cefepime, flagyl and vanco -- being adjusted  Repeat blood cultures negative to date - greater than 4 days  TTE without evidence of large vegetations although images suboptimal   Going for transesophageal echocardiogram today  ID transitioning the patient to ampicillin 2 g every 6 hours to begin tomorrow to complete a total of 2 weeks of antibiotic therapy for enterococcal bacteremia through 10/3/24

## 2024-09-25 NOTE — ASSESSMENT & PLAN NOTE
Presumably secondary to sepsis, bacteremia, COVID infection  CT head with no acute intracranial abnormality  Continue with supportive care  Reorientation, delirium precautions

## 2024-09-25 NOTE — ASSESSMENT & PLAN NOTE
Polymicrobial bacteremia likely secondary sacral wound with possible fistulous connection  Infectious disease input noted and appreciated.  Continue IV cefepime, flagyl and vancomycin   ID transitioning the patient to ampicillin 2 g every 6 hours to begin tomorrow to complete a total of 2 weeks of antibiotic therapy for enterococcal bacteremia through 10/3   Repeat cultures negative to date   Plan for JOSEFINA today

## 2024-09-25 NOTE — ASSESSMENT & PLAN NOTE
Blood cultures with growth of enterobacter and proteus in one set, and  second set growing enterococcus faecalis. Likely secondary to proctitis/colitis with possible fistulous connection to the sacral wound. No other source appreciated. Patient has COVID-19 but no opacities/groundglass/consolidations on chest imaging suggesting a bacterial process. TTE without evidence of valvular vegetations but was a technically difficult study. He will complete JOSEFINA later this morning for further evaluation and to establish shortened antibiotic treatment length. Repeat blood cultures are negative >4 days.  -antibiotics as above  -check CBCD and BMP tomorrow  -follow up repeat blood cultures  -follow up JOSEFINA  -monitor vitals

## 2024-09-25 NOTE — ANESTHESIA PREPROCEDURE EVALUATION
Procedure:  JOSEFINA    Relevant Problems   ANESTHESIA (within normal limits)      CARDIO  Left Ventricle Left ventricular cavity size is normal. Wall thickness is normal. The left ventricular ejection fraction is 55%. Systolic function is normal. Although no diagnostic regional wall motion abnormality was identified, this possibility cannot be completely excluded on the basis of this study. Unable to assess diastolic function due to E/A fusion due to tachycardia.  Right Ventricle Right ventricular cavity size is normal. Systolic function is normal. Wall thickness is normal.  Left Atrium The atrium is normal in size.  Right Atrium The atrium is normal in size.  Aortic Valve The aortic valve is trileaflet. The leaflets are mildly thickened. The leaflets are mildly calcified. The leaflets exhibit normal mobility. There is no evidence of regurgitation. The aortic valve has no significant stenosis.  Mitral Valve There is mild to moderate annular calcification.  There is no evidence of regurgitation. There is no evidence of stenosis. The valve has normal function.  Tricuspid Valve Tricuspid valve structure is normal. There is trace regurgitation. There is no evidence of stenosis. Right ventricular systolic pressure could not be assessed.  Pulmonic Valve The pulmonic valve was not well visualized. There is no evidence of regurgitation. There is no evidence of stenosis.  Ascending Aorta The aortic root is normal in size. The ascending aorta is normal in size.  IVC/SVC The inferior vena cava is dilated. Respirophasic changes were blunted (less than 50% variation).  Pericardium There is no pericardial effusion. The pericardium is normal in appearance.       (+) Mixed hyperlipidemia   (+) Paroxysmal atrial fibrillation (HCC)   (+) Primary hypertension      ENDO   (+) Type 2 diabetes mellitus without complication, without long-term current use of insulin (HCC)      GI/HEPATIC (within normal limits)      HEMATOLOGY   (+) Anemia       MUSCULOSKELETAL (within normal limits)      NEURO/PSYCH   (+) Depression      PULMONARY (within normal limits)      Neurology/Sleep   (+) Acute metabolic encephalopathy   (+) History of CVA (cerebrovascular accident)      Blood   (+) Sepsis      Surgery/Wound/Pain   (+) Sacral wound      Other   (+) COVID-19   (+) Gram-negative bacteremia (Resolved)   (+) Polymicrobial bacteremia   (+) Severe protein-calorie malnutrition (HCC)        Physical Exam    Airway    Mallampati score: II         Dental   No notable dental hx     Cardiovascular  Cardiovascular exam normal    Pulmonary  Pulmonary exam normal Breath sounds clear to auscultation    Other Findings        Anesthesia Plan  ASA Score- 3     Anesthesia Type- IV sedation with anesthesia with ASA Monitors.         Additional Monitors:     Airway Plan:            Plan Factors-    Chart reviewed.   Existing labs reviewed. Patient summary reviewed.    Patient is not a current smoker.              Induction-     Postoperative Plan-     Perioperative Resuscitation Plan - Level 1 - Full Code.       Informed Consent- Anesthetic plan and risks discussed with patient.

## 2024-09-25 NOTE — ASSESSMENT & PLAN NOTE
Tested positive for COVID on 9/17/2024  Patient currently on room air though high risk given his age and comorbidities  Completed three days of IV remdesivir   Supportive care otherwise  Contact and airborne precautions  Currently on mild COVID pathway.    Not requiring supplemental oxygen- remains on room air  To complete 10 days of isolation through 9/27   Isolation to be removed 9/28

## 2024-09-25 NOTE — OCCUPATIONAL THERAPY NOTE
Occupational Therapy         Patient Name: Drew Santos  Today's Date: 9/25/2024 09/25/24 1141   OT Last Visit   OT Visit Date 09/25/24   Note Type   Note type Cancelled Session   Cancel Reasons Patient off floor/test   Additional Comments Pt off floor for JOSEFINA. Will continue to follow.     Mine Wiseman

## 2024-09-25 NOTE — ASSESSMENT & PLAN NOTE
CT C/A/P showed mild distal sigmoid and rectal inflammatory changes, R ischioanal fossa emphysema, and possible fistulous connection to skin surface. This is likely source of patient's bacteremia above. Family reports patient has very infrequent bowel movements in past few weeks. General surgery is following for wound. GI has been consulted and they have concern for ischemic colitis.   -antibiotics as above  -follow up repeat CT A/P  -serial abdominal/rectal exams  -monitor stool output  -follow up stool studies  -continue follow up with general surgery  -continue follow up with gastroenterology

## 2024-09-25 NOTE — PROGRESS NOTES
Progress Note - Infectious Disease   Name: Drew Santos 73 y.o. male I MRN: [id number]  Unit/Bed#: [place] [address] -01 I Date of Admission: 9/17/2024   Date of Service: 9/25/2024 I Hospital Day: 8     Assessment & Plan  Sepsis  Fever, tachycardia, tachypnea, and leukocytosis. Secondary to polymicrobial bacteremia, likely from colitis/proctitis with possible fistulous connection to sacral ulcer. Blood cultures now updated with growth of enterobacter, enterococcus faecalis, proteus. Also consider role of COVID-19. No other clear source appreciated. Chest imaging with no opacities/groundglass/consolidations suggesting a bacterial process. Urine was abnormal, culture showed growth of enterobacter. Head CT with no acute intracranial findings. Despite being systemically ill he remains hemodynamically stable. This morning he is afebrile. WBC count is trending down. He is receiving IV cefepime, IV flagyl, and IV Vancomycin which he is tolerating without difficulty. I will continue this regimen for now. Repeat blood cultures are negative >4 days.    -continue IV cefepime through 9/25/2024 for 7 days of GNR coverage  -continue PO flagyl through 9/25/2024 for 7 days of GNR coverage  -continue IV vancomycin today with anticipation of transition to IV ampicillin tomorrow  -continue pharmacy consult for guidance on vancomycin dosage  -monitor CBCD and BMP  -follow up repeat blood cultures   -monitor vitals  -supportive care   Polymicrobial bacteremia  Blood cultures with growth of enterobacter and proteus in one set, and  second set growing enterococcus faecalis. Likely secondary to proctitis/colitis with possible fistulous connection to the sacral wound. No other source appreciated. Patient has COVID-19 but no opacities/groundglass/consolidations on chest imaging suggesting a bacterial process. TTE without evidence of valvular vegetations but was a technically difficult study. He will complete JOSEFINA later this morning for further  evaluation and to establish shortened antibiotic treatment length. Repeat blood cultures are negative >4 days.  -antibiotics as above  -check CBCD and BMP tomorrow  -follow up repeat blood cultures  -follow up JOSEFINA  -monitor vitals  Sacral wound  Per patient's family wound initially started during patient's stay in a skilled nursing facility for rehab. Wound imaging present in our system since prior hematuria hospitalization. Wound now with significant depth. Concern for possible fistulous connection to the rectum given evidence of colitis/proctitis and extensive tissue emphysema on CT. This is likely playing a role in bacteremia above. General surgery has debrided and will continue to follow up with wound. He may require diverting colostomy. He complete repeat CT imaging yesterday. I personally reviewed the study which showed ongoing possibility of fistula.  -antibiotic as above  -serial skin exams  -frequent turning/repositioning to offload pressure from the wound  -local wound care per general surgery  -continue follow up with general surgery  Proctitis  CT C/A/P showed mild distal sigmoid and rectal inflammatory changes, R ischioanal fossa emphysema, and possible fistulous connection to skin surface. This is likely source of patient's bacteremia above. Family reports patient has very infrequent bowel movements in past few weeks. General surgery is following for wound. GI has been consulted and they have concern for ischemic colitis.   -antibiotics as above  -follow up repeat CT A/P  -serial abdominal/rectal exams  -monitor stool output  -follow up stool studies  -continue follow up with general surgery  -continue follow up with gastroenterology  Type 2 diabetes mellitus without complication, without long-term current use of insulin ([facility])  Lab Results   Component Value Date    HGBA1C 6.2 (H) 09/17/2024   This is risk factor for wounds and infection.  -recommend tight glycemic control  -blood glucose management per  primary service  COVID-19  PCR positive on 2024 although per patient's family he tested positive prior to admission. Patient's wife also positive. Chest imaging showed no airspace opacities or groundglass. He has been started on mild disease protocol and received IV Remdesivir x3 days.  -management per primary service  -monitor vitals  -monitor respiratory status    Above plan was discussed in detail with patient at the bedside.  Above plan was discussed in detail with SLIM who agrees with plan for JOSEFINA and ongoing antibiotic.    Antibiotics:  Cefepime 8  Vancomycin 7  Flagyl 8  Antibiotics 9    Subjective:  Patient tells me he's okay. He has no chest or abdominal pain. He has no fever, chills, sweats, or shakes. He denies cough and shortness of breath. He denies pain in his sacral wound. He offers no other symptoms.    Objective:  Vitals:  Temp:  [98.1 °F (36.7 °C)-99.6 °F (37.6 °C)] 98.8 °F (37.1 °C)  HR:  [74-90] 78  Resp:  [16-18] 16  BP: (113-137)/(70-79) 135/75  SpO2:  [96 %-98 %] 96 %  Temp (24hrs), Av.8 °F (37.1 °C), Min:98.1 °F (36.7 °C), Max:99.6 °F (37.6 °C)  Current: Temperature: 98.8 °F (37.1 °C)    Physical Exam:   General Appearance:  Alert, interactive with limited speech, nontoxic, no acute distress. He appears comfortable sitting up in bed.    Throat: Oropharynx moist without lesions.    Lungs:   Clear to auscultation bilaterally; no wheezes, rhonchi or rales; respirations unlabored on room air.   Heart:  RRR; no murmur, rub or gallop.   Abdomen:   Soft, non-tender, non-distended, positive bowel sounds.     Extremities: No clubbing or cyanosis, mild peripheral edema. B/L offloading boots intact.   Skin: No new rashes noted on exposed skin.     Labs, Imaging, & Other studies:   All pertinent labs and imaging studies were personally reviewed  Results from last 7 days   Lab Units 24  0439 24  0543 24  0525   WBC Thousand/uL 10.92* 13.09* 13.79*   HEMOGLOBIN g/dL 8.3* 7.6*  7.6*   PLATELETS Thousands/uL 543* 468* 310     Results from last 7 days   Lab Units 09/25/24  0439   POTASSIUM mmol/L 3.7   CHLORIDE mmol/L 108   CO2 mmol/L 29   BUN mg/dL 6   CREATININE mg/dL 0.60   EGFR ml/min/1.73sq m 98   CALCIUM mg/dL 8.0*   AST U/L 28   ALT U/L 19   ALK PHOS U/L 51     Results from last 7 days   Lab Units 09/20/24  0555   BLOOD CULTURE  No Growth After 4 Days.  No Growth After 4 Days.

## 2024-09-25 NOTE — PROCEDURES
Debridement    Universal Protocol:  Procedure performed by: (Peter Holland)  Consent: Verbal consent obtained.  Consent given by: patient  Timeout called at: 9/25/2024 4:37 PM.  Patient understanding: patient states understanding of the procedure being performed  Patient consent: the patient's understanding of the procedure matches consent given  Patient identity confirmed: verbally with patient and arm band    Debridement Details  Performed by: resident  Debridement type: surgical  Level of debridement: subcutaneous tissue  Pain control: lidocaine 1% (10cc)      Post-debridement measurements  Length (cm): 6  Width (cm): 5.5  Depth (cm): 5  Percent debrided: 90%  Surface Area (cm^2): 33  Area Debrided (cm^2): 29.7  Volume (cm^3): 165    Tissue and other material debrided: adipose, dermis and hypergranulation  Devitalized tissue debrided: fibrin, necrotic debris and slough  Instrument(s) utilized: blade, forceps and scissors  Technique utilized: excisionalBleeding: small  Hemostasis obtained with: not applicable  Procedural pain (0-10): insensate  Post-procedural pain: insensate   Response to treatment: procedure was tolerated well  Debridement Comments: Wound is about 6 x 5 x 5 cm, going down near the sacrum and coccyx which are palpable just below the debrided tissue.  1 small piece of hard porous tissue was embedded in the wound around the sacrococcyx area.  Necrotic debris, fibrin, and slough was sharply debrided with scalpel and scissors down to healthy bleeding granulation tissue.  Minimal bleeding.  Patient tolerated the procedure well, limited sensation.  Repacked with quarter percent Dakins solution.  Overlying pressure relief dressing.

## 2024-09-25 NOTE — ASSESSMENT & PLAN NOTE
Patient appears to lack the capacity to make any complex medical choices on my visit today  I tried to call his spouse several times, no . Left VM with call back number  Code Status: Level 1 - Full Code  Advance Directive and Living Will:    none  Power of :  wife per PA   POLST:

## 2024-09-25 NOTE — ASSESSMENT & PLAN NOTE
Lab Results   Component Value Date    HGBA1C 6.2 (H) 09/17/2024     Recent Labs     09/24/24  1614 09/24/24  2120 09/25/24  0726 09/25/24  1100   POCGLU 183* 129 103 92   Well-controlled with last hemoglobin A1c of 6.2%  Holding home oral regimen  Monitor on sliding scale    Hypoglycemia protocol

## 2024-09-25 NOTE — ASSESSMENT & PLAN NOTE
S/p bedside debridement  Repeat CT on 9/24 showing developing osteomyelitis of the coccyx.   Per notes, there are no plans for prolonged antibiotic for this issue unless patient is to undergo resection and flap closure

## 2024-09-25 NOTE — ASSESSMENT & PLAN NOTE
Fever, tachycardia, tachypnea, and leukocytosis. Secondary to polymicrobial bacteremia, likely from colitis/proctitis with possible fistulous connection to sacral ulcer. Blood cultures now updated with growth of enterobacter, enterococcus faecalis, proteus. Also consider role of COVID-19. No other clear source appreciated. Chest imaging with no opacities/groundglass/consolidations suggesting a bacterial process. Urine was abnormal, culture showed growth of enterobacter. Head CT with no acute intracranial findings. Despite being systemically ill he remains hemodynamically stable. This morning he is afebrile. WBC count is trending down. He is receiving IV cefepime, IV flagyl, and IV Vancomycin which he is tolerating without difficulty. I will continue this regimen for now. Repeat blood cultures are negative >4 days.    -continue IV cefepime through 9/25/2024 for 7 days of GNR coverage  -continue PO flagyl through 9/25/2024 for 7 days of GNR coverage  -continue IV vancomycin today with anticipation of transition to IV ampicillin tomorrow  -continue pharmacy consult for guidance on vancomycin dosage  -monitor CBCD and BMP  -follow up repeat blood cultures   -monitor vitals  -supportive care

## 2024-09-25 NOTE — PROGRESS NOTES
Drew Santos is a 73 y.o. male who is currently ordered Vancomycin IV with management by the Pharmacy Consult service.  Relevant clinical data and objective / subjective history reviewed.  Vancomycin Assessment:  Indication and Goal AUC/Trough: Bacteremia (goal -600, trough >10)  Clinical Status: stable  Micro:     Renal Function:  SCr: 0.6 mg/dL  CrCl: 148.5 mL/min  Renal replacement: Not on dialysis  Days of Therapy: 7  Current Dose: 1500mg IV Q12H  Vancomycin Plan:  New Dosing: Continue 1500mg IV Q12H  Estimated AUC: 497 mcg*hr/mL  Estimated Trough: 13.8 mcg/mL  Next Level: 9/28 with AM labs  Renal Function Monitoring: Daily BMP and UOP  Pharmacy will continue to follow closely for s/sx of nephrotoxicity, infusion reactions and appropriateness of therapy.  BMP and CBC will be ordered per protocol. We will continue to follow the patient’s culture results and clinical progress daily.    Darren Lewis, Pharmacist     [FreeTextEntry1] : BL shoulder stiffness/ PMR?\par has hx of PMR- responded well to steroids\par Now s/p trial with prednisone with no significant improvement in symptoms. Also with recent labs/ inflammatory markers- stable. \par \par - ortho/ pain management evaluation for possible steroids injections\par - consider repeating imaging studies\par \par Fibromyalgia. Has hx of Fatigue, poor sleep, intermittent polyarthralgias- current symptoms stable\par \par - amitriptyline 10mg daily\par - stop meloxicam. start Celebrex 200mg BID prn\par - gabapentin 300mg daily\par - reviewed risk and benefits of medications\par - encouraged proper diet, good sleep hygiene, exercise \par \par Discussed treatment plan with the patient. The patient was given the opportunity to ask questions and all questions were answered to their satisfaction.

## 2024-09-25 NOTE — CONSULTS
Consultation - Palliative Care   Name: Drew Santos 73 y.o. male I MRN: [id number]  Unit/Bed#: [place] [address] -01 I Date of Admission: 9/17/2024   Date of Service: 9/25/2024 I Hospital Day: 8   Inpatient consult to Palliative Care  Consult performed by: Lisa Dwyer MD  Consult ordered by: Laurel Valles PA-C        Physician Requesting Evaluation: Taye Rodriguez DO   Reason for Evaluation / Principal Problem: goals of care    Assessment & Plan  Advanced care planning/counseling discussion  Patient appears to lack the capacity to make any complex medical choices on my visit today  I tried to call his spouse several times, no . Left VM with call back number  Code Status: Level 1 - Full Code  Advance Directive and Living Will:    none  Power of :  wife per PA   POLST:    Sepsis  ID on board, on IV antibiotics  Tomorrow, 9/26, ID is transition to PO meds for total 2 weeks therapy    Sacral wound  S/p bedside debridement  Repeat CT on 9/24 showing developing osteomyelitis of the coccyx.   Per notes, there are no plans for prolonged antibiotic for this issue unless patient is to undergo resection and flap closure   COVID-19  Comfortable and stable on RA  Polymicrobial bacteremia  Source: sacral wound  Going for JOSEFINA today  Acute metabolic encephalopathy  Multifactorial but from infection as above  Appears better today per chart review but patient is still not very interactive/conversant  Severe protein-calorie malnutrition (HCC)  Malnutrition Findings:   Adult Malnutrition type: Chronic illness  Adult Degree of Malnutrition: Other severe protein calorie malnutrition  Malnutrition Characteristics: Inadequate energy, Weight loss                  360 Statement: Severe protein calorie malnutrition in context of chronic illness r/t poor appetite, inadequate PO intake as evidance by energy intake less than 75% compared to estimated needs>1 month, 7.5% wt loss x 1 month ( 122kg 8/23/24-> 113kg 9/17)  treated with PO diet and oral supplements    BMI Findings:           Body mass index is 29.53 kg/m².     Please contact the SecureChat role for the Palliative Care service with any questions/concerns.    Decisional apparatus: Patient is not competent on my exam today. If competence is lost, patient's substitute decision maker would default to wife by [place] Act 169.     PDMP Review: I have reviewed the patient's controlled substance dispensing history in the Prescription Drug Monitoring Program in compliance with the [facility] regulations before prescribing any controlled substances.    History of Present Illness   HPI: Drew Santos is a 73 y.o. year old bedbound male with Hx of CVA w/ residual L-sided deficits, chronic sacral ulcer, DM, Afib, CAD, HLD, HTN who was admitted from home on 9/17/2024 due to altered mental status, fever. He was found COVID-19 positive and septic from polymicrobial bacteremia (Enterobacter, Proteus, Enterococcus faecalis) and his sacral wound. 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 GI, Gen surg, ID, wound care on board. Conservative management is recommended. He is s/p bedside debridement of sacral decubitus ulcer. He is on IV antibiotics. However, he continues to have leukocytosis. Repeat imaging on 9/24 showing developing changes of osteomyelitis of the sacrum. ID recommended JOSEFINA which the family agreed to. Palliative Medicine for goals of care.     Saw patient in his room. Awake and alert. Comfortable on RA. He acknowledged that I was in the room by turning towards me. However, he was only able to answer yes and mmhmm to all questions without elaboration.     Tried to call wife at listed number a few times. Did not  phone, left VM with office call back number.     Review of Systems   Unable to perform ROS: Mental status change     I have reviewed the patient's PMH, PSH, Social History, Family History, Meds, and Allergies  Social History     Tobacco Use    Smoking status: Never      Passive exposure: Never    Smokeless tobacco: Never   Vaping Use    Vaping status: Never Used   Substance and Sexual Activity    Alcohol use: Not Currently    Drug use: Never    Sexual activity: Not on file       Objective      Temp:  [98.1 °F (36.7 °C)-99.6 °F (37.6 °C)] 98.3 °F (36.8 °C)  HR:  [68-90] 68  Resp:  [16-18] 16  BP: (113-135)/(58-79) 118/58  O2 Device: None (Room air)          I/O last 24 hours:  In: 260 [P.O.:240; I.V.:20]  Out: 3410 [Urine:3410]  Lines/Drains/Airways       Active Status       Name Placement date Placement time Site Days    Urethral Catheter Three way 22 Fr. 08/31/24 1925  Three way  24    Continuous Bladder Irrigation Three-way 08/31/24 1925  Three-way  24                  Physical Exam  Constitutional:       General: He is not in acute distress.     Appearance: He is ill-appearing. He is not toxic-appearing or diaphoretic.      Comments: Appears chronically ill looking, stable, comfortable, pleasant   HENT:      Head: Normocephalic.   Eyes:      General: No scleral icterus.  Pulmonary:      Effort: Pulmonary effort is normal. No respiratory distress.      Comments: On RA  Abdominal:      General: There is no distension.   Skin:     General: Skin is warm and dry.      Coloration: Skin is not pale.   Neurological:      Mental Status: He is alert.      Comments: He only said yes/mmhmm to all questions asked   Psychiatric:         Mood and Affect: Mood is not anxious.      Comments: Failed to tell me why he is in the hospital, seemingly unaware of his current treatment plan          Lab Results: I have reviewed the following results:   Lab Results   Component Value Date/Time    SODIUM 139 09/25/2024 04:39 AM    SODIUM 139 08/22/2024 09:37 AM    K 3.7 09/25/2024 04:39 AM    K 4.6 08/22/2024 09:37 AM    BUN 6 09/25/2024 04:39 AM    BUN 13 08/22/2024 09:37 AM    CREATININE 0.60 09/25/2024 04:39 AM    CREATININE 0.78 08/22/2024 09:37 AM    GLUC 98 09/25/2024 04:39 AM    GLUC 123 (H)  08/22/2024 09:37 AM    CALCIUM 8.0 (L) 09/25/2024 04:39 AM    CALCIUM 8.9 08/22/2024 09:37 AM    AST 28 09/25/2024 04:39 AM    AST 20 08/22/2024 09:37 AM    ALT 19 09/25/2024 04:39 AM    ALT 22 08/22/2024 09:37 AM    ALB 1.9 (L) 09/25/2024 04:39 AM    ALB 2.8 (L) 08/22/2024 09:37 AM    TP 6.4 09/25/2024 04:39 AM    TP 6.2 (L) 08/22/2024 09:37 AM    EGFR 98 09/25/2024 04:39 AM    EGFR 93 08/22/2024 09:37 AM     Lab Results   Component Value Date/Time    HGB 8.3 (L) 09/25/2024 04:39 AM    WBC 10.92 (H) 09/25/2024 04:39 AM     (H) 09/25/2024 04:39 AM    INR 1.39 (H) 08/31/2024 06:16 PM    PTT 38 (H) 08/31/2024 06:16 PM     Lab Results   Component Value Date/Time    IFO6IFMADJJC 5.228 (H) 03/08/2024 05:30 AM       Imaging Review: Reviewed radiology reports from this admission including: CT chest and CT abdomen/pelvis.  Other Studies: No additional pertinent studies reviewed.    Administrative Statements   I have spent a total time of 30 minutes in caring for this patient on the day of the visit/encounter including Counseling / Coordination of care, Documenting in the medical record, Obtaining or reviewing history  , and trying to call POA .    Lisa Dwyer MD  Palliative Medicine & Supportive Care  Internal Medicine  Available via [facility] Text  Office: [phone]  Fax: [phone]

## 2024-09-25 NOTE — ASSESSMENT & PLAN NOTE
Chronic sacral decubitus ulcer; present on admission  Status post debridement at bedside per surgical team.   Continue with wound care as tolerated.     Continue dressing changes and serial exams  Wound care consultation  Offload pressure   Concern for emphysematous changes tracking to sacral wound/developing fistula from site on repeat abdominal imaging on 924  I did breach the topic with wife about palliative care but seems overwhelmed at this thought at this time 9/24  She is not sure if she wants him back at rehab versus home at time of discharge  Official palliative care consult placed for goals of care

## 2024-09-25 NOTE — ASSESSMENT & PLAN NOTE
Noted on CT scan.  No diarrhea or clinical evidence of colitis.   Stool studies negative  Likely ischemic colitis given area affected, continue supportive measures   No plan for any inpatient scope   Repeat CAT scan without signs of ongoing abscess.

## 2024-09-26 LAB
ALBUMIN SERPL BCG-MCNC: 2.1 G/DL (ref 3.5–5)
ALP SERPL-CCNC: 52 U/L (ref 34–104)
ALT SERPL W P-5'-P-CCNC: 17 U/L (ref 7–52)
ANION GAP SERPL CALCULATED.3IONS-SCNC: 3 MMOL/L (ref 4–13)
AST SERPL W P-5'-P-CCNC: 25 U/L (ref 13–39)
BASOPHILS # BLD AUTO: 0.08 THOUSANDS/ΜL (ref 0–0.1)
BASOPHILS NFR BLD AUTO: 1 % (ref 0–1)
BILIRUB SERPL-MCNC: 0.5 MG/DL (ref 0.2–1)
BUN SERPL-MCNC: 6 MG/DL (ref 5–25)
CALCIUM ALBUM COR SERPL-MCNC: 9.7 MG/DL (ref 8.3–10.1)
CALCIUM SERPL-MCNC: 8.2 MG/DL (ref 8.4–10.2)
CHLORIDE SERPL-SCNC: 105 MMOL/L (ref 96–108)
CO2 SERPL-SCNC: 30 MMOL/L (ref 21–32)
CREAT SERPL-MCNC: 0.57 MG/DL (ref 0.6–1.3)
EOSINOPHIL # BLD AUTO: 0.22 THOUSAND/ΜL (ref 0–0.61)
EOSINOPHIL NFR BLD AUTO: 2 % (ref 0–6)
ERYTHROCYTE [DISTWIDTH] IN BLOOD BY AUTOMATED COUNT: 15.5 % (ref 11.6–15.1)
GFR SERPL CREATININE-BSD FRML MDRD: 100 ML/MIN/1.73SQ M
GLUCOSE SERPL-MCNC: 117 MG/DL (ref 65–140)
GLUCOSE SERPL-MCNC: 125 MG/DL (ref 65–140)
GLUCOSE SERPL-MCNC: 128 MG/DL (ref 65–140)
GLUCOSE SERPL-MCNC: 150 MG/DL (ref 65–140)
GLUCOSE SERPL-MCNC: 190 MG/DL (ref 65–140)
HCT VFR BLD AUTO: 27.7 % (ref 36.5–49.3)
HGB BLD-MCNC: 8.6 G/DL (ref 12–17)
IMM GRANULOCYTES # BLD AUTO: 0.23 THOUSAND/UL (ref 0–0.2)
IMM GRANULOCYTES NFR BLD AUTO: 2 % (ref 0–2)
LYMPHOCYTES # BLD AUTO: 2.71 THOUSANDS/ΜL (ref 0.6–4.47)
LYMPHOCYTES NFR BLD AUTO: 22 % (ref 14–44)
MCH RBC QN AUTO: 30.4 PG (ref 26.8–34.3)
MCHC RBC AUTO-ENTMCNC: 31 G/DL (ref 31.4–37.4)
MCV RBC AUTO: 98 FL (ref 82–98)
MONOCYTES # BLD AUTO: 0.97 THOUSAND/ΜL (ref 0.17–1.22)
MONOCYTES NFR BLD AUTO: 8 % (ref 4–12)
NEUTROPHILS # BLD AUTO: 7.94 THOUSANDS/ΜL (ref 1.85–7.62)
NEUTS SEG NFR BLD AUTO: 65 % (ref 43–75)
NRBC BLD AUTO-RTO: 0 /100 WBCS
PLATELET # BLD AUTO: 531 THOUSANDS/UL (ref 149–390)
PMV BLD AUTO: 8.8 FL (ref 8.9–12.7)
POTASSIUM SERPL-SCNC: 3.8 MMOL/L (ref 3.5–5.3)
PROT SERPL-MCNC: 6.8 G/DL (ref 6.4–8.4)
RBC # BLD AUTO: 2.83 MILLION/UL (ref 3.88–5.62)
SODIUM SERPL-SCNC: 138 MMOL/L (ref 135–147)
WBC # BLD AUTO: 12.15 THOUSAND/UL (ref 4.31–10.16)

## 2024-09-26 PROCEDURE — 92526 ORAL FUNCTION THERAPY: CPT

## 2024-09-26 PROCEDURE — 99233 SBSQ HOSP IP/OBS HIGH 50: CPT | Performed by: INTERNAL MEDICINE

## 2024-09-26 PROCEDURE — 85025 COMPLETE CBC W/AUTO DIFF WBC: CPT | Performed by: PHYSICIAN ASSISTANT

## 2024-09-26 PROCEDURE — 99233 SBSQ HOSP IP/OBS HIGH 50: CPT | Performed by: PHYSICIAN ASSISTANT

## 2024-09-26 PROCEDURE — 82948 REAGENT STRIP/BLOOD GLUCOSE: CPT

## 2024-09-26 PROCEDURE — 80053 COMPREHEN METABOLIC PANEL: CPT | Performed by: INTERNAL MEDICINE

## 2024-09-26 RX ADMIN — POTASSIUM CHLORIDE 20 MEQ: 1.5 SOLUTION ORAL at 08:53

## 2024-09-26 RX ADMIN — AMPICILLIN SODIUM 2000 MG: 2 INJECTION, POWDER, FOR SOLUTION INTRAMUSCULAR; INTRAVENOUS at 05:16

## 2024-09-26 RX ADMIN — ATORVASTATIN CALCIUM 80 MG: 80 TABLET, FILM COATED ORAL at 17:42

## 2024-09-26 RX ADMIN — SODIUM HYPOCHLORITE: 1.25 SOLUTION TOPICAL at 11:13

## 2024-09-26 RX ADMIN — AMPICILLIN SODIUM 2000 MG: 2 INJECTION, POWDER, FOR SOLUTION INTRAMUSCULAR; INTRAVENOUS at 12:13

## 2024-09-26 RX ADMIN — INSULIN LISPRO 2 UNITS: 100 INJECTION, SOLUTION INTRAVENOUS; SUBCUTANEOUS at 21:00

## 2024-09-26 RX ADMIN — FINASTERIDE 5 MG: 5 TABLET, FILM COATED ORAL at 08:50

## 2024-09-26 RX ADMIN — EZETIMIBE 10 MG: 10 TABLET ORAL at 08:50

## 2024-09-26 RX ADMIN — APIXABAN 5 MG: 5 TABLET, FILM COATED ORAL at 08:50

## 2024-09-26 RX ADMIN — ASPIRIN 81 MG CHEWABLE TABLET 81 MG: 81 TABLET CHEWABLE at 08:50

## 2024-09-26 RX ADMIN — Medication 12.5 MG: at 21:00

## 2024-09-26 RX ADMIN — SENNOSIDES AND DOCUSATE SODIUM 1 TABLET: 8.6; 5 TABLET ORAL at 21:00

## 2024-09-26 RX ADMIN — INSULIN LISPRO 1 UNITS: 100 INJECTION, SOLUTION INTRAVENOUS; SUBCUTANEOUS at 12:12

## 2024-09-26 RX ADMIN — BACITRACIN ZINC 1 SMALL APPLICATION: 500 OINTMENT TOPICAL at 08:50

## 2024-09-26 RX ADMIN — BACITRACIN ZINC 1 SMALL APPLICATION: 500 OINTMENT TOPICAL at 17:42

## 2024-09-26 RX ADMIN — Medication 1 TABLET: at 08:52

## 2024-09-26 RX ADMIN — AMPICILLIN SODIUM 2000 MG: 2 INJECTION, POWDER, FOR SOLUTION INTRAMUSCULAR; INTRAVENOUS at 17:44

## 2024-09-26 RX ADMIN — Medication 12.5 MG: at 08:49

## 2024-09-26 RX ADMIN — APIXABAN 5 MG: 5 TABLET, FILM COATED ORAL at 17:42

## 2024-09-26 RX ADMIN — AMPICILLIN SODIUM 2000 MG: 2 INJECTION, POWDER, FOR SOLUTION INTRAMUSCULAR; INTRAVENOUS at 00:41

## 2024-09-26 RX ADMIN — POTASSIUM CHLORIDE 20 MEQ: 1.5 SOLUTION ORAL at 17:42

## 2024-09-26 RX ADMIN — LISINOPRIL 2.5 MG: 2.5 TABLET ORAL at 08:50

## 2024-09-26 RX ADMIN — ESCITALOPRAM OXALATE 10 MG: 10 TABLET ORAL at 08:50

## 2024-09-26 RX ADMIN — Medication 1 TABLET: at 17:41

## 2024-09-26 NOTE — PLAN OF CARE
Problem: Potential for Falls  Goal: Patient will remain free of falls  Description: INTERVENTIONS:  - Educate patient/family on patient safety including physical limitations  - Instruct patient to call for assistance with activity   - Consult OT/PT to assist with strengthening/mobility   - Keep Call bell within reach  - Keep bed low and locked with side rails adjusted as appropriate  - Keep care items and personal belongings within reach  - Initiate and maintain comfort rounds  - Make Fall Risk Sign visible to staff  - Offer Toileting every 2 Hours, in advance of need  - Initiate/Maintain bed alarm  - Obtain necessary fall risk management equipment: bed alarm call bell  - Apply yellow socks and bracelet for high fall risk patients  - Consider moving patient to room near nurses station  Outcome: Progressing     Problem: PAIN - ADULT  Goal: Verbalizes/displays adequate comfort level or baseline comfort level  Description: Interventions:  - Encourage patient to monitor pain and request assistance  - Assess pain using appropriate pain scale  - Administer analgesics based on type and severity of pain and evaluate response  - Implement non-pharmacological measures as appropriate and evaluate response  - Consider cultural and social influences on pain and pain management  - Notify physician/advanced practitioner if interventions unsuccessful or patient reports new pain  Outcome: Progressing     Problem: INFECTION - ADULT  Goal: Absence or prevention of progression during hospitalization  Description: INTERVENTIONS:  - Assess and monitor for signs and symptoms of infection  - Monitor lab/diagnostic results  - Monitor all insertion sites, i.e. indwelling lines, tubes, and drains  - Monitor endotracheal if appropriate and nasal secretions for changes in amount and color  - [place] appropriate cooling/warming therapies per order  - Administer medications as ordered  - Instruct and encourage patient and family to use good  hand hygiene technique  - Identify and instruct in appropriate isolation precautions for identified infection/condition  Outcome: Progressing  Goal: Absence of fever/infection during neutropenic period  Description: INTERVENTIONS:  - Monitor WBC    Outcome: Progressing     Problem: SAFETY ADULT  Goal: Patient will remain free of falls  Description: INTERVENTIONS:  - Educate patient/family on patient safety including physical limitations  - Instruct patient to call for assistance with activity   - Consult OT/PT to assist with strengthening/mobility   - Keep Call bell within reach  - Keep bed low and locked with side rails adjusted as appropriate  - Keep care items and personal belongings within reach  - Initiate and maintain comfort rounds  - Make Fall Risk Sign visible to staff  - Offer Toileting every 2 Hours, in advance of need  - Initiate/Maintain bed alarm  - Obtain necessary fall risk management equipment: bed alarm call bell  - Apply yellow socks and bracelet for high fall risk patients  - Consider moving patient to room near nurses station  Outcome: Progressing  Goal: Maintain or return to baseline ADL function  Description: INTERVENTIONS:  -  Assess patient's ability to carry out ADLs; assess patient's baseline for ADL function and identify physical deficits which impact ability to perform ADLs (bathing, care of mouth/teeth, toileting, grooming, dressing, etc.)  - Assess/evaluate cause of self-care deficits   - Assess range of motion  - Assess patient's mobility; develop plan if impaired  - Assess patient's need for assistive devices and provide as appropriate  - Encourage maximum independence but intervene and supervise when necessary  - Involve family in performance of ADLs  - Assess for home care needs following discharge   - Consider OT consult to assist with ADL evaluation and planning for discharge  - Provide patient education as appropriate  Outcome: Progressing  Goal: Maintains/Returns to pre  admission functional level  Description: INTERVENTIONS:  - Perform AM-PAC 6 Click Basic Mobility/ Daily Activity assessment daily.  - Set and communicate daily mobility goal to care team and patient/family/caregiver.   - Collaborate with rehabilitation services on mobility goals if consulted  - Perform Range of Motion 4 times a day.  - Reposition patient every 2 hours.  - Dangle patient 3 times a day  - Stand patient 3 times a day  - Ambulate patient 3 times a day  - Out of bed to chair 3 times a day   - Out of bed for meals 3 times a day  - Out of bed for toileting  - Record patient progress and toleration of activity level   Outcome: Progressing     Problem: DISCHARGE PLANNING  Goal: Discharge to home or other facility with appropriate resources  Description: INTERVENTIONS:  - Identify barriers to discharge w/patient and caregiver  - Arrange for needed discharge resources and transportation as appropriate  - Identify discharge learning needs (meds, wound care, etc.)  - Arrange for interpretive services to assist at discharge as needed  - Refer to Case Management Department for coordinating discharge planning if the patient needs post-hospital services based on physician/advanced practitioner order or complex needs related to functional status, cognitive ability, or social support system  Outcome: Progressing     Problem: Knowledge Deficit  Goal: Patient/family/caregiver demonstrates understanding of disease process, treatment plan, medications, and discharge instructions  Description: Complete learning assessment and assess knowledge base.  Interventions:  - Provide teaching at level of understanding  - Provide teaching via preferred learning methods  Outcome: Progressing     Problem: Prexisting or High Potential for Compromised Skin Integrity  Goal: Skin integrity is maintained or improved  Description: INTERVENTIONS:  - Identify patients at risk for skin breakdown  - Assess and monitor skin integrity  - Assess  and monitor nutrition and hydration status  - Monitor labs   - Assess for incontinence   - Turn and reposition patient  - Assist with mobility/ambulation  - Relieve pressure over bony prominences  - Avoid friction and shearing  - Provide appropriate hygiene as needed including keeping skin clean and dry  - Evaluate need for skin moisturizer/barrier cream  - Collaborate with interdisciplinary team   - Patient/family teaching  - Consider wound care consult   Outcome: Progressing     Problem: GASTROINTESTINAL - ADULT  Goal: Maintains or returns to baseline bowel function  Description: INTERVENTIONS:  - Assess bowel function  - Encourage oral fluids to ensure adequate hydration  - Administer IV fluids if ordered to ensure adequate hydration  - Administer ordered medications as needed  - Encourage mobilization and activity  - Consider nutritional services referral to assist patient with adequate nutrition and appropriate food choices  Outcome: Progressing     Problem: GENITOURINARY - ADULT  Goal: Urinary catheter remains patent  Description: INTERVENTIONS:  - Assess patency of urinary catheter  - If patient has a chronic hanks, consider changing catheter if non-functioning  - Follow guidelines for intermittent irrigation of non-functioning urinary catheter  Outcome: Progressing     Problem: METABOLIC, FLUID AND ELECTROLYTES - ADULT  Goal: Electrolytes maintained within normal limits  Description: INTERVENTIONS:  - Monitor labs and assess patient for signs and symptoms of electrolyte imbalances  - Administer electrolyte replacement as ordered  - Monitor response to electrolyte replacements, including repeat lab results as appropriate  - Instruct patient on fluid and nutrition as appropriate  Outcome: Progressing     Problem: SKIN/TISSUE INTEGRITY - ADULT  Goal: Skin Integrity remains intact(Skin Breakdown Prevention)  Description: Assess:  -Perform Goldy assessment every shift  -Clean and moisturize skin every  shift  -Inspect skin when repositioning, toileting, and assisting with ADLS  -Assess under medical devices such as Masimo every shift  -Assess extremities for adequate circulation and sensation     Bed Management:  -Have minimal linens on bed & keep smooth, unwrinkled  -Change linens as needed when moist or perspiring  -Avoid sitting or lying in one position for more than 1 hours while in bed  -Keep HOB at 30 degrees     Toileting:  -Offer bedside commode  -Assess for incontinence every shift  -Use incontinent care products after each incontinent episode such as foam cleanser     Activity:  -Mobilize patient 4 times a day  -Encourage activity and walks on unit  -Encourage or provide ROM exercises   -Turn and reposition patient every 2 Hours  -Use appropriate equipment to lift or move patient in bed  -Instruct/ Assist with weight shifting every 1 hour when out of bed in chair  -Consider limitation of chair time 1 hour intervals    Skin Care:  -Avoid use of baby powder, tape, friction and shearing, hot water or constrictive clothing  -Relieve pressure over bony prominences using wedges  -Do not massage red bony areas    Next Steps:  -Teach patient strategies to minimize risks such as skin breakdown   -Consider consults to  interdisciplinary teams such as wound care nurse   Outcome: Progressing  Goal: Incision(s), wounds(s) or drain site(s) healing without S/S of infection  Description: INTERVENTIONS  - Assess and document dressing, incision, wound bed, drain sites and surrounding tissue  - Provide patient and family education  - Perform skin care/dressing changes every shift   Outcome: Progressing  Goal: Pressure injury heals and does not worsen  Description: Interventions:  - Implement low air loss mattress or specialty surface (Criteria met)  - Apply silicone foam dressing  - Instruct/assist with weight shifting every 30 minutes when in chair   - Limit chair time to 1 hour intervals  - Use special pressure reducing  interventions such as cushion when in chair   - Apply fecal or urinary incontinence containment device   - Perform passive or active ROM every shift  - Turn and reposition patient & offload bony prominences every 1 hours   - Utilize friction reducing device or surface for transfers   - Consider consults to  interdisciplinary teams such as wound care nurse   - Use incontinent care products after each incontinent episode such as foam cleanser   - Consider nutrition services referral as needed  Outcome: Progressing     Problem: HEMATOLOGIC - ADULT  Goal: Maintains hematologic stability  Description: INTERVENTIONS  - Assess for signs and symptoms of bleeding or hemorrhage  - Monitor labs  - Administer supportive blood products/factors as ordered and appropriate  Outcome: Progressing     Problem: Nutrition/Hydration-ADULT  Goal: Nutrient/Hydration intake appropriate for improving, restoring or maintaining nutritional needs  Description: Monitor and assess patient's nutrition/hydration status for malnutrition. Collaborate with interdisciplinary team and initiate plan and interventions as ordered.  Monitor patient's weight and dietary intake as ordered or per policy. Utilize nutrition screening tool and intervene as necessary. Determine patient's food preferences and provide high-protein, high-caloric foods as appropriate.     INTERVENTIONS:  - Monitor oral intake, urinary output, labs, and treatment plans  - Assess nutrition and hydration status and recommend course of action  - Evaluate amount of meals eaten  - Assist patient with eating if necessary   - Allow adequate time for meals  - Recommend/ encourage appropriate diets, oral nutritional supplements, and vitamin/mineral supplements  - Order, calculate, and assess calorie counts as needed  - Recommend, monitor, and adjust tube feedings and TPN/PPN based on assessed needs  - Assess need for intravenous fluids  - Provide specific nutrition/hydration education as  appropriate  - Include patient/family/caregiver in decisions related to nutrition  Outcome: Progressing

## 2024-09-26 NOTE — PROGRESS NOTES
Progress Note - Hospitalist   Name: Drew Santos 73 y.o. male I MRN: [id number]  Unit/Bed#: [place] [address] -01 I Date of Admission: 9/17/2024   Date of Service: 9/26/2024 I Hospital Day: 9    Assessment & Plan  Sepsis  Presented with tachycardia and leukocytosis and fever - tested positive for COVID 9/17/24  Now with polymicrobial bacteremia- enterbacter, enterococcus and proetus   CT abdomen and pelvis: mid/distal sigmoid colonic and rectal inflammatory changes in keeping with a segmental colitis/proctitis. Right ischioanal fossa subcutaneous emphysema extending through the right paramidline gluteal subcutaneous tissues to the skin surface suspicious for a perirectal or perianal fistula   Has large sacral decubitus ulcer- status post bedside debridement of sacral ulcer 9/18/24 by general surgery  Continue with dressing changes and wound care management by general surgery  Repeat CT abdomen and pelvis 9/24 to evaluate for any fistula connection versus abscess due to ongoing leukocytosis  This reveals no ongoing abscess however does show developing osteomyelitis of coccyx bone.  Seen by GI imaging likely due to ischemic colitis but likely not primary cause of his presentation - no role for colonoscopy or flex sig curently    Continue IV antibiotics being managed by infectious disease  Was on cefepime, flagyl and vanco -- being adjusted  Repeat blood cultures negative to date - greater than 4 days  ID transitioning the patient to ampicillin 2 g every 6 hours to begin tomorrow to complete a total of 2 weeks of antibiotic therapy for enterococcal bacteremia through 10/3/24  Polymicrobial bacteremia  Polymicrobial bacteremia likely secondary sacral wound with possible fistulous connection  Infectious disease input noted and appreciated.  Continue IV cefepime, flagyl and vancomycin   ID transitioning the patient to ampicillin 2 g every 6 hours to begin tomorrow to complete a total of 2 weeks of antibiotic therapy for  enterococcal bacteremia through 10/3   Repeat cultures negative to date   Underwent transesophageal echocardiogram 9/25/2024-no evidence of endocarditis  Sacral wound  Chronic sacral decubitus ulcer; present on admission  Status post debridement at bedside per surgical team.   Continue with wound care as tolerated.     Continue dressing changes and serial exams  Wound care consultation  Offload pressure   Concern for emphysematous changes tracking to sacral wound/developing fistula from site on repeat abdominal imaging on 924  Breached the topic with wife about palliative care but seems overwhelmed at this thought at this time 9/24  Spoke with wife via telephone 9/26/2024-discussed concern for osteomyelitis development  She will wait to speak with palliative care in regards to ongoing goals of care discussion before making any further decisions  Advanced care planning/counseling discussion  Ongoing discussions in regards to poor prognosis with large sacral wound and developing osteomyelitis of coccyx bone in the setting of polymicrobial bacteremia  Type 2 diabetes mellitus without complication, without long-term current use of insulin ([facility])  Lab Results   Component Value Date    HGBA1C 6.2 (H) 09/17/2024     Recent Labs     09/25/24  1555 09/25/24  2112 09/26/24  0719 09/26/24  1119   POCGLU 104 159* 117 150*   Controlled with last hemoglobin A1c of 6.2%  Holding home oral regimen  Monitor on sliding scale    COVID-19  Tested positive for COVID on 9/17/2024  Patient currently on room air though high risk given his age and comorbidities  Completed three days of IV remdesivir   Supportive care otherwise  Contact and airborne precautions  Currently on mild COVID pathway.    Not requiring supplemental oxygen- remains on room air  To complete 10 days of isolation through 9/27   Isolation to be removed 9/28  Proctitis  Noted on CT scan.  No diarrhea or clinical evidence of colitis.   Stool studies negative  Likely  ischemic colitis given area affected, continue supportive measures   No plan for any inpatient scope   Repeat CAT scan without signs of ongoing abscess.  Acute metabolic encephalopathy  Presumably secondary to sepsis, bacteremia, COVID infection  CT head with no acute intracranial abnormality  Continue with supportive care  Reorientation, delirium precautions   History of CVA (cerebrovascular accident)  History of CVA   Chronically bedbound and with chronic left-sided deficits   Continue home aspirin, Eliquis, statin  Dysphagia 2 mechanical soft, thin liquids   Residing at [place] complete care for the past 3 weeks  Paroxysmal atrial fibrillation (HCC)  Continue Toprol XL 25 mg daily  Continue home Eliquis for stroke prevention  Anemia  Low blood counts noted 9/19/24.   Has chronic anemia likely secondary to poor nutritional status underlying chronic illness.    Hemoglobin declined to 7.1 given -1 unit PRBCs given 9/19/2024  Encourage oral intake, cannot utilize IV iron due to bacteremia   Lab Results   Component Value Date    HGB 8.6 (L) 09/26/2024    HGB 8.3 (L) 09/25/2024    HGB 7.6 (L) 09/24/2024    HGB 7.6 (L) 09/23/2024    HGB 7.6 (L) 09/22/2024   Ongoing monitoring of hemoglobin and continue to transfuse if less than 7  Severe protein-calorie malnutrition (HCC)  Malnutrition Findings:   Adult Malnutrition type: Chronic illness  Adult Degree of Malnutrition: Other severe protein calorie malnutrition  Malnutrition Characteristics: Inadequate energy, Weight loss  360 Statement: Severe protein calorie malnutrition in context of chronic illness r/t poor appetite, inadequate PO intake as evidance by energy intake less than 75% compared to estimated needs>1 month, 7.5% wt loss x 1 month ( 122kg 8/23/24-> 113kg 9/17) treated with PO diet and oral supplements  BMI Findings:  Body mass index is 29.53 kg/m².     VTE Pharmacologic Prophylaxis:  eliquis    Mobility:   Basic Mobility Inpatient Raw Score: 8  -[facility] Goal:  3: Sit at edge of bed  [facility] Achieved: 1: Laying in bed    Patient Centered Rounds: I performed bedside rounds with nursing staff today.   Discussions with Specialists or Other Care Team Provider: nursing, palliative care    Education and Discussions with Family / Patient: patient, wife at bedside    Current Length of Stay: 9 day(s)  Current Patient Status: Inpatient   Discharge Plan: With need for continued patient stay for ongoing IV antibiotics, sacral wound, safe discharge plan, palliative care evaluation, coordination of care    Code Status: Level 1 - Full Code    Subjective   Patient resting in bed.  Answers basic questions with one-word answers.  Oriented to name and place.  Had a long discussion with wife via telephone today.  She was able to visualize sacral wound yesterday while dressing was being changed at bedside. We discussed large wound and development of osteomyelitis to coccyx bone. Ongoing discussion in regards to goals of care.  Eager to speak with Dr. Saavedra of palliative care today.    Objective     Vitals:   Temp (24hrs), Av.2 °F (36.8 °C), Min:98.1 °F (36.7 °C), Max:98.4 °F (36.9 °C)    Temp:  [98.1 °F (36.7 °C)-98.4 °F (36.9 °C)] 98.1 °F (36.7 °C)  HR:  [67-89] 82  Resp:  [15-20] 20  BP: (143-157)/(79-93) 154/93  SpO2:  [95 %-97 %] 97 %  Body mass index is 29.53 kg/m².     Input and Output Summary (last 24 hours):     Intake/Output Summary (Last 24 hours) at 2024 1146  Last data filed at 2024 0516  Gross per 24 hour   Intake 120 ml   Output 2100 ml   Net -1980 ml       Physical Exam  Vitals and nursing note reviewed.   Constitutional:       General: He is not in acute distress.     Appearance: He is obese. He is ill-appearing. He is not toxic-appearing or diaphoretic.   HENT:      Head: Normocephalic and atraumatic.   Eyes:      General: No scleral icterus.  Cardiovascular:      Rate and Rhythm: Normal rate and regular rhythm.   Pulmonary:      Effort: Pulmonary effort is  normal. No respiratory distress.      Breath sounds: Normal breath sounds. No stridor. No wheezing or rhonchi.   Abdominal:      General: Bowel sounds are normal. There is no distension.      Palpations: Abdomen is soft. There is no mass.      Tenderness: There is no abdominal tenderness.      Hernia: No hernia is present.   Genitourinary:     Comments: Parra catheter in place draining yellow urine  Musculoskeletal:         General: No swelling.   Skin:     General: Skin is warm and dry.   Neurological:      Mental Status: He is alert. Mental status is at baseline.      Motor: Weakness present.      Comments: To left side.     Was oriented to person and place   Psychiatric:         Attention and Perception: He is inattentive.         Mood and Affect: Mood normal.         Behavior: Behavior is slowed and withdrawn.         Cognition and Memory: Cognition is impaired. Memory is impaired.        Lines/Drains:  Lines/Drains/Airways       Active Status       Name Placement date Placement time Site Days    Urethral Catheter Three way 22 Fr. 08/31/24 1925  Three way  25    Continuous Bladder Irrigation Three-way 08/31/24 1925  Three-way  25                  Urinary Catheter:  Goal for removal: N/A - Chronic Parra                 Lab Results: I have reviewed the following results:    Results from last 7 days   Lab Units 09/26/24  0439   WBC Thousand/uL 12.15*   HEMOGLOBIN g/dL 8.6*   HEMATOCRIT % 27.7*   PLATELETS Thousands/uL 531*   SEGS PCT % 65   LYMPHO PCT % 22   MONO PCT % 8   EOS PCT % 2     Results from last 7 days   Lab Units 09/26/24  0439   SODIUM mmol/L 138   POTASSIUM mmol/L 3.8   CHLORIDE mmol/L 105   CO2 mmol/L 30   BUN mg/dL 6   CREATININE mg/dL 0.57*   ANION GAP mmol/L 3*   CALCIUM mg/dL 8.2*   ALBUMIN g/dL 2.1*   TOTAL BILIRUBIN mg/dL 0.50   ALK PHOS U/L 52   ALT U/L 17   AST U/L 25   GLUCOSE RANDOM mg/dL 128         Results from last 7 days   Lab Units 09/26/24  1119 09/26/24  0719 09/25/24 2112  09/25/24  1555 09/25/24  1100 09/25/24  0726 09/24/24  2120 09/24/24  1614 09/24/24  1137 09/24/24  0806 09/23/24  2056 09/23/24  1635   POC GLUCOSE mg/dl 150* 117 159* 104 92 103 129 183* 113 118 178* 113               Recent Cultures (last 7 days):   Results from last 7 days   Lab Units 09/20/24  0555   BLOOD CULTURE  No Growth After 5 Days.  No Growth After 5 Days.       Imaging Review: ct abdomen      Last 24 Hours Medication List:     Current Facility-Administered Medications:     acetaminophen (Ofirmev) injection 1,000 mg, Q6H PRN    acetaminophen (TYLENOL) tablet 650 mg, Q6H PRN    ampicillin (OMNIPEN) 2,000 mg in sodium chloride 0.9 % 100 mL IVPB, Q6H, Last Rate: 2,000 mg (09/26/24 0516)    apixaban (ELIQUIS) tablet 5 mg, BID    aspirin chewable tablet 81 mg, Daily    atorvastatin (LIPITOR) tablet 80 mg, QPM    bacitracin topical ointment 1 small application, BID    calcium carbonate-vitamin D 500 mg-5 mcg tablet 1 tablet, BID With Meals    escitalopram (LEXAPRO) tablet 10 mg, Daily    ezetimibe (ZETIA) tablet 10 mg, Daily    finasteride (PROSCAR) tablet 5 mg, Daily    insulin lispro (HumALOG/ADMELOG) 100 units/mL subcutaneous injection 1-6 Units, TID AC **AND** Fingerstick Glucose (POCT), TID AC    insulin lispro (HumALOG/ADMELOG) 100 units/mL subcutaneous injection 1-6 Units, HS    lisinopril (ZESTRIL) tablet 2.5 mg, Daily    metoprolol tartrate (LOPRESSOR) partial tablet 12.5 mg, Q12H ELISEO    ondansetron (ZOFRAN) injection 4 mg, Q4H PRN    potassium chloride oral solution 20 mEq, BID    senna-docusate sodium (SENOKOT S) 8.6-50 mg per tablet 1 tablet, HS    sodium hypochlorite (DAKIN'S QUARTER-STRENGTH) 0.125 percent topical solution, Daily    Administrative Statements   Today, Patient Was Seen By: Whit Kimble PA-C      **Please Note: This note may have been constructed using a voice recognition system.**

## 2024-09-26 NOTE — ASSESSMENT & PLAN NOTE
Lab Results   Component Value Date    HGBA1C 6.2 (H) 09/17/2024     Recent Labs     09/25/24  1555 09/25/24  2112 09/26/24  0719 09/26/24  1119   POCGLU 104 159* 117 150*   Controlled with last hemoglobin A1c of 6.2%  Holding home oral regimen  Monitor on sliding scale

## 2024-09-26 NOTE — PLAN OF CARE
Problem: Potential for Falls  Goal: Patient will remain free of falls  Description: INTERVENTIONS:  - Educate patient/family on patient safety including physical limitations  - Instruct patient to call for assistance with activity   - Consult OT/PT to assist with strengthening/mobility   - Keep Call bell within reach  - Keep bed low and locked with side rails adjusted as appropriate  - Keep care items and personal belongings within reach  - Initiate and maintain comfort rounds  - Make Fall Risk Sign visible to staff  - Offer Toileting every 2 Hours, in advance of need  - Initiate/Maintain bed alarm  - Obtain necessary fall risk management equipment: bed alarm call bell  - Apply yellow socks and bracelet for high fall risk patients  - Consider moving patient to room near nurses station  Outcome: Progressing     Problem: PAIN - ADULT  Goal: Verbalizes/displays adequate comfort level or baseline comfort level  Description: Interventions:  - Encourage patient to monitor pain and request assistance  - Assess pain using appropriate pain scale  - Administer analgesics based on type and severity of pain and evaluate response  - Implement non-pharmacological measures as appropriate and evaluate response  - Consider cultural and social influences on pain and pain management  - Notify physician/advanced practitioner if interventions unsuccessful or patient reports new pain  Outcome: Progressing     Problem: INFECTION - ADULT  Goal: Absence or prevention of progression during hospitalization  Description: INTERVENTIONS:  - Assess and monitor for signs and symptoms of infection  - Monitor lab/diagnostic results  - Monitor all insertion sites, i.e. indwelling lines, tubes, and drains  - Monitor endotracheal if appropriate and nasal secretions for changes in amount and color  - [place] appropriate cooling/warming therapies per order  - Administer medications as ordered  - Instruct and encourage patient and family to use good  hand hygiene technique  - Identify and instruct in appropriate isolation precautions for identified infection/condition  Outcome: Progressing     Problem: INFECTION - ADULT  Goal: Absence of fever/infection during neutropenic period  Description: INTERVENTIONS:  - Monitor WBC    Outcome: Progressing     Problem: SAFETY ADULT  Goal: Patient will remain free of falls  Description: INTERVENTIONS:  - Educate patient/family on patient safety including physical limitations  - Instruct patient to call for assistance with activity   - Consult OT/PT to assist with strengthening/mobility   - Keep Call bell within reach  - Keep bed low and locked with side rails adjusted as appropriate  - Keep care items and personal belongings within reach  - Initiate and maintain comfort rounds  - Make Fall Risk Sign visible to staff  - Offer Toileting every 2 Hours, in advance of need  - Initiate/Maintain bed alarm  - Obtain necessary fall risk management equipment: bed alarm call bell  - Apply yellow socks and bracelet for high fall risk patients  - Consider moving patient to room near nurses station  Outcome: Progressing

## 2024-09-26 NOTE — CONSULTS
The patient's vancomycin IV treatment has been completed; the patient is being continued on ampicillin IV for his enterococcal bacteremia. Pharmacy will sign off as consultants for vancomycin dose management. Thank you!

## 2024-09-26 NOTE — ASSESSMENT & PLAN NOTE
Polymicrobial bacteremia likely secondary sacral wound with possible fistulous connection  Infectious disease input noted and appreciated.  Continue IV cefepime, flagyl and vancomycin   ID transitioning the patient to ampicillin 2 g every 6 hours to begin tomorrow to complete a total of 2 weeks of antibiotic therapy for enterococcal bacteremia through 10/3   Repeat cultures negative to date   Underwent transesophageal echocardiogram 9/25/2024-no evidence of endocarditis

## 2024-09-26 NOTE — ASSESSMENT & PLAN NOTE
Chronic sacral decubitus ulcer; present on admission  Status post debridement at bedside per surgical team.   Continue with wound care as tolerated.     Continue dressing changes and serial exams  Wound care consultation  Offload pressure   Concern for emphysematous changes tracking to sacral wound/developing fistula from site on repeat abdominal imaging on 924  Breached the topic with wife about palliative care but seems overwhelmed at this thought at this time 9/24  Spoke with wife via telephone 9/26/2024-discussed concern for osteomyelitis development  She will wait to speak with palliative care in regards to ongoing goals of care discussion before making any further decisions

## 2024-09-26 NOTE — ASSESSMENT & PLAN NOTE
History of CVA   Chronically bedbound and with chronic left-sided deficits   Continue home aspirin, Eliquis, statin  Dysphagia 2 mechanical soft, thin liquids   Residing at [place] complete care for the past 3 weeks

## 2024-09-26 NOTE — ASSESSMENT & PLAN NOTE
Patient appears to lack the capacity to make any complex medical choices on my visit today  I called and spoke to his wife/Kristi earlier and then met with her in person later on. Discussed severity of his condition with the infection from his sacral wound. She is aware of how serious this is now and she tells me this is not amenable to surgery. Given bed bound status due to prior recent strokes, wound unlikely to close on its own and will always be prone to more infections in the future even with the best wound care. Future infections can range from mild localized infection to severe systemic infections (sepsis/septic shock). If we are not able to control source of infection, antibiotics can also do so much. In the event of a more serious infection that has not responded to maximal supportive cares, then hospice to make him comfortable may be the only other viable option. I discussed the recommendations from the ID team of continuing and switching to another antibiotic today until 10/3 to address the bacteremia and surgery/wound cares is doing wound care.   She appreciates the seriousness of his condition and appears more open to hospice/comfort cares in the future. At this time, she is not ready for this path yet and would like to continue current cares.   I also addressed his code status and she wants his to remain a level 1/Full code.   Palliative medicine will sign off. Goals are clear. Please re-engage if goals need to be re-addressed  D/w SLIM above    Code Status: Level 1 - Full Code  Advance Directive and Living Will:    none  Power of :  wife per PA   POLST:

## 2024-09-26 NOTE — PROGRESS NOTES
Progress Note - Palliative Care   Name: Drew Santos 73 y.o. male I MRN: [id number]  Unit/Bed#: [place] [address] -01 I Date of Admission: 9/17/2024   Date of Service: 9/26/2024 I Hospital Day: 9    Assessment & Plan  Advanced care planning/counseling discussion  Patient appears to lack the capacity to make any complex medical choices on my visit today  I called and spoke to his wife/Kristi earlier and then met with her in person later on. Discussed severity of his condition with the infection from his sacral wound. She is aware of how serious this is now and she tells me this is not amenable to surgery. Given bed bound status due to prior recent strokes, wound unlikely to close on its own and will always be prone to more infections in the future even with the best wound care. Future infections can range from mild localized infection to severe systemic infections (sepsis/septic shock). If we are not able to control source of infection, antibiotics can also do so much. In the event of a more serious infection that has not responded to maximal supportive cares, then hospice to make him comfortable may be the only other viable option. I discussed the recommendations from the ID team of continuing and switching to another antibiotic today until 10/3 to address the bacteremia and surgery/wound cares is doing wound care.   She appreciates the seriousness of his condition and appears more open to hospice/comfort cares in the future. At this time, she is not ready for this path yet and would like to continue current cares.   I also addressed his code status and she wants his to remain a level 1/Full code.   Palliative medicine will sign off. Goals are clear. Please re-engage if goals need to be re-addressed  D/w SLIM above    Code Status: Level 1 - Full Code  Advance Directive and Living Will:    none  Power of :  wife per PA   POLST:    Sepsis  ID on board, on IV antibiotics  Tomorrow, 9/26, ID is transition to PO  meds for total 2 weeks therapy    Sacral wound  S/p bedside debridement  Repeat CT on 9/24 showing developing osteomyelitis of the coccyx.   Per notes, there are no plans for prolonged antibiotic for this issue unless patient is to undergo resection and flap closure   COVID-19  Comfortable and stable on RA  Polymicrobial bacteremia  Source: sacral wound  Going for JOSEFINA today  Acute metabolic encephalopathy  Multifactorial but from infection as above  Appears better today per chart review but patient is still not very interactive/conversant  Severe protein-calorie malnutrition (HCC)  Malnutrition Findings:   Adult Malnutrition type: Chronic illness  Adult Degree of Malnutrition: Other severe protein calorie malnutrition  Malnutrition Characteristics: Inadequate energy, Weight loss                  360 Statement: Severe protein calorie malnutrition in context of chronic illness r/t poor appetite, inadequate PO intake as evidance by energy intake less than 75% compared to estimated needs>1 month, 7.5% wt loss x 1 month ( 122kg 8/23/24-> 113kg 9/17) treated with PO diet and oral supplements    BMI Findings:           Body mass index is 29.53 kg/m².       Decisional apparatus: Patient is not competent on my exam today. If competence is lost, patient's substitute decision maker would default to wife by [place] Act 169.     PDMP Review: I have reviewed the patient's controlled substance dispensing history in the Prescription Drug Monitoring Program in compliance with the [place] regulations before prescribing any controlled substances.    History of Present Illness /INTERVAL HISTORY  Chart reviewed. BUFFY. Saw patient today, appears the same as yesteday. Not very interactive/communicative. Appears comfortable. Spoke to wife, discussion as above.     Objective      Temp:  [98.1 °F (36.7 °C)-98.4 °F (36.9 °C)] 98.1 °F (36.7 °C)  HR:  [72-89] 82  Resp:  [17-20] 20  BP: (143-154)/(79-93) 154/93  O2 Device: None (Room air)          I/O  last 24 hours:  In: 420 [P.O.:120; I.V.:300]  Out: 3475 [Urine:3475]  Lines/Drains/Airways       Active Status       Name Placement date Placement time Site Days    Urethral Catheter Three way 22 Fr. 08/31/24 1925  Three way  25    Continuous Bladder Irrigation Three-way 08/31/24 1925  Three-way  25                  Physical Exam  Constitutional:       General: He is not in acute distress.     Appearance: He is ill-appearing. He is not toxic-appearing or diaphoretic.      Comments: Chronically ill looking, comfortable, stable   HENT:      Head: Normocephalic and atraumatic.   Eyes:      General: No scleral icterus.        Right eye: No discharge.         Left eye: No discharge.   Pulmonary:      Effort: Pulmonary effort is normal. No respiratory distress.   Abdominal:      General: Abdomen is flat. There is no distension.   Skin:     General: Skin is warm and dry.      Coloration: Skin is not jaundiced or pale.   Neurological:      Mental Status: He is alert.      Comments: Awake and alert. Acknowledged that I was in the room. Said he was fine. But did not engage in further conversations.    Psychiatric:      Comments: Not agitated          Lab Results: I have reviewed the following results:   Lab Results   Component Value Date/Time    SODIUM 138 09/26/2024 04:39 AM    SODIUM 139 08/22/2024 09:37 AM    K 3.8 09/26/2024 04:39 AM    K 4.6 08/22/2024 09:37 AM    BUN 6 09/26/2024 04:39 AM    BUN 13 08/22/2024 09:37 AM    CREATININE 0.57 (L) 09/26/2024 04:39 AM    CREATININE 0.78 08/22/2024 09:37 AM    GLUC 128 09/26/2024 04:39 AM    GLUC 123 (H) 08/22/2024 09:37 AM    CALCIUM 8.2 (L) 09/26/2024 04:39 AM    CALCIUM 8.9 08/22/2024 09:37 AM    AST 25 09/26/2024 04:39 AM    AST 20 08/22/2024 09:37 AM    ALT 17 09/26/2024 04:39 AM    ALT 22 08/22/2024 09:37 AM    ALB 2.1 (L) 09/26/2024 04:39 AM    ALB 2.8 (L) 08/22/2024 09:37 AM    TP 6.8 09/26/2024 04:39 AM    TP 6.2 (L) 08/22/2024 09:37 AM    EGFR 100 09/26/2024 04:39 AM     EGFR 93 08/22/2024 09:37 AM     Lab Results   Component Value Date/Time    HGB 8.6 (L) 09/26/2024 04:39 AM    WBC 12.15 (H) 09/26/2024 04:39 AM     (H) 09/26/2024 04:39 AM    INR 1.39 (H) 08/31/2024 06:16 PM    PTT 38 (H) 08/31/2024 06:16 PM     Lab Results   Component Value Date/Time    WHG1KHBFVLNZ 5.228 (H) 03/08/2024 05:30 AM       Administrative Statements   I have spent a total time of 60 minutes in caring for this patient on the day of the visit/encounter including Diagnostic results, Prognosis, Risks and benefits of tx options, Instructions for management, Patient and family education, Importance of tx compliance, Risk factor reductions, Impressions, Counseling / Coordination of care, Documenting in the medical record, Reviewing / ordering tests, medicine, procedures  , Obtaining or reviewing history  , and Communicating with other healthcare professionals .    Lisa Dwyer MD  Palliative Medicine & Supportive Care  Internal Medicine  Available via [place] Text  Office: [phone]  Fax: [phone]

## 2024-09-26 NOTE — SPEECH THERAPY NOTE
"Speech Language/Pathology    Speech/Language Pathology Progress Note    Patient Name: Drew Santos  Today's Date: 9/26/2024     Problem List  Principal Problem:    Advanced care planning/counseling discussion  Active Problems:    Sepsis    Sacral wound    Type 2 diabetes mellitus without complication, without long-term current use of insulin (HCC)    COVID-19    Proctitis    Polymicrobial bacteremia    Acute metabolic encephalopathy    History of CVA (cerebrovascular accident)    Paroxysmal atrial fibrillation (HCC)    Anemia    Severe protein-calorie malnutrition (HCC)       Past Medical History  Past Medical History:   Diagnosis Date    Atherosclerotic heart disease of native coronary artery without angina pectoris     Atrial fibrillation (HCC)     Cerebral infarction (HCC)     Constipation     Depression     Diabetes mellitus (HCC)     Hemiplegia and hemiparesis following cerebral infarction affecting left non-dominant side (HCC)     Hyperlipidemia     Hypertension     Pressure ulcer of sacral region, stage 3 (HCC)     Prostatic hyperplasia     Sepsis (HCC)     Stroke (HCC)     TIA (transient ischemic attack)     Urinary retention     Vitamin D deficiency         Past Surgical History  No past surgical history on file.      Subjective:  Pt alert and feeding self watermelon that his wife brought in. Wife present.   Objective:  Seen for f/u dysphagia tx. As above pt was feeding self chunks of watermelon. Mastication was quite prolonged but functional. Pt ate a large amount. Assisted w/ getting final pieces out of container. Held some in his cheeks at times but eventually cleared. Swallows prompt. Wife stated he likes fruit, and asked for pizza later. I told her I had been trying t get him to tell me what he wanted to eat. \"Oh he won't\" . This is the most alert and most movement I have see this far. He did not speak to either of us though. Only nodded.  Assessment:  Alert and feeding self water melon today w/ " prolonged mastication. Asked wife for pizza later.   Plan/Recommendations:  Upgrade diet to dysphagia 3 dental soft w/ thin. Assist w/ set up. Encourage intake. Allow wife to give pt regular items when visiting.

## 2024-09-26 NOTE — ASSESSMENT & PLAN NOTE
Ongoing discussions in regards to poor prognosis with large sacral wound and developing osteomyelitis of coccyx bone in the setting of polymicrobial bacteremia

## 2024-09-26 NOTE — ASSESSMENT & PLAN NOTE
Presented with tachycardia and leukocytosis and fever - tested positive for COVID 9/17/24  Now with polymicrobial bacteremia- enterbacter, enterococcus and proetus   CT abdomen and pelvis: mid/distal sigmoid colonic and rectal inflammatory changes in keeping with a segmental colitis/proctitis. Right ischioanal fossa subcutaneous emphysema extending through the right paramidline gluteal subcutaneous tissues to the skin surface suspicious for a perirectal or perianal fistula   Has large sacral decubitus ulcer- status post bedside debridement of sacral ulcer 9/18/24 by general surgery  Continue with dressing changes and wound care management by general surgery  Repeat CT abdomen and pelvis 9/24 to evaluate for any fistula connection versus abscess due to ongoing leukocytosis  This reveals no ongoing abscess however does show developing osteomyelitis of coccyx bone.  Seen by GI imaging likely due to ischemic colitis but likely not primary cause of his presentation - no role for colonoscopy or flex sig curently    Continue IV antibiotics being managed by infectious disease  Was on cefepime, flagyl and vanco -- being adjusted  Repeat blood cultures negative to date - greater than 4 days  ID transitioning the patient to ampicillin 2 g every 6 hours to begin tomorrow to complete a total of 2 weeks of antibiotic therapy for enterococcal bacteremia through 10/3/24

## 2024-09-27 LAB
GLUCOSE SERPL-MCNC: 116 MG/DL (ref 65–140)
GLUCOSE SERPL-MCNC: 122 MG/DL (ref 65–140)
GLUCOSE SERPL-MCNC: 135 MG/DL (ref 65–140)
GLUCOSE SERPL-MCNC: 204 MG/DL (ref 65–140)

## 2024-09-27 PROCEDURE — 82948 REAGENT STRIP/BLOOD GLUCOSE: CPT

## 2024-09-27 PROCEDURE — 99233 SBSQ HOSP IP/OBS HIGH 50: CPT | Performed by: PHYSICIAN ASSISTANT

## 2024-09-27 RX ADMIN — Medication 12.5 MG: at 08:24

## 2024-09-27 RX ADMIN — BACITRACIN ZINC 1 SMALL APPLICATION: 500 OINTMENT TOPICAL at 08:24

## 2024-09-27 RX ADMIN — EZETIMIBE 10 MG: 10 TABLET ORAL at 08:24

## 2024-09-27 RX ADMIN — Medication 1 TABLET: at 08:24

## 2024-09-27 RX ADMIN — INSULIN LISPRO 2 UNITS: 100 INJECTION, SOLUTION INTRAVENOUS; SUBCUTANEOUS at 21:23

## 2024-09-27 RX ADMIN — ASPIRIN 81 MG CHEWABLE TABLET 81 MG: 81 TABLET CHEWABLE at 08:24

## 2024-09-27 RX ADMIN — SODIUM HYPOCHLORITE: 1.25 SOLUTION TOPICAL at 08:25

## 2024-09-27 RX ADMIN — AMPICILLIN SODIUM 2000 MG: 2 INJECTION, POWDER, FOR SOLUTION INTRAMUSCULAR; INTRAVENOUS at 17:39

## 2024-09-27 RX ADMIN — FINASTERIDE 5 MG: 5 TABLET, FILM COATED ORAL at 08:24

## 2024-09-27 RX ADMIN — AMPICILLIN SODIUM 2000 MG: 2 INJECTION, POWDER, FOR SOLUTION INTRAMUSCULAR; INTRAVENOUS at 05:04

## 2024-09-27 RX ADMIN — APIXABAN 5 MG: 5 TABLET, FILM COATED ORAL at 08:24

## 2024-09-27 RX ADMIN — POTASSIUM CHLORIDE 20 MEQ: 1.5 SOLUTION ORAL at 08:24

## 2024-09-27 RX ADMIN — SENNOSIDES AND DOCUSATE SODIUM 1 TABLET: 8.6; 5 TABLET ORAL at 21:19

## 2024-09-27 RX ADMIN — APIXABAN 5 MG: 5 TABLET, FILM COATED ORAL at 17:38

## 2024-09-27 RX ADMIN — BACITRACIN ZINC 1 SMALL APPLICATION: 500 OINTMENT TOPICAL at 17:38

## 2024-09-27 RX ADMIN — AMPICILLIN SODIUM 2000 MG: 2 INJECTION, POWDER, FOR SOLUTION INTRAMUSCULAR; INTRAVENOUS at 11:30

## 2024-09-27 RX ADMIN — POTASSIUM CHLORIDE 20 MEQ: 1.5 SOLUTION ORAL at 17:38

## 2024-09-27 RX ADMIN — AMPICILLIN SODIUM 2000 MG: 2 INJECTION, POWDER, FOR SOLUTION INTRAMUSCULAR; INTRAVENOUS at 00:36

## 2024-09-27 RX ADMIN — ATORVASTATIN CALCIUM 80 MG: 80 TABLET, FILM COATED ORAL at 17:38

## 2024-09-27 RX ADMIN — Medication 1 TABLET: at 17:00

## 2024-09-27 RX ADMIN — ESCITALOPRAM OXALATE 10 MG: 10 TABLET ORAL at 08:24

## 2024-09-27 RX ADMIN — AMPICILLIN SODIUM 2000 MG: 2 INJECTION, POWDER, FOR SOLUTION INTRAMUSCULAR; INTRAVENOUS at 23:05

## 2024-09-27 RX ADMIN — LISINOPRIL 2.5 MG: 2.5 TABLET ORAL at 08:24

## 2024-09-27 RX ADMIN — Medication 12.5 MG: at 21:19

## 2024-09-27 NOTE — ASSESSMENT & PLAN NOTE
Ongoing discussions in regards to poor prognosis with large sacral wound and developing osteomyelitis of coccyx bone in the setting of polymicrobial bacteremia  Palliative care discussed with patient's wife who is opting to proceed with full medical care at this time

## 2024-09-27 NOTE — ASSESSMENT & PLAN NOTE
Polymicrobial bacteremia likely secondary sacral wound with possible fistulous connection  Infectious disease input noted and appreciated.  Continue IV cefepime, flagyl and vancomycin   ID transitioning the patient to ampicillin 2 g every 6 hours to begin 9/26 to complete a total of 2 weeks of antibiotic therapy for enterococcal bacteremia through 10/3/24  Repeat blood cultures negative to date   Underwent transesophageal echocardiogram 9/25/2024-no evidence of endocarditis

## 2024-09-27 NOTE — ASSESSMENT & PLAN NOTE
Lab Results   Component Value Date    HGBA1C 6.2 (H) 09/17/2024     Recent Labs     09/26/24  1628 09/26/24 2044 09/27/24  0822 09/27/24  1133   POCGLU 125 190* 116 122   Controlled with last hemoglobin A1c of 6.2%  Holding home oral regimen  Monitor on sliding scale

## 2024-09-27 NOTE — PLAN OF CARE
Problem: Potential for Falls  Goal: Patient will remain free of falls  Description: INTERVENTIONS:  - Educate patient/family on patient safety including physical limitations  - Instruct patient to call for assistance with activity   - Consult OT/PT to assist with strengthening/mobility   - Keep Call bell within reach  - Keep bed low and locked with side rails adjusted as appropriate  - Keep care items and personal belongings within reach  - Initiate and maintain comfort rounds  - Make Fall Risk Sign visible to staff  - Offer Toileting every 2 Hours, in advance of need  - Initiate/Maintain bed alarm  - Obtain necessary fall risk management equipment: bed alarm call bell  - Apply yellow socks and bracelet for high fall risk patients  - Consider moving patient to room near nurses station  Outcome: Progressing     Problem: PAIN - ADULT  Goal: Verbalizes/displays adequate comfort level or baseline comfort level  Description: Interventions:  - Encourage patient to monitor pain and request assistance  - Assess pain using appropriate pain scale  - Administer analgesics based on type and severity of pain and evaluate response  - Implement non-pharmacological measures as appropriate and evaluate response  - Consider cultural and social influences on pain and pain management  - Notify physician/advanced practitioner if interventions unsuccessful or patient reports new pain  Outcome: Progressing     Problem: INFECTION - ADULT  Goal: Absence or prevention of progression during hospitalization  Description: INTERVENTIONS:  - Assess and monitor for signs and symptoms of infection  - Monitor lab/diagnostic results  - Monitor all insertion sites, i.e. indwelling lines, tubes, and drains  - Monitor endotracheal if appropriate and nasal secretions for changes in amount and color  - [place] appropriate cooling/warming therapies per order  - Administer medications as ordered  - Instruct and encourage patient and family to use good  hand hygiene technique  - Identify and instruct in appropriate isolation precautions for identified infection/condition  Outcome: Progressing  Goal: Absence of fever/infection during neutropenic period  Description: INTERVENTIONS:  - Monitor WBC    Outcome: Completed     Problem: SAFETY ADULT  Goal: Patient will remain free of falls  Description: INTERVENTIONS:  - Educate patient/family on patient safety including physical limitations  - Instruct patient to call for assistance with activity   - Consult OT/PT to assist with strengthening/mobility   - Keep Call bell within reach  - Keep bed low and locked with side rails adjusted as appropriate  - Keep care items and personal belongings within reach  - Initiate and maintain comfort rounds  - Make Fall Risk Sign visible to staff  - Offer Toileting every 2 Hours, in advance of need  - Initiate/Maintain bed alarm  - Obtain necessary fall risk management equipment: bed alarm call bell  - Apply yellow socks and bracelet for high fall risk patients  - Consider moving patient to room near nurses station  Outcome: Progressing  Goal: Maintain or return to baseline ADL function  Description: INTERVENTIONS:  -  Assess patient's ability to carry out ADLs; assess patient's baseline for ADL function and identify physical deficits which impact ability to perform ADLs (bathing, care of mouth/teeth, toileting, grooming, dressing, etc.)  - Assess/evaluate cause of self-care deficits   - Assess range of motion  - Assess patient's mobility; develop plan if impaired  - Assess patient's need for assistive devices and provide as appropriate  - Encourage maximum independence but intervene and supervise when necessary  - Involve family in performance of ADLs  - Assess for home care needs following discharge   - Consider OT consult to assist with ADL evaluation and planning for discharge  - Provide patient education as appropriate  Outcome: Progressing  Goal: Maintains/Returns to pre admission  functional level  Description: INTERVENTIONS:  - Perform AM-PAC 6 Click Basic Mobility/ Daily Activity assessment daily.  - Set and communicate daily mobility goal to care team and patient/family/caregiver.   - Collaborate with rehabilitation services on mobility goals if consulted  - Perform Range of Motion 4 times a day.  - Reposition patient every 2 hours.  - Dangle patient 3 times a day  - Stand patient 3 times a day  - Ambulate patient 3 times a day  - Out of bed to chair 3 times a day   - Out of bed for meals 3 times a day  - Out of bed for toileting  - Record patient progress and toleration of activity level   Outcome: Progressing     Problem: DISCHARGE PLANNING  Goal: Discharge to home or other facility with appropriate resources  Description: INTERVENTIONS:  - Identify barriers to discharge w/patient and caregiver  - Arrange for needed discharge resources and transportation as appropriate  - Identify discharge learning needs (meds, wound care, etc.)  - Arrange for interpretive services to assist at discharge as needed  - Refer to Case Management Department for coordinating discharge planning if the patient needs post-hospital services based on physician/advanced practitioner order or complex needs related to functional status, cognitive ability, or social support system  Outcome: Progressing     Problem: Knowledge Deficit  Goal: Patient/family/caregiver demonstrates understanding of disease process, treatment plan, medications, and discharge instructions  Description: Complete learning assessment and assess knowledge base.  Interventions:  - Provide teaching at level of understanding  - Provide teaching via preferred learning methods  Outcome: Progressing     Problem: Prexisting or High Potential for Compromised Skin Integrity  Goal: Skin integrity is maintained or improved  Description: INTERVENTIONS:  - Identify patients at risk for skin breakdown  - Assess and monitor skin integrity  - Assess and monitor  nutrition and hydration status  - Monitor labs   - Assess for incontinence   - Turn and reposition patient  - Assist with mobility/ambulation  - Relieve pressure over bony prominences  - Avoid friction and shearing  - Provide appropriate hygiene as needed including keeping skin clean and dry  - Evaluate need for skin moisturizer/barrier cream  - Collaborate with interdisciplinary team   - Patient/family teaching  - Consider wound care consult   Outcome: Progressing     Problem: GENITOURINARY - ADULT  Goal: Urinary catheter remains patent  Description: INTERVENTIONS:  - Assess patency of urinary catheter  - If patient has a chronic hanks, consider changing catheter if non-functioning  - Follow guidelines for intermittent irrigation of non-functioning urinary catheter  Outcome: Progressing     Problem: METABOLIC, FLUID AND ELECTROLYTES - ADULT  Goal: Electrolytes maintained within normal limits  Description: INTERVENTIONS:  - Monitor labs and assess patient for signs and symptoms of electrolyte imbalances  - Administer electrolyte replacement as ordered  - Monitor response to electrolyte replacements, including repeat lab results as appropriate  - Instruct patient on fluid and nutrition as appropriate  Outcome: Progressing     Problem: SKIN/TISSUE INTEGRITY - ADULT  Goal: Skin Integrity remains intact(Skin Breakdown Prevention)  Description: Assess:  -Perform Goldy assessment every shift  -Clean and moisturize skin every shift  -Inspect skin when repositioning, toileting, and assisting with ADLS  -Assess under medical devices such as Masimo every shift  -Assess extremities for adequate circulation and sensation     Bed Management:  -Have minimal linens on bed & keep smooth, unwrinkled  -Change linens as needed when moist or perspiring  -Avoid sitting or lying in one position for more than 1 hours while in bed  -Keep HOB at 30 degrees     Toileting:  -Offer bedside commode  -Assess for incontinence every shift  -Use  incontinent care products after each incontinent episode such as foam cleanser     Activity:  -Mobilize patient 4 times a day  -Encourage activity and walks on unit  -Encourage or provide ROM exercises   -Turn and reposition patient every 2 Hours  -Use appropriate equipment to lift or move patient in bed  -Instruct/ Assist with weight shifting every 1 hour when out of bed in chair  -Consider limitation of chair time 1 hour intervals    Skin Care:  -Avoid use of baby powder, tape, friction and shearing, hot water or constrictive clothing  -Relieve pressure over bony prominences using wedges  -Do not massage red bony areas    Next Steps:  -Teach patient strategies to minimize risks such as skin breakdown   -Consider consults to  interdisciplinary teams such as wound care nurse   Outcome: Progressing  Goal: Incision(s), wounds(s) or drain site(s) healing without S/S of infection  Description: INTERVENTIONS  - Assess and document dressing, incision, wound bed, drain sites and surrounding tissue  - Provide patient and family education  - Perform skin care/dressing changes every shift   Outcome: Progressing  Goal: Pressure injury heals and does not worsen  Description: Interventions:  - Implement low air loss mattress or specialty surface (Criteria met)  - Apply silicone foam dressing  - Instruct/assist with weight shifting every 30 minutes when in chair   - Limit chair time to 1 hour intervals  - Use special pressure reducing interventions such as cushion when in chair   - Apply fecal or urinary incontinence containment device   - Perform passive or active ROM every shift  - Turn and reposition patient & offload bony prominences every 1 hours   - Utilize friction reducing device or surface for transfers   - Consider consults to  interdisciplinary teams such as wound care nurse   - Use incontinent care products after each incontinent episode such as foam cleanser   - Consider nutrition services referral as  needed  Outcome: Progressing

## 2024-09-27 NOTE — ASSESSMENT & PLAN NOTE
Chronic sacral decubitus ulcer; present on admission  Status post debridement at bedside per surgical team.   Continue with wound care as tolerated.     Continue dressing changes and serial exams  Wound care consultation  Offload pressure   Concern for emphysematous changes tracking to sacral wound/developing fistula from site on repeat abdominal imaging on 924  Breached the topic with wife about palliative care but seems overwhelmed at this thought at this time 9/24  Spoke with wife via telephone 9/26/2024-discussed concern for osteomyelitis development  Discussion with palliative care has yielded in continuing with full medical care at this time

## 2024-09-27 NOTE — ASSESSMENT & PLAN NOTE
Low blood counts noted 9/19/24.   Has chronic anemia likely secondary to poor nutritional status underlying chronic illness.    Hemoglobin declined to 7.1 given -1 unit PRBCs given 9/19/2024  Encourage oral intake, cannot utilize IV iron due to bacteremia   Lab Results   Component Value Date    HGB 8.6 (L) 09/26/2024    HGB 8.3 (L) 09/25/2024    HGB 7.6 (L) 09/24/2024    HGB 7.6 (L) 09/23/2024    HGB 7.6 (L) 09/22/2024   Ongoing monitoring of hemoglobin and continue to transfuse if less than 7

## 2024-09-27 NOTE — PLAN OF CARE
Problem: Potential for Falls  Goal: Patient will remain free of falls  Description: INTERVENTIONS:  - Educate patient/family on patient safety including physical limitations  - Instruct patient to call for assistance with activity   - Consult OT/PT to assist with strengthening/mobility   - Keep Call bell within reach  - Keep bed low and locked with side rails adjusted as appropriate  - Keep care items and personal belongings within reach  - Initiate and maintain comfort rounds  - Make Fall Risk Sign visible to staff  - Offer Toileting every 2 Hours, in advance of need  - Initiate/Maintain bed alarm  - Obtain necessary fall risk management equipment: bed alarm call bell  - Apply yellow socks and bracelet for high fall risk patients  - Consider moving patient to room near nurses station  Outcome: Progressing     Problem: PAIN - ADULT  Goal: Verbalizes/displays adequate comfort level or baseline comfort level  Description: Interventions:  - Encourage patient to monitor pain and request assistance  - Assess pain using appropriate pain scale  - Administer analgesics based on type and severity of pain and evaluate response  - Implement non-pharmacological measures as appropriate and evaluate response  - Consider cultural and social influences on pain and pain management  - Notify physician/advanced practitioner if interventions unsuccessful or patient reports new pain  Outcome: Progressing     Problem: INFECTION - ADULT  Goal: Absence or prevention of progression during hospitalization  Description: INTERVENTIONS:  - Assess and monitor for signs and symptoms of infection  - Monitor lab/diagnostic results  - Monitor all insertion sites, i.e. indwelling lines, tubes, and drains  - Monitor endotracheal if appropriate and nasal secretions for changes in amount and color  - [place] appropriate cooling/warming therapies per order  - Administer medications as ordered  - Instruct and encourage patient and family to use good  hand hygiene technique  - Identify and instruct in appropriate isolation precautions for identified infection/condition  Outcome: Progressing  Goal: Absence of fever/infection during neutropenic period  Description: INTERVENTIONS:  - Monitor WBC    Outcome: Progressing     Problem: SAFETY ADULT  Goal: Patient will remain free of falls  Description: INTERVENTIONS:  - Educate patient/family on patient safety including physical limitations  - Instruct patient to call for assistance with activity   - Consult OT/PT to assist with strengthening/mobility   - Keep Call bell within reach  - Keep bed low and locked with side rails adjusted as appropriate  - Keep care items and personal belongings within reach  - Initiate and maintain comfort rounds  - Make Fall Risk Sign visible to staff  - Offer Toileting every 2 Hours, in advance of need  - Initiate/Maintain bed alarm  - Obtain necessary fall risk management equipment: bed alarm call bell  - Apply yellow socks and bracelet for high fall risk patients  - Consider moving patient to room near nurses station  Outcome: Progressing  Goal: Maintain or return to baseline ADL function  Description: INTERVENTIONS:  -  Assess patient's ability to carry out ADLs; assess patient's baseline for ADL function and identify physical deficits which impact ability to perform ADLs (bathing, care of mouth/teeth, toileting, grooming, dressing, etc.)  - Assess/evaluate cause of self-care deficits   - Assess range of motion  - Assess patient's mobility; develop plan if impaired  - Assess patient's need for assistive devices and provide as appropriate  - Encourage maximum independence but intervene and supervise when necessary  - Involve family in performance of ADLs  - Assess for home care needs following discharge   - Consider OT consult to assist with ADL evaluation and planning for discharge  - Provide patient education as appropriate  Outcome: Progressing  Goal: Maintains/Returns to pre  admission functional level  Description: INTERVENTIONS:  - Perform AM-PAC 6 Click Basic Mobility/ Daily Activity assessment daily.  - Set and communicate daily mobility goal to care team and patient/family/caregiver.   - Collaborate with rehabilitation services on mobility goals if consulted  - Perform Range of Motion 4 times a day.  - Reposition patient every 2 hours.  - Dangle patient 3 times a day  - Stand patient 3 times a day  - Ambulate patient 3 times a day  - Out of bed to chair 3 times a day   - Out of bed for meals 3 times a day  - Out of bed for toileting  - Record patient progress and toleration of activity level   Outcome: Progressing     Problem: DISCHARGE PLANNING  Goal: Discharge to home or other facility with appropriate resources  Description: INTERVENTIONS:  - Identify barriers to discharge w/patient and caregiver  - Arrange for needed discharge resources and transportation as appropriate  - Identify discharge learning needs (meds, wound care, etc.)  - Arrange for interpretive services to assist at discharge as needed  - Refer to Case Management Department for coordinating discharge planning if the patient needs post-hospital services based on physician/advanced practitioner order or complex needs related to functional status, cognitive ability, or social support system  Outcome: Progressing     Problem: Knowledge Deficit  Goal: Patient/family/caregiver demonstrates understanding of disease process, treatment plan, medications, and discharge instructions  Description: Complete learning assessment and assess knowledge base.  Interventions:  - Provide teaching at level of understanding  - Provide teaching via preferred learning methods  Outcome: Progressing     Problem: Prexisting or High Potential for Compromised Skin Integrity  Goal: Skin integrity is maintained or improved  Description: INTERVENTIONS:  - Identify patients at risk for skin breakdown  - Assess and monitor skin integrity  - Assess  and monitor nutrition and hydration status  - Monitor labs   - Assess for incontinence   - Turn and reposition patient  - Assist with mobility/ambulation  - Relieve pressure over bony prominences  - Avoid friction and shearing  - Provide appropriate hygiene as needed including keeping skin clean and dry  - Evaluate need for skin moisturizer/barrier cream  - Collaborate with interdisciplinary team   - Patient/family teaching  - Consider wound care consult   Outcome: Progressing     Problem: GASTROINTESTINAL - ADULT  Goal: Maintains or returns to baseline bowel function  Description: INTERVENTIONS:  - Assess bowel function  - Encourage oral fluids to ensure adequate hydration  - Administer IV fluids if ordered to ensure adequate hydration  - Administer ordered medications as needed  - Encourage mobilization and activity  - Consider nutritional services referral to assist patient with adequate nutrition and appropriate food choices  Outcome: Progressing     Problem: GENITOURINARY - ADULT  Goal: Urinary catheter remains patent  Description: INTERVENTIONS:  - Assess patency of urinary catheter  - If patient has a chronic hanks, consider changing catheter if non-functioning  - Follow guidelines for intermittent irrigation of non-functioning urinary catheter  Outcome: Progressing     Problem: METABOLIC, FLUID AND ELECTROLYTES - ADULT  Goal: Electrolytes maintained within normal limits  Description: INTERVENTIONS:  - Monitor labs and assess patient for signs and symptoms of electrolyte imbalances  - Administer electrolyte replacement as ordered  - Monitor response to electrolyte replacements, including repeat lab results as appropriate  - Instruct patient on fluid and nutrition as appropriate  Outcome: Progressing     Problem: SKIN/TISSUE INTEGRITY - ADULT  Goal: Skin Integrity remains intact(Skin Breakdown Prevention)  Description: Assess:  -Perform Goldy assessment every shift  -Clean and moisturize skin every  shift  -Inspect skin when repositioning, toileting, and assisting with ADLS  -Assess under medical devices such as Masimo every shift  -Assess extremities for adequate circulation and sensation     Bed Management:  -Have minimal linens on bed & keep smooth, unwrinkled  -Change linens as needed when moist or perspiring  -Avoid sitting or lying in one position for more than 1 hours while in bed  -Keep HOB at 30 degrees     Toileting:  -Offer bedside commode  -Assess for incontinence every shift  -Use incontinent care products after each incontinent episode such as foam cleanser     Activity:  -Mobilize patient 4 times a day  -Encourage activity and walks on unit  -Encourage or provide ROM exercises   -Turn and reposition patient every 2 Hours  -Use appropriate equipment to lift or move patient in bed  -Instruct/ Assist with weight shifting every 1 hour when out of bed in chair  -Consider limitation of chair time 1 hour intervals    Skin Care:  -Avoid use of baby powder, tape, friction and shearing, hot water or constrictive clothing  -Relieve pressure over bony prominences using wedges  -Do not massage red bony areas    Next Steps:  -Teach patient strategies to minimize risks such as skin breakdown   -Consider consults to  interdisciplinary teams such as wound care nurse   Outcome: Progressing  Goal: Incision(s), wounds(s) or drain site(s) healing without S/S of infection  Description: INTERVENTIONS  - Assess and document dressing, incision, wound bed, drain sites and surrounding tissue  - Provide patient and family education  - Perform skin care/dressing changes every shift   Outcome: Progressing  Goal: Pressure injury heals and does not worsen  Description: Interventions:  - Implement low air loss mattress or specialty surface (Criteria met)  - Apply silicone foam dressing  - Instruct/assist with weight shifting every 30 minutes when in chair   - Limit chair time to 1 hour intervals  - Use special pressure reducing  interventions such as cushion when in chair   - Apply fecal or urinary incontinence containment device   - Perform passive or active ROM every shift  - Turn and reposition patient & offload bony prominences every 1 hours   - Utilize friction reducing device or surface for transfers   - Consider consults to  interdisciplinary teams such as wound care nurse   - Use incontinent care products after each incontinent episode such as foam cleanser   - Consider nutrition services referral as needed  Outcome: Progressing     Problem: HEMATOLOGIC - ADULT  Goal: Maintains hematologic stability  Description: INTERVENTIONS  - Assess for signs and symptoms of bleeding or hemorrhage  - Monitor labs  - Administer supportive blood products/factors as ordered and appropriate  Outcome: Progressing     Problem: Nutrition/Hydration-ADULT  Goal: Nutrient/Hydration intake appropriate for improving, restoring or maintaining nutritional needs  Description: Monitor and assess patient's nutrition/hydration status for malnutrition. Collaborate with interdisciplinary team and initiate plan and interventions as ordered.  Monitor patient's weight and dietary intake as ordered or per policy. Utilize nutrition screening tool and intervene as necessary. Determine patient's food preferences and provide high-protein, high-caloric foods as appropriate.     INTERVENTIONS:  - Monitor oral intake, urinary output, labs, and treatment plans  - Assess nutrition and hydration status and recommend course of action  - Evaluate amount of meals eaten  - Assist patient with eating if necessary   - Allow adequate time for meals  - Recommend/ encourage appropriate diets, oral nutritional supplements, and vitamin/mineral supplements  - Order, calculate, and assess calorie counts as needed  - Recommend, monitor, and adjust tube feedings and TPN/PPN based on assessed needs  - Assess need for intravenous fluids  - Provide specific nutrition/hydration education as  appropriate  - Include patient/family/caregiver in decisions related to nutrition  Outcome: Progressing

## 2024-09-27 NOTE — ASSESSMENT & PLAN NOTE
History of CVA   Chronically bedbound and with chronic left-sided deficits   Continue home aspirin, Eliquis, statin  Dysphagia 2 mechanical soft, thin liquids -upgraded to level 3 by speech therapy  Residing at [facility] for the past 3 weeks

## 2024-09-27 NOTE — PROGRESS NOTES
Progress Note - Hospitalist   Name: Drew Santos 73 y.o. male I MRN: [id number]  Unit/Bed#: [place] [address] -01 I Date of Admission: 9/17/2024   Date of Service: 9/27/2024 I Hospital Day: 10    Assessment & Plan  Sepsis  Presented with tachycardia and leukocytosis and fever - tested positive for COVID 9/17/24  Now with polymicrobial bacteremia- enterbacter, enterococcus and proetus   CT abdomen and pelvis: mid/distal sigmoid colonic and rectal inflammatory changes in keeping with a segmental colitis/proctitis. Right ischioanal fossa subcutaneous emphysema extending through the right paramidline gluteal subcutaneous tissues to the skin surface suspicious for a perirectal or perianal fistula   Has large sacral decubitus ulcer- status post bedside debridement of sacral ulcer 9/18/24 by general surgery  Continue with dressing changes and wound care management by general surgery  Repeat CT abdomen and pelvis 9/24 to evaluate for any fistula connection versus abscess due to ongoing leukocytosis  CT reveals no ongoing abscess however does show developing osteomyelitis of coccyx bone.  Seen by GI imaging likely due to ischemic colitis but likely not primary cause of his presentation - no role for colonoscopy or flex sig curently    Continue IV antibiotics being managed by infectious disease  Was on cefepime, flagyl and vanco -- being adjusted  Repeat blood cultures negative to date - greater than 4 days  ID transitioning the patient to ampicillin 2 g every 6 hours to begin 9/26 to complete a total of 2 weeks of antibiotic therapy for enterococcal bacteremia through 10/3/24  Coordinating discharge plan with case management- Has been residing at [facility] complete care for the past 3 weeks  Will need to see if the facility will be able to care for him, if not, other options will need to be pursued.   Patient cleared to have PICC line placed. Anticipate this to occur for Monday 9/30  Polymicrobial bacteremia  Polymicrobial  bacteremia likely secondary sacral wound with possible fistulous connection  Infectious disease input noted and appreciated.  Continue IV cefepime, flagyl and vancomycin   ID transitioning the patient to ampicillin 2 g every 6 hours to begin 9/26 to complete a total of 2 weeks of antibiotic therapy for enterococcal bacteremia through 10/3/24  Repeat blood cultures negative to date   Underwent transesophageal echocardiogram 9/25/2024-no evidence of endocarditis  Sacral wound  Chronic sacral decubitus ulcer; present on admission  Status post debridement at bedside per surgical team.   Continue with wound care as tolerated.     Continue dressing changes and serial exams  Wound care consultation  Offload pressure   Concern for emphysematous changes tracking to sacral wound/developing fistula from site on repeat abdominal imaging on 924  Breached the topic with wife about palliative care but seems overwhelmed at this thought at this time 9/24  Spoke with wife via telephone 9/26/2024-discussed concern for osteomyelitis development  Discussion with palliative care has yielded in continuing with full medical care at this time  Advanced care planning/counseling discussion  Ongoing discussions in regards to poor prognosis with large sacral wound and developing osteomyelitis of coccyx bone in the setting of polymicrobial bacteremia  Palliative care discussed with patient's wife who is opting to proceed with full medical care at this time  Type 2 diabetes mellitus without complication, without long-term current use of insulin ([facility])  Lab Results   Component Value Date    HGBA1C 6.2 (H) 09/17/2024     Recent Labs     09/26/24  1628 09/26/24  2044 09/27/24  0822 09/27/24  1133   POCGLU 125 190* 116 122   Controlled with last hemoglobin A1c of 6.2%  Holding home oral regimen  Monitor on sliding scale    COVID-19  Tested positive for COVID on 9/17/2024  Patient currently on room air though high risk given his age and  comorbidities  Completed three days of IV remdesivir   Supportive care otherwise  Contact and airborne precautions  Currently on mild COVID pathway.    Not requiring supplemental oxygen- remains on room air  To complete 10 days of isolation through 9/27   Isolation to be removed 9/28  Proctitis  Noted on CT scan.  No diarrhea or clinical evidence of colitis.   Stool studies negative  Likely ischemic colitis given area affected, continue supportive measures   No plan for any inpatient scope   Repeat CAT scan without signs of ongoing abscess.  Acute metabolic encephalopathy  Presumably secondary to sepsis, bacteremia, COVID infection  CT head with no acute intracranial abnormality  Continue with supportive care  Reorientation, delirium precautions   History of CVA (cerebrovascular accident)  History of CVA   Chronically bedbound and with chronic left-sided deficits   Continue home aspirin, Eliquis, statin  Dysphagia 2 mechanical soft, thin liquids -upgraded to level 3 by speech therapy  Residing at [facility] complete care for the past 3 weeks  Paroxysmal atrial fibrillation (HCC)  Continue Toprol XL 25 mg daily  Continue home Eliquis for stroke prevention  Anemia  Low blood counts noted 9/19/24.   Has chronic anemia likely secondary to poor nutritional status underlying chronic illness.    Hemoglobin declined to 7.1 given -1 unit PRBCs given 9/19/2024  Encourage oral intake, cannot utilize IV iron due to bacteremia   Lab Results   Component Value Date    HGB 8.6 (L) 09/26/2024    HGB 8.3 (L) 09/25/2024    HGB 7.6 (L) 09/24/2024    HGB 7.6 (L) 09/23/2024    HGB 7.6 (L) 09/22/2024   Ongoing monitoring of hemoglobin and continue to transfuse if less than 7  Severe protein-calorie malnutrition (HCC)  Malnutrition Findings:   Adult Malnutrition type: Chronic illness  Adult Degree of Malnutrition: Other severe protein calorie malnutrition  Malnutrition Characteristics: Inadequate energy, Weight loss  360 Statement: Severe  protein calorie malnutrition in context of chronic illness r/t poor appetite, inadequate PO intake as evidance by energy intake less than 75% compared to estimated needs>1 month, 7.5% wt loss x 1 month ( 122kg 24-> 113kg ) treated with PO diet and oral supplements  BMI Findings:  Body mass index is 29.53 kg/m².     VTE Pharmacologic Prophylaxis:   eliquis    Mobility:   Basic Mobility Inpatient Raw Score: 8  -[facility] Goal: 3: Sit at edge of bed  -[facility] Achieved: 1: Laying in bed      Patient Centered Rounds: I performed bedside rounds with nursing staff today.   Discussions with Specialists or Other Care Team Provider: CM    Education and Discussions with Family / Patient: wife via telephone    Current Length of Stay: 10 day(s)  Current Patient Status: Inpatient   Certification Statement:  With need for continued inpatient stay for IV antibiotics, coordination of care, charge planning  Discharge Plan: Anticipate discharge in 24-48 hrs to anticipate discharge back to [facility] complete care    Code Status: Level 1 - Full Code    Subjective   Patient resting in bed.  Answers with one-word's.  Updated wife via telephone.  No acute complaints today.  Seems to eat more food for wife rather than nursing staff.    Objective     Vitals:   Temp (24hrs), Av.3 °F (36.8 °C), Min:98.3 °F (36.8 °C), Max:98.3 °F (36.8 °C)    Temp:  [98.3 °F (36.8 °C)] 98.3 °F (36.8 °C)  HR:  [85-97] 85  Resp:  [18-20] 20  BP: (143-165)/(80-99) 165/99  SpO2:  [95 %-98 %] 98 %  Body mass index is 29.53 kg/m².     Input and Output Summary (last 24 hours):     Intake/Output Summary (Last 24 hours) at 2024 1502  Last data filed at 2024 0901  Gross per 24 hour   Intake 360 ml   Output 2600 ml   Net -2240 ml       Physical Exam  Vitals and nursing note reviewed.   Constitutional:       General: He is not in acute distress.     Appearance: Normal appearance. He is normal weight. He is not ill-appearing, toxic-appearing or diaphoretic.    HENT:      Head: Normocephalic and atraumatic.   Eyes:      General: No scleral icterus.  Cardiovascular:      Rate and Rhythm: Normal rate and regular rhythm.   Pulmonary:      Effort: Pulmonary effort is normal. No respiratory distress.      Breath sounds: Normal breath sounds. Stridor present. No wheezing or rhonchi.   Abdominal:      General: Bowel sounds are normal. There is no distension.      Palpations: Abdomen is soft. There is no mass.      Tenderness: There is no abdominal tenderness.      Hernia: No hernia is present.   Musculoskeletal:         General: No swelling.      Cervical back: Neck supple.   Skin:     General: Skin is warm and dry.   Neurological:      Mental Status: He is alert and oriented to person, place, and time. Mental status is at baseline.   Psychiatric:         Mood and Affect: Mood normal.         Behavior: Behavior normal.          Lines/Drains:  Lines/Drains/Airways       Active Status       Name Placement date Placement time Site Days    Urethral Catheter Three way 22 Fr. 08/31/24 1925  Three way  26    Continuous Bladder Irrigation Three-way 08/31/24 1925  Three-way  26                  Urinary Catheter:  Goal for removal: N/A - Chronic Parra                 Lab Results: I have reviewed the following results:    Results from last 7 days   Lab Units 09/26/24  0439   WBC Thousand/uL 12.15*   HEMOGLOBIN g/dL 8.6*   HEMATOCRIT % 27.7*   PLATELETS Thousands/uL 531*   SEGS PCT % 65   LYMPHO PCT % 22   MONO PCT % 8   EOS PCT % 2     Results from last 7 days   Lab Units 09/26/24  0439   SODIUM mmol/L 138   POTASSIUM mmol/L 3.8   CHLORIDE mmol/L 105   CO2 mmol/L 30   BUN mg/dL 6   CREATININE mg/dL 0.57*   ANION GAP mmol/L 3*   CALCIUM mg/dL 8.2*   ALBUMIN g/dL 2.1*   TOTAL BILIRUBIN mg/dL 0.50   ALK PHOS U/L 52   ALT U/L 17   AST U/L 25   GLUCOSE RANDOM mg/dL 128         Results from last 7 days   Lab Units 09/27/24  1133 09/27/24  0822 09/26/24  2044 09/26/24  1628 09/26/24  1119  09/26/24  0719 09/25/24  2112 09/25/24  1555 09/25/24  1100 09/25/24  0726 09/24/24  2120 09/24/24  1614   POC GLUCOSE mg/dl 122 116 190* 125 150* 117 159* 104 92 103 129 183*             Last 24 Hours Medication List:     Current Facility-Administered Medications:     acetaminophen (Ofirmev) injection 1,000 mg, Q6H PRN    acetaminophen (TYLENOL) tablet 650 mg, Q6H PRN    ampicillin (OMNIPEN) 2,000 mg in sodium chloride 0.9 % 100 mL IVPB, Q6H, Last Rate: 2,000 mg (09/27/24 1130)    apixaban (ELIQUIS) tablet 5 mg, BID    aspirin chewable tablet 81 mg, Daily    atorvastatin (LIPITOR) tablet 80 mg, QPM    bacitracin topical ointment 1 small application, BID    calcium carbonate-vitamin D 500 mg-5 mcg tablet 1 tablet, BID With Meals    escitalopram (LEXAPRO) tablet 10 mg, Daily    ezetimibe (ZETIA) tablet 10 mg, Daily    finasteride (PROSCAR) tablet 5 mg, Daily    insulin lispro (HumALOG/ADMELOG) 100 units/mL subcutaneous injection 1-6 Units, TID AC **AND** Fingerstick Glucose (POCT), TID AC    insulin lispro (HumALOG/ADMELOG) 100 units/mL subcutaneous injection 1-6 Units, HS    lisinopril (ZESTRIL) tablet 2.5 mg, Daily    metoprolol tartrate (LOPRESSOR) partial tablet 12.5 mg, Q12H ELISEO    ondansetron (ZOFRAN) injection 4 mg, Q4H PRN    potassium chloride oral solution 20 mEq, BID    senna-docusate sodium (SENOKOT S) 8.6-50 mg per tablet 1 tablet, HS    sodium hypochlorite (DAKIN'S QUARTER-STRENGTH) 0.125 percent topical solution, Daily    Administrative Statements   Today, Patient Was Seen By: Whit Kimble PA-C      **Please Note: This note may have been constructed using a voice recognition system.**

## 2024-09-27 NOTE — ASSESSMENT & PLAN NOTE
Presented with tachycardia and leukocytosis and fever - tested positive for COVID 9/17/24  Now with polymicrobial bacteremia- enterbacter, enterococcus and proetus   CT abdomen and pelvis: mid/distal sigmoid colonic and rectal inflammatory changes in keeping with a segmental colitis/proctitis. Right ischioanal fossa subcutaneous emphysema extending through the right paramidline gluteal subcutaneous tissues to the skin surface suspicious for a perirectal or perianal fistula   Has large sacral decubitus ulcer- status post bedside debridement of sacral ulcer 9/18/24 by general surgery  Continue with dressing changes and wound care management by general surgery  Repeat CT abdomen and pelvis 9/24 to evaluate for any fistula connection versus abscess due to ongoing leukocytosis  CT reveals no ongoing abscess however does show developing osteomyelitis of coccyx bone.  Seen by GI imaging likely due to ischemic colitis but likely not primary cause of his presentation - no role for colonoscopy or flex sig curently    Continue IV antibiotics being managed by infectious disease  Was on cefepime, flagyl and vanco -- being adjusted  Repeat blood cultures negative to date - greater than 4 days  ID transitioning the patient to ampicillin 2 g every 6 hours to begin 9/26 to complete a total of 2 weeks of antibiotic therapy for enterococcal bacteremia through 10/3/24  Coordinating discharge plan with case management- Has been residing at [facility] complete care for the past 3 weeks  Will need to see if the facility will be able to care for him, if not, other options will need to be pursued.   Patient cleared to have PICC line placed. Anticipate this to occur for Monday 9/30

## 2024-09-28 LAB
GLUCOSE SERPL-MCNC: 118 MG/DL (ref 65–140)
GLUCOSE SERPL-MCNC: 142 MG/DL (ref 65–140)
GLUCOSE SERPL-MCNC: 161 MG/DL (ref 65–140)
GLUCOSE SERPL-MCNC: 171 MG/DL (ref 65–140)

## 2024-09-28 PROCEDURE — 82948 REAGENT STRIP/BLOOD GLUCOSE: CPT

## 2024-09-28 PROCEDURE — 99232 SBSQ HOSP IP/OBS MODERATE 35: CPT | Performed by: PHYSICIAN ASSISTANT

## 2024-09-28 RX ADMIN — Medication 12.5 MG: at 09:02

## 2024-09-28 RX ADMIN — POTASSIUM CHLORIDE 20 MEQ: 1.5 SOLUTION ORAL at 09:02

## 2024-09-28 RX ADMIN — BACITRACIN ZINC 1 SMALL APPLICATION: 500 OINTMENT TOPICAL at 17:35

## 2024-09-28 RX ADMIN — FINASTERIDE 5 MG: 5 TABLET, FILM COATED ORAL at 09:02

## 2024-09-28 RX ADMIN — ESCITALOPRAM OXALATE 10 MG: 10 TABLET ORAL at 09:02

## 2024-09-28 RX ADMIN — ATORVASTATIN CALCIUM 80 MG: 80 TABLET, FILM COATED ORAL at 17:35

## 2024-09-28 RX ADMIN — INSULIN LISPRO 1 UNITS: 100 INJECTION, SOLUTION INTRAVENOUS; SUBCUTANEOUS at 12:00

## 2024-09-28 RX ADMIN — POTASSIUM CHLORIDE 20 MEQ: 1.5 SOLUTION ORAL at 17:27

## 2024-09-28 RX ADMIN — SODIUM HYPOCHLORITE: 1.25 SOLUTION TOPICAL at 09:03

## 2024-09-28 RX ADMIN — SENNOSIDES AND DOCUSATE SODIUM 1 TABLET: 8.6; 5 TABLET ORAL at 23:44

## 2024-09-28 RX ADMIN — Medication 1 TABLET: at 17:00

## 2024-09-28 RX ADMIN — ASPIRIN 81 MG CHEWABLE TABLET 81 MG: 81 TABLET CHEWABLE at 09:02

## 2024-09-28 RX ADMIN — BACITRACIN ZINC 1 SMALL APPLICATION: 500 OINTMENT TOPICAL at 09:02

## 2024-09-28 RX ADMIN — EZETIMIBE 10 MG: 10 TABLET ORAL at 09:02

## 2024-09-28 RX ADMIN — LISINOPRIL 2.5 MG: 2.5 TABLET ORAL at 09:02

## 2024-09-28 RX ADMIN — APIXABAN 5 MG: 5 TABLET, FILM COATED ORAL at 17:35

## 2024-09-28 RX ADMIN — APIXABAN 5 MG: 5 TABLET, FILM COATED ORAL at 09:00

## 2024-09-28 RX ADMIN — INSULIN LISPRO 1 UNITS: 100 INJECTION, SOLUTION INTRAVENOUS; SUBCUTANEOUS at 23:44

## 2024-09-28 RX ADMIN — AMPICILLIN SODIUM 2000 MG: 2 INJECTION, POWDER, FOR SOLUTION INTRAMUSCULAR; INTRAVENOUS at 17:35

## 2024-09-28 RX ADMIN — Medication 12.5 MG: at 23:43

## 2024-09-28 RX ADMIN — Medication 1 TABLET: at 09:02

## 2024-09-28 RX ADMIN — AMPICILLIN SODIUM 2000 MG: 2 INJECTION, POWDER, FOR SOLUTION INTRAMUSCULAR; INTRAVENOUS at 05:25

## 2024-09-28 RX ADMIN — AMPICILLIN SODIUM 2000 MG: 2 INJECTION, POWDER, FOR SOLUTION INTRAMUSCULAR; INTRAVENOUS at 11:56

## 2024-09-28 NOTE — ASSESSMENT & PLAN NOTE
Presumably secondary to sepsis, bacteremia, COVID infection  CT head with no acute intracranial abnormality  Continue with supportive care  Reorientation, delirium precautions   More alert and conversant today

## 2024-09-28 NOTE — PLAN OF CARE
Problem: INFECTION - ADULT  Goal: Absence or prevention of progression during hospitalization  Description: INTERVENTIONS:  - Assess and monitor for signs and symptoms of infection  - Monitor lab/diagnostic results  - Monitor all insertion sites, i.e. indwelling lines, tubes, and drains  - Monitor endotracheal if appropriate and nasal secretions for changes in amount and color  - [place] appropriate cooling/warming therapies per order  - Administer medications as ordered  - Instruct and encourage patient and family to use good hand hygiene technique  - Identify and instruct in appropriate isolation precautions for identified infection/condition  9/27/2024 2038 by Linda Lombardi, RN  Outcome: Progressing  9/27/2024 2038 by Linda Lombardi, RN  Outcome: Progressing     Problem: SAFETY ADULT  Goal: Patient will remain free of falls  Description: INTERVENTIONS:  - Educate patient/family on patient safety including physical limitations  - Instruct patient to call for assistance with activity   - Consult OT/PT to assist with strengthening/mobility   - Keep Call bell within reach  - Keep bed low and locked with side rails adjusted as appropriate  - Keep care items and personal belongings within reach  - Initiate and maintain comfort rounds  - Make Fall Risk Sign visible to staff  - Offer Toileting every 2 Hours, in advance of need  - Initiate/Maintain bed alarm  - Obtain necessary fall risk management equipment: bed alarm call bell  - Apply yellow socks and bracelet for high fall risk patients  - Consider moving patient to room near nurses station  9/27/2024 2038 by Linda Lombardi, RN  Outcome: Progressing  9/27/2024 2038 by Linda Lombardi, RN  Outcome: Progressing  Goal: Maintain or return to baseline ADL function  Description: INTERVENTIONS:  -  Assess patient's ability to carry out ADLs; assess patient's baseline for ADL function and identify physical deficits which impact ability to perform ADLs (bathing,  care of mouth/teeth, toileting, grooming, dressing, etc.)  - Assess/evaluate cause of self-care deficits   - Assess range of motion  - Assess patient's mobility; develop plan if impaired  - Assess patient's need for assistive devices and provide as appropriate  - Encourage maximum independence but intervene and supervise when necessary  - Involve family in performance of ADLs  - Assess for home care needs following discharge   - Consider OT consult to assist with ADL evaluation and planning for discharge  - Provide patient education as appropriate  9/27/2024 2038 by Linda Lombardi RN  Outcome: Progressing  9/27/2024 2038 by Linda Lombardi RN  Outcome: Progressing  Goal: Maintains/Returns to pre admission functional level  Description: INTERVENTIONS:  - Perform AM-PAC 6 Click Basic Mobility/ Daily Activity assessment daily.  - Set and communicate daily mobility goal to care team and patient/family/caregiver.   - Collaborate with rehabilitation services on mobility goals if consulted  - Perform Range of Motion 4 times a day.  - Reposition patient every 2 hours.  - Dangle patient 3 times a day  - Stand patient 3 times a day  - Ambulate patient 3 times a day  - Out of bed to chair 3 times a day   - Out of bed for meals 3 times a day  - Out of bed for toileting  - Record patient progress and toleration of activity level   9/27/2024 2038 by Linda Lombardi RN  Outcome: Progressing  9/27/2024 2038 by Linda Lombardi RN  Outcome: Progressing     Problem: DISCHARGE PLANNING  Goal: Discharge to home or other facility with appropriate resources  Description: INTERVENTIONS:  - Identify barriers to discharge w/patient and caregiver  - Arrange for needed discharge resources and transportation as appropriate  - Identify discharge learning needs (meds, wound care, etc.)  - Arrange for interpretive services to assist at discharge as needed  - Refer to Case Management Department for coordinating discharge planning if the  patient needs post-hospital services based on physician/advanced practitioner order or complex needs related to functional status, cognitive ability, or social support system  9/27/2024 2038 by Linda Lombardi RN  Outcome: Progressing  9/27/2024 2038 by Linda Lombardi RN  Outcome: Progressing     Problem: Knowledge Deficit  Goal: Patient/family/caregiver demonstrates understanding of disease process, treatment plan, medications, and discharge instructions  Description: Complete learning assessment and assess knowledge base.  Interventions:  - Provide teaching at level of understanding  - Provide teaching via preferred learning methods  9/27/2024 2038 by Linda Lombardi RN  Outcome: Progressing  9/27/2024 2038 by Linda Lombardi RN  Outcome: Progressing     Problem: Prexisting or High Potential for Compromised Skin Integrity  Goal: Skin integrity is maintained or improved  Description: INTERVENTIONS:  - Identify patients at risk for skin breakdown  - Assess and monitor skin integrity  - Assess and monitor nutrition and hydration status  - Monitor labs   - Assess for incontinence   - Turn and reposition patient  - Assist with mobility/ambulation  - Relieve pressure over bony prominences  - Avoid friction and shearing  - Provide appropriate hygiene as needed including keeping skin clean and dry  - Evaluate need for skin moisturizer/barrier cream  - Collaborate with interdisciplinary team   - Patient/family teaching  - Consider wound care consult   9/27/2024 2038 by Linda Lombardi RN  Outcome: Progressing  9/27/2024 2038 by Linda Lombardi RN  Outcome: Progressing     Problem: GASTROINTESTINAL - ADULT  Goal: Maintains or returns to baseline bowel function  Description: INTERVENTIONS:  - Assess bowel function  - Encourage oral fluids to ensure adequate hydration  - Administer IV fluids if ordered to ensure adequate hydration  - Administer ordered medications as needed  - Encourage mobilization and  activity  - Consider nutritional services referral to assist patient with adequate nutrition and appropriate food choices  9/27/2024 2038 by Linda Lombardi RN  Outcome: Progressing  9/27/2024 2038 by Linda Lombardi RN  Outcome: Progressing     Problem: GENITOURINARY - ADULT  Goal: Urinary catheter remains patent  Description: INTERVENTIONS:  - Assess patency of urinary catheter  - If patient has a chronic hanks, consider changing catheter if non-functioning  - Follow guidelines for intermittent irrigation of non-functioning urinary catheter  9/27/2024 2038 by Linda Lombardi RN  Outcome: Progressing  9/27/2024 2038 by Linda Lombardi RN  Outcome: Progressing     Problem: METABOLIC, FLUID AND ELECTROLYTES - ADULT  Goal: Electrolytes maintained within normal limits  Description: INTERVENTIONS:  - Monitor labs and assess patient for signs and symptoms of electrolyte imbalances  - Administer electrolyte replacement as ordered  - Monitor response to electrolyte replacements, including repeat lab results as appropriate  - Instruct patient on fluid and nutrition as appropriate  9/27/2024 2038 by Linda Lombardi RN  Outcome: Progressing  9/27/2024 2038 by Linda Lombardi RN  Outcome: Progressing     Problem: SKIN/TISSUE INTEGRITY - ADULT  Goal: Skin Integrity remains intact(Skin Breakdown Prevention)  Description: Assess:  -Perform Goldy assessment every shift  -Clean and moisturize skin every shift  -Inspect skin when repositioning, toileting, and assisting with ADLS  -Assess under medical devices such as Masimo every shift  -Assess extremities for adequate circulation and sensation     Bed Management:  -Have minimal linens on bed & keep smooth, unwrinkled  -Change linens as needed when moist or perspiring  -Avoid sitting or lying in one position for more than 1 hours while in bed  -Keep HOB at 30 degrees     Toileting:  -Offer bedside commode  -Assess for incontinence every shift  -Use incontinent care  products after each incontinent episode such as foam cleanser     Activity:  -Mobilize patient 4 times a day  -Encourage activity and walks on unit  -Encourage or provide ROM exercises   -Turn and reposition patient every 2 Hours  -Use appropriate equipment to lift or move patient in bed  -Instruct/ Assist with weight shifting every 1 hour when out of bed in chair  -Consider limitation of chair time 1 hour intervals    Skin Care:  -Avoid use of baby powder, tape, friction and shearing, hot water or constrictive clothing  -Relieve pressure over bony prominences using wedges  -Do not massage red bony areas    Next Steps:  -Teach patient strategies to minimize risks such as skin breakdown   -Consider consults to  interdisciplinary teams such as wound care nurse   9/27/2024 2038 by Linda Lombardi RN  Outcome: Progressing  9/27/2024 2038 by Linda Lombardi RN  Outcome: Progressing  Goal: Incision(s), wounds(s) or drain site(s) healing without S/S of infection  Description: INTERVENTIONS  - Assess and document dressing, incision, wound bed, drain sites and surrounding tissue  - Provide patient and family education  - Perform skin care/dressing changes every shift   9/27/2024 2038 by Linda Lombardi, RN  Outcome: Progressing  9/27/2024 2038 by Linda Lombardi, RN  Outcome: Progressing  Goal: Pressure injury heals and does not worsen  Description: Interventions:  - Implement low air loss mattress or specialty surface (Criteria met)  - Apply silicone foam dressing  - Instruct/assist with weight shifting every 30 minutes when in chair   - Limit chair time to 1 hour intervals  - Use special pressure reducing interventions such as cushion when in chair   - Apply fecal or urinary incontinence containment device   - Perform passive or active ROM every shift  - Turn and reposition patient & offload bony prominences every 1 hours   - Utilize friction reducing device or surface for transfers   - Consider consults to   interdisciplinary teams such as wound care nurse   - Use incontinent care products after each incontinent episode such as foam cleanser   - Consider nutrition services referral as needed  9/27/2024 2038 by Linda Lombardi RN  Outcome: Progressing  9/27/2024 2038 by Linda Lombardi RN  Outcome: Progressing     Problem: HEMATOLOGIC - ADULT  Goal: Maintains hematologic stability  Description: INTERVENTIONS  - Assess for signs and symptoms of bleeding or hemorrhage  - Monitor labs  - Administer supportive blood products/factors as ordered and appropriate  9/27/2024 2038 by Linda Lombardi RN  Outcome: Progressing  9/27/2024 2038 by Linda Lombardi RN  Outcome: Progressing     Problem: Nutrition/Hydration-ADULT  Goal: Nutrient/Hydration intake appropriate for improving, restoring or maintaining nutritional needs  Description: Monitor and assess patient's nutrition/hydration status for malnutrition. Collaborate with interdisciplinary team and initiate plan and interventions as ordered.  Monitor patient's weight and dietary intake as ordered or per policy. Utilize nutrition screening tool and intervene as necessary. Determine patient's food preferences and provide high-protein, high-caloric foods as appropriate.     INTERVENTIONS:  - Monitor oral intake, urinary output, labs, and treatment plans  - Assess nutrition and hydration status and recommend course of action  - Evaluate amount of meals eaten  - Assist patient with eating if necessary   - Allow adequate time for meals  - Recommend/ encourage appropriate diets, oral nutritional supplements, and vitamin/mineral supplements  - Order, calculate, and assess calorie counts as needed  - Recommend, monitor, and adjust tube feedings and TPN/PPN based on assessed needs  - Assess need for intravenous fluids  - Provide specific nutrition/hydration education as appropriate  - Include patient/family/caregiver in decisions related to nutrition  9/27/2024 2038 by Linda  Harjit RN  Outcome: Progressing  9/27/2024 2038 by Linda Lombardi, RN  Outcome: Progressing

## 2024-09-28 NOTE — PLAN OF CARE

## 2024-09-28 NOTE — ASSESSMENT & PLAN NOTE
History of CVA   Chronically bedbound and with chronic left-sided deficits   Continue home aspirin, Eliquis, statin  Dysphagia 2 mechanical soft, thin liquids -upgraded to level 3 by speech therapy  Residing at [facility] for the past 3 weeks

## 2024-09-28 NOTE — PLAN OF CARE
Problem: Potential for Falls  Goal: Patient will remain free of falls  Description: INTERVENTIONS:  - Educate patient/family on patient safety including physical limitations  - Instruct patient to call for assistance with activity   - Consult OT/PT to assist with strengthening/mobility   - Keep Call bell within reach  - Keep bed low and locked with side rails adjusted as appropriate  - Keep care items and personal belongings within reach  - Initiate and maintain comfort rounds  - Make Fall Risk Sign visible to staff  - Offer Toileting every 2 Hours, in advance of need  - Initiate/Maintain bed alarm  - Obtain necessary fall risk management equipment: bed alarm call bell  - Apply yellow socks and bracelet for high fall risk patients  - Consider moving patient to room near nurses station  Outcome: Progressing     Problem: PAIN - ADULT  Goal: Verbalizes/displays adequate comfort level or baseline comfort level  Description: Interventions:  - Encourage patient to monitor pain and request assistance  - Assess pain using appropriate pain scale  - Administer analgesics based on type and severity of pain and evaluate response  - Implement non-pharmacological measures as appropriate and evaluate response  - Consider cultural and social influences on pain and pain management  - Notify physician/advanced practitioner if interventions unsuccessful or patient reports new pain  Outcome: Progressing     Problem: INFECTION - ADULT  Goal: Absence or prevention of progression during hospitalization  Description: INTERVENTIONS:  - Assess and monitor for signs and symptoms of infection  - Monitor lab/diagnostic results  - Monitor all insertion sites, i.e. indwelling lines, tubes, and drains  - Monitor endotracheal if appropriate and nasal secretions for changes in amount and color  - [place] appropriate cooling/warming therapies per order  - Administer medications as ordered  - Instruct and encourage patient and family to use good  hand hygiene technique  - Identify and instruct in appropriate isolation precautions for identified infection/condition  Outcome: Progressing     Problem: SAFETY ADULT  Goal: Patient will remain free of falls  Description: INTERVENTIONS:  - Educate patient/family on patient safety including physical limitations  - Instruct patient to call for assistance with activity   - Consult OT/PT to assist with strengthening/mobility   - Keep Call bell within reach  - Keep bed low and locked with side rails adjusted as appropriate  - Keep care items and personal belongings within reach  - Initiate and maintain comfort rounds  - Make Fall Risk Sign visible to staff  - Offer Toileting every 2 Hours, in advance of need  - Initiate/Maintain bed alarm  - Obtain necessary fall risk management equipment: bed alarm call bell  - Apply yellow socks and bracelet for high fall risk patients  - Consider moving patient to room near nurses station  Outcome: Progressing  Goal: Maintain or return to baseline ADL function  Description: INTERVENTIONS:  -  Assess patient's ability to carry out ADLs; assess patient's baseline for ADL function and identify physical deficits which impact ability to perform ADLs (bathing, care of mouth/teeth, toileting, grooming, dressing, etc.)  - Assess/evaluate cause of self-care deficits   - Assess range of motion  - Assess patient's mobility; develop plan if impaired  - Assess patient's need for assistive devices and provide as appropriate  - Encourage maximum independence but intervene and supervise when necessary  - Involve family in performance of ADLs  - Assess for home care needs following discharge   - Consider OT consult to assist with ADL evaluation and planning for discharge  - Provide patient education as appropriate  Outcome: Progressing  Goal: Maintains/Returns to pre admission functional level  Description: INTERVENTIONS:  - Perform AM-PAC 6 Click Basic Mobility/ Daily Activity assessment daily.  -  Set and communicate daily mobility goal to care team and patient/family/caregiver.   - Collaborate with rehabilitation services on mobility goals if consulted  - Perform Range of Motion 4 times a day.  - Reposition patient every 2 hours.  - Dangle patient 3 times a day  - Stand patient 3 times a day  - Ambulate patient 3 times a day  - Out of bed to chair 3 times a day   - Out of bed for meals 3 times a day  - Out of bed for toileting  - Record patient progress and toleration of activity level   Outcome: Progressing     Problem: DISCHARGE PLANNING  Goal: Discharge to home or other facility with appropriate resources  Description: INTERVENTIONS:  - Identify barriers to discharge w/patient and caregiver  - Arrange for needed discharge resources and transportation as appropriate  - Identify discharge learning needs (meds, wound care, etc.)  - Arrange for interpretive services to assist at discharge as needed  - Refer to Case Management Department for coordinating discharge planning if the patient needs post-hospital services based on physician/advanced practitioner order or complex needs related to functional status, cognitive ability, or social support system  Outcome: Progressing     Problem: Knowledge Deficit  Goal: Patient/family/caregiver demonstrates understanding of disease process, treatment plan, medications, and discharge instructions  Description: Complete learning assessment and assess knowledge base.  Interventions:  - Provide teaching at level of understanding  - Provide teaching via preferred learning methods  Outcome: Progressing     Problem: Prexisting or High Potential for Compromised Skin Integrity  Goal: Skin integrity is maintained or improved  Description: INTERVENTIONS:  - Identify patients at risk for skin breakdown  - Assess and monitor skin integrity  - Assess and monitor nutrition and hydration status  - Monitor labs   - Assess for incontinence   - Turn and reposition patient  - Assist with  mobility/ambulation  - Relieve pressure over bony prominences  - Avoid friction and shearing  - Provide appropriate hygiene as needed including keeping skin clean and dry  - Evaluate need for skin moisturizer/barrier cream  - Collaborate with interdisciplinary team   - Patient/family teaching  - Consider wound care consult   Outcome: Progressing     Problem: GASTROINTESTINAL - ADULT  Goal: Maintains or returns to baseline bowel function  Description: INTERVENTIONS:  - Assess bowel function  - Encourage oral fluids to ensure adequate hydration  - Administer IV fluids if ordered to ensure adequate hydration  - Administer ordered medications as needed  - Encourage mobilization and activity  - Consider nutritional services referral to assist patient with adequate nutrition and appropriate food choices  Outcome: Progressing     Problem: GASTROINTESTINAL - ADULT  Goal: Maintains or returns to baseline bowel function  Description: INTERVENTIONS:  - Assess bowel function  - Encourage oral fluids to ensure adequate hydration  - Administer IV fluids if ordered to ensure adequate hydration  - Administer ordered medications as needed  - Encourage mobilization and activity  - Consider nutritional services referral to assist patient with adequate nutrition and appropriate food choices  Outcome: Progressing     Problem: GENITOURINARY - ADULT  Goal: Urinary catheter remains patent  Description: INTERVENTIONS:  - Assess patency of urinary catheter  - If patient has a chronic hanks, consider changing catheter if non-functioning  - Follow guidelines for intermittent irrigation of non-functioning urinary catheter  Outcome: Progressing     Problem: METABOLIC, FLUID AND ELECTROLYTES - ADULT  Goal: Electrolytes maintained within normal limits  Description: INTERVENTIONS:  - Monitor labs and assess patient for signs and symptoms of electrolyte imbalances  - Administer electrolyte replacement as ordered  - Monitor response to electrolyte  replacements, including repeat lab results as appropriate  - Instruct patient on fluid and nutrition as appropriate  Outcome: Progressing     Problem: SKIN/TISSUE INTEGRITY - ADULT  Goal: Skin Integrity remains intact(Skin Breakdown Prevention)  Description: Assess:  -Perform Goldy assessment every shift  -Clean and moisturize skin every shift  -Inspect skin when repositioning, toileting, and assisting with ADLS  -Assess under medical devices such as Masimo every shift  -Assess extremities for adequate circulation and sensation     Bed Management:  -Have minimal linens on bed & keep smooth, unwrinkled  -Change linens as needed when moist or perspiring  -Avoid sitting or lying in one position for more than 1 hours while in bed  -Keep HOB at 30 degrees     Toileting:  -Offer bedside commode  -Assess for incontinence every shift  -Use incontinent care products after each incontinent episode such as foam cleanser     Activity:  -Mobilize patient 4 times a day  -Encourage activity and walks on unit  -Encourage or provide ROM exercises   -Turn and reposition patient every 2 Hours  -Use appropriate equipment to lift or move patient in bed  -Instruct/ Assist with weight shifting every 1 hour when out of bed in chair  -Consider limitation of chair time 1 hour intervals    Skin Care:  -Avoid use of baby powder, tape, friction and shearing, hot water or constrictive clothing  -Relieve pressure over bony prominences using wedges  -Do not massage red bony areas    Next Steps:  -Teach patient strategies to minimize risks such as skin breakdown   -Consider consults to  interdisciplinary teams such as wound care nurse   Outcome: Progressing  Goal: Incision(s), wounds(s) or drain site(s) healing without S/S of infection  Description: INTERVENTIONS  - Assess and document dressing, incision, wound bed, drain sites and surrounding tissue  - Provide patient and family education  - Perform skin care/dressing changes every shift    Outcome: Progressing  Goal: Pressure injury heals and does not worsen  Description: Interventions:  - Implement low air loss mattress or specialty surface (Criteria met)  - Apply silicone foam dressing  - Instruct/assist with weight shifting every 30 minutes when in chair   - Limit chair time to 1 hour intervals  - Use special pressure reducing interventions such as cushion when in chair   - Apply fecal or urinary incontinence containment device   - Perform passive or active ROM every shift  - Turn and reposition patient & offload bony prominences every 1 hours   - Utilize friction reducing device or surface for transfers   - Consider consults to  interdisciplinary teams such as wound care nurse   - Use incontinent care products after each incontinent episode such as foam cleanser   - Consider nutrition services referral as needed  Outcome: Progressing     Problem: HEMATOLOGIC - ADULT  Goal: Maintains hematologic stability  Description: INTERVENTIONS  - Assess for signs and symptoms of bleeding or hemorrhage  - Monitor labs  - Administer supportive blood products/factors as ordered and appropriate  Outcome: Progressing     Problem: Nutrition/Hydration-ADULT  Goal: Nutrient/Hydration intake appropriate for improving, restoring or maintaining nutritional needs  Description: Monitor and assess patient's nutrition/hydration status for malnutrition. Collaborate with interdisciplinary team and initiate plan and interventions as ordered.  Monitor patient's weight and dietary intake as ordered or per policy. Utilize nutrition screening tool and intervene as necessary. Determine patient's food preferences and provide high-protein, high-caloric foods as appropriate.     INTERVENTIONS:  - Monitor oral intake, urinary output, labs, and treatment plans  - Assess nutrition and hydration status and recommend course of action  - Evaluate amount of meals eaten  - Assist patient with eating if necessary   - Allow adequate time for  meals  - Recommend/ encourage appropriate diets, oral nutritional supplements, and vitamin/mineral supplements  - Order, calculate, and assess calorie counts as needed  - Recommend, monitor, and adjust tube feedings and TPN/PPN based on assessed needs  - Assess need for intravenous fluids  - Provide specific nutrition/hydration education as appropriate  - Include patient/family/caregiver in decisions related to nutrition  Outcome: Progressing

## 2024-09-28 NOTE — ASSESSMENT & PLAN NOTE
Lab Results   Component Value Date    HGBA1C 6.2 (H) 09/17/2024     Recent Labs     09/27/24  1628 09/27/24  2100 09/28/24  0728 09/28/24  1109   POCGLU 135 204* 118 161*   Controlled with last hemoglobin A1c of 6.2%  Holding home oral regimen  Monitor on sliding scale

## 2024-09-28 NOTE — ASSESSMENT & PLAN NOTE
Presented with tachycardia and leukocytosis and fever - tested positive for COVID 9/17/24  Now with polymicrobial bacteremia- enterbacter, enterococcus and proetus   CT abdomen and pelvis: mid/distal sigmoid colonic and rectal inflammatory changes in keeping with a segmental colitis/proctitis. Right ischioanal fossa subcutaneous emphysema extending through the right paramidline gluteal subcutaneous tissues to the skin surface suspicious for a perirectal or perianal fistula   Has large sacral decubitus ulcer- status post bedside debridement of sacral ulcer 9/18/24 by general surgery  Continue with dressing changes and wound care management by general surgery  Repeat CT abdomen and pelvis 9/24 to evaluate for any fistula connection versus abscess due to ongoing leukocytosis  CT reveals no ongoing abscess however does show developing osteomyelitis of coccyx bone.  Seen by GI imaging likely due to ischemic colitis but likely not primary cause of his presentation - no role for colonoscopy or flex sig curently    Continue IV antibiotics being managed by infectious disease  Was on cefepime, flagyl and vanco -- being adjusted  Repeat blood cultures negative to date  ID transitioning the patient to ampicillin 2 g every 6 hours to begin 9/26 to complete a total of 2 weeks of antibiotic therapy for enterococcal bacteremia through 10/3/24  Coordinating discharge plan with case management- Has been residing at [facility] complete care for the past 3 weeks  Will need to see if the facility will be able to care for him, if not, other options will need to be pursued.   Patient cleared to have PICC line placed. Anticipate this to occur for Monday 9/30, other option would be to keep patient inpatient until Thursday to complete full course of IV abx

## 2024-09-28 NOTE — PROGRESS NOTES
Progress Note - Hospitalist   Name: Drew Santos 73 y.o. male I MRN: [id number]  Unit/Bed#: [place] [address] -01 I Date of Admission: 9/17/2024   Date of Service: 9/28/2024 I Hospital Day: 11    Assessment & Plan  Sepsis  Presented with tachycardia and leukocytosis and fever - tested positive for COVID 9/17/24  Now with polymicrobial bacteremia- enterbacter, enterococcus and proetus   CT abdomen and pelvis: mid/distal sigmoid colonic and rectal inflammatory changes in keeping with a segmental colitis/proctitis. Right ischioanal fossa subcutaneous emphysema extending through the right paramidline gluteal subcutaneous tissues to the skin surface suspicious for a perirectal or perianal fistula   Has large sacral decubitus ulcer- status post bedside debridement of sacral ulcer 9/18/24 by general surgery  Continue with dressing changes and wound care management by general surgery  Repeat CT abdomen and pelvis 9/24 to evaluate for any fistula connection versus abscess due to ongoing leukocytosis  CT reveals no ongoing abscess however does show developing osteomyelitis of coccyx bone.  Seen by GI imaging likely due to ischemic colitis but likely not primary cause of his presentation - no role for colonoscopy or flex sig curently    Continue IV antibiotics being managed by infectious disease  Was on cefepime, flagyl and vanco -- being adjusted  Repeat blood cultures negative to date  ID transitioning the patient to ampicillin 2 g every 6 hours to begin 9/26 to complete a total of 2 weeks of antibiotic therapy for enterococcal bacteremia through 10/3/24  Coordinating discharge plan with case management- Has been residing at [facility] complete care for the past 3 weeks  Will need to see if the facility will be able to care for him, if not, other options will need to be pursued.   Patient cleared to have PICC line placed. Anticipate this to occur for Monday 9/30, other option would be to keep patient inpatient until Thursday to  complete full course of IV abx   Polymicrobial bacteremia  Polymicrobial bacteremia likely secondary sacral wound with possible fistulous connection  Infectious disease input noted and appreciated.  Continue IV cefepime, flagyl and vancomycin   ID transitioning the patient to ampicillin 2 g every 6 hours to begin 9/26 to complete a total of 2 weeks of antibiotic therapy for enterococcal bacteremia through 10/3/24  Repeat blood cultures negative to date   Underwent transesophageal echocardiogram 9/25/2024-no evidence of endocarditis  Sacral wound  Chronic sacral decubitus ulcer; present on admission  Status post debridement at bedside per surgical team.   Continue with wound care as tolerated.     Continue dressing changes and serial exams  Wound care consultation  Offload pressure   Concern for emphysematous changes tracking to sacral wound/developing fistula from site on repeat abdominal imaging   Discussion with palliative care has yielded in continuing with full medical care at this time  Advanced care planning/counseling discussion  Ongoing discussions in regards to poor prognosis with large sacral wound and developing osteomyelitis of coccyx bone in the setting of polymicrobial bacteremia  Palliative care discussed with patient's wife who is opting to proceed with full medical care at this time  Type 2 diabetes mellitus without complication, without long-term current use of insulin ([facility])  Lab Results   Component Value Date    HGBA1C 6.2 (H) 09/17/2024     Recent Labs     09/27/24  1628 09/27/24  2100 09/28/24  0728 09/28/24  1109   POCGLU 135 204* 118 161*   Controlled with last hemoglobin A1c of 6.2%  Holding home oral regimen  Monitor on sliding scale    COVID-19  Tested positive for COVID on 9/17/2024  Patient currently on room air though high risk given his age and comorbidities  Completed three days of IV remdesivir   Supportive care otherwise  Contact and airborne precautions  Currently on mild COVID  pathway.    Not requiring supplemental oxygen- remains on room air  To complete 10 days of isolation through 9/27   Isolation removed  Proctitis  Noted on CT scan.  No diarrhea or clinical evidence of colitis.   Stool studies negative  Likely ischemic colitis given area affected, continue supportive measures   No plan for any inpatient scope   Repeat CAT scan without signs of ongoing abscess.  Acute metabolic encephalopathy  Presumably secondary to sepsis, bacteremia, COVID infection  CT head with no acute intracranial abnormality  Continue with supportive care  Reorientation, delirium precautions   More alert and conversant today   History of CVA (cerebrovascular accident)  History of CVA   Chronically bedbound and with chronic left-sided deficits   Continue home aspirin, Eliquis, statin  Dysphagia 2 mechanical soft, thin liquids -upgraded to level 3 by speech therapy  Residing at [place] complete care for the past 3 weeks  Paroxysmal atrial fibrillation (HCC)  Continue Toprol XL 25 mg daily  Continue home Eliquis for stroke prevention  Anemia  Low blood counts noted 9/19/24.   Has chronic anemia likely secondary to poor nutritional status underlying chronic illness.    Hemoglobin declined to 7.1 given -1 unit PRBCs given 9/19/2024  Encourage oral intake, cannot utilize IV iron due to bacteremia   Lab Results   Component Value Date    HGB 8.6 (L) 09/26/2024    HGB 8.3 (L) 09/25/2024    HGB 7.6 (L) 09/24/2024    HGB 7.6 (L) 09/23/2024    HGB 7.6 (L) 09/22/2024   Ongoing monitoring of hemoglobin and continue to transfuse if less than 7  Severe protein-calorie malnutrition (HCC)  Malnutrition Findings:   Adult Malnutrition type: Chronic illness  Adult Degree of Malnutrition: Other severe protein calorie malnutrition  Malnutrition Characteristics: Inadequate energy, Weight loss  360 Statement: Severe protein calorie malnutrition in context of chronic illness r/t poor appetite, inadequate PO intake as evidance by  energy intake less than 75% compared to estimated needs>1 month, 7.5% wt loss x 1 month ( 122kg 24-> 113kg ) treated with PO diet and oral supplements  BMI Findings:  Body mass index is 29.53 kg/m².     VTE Pharmacologic Prophylaxis:   High Risk (Score >/= 5) - Pharmacological DVT Prophylaxis Ordered: apixaban (Eliquis). Sequential Compression Devices Ordered.    Mobility:   Basic Mobility Inpatient Raw Score: 8  -HL Goal: 3: Sit at edge of bed  JH-HLM Achieved: 1: Laying in bed  JH-HLM Goal NOT achieved. Continue with multidisciplinary rounding and encourage appropriate mobility to improve upon -HLM goals.    Patient Centered Rounds: I performed bedside rounds with nursing staff today.   Discussions with Specialists or Other Care Team Provider: none    Current Length of Stay: 11 day(s)  Current Patient Status: Inpatient   Certification Statement: The patient will continue to require additional inpatient hospital stay due to bacteremia requiring IV abx   Discharge Plan: Anticipate discharge in 48-72 hrs to rehab facility.    Code Status: Level 1 - Full Code    Subjective   Patient seen and examined at bedside. Notes he is doing fine today. Denies any pain.      Objective     Vitals:   Temp (24hrs), Av.6 °F (37 °C), Min:98.5 °F (36.9 °C), Max:98.7 °F (37.1 °C)    Temp:  [98.5 °F (36.9 °C)-98.7 °F (37.1 °C)] 98.5 °F (36.9 °C)  HR:  [77-89] 89  Resp:  [17] 17  BP: (144-158)/(73-85) 158/85  SpO2:  [97 %-99 %] 97 %  Body mass index is 29.53 kg/m².     Input and Output Summary (last 24 hours):     Intake/Output Summary (Last 24 hours) at 2024 1216  Last data filed at 2024 0935  Gross per 24 hour   Intake 480 ml   Output 1900 ml   Net -1420 ml       Physical Exam  Vitals reviewed.   Constitutional:       General: He is not in acute distress.  HENT:      Head: Normocephalic and atraumatic.      Mouth/Throat:      Mouth: Oropharynx is clear and moist.   Eyes:      General: No scleral icterus.      Extraocular Movements: EOM normal.      Conjunctiva/sclera: Conjunctivae normal.   Cardiovascular:      Rate and Rhythm: Normal rate and regular rhythm.      Heart sounds: Normal heart sounds. No murmur heard.  Pulmonary:      Effort: Pulmonary effort is normal. No respiratory distress.      Breath sounds: Normal breath sounds. No wheezing.   Abdominal:      General: Bowel sounds are normal. There is no distension.      Palpations: Abdomen is soft.      Tenderness: There is no abdominal tenderness.   Musculoskeletal:         General: No edema.      Cervical back: Neck supple.      Right lower leg: No edema.      Left lower leg: No edema.   Skin:     General: Skin is warm and dry.   Neurological:      Mental Status: He is alert. Mental status is at baseline.      Motor: Weakness (chronic left sided) present.   Psychiatric:         Mood and Affect: Mood and affect normal.         Behavior: Behavior normal.         Thought Content: Thought content normal.          Lines/Drains:  Lines/Drains/Airways       Active Status       Name Placement date Placement time Site Days    Urethral Catheter Three way 22 Fr. 08/31/24 1925  Three way  27    Continuous Bladder Irrigation Three-way 08/31/24 1925  Three-way  27                  Urinary Catheter:  Goal for removal: N/A - Chronic Parra                 Lab Results: I have reviewed the following results:    Results from last 7 days   Lab Units 09/26/24  0439   WBC Thousand/uL 12.15*   HEMOGLOBIN g/dL 8.6*   HEMATOCRIT % 27.7*   PLATELETS Thousands/uL 531*   SEGS PCT % 65   LYMPHO PCT % 22   MONO PCT % 8   EOS PCT % 2     Results from last 7 days   Lab Units 09/26/24  0439   SODIUM mmol/L 138   POTASSIUM mmol/L 3.8   CHLORIDE mmol/L 105   CO2 mmol/L 30   BUN mg/dL 6   CREATININE mg/dL 0.57*   ANION GAP mmol/L 3*   CALCIUM mg/dL 8.2*   ALBUMIN g/dL 2.1*   TOTAL BILIRUBIN mg/dL 0.50   ALK PHOS U/L 52   ALT U/L 17   AST U/L 25   GLUCOSE RANDOM mg/dL 128         Results from last 7  days   Lab Units 09/28/24  1109 09/28/24  0728 09/27/24  2100 09/27/24  1628 09/27/24  1133 09/27/24  0822 09/26/24  2044 09/26/24  1628 09/26/24  1119 09/26/24  0719 09/25/24  2112 09/25/24  1555   POC GLUCOSE mg/dl 161* 118 204* 135 122 116 190* 125 150* 117 159* 104               Recent Cultures (last 7 days):         Imaging Review: No pertinent imaging studies reviewed.  Other Studies: No additional pertinent studies reviewed.    Last 24 Hours Medication List:     Current Facility-Administered Medications:     acetaminophen (Ofirmev) injection 1,000 mg, Q6H PRN    acetaminophen (TYLENOL) tablet 650 mg, Q6H PRN    ampicillin (OMNIPEN) 2,000 mg in sodium chloride 0.9 % 100 mL IVPB, Q6H, Last Rate: 2,000 mg (09/28/24 1156)    apixaban (ELIQUIS) tablet 5 mg, BID    aspirin chewable tablet 81 mg, Daily    atorvastatin (LIPITOR) tablet 80 mg, QPM    bacitracin topical ointment 1 small application, BID    calcium carbonate-vitamin D 500 mg-5 mcg tablet 1 tablet, BID With Meals    escitalopram (LEXAPRO) tablet 10 mg, Daily    ezetimibe (ZETIA) tablet 10 mg, Daily    finasteride (PROSCAR) tablet 5 mg, Daily    insulin lispro (HumALOG/ADMELOG) 100 units/mL subcutaneous injection 1-6 Units, TID AC **AND** Fingerstick Glucose (POCT), TID AC    insulin lispro (HumALOG/ADMELOG) 100 units/mL subcutaneous injection 1-6 Units, HS    lisinopril (ZESTRIL) tablet 2.5 mg, Daily    metoprolol tartrate (LOPRESSOR) partial tablet 12.5 mg, Q12H ELISEO    ondansetron (ZOFRAN) injection 4 mg, Q4H PRN    potassium chloride oral solution 20 mEq, BID    senna-docusate sodium (SENOKOT S) 8.6-50 mg per tablet 1 tablet, HS    sodium hypochlorite (DAKIN'S QUARTER-STRENGTH) 0.125 percent topical solution, Daily    Administrative Statements   Today, Patient Was Seen By: Esperanza Arizmendi PA-C      **Please Note: This note may have been constructed using a voice recognition system.**

## 2024-09-28 NOTE — ASSESSMENT & PLAN NOTE
Chronic sacral decubitus ulcer; present on admission  Status post debridement at bedside per surgical team.   Continue with wound care as tolerated.     Continue dressing changes and serial exams  Wound care consultation  Offload pressure   Concern for emphysematous changes tracking to sacral wound/developing fistula from site on repeat abdominal imaging   Discussion with palliative care has yielded in continuing with full medical care at this time

## 2024-09-29 LAB
GLUCOSE SERPL-MCNC: 100 MG/DL (ref 65–140)
GLUCOSE SERPL-MCNC: 106 MG/DL (ref 65–140)
GLUCOSE SERPL-MCNC: 132 MG/DL (ref 65–140)
GLUCOSE SERPL-MCNC: 140 MG/DL (ref 65–140)

## 2024-09-29 PROCEDURE — 82948 REAGENT STRIP/BLOOD GLUCOSE: CPT

## 2024-09-29 PROCEDURE — 99232 SBSQ HOSP IP/OBS MODERATE 35: CPT | Performed by: PHYSICIAN ASSISTANT

## 2024-09-29 RX ADMIN — SENNOSIDES AND DOCUSATE SODIUM 1 TABLET: 8.6; 5 TABLET ORAL at 21:08

## 2024-09-29 RX ADMIN — AMPICILLIN SODIUM 2000 MG: 2 INJECTION, POWDER, FOR SOLUTION INTRAMUSCULAR; INTRAVENOUS at 17:22

## 2024-09-29 RX ADMIN — AMPICILLIN SODIUM 2000 MG: 2 INJECTION, POWDER, FOR SOLUTION INTRAMUSCULAR; INTRAVENOUS at 06:33

## 2024-09-29 RX ADMIN — AMPICILLIN SODIUM 2000 MG: 2 INJECTION, POWDER, FOR SOLUTION INTRAMUSCULAR; INTRAVENOUS at 00:42

## 2024-09-29 RX ADMIN — BACITRACIN ZINC 1 SMALL APPLICATION: 500 OINTMENT TOPICAL at 17:26

## 2024-09-29 RX ADMIN — LISINOPRIL 2.5 MG: 2.5 TABLET ORAL at 09:27

## 2024-09-29 RX ADMIN — APIXABAN 5 MG: 5 TABLET, FILM COATED ORAL at 09:27

## 2024-09-29 RX ADMIN — POTASSIUM CHLORIDE 20 MEQ: 1.5 SOLUTION ORAL at 09:27

## 2024-09-29 RX ADMIN — AMPICILLIN SODIUM 2000 MG: 2 INJECTION, POWDER, FOR SOLUTION INTRAMUSCULAR; INTRAVENOUS at 12:42

## 2024-09-29 RX ADMIN — AMPICILLIN SODIUM 2000 MG: 2 INJECTION, POWDER, FOR SOLUTION INTRAMUSCULAR; INTRAVENOUS at 23:36

## 2024-09-29 RX ADMIN — POTASSIUM CHLORIDE 20 MEQ: 1.5 SOLUTION ORAL at 17:22

## 2024-09-29 RX ADMIN — ESCITALOPRAM OXALATE 10 MG: 10 TABLET ORAL at 09:27

## 2024-09-29 RX ADMIN — APIXABAN 5 MG: 5 TABLET, FILM COATED ORAL at 17:22

## 2024-09-29 RX ADMIN — Medication 12.5 MG: at 09:27

## 2024-09-29 RX ADMIN — ATORVASTATIN CALCIUM 80 MG: 80 TABLET, FILM COATED ORAL at 17:22

## 2024-09-29 RX ADMIN — Medication 1 TABLET: at 17:22

## 2024-09-29 RX ADMIN — Medication 12.5 MG: at 21:12

## 2024-09-29 RX ADMIN — SODIUM HYPOCHLORITE: 1.25 SOLUTION TOPICAL at 09:28

## 2024-09-29 RX ADMIN — EZETIMIBE 10 MG: 10 TABLET ORAL at 09:27

## 2024-09-29 RX ADMIN — BACITRACIN ZINC 1 SMALL APPLICATION: 500 OINTMENT TOPICAL at 09:27

## 2024-09-29 RX ADMIN — Medication 1 TABLET: at 09:30

## 2024-09-29 RX ADMIN — ASPIRIN 81 MG CHEWABLE TABLET 81 MG: 81 TABLET CHEWABLE at 09:27

## 2024-09-29 RX ADMIN — FINASTERIDE 5 MG: 5 TABLET, FILM COATED ORAL at 09:27

## 2024-09-29 NOTE — ASSESSMENT & PLAN NOTE
History of CVA   Chronically bedbound and with chronic left-sided deficits   Continue home aspirin, Eliquis, statin  Dysphagia 2 mechanical soft, thin liquids -upgraded to level 3 by speech therapy  Residing at [facility] for the past 3 weeks

## 2024-09-29 NOTE — ASSESSMENT & PLAN NOTE
Lab Results   Component Value Date    HGBA1C 6.2 (H) 09/17/2024     Recent Labs     09/28/24  1109 09/28/24  1550 09/28/24 2055 09/29/24  0724   POCGLU 161* 142* 171* 132   Controlled with last hemoglobin A1c of 6.2%  Holding home oral regimen  Monitor on sliding scale

## 2024-09-29 NOTE — ASSESSMENT & PLAN NOTE
Tested positive for COVID on 9/17/2024  Patient currently on room air though high risk given his age and comorbidities  Completed three days of IV remdesivir   Supportive care otherwise  Contact and airborne precautions  Currently on mild COVID pathway.    Not requiring supplemental oxygen- remains on room air  To complete 10 days of isolation through 9/27   Isolation removed

## 2024-09-29 NOTE — ASSESSMENT & PLAN NOTE
Presented with tachycardia and leukocytosis and fever - tested positive for COVID 9/17/24  Now with polymicrobial bacteremia- enterbacter, enterococcus and proetus   CT abdomen and pelvis: mid/distal sigmoid colonic and rectal inflammatory changes in keeping with a segmental colitis/proctitis. Right ischioanal fossa subcutaneous emphysema extending through the right paramidline gluteal subcutaneous tissues to the skin surface suspicious for a perirectal or perianal fistula   Has large sacral decubitus ulcer- status post bedside debridement of sacral ulcer 9/18/24 by general surgery  Continue with dressing changes and wound care management by general surgery  Repeat CT abdomen and pelvis 9/24 to evaluate for any fistula connection versus abscess due to ongoing leukocytosis  CT reveals no ongoing abscess however does show developing osteomyelitis of coccyx bone.  Seen by GI imaging likely due to ischemic colitis but likely not primary cause of his presentation - no role for colonoscopy or flex sig curently    Continue IV antibiotics being managed by infectious disease  Was on cefepime, flagyl and vanco -- being adjusted  Repeat blood cultures negative to date  ID transitioning the patient to ampicillin 2 g every 6 hours to begin 9/26 to complete a total of 2 weeks of antibiotic therapy for enterococcal bacteremia through 10/3/24  Coordinating discharge plan with case management- Has been residing at [place] complete care for the past 3 weeks  Will need to see if the facility will be able to care for him, if not, other options will need to be pursued.   Patient cleared to have PICC line placed. Anticipate this to occur for Monday 9/30, other option would be to keep patient inpatient until Thursday to complete full course of IV abx, will discuss with CM and SNF on Monday

## 2024-09-29 NOTE — ASSESSMENT & PLAN NOTE
Presumably secondary to sepsis, bacteremia, COVID infection  CT head with no acute intracranial abnormality  Continue with supportive care  Reorientation, delirium precautions      Sulfacetamide Sodium-Sulfur Liquid is Denied    Contacted insurance for denial/appeal information.  If received at MD office, please fax to EPA team at [phone] and update this encounter with how you would like to proceed. If received by [employer] team will update provider with this information.

## 2024-09-29 NOTE — PLAN OF CARE
Problem: Potential for Falls  Goal: Patient will remain free of falls  Description: INTERVENTIONS:  - Educate patient/family on patient safety including physical limitations  - Instruct patient to call for assistance with activity   - Consult OT/PT to assist with strengthening/mobility   - Keep Call bell within reach  - Keep bed low and locked with side rails adjusted as appropriate  - Keep care items and personal belongings within reach  - Initiate and maintain comfort rounds  - Make Fall Risk Sign visible to staff  - Offer Toileting every 2 Hours, in advance of need  - Initiate/Maintain bed alarm  - Obtain necessary fall risk management equipment: bed alarm call bell  - Apply yellow socks and bracelet for high fall risk patients  - Consider moving patient to room near nurses station  Outcome: Progressing     Problem: PAIN - ADULT  Goal: Verbalizes/displays adequate comfort level or baseline comfort level  Description: Interventions:  - Encourage patient to monitor pain and request assistance  - Assess pain using appropriate pain scale  - Administer analgesics based on type and severity of pain and evaluate response  - Implement non-pharmacological measures as appropriate and evaluate response  - Consider cultural and social influences on pain and pain management  - Notify physician/advanced practitioner if interventions unsuccessful or patient reports new pain  Outcome: Progressing     Problem: INFECTION - ADULT  Goal: Absence or prevention of progression during hospitalization  Description: INTERVENTIONS:  - Assess and monitor for signs and symptoms of infection  - Monitor lab/diagnostic results  - Monitor all insertion sites, i.e. indwelling lines, tubes, and drains  - Monitor endotracheal if appropriate and nasal secretions for changes in amount and color  - Stanton appropriate cooling/warming therapies per order  - Administer medications as ordered  - Instruct and encourage patient and family to use good  hand hygiene technique  - Identify and instruct in appropriate isolation precautions for identified infection/condition  Outcome: Progressing     Problem: SAFETY ADULT  Goal: Patient will remain free of falls  Description: INTERVENTIONS:  - Educate patient/family on patient safety including physical limitations  - Instruct patient to call for assistance with activity   - Consult OT/PT to assist with strengthening/mobility   - Keep Call bell within reach  - Keep bed low and locked with side rails adjusted as appropriate  - Keep care items and personal belongings within reach  - Initiate and maintain comfort rounds  - Make Fall Risk Sign visible to staff  - Offer Toileting every 2 Hours, in advance of need  - Initiate/Maintain bed alarm  - Obtain necessary fall risk management equipment: bed alarm call bell  - Apply yellow socks and bracelet for high fall risk patients  - Consider moving patient to room near nurses station  Outcome: Progressing  Goal: Maintain or return to baseline ADL function  Description: INTERVENTIONS:  -  Assess patient's ability to carry out ADLs; assess patient's baseline for ADL function and identify physical deficits which impact ability to perform ADLs (bathing, care of mouth/teeth, toileting, grooming, dressing, etc.)  - Assess/evaluate cause of self-care deficits   - Assess range of motion  - Assess patient's mobility; develop plan if impaired  - Assess patient's need for assistive devices and provide as appropriate  - Encourage maximum independence but intervene and supervise when necessary  - Involve family in performance of ADLs  - Assess for home care needs following discharge   - Consider OT consult to assist with ADL evaluation and planning for discharge  - Provide patient education as appropriate  Outcome: Progressing  Goal: Maintains/Returns to pre admission functional level  Description: INTERVENTIONS:  - Perform AM-PAC 6 Click Basic Mobility/ Daily Activity assessment daily.  -  Set and communicate daily mobility goal to care team and patient/family/caregiver.   - Collaborate with rehabilitation services on mobility goals if consulted  - Perform Range of Motion 4 times a day.  - Reposition patient every 2 hours.  - Dangle patient 3 times a day  - Stand patient 3 times a day  - Ambulate patient 3 times a day  - Out of bed to chair 3 times a day   - Out of bed for meals 3 times a day  - Out of bed for toileting  - Record patient progress and toleration of activity level   Outcome: Progressing     Problem: DISCHARGE PLANNING  Goal: Discharge to home or other facility with appropriate resources  Description: INTERVENTIONS:  - Identify barriers to discharge w/patient and caregiver  - Arrange for needed discharge resources and transportation as appropriate  - Identify discharge learning needs (meds, wound care, etc.)  - Arrange for interpretive services to assist at discharge as needed  - Refer to Case Management Department for coordinating discharge planning if the patient needs post-hospital services based on physician/advanced practitioner order or complex needs related to functional status, cognitive ability, or social support system  Outcome: Progressing     Problem: Knowledge Deficit  Goal: Patient/family/caregiver demonstrates understanding of disease process, treatment plan, medications, and discharge instructions  Description: Complete learning assessment and assess knowledge base.  Interventions:  - Provide teaching at level of understanding  - Provide teaching via preferred learning methods  Outcome: Progressing     Problem: Prexisting or High Potential for Compromised Skin Integrity  Goal: Skin integrity is maintained or improved  Description: INTERVENTIONS:  - Identify patients at risk for skin breakdown  - Assess and monitor skin integrity  - Assess and monitor nutrition and hydration status  - Monitor labs   - Assess for incontinence   - Turn and reposition patient  - Assist with  mobility/ambulation  - Relieve pressure over bony prominences  - Avoid friction and shearing  - Provide appropriate hygiene as needed including keeping skin clean and dry  - Evaluate need for skin moisturizer/barrier cream  - Collaborate with interdisciplinary team   - Patient/family teaching  - Consider wound care consult   Outcome: Progressing     Problem: GASTROINTESTINAL - ADULT  Goal: Maintains or returns to baseline bowel function  Description: INTERVENTIONS:  - Assess bowel function  - Encourage oral fluids to ensure adequate hydration  - Administer IV fluids if ordered to ensure adequate hydration  - Administer ordered medications as needed  - Encourage mobilization and activity  - Consider nutritional services referral to assist patient with adequate nutrition and appropriate food choices  Outcome: Progressing     Problem: GENITOURINARY - ADULT  Goal: Urinary catheter remains patent  Description: INTERVENTIONS:  - Assess patency of urinary catheter  - If patient has a chronic hanks, consider changing catheter if non-functioning  - Follow guidelines for intermittent irrigation of non-functioning urinary catheter  Outcome: Progressing     Problem: METABOLIC, FLUID AND ELECTROLYTES - ADULT  Goal: Electrolytes maintained within normal limits  Description: INTERVENTIONS:  - Monitor labs and assess patient for signs and symptoms of electrolyte imbalances  - Administer electrolyte replacement as ordered  - Monitor response to electrolyte replacements, including repeat lab results as appropriate  - Instruct patient on fluid and nutrition as appropriate  Outcome: Progressing     Problem: SKIN/TISSUE INTEGRITY - ADULT  Goal: Skin Integrity remains intact(Skin Breakdown Prevention)  Description: Assess:  -Perform Goldy assessment every shift  -Clean and moisturize skin every shift  -Inspect skin when repositioning, toileting, and assisting with ADLS  -Assess under medical devices such as Masimo every shift  -Assess  extremities for adequate circulation and sensation     Bed Management:  -Have minimal linens on bed & keep smooth, unwrinkled  -Change linens as needed when moist or perspiring  -Avoid sitting or lying in one position for more than 1 hours while in bed  -Keep HOB at 30 degrees     Toileting:  -Offer bedside commode  -Assess for incontinence every shift  -Use incontinent care products after each incontinent episode such as foam cleanser     Activity:  -Mobilize patient 4 times a day  -Encourage activity and walks on unit  -Encourage or provide ROM exercises   -Turn and reposition patient every 2 Hours  -Use appropriate equipment to lift or move patient in bed  -Instruct/ Assist with weight shifting every 1 hour when out of bed in chair  -Consider limitation of chair time 1 hour intervals    Skin Care:  -Avoid use of baby powder, tape, friction and shearing, hot water or constrictive clothing  -Relieve pressure over bony prominences using wedges  -Do not massage red bony areas    Next Steps:  -Teach patient strategies to minimize risks such as skin breakdown   -Consider consults to  interdisciplinary teams such as wound care nurse   Outcome: Progressing  Goal: Incision(s), wounds(s) or drain site(s) healing without S/S of infection  Description: INTERVENTIONS  - Assess and document dressing, incision, wound bed, drain sites and surrounding tissue  - Provide patient and family education  - Perform skin care/dressing changes every shift   Outcome: Progressing  Goal: Pressure injury heals and does not worsen  Description: Interventions:  - Implement low air loss mattress or specialty surface (Criteria met)  - Apply silicone foam dressing  - Instruct/assist with weight shifting every 30 minutes when in chair   - Limit chair time to 1 hour intervals  - Use special pressure reducing interventions such as cushion when in chair   - Apply fecal or urinary incontinence containment device   - Perform passive or active ROM every  shift  - Turn and reposition patient & offload bony prominences every 1 hours   - Utilize friction reducing device or surface for transfers   - Consider consults to  interdisciplinary teams such as wound care nurse   - Use incontinent care products after each incontinent episode such as foam cleanser   - Consider nutrition services referral as needed  Outcome: Progressing     Problem: HEMATOLOGIC - ADULT  Goal: Maintains hematologic stability  Description: INTERVENTIONS  - Assess for signs and symptoms of bleeding or hemorrhage  - Monitor labs  - Administer supportive blood products/factors as ordered and appropriate  Outcome: Progressing     Problem: Nutrition/Hydration-ADULT  Goal: Nutrient/Hydration intake appropriate for improving, restoring or maintaining nutritional needs  Description: Monitor and assess patient's nutrition/hydration status for malnutrition. Collaborate with interdisciplinary team and initiate plan and interventions as ordered.  Monitor patient's weight and dietary intake as ordered or per policy. Utilize nutrition screening tool and intervene as necessary. Determine patient's food preferences and provide high-protein, high-caloric foods as appropriate.     INTERVENTIONS:  - Monitor oral intake, urinary output, labs, and treatment plans  - Assess nutrition and hydration status and recommend course of action  - Evaluate amount of meals eaten  - Assist patient with eating if necessary   - Allow adequate time for meals  - Recommend/ encourage appropriate diets, oral nutritional supplements, and vitamin/mineral supplements  - Order, calculate, and assess calorie counts as needed  - Recommend, monitor, and adjust tube feedings and TPN/PPN based on assessed needs  - Assess need for intravenous fluids  - Provide specific nutrition/hydration education as appropriate  - Include patient/family/caregiver in decisions related to nutrition  Outcome: Progressing

## 2024-09-29 NOTE — PROGRESS NOTES
Progress Note - Hospitalist   Name: Drew Santos 73 y.o. male I MRN: [id number]  Unit/Bed#: [facility] [address] -01 I Date of Admission: 9/17/2024   Date of Service: 9/29/2024 I Hospital Day: 12    Assessment & Plan  Sepsis  Presented with tachycardia and leukocytosis and fever - tested positive for COVID 9/17/24  Now with polymicrobial bacteremia- enterbacter, enterococcus and proetus   CT abdomen and pelvis: mid/distal sigmoid colonic and rectal inflammatory changes in keeping with a segmental colitis/proctitis. Right ischioanal fossa subcutaneous emphysema extending through the right paramidline gluteal subcutaneous tissues to the skin surface suspicious for a perirectal or perianal fistula   Has large sacral decubitus ulcer- status post bedside debridement of sacral ulcer 9/18/24 by general surgery  Continue with dressing changes and wound care management by general surgery  Repeat CT abdomen and pelvis 9/24 to evaluate for any fistula connection versus abscess due to ongoing leukocytosis  CT reveals no ongoing abscess however does show developing osteomyelitis of coccyx bone.  Seen by GI imaging likely due to ischemic colitis but likely not primary cause of his presentation - no role for colonoscopy or flex sig curently    Continue IV antibiotics being managed by infectious disease  Was on cefepime, flagyl and vanco -- being adjusted  Repeat blood cultures negative to date  ID transitioning the patient to ampicillin 2 g every 6 hours to begin 9/26 to complete a total of 2 weeks of antibiotic therapy for enterococcal bacteremia through 10/3/24  Coordinating discharge plan with case management- Has been residing at [facility] complete care for the past 3 weeks  Will need to see if the facility will be able to care for him, if not, other options will need to be pursued.   Patient cleared to have PICC line placed. Anticipate this to occur for Monday 9/30, other option would be to keep patient inpatient until Thursday to  complete full course of IV abx, will discuss with CM and SNF on Monday   Polymicrobial bacteremia  Polymicrobial bacteremia likely secondary sacral wound with possible fistulous connection  Infectious disease input noted and appreciated.  Continue IV cefepime, flagyl and vancomycin   ID transitioning the patient to ampicillin 2 g every 6 hours to begin 9/26 to complete a total of 2 weeks of antibiotic therapy for enterococcal bacteremia through 10/3/24  Repeat blood cultures negative to date   Underwent transesophageal echocardiogram 9/25/2024-no evidence of endocarditis  Sacral wound  Chronic sacral decubitus ulcer; present on admission  Status post debridement at bedside per surgical team.   Continue with wound care as tolerated.     Continue dressing changes and serial exams  Wound care consultation  Offload pressure   Concern for emphysematous changes tracking to sacral wound/developing fistula from site on repeat abdominal imaging   Discussion with palliative care has yielded in continuing with full medical care at this time  Advanced care planning/counseling discussion  Ongoing discussions in regards to poor prognosis with large sacral wound and developing osteomyelitis of coccyx bone in the setting of polymicrobial bacteremia  Palliative care discussed with patient's wife who is opting to proceed with full medical care at this time  Type 2 diabetes mellitus without complication, without long-term current use of insulin ([facility])  Lab Results   Component Value Date    HGBA1C 6.2 (H) 09/17/2024     Recent Labs     09/28/24  1109 09/28/24  1550 09/28/24  2055 09/29/24  0724   POCGLU 161* 142* 171* 132   Controlled with last hemoglobin A1c of 6.2%  Holding home oral regimen  Monitor on sliding scale    COVID-19  Tested positive for COVID on 9/17/2024  Patient currently on room air though high risk given his age and comorbidities  Completed three days of IV remdesivir   Supportive care otherwise  Contact and airborne  precautions  Currently on mild COVID pathway.    Not requiring supplemental oxygen- remains on room air  To complete 10 days of isolation through 9/27   Isolation removed  Proctitis  Noted on CT scan.  No diarrhea or clinical evidence of colitis.   Stool studies negative  Likely ischemic colitis given area affected, continue supportive measures   No plan for any inpatient scope   Repeat CAT scan without signs of ongoing abscess.  Acute metabolic encephalopathy  Presumably secondary to sepsis, bacteremia, COVID infection  CT head with no acute intracranial abnormality  Continue with supportive care  Reorientation, delirium precautions     History of CVA (cerebrovascular accident)  History of CVA   Chronically bedbound and with chronic left-sided deficits   Continue home aspirin, Eliquis, statin  Dysphagia 2 mechanical soft, thin liquids -upgraded to level 3 by speech therapy  Residing at [facility] complete care for the past 3 weeks  Paroxysmal atrial fibrillation (HCC)  Continue Toprol XL 25 mg daily  Continue home Eliquis for stroke prevention  Anemia  Low blood counts noted 9/19/24.   Has chronic anemia likely secondary to poor nutritional status underlying chronic illness.    Hemoglobin declined to 7.1 given -1 unit PRBCs given 9/19/2024  Encourage oral intake, cannot utilize IV iron due to bacteremia   Lab Results   Component Value Date    HGB 8.6 (L) 09/26/2024    HGB 8.3 (L) 09/25/2024    HGB 7.6 (L) 09/24/2024    HGB 7.6 (L) 09/23/2024    HGB 7.6 (L) 09/22/2024   Ongoing monitoring of hemoglobin and continue to transfuse if less than 7  Severe protein-calorie malnutrition (HCC)  Malnutrition Findings:   Adult Malnutrition type: Chronic illness  Adult Degree of Malnutrition: Other severe protein calorie malnutrition  Malnutrition Characteristics: Inadequate energy, Weight loss  360 Statement: Severe protein calorie malnutrition in context of chronic illness r/t poor appetite, inadequate PO intake as evidance by  energy intake less than 75% compared to estimated needs>1 month, 7.5% wt loss x 1 month ( 122kg 24-> 113kg ) treated with PO diet and oral supplements  BMI Findings:  Body mass index is 29.53 kg/m².     VTE Pharmacologic Prophylaxis:   High Risk (Score >/= 5) - Pharmacological DVT Prophylaxis Ordered: apixaban (Eliquis). Sequential Compression Devices Ordered.    Mobility:   Basic Mobility Inpatient Raw Score: 8  -HLM Goal: 3: Sit at edge of bed  JH-HLM Achieved: 1: Laying in bed  JH-HLM Goal NOT achieved. Continue with multidisciplinary rounding and encourage appropriate mobility to improve upon JH-HLM goals.    Patient Centered Rounds: I performed bedside rounds with nursing staff today.   Discussions with Specialists or Other Care Team Provider: CM     Education and Discussions with Family / Patient: Updated  (wife) via phone.    Current Length of Stay: 12 day(s)  Current Patient Status: Inpatient   Certification Statement: The patient will continue to require additional inpatient hospital stay due to bacteremia requiring IV abx   Discharge Plan: Anticipate discharge in 48-72 hrs to rehab facility.    Code Status: Level 1 - Full Code    Subjective   Patient seen and examined at bedside. Denies any complaints.    Objective     Vitals:   Temp (24hrs), Av.5 °F (36.9 °C), Min:98.1 °F (36.7 °C), Max:98.8 °F (37.1 °C)    Temp:  [98.1 °F (36.7 °C)-98.8 °F (37.1 °C)] 98.1 °F (36.7 °C)  HR:  [79-90] 89  Resp:  [17-18] 17  BP: (132-153)/(75-87) 135/81  SpO2:  [91 %-96 %] 91 %  Body mass index is 29.53 kg/m².     Input and Output Summary (last 24 hours):     Intake/Output Summary (Last 24 hours) at 2024 1011  Last data filed at 2024 0748  Gross per 24 hour   Intake 660 ml   Output 2800 ml   Net -2140 ml       Physical Exam  Vitals reviewed.   Constitutional:       General: He is not in acute distress.  HENT:      Head: Normocephalic and atraumatic.      Mouth/Throat:      Mouth:  Oropharynx is clear and moist.   Eyes:      General: No scleral icterus.     Extraocular Movements: EOM normal.      Conjunctiva/sclera: Conjunctivae normal.   Cardiovascular:      Rate and Rhythm: Normal rate and regular rhythm.      Heart sounds: Normal heart sounds. No murmur heard.  Pulmonary:      Effort: Pulmonary effort is normal. No respiratory distress.      Breath sounds: Normal breath sounds. No wheezing.   Abdominal:      General: Bowel sounds are normal. There is no distension.      Palpations: Abdomen is soft.      Tenderness: There is no abdominal tenderness.   Musculoskeletal:         General: No edema.      Cervical back: Neck supple.      Right lower leg: No edema.      Left lower leg: No edema.   Skin:     General: Skin is warm and dry.   Neurological:      Mental Status: He is alert. Mental status is at baseline.      Motor: Weakness (chronic left sided) present.   Psychiatric:         Mood and Affect: Mood and affect normal.         Behavior: Behavior normal.         Thought Content: Thought content normal.          Lines/Drains:  Lines/Drains/Airways       Active Status       Name Placement date Placement time Site Days    Urethral Catheter Three way 22 Fr. 08/31/24 1925  Three way  28    Continuous Bladder Irrigation Three-way 08/31/24 1925  Three-way  28                  Urinary Catheter:  Goal for removal: N/A - Chronic Parra                 Lab Results: I have reviewed the following results:    Results from last 7 days   Lab Units 09/26/24  0439   WBC Thousand/uL 12.15*   HEMOGLOBIN g/dL 8.6*   HEMATOCRIT % 27.7*   PLATELETS Thousands/uL 531*   SEGS PCT % 65   LYMPHO PCT % 22   MONO PCT % 8   EOS PCT % 2     Results from last 7 days   Lab Units 09/26/24  0439   SODIUM mmol/L 138   POTASSIUM mmol/L 3.8   CHLORIDE mmol/L 105   CO2 mmol/L 30   BUN mg/dL 6   CREATININE mg/dL 0.57*   ANION GAP mmol/L 3*   CALCIUM mg/dL 8.2*   ALBUMIN g/dL 2.1*   TOTAL BILIRUBIN mg/dL 0.50   ALK PHOS U/L 52    ALT U/L 17   AST U/L 25   GLUCOSE RANDOM mg/dL 128         Results from last 7 days   Lab Units 09/29/24  0724 09/28/24  2055 09/28/24  1550 09/28/24  1109 09/28/24  0728 09/27/24  2100 09/27/24  1628 09/27/24  1133 09/27/24  0822 09/26/24  2044 09/26/24  1628 09/26/24  1119   POC GLUCOSE mg/dl 132 171* 142* 161* 118 204* 135 122 116 190* 125 150*               Recent Cultures (last 7 days):         Imaging Review: No pertinent imaging studies reviewed.  Other Studies: No additional pertinent studies reviewed.    Last 24 Hours Medication List:     Current Facility-Administered Medications:     acetaminophen (Ofirmev) injection 1,000 mg, Q6H PRN    acetaminophen (TYLENOL) tablet 650 mg, Q6H PRN    ampicillin (OMNIPEN) 2,000 mg in sodium chloride 0.9 % 100 mL IVPB, Q6H, Last Rate: 2,000 mg (09/29/24 0633)    apixaban (ELIQUIS) tablet 5 mg, BID    aspirin chewable tablet 81 mg, Daily    atorvastatin (LIPITOR) tablet 80 mg, QPM    bacitracin topical ointment 1 small application, BID    calcium carbonate-vitamin D 500 mg-5 mcg tablet 1 tablet, BID With Meals    escitalopram (LEXAPRO) tablet 10 mg, Daily    ezetimibe (ZETIA) tablet 10 mg, Daily    finasteride (PROSCAR) tablet 5 mg, Daily    insulin lispro (HumALOG/ADMELOG) 100 units/mL subcutaneous injection 1-6 Units, TID AC **AND** Fingerstick Glucose (POCT), TID AC    insulin lispro (HumALOG/ADMELOG) 100 units/mL subcutaneous injection 1-6 Units, HS    lisinopril (ZESTRIL) tablet 2.5 mg, Daily    metoprolol tartrate (LOPRESSOR) partial tablet 12.5 mg, Q12H ELISEO    ondansetron (ZOFRAN) injection 4 mg, Q4H PRN    potassium chloride oral solution 20 mEq, BID    senna-docusate sodium (SENOKOT S) 8.6-50 mg per tablet 1 tablet, HS    sodium hypochlorite (DAKIN'S QUARTER-STRENGTH) 0.125 percent topical solution, Daily    Administrative Statements   Today, Patient Was Seen By: Esperanza Arizmendi PA-C      **Please Note: This note may have been constructed using a voice  recognition system.**

## 2024-09-30 LAB
GLUCOSE SERPL-MCNC: 101 MG/DL (ref 65–140)
GLUCOSE SERPL-MCNC: 114 MG/DL (ref 65–140)
GLUCOSE SERPL-MCNC: 166 MG/DL (ref 65–140)
GLUCOSE SERPL-MCNC: 178 MG/DL (ref 65–140)

## 2024-09-30 PROCEDURE — 97110 THERAPEUTIC EXERCISES: CPT | Performed by: PHYSICAL THERAPIST

## 2024-09-30 PROCEDURE — 97112 NEUROMUSCULAR REEDUCATION: CPT | Performed by: PHYSICAL THERAPIST

## 2024-09-30 PROCEDURE — 82948 REAGENT STRIP/BLOOD GLUCOSE: CPT

## 2024-09-30 PROCEDURE — 97530 THERAPEUTIC ACTIVITIES: CPT

## 2024-09-30 PROCEDURE — 97110 THERAPEUTIC EXERCISES: CPT

## 2024-09-30 PROCEDURE — 97112 NEUROMUSCULAR REEDUCATION: CPT

## 2024-09-30 PROCEDURE — 99231 SBSQ HOSP IP/OBS SF/LOW 25: CPT | Performed by: PHYSICIAN ASSISTANT

## 2024-09-30 RX ADMIN — POTASSIUM CHLORIDE 20 MEQ: 1.5 SOLUTION ORAL at 09:04

## 2024-09-30 RX ADMIN — BACITRACIN ZINC 1 SMALL APPLICATION: 500 OINTMENT TOPICAL at 17:22

## 2024-09-30 RX ADMIN — POTASSIUM CHLORIDE 20 MEQ: 1.5 SOLUTION ORAL at 17:22

## 2024-09-30 RX ADMIN — LISINOPRIL 2.5 MG: 2.5 TABLET ORAL at 09:05

## 2024-09-30 RX ADMIN — Medication 1 TABLET: at 09:05

## 2024-09-30 RX ADMIN — AMPICILLIN SODIUM 2000 MG: 2 INJECTION, POWDER, FOR SOLUTION INTRAMUSCULAR; INTRAVENOUS at 17:30

## 2024-09-30 RX ADMIN — SENNOSIDES AND DOCUSATE SODIUM 1 TABLET: 8.6; 5 TABLET ORAL at 21:15

## 2024-09-30 RX ADMIN — SODIUM HYPOCHLORITE: 1.25 SOLUTION TOPICAL at 09:12

## 2024-09-30 RX ADMIN — Medication 1 TABLET: at 17:22

## 2024-09-30 RX ADMIN — Medication 12.5 MG: at 09:05

## 2024-09-30 RX ADMIN — ESCITALOPRAM OXALATE 10 MG: 10 TABLET ORAL at 09:05

## 2024-09-30 RX ADMIN — ATORVASTATIN CALCIUM 80 MG: 80 TABLET, FILM COATED ORAL at 17:22

## 2024-09-30 RX ADMIN — BACITRACIN ZINC 1 SMALL APPLICATION: 500 OINTMENT TOPICAL at 09:12

## 2024-09-30 RX ADMIN — ASPIRIN 81 MG CHEWABLE TABLET 81 MG: 81 TABLET CHEWABLE at 09:05

## 2024-09-30 RX ADMIN — APIXABAN 5 MG: 5 TABLET, FILM COATED ORAL at 17:22

## 2024-09-30 RX ADMIN — INSULIN LISPRO 1 UNITS: 100 INJECTION, SOLUTION INTRAVENOUS; SUBCUTANEOUS at 21:18

## 2024-09-30 RX ADMIN — INSULIN LISPRO 1 UNITS: 100 INJECTION, SOLUTION INTRAVENOUS; SUBCUTANEOUS at 12:36

## 2024-09-30 RX ADMIN — AMPICILLIN SODIUM 2000 MG: 2 INJECTION, POWDER, FOR SOLUTION INTRAMUSCULAR; INTRAVENOUS at 12:37

## 2024-09-30 RX ADMIN — AMPICILLIN SODIUM 2000 MG: 2 INJECTION, POWDER, FOR SOLUTION INTRAMUSCULAR; INTRAVENOUS at 05:17

## 2024-09-30 RX ADMIN — APIXABAN 5 MG: 5 TABLET, FILM COATED ORAL at 09:05

## 2024-09-30 RX ADMIN — FINASTERIDE 5 MG: 5 TABLET, FILM COATED ORAL at 09:05

## 2024-09-30 RX ADMIN — EZETIMIBE 10 MG: 10 TABLET ORAL at 09:05

## 2024-09-30 RX ADMIN — Medication 12.5 MG: at 21:15

## 2024-09-30 NOTE — PLAN OF CARE
Problem: Potential for Falls  Goal: Patient will remain free of falls  Description: INTERVENTIONS:  - Educate patient/family on patient safety including physical limitations  - Instruct patient to call for assistance with activity   - Consult OT/PT to assist with strengthening/mobility   - Keep Call bell within reach  - Keep bed low and locked with side rails adjusted as appropriate  - Keep care items and personal belongings within reach  - Initiate and maintain comfort rounds  - Make Fall Risk Sign visible to staff  - Offer Toileting every 2 Hours, in advance of need  - Initiate/Maintain bed alarm  - Obtain necessary fall risk management equipment: bed alarm call bell  - Apply yellow socks and bracelet for high fall risk patients  - Consider moving patient to room near nurses station  Outcome: Progressing     Problem: PAIN - ADULT  Goal: Verbalizes/displays adequate comfort level or baseline comfort level  Description: Interventions:  - Encourage patient to monitor pain and request assistance  - Assess pain using appropriate pain scale  - Administer analgesics based on type and severity of pain and evaluate response  - Implement non-pharmacological measures as appropriate and evaluate response  - Consider cultural and social influences on pain and pain management  - Notify physician/advanced practitioner if interventions unsuccessful or patient reports new pain  Outcome: Progressing     Problem: INFECTION - ADULT  Goal: Absence or prevention of progression during hospitalization  Description: INTERVENTIONS:  - Assess and monitor for signs and symptoms of infection  - Monitor lab/diagnostic results  - Monitor all insertion sites, i.e. indwelling lines, tubes, and drains  - Monitor endotracheal if appropriate and nasal secretions for changes in amount and color  - [place] appropriate cooling/warming therapies per order  - Administer medications as ordered  - Instruct and encourage patient and family to use good  hand hygiene technique  - Identify and instruct in appropriate isolation precautions for identified infection/condition  Outcome: Progressing     Problem: SAFETY ADULT  Goal: Patient will remain free of falls  Description: INTERVENTIONS:  - Educate patient/family on patient safety including physical limitations  - Instruct patient to call for assistance with activity   - Consult OT/PT to assist with strengthening/mobility   - Keep Call bell within reach  - Keep bed low and locked with side rails adjusted as appropriate  - Keep care items and personal belongings within reach  - Initiate and maintain comfort rounds  - Make Fall Risk Sign visible to staff  - Offer Toileting every 2 Hours, in advance of need  - Initiate/Maintain bed alarm  - Obtain necessary fall risk management equipment: bed alarm call bell  - Apply yellow socks and bracelet for high fall risk patients  - Consider moving patient to room near nurses station  Outcome: Progressing  Goal: Maintain or return to baseline ADL function  Description: INTERVENTIONS:  -  Assess patient's ability to carry out ADLs; assess patient's baseline for ADL function and identify physical deficits which impact ability to perform ADLs (bathing, care of mouth/teeth, toileting, grooming, dressing, etc.)  - Assess/evaluate cause of self-care deficits   - Assess range of motion  - Assess patient's mobility; develop plan if impaired  - Assess patient's need for assistive devices and provide as appropriate  - Encourage maximum independence but intervene and supervise when necessary  - Involve family in performance of ADLs  - Assess for home care needs following discharge   - Consider OT consult to assist with ADL evaluation and planning for discharge  - Provide patient education as appropriate  Outcome: Progressing  Goal: Maintains/Returns to pre admission functional level  Description: INTERVENTIONS:  - Perform AM-PAC 6 Click Basic Mobility/ Daily Activity assessment daily.  -  Set and communicate daily mobility goal to care team and patient/family/caregiver.   - Collaborate with rehabilitation services on mobility goals if consulted  - Perform Range of Motion 4 times a day.  - Reposition patient every 2 hours.  - Dangle patient 3 times a day  - Stand patient 3 times a day  - Ambulate patient 3 times a day  - Out of bed to chair 3 times a day   - Out of bed for meals 3 times a day  - Out of bed for toileting  - Record patient progress and toleration of activity level   Outcome: Progressing     Problem: DISCHARGE PLANNING  Goal: Discharge to home or other facility with appropriate resources  Description: INTERVENTIONS:  - Identify barriers to discharge w/patient and caregiver  - Arrange for needed discharge resources and transportation as appropriate  - Identify discharge learning needs (meds, wound care, etc.)  - Arrange for interpretive services to assist at discharge as needed  - Refer to Case Management Department for coordinating discharge planning if the patient needs post-hospital services based on physician/advanced practitioner order or complex needs related to functional status, cognitive ability, or social support system  Outcome: Progressing     Problem: Knowledge Deficit  Goal: Patient/family/caregiver demonstrates understanding of disease process, treatment plan, medications, and discharge instructions  Description: Complete learning assessment and assess knowledge base.  Interventions:  - Provide teaching at level of understanding  - Provide teaching via preferred learning methods  Outcome: Progressing     Problem: Prexisting or High Potential for Compromised Skin Integrity  Goal: Skin integrity is maintained or improved  Description: INTERVENTIONS:  - Identify patients at risk for skin breakdown  - Assess and monitor skin integrity  - Assess and monitor nutrition and hydration status  - Monitor labs   - Assess for incontinence   - Turn and reposition patient  - Assist with  mobility/ambulation  - Relieve pressure over bony prominences  - Avoid friction and shearing  - Provide appropriate hygiene as needed including keeping skin clean and dry  - Evaluate need for skin moisturizer/barrier cream  - Collaborate with interdisciplinary team   - Patient/family teaching  - Consider wound care consult   Outcome: Progressing     Problem: GASTROINTESTINAL - ADULT  Goal: Maintains or returns to baseline bowel function  Description: INTERVENTIONS:  - Assess bowel function  - Encourage oral fluids to ensure adequate hydration  - Administer IV fluids if ordered to ensure adequate hydration  - Administer ordered medications as needed  - Encourage mobilization and activity  - Consider nutritional services referral to assist patient with adequate nutrition and appropriate food choices  Outcome: Progressing     Problem: GENITOURINARY - ADULT  Goal: Urinary catheter remains patent  Description: INTERVENTIONS:  - Assess patency of urinary catheter  - If patient has a chronic hanks, consider changing catheter if non-functioning  - Follow guidelines for intermittent irrigation of non-functioning urinary catheter  Outcome: Progressing     Problem: METABOLIC, FLUID AND ELECTROLYTES - ADULT  Goal: Electrolytes maintained within normal limits  Description: INTERVENTIONS:  - Monitor labs and assess patient for signs and symptoms of electrolyte imbalances  - Administer electrolyte replacement as ordered  - Monitor response to electrolyte replacements, including repeat lab results as appropriate  - Instruct patient on fluid and nutrition as appropriate  Outcome: Progressing     Problem: SKIN/TISSUE INTEGRITY - ADULT  Goal: Skin Integrity remains intact(Skin Breakdown Prevention)  Description: Assess:  -Perform Goldy assessment every shift  -Clean and moisturize skin every shift  -Inspect skin when repositioning, toileting, and assisting with ADLS  -Assess under medical devices such as Masimo every shift  -Assess  extremities for adequate circulation and sensation     Bed Management:  -Have minimal linens on bed & keep smooth, unwrinkled  -Change linens as needed when moist or perspiring  -Avoid sitting or lying in one position for more than 1 hours while in bed  -Keep HOB at 30 degrees     Toileting:  -Offer bedside commode  -Assess for incontinence every shift  -Use incontinent care products after each incontinent episode such as foam cleanser     Activity:  -Mobilize patient 4 times a day  -Encourage activity and walks on unit  -Encourage or provide ROM exercises   -Turn and reposition patient every 2 Hours  -Use appropriate equipment to lift or move patient in bed  -Instruct/ Assist with weight shifting every 1 hour when out of bed in chair  -Consider limitation of chair time 1 hour intervals    Skin Care:  -Avoid use of baby powder, tape, friction and shearing, hot water or constrictive clothing  -Relieve pressure over bony prominences using wedges  -Do not massage red bony areas    Next Steps:  -Teach patient strategies to minimize risks such as skin breakdown   -Consider consults to  interdisciplinary teams such as wound care nurse   Outcome: Progressing  Goal: Incision(s), wounds(s) or drain site(s) healing without S/S of infection  Description: INTERVENTIONS  - Assess and document dressing, incision, wound bed, drain sites and surrounding tissue  - Provide patient and family education  - Perform skin care/dressing changes every shift   Outcome: Progressing  Goal: Pressure injury heals and does not worsen  Description: Interventions:  - Implement low air loss mattress or specialty surface (Criteria met)  - Apply silicone foam dressing  - Instruct/assist with weight shifting every 30 minutes when in chair   - Limit chair time to 1 hour intervals  - Use special pressure reducing interventions such as cushion when in chair   - Apply fecal or urinary incontinence containment device   - Perform passive or active ROM every  shift  - Turn and reposition patient & offload bony prominences every 1 hours   - Utilize friction reducing device or surface for transfers   - Consider consults to  interdisciplinary teams such as wound care nurse   - Use incontinent care products after each incontinent episode such as foam cleanser   - Consider nutrition services referral as needed  Outcome: Progressing     Problem: HEMATOLOGIC - ADULT  Goal: Maintains hematologic stability  Description: INTERVENTIONS  - Assess for signs and symptoms of bleeding or hemorrhage  - Monitor labs  - Administer supportive blood products/factors as ordered and appropriate  Outcome: Progressing     Problem: Nutrition/Hydration-ADULT  Goal: Nutrient/Hydration intake appropriate for improving, restoring or maintaining nutritional needs  Description: Monitor and assess patient's nutrition/hydration status for malnutrition. Collaborate with interdisciplinary team and initiate plan and interventions as ordered.  Monitor patient's weight and dietary intake as ordered or per policy. Utilize nutrition screening tool and intervene as necessary. Determine patient's food preferences and provide high-protein, high-caloric foods as appropriate.     INTERVENTIONS:  - Monitor oral intake, urinary output, labs, and treatment plans  - Assess nutrition and hydration status and recommend course of action  - Evaluate amount of meals eaten  - Assist patient with eating if necessary   - Allow adequate time for meals  - Recommend/ encourage appropriate diets, oral nutritional supplements, and vitamin/mineral supplements  - Order, calculate, and assess calorie counts as needed  - Recommend, monitor, and adjust tube feedings and TPN/PPN based on assessed needs  - Assess need for intravenous fluids  - Provide specific nutrition/hydration education as appropriate  - Include patient/family/caregiver in decisions related to nutrition  Outcome: Progressing      no

## 2024-09-30 NOTE — ASSESSMENT & PLAN NOTE
Lab Results   Component Value Date    HGBA1C 6.2 (H) 09/17/2024     Recent Labs     09/29/24  1626 09/29/24  2112 09/30/24  0714 09/30/24  1100   POCGLU 106 140 114 166*   Controlled with last hemoglobin A1c of 6.2%  Holding home oral regimen  Monitor on sliding scale

## 2024-09-30 NOTE — WOUND OSTOMY CARE
Progress Note - Wound   Drew Santos 73 y.o. male MRN: [id number]  Unit/Bed#: [place] [address] -01 Encounter: [id number]        Assessment:   Patient seen for wound care follow-up.  He is agreeable to assessment, assessed along with primary RN.  Patient is on P500 low air-loss mattress with ATR positioning system in place.  Dependent for turning/repositioning.  Parra catheter in place, occasionally incontinent of stool.  Nutrition team following, dietary supplements ordered.  Bilateral heels intact with preventative foam dressings and offloading heel boots.     Urethra--measuring slightly smaller, pink, partial thickness tissue loss with no drainage.  Cherelle-wound intact.  Present on admission unstageable pressure injury to sacrum--90% yellow/tan devitalized tissue with 10% pink tissue.  Wound with significant depth and circumferential undermining (greatest at 12-1  o'clock).  There is moderate serosanguinous/tan drainage on old dressing when removed.  More superficial tissue loss surrounding the main wound opening with yellow and pink tissue.  Cherelle-wound otherwise intact with hyperpigmentation.  No induration.  Patient reports pain when laying on the wound.    See flowsheet for wound details.     Wound Care Plan:   1-Silicone Cream/Hydraguard lotion to bilateral heels twice daily and as needed.  2-Elevate heels off of bed/chair surface--offloading heel boots.  3-P500 low air-loss mattress.   4-Apply moisturizing skin cream to body daily and as needed.  5-Turn/reposition every 2 hours while in bed for pressure re-distribution on skin.   6-Urethra--cleanse, pat dry. Apply Bacitrain twice daily.    7-Sacrum: Cleanse wound with NSS and pat dry. Apply skin prep to intact cherelle-wound skin and allow to dry. Lightly pack wound and undermining with 0.25% Dakins Moistened Lazarus. Cover any exposed yellow slough that is unable to be packed with silver alginate. Cover wound with large sacral silicone bordered foam dressing. Eduardo with  T for Treatment and change every day or PRN.     Wound care team to follow.      Patient should follow-up at the wound center on discharge.  Ambulatory referral placed.     Wound 08/23/24 Pressure Injury Coccyx (Active)   Wound Image   09/30/24 1220   Wound Description Pink;Yellow;Tan;Slough 09/30/24 1220   Cherelle-wound Assessment Hyperpigmented 09/30/24 1220   Wound Length (cm) 8.5 cm 09/30/24 1220   Wound Width (cm) 7 cm 09/30/24 1220   Wound Depth (cm) 5.8 cm 09/30/24 1220   Wound Surface Area (cm^2) 59.5 cm^2 09/30/24 1220   Wound Volume (cm^3) 345.1 cm^3 09/30/24 1220   Calculated Wound Volume (cm^3) 345.1 cm^3 09/30/24 1220   Change in Wound Size % -1954.17 09/30/24 1220   Number of underminings 1 09/30/24 1220   Undermining 1 3 09/30/24 1220   Undermining 1 is depth extending from Circumferential  09/30/24 1220   Drainage Amount Moderate 09/30/24 1220   Drainage Description Serosanguineous;Tan 09/30/24 1220   Non-staged Wound Description Full thickness 09/30/24 1220   Treatments Irrigation with NSS 09/30/24 1220   Dressing Packings;Foam, Silicon (eg. Allevyn, etc);Calcium Alginate with Silver 09/30/24 1220   Wound packed? Yes 09/30/24 1220   Packing- # removed 1 09/30/24 1220   Packing- # inserted 1 09/30/24 1220   Dressing Changed Changed 09/30/24 1220   Patient Tolerance Tolerated well 09/30/24 1220   Dressing Status Clean;Dry;Intact 09/30/24 1220       Wound 09/02/24 Penis Mid (Active)   Wound Image   09/30/24 1219   Wound Description [place] 09/30/24 1219   Cherelle-wound Assessment Intact 09/30/24 1219   Wound Length (cm) 0.3 cm 09/30/24 1219   Wound Width (cm) 0.5 cm 09/30/24 1219   Wound Depth (cm) 0.1 cm 09/30/24 1219   Wound Surface Area (cm^2) 0.15 cm^2 09/30/24 1219   Wound Volume (cm^3) 0.015 cm^3 09/30/24 1219   Calculated Wound Volume (cm^3) 0.02 cm^3 09/30/24 1219   Change in Wound Size % 0 09/30/24 1219   Drainage Amount None 09/30/24 1219   Non-staged Wound Description Partial thickness 09/30/24 1219    Treatments Cleansed 09/30/24 1219   Dressing Open to air 09/30/24 1219     Genevieve SRN, RN, CWON

## 2024-09-30 NOTE — PROGRESS NOTES
Progress Note - Hospitalist   Name: Drew Santos 73 y.o. male I MRN: [id number]  Unit/Bed#: [place] [address] -01 I Date of Admission: 9/17/2024   Date of Service: 9/30/2024 I Hospital Day: 13    Assessment & Plan  Sepsis  Presented with tachycardia and leukocytosis and fever - tested positive for COVID 9/17/24  Now with polymicrobial bacteremia- enterbacter, enterococcus and proetus   CT abdomen and pelvis: mid/distal sigmoid colonic and rectal inflammatory changes in keeping with a segmental colitis/proctitis. Right ischioanal fossa subcutaneous emphysema extending through the right paramidline gluteal subcutaneous tissues to the skin surface suspicious for a perirectal or perianal fistula   Has large sacral decubitus ulcer- status post bedside debridement of sacral ulcer 9/18/24 by general surgery  Continue with dressing changes and wound care management by general surgery  Repeat CT abdomen and pelvis 9/24 to evaluate for any fistula connection versus abscess due to ongoing leukocytosis  CT reveals no ongoing abscess however does show developing osteomyelitis of coccyx bone.  Seen by GI imaging likely due to ischemic colitis but likely not primary cause of his presentation - no role for colonoscopy or flex sig curently    Continue IV antibiotics being managed by infectious disease  Was on cefepime, flagyl and vanco -- being adjusted  Repeat blood cultures negative to date  ID transitioning the patient to ampicillin 2 g every 6 hours to begin 9/26 to complete a total of 2 weeks of antibiotic therapy for enterococcal bacteremia through 10/3/24  Coordinating discharge plan with case management- Has been residing at [facility] for the past 3 weeks  Patient remain in the hospital through Thursday to complete full course of IV antibiotics  Case management working on placement  Polymicrobial bacteremia  Polymicrobial bacteremia likely secondary sacral wound with possible fistulous connection  Infectious disease  input noted and appreciated.  Continue IV cefepime, flagyl and vancomycin   ID transitioning the patient to ampicillin 2 g every 6 hours to begin 9/26 to complete a total of 2 weeks of antibiotic therapy for enterococcal bacteremia through 10/3/24  Repeat blood cultures negative to date   Underwent transesophageal echocardiogram 9/25/2024-no evidence of endocarditis  Sacral wound  Chronic sacral decubitus ulcer; present on admission  Status post debridement at bedside per surgical team.   Continue with wound care as tolerated.     Continue dressing changes and serial exams  Wound care consultation  Offload pressure   Concern for emphysematous changes tracking to sacral wound/developing fistula from site on repeat abdominal imaging   Discussion with palliative care has yielded in continuing with full medical care at this time  Advanced care planning/counseling discussion  Ongoing discussions in regards to poor prognosis with large sacral wound and developing osteomyelitis of coccyx bone in the setting of polymicrobial bacteremia  Palliative care discussed with patient's wife who is opting to proceed with full medical care at this time  Type 2 diabetes mellitus without complication, without long-term current use of insulin ([facility])  Lab Results   Component Value Date    HGBA1C 6.2 (H) 09/17/2024     Recent Labs     09/29/24  1626 09/29/24  2112 09/30/24  0714 09/30/24  1100   POCGLU 106 140 114 166*   Controlled with last hemoglobin A1c of 6.2%  Holding home oral regimen  Monitor on sliding scale    COVID-19  Tested positive for COVID on 9/17/2024  Patient currently on room air though high risk given his age and comorbidities  Completed three days of IV remdesivir   Supportive care otherwise  Contact and airborne precautions  Currently on mild COVID pathway.    Not requiring supplemental oxygen- remains on room air  To complete 10 days of isolation through 9/27   Isolation removed  Proctitis  Noted on CT scan.  No  diarrhea or clinical evidence of colitis.   Stool studies negative  Likely ischemic colitis given area affected, continue supportive measures   No plan for any inpatient scope   Repeat CAT scan without signs of ongoing abscess.  Acute metabolic encephalopathy  Presumably secondary to sepsis, bacteremia, COVID infection  CT head with no acute intracranial abnormality  Continue with supportive care  Reorientation, delirium precautions     History of CVA (cerebrovascular accident)  History of CVA   Chronically bedbound and with chronic left-sided deficits   Continue home aspirin, Eliquis, statin  Dysphagia 2 mechanical soft, thin liquids -upgraded to level 3 by speech therapy  Residing at [facility] complete care for the past 3 weeks  Paroxysmal atrial fibrillation (HCC)  Continue Toprol XL 25 mg daily  Continue home Eliquis for stroke prevention  Anemia  Low blood counts noted 9/19/24.   Has chronic anemia likely secondary to poor nutritional status underlying chronic illness.    Hemoglobin declined to 7.1 given -1 unit PRBCs given 9/19/2024  Encourage oral intake, cannot utilize IV iron due to bacteremia   Lab Results   Component Value Date    HGB 8.6 (L) 09/26/2024    HGB 8.3 (L) 09/25/2024    HGB 7.6 (L) 09/24/2024    HGB 7.6 (L) 09/23/2024    HGB 7.6 (L) 09/22/2024   Ongoing monitoring of hemoglobin and continue to transfuse if less than 7  Severe protein-calorie malnutrition (HCC)  Malnutrition Findings:   Adult Malnutrition type: Chronic illness  Adult Degree of Malnutrition: Other severe protein calorie malnutrition  Malnutrition Characteristics: Inadequate energy, Weight loss  360 Statement: Severe protein calorie malnutrition in context of chronic illness r/t poor appetite, inadequate PO intake as evidance by energy intake less than 75% compared to estimated needs>1 month, 7.5% wt loss x 1 month ( 122kg 8/23/24-> 113kg 9/17) treated with PO diet and oral supplements  BMI Findings:  Body mass index is 29.53  kg/m².     VTE Pharmacologic Prophylaxis:   High Risk (Score >/= 5) - Pharmacological DVT Prophylaxis Ordered: apixaban (Eliquis). Sequential Compression Devices Ordered.    Mobility:   Basic Mobility Inpatient Raw Score: 8  JH-HLM Goal: 3: Sit at edge of bed  JH-HLM Achieved: 2: Bed activities/Dependent transfer  JH-HLM Goal NOT achieved. Continue with multidisciplinary rounding and encourage appropriate mobility to improve upon JH-HLM goals.    Patient Centered Rounds: I performed bedside rounds with nursing staff today.   Discussions with Specialists or Other Care Team Provider: Case management    Current Length of Stay: 13 day(s)  Current Patient Status: Inpatient   Certification Statement: The patient will continue to require additional inpatient hospital stay due to bacteremia requiring IV antibiotics  Discharge Plan: Anticipate discharge in >72 hrs to rehab facility.    Code Status: Level 1 - Full Code    Subjective   Patient seen and examined at bedside.  Notes he is doing okay today.  Enjoyed his banana for breakfast.  Agreeable to work with therapy today.    Objective     Vitals:   Temp (24hrs), Av.2 °F (36.8 °C), Min:97.2 °F (36.2 °C), Max:99.2 °F (37.3 °C)    Temp:  [97.2 °F (36.2 °C)-99.2 °F (37.3 °C)] 98.2 °F (36.8 °C)  HR:  [74-80] 77  Resp:  [18-21] 20  BP: (118-148)/(70-84) 136/75  SpO2:  [95 %-97 %] 96 %  Body mass index is 29.53 kg/m².     Input and Output Summary (last 24 hours):     Intake/Output Summary (Last 24 hours) at 2024 1154  Last data filed at 2024 0848  Gross per 24 hour   Intake 720 ml   Output 1750 ml   Net -1030 ml       Physical Exam  Vitals reviewed.   Constitutional:       General: He is not in acute distress.  HENT:      Head: Normocephalic and atraumatic.      Mouth/Throat:      Mouth: Oropharynx is clear and moist.   Eyes:      General: No scleral icterus.     Extraocular Movements: EOM normal.      Conjunctiva/sclera: Conjunctivae normal.   Cardiovascular:       Rate and Rhythm: Normal rate and regular rhythm.      Heart sounds: Normal heart sounds. No murmur heard.  Pulmonary:      Effort: Pulmonary effort is normal. No respiratory distress.      Breath sounds: Normal breath sounds. No wheezing.   Abdominal:      General: Bowel sounds are normal. There is no distension.      Palpations: Abdomen is soft.      Tenderness: There is no abdominal tenderness.   Musculoskeletal:         General: No edema.      Cervical back: Neck supple.      Right lower leg: No edema.      Left lower leg: No edema.   Skin:     General: Skin is warm and dry.   Neurological:      General: No focal deficit present.      Mental Status: He is alert. Mental status is at baseline.      Motor: Weakness (Chronic left-sided) present.   Psychiatric:         Mood and Affect: Mood and affect normal.         Behavior: Behavior normal.         Thought Content: Thought content normal.          Lines/Drains:  Lines/Drains/Airways       Active Status       Name Placement date Placement time Site Days    Urethral Catheter Three way 22 Fr. 08/31/24 1925  Three way  29    Continuous Bladder Irrigation Three-way 08/31/24 1925  Three-way  29                  Urinary Catheter:  Goal for removal: N/A - Chronic Parra                 Lab Results: I have reviewed the following results:    Results from last 7 days   Lab Units 09/26/24  0439   WBC Thousand/uL 12.15*   HEMOGLOBIN g/dL 8.6*   HEMATOCRIT % 27.7*   PLATELETS Thousands/uL 531*   SEGS PCT % 65   LYMPHO PCT % 22   MONO PCT % 8   EOS PCT % 2     Results from last 7 days   Lab Units 09/26/24  0439   SODIUM mmol/L 138   POTASSIUM mmol/L 3.8   CHLORIDE mmol/L 105   CO2 mmol/L 30   BUN mg/dL 6   CREATININE mg/dL 0.57*   ANION GAP mmol/L 3*   CALCIUM mg/dL 8.2*   ALBUMIN g/dL 2.1*   TOTAL BILIRUBIN mg/dL 0.50   ALK PHOS U/L 52   ALT U/L 17   AST U/L 25   GLUCOSE RANDOM mg/dL 128         Results from last 7 days   Lab Units 09/30/24  1100 09/30/24  0714 09/29/24 2112  09/29/24  1626 09/29/24  1113 09/29/24  0724 09/28/24  2055 09/28/24  1550 09/28/24  1109 09/28/24  0728 09/27/24  2100 09/27/24  1628   POC GLUCOSE mg/dl 166* 114 140 106 100 132 171* 142* 161* 118 204* 135               Recent Cultures (last 7 days):         Imaging Review: No pertinent imaging studies reviewed.  Other Studies: No additional pertinent studies reviewed.    Last 24 Hours Medication List:     Current Facility-Administered Medications:     acetaminophen (Ofirmev) injection 1,000 mg, Q6H PRN    acetaminophen (TYLENOL) tablet 650 mg, Q6H PRN    ampicillin (OMNIPEN) 2,000 mg in sodium chloride 0.9 % 100 mL IVPB, Q6H, Last Rate: 2,000 mg (09/30/24 0517)    apixaban (ELIQUIS) tablet 5 mg, BID    aspirin chewable tablet 81 mg, Daily    atorvastatin (LIPITOR) tablet 80 mg, QPM    bacitracin topical ointment 1 small application, BID    calcium carbonate-vitamin D 500 mg-5 mcg tablet 1 tablet, BID With Meals    escitalopram (LEXAPRO) tablet 10 mg, Daily    ezetimibe (ZETIA) tablet 10 mg, Daily    finasteride (PROSCAR) tablet 5 mg, Daily    insulin lispro (HumALOG/ADMELOG) 100 units/mL subcutaneous injection 1-6 Units, TID AC **AND** Fingerstick Glucose (POCT), TID AC    insulin lispro (HumALOG/ADMELOG) 100 units/mL subcutaneous injection 1-6 Units, HS    lisinopril (ZESTRIL) tablet 2.5 mg, Daily    metoprolol tartrate (LOPRESSOR) partial tablet 12.5 mg, Q12H ELISEO    ondansetron (ZOFRAN) injection 4 mg, Q4H PRN    potassium chloride oral solution 20 mEq, BID    senna-docusate sodium (SENOKOT S) 8.6-50 mg per tablet 1 tablet, HS    sodium hypochlorite (DAKIN'S QUARTER-STRENGTH) 0.125 percent topical solution, Daily    Administrative Statements   Today, Patient Was Seen By: Esperanza Arizmendi PA-C      **Please Note: This note may have been constructed using a voice recognition system.**

## 2024-09-30 NOTE — PLAN OF CARE
Problem: OCCUPATIONAL THERAPY ADULT  Goal: Performs self-care activities at highest level of function for planned discharge setting.  See evaluation for individualized goals.  Description: Treatment Interventions: ADL retraining, Functional transfer training, UE strengthening/ROM, Cognitive reorientation, Patient/family training, Equipment evaluation/education, Neuromuscular reeducation, Compensatory technique education, Energy conservation, Activityengagement          See flowsheet documentation for full assessment, interventions and recommendations.   Outcome: Progressing  Note: Limitation: Decreased ADL status, Decreased UE strength, Decreased Safe judgement during ADL, Decreased endurance, Decreased self-care trans, Decreased high-level ADLs  Prognosis: Good  Assessment: Pt participated in skilled OT f/u tx session per POC. Unfortunately, pt w slight regression in overall function this date. Continues to require Depx2 for bed mobility, although sitting balance Poor+/Poor- w/ heavy R and posterior lean. Pt able to tolerate only EOB ~10 min this date to engage in functional reach tasks in which pt tapping therapists hand in all planes - provided assistance w positioning pt's head in neutral position (leans to R) in order to attend to L side better. Pt able to better attend to L side this session than prior session, even assisting in PROM to L UE by using R UE to hold onto arm intermittently. Pt returned to supine w Ax2, offlaoded to L side w R UE propped on pillow. Pt would continue to benefit from level 2 resource intensity at time of d/c. Will continue to see pt per poc.     Rehab Resource Intensity Level, OT: II (Moderate Resource Intensity)

## 2024-09-30 NOTE — ASSESSMENT & PLAN NOTE
Presented with tachycardia and leukocytosis and fever - tested positive for COVID 9/17/24  Now with polymicrobial bacteremia- enterbacter, enterococcus and proetus   CT abdomen and pelvis: mid/distal sigmoid colonic and rectal inflammatory changes in keeping with a segmental colitis/proctitis. Right ischioanal fossa subcutaneous emphysema extending through the right paramidline gluteal subcutaneous tissues to the skin surface suspicious for a perirectal or perianal fistula   Has large sacral decubitus ulcer- status post bedside debridement of sacral ulcer 9/18/24 by general surgery  Continue with dressing changes and wound care management by general surgery  Repeat CT abdomen and pelvis 9/24 to evaluate for any fistula connection versus abscess due to ongoing leukocytosis  CT reveals no ongoing abscess however does show developing osteomyelitis of coccyx bone.  Seen by GI imaging likely due to ischemic colitis but likely not primary cause of his presentation - no role for colonoscopy or flex sig curently    Continue IV antibiotics being managed by infectious disease  Was on cefepime, flagyl and vanco -- being adjusted  Repeat blood cultures negative to date  ID transitioning the patient to ampicillin 2 g every 6 hours to begin 9/26 to complete a total of 2 weeks of antibiotic therapy for enterococcal bacteremia through 10/3/24  Coordinating discharge plan with case management- Has been residing at [facility] care for the past 3 weeks  Patient remain in the hospital through Thursday to complete full course of IV antibiotics  Case management working on placement

## 2024-09-30 NOTE — PLAN OF CARE
Problem: Potential for Falls  Goal: Patient will remain free of falls  Description: INTERVENTIONS:  - Educate patient/family on patient safety including physical limitations  - Instruct patient to call for assistance with activity   - Consult OT/PT to assist with strengthening/mobility   - Keep Call bell within reach  - Keep bed low and locked with side rails adjusted as appropriate  - Keep care items and personal belongings within reach  - Initiate and maintain comfort rounds  - Make Fall Risk Sign visible to staff  - Offer Toileting every 2 Hours, in advance of need  - Initiate/Maintain bed alarm  - Obtain necessary fall risk management equipment: bed alarm call bell  - Apply yellow socks and bracelet for high fall risk patients  - Consider moving patient to room near nurses station  Outcome: Progressing     Problem: PAIN - ADULT  Goal: Verbalizes/displays adequate comfort level or baseline comfort level  Description: Interventions:  - Encourage patient to monitor pain and request assistance  - Assess pain using appropriate pain scale  - Administer analgesics based on type and severity of pain and evaluate response  - Implement non-pharmacological measures as appropriate and evaluate response  - Consider cultural and social influences on pain and pain management  - Notify physician/advanced practitioner if interventions unsuccessful or patient reports new pain  Outcome: Progressing     Problem: INFECTION - ADULT  Goal: Absence or prevention of progression during hospitalization  Description: INTERVENTIONS:  - Assess and monitor for signs and symptoms of infection  - Monitor lab/diagnostic results  - Monitor all insertion sites, i.e. indwelling lines, tubes, and drains  - Monitor endotracheal if appropriate and nasal secretions for changes in amount and color  - [place] appropriate cooling/warming therapies per order  - Administer medications as ordered  - Instruct and encourage patient and family to use good  hand hygiene technique  - Identify and instruct in appropriate isolation precautions for identified infection/condition  Outcome: Progressing     Problem: SAFETY ADULT  Goal: Patient will remain free of falls  Description: INTERVENTIONS:  - Educate patient/family on patient safety including physical limitations  - Instruct patient to call for assistance with activity   - Consult OT/PT to assist with strengthening/mobility   - Keep Call bell within reach  - Keep bed low and locked with side rails adjusted as appropriate  - Keep care items and personal belongings within reach  - Initiate and maintain comfort rounds  - Make Fall Risk Sign visible to staff  - Offer Toileting every 2 Hours, in advance of need  - Initiate/Maintain bed alarm  - Obtain necessary fall risk management equipment: bed alarm call bell  - Apply yellow socks and bracelet for high fall risk patients  - Consider moving patient to room near nurses station  Outcome: Progressing  Goal: Maintain or return to baseline ADL function  Description: INTERVENTIONS:  -  Assess patient's ability to carry out ADLs; assess patient's baseline for ADL function and identify physical deficits which impact ability to perform ADLs (bathing, care of mouth/teeth, toileting, grooming, dressing, etc.)  - Assess/evaluate cause of self-care deficits   - Assess range of motion  - Assess patient's mobility; develop plan if impaired  - Assess patient's need for assistive devices and provide as appropriate  - Encourage maximum independence but intervene and supervise when necessary  - Involve family in performance of ADLs  - Assess for home care needs following discharge   - Consider OT consult to assist with ADL evaluation and planning for discharge  - Provide patient education as appropriate  Outcome: Progressing  Goal: Maintains/Returns to pre admission functional level  Description: INTERVENTIONS:  - Perform AM-PAC 6 Click Basic Mobility/ Daily Activity assessment daily.  -  Set and communicate daily mobility goal to care team and patient/family/caregiver.   - Collaborate with rehabilitation services on mobility goals if consulted  - Perform Range of Motion 4 times a day.  - Reposition patient every 2 hours.  - Dangle patient 3 times a day  - Stand patient 3 times a day  - Ambulate patient 3 times a day  - Out of bed to chair 3 times a day   - Out of bed for meals 3 times a day  - Out of bed for toileting  - Record patient progress and toleration of activity level   Outcome: Progressing     Problem: DISCHARGE PLANNING  Goal: Discharge to home or other facility with appropriate resources  Description: INTERVENTIONS:  - Identify barriers to discharge w/patient and caregiver  - Arrange for needed discharge resources and transportation as appropriate  - Identify discharge learning needs (meds, wound care, etc.)  - Arrange for interpretive services to assist at discharge as needed  - Refer to Case Management Department for coordinating discharge planning if the patient needs post-hospital services based on physician/advanced practitioner order or complex needs related to functional status, cognitive ability, or social support system  Outcome: Progressing     Problem: Knowledge Deficit  Goal: Patient/family/caregiver demonstrates understanding of disease process, treatment plan, medications, and discharge instructions  Description: Complete learning assessment and assess knowledge base.  Interventions:  - Provide teaching at level of understanding  - Provide teaching via preferred learning methods  Outcome: Progressing     Problem: Prexisting or High Potential for Compromised Skin Integrity  Goal: Skin integrity is maintained or improved  Description: INTERVENTIONS:  - Identify patients at risk for skin breakdown  - Assess and monitor skin integrity  - Assess and monitor nutrition and hydration status  - Monitor labs   - Assess for incontinence   - Turn and reposition patient  - Assist with  mobility/ambulation  - Relieve pressure over bony prominences  - Avoid friction and shearing  - Provide appropriate hygiene as needed including keeping skin clean and dry  - Evaluate need for skin moisturizer/barrier cream  - Collaborate with interdisciplinary team   - Patient/family teaching  - Consider wound care consult   Outcome: Progressing     Problem: GASTROINTESTINAL - ADULT  Goal: Maintains or returns to baseline bowel function  Description: INTERVENTIONS:  - Assess bowel function  - Encourage oral fluids to ensure adequate hydration  - Administer IV fluids if ordered to ensure adequate hydration  - Administer ordered medications as needed  - Encourage mobilization and activity  - Consider nutritional services referral to assist patient with adequate nutrition and appropriate food choices  Outcome: Progressing     Problem: GENITOURINARY - ADULT  Goal: Urinary catheter remains patent  Description: INTERVENTIONS:  - Assess patency of urinary catheter  - If patient has a chronic hanks, consider changing catheter if non-functioning  - Follow guidelines for intermittent irrigation of non-functioning urinary catheter  Outcome: Progressing     Problem: METABOLIC, FLUID AND ELECTROLYTES - ADULT  Goal: Electrolytes maintained within normal limits  Description: INTERVENTIONS:  - Monitor labs and assess patient for signs and symptoms of electrolyte imbalances  - Administer electrolyte replacement as ordered  - Monitor response to electrolyte replacements, including repeat lab results as appropriate  - Instruct patient on fluid and nutrition as appropriate  Outcome: Progressing     Problem: SKIN/TISSUE INTEGRITY - ADULT  Goal: Skin Integrity remains intact(Skin Breakdown Prevention)  Description: Assess:  -Perform Goldy assessment every shift  -Clean and moisturize skin every shift  -Inspect skin when repositioning, toileting, and assisting with ADLS  -Assess under medical devices such as Masimo every shift  -Assess  extremities for adequate circulation and sensation     Bed Management:  -Have minimal linens on bed & keep smooth, unwrinkled  -Change linens as needed when moist or perspiring  -Avoid sitting or lying in one position for more than 1 hours while in bed  -Keep HOB at 30 degrees     Toileting:  -Offer bedside commode  -Assess for incontinence every shift  -Use incontinent care products after each incontinent episode such as foam cleanser     Activity:  -Mobilize patient 4 times a day  -Encourage activity and walks on unit  -Encourage or provide ROM exercises   -Turn and reposition patient every 2 Hours  -Use appropriate equipment to lift or move patient in bed  -Instruct/ Assist with weight shifting every 1 hour when out of bed in chair  -Consider limitation of chair time 1 hour intervals    Skin Care:  -Avoid use of baby powder, tape, friction and shearing, hot water or constrictive clothing  -Relieve pressure over bony prominences using wedges  -Do not massage red bony areas    Next Steps:  -Teach patient strategies to minimize risks such as skin breakdown   -Consider consults to  interdisciplinary teams such as wound care nurse   Outcome: Progressing  Goal: Incision(s), wounds(s) or drain site(s) healing without S/S of infection  Description: INTERVENTIONS  - Assess and document dressing, incision, wound bed, drain sites and surrounding tissue  - Provide patient and family education  - Perform skin care/dressing changes every shift   Outcome: Progressing  Goal: Pressure injury heals and does not worsen  Description: Interventions:  - Implement low air loss mattress or specialty surface (Criteria met)  - Apply silicone foam dressing  - Instruct/assist with weight shifting every 30 minutes when in chair   - Limit chair time to 1 hour intervals  - Use special pressure reducing interventions such as cushion when in chair   - Apply fecal or urinary incontinence containment device   - Perform passive or active ROM every  shift  - Turn and reposition patient & offload bony prominences every 1 hours   - Utilize friction reducing device or surface for transfers   - Consider consults to  interdisciplinary teams such as wound care nurse   - Use incontinent care products after each incontinent episode such as foam cleanser   - Consider nutrition services referral as needed  Outcome: Progressing     Problem: HEMATOLOGIC - ADULT  Goal: Maintains hematologic stability  Description: INTERVENTIONS  - Assess for signs and symptoms of bleeding or hemorrhage  - Monitor labs  - Administer supportive blood products/factors as ordered and appropriate  Outcome: Progressing     Problem: Nutrition/Hydration-ADULT  Goal: Nutrient/Hydration intake appropriate for improving, restoring or maintaining nutritional needs  Description: Monitor and assess patient's nutrition/hydration status for malnutrition. Collaborate with interdisciplinary team and initiate plan and interventions as ordered.  Monitor patient's weight and dietary intake as ordered or per policy. Utilize nutrition screening tool and intervene as necessary. Determine patient's food preferences and provide high-protein, high-caloric foods as appropriate.     INTERVENTIONS:  - Monitor oral intake, urinary output, labs, and treatment plans  - Assess nutrition and hydration status and recommend course of action  - Evaluate amount of meals eaten  - Assist patient with eating if necessary   - Allow adequate time for meals  - Recommend/ encourage appropriate diets, oral nutritional supplements, and vitamin/mineral supplements  - Order, calculate, and assess calorie counts as needed  - Recommend, monitor, and adjust tube feedings and TPN/PPN based on assessed needs  - Assess need for intravenous fluids  - Provide specific nutrition/hydration education as appropriate  - Include patient/family/caregiver in decisions related to nutrition  Outcome: Progressing

## 2024-09-30 NOTE — PHYSICAL THERAPY NOTE
PT PROGRESS NOTE    Name: Drew Santos  AGE: 73 y.o.  MRN: [id number]  LENGTH OF STAY: 13        09/30/24 1015   PT Last Visit   PT Visit Date 09/30/24   Note Type   Note Type Treatment   Pain Assessment   Pain Assessment Tool 0-10   Pain Score No Pain   Restrictions/Precautions   Weight Bearing Precautions Per Order No   Other Precautions Cognitive;Bed Alarm;Chair Alarm;Pain;Fall Risk  (L neglect/hemiparesis)   General   Chart Reviewed Yes   Additional Pertinent History pt admitted 9/17/24 for sepsis. up and oob orders. PMHx significant for T2DM, covid, depression, stroke w residual L side deficits   Response to Previous Treatment Patient with no complaints from previous session.   Family/Caregiver Present No   Cognition   Overall Cognitive Status Impaired   Arousal/Participation Alert;Persistent stimuli required;Cooperative   Attention Attends with cues to redirect   Orientation Level Oriented to person;Oriented to place;Disoriented to time;Disoriented to situation   Following Commands Follows one step commands inconsistently   Comments Requires repetitive cuing for task initiation/completion.   Subjective   Subjective agreeable to work with PT   Bed Mobility   Rolling R 1  Dependent   Additional items Assist x 2   Rolling L 1  Dependent   Additional items Assist x 2   Supine to Sit 1  Dependent   Additional items Assist x 2   Sit to Supine 1  Dependent   Additional items Assist x 2   Additional Comments Sat EOB for ~10 min w/ Poor+/Poor- balance. Engaged in functional reach, therex, and neuro-eladio.   Transfers   Sit to Stand Unable to assess   Ambulation/Elevation   Gait pattern Not appropriate   Balance   Static Sitting Fair -  (fatigued quickly, needed A t/o sitting at EOB)   Dynamic Sitting Poor +   Endurance Deficit   Endurance Deficit Yes   Endurance Deficit Description fatigue   Activity Tolerance   Activity Tolerance Patient limited by fatigue;Other (Comment)  (cog./ left sided neglect)   Medical Staff  "Made Aware OT SMITH Blount   Nurse Made Aware yes   Exercises   Hip Flexion Sitting;Right;15 reps   Hip Adduction Supine;AAROM;PROM;10 reps  (PROM on L LE, AAROM on R LE)   Knee PROM Flexion Supine;15 reps;Left  (PROM- 15x on L LE, AAROM on R LE 15x)   Knee AROM Long Arc Quad Sitting;Right;AROM   Ankle Pumps Supine;20 reps;Bilateral  (AAROM on L LE, AROM on R LE)   Balance training  pt able to pump ankle on L LE inconsistently with cues. Moderate cues to stay focused on task/ exercise t/o session   Assessment   Prognosis Fair   Problem List Decreased strength;Decreased endurance;Impaired balance;Decreased mobility;Decreased cognition;Impaired judgement;Decreased safety awareness;Decreased skin integrity   Assessment Patient was seen today per POC. Overall, pt demonstrated improved tolerance to activity. Patient was greeted in supine at start of session. Pt was able to participate in supine TE focusing on active and passive ROM in LE. Pt able to PF/DF L ankle inconsistently, but not able to actively flex knee or hip during session on L LE. Pt needed constant cues to stay on task, but was able to follow simple commands when asked. Pt was dependent when getting to EOB (supine<>sit), but was able to sit at EOB and perform TE with min-max Ax1 for sitting balance. Pt fatigued quickly in sitting and needed maxAx1 to sit EOB. Noted posteior and lateral leaning to the R while seated EOB. Pt requires mod assist x1 to right to midline with verbal and tactile cues for upright posture, and to maintain midline for head and neck. Pt performed dynamic balance activities of reaching in all directions/ planes of motion with R UE and minAx1 of OT for balance sitting EOB. Pt able to perform seated EOB exercises with LE and UE for ~10 minutes.  No reports of dizziness t/o session. Nsg staff most recent vital signs as follows: /75   Pulse 77   Temp 98.2 °F (36.8 °C)   Resp 20   Ht 6' 5\" (1.956 m)   Wt 113 kg (249 lb)   SpO2 " 96%   BMI 29.53 kg/m² . Will continue to see pt per POC as tolerated.  From PT standpoint continued recommendation for level II (moderate resource intensity) at D/C when medically cleared based on medical. Nsg staff to continue to mobilized pt as tolerated to prevent further decline in function. Nsg staff notified.   Barriers to Discharge Inaccessible home environment;Decreased caregiver support  (unsafe to return to home environment at current LOF, questionable caregiver support)   Goals   Patient Goals none offered   STG Expiration Date 10/14/24   Short Term Goal #1 1). Pt will perform bed mobility with max Ax2 demonstrating appropriate technique 100% of the time in order to improve function.2) Perform all transfers with max Ax2 demonstrating safe and appropriate technique 100% of the time in order to improve ability to negotiate safely in home environment.3) Improve overall strength and balance 1/2 grade in order to optimize ability to perform functional tasks and reduce fall risk. 4) Increase activity tolerance to 25 minutes in order to improve endurance to functional tasks.   PT Treatment Day 2   Plan   Treatment/Interventions Functional transfer training;LE strengthening/ROM;Elevations;Therapeutic exercise;Endurance training;Patient/family training;Gait training;Bed mobility;Equipment eval/education;Spoke to nursing;Spoke to case management;Family;OT   Progress Slow progress, decreased activity tolerance   PT Frequency 1-2x/wk   Discharge Recommendation   Rehab Resource Intensity Level, PT II (Moderate Resource Intensity)   Additional Comments alexander GARRETT   AM-PAC Basic Mobility Inpatient   Turning in Flat Bed Without Bedrails 2   Lying on Back to Sitting on Edge of Flat Bed Without Bedrails 2   Moving Bed to Chair 1   Standing Up From Chair Using Arms 1   Walk in Room 1   Climb 3-5 Stairs With Railing 1   Basic Mobility Inpatient Raw Score 8   Turning Head Towards Sound 2   Follow Simple Instructions 3   Low  Function Basic Mobility Raw Score  13   Low Function Basic Mobility Standardized Score  20.14   [facility] Highest Level Of Mobility   -HL Goal 3: Sit at edge of bed   -HL Achieved 3: Sit at edge of bed   Education   Education Provided Mobility training   Patient Reinforcement needed   End of Consult   Patient Position at End of Consult Supine;Bed/Chair alarm activated;All needs within reach   End of Consult Comments pt stable, left supine and off loaded using cushions. Call bell within reach, alarm on     Catalina Crowell, PT

## 2024-09-30 NOTE — PLAN OF CARE
"  Problem: PHYSICAL THERAPY ADULT  Goal: Performs mobility at highest level of function for planned discharge setting.  See evaluation for individualized goals.  Description: Treatment/Interventions: Functional transfer training, LE strengthening/ROM, Therapeutic exercise, Cognitive reorientation, Equipment eval/education, Patient/family training, Endurance training, Bed mobility, Continued evaluation, Spoke to nursing, OT          See flowsheet documentation for full assessment, interventions and recommendations.  Note: Prognosis: Fair  Problem List: Decreased strength, Decreased endurance, Impaired balance, Decreased mobility, Decreased cognition, Impaired judgement, Decreased safety awareness, Decreased skin integrity  Assessment: Patient was seen today per POC. Overall, pt demonstrated improved tolerance to activity. Patient was greeted in supine at start of session. Pt was able to participate in supine TE focusing on active and passive ROM in LE. Pt able to PF/DF L ankle inconsistently, but not able to actively flex knee or hip during session on L LE. Pt needed constant cues to stay on task, but was able to follow simple commands when asked. Pt was dependent when getting to EOB (supine<>sit), but was able to sit at EOB and perform TE with min-max Ax1 for sitting balance. Pt fatigued quickly in sitting and needed maxAx1 to sit EOB. Noted posteior and lateral leaning to the R while seated EOB. Pt requires mod assist x1 to right to midline with verbal and tactile cues for upright posture, and to maintain midline for head and neck. Pt performed dynamic balance activities of reaching in all directions/ planes of motion with R UE and minAx1 of OT for balance sitting EOB. Pt able to perform seated EOB exercises with LE and UE for ~10 minutes.  No reports of dizziness t/o session. Nsg staff most recent vital signs as follows: /75   Pulse 77   Temp 98.2 °F (36.8 °C)   Resp 20   Ht 6' 5\" (1.956 m)   Wt 113 kg " (249 lb)   SpO2 96%   BMI 29.53 kg/m² . Will continue to see pt per POC as tolerated.  From PT standpoint continued recommendation for level II (moderate resource intensity) at D/C when medically cleared based on medical. Nsg staff to continue to mobilized pt as tolerated to prevent further decline in function. Nsg staff notified.  Barriers to Discharge: Inaccessible home environment, Decreased caregiver support (unsafe to return to home environment at current LOF, questionable caregiver support)     Rehab Resource Intensity Level, PT: II (Moderate Resource Intensity)    See flowsheet documentation for full assessment.

## 2024-09-30 NOTE — OCCUPATIONAL THERAPY NOTE
Occupational Therapy Progress Note     Patient Name: Drew Santos  Today's Date: 9/30/2024  Problem List  Principal Problem:    Sepsis  Active Problems:    Sacral wound    Type 2 diabetes mellitus without complication, without long-term current use of insulin (HCC)    COVID-19    Proctitis    Polymicrobial bacteremia    Acute metabolic encephalopathy    History of CVA (cerebrovascular accident)    Paroxysmal atrial fibrillation (HCC)    Anemia    Severe protein-calorie malnutrition (HCC)    Advanced care planning/counseling discussion       09/30/24 0935   OT Last Visit   OT Visit Date 09/30/24   Note Type   Note Type Treatment   Pain Assessment   Pain Assessment Tool 0-10   Pain Score No Pain   Restrictions/Precautions   Weight Bearing Precautions Per Order No   Other Precautions Limb alert;Fall Risk;Cognitive;Bed Alarm  (L neglect/hemiparesis)   ADL   Where Assessed Edge of bed   Eating Assistance 3  Moderate Assistance   Eating Deficit Verbal cueing;Supervision/safety;Other (Comment)   Eating Comments Pt sitting EOB to drink ensure. Requires min-modA for trunk support while R UE was used to bring drink to mouth.   LB Dressing Assistance 1  Total Assistance   LB Dressing Deficit Don/doff R sock;Don/doff L sock   Bed Mobility   Rolling R 1  Dependent   Additional items Assist x 2   Rolling L 1  Dependent   Additional items Assist x 2   Supine to Sit 1  Dependent   Additional items Assist x 2   Sit to Supine 1  Dependent   Additional items Assist x 2   Additional Comments Sat EOB for ~10 min w/ Poor+/Poor- balance. Engaged in functional reach, therex, and neuro-eladio.   Transfers   Sit to Stand Unable to assess   Functional Mobility   Additional Comments N/A   ROM- Right Upper Extremities   R Shoulder AAROM;Flexion;Extension   R Elbow AROM;AAROM;Elbow flexion;Elbow extension   R Weight/Reps/Sets 2x5 reps. Repetitive multimodal cuing to complete. Decreased attention to task, ability to follow simple commands.   ROM -  "Left Upper Extremities    L Shoulder PROM;Flexion;Extension;ABduction;Horizontal ABduction   L Elbow PROM;Elbow flexion;Elbow extension   L Weight/Reps/Sets 1x10 rep   LUE ROM Comment Encouraged pt to attend to L side (look at arm) for participation in L UE ROM.   Subjective   Subjective \"Ok\" Agreeable to OT/PT co-tx.   Cognition   Overall Cognitive Status Impaired   Arousal/Participation Alert;Persistent stimuli required;Cooperative   Attention Attends with cues to redirect   Orientation Level Oriented to person;Oriented to place;Disoriented to time;Disoriented to situation   Following Commands Follows one step commands inconsistently   Comments Requires repetitive cuing for task initiation/completion.   Activity Tolerance   Activity Tolerance Patient tolerated treatment well;Patient limited by fatigue;Treatment limited secondary to medical complications (Comment)   Medical Staff Made Aware PT Ali   Assessment   Assessment Pt participated in skilled OT f/u tx session per POC. Unfortunately, pt w slight regression in overall function this date. Continues to require Depx2 for bed mobility, although sitting balance Poor+/Poor- w/ heavy R and posterior lean. Pt able to tolerate only EOB ~10 min this date to engage in functional reach tasks in which pt tapping therapists hand in all planes - provided assistance w positioning pt's head in neutral position (leans to R) in order to attend to L side better. Pt able to better attend to L side this session than prior session, even assisting in PROM to L UE by using R UE to hold onto arm intermittently. Pt returned to supine w Ax2, offlaoded to L side w R UE propped on pillow. Pt would continue to benefit from level 2 resource intensity at time of d/c. Will continue to see pt per poc.   Plan   Treatment Interventions ADL retraining;Functional transfer training;UE strengthening/ROM;Cognitive reorientation;Patient/family training;Equipment evaluation/education;Neuromuscular " reeducation;Compensatory technique education;Energy conservation;Activityengagement   Goal Expiration Date 10/02/24   OT Treatment Day 2   OT Frequency 1-2x/wk;2-3x/wk   Discharge Recommendation   Rehab Resource Intensity Level, OT II (Moderate Resource Intensity)   AM-PAC Daily Activity Inpatient   Lower Body Dressing 1   Bathing 1   Toileting 1   Upper Body Dressing 1   Grooming 2   Eating 3   Daily Activity Raw Score 9   AM-PAC Applied Cognition Inpatient   Following a Speech/Presentation 2   Understanding Ordinary Conversation 2   Taking Medications 1   Remembering Where Things Are Placed or Put Away 1   Remembering List of 4-5 Errands 1   Taking Care of Complicated Tasks 1   Applied Cognition Raw Score 8   Applied Cognition Standardized Score 19.32     Mine Wiseman

## 2024-09-30 NOTE — ASSESSMENT & PLAN NOTE
History of CVA   Chronically bedbound and with chronic left-sided deficits   Continue home aspirin, Eliquis, statin  Dysphagia 2 mechanical soft, thin liquids -upgraded to level 3 by speech therapy  Residing at [facility] for the past 3 weeks

## 2024-10-01 LAB
ANION GAP SERPL CALCULATED.3IONS-SCNC: 3 MMOL/L (ref 4–13)
BUN SERPL-MCNC: 5 MG/DL (ref 5–25)
CALCIUM SERPL-MCNC: 8.3 MG/DL (ref 8.4–10.2)
CHLORIDE SERPL-SCNC: 105 MMOL/L (ref 96–108)
CO2 SERPL-SCNC: 29 MMOL/L (ref 21–32)
CREAT SERPL-MCNC: 0.5 MG/DL (ref 0.6–1.3)
ERYTHROCYTE [DISTWIDTH] IN BLOOD BY AUTOMATED COUNT: 15.5 % (ref 11.6–15.1)
GFR SERPL CREATININE-BSD FRML MDRD: 106 ML/MIN/1.73SQ M
GLUCOSE SERPL-MCNC: 130 MG/DL (ref 65–140)
GLUCOSE SERPL-MCNC: 181 MG/DL (ref 65–140)
GLUCOSE SERPL-MCNC: 191 MG/DL (ref 65–140)
GLUCOSE SERPL-MCNC: 95 MG/DL (ref 65–140)
GLUCOSE SERPL-MCNC: 97 MG/DL (ref 65–140)
HCT VFR BLD AUTO: 27.8 % (ref 36.5–49.3)
HGB BLD-MCNC: 8.8 G/DL (ref 12–17)
MCH RBC QN AUTO: 30.9 PG (ref 26.8–34.3)
MCHC RBC AUTO-ENTMCNC: 31.7 G/DL (ref 31.4–37.4)
MCV RBC AUTO: 98 FL (ref 82–98)
PLATELET # BLD AUTO: 464 THOUSANDS/UL (ref 149–390)
PMV BLD AUTO: 8.8 FL (ref 8.9–12.7)
POTASSIUM SERPL-SCNC: 4 MMOL/L (ref 3.5–5.3)
RBC # BLD AUTO: 2.85 MILLION/UL (ref 3.88–5.62)
SODIUM SERPL-SCNC: 137 MMOL/L (ref 135–147)
WBC # BLD AUTO: 10.14 THOUSAND/UL (ref 4.31–10.16)

## 2024-10-01 PROCEDURE — 99231 SBSQ HOSP IP/OBS SF/LOW 25: CPT | Performed by: PHYSICIAN ASSISTANT

## 2024-10-01 PROCEDURE — 85027 COMPLETE CBC AUTOMATED: CPT | Performed by: PHYSICIAN ASSISTANT

## 2024-10-01 PROCEDURE — 99232 SBSQ HOSP IP/OBS MODERATE 35: CPT | Performed by: SPECIALIST

## 2024-10-01 PROCEDURE — 82948 REAGENT STRIP/BLOOD GLUCOSE: CPT

## 2024-10-01 PROCEDURE — 80048 BASIC METABOLIC PNL TOTAL CA: CPT | Performed by: PHYSICIAN ASSISTANT

## 2024-10-01 PROCEDURE — 99233 SBSQ HOSP IP/OBS HIGH 50: CPT | Performed by: STUDENT IN AN ORGANIZED HEALTH CARE EDUCATION/TRAINING PROGRAM

## 2024-10-01 RX ADMIN — INSULIN LISPRO 2 UNITS: 100 INJECTION, SOLUTION INTRAVENOUS; SUBCUTANEOUS at 17:23

## 2024-10-01 RX ADMIN — AMPICILLIN SODIUM 2000 MG: 2 INJECTION, POWDER, FOR SOLUTION INTRAMUSCULAR; INTRAVENOUS at 00:24

## 2024-10-01 RX ADMIN — APIXABAN 5 MG: 5 TABLET, FILM COATED ORAL at 08:56

## 2024-10-01 RX ADMIN — ATORVASTATIN CALCIUM 80 MG: 80 TABLET, FILM COATED ORAL at 17:24

## 2024-10-01 RX ADMIN — BACITRACIN ZINC 1 SMALL APPLICATION: 500 OINTMENT TOPICAL at 17:24

## 2024-10-01 RX ADMIN — AMPICILLIN SODIUM 2000 MG: 2 INJECTION, POWDER, FOR SOLUTION INTRAMUSCULAR; INTRAVENOUS at 05:01

## 2024-10-01 RX ADMIN — POTASSIUM CHLORIDE 20 MEQ: 1.5 SOLUTION ORAL at 17:24

## 2024-10-01 RX ADMIN — FINASTERIDE 5 MG: 5 TABLET, FILM COATED ORAL at 08:55

## 2024-10-01 RX ADMIN — Medication 1 TABLET: at 08:55

## 2024-10-01 RX ADMIN — ESCITALOPRAM OXALATE 10 MG: 10 TABLET ORAL at 08:55

## 2024-10-01 RX ADMIN — AMPICILLIN SODIUM 2000 MG: 2 INJECTION, POWDER, FOR SOLUTION INTRAMUSCULAR; INTRAVENOUS at 23:49

## 2024-10-01 RX ADMIN — ASPIRIN 81 MG CHEWABLE TABLET 81 MG: 81 TABLET CHEWABLE at 08:56

## 2024-10-01 RX ADMIN — POTASSIUM CHLORIDE 20 MEQ: 1.5 SOLUTION ORAL at 08:56

## 2024-10-01 RX ADMIN — BACITRACIN ZINC 1 SMALL APPLICATION: 500 OINTMENT TOPICAL at 08:56

## 2024-10-01 RX ADMIN — SENNOSIDES AND DOCUSATE SODIUM 1 TABLET: 8.6; 5 TABLET ORAL at 21:52

## 2024-10-01 RX ADMIN — AMPICILLIN SODIUM 2000 MG: 2 INJECTION, POWDER, FOR SOLUTION INTRAMUSCULAR; INTRAVENOUS at 12:29

## 2024-10-01 RX ADMIN — AMPICILLIN SODIUM 2000 MG: 2 INJECTION, POWDER, FOR SOLUTION INTRAMUSCULAR; INTRAVENOUS at 17:24

## 2024-10-01 RX ADMIN — Medication 1 TABLET: at 17:24

## 2024-10-01 RX ADMIN — Medication 12.5 MG: at 21:51

## 2024-10-01 RX ADMIN — INSULIN LISPRO 1 UNITS: 100 INJECTION, SOLUTION INTRAVENOUS; SUBCUTANEOUS at 12:28

## 2024-10-01 RX ADMIN — Medication 12.5 MG: at 08:55

## 2024-10-01 RX ADMIN — LISINOPRIL 2.5 MG: 2.5 TABLET ORAL at 08:55

## 2024-10-01 RX ADMIN — APIXABAN 5 MG: 5 TABLET, FILM COATED ORAL at 17:24

## 2024-10-01 RX ADMIN — EZETIMIBE 10 MG: 10 TABLET ORAL at 08:55

## 2024-10-01 RX ADMIN — SODIUM HYPOCHLORITE: 1.25 SOLUTION TOPICAL at 08:56

## 2024-10-01 NOTE — ASSESSMENT & PLAN NOTE
Lab Results   Component Value Date    HGBA1C 6.2 (H) 09/17/2024     Recent Labs     09/30/24  1100 09/30/24  1603 09/30/24  2110 10/01/24  0717   POCGLU 166* 101 178* 95   Controlled with last hemoglobin A1c of 6.2%  Holding home oral regimen  Monitor on sliding scale

## 2024-10-01 NOTE — PROGRESS NOTES
Progress Note - Hospitalist   Name: Drew Santos 73 y.o. male I MRN: [id number]  Unit/Bed#: [place] [address] -01 I Date of Admission: 9/17/2024   Date of Service: 10/1/2024 I Hospital Day: 14    Assessment & Plan  Sepsis  Presented with tachycardia and leukocytosis and fever - tested positive for COVID 9/17/24  Now with polymicrobial bacteremia- enterbacter, enterococcus and proetus   CT abdomen and pelvis: mid/distal sigmoid colonic and rectal inflammatory changes in keeping with a segmental colitis/proctitis. Right ischioanal fossa subcutaneous emphysema extending through the right paramidline gluteal subcutaneous tissues to the skin surface suspicious for a perirectal or perianal fistula   Has large sacral decubitus ulcer- status post bedside debridement of sacral ulcer 9/18/24 by general surgery  Continue with dressing changes and wound care management by general surgery  Repeat CT abdomen and pelvis 9/24 to evaluate for any fistula connection versus abscess due to ongoing leukocytosis  CT reveals no ongoing abscess however does show developing osteomyelitis of coccyx bone.  Seen by GI imaging likely due to ischemic colitis but likely not primary cause of his presentation - no role for colonoscopy or flex sig curently    Continue IV antibiotics being managed by infectious disease  Was on cefepime, flagyl and vanco -- being adjusted  Repeat blood cultures negative to date  ID transitioning the patient to ampicillin 2 g every 6 hours to begin 9/26 to complete a total of 2 weeks of antibiotic therapy for enterococcal bacteremia through 10/3/24  Coordinating discharge plan with case management- Has been residing at [facility] for the past 3 weeks  Patient remain in the hospital through Thursday to complete full course of IV antibiotics  Case management working on placement  Polymicrobial bacteremia  Polymicrobial bacteremia likely secondary sacral wound with possible fistulous connection  Infectious disease  input noted and appreciated.  Continue IV cefepime, flagyl and vancomycin   ID transitioning the patient to ampicillin 2 g every 6 hours to begin 9/26 to complete a total of 2 weeks of antibiotic therapy for enterococcal bacteremia through 10/3/24  Repeat blood cultures negative to date   Underwent transesophageal echocardiogram 9/25/2024-no evidence of endocarditis  Sacral wound  Chronic sacral decubitus ulcer; present on admission  Status post debridement at bedside per surgical team.   Continue with wound care as tolerated.     Continue dressing changes and serial exams  Wound care consultation  Offload pressure   Concern for emphysematous changes tracking to sacral wound/developing fistula from site on repeat abdominal imaging   Discussion with palliative care has yielded in continuing with full medical care at this time  Advanced care planning/counseling discussion  Ongoing discussions in regards to poor prognosis with large sacral wound and developing osteomyelitis of coccyx bone in the setting of polymicrobial bacteremia  Palliative care discussed with patient's wife who is opting to proceed with full medical care at this time  Type 2 diabetes mellitus without complication, without long-term current use of insulin ([facility])  Lab Results   Component Value Date    HGBA1C 6.2 (H) 09/17/2024     Recent Labs     09/30/24  1100 09/30/24  1603 09/30/24  2110 10/01/24  0717   POCGLU 166* 101 178* 95   Controlled with last hemoglobin A1c of 6.2%  Holding home oral regimen  Monitor on sliding scale    COVID-19  Tested positive for COVID on 9/17/2024  Patient currently on room air though high risk given his age and comorbidities  Completed three days of IV remdesivir   Supportive care otherwise  Contact and airborne precautions  Currently on mild COVID pathway.    Not requiring supplemental oxygen- remains on room air  To complete 10 days of isolation through 9/27   Isolation removed  Proctitis  Noted on CT scan.  No  diarrhea or clinical evidence of colitis.   Stool studies negative  Likely ischemic colitis given area affected, continue supportive measures   No plan for any inpatient scope   Repeat CAT scan without signs of ongoing abscess.  Acute metabolic encephalopathy  Presumably secondary to sepsis, bacteremia, COVID infection  CT head with no acute intracranial abnormality  Continue with supportive care  Reorientation, delirium precautions     History of CVA (cerebrovascular accident)  History of CVA   Chronically bedbound and with chronic left-sided deficits   Continue home aspirin, Eliquis, statin  Dysphagia 2 mechanical soft, thin liquids -upgraded to level 3 by speech therapy  Residing at [facility] complete care for the past 3 weeks  Paroxysmal atrial fibrillation (HCC)  Continue Toprol XL 25 mg daily  Continue home Eliquis for stroke prevention  Anemia  Low blood counts noted 9/19/24.   Has chronic anemia likely secondary to poor nutritional status underlying chronic illness.    Hemoglobin declined to 7.1 given -1 unit PRBCs given 9/19/2024  Encourage oral intake, cannot utilize IV iron due to bacteremia   Lab Results   Component Value Date    HGB 8.8 (L) 10/01/2024    HGB 8.6 (L) 09/26/2024    HGB 8.3 (L) 09/25/2024    HGB 7.6 (L) 09/24/2024    HGB 7.6 (L) 09/23/2024   Ongoing monitoring of hemoglobin and continue to transfuse if less than 7  Severe protein-calorie malnutrition (HCC)  Malnutrition Findings:   Adult Malnutrition type: Chronic illness  Adult Degree of Malnutrition: Other severe protein calorie malnutrition  Malnutrition Characteristics: Inadequate energy, Weight loss  360 Statement: Severe protein calorie malnutrition in context of chronic illness r/t poor appetite, inadequate PO intake as evidance by energy intake less than 75% compared to estimated needs>1 month, 7.5% wt loss x 1 month ( 122kg 8/23/24-> 113kg 9/17) treated with PO diet and oral supplements  BMI Findings:  Body mass index is 29.53  kg/m².     VTE Pharmacologic Prophylaxis:   High Risk (Score >/= 5) - Pharmacological DVT Prophylaxis Ordered: apixaban (Eliquis). Sequential Compression Devices Ordered.    Mobility:   Basic Mobility Inpatient Raw Score: 8  JH-HLM Goal: 3: Sit at edge of bed  JH-HLM Achieved: 2: Bed activities/Dependent transfer  JH-HLM Goal NOT achieved. Continue with multidisciplinary rounding and encourage appropriate mobility to improve upon JH-HLM goals.    Patient Centered Rounds: I performed bedside rounds with nursing staff today.   Discussions with Specialists or Other Care Team Provider: None    Current Length of Stay: 14 day(s)  Current Patient Status: Inpatient   Certification Statement: The patient will continue to require additional inpatient hospital stay due to sepsis requiring IV antibiotics  Discharge Plan: Anticipate discharge in >72 hrs to rehab facility.    Code Status: Level 1 - Full Code    Subjective   Patient seen and examined at bedside.  Denies any complaints today.    Objective     Vitals:   Temp (24hrs), Av.4 °F (36.9 °C), Min:98.1 °F (36.7 °C), Max:98.6 °F (37 °C)    Temp:  [98.1 °F (36.7 °C)-98.6 °F (37 °C)] 98.6 °F (37 °C)  HR:  [71-94] 94  Resp:  [18-20] 18  BP: (127-151)/(78-94) 151/94  SpO2:  [94 %-99 %] 94 %  Body mass index is 29.53 kg/m².     Input and Output Summary (last 24 hours):     Intake/Output Summary (Last 24 hours) at 10/1/2024 1047  Last data filed at 10/1/2024 0615  Gross per 24 hour   Intake 60 ml   Output 2150 ml   Net -2090 ml       Physical Exam  Vitals reviewed.   Constitutional:       General: He is not in acute distress.  HENT:      Head: Normocephalic and atraumatic.      Mouth/Throat:      Mouth: Oropharynx is clear and moist.   Eyes:      General: No scleral icterus.     Extraocular Movements: EOM normal.      Conjunctiva/sclera: Conjunctivae normal.   Cardiovascular:      Rate and Rhythm: Normal rate and regular rhythm.      Heart sounds: Normal heart sounds. No  murmur heard.  Pulmonary:      Effort: Pulmonary effort is normal. No respiratory distress.      Breath sounds: Normal breath sounds. No wheezing.   Abdominal:      General: Bowel sounds are normal. There is no distension.      Palpations: Abdomen is soft.      Tenderness: There is no abdominal tenderness.   Musculoskeletal:         General: No edema.      Cervical back: Neck supple.      Right lower leg: No edema.      Left lower leg: No edema.   Skin:     General: Skin is warm and dry.   Neurological:      Mental Status: He is alert. Mental status is at baseline.      Motor: Weakness (chronic left sided) present.   Psychiatric:         Mood and Affect: Mood and affect normal.         Behavior: Behavior normal.         Thought Content: Thought content normal.          Lines/Drains:  Lines/Drains/Airways       Active Status       Name Placement date Placement time Site Days    Urethral Catheter Three way 22 Fr. 08/31/24 1925  Three way  30    Continuous Bladder Irrigation Three-way 08/31/24 1925  Three-way  30                  Urinary Catheter:  Goal for removal: N/A - Chronic Parra                 Lab Results: I have reviewed the following results:    Results from last 7 days   Lab Units 10/01/24  0445 09/26/24  0439   WBC Thousand/uL 10.14 12.15*   HEMOGLOBIN g/dL 8.8* 8.6*   HEMATOCRIT % 27.8* 27.7*   PLATELETS Thousands/uL 464* 531*   SEGS PCT %  --  65   LYMPHO PCT %  --  22   MONO PCT %  --  8   EOS PCT %  --  2     Results from last 7 days   Lab Units 10/01/24  0445 09/26/24  0439   SODIUM mmol/L 137 138   POTASSIUM mmol/L 4.0 3.8   CHLORIDE mmol/L 105 105   CO2 mmol/L 29 30   BUN mg/dL 5 6   CREATININE mg/dL 0.50* 0.57*   ANION GAP mmol/L 3* 3*   CALCIUM mg/dL 8.3* 8.2*   ALBUMIN g/dL  --  2.1*   TOTAL BILIRUBIN mg/dL  --  0.50   ALK PHOS U/L  --  52   ALT U/L  --  17   AST U/L  --  25   GLUCOSE RANDOM mg/dL 97 128         Results from last 7 days   Lab Units 10/01/24  0717 09/30/24  2110 09/30/24  1603  09/30/24  1100 09/30/24  0714 09/29/24  2112 09/29/24  1626 09/29/24  1113 09/29/24  0724 09/28/24  2055 09/28/24  1550 09/28/24  1109   POC GLUCOSE mg/dl 95 178* 101 166* 114 140 106 100 132 171* 142* 161*               Recent Cultures (last 7 days):         Imaging Review: No pertinent imaging studies reviewed.  Other Studies: No additional pertinent studies reviewed.    Last 24 Hours Medication List:     Current Facility-Administered Medications:     acetaminophen (Ofirmev) injection 1,000 mg, Q6H PRN    acetaminophen (TYLENOL) tablet 650 mg, Q6H PRN    ampicillin (OMNIPEN) 2,000 mg in sodium chloride 0.9 % 100 mL IVPB, Q6H, Last Rate: 2,000 mg (10/01/24 0501)    apixaban (ELIQUIS) tablet 5 mg, BID    aspirin chewable tablet 81 mg, Daily    atorvastatin (LIPITOR) tablet 80 mg, QPM    bacitracin topical ointment 1 small application, BID    calcium carbonate-vitamin D 500 mg-5 mcg tablet 1 tablet, BID With Meals    escitalopram (LEXAPRO) tablet 10 mg, Daily    ezetimibe (ZETIA) tablet 10 mg, Daily    finasteride (PROSCAR) tablet 5 mg, Daily    insulin lispro (HumALOG/ADMELOG) 100 units/mL subcutaneous injection 1-6 Units, TID AC **AND** Fingerstick Glucose (POCT), TID AC    insulin lispro (HumALOG/ADMELOG) 100 units/mL subcutaneous injection 1-6 Units, HS    lisinopril (ZESTRIL) tablet 2.5 mg, Daily    metoprolol tartrate (LOPRESSOR) partial tablet 12.5 mg, Q12H ELISEO    ondansetron (ZOFRAN) injection 4 mg, Q4H PRN    potassium chloride oral solution 20 mEq, BID    senna-docusate sodium (SENOKOT S) 8.6-50 mg per tablet 1 tablet, HS    sodium hypochlorite (DAKIN'S QUARTER-STRENGTH) 0.125 percent topical solution, Daily    Administrative Statements   Today, Patient Was Seen By: Esperanza Arizmendi PA-C      **Please Note: This note may have been constructed using a voice recognition system.**

## 2024-10-01 NOTE — ASSESSMENT & PLAN NOTE
Low blood counts noted 9/19/24.   Has chronic anemia likely secondary to poor nutritional status underlying chronic illness.    Hemoglobin declined to 7.1 given -1 unit PRBCs given 9/19/2024  Encourage oral intake, cannot utilize IV iron due to bacteremia   Lab Results   Component Value Date    HGB 8.8 (L) 10/01/2024    HGB 8.6 (L) 09/26/2024    HGB 8.3 (L) 09/25/2024    HGB 7.6 (L) 09/24/2024    HGB 7.6 (L) 09/23/2024   Ongoing monitoring of hemoglobin and continue to transfuse if less than 7

## 2024-10-01 NOTE — ASSESSMENT & PLAN NOTE
Fever, tachycardia, tachypnea, and leukocytosis. Secondary to polymicrobial bacteremia, likely from colitis/proctitis with possible fistulous connection to sacral ulcer. Blood cultures with growth of enterobacter, enterococcus faecalis, proteus. Also consider role of COVID-19. No other clear source appreciated. Chest imaging with no opacities/groundglass/consolidations suggesting a bacterial process. Urine was abnormal, culture showed growth of enterobacter. Head CT with no acute intracranial findings. Patient hemodynamically stable, afebrile. Repeat blood cultures no growth. Completed 7 days cefepime/flagyl/vancomycin 9/25 and now tolerating IV Ampicillin for Enterococcal bacteremia  -antibiotics as below  -monitor CBCD and BMP  -monitor vitals  -supportive care

## 2024-10-01 NOTE — ASSESSMENT & PLAN NOTE
Patient is a 73-year-old male with a history of A-fib, CVA, diabetes, sacral ulcer stage III, CAD who presented with altered mental status and was found to be COVID-positive.  No longer on isolation. Sacral ulcer has been debrided, now receiving wet - dry dressings.  Reassessed today, clearly a Stage IV ulcer with palpable cancellous bone.  Patient incontinent of stool, but no stool contaminating the wound. Slough removed with scissor/forceps. Change to santyl ointment to facilitate enzymatic debridement of remaining necrotic tissue.    -consider diverting sigmoid colostomy  -weekly follow up with wound healing center upon discharge  -low yield with antibiotics for stable pressure ulcer once wound begins to granulate.

## 2024-10-01 NOTE — PROGRESS NOTES
Progress Note - Infectious Disease   Name: Drew Santos 73 y.o. male I MRN: [id number]  Unit/Bed#: [place] [address] -01 I Date of Admission: 9/17/2024   Date of Service: 10/1/2024 I Hospital Day: 14     Assessment & Plan  Sepsis  Fever, tachycardia, tachypnea, and leukocytosis. Secondary to polymicrobial bacteremia, likely from colitis/proctitis with possible fistulous connection to sacral ulcer. Blood cultures with growth of enterobacter, enterococcus faecalis, proteus. Also consider role of COVID-19. No other clear source appreciated. Chest imaging with no opacities/groundglass/consolidations suggesting a bacterial process. Urine was abnormal, culture showed growth of enterobacter. Head CT with no acute intracranial findings. Patient hemodynamically stable, afebrile. Repeat blood cultures no growth. Completed 7 days cefepime/flagyl/vancomycin 9/25 and now tolerating IV Ampicillin for Enterococcal bacteremia  -antibiotics as below  -monitor CBCD and BMP  -monitor vitals  -supportive care   Polymicrobial bacteremia  Blood cultures with growth of enterobacter and proteus in one set, and second set growing enterococcus faecalis. Likely secondary to proctitis/colitis with possible fistulous connection to the sacral wound. No other source appreciated. Patient has COVID-19 but no opacities/groundglass/consolidations on chest imaging suggesting a bacterial process. TTE without evidence of valvular vegetations but was a technically difficult study. JOSEFINA negative. Repeat blood cultures no growth.   -continue IV Ampicillin 2 g every 6 hours to complete a total of 2 weeks of antibiotic therapy for enterococcal bacteremia through 10/3/24  -monitor weekly CBCD and BMP   Sacral wound  Per patient's family wound initially started during patient's stay in a skilled nursing facility for rehab. Wound imaging present in our system since prior hematuria hospitalization. Wound now with significant depth. Concern for possible fistulous  connection to the rectum given evidence of colitis/proctitis and extensive tissue emphysema on CT. This is likely playing a role in bacteremia above. General surgery has debrided and will continue to follow up with wound. He may require diverting colostomy. Wound without signs of current infection. Wound is stage IV with palpable bone  -frequent turning/repositioning to offload pressure from the wound  -local wound care per general surgery  -continue follow up with general surgery  -no indication for long term antibiotics without debridement/resection of infected bone and flap coverage  Proctitis  CT C/A/P showed mild distal sigmoid and rectal inflammatory changes, R ischioanal fossa emphysema, and possible fistulous connection to skin surface. This is likely source of patient's bacteremia above. Family reports patient has very infrequent bowel movements in past few weeks. General surgery is following for wound. GI has been consulted and they have concern for ischemic colitis. Patient has completed antibiotics for this issue  -serial abdominal/rectal exams  -monitor stool output  -continue follow up with general surgery  -continue follow up with gastroenterology  Type 2 diabetes mellitus without complication, without long-term current use of insulin ([facility])  Lab Results   Component Value Date    HGBA1C 6.2 (H) 09/17/2024   This is risk factor for wounds and infection.  -recommend tight glycemic control  -blood glucose management per primary service  COVID-19  PCR positive on 9/17/2024 although per patient's family he tested positive prior to admission. Patient's wife also positive. Chest imaging showed no airspace opacities or groundglass. He has been started on mild disease protocol and received IV Remdesivir x3 days.  -management per primary service  -monitor vitals  -monitor respiratory status    Above management plan to continue Ampicillin through 10/3 discussed with the primary team provider. ID will see again  10/3/24, please call with questions.     Antibiotics:  IV Ampicillin    Subjective:  Patient reports he is tired but otherwise feeling okay. No fever, chills, pain endorsed. Tolerating antibiotics.     Objective:  Vitals:  Temp:  [98.1 °F (36.7 °C)-99.1 °F (37.3 °C)] 99.1 °F (37.3 °C)  HR:  [80-94] 92  Resp:  [18] 18  BP: (127-151)/(78-94) 139/79  SpO2:  [94 %-97 %] 96 %  Temp (24hrs), Av.6 °F (37 °C), Min:98.1 °F (36.7 °C), Max:99.1 °F (37.3 °C)  Current: Temperature: 99.1 °F (37.3 °C)    Physical Exam:   General Appearance:  Chronically ill appearing but no acute distress   Throat: Oropharynx moist without lesions.    Lungs:   Clear to auscultation bilaterally; no wheezes, rhonchi or rales; respirations unlabored   Heart:  RRR; no murmur, rub or gallop   Abdomen:   Soft, non-tender, non-distended, positive bowel sounds.     Extremities: No clubbing, cyanosis or edema   Skin: Sacral wound images reviewed--slough present, no purulence       Labs:   All pertinent labs and imaging studies were personally reviewed  Results from last 7 days   Lab Units 10/01/24  0445 09/26/24  0439 09/25/24  043   WBC Thousand/uL 10.14 12.15* 10.92*   HEMOGLOBIN g/dL 8.8* 8.6* 8.3*   PLATELETS Thousands/uL 464* 531* 543*     Results from last 7 days   Lab Units 10/01/24  0445 09/26/24  0439 09/25/24  043   SODIUM mmol/L 137 138 139   POTASSIUM mmol/L 4.0 3.8 3.7   CHLORIDE mmol/L 105 105 108   CO2 mmol/L 29 30 29   BUN mg/dL 5 6 6   CREATININE mg/dL 0.50* 0.57* 0.60   EGFR ml/min/1.73sq m 106 100 98   CALCIUM mg/dL 8.3* 8.2* 8.0*   AST U/L  --  25 28   ALT U/L  --  17 19   ALK PHOS U/L  --  52 51

## 2024-10-01 NOTE — ASSESSMENT & PLAN NOTE
Per patient's family wound initially started during patient's stay in a skilled nursing facility for rehab. Wound imaging present in our system since prior hematuria hospitalization. Wound now with significant depth. Concern for possible fistulous connection to the rectum given evidence of colitis/proctitis and extensive tissue emphysema on CT. This is likely playing a role in bacteremia above. General surgery has debrided and will continue to follow up with wound. He may require diverting colostomy. Wound without signs of current infection. Wound is stage IV with palpable bone  -frequent turning/repositioning to offload pressure from the wound  -local wound care per general surgery  -continue follow up with general surgery  -no indication for long term antibiotics without debridement/resection of infected bone and flap coverage

## 2024-10-01 NOTE — PROGRESS NOTES
Progress Note - Surgery-General   Name: Drew Santos 73 y.o. male I MRN: [id number]  Unit/Bed#: [place] [address] -01 I Date of Admission: 9/17/2024   Date of Service: 10/1/2024 I Hospital Day: 14     Assessment & Plan  Sacral wound  Patient is a 73-year-old male with a history of A-fib, CVA, diabetes, sacral ulcer stage III, CAD who presented with altered mental status and was found to be COVID-positive.  No longer on isolation. Sacral ulcer has been debrided, now receiving wet - dry dressings.  Reassessed today, clearly a Stage IV ulcer with palpable cancellous bone.  Patient incontinent of stool, but no stool contaminating the wound. Slough removed with scissor/forceps. Change to santyl ointment to facilitate enzymatic debridement of remaining necrotic tissue.    -consider diverting sigmoid colostomy  -weekly follow up with wound healing center upon discharge  -low yield with antibiotics for stable pressure ulcer once wound begins to granulate.    Severe protein-calorie malnutrition (HCC)  Adequate protein and calorie intake critical to achieving wound healing    Malnutrition Findings:   Adult Malnutrition type: Chronic illness  Adult Degree of Malnutrition: Other severe protein calorie malnutrition  Malnutrition Characteristics: Inadequate energy, Weight loss                  360 Statement: Severe protein calorie malnutrition in context of chronic illness r/t poor appetite, inadequate PO intake as evidance by energy intake less than 75% compared to estimated needs>1 month, 7.5% wt loss x 1 month ( 122kg 8/23/24-> 113kg 9/17) treated with PO diet and oral supplements    BMI Findings:           Body mass index is 29.53 kg/m².       Surgery-General service will follow weekly.    History of Present Illness   Debilitated 74 yo M with Stage IV sacral decubitus ulcer.    Objective      Temp:  [98.1 °F (36.7 °C)-98.6 °F (37 °C)] 98.6 °F (37 °C)  HR:  [71-94] 94  Resp:  [18-20] 18  BP: (127-151)/(78-94) 151/94  O2 Device:  "None (Room air)          I/O         09/29 0701  09/30 0700 09/30 0701  10/01 0700 10/01 0701  10/02 0700    P.O. 600 180     Total Intake(mL/kg) 600 (5.3) 180 (1.6)     Urine (mL/kg/hr) 2700 (1) 2150 (0.8)     Stool  0     Total Output 2700 2150     Net -2100 -1970            Unmeasured Stool Occurrence  1 x           Lines/Drains/Airways       Active Status       Name Placement date Placement time Site Days    Urethral Catheter Three way 22 Fr. 08/31/24 1925  Three way  30    Continuous Bladder Irrigation Three-way 08/31/24 1925  Three-way  30                  Physical Exam   See photo    Lab Results: I have reviewed the following results: CBC/BMP:   .     10/01/24  0445   WBC 10.14   HGB 8.8*   HCT 27.8*   *   SODIUM 137   K 4.0      CO2 29   BUN 5   CREATININE 0.50*   GLUC 97    , Prealbumin: No results found for: \"PREALBUMIN\"   Latest Reference Range & Units 09/26/24 04:39   Albumin 3.5 - 5.0 g/dL 2.1 (L)   (L): Data is abnormally low  VTE Pharmacologic Prophylaxis: Reason for no pharmacologic prophylaxis on Eliquis  VTE Mechanical Prophylaxis: reason for no mechanical VTE prophylaxis On Eliquis  "

## 2024-10-01 NOTE — PLAN OF CARE
Problem: Potential for Falls  Goal: Patient will remain free of falls  Description: INTERVENTIONS:  - Educate patient/family on patient safety including physical limitations  - Instruct patient to call for assistance with activity   - Consult OT/PT to assist with strengthening/mobility   - Keep Call bell within reach  - Keep bed low and locked with side rails adjusted as appropriate  - Keep care items and personal belongings within reach  - Initiate and maintain comfort rounds  - Make Fall Risk Sign visible to staff  - Offer Toileting every 2 Hours, in advance of need  - Initiate/Maintain bed alarm  - Obtain necessary fall risk management equipment: bed alarm call bell  - Apply yellow socks and bracelet for high fall risk patients  - Consider moving patient to room near nurses station  Outcome: Progressing     Problem: PAIN - ADULT  Goal: Verbalizes/displays adequate comfort level or baseline comfort level  Description: Interventions:  - Encourage patient to monitor pain and request assistance  - Assess pain using appropriate pain scale  - Administer analgesics based on type and severity of pain and evaluate response  - Implement non-pharmacological measures as appropriate and evaluate response  - Consider cultural and social influences on pain and pain management  - Notify physician/advanced practitioner if interventions unsuccessful or patient reports new pain  Outcome: Progressing     Problem: INFECTION - ADULT  Goal: Absence or prevention of progression during hospitalization  Description: INTERVENTIONS:  - Assess and monitor for signs and symptoms of infection  - Monitor lab/diagnostic results  - Monitor all insertion sites, i.e. indwelling lines, tubes, and drains  - Monitor endotracheal if appropriate and nasal secretions for changes in amount and color  - [place] appropriate cooling/warming therapies per order  - Administer medications as ordered  - Instruct and encourage patient and family to use good  hand hygiene technique  - Identify and instruct in appropriate isolation precautions for identified infection/condition  Outcome: Progressing     Problem: SAFETY ADULT  Goal: Patient will remain free of falls  Description: INTERVENTIONS:  - Educate patient/family on patient safety including physical limitations  - Instruct patient to call for assistance with activity   - Consult OT/PT to assist with strengthening/mobility   - Keep Call bell within reach  - Keep bed low and locked with side rails adjusted as appropriate  - Keep care items and personal belongings within reach  - Initiate and maintain comfort rounds  - Make Fall Risk Sign visible to staff  - Offer Toileting every 2 Hours, in advance of need  - Initiate/Maintain bed alarm  - Obtain necessary fall risk management equipment: bed alarm call bell  - Apply yellow socks and bracelet for high fall risk patients  - Consider moving patient to room near nurses station  Outcome: Progressing  Goal: Maintain or return to baseline ADL function  Description: INTERVENTIONS:  -  Assess patient's ability to carry out ADLs; assess patient's baseline for ADL function and identify physical deficits which impact ability to perform ADLs (bathing, care of mouth/teeth, toileting, grooming, dressing, etc.)  - Assess/evaluate cause of self-care deficits   - Assess range of motion  - Assess patient's mobility; develop plan if impaired  - Assess patient's need for assistive devices and provide as appropriate  - Encourage maximum independence but intervene and supervise when necessary  - Involve family in performance of ADLs  - Assess for home care needs following discharge   - Consider OT consult to assist with ADL evaluation and planning for discharge  - Provide patient education as appropriate  Outcome: Progressing  Goal: Maintains/Returns to pre admission functional level  Description: INTERVENTIONS:  - Perform AM-PAC 6 Click Basic Mobility/ Daily Activity assessment daily.  -  Set and communicate daily mobility goal to care team and patient/family/caregiver.   - Collaborate with rehabilitation services on mobility goals if consulted  - Perform Range of Motion 4 times a day.  - Reposition patient every 2 hours.  - Dangle patient 3 times a day  - Stand patient 3 times a day  - Ambulate patient 3 times a day  - Out of bed to chair 3 times a day   - Out of bed for meals 3 times a day  - Out of bed for toileting  - Record patient progress and toleration of activity level   Outcome: Progressing     Problem: DISCHARGE PLANNING  Goal: Discharge to home or other facility with appropriate resources  Description: INTERVENTIONS:  - Identify barriers to discharge w/patient and caregiver  - Arrange for needed discharge resources and transportation as appropriate  - Identify discharge learning needs (meds, wound care, etc.)  - Arrange for interpretive services to assist at discharge as needed  - Refer to Case Management Department for coordinating discharge planning if the patient needs post-hospital services based on physician/advanced practitioner order or complex needs related to functional status, cognitive ability, or social support system  Outcome: Progressing     Problem: Knowledge Deficit  Goal: Patient/family/caregiver demonstrates understanding of disease process, treatment plan, medications, and discharge instructions  Description: Complete learning assessment and assess knowledge base.  Interventions:  - Provide teaching at level of understanding  - Provide teaching via preferred learning methods  Outcome: Progressing     Problem: Prexisting or High Potential for Compromised Skin Integrity  Goal: Skin integrity is maintained or improved  Description: INTERVENTIONS:  - Identify patients at risk for skin breakdown  - Assess and monitor skin integrity  - Assess and monitor nutrition and hydration status  - Monitor labs   - Assess for incontinence   - Turn and reposition patient  - Assist with  mobility/ambulation  - Relieve pressure over bony prominences  - Avoid friction and shearing  - Provide appropriate hygiene as needed including keeping skin clean and dry  - Evaluate need for skin moisturizer/barrier cream  - Collaborate with interdisciplinary team   - Patient/family teaching  - Consider wound care consult   Outcome: Progressing     Problem: GASTROINTESTINAL - ADULT  Goal: Maintains or returns to baseline bowel function  Description: INTERVENTIONS:  - Assess bowel function  - Encourage oral fluids to ensure adequate hydration  - Administer IV fluids if ordered to ensure adequate hydration  - Administer ordered medications as needed  - Encourage mobilization and activity  - Consider nutritional services referral to assist patient with adequate nutrition and appropriate food choices  Outcome: Progressing     Problem: GENITOURINARY - ADULT  Goal: Urinary catheter remains patent  Description: INTERVENTIONS:  - Assess patency of urinary catheter  - If patient has a chronic hanks, consider changing catheter if non-functioning  - Follow guidelines for intermittent irrigation of non-functioning urinary catheter  Outcome: Progressing     Problem: METABOLIC, FLUID AND ELECTROLYTES - ADULT  Goal: Electrolytes maintained within normal limits  Description: INTERVENTIONS:  - Monitor labs and assess patient for signs and symptoms of electrolyte imbalances  - Administer electrolyte replacement as ordered  - Monitor response to electrolyte replacements, including repeat lab results as appropriate  - Instruct patient on fluid and nutrition as appropriate  Outcome: Progressing     Problem: SKIN/TISSUE INTEGRITY - ADULT  Goal: Skin Integrity remains intact(Skin Breakdown Prevention)  Description: Assess:  -Perform Goldy assessment every shift  -Clean and moisturize skin every shift  -Inspect skin when repositioning, toileting, and assisting with ADLS  -Assess under medical devices such as Masimo every shift  -Assess  extremities for adequate circulation and sensation     Bed Management:  -Have minimal linens on bed & keep smooth, unwrinkled  -Change linens as needed when moist or perspiring  -Avoid sitting or lying in one position for more than 1 hours while in bed  -Keep HOB at 30 degrees     Toileting:  -Offer bedside commode  -Assess for incontinence every shift  -Use incontinent care products after each incontinent episode such as foam cleanser     Activity:  -Mobilize patient 4 times a day  -Encourage activity and walks on unit  -Encourage or provide ROM exercises   -Turn and reposition patient every 2 Hours  -Use appropriate equipment to lift or move patient in bed  -Instruct/ Assist with weight shifting every 1 hour when out of bed in chair  -Consider limitation of chair time 1 hour intervals    Skin Care:  -Avoid use of baby powder, tape, friction and shearing, hot water or constrictive clothing  -Relieve pressure over bony prominences using wedges  -Do not massage red bony areas    Next Steps:  -Teach patient strategies to minimize risks such as skin breakdown   -Consider consults to  interdisciplinary teams such as wound care nurse   Outcome: Progressing  Goal: Incision(s), wounds(s) or drain site(s) healing without S/S of infection  Description: INTERVENTIONS  - Assess and document dressing, incision, wound bed, drain sites and surrounding tissue  - Provide patient and family education  - Perform skin care/dressing changes every shift   Outcome: Progressing  Goal: Pressure injury heals and does not worsen  Description: Interventions:  - Implement low air loss mattress or specialty surface (Criteria met)  - Apply silicone foam dressing  - Instruct/assist with weight shifting every 30 minutes when in chair   - Limit chair time to 1 hour intervals  - Use special pressure reducing interventions such as cushion when in chair   - Apply fecal or urinary incontinence containment device   - Perform passive or active ROM every  shift  - Turn and reposition patient & offload bony prominences every 1 hours   - Utilize friction reducing device or surface for transfers   - Consider consults to  interdisciplinary teams such as wound care nurse   - Use incontinent care products after each incontinent episode such as foam cleanser   - Consider nutrition services referral as needed  Outcome: Progressing     Problem: HEMATOLOGIC - ADULT  Goal: Maintains hematologic stability  Description: INTERVENTIONS  - Assess for signs and symptoms of bleeding or hemorrhage  - Monitor labs  - Administer supportive blood products/factors as ordered and appropriate  Outcome: Progressing     Problem: Nutrition/Hydration-ADULT  Goal: Nutrient/Hydration intake appropriate for improving, restoring or maintaining nutritional needs  Description: Monitor and assess patient's nutrition/hydration status for malnutrition. Collaborate with interdisciplinary team and initiate plan and interventions as ordered.  Monitor patient's weight and dietary intake as ordered or per policy. Utilize nutrition screening tool and intervene as necessary. Determine patient's food preferences and provide high-protein, high-caloric foods as appropriate.     INTERVENTIONS:  - Monitor oral intake, urinary output, labs, and treatment plans  - Assess nutrition and hydration status and recommend course of action  - Evaluate amount of meals eaten  - Assist patient with eating if necessary   - Allow adequate time for meals  - Recommend/ encourage appropriate diets, oral nutritional supplements, and vitamin/mineral supplements  - Order, calculate, and assess calorie counts as needed  - Recommend, monitor, and adjust tube feedings and TPN/PPN based on assessed needs  - Assess need for intravenous fluids  - Provide specific nutrition/hydration education as appropriate  - Include patient/family/caregiver in decisions related to nutrition  Outcome: Progressing

## 2024-10-01 NOTE — ASSESSMENT & PLAN NOTE
History of CVA   Chronically bedbound and with chronic left-sided deficits   Continue home aspirin, Eliquis, statin  Dysphagia 2 mechanical soft, thin liquids -upgraded to level 3 by speech therapy  Residing at [facility] for the past 3 weeks

## 2024-10-01 NOTE — ASSESSMENT & PLAN NOTE
CT C/A/P showed mild distal sigmoid and rectal inflammatory changes, R ischioanal fossa emphysema, and possible fistulous connection to skin surface. This is likely source of patient's bacteremia above. Family reports patient has very infrequent bowel movements in past few weeks. General surgery is following for wound. GI has been consulted and they have concern for ischemic colitis. Patient has completed antibiotics for this issue  -serial abdominal/rectal exams  -monitor stool output  -continue follow up with general surgery  -continue follow up with gastroenterology

## 2024-10-01 NOTE — ASSESSMENT & PLAN NOTE
Presented with tachycardia and leukocytosis and fever - tested positive for COVID 9/17/24  Now with polymicrobial bacteremia- enterbacter, enterococcus and proetus   CT abdomen and pelvis: mid/distal sigmoid colonic and rectal inflammatory changes in keeping with a segmental colitis/proctitis. Right ischioanal fossa subcutaneous emphysema extending through the right paramidline gluteal subcutaneous tissues to the skin surface suspicious for a perirectal or perianal fistula   Has large sacral decubitus ulcer- status post bedside debridement of sacral ulcer 9/18/24 by general surgery  Continue with dressing changes and wound care management by general surgery  Repeat CT abdomen and pelvis 9/24 to evaluate for any fistula connection versus abscess due to ongoing leukocytosis  CT reveals no ongoing abscess however does show developing osteomyelitis of coccyx bone.  Seen by GI imaging likely due to ischemic colitis but likely not primary cause of his presentation - no role for colonoscopy or flex sig curently    Continue IV antibiotics being managed by infectious disease  Was on cefepime, flagyl and vanco -- being adjusted  Repeat blood cultures negative to date  ID transitioning the patient to ampicillin 2 g every 6 hours to begin 9/26 to complete a total of 2 weeks of antibiotic therapy for enterococcal bacteremia through 10/3/24  Coordinating discharge plan with case management- Has been residing at [facility] care for the past 3 weeks  Patient remain in the hospital through Thursday to complete full course of IV antibiotics  Case management working on placement

## 2024-10-01 NOTE — PLAN OF CARE
Problem: Potential for Falls  Goal: Patient will remain free of falls  Description: INTERVENTIONS:  - Educate patient/family on patient safety including physical limitations  - Instruct patient to call for assistance with activity   - Consult OT/PT to assist with strengthening/mobility   - Keep Call bell within reach  - Keep bed low and locked with side rails adjusted as appropriate  - Keep care items and personal belongings within reach  - Initiate and maintain comfort rounds  - Make Fall Risk Sign visible to staff  - Offer Toileting every 2 Hours, in advance of need  - Initiate/Maintain bed alarm  - Obtain necessary fall risk management equipment: bed alarm call bell  - Apply yellow socks and bracelet for high fall risk patients  - Consider moving patient to room near nurses station  Outcome: Progressing     Problem: PAIN - ADULT  Goal: Verbalizes/displays adequate comfort level or baseline comfort level  Description: Interventions:  - Encourage patient to monitor pain and request assistance  - Assess pain using appropriate pain scale  - Administer analgesics based on type and severity of pain and evaluate response  - Implement non-pharmacological measures as appropriate and evaluate response  - Consider cultural and social influences on pain and pain management  - Notify physician/advanced practitioner if interventions unsuccessful or patient reports new pain  Outcome: Progressing     Problem: INFECTION - ADULT  Goal: Absence or prevention of progression during hospitalization  Description: INTERVENTIONS:  - Assess and monitor for signs and symptoms of infection  - Monitor lab/diagnostic results  - Monitor all insertion sites, i.e. indwelling lines, tubes, and drains  - Monitor endotracheal if appropriate and nasal secretions for changes in amount and color  - [place] appropriate cooling/warming therapies per order  - Administer medications as ordered  - Instruct and encourage patient and family to use good  hand hygiene technique  - Identify and instruct in appropriate isolation precautions for identified infection/condition  Outcome: Progressing     Problem: SAFETY ADULT  Goal: Patient will remain free of falls  Description: INTERVENTIONS:  - Educate patient/family on patient safety including physical limitations  - Instruct patient to call for assistance with activity   - Consult OT/PT to assist with strengthening/mobility   - Keep Call bell within reach  - Keep bed low and locked with side rails adjusted as appropriate  - Keep care items and personal belongings within reach  - Initiate and maintain comfort rounds  - Make Fall Risk Sign visible to staff  - Offer Toileting every 2 Hours, in advance of need  - Initiate/Maintain bed alarm  - Obtain necessary fall risk management equipment: bed alarm call bell  - Apply yellow socks and bracelet for high fall risk patients  - Consider moving patient to room near nurses station  Outcome: Progressing  Goal: Maintain or return to baseline ADL function  Description: INTERVENTIONS:  -  Assess patient's ability to carry out ADLs; assess patient's baseline for ADL function and identify physical deficits which impact ability to perform ADLs (bathing, care of mouth/teeth, toileting, grooming, dressing, etc.)  - Assess/evaluate cause of self-care deficits   - Assess range of motion  - Assess patient's mobility; develop plan if impaired  - Assess patient's need for assistive devices and provide as appropriate  - Encourage maximum independence but intervene and supervise when necessary  - Involve family in performance of ADLs  - Assess for home care needs following discharge   - Consider OT consult to assist with ADL evaluation and planning for discharge  - Provide patient education as appropriate  Outcome: Progressing  Goal: Maintains/Returns to pre admission functional level  Description: INTERVENTIONS:  - Perform AM-PAC 6 Click Basic Mobility/ Daily Activity assessment daily.  -  Set and communicate daily mobility goal to care team and patient/family/caregiver.   - Collaborate with rehabilitation services on mobility goals if consulted  - Perform Range of Motion 4 times a day.  - Reposition patient every 2 hours.  - Dangle patient 3 times a day  - Stand patient 3 times a day  - Ambulate patient 3 times a day  - Out of bed to chair 3 times a day   - Out of bed for meals 3 times a day  - Out of bed for toileting  - Record patient progress and toleration of activity level   Outcome: Progressing     Problem: DISCHARGE PLANNING  Goal: Discharge to home or other facility with appropriate resources  Description: INTERVENTIONS:  - Identify barriers to discharge w/patient and caregiver  - Arrange for needed discharge resources and transportation as appropriate  - Identify discharge learning needs (meds, wound care, etc.)  - Arrange for interpretive services to assist at discharge as needed  - Refer to Case Management Department for coordinating discharge planning if the patient needs post-hospital services based on physician/advanced practitioner order or complex needs related to functional status, cognitive ability, or social support system  Outcome: Progressing     Problem: Knowledge Deficit  Goal: Patient/family/caregiver demonstrates understanding of disease process, treatment plan, medications, and discharge instructions  Description: Complete learning assessment and assess knowledge base.  Interventions:  - Provide teaching at level of understanding  - Provide teaching via preferred learning methods  Outcome: Progressing     Problem: Prexisting or High Potential for Compromised Skin Integrity  Goal: Skin integrity is maintained or improved  Description: INTERVENTIONS:  - Identify patients at risk for skin breakdown  - Assess and monitor skin integrity  - Assess and monitor nutrition and hydration status  - Monitor labs   - Assess for incontinence   - Turn and reposition patient  - Assist with  mobility/ambulation  - Relieve pressure over bony prominences  - Avoid friction and shearing  - Provide appropriate hygiene as needed including keeping skin clean and dry  - Evaluate need for skin moisturizer/barrier cream  - Collaborate with interdisciplinary team   - Patient/family teaching  - Consider wound care consult   Outcome: Progressing     Problem: GASTROINTESTINAL - ADULT  Goal: Maintains or returns to baseline bowel function  Description: INTERVENTIONS:  - Assess bowel function  - Encourage oral fluids to ensure adequate hydration  - Administer IV fluids if ordered to ensure adequate hydration  - Administer ordered medications as needed  - Encourage mobilization and activity  - Consider nutritional services referral to assist patient with adequate nutrition and appropriate food choices  Outcome: Progressing     Problem: GENITOURINARY - ADULT  Goal: Urinary catheter remains patent  Description: INTERVENTIONS:  - Assess patency of urinary catheter  - If patient has a chronic hanks, consider changing catheter if non-functioning  - Follow guidelines for intermittent irrigation of non-functioning urinary catheter  Outcome: Progressing     Problem: METABOLIC, FLUID AND ELECTROLYTES - ADULT  Goal: Electrolytes maintained within normal limits  Description: INTERVENTIONS:  - Monitor labs and assess patient for signs and symptoms of electrolyte imbalances  - Administer electrolyte replacement as ordered  - Monitor response to electrolyte replacements, including repeat lab results as appropriate  - Instruct patient on fluid and nutrition as appropriate  Outcome: Progressing     Problem: SKIN/TISSUE INTEGRITY - ADULT  Goal: Skin Integrity remains intact(Skin Breakdown Prevention)  Description: Assess:  -Perform Goldy assessment every shift  -Clean and moisturize skin every shift  -Inspect skin when repositioning, toileting, and assisting with ADLS  -Assess under medical devices such as Masimo every shift  -Assess  extremities for adequate circulation and sensation     Bed Management:  -Have minimal linens on bed & keep smooth, unwrinkled  -Change linens as needed when moist or perspiring  -Avoid sitting or lying in one position for more than 1 hours while in bed  -Keep HOB at 30 degrees     Toileting:  -Offer bedside commode  -Assess for incontinence every shift  -Use incontinent care products after each incontinent episode such as foam cleanser     Activity:  -Mobilize patient 4 times a day  -Encourage activity and walks on unit  -Encourage or provide ROM exercises   -Turn and reposition patient every 2 Hours  -Use appropriate equipment to lift or move patient in bed  -Instruct/ Assist with weight shifting every 1 hour when out of bed in chair  -Consider limitation of chair time 1 hour intervals    Skin Care:  -Avoid use of baby powder, tape, friction and shearing, hot water or constrictive clothing  -Relieve pressure over bony prominences using wedges  -Do not massage red bony areas    Next Steps:  -Teach patient strategies to minimize risks such as skin breakdown   -Consider consults to  interdisciplinary teams such as wound care nurse   Outcome: Progressing  Goal: Incision(s), wounds(s) or drain site(s) healing without S/S of infection  Description: INTERVENTIONS  - Assess and document dressing, incision, wound bed, drain sites and surrounding tissue  - Provide patient and family education  - Perform skin care/dressing changes every shift   Outcome: Progressing  Goal: Pressure injury heals and does not worsen  Description: Interventions:  - Implement low air loss mattress or specialty surface (Criteria met)  - Apply silicone foam dressing  - Instruct/assist with weight shifting every 30 minutes when in chair   - Limit chair time to 1 hour intervals  - Use special pressure reducing interventions such as cushion when in chair   - Apply fecal or urinary incontinence containment device   - Perform passive or active ROM every  shift  - Turn and reposition patient & offload bony prominences every 1 hours   - Utilize friction reducing device or surface for transfers   - Consider consults to  interdisciplinary teams such as wound care nurse   - Use incontinent care products after each incontinent episode such as foam cleanser   - Consider nutrition services referral as needed  Outcome: Progressing     Problem: HEMATOLOGIC - ADULT  Goal: Maintains hematologic stability  Description: INTERVENTIONS  - Assess for signs and symptoms of bleeding or hemorrhage  - Monitor labs  - Administer supportive blood products/factors as ordered and appropriate  Outcome: Progressing     Problem: Nutrition/Hydration-ADULT  Goal: Nutrient/Hydration intake appropriate for improving, restoring or maintaining nutritional needs  Description: Monitor and assess patient's nutrition/hydration status for malnutrition. Collaborate with interdisciplinary team and initiate plan and interventions as ordered.  Monitor patient's weight and dietary intake as ordered or per policy. Utilize nutrition screening tool and intervene as necessary. Determine patient's food preferences and provide high-protein, high-caloric foods as appropriate.     INTERVENTIONS:  - Monitor oral intake, urinary output, labs, and treatment plans  - Assess nutrition and hydration status and recommend course of action  - Evaluate amount of meals eaten  - Assist patient with eating if necessary   - Allow adequate time for meals  - Recommend/ encourage appropriate diets, oral nutritional supplements, and vitamin/mineral supplements  - Order, calculate, and assess calorie counts as needed  - Recommend, monitor, and adjust tube feedings and TPN/PPN based on assessed needs  - Assess need for intravenous fluids  - Provide specific nutrition/hydration education as appropriate  - Include patient/family/caregiver in decisions related to nutrition  Outcome: Progressing

## 2024-10-01 NOTE — ASSESSMENT & PLAN NOTE
Adequate protein and calorie intake critical to achieving wound healing    Malnutrition Findings:   Adult Malnutrition type: Chronic illness  Adult Degree of Malnutrition: Other severe protein calorie malnutrition  Malnutrition Characteristics: Inadequate energy, Weight loss                  360 Statement: Severe protein calorie malnutrition in context of chronic illness r/t poor appetite, inadequate PO intake as evidance by energy intake less than 75% compared to estimated needs>1 month, 7.5% wt loss x 1 month ( 122kg 8/23/24-> 113kg 9/17) treated with PO diet and oral supplements    BMI Findings:           Body mass index is 29.53 kg/m².

## 2024-10-01 NOTE — ASSESSMENT & PLAN NOTE
Blood cultures with growth of enterobacter and proteus in one set, and second set growing enterococcus faecalis. Likely secondary to proctitis/colitis with possible fistulous connection to the sacral wound. No other source appreciated. Patient has COVID-19 but no opacities/groundglass/consolidations on chest imaging suggesting a bacterial process. TTE without evidence of valvular vegetations but was a technically difficult study. JOSEFINA negative. Repeat blood cultures no growth.   -continue IV Ampicillin 2 g every 6 hours to complete a total of 2 weeks of antibiotic therapy for enterococcal bacteremia through 10/3/24  -monitor weekly CBCD and BMP

## 2024-10-02 LAB
GLUCOSE SERPL-MCNC: 112 MG/DL (ref 65–140)
GLUCOSE SERPL-MCNC: 135 MG/DL (ref 65–140)
GLUCOSE SERPL-MCNC: 177 MG/DL (ref 65–140)
GLUCOSE SERPL-MCNC: 245 MG/DL (ref 65–140)

## 2024-10-02 PROCEDURE — 82948 REAGENT STRIP/BLOOD GLUCOSE: CPT

## 2024-10-02 PROCEDURE — 99231 SBSQ HOSP IP/OBS SF/LOW 25: CPT | Performed by: HOSPITALIST

## 2024-10-02 PROCEDURE — 92526 ORAL FUNCTION THERAPY: CPT

## 2024-10-02 RX ADMIN — BACITRACIN ZINC 1 SMALL APPLICATION: 500 OINTMENT TOPICAL at 17:20

## 2024-10-02 RX ADMIN — Medication 12.5 MG: at 21:36

## 2024-10-02 RX ADMIN — AMPICILLIN SODIUM 2000 MG: 2 INJECTION, POWDER, FOR SOLUTION INTRAMUSCULAR; INTRAVENOUS at 17:22

## 2024-10-02 RX ADMIN — SENNOSIDES AND DOCUSATE SODIUM 1 TABLET: 8.6; 5 TABLET ORAL at 21:36

## 2024-10-02 RX ADMIN — INSULIN LISPRO 3 UNITS: 100 INJECTION, SOLUTION INTRAVENOUS; SUBCUTANEOUS at 21:36

## 2024-10-02 RX ADMIN — ESCITALOPRAM OXALATE 10 MG: 10 TABLET ORAL at 08:32

## 2024-10-02 RX ADMIN — LISINOPRIL 2.5 MG: 2.5 TABLET ORAL at 08:31

## 2024-10-02 RX ADMIN — AMPICILLIN SODIUM 2000 MG: 2 INJECTION, POWDER, FOR SOLUTION INTRAMUSCULAR; INTRAVENOUS at 05:24

## 2024-10-02 RX ADMIN — Medication 12.5 MG: at 08:31

## 2024-10-02 RX ADMIN — BACITRACIN ZINC 1 SMALL APPLICATION: 500 OINTMENT TOPICAL at 08:31

## 2024-10-02 RX ADMIN — APIXABAN 5 MG: 5 TABLET, FILM COATED ORAL at 17:20

## 2024-10-02 RX ADMIN — Medication 1 TABLET: at 17:20

## 2024-10-02 RX ADMIN — POTASSIUM CHLORIDE 20 MEQ: 1.5 SOLUTION ORAL at 17:20

## 2024-10-02 RX ADMIN — AMPICILLIN SODIUM 2000 MG: 2 INJECTION, POWDER, FOR SOLUTION INTRAMUSCULAR; INTRAVENOUS at 11:46

## 2024-10-02 RX ADMIN — INSULIN LISPRO 1 UNITS: 100 INJECTION, SOLUTION INTRAVENOUS; SUBCUTANEOUS at 11:45

## 2024-10-02 RX ADMIN — ATORVASTATIN CALCIUM 80 MG: 80 TABLET, FILM COATED ORAL at 17:20

## 2024-10-02 RX ADMIN — AMPICILLIN SODIUM 2000 MG: 2 INJECTION, POWDER, FOR SOLUTION INTRAMUSCULAR; INTRAVENOUS at 23:56

## 2024-10-02 RX ADMIN — APIXABAN 5 MG: 5 TABLET, FILM COATED ORAL at 08:32

## 2024-10-02 RX ADMIN — FINASTERIDE 5 MG: 5 TABLET, FILM COATED ORAL at 08:32

## 2024-10-02 RX ADMIN — ASPIRIN 81 MG CHEWABLE TABLET 81 MG: 81 TABLET CHEWABLE at 08:31

## 2024-10-02 RX ADMIN — EZETIMIBE 10 MG: 10 TABLET ORAL at 08:31

## 2024-10-02 RX ADMIN — COLLAGENASE SANTYL: 250 OINTMENT TOPICAL at 08:44

## 2024-10-02 RX ADMIN — Medication 1 TABLET: at 08:31

## 2024-10-02 RX ADMIN — POTASSIUM CHLORIDE 20 MEQ: 1.5 SOLUTION ORAL at 08:31

## 2024-10-02 NOTE — ASSESSMENT & PLAN NOTE
History of CVA   Chronically bedbound and with chronic left-sided deficits   Continue home aspirin, Eliquis, statin  Dysphagia 2 mechanical soft, thin liquids -upgraded to level 3 by speech therapy  Residing at [facility] for the past 3 weeks

## 2024-10-02 NOTE — ASSESSMENT & PLAN NOTE
Lab Results   Component Value Date    HGBA1C 6.2 (H) 09/17/2024     Recent Labs     10/01/24  1123 10/01/24  1610 10/01/24  2120 10/02/24  0739   POCGLU 181* 191* 130 112   Controlled with last hemoglobin A1c of 6.2%  Holding home oral regimen  Monitor on sliding scale

## 2024-10-02 NOTE — CASE MANAGEMENT
Case Management Discharge Planning Note    Patient name Drew Santos  [facility] M* MRN [id number]  : 1950 Date 10/2/2024       Current Admission Date: 2024  Current Admission Diagnosis:Sepsis   Patient Active Problem List    Diagnosis Date Noted Date Diagnosed    Advanced care planning/counseling discussion 2024     Severe protein-calorie malnutrition (HCC) 2024     Polymicrobial bacteremia 2024     Acute metabolic encephalopathy 2024     History of CVA (cerebrovascular accident) 2024     Paroxysmal atrial fibrillation (HCC) 2024     Anemia 2024     Type 2 diabetes mellitus without complication, without long-term current use of insulin (HCC) 2024     COVID-19 2024     Proctitis 2024     Dysgeusia 2024     Gross hematuria 2024     Depression 2024     Sacral wound 2024     Primary hypertension 2024     Mixed hyperlipidemia 2024     Urinary retention 2024     Syncope 2024     Elevated lactic acid level 2024     Sepsis 2024     Abnormal CPK 2024       LOS (days): 15  Geometric Mean LOS (GMLOS) (days): 4.9  Days to GMLOS:-9.8     OBJECTIVE:  Risk of Unplanned Readmission Score: 25.08         Current admission status: Inpatient   Preferred Pharmacy:   [id number] [place], [place] - [address] [address]  Phone: [phone] Fax: [phone]    Primary Care Provider: Joe Lyon MD    [facility]  Secondary Insurance:     DISCHARGE DETAILS:    Transport at Discharge :  ([facility] to [facility])     ETA of Transport (Date): 10/04/24  ETA of Transport (Time): 4050    Additional Comments: CM spoke with patient's spouse, Kristi via phone, CM reviewed patient anticipated to discharge Friday, CM discussed STR requesting transporation for after 3:00P.M. (1500), CM discussed to set [facility] transport request for 2:30P.M. from [facility]. Patient's spouse, Kristi agreeable. CM contacted [facility] (PH: [phone]) to set transportation for Friday, October 4th 2024. CM to follow for further discharge planning.    CM left message for [facility] (for transportation) set up for Friday.  CM to follow.

## 2024-10-02 NOTE — PLAN OF CARE
Problem: Potential for Falls  Goal: Patient will remain free of falls  Description: INTERVENTIONS:  - Educate patient/family on patient safety including physical limitations  - Instruct patient to call for assistance with activity   - Consult OT/PT to assist with strengthening/mobility   - Keep Call bell within reach  - Keep bed low and locked with side rails adjusted as appropriate  - Keep care items and personal belongings within reach  - Initiate and maintain comfort rounds  - Make Fall Risk Sign visible to staff  - Offer Toileting every 2 Hours, in advance of need  - Initiate/Maintain bed alarm  - Obtain necessary fall risk management equipment: bed alarm call bell  - Apply yellow socks and bracelet for high fall risk patients  - Consider moving patient to room near nurses station  Outcome: Progressing     Problem: PAIN - ADULT  Goal: Verbalizes/displays adequate comfort level or baseline comfort level  Description: Interventions:  - Encourage patient to monitor pain and request assistance  - Assess pain using appropriate pain scale  - Administer analgesics based on type and severity of pain and evaluate response  - Implement non-pharmacological measures as appropriate and evaluate response  - Consider cultural and social influences on pain and pain management  - Notify physician/advanced practitioner if interventions unsuccessful or patient reports new pain  Outcome: Progressing     Problem: INFECTION - ADULT  Goal: Absence or prevention of progression during hospitalization  Description: INTERVENTIONS:  - Assess and monitor for signs and symptoms of infection  - Monitor lab/diagnostic results  - Monitor all insertion sites, i.e. indwelling lines, tubes, and drains  - Monitor endotracheal if appropriate and nasal secretions for changes in amount and color  - [place] appropriate cooling/warming therapies per order  - Administer medications as ordered  - Instruct and encourage patient and family to use good  hand hygiene technique  - Identify and instruct in appropriate isolation precautions for identified infection/condition  Outcome: Progressing

## 2024-10-02 NOTE — SPEECH THERAPY NOTE
Speech Language/Pathology    Speech/Language Pathology Progress Note    Patient Name: Drew Santos  Today's Date: 10/2/2024     Problem List  Principal Problem:    Sepsis  Active Problems:    Sacral wound    Type 2 diabetes mellitus without complication, without long-term current use of insulin (HCC)    COVID-19    Proctitis    Polymicrobial bacteremia    Acute metabolic encephalopathy    History of CVA (cerebrovascular accident)    Paroxysmal atrial fibrillation (HCC)    Anemia    Severe protein-calorie malnutrition (HCC)    Advanced care planning/counseling discussion       Past Medical History  Past Medical History:   Diagnosis Date    Atherosclerotic heart disease of native coronary artery without angina pectoris     Atrial fibrillation (HCC)     Cerebral infarction (HCC)     Constipation     Depression     Diabetes mellitus (HCC)     Hemiplegia and hemiparesis following cerebral infarction affecting left non-dominant side (HCC)     Hyperlipidemia     Hypertension     Pressure ulcer of sacral region, stage 3 (HCC)     Prostatic hyperplasia     Sepsis (HCC)     Stroke (HCC)     TIA (transient ischemic attack)     Urinary retention     Vitamin D deficiency         Past Surgical History  No past surgical history on file.      Subjective:  Pt alert, not speaking today. Nodding only.   Objective:  Pt seen for f/u w/ NDD3 and thin. Pt refused all items on his tray other than the vanilla magic cup and a few sips of coffee. He was not feeding himself. Fed by me. All items transfer and swallowed promptly w/out difficulty. Offered to order him something else.Nodded no. Encouraged him to try items on tray. Nodded no. Seems to eat more when wife brings in food.   Assessment:  Poor Intake at breakfast and lunch today. Remains on RA.   Plan/Recommendations:  Continue NDD3 and thin. Encourage intake. I suspect he will eat food his wife brings in more than the food here. ST will d/c. If concerns arise or there is a significant  change in status, please re-consult.

## 2024-10-02 NOTE — PLAN OF CARE
Problem: Potential for Falls  Goal: Patient will remain free of falls  Description: INTERVENTIONS:  - Educate patient/family on patient safety including physical limitations  - Instruct patient to call for assistance with activity   - Consult OT/PT to assist with strengthening/mobility   - Keep Call bell within reach  - Keep bed low and locked with side rails adjusted as appropriate  - Keep care items and personal belongings within reach  - Initiate and maintain comfort rounds  - Make Fall Risk Sign visible to staff  - Offer Toileting every 2 Hours, in advance of need  - Initiate/Maintain bed alarm  - Obtain necessary fall risk management equipment: bed alarm call bell  - Apply yellow socks and bracelet for high fall risk patients  - Consider moving patient to room near nurses station  Outcome: Progressing     Problem: PAIN - ADULT  Goal: Verbalizes/displays adequate comfort level or baseline comfort level  Description: Interventions:  - Encourage patient to monitor pain and request assistance  - Assess pain using appropriate pain scale  - Administer analgesics based on type and severity of pain and evaluate response  - Implement non-pharmacological measures as appropriate and evaluate response  - Consider cultural and social influences on pain and pain management  - Notify physician/advanced practitioner if interventions unsuccessful or patient reports new pain  Outcome: Progressing     Problem: INFECTION - ADULT  Goal: Absence or prevention of progression during hospitalization  Description: INTERVENTIONS:  - Assess and monitor for signs and symptoms of infection  - Monitor lab/diagnostic results  - Monitor all insertion sites, i.e. indwelling lines, tubes, and drains  - Monitor endotracheal if appropriate and nasal secretions for changes in amount and color  - [place] appropriate cooling/warming therapies per order  - Administer medications as ordered  - Instruct and encourage patient and family to use good  hand hygiene technique  - Identify and instruct in appropriate isolation precautions for identified infection/condition  Outcome: Progressing     Problem: SAFETY ADULT  Goal: Patient will remain free of falls  Description: INTERVENTIONS:  - Educate patient/family on patient safety including physical limitations  - Instruct patient to call for assistance with activity   - Consult OT/PT to assist with strengthening/mobility   - Keep Call bell within reach  - Keep bed low and locked with side rails adjusted as appropriate  - Keep care items and personal belongings within reach  - Initiate and maintain comfort rounds  - Make Fall Risk Sign visible to staff  - Offer Toileting every 2 Hours, in advance of need  - Initiate/Maintain bed alarm  - Obtain necessary fall risk management equipment: bed alarm call bell  - Apply yellow socks and bracelet for high fall risk patients  - Consider moving patient to room near nurses station  Outcome: Progressing  Goal: Maintain or return to baseline ADL function  Description: INTERVENTIONS:  -  Assess patient's ability to carry out ADLs; assess patient's baseline for ADL function and identify physical deficits which impact ability to perform ADLs (bathing, care of mouth/teeth, toileting, grooming, dressing, etc.)  - Assess/evaluate cause of self-care deficits   - Assess range of motion  - Assess patient's mobility; develop plan if impaired  - Assess patient's need for assistive devices and provide as appropriate  - Encourage maximum independence but intervene and supervise when necessary  - Involve family in performance of ADLs  - Assess for home care needs following discharge   - Consider OT consult to assist with ADL evaluation and planning for discharge  - Provide patient education as appropriate  Outcome: Progressing  Goal: Maintains/Returns to pre admission functional level  Description: INTERVENTIONS:  - Perform AM-PAC 6 Click Basic Mobility/ Daily Activity assessment daily.  -  Set and communicate daily mobility goal to care team and patient/family/caregiver.   - Collaborate with rehabilitation services on mobility goals if consulted  - Perform Range of Motion 4 times a day.  - Reposition patient every 2 hours.  - Dangle patient 3 times a day  - Stand patient 3 times a day  - Ambulate patient 3 times a day  - Out of bed to chair 3 times a day   - Out of bed for meals 3 times a day  - Out of bed for toileting  - Record patient progress and toleration of activity level   Outcome: Progressing     Problem: DISCHARGE PLANNING  Goal: Discharge to home or other facility with appropriate resources  Description: INTERVENTIONS:  - Identify barriers to discharge w/patient and caregiver  - Arrange for needed discharge resources and transportation as appropriate  - Identify discharge learning needs (meds, wound care, etc.)  - Arrange for interpretive services to assist at discharge as needed  - Refer to Case Management Department for coordinating discharge planning if the patient needs post-hospital services based on physician/advanced practitioner order or complex needs related to functional status, cognitive ability, or social support system  Outcome: Progressing     Problem: Knowledge Deficit  Goal: Patient/family/caregiver demonstrates understanding of disease process, treatment plan, medications, and discharge instructions  Description: Complete learning assessment and assess knowledge base.  Interventions:  - Provide teaching at level of understanding  - Provide teaching via preferred learning methods  Outcome: Progressing     Problem: Prexisting or High Potential for Compromised Skin Integrity  Goal: Skin integrity is maintained or improved  Description: INTERVENTIONS:  - Identify patients at risk for skin breakdown  - Assess and monitor skin integrity  - Assess and monitor nutrition and hydration status  - Monitor labs   - Assess for incontinence   - Turn and reposition patient  - Assist with  mobility/ambulation  - Relieve pressure over bony prominences  - Avoid friction and shearing  - Provide appropriate hygiene as needed including keeping skin clean and dry  - Evaluate need for skin moisturizer/barrier cream  - Collaborate with interdisciplinary team   - Patient/family teaching  - Consider wound care consult   Outcome: Progressing     Problem: GASTROINTESTINAL - ADULT  Goal: Maintains or returns to baseline bowel function  Description: INTERVENTIONS:  - Assess bowel function  - Encourage oral fluids to ensure adequate hydration  - Administer IV fluids if ordered to ensure adequate hydration  - Administer ordered medications as needed  - Encourage mobilization and activity  - Consider nutritional services referral to assist patient with adequate nutrition and appropriate food choices  Outcome: Progressing     Problem: GENITOURINARY - ADULT  Goal: Urinary catheter remains patent  Description: INTERVENTIONS:  - Assess patency of urinary catheter  - If patient has a chronic hanks, consider changing catheter if non-functioning  - Follow guidelines for intermittent irrigation of non-functioning urinary catheter  Outcome: Progressing     Problem: METABOLIC, FLUID AND ELECTROLYTES - ADULT  Goal: Electrolytes maintained within normal limits  Description: INTERVENTIONS:  - Monitor labs and assess patient for signs and symptoms of electrolyte imbalances  - Administer electrolyte replacement as ordered  - Monitor response to electrolyte replacements, including repeat lab results as appropriate  - Instruct patient on fluid and nutrition as appropriate  Outcome: Progressing     Problem: SKIN/TISSUE INTEGRITY - ADULT  Goal: Skin Integrity remains intact(Skin Breakdown Prevention)  Description: Assess:  -Perform Goldy assessment every shift  -Clean and moisturize skin every shift  -Inspect skin when repositioning, toileting, and assisting with ADLS  -Assess under medical devices such as Masimo every shift  -Assess  extremities for adequate circulation and sensation     Bed Management:  -Have minimal linens on bed & keep smooth, unwrinkled  -Change linens as needed when moist or perspiring  -Avoid sitting or lying in one position for more than 1 hours while in bed  -Keep HOB at 30 degrees     Toileting:  -Offer bedside commode  -Assess for incontinence every shift  -Use incontinent care products after each incontinent episode such as foam cleanser     Activity:  -Mobilize patient 4 times a day  -Encourage activity and walks on unit  -Encourage or provide ROM exercises   -Turn and reposition patient every 2 Hours  -Use appropriate equipment to lift or move patient in bed  -Instruct/ Assist with weight shifting every 1 hour when out of bed in chair  -Consider limitation of chair time 1 hour intervals    Skin Care:  -Avoid use of baby powder, tape, friction and shearing, hot water or constrictive clothing  -Relieve pressure over bony prominences using wedges  -Do not massage red bony areas    Next Steps:  -Teach patient strategies to minimize risks such as skin breakdown   -Consider consults to  interdisciplinary teams such as wound care nurse   Outcome: Progressing  Goal: Incision(s), wounds(s) or drain site(s) healing without S/S of infection  Description: INTERVENTIONS  - Assess and document dressing, incision, wound bed, drain sites and surrounding tissue  - Provide patient and family education  - Perform skin care/dressing changes every shift   Outcome: Progressing  Goal: Pressure injury heals and does not worsen  Description: Interventions:  - Implement low air loss mattress or specialty surface (Criteria met)  - Apply silicone foam dressing  - Instruct/assist with weight shifting every 30 minutes when in chair   - Limit chair time to 1 hour intervals  - Use special pressure reducing interventions such as cushion when in chair   - Apply fecal or urinary incontinence containment device   - Perform passive or active ROM every  shift  - Turn and reposition patient & offload bony prominences every 1 hours   - Utilize friction reducing device or surface for transfers   - Consider consults to  interdisciplinary teams such as wound care nurse   - Use incontinent care products after each incontinent episode such as foam cleanser   - Consider nutrition services referral as needed  Outcome: Progressing     Problem: HEMATOLOGIC - ADULT  Goal: Maintains hematologic stability  Description: INTERVENTIONS  - Assess for signs and symptoms of bleeding or hemorrhage  - Monitor labs  - Administer supportive blood products/factors as ordered and appropriate  Outcome: Progressing     Problem: Nutrition/Hydration-ADULT  Goal: Nutrient/Hydration intake appropriate for improving, restoring or maintaining nutritional needs  Description: Monitor and assess patient's nutrition/hydration status for malnutrition. Collaborate with interdisciplinary team and initiate plan and interventions as ordered.  Monitor patient's weight and dietary intake as ordered or per policy. Utilize nutrition screening tool and intervene as necessary. Determine patient's food preferences and provide high-protein, high-caloric foods as appropriate.     INTERVENTIONS:  - Monitor oral intake, urinary output, labs, and treatment plans  - Assess nutrition and hydration status and recommend course of action  - Evaluate amount of meals eaten  - Assist patient with eating if necessary   - Allow adequate time for meals  - Recommend/ encourage appropriate diets, oral nutritional supplements, and vitamin/mineral supplements  - Order, calculate, and assess calorie counts as needed  - Recommend, monitor, and adjust tube feedings and TPN/PPN based on assessed needs  - Assess need for intravenous fluids  - Provide specific nutrition/hydration education as appropriate  - Include patient/family/caregiver in decisions related to nutrition  Outcome: Progressing

## 2024-10-02 NOTE — CASE MANAGEMENT
Case Management Discharge Planning Note    Patient name Drew Santos  Location [facility] M* MRN [id number]  : 1950 Date 10/2/2024       Current Admission Date: 2024  Current Admission Diagnosis:Sepsis   Patient Active Problem List    Diagnosis Date Noted Date Diagnosed    Advanced care planning/counseling discussion 2024     Severe protein-calorie malnutrition (HCC) 2024     Polymicrobial bacteremia 2024     Acute metabolic encephalopathy 2024     History of CVA (cerebrovascular accident) 2024     Paroxysmal atrial fibrillation (HCC) 2024     Anemia 2024     Type 2 diabetes mellitus without complication, without long-term current use of insulin (HCC) 2024     COVID-19 2024     Proctitis 2024     Dysgeusia 2024     Gross hematuria 2024     Depression 2024     Sacral wound 2024     Primary hypertension 2024     Mixed hyperlipidemia 2024     Urinary retention 2024     Syncope 2024     Elevated lactic acid level 2024     Sepsis 2024     Abnormal CPK 2024       LOS (days): 15  Geometric Mean LOS (GMLOS) (days): 4.9  Days to GMLOS:-9.9     OBJECTIVE:  Risk of Unplanned Readmission Score: 25.13         Current admission status: Inpatient   Preferred Pharmacy:   [id number] [place], [place] - [address] [facility] [place] [address] [address] [place] [id number]  Phone: [phone] Fax: [phone]    Primary Care Provider: Joe Lyon MD    Primary Insurance: [facility]  Secondary Insurance:     DISCHARGE DETAILS:    Discharge planning discussed with:: Patient spouse, Kristi Santos        CM contacted family/caregiver?: Yes (Patient's spouse, Kristi Santos)  Were Treatment Team discharge recommendations reviewed with patient/caregiver?: Yes  Did patient/caregiver verbalize understanding of patient care needs?: N/A- going to facility       Contacts  Patient Contacts:  Kristi Santos (Spouse)  Relationship to Patient:: Family  Contact Method: Phone  Phone Number: [phone] (Mobile)  Reason/Outcome: Discharge Planning, Emergency Contact, Continuity of Care    Requested Home Health Care         Is the patient interested in HHC at discharge?: No    DME Referral Provided  Referral made for DME?: No    Other Referral/Resources/Interventions Provided:  Interventions: Short Term Rehab  Referral Comments: [facility], return [facility]    Treatment Team Recommendation: Short Term Rehab (STR- [facility])  Discharge Destination Plan:: Short Term Rehab  [facility]     Number/Name of Dispatcher: Roundtrip [phone]    Additional Comments: CM spoke with Traci at [facility] transportation ([phone]) confirming patient hospitalization not with [facility]; CM requested transportation with [facility] ([facility] with patient's Medicare insurance. CM to follow for further discharge planning.

## 2024-10-02 NOTE — ASSESSMENT & PLAN NOTE
Presented with tachycardia and leukocytosis and fever - tested positive for COVID 9/17/24  Now with polymicrobial bacteremia- enterbacter, enterococcus and proetus   CT abdomen and pelvis: mid/distal sigmoid colonic and rectal inflammatory changes in keeping with a segmental colitis/proctitis. Right ischioanal fossa subcutaneous emphysema extending through the right paramidline gluteal subcutaneous tissues to the skin surface suspicious for a perirectal or perianal fistula   Has large sacral decubitus ulcer- status post bedside debridement of sacral ulcer 9/18/24 by general surgery  Continue with dressing changes and wound care management by general surgery  Repeat CT abdomen and pelvis 9/24 to evaluate for any fistula connection versus abscess due to ongoing leukocytosis  CT reveals no ongoing abscess however does show developing osteomyelitis of coccyx bone.  Seen by GI imaging likely due to ischemic colitis but likely not primary cause of his presentation - no role for colonoscopy or flex sig curently     Repeat blood cultures negative to date  ID transitioning the patient to ampicillin 2 g every 6 hours to begin 9/26 to complete a total of 2 weeks of antibiotic therapy for enterococcal bacteremia through 10/3/24  Coordinating discharge plan with case management- Has been residing at [facility] for the past 3 weeks  Patient remain in the hospital through Thursday to complete full course of IV antibiotics  Case management working on placement- will [place] Friday afternoon

## 2024-10-02 NOTE — PROGRESS NOTES
Progress Note - Hospitalist   Name: Drew Santos 73 y.o. male I MRN: [id number]  Unit/Bed#: [place] [address] -01 I Date of Admission: 9/17/2024   Date of Service: 10/2/2024 I Hospital Day: 15    Assessment & Plan  Sepsis  Presented with tachycardia and leukocytosis and fever - tested positive for COVID 9/17/24  Now with polymicrobial bacteremia- enterbacter, enterococcus and proetus   CT abdomen and pelvis: mid/distal sigmoid colonic and rectal inflammatory changes in keeping with a segmental colitis/proctitis. Right ischioanal fossa subcutaneous emphysema extending through the right paramidline gluteal subcutaneous tissues to the skin surface suspicious for a perirectal or perianal fistula   Has large sacral decubitus ulcer- status post bedside debridement of sacral ulcer 9/18/24 by general surgery  Continue with dressing changes and wound care management by general surgery  Repeat CT abdomen and pelvis 9/24 to evaluate for any fistula connection versus abscess due to ongoing leukocytosis  CT reveals no ongoing abscess however does show developing osteomyelitis of coccyx bone.  Seen by GI imaging likely due to ischemic colitis but likely not primary cause of his presentation - no role for colonoscopy or flex sig curently     Repeat blood cultures negative to date  ID transitioning the patient to ampicillin 2 g every 6 hours to begin 9/26 to complete a total of 2 weeks of antibiotic therapy for enterococcal bacteremia through 10/3/24  Coordinating discharge plan with case management- Has been residing at [place] complete care for the past 3 weeks  Patient remain in the hospital through Thursday to complete full course of IV antibiotics  Case management working on placement- will SD Friday afternoon  Polymicrobial bacteremia  Polymicrobial bacteremia likely secondary sacral wound with possible fistulous connection  Infectious disease input noted and appreciated.  Continue IV cefepime, flagyl and vancomycin   ID  transitioning the patient to ampicillin 2 g every 6 hours to begin 9/26 to complete a total of 2 weeks of antibiotic therapy for enterococcal bacteremia through 10/3/24  Repeat blood cultures negative to date   Underwent transesophageal echocardiogram 9/25/2024-no evidence of endocarditis  Sacral wound  Chronic sacral decubitus ulcer; present on admission  Status post debridement at bedside per surgical team.   Continue with wound care as tolerated.     Continue dressing changes and serial exams  Wound care consultation  Offload pressure   Concern for emphysematous changes tracking to sacral wound/developing fistula from site on repeat abdominal imaging   Discussion with palliative care has yielded in continuing with full medical care at this time  Advanced care planning/counseling discussion  Ongoing discussions in regards to poor prognosis with large sacral wound and developing osteomyelitis of coccyx bone in the setting of polymicrobial bacteremia  Palliative care discussed with patient's wife who is opting to proceed with full medical care at this time  Type 2 diabetes mellitus without complication, without long-term current use of insulin ([facility])  Lab Results   Component Value Date    HGBA1C 6.2 (H) 09/17/2024     Recent Labs     10/01/24  1123 10/01/24  1610 10/01/24  2120 10/02/24  0739   POCGLU 181* 191* 130 112   Controlled with last hemoglobin A1c of 6.2%  Holding home oral regimen  Monitor on sliding scale    COVID-19  Tested positive for COVID on 9/17/2024  Patient currently on room air though high risk given his age and comorbidities  Completed three days of IV remdesivir   Supportive care otherwise  Contact and airborne precautions  Currently on mild COVID pathway.    Not requiring supplemental oxygen- remains on room air  To complete 10 days of isolation through 9/27   Isolation removed  Proctitis  Noted on CT scan.  No diarrhea or clinical evidence of colitis.   Stool studies negative  Likely ischemic  colitis given area affected, continue supportive measures   No plan for any inpatient scope   Repeat CAT scan without signs of ongoing abscess.  Acute metabolic encephalopathy  Presumably secondary to sepsis, bacteremia, COVID infection  CT head with no acute intracranial abnormality  Continue with supportive care  Reorientation, delirium precautions     History of CVA (cerebrovascular accident)  History of CVA   Chronically bedbound and with chronic left-sided deficits   Continue home aspirin, Eliquis, statin  Dysphagia 2 mechanical soft, thin liquids -upgraded to level 3 by speech therapy  Residing at [place] complete care for the past 3 weeks  Paroxysmal atrial fibrillation (HCC)  Continue Toprol XL 25 mg daily  Continue home Eliquis for stroke prevention  Anemia  Low blood counts noted 9/19/24.   Has chronic anemia likely secondary to poor nutritional status underlying chronic illness.    Hemoglobin declined to 7.1 given -1 unit PRBCs given 9/19/2024  Encourage oral intake, cannot utilize IV iron due to bacteremia   Lab Results   Component Value Date    HGB 8.8 (L) 10/01/2024    HGB 8.6 (L) 09/26/2024    HGB 8.3 (L) 09/25/2024    HGB 7.6 (L) 09/24/2024    HGB 7.6 (L) 09/23/2024   Ongoing monitoring of hemoglobin and continue to transfuse if less than 7  Severe protein-calorie malnutrition (HCC)  Malnutrition Findings:   Adult Malnutrition type: Chronic illness  Adult Degree of Malnutrition: Other severe protein calorie malnutrition  Malnutrition Characteristics: Inadequate energy, Weight loss  360 Statement: Severe protein calorie malnutrition in context of chronic illness r/t poor appetite, inadequate PO intake as evidance by energy intake less than 75% compared to estimated needs>1 month, 7.5% wt loss x 1 month ( 122kg 8/23/24-> 113kg 9/17) treated with PO diet and oral supplements  BMI Findings:  Body mass index is 29.53 kg/m².     VTE Pharmacologic Prophylaxis:   High Risk (Score >/= 5) - Pharmacological  DVT Prophylaxis Ordered: apixaban (Eliquis). Sequential Compression Devices Ordered.    Mobility:   Basic Mobility Inpatient Raw Score: 8  JH-HLM Goal: 3: Sit at edge of bed  JH-HLM Achieved: 1: Laying in bed  JH-HLM Goal NOT achieved. Continue with multidisciplinary rounding and encourage appropriate mobility to improve upon JH-HLM goals.    Patient Centered Rounds: I performed bedside rounds with nursing staff today.   Discussions with Specialists or Other Care Team Provider: Case management    Education and Discussions with Family / Patient:  Discussed with wife at bedside yesterday, plan of care has not changed.     Current Length of Stay: 15 day(s)  Current Patient Status: Inpatient   Certification Statement: The patient will continue to require additional inpatient hospital stay due to IV antibiotics for bacteremia  Discharge Plan: Anticipate discharge in 48 hrs to rehab facility.    Code Status: Level 1 - Full Code    Subjective   Patient seen and examined at bedside.  Notes he is doing okay today.  Finished his ice cream for breakfast.    Objective :  Temp:  [98.5 °F (36.9 °C)-99.1 °F (37.3 °C)] 98.5 °F (36.9 °C)  HR:  [61-96] 61  BP: (124-139)/(77-79) 124/79  Resp:  [16-18] 16  SpO2:  [95 %-96 %] 95 %  O2 Device: None (Room air)    Body mass index is 29.53 kg/m².     Input and Output Summary (last 24 hours):     Intake/Output Summary (Last 24 hours) at 10/2/2024 1004  Last data filed at 10/2/2024 0745  Gross per 24 hour   Intake 480 ml   Output 3300 ml   Net -2820 ml       Physical Exam  Vitals reviewed.   Constitutional:       General: He is not in acute distress.     Comments: Sitting up in bed, feeding himself, answering simple questions appropriately   HENT:      Head: Normocephalic and atraumatic.   Eyes:      General: No scleral icterus.     Conjunctiva/sclera: Conjunctivae normal.   Cardiovascular:      Rate and Rhythm: Normal rate and regular rhythm.      Heart sounds: No murmur heard.  Pulmonary:       Effort: Pulmonary effort is normal. No respiratory distress.      Breath sounds: Normal breath sounds.   Abdominal:      General: Bowel sounds are normal. There is no distension.      Palpations: Abdomen is soft.      Tenderness: There is no abdominal tenderness.   Musculoskeletal:      Cervical back: Neck supple.      Right lower leg: No edema.      Left lower leg: No edema.   Skin:     General: Skin is warm and dry.   Neurological:      Mental Status: He is alert. Mental status is at baseline.      Motor: Weakness (Chronic left-sided) present.   Psychiatric:         Mood and Affect: Mood normal.         Behavior: Behavior normal.           Lines/Drains:  Lines/Drains/Airways       Active Status       Name Placement date Placement time Site Days    Urethral Catheter Three way 22 Fr. 08/31/24 1925  Three way  31                  Urinary Catheter:  Goal for removal: N/A - Chronic Parra                 Lab Results: I have reviewed the following results:   Results from last 7 days   Lab Units 10/01/24  0445 09/26/24  0439   WBC Thousand/uL 10.14 12.15*   HEMOGLOBIN g/dL 8.8* 8.6*   HEMATOCRIT % 27.8* 27.7*   PLATELETS Thousands/uL 464* 531*   SEGS PCT %  --  65   LYMPHO PCT %  --  22   MONO PCT %  --  8   EOS PCT %  --  2     Results from last 7 days   Lab Units 10/01/24  0445 09/26/24  0439   SODIUM mmol/L 137 138   POTASSIUM mmol/L 4.0 3.8   CHLORIDE mmol/L 105 105   CO2 mmol/L 29 30   BUN mg/dL 5 6   CREATININE mg/dL 0.50* 0.57*   ANION GAP mmol/L 3* 3*   CALCIUM mg/dL 8.3* 8.2*   ALBUMIN g/dL  --  2.1*   TOTAL BILIRUBIN mg/dL  --  0.50   ALK PHOS U/L  --  52   ALT U/L  --  17   AST U/L  --  25   GLUCOSE RANDOM mg/dL 97 128         Results from last 7 days   Lab Units 10/02/24  0739 10/01/24  2120 10/01/24  1610 10/01/24  1123 10/01/24  0717 09/30/24  2110 09/30/24  1603 09/30/24  1100 09/30/24  0714 09/29/24  2112 09/29/24  1626 09/29/24  1113   POC GLUCOSE mg/dl 112 130 191* 181* 95 178* 101 166* 114 140 106  100               Recent Cultures (last 7 days):               Last 24 Hours Medication List:     Current Facility-Administered Medications:     acetaminophen (Ofirmev) injection 1,000 mg, Q6H PRN    acetaminophen (TYLENOL) tablet 650 mg, Q6H PRN    ampicillin (OMNIPEN) 2,000 mg in sodium chloride 0.9 % 100 mL IVPB, Q6H, Last Rate: 2,000 mg (10/02/24 0524)    apixaban (ELIQUIS) tablet 5 mg, BID    aspirin chewable tablet 81 mg, Daily    atorvastatin (LIPITOR) tablet 80 mg, QPM    bacitracin topical ointment 1 small application, BID    calcium carbonate-vitamin D 500 mg-5 mcg tablet 1 tablet, BID With Meals    collagenase (SANTYL) ointment, Daily    escitalopram (LEXAPRO) tablet 10 mg, Daily    ezetimibe (ZETIA) tablet 10 mg, Daily    finasteride (PROSCAR) tablet 5 mg, Daily    insulin lispro (HumALOG/ADMELOG) 100 units/mL subcutaneous injection 1-6 Units, TID AC **AND** Fingerstick Glucose (POCT), TID AC    insulin lispro (HumALOG/ADMELOG) 100 units/mL subcutaneous injection 1-6 Units, HS    lisinopril (ZESTRIL) tablet 2.5 mg, Daily    metoprolol tartrate (LOPRESSOR) partial tablet 12.5 mg, Q12H ELISEO    ondansetron (ZOFRAN) injection 4 mg, Q4H PRN    potassium chloride oral solution 20 mEq, BID    senna-docusate sodium (SENOKOT S) 8.6-50 mg per tablet 1 tablet, HS    Administrative Statements   Today, Patient Was Seen By: Esperanza Arizmendi PA-C      **Please Note: This note may have been constructed using a voice recognition system.**

## 2024-10-03 LAB
GLUCOSE SERPL-MCNC: 100 MG/DL (ref 65–140)
GLUCOSE SERPL-MCNC: 117 MG/DL (ref 65–140)
GLUCOSE SERPL-MCNC: 146 MG/DL (ref 65–140)
GLUCOSE SERPL-MCNC: 182 MG/DL (ref 65–140)

## 2024-10-03 PROCEDURE — 99233 SBSQ HOSP IP/OBS HIGH 50: CPT | Performed by: STUDENT IN AN ORGANIZED HEALTH CARE EDUCATION/TRAINING PROGRAM

## 2024-10-03 PROCEDURE — 97530 THERAPEUTIC ACTIVITIES: CPT

## 2024-10-03 PROCEDURE — 99232 SBSQ HOSP IP/OBS MODERATE 35: CPT | Performed by: HOSPITALIST

## 2024-10-03 PROCEDURE — 97535 SELF CARE MNGMENT TRAINING: CPT

## 2024-10-03 PROCEDURE — 97112 NEUROMUSCULAR REEDUCATION: CPT

## 2024-10-03 PROCEDURE — 82948 REAGENT STRIP/BLOOD GLUCOSE: CPT

## 2024-10-03 RX ADMIN — AMPICILLIN SODIUM 2000 MG: 2 INJECTION, POWDER, FOR SOLUTION INTRAMUSCULAR; INTRAVENOUS at 12:09

## 2024-10-03 RX ADMIN — Medication 1 TABLET: at 17:26

## 2024-10-03 RX ADMIN — EZETIMIBE 10 MG: 10 TABLET ORAL at 08:40

## 2024-10-03 RX ADMIN — AMPICILLIN SODIUM 2000 MG: 2 INJECTION, POWDER, FOR SOLUTION INTRAMUSCULAR; INTRAVENOUS at 17:36

## 2024-10-03 RX ADMIN — COLLAGENASE SANTYL: 250 OINTMENT TOPICAL at 08:48

## 2024-10-03 RX ADMIN — Medication 12.5 MG: at 08:39

## 2024-10-03 RX ADMIN — LISINOPRIL 2.5 MG: 2.5 TABLET ORAL at 08:40

## 2024-10-03 RX ADMIN — POTASSIUM CHLORIDE 20 MEQ: 1.5 SOLUTION ORAL at 17:26

## 2024-10-03 RX ADMIN — POTASSIUM CHLORIDE 20 MEQ: 1.5 SOLUTION ORAL at 08:40

## 2024-10-03 RX ADMIN — ASPIRIN 81 MG CHEWABLE TABLET 81 MG: 81 TABLET CHEWABLE at 08:40

## 2024-10-03 RX ADMIN — FINASTERIDE 5 MG: 5 TABLET, FILM COATED ORAL at 08:40

## 2024-10-03 RX ADMIN — ATORVASTATIN CALCIUM 80 MG: 80 TABLET, FILM COATED ORAL at 17:26

## 2024-10-03 RX ADMIN — BACITRACIN ZINC 1 SMALL APPLICATION: 500 OINTMENT TOPICAL at 08:46

## 2024-10-03 RX ADMIN — Medication 1 TABLET: at 08:39

## 2024-10-03 RX ADMIN — INSULIN LISPRO 1 UNITS: 100 INJECTION, SOLUTION INTRAVENOUS; SUBCUTANEOUS at 17:26

## 2024-10-03 RX ADMIN — SENNOSIDES AND DOCUSATE SODIUM 1 TABLET: 8.6; 5 TABLET ORAL at 21:40

## 2024-10-03 RX ADMIN — APIXABAN 5 MG: 5 TABLET, FILM COATED ORAL at 17:26

## 2024-10-03 RX ADMIN — BACITRACIN ZINC 1 SMALL APPLICATION: 500 OINTMENT TOPICAL at 17:26

## 2024-10-03 RX ADMIN — APIXABAN 5 MG: 5 TABLET, FILM COATED ORAL at 08:39

## 2024-10-03 RX ADMIN — ESCITALOPRAM OXALATE 10 MG: 10 TABLET ORAL at 08:40

## 2024-10-03 RX ADMIN — AMPICILLIN SODIUM 2000 MG: 2 INJECTION, POWDER, FOR SOLUTION INTRAMUSCULAR; INTRAVENOUS at 05:39

## 2024-10-03 NOTE — PROGRESS NOTES
Progress Note - Hospitalist   Name: Drew Santos 73 y.o. male I MRN: [id number]  Unit/Bed#: [place] [address] -01 I Date of Admission: 9/17/2024   Date of Service: 10/3/2024 I Hospital Day: 16    Assessment & Plan  Sepsis  Presented with tachycardia and leukocytosis and fever - tested positive for COVID 9/17/24  Now with polymicrobial bacteremia felt secondary to colitis/proctitis with possible fistulous connection to sacral ulcer- enterbacter, enterococcus and proetus   CT abdomen and pelvis: mid/distal sigmoid colonic and rectal inflammatory changes in keeping with a segmental colitis/proctitis. Right ischioanal fossa subcutaneous emphysema extending through the right paramidline gluteal subcutaneous tissues to the skin surface suspicious for a perirectal or perianal fistula   Has large sacral decubitus ulcer- status post bedside debridement of sacral ulcer 9/18/24 by general surgery  Continue with dressing changes and wound care management by general surgery  Repeat CT abdomen and pelvis 9/24 to evaluate for any fistula connection versus abscess due to ongoing leukocytosis  CT reveals no ongoing abscess however does show developing osteomyelitis of coccyx bone.  Seen by GI imaging likely due to ischemic colitis but likely not primary cause of his presentation - no role for colonoscopy or flex sig curently     Repeat blood cultures negative to date  ID transitioning the patient to ampicillin 2 g every 6 hours to begin 9/26 to complete a total of 2 weeks of antibiotic therapy for enterococcal bacteremia through 10/3/24  Coordinating discharge plan with case management- Has been residing at [facility] care for the past 3 weeks  Patient remain in the hospital today to complete full course of IV antibiotics  Case management working on placement- plan for discharge tomorrow  Polymicrobial bacteremia  Polymicrobial bacteremia likely secondary sacral wound with possible fistulous connection  Infectious disease input  noted and appreciated.  Completed 7 day therapy with IV cefepime, flagyl and vancomycin   ID transitioned the patient to ampicillin 2 g every 6 hours to begin 9/26 to complete a total of 2 weeks of antibiotic therapy for enterococcal bacteremia through 10/3/24  Repeat blood cultures negative to date   Underwent transesophageal echocardiogram 9/25/2024-no evidence of endocarditis  Sacral wound  Chronic sacral decubitus ulcer; present on admission  Status post debridement at bedside per surgical team.   Continue with wound care as tolerated.     Continue dressing changes and serial exams  Wound care consultation  Offload pressure   Concern for emphysematous changes tracking to sacral wound/developing fistula from site on repeat abdominal imaging   Discussion with palliative care has yielded in continuing with full medical care at this time  Will need weekly follow up with wound healing center  Advanced care planning/counseling discussion  Ongoing discussions in regards to poor prognosis with large sacral wound and developing osteomyelitis of coccyx bone in the setting of polymicrobial bacteremia  Palliative care discussed with patient's wife 9/26 who is opting to proceed with full medical care at this time  Type 2 diabetes mellitus without complication, without long-term current use of insulin ([facility])  Lab Results   Component Value Date    HGBA1C 6.2 (H) 09/17/2024     Recent Labs     10/02/24  1125 10/02/24  1617 10/02/24  2121 10/03/24  0802   POCGLU 177* 135 245* 100   Controlled with last hemoglobin A1c of 6.2%  Holding home meds  Continue sign scale insulin  COVID-19  Tested positive for COVID on 9/17/2024  Patient currently on room air though high risk given his age and comorbidities  Completed three days of IV remdesivir   Completed 10 days of isolation through 9/27   Remains on room air  Proctitis  Noted on CT scan.  No diarrhea or clinical evidence of colitis.   Stool studies negative  Likely ischemic colitis  given area affected, continue supportive measures   No plan for any inpatient scope   Repeat CAT scan without signs of ongoing abscess.  Acute metabolic encephalopathy  Presumably secondary to sepsis, bacteremia, COVID infection  CT head with no acute intracranial abnormality  Mentation appears baseline per previous provider notes  Continue with supportive care  Reorientation, delirium precautions   History of CVA (cerebrovascular accident)  History of CVA   Chronically bedbound and with chronic left-sided deficits   Continue home aspirin, Eliquis, statin  Dysphagia 2 mechanical soft, thin liquids -upgraded to level 3 by speech therapy  Residing at [place] complete care for the past 3 weeks  Paroxysmal atrial fibrillation (HCC)  Continue Toprol XL 25 mg daily  Continue home Eliquis for stroke prevention  Anemia  Low blood counts noted 9/19/24.   Has chronic anemia likely secondary to poor nutritional status underlying chronic illness.    Hemoglobin declined to 7.1 given -1 unit PRBCs given 9/19/2024  Encourage oral intake, cannot utilize IV iron due to bacteremia   Lab Results   Component Value Date    HGB 8.8 (L) 10/01/2024    HGB 8.6 (L) 09/26/2024    HGB 8.3 (L) 09/25/2024    HGB 7.6 (L) 09/24/2024    HGB 7.6 (L) 09/23/2024   Ongoing monitoring of hemoglobin and continue to transfuse if less than 7  Severe protein-calorie malnutrition (HCC)  Malnutrition Findings:   Adult Malnutrition type: Chronic illness  Adult Degree of Malnutrition: Other severe protein calorie malnutrition  Malnutrition Characteristics: Inadequate energy, Weight loss  360 Statement: Severe protein calorie malnutrition in context of chronic illness r/t poor appetite, inadequate PO intake as evidance by energy intake less than 75% compared to estimated needs>1 month, 7.5% wt loss x 1 month ( 122kg 8/23/24-> 113kg 9/17) treated with PO diet and oral supplements  BMI Findings:  Body mass index is 29.53 kg/m².     VTE Pharmacologic Prophylaxis:    Moderate Risk (Score 3-4) - Pharmacological DVT Prophylaxis Ordered: apixaban (Eliquis).    Mobility:   Basic Mobility Inpatient Raw Score: 8  JH-HLM Goal: 3: Sit at edge of bed  JH-HLM Achieved: 3: Sit at edge of bed  JH-HLM Goal NOT achieved. Continue with multidisciplinary rounding and encourage appropriate mobility to improve upon JH-HLM goals.    Patient Centered Rounds: I performed bedside rounds with nursing staff today.   Discussions with Specialists or Other Care Team Provider: AGUILA    Education and Discussions with Family / Patient: Attempted to update  (wife) via phone. Left voicemail.     Current Length of Stay: 16 day(s)  Current Patient Status: Inpatient   Certification Statement: The patient will continue to require additional inpatient hospital stay due to antibiotics  Discharge Plan: tomorrow to complete care    Code Status: Level 1 - Full Code    Subjective   Patient seen and examined.  He is alert to himself.  He ate a little breakfast.  Nursing is changing him.  He denies any pain or shortness of breath.    Objective :  Temp:  [97.9 °F (36.6 °C)-98.5 °F (36.9 °C)] 98.5 °F (36.9 °C)  HR:  [] 113  BP: (112-146)/() 122/86  Resp:  [16] 16  SpO2:  [92 %-96 %] 96 %  O2 Device: None (Room air)    Body mass index is 29.53 kg/m².     Input and Output Summary (last 24 hours):     Intake/Output Summary (Last 24 hours) at 10/3/2024 1144  Last data filed at 10/3/2024 0540  Gross per 24 hour   Intake 480 ml   Output 1225 ml   Net -745 ml       Physical Exam  Constitutional:       Appearance: He is obese. He is ill-appearing.   Cardiovascular:      Rate and Rhythm: Normal rate and regular rhythm.   Pulmonary:      Effort: Pulmonary effort is normal. No respiratory distress.      Breath sounds: Normal breath sounds.   Abdominal:      General: Bowel sounds are normal.      Palpations: Abdomen is soft.      Tenderness: There is no abdominal tenderness.   Genitourinary:     Comments: iFdel  catheter in place draining yellow urine  Musculoskeletal:      Right lower leg: No edema.      Left lower leg: No edema.   Skin:     General: Skin is warm and dry.   Neurological:      Mental Status: Mental status is at baseline.      Motor: Weakness present.      Comments: Chronic left-sided weakness alert to person only.  Answers questions, follow commands       Lines/Drains:  Lines/Drains/Airways       Active Status       Name Placement date Placement time Site Days    Urethral Catheter Three way 22 Fr. 08/31/24 1925  Three way  32                  Urinary Catheter:  Goal for removal: chronic hanks      Lab Results: I have reviewed the following results:   Results from last 7 days   Lab Units 10/01/24  0445   WBC Thousand/uL 10.14   HEMOGLOBIN g/dL 8.8*   HEMATOCRIT % 27.8*   PLATELETS Thousands/uL 464*     Results from last 7 days   Lab Units 10/01/24  0445   SODIUM mmol/L 137   POTASSIUM mmol/L 4.0   CHLORIDE mmol/L 105   CO2 mmol/L 29   BUN mg/dL 5   CREATININE mg/dL 0.50*   ANION GAP mmol/L 3*   CALCIUM mg/dL 8.3*   GLUCOSE RANDOM mg/dL 97     Results from last 7 days   Lab Units 10/03/24  0802 10/02/24  2121 10/02/24  1617 10/02/24  1125 10/02/24  0739 10/01/24  2120 10/01/24  1610 10/01/24  1123 10/01/24  0717 09/30/24  2110 09/30/24  1603 09/30/24  1100   POC GLUCOSE mg/dl 100 245* 135 177* 112 130 191* 181* 95 178* 101 166*     Last 24 Hours Medication List:     Current Facility-Administered Medications:     acetaminophen (Ofirmev) injection 1,000 mg, Q6H PRN    acetaminophen (TYLENOL) tablet 650 mg, Q6H PRN    ampicillin (OMNIPEN) 2,000 mg in sodium chloride 0.9 % 100 mL IVPB, Q6H, Last Rate: 2,000 mg (10/03/24 0539)    apixaban (ELIQUIS) tablet 5 mg, BID    aspirin chewable tablet 81 mg, Daily    atorvastatin (LIPITOR) tablet 80 mg, QPM    bacitracin topical ointment 1 small application, BID    calcium carbonate-vitamin D 500 mg-5 mcg tablet 1 tablet, BID With Meals    collagenase (SANTYL) ointment,  Daily    escitalopram (LEXAPRO) tablet 10 mg, Daily    ezetimibe (ZETIA) tablet 10 mg, Daily    finasteride (PROSCAR) tablet 5 mg, Daily    insulin lispro (HumALOG/ADMELOG) 100 units/mL subcutaneous injection 1-6 Units, TID AC **AND** Fingerstick Glucose (POCT), TID AC    insulin lispro (HumALOG/ADMELOG) 100 units/mL subcutaneous injection 1-6 Units, HS    lisinopril (ZESTRIL) tablet 2.5 mg, Daily    metoprolol tartrate (LOPRESSOR) partial tablet 12.5 mg, Q12H ELISEO    ondansetron (ZOFRAN) injection 4 mg, Q4H PRN    potassium chloride oral solution 20 mEq, BID    senna-docusate sodium (SENOKOT S) 8.6-50 mg per tablet 1 tablet, HS    Administrative Statements   Today, Patient Was Seen By: Yesi Crawford PA-C    **Please Note: This note may have been constructed using a voice recognition system.**

## 2024-10-03 NOTE — ASSESSMENT & PLAN NOTE
Presumably secondary to sepsis, bacteremia, COVID infection  CT head with no acute intracranial abnormality  Mentation appears baseline per previous provider notes  Continue with supportive care  Reorientation, delirium precautions

## 2024-10-03 NOTE — ASSESSMENT & PLAN NOTE
Presented with tachycardia and leukocytosis and fever - tested positive for COVID 9/17/24  Now with polymicrobial bacteremia felt secondary to colitis/proctitis with possible fistulous connection to sacral ulcer- enterbacter, enterococcus and proetus   CT abdomen and pelvis: mid/distal sigmoid colonic and rectal inflammatory changes in keeping with a segmental colitis/proctitis. Right ischioanal fossa subcutaneous emphysema extending through the right paramidline gluteal subcutaneous tissues to the skin surface suspicious for a perirectal or perianal fistula   Has large sacral decubitus ulcer- status post bedside debridement of sacral ulcer 9/18/24 by general surgery  Continue with dressing changes and wound care management by general surgery  Repeat CT abdomen and pelvis 9/24 to evaluate for any fistula connection versus abscess due to ongoing leukocytosis  CT reveals no ongoing abscess however does show developing osteomyelitis of coccyx bone.  Seen by GI imaging likely due to ischemic colitis but likely not primary cause of his presentation - no role for colonoscopy or flex sig curently     Repeat blood cultures negative to date  ID transitioning the patient to ampicillin 2 g every 6 hours to begin 9/26 to complete a total of 2 weeks of antibiotic therapy for enterococcal bacteremia through 10/3/24  Coordinating discharge plan with case management- Has been residing at [place] complete care for the past 3 weeks  Patient remain in the hospital today to complete full course of IV antibiotics  Case management working on placement- plan for discharge tomorrow

## 2024-10-03 NOTE — ASSESSMENT & PLAN NOTE
History of CVA   Chronically bedbound and with chronic left-sided deficits   Continue home aspirin, Eliquis, statin  Dysphagia 2 mechanical soft, thin liquids -upgraded to level 3 by speech therapy  Residing at [facility] for the past 3 weeks

## 2024-10-03 NOTE — ASSESSMENT & PLAN NOTE
Polymicrobial bacteremia likely secondary sacral wound with possible fistulous connection  Infectious disease input noted and appreciated.  Completed 7 day therapy with IV cefepime, flagyl and vancomycin   ID transitioned the patient to ampicillin 2 g every 6 hours to begin 9/26 to complete a total of 2 weeks of antibiotic therapy for enterococcal bacteremia through 10/3/24  Repeat blood cultures negative to date   Underwent transesophageal echocardiogram 9/25/2024-no evidence of endocarditis

## 2024-10-03 NOTE — ASSESSMENT & PLAN NOTE
Tested positive for COVID on 9/17/2024  Patient currently on room air though high risk given his age and comorbidities  Completed three days of IV remdesivir   Completed 10 days of isolation through 9/27   Remains on room air   N/A

## 2024-10-03 NOTE — PLAN OF CARE
Problem: Potential for Falls  Goal: Patient will remain free of falls  Description: INTERVENTIONS:  - Educate patient/family on patient safety including physical limitations  - Instruct patient to call for assistance with activity   - Consult OT/PT to assist with strengthening/mobility   - Keep Call bell within reach  - Keep bed low and locked with side rails adjusted as appropriate  - Keep care items and personal belongings within reach  - Initiate and maintain comfort rounds  - Make Fall Risk Sign visible to staff  - Offer Toileting every 2 Hours, in advance of need  - Initiate/Maintain bed alarm  - Obtain necessary fall risk management equipment: bed alarm call bell  - Apply yellow socks and bracelet for high fall risk patients  - Consider moving patient to room near nurses station  Outcome: Progressing     Problem: PAIN - ADULT  Goal: Verbalizes/displays adequate comfort level or baseline comfort level  Description: Interventions:  - Encourage patient to monitor pain and request assistance  - Assess pain using appropriate pain scale  - Administer analgesics based on type and severity of pain and evaluate response  - Implement non-pharmacological measures as appropriate and evaluate response  - Consider cultural and social influences on pain and pain management  - Notify physician/advanced practitioner if interventions unsuccessful or patient reports new pain  Outcome: Progressing     Problem: INFECTION - ADULT  Goal: Absence or prevention of progression during hospitalization  Description: INTERVENTIONS:  - Assess and monitor for signs and symptoms of infection  - Monitor lab/diagnostic results  - Monitor all insertion sites, i.e. indwelling lines, tubes, and drains  - Monitor endotracheal if appropriate and nasal secretions for changes in amount and color  - [place] appropriate cooling/warming therapies per order  - Administer medications as ordered  - Instruct and encourage patient and family to use good  hand hygiene technique  - Identify and instruct in appropriate isolation precautions for identified infection/condition  Outcome: Progressing     Problem: SAFETY ADULT  Goal: Patient will remain free of falls  Description: INTERVENTIONS:  - Educate patient/family on patient safety including physical limitations  - Instruct patient to call for assistance with activity   - Consult OT/PT to assist with strengthening/mobility   - Keep Call bell within reach  - Keep bed low and locked with side rails adjusted as appropriate  - Keep care items and personal belongings within reach  - Initiate and maintain comfort rounds  - Make Fall Risk Sign visible to staff  - Offer Toileting every 2 Hours, in advance of need  - Initiate/Maintain bed alarm  - Obtain necessary fall risk management equipment: bed alarm call bell  - Apply yellow socks and bracelet for high fall risk patients  - Consider moving patient to room near nurses station  Outcome: Progressing  Goal: Maintain or return to baseline ADL function  Description: INTERVENTIONS:  -  Assess patient's ability to carry out ADLs; assess patient's baseline for ADL function and identify physical deficits which impact ability to perform ADLs (bathing, care of mouth/teeth, toileting, grooming, dressing, etc.)  - Assess/evaluate cause of self-care deficits   - Assess range of motion  - Assess patient's mobility; develop plan if impaired  - Assess patient's need for assistive devices and provide as appropriate  - Encourage maximum independence but intervene and supervise when necessary  - Involve family in performance of ADLs  - Assess for home care needs following discharge   - Consider OT consult to assist with ADL evaluation and planning for discharge  - Provide patient education as appropriate  Outcome: Progressing  Goal: Maintains/Returns to pre admission functional level  Description: INTERVENTIONS:  - Perform AM-PAC 6 Click Basic Mobility/ Daily Activity assessment daily.  -  Set and communicate daily mobility goal to care team and patient/family/caregiver.   - Collaborate with rehabilitation services on mobility goals if consulted  - Perform Range of Motion 4 times a day.  - Reposition patient every 2 hours.  - Dangle patient 3 times a day  - Stand patient 3 times a day  - Ambulate patient 3 times a day  - Out of bed to chair 3 times a day   - Out of bed for meals 3 times a day  - Out of bed for toileting  - Record patient progress and toleration of activity level   Outcome: Progressing     Problem: DISCHARGE PLANNING  Goal: Discharge to home or other facility with appropriate resources  Description: INTERVENTIONS:  - Identify barriers to discharge w/patient and caregiver  - Arrange for needed discharge resources and transportation as appropriate  - Identify discharge learning needs (meds, wound care, etc.)  - Arrange for interpretive services to assist at discharge as needed  - Refer to Case Management Department for coordinating discharge planning if the patient needs post-hospital services based on physician/advanced practitioner order or complex needs related to functional status, cognitive ability, or social support system  Outcome: Progressing     Problem: Knowledge Deficit  Goal: Patient/family/caregiver demonstrates understanding of disease process, treatment plan, medications, and discharge instructions  Description: Complete learning assessment and assess knowledge base.  Interventions:  - Provide teaching at level of understanding  - Provide teaching via preferred learning methods  Outcome: Progressing     Problem: Prexisting or High Potential for Compromised Skin Integrity  Goal: Skin integrity is maintained or improved  Description: INTERVENTIONS:  - Identify patients at risk for skin breakdown  - Assess and monitor skin integrity  - Assess and monitor nutrition and hydration status  - Monitor labs   - Assess for incontinence   - Turn and reposition patient  - Assist with  mobility/ambulation  - Relieve pressure over bony prominences  - Avoid friction and shearing  - Provide appropriate hygiene as needed including keeping skin clean and dry  - Evaluate need for skin moisturizer/barrier cream  - Collaborate with interdisciplinary team   - Patient/family teaching  - Consider wound care consult   Outcome: Progressing     Problem: GASTROINTESTINAL - ADULT  Goal: Maintains or returns to baseline bowel function  Description: INTERVENTIONS:  - Assess bowel function  - Encourage oral fluids to ensure adequate hydration  - Administer IV fluids if ordered to ensure adequate hydration  - Administer ordered medications as needed  - Encourage mobilization and activity  - Consider nutritional services referral to assist patient with adequate nutrition and appropriate food choices  Outcome: Progressing     Problem: GENITOURINARY - ADULT  Goal: Urinary catheter remains patent  Description: INTERVENTIONS:  - Assess patency of urinary catheter  - If patient has a chronic hanks, consider changing catheter if non-functioning  - Follow guidelines for intermittent irrigation of non-functioning urinary catheter  Outcome: Progressing     Problem: METABOLIC, FLUID AND ELECTROLYTES - ADULT  Goal: Electrolytes maintained within normal limits  Description: INTERVENTIONS:  - Monitor labs and assess patient for signs and symptoms of electrolyte imbalances  - Administer electrolyte replacement as ordered  - Monitor response to electrolyte replacements, including repeat lab results as appropriate  - Instruct patient on fluid and nutrition as appropriate  Outcome: Progressing     Problem: SKIN/TISSUE INTEGRITY - ADULT  Goal: Skin Integrity remains intact(Skin Breakdown Prevention)  Description: Assess:  -Perform Goldy assessment every shift  -Clean and moisturize skin every shift  -Inspect skin when repositioning, toileting, and assisting with ADLS  -Assess under medical devices such as Masimo every shift  -Assess  extremities for adequate circulation and sensation     Bed Management:  -Have minimal linens on bed & keep smooth, unwrinkled  -Change linens as needed when moist or perspiring  -Avoid sitting or lying in one position for more than 1 hours while in bed  -Keep HOB at 30 degrees     Toileting:  -Offer bedside commode  -Assess for incontinence every shift  -Use incontinent care products after each incontinent episode such as foam cleanser     Activity:  -Mobilize patient 4 times a day  -Encourage activity and walks on unit  -Encourage or provide ROM exercises   -Turn and reposition patient every 2 Hours  -Use appropriate equipment to lift or move patient in bed  -Instruct/ Assist with weight shifting every 1 hour when out of bed in chair  -Consider limitation of chair time 1 hour intervals    Skin Care:  -Avoid use of baby powder, tape, friction and shearing, hot water or constrictive clothing  -Relieve pressure over bony prominences using wedges  -Do not massage red bony areas    Next Steps:  -Teach patient strategies to minimize risks such as skin breakdown   -Consider consults to  interdisciplinary teams such as wound care nurse   Outcome: Progressing  Goal: Incision(s), wounds(s) or drain site(s) healing without S/S of infection  Description: INTERVENTIONS  - Assess and document dressing, incision, wound bed, drain sites and surrounding tissue  - Provide patient and family education  - Perform skin care/dressing changes every shift   Outcome: Progressing  Goal: Pressure injury heals and does not worsen  Description: Interventions:  - Implement low air loss mattress or specialty surface (Criteria met)  - Apply silicone foam dressing  - Instruct/assist with weight shifting every 30 minutes when in chair   - Limit chair time to 1 hour intervals  - Use special pressure reducing interventions such as cushion when in chair   - Apply fecal or urinary incontinence containment device   - Perform passive or active ROM every  shift  - Turn and reposition patient & offload bony prominences every 1 hours   - Utilize friction reducing device or surface for transfers   - Consider consults to  interdisciplinary teams such as wound care nurse   - Use incontinent care products after each incontinent episode such as foam cleanser   - Consider nutrition services referral as needed  Outcome: Progressing     Problem: HEMATOLOGIC - ADULT  Goal: Maintains hematologic stability  Description: INTERVENTIONS  - Assess for signs and symptoms of bleeding or hemorrhage  - Monitor labs  - Administer supportive blood products/factors as ordered and appropriate  Outcome: Progressing

## 2024-10-03 NOTE — ASSESSMENT & PLAN NOTE
Blood cultures with growth of enterobacter and proteus in one set, and second set growing enterococcus faecalis. Likely secondary to proctitis/colitis with possible fistulous connection to the sacral wound. No other source appreciated. Patient has COVID-19 but no opacities/groundglass/consolidations on chest imaging suggesting a bacterial process. TTE without evidence of valvular vegetations but was a technically difficult study. JOSEFINA negative. Repeat blood cultures no growth.   -continue IV Ampicillin 2 g every 6 hours to complete a total of 2 weeks of antibiotic therapy for enterococcal bacteremia through today 10/3/24  -monitor off antibiotics thereafter

## 2024-10-03 NOTE — QUICK NOTE
General surgery wound check note:    Patient seen and examined by myself and Tiana Chirinos PA-C at bedside with PCA assist for turning. Patient denies any discomfort at this time and is in his usual state of health.      ABD/gauze 4x4s removed, one 4x4 in wound removed with serosanguinous drainage. Wound edges with some adherent slough, otherwise pink. Wound bed with gray adherent slough sharply debrided.     Nickel-thick layer of santyl applied to wound bed, packed with wet to dry gauze and covered with foam mepilex dressing.     Continue pressure offloading and local wound care

## 2024-10-03 NOTE — PLAN OF CARE
Problem: OCCUPATIONAL THERAPY ADULT  Goal: Performs self-care activities at highest level of function for planned discharge setting.  See evaluation for individualized goals.  Description: Treatment Interventions: ADL retraining, Functional transfer training, UE strengthening/ROM, Cognitive reorientation, Patient/family training, Equipment evaluation/education, Neuromuscular reeducation, Compensatory technique education, Energy conservation, Activityengagement          See flowsheet documentation for full assessment, interventions and recommendations.   Note: Limitation: Decreased ADL status, Decreased UE strength, Decreased Safe judgement during ADL, Decreased endurance, Decreased self-care trans, Decreased high-level ADLs  Prognosis: Good  Assessment: Pt seen for 42min tx session with focus on functional balance, functional mobility, ADL status, transfer safety, b/l UE ROM, and cognition. Pt able to tolerate OOB mobility; sitting balance=f-/p+, standing balance=p/p-. Pt required heavy assistance with all functional mobility tasks; able to complete standing by blocking L knee to provide extension/WB ability. Pt demonstrating need for heavy assistance with his UE and LE ADLs. No active movement noted with L UE. Noted L sided neglect(i.e.visual and body scheme). Cognitive deficits noted--i.e.orientation, memory, direction-following, judgement/safety. Will continue. Goals stated on initial eval remain appropriate. Will extend goal date. The patient's raw score on the AM-PAC Daily Activity Inpatient Short Form is 9. A raw score of less than 19 suggests the patient may benefit from discharge to post-acute rehabilitation services. Please refer to the recommendation of the Occupational Therapist for safe discharge planning.     Rehab Resource Intensity Level, OT: II (Moderate Resource Intensity)

## 2024-10-03 NOTE — OCCUPATIONAL THERAPY NOTE
Occupational Therapy Progress Note     Patient Name: Drew Santos  Today's Date: 10/3/2024  Problem List  Principal Problem:    Sepsis  Active Problems:    Sacral wound    Type 2 diabetes mellitus without complication, without long-term current use of insulin (HCC)    COVID-19    Proctitis    Polymicrobial bacteremia    Acute metabolic encephalopathy    History of CVA (cerebrovascular accident)    Paroxysmal atrial fibrillation (HCC)    Anemia    Severe protein-calorie malnutrition (HCC)    Advanced care planning/counseling discussion            10/03/24 7148   Note Type   Note Type Treatment   Pain Assessment   Pain Assessment Tool FLACC   Pain Rating: FLACC (Rest) - Face 0   Pain Rating: FLACC (Rest) - Legs 0   Pain Rating: FLACC (Rest) - Activity 0   Pain Rating: FLACC (Rest) - Cry 0   Pain Rating: FLACC (Rest) - Consolability 0   Score: FLACC (Rest) 0   Restrictions/Precautions   Weight Bearing Precautions Per Order No   Other Precautions Cognitive;Bed Alarm;Chair Alarm;Fall Risk;Multiple lines   ADL   Where Assessed Edge of bed   Eating Assistance 3  Moderate Assistance   Grooming Assistance 2  Maximal Assistance   UB Bathing Assistance 2  Maximal Assistance   LB Bathing Assistance 1  Total Assistance   UB Dressing Assistance 2  Maximal Assistance   LB Dressing Assistance 1  Total Assistance   Toileting Assistance  1  Total Assistance   Functional Standing Tolerance   Time 10-20ses x 1   Bed Mobility   Rolling R 1  Dependent   Additional items Assist x 2;Increased time required;Verbal cues;LE management   Rolling L 1  Dependent   Additional items Assist x 2;Increased time required;Verbal cues;LE management   Supine to Sit 1  Dependent   Additional items Assist x 2;Increased time required;LE management;Verbal cues   Sit to Supine 1  Dependent   Additional items Assist x 2;Increased time required;Verbal cues;LE management   Transfers   Sit to Stand 2  Maximal assistance   Additional items Assist x 2;Increased  "time required;Verbal cues   Stand to Sit 2  Maximal assistance   Additional items Assist x 2;Increased time required;Verbal cues   Functional Mobility   Functional Mobility   (continue to recommend hoyerlift for OOB with nsg)   Therapeutic Exercise - ROM   UE-ROM Yes  (R UE AROM=WFLs, L UE=no active movement noted)   Subjective   Subjective \"Santos.\"   Cognition   Overall Cognitive Status Impaired   Arousal/Participation Arousable   Attention Difficulty attending to directions   Orientation Level Oriented to person;Oriented to place;Disoriented to time;Disoriented to situation   Memory Decreased short term memory;Decreased recall of precautions;Decreased recall of recent events   Following Commands Follows one step commands inconsistently   Additional Activities   Additional Activities Comments Pt seen for co-tx session with P.T. to improve safety with pt handling and the requirement for additional assistance from a skilled therapist.   Activity Tolerance   Activity Tolerance Patient limited by fatigue   Medical Staff Made Aware nsg, P.T.   Assessment   Assessment Pt seen for 42min tx session with focus on functional balance, functional mobility, ADL status, transfer safety, b/l UE ROM, and cognition. Pt able to tolerate OOB mobility; sitting balance=f-/p+, standing balance=p/p-. Pt required heavy assistance with all functional mobility tasks; able to complete standing by blocking L knee to provide extension/WB ability. Pt demonstrating need for heavy assistance with his UE and LE ADLs. No active movement noted with L UE. Noted L sided neglect(i.e.visual and body scheme). Cognitive deficits noted--i.e.orientation, memory, direction-following, judgement/safety. Will continue. Goals stated on initial eval remain appropriate. Will extend goal date. The patient's raw score on the -PAC Daily Activity Inpatient Short Form is 9. A raw score of less than 19 suggests the patient may benefit from discharge to post-acute " rehabilitation services. Please refer to the recommendation of the Occupational Therapist for safe discharge planning.   Plan   Treatment Interventions ADL retraining;Functional transfer training;UE strengthening/ROM;Endurance training;Cognitive reorientation;Patient/family training;Equipment evaluation/education;Compensatory technique education;Continued evaluation   Goal Expiration Date 10/17/24   OT Treatment Day 3   OT Frequency 1-2x/wk   Discharge Recommendation   Rehab Resource Intensity Level, OT II (Moderate Resource Intensity)   AM-PAC Daily Activity Inpatient   Lower Body Dressing 1   Bathing 1   Toileting 1   Upper Body Dressing 2   Grooming 2   Eating 2   Daily Activity Raw Score 9   Turning Head Towards Sound 3   Follow Simple Instructions 2   Low Function Daily Activity Raw Score 14   Low Function Daily Activity Standardized Score  24.79   AM-PAC Applied Cognition Inpatient   Following a Speech/Presentation 2   Understanding Ordinary Conversation 2   Taking Medications 1   Remembering Where Things Are Placed or Put Away 1   Remembering List of 4-5 Errands 1   Taking Care of Complicated Tasks 1   Applied Cognition Raw Score 8   Applied Cognition Standardized Score 19.32   Matti Pittman

## 2024-10-03 NOTE — ASSESSMENT & PLAN NOTE
Ongoing discussions in regards to poor prognosis with large sacral wound and developing osteomyelitis of coccyx bone in the setting of polymicrobial bacteremia  Palliative care discussed with patient's wife 9/26 who is opting to proceed with full medical care at this time

## 2024-10-03 NOTE — PHYSICAL THERAPY NOTE
PHYSICAL THERAPY NOTE          Patient Name: Drew Santos  Today's Date: 10/3/2024    10/03/24 1415   Note Type   Note Type Treatment   Pain Assessment   Pain Assessment Tool FLACC   Pain Rating: FLACC (Rest) - Face 0   Pain Rating: FLACC (Rest) - Legs 0   Pain Rating: FLACC (Rest) - Activity 0   Pain Rating: FLACC (Rest) - Cry 0   Pain Rating: FLACC (Rest) - Consolability 0   Score: FLACC (Rest) 0   Pain Rating: FLACC (Activity) - Face 0   Pain Rating: FLACC (Activity) - Legs 0   Pain Rating: FLACC (Activity) - Activity 0   Pain Rating: FLACC (Activity) - Cry 0   Pain Rating: FLACC (Activity) - Consolability 0   Score: FLACC (Activity) 0   Restrictions/Precautions   Other Precautions Cognitive;Chair Alarm;Bed Alarm;Fall Risk;Multiple lines  ((+) hanks)   General   Chart Reviewed Yes   Family/Caregiver Present Yes  (pt spouse present in beginning of session only.)   Cognition   Overall Cognitive Status Impaired   Arousal/Participation Arousable   Attention Difficulty attending to directions   Orientation Level Oriented to person;Oriented to place;Disoriented to time;Disoriented to situation   Memory Decreased recall of precautions;Decreased recall of recent events;Decreased short term memory   Following Commands Follows one step commands inconsistently   Subjective   Subjective pt  sleeping upon arrival.  pt arouseable and agreeable to PT.   Bed Mobility   Rolling R 1  Dependent   Additional items Assist x 1;Bedrails;Increased time required;Verbal cues;LE management   Rolling L 1  Dependent   Additional items Assist x 1;Bedrails;Increased time required;Verbal cues;LE management   Supine to Sit 1  Dependent   Additional items Assist x 1;Increased time required;Verbal cues;LE management   Sit to Supine 1  Dependent   Additional Comments EOB sitting balance/ tolerance activities ~ 10-12 minutes with max/ mod assist x1, and standby of  another for safety, to close supervision for approximately 2-3 minutes.   Transfers   Sit to Stand 2  Maximal assistance   Additional items Assist x 2;Increased time required;Verbal cues   Stand to Sit 2  Maximal assistance   Additional items Assist x 2;Increased time required;Verbal cues   Additional Comments pt performed sit to stand transfer trials x2 with max assist x2 with R ue support and blocking of b/l knees with chair and then with a therapist on either side blocking each knee.  pt  was unable to stand with first attempt, second attempt pt able to stand completely upright and maintain upright standing posture balance with max assist  x2 approximately 35 seconds.   Ambulation/Elevation   Gait pattern Not appropriate   Balance   Static Sitting   (zero initially progressing to fair then back to zero due to fatigue, decreased activity tolerance, cognition,  decreased focus and increased distractibility left side neglect, poor tracking towrd the L.)   Dynamic Sitting Poor  ((+) R sided lob with reaching activities.)   Static Standing Poor -   Endurance Deficit   Endurance Deficit Description fatigue, overall weakness, deconditioning   Activity Tolerance   Activity Tolerance Patient limited by fatigue;Treatment limited secondary to medical complications (Comment)  (cognition,  left side neglect decreased focus and increased distractibility  poor tracking towrd the L.)   Nurse Made Aware yes   Exercises   Balance training  PRom to L le in supine  x 10 reps.  gentle HC stretching to L achilles x 3 reps  x 10 second hold.  sitting and standng balance activities at EOB.   EOB sitting balance/ tolerance activities ~ 10-12 minutes with max/ mod assist x1 and standby of another for safety, to close supervision for approximately 2-3 minutes.   pt was unable to stand with first attempt, second attempt pt able to stand completely upright and maintain upright standing posture balance with max assist x2 approximately 35  seconds.   Assessment   Prognosis Guarded   Problem List Decreased range of motion;Decreased strength;Decreased endurance;Impaired balance;Decreased mobility;Decreased cognition;Impaired judgement;Decreased safety awareness;Decreased skin integrity;Impaired tone  (left neglect)   Assessment Pt seen for PT treatment session this date with interventions consisting of bed mobility, transfer training, and HEP,sitting and standing balance/ tolerance activities eob and education provided as needed for safety and direction to improve functional mobility, safety awareness, and activity tolerance. Pt agreeable to PT treatment session upon arrival, pt found supine in bed sleeping. At end of session, pt left in R sidelying position with all needs in reach. In comparison to previous session, pt with no improvement activity tolerance, endurance, static sitting balance,  dynamic sitting balance, standing balance, ambulation distances, ambulatory balance, b/l le strength, AM- pac score, and functional mobility. No active movement noted l le, pt rec'd PROM to L le. Pt's continues to require a significant amount of assistance for all aspects of mobility including supine <> sit, transfers sit<> stand, static and dynamic sitting, and static standing.  Pt with poor initiation of tasks.  Pt  is currently functioning at dependant assist x2 for supine<> sit, and rolling L and R. Sitting balance with max/ mod assist x1 and standby of another for safety to min- close supervision for short periods of time~ 2-3 minutes.   pt demonstrates retropulsion and R sided LOB with static  and dynamic sitting.  Pt  unable to right self to midline requires mod- max assist x1. Pt  not tracking toward the L,  significant L sided neglect.. pt was able to perform sit to stand x1 with max assist x2 with b/l knee blocking, pt able to maintain static standing x 35 seconds.  Pt is easily distracted with loss of focus to task.  Noted inconsistent following of  commands.  Continue to recommend  level II moderate rehab resource intensity vs LTC  at time of d/c in order to maximize pt's functional independence and safety w/ mobility. Pt continues to be functioning below baseline level. PT will continue to see pt while here in order to address the deficits listed above and provide interventions consistent w/ POC in effort to achieve STGs.    The patient's AM-PAC Basic Mobility Inpatient Short Form Raw Score is 6. A raw score less than 16 suggests the patient may benefit from discharge to post-acute rehabilitation services. Please also refer to the recommendation of the Physical Therapist for safe discharge planning.   Goals   Patient Goals To stand   STG Expiration Date 10/14/24   PT Treatment Day 3   Plan   Treatment/Interventions Functional transfer training;LE strengthening/ROM;Therapeutic exercise;Endurance training;Cognitive reorientation;Patient/family training;Equipment eval/education;Bed mobility;Spoke to nursing;OT   Progress Slow progress, decreased activity tolerance  (cognition, left side neglect decreased focus and increased distractibility poor tracking towrd the L.)   PT Frequency 1-2x/wk   Discharge Recommendation   Rehab Resource Intensity Level, PT II (Moderate Resource Intensity)   AM-PAC Basic Mobility Inpatient   Turning in Flat Bed Without Bedrails 1   Lying on Back to Sitting on Edge of Flat Bed Without Bedrails 1   Moving Bed to Chair 1   Standing Up From Chair Using Arms 1   Walk in Room 1   Climb 3-5 Stairs With Railing 1   Basic Mobility Inpatient Raw Score 6   Turning Head Towards Sound 2   Follow Simple Instructions 2   Low Function Basic Mobility Raw Score  10   Low Function Basic Mobility Standardized Score  14.65   [facility] Highest Level Of Mobility   JH-HLM Goal 2: Bed activities/Dependent transfer   -HLM Achieved 3: Sit at edge of bed   Education   Education Provided Mobility training;Home exercise program;Assistive device   Patient  Reinforcement needed   End of Consult   Patient Position at End of Consult Supine;Bed/Chair alarm activated;All needs within reach   End of Consult Comments pt positioned into Right sidleying positiong for off loading of left side bottock and sacral area. b/l heel protectors applied and pillow between knees and under L ue for comfort and elevation.   Fabienne Pitts, PTA

## 2024-10-03 NOTE — ASSESSMENT & PLAN NOTE
Chronic sacral decubitus ulcer; present on admission  Status post debridement at bedside per surgical team.   Continue with wound care as tolerated.     Continue dressing changes and serial exams  Wound care consultation  Offload pressure   Concern for emphysematous changes tracking to sacral wound/developing fistula from site on repeat abdominal imaging   Discussion with palliative care has yielded in continuing with full medical care at this time  Will need weekly follow up with wound healing center

## 2024-10-03 NOTE — PLAN OF CARE
Problem: PHYSICAL THERAPY ADULT  Goal: Performs mobility at highest level of function for planned discharge setting.  See evaluation for individualized goals.  Description: Treatment/Interventions: Functional transfer training, LE strengthening/ROM, Therapeutic exercise, Cognitive reorientation, Equipment eval/education, Patient/family training, Endurance training, Bed mobility, Continued evaluation, Spoke to nursing, OT          See flowsheet documentation for full assessment, interventions and recommendations.  10/3/2024 1825 by Fabienne Pitts PTA  Outcome: Progressing  Note: Prognosis: Guarded  Problem List: Decreased range of motion, Decreased strength, Decreased endurance, Impaired balance, Decreased mobility, Decreased cognition, Impaired judgement, Decreased safety awareness, Decreased skin integrity, Impaired tone (left neglect)  Assessment: Pt seen for PT treatment session this date with interventions consisting of bed mobility, transfer training, and HEP,sitting and standing balance/ tolerance activities eob and education provided as needed for safety and direction to improve functional mobility, safety awareness, and activity tolerance. Pt agreeable to PT treatment session upon arrival, pt found supine in bed sleeping. At end of session, pt left in R sidelying position with all needs in reach. In comparison to previous session, pt with no improvement activity tolerance, endurance, static sitting balance,  dynamic sitting balance, standing balance, ambulation distances, ambulatory balance, b/l le strength, AM- pac score, and functional mobility. No active movement noted l le, pt rec'd PROM to L le. Pt's continues to require a significant amount of assistance for all aspects of mobility including supine <> sit, transfers sit<> stand, static and dynamic sitting, and static standing.  Pt with poor initiation of tasks.  Pt  is currently functioning at dependant assist x2 for supine<> sit, and rolling L and  R. Sitting balance with max/ mod assist x1 and standby of another for safety to min- close supervision for short periods of time~ 2-3 minutes.   pt demonstrates retropulsion and R sided LOB with static  and dynamic sitting.  Pt  unable to right self to midline requires mod- max assist x1. Pt  not tracking toward the L,  significant L sided neglect.. pt was able to perform sit to stand x1 with max assist x2 with b/l knee blocking, pt able to maintain static standing x 35 seconds.  Pt is easily distracted with loss of focus to task.  Noted inconsistent following of commands.  Continue to recommend  level II moderate rehab resource intensity vs LTC  at time of d/c in order to maximize pt's functional independence and safety w/ mobility. Pt continues to be functioning below baseline level. PT will continue to see pt while here in order to address the deficits listed above and provide interventions consistent w/ POC in effort to achieve STGs.    The patient's AM-PAC Basic Mobility Inpatient Short Form Raw Score is 6. A raw score less than 16 suggests the patient may benefit from discharge to post-acute rehabilitation services. Please also refer to the recommendation of the Physical Therapist for safe discharge planning.  Barriers to Discharge: Inaccessible home environment, Decreased caregiver support (unsafe to return to home environment at current LOF, questionable caregiver support)     Rehab Resource Intensity Level, PT: II (Moderate Resource Intensity)    See flowsheet documentation for full assessment.

## 2024-10-03 NOTE — PROGRESS NOTES
Progress Note - Infectious Disease   Name: Drew Santos 73 y.o. male I MRN: [id number]  Unit/Bed#: [place] [address] -01 I Date of Admission: 9/17/2024   Date of Service: 10/3/2024 I Hospital Day: 16     Assessment & Plan  Sepsis  Fever, tachycardia, tachypnea, and leukocytosis. Secondary to polymicrobial bacteremia, likely from colitis/proctitis with possible fistulous connection to sacral ulcer. Blood cultures with growth of enterobacter, enterococcus faecalis, proteus. Also consider role of COVID-19. No other clear source appreciated. Chest imaging with no opacities/groundglass/consolidations suggesting a bacterial process. Urine was abnormal, culture showed growth of enterobacter. Head CT with no acute intracranial findings. Patient hemodynamically stable, afebrile. Repeat blood cultures no growth. Completed 7 days cefepime/flagyl/vancomycin 9/25 and now tolerating IV Ampicillin for Enterococcal bacteremia  -antibiotics as below  -monitor CBCD and BMP  -monitor vitals  -supportive care   Polymicrobial bacteremia  Blood cultures with growth of enterobacter and proteus in one set, and second set growing enterococcus faecalis. Likely secondary to proctitis/colitis with possible fistulous connection to the sacral wound. No other source appreciated. Patient has COVID-19 but no opacities/groundglass/consolidations on chest imaging suggesting a bacterial process. TTE without evidence of valvular vegetations but was a technically difficult study. JOSEFINA negative. Repeat blood cultures no growth.   -continue IV Ampicillin 2 g every 6 hours to complete a total of 2 weeks of antibiotic therapy for enterococcal bacteremia through today 10/3/24  -monitor off antibiotics thereafter  Sacral wound  Per patient's family wound initially started during patient's stay in a skilled nursing facility for rehab. Wound imaging present in our system since prior hematuria hospitalization. Wound now with significant depth. Concern for possible  fistulous connection to the rectum given evidence of colitis/proctitis and extensive tissue emphysema on CT. This is likely playing a role in bacteremia above. General surgery has debrided and will continue to follow up with wound. He may require diverting colostomy. Wound without signs of current infection. Wound is stage IV with palpable bone  -frequent turning/repositioning to offload pressure from the wound  -local wound care per general surgery  -continue follow up with general surgery  -no indication for long term antibiotics without debridement/resection of infected bone and flap coverage  Proctitis  CT C/A/P showed mild distal sigmoid and rectal inflammatory changes, R ischioanal fossa emphysema, and possible fistulous connection to skin surface. This is likely source of patient's bacteremia above. Family reports patient has very infrequent bowel movements in past few weeks. General surgery is following for wound. GI has been consulted and they have concern for ischemic colitis. Patient has completed antibiotics for this issue  -serial abdominal/rectal exams  -monitor stool output  -continue follow up with general surgery  -continue follow up with gastroenterology  Type 2 diabetes mellitus without complication, without long-term current use of insulin ([facility])  Lab Results   Component Value Date    HGBA1C 6.2 (H) 09/17/2024   This is risk factor for wounds and infection.  -recommend tight glycemic control  -blood glucose management per primary service  COVID-19  PCR positive on 9/17/2024 although per patient's family he tested positive prior to admission. Patient's wife also positive. Chest imaging showed no airspace opacities or groundglass. He has been started on mild disease protocol and received IV Remdesivir x3 days.  -management per primary service  -monitor vitals  -monitor respiratory status    Above management plan to continue Ampicillin through today discussed with the primary team AP. ID will sign  off at this time, please call with questions.     Antibiotics:  IV Ampicillin    Subjective:  The patient is resting comfortably. No acute events, no pain, fevers.     Objective:  Vitals:  Temp:  [97.9 °F (36.6 °C)-99.1 °F (37.3 °C)] 99.1 °F (37.3 °C)  HR:  [] 93  Resp:  [20] 20  BP: (103-146)/() 103/61  SpO2:  [92 %-96 %] 96 %  Temp (24hrs), Av.4 °F (36.9 °C), Min:97.9 °F (36.6 °C), Max:99.1 °F (37.3 °C)  Current: Temperature: 99.1 °F (37.3 °C)    Physical Exam:   General Appearance:  Chronically ill appearing but no acute distress   Throat: Oropharynx moist without lesions.    Lungs:   Clear to auscultation bilaterally; no wheezes, rhonchi or rales; respirations unlabored   Heart:  RRR; no murmur, rub or gallop   Abdomen:   Soft, non-tender, non-distended, positive bowel sounds.     Extremities: No clubbing, cyanosis or edema   Skin: Sacral wound images reviewed--slough present, no purulence       Labs:   All pertinent labs and imaging studies were personally reviewed  Results from last 7 days   Lab Units 10/01/24  0445   WBC Thousand/uL 10.14   HEMOGLOBIN g/dL 8.8*   PLATELETS Thousands/uL 464*     Results from last 7 days   Lab Units 10/01/24  0445   SODIUM mmol/L 137   POTASSIUM mmol/L 4.0   CHLORIDE mmol/L 105   CO2 mmol/L 29   BUN mg/dL 5   CREATININE mg/dL 0.50*   EGFR ml/min/1.73sq m 106   CALCIUM mg/dL 8.3*

## 2024-10-03 NOTE — ASSESSMENT & PLAN NOTE
Lab Results   Component Value Date    HGBA1C 6.2 (H) 09/17/2024     Recent Labs     10/02/24  1125 10/02/24  1617 10/02/24  2121 10/03/24  0802   POCGLU 177* 135 245* 100   Controlled with last hemoglobin A1c of 6.2%  Holding home meds  Continue sign scale insulin

## 2024-10-03 NOTE — PLAN OF CARE
Problem: Potential for Falls  Goal: Patient will remain free of falls  Description: INTERVENTIONS:  - Educate patient/family on patient safety including physical limitations  - Instruct patient to call for assistance with activity   - Consult OT/PT to assist with strengthening/mobility   - Keep Call bell within reach  - Keep bed low and locked with side rails adjusted as appropriate  - Keep care items and personal belongings within reach  - Initiate and maintain comfort rounds  - Make Fall Risk Sign visible to staff  - Offer Toileting every 2 Hours, in advance of need  - Initiate/Maintain bed alarm  - Obtain necessary fall risk management equipment: bed alarm call bell  - Apply yellow socks and bracelet for high fall risk patients  - Consider moving patient to room near nurses station  Outcome: Progressing     Problem: PAIN - ADULT  Goal: Verbalizes/displays adequate comfort level or baseline comfort level  Description: Interventions:  - Encourage patient to monitor pain and request assistance  - Assess pain using appropriate pain scale  - Administer analgesics based on type and severity of pain and evaluate response  - Implement non-pharmacological measures as appropriate and evaluate response  - Consider cultural and social influences on pain and pain management  - Notify physician/advanced practitioner if interventions unsuccessful or patient reports new pain  Outcome: Progressing     Problem: INFECTION - ADULT  Goal: Absence or prevention of progression during hospitalization  Description: INTERVENTIONS:  - Assess and monitor for signs and symptoms of infection  - Monitor lab/diagnostic results  - Monitor all insertion sites, i.e. indwelling lines, tubes, and drains  - Monitor endotracheal if appropriate and nasal secretions for changes in amount and color  - [place] appropriate cooling/warming therapies per order  - Administer medications as ordered  - Instruct and encourage patient and family to use good  hand hygiene technique  - Identify and instruct in appropriate isolation precautions for identified infection/condition  Outcome: Progressing     Problem: SAFETY ADULT  Goal: Patient will remain free of falls  Description: INTERVENTIONS:  - Educate patient/family on patient safety including physical limitations  - Instruct patient to call for assistance with activity   - Consult OT/PT to assist with strengthening/mobility   - Keep Call bell within reach  - Keep bed low and locked with side rails adjusted as appropriate  - Keep care items and personal belongings within reach  - Initiate and maintain comfort rounds  - Make Fall Risk Sign visible to staff  - Offer Toileting every 2 Hours, in advance of need  - Initiate/Maintain bed alarm  - Obtain necessary fall risk management equipment: bed alarm call bell  - Apply yellow socks and bracelet for high fall risk patients  - Consider moving patient to room near nurses station  Outcome: Progressing  Goal: Maintain or return to baseline ADL function  Description: INTERVENTIONS:  -  Assess patient's ability to carry out ADLs; assess patient's baseline for ADL function and identify physical deficits which impact ability to perform ADLs (bathing, care of mouth/teeth, toileting, grooming, dressing, etc.)  - Assess/evaluate cause of self-care deficits   - Assess range of motion  - Assess patient's mobility; develop plan if impaired  - Assess patient's need for assistive devices and provide as appropriate  - Encourage maximum independence but intervene and supervise when necessary  - Involve family in performance of ADLs  - Assess for home care needs following discharge   - Consider OT consult to assist with ADL evaluation and planning for discharge  - Provide patient education as appropriate  Outcome: Progressing  Goal: Maintains/Returns to pre admission functional level  Description: INTERVENTIONS:  - Perform AM-PAC 6 Click Basic Mobility/ Daily Activity assessment daily.  -  Set and communicate daily mobility goal to care team and patient/family/caregiver.   - Collaborate with rehabilitation services on mobility goals if consulted  - Perform Range of Motion 4 times a day.  - Reposition patient every 2 hours.  - Dangle patient 3 times a day  - Stand patient 3 times a day  - Ambulate patient 3 times a day  - Out of bed to chair 3 times a day   - Out of bed for meals 3 times a day  - Out of bed for toileting  - Record patient progress and toleration of activity level   Outcome: Progressing     Problem: DISCHARGE PLANNING  Goal: Discharge to home or other facility with appropriate resources  Description: INTERVENTIONS:  - Identify barriers to discharge w/patient and caregiver  - Arrange for needed discharge resources and transportation as appropriate  - Identify discharge learning needs (meds, wound care, etc.)  - Arrange for interpretive services to assist at discharge as needed  - Refer to Case Management Department for coordinating discharge planning if the patient needs post-hospital services based on physician/advanced practitioner order or complex needs related to functional status, cognitive ability, or social support system  Outcome: Progressing     Problem: Knowledge Deficit  Goal: Patient/family/caregiver demonstrates understanding of disease process, treatment plan, medications, and discharge instructions  Description: Complete learning assessment and assess knowledge base.  Interventions:  - Provide teaching at level of understanding  - Provide teaching via preferred learning methods  Outcome: Progressing     Problem: Prexisting or High Potential for Compromised Skin Integrity  Goal: Skin integrity is maintained or improved  Description: INTERVENTIONS:  - Identify patients at risk for skin breakdown  - Assess and monitor skin integrity  - Assess and monitor nutrition and hydration status  - Monitor labs   - Assess for incontinence   - Turn and reposition patient  - Assist with  mobility/ambulation  - Relieve pressure over bony prominences  - Avoid friction and shearing  - Provide appropriate hygiene as needed including keeping skin clean and dry  - Evaluate need for skin moisturizer/barrier cream  - Collaborate with interdisciplinary team   - Patient/family teaching  - Consider wound care consult   Outcome: Progressing     Problem: GASTROINTESTINAL - ADULT  Goal: Maintains or returns to baseline bowel function  Description: INTERVENTIONS:  - Assess bowel function  - Encourage oral fluids to ensure adequate hydration  - Administer IV fluids if ordered to ensure adequate hydration  - Administer ordered medications as needed  - Encourage mobilization and activity  - Consider nutritional services referral to assist patient with adequate nutrition and appropriate food choices  Outcome: Progressing     Problem: GENITOURINARY - ADULT  Goal: Urinary catheter remains patent  Description: INTERVENTIONS:  - Assess patency of urinary catheter  - If patient has a chronic hanks, consider changing catheter if non-functioning  - Follow guidelines for intermittent irrigation of non-functioning urinary catheter  Outcome: Progressing     Problem: METABOLIC, FLUID AND ELECTROLYTES - ADULT  Goal: Electrolytes maintained within normal limits  Description: INTERVENTIONS:  - Monitor labs and assess patient for signs and symptoms of electrolyte imbalances  - Administer electrolyte replacement as ordered  - Monitor response to electrolyte replacements, including repeat lab results as appropriate  - Instruct patient on fluid and nutrition as appropriate  Outcome: Progressing     Problem: SKIN/TISSUE INTEGRITY - ADULT  Goal: Skin Integrity remains intact(Skin Breakdown Prevention)  Description: Assess:  -Perform Goldy assessment every shift  -Clean and moisturize skin every shift  -Inspect skin when repositioning, toileting, and assisting with ADLS  -Assess under medical devices such as Masimo every shift  -Assess  extremities for adequate circulation and sensation     Bed Management:  -Have minimal linens on bed & keep smooth, unwrinkled  -Change linens as needed when moist or perspiring  -Avoid sitting or lying in one position for more than 1 hours while in bed  -Keep HOB at 30 degrees     Toileting:  -Offer bedside commode  -Assess for incontinence every shift  -Use incontinent care products after each incontinent episode such as foam cleanser     Activity:  -Mobilize patient 4 times a day  -Encourage activity and walks on unit  -Encourage or provide ROM exercises   -Turn and reposition patient every 2 Hours  -Use appropriate equipment to lift or move patient in bed  -Instruct/ Assist with weight shifting every 1 hour when out of bed in chair  -Consider limitation of chair time 1 hour intervals    Skin Care:  -Avoid use of baby powder, tape, friction and shearing, hot water or constrictive clothing  -Relieve pressure over bony prominences using wedges  -Do not massage red bony areas    Next Steps:  -Teach patient strategies to minimize risks such as skin breakdown   -Consider consults to  interdisciplinary teams such as wound care nurse   Outcome: Progressing  Goal: Incision(s), wounds(s) or drain site(s) healing without S/S of infection  Description: INTERVENTIONS  - Assess and document dressing, incision, wound bed, drain sites and surrounding tissue  - Provide patient and family education  - Perform skin care/dressing changes every shift   Outcome: Progressing  Goal: Pressure injury heals and does not worsen  Description: Interventions:  - Implement low air loss mattress or specialty surface (Criteria met)  - Apply silicone foam dressing  - Instruct/assist with weight shifting every 30 minutes when in chair   - Limit chair time to 1 hour intervals  - Use special pressure reducing interventions such as cushion when in chair   - Apply fecal or urinary incontinence containment device   - Perform passive or active ROM every  shift  - Turn and reposition patient & offload bony prominences every 1 hours   - Utilize friction reducing device or surface for transfers   - Consider consults to  interdisciplinary teams such as wound care nurse   - Use incontinent care products after each incontinent episode such as foam cleanser   - Consider nutrition services referral as needed  Outcome: Progressing     Problem: HEMATOLOGIC - ADULT  Goal: Maintains hematologic stability  Description: INTERVENTIONS  - Assess for signs and symptoms of bleeding or hemorrhage  - Monitor labs  - Administer supportive blood products/factors as ordered and appropriate  Outcome: Progressing     Problem: Nutrition/Hydration-ADULT  Goal: Nutrient/Hydration intake appropriate for improving, restoring or maintaining nutritional needs  Description: Monitor and assess patient's nutrition/hydration status for malnutrition. Collaborate with interdisciplinary team and initiate plan and interventions as ordered.  Monitor patient's weight and dietary intake as ordered or per policy. Utilize nutrition screening tool and intervene as necessary. Determine patient's food preferences and provide high-protein, high-caloric foods as appropriate.     INTERVENTIONS:  - Monitor oral intake, urinary output, labs, and treatment plans  - Assess nutrition and hydration status and recommend course of action  - Evaluate amount of meals eaten  - Assist patient with eating if necessary   - Allow adequate time for meals  - Recommend/ encourage appropriate diets, oral nutritional supplements, and vitamin/mineral supplements  - Order, calculate, and assess calorie counts as needed  - Recommend, monitor, and adjust tube feedings and TPN/PPN based on assessed needs  - Assess need for intravenous fluids  - Provide specific nutrition/hydration education as appropriate  - Include patient/family/caregiver in decisions related to nutrition  Outcome: Progressing

## 2024-10-03 NOTE — PLAN OF CARE
Problem: PHYSICAL THERAPY ADULT  Goal: Performs mobility at highest level of function for planned discharge setting.  See evaluation for individualized goals.  Description: Treatment/Interventions: Functional transfer training, LE strengthening/ROM, Therapeutic exercise, Cognitive reorientation, Equipment eval/education, Patient/family training, Endurance training, Bed mobility, Continued evaluation, Spoke to nursing, OT          See flowsheet documentation for full assessment, interventions and recommendations.  Outcome: Not Progressing  Note: Prognosis: Guarded  Problem List: Decreased range of motion, Decreased strength, Decreased endurance, Impaired balance, Decreased mobility, Decreased cognition, Impaired judgement, Decreased safety awareness, Decreased skin integrity, Impaired tone (left neglect)  Assessment: Pt seen for PT treatment session this date with interventions consisting of bed mobility, transfer training, and HEP,sitting and standing balance/ tolerance activities eob and education provided as needed for safety and direction to improve functional mobility, safety awareness, and activity tolerance. Pt agreeable to PT treatment session upon arrival, pt found supine in bed sleeping. At end of session, pt left in R sidelying position with all needs in reach. In comparison to previous session, pt with no improvement activity tolerance, endurance, static sitting balance,  dynamic sitting balance, standing balance, ambulation distances, ambulatory balance, b/l le strength, AM- pac score, and functional mobility. No active movement noted l le, pt rec'd PROM to L le. Pt's continues to require a significant amount of assistance for all aspects of mobility including supine <> sit, transfers sit<> stand, static and dynamic sitting, and static standing.  Pt with poor initiation of tasks.  Pt  is currently functioning at dependant assist x2 for supine<> sit, and rolling L and R. Sitting balance with max/ mod  assist x1 and standby of another for safety to min- close supervision for short periods of time~ 2-3 minutes.   pt demonstrates retropulsion and R sided LOB with static  and dynamic sitting.  Pt  unable to right self to midline requires mod- max assist x1. Pt  not tracking toward the L,  significant L sided neglect.. pt was able to perform sit to stand x1 with max assist x2 with b/l knee blocking, pt able to maintain static standing x 35 seconds.  Pt is easily distracted with loss of focus to task.  Noted inconsistent following of commands.  Continue to recommend  level II moderate rehab resource intensity vs LTC  at time of d/c in order to maximize pt's functional independence and safety w/ mobility. Pt continues to be functioning below baseline level. PT will continue to see pt while here in order to address the deficits listed above and provide interventions consistent w/ POC in effort to achieve STGs.    The patient's AM-PAC Basic Mobility Inpatient Short Form Raw Score is 6. A raw score less than 16 suggests the patient may benefit from discharge to post-acute rehabilitation services. Please also refer to the recommendation of the Physical Therapist for safe discharge planning.  Barriers to Discharge: Inaccessible home environment, Decreased caregiver support (unsafe to return to home environment at current LOF, questionable caregiver support)     Rehab Resource Intensity Level, PT: II (Moderate Resource Intensity)    See flowsheet documentation for full assessment.

## 2024-10-04 VITALS
HEART RATE: 59 BPM | BODY MASS INDEX: 29.4 KG/M2 | RESPIRATION RATE: 18 BRPM | DIASTOLIC BLOOD PRESSURE: 80 MMHG | HEIGHT: 77 IN | SYSTOLIC BLOOD PRESSURE: 153 MMHG | WEIGHT: 249 LBS | TEMPERATURE: 98.3 F | OXYGEN SATURATION: 96 %

## 2024-10-04 LAB
GLUCOSE SERPL-MCNC: 101 MG/DL (ref 65–140)
GLUCOSE SERPL-MCNC: 178 MG/DL (ref 65–140)

## 2024-10-04 PROCEDURE — 82948 REAGENT STRIP/BLOOD GLUCOSE: CPT

## 2024-10-04 PROCEDURE — 99239 HOSP IP/OBS DSCHRG MGMT >30: CPT | Performed by: HOSPITALIST

## 2024-10-04 RX ORDER — LANOLIN ALCOHOL/MO/W.PET/CERES
1 CREAM (GRAM) TOPICAL 2 TIMES DAILY WITH MEALS
Start: 2024-10-04

## 2024-10-04 RX ADMIN — FINASTERIDE 5 MG: 5 TABLET, FILM COATED ORAL at 08:27

## 2024-10-04 RX ADMIN — Medication 12.5 MG: at 08:27

## 2024-10-04 RX ADMIN — POTASSIUM CHLORIDE 20 MEQ: 1.5 SOLUTION ORAL at 08:27

## 2024-10-04 RX ADMIN — EZETIMIBE 10 MG: 10 TABLET ORAL at 08:27

## 2024-10-04 RX ADMIN — APIXABAN 5 MG: 5 TABLET, FILM COATED ORAL at 08:27

## 2024-10-04 RX ADMIN — ASPIRIN 81 MG CHEWABLE TABLET 81 MG: 81 TABLET CHEWABLE at 08:27

## 2024-10-04 RX ADMIN — INSULIN LISPRO 1 UNITS: 100 INJECTION, SOLUTION INTRAVENOUS; SUBCUTANEOUS at 12:29

## 2024-10-04 RX ADMIN — Medication 1 TABLET: at 08:27

## 2024-10-04 RX ADMIN — COLLAGENASE SANTYL: 250 OINTMENT TOPICAL at 08:28

## 2024-10-04 RX ADMIN — ESCITALOPRAM OXALATE 10 MG: 10 TABLET ORAL at 08:27

## 2024-10-04 RX ADMIN — LISINOPRIL 2.5 MG: 2.5 TABLET ORAL at 08:27

## 2024-10-04 RX ADMIN — BACITRACIN ZINC 1 SMALL APPLICATION: 500 OINTMENT TOPICAL at 08:27

## 2024-10-04 NOTE — CASE MANAGEMENT
Case Management Discharge Planning Note    Patient name Drew Santos  Location [facility]* MRN [id number]  : 1950 Date 10/4/2024       Current Admission Date: 2024  Current Admission Diagnosis:Sepsis   Patient Active Problem List    Diagnosis Date Noted Date Diagnosed    Advanced care planning/counseling discussion 2024     Severe protein-calorie malnutrition (HCC) 2024     Polymicrobial bacteremia 2024     Acute metabolic encephalopathy 2024     History of CVA (cerebrovascular accident) 2024     Paroxysmal atrial fibrillation (HCC) 2024     Anemia 2024     Type 2 diabetes mellitus without complication, without long-term current use of insulin (HCC) 2024     COVID-19 2024     Proctitis 2024     Dysgeusia 2024     Gross hematuria 2024     Depression 2024     Sacral wound 2024     Primary hypertension 2024     Mixed hyperlipidemia 2024     Urinary retention 2024     Syncope 2024     Elevated lactic acid level 2024     Sepsis 2024     Abnormal CPK 2024       LOS (days): 17  Geometric Mean LOS (GMLOS) (days): 4.9  Days to GMLOS:-11.7     OBJECTIVE:  Risk of Unplanned Readmission Score: 20.84         Current admission status: Inpatient   [id number] [place], [place] - [address] [facility] [address] [address]  Phone: [phone] Fax: [phone]    Primary Care Provider: Joe Lyon MD    Primary Insurance: VETERANS  Secondary Insurance: [employer] REP    DISCHARGE DETAILS:     Additional Comments:  sent email to [facility] Affairs contacts : Cee Gustafson and Terra Leong ([email or web address]: [email or web address]>; Cee Andres <[email or web address]>; Terra Leong <[email or web address]>, sent on  to inquire to have hospitalization to be under patient's [employer] benefits. CM  followed up with Molly at [facility] for transport (PH: [phone] ext. [phone]) to request transport for 2:30P.M. this afternoon. CM sent follow up to email addresses listed above to inquire for further assistance with coverage with current transportation set with Roundtrip ([employer]cher [employer]) with [employer] or at alternatively provide [employer] transportation with [facility]. [facility] with [facility] confirmed not able to see patient current hospitalization under [facility] benefits. Molly will contact  to follow up for transport. CM [facility] with [facility] Sandy Sepulveda whom sent message to [facility]  (Cyndie Crawford). CM awaiting confirmation from . CM sent follow up message to facility and updated facility contacts. CM updated bedside nurse. CM to follow for further discharge planning.  CM sent follow up message to [facility] and confirmed patient does not need an updated authorization. CM spoke with  admissions at [facility], confirming patient has an updated authorization and patient is able to return to [facility] at  this afternoon to resume STR.     Accepting Facility Name, City & State : [facility]  Receiving Facility/Agency Phone Number: [phone]  Facility/Agency Fax Number: [phone]

## 2024-10-04 NOTE — ASSESSMENT & PLAN NOTE
Lab Results   Component Value Date    HGBA1C 6.2 (H) 09/17/2024     Recent Labs     10/03/24  1205 10/03/24  1618 10/03/24  2126 10/04/24  0730   POCGLU 146* 182* 117 101   Controlled with last hemoglobin A1c of 6.2%  Holding home meds  Continue sign scale insulin

## 2024-10-04 NOTE — ASSESSMENT & PLAN NOTE
Tested positive for COVID on 9/17/2024  Patient currently on room air though high risk given his age and comorbidities  Completed three days of IV remdesivir   Completed 10 days of isolation through 9/27   Remains on room air

## 2024-10-04 NOTE — PLAN OF CARE
Problem: Potential for Falls  Goal: Patient will remain free of falls  Description: INTERVENTIONS:  - Educate patient/family on patient safety including physical limitations  - Instruct patient to call for assistance with activity   - Consult OT/PT to assist with strengthening/mobility   - Keep Call bell within reach  - Keep bed low and locked with side rails adjusted as appropriate  - Keep care items and personal belongings within reach  - Initiate and maintain comfort rounds  - Make Fall Risk Sign visible to staff  - Offer Toileting every 2 Hours, in advance of need  - Initiate/Maintain bed alarm  - Obtain necessary fall risk management equipment: bed alarm call bell  - Apply yellow socks and bracelet for high fall risk patients  - Consider moving patient to room near nurses station  Outcome: Adequate for Discharge     Problem: PAIN - ADULT  Goal: Verbalizes/displays adequate comfort level or baseline comfort level  Description: Interventions:  - Encourage patient to monitor pain and request assistance  - Assess pain using appropriate pain scale  - Administer analgesics based on type and severity of pain and evaluate response  - Implement non-pharmacological measures as appropriate and evaluate response  - Consider cultural and social influences on pain and pain management  - Notify physician/advanced practitioner if interventions unsuccessful or patient reports new pain  Outcome: Adequate for Discharge     Problem: INFECTION - ADULT  Goal: Absence or prevention of progression during hospitalization  Description: INTERVENTIONS:  - Assess and monitor for signs and symptoms of infection  - Monitor lab/diagnostic results  - Monitor all insertion sites, i.e. indwelling lines, tubes, and drains  - Monitor endotracheal if appropriate and nasal secretions for changes in amount and color  - [place] appropriate cooling/warming therapies per order  - Administer medications as ordered  - Instruct and encourage patient  and family to use good hand hygiene technique  - Identify and instruct in appropriate isolation precautions for identified infection/condition  Outcome: Adequate for Discharge     Problem: SAFETY ADULT  Goal: Patient will remain free of falls  Description: INTERVENTIONS:  - Educate patient/family on patient safety including physical limitations  - Instruct patient to call for assistance with activity   - Consult OT/PT to assist with strengthening/mobility   - Keep Call bell within reach  - Keep bed low and locked with side rails adjusted as appropriate  - Keep care items and personal belongings within reach  - Initiate and maintain comfort rounds  - Make Fall Risk Sign visible to staff  - Offer Toileting every 2 Hours, in advance of need  - Initiate/Maintain bed alarm  - Obtain necessary fall risk management equipment: bed alarm call bell  - Apply yellow socks and bracelet for high fall risk patients  - Consider moving patient to room near nurses station  Outcome: Adequate for Discharge  Goal: Maintain or return to baseline ADL function  Description: INTERVENTIONS:  -  Assess patient's ability to carry out ADLs; assess patient's baseline for ADL function and identify physical deficits which impact ability to perform ADLs (bathing, care of mouth/teeth, toileting, grooming, dressing, etc.)  - Assess/evaluate cause of self-care deficits   - Assess range of motion  - Assess patient's mobility; develop plan if impaired  - Assess patient's need for assistive devices and provide as appropriate  - Encourage maximum independence but intervene and supervise when necessary  - Involve family in performance of ADLs  - Assess for home care needs following discharge   - Consider OT consult to assist with ADL evaluation and planning for discharge  - Provide patient education as appropriate  Outcome: Adequate for Discharge  Goal: Maintains/Returns to pre admission functional level  Description: INTERVENTIONS:  - Perform AM-PAC 6  Click Basic Mobility/ Daily Activity assessment daily.  - Set and communicate daily mobility goal to care team and patient/family/caregiver.   - Collaborate with rehabilitation services on mobility goals if consulted  - Perform Range of Motion 4 times a day.  - Reposition patient every 2 hours.  - Dangle patient 3 times a day  - Stand patient 3 times a day  - Ambulate patient 3 times a day  - Out of bed to chair 3 times a day   - Out of bed for meals 3 times a day  - Out of bed for toileting  - Record patient progress and toleration of activity level   Outcome: Adequate for Discharge     Problem: DISCHARGE PLANNING  Goal: Discharge to home or other facility with appropriate resources  Description: INTERVENTIONS:  - Identify barriers to discharge w/patient and caregiver  - Arrange for needed discharge resources and transportation as appropriate  - Identify discharge learning needs (meds, wound care, etc.)  - Arrange for interpretive services to assist at discharge as needed  - Refer to Case Management Department for coordinating discharge planning if the patient needs post-hospital services based on physician/advanced practitioner order or complex needs related to functional status, cognitive ability, or social support system  Outcome: Adequate for Discharge     Problem: Knowledge Deficit  Goal: Patient/family/caregiver demonstrates understanding of disease process, treatment plan, medications, and discharge instructions  Description: Complete learning assessment and assess knowledge base.  Interventions:  - Provide teaching at level of understanding  - Provide teaching via preferred learning methods  Outcome: Adequate for Discharge     Problem: Prexisting or High Potential for Compromised Skin Integrity  Goal: Skin integrity is maintained or improved  Description: INTERVENTIONS:  - Identify patients at risk for skin breakdown  - Assess and monitor skin integrity  - Assess and monitor nutrition and hydration  status  - Monitor labs   - Assess for incontinence   - Turn and reposition patient  - Assist with mobility/ambulation  - Relieve pressure over bony prominences  - Avoid friction and shearing  - Provide appropriate hygiene as needed including keeping skin clean and dry  - Evaluate need for skin moisturizer/barrier cream  - Collaborate with interdisciplinary team   - Patient/family teaching  - Consider wound care consult   Outcome: Adequate for Discharge     Problem: GASTROINTESTINAL - ADULT  Goal: Maintains or returns to baseline bowel function  Description: INTERVENTIONS:  - Assess bowel function  - Encourage oral fluids to ensure adequate hydration  - Administer IV fluids if ordered to ensure adequate hydration  - Administer ordered medications as needed  - Encourage mobilization and activity  - Consider nutritional services referral to assist patient with adequate nutrition and appropriate food choices  Outcome: Adequate for Discharge     Problem: GENITOURINARY - ADULT  Goal: Urinary catheter remains patent  Description: INTERVENTIONS:  - Assess patency of urinary catheter  - If patient has a chronic hanks, consider changing catheter if non-functioning  - Follow guidelines for intermittent irrigation of non-functioning urinary catheter  Outcome: Adequate for Discharge     Problem: METABOLIC, FLUID AND ELECTROLYTES - ADULT  Goal: Electrolytes maintained within normal limits  Description: INTERVENTIONS:  - Monitor labs and assess patient for signs and symptoms of electrolyte imbalances  - Administer electrolyte replacement as ordered  - Monitor response to electrolyte replacements, including repeat lab results as appropriate  - Instruct patient on fluid and nutrition as appropriate  Outcome: Adequate for Discharge     Problem: SKIN/TISSUE INTEGRITY - ADULT  Goal: Skin Integrity remains intact(Skin Breakdown Prevention)  Description: Assess:  -Perform Goldy assessment every shift  -Clean and moisturize skin every  shift  -Inspect skin when repositioning, toileting, and assisting with ADLS  -Assess under medical devices such as Masimo every shift  -Assess extremities for adequate circulation and sensation     Bed Management:  -Have minimal linens on bed & keep smooth, unwrinkled  -Change linens as needed when moist or perspiring  -Avoid sitting or lying in one position for more than 1 hours while in bed  -Keep HOB at 30 degrees     Toileting:  -Offer bedside commode  -Assess for incontinence every shift  -Use incontinent care products after each incontinent episode such as foam cleanser     Activity:  -Mobilize patient 4 times a day  -Encourage activity and walks on unit  -Encourage or provide ROM exercises   -Turn and reposition patient every 2 Hours  -Use appropriate equipment to lift or move patient in bed  -Instruct/ Assist with weight shifting every 1 hour when out of bed in chair  -Consider limitation of chair time 1 hour intervals    Skin Care:  -Avoid use of baby powder, tape, friction and shearing, hot water or constrictive clothing  -Relieve pressure over bony prominences using wedges  -Do not massage red bony areas    Next Steps:  -Teach patient strategies to minimize risks such as skin breakdown   -Consider consults to  interdisciplinary teams such as wound care nurse   Outcome: Adequate for Discharge  Goal: Incision(s), wounds(s) or drain site(s) healing without S/S of infection  Description: INTERVENTIONS  - Assess and document dressing, incision, wound bed, drain sites and surrounding tissue  - Provide patient and family education  - Perform skin care/dressing changes every shift   Outcome: Adequate for Discharge  Goal: Pressure injury heals and does not worsen  Description: Interventions:  - Implement low air loss mattress or specialty surface (Criteria met)  - Apply silicone foam dressing  - Instruct/assist with weight shifting every 30 minutes when in chair   - Limit chair time to 1 hour intervals  - Use  special pressure reducing interventions such as cushion when in chair   - Apply fecal or urinary incontinence containment device   - Perform passive or active ROM every shift  - Turn and reposition patient & offload bony prominences every 1 hours   - Utilize friction reducing device or surface for transfers   - Consider consults to  interdisciplinary teams such as wound care nurse   - Use incontinent care products after each incontinent episode such as foam cleanser   - Consider nutrition services referral as needed  Outcome: Adequate for Discharge     Problem: HEMATOLOGIC - ADULT  Goal: Maintains hematologic stability  Description: INTERVENTIONS  - Assess for signs and symptoms of bleeding or hemorrhage  - Monitor labs  - Administer supportive blood products/factors as ordered and appropriate  Outcome: Adequate for Discharge     Problem: Nutrition/Hydration-ADULT  Goal: Nutrient/Hydration intake appropriate for improving, restoring or maintaining nutritional needs  Description: Monitor and assess patient's nutrition/hydration status for malnutrition. Collaborate with interdisciplinary team and initiate plan and interventions as ordered.  Monitor patient's weight and dietary intake as ordered or per policy. Utilize nutrition screening tool and intervene as necessary. Determine patient's food preferences and provide high-protein, high-caloric foods as appropriate.     INTERVENTIONS:  - Monitor oral intake, urinary output, labs, and treatment plans  - Assess nutrition and hydration status and recommend course of action  - Evaluate amount of meals eaten  - Assist patient with eating if necessary   - Allow adequate time for meals  - Recommend/ encourage appropriate diets, oral nutritional supplements, and vitamin/mineral supplements  - Order, calculate, and assess calorie counts as needed  - Recommend, monitor, and adjust tube feedings and TPN/PPN based on assessed needs  - Assess need for intravenous fluids  - Provide  specific nutrition/hydration education as appropriate  - Include patient/family/caregiver in decisions related to nutrition  Outcome: Adequate for Discharge

## 2024-10-04 NOTE — PLAN OF CARE
Problem: Potential for Falls  Goal: Patient will remain free of falls  Description: INTERVENTIONS:  - Educate patient/family on patient safety including physical limitations  - Instruct patient to call for assistance with activity   - Consult OT/PT to assist with strengthening/mobility   - Keep Call bell within reach  - Keep bed low and locked with side rails adjusted as appropriate  - Keep care items and personal belongings within reach  - Initiate and maintain comfort rounds  - Make Fall Risk Sign visible to staff  - Offer Toileting every 2 Hours, in advance of need  - Initiate/Maintain bed alarm  - Obtain necessary fall risk management equipment: bed alarm call bell  - Apply yellow socks and bracelet for high fall risk patients  - Consider moving patient to room near nurses station  Outcome: Progressing     Problem: PAIN - ADULT  Goal: Verbalizes/displays adequate comfort level or baseline comfort level  Description: Interventions:  - Encourage patient to monitor pain and request assistance  - Assess pain using appropriate pain scale  - Administer analgesics based on type and severity of pain and evaluate response  - Implement non-pharmacological measures as appropriate and evaluate response  - Consider cultural and social influences on pain and pain management  - Notify physician/advanced practitioner if interventions unsuccessful or patient reports new pain  Outcome: Progressing     Problem: INFECTION - ADULT  Goal: Absence or prevention of progression during hospitalization  Description: INTERVENTIONS:  - Assess and monitor for signs and symptoms of infection  - Monitor lab/diagnostic results  - Monitor all insertion sites, i.e. indwelling lines, tubes, and drains  - Monitor endotracheal if appropriate and nasal secretions for changes in amount and color  - [place] appropriate cooling/warming therapies per order  - Administer medications as ordered  - Instruct and encourage patient and family to use good  hand hygiene technique  - Identify and instruct in appropriate isolation precautions for identified infection/condition  Outcome: Progressing     Problem: SAFETY ADULT  Goal: Patient will remain free of falls  Description: INTERVENTIONS:  - Educate patient/family on patient safety including physical limitations  - Instruct patient to call for assistance with activity   - Consult OT/PT to assist with strengthening/mobility   - Keep Call bell within reach  - Keep bed low and locked with side rails adjusted as appropriate  - Keep care items and personal belongings within reach  - Initiate and maintain comfort rounds  - Make Fall Risk Sign visible to staff  - Offer Toileting every 2 Hours, in advance of need  - Initiate/Maintain bed alarm  - Obtain necessary fall risk management equipment: bed alarm call bell  - Apply yellow socks and bracelet for high fall risk patients  - Consider moving patient to room near nurses station  Outcome: Progressing  Goal: Maintain or return to baseline ADL function  Description: INTERVENTIONS:  -  Assess patient's ability to carry out ADLs; assess patient's baseline for ADL function and identify physical deficits which impact ability to perform ADLs (bathing, care of mouth/teeth, toileting, grooming, dressing, etc.)  - Assess/evaluate cause of self-care deficits   - Assess range of motion  - Assess patient's mobility; develop plan if impaired  - Assess patient's need for assistive devices and provide as appropriate  - Encourage maximum independence but intervene and supervise when necessary  - Involve family in performance of ADLs  - Assess for home care needs following discharge   - Consider OT consult to assist with ADL evaluation and planning for discharge  - Provide patient education as appropriate  Outcome: Progressing  Goal: Maintains/Returns to pre admission functional level  Description: INTERVENTIONS:  - Perform AM-PAC 6 Click Basic Mobility/ Daily Activity assessment daily.  -  Set and communicate daily mobility goal to care team and patient/family/caregiver.   - Collaborate with rehabilitation services on mobility goals if consulted  - Perform Range of Motion 4 times a day.  - Reposition patient every 2 hours.  - Dangle patient 3 times a day  - Stand patient 3 times a day  - Ambulate patient 3 times a day  - Out of bed to chair 3 times a day   - Out of bed for meals 3 times a day  - Out of bed for toileting  - Record patient progress and toleration of activity level   Outcome: Progressing     Problem: DISCHARGE PLANNING  Goal: Discharge to home or other facility with appropriate resources  Description: INTERVENTIONS:  - Identify barriers to discharge w/patient and caregiver  - Arrange for needed discharge resources and transportation as appropriate  - Identify discharge learning needs (meds, wound care, etc.)  - Arrange for interpretive services to assist at discharge as needed  - Refer to Case Management Department for coordinating discharge planning if the patient needs post-hospital services based on physician/advanced practitioner order or complex needs related to functional status, cognitive ability, or social support system  Outcome: Progressing     Problem: Knowledge Deficit  Goal: Patient/family/caregiver demonstrates understanding of disease process, treatment plan, medications, and discharge instructions  Description: Complete learning assessment and assess knowledge base.  Interventions:  - Provide teaching at level of understanding  - Provide teaching via preferred learning methods  Outcome: Progressing     Problem: Prexisting or High Potential for Compromised Skin Integrity  Goal: Skin integrity is maintained or improved  Description: INTERVENTIONS:  - Identify patients at risk for skin breakdown  - Assess and monitor skin integrity  - Assess and monitor nutrition and hydration status  - Monitor labs   - Assess for incontinence   - Turn and reposition patient  - Assist with  mobility/ambulation  - Relieve pressure over bony prominences  - Avoid friction and shearing  - Provide appropriate hygiene as needed including keeping skin clean and dry  - Evaluate need for skin moisturizer/barrier cream  - Collaborate with interdisciplinary team   - Patient/family teaching  - Consider wound care consult   Outcome: Progressing     Problem: GASTROINTESTINAL - ADULT  Goal: Maintains or returns to baseline bowel function  Description: INTERVENTIONS:  - Assess bowel function  - Encourage oral fluids to ensure adequate hydration  - Administer IV fluids if ordered to ensure adequate hydration  - Administer ordered medications as needed  - Encourage mobilization and activity  - Consider nutritional services referral to assist patient with adequate nutrition and appropriate food choices  Outcome: Progressing     Problem: GENITOURINARY - ADULT  Goal: Urinary catheter remains patent  Description: INTERVENTIONS:  - Assess patency of urinary catheter  - If patient has a chronic hanks, consider changing catheter if non-functioning  - Follow guidelines for intermittent irrigation of non-functioning urinary catheter  Outcome: Progressing     Problem: METABOLIC, FLUID AND ELECTROLYTES - ADULT  Goal: Electrolytes maintained within normal limits  Description: INTERVENTIONS:  - Monitor labs and assess patient for signs and symptoms of electrolyte imbalances  - Administer electrolyte replacement as ordered  - Monitor response to electrolyte replacements, including repeat lab results as appropriate  - Instruct patient on fluid and nutrition as appropriate  Outcome: Progressing     Problem: SKIN/TISSUE INTEGRITY - ADULT  Goal: Skin Integrity remains intact(Skin Breakdown Prevention)  Description: Assess:  -Perform Goldy assessment every shift  -Clean and moisturize skin every shift  -Inspect skin when repositioning, toileting, and assisting with ADLS  -Assess under medical devices such as Masimo every shift  -Assess  extremities for adequate circulation and sensation     Bed Management:  -Have minimal linens on bed & keep smooth, unwrinkled  -Change linens as needed when moist or perspiring  -Avoid sitting or lying in one position for more than 1 hours while in bed  -Keep HOB at 30 degrees     Toileting:  -Offer bedside commode  -Assess for incontinence every shift  -Use incontinent care products after each incontinent episode such as foam cleanser     Activity:  -Mobilize patient 4 times a day  -Encourage activity and walks on unit  -Encourage or provide ROM exercises   -Turn and reposition patient every 2 Hours  -Use appropriate equipment to lift or move patient in bed  -Instruct/ Assist with weight shifting every 1 hour when out of bed in chair  -Consider limitation of chair time 1 hour intervals    Skin Care:  -Avoid use of baby powder, tape, friction and shearing, hot water or constrictive clothing  -Relieve pressure over bony prominences using wedges  -Do not massage red bony areas    Next Steps:  -Teach patient strategies to minimize risks such as skin breakdown   -Consider consults to  interdisciplinary teams such as wound care nurse   Outcome: Progressing  Goal: Incision(s), wounds(s) or drain site(s) healing without S/S of infection  Description: INTERVENTIONS  - Assess and document dressing, incision, wound bed, drain sites and surrounding tissue  - Provide patient and family education  - Perform skin care/dressing changes every shift   Outcome: Progressing  Goal: Pressure injury heals and does not worsen  Description: Interventions:  - Implement low air loss mattress or specialty surface (Criteria met)  - Apply silicone foam dressing  - Instruct/assist with weight shifting every 30 minutes when in chair   - Limit chair time to 1 hour intervals  - Use special pressure reducing interventions such as cushion when in chair   - Apply fecal or urinary incontinence containment device   - Perform passive or active ROM every  shift  - Turn and reposition patient & offload bony prominences every 1 hours   - Utilize friction reducing device or surface for transfers   - Consider consults to  interdisciplinary teams such as wound care nurse   - Use incontinent care products after each incontinent episode such as foam cleanser   - Consider nutrition services referral as needed  Outcome: Progressing     Problem: HEMATOLOGIC - ADULT  Goal: Maintains hematologic stability  Description: INTERVENTIONS  - Assess for signs and symptoms of bleeding or hemorrhage  - Monitor labs  - Administer supportive blood products/factors as ordered and appropriate  Outcome: Progressing     Problem: Nutrition/Hydration-ADULT  Goal: Nutrient/Hydration intake appropriate for improving, restoring or maintaining nutritional needs  Description: Monitor and assess patient's nutrition/hydration status for malnutrition. Collaborate with interdisciplinary team and initiate plan and interventions as ordered.  Monitor patient's weight and dietary intake as ordered or per policy. Utilize nutrition screening tool and intervene as necessary. Determine patient's food preferences and provide high-protein, high-caloric foods as appropriate.     INTERVENTIONS:  - Monitor oral intake, urinary output, labs, and treatment plans  - Assess nutrition and hydration status and recommend course of action  - Evaluate amount of meals eaten  - Assist patient with eating if necessary   - Allow adequate time for meals  - Recommend/ encourage appropriate diets, oral nutritional supplements, and vitamin/mineral supplements  - Order, calculate, and assess calorie counts as needed  - Recommend, monitor, and adjust tube feedings and TPN/PPN based on assessed needs  - Assess need for intravenous fluids  - Provide specific nutrition/hydration education as appropriate  - Include patient/family/caregiver in decisions related to nutrition  Outcome: Progressing

## 2024-10-04 NOTE — NURSING NOTE
Gave report to Anum at [facility] [phone]. No questions. Provide SLA number if have questions.

## 2024-10-04 NOTE — ASSESSMENT & PLAN NOTE
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

## 2024-10-04 NOTE — PLAN OF CARE
Problem: Potential for Falls  Goal: Patient will remain free of falls  Description: INTERVENTIONS:  - Educate patient/family on patient safety including physical limitations  - Instruct patient to call for assistance with activity   - Consult OT/PT to assist with strengthening/mobility   - Keep Call bell within reach  - Keep bed low and locked with side rails adjusted as appropriate  - Keep care items and personal belongings within reach  - Initiate and maintain comfort rounds  - Make Fall Risk Sign visible to staff  - Offer Toileting every 2 Hours, in advance of need  - Initiate/Maintain bed alarm  - Obtain necessary fall risk management equipment: bed alarm call bell  - Apply yellow socks and bracelet for high fall risk patients  - Consider moving patient to room near nurses station  Outcome: Progressing     Problem: PAIN - ADULT  Goal: Verbalizes/displays adequate comfort level or baseline comfort level  Description: Interventions:  - Encourage patient to monitor pain and request assistance  - Assess pain using appropriate pain scale  - Administer analgesics based on type and severity of pain and evaluate response  - Implement non-pharmacological measures as appropriate and evaluate response  - Consider cultural and social influences on pain and pain management  - Notify physician/advanced practitioner if interventions unsuccessful or patient reports new pain  Outcome: Progressing     Problem: INFECTION - ADULT  Goal: Absence or prevention of progression during hospitalization  Description: INTERVENTIONS:  - Assess and monitor for signs and symptoms of infection  - Monitor lab/diagnostic results  - Monitor all insertion sites, i.e. indwelling lines, tubes, and drains  - Monitor endotracheal if appropriate and nasal secretions for changes in amount and color  - [place] appropriate cooling/warming therapies per order  - Administer medications as ordered  - Instruct and encourage patient and family to use good  hand hygiene technique  - Identify and instruct in appropriate isolation precautions for identified infection/condition  Outcome: Progressing     Problem: SAFETY ADULT  Goal: Patient will remain free of falls  Description: INTERVENTIONS:  - Educate patient/family on patient safety including physical limitations  - Instruct patient to call for assistance with activity   - Consult OT/PT to assist with strengthening/mobility   - Keep Call bell within reach  - Keep bed low and locked with side rails adjusted as appropriate  - Keep care items and personal belongings within reach  - Initiate and maintain comfort rounds  - Make Fall Risk Sign visible to staff  - Offer Toileting every 2 Hours, in advance of need  - Initiate/Maintain bed alarm  - Obtain necessary fall risk management equipment: bed alarm call bell  - Apply yellow socks and bracelet for high fall risk patients  - Consider moving patient to room near nurses station  Outcome: Progressing  Goal: Maintain or return to baseline ADL function  Description: INTERVENTIONS:  -  Assess patient's ability to carry out ADLs; assess patient's baseline for ADL function and identify physical deficits which impact ability to perform ADLs (bathing, care of mouth/teeth, toileting, grooming, dressing, etc.)  - Assess/evaluate cause of self-care deficits   - Assess range of motion  - Assess patient's mobility; develop plan if impaired  - Assess patient's need for assistive devices and provide as appropriate  - Encourage maximum independence but intervene and supervise when necessary  - Involve family in performance of ADLs  - Assess for home care needs following discharge   - Consider OT consult to assist with ADL evaluation and planning for discharge  - Provide patient education as appropriate  Outcome: Progressing  Goal: Maintains/Returns to pre admission functional level  Description: INTERVENTIONS:  - Perform AM-PAC 6 Click Basic Mobility/ Daily Activity assessment daily.  -  Set and communicate daily mobility goal to care team and patient/family/caregiver.   - Collaborate with rehabilitation services on mobility goals if consulted  - Perform Range of Motion 4 times a day.  - Reposition patient every 2 hours.  - Dangle patient 3 times a day  - Stand patient 3 times a day  - Ambulate patient 3 times a day  - Out of bed to chair 3 times a day   - Out of bed for meals 3 times a day  - Out of bed for toileting  - Record patient progress and toleration of activity level   Outcome: Progressing     Problem: DISCHARGE PLANNING  Goal: Discharge to home or other facility with appropriate resources  Description: INTERVENTIONS:  - Identify barriers to discharge w/patient and caregiver  - Arrange for needed discharge resources and transportation as appropriate  - Identify discharge learning needs (meds, wound care, etc.)  - Arrange for interpretive services to assist at discharge as needed  - Refer to Case Management Department for coordinating discharge planning if the patient needs post-hospital services based on physician/advanced practitioner order or complex needs related to functional status, cognitive ability, or social support system  Outcome: Progressing     Problem: Knowledge Deficit  Goal: Patient/family/caregiver demonstrates understanding of disease process, treatment plan, medications, and discharge instructions  Description: Complete learning assessment and assess knowledge base.  Interventions:  - Provide teaching at level of understanding  - Provide teaching via preferred learning methods  Outcome: Progressing     Problem: Prexisting or High Potential for Compromised Skin Integrity  Goal: Skin integrity is maintained or improved  Description: INTERVENTIONS:  - Identify patients at risk for skin breakdown  - Assess and monitor skin integrity  - Assess and monitor nutrition and hydration status  - Monitor labs   - Assess for incontinence   - Turn and reposition patient  - Assist with  mobility/ambulation  - Relieve pressure over bony prominences  - Avoid friction and shearing  - Provide appropriate hygiene as needed including keeping skin clean and dry  - Evaluate need for skin moisturizer/barrier cream  - Collaborate with interdisciplinary team   - Patient/family teaching  - Consider wound care consult   Outcome: Progressing     Problem: GASTROINTESTINAL - ADULT  Goal: Maintains or returns to baseline bowel function  Description: INTERVENTIONS:  - Assess bowel function  - Encourage oral fluids to ensure adequate hydration  - Administer IV fluids if ordered to ensure adequate hydration  - Administer ordered medications as needed  - Encourage mobilization and activity  - Consider nutritional services referral to assist patient with adequate nutrition and appropriate food choices  Outcome: Progressing     Problem: GENITOURINARY - ADULT  Goal: Urinary catheter remains patent  Description: INTERVENTIONS:  - Assess patency of urinary catheter  - If patient has a chronic hanks, consider changing catheter if non-functioning  - Follow guidelines for intermittent irrigation of non-functioning urinary catheter  Outcome: Progressing     Problem: METABOLIC, FLUID AND ELECTROLYTES - ADULT  Goal: Electrolytes maintained within normal limits  Description: INTERVENTIONS:  - Monitor labs and assess patient for signs and symptoms of electrolyte imbalances  - Administer electrolyte replacement as ordered  - Monitor response to electrolyte replacements, including repeat lab results as appropriate  - Instruct patient on fluid and nutrition as appropriate  Outcome: Progressing     Problem: SKIN/TISSUE INTEGRITY - ADULT  Goal: Skin Integrity remains intact(Skin Breakdown Prevention)  Description: Assess:  -Perform Goldy assessment every shift  -Clean and moisturize skin every shift  -Inspect skin when repositioning, toileting, and assisting with ADLS  -Assess under medical devices such as Masimo every shift  -Assess  extremities for adequate circulation and sensation     Bed Management:  -Have minimal linens on bed & keep smooth, unwrinkled  -Change linens as needed when moist or perspiring  -Avoid sitting or lying in one position for more than 1 hours while in bed  -Keep HOB at 30 degrees     Toileting:  -Offer bedside commode  -Assess for incontinence every shift  -Use incontinent care products after each incontinent episode such as foam cleanser     Activity:  -Mobilize patient 4 times a day  -Encourage activity and walks on unit  -Encourage or provide ROM exercises   -Turn and reposition patient every 2 Hours  -Use appropriate equipment to lift or move patient in bed  -Instruct/ Assist with weight shifting every 1 hour when out of bed in chair  -Consider limitation of chair time 1 hour intervals    Skin Care:  -Avoid use of baby powder, tape, friction and shearing, hot water or constrictive clothing  -Relieve pressure over bony prominences using wedges  -Do not massage red bony areas    Next Steps:  -Teach patient strategies to minimize risks such as skin breakdown   -Consider consults to  interdisciplinary teams such as wound care nurse   Outcome: Progressing  Goal: Incision(s), wounds(s) or drain site(s) healing without S/S of infection  Description: INTERVENTIONS  - Assess and document dressing, incision, wound bed, drain sites and surrounding tissue  - Provide patient and family education  - Perform skin care/dressing changes every shift   Outcome: Progressing  Goal: Pressure injury heals and does not worsen  Description: Interventions:  - Implement low air loss mattress or specialty surface (Criteria met)  - Apply silicone foam dressing  - Instruct/assist with weight shifting every 30 minutes when in chair   - Limit chair time to 1 hour intervals  - Use special pressure reducing interventions such as cushion when in chair   - Apply fecal or urinary incontinence containment device   - Perform passive or active ROM every  shift  - Turn and reposition patient & offload bony prominences every 1 hours   - Utilize friction reducing device or surface for transfers   - Consider consults to  interdisciplinary teams such as wound care nurse   - Use incontinent care products after each incontinent episode such as foam cleanser   - Consider nutrition services referral as needed  Outcome: Progressing     Problem: HEMATOLOGIC - ADULT  Goal: Maintains hematologic stability  Description: INTERVENTIONS  - Assess for signs and symptoms of bleeding or hemorrhage  - Monitor labs  - Administer supportive blood products/factors as ordered and appropriate  Outcome: Progressing     Problem: Nutrition/Hydration-ADULT  Goal: Nutrient/Hydration intake appropriate for improving, restoring or maintaining nutritional needs  Description: Monitor and assess patient's nutrition/hydration status for malnutrition. Collaborate with interdisciplinary team and initiate plan and interventions as ordered.  Monitor patient's weight and dietary intake as ordered or per policy. Utilize nutrition screening tool and intervene as necessary. Determine patient's food preferences and provide high-protein, high-caloric foods as appropriate.     INTERVENTIONS:  - Monitor oral intake, urinary output, labs, and treatment plans  - Assess nutrition and hydration status and recommend course of action  - Evaluate amount of meals eaten  - Assist patient with eating if necessary   - Allow adequate time for meals  - Recommend/ encourage appropriate diets, oral nutritional supplements, and vitamin/mineral supplements  - Order, calculate, and assess calorie counts as needed  - Recommend, monitor, and adjust tube feedings and TPN/PPN based on assessed needs  - Assess need for intravenous fluids  - Provide specific nutrition/hydration education as appropriate  - Include patient/family/caregiver in decisions related to nutrition  Outcome: Progressing

## 2024-10-04 NOTE — ASSESSMENT & PLAN NOTE
Patient met sepsis criteria time of admission with tachycardia, leukocytosis and fever  Did test positive for COVID-19  Also found to have polymicrobial bacteremia secondary to sacral decubitus ulcer  Infectious disease was consulted and patient completed course of IV ampicillin x 2 weeks for enterococcal bacteremia  Completed inpatient isolation for COVID  Patient discharged to rehab facility

## 2024-10-04 NOTE — CASE MANAGEMENT
Case Management Discharge Planning Note    Patient name Drew Santos  [facility] M* MRN [id number]  : 1950 Date 10/4/2024       Current Admission Date: 2024  Current Admission Diagnosis:Sepsis   Patient Active Problem List    Diagnosis Date Noted Date Diagnosed    Advanced care planning/counseling discussion 2024     Severe protein-calorie malnutrition (HCC) 2024     Polymicrobial bacteremia 2024     Acute metabolic encephalopathy 2024     History of CVA (cerebrovascular accident) 2024     Paroxysmal atrial fibrillation (HCC) 2024     Anemia 2024     Type 2 diabetes mellitus without complication, without long-term current use of insulin (HCC) 2024     COVID-19 2024     Proctitis 2024     Dysgeusia 2024     Gross hematuria 2024     Depression 2024     Sacral wound 2024     Primary hypertension 2024     Mixed hyperlipidemia 2024     Urinary retention 2024     Syncope 2024     Elevated lactic acid level 2024     Sepsis 2024     Abnormal CPK 2024       LOS (days): 17  Geometric Mean LOS (GMLOS) (days): 4.9  Days to GMLOS:-11.8     OBJECTIVE:  Risk of Unplanned Readmission Score: 20.84         Current admission status: Inpatient   Preferred Pharmacy:   [id number] [place], [place] - [address] [address]  Phone: [phone] Fax: [phone]    Primary Care Provider: Joe Lyon MD    Primary Insurance: [employer]  Secondary Insurance: [employer] REP    DISCHARGE DETAILS:    Additional Comments: CM spoke with patient's daughter, patient's spouse requested discharge paperwork, CM sent through email per request (E: [email or web address]). CM to follow for further discharge planning.   CM reviewed transportation set for 2:30 P.M. Patient's spouse agreeable and understood.

## 2024-10-04 NOTE — DISCHARGE SUMMARY
Discharge Summary - Hospitalist   Name: Drew Santos 73 y.o. male I MRN: [id number]  Unit/Bed#: [place] [address] -01 I Date of Admission: 9/17/2024   Date of Service: 10/4/2024 I Hospital Day: 17     Assessment & Plan  Sepsis  Patient met sepsis criteria time of admission with tachycardia, leukocytosis and fever  Did test positive for COVID-19  Also found to have polymicrobial bacteremia secondary to sacral decubitus ulcer  Infectious disease was consulted and patient completed course of IV ampicillin x 2 weeks for enterococcal bacteremia  Completed inpatient isolation for COVID  Patient discharged to rehab facility   Polymicrobial bacteremia  Polymicrobial bacteremia likely secondary sacral wound with possible fistulous connection  Infectious disease input noted and appreciated.  Completed 7 day therapy with IV cefepime, flagyl and vancomycin   ID transitioned the patient to ampicillin 2 g every 6 hours to begin 9/26 to complete a total of 2 weeks of antibiotic therapy for enterococcal bacteremia through 10/3/24  Repeat blood cultures negative to date   Underwent transesophageal echocardiogram 9/25/2024-no evidence of endocarditis  Sacral wound  Chronic sacral decubitus ulcer; present on admission  Status post debridement at bedside per surgical team.   Continue with wound care as tolerated.     Continue dressing changes and serial exams  Wound care consultation  Offload pressure   Concern for emphysematous changes tracking to sacral wound/developing fistula from site on repeat abdominal imaging   Discussion with palliative care has yielded in continuing with full medical care at this time  Will need weekly follow up with wound healing center  Advanced care planning/counseling discussion  Ongoing discussions in regards to poor prognosis with large sacral wound and developing osteomyelitis of coccyx bone in the setting of polymicrobial bacteremia  Palliative care discussed with patient's wife 9/26 who is opting to  proceed with full medical care at this time  Type 2 diabetes mellitus without complication, without long-term current use of insulin (HCC)  Lab Results   Component Value Date    HGBA1C 6.2 (H) 09/17/2024     Recent Labs     10/03/24  1205 10/03/24  1618 10/03/24  2126 10/04/24  0730   POCGLU 146* 182* 117 101   Controlled with last hemoglobin A1c of 6.2%  Holding home meds  Continue sign scale insulin  COVID-19  Tested positive for COVID on 9/17/2024  Patient currently on room air though high risk given his age and comorbidities  Completed three days of IV remdesivir   Completed 10 days of isolation through 9/27   Remains on room air  Proctitis  Noted on CT scan.  No diarrhea or clinical evidence of colitis.   Stool studies negative  Likely ischemic colitis given area affected, continue supportive measures   No plan for any inpatient scope   Repeat CAT scan without signs of ongoing abscess.  Acute metabolic encephalopathy  Presumably secondary to sepsis, bacteremia, COVID infection  CT head with no acute intracranial abnormality  Mentation appears baseline per previous provider notes  Continue with supportive care  Reorientation, delirium precautions   History of CVA (cerebrovascular accident)  History of CVA   Chronically bedbound and with chronic left-sided deficits   Continue home aspirin, Eliquis, statin  Dysphagia 2 mechanical soft, thin liquids -upgraded to level 3 by speech therapy  Discharge back to rehab  Paroxysmal atrial fibrillation (HCC)  Continue Toprol XL 25 mg daily  Continue home Eliquis for stroke prevention  Anemia  Low blood counts noted 9/19/24.   Has chronic anemia likely secondary to poor nutritional status underlying chronic illness.    Hemoglobin declined to 7.1 given -1 unit PRBCs given 9/19/2024  Encourage oral intake, cannot utilize IV iron due to bacteremia   Lab Results   Component Value Date    HGB 8.8 (L) 10/01/2024    HGB 8.6 (L) 09/26/2024    HGB 8.3 (L) 09/25/2024    HGB 7.6 (L)  09/24/2024    HGB 7.6 (L) 09/23/2024   Ongoing monitoring of hemoglobin and continue to transfuse if less than 7  Severe protein-calorie malnutrition (HCC)  Malnutrition Findings:   Adult Malnutrition type: Chronic illness  Adult Degree of Malnutrition: Other severe protein calorie malnutrition  Malnutrition Characteristics: Inadequate energy, Weight loss  360 Statement: Severe protein calorie malnutrition in context of chronic illness r/t poor appetite, inadequate PO intake as evidance by energy intake less than 75% compared to estimated needs>1 month, 7.5% wt loss x 1 month ( 122kg 8/23/24-> 113kg 9/17) treated with PO diet and oral supplements  BMI Findings:  Body mass index is 29.53 kg/m².      Medical Problems       Resolved Problems  Date Reviewed: 9/3/2024            Resolved    Sepsis (HCC) 9/18/2024     Resolved by  Sherlyn Sharma MD    Gram-negative bacteremia 9/19/2024     Resolved by  AVELINA Zamora        Discharging Physician / Practitioner: Esperanza Arizmendi PA-C  PCP: Joe Lyon MD  Admission Date:   Admission Orders (From admission, onward)       Ordered        09/17/24 1938  Inpatient Admission  Once                          Discharge Date: 10/04/24    Consultations During Hospital Stay:  General surgery, infectious disease, gastroenterology, cardiology, palliative care    Procedures Performed:   CT chest abdomen pelvis w contrast    Result Date: 9/18/2024  Impression: No acute findings in the chest. Mid/distal sigmoid colonic and rectal inflammatory changes in keeping with a segmental colitis/proctitis. Right ischioanal fossa subcutaneous emphysema extending through the right paramidline gluteal subcutaneous tissues to the skin surface suspicious for a perirectal or perianal fistula; limited assessment without oral or rectal contrast. Parra catheter balloon is located in the prostatic urethra; this catheter should be repositioned. Fat stranding surrounding the decompressed  bladder may indicate cystitis    CT head wo contrast    Result Date: 9/17/2024  Impression: No acute intracranial hemorrhage seen. No mass effect or midline shift seen     XR chest 2 views    Result Date: 9/17/2024  Impression: Limited study. Platelike atelectasis in the right lower lung field. No convincing evidence of pneumonia       Significant Findings / Test Results:   Blood culture positive for Enterococcus faecalis, Enterobacter and Proteus    Incidental Findings:   None    Test Results Pending at Discharge (will require follow up):   None     Outpatient Tests Requested:  Wound care follow-up    Complications: None    Reason for Admission: Sepsis    Hospital Course:   Drew Santos is a 73 y.o. male patient who originally presented to the hospital on 9/17/2024 due to sepsis.  He was found to have polymicrobial bacteremia thought to be secondary to sacral wound.  He was seen in consult by general surgery and underwent bedside debridement throughout the course of his hospital stay.  Given concern for colitis possible fistula, he was seen in consult by gastroenterology, likely secondary to ischemic colitis.  He was seen in consult by infectious disease and received 2 weeks of IV antibiotics for his polymicrobial bacteremia.  He underwent JOSEFINA with cardiology without evidence of endocarditis.  Palliative care was consulted and patient and family chose to continue full medical care.  He worked with physical and Occupational Therapy while inpatient and was discharged back to short-term rehab.      Please see above list of diagnoses and related plan for additional information.     Condition at Discharge: stable    Discharge Day Visit / Exam:   Subjective: Patient seen examined at bedside.  Denies any complaints.  Agreeable for discharge today.  Vitals: Blood Pressure: 153/80 (10/04/24 0734)  Pulse: 59 (10/04/24 0734)  Temperature: 98.3 °F (36.8 °C) (10/04/24 0734)  Temp Source: Oral (10/03/24 2137)  Respirations: 18  "(10/04/24 0734)  Height: 6' 5\" (195.6 cm) (09/25/24 1218)  Weight - Scale: 113 kg (249 lb) (09/25/24 1218)  SpO2: 96 % (10/04/24 0734)  Physical Exam  Vitals reviewed.   Constitutional:       General: He is not in acute distress.  HENT:      Head: Normocephalic and atraumatic.   Eyes:      General: No scleral icterus.     Conjunctiva/sclera: Conjunctivae normal.   Cardiovascular:      Rate and Rhythm: Normal rate and regular rhythm.      Heart sounds: No murmur heard.  Pulmonary:      Effort: Pulmonary effort is normal. No respiratory distress.      Breath sounds: Normal breath sounds. No wheezing.   Abdominal:      General: Bowel sounds are normal. There is no distension.      Palpations: Abdomen is soft.      Tenderness: There is no abdominal tenderness.   Musculoskeletal:      Cervical back: Neck supple.      Right lower leg: No edema.      Left lower leg: No edema.   Skin:     General: Skin is warm and dry.   Neurological:      Mental Status: He is alert. Mental status is at baseline.      Motor: Weakness (Chronic left-sided) present.   Psychiatric:         Mood and Affect: Mood normal.         Behavior: Behavior normal.            Discharge instructions/Information to patient and family:   See after visit summary for information provided to patient and family.      Provisions for Follow-Up Care:  See after visit summary for information related to follow-up care and any pertinent home health orders.      Mobility at time of Discharge:   Basic Mobility Inpatient Raw Score: 6  JH-HLM Goal: 2: Bed activities/Dependent transfer  JH-HLM Achieved: 1: Laying in bed  HLM Goal NOT achieved. Continue to encourage mobility in post discharge setting.     Disposition:   Other Skilled Nursing Facility at complete care    Planned Readmission: None    Discharge Medications:  See after visit summary for reconciled discharge medications provided to patient and/or family.      Administrative Statements   Discharge Statement:  I " have spent a total time of 45 minutes in caring for this patient on the day of the visit/encounter. .    **Please Note: This note may have been constructed using a voice recognition system**

## 2024-10-07 NOTE — UTILIZATION REVIEW
NOTIFICATION OF ADMISSION DISCHARGE   This is a Notification of Discharge from [facility]. Please be advised that this patient has been discharge from our facility. Below you will find the admission and discharge date and time including the patient’s disposition.   UTILIZATION REVIEW CONTACT:  Anne Davila MA  Utilization   Network Utilization Review Department  Phone: [phone] x carefully listen to the prompts. All voicemails are confidential.  Email: [email or web address]     ADMISSION INFORMATION  PRESENTATION DATE: 9/17/2024  3:58 PM  OBERVATION ADMISSION DATE: N/A  INPATIENT ADMISSION DATE: 9/17/24  7:38 PM   DISCHARGE DATE: 10/4/2024  4:05 PM   DISPOSITION:Non [facility] SNF/TCU/SNU    Network Utilization Review Department  ATTENTION: Please call with any questions or concerns to [phone] and carefully listen to the prompts so that you are directed to the right person. All voicemails are confidential.   For Discharge needs, contact Care Management DC Support Team at [phone] opt. 2  Send all requests for admission clinical reviews, approved or denied determinations and any other requests to dedicated fax number below belonging to the campus where the patient is receiving treatment. List of dedicated fax numbers for the Facilities:  FACILITY NAME UR FAX NUMBER   ADMISSION DENIALS (Administrative/Medical Necessity) [phone]   DISCHARGE SUPPORT TEAM ([facility]) [phone]   PARENT CHILD HEALTH (Maternity/NICU/Pediatrics) [phone]   [facility] [phone]   [facility] [phone]   Critical access hospital [phone]   [facility] [phone]   [facility] [phone]   [facility] [phone]   [facility] [phone]   [facility]  [phone]   [facility] [phone]   [facility] [phone]   [facility] [phone]   [facility] [phone]

## 2024-10-18 ENCOUNTER — APPOINTMENT (EMERGENCY)
Dept: RADIOLOGY | Facility: HOSPITAL | Age: 74
DRG: 853 | End: 2024-10-18
Payer: COMMERCIAL

## 2024-10-18 ENCOUNTER — APPOINTMENT (EMERGENCY)
Dept: CT IMAGING | Facility: HOSPITAL | Age: 74
DRG: 853 | End: 2024-10-18
Payer: COMMERCIAL

## 2024-10-18 ENCOUNTER — HOSPITAL ENCOUNTER (INPATIENT)
Facility: HOSPITAL | Age: 74
LOS: 11 days | Discharge: NURSING, REHAB OR LONG-TERM CARE | DRG: 853 | End: 2024-10-29
Attending: EMERGENCY MEDICINE | Admitting: FAMILY MEDICINE
Payer: COMMERCIAL

## 2024-10-18 DIAGNOSIS — E43 SEVERE PROTEIN-CALORIE MALNUTRITION (HCC): ICD-10-CM

## 2024-10-18 DIAGNOSIS — Z93.1 S/P PERCUTANEOUS ENDOSCOPIC GASTROSTOMY (PEG) TUBE PLACEMENT (HCC): ICD-10-CM

## 2024-10-18 DIAGNOSIS — L98.429 SACRAL ULCER (HCC): ICD-10-CM

## 2024-10-18 DIAGNOSIS — S31.000D WOUND OF SACRAL REGION, SUBSEQUENT ENCOUNTER: ICD-10-CM

## 2024-10-18 DIAGNOSIS — A41.9 SEPSIS (HCC): Primary | ICD-10-CM

## 2024-10-18 DIAGNOSIS — M86.9 OSTEOMYELITIS (HCC): ICD-10-CM

## 2024-10-18 DIAGNOSIS — I48.0 PAROXYSMAL ATRIAL FIBRILLATION (HCC): ICD-10-CM

## 2024-10-18 DIAGNOSIS — E46 MALNUTRITION (HCC): ICD-10-CM

## 2024-10-18 DIAGNOSIS — F32.A DEPRESSION, UNSPECIFIED DEPRESSION TYPE: ICD-10-CM

## 2024-10-18 DIAGNOSIS — S31.000A WOUND OF SACRAL REGION, INITIAL ENCOUNTER: ICD-10-CM

## 2024-10-18 LAB
ALBUMIN SERPL BCG-MCNC: 2.2 G/DL (ref 3.5–5)
ALBUMIN SERPL BCG-MCNC: 2.5 G/DL (ref 3.5–5)
ALP SERPL-CCNC: 80 U/L (ref 34–104)
ALP SERPL-CCNC: 94 U/L (ref 34–104)
ALT SERPL W P-5'-P-CCNC: 76 U/L (ref 7–52)
ALT SERPL W P-5'-P-CCNC: 99 U/L (ref 7–52)
AMORPH URATE CRY URNS QL MICRO: ABNORMAL
ANION GAP SERPL CALCULATED.3IONS-SCNC: 8 MMOL/L (ref 4–13)
ANION GAP SERPL CALCULATED.3IONS-SCNC: 8 MMOL/L (ref 4–13)
APTT PPP: 38 SECONDS (ref 23–34)
AST SERPL W P-5'-P-CCNC: 109 U/L (ref 13–39)
AST SERPL W P-5'-P-CCNC: 68 U/L (ref 13–39)
ATRIAL RATE: 117 BPM
BACTERIA UR QL AUTO: ABNORMAL /HPF
BASOPHILS # BLD AUTO: 0.04 THOUSANDS/ΜL (ref 0–0.1)
BASOPHILS # BLD AUTO: 0.05 THOUSANDS/ΜL (ref 0–0.1)
BASOPHILS NFR BLD AUTO: 0 % (ref 0–1)
BASOPHILS NFR BLD AUTO: 0 % (ref 0–1)
BILIRUB SERPL-MCNC: 0.74 MG/DL (ref 0.2–1)
BILIRUB SERPL-MCNC: 0.81 MG/DL (ref 0.2–1)
BILIRUB UR QL STRIP: NEGATIVE
BUN SERPL-MCNC: 59 MG/DL (ref 5–25)
BUN SERPL-MCNC: 60 MG/DL (ref 5–25)
CALCIUM ALBUM COR SERPL-MCNC: 10 MG/DL (ref 8.3–10.1)
CALCIUM ALBUM COR SERPL-MCNC: 10.6 MG/DL (ref 8.3–10.1)
CALCIUM SERPL-MCNC: 8.6 MG/DL (ref 8.4–10.2)
CALCIUM SERPL-MCNC: 9.4 MG/DL (ref 8.4–10.2)
CHLORIDE SERPL-SCNC: 111 MMOL/L (ref 96–108)
CHLORIDE SERPL-SCNC: 113 MMOL/L (ref 96–108)
CLARITY UR: ABNORMAL
CO2 SERPL-SCNC: 26 MMOL/L (ref 21–32)
CO2 SERPL-SCNC: 28 MMOL/L (ref 21–32)
COLOR UR: ABNORMAL
CREAT SERPL-MCNC: 1.07 MG/DL (ref 0.6–1.3)
CREAT SERPL-MCNC: 1.1 MG/DL (ref 0.6–1.3)
EOSINOPHIL # BLD AUTO: 0.03 THOUSAND/ΜL (ref 0–0.61)
EOSINOPHIL # BLD AUTO: 0.03 THOUSAND/ΜL (ref 0–0.61)
EOSINOPHIL NFR BLD AUTO: 0 % (ref 0–6)
EOSINOPHIL NFR BLD AUTO: 0 % (ref 0–6)
ERYTHROCYTE [DISTWIDTH] IN BLOOD BY AUTOMATED COUNT: 16.1 % (ref 11.6–15.1)
ERYTHROCYTE [DISTWIDTH] IN BLOOD BY AUTOMATED COUNT: 16.1 % (ref 11.6–15.1)
GFR SERPL CREATININE-BSD FRML MDRD: 65 ML/MIN/1.73SQ M
GFR SERPL CREATININE-BSD FRML MDRD: 67 ML/MIN/1.73SQ M
GLUCOSE SERPL-MCNC: 192 MG/DL (ref 65–140)
GLUCOSE SERPL-MCNC: 204 MG/DL (ref 65–140)
GLUCOSE SERPL-MCNC: 208 MG/DL (ref 65–140)
GLUCOSE UR STRIP-MCNC: ABNORMAL MG/DL
HCT VFR BLD AUTO: 30.1 % (ref 36.5–49.3)
HCT VFR BLD AUTO: 33.2 % (ref 36.5–49.3)
HGB BLD-MCNC: 10 G/DL (ref 12–17)
HGB BLD-MCNC: 9.2 G/DL (ref 12–17)
HGB UR QL STRIP.AUTO: NEGATIVE
IMM GRANULOCYTES # BLD AUTO: 0.17 THOUSAND/UL (ref 0–0.2)
IMM GRANULOCYTES # BLD AUTO: 0.21 THOUSAND/UL (ref 0–0.2)
IMM GRANULOCYTES NFR BLD AUTO: 1 % (ref 0–2)
IMM GRANULOCYTES NFR BLD AUTO: 1 % (ref 0–2)
INR PPP: 1.57 (ref 0.85–1.19)
KETONES UR STRIP-MCNC: NEGATIVE MG/DL
LACTATE SERPL-SCNC: 1.6 MMOL/L (ref 0.5–2)
LACTATE SERPL-SCNC: 1.8 MMOL/L (ref 0.5–2)
LEUKOCYTE ESTERASE UR QL STRIP: ABNORMAL
LYMPHOCYTES # BLD AUTO: 1.85 THOUSANDS/ΜL (ref 0.6–4.47)
LYMPHOCYTES # BLD AUTO: 2.13 THOUSANDS/ΜL (ref 0.6–4.47)
LYMPHOCYTES NFR BLD AUTO: 10 % (ref 14–44)
LYMPHOCYTES NFR BLD AUTO: 10 % (ref 14–44)
MAGNESIUM SERPL-MCNC: 2.4 MG/DL (ref 1.9–2.7)
MCH RBC QN AUTO: 28.8 PG (ref 26.8–34.3)
MCH RBC QN AUTO: 29.3 PG (ref 26.8–34.3)
MCHC RBC AUTO-ENTMCNC: 30.1 G/DL (ref 31.4–37.4)
MCHC RBC AUTO-ENTMCNC: 30.6 G/DL (ref 31.4–37.4)
MCV RBC AUTO: 94 FL (ref 82–98)
MCV RBC AUTO: 97 FL (ref 82–98)
MONOCYTES # BLD AUTO: 1.14 THOUSAND/ΜL (ref 0.17–1.22)
MONOCYTES # BLD AUTO: 1.18 THOUSAND/ΜL (ref 0.17–1.22)
MONOCYTES NFR BLD AUTO: 6 % (ref 4–12)
MONOCYTES NFR BLD AUTO: 6 % (ref 4–12)
MUCOUS THREADS UR QL AUTO: ABNORMAL
NEUTROPHILS # BLD AUTO: 16.25 THOUSANDS/ΜL (ref 1.85–7.62)
NEUTROPHILS # BLD AUTO: 18.01 THOUSANDS/ΜL (ref 1.85–7.62)
NEUTS SEG NFR BLD AUTO: 83 % (ref 43–75)
NEUTS SEG NFR BLD AUTO: 83 % (ref 43–75)
NITRITE UR QL STRIP: NEGATIVE
NON-SQ EPI CELLS URNS QL MICRO: ABNORMAL /HPF
NRBC BLD AUTO-RTO: 0 /100 WBCS
NRBC BLD AUTO-RTO: 0 /100 WBCS
PH UR STRIP.AUTO: 8.5 [PH]
PHOSPHATE SERPL-MCNC: 4.2 MG/DL (ref 2.3–4.1)
PLATELET # BLD AUTO: 445 THOUSANDS/UL (ref 149–390)
PLATELET # BLD AUTO: 530 THOUSANDS/UL (ref 149–390)
PMV BLD AUTO: 9.5 FL (ref 8.9–12.7)
PMV BLD AUTO: 9.5 FL (ref 8.9–12.7)
POTASSIUM SERPL-SCNC: 3.7 MMOL/L (ref 3.5–5.3)
POTASSIUM SERPL-SCNC: 3.9 MMOL/L (ref 3.5–5.3)
PROCALCITONIN SERPL-MCNC: 0.72 NG/ML
PROT SERPL-MCNC: 7.6 G/DL (ref 6.4–8.4)
PROT SERPL-MCNC: 8.7 G/DL (ref 6.4–8.4)
PROT UR STRIP-MCNC: ABNORMAL MG/DL
PROTHROMBIN TIME: 18.8 SECONDS (ref 12.3–15)
QRS AXIS: 62 DEGREES
QRSD INTERVAL: 80 MS
QT INTERVAL: 364 MS
QTC INTERVAL: 495 MS
RBC # BLD AUTO: 3.19 MILLION/UL (ref 3.88–5.62)
RBC # BLD AUTO: 3.41 MILLION/UL (ref 3.88–5.62)
RBC #/AREA URNS AUTO: ABNORMAL /HPF
SODIUM SERPL-SCNC: 147 MMOL/L (ref 135–147)
SODIUM SERPL-SCNC: 147 MMOL/L (ref 135–147)
SP GR UR STRIP.AUTO: 1.02 (ref 1–1.03)
T WAVE AXIS: 23 DEGREES
UROBILINOGEN UR STRIP-ACNC: 2 MG/DL
VENTRICULAR RATE: 111 BPM
WBC # BLD AUTO: 19.52 THOUSAND/UL (ref 4.31–10.16)
WBC # BLD AUTO: 21.57 THOUSAND/UL (ref 4.31–10.16)
WBC #/AREA URNS AUTO: ABNORMAL /HPF
WBC CLUMPS # UR AUTO: PRESENT /UL

## 2024-10-18 PROCEDURE — 85610 PROTHROMBIN TIME: CPT | Performed by: EMERGENCY MEDICINE

## 2024-10-18 PROCEDURE — 36415 COLL VENOUS BLD VENIPUNCTURE: CPT | Performed by: EMERGENCY MEDICINE

## 2024-10-18 PROCEDURE — 83605 ASSAY OF LACTIC ACID: CPT | Performed by: EMERGENCY MEDICINE

## 2024-10-18 PROCEDURE — 83735 ASSAY OF MAGNESIUM: CPT | Performed by: EMERGENCY MEDICINE

## 2024-10-18 PROCEDURE — 85025 COMPLETE CBC W/AUTO DIFF WBC: CPT | Performed by: EMERGENCY MEDICINE

## 2024-10-18 PROCEDURE — 81001 URINALYSIS AUTO W/SCOPE: CPT | Performed by: EMERGENCY MEDICINE

## 2024-10-18 PROCEDURE — 93005 ELECTROCARDIOGRAM TRACING: CPT

## 2024-10-18 PROCEDURE — 99285 EMERGENCY DEPT VISIT HI MDM: CPT

## 2024-10-18 PROCEDURE — 96367 TX/PROPH/DG ADDL SEQ IV INF: CPT

## 2024-10-18 PROCEDURE — 96365 THER/PROPH/DIAG IV INF INIT: CPT

## 2024-10-18 PROCEDURE — 93010 ELECTROCARDIOGRAM REPORT: CPT

## 2024-10-18 PROCEDURE — 87040 BLOOD CULTURE FOR BACTERIA: CPT | Performed by: EMERGENCY MEDICINE

## 2024-10-18 PROCEDURE — 82948 REAGENT STRIP/BLOOD GLUCOSE: CPT

## 2024-10-18 PROCEDURE — 74177 CT ABD & PELVIS W/CONTRAST: CPT

## 2024-10-18 PROCEDURE — 99223 1ST HOSP IP/OBS HIGH 75: CPT | Performed by: HOSPITALIST

## 2024-10-18 PROCEDURE — 84145 PROCALCITONIN (PCT): CPT | Performed by: EMERGENCY MEDICINE

## 2024-10-18 PROCEDURE — 85025 COMPLETE CBC W/AUTO DIFF WBC: CPT

## 2024-10-18 PROCEDURE — 83605 ASSAY OF LACTIC ACID: CPT

## 2024-10-18 PROCEDURE — 96366 THER/PROPH/DIAG IV INF ADDON: CPT

## 2024-10-18 PROCEDURE — 99291 CRITICAL CARE FIRST HOUR: CPT | Performed by: EMERGENCY MEDICINE

## 2024-10-18 PROCEDURE — 87086 URINE CULTURE/COLONY COUNT: CPT | Performed by: EMERGENCY MEDICINE

## 2024-10-18 PROCEDURE — 87076 CULTURE ANAEROBE IDENT EACH: CPT | Performed by: EMERGENCY MEDICINE

## 2024-10-18 PROCEDURE — 87154 CUL TYP ID BLD PTHGN 6+ TRGT: CPT | Performed by: EMERGENCY MEDICINE

## 2024-10-18 PROCEDURE — 84100 ASSAY OF PHOSPHORUS: CPT | Performed by: EMERGENCY MEDICINE

## 2024-10-18 PROCEDURE — 87185 SC STD ENZYME DETCJ PER NZM: CPT | Performed by: EMERGENCY MEDICINE

## 2024-10-18 PROCEDURE — 99223 1ST HOSP IP/OBS HIGH 75: CPT | Performed by: SPECIALIST

## 2024-10-18 PROCEDURE — 80053 COMPREHEN METABOLIC PANEL: CPT

## 2024-10-18 PROCEDURE — 87186 SC STD MICRODIL/AGAR DIL: CPT | Performed by: EMERGENCY MEDICINE

## 2024-10-18 PROCEDURE — 85730 THROMBOPLASTIN TIME PARTIAL: CPT | Performed by: EMERGENCY MEDICINE

## 2024-10-18 PROCEDURE — 71045 X-RAY EXAM CHEST 1 VIEW: CPT

## 2024-10-18 PROCEDURE — 80053 COMPREHEN METABOLIC PANEL: CPT | Performed by: EMERGENCY MEDICINE

## 2024-10-18 PROCEDURE — 87077 CULTURE AEROBIC IDENTIFY: CPT | Performed by: EMERGENCY MEDICINE

## 2024-10-18 RX ORDER — ACETAMINOPHEN 325 MG/1
650 TABLET ORAL EVERY 6 HOURS PRN
Status: DISCONTINUED | OUTPATIENT
Start: 2024-10-18 | End: 2024-10-18 | Stop reason: SDUPTHER

## 2024-10-18 RX ORDER — BISACODYL 10 MG
10 SUPPOSITORY, RECTAL RECTAL DAILY PRN
Status: DISCONTINUED | OUTPATIENT
Start: 2024-10-18 | End: 2024-10-19

## 2024-10-18 RX ORDER — INSULIN LISPRO 100 [IU]/ML
1-5 INJECTION, SOLUTION INTRAVENOUS; SUBCUTANEOUS
Status: DISCONTINUED | OUTPATIENT
Start: 2024-10-19 | End: 2024-10-25

## 2024-10-18 RX ORDER — SODIUM HYPOCHLORITE 2.5 MG/ML
1 SOLUTION TOPICAL DAILY
Status: DISCONTINUED | OUTPATIENT
Start: 2024-10-19 | End: 2024-10-21

## 2024-10-18 RX ORDER — SENNOSIDES 8.6 MG
2 TABLET ORAL DAILY PRN
Status: DISCONTINUED | OUTPATIENT
Start: 2024-10-18 | End: 2024-10-27

## 2024-10-18 RX ORDER — METRONIDAZOLE 500 MG/1
500 TABLET ORAL EVERY 12 HOURS SCHEDULED
Status: DISCONTINUED | OUTPATIENT
Start: 2024-10-19 | End: 2024-10-18

## 2024-10-18 RX ORDER — SODIUM CHLORIDE, SODIUM GLUCONATE, SODIUM ACETATE, POTASSIUM CHLORIDE, MAGNESIUM CHLORIDE, SODIUM PHOSPHATE, DIBASIC, AND POTASSIUM PHOSPHATE .53; .5; .37; .037; .03; .012; .00082 G/100ML; G/100ML; G/100ML; G/100ML; G/100ML; G/100ML; G/100ML
1000 INJECTION, SOLUTION INTRAVENOUS ONCE
Status: COMPLETED | OUTPATIENT
Start: 2024-10-18 | End: 2024-10-19

## 2024-10-18 RX ORDER — ATORVASTATIN CALCIUM 80 MG/1
80 TABLET, FILM COATED ORAL EVERY EVENING
Status: DISCONTINUED | OUTPATIENT
Start: 2024-10-18 | End: 2024-10-27

## 2024-10-18 RX ORDER — ACETAMINOPHEN 325 MG/1
650 TABLET ORAL EVERY 4 HOURS PRN
Status: DISCONTINUED | OUTPATIENT
Start: 2024-10-18 | End: 2024-10-27

## 2024-10-18 RX ORDER — TAMSULOSIN HYDROCHLORIDE 0.4 MG/1
0.4 CAPSULE ORAL
Status: DISCONTINUED | OUTPATIENT
Start: 2024-10-19 | End: 2024-10-29 | Stop reason: HOSPADM

## 2024-10-18 RX ORDER — ESCITALOPRAM OXALATE 10 MG/1
10 TABLET ORAL DAILY
Status: DISCONTINUED | OUTPATIENT
Start: 2024-10-19 | End: 2024-10-27

## 2024-10-18 RX ORDER — FINASTERIDE 5 MG/1
5 TABLET, FILM COATED ORAL DAILY
Status: DISCONTINUED | OUTPATIENT
Start: 2024-10-19 | End: 2024-10-29 | Stop reason: HOSPADM

## 2024-10-18 RX ORDER — NYSTATIN 100000 [USP'U]/ML
500000 SUSPENSION ORAL 4 TIMES DAILY
COMMUNITY

## 2024-10-18 RX ORDER — ONDANSETRON 2 MG/ML
4 INJECTION INTRAMUSCULAR; INTRAVENOUS EVERY 6 HOURS PRN
Status: DISCONTINUED | OUTPATIENT
Start: 2024-10-18 | End: 2024-10-29 | Stop reason: HOSPADM

## 2024-10-18 RX ORDER — SODIUM CHLORIDE 9 MG/ML
100 INJECTION, SOLUTION INTRAVENOUS CONTINUOUS
Status: DISCONTINUED | OUTPATIENT
Start: 2024-10-18 | End: 2024-10-18

## 2024-10-18 RX ORDER — SODIUM CHLORIDE, SODIUM GLUCONATE, SODIUM ACETATE, POTASSIUM CHLORIDE, MAGNESIUM CHLORIDE, SODIUM PHOSPHATE, DIBASIC, AND POTASSIUM PHOSPHATE .53; .5; .37; .037; .03; .012; .00082 G/100ML; G/100ML; G/100ML; G/100ML; G/100ML; G/100ML; G/100ML
1000 INJECTION, SOLUTION INTRAVENOUS ONCE
Status: COMPLETED | OUTPATIENT
Start: 2024-10-18 | End: 2024-10-18

## 2024-10-18 RX ORDER — ASPIRIN 81 MG/1
81 TABLET, CHEWABLE ORAL DAILY
Status: DISCONTINUED | OUTPATIENT
Start: 2024-10-19 | End: 2024-10-27

## 2024-10-18 RX ORDER — INSULIN LISPRO 100 [IU]/ML
1-5 INJECTION, SOLUTION INTRAVENOUS; SUBCUTANEOUS
Status: DISCONTINUED | OUTPATIENT
Start: 2024-10-18 | End: 2024-10-25

## 2024-10-18 RX ORDER — ERTUGLIFLOZIN 15 MG/1
15 TABLET, FILM COATED ORAL DAILY
COMMUNITY

## 2024-10-18 RX ORDER — DOCUSATE SODIUM 100 MG/1
100 CAPSULE, LIQUID FILLED ORAL DAILY
Status: DISCONTINUED | OUTPATIENT
Start: 2024-10-19 | End: 2024-10-20

## 2024-10-18 RX ORDER — METOPROLOL SUCCINATE 25 MG/1
25 TABLET, EXTENDED RELEASE ORAL DAILY
Status: DISCONTINUED | OUTPATIENT
Start: 2024-10-19 | End: 2024-10-23

## 2024-10-18 RX ORDER — NICOTINE POLACRILEX 4 MG
15 LOZENGE BUCCAL ONCE
COMMUNITY

## 2024-10-18 RX ORDER — IBUPROFEN 600 MG/1
TABLET ORAL
COMMUNITY

## 2024-10-18 RX ORDER — METRONIDAZOLE 500 MG/100ML
500 INJECTION, SOLUTION INTRAVENOUS ONCE
Status: COMPLETED | OUTPATIENT
Start: 2024-10-18 | End: 2024-10-18

## 2024-10-18 RX ORDER — SODIUM CHLORIDE, SODIUM GLUCONATE, SODIUM ACETATE, POTASSIUM CHLORIDE, MAGNESIUM CHLORIDE, SODIUM PHOSPHATE, DIBASIC, AND POTASSIUM PHOSPHATE .53; .5; .37; .037; .03; .012; .00082 G/100ML; G/100ML; G/100ML; G/100ML; G/100ML; G/100ML; G/100ML
100 INJECTION, SOLUTION INTRAVENOUS CONTINUOUS
Status: DISCONTINUED | OUTPATIENT
Start: 2024-10-18 | End: 2024-10-20

## 2024-10-18 RX ORDER — BISACODYL 10 MG
10 SUPPOSITORY, RECTAL RECTAL DAILY PRN
Status: DISCONTINUED | OUTPATIENT
Start: 2024-10-18 | End: 2024-10-18 | Stop reason: SDUPTHER

## 2024-10-18 RX ORDER — VANCOMYCIN HYDROCHLORIDE 750 MG/150ML
750 INJECTION, SOLUTION INTRAVENOUS EVERY 12 HOURS
Status: DISCONTINUED | OUTPATIENT
Start: 2024-10-19 | End: 2024-10-19

## 2024-10-18 RX ORDER — MIRTAZAPINE 15 MG/1
15 TABLET, FILM COATED ORAL
Status: DISCONTINUED | OUTPATIENT
Start: 2024-10-18 | End: 2024-10-18

## 2024-10-18 RX ORDER — LISINOPRIL 10 MG/1
10 TABLET ORAL DAILY
Status: DISCONTINUED | OUTPATIENT
Start: 2024-10-19 | End: 2024-10-27

## 2024-10-18 RX ORDER — METRONIDAZOLE 500 MG/100ML
500 INJECTION, SOLUTION INTRAVENOUS EVERY 12 HOURS
Status: DISCONTINUED | OUTPATIENT
Start: 2024-10-19 | End: 2024-10-20

## 2024-10-18 RX ADMIN — SODIUM CHLORIDE, SODIUM GLUCONATE, SODIUM ACETATE, POTASSIUM CHLORIDE, MAGNESIUM CHLORIDE, SODIUM PHOSPHATE, DIBASIC, AND POTASSIUM PHOSPHATE 1000 ML: .53; .5; .37; .037; .03; .012; .00082 INJECTION, SOLUTION INTRAVENOUS at 22:06

## 2024-10-18 RX ADMIN — CEFEPIME 2000 MG: 2 INJECTION, POWDER, FOR SOLUTION INTRAVENOUS at 15:08

## 2024-10-18 RX ADMIN — VANCOMYCIN HYDROCHLORIDE 2000 MG: 1 INJECTION, POWDER, LYOPHILIZED, FOR SOLUTION INTRAVENOUS at 16:48

## 2024-10-18 RX ADMIN — METRONIDAZOLE 500 MG: 500 INJECTION, SOLUTION INTRAVENOUS at 15:51

## 2024-10-18 RX ADMIN — IOHEXOL 100 ML: 350 INJECTION, SOLUTION INTRAVENOUS at 16:15

## 2024-10-18 RX ADMIN — SODIUM CHLORIDE, SODIUM GLUCONATE, SODIUM ACETATE, POTASSIUM CHLORIDE, MAGNESIUM CHLORIDE, SODIUM PHOSPHATE, DIBASIC, AND POTASSIUM PHOSPHATE 1000 ML: .53; .5; .37; .037; .03; .012; .00082 INJECTION, SOLUTION INTRAVENOUS at 23:26

## 2024-10-18 RX ADMIN — CEFEPIME 1000 MG: 1 INJECTION, POWDER, FOR SOLUTION INTRAMUSCULAR; INTRAVENOUS at 21:20

## 2024-10-18 NOTE — CONSULTS
"Consultation - Surgery-General   Name: Drew Santos 73 y.o. male I MRN: [id number]  Unit/Bed#: ED-24 I Date of Admission: 10/18/2024   Date of Service: 10/18/2024 I Hospital Day: 0   Consult to surgery general  Consult performed by: Peter Holland MD  Consult ordered by: Beka Petit DO  Reason for consult: stage 4 sacral ulcer  Assessment/Recommendations: Sacral wound w/ exposed coccyx with slough and necrotic superficial tissue      Physician Requesting Evaluation: Zelalem Bruno DO   Reason for Evaluation / Principal Problem: sacral ulcer wound    Assessment & Plan  Sepsis  Leukocytosis at admission WBC=21. CXR collected without convincing evidence of pneumonia. Although sacral ulcer could be contributing to elevated WBC, wound appears non-infected and would suspect infection likely related to the patients dirty UA and likely UTI.    -daily CBC  -trend fever curve  -follow blood & urine cxs  -daily wound care as written  -abx per primary: vanco/cefepime/flagyl (10/18- )  Type 2 diabetes mellitus without complication, without long-term current use of insulin ([facility])  Lab Results   Component Value Date    HGBA1C 6.2 (H) 09/17/2024       No results for input(s): \"POCGLU\" in the last 72 hours.    Blood Sugar Average: Last 72 hrs:    -continue to trend sugars  -agree with SSI while inpatient  -hypoglycemic protocol  Paroxysmal atrial fibrillation (HCC)  Tachycardic in ED. EKG collected 10/18.    -recommend holding eliquis while inpatient if patient requires surgical debridement of sacral ulcer  -VTE ppx to be initiated by primary service, however appropriate for SQH 5000 TID from surgical perspective  -CTM vitals  -management per primary  Sacral ulcer (HCC)  Sacral ulcer previously debrided by our surgical service at bedside (most recently on 9/25) where granulation tissue was exposed. Gross size/depth of wound is visually unchanged between photos taken on 10/18 and last hospitalization. Patient " discharged to nursing care facility with wound care service follow up. Return to ED w/ necrotic superficial slough of wound.    -please collect updated photos of wound evolution with debridements  -recommend initiation of 0.25% Dakin soaked guaze packing TID while inpatient  -surgical debridement may be required given radiographic findings, however will proceed once Eliquis has been held for multiple days. Until then will sanitize and softly debride with dressing changes  -would recommend wound care consult for continued assistance for continued services while inpatient  -abx selection per primary team  -rotate patient q2hrs to prevent worsening of or development of new sacral ulcer  -pain and nausea control prn  Surgery-General service will follow.    Peter Holland MD  General Surgery  10/18/24  8:08 PM      History of Present Illness   Drew Santos is a 73 y.o. male with complicated medical history including A-fib (taking Eliquis and aspirin) and recent hospitalization for bacteremia and sacral pressure ulcer who was discharged on 10/4 to a nursing facility and outpatient wound care follow-up who returned to the ED today with concerns of altered mental status and leukocytosis.  General surgery was consulted due to new radiographic findings of bilateral gas collections around the medial aspects of his gluteal musculature closest to his known sacral wound.  Admitting WBC=21, elevated procalcitonin, CXR negative for obvious pneumonia, and dirty UA suggestive of UTI for which blood cultures and urine culture were collected.  Patient is to be admitted to medicine service with surgical consult for recommendations for management of his sacral wound.    Review of Systems   Reason unable to perform ROS: patient non-verbal and no care-provider present.     Patient's prior history (PMH, PSH, SH, FH, etc) not obtainable due to Altered mental status, patient non-verbal    Objective :  Temp:  [97.2 °F (36.2 °C)] 97.2 °F (36.2  °C)  HR:  [107-124] 124  BP: (108-138)/(60-92) 112/70  Resp:  [18-20] 20  SpO2:  [97 %-100 %] 98 %  O2 Device: None (Room air)    Physical Exam:  GENERAL APPEARANCE: well developed, elderly, non-verbal male lying supine in bed in NAD. Not engaged with exam.   HEENT: NCAT; EOMI; normal external nose & ears; MMM  NECK: Supple, normal ROM.  CARDIOVASCULAR: Tachycardic (ZS=118j). Grossly well perfused.  LUNGS/CHEST: Symmetric chest rise/fall with respirations.  ABD: Soft; non-distended; non-tender.  EXT: No observable deformities in 4/4 extremities. No edema.  NEURO: non-verbal, eyes open however non-attentive to exam. Able to answer yes/no questions by squeezing hand.  SKIN: Warm, dry and well perfused; no jaundice. Approximately 5x6cm sacral ulcer with exposed coccyx and superficial slough of tissues on wound bed with healthy bleeding underneath (visualized in photo below) w/ circumferential undermining around entire wound, superficial skin tears around lower-back likely adhesion related. New 1x1 cm focus of different sacral wound on L gluteal cheek (hidden under guaze in photo below)    ED 10/18 -- sacral wound:        Lab Results: I have reviewed the following results:  Recent Labs     10/18/24  1445   WBC 21.57*   HGB 10.0*   HCT 33.2*   *   SODIUM 147   K 3.9   *   CO2 28   BUN 60*   CREATININE 1.07   GLUC 204*   MG 2.4   PHOS 4.2*   *   ALT 99*   ALB 2.5*   TBILI 0.74   ALKPHOS 94   PTT 38*   INR 1.57*   LACTICACID 1.8       Imaging Results Review: I reviewed radiology reports from this admission including: chest xray and CT abdomen/pelvis.\    XR chest portable   Final Result      Limited study. Some atelectasis is present at the right base. No convincing evidence of pneumonia.            Workstation performed: [id number]         CT abdomen pelvis with contrast   Final Result      Large sacral decubitus ulcer extending to the coccyx with soft tissue gas now seen extending into the right and  left medial aspects of the gluteus jac muscles. No drainable fluid collection.      Erosion of the coccyx suggestive of osteomyelitis.      Additional findings as above.         Workstation performed: [id number]           Other Study Results Review: EKG was reviewed.     VTE Pharmacologic Prophylaxis: VTE covered by:    None    VTE Mechanical Prophylaxis: sequential compression device

## 2024-10-18 NOTE — ASSESSMENT & PLAN NOTE
Leukocytosis at admission WBC=21. CXR collected without convincing evidence of pneumonia. Although sacral ulcer could be contributing to elevated WBC, wound appears non-infected and would suspect infection likely related to the patients dirty UA and likely UTI.    -daily CBC  -trend fever curve  -follow blood & urine cxs  -daily wound care as written  -abx per primary: vanco/cefepime/flagyl (10/18- )

## 2024-10-18 NOTE — ASSESSMENT & PLAN NOTE
Patient has at least 2 sacral ulcers.  At least one of them is stage IV.  Possible osteomyelitis.    Surgery consulted.    Consult wound care

## 2024-10-18 NOTE — ASSESSMENT & PLAN NOTE
Sacral ulcer previously debrided by our surgical service at bedside (most recently on 9/25) where granulation tissue was exposed. Gross size/depth of wound is visually unchanged between photos taken on 10/18 and last hospitalization. Patient discharged to nursing care facility with wound care service follow up. Return to ED w/ necrotic superficial slough of wound.    -please collect updated photos of wound evolution with debridements  -recommend initiation of 0.25% Dakin soaked guaze packing TID while inpatient  -surgical debridement may be required given radiographic findings, however will proceed once Eliquis has been held for multiple days. Until then will sanitize and softly debride with dressing changes  -would recommend wound care consult for continued assistance for continued services while inpatient  -abx selection per primary team  -rotate patient q2hrs to prevent worsening of or development of new sacral ulcer  -pain and nausea control prn

## 2024-10-18 NOTE — ED PROVIDER NOTES
Time reflects when diagnosis was documented in both MDM as applicable and the Disposition within this note       Time User Action Codes Description Comment    10/18/2024  5:34 PM Beka Petit Add [A41.9] Sepsis (HCC)     10/18/2024  5:34 PM Beka Petit [M86.9] Osteomyelitis (HCC)     10/18/2024  5:34 PM Beka Petit [S31.000A] Wound of sacral region, initial encounter     10/18/2024  7:18 PM Zelalem Bruno [L98.429] Sacral ulcer (HCC)           ED Disposition       ED Disposition   Admit    Condition   Stable    Date/Time   Fri Oct 18, 2024  6:43 PM    Comment   Case was discussed with [facility] and the patient's admission status was agreed to be Admission Status: inpatient status to the service of Dr. Bruno .               Assessment & Plan       Medical Decision Making  Amount and/or Complexity of Data Reviewed  Labs: ordered. Decision-making details documented in ED Course.  Radiology: ordered. Decision-making details documented in ED Course.    Risk  Prescription drug management.  Decision regarding hospitalization.      73-year-old male with leukocytosis and elevated procalcitonin in the setting of stage IV sacral wound and recent bacteremia presents with worsening altered mental status.  Patient is ill-appearing and tachycardic, has dry mucous membranes.  He has a significant sacral wound that is foul-smelling.  Plan for sepsis workup, labs, IV antibiotics, imaging of his abdomen pelvis to further evaluate any sequela or complications from his sacral wound.  Patient will need admission to the hospital.  Discussed with wife who understands and is agreeable with plan.    Medications   sodium hypochlorite (DAKIN'S HALF-STRENGTH) 0.25 percent topical solution 1 Application (has no administration in time range)   cefepime (MAXIPIME) 1 g/50 mL dextrose IVPB (has no administration in time range)   metroNIDAZOLE (FLAGYL) tablet 500 mg (has no administration in time range)   vancomycin (VANCOCIN)  1500 mg in sodium chloride 0.9% 250 mL IVPB (has no administration in time range)   acetaminophen (TYLENOL) tablet 650 mg (has no administration in time range)   insulin lispro (HumALOG/ADMELOG) 100 units/mL subcutaneous injection 1-5 Units (has no administration in time range)   insulin lispro (HumALOG/ADMELOG) 100 units/mL subcutaneous injection 1-5 Units (has no administration in time range)   cefepime (MAXIPIME) 2 g/50 mL dextrose IVPB (0 mg Intravenous Stopped 10/18/24 1555)   vancomycin (VANCOCIN) 2,000 mg in sodium chloride 0.9 % 500 mL IVPB (0 mg Intravenous Stopped 10/18/24 1923)   metroNIDAZOLE (FLAGYL) IVPB (premix) 500 mg 100 mL (0 mg Intravenous Stopped 10/18/24 1648)   iohexol (OMNIPAQUE) 350 MG/ML injection (SINGLE-DOSE) 100 mL (100 mL Intravenous Given 10/18/24 1615)       ED Risk Strat Scores                           SBIRT 20yo+      Flowsheet Row Most Recent Value   Initial Alcohol Screen: US AUDIT-C     1. How often do you have a drink containing alcohol? 0 Filed at: 10/18/2024 1355   2. How many drinks containing alcohol do you have on a typical day you are drinking?  0 Filed at: 10/18/2024 1355   3b. FEMALE Any Age, or MALE 65+: How often do you have 4 or more drinks on one occassion? 0 Filed at: 10/18/2024 1355   Audit-C Score 0 Filed at: 10/18/2024 1355   NIR: How many times in the past year have you...    Used an illegal drug or used a prescription medication for non-medical reasons? Never Filed at: 10/18/2024 1351                            History of Present Illness       Chief Complaint   Patient presents with    Failure To Thrive     Patient arrives via EMS from [facility] with reports of failure to thrive. Per EMS patient has been refusing to eat for one week and has been declining and also has a sacral wound. HX of stroke with left sided deficits.        Past Medical History:   Diagnosis Date    Atherosclerotic heart disease of native coronary artery without angina pectoris     Atrial  fibrillation (HCC)     Cerebral infarction (HCC)     Constipation     Depression     Diabetes mellitus (HCC)     Hemiplegia and hemiparesis following cerebral infarction affecting left non-dominant side (HCC)     Hyperlipidemia     Hypertension     Pressure ulcer of sacral region, stage 3 (HCC)     Prostatic hyperplasia     Sepsis (HCC)     Stroke (HCC)     TIA (transient ischemic attack)     Urinary retention     Vitamin D deficiency       History reviewed. No pertinent surgical history.   History reviewed. No pertinent family history.   Social History     Tobacco Use    Smoking status: Never     Passive exposure: Never    Smokeless tobacco: Never   Vaping Use    Vaping status: Never Used   Substance Use Topics    Alcohol use: Not Currently    Drug use: Never      E-Cigarette/Vaping    E-Cigarette Use Never User       E-Cigarette/Vaping Substances    Nicotine No     THC No     CBD No     Flavoring No     Other No     Unknown No       I have reviewed and agree with the history as documented.       73 YOM, recent admission for COVID and polymicrobial bacteremia likely secondary to sacral wound with possible fistulous connection. S/P bedside debridement and also received 2 weeks of ABX through 10/3. Returns to the ED for worsening mental status, not eating. He had labs done at his nursing facility which showed leukocytosis of 18.2 and procalcitonin of 1.17.  Wife is also present and able to say that he is not at his baseline.  He is normally able to be more alert and can participate in feeding.  However recently he is not doing that.         HPI    Review of Systems   Unable to perform ROS: Mental status change           Objective       ED Triage Vitals [10/18/24 1326]   Temperature Pulse Blood Pressure Respirations SpO2 Patient Position - Orthostatic VS   (!) 97.2 °F (36.2 °C) (!) 107 128/92 20 97 % Lying      Temp Source Heart Rate Source BP Location FiO2 (%) Pain Score    Axillary Monitor Right arm -- --       Vitals      Date and Time Temp Pulse SpO2 Resp BP Pain Score FACES Pain Rating User   10/18/24 1800 -- 118 97 % 18 108/60 -- -- AMB   10/18/24 1700 -- 120 100 % 18 121/67 -- -- AMB   10/18/24 1630 -- 112 100 % 18 138/76 -- -- EK   10/18/24 1555 -- 110 100 % 18 119/72 -- -- AMB   10/18/24 1530 -- 110 -- 20 119/72 -- -- AMB   10/18/24 1430 -- 112 -- 18 129/70 -- -- AMB   10/18/24 1326 97.2 °F (36.2 °C) 107 97 % 20 128/92 -- -- AMB            Physical Exam  Vitals and nursing note reviewed.   Constitutional:       Appearance: He is ill-appearing.   HENT:      Head: Normocephalic and atraumatic.      Right Ear: External ear normal.      Left Ear: External ear normal.      Nose: Nose normal.   Eyes:      Pupils: Pupils are equal, round, and reactive to light.   Cardiovascular:      Rate and Rhythm: Tachycardia present.      Pulses: Normal pulses.   Pulmonary:      Effort: Pulmonary effort is normal.      Breath sounds: Normal breath sounds.   Abdominal:      General: Abdomen is flat.      Palpations: Abdomen is soft.   Musculoskeletal:         General: Normal range of motion.      Cervical back: Neck supple.      Right lower leg: No edema.      Left lower leg: No edema.   Skin:     General: Skin is warm and dry.      Capillary Refill: Capillary refill takes less than 2 seconds.             Comments: Stage IV sacral wound, foul smelling   Neurological:      Mental Status: He is lethargic and disoriented.         Results Reviewed       Procedure Component Value Units Date/Time    Urine Microscopic [[id number]]  (Abnormal) Collected: 10/18/24 1500    Lab Status: Final result Specimen: Urine, Indwelling Parra Catheter Updated: 10/18/24 1635     RBC, UA 20-30 /hpf      WBC, UA Innumerable /hpf      Epithelial Cells Occasional /hpf      Bacteria, UA Innumerable /hpf      MUCUS THREADS Innumerable     Amorphous Crystals, UA Occasional     WBC Clumps Present    Urine culture [[id number]] Collected: 10/18/24 1500    Lab Status:  In process Specimen: Urine, Indwelling Parra Catheter Updated: 10/18/24 1635    Procalcitonin [[id number]]  (Abnormal) Collected: 10/18/24 1445    Lab Status: Final result Specimen: Blood from Arm, Left Updated: 10/18/24 1542     Procalcitonin 0.72 ng/ml     Lactic acid [[id number]]  (Normal) Collected: 10/18/24 1445    Lab Status: Final result Specimen: Blood from Arm, Left Updated: 10/18/24 1536     LACTIC ACID 1.8 mmol/L     Narrative:      Result may be elevated if tourniquet was used during collection.    Comprehensive metabolic panel [[id number]]  (Abnormal) Collected: 10/18/24 1445    Lab Status: Final result Specimen: Blood from Arm, Left Updated: 10/18/24 1535     Sodium 147 mmol/L      Potassium 3.9 mmol/L      Chloride 111 mmol/L      CO2 28 mmol/L      ANION GAP 8 mmol/L      BUN 60 mg/dL      Creatinine 1.07 mg/dL      Glucose 204 mg/dL      Calcium 9.4 mg/dL      Corrected Calcium 10.6 mg/dL       U/L      ALT 99 U/L      Alkaline Phosphatase 94 U/L      Total Protein 8.7 g/dL      Albumin 2.5 g/dL      Total Bilirubin 0.74 mg/dL      eGFR 67 ml/min/1.73sq m     Narrative:      National Kid[employer] Foundation guidelines for Chronic Kidney Disease (CKD):     Stage 1 with normal or high GFR (GFR > 90 mL/min/1.73 square meters)    Stage 2 Mild CKD (GFR = 60-89 mL/min/1.73 square meters)    Stage 3A Moderate CKD (GFR = 45-59 mL/min/1.73 square meters)    Stage 3B Moderate CKD (GFR = 30-44 mL/min/1.73 square meters)    Stage 4 Severe CKD (GFR = 15-29 mL/min/1.73 square meters)    Stage 5 End Stage CKD (GFR <15 mL/min/1.73 square meters)  Note: GFR calculation is accurate only with a steady state creatinine    Magnesium [[id number]]  (Normal) Collected: 10/18/24 1445    Lab Status: Final result Specimen: Blood from Arm, Left Updated: 10/18/24 1535     Magnesium 2.4 mg/dL     Phosphorus [[id number]]  (Abnormal) Collected: 10/18/24 1445    Lab Status: Final result Specimen: Blood from Arm, Left Updated:  10/18/24 1535     Phosphorus 4.2 mg/dL     UA w Reflex to Microscopic w Reflex to Culture [[id number]]  (Abnormal) Collected: 10/18/24 1500    Lab Status: Final result Specimen: Urine, Indwelling Parra Catheter Updated: 10/18/24 1530     Color, UA Light Orange     Clarity, UA Extra Turbid     Specific Gravity, UA 1.021     pH, UA 8.5     Leukocytes, UA Large     Nitrite, UA Negative     Protein, UA >=600 (4+) mg/dl      Glucose,  (3/10%) mg/dl      Ketones, UA Negative mg/dl      Urobilinogen, UA 2.0 mg/dl      Bilirubin, UA Negative     Occult Blood, UA Negative    Protime-INR [[id number]]  (Abnormal) Collected: 10/18/24 1445    Lab Status: Final result Specimen: Blood from Arm, Left Updated: 10/18/24 1530     Protime 18.8 seconds      INR 1.57    Narrative:      INR Therapeutic Range    Indication                                             INR Range      Atrial Fibrillation                                               2.0-3.0  Hypercoagulable State                                    2.0.2.3  Left Ventricular Asist Device                            2.0-3.0  Mechanical Heart Valve                                  -    Aortic(with afib, MI, embolism, HF, LA enlargement,    and/or coagulopathy)                                     2.0-3.0 (2.5-3.5)     Mitral                                                             2.5-3.5  Prosthetic/Bioprosthetic Heart Valve               2.0-3.0  Venous thromboembolism (VTE: VT, PE        2.0-3.0    APTT [[id number]]  (Abnormal) Collected: 10/18/24 1445    Lab Status: Final result Specimen: Blood from Arm, Left Updated: 10/18/24 1530     PTT 38 seconds     CBC and differential [[id number]]  (Abnormal) Collected: 10/18/24 1445    Lab Status: Final result Specimen: Blood from Arm, Left Updated: 10/18/24 1512     WBC 21.57 Thousand/uL      RBC 3.41 Million/uL      Hemoglobin 10.0 g/dL      Hematocrit 33.2 %      MCV 97 fL      MCH 29.3 pg      MCHC 30.1 g/dL      RDW 16.1 %       MPV 9.5 fL      Platelets 530 Thousands/uL      nRBC 0 /100 WBCs      Segmented % 83 %      Immature Grans % 1 %      Lymphocytes % 10 %      Monocytes % 6 %      Eosinophils Relative 0 %      Basophils Relative 0 %      Absolute Neutrophils 18.01 Thousands/µL      Absolute Immature Grans 0.17 Thousand/uL      Absolute Lymphocytes 2.13 Thousands/µL      Absolute Monocytes 1.18 Thousand/µL      Eosinophils Absolute 0.03 Thousand/µL      Basophils Absolute 0.05 Thousands/µL     Blood culture #2 [[id number]] Collected: 10/18/24 1445    Lab Status: In process Specimen: Blood from Arm, Left Updated: 10/18/24 1506    Blood culture #1 [[id number]] Collected: 10/18/24 1335    Lab Status: In process Specimen: Blood from Hand, Left Updated: 10/18/24 1506            XR chest portable   Final Interpretation by Ron Newton MD (10/18 1718)      Limited study. Some atelectasis is present at the right base. No convincing evidence of pneumonia.            Workstation performed: [id number]         CT abdomen pelvis with contrast   Final Interpretation by Eduardo Mcdonnell MD (10/18 6017)      Large sacral decubitus ulcer extending to the coccyx with soft tissue gas now seen extending into the right and left medial aspects of the gluteus jac muscles. No drainable fluid collection.      Erosion of the coccyx suggestive of osteomyelitis.      Additional findings as above.         Workstation performed: [id number]             CriticalCare Time    Date/Time: 10/18/2024 4:59 PM    Performed by: Beka Petit DO  Authorized by: Beka Petit DO    Critical care provider statement:     Critical care time (minutes):  35    Critical care time was exclusive of:  Separately billable procedures and treating other patients and teaching time    Critical care was necessary to treat or prevent imminent or life-threatening deterioration of the following conditions:  Sepsis    Critical care was time spent  personally by me on the following activities:  Ordering and performing treatments and interventions, obtaining history from patient or surrogate, development of treatment plan with patient or surrogate, ordering and review of laboratory studies, ordering and review of radiographic studies, discussions with consultants, re-evaluation of patient's condition, review of old charts, evaluation of patient's response to treatment and examination of patient  ECG 12 Lead Documentation Only    Date/Time: 10/18/2024 5:00 PM    Performed by: Beka Petit DO  Authorized by: Beka Petit DO    Indications / Diagnosis:  Sepsis  ECG reviewed by me, the ED Provider: yes    Patient location:  ED  Interpretation:     Interpretation: abnormal    Quality:     Tracing quality:  Limited by artifact  Rate:     ECG rate:  111    ECG rate assessment: tachycardic    Rhythm:     Rhythm: sinus rhythm    Ectopy:     Ectopy: none    QRS:     QRS axis:  Normal  Conduction:     Conduction: normal    ST segments:     ST segments:  Non-specific  T waves:     T waves: non-specific        ED Medication and Procedure Management   Prior to Admission Medications   Prescriptions Last Dose Informant Patient Reported? Taking?   ASPIRIN 81 PO   Yes Yes   Sig: Take 81 mg by mouth daily   Calcium Carb-Cholecalciferol (calcium carbonate-vitamin D) 500 mg-5 mcg tablet   No Yes   Sig: Take 1 tablet by mouth 2 (two) times a day with meals   Cholecalciferol (Vitamin D3) 50 MCG (2000 UT) CAPS  Spouse/Significant Other, Self Yes Yes   Sig: Take 2,000 Units by mouth daily   Ertugliflozin L-PyroglutamicAc (Steglatro) 15 MG TABS  Outside Facility (Specify) Yes Yes   Sig: Take 15 mg by mouth daily   Glucagon, rDNA, (Glucagon Emergency) 1 MG KIT  Outside Facility (Specify) Yes Yes   Sig: Inject as directed   MULTIPLE VITAMIN PO  Spouse/Significant Other, Self Yes Yes   Sig: Take 1 tablet by mouth in the morning. Indications: Vitamin A  deficiency   Polyethylene Glycol 1000 POWD   Yes Yes   Sig: Take 17 g by mouth daily as needed (constipation) Dissolve in 4-8oz of water, juice, coffee or tea   Silver (SilvaSorb) GEL Not Taking  Yes No   Sig: Apply 1 Application topically daily. Apply to buttock wound bed daily.   Patient not taking: Reported on 10/18/2024   acetaminophen (TYLENOL) 325 mg tablet   Yes Yes   Sig: Take 650 mg by mouth every 4 (four) hours as needed for fever or mild pain.   apixaban (Eliquis) 5 mg   No Yes   Sig: Take 1 tablet (5 mg total) by mouth 2 (two) times a day   atorvastatin (LIPITOR) 40 mg tablet   No Yes   Sig: Take 2 tablets (80 mg total) by mouth every evening Per AVS, 80mg daily   Patient taking differently: Take 80 mg by mouth every evening Per AVS, 80mg daily   bisacodyl (Bisacodyl Laxative) 10 mg suppository   Yes Yes   Sig: Insert 10 mg into the rectum daily as needed for constipation (if no results from mom).   bisacodyl (FLEET) 10 MG/30ML ENEM   Yes Yes   Sig: Insert 10 mg into the rectum daily as needed for constipation Only use if no response from Dulcolax after 2 hours   collagenase (SANTYL) ointment   No Yes   Sig: Apply topically daily   dapagliflozin (Farxiga) 10 MG tablet Not Taking Spouse/Significant Other, Self Yes No   Sig: Take 10 mg by mouth daily   Patient not taking: Reported on 10/18/2024   docusate sodium (COLACE) 100 mg capsule   Yes Yes   Sig: Take 100 mg by mouth daily Per OSR 9/17, take 2 capsules by mouth once daily.   escitalopram (LEXAPRO) 10 mg tablet   Yes Yes   Sig: Take 10 mg by mouth daily   ezetimibe (ZETIA) 10 mg tablet Not Taking Spouse/Significant Other, Self Yes No   Sig: Take 10 mg by mouth daily. Indications: High Amount of Fats in the Blood   Patient not taking: Reported on 10/18/2024   finasteride (PROSCAR) 5 mg tablet   No Yes   Sig: Take 1 tablet (5 mg total) by mouth daily   Patient taking differently: Take 5 mg by mouth daily   glucose 40 %   Yes Yes   Sig: Take 15 g by  mouth once   lisinopril (ZESTRIL) 10 mg tablet   No Yes   Sig: Take 1 tablet (10 mg total) by mouth daily   Patient taking differently: Take 10 mg by mouth daily   magnesium hydroxide (Milk of Magnesia) 400 mg/5 mL oral suspension   Yes Yes   Sig: Take 30 mL by mouth daily as needed for constipation Use if no bowel movement x 3 days. Give at bedtime. Separate from other medications x 2 hours.   metoprolol succinate (TOPROL-XL) 25 mg 24 hr tablet   Yes Yes   Sig: Take 25 mg by mouth daily   mirtazapine (REMERON) 15 mg tablet   No Yes   Sig: Take 1 tablet (15 mg total) by mouth daily at bedtime   nystatin (MYCOSTATIN) 500,000 units/5 mL suspension   Yes Yes   Sig: Apply 500,000 Units to the mouth or throat 4 (four) times a day   potassium chloride (MICRO-K) 10 MEQ CR capsule   No No   Sig: Take 2 capsules (20 mEq total) by mouth daily for 2 days   senna (Senna-Tabs) 8.6 MG tablet   Yes Yes   Sig: Take 2 tablets by mouth daily as needed for constipation.   tamsulosin (FLOMAX) 0.4 mg   Yes Yes   Sig: Take 0.4 mg by mouth daily      Facility-Administered Medications: None     Patient's Medications   Discharge Prescriptions    No medications on file     No discharge procedures on file.  ED SEPSIS DOCUMENTATION   Time reflects when diagnosis was documented in both MDM as applicable and the Disposition within this note       Time User Action Codes Description Comment    10/18/2024  5:34 PM Beka Petit [A41.9] Sepsis ([facility])     10/18/2024  5:34 PM Beka Petit [M86.9] Osteomyelitis ([facility])     10/18/2024  5:34 PM Beka Petit [S31.000A] Wound of sacral region, initial encounter     10/18/2024  7:18 PM Zelalem Bruno [L98.429] Sacral ulcer ([facility])                  Beka Petit DO  10/18/24 1933

## 2024-10-18 NOTE — H&P
"H&P - Hospitalist   Name: Drew Santos 73 y.o. male I MRN: [id number]  Unit/Bed#: ED-24 I Date of Admission: 10/18/2024   Date of Service: 10/18/2024 I Hospital Day: 0     Assessment & Plan  Sepsis  Due to worsening sacral wounds.  Possible osteomyelitis.    Patient seen by general surgery.  May need debridement.    Will consult wound care and infectious disease    Patient received cefepime, Flagyl and vancomycin in the ER.  Will talk with ID this evening to decide on empiric antibiotics  Type 2 diabetes mellitus without complication, without long-term current use of insulin (HCC)  Lab Results   Component Value Date    HGBA1C 6.2 (H) 09/17/2024       No results for input(s): \"POCGLU\" in the last 72 hours.    Blood Sugar Average: Last 72 hrs:  add HISS    Paroxysmal atrial fibrillation (HCC)  Surgery requests holding Eliquis in case surgery needed.    Continue metoprolol  Sacral ulcer (HCC)  Patient has at least 2 sacral ulcers.  At least one of them is stage IV.  Possible osteomyelitis.    Surgery consulted.    Consult wound care            Chief Complaint:     Failure To Thrive (Patient arrives via EMS from SNF with reports of failure to thrive. Per EMS patient has been refusing to eat for one week and has been declining and also has a sacral wound. HX of stroke with left sided deficits. )    History of Present Illness  Drew Santos is a 73 y.o. male with a PMH of \chronic sacral wounds presents with worsening sacral wound.  He was just in the hospital last month for polymicrobial bacteremia from the sacral wounds.  He completed full course of IV antibiotics with ampicillin.  Blood cultures are growing Enterococcus, Enterobacter and Proteus last month.  He went to [facility] nursing facility.  They sent him back today to the ER because they were concerned that these wounds were getting worse.  No fever or chills.  No recent antibiotics since he was discharged about a month ago.  Wife confirms that the wounds do " seem to be getting worse.   Review of Systems   Constitutional:  Negative for chills and fever.   HENT:  Negative for ear pain and sore throat.    Eyes:  Negative for pain and visual disturbance.   Respiratory:  Negative for cough and shortness of breath.    Cardiovascular:  Negative for chest pain and palpitations.   Gastrointestinal:  Negative for abdominal pain and vomiting.   Genitourinary:  Negative for dysuria and hematuria.   Musculoskeletal:  Negative for arthralgias and back pain.        Worsening sacral wound   Skin:  Negative for color change and rash.   Neurological:  Negative for seizures and syncope.   All other systems reviewed and are negative.      Past Medical and Surgical History:   Past Medical History:   Diagnosis Date    Atherosclerotic heart disease of native coronary artery without angina pectoris     Atrial fibrillation (HCC)     Cerebral infarction (HCC)     Constipation     Depression     Diabetes mellitus (HCC)     Hemiplegia and hemiparesis following cerebral infarction affecting left non-dominant side (HCC)     Hyperlipidemia     Hypertension     Pressure ulcer of sacral region, stage 3 (HCC)     Prostatic hyperplasia     Sepsis (HCC)     Stroke (HCC)     TIA (transient ischemic attack)     Urinary retention     Vitamin D deficiency      History reviewed. No pertinent surgical history.  Meds/Allergies:  Allergies:   Allergies   Allergen Reactions    Primaquine Other (See Comments) and Hives     Prior to Admission Medications   Prescriptions Last Dose Informant Patient Reported? Taking?   ASPIRIN 81 PO   Yes Yes   Sig: Take 81 mg by mouth daily   Calcium Carb-Cholecalciferol (calcium carbonate-vitamin D) 500 mg-5 mcg tablet   No Yes   Sig: Take 1 tablet by mouth 2 (two) times a day with meals   Cholecalciferol (Vitamin D3) 50 MCG (2000 UT) CAPS  Spouse/Significant Other, Self Yes Yes   Sig: Take 2,000 Units by mouth daily   Ertugliflozin L-PyroglutamicAc (Steglatro) 15 MG TABS  Outside  Facility (Specify) Yes Yes   Sig: Take 15 mg by mouth daily   Glucagon, rDNA, (Glucagon Emergency) 1 MG KIT  Outside Facility (Specify) Yes Yes   Sig: Inject as directed   MULTIPLE VITAMIN PO  Spouse/Significant Other, Self Yes Yes   Sig: Take 1 tablet by mouth in the morning. Indications: Vitamin A deficiency   Polyethylene Glycol 1000 POWD   Yes Yes   Sig: Take 17 g by mouth daily as needed (constipation) Dissolve in 4-8oz of water, juice, coffee or tea   Silver (SilvaSorb) GEL Not Taking  Yes No   Sig: Apply 1 Application topically daily. Apply to buttock wound bed daily.   Patient not taking: Reported on 10/18/2024   acetaminophen (TYLENOL) 325 mg tablet   Yes Yes   Sig: Take 650 mg by mouth every 4 (four) hours as needed for fever or mild pain.   apixaban (Eliquis) 5 mg   No Yes   Sig: Take 1 tablet (5 mg total) by mouth 2 (two) times a day   atorvastatin (LIPITOR) 40 mg tablet   No Yes   Sig: Take 2 tablets (80 mg total) by mouth every evening Per AVS, 80mg daily   Patient taking differently: Take 80 mg by mouth every evening Per AVS, 80mg daily   bisacodyl (Bisacodyl Laxative) 10 mg suppository   Yes Yes   Sig: Insert 10 mg into the rectum daily as needed for constipation (if no results from mom).   bisacodyl (FLEET) 10 MG/30ML ENEM   Yes Yes   Sig: Insert 10 mg into the rectum daily as needed for constipation Only use if no response from Dulcolax after 2 hours   collagenase (SANTYL) ointment   No Yes   Sig: Apply topically daily   dapagliflozin (Farxiga) 10 MG tablet Not Taking Spouse/Significant Other, Self Yes No   Sig: Take 10 mg by mouth daily   Patient not taking: Reported on 10/18/2024   docusate sodium (COLACE) 100 mg capsule   Yes Yes   Sig: Take 100 mg by mouth daily Per OSR 9/17, take 2 capsules by mouth once daily.   escitalopram (LEXAPRO) 10 mg tablet   Yes Yes   Sig: Take 10 mg by mouth daily   ezetimibe (ZETIA) 10 mg tablet Not Taking Spouse/Significant Other, Self Yes No   Sig: Take 10 mg by  mouth daily. Indications: High Amount of Fats in the Blood   Patient not taking: Reported on 10/18/2024   finasteride (PROSCAR) 5 mg tablet   No Yes   Sig: Take 1 tablet (5 mg total) by mouth daily   Patient taking differently: Take 5 mg by mouth daily   glucose 40 %   Yes Yes   Sig: Take 15 g by mouth once   lisinopril (ZESTRIL) 10 mg tablet   No Yes   Sig: Take 1 tablet (10 mg total) by mouth daily   Patient taking differently: Take 10 mg by mouth daily   magnesium hydroxide (Milk of Magnesia) 400 mg/5 mL oral suspension   Yes Yes   Sig: Take 30 mL by mouth daily as needed for constipation Use if no bowel movement x 3 days. Give at bedtime. Separate from other medications x 2 hours.   metoprolol succinate (TOPROL-XL) 25 mg 24 hr tablet   Yes Yes   Sig: Take 25 mg by mouth daily   mirtazapine (REMERON) 15 mg tablet   No Yes   Sig: Take 1 tablet (15 mg total) by mouth daily at bedtime   nystatin (MYCOSTATIN) 500,000 units/5 mL suspension   Yes Yes   Sig: Apply 500,000 Units to the mouth or throat 4 (four) times a day   potassium chloride (MICRO-K) 10 MEQ CR capsule   No No   Sig: Take 2 capsules (20 mEq total) by mouth daily for 2 days   senna (Senna-Tabs) 8.6 MG tablet   Yes Yes   Sig: Take 2 tablets by mouth daily as needed for constipation.   tamsulosin (FLOMAX) 0.4 mg   Yes Yes   Sig: Take 0.4 mg by mouth daily      Facility-Administered Medications: None     Social History:     Social History     Socioeconomic History    Marital status: /Civil Union     Spouse name: Not on file    Number of children: Not on file    Years of education: Not on file    Highest education level: Not on file   Occupational History    Not on file   Tobacco Use    Smoking status: Never     Passive exposure: Never    Smokeless tobacco: Never   Vaping Use    Vaping status: Never Used   Substance and Sexual Activity    Alcohol use: Not Currently    Drug use: Never    Sexual activity: Not on file   Other Topics Concern    Not on  "file   Social History Narrative    Not on file     Social Determinants of Health     Financial Resource Strain: Low Risk  (7/10/2024)    Received from [facility]    Overall Financial Resource Strain (CARDIA)     Difficulty of Paying Living Expenses: Not hard at all   Food Insecurity: No Food Insecurity (9/20/2024)    Hunger Vital Sign     Worried About Running Out of Food in the Last Year: Never true     Ran Out of Food in the Last Year: Never true   Transportation Needs: No Transportation Needs (9/20/2024)    PRAPARE - Transportation     Lack of Transportation (Medical): No     Lack of Transportation (Non-Medical): No   Physical Activity: Not on file   Stress: Stress Concern Present (5/8/2023)    [facility] from [facility], [facility]    Citizen of [place] of [employer] - Occupational Stress Questionnaire     Feeling of Stress : To some extent   Social Connections: Feeling Socially Integrated (7/25/2024)    Received from [facility]    OASIS : Social Isolation     How often do you feel lonely or isolated from those around you?: Rarely   Intimate Partner Violence: Not At Risk (7/10/2024)    Received from [facility]    Humiliation, Afraid, Rape, and Kick questionnaire     Fear of Current or Ex-Partner: No     Emotionally Abused: No     Physically Abused: No     Sexually Abused: No   Housing Stability: Low Risk  (9/20/2024)    Housing Stability Vital Sign     Unable to Pay for Housing in the Last Year: No     Number of Times Moved in the Last Year: 0     Homeless in the Last Year: No         Objective   Vitals:   Blood Pressure: 108/60 (10/18/24 1800)  Pulse: (!) 118 (10/18/24 1800)  Temperature: (!) 97.2 °F (36.2 °C) (10/18/24 1326)  Temp Source: Axillary (10/18/24 1326)  Respirations: 18 (10/18/24 1800)  Height: 6' 5\" (195.6 cm) (10/18/24 1326)  Weight - Scale: 97.7 kg (215 lb 6.2 oz) (10/18/24 1529)  SpO2: 97 % " (10/18/24 1800)    Physical Exam  Vitals and nursing note reviewed.   Constitutional:       General: He is not in acute distress.     Appearance: He is well-developed.   HENT:      Head: Normocephalic and atraumatic.   Eyes:      Conjunctiva/sclera: Conjunctivae normal.   Cardiovascular:      Rate and Rhythm: Normal rate and regular rhythm.      Heart sounds: No murmur heard.  Pulmonary:      Effort: Pulmonary effort is normal. No respiratory distress.      Breath sounds: Normal breath sounds.   Abdominal:      Palpations: Abdomen is soft.      Tenderness: There is no abdominal tenderness.   Musculoskeletal:         General: No swelling.      Cervical back: Neck supple.      Comments: Sacral dressings are C/D/I   Skin:     General: Skin is warm and dry.      Capillary Refill: Capillary refill takes less than 2 seconds.   Neurological:      Mental Status: He is alert.   Psychiatric:         Mood and Affect: Mood normal.         Additional Data:   Lab Results: I have reviewed the following results:  Results from last 7 days   Lab Units 10/18/24  1445   WBC Thousand/uL 21.57*   HEMOGLOBIN g/dL 10.0*   HEMATOCRIT % 33.2*   PLATELETS Thousands/uL 530*   SEGS PCT % 83*   LYMPHO PCT % 10*   MONO PCT % 6   EOS PCT % 0     Results from last 7 days   Lab Units 10/18/24  1445   SODIUM mmol/L 147   POTASSIUM mmol/L 3.9   CHLORIDE mmol/L 111*   CO2 mmol/L 28   ANION GAP mmol/L 8   BUN mg/dL 60*   CREATININE mg/dL 1.07   CALCIUM mg/dL 9.4   ALBUMIN g/dL 2.5*   TOTAL BILIRUBIN mg/dL 0.74   ALK PHOS U/L 94   ALT U/L 99*   AST U/L 109*   EGFR ml/min/1.73sq m 67   GLUCOSE RANDOM mg/dL 204*     Results from last 7 days   Lab Units 10/18/24  1445   INR  1.57*               EKG, Pathology, and Other Studies Reviewed on Admission:   EKG      Administrative Statements       ** Please Note: This note has been constructed using a voice recognition system. **

## 2024-10-18 NOTE — ASSESSMENT & PLAN NOTE
Due to worsening sacral wounds.  Possible osteomyelitis.    Patient seen by general surgery.  May need debridement.    Will consult wound care and infectious disease    Patient received cefepime, Flagyl and vancomycin in the ER.  Will talk with ID this evening to decide on empiric antibiotics

## 2024-10-18 NOTE — ASSESSMENT & PLAN NOTE
"Lab Results   Component Value Date    HGBA1C 6.2 (H) 09/17/2024       No results for input(s): \"POCGLU\" in the last 72 hours.    Blood Sugar Average: Last 72 hrs:    -continue to trend sugars  -agree with SSI while inpatient  -hypoglycemic protocol  "

## 2024-10-18 NOTE — PROGRESS NOTES
Drew Santos is a 73 y.o. male who is currently ordered Vancomycin IV with management by the Pharmacy Consult service.  Relevant clinical data and objective / subjective history reviewed.  Vancomycin Assessment:  Indication and Goal AUC/Trough: Soft tissue (goal -600, trough >10)  Clinical Status:  new  Micro:   pending  Renal Function:  SCr: 1.07 mg/dL  CrCl: 75.2 mL/min  Renal replacement: Not on dialysis  Days of Therapy: 1  Current Dose: 2000 mg IV Loading dose  Vancomycin Plan:  New Dosin mg IV q 12 hrs  Estimated AUC: 416 mcg*hr/mL  Estimated Trough: 13 mcg/mL  Next Level: 10/20/24 with am labs  Renal Function Monitoring: Daily BMP and UOP  Pharmacy will continue to follow closely for s/sx of nephrotoxicity, infusion reactions and appropriateness of therapy.  BMP and CBC will be ordered per protocol. We will continue to follow the patient’s culture results and clinical progress daily.    Mary Anne Quintanilla, Pharmacist

## 2024-10-18 NOTE — ASSESSMENT & PLAN NOTE
Tachycardic in ED. EKG collected 10/18.    -recommend holding eliquis while inpatient if patient requires surgical debridement of sacral ulcer  -VTE ppx to be initiated by primary service, however appropriate for SQH 5000 TID from surgical perspective  -CTM vitals  -management per primary

## 2024-10-18 NOTE — ASSESSMENT & PLAN NOTE
"Lab Results   Component Value Date    HGBA1C 6.2 (H) 09/17/2024       No results for input(s): \"POCGLU\" in the last 72 hours.    Blood Sugar Average: Last 72 hrs:  add HISS    "

## 2024-10-19 ENCOUNTER — APPOINTMENT (INPATIENT)
Dept: ULTRASOUND IMAGING | Facility: HOSPITAL | Age: 74
DRG: 853 | End: 2024-10-19
Payer: COMMERCIAL

## 2024-10-19 PROBLEM — E87.0 HYPERNATREMIA: Status: ACTIVE | Noted: 2024-10-19

## 2024-10-19 PROBLEM — D63.8 ANEMIA OF CHRONIC DISEASE: Status: ACTIVE | Noted: 2024-09-19

## 2024-10-19 PROBLEM — R82.81 PYURIA: Status: ACTIVE | Noted: 2024-10-19

## 2024-10-19 PROBLEM — Z97.8 CHRONIC INDWELLING FOLEY CATHETER: Status: ACTIVE | Noted: 2024-10-19

## 2024-10-19 LAB
ALBUMIN SERPL BCG-MCNC: 2.1 G/DL (ref 3.5–5)
ALP SERPL-CCNC: 81 U/L (ref 34–104)
ALT SERPL W P-5'-P-CCNC: 70 U/L (ref 7–52)
ANION GAP SERPL CALCULATED.3IONS-SCNC: 7 MMOL/L (ref 4–13)
AST SERPL W P-5'-P-CCNC: 67 U/L (ref 13–39)
BASOPHILS # BLD AUTO: 0.04 THOUSANDS/ΜL (ref 0–0.1)
BASOPHILS NFR BLD AUTO: 0 % (ref 0–1)
BILIRUB SERPL-MCNC: 0.77 MG/DL (ref 0.2–1)
BUN SERPL-MCNC: 50 MG/DL (ref 5–25)
CALCIUM ALBUM COR SERPL-MCNC: 9.8 MG/DL (ref 8.3–10.1)
CALCIUM SERPL-MCNC: 8.3 MG/DL (ref 8.4–10.2)
CHLORIDE SERPL-SCNC: 114 MMOL/L (ref 96–108)
CO2 SERPL-SCNC: 28 MMOL/L (ref 21–32)
CREAT SERPL-MCNC: 0.92 MG/DL (ref 0.6–1.3)
E COLI DNA BLD POS QL NAA+NON-PROBE: DETECTED
EOSINOPHIL # BLD AUTO: 0.07 THOUSAND/ΜL (ref 0–0.61)
EOSINOPHIL NFR BLD AUTO: 0 % (ref 0–6)
ERYTHROCYTE [DISTWIDTH] IN BLOOD BY AUTOMATED COUNT: 16.4 % (ref 11.6–15.1)
GFR SERPL CREATININE-BSD FRML MDRD: 81 ML/MIN/1.73SQ M
GLUCOSE SERPL-MCNC: 118 MG/DL (ref 65–140)
GLUCOSE SERPL-MCNC: 139 MG/DL (ref 65–140)
GLUCOSE SERPL-MCNC: 155 MG/DL (ref 65–140)
GLUCOSE SERPL-MCNC: 164 MG/DL (ref 65–140)
GLUCOSE SERPL-MCNC: 168 MG/DL (ref 65–140)
GRAM STN SPEC: ABNORMAL
GRAM STN SPEC: ABNORMAL
HCT VFR BLD AUTO: 28.1 % (ref 36.5–49.3)
HGB BLD-MCNC: 8.5 G/DL (ref 12–17)
IMM GRANULOCYTES # BLD AUTO: 0.2 THOUSAND/UL (ref 0–0.2)
IMM GRANULOCYTES NFR BLD AUTO: 1 % (ref 0–2)
LYMPHOCYTES # BLD AUTO: 2.11 THOUSANDS/ΜL (ref 0.6–4.47)
LYMPHOCYTES NFR BLD AUTO: 11 % (ref 14–44)
MAGNESIUM SERPL-MCNC: 2.4 MG/DL (ref 1.9–2.7)
MCH RBC QN AUTO: 28.4 PG (ref 26.8–34.3)
MCHC RBC AUTO-ENTMCNC: 30.2 G/DL (ref 31.4–37.4)
MCV RBC AUTO: 94 FL (ref 82–98)
MONOCYTES # BLD AUTO: 1.04 THOUSAND/ΜL (ref 0.17–1.22)
MONOCYTES NFR BLD AUTO: 6 % (ref 4–12)
NEUTROPHILS # BLD AUTO: 15.33 THOUSANDS/ΜL (ref 1.85–7.62)
NEUTS SEG NFR BLD AUTO: 82 % (ref 43–75)
NRBC BLD AUTO-RTO: 0 /100 WBCS
PLATELET # BLD AUTO: 450 THOUSANDS/UL (ref 149–390)
PMV BLD AUTO: 9.5 FL (ref 8.9–12.7)
POTASSIUM SERPL-SCNC: 3.3 MMOL/L (ref 3.5–5.3)
PROT SERPL-MCNC: 7.1 G/DL (ref 6.4–8.4)
PROTEUS SP DNA BLD POS QL NAA+NON-PROBE: DETECTED
RBC # BLD AUTO: 2.99 MILLION/UL (ref 3.88–5.62)
SODIUM SERPL-SCNC: 149 MMOL/L (ref 135–147)
WBC # BLD AUTO: 18.79 THOUSAND/UL (ref 4.31–10.16)

## 2024-10-19 PROCEDURE — 83735 ASSAY OF MAGNESIUM: CPT | Performed by: HOSPITALIST

## 2024-10-19 PROCEDURE — 99223 1ST HOSP IP/OBS HIGH 75: CPT | Performed by: INTERNAL MEDICINE

## 2024-10-19 PROCEDURE — 99232 SBSQ HOSP IP/OBS MODERATE 35: CPT

## 2024-10-19 PROCEDURE — 87081 CULTURE SCREEN ONLY: CPT | Performed by: INTERNAL MEDICINE

## 2024-10-19 PROCEDURE — 80053 COMPREHEN METABOLIC PANEL: CPT

## 2024-10-19 PROCEDURE — NC001 PR NO CHARGE: Performed by: SPECIALIST

## 2024-10-19 PROCEDURE — 82948 REAGENT STRIP/BLOOD GLUCOSE: CPT

## 2024-10-19 PROCEDURE — 76705 ECHO EXAM OF ABDOMEN: CPT

## 2024-10-19 PROCEDURE — 85025 COMPLETE CBC W/AUTO DIFF WBC: CPT | Performed by: HOSPITALIST

## 2024-10-19 RX ORDER — VANCOMYCIN HYDROCHLORIDE 1 G/200ML
1000 INJECTION, SOLUTION INTRAVENOUS EVERY 12 HOURS
Status: DISCONTINUED | OUTPATIENT
Start: 2024-10-19 | End: 2024-10-21

## 2024-10-19 RX ORDER — BISACODYL 10 MG
10 SUPPOSITORY, RECTAL RECTAL DAILY PRN
Status: DISCONTINUED | OUTPATIENT
Start: 2024-10-19 | End: 2024-10-23

## 2024-10-19 RX ORDER — POTASSIUM CHLORIDE 14.9 MG/ML
20 INJECTION INTRAVENOUS ONCE
Status: COMPLETED | OUTPATIENT
Start: 2024-10-19 | End: 2024-10-19

## 2024-10-19 RX ORDER — HEPARIN SODIUM 5000 [USP'U]/ML
5000 INJECTION, SOLUTION INTRAVENOUS; SUBCUTANEOUS EVERY 8 HOURS SCHEDULED
Status: DISCONTINUED | OUTPATIENT
Start: 2024-10-19 | End: 2024-10-29

## 2024-10-19 RX ADMIN — SODIUM HYPOCHLORITE 1 APPLICATION: 2.5 SOLUTION TOPICAL at 09:24

## 2024-10-19 RX ADMIN — VANCOMYCIN HYDROCHLORIDE 1000 MG: 1 INJECTION, SOLUTION INTRAVENOUS at 20:14

## 2024-10-19 RX ADMIN — VANCOMYCIN HYDROCHLORIDE 750 MG: 750 INJECTION, SOLUTION INTRAVENOUS at 08:12

## 2024-10-19 RX ADMIN — POTASSIUM CHLORIDE 20 MEQ: 14.9 INJECTION, SOLUTION INTRAVENOUS at 09:25

## 2024-10-19 RX ADMIN — CEFEPIME 2000 MG: 2 INJECTION, POWDER, FOR SOLUTION INTRAVENOUS at 13:02

## 2024-10-19 RX ADMIN — HEPARIN SODIUM 5000 UNITS: 5000 INJECTION INTRAVENOUS; SUBCUTANEOUS at 21:36

## 2024-10-19 RX ADMIN — METRONIDAZOLE 500 MG: 500 INJECTION, SOLUTION INTRAVENOUS at 05:23

## 2024-10-19 RX ADMIN — METRONIDAZOLE 500 MG: 500 INJECTION, SOLUTION INTRAVENOUS at 17:26

## 2024-10-19 RX ADMIN — SODIUM CHLORIDE, SODIUM GLUCONATE, SODIUM ACETATE, POTASSIUM CHLORIDE, MAGNESIUM CHLORIDE, SODIUM PHOSPHATE, DIBASIC, AND POTASSIUM PHOSPHATE 100 ML/HR: .53; .5; .37; .037; .03; .012; .00082 INJECTION, SOLUTION INTRAVENOUS at 16:02

## 2024-10-19 RX ADMIN — HEPARIN SODIUM 5000 UNITS: 5000 INJECTION INTRAVENOUS; SUBCUTANEOUS at 13:02

## 2024-10-19 RX ADMIN — SODIUM CHLORIDE, SODIUM GLUCONATE, SODIUM ACETATE, POTASSIUM CHLORIDE, MAGNESIUM CHLORIDE, SODIUM PHOSPHATE, DIBASIC, AND POTASSIUM PHOSPHATE 1000 ML: .53; .5; .37; .037; .03; .012; .00082 INJECTION, SOLUTION INTRAVENOUS at 00:45

## 2024-10-19 NOTE — PLAN OF CARE
Problem: Potential for Falls  Goal: Patient will remain free of falls  Description: INTERVENTIONS:  - Educate patient/family on patient safety including physical limitations  - Instruct patient to call for assistance with activity   - Consult OT/PT to assist with strengthening/mobility   - Keep Call bell within reach  - Keep bed low and locked with side rails adjusted as appropriate  - Keep care items and personal belongings within reach  - Initiate and maintain comfort rounds  - Make Fall Risk Sign visible to staff  - Offer Toileting every 2 Hours, in advance of need  - Initiate/Maintain bed alarm  - Obtain necessary fall risk management equipment: non slip socks., call bell   - Apply yellow socks and bracelet for high fall risk patients  - Consider moving patient to room near nurses station  Outcome: Progressing     Problem: Prexisting or High Potential for Compromised Skin Integrity  Goal: Skin integrity is maintained or improved  Description: INTERVENTIONS:  - Identify patients at risk for skin breakdown  - Assess and monitor skin integrity  - Assess and monitor nutrition and hydration status  - Monitor labs   - Assess for incontinence   - Turn and reposition patient  - Assist with mobility/ambulation  - Relieve pressure over bony prominences  - Avoid friction and shearing  - Provide appropriate hygiene as needed including keeping skin clean and dry  - Evaluate need for skin moisturizer/barrier cream  - Collaborate with interdisciplinary team   - Patient/family teaching  - Consider wound care consult   Outcome: Progressing     Problem: PAIN - ADULT  Goal: Verbalizes/displays adequate comfort level or baseline comfort level  Description: Interventions:  - Encourage patient to monitor pain and request assistance  - Assess pain using appropriate pain scale  - Administer analgesics based on type and severity of pain and evaluate response  - Implement non-pharmacological measures as appropriate and evaluate  response  - Consider cultural and social influences on pain and pain management  - Notify physician/advanced practitioner if interventions unsuccessful or patient reports new pain  Outcome: Progressing     Problem: INFECTION - ADULT  Goal: Absence or prevention of progression during hospitalization  Description: INTERVENTIONS:  - Assess and monitor for signs and symptoms of infection  - Monitor lab/diagnostic results  - Monitor all insertion sites, i.e. indwelling lines, tubes, and drains  - Monitor endotracheal if appropriate and nasal secretions for changes in amount and color  - [place] appropriate cooling/warming therapies per order  - Administer medications as ordered  - Instruct and encourage patient and family to use good hand hygiene technique  - Identify and instruct in appropriate isolation precautions for identified infection/condition  Outcome: Progressing     Problem: DISCHARGE PLANNING  Goal: Discharge to home or other facility with appropriate resources  Description: INTERVENTIONS:  - Identify barriers to discharge w/patient and caregiver  - Arrange for needed discharge resources and transportation as appropriate  - Identify discharge learning needs (meds, wound care, etc.)  - Arrange for interpretive services to assist at discharge as needed  - Refer to Case Management Department for coordinating discharge planning if the patient needs post-hospital services based on physician/advanced practitioner order or complex needs related to functional status, cognitive ability, or social support system  Outcome: Progressing     Problem: Knowledge Deficit  Goal: Patient/family/caregiver demonstrates understanding of disease process, treatment plan, medications, and discharge instructions  Description: Complete learning assessment and assess knowledge base.  Interventions:  - Provide teaching at level of understanding  - Provide teaching via preferred learning methods  Outcome: Progressing

## 2024-10-19 NOTE — PLAN OF CARE
Problem: Potential for Falls  Goal: Patient will remain free of falls  Description: INTERVENTIONS:  - Educate patient/family on patient safety including physical limitations  - Instruct patient to call for assistance with activity   - Consult OT/PT to assist with strengthening/mobility   - Keep Call bell within reach  - Keep bed low and locked with side rails adjusted as appropriate  - Keep care items and personal belongings within reach  - Initiate and maintain comfort rounds  - Make Fall Risk Sign visible to staff  - Offer Toileting every 2 Hours, in advance of need  - Initiate/Maintain bed alarm  - Apply yellow socks and bracelet for high fall risk patients  - Consider moving patient to room near nurses station  Outcome: Progressing     Problem: Prexisting or High Potential for Compromised Skin Integrity  Goal: Skin integrity is maintained or improved  Description: INTERVENTIONS:  - Identify patients at risk for skin breakdown  - Assess and monitor skin integrity  - Assess and monitor nutrition and hydration status  - Monitor labs   - Assess for incontinence   - Turn and reposition patient  - Assist with mobility/ambulation  - Relieve pressure over bony prominences  - Avoid friction and shearing  - Provide appropriate hygiene as needed including keeping skin clean and dry  - Evaluate need for skin moisturizer/barrier cream  - Collaborate with interdisciplinary team   - Patient/family teaching  - Consider wound care consult   Outcome: Progressing     Problem: PAIN - ADULT  Goal: Verbalizes/displays adequate comfort level or baseline comfort level  Description: Interventions:  - Encourage patient to monitor pain and request assistance  - Assess pain using appropriate pain scale  - Administer analgesics based on type and severity of pain and evaluate response  - Implement non-pharmacological measures as appropriate and evaluate response  - Consider cultural and social influences on pain and pain management  -  Notify physician/advanced practitioner if interventions unsuccessful or patient reports new pain  Outcome: Progressing     Problem: INFECTION - ADULT  Goal: Absence or prevention of progression during hospitalization  Description: INTERVENTIONS:  - Assess and monitor for signs and symptoms of infection  - Monitor lab/diagnostic results  - Monitor all insertion sites, i.e. indwelling lines, tubes, and drains  - Monitor endotracheal if appropriate and nasal secretions for changes in amount and color  - [place] appropriate cooling/warming therapies per order  - Administer medications as ordered  - Instruct and encourage patient and family to use good hand hygiene technique  - Identify and instruct in appropriate isolation precautions for identified infection/condition  Outcome: Progressing     Problem: DISCHARGE PLANNING  Goal: Discharge to home or other facility with appropriate resources  Description: INTERVENTIONS:  - Identify barriers to discharge w/patient and caregiver  - Arrange for needed discharge resources and transportation as appropriate  - Identify discharge learning needs (meds, wound care, etc.)  - Arrange for interpretive services to assist at discharge as needed  - Refer to Case Management Department for coordinating discharge planning if the patient needs post-hospital services based on physician/advanced practitioner order or complex needs related to functional status, cognitive ability, or social support system  Outcome: Progressing     Problem: Knowledge Deficit  Goal: Patient/family/caregiver demonstrates understanding of disease process, treatment plan, medications, and discharge instructions  Description: Complete learning assessment and assess knowledge base.  Interventions:  - Provide teaching at level of understanding  - Provide teaching via preferred learning methods  Outcome: Progressing     Problem: Nutrition/Hydration-ADULT  Goal: Nutrient/Hydration intake appropriate for improving,  restoring or maintaining nutritional needs  Description: Monitor and assess patient's nutrition/hydration status for malnutrition. Collaborate with interdisciplinary team and initiate plan and interventions as ordered.  Monitor patient's weight and dietary intake as ordered or per policy. Utilize nutrition screening tool and intervene as necessary. Determine patient's food preferences and provide high-protein, high-caloric foods as appropriate.     INTERVENTIONS:  - Monitor oral intake, urinary output, labs, and treatment plans  - Assess nutrition and hydration status and recommend course of action  - Evaluate amount of meals eaten  - Assist patient with eating if necessary   - Allow adequate time for meals  - Recommend/ encourage appropriate diets, oral nutritional supplements, and vitamin/mineral supplements  - Order, calculate, and assess calorie counts as needed  - Recommend, monitor, and adjust tube feedings and TPN/PPN based on assessed needs  - Assess need for intravenous fluids  - Provide specific nutrition/hydration education as appropriate  - Include patient/family/caregiver in decisions related to nutrition  Outcome: Progressing

## 2024-10-19 NOTE — ASSESSMENT & PLAN NOTE
Has chronic anemia likely secondary to poor nutritional status underlying chronic illness.   Hgb stable 8.5  Hemodynamically stable at this time  Trend with daily CBC

## 2024-10-19 NOTE — CONSULTS
Consultation - Infectious Disease   Name: Drew Santos 73 y.o. male I MRN: [id number]  Unit/Bed#: E2 -01 I Date of Admission: 10/18/2024   Date of Service: 10/19/2024 I Hospital Day: 1   Inpatient consult to Infectious Diseases  Consult performed by: Duncan Benavides MD  Consult ordered by: Zelalem Bruno DO        Physician Requesting Evaluation: Christiano Reddy DO   Reason for Evaluation / Principal Problem: Concern for infected sacral wound    Assessment & Plan  Sepsis  As evidenced by tachycardia, leukocytosis on presentation.  Patient has not yet had fevers.  Source is likely bacteremia.  Sacral wound was examined by surgery and feel it appears noninfected at this time.  CT scan is also without any evidence of a drainable fluid collection/abscess.  Consider urinary source in setting of chronic Parra catheter.  Chest x-ray without any consolidation to suggest pneumonia. LFTs mildly elevated, but no pericholecystic fluid on CT. He is currently hemodynamically stable.  -Continue empiric cefepime and vancomycin while awaiting final blood cultures  -Continue Flagyl given proctitis on CT and while evaluating gallbladder  -Recommend RUQ US to further evaluate gallbladder  -Trend LFTs  -Anticipate antibiotic de-escalation over next 24-48 hours based on updated cultures  -Follow-up urine culture and blood cultures  -As below, would exchange Parra catheter if not already done  -Ongoing close monitoring of sacral wound, appreciate surgery input  -Repeat CBC tomorrow to trend leukocytosis  -Monitor fever curve and hemodynamics  Gram-negative bacteremia  Blood cultures from admission are both growing gram-negative rods.  Blood culture identification panel has identified both E. coli and Proteus thus far.  Patient had a recent polymicrobial bacteremia during last admission with source felt to be sacral wound.  Surgery has examined patient's sacral wound and lower concern for acute infection, though may be source of  translocation.  Would also consider possibility of urinary source as patient does have a chronic Parra catheter. Also consider gallbladder source given transaminitis and gallstones on CT, though no obvious RUQ tenderness on exam.  CT abdomen/pelvis with contrast without any evidence of pyelonephritis, intra-abdominal abscess, or abscess beneath the sacral wound.  -Continue IV cefepime but increase dose to 2g every 12 hours as would consider this a moderate to severe infection  -Follow-up susceptibilities of gram-negative's to further de-escalate  -Will continue IV vancomycin for now while further awaiting blood cultures to ensure no growth of gram-positive's  -Recommend RUQ US to better evaluate gallbladder  -Trend LFTs  -Plan to de-escalate antibiotics over next 24-48 hours based on updated blood culture results and further workup  -Ongoing management of sacral wound with assistance of surgery  -Follow-up urine culture  -Please exchange Parra catheter if not already done  Sacral ulcer (HCC)  This was felt to have initially developed during patient stay in skilled nursing facility for rehab.  During recent hospitalization in September there was concern for fistulous connection from rectum to the sacral wound in setting of colitis/proctitis.  Wound was previously debrided by general surgery and during last admission was stage IV with palpable bone.  Latest CT showing soft tissue gas extending into right and left medial aspects of gluteus jac muscle, but no abscess.  There is erosion of coccyx suggestive of osteomyelitis.  Wound was evaluated by surgery and they currently feel it appears noninfected.  However in setting of polymicrobial bacteremia, may be a source of translocation.  -Ongoing close monitoring of wound and surgery recommendations  -Long term antibiotics for sacral osteomyelitis would be futile without aggressive bone/tissue debridement, bone cultures and ability to close wound with flap  Pyuria  UA  sent from patient's chronic Parra catheter had innumerable WBCs.  However this is expected finding in setting of chronic catheter and cannot be used alone to determine UTI.  CT abdomen/pelvis was without any hydronephrosis or hydroureter.  There was some chronic appearing bilateral perinephric stranding.  The urinary bladder was noted to be collapsed around a Parra catheter.  Previous urine culture in September had grown Enterobacter cloacae.  -Continue broad empiric antibiotics as above while awaiting cultures  -Follow-up urine culture  -If not done recently, recommend patient's Parra catheter be exchanged  Chronic indwelling Parra catheter  In setting of chronic urinary retention, BPH.   -As above, recommend Parra catheter exchange if not done recently  -Monitor urine output  -Close follow-up with urology for chronic catheter management  Type 2 diabetes mellitus without complication, without long-term current use of insulin ([facility])  A1c of 6.2%.  This is a risk factor for infection.    Paroxysmal atrial fibrillation ([facility])  Patient is on anticoagulation.  Proctitis  Mild wall thickening of rectum noted on CT scan.  ET scan back in September also noted proctocolitis.  Given persistence, this may be a somewhat chronic finding.  He is not having any current diarrhea.  -Antibiotics as above to target bacteria and blood cultures  -Continue Flagyl for now  -Monitor stool output  I have discussed the above management plan in detail with the primary service.   They agree with plan to continue IV vancomycin, IV cefepime and Flagyl.    Antibiotics:  Vancomycin  Cefepime  Metronidazole    History of Present Illness   Drew Santos is a 73 y.o. year old male with sacral decubitus ulcer, urinary retention with BPH, prior CVA with left-sided deficits, type 2 diabetes, paroxysmal atrial fibrillation on Eliquis.    Patient had a recent prolonged admission from 9/17 through 10/4.  He was found to have a polymicrobial bacteremia with  growth of Enterobacter and Proteus in 1 set, growth of Enterococcus faecalis and second set.  He was seen by infectious diseases.  This was felt to be due to proctitis/colitis with possible fistulous connection to his sacral decubitus wound.  He also had COVID-19 infection.  JOSEFINA was performed during that hospital stay and negative for vegetations.  He completed 7 days of IV antibiotics for the gram-negative bacteremia, a total of 14 days of IV antibiotics with ampicillin for the Enterococcus.    Patient was brought back to the ER on 10/18 from his skilled nursing facility for evaluation of failure to thrive.  Per EMS patient had been refusing to eat for 1 week.  He had labs done at his nursing facility which reportedly found a new leukocytosis of 18.2 and an elevated procalcitonin thus he was sent into the hospital for further evaluation and infection rule out.  Patient's wife ported to the ER that patient was also more confused than his baseline as he was normally more alert and able to feed himself.  On arrival he was found to have leukocytosis of 21.5, but afebrile.  He was tachycardic.  Labs also notable for KAVITA with serum creatinine of 1.0 up from baseline of 0.5, transaminitis with AST of 109 and ALT of 99, normal lactate.  A UA was obtained from a chronic indwelling Parra catheter which had innumerable WBCs.  Blood cultures were also obtained.    Patient is currently confused this morning and unable to participate interview.  I was able to discuss with his wife at bedside who reports that over the last several days he has not been eating as much at his facility.  She denies him having any fevers or chills.  She reports that he was not complaining of any headaches, abdominal pain or flank pain.  She reports that he was not having any diarrhea.  She notes that his Parra catheter is chronic and is unsure as to when it was last exchanged.  He does note that he has been more confused and closing his eyes more over  the last several days compared to a week ago.    A complete review of systems is negative other than that noted in the HPI.    I have reviewed the patient's PMH, PSH, Social History, Family History, Meds, and Allergies    Objective :  Temp:  [97.2 °F (36.2 °C)-98.6 °F (37 °C)] 98.2 °F (36.8 °C)  HR:  [] 97  BP: ()/(50-92) 98/60  Resp:  [16-24] 18  SpO2:  [91 %-100 %] 97 %  O2 Device: None (Room air)    General:  No acute distress, lying in bed and mostly keeping eyes closed.  Not currently verbal and not following commands for me.  Psychiatric: Unable to assess given mental status  Pulmonary:  Normal respiratory excursion without accessory muscle use  Abdomen:  Soft, nontender, nondistended  Extremities:  No edema  Skin:  No rashes.  Sacral wound examined at bedside without any obvious purulence or periwound erythema.        Lab Results: I have reviewed the following results:  Results from last 7 days   Lab Units 10/19/24  0554 10/18/24  2244 10/18/24  1445   WBC Thousand/uL 18.79* 19.52* 21.57*   HEMOGLOBIN g/dL 8.5* 9.2* 10.0*   PLATELETS Thousands/uL 450* 445* 530*     Results from last 7 days   Lab Units 10/19/24  0554 10/18/24  2244 10/18/24  1445   SODIUM mmol/L 149* 147 147   POTASSIUM mmol/L 3.3* 3.7 3.9   CHLORIDE mmol/L 114* 113* 111*   CO2 mmol/L 28 26 28   BUN mg/dL 50* 59* 60*   CREATININE mg/dL 0.92 1.10 1.07   EGFR ml/min/1.73sq m 81 65 67   CALCIUM mg/dL 8.3* 8.6 9.4   AST U/L 67* 68* 109*   ALT U/L 70* 76* 99*   ALK PHOS U/L 81 80 94   ALBUMIN g/dL 2.1* 2.2* 2.5*     Results from last 7 days   Lab Units 10/18/24  1445 10/18/24  1335   BLOOD CULTURE  Received in Microbiology Lab. Culture in Progress. Received in Microbiology Lab. Culture in Progress.     Results from last 7 days   Lab Units 10/18/24  1445   PROCALCITONIN ng/ml 0.72*                   Imaging Results Review: I personally reviewed the following image studies in PACS and associated radiology reports: CT abdomen/pelvis. My  interpretation of the radiology images/reports is: No obvious fluid collection.  Other Study Results Review: No additional pertinent studies reviewed.

## 2024-10-19 NOTE — ASSESSMENT & PLAN NOTE
Blood cultures from admission are both growing gram-negative rods.  Blood culture identification panel has identified both E. coli and Proteus thus far.  Patient had a recent polymicrobial bacteremia during last admission with source felt to be sacral wound.  Surgery has examined patient's sacral wound and lower concern for acute infection, though may be source of translocation.  Would also consider possibility of urinary source as patient does have a chronic Parra catheter. Also consider gallbladder source given transaminitis and gallstones on CT, though no obvious RUQ tenderness on exam.  CT abdomen/pelvis with contrast without any evidence of pyelonephritis, intra-abdominal abscess, or abscess beneath the sacral wound.  -Continue IV cefepime but increase dose to 2g every 12 hours as would consider this a moderate to severe infection  -Follow-up susceptibilities of gram-negative's to further de-escalate  -Will continue IV vancomycin for now while further awaiting blood cultures to ensure no growth of gram-positive's  -Recommend RUQ US to better evaluate gallbladder  -Trend LFTs  -Plan to de-escalate antibiotics over next 24-48 hours based on updated blood culture results and further workup  -Ongoing management of sacral wound with assistance of surgery  -Follow-up urine culture  -Please exchange Parra catheter if not already done

## 2024-10-19 NOTE — ASSESSMENT & PLAN NOTE
Chronic urethral hanks catheter secondary to chronic urinary retention, BPH.  Hanks patent at this time   Hanks was exchanged today 10/19  Continue tamsulosin and finasteride

## 2024-10-19 NOTE — ASSESSMENT & PLAN NOTE
Surgery requests holding Eliquis in case surgery needed.  Continue pta metoprolol with hold parameters

## 2024-10-19 NOTE — ASSESSMENT & PLAN NOTE
As evidenced by tachycardia, leukocytosis on presentation.  Patient has not yet had fevers.  Source is likely bacteremia.  Sacral wound was examined by surgery and feel it appears noninfected at this time.  CT scan is also without any evidence of a drainable fluid collection/abscess.  Consider urinary source in setting of chronic Parra catheter.  Chest x-ray without any consolidation to suggest pneumonia. LFTs mildly elevated, but no pericholecystic fluid on CT. He is currently hemodynamically stable.  -Continue empiric cefepime and vancomycin while awaiting final blood cultures  -Continue Flagyl given proctitis on CT and while evaluating gallbladder  -Recommend RUQ US to further evaluate gallbladder  -Trend LFTs  -Anticipate antibiotic de-escalation over next 24-48 hours based on updated cultures  -Follow-up urine culture and blood cultures  -As below, would exchange Parra catheter if not already done  -Ongoing close monitoring of sacral wound, appreciate surgery input  -Repeat CBC tomorrow to trend leukocytosis  -Monitor fever curve and hemodynamics

## 2024-10-19 NOTE — SEPSIS NOTE
"Received sepsis alert for patient secondary to bp of 88/64 with tachycardia and leukocytosis of 21.57  Pt recently admitted for sepsis secondary to sacral wound  Pt assessed at the bedside and lethargic, arousable to sternal rub. Pt wife at the bedside reports this is similar to how he was when he came in. Pt appears dry on exam. HR tachycardic, regular, lungs clear, no pedal edema.  No history of CHF per chart review. Pt recent ECHO on 9/20/2024 EF 55%. Normal systolic function. Normal diastolic function however per report \"although no diagnostic regional wall motion abnormality was identified, this possibility cannot be completely excluded on the basis of this study.\"    PLAN   Sepsis alert called  Will cautiously give weight-based IV fluids and reassess  Repeat lactic acid, CMP, and CBC ordered  Pt with blood cultures drawn earlier today, so no repeat necessary at this time  Continue IV antibiotics with cefepime/vanc/flagyl  Pt made NPO with speech eval pending improvement in mentation     Sepsis Note   Drew Santos 73 y.o. male MRN: [id number]  Unit/Bed#: E2 -01 Encounter: [id number]       Initial Sepsis Screening       Row Name 10/18/24 2110                Is the patient's history suggestive of a new or worsening infection? Yes (Proceed)  -NK        Suspected source of infection soft tissue  -NK        Indicate SIRS criteria Tachycardia > 90 bpm;Leukocytosis (WBC > 40906 IJL) OR Leukopenia (WBC <4000 IJL) OR Bandemia (WBC >10% bands)  -NK        Are two or more of the above signs & symptoms of infection both present and new to the patient? Yes (Proceed)  -NK        Assess for evidence of organ dysfunction: Are any of the below criteria present within 6 hours of suspected infection and SIRS criteria that are NOT considered to be chronic conditions? SBP < 90  -NK        Date of presentation of septic shock 10/18/24  -NK        Time of presentation of septic shock 2112  -NK        Fluid Resuscitation: 30 " "ml/kg IV fluid bolus will be given based on actual body weight  -NK        Is the patient is persistently hypotensive in the hour after fluid bolus administration? If yes, patient meets criteria for vasopressor use. --        Sepsis Note: Click \"NEXT\" below (NOT \"close\") to generate sepsis note based on above information. --                  User Key  (r) = Recorded By, (t) = Taken By, (c) = Cosigned By      Initials Name Provider Type    NK Laith Holder PA-C Physician Assistant                        Body mass index is 25.54 kg/m².  Wt Readings from Last 1 Encounters:   10/18/24 97.7 kg (215 lb 6.2 oz)     IBW (Ideal Body Weight): 89.1 kg    Ideal body weight: 89.1 kg (196 lb 6.9 oz)  Adjusted ideal body weight: 92.5 kg (204 lb 0.2 oz)    "

## 2024-10-19 NOTE — PROGRESS NOTES
Progress Note - Surgery-General   Name: Drew Santos 73 y.o. male I MRN: [id number]  Unit/Bed#: E2 -01 I Date of Admission: 10/18/2024   Date of Service: 10/19/2024 I Hospital Day: 1    Assessment & Plan  Sepsis  Leukocytosis at admission WBC=21. CXR collected without convincing evidence of pneumonia. Although sacral ulcer could be contributing to elevated WBC, wound appears non-infected and would suspect infection likely related to the patients dirty UA and likely UTI.    -daily CBC  -trend fever curve  -blood cultures positive for GNR, follow susceptibilities  -daily wound care w/ Dakins as written  -abx per primary: vanco/cefepime/flagyl (10/18- )  Type 2 diabetes mellitus without complication, without long-term current use of insulin ([facility])  Lab Results   Component Value Date    HGBA1C 6.2 (H) 09/17/2024       Recent Labs     10/18/24  2131 10/19/24  0744   POCGLU 192* 164*       Blood Sugar Average: Last 72 hrs:  (P) 178  -continue to trend sugars  -agree with SSI while inpatient  -hypoglycemic protocol  Paroxysmal atrial fibrillation (HCC)  Tachycardic in ED. EKG collected 10/18.    -recommend holding eliquis while inpatient if patient requires surgical debridement of sacral ulcer  -VTE ppx to be initiated by primary service, however appropriate for SQH 5000 TID from surgical perspective  -CTM vitals  -management per primary  Sacral ulcer (HCC)  Sacral ulcer previously debrided by our surgical service at bedside (most recently on 9/25) where granulation tissue was exposed. Gross size/depth of wound is visually unchanged between photos taken on 10/18 and last hospitalization. Patient discharged to nursing care facility with wound care service follow up. Return to ED w/ necrotic superficial slough of wound. Improvement of wound appearance w/ dakin soaked dressings with removal of superficial necrotic tissue.    -recommend initiation of 0.25% Dakin soaked guaze packing TID while inpatient  -surgical  debridement may be required given radiographic findings, however will proceed once Eliquis has been held for multiple days. Until then will sanitize and softly debride with dressing changes  -would recommend wound care consult for continued assistance for continued services while inpatient  -patient appropriate for diet at this time  -agree with continuing abx per primary: vanc/cefepime/flagyl (10/18- )   -follow cultures  -rotate patient q2hrs to prevent worsening of or development of new sacral ulcer  -pain and nausea control prn    Surgery-General service will follow.    Peter Holland MD  General Surgery  10/19/24  10:08 AM      Subjective   GABRIELLE. AVSS. Patient mental status improved this morning as he is engaged with exam, nodding his head yes/no appropriately. Patient wife present. Wound examined.    Objective :  Temp:  [97.2 °F (36.2 °C)-98.6 °F (37 °C)] 98.2 °F (36.8 °C)  HR:  [] 97  BP: ()/(50-92) 98/60  Resp:  [16-24] 18  SpO2:  [91 %-100 %] 97 %  O2 Device: None (Room air)    I/O         10/17 0701  10/18 0700 10/18 0701  10/19 0700    IV Piggyback  150    Total Intake(mL/kg)  150 (1.5)    Net  +150                Physical Exam:  GENERAL APPEARANCE: well developed, elderly, non-verbal male lying supine in bed in NAD. Appears comfortable  HEENT: NCAT; EOMI; normal external nose & ears; MMM  NECK: Supple, normal ROM.  CARDIOVASCULAR: Regular rate. Grossly well perfused.  LUNGS/CHEST: Symmetric chest rise/fall with respirations.  ABD: Soft; non-distended; non-tender.  EXT: No observable deformities in 4/4 extremities. No edema.  NEURO: attentive to exam, nodding head yes/no when asked questions.  SKIN: Warm, dry and well perfused; no jaundice. Approximately 5x6cm sacral ulcer with exposed coccyx with improved appearance of wound base with less necrotic appearing tissue; healthy bleeding underneath, superficial skin tears around lower-back likely adhesion related. Unchanged 1x1 cm focus of  different sacral wound on L gluteal cheek      Lab Results: I have reviewed the following results:  Recent Labs     10/18/24  1445 10/18/24  2244 10/19/24  0554   WBC 21.57* 19.52* 18.79*   HGB 10.0* 9.2* 8.5*   HCT 33.2* 30.1* 28.1*   * 445* 450*   SODIUM 147 147 149*   K 3.9 3.7 3.3*   * 113* 114*   CO2 28 26 28   BUN 60* 59* 50*   CREATININE 1.07 1.10 0.92   GLUC 204* 208* 168*   MG 2.4  --  2.4   PHOS 4.2*  --   --    * 68* 67*   ALT 99* 76* 70*   ALB 2.5* 2.2* 2.1*   TBILI 0.74 0.81 0.77   ALKPHOS 94 80 81   PTT 38*  --   --    INR 1.57*  --   --    LACTICACID 1.8 1.6  --      Imaging Results Review: I reviewed radiology reports from this admission including: chest xray and CT abdomen/pelvis.  XR chest portable   Final Result      Limited study. Some atelectasis is present at the right base. No convincing evidence of pneumonia.            Workstation performed: [id number]         CT abdomen pelvis with contrast   Final Result      Large sacral decubitus ulcer extending to the coccyx with soft tissue gas now seen extending into the right and left medial aspects of the gluteus jac muscles. No drainable fluid collection.      Erosion of the coccyx suggestive of osteomyelitis.      Additional findings as above.         Workstation performed: [id number]           Other Study Results Review: EKG was reviewed.     VTE Pharmacologic Prophylaxis: VTE covered by:  apixaban, Oral    VTE Mechanical Prophylaxis: sequential compression device

## 2024-10-19 NOTE — ASSESSMENT & PLAN NOTE
Lab Results   Component Value Date    HGBA1C 6.2 (H) 09/17/2024     Recent Labs     10/18/24  2131 10/19/24  0744   POCGLU 192* 164*     Blood Sugar Average: Last 72 hrs:  (P) 178  Last HgbA1C 6.2%  Continue sliding scale insulin   Hypoglycemia protocol

## 2024-10-19 NOTE — ASSESSMENT & PLAN NOTE
Noted on CT scan with history of this as possibly chronic. No diarrhea or clinical evidence of colitis.

## 2024-10-19 NOTE — ASSESSMENT & PLAN NOTE
UA sent from patient's chronic Parra catheter had innumerable WBCs.  However this is expected finding in setting of chronic catheter and cannot be used alone to determine UTI.  CT abdomen/pelvis was without any hydronephrosis or hydroureter.  There was some chronic appearing bilateral perinephric stranding.  The urinary bladder was noted to be collapsed around a Parra catheter.  Previous urine culture in September had grown Enterobacter cloacae.  -Continue broad empiric antibiotics as above while awaiting cultures  -Follow-up urine culture  -If not done recently, recommend patient's Parra catheter be exchanged

## 2024-10-19 NOTE — PLAN OF CARE
Problem: Potential for Falls  Goal: Patient will remain free of falls  Description: INTERVENTIONS:  - Educate patient/family on patient safety including physical limitations  - Instruct patient to call for assistance with activity   - Consult OT/PT to assist with strengthening/mobility   - Keep Call bell within reach  - Keep bed low and locked with side rails adjusted as appropriate  - Keep care items and personal belongings within reach  - Initiate and maintain comfort rounds  - Make Fall Risk Sign visible to staff  - Offer Toileting every 2 Hours, in advance of need  - Initiate/Maintain bed alarm  - Obtain necessary fall risk management equipment:   - Apply yellow socks and bracelet for high fall risk patients  - Consider moving patient to room near nurses station  Outcome: Progressing     Problem: Prexisting or High Potential for Compromised Skin Integrity  Goal: Skin integrity is maintained or improved  Description: INTERVENTIONS:  - Identify patients at risk for skin breakdown  - Assess and monitor skin integrity  - Assess and monitor nutrition and hydration status  - Monitor labs   - Assess for incontinence   - Turn and reposition patient  - Assist with mobility/ambulation  - Relieve pressure over bony prominences  - Avoid friction and shearing  - Provide appropriate hygiene as needed including keeping skin clean and dry  - Evaluate need for skin moisturizer/barrier cream  - Collaborate with interdisciplinary team   - Patient/family teaching  - Consider wound care consult   Outcome: Progressing     Problem: PAIN - ADULT  Goal: Verbalizes/displays adequate comfort level or baseline comfort level  Description: Interventions:  - Encourage patient to monitor pain and request assistance  - Assess pain using appropriate pain scale  - Administer analgesics based on type and severity of pain and evaluate response  - Implement non-pharmacological measures as appropriate and evaluate response  - Consider cultural and  social influences on pain and pain management  - Notify physician/advanced practitioner if interventions unsuccessful or patient reports new pain  Outcome: Progressing     Problem: INFECTION - ADULT  Goal: Absence or prevention of progression during hospitalization  Description: INTERVENTIONS:  - Assess and monitor for signs and symptoms of infection  - Monitor lab/diagnostic results  - Monitor all insertion sites, i.e. indwelling lines, tubes, and drains  - Monitor endotracheal if appropriate and nasal secretions for changes in amount and color  - Jerome appropriate cooling/warming therapies per order  - Administer medications as ordered  - Instruct and encourage patient and family to use good hand hygiene technique  - Identify and instruct in appropriate isolation precautions for identified infection/condition  Outcome: Progressing     Problem: DISCHARGE PLANNING  Goal: Discharge to home or other facility with appropriate resources  Description: INTERVENTIONS:  - Identify barriers to discharge w/patient and caregiver  - Arrange for needed discharge resources and transportation as appropriate  - Identify discharge learning needs (meds, wound care, etc.)  - Arrange for interpretive services to assist at discharge as needed  - Refer to Case Management Department for coordinating discharge planning if the patient needs post-hospital services based on physician/advanced practitioner order or complex needs related to functional status, cognitive ability, or social support system  Outcome: Progressing     Problem: Knowledge Deficit  Goal: Patient/family/caregiver demonstrates understanding of disease process, treatment plan, medications, and discharge instructions  Description: Complete learning assessment and assess knowledge base.  Interventions:  - Provide teaching at level of understanding  - Provide teaching via preferred learning methods  Outcome: Progressing

## 2024-10-19 NOTE — PROGRESS NOTES
"Progress Note - Hospitalist   Name: Drew Santos 73 y.o. male I MRN: [id number]  Unit/Bed#: E2 -01 I Date of Admission: 10/18/2024   Date of Service: 10/19/2024 I Hospital Day: 1    Assessment & Plan  Sepsis  Patient recently discharged 10/4 with polymicrobial bacteremia secondary to sacral wound and completed course of IV cefepime, flagyl and vancomycin followed by ampicillin. Blood cultures are growing Enterococcus, Enterobacter and Proteus last month. Patient presented to [facility] ED 10/18 from SNF due to worsening sacral wounds and possible osteomyelitis. Patient received cefepime, Flagyl and vancomycin in the ER.   CXR \"Limited study. Some atelectasis is present at the right base. No convincing evidence of pneumonia.\"  CT abdomen pelvis showing \"Large sacral decubitus ulcer extending to the coccyx with soft tissue gas now seen extending into the right and left medial aspects of the gluteus jac muscles. No drainable fluid collection. Erosion of the coccyx suggestive of osteomyelitis.\"  Blood culture x2 obtained upon admission  MRSA pending  Sepsis alert called overnight for hypotension, tachycardia and leukocytosis 21.57, slightly improving this morning to 18.79 with stable BP; however, on the soft side 98/60. Has been afebrile since admission.  Continue IVFs  Infectious disease recs appreciated  Continue vanc/cefepime/flagyl (10/18- )   NPO with sips of clear liquids at this time, await Speech therapy recs prior to initiating diet.  Gram-negative bacteremia  Blood cultures from admission 2 out of 2 sets growing gram-negative rods. Per ID with plan to de-escalate antibiotics over next 24-48 hours based on updated blood culture results and further workup   Check RUQ US for possible gallbladder source given transaminitis and gallstones on CT  Sacral ulcer (HCC)  Patient has at least 2 sacral ulcers. At least one of them is stage IV with palpable bone. Possible osteomyelitis. Surgery has examined patient's " "sacral wound and lower concern for acute infection, though may be source of translocation.   Surgery consult appreciated; recommend initiation of 0.25% Dakin soaked guaze packing TID while inpatient   Q2hr turns as patient tolerates  Pyuria  Noted with innumerable WBC from chronic hanks catheter sample, which cannot alone determine UTI in the setting of chronic catheter. CT scan showing \"Tiny bilateral renal cysts. Nonobstructing left renal calculi. No hydronephrosis or hydroureter. Chronic bilateral perinephric stranding. And Relatively collapsed around a Hanks catheter limiting evaluation\"  Continue broad spectrum antibiotics per ID at this time  Follow-up urine culture  Chronic indwelling Hanks catheter  Chronic urethral hanks catheter secondary to chronic urinary retention, BPH.  Hanks patent at this time   Hanks was exchanged today 10/19  Continue tamsulosin and finasteride  Hypernatremia  Noted sodium 149 on labs this morning likely hypovolemic hypernatremia related to mild dehydration   Trend with daily BMP  Continue IVFs, consider D5 if hypernatremia worsens  Proctitis  Noted on CT scan with history of this as possibly chronic. No diarrhea or clinical evidence of colitis.   Type 2 diabetes mellitus without complication, without long-term current use of insulin (HCC)  Lab Results   Component Value Date    HGBA1C 6.2 (H) 09/17/2024     Recent Labs     10/18/24  2131 10/19/24  0744   POCGLU 192* 164*     Blood Sugar Average: Last 72 hrs:  (P) 178  Last HgbA1C 6.2%  Continue sliding scale insulin   Hypoglycemia protocol  Paroxysmal atrial fibrillation (HCC)  Surgery requests holding Eliquis in case surgery needed.  Continue pta metoprolol with hold parameters  Anemia of chronic disease  Has chronic anemia likely secondary to poor nutritional status underlying chronic illness.   Hgb stable 8.5  Hemodynamically stable at this time  Trend with daily CBC  Severe protein-calorie malnutrition (HCC)  Malnutrition " Findings:   Chronic   Patient with significant weight loss and per wife with significantly decreased oral intake over the past few months and has not been eating for 1 week at the nursing home.  Weight noted in April to be 275 lb, and now weighing 215 lb  NPO with sips of clear liquids at this time, await Speech therapy recs prior to initiating diet.  Dietary consult     BMI Findings:    Body mass index is 25.54 kg/m².       VTE Pharmacologic Prophylaxis: VTE Score: 8 High Risk (Score >/= 5) - Pharmacological DVT Prophylaxis indicated  (Eliquis placed on hold per surgery; heparin in the mean time). Sequential Compression Devices Ordered.    Mobility:   Basic Mobility Inpatient Raw Score: 6  JH-HLM Goal: 2: Bed activities/Dependent transfer  JH-HLM Achieved: 1: Laying in bed  JH-HLM Goal NOT achieved. Continue with multidisciplinary rounding and encourage appropriate mobility to improve upon JH-HLM goals.    Patient Centered Rounds: I performed bedside rounds with nursing staff today.   Discussions with Specialists or Other Care Team Provider: ID, general surgery notes reviewed     Education and Discussions with Family / Patient: Updated  (wife, Kristi) at bedside.    Current Length of Stay: 1 day(s)  Current Patient Status: Inpatient   Certification Statement: The patient will continue to require additional inpatient hospital stay due to complex management of sepsis requiring IV abx.  Discharge Plan: Anticipate discharge in >72 hrs to rehab facility.    Code Status: Level 1 - Full Code    Subjective   Patient seen and examined at bedside, lying in bed, awake and alert to voice. Patient was able to nod yes when asked if he had pain and nod yes to allow for physical exam. Patient otherwise nonverbal; however, cooperative with cares.    Objective :  Temp:  [97.2 °F (36.2 °C)-98.6 °F (37 °C)] 98.2 °F (36.8 °C)  HR:  [] 97  BP: ()/(50-92) 98/60  Resp:  [16-24] 18  SpO2:  [91 %-100 %] 97 %  O2  Device: None (Room air)    Body mass index is 25.54 kg/m².     Input and Output Summary (last 24 hours):     Intake/Output Summary (Last 24 hours) at 10/19/2024 0956  Last data filed at 10/19/2024 0701  Gross per 24 hour   Intake 1081 ml   Output 1150 ml   Net -69 ml       Physical Exam  Vitals and nursing note reviewed.   Constitutional:       General: He is awake. He is not in acute distress.     Appearance: He is well-developed. He is ill-appearing.   HENT:      Head: Normocephalic and atraumatic.      Mouth/Throat:      Mouth: Mucous membranes are dry.      Pharynx: Oropharynx is clear. No oropharyngeal exudate or posterior oropharyngeal erythema.   Eyes:      Conjunctiva/sclera: Conjunctivae normal.      Pupils: Pupils are equal, round, and reactive to light.   Cardiovascular:      Rate and Rhythm: Normal rate and regular rhythm.      Pulses: Normal pulses.           Carotid pulses are 2+ on the right side and 2+ on the left side.       Radial pulses are 2+ on the right side and 2+ on the left side.        Dorsalis pedis pulses are 2+ on the right side and 2+ on the left side.      Heart sounds: Normal heart sounds. No murmur heard.  Pulmonary:      Effort: Pulmonary effort is normal. No respiratory distress.      Breath sounds: Normal breath sounds. Decreased air movement present. No wheezing.   Abdominal:      Palpations: Abdomen is soft.      Tenderness: There is no abdominal tenderness.   Genitourinary:     Penis: Normal.    Musculoskeletal:         General: No swelling.      Cervical back: Neck supple.      Right lower leg: No edema.      Left lower leg: No edema.   Skin:     General: Skin is warm and dry.      Capillary Refill: Capillary refill takes less than 2 seconds.      Coloration: Skin is pale.      Findings: Wound present.          Neurological:      Mental Status: He is alert. Mental status is at baseline.      Comments: Patient baseline nonverbal, was able to nod yes when asked simple questions        Lines/Drains:    Urinary Catheter:  Goal for removal: N/A - Chronic Parra           Lab Results: I have reviewed the following results:   Results from last 7 days   Lab Units 10/19/24  0554   WBC Thousand/uL 18.79*   HEMOGLOBIN g/dL 8.5*   HEMATOCRIT % 28.1*   PLATELETS Thousands/uL 450*   SEGS PCT % 82*   LYMPHO PCT % 11*   MONO PCT % 6   EOS PCT % 0     Results from last 7 days   Lab Units 10/19/24  0554   SODIUM mmol/L 149*   POTASSIUM mmol/L 3.3*   CHLORIDE mmol/L 114*   CO2 mmol/L 28   BUN mg/dL 50*   CREATININE mg/dL 0.92   ANION GAP mmol/L 7   CALCIUM mg/dL 8.3*   ALBUMIN g/dL 2.1*   TOTAL BILIRUBIN mg/dL 0.77   ALK PHOS U/L 81   ALT U/L 70*   AST U/L 67*   GLUCOSE RANDOM mg/dL 168*     Results from last 7 days   Lab Units 10/18/24  1445   INR  1.57*     Results from last 7 days   Lab Units 10/19/24  0744 10/18/24  2131   POC GLUCOSE mg/dl 164* 192*         Results from last 7 days   Lab Units 10/18/24  2244 10/18/24  1445   LACTIC ACID mmol/L 1.6 1.8   PROCALCITONIN ng/ml  --  0.72*       Recent Cultures (last 7 days):   Results from last 7 days   Lab Units 10/18/24  1445 10/18/24  1335   BLOOD CULTURE  Received in Microbiology Lab. Culture in Progress.  --    GRAM STAIN RESULT   --  Gram negative rods*       Imaging Results Review: I reviewed radiology reports from this admission including: chest xray and CT abdomen/pelvis.  Other Study Results Review: EKG was reviewed.     Last 24 Hours Medication List:     Current Facility-Administered Medications:     acetaminophen (TYLENOL) tablet 650 mg, Q4H PRN    apixaban (ELIQUIS) tablet 5 mg, BID    aspirin chewable tablet 81 mg, Daily    atorvastatin (LIPITOR) tablet 80 mg, QPM    bisacodyl (DULCOLAX) rectal suppository 10 mg, Daily PRN    cefepime (MAXIPIME) 1 g/50 mL dextrose IVPB, Q12H, Last Rate: 1,000 mg (10/18/24 2120)    docusate sodium (COLACE) capsule 100 mg, Daily    Empagliflozin (JARDIANCE) tablet 10 mg, Daily    escitalopram (LEXAPRO) tablet 10  mg, Daily    finasteride (PROSCAR) tablet 5 mg, Daily    insulin lispro (HumALOG/ADMELOG) 100 units/mL subcutaneous injection 1-5 Units, TID AC **AND** Fingerstick Glucose (POCT), TID AC    insulin lispro (HumALOG/ADMELOG) 100 units/mL subcutaneous injection 1-5 Units, HS    lisinopril (ZESTRIL) tablet 10 mg, Daily    metoprolol succinate (TOPROL-XL) 24 hr tablet 25 mg, Daily    metroNIDAZOLE (FLAGYL) IVPB (premix) 500 mg 100 mL, Q12H, Last Rate: 500 mg (10/19/24 0523)    multi-electrolyte (PLASMALYTE-A/ISOLYTE-S PH 7.4) IV solution, Continuous, Last Rate: 100 mL/hr (10/19/24 0200)    multivitamin stress formula tablet 1 tablet, Daily    ondansetron (ZOFRAN) injection 4 mg, Q6H PRN    potassium chloride 20 mEq IVPB (premix), Once, Last Rate: 20 mEq (10/19/24 0925)    senna (SENOKOT) tablet 17.2 mg, Daily PRN    sodium hypochlorite (DAKIN'S HALF-STRENGTH) 0.25 percent topical solution 1 Application, Daily    tamsulosin (FLOMAX) capsule 0.4 mg, Daily With Dinner    vancomycin (VANCOCIN) IVPB (premix in dextrose) 750 mg 150 mL, Q12H, Last Rate: 750 mg (10/19/24 0812)    Administrative Statements   Today, Patient Was Seen By: VAELINA Flores  I have spent a total time of 45 minutes in caring for this patient on the day of the visit/encounter including Patient and family education, Risk factor reductions, Impressions, Counseling / Coordination of care, Documenting in the medical record, Reviewing / ordering tests, medicine, procedures  , Obtaining or reviewing history  , and Communicating with other healthcare professionals .    **Please Note: This note may have been constructed using a voice recognition system.**

## 2024-10-19 NOTE — ASSESSMENT & PLAN NOTE
Malnutrition Findings:   Chronic   Patient with significant weight loss and per wife with significantly decreased oral intake over the past few months and has not been eating for 1 week at the nursing home.  Weight noted in April to be 275 lb, and now weighing 215 lb  NPO with sips of clear liquids at this time, await Speech therapy recs prior to initiating diet.  Dietary consult     BMI Findings:    Body mass index is 25.54 kg/m².

## 2024-10-19 NOTE — ASSESSMENT & PLAN NOTE
"Noted with innumerable WBC from chronic hanks catheter sample, which cannot alone determine UTI in the setting of chronic catheter. CT scan showing \"Tiny bilateral renal cysts. Nonobstructing left renal calculi. No hydronephrosis or hydroureter. Chronic bilateral perinephric stranding. And Relatively collapsed around a Hanks catheter limiting evaluation\"  Continue broad spectrum antibiotics per ID at this time  Follow-up urine culture  "

## 2024-10-19 NOTE — ASSESSMENT & PLAN NOTE
"Patient recently discharged 10/4 with polymicrobial bacteremia secondary to sacral wound and completed course of IV cefepime, flagyl and vancomycin followed by ampicillin. Blood cultures are growing Enterococcus, Enterobacter and Proteus last month. Patient presented to [facility] ED 10/18 from SNF due to worsening sacral wounds and possible osteomyelitis. Patient received cefepime, Flagyl and vancomycin in the ER.   CXR \"Limited study. Some atelectasis is present at the right base. No convincing evidence of pneumonia.\"  CT abdomen pelvis showing \"Large sacral decubitus ulcer extending to the coccyx with soft tissue gas now seen extending into the right and left medial aspects of the gluteus jac muscles. No drainable fluid collection. Erosion of the coccyx suggestive of osteomyelitis.\"  Blood culture x2 obtained upon admission  MRSA pending  Sepsis alert called overnight for hypotension, tachycardia and leukocytosis 21.57, slightly improving this morning to 18.79 with stable BP; however, on the soft side 98/60. Has been afebrile since admission.  Continue IVFs  Infectious disease recs appreciated  Continue vanc/cefepime/flagyl (10/18- )   NPO with sips of clear liquids at this time, await Speech therapy recs prior to initiating diet.  "

## 2024-10-19 NOTE — ASSESSMENT & PLAN NOTE
Sacral ulcer previously debrided by our surgical service at bedside (most recently on 9/25) where granulation tissue was exposed. Gross size/depth of wound is visually unchanged between photos taken on 10/18 and last hospitalization. Patient discharged to nursing care facility with wound care service follow up. Return to ED w/ necrotic superficial slough of wound. Improvement of wound appearance w/ dakin soaked dressings with removal of superficial necrotic tissue.    -recommend initiation of 0.25% Dakin soaked guaze packing TID while inpatient  -surgical debridement may be required given radiographic findings, however will proceed once Eliquis has been held for multiple days. Until then will sanitize and softly debride with dressing changes  -would recommend wound care consult for continued assistance for continued services while inpatient  -patient appropriate for diet at this time  -agree with continuing abx per primary: vanc/cefepime/flagyl (10/18- )   -follow cultures  -rotate patient q2hrs to prevent worsening of or development of new sacral ulcer  -pain and nausea control prn

## 2024-10-19 NOTE — MALNUTRITION/BMI
This medical record reflects one or more clinical indicators suggestive of malnutrition and/or morbid obesity.    Malnutrition Findings:   Adult Malnutrition type: Chronic illness  Adult Degree of Malnutrition: Other severe protein calorie malnutrition  Malnutrition Characteristics: Weight loss, Inadequate energy                360 Statement: Severe protein calorie malnutrition in context of chronic illness r/t poor appetite, inadequate PO intake as evidance by energy intake less than 75% compared to estimated needs>1 month, 14% wt loss in 1 month (9/17/24 114 kg, 10/19/24 97.7 kg) treated with PO diet. Once able to have po treat with Mechanical Soft diet, Magic Cup BID, Ensure Compact 1x daily.    BMI Findings:           Body mass index is 25.54 kg/m².     See Nutrition note dated 10/19/24  for additional details.  Completed nutrition assessment is viewable in the nutrition documentation.

## 2024-10-19 NOTE — ASSESSMENT & PLAN NOTE
Noted sodium 149 on labs this morning likely hypovolemic hypernatremia related to mild dehydration   Trend with daily BMP  Continue IVFs, consider D5 if hypernatremia worsens

## 2024-10-19 NOTE — ASSESSMENT & PLAN NOTE
Mild wall thickening of rectum noted on CT scan.  ET scan back in September also noted proctocolitis.  Given persistence, this may be a somewhat chronic finding.  He is not having any current diarrhea.  -Antibiotics as above to target bacteria and blood cultures  -Continue Flagyl for now  -Monitor stool output

## 2024-10-19 NOTE — ASSESSMENT & PLAN NOTE
Blood cultures from admission 2 out of 2 sets growing gram-negative rods. Per ID with plan to de-escalate antibiotics over next 24-48 hours based on updated blood culture results and further workup   Check RUQ US for possible gallbladder source given transaminitis and gallstones on CT

## 2024-10-19 NOTE — ASSESSMENT & PLAN NOTE
This was felt to have initially developed during patient stay in skilled nursing facility for rehab.  During recent hospitalization in September there was concern for fistulous connection from rectum to the sacral wound in setting of colitis/proctitis.  Wound was previously debrided by general surgery and during last admission was stage IV with palpable bone.  Latest CT showing soft tissue gas extending into right and left medial aspects of gluteus jac muscle, but no abscess.  There is erosion of coccyx suggestive of osteomyelitis.  Wound was evaluated by surgery and they currently feel it appears noninfected.  However in setting of polymicrobial bacteremia, may be a source of translocation.  -Ongoing close monitoring of wound and surgery recommendations  -Long term antibiotics for sacral osteomyelitis would be futile without aggressive bone/tissue debridement, bone cultures and ability to close wound with flap

## 2024-10-19 NOTE — ASSESSMENT & PLAN NOTE
Lab Results   Component Value Date    HGBA1C 6.2 (H) 09/17/2024       Recent Labs     10/18/24  2131 10/19/24  0744   POCGLU 192* 164*       Blood Sugar Average: Last 72 hrs:  (P) 178  -continue to trend sugars  -agree with SSI while inpatient  -hypoglycemic protocol

## 2024-10-19 NOTE — SEPSIS NOTE
"  Sepsis Note   Drew Santos 73 y.o. male MRN: [id number]  Unit/Bed#: E2 -01 Encounter: [id number]       Initial Sepsis Screening       Row Name 10/19/24 0225 10/18/24 2110             Is the patient's history suggestive of a new or worsening infection? -- Yes (Proceed)  -NK       Suspected source of infection -- soft tissue  -NK       Indicate SIRS criteria -- Tachycardia > 90 bpm;Leukocytosis (WBC > 32985 IJL) OR Leukopenia (WBC <4000 IJL) OR Bandemia (WBC >10% bands)  -NK       Are two or more of the above signs & symptoms of infection both present and new to the patient? -- Yes (Proceed)  -NK       Assess for evidence of organ dysfunction: Are any of the below criteria present within 6 hours of suspected infection and SIRS criteria that are NOT considered to be chronic conditions? -- SBP < 90  -NK       Date of presentation of septic shock -- 10/18/24  -NK       Time of presentation of septic shock -- 2112  -NK       Fluid Resuscitation: -- 30 ml/kg IV fluid bolus will be given based on actual body weight  -NK       Is the patient is persistently hypotensive in the hour after fluid bolus administration? If yes, patient meets criteria for vasopressor use. NO  -NK --       Sepsis Note: Click \"NEXT\" below (NOT \"close\") to generate sepsis note based on above information. -- --                 User Key  (r) = Recorded By, (t) = Taken By, (c) = Cosigned By      Initials Name Provider Type    NK Laith Holder PA-C Physician Assistant                    Default Flowsheet Data (Last 720 Hours)       Sepsis Reassess       Row Name 10/19/24 0225                   Repeat Volume Status and Tissue Perfusion Assessment Performed    Date of Reassessment: 10/19/24  -NK        Time of Reassessment: 0225 -NK        Sepsis Reassessment Note: Click \"NEXT\" below (NOT \"close\") to generate sepsis reassessment note. YES (proceed by clicking \"NEXT\")  -NK        Repeat Volume Status and Tissue Perfusion Assessment Performed --   "                User Key  (r) = Recorded By, (t) = Taken By, (c) = Cosigned By      Initials Name Provider Type    NK Laith Holder PA-C Physician Assistant                    Body mass index is 25.54 kg/m².  Wt Readings from Last 1 Encounters:   10/18/24 97.7 kg (215 lb 6.2 oz)     IBW (Ideal Body Weight): 89.1 kg    Ideal body weight: 89.1 kg (196 lb 6.9 oz)  Adjusted ideal body weight: 92.5 kg (204 lb 0.2 oz)

## 2024-10-19 NOTE — ASSESSMENT & PLAN NOTE
Leukocytosis at admission WBC=21. CXR collected without convincing evidence of pneumonia. Although sacral ulcer could be contributing to elevated WBC, wound appears non-infected and would suspect infection likely related to the patients dirty UA and likely UTI.    -daily CBC  -trend fever curve  -blood cultures positive for GNR, follow susceptibilities  -daily wound care w/ Dakins as written  -abx per primary: vanco/cefepime/flagyl (10/18- )

## 2024-10-19 NOTE — ASSESSMENT & PLAN NOTE
Patient has at least 2 sacral ulcers. At least one of them is stage IV with palpable bone. Possible osteomyelitis. Surgery has examined patient's sacral wound and lower concern for acute infection, though may be source of translocation.   Surgery consult appreciated; recommend initiation of 0.25% Dakin soaked guaze packing TID while inpatient   Q2hr turns as patient tolerates

## 2024-10-19 NOTE — PROGRESS NOTES
Drew Santos is a 73 y.o. male who is currently ordered Vancomycin IV with management by the Pharmacy Consult service.  Relevant clinical data and objective / subjective history reviewed.  Vancomycin Assessment:  Indication and Goal AUC/Trough: Soft tissue (goal -600, trough >10)  Clinical Status: stable  Micro:     Renal Function:  SCr: 0.92 mg/dL  CrCl: 87.4 mL/min  Renal replacement: Not on dialysis  Days of Therapy: 2  Current Dose: 750 mg IV Q12h  Vancomycin Plan:  New Dosin mg IV q12h   Estimated AUC: 479 mcg*hr/mL  Estimated Trough: 14.3 mcg/mL  Next Level: 10/20 with AM labs   Renal Function Monitoring: Daily BMP and UOP  Pharmacy will continue to follow closely for s/sx of nephrotoxicity, infusion reactions and appropriateness of therapy.  BMP and CBC will be ordered per protocol. We will continue to follow the patient’s culture results and clinical progress daily.    Anne Ledezma, Pharmacist

## 2024-10-19 NOTE — ASSESSMENT & PLAN NOTE
In setting of chronic urinary retention, BPH.   -As above, recommend Parra catheter exchange if not done recently  -Monitor urine output  -Close follow-up with urology for chronic catheter management

## 2024-10-20 ENCOUNTER — APPOINTMENT (INPATIENT)
Dept: RADIOLOGY | Facility: HOSPITAL | Age: 74
DRG: 853 | End: 2024-10-20
Payer: COMMERCIAL

## 2024-10-20 PROBLEM — R74.8 ELEVATED LIVER ENZYMES: Status: ACTIVE | Noted: 2024-10-20

## 2024-10-20 LAB
ALBUMIN SERPL BCG-MCNC: 2.1 G/DL (ref 3.5–5)
ALP SERPL-CCNC: 83 U/L (ref 34–104)
ALT SERPL W P-5'-P-CCNC: 54 U/L (ref 7–52)
ANION GAP SERPL CALCULATED.3IONS-SCNC: 4 MMOL/L (ref 4–13)
ANION GAP SERPL CALCULATED.3IONS-SCNC: 5 MMOL/L (ref 4–13)
AST SERPL W P-5'-P-CCNC: 47 U/L (ref 13–39)
BACTERIA UR CULT: ABNORMAL
BASOPHILS # BLD AUTO: 0.05 THOUSANDS/ΜL (ref 0–0.1)
BASOPHILS NFR BLD AUTO: 0 % (ref 0–1)
BILIRUB SERPL-MCNC: 0.76 MG/DL (ref 0.2–1)
BUN SERPL-MCNC: 30 MG/DL (ref 5–25)
BUN SERPL-MCNC: 37 MG/DL (ref 5–25)
CALCIUM ALBUM COR SERPL-MCNC: 9.9 MG/DL (ref 8.3–10.1)
CALCIUM SERPL-MCNC: 8.3 MG/DL (ref 8.4–10.2)
CALCIUM SERPL-MCNC: 8.4 MG/DL (ref 8.4–10.2)
CHLORIDE SERPL-SCNC: 118 MMOL/L (ref 96–108)
CHLORIDE SERPL-SCNC: 120 MMOL/L (ref 96–108)
CO2 SERPL-SCNC: 25 MMOL/L (ref 21–32)
CO2 SERPL-SCNC: 30 MMOL/L (ref 21–32)
CREAT SERPL-MCNC: 0.74 MG/DL (ref 0.6–1.3)
CREAT SERPL-MCNC: 0.75 MG/DL (ref 0.6–1.3)
EOSINOPHIL # BLD AUTO: 0.26 THOUSAND/ΜL (ref 0–0.61)
EOSINOPHIL NFR BLD AUTO: 2 % (ref 0–6)
ERYTHROCYTE [DISTWIDTH] IN BLOOD BY AUTOMATED COUNT: 16.3 % (ref 11.6–15.1)
GFR SERPL CREATININE-BSD FRML MDRD: 89 ML/MIN/1.73SQ M
GFR SERPL CREATININE-BSD FRML MDRD: 90 ML/MIN/1.73SQ M
GLUCOSE SERPL-MCNC: 112 MG/DL (ref 65–140)
GLUCOSE SERPL-MCNC: 117 MG/DL (ref 65–140)
GLUCOSE SERPL-MCNC: 143 MG/DL (ref 65–140)
GLUCOSE SERPL-MCNC: 165 MG/DL (ref 65–140)
GLUCOSE SERPL-MCNC: 168 MG/DL (ref 65–140)
GLUCOSE SERPL-MCNC: 172 MG/DL (ref 65–140)
HCT VFR BLD AUTO: 29.4 % (ref 36.5–49.3)
HGB BLD-MCNC: 8.5 G/DL (ref 12–17)
IMM GRANULOCYTES # BLD AUTO: 0.24 THOUSAND/UL (ref 0–0.2)
IMM GRANULOCYTES NFR BLD AUTO: 2 % (ref 0–2)
LYMPHOCYTES # BLD AUTO: 2.82 THOUSANDS/ΜL (ref 0.6–4.47)
LYMPHOCYTES NFR BLD AUTO: 18 % (ref 14–44)
MCH RBC QN AUTO: 28.5 PG (ref 26.8–34.3)
MCHC RBC AUTO-ENTMCNC: 28.9 G/DL (ref 31.4–37.4)
MCV RBC AUTO: 99 FL (ref 82–98)
MONOCYTES # BLD AUTO: 0.87 THOUSAND/ΜL (ref 0.17–1.22)
MONOCYTES NFR BLD AUTO: 5 % (ref 4–12)
MRSA NOSE QL CULT: NORMAL
NEUTROPHILS # BLD AUTO: 11.74 THOUSANDS/ΜL (ref 1.85–7.62)
NEUTS SEG NFR BLD AUTO: 73 % (ref 43–75)
NRBC BLD AUTO-RTO: 0 /100 WBCS
OSMOLALITY UR/SERPL-RTO: 335 MMOL/KG (ref 282–298)
PLATELET # BLD AUTO: 480 THOUSANDS/UL (ref 149–390)
PMV BLD AUTO: 10.1 FL (ref 8.9–12.7)
POTASSIUM SERPL-SCNC: 3.5 MMOL/L (ref 3.5–5.3)
POTASSIUM SERPL-SCNC: 4.3 MMOL/L (ref 3.5–5.3)
PROT SERPL-MCNC: 7.2 G/DL (ref 6.4–8.4)
RBC # BLD AUTO: 2.98 MILLION/UL (ref 3.88–5.62)
SODIUM SERPL-SCNC: 148 MMOL/L (ref 135–147)
SODIUM SERPL-SCNC: 154 MMOL/L (ref 135–147)
VANCOMYCIN SERPL-MCNC: 18.7 UG/ML (ref 10–20)
WBC # BLD AUTO: 15.98 THOUSAND/UL (ref 4.31–10.16)

## 2024-10-20 PROCEDURE — 99233 SBSQ HOSP IP/OBS HIGH 50: CPT | Performed by: INTERNAL MEDICINE

## 2024-10-20 PROCEDURE — 83930 ASSAY OF BLOOD OSMOLALITY: CPT

## 2024-10-20 PROCEDURE — 85025 COMPLETE CBC W/AUTO DIFF WBC: CPT

## 2024-10-20 PROCEDURE — 82948 REAGENT STRIP/BLOOD GLUCOSE: CPT

## 2024-10-20 PROCEDURE — 71045 X-RAY EXAM CHEST 1 VIEW: CPT

## 2024-10-20 PROCEDURE — 99233 SBSQ HOSP IP/OBS HIGH 50: CPT | Performed by: SPECIALIST

## 2024-10-20 PROCEDURE — 80053 COMPREHEN METABOLIC PANEL: CPT

## 2024-10-20 PROCEDURE — 92610 EVALUATE SWALLOWING FUNCTION: CPT

## 2024-10-20 PROCEDURE — 99232 SBSQ HOSP IP/OBS MODERATE 35: CPT

## 2024-10-20 PROCEDURE — 80048 BASIC METABOLIC PNL TOTAL CA: CPT

## 2024-10-20 PROCEDURE — 80202 ASSAY OF VANCOMYCIN: CPT | Performed by: HOSPITALIST

## 2024-10-20 RX ORDER — METRONIDAZOLE 500 MG/100ML
500 INJECTION, SOLUTION INTRAVENOUS EVERY 12 HOURS
Status: DISCONTINUED | OUTPATIENT
Start: 2024-10-20 | End: 2024-10-21

## 2024-10-20 RX ORDER — DEXTROSE MONOHYDRATE 50 MG/ML
100 INJECTION, SOLUTION INTRAVENOUS CONTINUOUS
Status: DISCONTINUED | OUTPATIENT
Start: 2024-10-20 | End: 2024-10-21

## 2024-10-20 RX ADMIN — HEPARIN SODIUM 5000 UNITS: 5000 INJECTION INTRAVENOUS; SUBCUTANEOUS at 13:07

## 2024-10-20 RX ADMIN — DEXTROSE 100 ML/HR: 5 SOLUTION INTRAVENOUS at 08:15

## 2024-10-20 RX ADMIN — SODIUM HYPOCHLORITE 1 APPLICATION: 2.5 SOLUTION TOPICAL at 08:14

## 2024-10-20 RX ADMIN — METRONIDAZOLE 500 MG: 500 INJECTION, SOLUTION INTRAVENOUS at 16:08

## 2024-10-20 RX ADMIN — VANCOMYCIN HYDROCHLORIDE 1000 MG: 1 INJECTION, SOLUTION INTRAVENOUS at 18:06

## 2024-10-20 RX ADMIN — HEPARIN SODIUM 5000 UNITS: 5000 INJECTION INTRAVENOUS; SUBCUTANEOUS at 05:19

## 2024-10-20 RX ADMIN — CEFEPIME 2000 MG: 2 INJECTION, POWDER, FOR SOLUTION INTRAVENOUS at 00:08

## 2024-10-20 RX ADMIN — INSULIN LISPRO 1 UNITS: 100 INJECTION, SOLUTION INTRAVENOUS; SUBCUTANEOUS at 21:50

## 2024-10-20 RX ADMIN — CEFEPIME 2000 MG: 2 INJECTION, POWDER, FOR SOLUTION INTRAVENOUS at 12:09

## 2024-10-20 RX ADMIN — HEPARIN SODIUM 5000 UNITS: 5000 INJECTION INTRAVENOUS; SUBCUTANEOUS at 21:47

## 2024-10-20 RX ADMIN — METRONIDAZOLE 500 MG: 500 INJECTION, SOLUTION INTRAVENOUS at 05:19

## 2024-10-20 RX ADMIN — VANCOMYCIN HYDROCHLORIDE 1000 MG: 1 INJECTION, SOLUTION INTRAVENOUS at 06:30

## 2024-10-20 NOTE — ASSESSMENT & PLAN NOTE
Recommend consulting palliative care team for discussion of goals of care in medically complex case. Team to lead discussion about desiring PEG tube feeds and other potential future interventions (ostomy diversion).

## 2024-10-20 NOTE — ASSESSMENT & PLAN NOTE
Malnutrition Findings:   ChronicAdult Malnutrition type: Chronic illness  Adult Degree of Malnutrition: Other severe protein calorie malnutrition  Malnutrition Characteristics: Weight loss, Inadequate energy  360 Statement: Severe protein calorie malnutrition in context of chronic illness r/t poor appetite, inadequate PO intake as evidance by energy intake less than 75% compared to estimated needs>1 month, 14% wt loss in 1 month (9/17/24 114 kg, 10/19/24 97.7 kg) treated with PO diet. Once able to have po treat with Mechanical Soft diet, Magic Cup BID, Ensure Compact 1x daily.  Patient with significant weight loss and per wife with significantly decreased oral intake over the past few months and has not been eating for 1 week at the nursing home.  Weight noted in April to be 275 lb, and now weighing 215 lb  Speech therapy consult and recommend dysphagia level 1-puree with thin liquids  Dietary consult  Tube feed ordered with PO diet, Dietary to adjust based on patient nutritional needs.  NGT placement for tube feed infusion  Check am Phos    BMI Findings:    Body mass index is 25.54 kg/m².

## 2024-10-20 NOTE — ASSESSMENT & PLAN NOTE
Lab Results   Component Value Date    HGBA1C 6.2 (H) 09/17/2024     Recent Labs     10/19/24  1104 10/19/24  1604 10/19/24  2024 10/20/24  0633   POCGLU 155* 139 118 117     Blood Sugar Average: Last 72 hrs:  (P) 147.5  Last HgbA1C 6.2%  Continue sliding scale insulin   Hypoglycemia protocol

## 2024-10-20 NOTE — UTILIZATION REVIEW
Initial Clinical Review    Admission: Date/Time/Statement:   Admission Orders (From admission, onward)       Ordered        10/18/24 1844  INPATIENT ADMISSION  Once                          Orders Placed This Encounter   Procedures    INPATIENT ADMISSION     Standing Status:   Standing     Number of Occurrences:   1     Order Specific Question:   Level of Care     Answer:   Med Surg [16]     Order Specific Question:   Estimated length of stay     Answer:   More than 2 Midnights     Order Specific Question:   Certification     Answer:   I certify that inpatient services are medically necessary for this patient for a duration of greater than two midnights. See H&P and MD Progress Notes for additional information about the patient's course of treatment.     ED Arrival Information       Expected   -    Arrival   10/18/2024 13:24    Acuity   Urgent              Means of arrival   Ambulance    Escorted by   [facility]    Service   Hospitalist    Admission type   Emergency              Arrival complaint   -             Chief Complaint   Patient presents with    Failure To Thrive     Patient arrives via EMS from  with reports of failure to thrive. Per EMS patient has been refusing to eat for one week and has been declining and also has a sacral wound. HX of stroke with left sided deficits.        Initial Presentation: 73 y.o. male w/ PMH of chronic sacral wounds, paroxysmal afib on Eliquis, T2DM presented to the ED form the nursing home via EMS w/  worsening sacral wound.    Pt was just in the hospital last month for polymicrobial bacteremia from the sacral wounds, completed full course of IV antibiotics with ampicillin.  Blood cultures are growing Enterococcus, Enterobacter and Proteus last month.   In the ED, T 97.2, . WBC 21.57. Procal 0.2.  On exam, lethargic, disoriented, tachycardia, stage 4 sacral wound present, foul smelling. Possible osteomyelitis.   Given IV Cefepime, IV Vanco, IV Flagyl.    Admit  "as Inpatient for evaluation and treatment of sepsis, worseningn sacral wounds.  Plan: Gen surg consulted for possible debridement. Wound care consult. ID consult. Hold Eliquis for surg procedure.         Gen Surg Consult:stage IV sacral decubitus ulcer, with foul odor and necrotic tissue. Also with Gm neg bacteremia from apparent urinary source. Albumin 2.5.  Plan: Dressing changes with Dakin's solution will help control odor from the wound, Necrotic tissue can be removed with bedside debridements. Depending of advanced directives consider feeding tube placement as well as diverting colostomy.     Date: 10/19   Day 2:   IM Notes: Ovn, sepsis alert was called for hypotension, tachycardia and leukocytosis 21.57, slightly improving this morning to 18.79 with stable but soft BP.  CXR showed some atelectasis is present at the right base. No convincing evidence of pneumonia. T abdomen pelvis showing \"Large sacral decubitus ulcer extending to the coccyx with soft tissue gas now seen extending into the right and left medial aspects of the gluteus jac muscles. No drainable fluid collection. Erosion of the coccyx suggestive of osteomyelitis.\"   Plan: f/u bld cxs. MRSA pending. Cont IVFs. Cont vanco, cefepime, flagyl. NPO w/ sips of clear liqs for now. ST prior to initiating diet. Check RUQ US for possible gallbladder source given transaminitis and gallstones on CT. Cont wound care. Parra was exchange today.     Gen Surg Notes:Pt's MS improved this am, he is engaged with exam, nodding his head yes/no appropriately. Cont 0.25% Dakin soaked guaze packing TID.  surgical debridement may be required given radiographic findings, however will proceed once Eliquis has been held for multiple days. Until then will sanitize and softly debride with dressing changes. Cont IV abx. F/u cxs. Pain and nausea control prn.    ID Consult: sepsis, gm neg bacteremia:  Blood cultures from admission are both growing gram-negative rods. Blood " culture identification panel has identified both E. coli and Proteus thus far.   Plan: cont Continue IV cefepime but increase dose to 2g every 12 hours. Cont IV Vanco. Cont IV Flagyl. F/u final bld cxs. F/u Urine cxs. Recommend RUQ US to further evaluate gallbladder. Trend LFTs.    Date: 10/20  Day 3: Has surpassed a 2nd midnight with active treatments and services.  Pt was not as interactive today as he was yesterday however opens eyes to voice. BPs on the soft side. WBC down trending. Hgb 8.5. Na 154. AST/ALT down trending. F/u cxs. Speech therapy consult and recommend dysphagia level 1-puree with thin liquids. Map goal>65. Cont IVFs. Cont IV abx. Pending RUQ US. Trend CMP. Check serum osmolality. Tube feed ordered with PO diet, Dietary to adjust based on patient nutritional needs. NGT placement for tube feed infusion. Check am Phos.    ED Treatment-Medication Administration from 10/18/2024 1324 to 10/18/2024 2042         Date/Time Order Dose Route Action     10/18/2024 1508 cefepime (MAXIPIME) 2 g/50 mL dextrose IVPB 2,000 mg Intravenous New Bag     10/18/2024 1648 vancomycin (VANCOCIN) 2,000 mg in sodium chloride 0.9 % 500 mL IVPB 2,000 mg Intravenous New Bag     10/18/2024 1551 metroNIDAZOLE (FLAGYL) IVPB (premix) 500 mg 100 mL 500 mg Intravenous New Bag     10/18/2024 1615 iohexol (OMNIPAQUE) 350 MG/ML injection (SINGLE-DOSE) 100 mL 100 mL Intravenous Given            Scheduled Medications:  aspirin, 81 mg, Oral, Daily  atorvastatin, 80 mg, Oral, QPM  cefepime, 2,000 mg, Intravenous, Q12H  docusate sodium, 100 mg, Oral, Daily  escitalopram, 10 mg, Oral, Daily  finasteride, 5 mg, Oral, Daily  heparin (porcine), 5,000 Units, Subcutaneous, Q8H ELISEO  insulin lispro, 1-5 Units, Subcutaneous, TID AC  insulin lispro, 1-5 Units, Subcutaneous, HS  lisinopril, 10 mg, Oral, Daily  metoprolol succinate, 25 mg, Oral, Daily  metroNIDAZOLE, 500 mg, Intravenous, Q12H  multivitamin stress formula, 1 tablet, Oral, Daily  sodium  hypochlorite, 1 Application, Irrigation, Daily  tamsulosin, 0.4 mg, Oral, Daily With Dinner  vancomycin, 1,000 mg, Intravenous, Q12H      Continuous IV Infusions:  dextrose, 100 mL/hr, Intravenous, Continuous      PRN Meds:  acetaminophen, 650 mg, Oral, Q4H PRN  bisacodyl, 10 mg, Rectal, Daily PRN  ondansetron, 4 mg, Intravenous, Q6H PRN  senna, 2 tablet, Oral, Daily PRN      ED Triage Vitals   Temperature Pulse Respirations Blood Pressure SpO2 Pain Score   10/18/24 1326 10/18/24 1326 10/18/24 1326 10/18/24 1326 10/18/24 1326 10/19/24 0700   (!) 97.2 °F (36.2 °C) (!) 107 20 128/92 97 % No Pain     Weight (last 2 days)       Date/Time Weight    10/18/24 1529 97.7 (215.39)    10/18/24 1326 --     Weight: unable to obtain - low battery bed, pt unable to stand at 10/18/24 1326            Vital Signs (last 3 days)       Date/Time Temp Pulse Resp BP MAP (mmHg) SpO2 O2 Device Patient Position - Orthostatic VS Pain    10/20/24 0644 97.8 °F (36.6 °C) 87 18 94/76 86 93 % None (Room air) -- --    10/19/24 2324 98.3 °F (36.8 °C) 61 18 126/61 -- 98 % None (Room air) Lying --    10/19/24 1500 98 °F (36.7 °C) 58 20 98/58 -- 97 % None (Room air) Lying No Pain    10/19/24 0700 98.2 °F (36.8 °C) 97 18 98/60 67 97 % None (Room air) Lying No Pain    10/19/24 0532 97.6 °F (36.4 °C) 95 16 93/57 69 -- -- Lying --    10/19/24 0300 -- 95 -- 92/58 -- -- -- -- --    10/19/24 0245 -- 98 16 102/58 -- 92 % None (Room air) -- --    10/19/24 0230 -- 100 16 98/54 -- -- -- -- --    10/19/24 0215 -- 94 16 110/62 -- 92 % None (Room air) -- --    10/19/24 0032 -- 99 16 96/58 -- -- -- Lying --    10/18/24 2350 -- -- -- 88/56 -- -- -- Lying --    10/18/24 2323 98.6 °F (37 °C) 101 20 81/50 59 91 % None (Room air) Lying --    10/18/24 2054 97.3 °F (36.3 °C) 116 24 88/64 69 91 % None (Room air) Lying --    10/18/24 2000 -- 122 18 104/60 76 96 % None (Room air) Lying --    10/18/24 1930 -- 124 20 112/70 83 98 % None (Room air) Lying --    10/18/24 1800 --  118 18 108/60 77 97 % None (Room air) Lying --    10/18/24 1700 -- 120 18 121/67 87 100 % None (Room air) Lying --    10/18/24 1630 -- 112 18 138/76 99 100 % None (Room air) Lying --    10/18/24 1555 -- 110 18 119/72 -- 100 % None (Room air) Lying --    10/18/24 1530 -- 110 20 119/72 89 -- None (Room air) Lying --    10/18/24 1430 -- 112 18 129/70 92 -- -- Lying --    10/18/24 1326 97.2 °F (36.2 °C) 107 20 128/92 -- 97 % None (Room air) Lying --              Pertinent Labs/Diagnostic Test Results:   Radiology:  XR chest portable   Final Interpretation by Ron Newton MD (10/18 1718)      Limited study. Some atelectasis is present at the right base. No convincing evidence of pneumonia.            Workstation performed: [id number]         CT abdomen pelvis with contrast   Final Interpretation by Eduardo Mcdonnell MD (10/18 1647)      Large sacral decubitus ulcer extending to the coccyx with soft tissue gas now seen extending into the right and left medial aspects of the gluteus jac muscles. No drainable fluid collection.      Erosion of the coccyx suggestive of osteomyelitis.      Additional findings as above.         Workstation performed: [id number]         US right upper quadrant    (Results Pending)     Cardiology:  No orders to display     GI:  No orders to display           Results from last 7 days   Lab Units 10/20/24  0429 10/19/24  0554 10/18/24  2244 10/18/24  1445   WBC Thousand/uL 15.98* 18.79* 19.52* 21.57*   HEMOGLOBIN g/dL 8.5* 8.5* 9.2* 10.0*   HEMATOCRIT % 29.4* 28.1* 30.1* 33.2*   PLATELETS Thousands/uL 480* 450* 445* 530*   TOTAL NEUT ABS Thousands/µL 11.74* 15.33* 16.25* 18.01*         Results from last 7 days   Lab Units 10/20/24  0429 10/19/24  0554 10/18/24  2244 10/18/24  1445   SODIUM mmol/L 154* 149* 147 147   POTASSIUM mmol/L 3.5 3.3* 3.7 3.9   CHLORIDE mmol/L 120* 114* 113* 111*   CO2 mmol/L 30 28 26 28   ANION GAP mmol/L 4 7 8 8   BUN mg/dL 37* 50* 59* 60*   CREATININE mg/dL  0.74 0.92 1.10 1.07   EGFR ml/min/1.73sq m 90 81 65 67   CALCIUM mg/dL 8.4 8.3* 8.6 9.4   MAGNESIUM mg/dL  --  2.4  --  2.4   PHOSPHORUS mg/dL  --   --   --  4.2*     Results from last 7 days   Lab Units 10/20/24  0429 10/19/24  0554 10/18/24  2244 10/18/24  1445   AST U/L 47* 67* 68* 109*   ALT U/L 54* 70* 76* 99*   ALK PHOS U/L 83 81 80 94   TOTAL PROTEIN g/dL 7.2 7.1 7.6 8.7*   ALBUMIN g/dL 2.1* 2.1* 2.2* 2.5*   TOTAL BILIRUBIN mg/dL 0.76 0.77 0.81 0.74     Results from last 7 days   Lab Units 10/20/24  1125 10/20/24  0633 10/19/24  2024 10/19/24  1604 10/19/24  1104 10/19/24  0744 10/18/24  2131   POC GLUCOSE mg/dl 143* 117 118 139 155* 164* 192*     Results from last 7 days   Lab Units 10/20/24  0429 10/19/24  0554 10/18/24  2244 10/18/24  1445   GLUCOSE RANDOM mg/dL 112 168* 208* 204*             Results from last 7 days   Lab Units 10/18/24  1445   PROTIME seconds 18.8*   INR  1.57*   PTT seconds 38*         Results from last 7 days   Lab Units 10/18/24  1445   PROCALCITONIN ng/ml 0.72*     Results from last 7 days   Lab Units 10/18/24  2244 10/18/24  1445   LACTIC ACID mmol/L 1.6 1.8               Results from last 7 days   Lab Units 10/18/24  1500   CLARITY UA  Extra Turbid   COLOR UA  Light Orange   SPEC GRAV UA  1.021   PH UA  8.5*   GLUCOSE UA mg/dl 300 (3/10%)*   KETONES UA mg/dl Negative   BLOOD UA  Negative   PROTEIN UA mg/dl >=600 (4+)*   NITRITE UA  Negative   BILIRUBIN UA  Negative   UROBILINOGEN UA (BE) mg/dl 2.0*   LEUKOCYTES UA  Large*   WBC UA /hpf Innumerable*   RBC UA /hpf 20-30*   BACTERIA UA /hpf Innumerable*   EPITHELIAL CELLS WET PREP /hpf Occasional   MUCUS THREADS  Innumerable*                                 Results from last 7 days   Lab Units 10/18/24  1500 10/18/24  1445 10/18/24  1335   GRAM STAIN RESULT   --  Gram negative rods* Gram negative rods*   URINE CULTURE  Culture results to follow.  --   --              Results from last 7 days   Lab Units 10/20/24  0429   VANCOMYCIN RM  ug/mL 18.7       Past Medical History:   Diagnosis Date    Atherosclerotic heart disease of native coronary artery without angina pectoris     Atrial fibrillation (HCC)     Cerebral infarction (HCC)     Constipation     Depression     Diabetes mellitus (HCC)     Hemiplegia and hemiparesis following cerebral infarction affecting left non-dominant side (HCC)     Hyperlipidemia     Hypertension     Pressure ulcer of sacral region, stage 3 (HCC)     Prostatic hyperplasia     Sepsis (HCC)     Stroke (HCC)     TIA (transient ischemic attack)     Urinary retention     Vitamin D deficiency      Present on Admission:   Sepsis   Sacral ulcer (HCC)   Type 2 diabetes mellitus without complication, without long-term current use of insulin (HCC)   Paroxysmal atrial fibrillation (HCC)   Pyuria   Proctitis   Anemia of chronic disease   Gram-negative bacteremia   Severe protein-calorie malnutrition (HCC)      Admitting Diagnosis: Osteomyelitis (HCC) [M86.9]  Failure to thrive in adult [R62.7]  Sacral ulcer (HCC) [L98.429]  Wound of sacral region, initial encounter [S31.000A]  Sepsis (HCC) [A41.9]  Age/Sex: 73 y.o. male    Network Utilization Review Department  ATTENTION: Please call with any questions or concerns to [phone] and carefully listen to the prompts so that you are directed to the right person. All voicemails are confidential.   For Discharge needs, contact Care Management DC Support Team at [phone] opt. 2  Send all requests for admission clinical reviews, approved or denied determinations and any other requests to dedicated fax number below belonging to the campus where the patient is receiving treatment. List of dedicated fax numbers for the Facilities:  FACILITY NAME UR FAX NUMBER   ADMISSION DENIALS (Administrative/Medical Necessity) [phone]   DISCHARGE SUPPORT TEAM (NETWORK) [phone]   PARENT CHILD HEALTH (Maternity/NICU/Pediatrics) [phone]   [facility] [phone]    [facility] [phone]   [facility] [phone]   [facility] [phone]   [facility] [phone]   General acute hospital [phone]   [facility] [phone]   [facility] [phone]   [facility] [phone]   [facility] [phone]   [facility] [phone]   [facility] [phone]

## 2024-10-20 NOTE — PLAN OF CARE
Problem: Potential for Falls  Goal: Patient will remain free of falls  Description: INTERVENTIONS:  - Educate patient/family on patient safety including physical limitations  - Instruct patient to call for assistance with activity   - Consult OT/PT to assist with strengthening/mobility   - Keep Call bell within reach  - Keep bed low and locked with side rails adjusted as appropriate  - Keep care items and personal belongings within reach  - Initiate and maintain comfort rounds  - Make Fall Risk Sign visible to staff  - Offer Toileting every 2 Hours, in advance of need  - Initiate/Maintain bed alarm  - Obtain necessary fall risk management equipment: non slip socks, call bell   - Apply yellow socks and bracelet for high fall risk patients  - Consider moving patient to room near nurses station  Outcome: Progressing     Problem: Prexisting or High Potential for Compromised Skin Integrity  Goal: Skin integrity is maintained or improved  Description: INTERVENTIONS:  - Identify patients at risk for skin breakdown  - Assess and monitor skin integrity  - Assess and monitor nutrition and hydration status  - Monitor labs   - Assess for incontinence   - Turn and reposition patient  - Assist with mobility/ambulation  - Relieve pressure over bony prominences  - Avoid friction and shearing  - Provide appropriate hygiene as needed including keeping skin clean and dry  - Evaluate need for skin moisturizer/barrier cream  - Collaborate with interdisciplinary team   - Patient/family teaching  - Consider wound care consult   Outcome: Progressing     Problem: PAIN - ADULT  Goal: Verbalizes/displays adequate comfort level or baseline comfort level  Description: Interventions:  - Encourage patient to monitor pain and request assistance  - Assess pain using appropriate pain scale  - Administer analgesics based on type and severity of pain and evaluate response  - Implement non-pharmacological measures as appropriate and evaluate  response  - Consider cultural and social influences on pain and pain management  - Notify physician/advanced practitioner if interventions unsuccessful or patient reports new pain  Outcome: Progressing     Problem: INFECTION - ADULT  Goal: Absence or prevention of progression during hospitalization  Description: INTERVENTIONS:  - Assess and monitor for signs and symptoms of infection  - Monitor lab/diagnostic results  - Monitor all insertion sites, i.e. indwelling lines, tubes, and drains  - Monitor endotracheal if appropriate and nasal secretions for changes in amount and color  - [place] appropriate cooling/warming therapies per order  - Administer medications as ordered  - Instruct and encourage patient and family to use good hand hygiene technique  - Identify and instruct in appropriate isolation precautions for identified infection/condition  Outcome: Progressing     Problem: DISCHARGE PLANNING  Goal: Discharge to home or other facility with appropriate resources  Description: INTERVENTIONS:  - Identify barriers to discharge w/patient and caregiver  - Arrange for needed discharge resources and transportation as appropriate  - Identify discharge learning needs (meds, wound care, etc.)  - Arrange for interpretive services to assist at discharge as needed  - Refer to Case Management Department for coordinating discharge planning if the patient needs post-hospital services based on physician/advanced practitioner order or complex needs related to functional status, cognitive ability, or social support system  Outcome: Progressing     Problem: Knowledge Deficit  Goal: Patient/family/caregiver demonstrates understanding of disease process, treatment plan, medications, and discharge instructions  Description: Complete learning assessment and assess knowledge base.  Interventions:  - Provide teaching at level of understanding  - Provide teaching via preferred learning methods  Outcome: Progressing

## 2024-10-20 NOTE — ASSESSMENT & PLAN NOTE
Patient readmitted with worsening prognosis with large sacral wound and developing osteomyelitis secondary to bacteremia, severe malnutrition. Extensive discussion with wife regarding patients guarded prognosis and goals of care for her . Wife opting to proceed with full medical care at this time. Considering patients nutritional status, offered initiation of tube feed and wife agreeable; likewise, agreeable to a Palliative care consult.

## 2024-10-20 NOTE — PROGRESS NOTES
Progress Note - Surgery-General   Name: Drew Santos 73 y.o. male I MRN: [id number]  Unit/Bed#: E2 -01 I Date of Admission: 10/18/2024   Date of Service: 10/20/2024 I Hospital Day: 2    Assessment & Plan  Sepsis  Leukocytosis at admission WBC=21. CXR collected without convincing evidence of pneumonia. Although sacral ulcer could be contributing to elevated WBC.    -daily CBC  -trend fever curve  -blood cultures positive for GNR, follow susceptibilities  -Urince culture +Proteus mirabilis  -daily wound care w/ Dakins as written  -abx per primary: vanco/cefepime/flagyl (10/18- )  Type 2 diabetes mellitus without complication, without long-term current use of insulin ([facility])  Lab Results   Component Value Date    HGBA1C 6.2 (H) 09/17/2024       Recent Labs     10/19/24  2024 10/20/24  0633 10/20/24  1125 10/20/24  1614   POCGLU 118 117 143* 165*       Blood Sugar Average: Last 72 hrs:  (P) 149.125  -continue to trend sugars  -agree with SSI while inpatient  -hypoglycemic protocol  Paroxysmal atrial fibrillation (HCC)  Tachycardic in ED. EKG collected 10/18.    -recommend holding eliquis while inpatient if patient requires surgical debridement of sacral ulcer  -VTE ppx to be initiated by primary service, however appropriate for SQH 5000 TID from surgical perspective  -CTM vitals  -management per primary  Sacral ulcer (HCC)  Sacral ulcer previously debrided by our surgical service at bedside (most recently on 9/25) where granulation tissue was exposed. Gross size/depth of wound is visually unchanged between photos taken on 10/18 and last hospitalization. Patient discharged to nursing care facility with wound care service follow up. Return to ED w/ necrotic superficial slough of wound. Improvement of wound appearance w/ dakin soaked dressings with removal of superficial necrotic tissue.    -recommend initiation of 0.25% Dakin soaked guaze packing TID while inpatient  -surgical debridement may be required given  radiographic findings, however will proceed once Eliquis has been held for multiple days. Until then will sanitize and softly debride with dressing changes  -would recommend wound care consult for continued assistance for continued services while inpatient  -agree with continuing abx per primary: vanc/cefepime/flagyl (10/18- )   -follow cultures  -rotate patient q2hrs to prevent worsening of or development of new sacral ulcer  -pain and nausea control prn  -patient needs nutritional supplementation. SLP evaluated with poor engagement with swallowing   -would recommend placement of NGT and initiation of tube feeds for enteric nutrition   -consult nutrition   -patient may need evaluation for PEG if unable to progress oral intake  -patient needs debridement at bedside today (surgery team to perform)  Severe protein-calorie malnutrition (HCC)  Malnutrition Findings:   Adult Malnutrition type: Chronic illness  Adult Degree of Malnutrition: Other severe protein calorie malnutrition  Malnutrition Characteristics: Weight loss, Inadequate energy                  360 Statement: Severe protein calorie malnutrition in context of chronic illness r/t poor appetite, inadequate PO intake as evidance by energy intake less than 75% compared to estimated needs>1 month, 14% wt loss in 1 month (9/17/24 114 kg, 10/19/24 97.7 kg) treated with PO diet. Once able to have po treat with Mechanical Soft diet, Magic Cup BID, Ensure Compact 1x daily.    BMI Findings:           Body mass index is 25.54 kg/m².     -patient needs nutritional supplementation. SLP evaluated with poor engagement with swallowing   -would recommend placement of NGT and initiation of tube feeds for enteric nutrition   -consult nutrition   -patient may need evaluation for PEG if unable to progress oral intake  -recommend reaching out to palliative care for goals of care discussion  Pyuria  Urine culture positive for Proteus mirabilis.    -management for sepsis as  above  Advanced care planning/counseling discussion  Recommend consulting palliative care team for discussion of goals of care in medically complex case. Team to lead discussion about desiring PEG tube feeds and other potential future interventions (ostomy diversion).    Surgery-General service will follow.    Peter Holland MD  General Surgery  10/20/24  6:41 PM      Subjective   NAEON. AVSS. Patient status the same. SLP evaluated with failure of swallow study. Continues to remain with poor/no PO intake and albumin=2.1. Wife at beside who expressed concerns which were addressed with primary and surgical teams in attendance. Recommended re-engaging palliative care service for goals of care discussion and initiation of NGT feeds. Wife agreeable.    Objective :  Temp:  [97 °F (36.1 °C)-98.3 °F (36.8 °C)] 97 °F (36.1 °C)  HR:  [61-97] 97  BP: ()/(58-80) 137/80  Resp:  [16-18] 16  SpO2:  [93 %-98 %] 98 %  O2 Device: None (Room air)    I/O         10/18 0701  10/19 0700 10/19 0701  10/20 0700    I.V. (mL/kg) 931 (9.5)     IV Piggyback 150     Total Intake(mL/kg) 1081 (11.1)     Urine (mL/kg/hr) 650 1700 (0.7)    Total Output 650 1700    Net +431 -1700                Lines/Drains/Airways       Active Status       Name Placement date Placement time Site Days    Urethral Catheter Latex 20 Fr. 10/19/24  1209  Latex  1    NG/OG Tube Nasogastric Left nare 10/20/24  1518  Left nare  less than 1                  Physical Exam:  GENERAL APPEARANCE: well developed, elderly, non-verbal male lying supine in bed in NAD. Appears comfortable  HEENT: NCAT; EOMI; normal external nose & ears; MMM  NECK: Supple, normal ROM.  CARDIOVASCULAR: Regular rate. Grossly well perfused.  LUNGS/CHEST: Symmetric chest rise/fall with respirations.  ABD: Soft; non-distended; non-tender.  EXT: No observable deformities in 4/4 extremities. No edema.  NEURO: attentive to exam, nodding head yes/no when asked questions.  SKIN: Warm, dry and well  perfused; no jaundice. Approximately 5x6cm sacral ulcer with exposed coccyx with necrotic appearance of wound base and superficial eschar ; healthy bleeding underneath, superficial skin tears around lower-back likely adhesion related. Unchanged 1x1 cm focus of different sacral wound on L gluteal cheek      Lab Results: I have reviewed the following results:  Recent Labs     10/18/24  1445 10/18/24  2244 10/19/24  0554 10/20/24  0429 10/20/24  1552   WBC 21.57* 19.52* 18.79* 15.98*  --    HGB 10.0* 9.2* 8.5* 8.5*  --    HCT 33.2* 30.1* 28.1* 29.4*  --    * 445* 450* 480*  --    SODIUM 147 147 149* 154* 148*   K 3.9 3.7 3.3* 3.5 4.3   * 113* 114* 120* 118*   CO2 28 26 28 30 25   BUN 60* 59* 50* 37* 30*   CREATININE 1.07 1.10 0.92 0.74 0.75   GLUC 204* 208* 168* 112 172*   MG 2.4  --  2.4  --   --    PHOS 4.2*  --   --   --   --    * 68* 67* 47*  --    ALT 99* 76* 70* 54*  --    ALB 2.5* 2.2* 2.1* 2.1*  --    TBILI 0.74 0.81 0.77 0.76  --    ALKPHOS 94 80 81 83  --    PTT 38*  --   --   --   --    INR 1.57*  --   --   --   --    LACTICACID 1.8 1.6  --   --   --      Imaging Results Review: I reviewed radiology reports from this admission including: CT abdomen/pelvis.  US right upper quadrant   Final Result      Cholelithiasis without acute cholecystitis.      Workstation performed: [id number]         XR chest portable   Final Result      Limited study. Some atelectasis is present at the right base. No convincing evidence of pneumonia.            Workstation performed: [id number]         CT abdomen pelvis with contrast   Final Result      Large sacral decubitus ulcer extending to the coccyx with soft tissue gas now seen extending into the right and left medial aspects of the gluteus jac muscles. No drainable fluid collection.      Erosion of the coccyx suggestive of osteomyelitis.      Additional findings as above.         Workstation performed: [id number]         XR chest portable    (Results  Pending)     Other Study Results Review: EKG was reviewed.     VTE Pharmacologic Prophylaxis: VTE covered by:  heparin (porcine), Subcutaneous, 5,000 Units at 10/20/24 1307      VTE Mechanical Prophylaxis: sequential compression device

## 2024-10-20 NOTE — DISCHARGE INSTR - DIET
Frequent oral care. NPO/peg for primary. Allow ice chips and small sips of water for pleasure as tolerated with nursing supervision. Stop if coughing. Complete oral care prior to trials. Advance diet as tolerated.

## 2024-10-20 NOTE — ASSESSMENT & PLAN NOTE
Malnutrition Findings:   Adult Malnutrition type: Chronic illness  Adult Degree of Malnutrition: Other severe protein calorie malnutrition  Malnutrition Characteristics: Weight loss, Inadequate energy                  360 Statement: Severe protein calorie malnutrition in context of chronic illness r/t poor appetite, inadequate PO intake as evidance by energy intake less than 75% compared to estimated needs>1 month, 14% wt loss in 1 month (9/17/24 114 kg, 10/19/24 97.7 kg) treated with PO diet. Once able to have po treat with Mechanical Soft diet, Magic Cup BID, Ensure Compact 1x daily.    BMI Findings:           Body mass index is 25.54 kg/m².     -patient needs nutritional supplementation. SLP evaluated with poor engagement with swallowing   -would recommend placement of NGT and initiation of tube feeds for enteric nutrition   -consult nutrition   -patient may need evaluation for PEG if unable to progress oral intake  -recommend reaching out to palliative care for goals of care discussion

## 2024-10-20 NOTE — ASSESSMENT & PLAN NOTE
In setting of chronic urinary retention, BPH. Catheter exchanged this admission.  -Monitor urine output  -Close follow-up with urology for chronic catheter management

## 2024-10-20 NOTE — ASSESSMENT & PLAN NOTE
History of stroke with L sided hemiparesis recently hospitalized at [facility] in July 2024  Baseline non-verbal, alert, at times selective in answers, can nod yes/no however unsure accuracy most information obtained per patients wife  Baseline Dysphagia 2 mechanical soft, thin liquids, patient well known to speech therapy  Rehabbing at [facility]

## 2024-10-20 NOTE — PROGRESS NOTES
Drew Santos is a 73 y.o. male who is currently ordered Vancomycin IV with management by the Pharmacy Consult service.  Relevant clinical data and objective / subjective history reviewed.  Vancomycin Assessment:  Indication and Goal AUC/Trough: Soft tissue (goal -600, trough >10)  Clinical Status: stable  Micro:     Renal Function:  SCr: 0.74 mg/dL  CrCl: 108.7 mL/min  Renal replacement: Not on dialysis  Days of Therapy: 3  Current Dose: 1000 mg IV q 12 hrs  Vancomycin Plan:  New Dosing: [id number] mg IV q 12 hrs  Estimated AUC: 520 mcg*hr/mL  Estimated Trough: 15.7 mcg/mL  Next Level: 10/27/24 with am labs  Renal Function Monitoring: Daily BMP and UOP  Pharmacy will continue to follow closely for s/sx of nephrotoxicity, infusion reactions and appropriateness of therapy.  BMP and CBC will be ordered per protocol. We will continue to follow the patient’s culture results and clinical progress daily.    Mary Anne Quintanilla, Pharmacist

## 2024-10-20 NOTE — ASSESSMENT & PLAN NOTE
Chronic urethral hanks catheter secondary to chronic urinary retention, BPH.  Hanks patent at this time   Hanks was exchanged 10/19  Continue tamsulosin and finasteride

## 2024-10-20 NOTE — ASSESSMENT & PLAN NOTE
POA mild elevation , ALT 99, without guarding    Improving, now with AST 47 and ALT 54  Pending RUQ US   Trend with CMP

## 2024-10-20 NOTE — ASSESSMENT & PLAN NOTE
Blood cultures from admission 2 out of 2 sets growing gram-negative rods. Per ID with plan to de-escalate antibiotics over next 24-48 hours based on updated blood culture results and further workup   Pending RUQ US for possible gallbladder source given transaminitis and gallstones on CT

## 2024-10-20 NOTE — ASSESSMENT & PLAN NOTE
Blood cultures from admission are both growing gram-negative rods.  Blood culture identification panel has identified both E. coli and Proteus thus far.  Patient had a recent polymicrobial bacteremia during last admission with source felt to be sacral wound.  Surgery has examined patient's sacral wound and lower concern for acute infection, though may be source of translocation.  Would also consider possibility of urinary source as patient does have a chronic Parra catheter. Also consider gallbladder source given transaminitis and gallstones on CT, though no obvious RUQ tenderness on exam.  CT abdomen/pelvis with contrast without any evidence of pyelonephritis, intra-abdominal abscess, or abscess beneath the sacral wound.  -Continue IV cefepime 2g every 12 hours  -Follow-up susceptibilities of gram-negative's in blood to further de-escalate  -Would stop Vancomycin tomorrow (10/21) if no gram positives in blood cultures  -Follow up urine culture  -Continue Metronidazole 500 mg every 12 hours for empiric anaerobic coverage while following up RUQ US  -Serial abdominal exams, trend LFTs  -Ongoing management of sacral wound with assistance of surgery

## 2024-10-20 NOTE — PROGRESS NOTES
"Progress Note - Hospitalist   Name: Drew Santos 73 y.o. male I MRN: [id number]  Unit/Bed#: E2 -01 I Date of Admission: 10/18/2024   Date of Service: 10/20/2024 I Hospital Day: 2    Assessment & Plan  Sepsis  Patient recently discharged 10/4 with polymicrobial bacteremia secondary to sacral wound and completed course of IV cefepime, flagyl and vancomycin followed by ampicillin. Blood cultures are growing Enterococcus, Enterobacter and Proteus last month. Patient presented to [facility] ED 10/18 from SNF due to worsening sacral wounds and possible osteomyelitis. Patient received cefepime, Flagyl and vancomycin in the ER.   CXR \"Limited study. Some atelectasis is present at the right base. No convincing evidence of pneumonia.\"  CT abdomen pelvis showing \"Large sacral decubitus ulcer extending to the coccyx with soft tissue gas now seen extending into the right and left medial aspects of the gluteus jac muscles. No drainable fluid collection. Erosion of the coccyx suggestive of osteomyelitis.\"  Blood culture x2 obtained upon admission  MRSA pending  Has been afebrile since admission.  Sepsis alert called overnight for hypotension, tachycardia and leukocytosis 21.57, continues to improve-- this morning to 15.98  Hemodynamically stable; however, on the BP soft side with systolic high 90's   Goal MAP >65  Continue IVFs  Infectious disease recs appreciated  Continue vanc/cefepime/flagyl (10/18- )   Gram-negative bacteremia  Blood cultures from admission 2 out of 2 sets growing gram-negative rods. Per ID with plan to de-escalate antibiotics over next 24-48 hours based on updated blood culture results and further workup   Pending RUQ US for possible gallbladder source given transaminitis and gallstones on CT  Elevated liver enzymes  POA mild elevation , ALT 99, without guarding    Improving, now with AST 47 and ALT 54  Pending RUQ US   Trend with CMP  Sacral ulcer (HCC)  Patient has at least 2 sacral ulcers. At " "least one of them is stage IV with palpable bone. Possible osteomyelitis. Surgery has examined patient's sacral wound and lower concern for acute infection, though may be source of translocation.   Surgery consult appreciated; recommend initiation of 0.25% Dakin soaked guaze packing TID while inpatient   Q2hr turns as patient tolerates  Pyuria  Noted with innumerable WBC from chronic hanks catheter sample, which cannot alone determine UTI in the setting of chronic catheter. CT scan showing \"Tiny bilateral renal cysts. Nonobstructing left renal calculi. No hydronephrosis or hydroureter. Chronic bilateral perinephric stranding. And Relatively collapsed around a Hanks catheter limiting evaluation\"  Continue broad spectrum antibiotics per ID at this time  Follow-up urine culture  Hypernatremia  Sodium 154, likely hypovolemic hypernatremia related to mild dehydration secondary to poor water intake  Continue IVFs, D5 infusion  Check serum osmolality  Repeat BMP today 1400  Chronic indwelling Hanks catheter  Chronic urethral hanks catheter secondary to chronic urinary retention, BPH.  Hanks patent at this time   Hanks was exchanged 10/19  Continue tamsulosin and finasteride  Proctitis  Noted on CT scan with history of this as possibly chronic. No diarrhea or clinical evidence of colitis.   Type 2 diabetes mellitus without complication, without long-term current use of insulin ([facility])  Lab Results   Component Value Date    HGBA1C 6.2 (H) 09/17/2024     Recent Labs     10/19/24  1104 10/19/24  1604 10/19/24  2024 10/20/24  0633   POCGLU 155* 139 118 117     Blood Sugar Average: Last 72 hrs:  (P) 147.5  Last HgbA1C 6.2%  Continue sliding scale insulin   Hypoglycemia protocol  Paroxysmal atrial fibrillation (HCC)  Surgery requests holding Eliquis in case surgery needed.  Continue pta metoprolol with hold parameters  Anemia of chronic disease  Has chronic anemia likely secondary to poor nutritional status underlying chronic " illness.   Hgb stable 8.5  Hemodynamically stable at this time  Trend with daily CBC  Severe protein-calorie malnutrition (HCC)  Malnutrition Findings:   ChronicAdult Malnutrition type: Chronic illness  Adult Degree of Malnutrition: Other severe protein calorie malnutrition  Malnutrition Characteristics: Weight loss, Inadequate energy  360 Statement: Severe protein calorie malnutrition in context of chronic illness r/t poor appetite, inadequate PO intake as evidance by energy intake less than 75% compared to estimated needs>1 month, 14% wt loss in 1 month (9/17/24 114 kg, 10/19/24 97.7 kg) treated with PO diet. Once able to have po treat with Mechanical Soft diet, Magic Cup BID, Ensure Compact 1x daily.  Patient with significant weight loss and per wife with significantly decreased oral intake over the past few months and has not been eating for 1 week at the nursing home.  Weight noted in April to be 275 lb, and now weighing 215 lb  Speech therapy consult and recommend dysphagia level 1-puree with thin liquids  Dietary consult  Tube feed ordered with PO diet, Dietary to adjust based on patient nutritional needs.  NGT placement for tube feed infusion  Check am Phos    BMI Findings:    Body mass index is 25.54 kg/m².   History of CVA (cerebrovascular accident)  History of stroke with L sided hemiparesis recently hospitalized at [facility] in July 2024  Baseline non-verbal, alert, at times selective in answers, can nod yes/no however unsure accuracy most information obtained per patients wife  Baseline Dysphagia 2 mechanical soft, thin liquids, patient well known to speech therapy  Rehabbing at [facility]   Advanced care planning/counseling discussion  Patient readmitted with worsening prognosis with large sacral wound and developing osteomyelitis secondary to bacteremia, severe malnutrition. Extensive discussion with wife regarding patients guarded prognosis and goals of care for her . Wife  opting to proceed with full medical care at this time. Considering patients nutritional status, offered initiation of tube feed and wife agreeable; likewise, agreeable to a Palliative care consult.      VTE Pharmacologic Prophylaxis: VTE Score: 8 High Risk (Score >/= 5) - Pharmacological DVT Prophylaxis Ordered: heparin. Sequential Compression Devices Ordered.    Mobility:   Basic Mobility Inpatient Raw Score: 6  JH-HLM Goal: 2: Bed activities/Dependent transfer  JH-HLM Achieved: 1: Laying in bed  JH-HLM Goal NOT achieved. Continue with multidisciplinary rounding and encourage appropriate mobility to improve upon -HLM goals.    Patient Centered Rounds: I performed bedside rounds with nursing staff today.     Education and Discussions with Family / Patient: Updated  (wife) at bedside.    Current Length of Stay: 2 day(s)  Current Patient Status: Inpatient   Certification Statement: The patient will continue to require additional inpatient hospital stay due to gram negative bacteremia, chronic nonhealing wounds, severe malnutrition  Discharge Plan: Anticipate discharge in >72 hrs to rehab facility.    Code Status: Level 1 - Full Code    Subjective   Patient seen and examined at bedside, opens eyes to voice. Patient was not as interactive with me today as he was yesterday it seems.    Objective :  Temp:  [97.8 °F (36.6 °C)-98.3 °F (36.8 °C)] 97.8 °F (36.6 °C)  HR:  [58-87] 87  BP: ()/(58-76) 94/76  Resp:  [18-20] 18  SpO2:  [93 %-98 %] 93 %  O2 Device: None (Room air)    Body mass index is 25.54 kg/m².     Input and Output Summary (last 24 hours):     Intake/Output Summary (Last 24 hours) at 10/20/2024 0805  Last data filed at 10/20/2024 0630  Gross per 24 hour   Intake --   Output 1200 ml   Net -1200 ml       Physical Exam  Vitals reviewed.   Constitutional:       General: He is not in acute distress.     Appearance: He is well-developed. He is ill-appearing.   HENT:      Head: Normocephalic and  atraumatic.      Mouth/Throat:      Mouth: Mucous membranes are dry.      Pharynx: Oropharynx is clear.   Eyes:      Conjunctiva/sclera: Conjunctivae normal.   Cardiovascular:      Rate and Rhythm: Normal rate and regular rhythm.      Heart sounds: No murmur heard.  Pulmonary:      Effort: Pulmonary effort is normal. No respiratory distress.      Breath sounds: Normal breath sounds. Decreased air movement present. No wheezing or rales.   Abdominal:      General: Abdomen is flat. Bowel sounds are normal.      Palpations: Abdomen is soft.      Tenderness: There is no abdominal tenderness.   Musculoskeletal:         General: No swelling.      Cervical back: Neck supple.   Skin:     General: Skin is warm and dry.      Capillary Refill: Capillary refill takes less than 2 seconds.      Coloration: Skin is pale.   Neurological:      Mental Status: He is alert. Mental status is at baseline.   Psychiatric:         Mood and Affect: Mood normal.       Lines/Drains:  Lines/Drains/Airways       Active Status       Name Placement date Placement time Site Days    Urethral Catheter Latex 20 Fr. 10/19/24  1209  Latex  less than 1                  Urinary Catheter:  Goal for removal: N/A - Chronic Parra                 Lab Results: I have reviewed the following results:   Results from last 7 days   Lab Units 10/20/24  0429   WBC Thousand/uL 15.98*   HEMOGLOBIN g/dL 8.5*   HEMATOCRIT % 29.4*   PLATELETS Thousands/uL 480*   SEGS PCT % 73   LYMPHO PCT % 18   MONO PCT % 5   EOS PCT % 2     Results from last 7 days   Lab Units 10/20/24  0429   SODIUM mmol/L 154*   POTASSIUM mmol/L 3.5   CHLORIDE mmol/L 120*   CO2 mmol/L 30   BUN mg/dL 37*   CREATININE mg/dL 0.74   ANION GAP mmol/L 4   CALCIUM mg/dL 8.4   ALBUMIN g/dL 2.1*   TOTAL BILIRUBIN mg/dL 0.76   ALK PHOS U/L 83   ALT U/L 54*   AST U/L 47*   GLUCOSE RANDOM mg/dL 112     Results from last 7 days   Lab Units 10/18/24  1445   INR  1.57*     Results from last 7 days   Lab Units  10/20/24  0633 10/19/24  2024 10/19/24  1604 10/19/24  1104 10/19/24  0744 10/18/24  2131   POC GLUCOSE mg/dl 117 118 139 155* 164* 192*         Results from last 7 days   Lab Units 10/18/24  2244 10/18/24  1445   LACTIC ACID mmol/L 1.6 1.8   PROCALCITONIN ng/ml  --  0.72*       Recent Cultures (last 7 days):   Results from last 7 days   Lab Units 10/18/24  1500 10/18/24  1445 10/18/24  1335   GRAM STAIN RESULT   --  Gram negative rods* Gram negative rods*   URINE CULTURE  Culture results to follow.  --   --        Imaging Results Review: No pertinent imaging studies reviewed.  Other Study Results Review: No additional pertinent studies reviewed.    Last 24 Hours Medication List:     Current Facility-Administered Medications:     acetaminophen (TYLENOL) tablet 650 mg, Q4H PRN    aspirin chewable tablet 81 mg, Daily    atorvastatin (LIPITOR) tablet 80 mg, QPM    bisacodyl (DULCOLAX) rectal suppository 10 mg, Daily PRN    ceFEPime (MAXIPIME) 2,000 mg in dextrose 5 % 50 mL IVPB, Q12H, Last Rate: 2,000 mg (10/20/24 0008)    dextrose 5 % infusion, Continuous    docusate sodium (COLACE) capsule 100 mg, Daily    escitalopram (LEXAPRO) tablet 10 mg, Daily    finasteride (PROSCAR) tablet 5 mg, Daily    heparin (porcine) subcutaneous injection 5,000 Units, Q8H ELISEO    insulin lispro (HumALOG/ADMELOG) 100 units/mL subcutaneous injection 1-5 Units, TID AC **AND** Fingerstick Glucose (POCT), TID AC    insulin lispro (HumALOG/ADMELOG) 100 units/mL subcutaneous injection 1-5 Units, HS    lisinopril (ZESTRIL) tablet 10 mg, Daily    metoprolol succinate (TOPROL-XL) 24 hr tablet 25 mg, Daily    metroNIDAZOLE (FLAGYL) IVPB (premix) 500 mg 100 mL, Q12H, Last Rate: 500 mg (10/20/24 0519)    multivitamin stress formula tablet 1 tablet, Daily    ondansetron (ZOFRAN) injection 4 mg, Q6H PRN    senna (SENOKOT) tablet 17.2 mg, Daily PRN    sodium hypochlorite (DAKIN'S HALF-STRENGTH) 0.25 percent topical solution 1 Application, Daily     tamsulosin (FLOMAX) capsule 0.4 mg, Daily With Dinner    vancomycin (VANCOCIN) IVPB (premix in dextrose) 1,000 mg 200 mL, Q12H, Last Rate: 1,000 mg (10/20/24 0630)    Administrative Statements   Today, Patient Was Seen By: AVELINA Flores  I have spent a total time of 35 minutes in caring for this patient on the day of the visit/encounter including Prognosis, Impressions, Counseling / Coordination of care, Documenting in the medical record, Reviewing / ordering tests, medicine, procedures  , and Obtaining or reviewing history  .    **Please Note: This note may have been constructed using a voice recognition system.**

## 2024-10-20 NOTE — ASSESSMENT & PLAN NOTE
As evidenced by tachycardia, leukocytosis on presentation.  Patient has not yet had fevers.  Source is bacteremia.  Sacral wound was examined by surgery and feel it appears noninfected at this time.  CT scan is also without any evidence of a drainable fluid collection/abscess.  Consider urinary source in setting of chronic Parra catheter.  Chest x-ray without any consolidation to suggest pneumonia. LFTs mildly elevated, but no pericholecystic fluid on CT. He is currently hemodynamically stable.  -Antibiotic plan as below  -Follow up RUQ US to further evaluate gallbladder  -Trend LFTs  -Follow-up urine culture and blood cultures  -Ongoing close monitoring of sacral wound, appreciate surgery input  -Repeat CBC tomorrow to trend leukocytosis  -Monitor fever curve and hemodynamics

## 2024-10-20 NOTE — ASSESSMENT & PLAN NOTE
Lab Results   Component Value Date    HGBA1C 6.2 (H) 09/17/2024       Recent Labs     10/19/24  2024 10/20/24  0633 10/20/24  1125 10/20/24  1614   POCGLU 118 117 143* 165*       Blood Sugar Average: Last 72 hrs:  (P) 149.125  -continue to trend sugars  -agree with SSI while inpatient  -hypoglycemic protocol

## 2024-10-20 NOTE — ASSESSMENT & PLAN NOTE
Mild wall thickening of rectum noted on CT scan.  ET scan back in September also noted proctocolitis.  Given persistence, this may be a somewhat chronic finding.  He is not having any current diarrhea.  -Antibiotics as above to target bacteria in blood cultures  -Continue Flagyl for now  -Monitor stool output

## 2024-10-20 NOTE — ASSESSMENT & PLAN NOTE
Leukocytosis at admission WBC=21. CXR collected without convincing evidence of pneumonia. Although sacral ulcer could be contributing to elevated WBC.    -daily CBC  -trend fever curve  -blood cultures positive for GNR, follow susceptibilities  -Urince culture +Proteus mirabilis  -daily wound care w/ Dakins as written  -abx per primary: vanco/cefepime/flagyl (10/18- )

## 2024-10-20 NOTE — SPEECH THERAPY NOTE
Speech Language/Pathology  Speech/Language Pathology  Assessment    Patient Name: Drew Santos  Today's Date: 10/20/2024     Problem List  Principal Problem:    Gram-negative bacteremia  Active Problems:    Sepsis    Type 2 diabetes mellitus without complication, without long-term current use of insulin (HCC)    Proctitis    Paroxysmal atrial fibrillation (HCC)    Anemia of chronic disease    Severe protein-calorie malnutrition (HCC)    Sacral ulcer (HCC)    Pyuria    Chronic indwelling Parra catheter    Hypernatremia    Past Medical History  Past Medical History:   Diagnosis Date    Atherosclerotic heart disease of native coronary artery without angina pectoris     Atrial fibrillation (HCC)     Cerebral infarction (HCC)     Constipation     Depression     Diabetes mellitus (HCC)     Hemiplegia and hemiparesis following cerebral infarction affecting left non-dominant side (HCC)     Hyperlipidemia     Hypertension     Pressure ulcer of sacral region, stage 3 (HCC)     Prostatic hyperplasia     Sepsis (HCC)     Stroke (HCC)     TIA (transient ischemic attack)     Urinary retention     Vitamin D deficiency      Past Surgical History  History reviewed. No pertinent surgical history.     Bedside Swallow Evaluation:    Summary:  Pt presented w/ mod oral dysphagia and suspect WFL pharyngeal stage of swallow. Positioned upright and alerted to stim. Pt full assist for feeds. Fed by ST trials of small pieces of banana cut by ST, pudding, and thin liquids via straw with single/successive sips. Mastication was mod prolonged vertical chew. Bolus control and formation were reduced evident by pocketing within buccal cavities with banana. ST removed banana via suction as pt decreased command following. Transfer and swallow initiation was prompt with liquids and mildly delayed with solids. Benefited from intermittent verbal cue to swallow with pudding which pt was able to follow.   Delayed cough following successive sips of thin,  straw pinch utilized to ensure slow rate. No other overt s/s of aspiration. Pt intermittently would raise hand to mouth as being fed by ST. Suspect oral intake will continue to be low as pt limited by mental status.     Recommendations:  Diet:Dysphagia 1 pureed   Liquid:thin   Meds: crushed with puree  Supervision:Full Assist   Positioning:Upright  Strategies: slow rate, alternate liquids with solids, swallow prior to additional po, monitor for pocketing. Cue to swallow prn.   Pt to take PO/Meds only when fully alert and upright.   Oral care  Aspiration precautions  Reflux precautions  Therapy Prognosis:guarded  Prognosis considerations:age, medical status.   Frequency:2-5 times weekly as indicated.     Consider consult w/:  Rehab  Nutrition  GOC     Goal(s):  Dysphagia LTG  -Patient will demonstrate safe and effective oral intake (without overt s/s significant oral/pharyngeal dysphagia including s/s penetration or aspiration) for the highest appropriate diet level.     1.Pt will tolerate least restrictive diet w/out s/s aspiration or oral/pharyngeal difficulties.   2.Pt will will effectively manipulate/masticate and transfer purees/solids w/out s/s dysphagia/aspiration.   3.Pt will tolerate thin liquids w/out s/s aspiration.   -If indicated, patient will comply with a Video/Modified Barium Swallow study for more complete assessment of swallowing anatomy/physiology/aspiration risk and to assess efficacy of treatment techniques so as to best guide treatment plan     H&P/Admit info/ pertinent provider notes: (PMH noted above)  Drew Santos is a 73 y.o. male with a PMH of \chronic sacral wounds presents with worsening sacral wound.  He was just in the hospital last month for polymicrobial bacteremia from the sacral wounds.  He completed full course of IV antibiotics with ampicillin.  Blood cultures are growing Enterococcus, Enterobacter and Proteus last month.  He went to [facility].  They sent him  back today to the ER because they were concerned that these wounds were getting worse.  No fever or chills.  No recent antibiotics since he was discharged about a month ago.  Wife confirms that the wounds do seem to be getting worse.       Special Studies:  XR CHEST PORTABLE 10/18/2024  Limited study. Some atelectasis is present at the right base. No convincing evidence of pneumonia.   CT ABDOMEN AND PELVIS WITH IV CONTRAST 10/18/2024  IMPRESSION:  Large sacral decubitus ulcer extending to the coccyx with soft tissue gas now seen extending into the right and left medial aspects of the gluteus jac muscles. No drainable fluid collection.  Erosion of the coccyx suggestive of osteomyelitis    Procalcitonin:    WBC: 15.98                        10/20/2024     Code Status Level 1 full code     Previous MBS:  Completed at [facility] 08/27/2024  Assessment Summary:    Pt presents with mild oropharyngeal dysphagia characterized by prolonged/incomplete mastication and oral transfer. Reduced bolus formation and control.  Posterior spill over the base of tongue evident with pooling in the valleculae (mostly during mastication of solids).  Pocketing evident as well, requiring verbal cues for swallow initiation.  Transient laryngeal penetration when taking thin liquids by successive straw sip.  No aspiration observed during the present study.  Given decreased bolus formation w/ posterior spill - may consider avoiding mixed consistencies.   Note: Images are available for review in PACS as desired.  Recommendations:   Recommended Diet: regular diet and thin liquids - avoid mixed consistencies   Recommended Form of Medications: 1 at a time with liquid, if pocketing whole with puree cut/crush larger   Aspiration precautions and compensatory swallowing strategies: upright posture, slow rate of feeding, small bites/sips, and alternating bites and sips  Consider referral to:  [facility]  SLP Dysphagia therapy recommended: ST will  continue to follow while in the acute care setting x1-3    Patient's goal:    Did the pt report pain?  If yes, was nursing notified/was it addressed?    Reason for consult:  R/o aspiration  Determine safest and least restrictive diet  poor intake  weight loss   h/o dysphagia     Precautions:  Contact  [place]  Aspiration  Fall    Food Allergies: No known    Current Diet: NPO sips of clear liquids    Premorbid diet: Dysphagia 3 dental soft and thin liquids    O2 requirement: Room air    Social/Prior living Nursing facility    Voice/Speech: WNL   Follows commands: Intermittent    Cognitive status: Alert      Oral [facility] exam:  Dentition: partial natural, has upper natural    Lips (VII):WNL  Tongue (XII):WNL  Mandible (V):adequate ROM   Face/oral sensation (V):WNL  Velum (X):WNL    Items administered:  Banana small pieces cut by ST, pudding, and thin liquids via straw with single/successive sips      Oral stage:  Lip closure:WNL  Mastication:vertical   Bolus formation:WFL  Bolus control:WFL  Transfer: reduced   Oral residue:within buccal cavity with puree  Pocketing: present with banana     Pharyngeal stage:  Swallow promptness:delayed with solids and timely with liquids   Laryngeal rise:appeared WFL   No overt s/s aspiration    Esophageal stage:  No s/s reported    Aspiration precautions posted    Results d/w:  Pt, nursing, CRNP

## 2024-10-20 NOTE — ASSESSMENT & PLAN NOTE
Sodium 154, likely hypovolemic hypernatremia related to mild dehydration secondary to poor water intake  Continue IVFs, D5 infusion  Check serum osmolality  Repeat BMP today 1400

## 2024-10-20 NOTE — ASSESSMENT & PLAN NOTE
"Orthopedic Progress Note      Assessment: 1 Day Post-Op  S/P Procedure(s):  RIGHT REVISION TOTAL KNEE ARTHROPLASTY, POLYETHYLENE EXCHANGE @    Plan:   - Continue PT/OT.   - Weightbearing status: WBAT.  - Anticoagulation: ASA in addition to SCDs, fauzia stockings and early ambulation.  - Duricef 500 BID x10 days  - Discharge planning: Discharge to home today with home care     Subjective:  Pain: Well controlled on Tylenol & oxycodone.  Nausea, Vomiting:  yes   Chest pain: No  Lightheadedness, Dizziness:  No  Neuro:  Patient denies new onset numbness or paresthesias in right lower extremity     Patient is doing well on POD #1. Ambulating, tolerating oral intake, voiding & pain is controlled with oral medication. Ready for discharge. Hgb 11.9 (pre-op 12.8).     Objective:  /71 (BP Location: Right arm, Patient Position: Per self)   Pulse 68   Temp 97.2  F (36.2  C) (Oral)   Resp 18   Ht 1.702 m (5' 7\")   Wt 73 kg (161 lb)   SpO2 98%   BMI 25.22 kg/m    The patient is A&Ox3. Appears comfortable, sitting up at bedside.  Sensation is intact to light touch & equal bilaterally in the L2 through S1 dermatomes.  Calves are soft and non-tender.  Dorsiflexion and plantar flexion is intact bilaterally.  Appropriate flexion and extension of the toes bilaterally.   Brisk capillary refill in the toes bilaterally.   Palpable right dorsalis pedis pulse.  right knee dressing C/D/I.      Pertinent Labs   Lab Results: personally reviewed.   No results found for: \"INR\", \"PROTIME\"  Lab Results   Component Value Date    WBC 10.9 04/03/2024    HGB 11.4 (L) 04/03/2024    HCT 35.0 04/03/2024    MCV 98 04/03/2024     04/03/2024     Lab Results   Component Value Date     04/03/2024    CO2 30 (H) 04/03/2024         Report completed by:  Luz Maria Villalobos PA-C/[facility]    Date: 4/3/2024  Time: 8:40 AM   " Sacral ulcer previously debrided by our surgical service at bedside (most recently on 9/25) where granulation tissue was exposed. Gross size/depth of wound is visually unchanged between photos taken on 10/18 and last hospitalization. Patient discharged to nursing care facility with wound care service follow up. Return to ED w/ necrotic superficial slough of wound. Improvement of wound appearance w/ dakin soaked dressings with removal of superficial necrotic tissue.    -recommend initiation of 0.25% Dakin soaked guaze packing TID while inpatient  -surgical debridement may be required given radiographic findings, however will proceed once Eliquis has been held for multiple days. Until then will sanitize and softly debride with dressing changes  -would recommend wound care consult for continued assistance for continued services while inpatient  -agree with continuing abx per primary: vanc/cefepime/flagyl (10/18- )   -follow cultures  -rotate patient q2hrs to prevent worsening of or development of new sacral ulcer  -pain and nausea control prn  -patient needs nutritional supplementation. SLP evaluated with poor engagement with swallowing   -would recommend placement of NGT and initiation of tube feeds for enteric nutrition   -consult nutrition   -patient may need evaluation for PEG if unable to progress oral intake  -patient needs debridement at bedside today (surgery team to perform)

## 2024-10-20 NOTE — PROGRESS NOTES
Progress Note - Infectious Disease   Name: Drew Santos 73 y.o. male I MRN: [id number]  Unit/Bed#: E2 -01 I Date of Admission: 10/18/2024   Date of Service: 10/20/2024 I Hospital Day: 2     Assessment & Plan  Sepsis  As evidenced by tachycardia, leukocytosis on presentation.  Patient has not yet had fevers.  Source is bacteremia.  Sacral wound was examined by surgery and feel it appears noninfected at this time.  CT scan is also without any evidence of a drainable fluid collection/abscess.  Consider urinary source in setting of chronic Parra catheter.  Chest x-ray without any consolidation to suggest pneumonia. LFTs mildly elevated, but no pericholecystic fluid on CT. He is currently hemodynamically stable.  -Antibiotic plan as below  -Follow up RUQ US to further evaluate gallbladder  -Trend LFTs  -Follow-up urine culture and blood cultures  -Ongoing close monitoring of sacral wound, appreciate surgery input  -Repeat CBC tomorrow to trend leukocytosis  -Monitor fever curve and hemodynamics  Gram-negative bacteremia  Blood cultures from admission are both growing gram-negative rods.  Blood culture identification panel has identified both E. coli and Proteus thus far.  Patient had a recent polymicrobial bacteremia during last admission with source felt to be sacral wound.  Surgery has examined patient's sacral wound and lower concern for acute infection, though may be source of translocation.  Would also consider possibility of urinary source as patient does have a chronic Parra catheter. Also consider gallbladder source given transaminitis and gallstones on CT, though no obvious RUQ tenderness on exam.  CT abdomen/pelvis with contrast without any evidence of pyelonephritis, intra-abdominal abscess, or abscess beneath the sacral wound.  -Continue IV cefepime 2g every 12 hours  -Follow-up susceptibilities of gram-negative's in blood to further de-escalate  -Would stop Vancomycin tomorrow (10/21) if no gram  positives in blood cultures  -Follow up urine culture  -Continue Metronidazole 500 mg every 12 hours for empiric anaerobic coverage while following up RUQ US  -Serial abdominal exams, trend LFTs  -Ongoing management of sacral wound with assistance of surgery  Sacral ulcer (HCC)  This was felt to have initially developed during patient stay in skilled nursing facility for rehab.  During recent hospitalization in September there was concern for fistulous connection from rectum to the sacral wound in setting of colitis/proctitis.  Wound was previously debrided by general surgery and during last admission was stage IV with palpable bone.  Latest CT showing soft tissue gas extending into right and left medial aspects of gluteus jac muscle, but no abscess.  There is erosion of coccyx suggestive of osteomyelitis.  Wound was evaluated by surgery and they currently feel it appears noninfected.  However in setting of polymicrobial bacteremia, may be a source of translocation.  -Ongoing close monitoring of wound and surgery recommendations  -Long term antibiotics for sacral osteomyelitis would be futile without aggressive bone/tissue debridement, bone cultures and ability to close wound with flap  Pyuria  UA sent from patient's chronic Parra catheter had innumerable WBCs.  However this is expected finding in setting of chronic catheter and cannot be used alone to determine UTI.  CT abdomen/pelvis was without any hydronephrosis or hydroureter.  There was some chronic appearing bilateral perinephric stranding.  The urinary bladder was noted to be collapsed around a Parra catheter.  Previous urine culture in September had grown Enterobacter cloacae. Parra was exchanged this admission.  -Continue Cefepime as above  -Follow-up urine culture  Chronic indwelling Parra catheter  In setting of chronic urinary retention, BPH. Catheter exchanged this admission.  -Monitor urine output  -Close follow-up with urology for chronic  catheter management  Type 2 diabetes mellitus without complication, without long-term current use of insulin ([facility])  A1c of 6.2%.  This is a risk factor for infection.  Paroxysmal atrial fibrillation ([facility])  Patient is on anticoagulation.  Proctitis  Mild wall thickening of rectum noted on CT scan.  ET scan back in September also noted proctocolitis.  Given persistence, this may be a somewhat chronic finding.  He is not having any current diarrhea.  -Antibiotics as above to target bacteria in blood cultures  -Continue Flagyl for now  -Monitor stool output    I have discussed the above management plan in detail with the primary service.   They agree with plan to continue IV cefepime, IV vancomycin and IV Flagyl for today.    Antibiotics:  Cefepime  Vancomycin  Metronidazole    Subjective   Patient is a bit more alert today.  He is able to shake his head yes/no to answer some questions.  Otherwise is nonverbal for me today.  He shakes his head no when asked if he is having any abdominal pain.  Abdomen is nontender on exam.  He has remained afebrile.  Leukocytosis is downtrending this morning.    Objective :  Temp:  [97.8 °F (36.6 °C)-98.3 °F (36.8 °C)] 97.8 °F (36.6 °C)  HR:  [58-87] 87  BP: ()/(58-76) 94/76  Resp:  [18-20] 18  SpO2:  [93 %-98 %] 93 %  O2 Device: None (Room air)    General:  No acute distress  Psychiatric:  Awake and alert, shaking head yes/no to answer some questions but nonverbal  Pulmonary:  Normal respiratory excursion without accessory muscle use  Abdomen:  Soft, nontender  Extremities:  No edema  Skin:  No rashes        Lab Results: I have reviewed the following results:  Results from last 7 days   Lab Units 10/20/24  0429 10/19/24  0554 10/18/24  2244   WBC Thousand/uL 15.98* 18.79* 19.52*   HEMOGLOBIN g/dL 8.5* 8.5* 9.2*   PLATELETS Thousands/uL 480* 450* 445*     Results from last 7 days   Lab Units 10/20/24  0429 10/19/24  0554 10/18/24  2244   SODIUM mmol/L 154* 149* 147   POTASSIUM  mmol/L 3.5 3.3* 3.7   CHLORIDE mmol/L 120* 114* 113*   CO2 mmol/L 30 28 26   BUN mg/dL 37* 50* 59*   CREATININE mg/dL 0.74 0.92 1.10   EGFR ml/min/1.73sq m 90 81 65   CALCIUM mg/dL 8.4 8.3* 8.6   AST U/L 47* 67* 68*   ALT U/L 54* 70* 76*   ALK PHOS U/L 83 81 80   ALBUMIN g/dL 2.1* 2.1* 2.2*     Results from last 7 days   Lab Units 10/18/24  1500 10/18/24  1445 10/18/24  1335   GRAM STAIN RESULT   --  Gram negative rods* Gram negative rods*   URINE CULTURE  Culture results to follow.  --   --      Results from last 7 days   Lab Units 10/18/24  1445   PROCALCITONIN ng/ml 0.72*                 Imaging Results Review: No pertinent imaging studies reviewed.  Other Study Results Review: No additional pertinent studies reviewed.

## 2024-10-20 NOTE — ASSESSMENT & PLAN NOTE
UA sent from patient's chronic Parra catheter had innumerable WBCs.  However this is expected finding in setting of chronic catheter and cannot be used alone to determine UTI.  CT abdomen/pelvis was without any hydronephrosis or hydroureter.  There was some chronic appearing bilateral perinephric stranding.  The urinary bladder was noted to be collapsed around a Parra catheter.  Previous urine culture in September had grown Enterobacter cloacae. Parra was exchanged this admission.  -Continue Cefepime as above  -Follow-up urine culture

## 2024-10-20 NOTE — ASSESSMENT & PLAN NOTE
"Patient recently discharged 10/4 with polymicrobial bacteremia secondary to sacral wound and completed course of IV cefepime, flagyl and vancomycin followed by ampicillin. Blood cultures are growing Enterococcus, Enterobacter and Proteus last month. Patient presented to [facility] ED 10/18 from SNF due to worsening sacral wounds and possible osteomyelitis. Patient received cefepime, Flagyl and vancomycin in the ER.   CXR \"Limited study. Some atelectasis is present at the right base. No convincing evidence of pneumonia.\"  CT abdomen pelvis showing \"Large sacral decubitus ulcer extending to the coccyx with soft tissue gas now seen extending into the right and left medial aspects of the gluteus jac muscles. No drainable fluid collection. Erosion of the coccyx suggestive of osteomyelitis.\"  Blood culture x2 obtained upon admission  MRSA pending  Has been afebrile since admission.  Sepsis alert called overnight for hypotension, tachycardia and leukocytosis 21.57, continues to improve-- this morning to 15.98  Hemodynamically stable; however, on the BP soft side with systolic high 90's   Goal MAP >65  Continue IVFs  Infectious disease recs appreciated  Continue vanc/cefepime/flagyl (10/18- )   "

## 2024-10-21 ENCOUNTER — ANESTHESIA (INPATIENT)
Dept: PERIOP | Facility: HOSPITAL | Age: 74
DRG: 853 | End: 2024-10-21
Payer: COMMERCIAL

## 2024-10-21 ENCOUNTER — ANESTHESIA EVENT (INPATIENT)
Dept: PERIOP | Facility: HOSPITAL | Age: 74
DRG: 853 | End: 2024-10-21
Payer: COMMERCIAL

## 2024-10-21 ENCOUNTER — APPOINTMENT (INPATIENT)
Dept: CT IMAGING | Facility: HOSPITAL | Age: 74
DRG: 853 | End: 2024-10-21
Payer: COMMERCIAL

## 2024-10-21 LAB
ALBUMIN SERPL BCG-MCNC: 2 G/DL (ref 3.5–5)
ALBUMIN SERPL BCG-MCNC: 2 G/DL (ref 3.5–5)
ALP SERPL-CCNC: 66 U/L (ref 34–104)
ALP SERPL-CCNC: 76 U/L (ref 34–104)
ALT SERPL W P-5'-P-CCNC: 32 U/L (ref 7–52)
ALT SERPL W P-5'-P-CCNC: 36 U/L (ref 7–52)
ANION GAP SERPL CALCULATED.3IONS-SCNC: 2 MMOL/L (ref 4–13)
ANION GAP SERPL CALCULATED.3IONS-SCNC: 4 MMOL/L (ref 4–13)
AST SERPL W P-5'-P-CCNC: 21 U/L (ref 13–39)
AST SERPL W P-5'-P-CCNC: 26 U/L (ref 13–39)
ATRIAL RATE: 120 BPM
BASE EX.OXY STD BLDV CALC-SCNC: 74 % (ref 60–80)
BASE EXCESS BLDV CALC-SCNC: 2.8 MMOL/L
BASOPHILS # BLD AUTO: 0.04 THOUSANDS/ΜL (ref 0–0.1)
BASOPHILS # BLD AUTO: 0.05 THOUSANDS/ΜL (ref 0–0.1)
BASOPHILS NFR BLD AUTO: 0 % (ref 0–1)
BASOPHILS NFR BLD AUTO: 0 % (ref 0–1)
BILIRUB SERPL-MCNC: 0.48 MG/DL (ref 0.2–1)
BILIRUB SERPL-MCNC: 0.62 MG/DL (ref 0.2–1)
BUN SERPL-MCNC: 25 MG/DL (ref 5–25)
BUN SERPL-MCNC: 26 MG/DL (ref 5–25)
CALCIUM ALBUM COR SERPL-MCNC: 9.8 MG/DL (ref 8.3–10.1)
CALCIUM ALBUM COR SERPL-MCNC: 9.8 MG/DL (ref 8.3–10.1)
CALCIUM SERPL-MCNC: 8.2 MG/DL (ref 8.4–10.2)
CALCIUM SERPL-MCNC: 8.2 MG/DL (ref 8.4–10.2)
CHLORIDE SERPL-SCNC: 117 MMOL/L (ref 96–108)
CHLORIDE SERPL-SCNC: 118 MMOL/L (ref 96–108)
CO2 SERPL-SCNC: 28 MMOL/L (ref 21–32)
CO2 SERPL-SCNC: 29 MMOL/L (ref 21–32)
CREAT SERPL-MCNC: 0.74 MG/DL (ref 0.6–1.3)
CREAT SERPL-MCNC: 0.8 MG/DL (ref 0.6–1.3)
EOSINOPHIL # BLD AUTO: 0.16 THOUSAND/ΜL (ref 0–0.61)
EOSINOPHIL # BLD AUTO: 0.24 THOUSAND/ΜL (ref 0–0.61)
EOSINOPHIL NFR BLD AUTO: 1 % (ref 0–6)
EOSINOPHIL NFR BLD AUTO: 1 % (ref 0–6)
ERYTHROCYTE [DISTWIDTH] IN BLOOD BY AUTOMATED COUNT: 16.5 % (ref 11.6–15.1)
ERYTHROCYTE [DISTWIDTH] IN BLOOD BY AUTOMATED COUNT: 16.6 % (ref 11.6–15.1)
ERYTHROCYTE [DISTWIDTH] IN BLOOD BY AUTOMATED COUNT: 16.8 % (ref 11.6–15.1)
GFR SERPL CREATININE-BSD FRML MDRD: 87 ML/MIN/1.73SQ M
GFR SERPL CREATININE-BSD FRML MDRD: 90 ML/MIN/1.73SQ M
GLUCOSE SERPL-MCNC: 155 MG/DL (ref 65–140)
GLUCOSE SERPL-MCNC: 187 MG/DL (ref 65–140)
GLUCOSE SERPL-MCNC: 191 MG/DL (ref 65–140)
GLUCOSE SERPL-MCNC: 200 MG/DL (ref 65–140)
GLUCOSE SERPL-MCNC: 229 MG/DL (ref 65–140)
GLUCOSE SERPL-MCNC: 265 MG/DL (ref 65–140)
HCO3 BLDV-SCNC: 28 MMOL/L (ref 24–30)
HCT VFR BLD AUTO: 28.1 % (ref 36.5–49.3)
HCT VFR BLD AUTO: 28.8 % (ref 36.5–49.3)
HCT VFR BLD AUTO: 31.3 % (ref 36.5–49.3)
HGB BLD-MCNC: 8.3 G/DL (ref 12–17)
HGB BLD-MCNC: 8.4 G/DL (ref 12–17)
HGB BLD-MCNC: 9 G/DL (ref 12–17)
IMM GRANULOCYTES # BLD AUTO: 0.21 THOUSAND/UL (ref 0–0.2)
IMM GRANULOCYTES # BLD AUTO: 0.22 THOUSAND/UL (ref 0–0.2)
IMM GRANULOCYTES NFR BLD AUTO: 1 % (ref 0–2)
IMM GRANULOCYTES NFR BLD AUTO: 1 % (ref 0–2)
LACTATE SERPL-SCNC: 1.5 MMOL/L (ref 0.5–2)
LYMPHOCYTES # BLD AUTO: 2.36 THOUSANDS/ΜL (ref 0.6–4.47)
LYMPHOCYTES # BLD AUTO: 2.64 THOUSANDS/ΜL (ref 0.6–4.47)
LYMPHOCYTES NFR BLD AUTO: 14 % (ref 14–44)
LYMPHOCYTES NFR BLD AUTO: 14 % (ref 14–44)
MAGNESIUM SERPL-MCNC: 2.1 MG/DL (ref 1.9–2.7)
MCH RBC QN AUTO: 28.7 PG (ref 26.8–34.3)
MCH RBC QN AUTO: 29.1 PG (ref 26.8–34.3)
MCH RBC QN AUTO: 29.6 PG (ref 26.8–34.3)
MCHC RBC AUTO-ENTMCNC: 28.8 G/DL (ref 31.4–37.4)
MCHC RBC AUTO-ENTMCNC: 29.2 G/DL (ref 31.4–37.4)
MCHC RBC AUTO-ENTMCNC: 29.5 G/DL (ref 31.4–37.4)
MCV RBC AUTO: 100 FL (ref 82–98)
MCV RBC AUTO: 101 FL (ref 82–98)
MCV RBC AUTO: 98 FL (ref 82–98)
MONOCYTES # BLD AUTO: 0.67 THOUSAND/ΜL (ref 0.17–1.22)
MONOCYTES # BLD AUTO: 0.67 THOUSAND/ΜL (ref 0.17–1.22)
MONOCYTES NFR BLD AUTO: 4 % (ref 4–12)
MONOCYTES NFR BLD AUTO: 4 % (ref 4–12)
NEUTROPHILS # BLD AUTO: 13.33 THOUSANDS/ΜL (ref 1.85–7.62)
NEUTROPHILS # BLD AUTO: 15.14 THOUSANDS/ΜL (ref 1.85–7.62)
NEUTS SEG NFR BLD AUTO: 80 % (ref 43–75)
NEUTS SEG NFR BLD AUTO: 80 % (ref 43–75)
NRBC BLD AUTO-RTO: 0 /100 WBCS
NRBC BLD AUTO-RTO: 0 /100 WBCS
O2 CT BLDV-SCNC: 9.9 ML/DL
P AXIS: 59 DEGREES
PCO2 BLDV: 46.1 MM HG (ref 42–50)
PH BLDV: 7.4 [PH] (ref 7.3–7.4)
PHOSPHATE SERPL-MCNC: 2.5 MG/DL (ref 2.3–4.1)
PHOSPHATE SERPL-MCNC: 2.9 MG/DL (ref 2.3–4.1)
PLATELET # BLD AUTO: 505 THOUSANDS/UL (ref 149–390)
PLATELET # BLD AUTO: 539 THOUSANDS/UL (ref 149–390)
PLATELET # BLD AUTO: 560 THOUSANDS/UL (ref 149–390)
PMV BLD AUTO: 10 FL (ref 8.9–12.7)
PMV BLD AUTO: 9.6 FL (ref 8.9–12.7)
PMV BLD AUTO: 9.8 FL (ref 8.9–12.7)
PO2 BLDV: 44 MM HG (ref 35–45)
POTASSIUM SERPL-SCNC: 3.4 MMOL/L (ref 3.5–5.3)
POTASSIUM SERPL-SCNC: 3.4 MMOL/L (ref 3.5–5.3)
PR INTERVAL: 154 MS
PROT SERPL-MCNC: 7.2 G/DL (ref 6.4–8.4)
PROT SERPL-MCNC: 7.3 G/DL (ref 6.4–8.4)
QRS AXIS: 31 DEGREES
QRSD INTERVAL: 76 MS
QT INTERVAL: 306 MS
QTC INTERVAL: 432 MS
RBC # BLD AUTO: 2.8 MILLION/UL (ref 3.88–5.62)
RBC # BLD AUTO: 2.93 MILLION/UL (ref 3.88–5.62)
RBC # BLD AUTO: 3.09 MILLION/UL (ref 3.88–5.62)
SODIUM SERPL-SCNC: 148 MMOL/L (ref 135–147)
SODIUM SERPL-SCNC: 150 MMOL/L (ref 135–147)
T WAVE AXIS: 39 DEGREES
VENTRICULAR RATE: 120 BPM
WBC # BLD AUTO: 15.62 THOUSAND/UL (ref 4.31–10.16)
WBC # BLD AUTO: 16.77 THOUSAND/UL (ref 4.31–10.16)
WBC # BLD AUTO: 18.96 THOUSAND/UL (ref 4.31–10.16)

## 2024-10-21 PROCEDURE — 87205 SMEAR GRAM STAIN: CPT | Performed by: SURGERY

## 2024-10-21 PROCEDURE — 85025 COMPLETE CBC W/AUTO DIFF WBC: CPT

## 2024-10-21 PROCEDURE — 99232 SBSQ HOSP IP/OBS MODERATE 35: CPT | Performed by: PHYSICIAN ASSISTANT

## 2024-10-21 PROCEDURE — 84100 ASSAY OF PHOSPHORUS: CPT

## 2024-10-21 PROCEDURE — 99223 1ST HOSP IP/OBS HIGH 75: CPT | Performed by: INTERNAL MEDICINE

## 2024-10-21 PROCEDURE — 83735 ASSAY OF MAGNESIUM: CPT

## 2024-10-21 PROCEDURE — 87185 SC STD ENZYME DETCJ PER NZM: CPT | Performed by: SURGERY

## 2024-10-21 PROCEDURE — 87070 CULTURE OTHR SPECIMN AEROBIC: CPT | Performed by: SURGERY

## 2024-10-21 PROCEDURE — 87076 CULTURE ANAEROBE IDENT EACH: CPT | Performed by: SURGERY

## 2024-10-21 PROCEDURE — 83605 ASSAY OF LACTIC ACID: CPT

## 2024-10-21 PROCEDURE — 80053 COMPREHEN METABOLIC PANEL: CPT

## 2024-10-21 PROCEDURE — 85027 COMPLETE CBC AUTOMATED: CPT

## 2024-10-21 PROCEDURE — 82948 REAGENT STRIP/BLOOD GLUCOSE: CPT

## 2024-10-21 PROCEDURE — 0QBS0ZZ EXCISION OF COCCYX, OPEN APPROACH: ICD-10-PCS | Performed by: SURGERY

## 2024-10-21 PROCEDURE — 99232 SBSQ HOSP IP/OBS MODERATE 35: CPT | Performed by: SURGERY

## 2024-10-21 PROCEDURE — 74177 CT ABD & PELVIS W/CONTRAST: CPT

## 2024-10-21 PROCEDURE — 87186 SC STD MICRODIL/AGAR DIL: CPT | Performed by: SURGERY

## 2024-10-21 PROCEDURE — 93010 ELECTROCARDIOGRAM REPORT: CPT | Performed by: INTERNAL MEDICINE

## 2024-10-21 PROCEDURE — 80048 BASIC METABOLIC PNL TOTAL CA: CPT

## 2024-10-21 PROCEDURE — 11047 DBRDMT BONE EACH ADDL: CPT | Performed by: SURGERY

## 2024-10-21 PROCEDURE — 11044 DBRDMT BONE 1ST 20 SQ CM/<: CPT | Performed by: SURGERY

## 2024-10-21 PROCEDURE — 99233 SBSQ HOSP IP/OBS HIGH 50: CPT | Performed by: STUDENT IN AN ORGANIZED HEALTH CARE EDUCATION/TRAINING PROGRAM

## 2024-10-21 PROCEDURE — 87075 CULTR BACTERIA EXCEPT BLOOD: CPT | Performed by: SURGERY

## 2024-10-21 PROCEDURE — 82805 BLOOD GASES W/O2 SATURATION: CPT

## 2024-10-21 PROCEDURE — 93005 ELECTROCARDIOGRAM TRACING: CPT

## 2024-10-21 PROCEDURE — 87077 CULTURE AEROBIC IDENTIFY: CPT | Performed by: SURGERY

## 2024-10-21 PROCEDURE — 87147 CULTURE TYPE IMMUNOLOGIC: CPT | Performed by: SURGERY

## 2024-10-21 PROCEDURE — 0J990ZZ DRAINAGE OF BUTTOCK SUBCUTANEOUS TISSUE AND FASCIA, OPEN APPROACH: ICD-10-PCS | Performed by: SURGERY

## 2024-10-21 PROCEDURE — 94762 N-INVAS EAR/PLS OXIMTRY CONT: CPT

## 2024-10-21 RX ORDER — METRONIDAZOLE 500 MG/1
500 TABLET ORAL EVERY 12 HOURS SCHEDULED
Status: DISCONTINUED | OUTPATIENT
Start: 2024-10-21 | End: 2024-10-21

## 2024-10-21 RX ORDER — CEFAZOLIN SODIUM 2 G/50ML
2000 SOLUTION INTRAVENOUS
Status: DISCONTINUED | OUTPATIENT
Start: 2024-10-22 | End: 2024-10-21

## 2024-10-21 RX ORDER — SODIUM CHLORIDE, SODIUM GLUCONATE, SODIUM ACETATE, POTASSIUM CHLORIDE, MAGNESIUM CHLORIDE, SODIUM PHOSPHATE, DIBASIC, AND POTASSIUM PHOSPHATE .53; .5; .37; .037; .03; .012; .00082 G/100ML; G/100ML; G/100ML; G/100ML; G/100ML; G/100ML; G/100ML
1000 INJECTION, SOLUTION INTRAVENOUS ONCE
Status: COMPLETED | OUTPATIENT
Start: 2024-10-21 | End: 2024-10-21

## 2024-10-21 RX ORDER — METRONIDAZOLE 500 MG/100ML
INJECTION, SOLUTION INTRAVENOUS CONTINUOUS PRN
Status: DISCONTINUED | OUTPATIENT
Start: 2024-10-21 | End: 2024-10-21

## 2024-10-21 RX ORDER — SODIUM CHLORIDE 9 MG/ML
INJECTION, SOLUTION INTRAVENOUS CONTINUOUS PRN
Status: DISCONTINUED | OUTPATIENT
Start: 2024-10-21 | End: 2024-10-21

## 2024-10-21 RX ORDER — MAGNESIUM HYDROXIDE 1200 MG/15ML
LIQUID ORAL AS NEEDED
Status: DISCONTINUED | OUTPATIENT
Start: 2024-10-21 | End: 2024-10-21 | Stop reason: HOSPADM

## 2024-10-21 RX ORDER — POTASSIUM CHLORIDE 14.9 MG/ML
20 INJECTION INTRAVENOUS
Status: DISCONTINUED | OUTPATIENT
Start: 2024-10-21 | End: 2024-10-22

## 2024-10-21 RX ORDER — LIDOCAINE HYDROCHLORIDE 20 MG/ML
INJECTION, SOLUTION EPIDURAL; INFILTRATION; INTRACAUDAL; PERINEURAL AS NEEDED
Status: DISCONTINUED | OUTPATIENT
Start: 2024-10-21 | End: 2024-10-21

## 2024-10-21 RX ORDER — METOPROLOL TARTRATE 1 MG/ML
2.5 INJECTION, SOLUTION INTRAVENOUS EVERY 6 HOURS PRN
Status: DISCONTINUED | OUTPATIENT
Start: 2024-10-21 | End: 2024-10-29 | Stop reason: HOSPADM

## 2024-10-21 RX ORDER — CLINDAMYCIN PHOSPHATE 600 MG/50ML
600 INJECTION, SOLUTION INTRAVENOUS EVERY 8 HOURS
Status: DISCONTINUED | OUTPATIENT
Start: 2024-10-21 | End: 2024-10-22

## 2024-10-21 RX ORDER — ONDANSETRON 2 MG/ML
4 INJECTION INTRAMUSCULAR; INTRAVENOUS ONCE AS NEEDED
Status: DISCONTINUED | OUTPATIENT
Start: 2024-10-21 | End: 2024-10-22 | Stop reason: HOSPADM

## 2024-10-21 RX ORDER — CEFAZOLIN SODIUM 1 G/3ML
INJECTION, POWDER, FOR SOLUTION INTRAMUSCULAR; INTRAVENOUS AS NEEDED
Status: DISCONTINUED | OUTPATIENT
Start: 2024-10-21 | End: 2024-10-21

## 2024-10-21 RX ORDER — FENTANYL CITRATE 50 UG/ML
INJECTION, SOLUTION INTRAMUSCULAR; INTRAVENOUS AS NEEDED
Status: DISCONTINUED | OUTPATIENT
Start: 2024-10-21 | End: 2024-10-21

## 2024-10-21 RX ORDER — ROCURONIUM BROMIDE 10 MG/ML
INJECTION, SOLUTION INTRAVENOUS AS NEEDED
Status: DISCONTINUED | OUTPATIENT
Start: 2024-10-21 | End: 2024-10-21

## 2024-10-21 RX ORDER — PROPOFOL 10 MG/ML
INJECTION, EMULSION INTRAVENOUS AS NEEDED
Status: DISCONTINUED | OUTPATIENT
Start: 2024-10-21 | End: 2024-10-21

## 2024-10-21 RX ORDER — VANCOMYCIN HYDROCHLORIDE 750 MG/150ML
750 INJECTION, SOLUTION INTRAVENOUS EVERY 12 HOURS
Status: DISCONTINUED | OUTPATIENT
Start: 2024-10-21 | End: 2024-10-22

## 2024-10-21 RX ORDER — HYDROMORPHONE HCL/PF 1 MG/ML
0.2 SYRINGE (ML) INJECTION
Status: DISCONTINUED | OUTPATIENT
Start: 2024-10-21 | End: 2024-10-22 | Stop reason: HOSPADM

## 2024-10-21 RX ADMIN — LIDOCAINE HYDROCHLORIDE 40 MG: 20 INJECTION, SOLUTION EPIDURAL; INFILTRATION; INTRACAUDAL at 20:47

## 2024-10-21 RX ADMIN — ROCURONIUM 10 MG: 50 INJECTION, SOLUTION INTRAVENOUS at 21:53

## 2024-10-21 RX ADMIN — METRONIDAZOLE 500 MG: 500 INJECTION, SOLUTION INTRAVENOUS at 05:07

## 2024-10-21 RX ADMIN — TAMSULOSIN HYDROCHLORIDE 0.4 MG: 0.4 CAPSULE ORAL at 17:49

## 2024-10-21 RX ADMIN — ASPIRIN 81 MG CHEWABLE TABLET 81 MG: 81 TABLET CHEWABLE at 08:31

## 2024-10-21 RX ADMIN — LISINOPRIL 10 MG: 10 TABLET ORAL at 08:31

## 2024-10-21 RX ADMIN — B-COMPLEX W/ C & FOLIC ACID TAB 1 TABLET: TAB at 08:31

## 2024-10-21 RX ADMIN — PROPOFOL 100 MG: 10 INJECTION, EMULSION INTRAVENOUS at 20:47

## 2024-10-21 RX ADMIN — DEXTROSE 100 ML/HR: 5 SOLUTION INTRAVENOUS at 08:55

## 2024-10-21 RX ADMIN — METRONIDAZOLE: 500 INJECTION, SOLUTION INTRAVENOUS at 21:06

## 2024-10-21 RX ADMIN — POTASSIUM CHLORIDE 20 MEQ: 14.9 INJECTION, SOLUTION INTRAVENOUS at 19:26

## 2024-10-21 RX ADMIN — METOPROLOL SUCCINATE 25 MG: 25 TABLET, EXTENDED RELEASE ORAL at 08:31

## 2024-10-21 RX ADMIN — THIAMINE HYDROCHLORIDE 100 MG: 100 INJECTION, SOLUTION INTRAMUSCULAR; INTRAVENOUS at 12:03

## 2024-10-21 RX ADMIN — HEPARIN SODIUM 5000 UNITS: 5000 INJECTION INTRAVENOUS; SUBCUTANEOUS at 05:07

## 2024-10-21 RX ADMIN — CLINDAMYCIN IN 5 PERCENT DEXTROSE 600 MG: 12 INJECTION, SOLUTION INTRAVENOUS at 19:54

## 2024-10-21 RX ADMIN — FENTANYL CITRATE 25 MCG: 50 INJECTION INTRAMUSCULAR; INTRAVENOUS at 21:48

## 2024-10-21 RX ADMIN — HEPARIN SODIUM 5000 UNITS: 5000 INJECTION INTRAVENOUS; SUBCUTANEOUS at 15:20

## 2024-10-21 RX ADMIN — PIPERACILLIN SODIUM AND TAZOBACTAM SODIUM 4.5 G: 36; 4.5 INJECTION, POWDER, LYOPHILIZED, FOR SOLUTION INTRAVENOUS at 23:56

## 2024-10-21 RX ADMIN — DEXTROSE 2000 MG: 50 INJECTION, SOLUTION INTRAVENOUS at 15:20

## 2024-10-21 RX ADMIN — VANCOMYCIN HYDROCHLORIDE 1000 MG: 1 INJECTION, SOLUTION INTRAVENOUS at 06:36

## 2024-10-21 RX ADMIN — INSULIN LISPRO 1 UNITS: 100 INJECTION, SOLUTION INTRAVENOUS; SUBCUTANEOUS at 08:30

## 2024-10-21 RX ADMIN — SODIUM CHLORIDE: 0.9 INJECTION, SOLUTION INTRAVENOUS at 20:43

## 2024-10-21 RX ADMIN — ESCITALOPRAM OXALATE 10 MG: 10 TABLET ORAL at 08:31

## 2024-10-21 RX ADMIN — INSULIN LISPRO 2 UNITS: 100 INJECTION, SOLUTION INTRAVENOUS; SUBCUTANEOUS at 16:46

## 2024-10-21 RX ADMIN — IOHEXOL 100 ML: 350 INJECTION, SOLUTION INTRAVENOUS at 18:51

## 2024-10-21 RX ADMIN — ATORVASTATIN CALCIUM 80 MG: 80 TABLET, FILM COATED ORAL at 17:49

## 2024-10-21 RX ADMIN — SODIUM HYPOCHLORITE 1 APPLICATION: 2.5 SOLUTION TOPICAL at 08:31

## 2024-10-21 RX ADMIN — FENTANYL CITRATE 25 MCG: 50 INJECTION INTRAMUSCULAR; INTRAVENOUS at 21:54

## 2024-10-21 RX ADMIN — FINASTERIDE 5 MG: 5 TABLET, FILM COATED ORAL at 08:31

## 2024-10-21 RX ADMIN — CEFEPIME 2000 MG: 2 INJECTION, POWDER, FOR SOLUTION INTRAVENOUS at 01:39

## 2024-10-21 RX ADMIN — ROCURONIUM 50 MG: 50 INJECTION, SOLUTION INTRAVENOUS at 20:47

## 2024-10-21 RX ADMIN — INSULIN LISPRO 1 UNITS: 100 INJECTION, SOLUTION INTRAVENOUS; SUBCUTANEOUS at 12:03

## 2024-10-21 RX ADMIN — ROCURONIUM 20 MG: 50 INJECTION, SOLUTION INTRAVENOUS at 21:23

## 2024-10-21 RX ADMIN — METRONIDAZOLE 500 MG: 500 TABLET ORAL at 17:49

## 2024-10-21 RX ADMIN — SODIUM CHLORIDE, SODIUM GLUCONATE, SODIUM ACETATE, POTASSIUM CHLORIDE, MAGNESIUM CHLORIDE, SODIUM PHOSPHATE, DIBASIC, AND POTASSIUM PHOSPHATE 1000 ML: .53; .5; .37; .037; .03; .012; .00082 INJECTION, SOLUTION INTRAVENOUS at 19:26

## 2024-10-21 RX ADMIN — CEFAZOLIN 2000 MG: 1 INJECTION, POWDER, FOR SOLUTION INTRAMUSCULAR; INTRAVENOUS; PARENTERAL at 21:00

## 2024-10-21 RX ADMIN — METOROPROLOL TARTRATE 2.5 MG: 5 INJECTION, SOLUTION INTRAVENOUS at 16:45

## 2024-10-21 RX ADMIN — SUGAMMADEX 300 MG: 100 INJECTION, SOLUTION INTRAVENOUS at 22:25

## 2024-10-21 NOTE — ASSESSMENT & PLAN NOTE
POA mild elevation , ALT 99, without guarding    Resolved  Right upper quadrant ultrasound without evidence of acute cholecystitis

## 2024-10-21 NOTE — ASSESSMENT & PLAN NOTE
Lab Results   Component Value Date    HGBA1C 6.2 (H) 09/17/2024       Recent Labs     10/20/24  1125 10/20/24  1614 10/20/24  2131 10/21/24  0709   POCGLU 143* 165* 168* 187*       Blood Sugar Average: Last 72 hrs:  (P) 154.8  -continue to trend sugars  -agree with SSI while inpatient  -hypoglycemic protocol

## 2024-10-21 NOTE — WOUND OSTOMY CARE
Consult Note - Wound   Drew Santos 73 y.o. male MRN: [id number]  Unit/Bed#: E2 -01 Encounter: [id number]      History and Present Illness:  73 year old male presented to the hospital with failure to thrive--refusing to eat and worsening sacral wound at facility.  Patient's history significant for CVA with left sided weakness, CAD, atrial fibrillation, DM, HTN, sacral wound.     Wound care team consultation requested by surgery team for sacral ulcer.    Assessment Findings:   Patient is non-verbal on exam.  Dependent for turning/repositioning.  On P500 low air-loss mattress.  Parra catheter in place, incontinent of stool.  Nutrition team following, receiving tube feeding and dietary supplements.    Groin/abdominal folds intact.  Bilateral heels intact, dry.  Scaling to bilateral lower extremities.    Present on admission stage 4 pressure injury to sacrum--wound not assessed in person at this time.  Surgery has been following wound daily for PRN debridement.   Wound was assessed and re-dressed this morning by surgery team.  Current wound care for Dakin's wet-to-dry dressings TID.  Discussed with surgery attending and resident that wound care would be in agreement with ongoing Dakin's at this time.  The only other alternative wound care team could offer would be wet-to-dry dressing with Vashe.  Per Dr. Torres, will d/c Dakin's and try Vashe wet-to-dry dressings BID.  Wound care team to evaluate sacral wound later this week.  Vashe solution placed in patient's room, RN aware of plan.    Photo taken 10/19/24 at 0825:    2. Right heel hyperpigmentation--intact epidermis.  Surrounding skin within normal limits.   Photo taken 10/21/24 at 1228:        See flowsheet for wound details.    Patient's family in discussion with palliative care--pursuing full medical cares at this time.  Patient currently with poor sacral wound healing potential secondary to malnutrition, immobility, stool incontinence, and ongoing sepsis.      Wound Care Plan:   1-Silicone Cream/Hydraguard lotion to bilateral heels and lower legs twice daily and as needed.  2-Elevate heels off of bed/chair surface--offloading heel boots.  3-P500 low air-loss mattress.   4-Apply moisturizing skin cream to body daily and as needed.  5-Turn/reposition every 2 hours while in bed for pressure re-distribution on skin. ATR positioning system.  6-Sacrum--remove dressing/packing.  Irrigate wound with normal saline, pat dry.  Apply skin prep to intact carmen-wound skin.  Pack wound with gauze packing moistened with VASHE solution.  Cover with silicone bordered foam dressing.  Change dressing twice daily and as needed with soilage.    Wound care team to follow.  Plan of care reviewed with primary RN.    Patient should follow-up at the wound center on discharge. Ambulatory referral in place.     Genevieve Mcdonald RN, BSN, CWON

## 2024-10-21 NOTE — ASSESSMENT & PLAN NOTE
Leukocytosis at admission WBC=21. CXR collected without convincing evidence of pneumonia. Although sacral ulcer could be contributing to elevated WBC.    -daily CBC  -trend fever curve  -blood cultures positive for GNR, presumed Proteus  -Urince culture +Proteus mirabilis  -daily wound care w/ Dakins as written  -abx per primary: vanco/cefepime/flagyl (10/18- )

## 2024-10-21 NOTE — CASE MANAGEMENT
Case Management Assessment & Discharge Planning Note    Patient name Drew Santos  Location [facility] /E2 -* MRN [id number]  : 1950 Date 10/21/2024       Current Admission Date: 10/18/2024  Current Admission Diagnosis:Gram-negative bacteremia   Patient Active Problem List    Diagnosis Date Noted Date Diagnosed    Elevated liver enzymes 10/20/2024     Pyuria 10/19/2024     Chronic indwelling Parra catheter 10/19/2024     Hypernatremia 10/19/2024     Sacral ulcer (HCC) 10/18/2024     Advanced care planning/counseling discussion 2024     Severe protein-calorie malnutrition (HCC) 2024     Polymicrobial bacteremia 2024     Acute metabolic encephalopathy 2024     History of CVA (cerebrovascular accident) 2024     Paroxysmal atrial fibrillation (HCC) 2024     Anemia of chronic disease 2024     Gram-negative bacteremia 2024     Type 2 diabetes mellitus without complication, without long-term current use of insulin (HCC) 2024     COVID-19 2024     Proctitis 2024     Dysgeusia 2024     Gross hematuria 2024     Depression 2024     Sacral wound 2024     Primary hypertension 2024     Mixed hyperlipidemia 2024     Urinary retention 2024     Syncope 2024     Elevated lactic acid level 2024     Sepsis 2024     Abnormal CPK 2024       LOS (days): 3  Geometric Mean LOS (GMLOS) (days):   Days to GMLOS:     OBJECTIVE:  PATIENT READMITTED TO HOSPITAL  Risk of Unplanned Readmission Score: 35.25         Current admission status: Inpatient       Preferred Pharmacy:   [id number] - [place] - [address]    [facility] [place] [address]  Phone: [phone] Fax: [phone]    Primary Care Provider: Joe Lyon MD    Primary Insurance: [facility]  [facility]    ASSESSMENT:  Active Health Care Proxies        Danielle Kristi Health Care Representative - Spouse   Primary Phone: [phone] (Mobile)                 Advance Directives  Does patient have a Health Care POA?: Yes (not in EHR)  Does patient have Advance Directives?: Yes (not in EHR)  Advance Directives: Living will, Power of  for health care  Primary Contact: Kristi Santos (spouse) [phone]         Readmission Root Cause  30 Day Readmission: Yes  During your hospital stay, did someone (provider, nurse, ) explain your care to you in a way you could understand?: Yes  Did you feel medically stable to leave the hospital?: Yes  Were you able to pay for your medication at the pharmacy?: Unable to Assess ([facility] was [facility] to [facility])  Did you have reliable transportation to take you to your appointments?: Unable to Assess (Pt was discharge to [facility] for [facility])  During previous admission, was a post-acute recommendation made?: Yes  What post-acute resources were offered?: STR  Patient was readmitted due to: Bacteremia  Action Plan: Admit to medicine. Treat with IV abx. Planned for PEG placement due to not eating and lack of nutrition    Patient Information  Admitted from:: Facility ([facility])  Mental Status: Other (Comment) (sleeping)  During Assessment patient was accompanied by: Spouse  Assessment information provided by:: Spouse  Primary Caregiver: Other (Comment)  Caregiver's Name:: Staff at [facility]  Support Systems: Spouse/significant other, Family members, Friend  County of Residence: [place] (Prior residence before admission to [facility])  What city do you live in?: [place]  Home entry access options. Select all that apply.: Stairs (Prior residence before admission to [facility])  Number of steps to enter home.: 5 (Prior residence before admission to [facility])  Do the steps have railings?: Yes  Type of Current Residence: Facility ([facility]; prior to admission to [facility] pt resided  in a 2 story home)  Upon entering residence, is there a bedroom on the main floor (no further steps)?: Yes (Pt is able to be set up on the main floor of the home w/ his spouse)  Upon entering residence, is there a bathroom on the main floor (no further steps)?: Yes  Living Arrangements: Other (Comment) (Currently resides at [facility] for [facility]; prior resided with spouse)  Is patient a ?: Yes  Is patient [facility] with [facility] ([place] [facility])?: Yes (Dr Efrain Redmond [phone] (F) [phone])  Is patient service connected?: Yes    Activities of Daily Living Prior to Admission  Functional Status: Assistance  Completes ADLs independently?: No  Level of ADL dependence: Total Dependent (Pt currently max assist x2)  Ambulates independently?: No  Level of ambulatory dependence: Total Dependent (Pt currently max assist x2)  Does patient use assisted devices?: Yes  Assisted Devices (DME) used: Wheelchair, [place] lift  Does patient currently own DME?: Yes (Pt owns all listed equipment at home)  What DME does the patient currently own?: Bedside Commode, Hospital Bed, [place] lift, Wheelchair  Does patient have a history of Outpatient Therapy (PT/OT)?: Yes  Does the patient have a history of Short-Term Rehab?: Yes ([facility])  Does patient have a history of HHC?: Yes ([facility])  Does patient currently have HHC?: No         Patient Information Continued  Income Source: Pension/assisted  Does patient have prescription coverage?: Yes  Does patient receive dialysis treatments?: No  Does patient have a history of substance abuse?: No  Does patient have a history of Mental Health Diagnosis?: Yes (Depression/PTSD)  Is patient receiving treatment for mental health?: Yes  Has patient received inpatient treatment related to mental health in the last 2 years?: No         Means of Transportation  Means of Transport to [facility]:: Other (Comment) (would require medical transport)      Social Determinants of Health (SDOH)      Flowsheet  Row Most Recent Value   Housing Stability    In the last 12 months, was there a time when you were not able to pay the mortgage or rent on time? N   In the past 12 months, how many times have you moved where you were living? 1   At any time in the past 12 months, were you homeless or living in a shelter (including now)? N   Transportation Needs    In the past 12 months, has lack of transportation kept you from medical appointments or from getting medications? no   In the past 12 months, has lack of transportation kept you from meetings, work, or from getting things needed for daily living? No   Food Insecurity    Within the past 12 months, you worried that your food would run out before you got the money to buy more. Never true   Within the past 12 months, the food you bought just didn't last and you didn't have money to get more. Never true   Utilities    In the past 12 months has the electric, gas, oil, or water company threatened to shut off services in your home? No            DISCHARGE DETAILS:    Discharge planning discussed with:: Spouse, Kristi  Freedom of Choice: Yes  Comments - Freedom of Choice: Spouse is unsure of discharge at this time. She would like to take pt home with SN but is open to return to [facility] if unable to care for him at home  CM contacted family/caregiver?: Yes             Contacts  Patient Contacts: Kristi Santos (Spouse)  Relationship to Patient:: Family  Contact Method: In Person  Reason/Outcome: Discharge Planning, Emergency Contact    Requested Home Health Care         Is the patient interested in HHC at discharge?:  (pending discharge plan)    DME Referral Provided  Referral made for DME?:  (pending discharge plan)                                                           Additional Comments: CM met with pt's spouse at the bedside. Prior to admission to [facility], pt resided with his spouse in a 2 story home. Pt was able to have 1st floor set up with hospital bed on that  level. Pt also had a alexander lift and wheelchair. Spouse is unsure what she would like to do in regards to discharge at this time. She is hopeful that she will be able to take him home, but it is based on if she will be able to handle the neccessary wound care. Pt is planned for PEG placement tomorrow, and CM encouraged spouse to see a dressing change here at the hospital of his sacral wounds to see if she feels this is something she would be able to manage at home. She is aware she would have a nurse to assist but also aware they are likely to not come out for every dressing change. Pt's spouse is not opposed to return to [facility] if unable to manage care at home. SHELDON sent to [facility] to allow them to continue to follow along with hospital course. Spouse also reported that pt received a letter [facility] reporting they are dropping him from the insurance plan with a recommendation to call the [facility] to determine insurance plan. CM to place call to VA to see available options as secondary to assist with coverage. CM also to see availability for at home care through the [facility] for pt.

## 2024-10-21 NOTE — ASSESSMENT & PLAN NOTE
Has chronic anemia likely secondary to poor nutritional status underlying chronic illness.   Hemoglobin stable at 8.3  Hemodynamically stable at this time  Trend with daily CBC

## 2024-10-21 NOTE — PROGRESS NOTES
Progress Note - Infectious Disease   Name: Drew Santos 73 y.o. male I MRN: [id number]  Unit/Bed#: E2 -01 I Date of Admission: 10/18/2024   Date of Service: 10/21/2024 I Hospital Day: 3     Assessment & Plan  Sepsis  Tachycardia and leukocytosis. Secondary to e col bacteremia from unclear source. Sacral wound was examined by surgery and feel it appears noninfected at this time.  CT scan is also without any evidence of a drainable fluid collection/abscess but did show mild proctitis.  Consider urinary source in setting of chronic Parra catheter.  Chest x-ray without any consolidation to suggest pneumonia. LFTs mildly elevated, but no pericholecystic fluid on CT. Despite being systemically ill he's remained hemodynamically stable. This morning he is afebrile and his WBC count is trending down. Patient somnolent on exam, recommend goals of care conversation.  -antibiotic as below  -check CBCD and CMP tomorrow  -follow up blood cultures  -monitor vitals  -supportive care  -recommend goals of care conversation  Gram-negative bacteremia  Blood culture identification panel has identified both E. coli and Proteus thus far.  Patient had a recent polymicrobial bacteremia during last admission with source felt to be sacral wound.  Surgery has examined patient's sacral wound and lower concern for acute infection, though may be source of translocation.  Also consider gallbladder source given transaminitis and gallstones on CT, though no obvious RUQ tenderness on exam. I personally reviewed patient's RUQ US which showed no evidence of acute cholecystitis or choledocholithiasis. Less likely is urinary source. Patient does have a chronic Parra catheter to promote drainage and culture will likely have component of colonization. CT abdomen/pelvis with contrast without any evidence of pyelonephritis, intra-abdominal abscess, or abscess beneath the sacral wound. The patient has been receiving IV cefepime, IV flagyl, and IV  vancomycin. He's been tolerating antibiotic without difficulty. Given lack of growth of GPC on cultures I will stop his vancomycin now. Will also de-escalate by discontinuing flagyl and transitioning his cefepime to IV ceftriaxone.  -stop IV vancomycin  -stop IV flagyl  -stop IV cefepime  -start IV ceftriaxone  -monitor CBCD and CMP  -follow up final culture results and sensitivities  -monitor vitals  Sacral ulcer (HCC)  This was felt to have initially developed during patient stay in skilled nursing facility for rehab.  During recent hospitalization in September there was concern for fistulous connection from rectum to the sacral wound in setting of colitis/proctitis.  Wound was previously debrided by general surgery and during last admission was stage IV with palpable bone.  Latest CT showing soft tissue gas extending into right and left medial aspects of gluteus jac muscle, but no abscess.  There is erosion of coccyx suggestive of osteomyelitis.  Wound was evaluated by surgery and they currently feel it appears noninfected.  However in setting of polymicrobial bacteremia, this may be a source of translocation.  -long term antibiotics would be futile without aggressive bone/tissue debridement, bone cultures, diverting colostomy, and ability to close wound with flap   -serial skin exams  -frequent turning/repositioning to off-load pressure from the wound  -local wound care per general surgery  -continue follow up with general surgery  Pyuria  UA sent from patient's chronic Hanks catheter had innumerable WBCs. This is expected finding in setting of chronic catheter and cannot be used alone to determine UTI.  CT abdomen/pelvis was without any hydronephrosis or hydroureter.  There was some chronic appearing bilateral perinephric stranding.  The urinary bladder was noted to be collapsed around a Hanks catheter.  Hanks was exchanged this admission.  -serial exams and care of hanks  -monitor urine output  Chronic  indwelling Hanks catheter  In setting of chronic urinary retention and BPH. Catheter was exchanged this admission.  -serial exams and care of hanks  -monitor urine output  -continue follow up with urology as needed  Type 2 diabetes mellitus without complication, without long-term current use of insulin ([facility])  Lab Results   Component Value Date     HGBA1C 6.2 (H) 2024   Elevated blood glucose is risk factor for wounds and infection. Recommend tight glycemic control.  -blood glucose management per primary service   Proctitis  CT A/P from 10/18/2024 showing mild wall thickening of the rectum. CT scan back in 2024 also noted proctocolitis.  Given persistence, this may be a somewhat chronic finding.  He is not having any current diarrhea.  -antibiotic as above  -monitor GI symptoms  -monitor stool output    Above plan was discussed in detail with patient at the bedside.  Above plan was discussed in detail with SLIM who agrees with antibiotic plan.    Antibiotics:  Vancomycin - stop  Cefepime 4  Flagyl 4  Antibiotics 4    Subjective:  Unable to perform ROS, patient somnolent at time of my visit.    Objective:  Vitals:  Temp:  [97 °F (36.1 °C)-97.7 °F (36.5 °C)] 97.2 °F (36.2 °C)  HR:  [] 100  Resp:  [16-18] 16  BP: (100-137)/(58-80) 110/65  SpO2:  [94 %-98 %] 98 %  Temp (24hrs), Av.3 °F (36.3 °C), Min:97 °F (36.1 °C), Max:97.7 °F (36.5 °C)  Current: Temperature: (!) 97.2 °F (36.2 °C)    Physical Exam:   General Appearance:  Appears somnolent, in no acute distress.   Neurologic: Offered no purposeful movement or interactive during my visit, did not respond to sternal rub or squeezing the shoulders or extremities.   Lungs:   Clear to auscultation bilaterally; no wheezes, rhonchi or rales; respirations unlabored on room air.   Heart:  Tachycardic; no murmur, rub or gallop.   Abdomen:   Soft, no facial grimace with abdominal palpation, non-distended, positive bowel sounds.     Extremities: No  clubbing or cyanosis, no edema.   Genitourinary: Parra intact.   Skin: No new rashes noted on exposed skin.     Labs, Imaging, & Other studies:   All pertinent labs and imaging studies were personally reviewed  Results from last 7 days   Lab Units 10/21/24  0439 10/20/24  0429 10/19/24  0554   WBC Thousand/uL 15.62* 15.98* 18.79*   HEMOGLOBIN g/dL 8.3* 8.5* 8.5*   PLATELETS Thousands/uL 539* 480* 450*     Results from last 7 days   Lab Units 10/21/24  0439 10/20/24  1552 10/20/24  0429 10/19/24  0554   POTASSIUM mmol/L 3.4*   < > 3.5 3.3*   CHLORIDE mmol/L 118*   < > 120* 114*   CO2 mmol/L 28   < > 30 28   BUN mg/dL 26*   < > 37* 50*   CREATININE mg/dL 0.74   < > 0.74 0.92   EGFR ml/min/1.73sq m 90   < > 90 81   CALCIUM mg/dL 8.2*   < > 8.4 8.3*   AST U/L 26  --  47* 67*   ALT U/L 36  --  54* 70*   ALK PHOS U/L 66  --  83 81    < > = values in this interval not displayed.     Results from last 7 days   Lab Units 10/19/24  0938 10/18/24  1500 10/18/24  1445 10/18/24  1335   GRAM STAIN RESULT   --   --  Gram negative rods* Gram negative rods*   URINE CULTURE   --  >100,000 cfu/ml Proteus mirabilis*  --   --    MRSA CULTURE ONLY  No Methicillin Resistant Staphlyococcus aureus (MRSA) isolated  --   --   --

## 2024-10-21 NOTE — ASSESSMENT & PLAN NOTE
Lab Results   Component Value Date     HGBA1C 6.2 (H) 09/17/2024   Elevated blood glucose is risk factor for wounds and infection. Recommend tight glycemic control.  -blood glucose management per primary service

## 2024-10-21 NOTE — ASSESSMENT & PLAN NOTE
Patient does not have the capacity to make any medical decisions on my visit today  GOC discussed with patient's wife.   She is not ready for hospice  She wants to continue with full medical cares available to him  She now also wants to take him home with support instead of sending him back to SNF where she said he is not improving anyway  I discussed concerns of how challenging this will be for her despite VNA/wound care support at home. I also discussed that overall I am concerned that despite the proper calculated caloric requirements he may be getting from the TF, that he will still not heal quick enough to see significant clinical improvement and that he will still be prone to further infections in the near future. The PEG/TF is the last resort for nutrition in his case and if he is still not improving, then we have to rethink goals of care again, including hospice cares. This goes the same for wound care. Despite the best wound care, he will still be prone to infection as long as the wound remains open.   D/w SLIM and ID  Palliative will sign off. Goals are clear

## 2024-10-21 NOTE — CONSULTS
Nutrition consult received and appreciated      Recommend adding thiamine IV x 5-7days given malnutrition, prolong poor PO intake, high risk for refeeding syndrome;     Increase TF Jevity 1.2cal goal rate to 75ml/hr, advance 10ml q8hr or as tolerated (TF at goal rate will provide 2160kcal, 100g protein, 1453ml free water)     Recommend adding ProSource NonCarb bid and Teddy bid (will provide additional 280kcal, 30g protein)     Free water flushes: 150ml q4hr or per md;     Monitor K, P, Mg and replete PRN     Nutrition will continue to follow up as per policy    Aubree Mora MS RD LDN CNSC

## 2024-10-21 NOTE — ASSESSMENT & PLAN NOTE
This was felt to have initially developed during patient stay in skilled nursing facility for rehab.  During recent hospitalization in September there was concern for fistulous connection from rectum to the sacral wound in setting of colitis/proctitis.  Wound was previously debrided by general surgery and during last admission was stage IV with palpable bone.  Latest CT showing soft tissue gas extending into right and left medial aspects of gluteus jac muscle, but no abscess.  There is erosion of coccyx suggestive of osteomyelitis.  Wound was evaluated by surgery and they currently feel it appears noninfected.  However in setting of polymicrobial bacteremia, this may be a source of translocation.  -long term antibiotics would be futile without aggressive bone/tissue debridement, bone cultures, diverting colostomy, and ability to close wound with flap   -serial skin exams  -frequent turning/repositioning to off-load pressure from the wound  -local wound care per general surgery  -continue follow up with general surgery

## 2024-10-21 NOTE — ASSESSMENT & PLAN NOTE
Was not really eating at the SNF in the past week or so  Surgery offered and planning on a PEG tube    Malnutrition Findings:   Adult Malnutrition type: Chronic illness  Adult Degree of Malnutrition: Other severe protein calorie malnutrition  Malnutrition Characteristics: Weight loss, Inadequate energy                  360 Statement: Severe protein calorie malnutrition in context of chronic illness r/t poor appetite, inadequate PO intake as evidance by energy intake less than 75% compared to estimated needs>1 month, 14% wt loss in 1 month (9/17/24 114 kg, 10/19/24 97.7 kg) treated with PO diet. Once able to have po treat with Mechanical Soft diet, Magic Cup BID, Ensure Compact 1x daily.    BMI Findings:           Body mass index is 25.54 kg/m².

## 2024-10-21 NOTE — ASSESSMENT & PLAN NOTE
In setting of chronic urinary retention and BPH. Catheter was exchanged this admission.  -serial exams and care of hanks  -monitor urine output  -continue follow up with urology as needed

## 2024-10-21 NOTE — ASSESSMENT & PLAN NOTE
Could be from the chronic wound versus gallbladder versus UTI (chronic hanks)  ID on board, on appropriate antibiotics

## 2024-10-21 NOTE — ASSESSMENT & PLAN NOTE
Patient readmitted with worsening prognosis with large sacral wound and developing osteomyelitis secondary to bacteremia, severe malnutrition. Extensive discussion with wife regarding patients guarded prognosis and goals of care for her . Wife opting to proceed with full medical care at this time.   Started on tube feeds with NG tube, PEG tube to be placed tomorrow  Seen in consult by palliative care, family remains goal oriented

## 2024-10-21 NOTE — ASSESSMENT & PLAN NOTE
CT A/P from 10/18/2024 showing mild wall thickening of the rectum. CT scan back in September 2024 also noted proctocolitis.  Given persistence, this may be a somewhat chronic finding.  He is not having any current diarrhea.  -antibiotic as above  -monitor GI symptoms  -monitor stool output

## 2024-10-21 NOTE — ASSESSMENT & PLAN NOTE
Lab Results   Component Value Date    HGBA1C 6.2 (H) 09/17/2024     Recent Labs     10/20/24  1614 10/20/24  2131 10/21/24  0709 10/21/24  1133   POCGLU 165* 168* 187* 191*     Blood Sugar Average: Last 72 hrs:  (P) [id number]  Last HgbA1C 6.2%  Continue sliding scale insulin   Hypoglycemia protocol  Monitor while on tube feeds

## 2024-10-21 NOTE — ASSESSMENT & PLAN NOTE
Malnutrition Findings:   ChronicAdult Malnutrition type: Chronic illness  Adult Degree of Malnutrition: Other severe protein calorie malnutrition  Malnutrition Characteristics: Weight loss, Inadequate energy  360 Statement: Severe protein calorie malnutrition in context of chronic illness r/t poor appetite, inadequate PO intake as evidance by energy intake less than 75% compared to estimated needs>1 month, 14% wt loss in 1 month (9/17/24 114 kg, 10/19/24 97.7 kg) treated with PO diet. Once able to have po treat with Mechanical Soft diet, Magic Cup BID, Ensure Compact 1x daily.  Patient with significant weight loss and per wife with significantly decreased oral intake over the past few months and has not been eating for 1 week at the nursing home.  Weight noted in April to be 275 lb, and now weighing 215 lb  Speech therapy consult and recommend dysphagia level 1-puree with thin liquids  NG tube placed  Tube feed ordered with PO diet, Dietary to adjust based on patient nutritional needs.  Plan for PEG tube placement in the OR tomorrow  Continue high-dose thiamine and electrolyte monitoring for refeeding syndrome    BMI Findings:    Body mass index is 25.54 kg/m².

## 2024-10-21 NOTE — DISCHARGE INSTR - OTHER ORDERS
Sacral Wound Care:  Sacrum: Remove prior dressing/packing.  Irrigate wound with normal saline, pat dry.  Apply skin prep to intact carmen-wound skin.  Pack wound with Kerlix moistened with VASHE solution.  Cover with ABD pads. Change dressing daily and as needed with soilage.      Ostomy Care Plan:  Pouch change every 3-4 days & with signs of leakage:  Remove pouch using push/pull method.   Cleanse stoma & surrounding skin with warm water, pat dry.    Measure stoma and cut pouch barrier to appropriate size.    Apply skin prep to skin around stoma.    Apply pouch to skin and hold gentle pressure with warm hand for 2-5 minutes for best adherence.    Empty pouch when 1/3-1/2 full of gas/stool.    Measure your stoma at least weekly--your stoma will shrink for 6-8 weeks after surgery.  On 10/25/24 your stoma measured 2.75 x 3 inches, oval shaped.    Empty pouch when 1/3-1/2 full.  Change your pouch every 3-5 days or if leaking or about to leak.    While in the hospital, you were using the following ostomy pouch supplies:  Convatec Esteem 4 inch 1 piece cut-to-fit pouch #[id number]    Ostomy Online Education Resources:   Www.ostomy.org   Www.Linksyatec.Buyt.In   Www.coloplast.us   Www.ImpulseFlyer    Ostomy Suppliers:  [employer]--4-355-700-8421 OR www.RewardSnap.[employer]--3-514-451-1067 OR www."[employer]cal--1-816.614.5400 OR Kartela.Buyt.In    Skin Care Plan:   1-Silicone Cream/Hydraguard lotion to bilateral heels and lower legs twice daily and as needed.  2-Elevate heels off of bed/chair surface--offloading heel boots.  3-P500 low air-loss mattress.   4-Apply lotion to body daily and as needed.  5-Turn/reposition every 2 hours while in bed for pressure re-distribution on skin. GetBack positioning system.  6-Sacrum--per surgery team.    Follow-up at the [facility]--[phone]. You can also use this scheduling line for ostomy clinic appointment.

## 2024-10-21 NOTE — ASSESSMENT & PLAN NOTE
Blood cultures growing gram-negative rods, E. coli and Proteus  Infectious disease following  De-escalating antibiotics, currently on IV Rocephin  Right upper quadrant ultrasound performed, no evidence of acute cholecystitis  Surgery planning debridement in the OR tomorrow

## 2024-10-21 NOTE — CONSULTS
Consultation - Palliative Care   Name: Drew Santos 73 y.o. male I MRN: [id number]  Unit/Bed#: E2 -01 I Date of Admission: 10/18/2024   Date of Service: 10/21/2024 I Hospital Day: 3   Inpatient consult to Palliative Care  Consult performed by: Lisa Dwyer MD  Consult ordered by: AVELINA Flores        Physician Requesting Evaluation: Lidya Guallpa MD   Reason for Evaluation / Principal Problem: goals of care    Assessment & Plan  Advanced care planning/counseling discussion  Patient does not have the capacity to make any medical decisions on my visit today  GOC discussed with patient's wife.   She is not ready for hospice  She wants to continue with full medical cares available to him  She now also wants to take him home with support instead of sending him back to SNF where she said he is not improving anyway  I discussed concerns of how challenging this will be for her despite VNA/wound care support at home. I also discussed that overall I am concerned that despite the proper calculated caloric requirements he may be getting from the TF, that he will still not heal quick enough to see significant clinical improvement and that he will still be prone to further infections in the near future. The PEG/TF is the last resort for nutrition in his case and if he is still not improving, then we have to rethink goals of care again, including hospice cares. This goes the same for wound care. Despite the best wound care, he will still be prone to infection as long as the wound remains open.   D/w SLIM and ID  Palliative will sign off. Goals are clear  Gram-negative bacteremia  Could be from the chronic wound versus gallbladder versus UTI (chronic hanks)  ID on board, on appropriate antibiotics   Sepsis  Currently, afebrile and WBC improving   History of CVA (cerebrovascular accident)  Since CVA in July 2024, patient's condition had been on the decline overall  Severe protein-calorie  malnutrition (HCC)  Was not really eating at the SNF in the past week or so  Surgery offered and planning on a PEG tube    Malnutrition Findings:   Adult Malnutrition type: Chronic illness  Adult Degree of Malnutrition: Other severe protein calorie malnutrition  Malnutrition Characteristics: Weight loss, Inadequate energy                  360 Statement: Severe protein calorie malnutrition in context of chronic illness r/t poor appetite, inadequate PO intake as evidance by energy intake less than 75% compared to estimated needs>1 month, 14% wt loss in 1 month (9/17/24 114 kg, 10/19/24 97.7 kg) treated with PO diet. Once able to have po treat with Mechanical Soft diet, Magic Cup BID, Ensure Compact 1x daily.    BMI Findings:           Body mass index is 25.54 kg/m².     Sacral ulcer (HCC)  Per surgery, the wound is overall the same compared to about 3-4 weeks ago when he was admitted here, but there appears to be some necrotic tissue and was foul smelling on admission  Management per surgery/wound care  I have discussed the above management plan in detail with the primary service.     Decisional apparatus: Patient is not competent on my exam today. If competence is lost, patient's substitute decision maker would default to wife [place] 169.     PDMP Review: I have reviewed the patient's controlled substance dispensing history in the Prescription Drug Monitoring Program in compliance with the [facility] regulations before prescribing any controlled substances.      08/12/2024 08/12/2024 1 Methylphenidate 5 Mg Tablet 15.00 15 Al [facility] [id number] Omn ([id number]) 0  Private Pay [place]   08/09/2024 07/25/2024 1 Methylphenidate 5 Mg Tablet 15.00 15 Kiera May [id number] Omn ([id number]) 0  Private Pay [place]   07/25/2024 07/25/2024 1 Methylphenidate 5 Mg Tablet 15.00 15 Kiera May [id number] Omn ([id number]) 0  Private Pay [place]       History of Present Illness   HPI: Drew Santos is a 73 y.o. year old male with chronic sacral wounds, DM, Hx oF CVA with L sided deficit who  was admitted from [facility] on 10/18/2024 because of failure to thrive clinical     Review of Systems  I have reviewed the patient's PMH, PSH, Social History, Family History, Meds, and Allergies  Social History     Tobacco Use    Smoking status: Never     Passive exposure: Never    Smokeless tobacco: Never   Vaping Use    Vaping status: Never Used   Substance and Sexual Activity    Alcohol use: Not Currently    Drug use: Never    Sexual activity: Not on file       Objective :  Temp:  [97 °F (36.1 °C)-97.7 °F (36.5 °C)] 97.2 °F (36.2 °C)  HR:  [] 100  BP: (100-137)/(58-80) 110/65  Resp:  [16-18] 16  SpO2:  [94 %-98 %] 98 %  O2 Device: None (Room air)    Physical Exam       Lab Results: I have reviewed the following results:  Lab Results   Component Value Date/Time    SODIUM 150 (H) 10/21/2024 04:39 AM    SODIUM 139 08/22/2024 09:37 AM    K 3.4 (L) 10/21/2024 04:39 AM    K 4.6 08/22/2024 09:37 AM    BUN 26 (H) 10/21/2024 04:39 AM    BUN 13 08/22/2024 09:37 AM    CREATININE 0.74 10/21/2024 04:39 AM    CREATININE 0.78 08/22/2024 09:37 AM    GLUC 200 (H) 10/21/2024 04:39 AM    GLUC 123 (H) 08/22/2024 09:37 AM    CALCIUM 8.2 (L) 10/21/2024 04:39 AM    CALCIUM 8.9 08/22/2024 09:37 AM    AST 26 10/21/2024 04:39 AM    AST 20 08/22/2024 09:37 AM    ALT 36 10/21/2024 04:39 AM    ALT 22 08/22/2024 09:37 AM    ALB 2.0 (L) 10/21/2024 04:39 AM    ALB 2.8 (L) 08/22/2024 09:37 AM    TP 7.3 10/21/2024 04:39 AM    TP 6.2 (L) 08/22/2024 09:37 AM    EGFR 90 10/21/2024 04:39 AM    EGFR 93 08/22/2024 09:37 AM     Lab Results   Component Value Date/Time    HGB 8.3 (L) 10/21/2024 04:39 AM    WBC 15.62 (H) 10/21/2024 04:39 AM     (H) 10/21/2024 04:39 AM    INR 1.57 (H) 10/18/2024 02:45 PM    PTT 38 (H) 10/18/2024 02:45 PM     Lab Results   Component Value Date/Time    XVP0RJZLEKZH 5.228 (H) 03/08/2024 05:30 AM       Imaging Results Review: No pertinent imaging studies reviewed.  Other Study Results Review: No additional pertinent  studies reviewed.    Code Status: Level 1 - Full Code  Advance Directive and Living Will:      Power of :    POLST:      Administrative Statements   I have spent a total time of 45 minutes in caring for this patient on the day of the visit/encounter including Prognosis, Risks and benefits of tx options, Instructions for management, Patient and family education, Importance of tx compliance, Risk factor reductions, Impressions, Counseling / Coordination of care, Documenting in the medical record, Reviewing / ordering tests, medicine, procedures  , Obtaining or reviewing history  , and Communicating with other healthcare professionals .    Lisa Dwyer MD  Palliative Medicine & Supportive Care  Internal Medicine  Available via [place] Text  Office: [phone]  Fax: [phone]

## 2024-10-21 NOTE — ASSESSMENT & PLAN NOTE
History of stroke with L sided hemiparesis recently hospitalized at [facility] in July 2024  Baseline non-verbal, alert, at times selective in answers, can nod yes/no however unsure accuracy most information obtained per patients wife  Baseline Dysphagia 2 mechanical soft, thin liquids, patient well known to speech therapy

## 2024-10-21 NOTE — ASSESSMENT & PLAN NOTE
UA sent from patient's chronic Hanks catheter had innumerable WBCs. This is expected finding in setting of chronic catheter and cannot be used alone to determine UTI.  CT abdomen/pelvis was without any hydronephrosis or hydroureter.  There was some chronic appearing bilateral perinephric stranding.  The urinary bladder was noted to be collapsed around a Hanks catheter.  Hanks was exchanged this admission.  -serial exams and care of hanks  -monitor urine output

## 2024-10-21 NOTE — ASSESSMENT & PLAN NOTE
Per surgery, the wound is overall the same compared to about 3-4 weeks ago when he was admitted here, but there appears to be some necrotic tissue and was foul smelling on admission  Management per surgery/wound care

## 2024-10-21 NOTE — ASSESSMENT & PLAN NOTE
Tachycardia and leukocytosis. Secondary to e col bacteremia from unclear source. Sacral wound was examined by surgery and feel it appears noninfected at this time.  CT scan is also without any evidence of a drainable fluid collection/abscess but did show mild proctitis.  Consider urinary source in setting of chronic Parra catheter.  Chest x-ray without any consolidation to suggest pneumonia. LFTs mildly elevated, but no pericholecystic fluid on CT. Despite being systemically ill he's remained hemodynamically stable. This morning he is afebrile and his WBC count is trending down. Patient somnolent on exam, recommend goals of care conversation.  -antibiotic as below  -check CBCD and CMP tomorrow  -follow up blood cultures  -monitor vitals  -supportive care  -recommend goals of care conversation

## 2024-10-21 NOTE — QUICK NOTE
Patient noted be tachycardic.  Repeat labs were ordered.  Patient seen and examined at bedside.  Patient appeared to be resting in no acute distress.  On exam, abdomen soft nontender nondistended.  Sacral wound was examined, the base of the wound appeared to be nonviable with ischemic appearing tissue requiring debridement of the 100% of the base.  Exposed coccyx as a center of the wound.  The wound bed was explored and there was noted to be pus draining from the tract and to the left gluteus muscle.  This tract was flushed with saline See image below.  The wound was packed with Dakin soaked Kerlix and covered with a.  Mepilex sacral dressing.    WBC 16.77 from 13.6  Hemoglobin 8.4 from 8.3  Creatinine 0.8 from 0.74  Lactic acid 1.5  Glucose 229 from 191 from 187 from 168    Plan  -Follow-up labs  -Stat CT scan  -Frequent wound checks    Sacral wound

## 2024-10-21 NOTE — CONSULTS
Vancomycin IV Pharmacy-to-Dose Consultation     Vancomycin has been discontinued.  Pharmacy will sign off.  Please contact or re-consult with questions.    Mary Anne Quintanilla, Pharmacist

## 2024-10-21 NOTE — ASSESSMENT & PLAN NOTE
"Patient recently discharged 10/4 with polymicrobial bacteremia secondary to sacral wound and completed course of IV cefepime, flagyl and vancomycin followed by ampicillin. Blood cultures are growing Enterococcus, Enterobacter and Proteus last month. Patient presented to [facility] ED 10/18 from SNF due to worsening sacral wounds and possible osteomyelitis. Patient received cefepime, Flagyl and vancomycin in the ER.   CXR \"Limited study. Some atelectasis is present at the right base. No convincing evidence of pneumonia.\"  CT abdomen pelvis showing \"Large sacral decubitus ulcer extending to the coccyx with soft tissue gas now seen extending into the right and left medial aspects of the gluteus jac muscles. No drainable fluid collection. Erosion of the coccyx suggestive of osteomyelitis.\"  Blood culture x2 obtained upon admission growing Proteus and E. coli  MRSA negative  Has been afebrile since admission.  Infectious disease recs appreciated  Continue IV antibiotics with IV Rocephin  "

## 2024-10-21 NOTE — SPEECH THERAPY NOTE
Speech Language/Pathology  Wife at bedside. Pt currently too lethargic for PO. NGT in and running. Tentative PEG and wound debridement tomorrow. Spoke w/ Dr. Nathan and SLIM. F/u as able and indicated.

## 2024-10-21 NOTE — ASSESSMENT & PLAN NOTE
Sacral ulcer previously debrided by our surgical service at bedside (most recently on 9/25) where granulation tissue was exposed. Gross size/depth of wound is visually unchanged between photos taken on 10/18 and last hospitalization. Patient discharged to nursing care facility with wound care service follow up. Return to ED w/ necrotic superficial slough of wound. Improvement of wound appearance w/ dakin soaked dressings with removal of superficial necrotic tissue.    -recommend initiation of 0.25% Dakin soaked guaze packing TID while inpatient  -surgical debridement may be required given radiographic findings, however will proceed once Eliquis has been held for multiple days. Until then will sanitize and softly debride with dressing changes  -would recommend wound care consult for continued assistance for continued services while inpatient  -agree with continuing abx per primary: vanc/cefepime/flagyl (10/18- )   -follow cultures  -rotate patient q2hrs to prevent worsening of or development of new sacral ulcer  -pain and nausea control prn  -patient needs nutritional supplementation. SLP evaluated with poor engagement with swallowing   -would recommend placement of NGT and initiation of tube feeds for enteric nutrition   -consult nutrition   -patient may need evaluation for PEG if unable to progress oral intake  -palliative care consultation/family meeting for goals of care discussion pending  -surgical debridement for further source control likely to be extensive with large skin defect; surgical plan to follow palliative care discussion

## 2024-10-21 NOTE — PLAN OF CARE
Problem: Potential for Falls  Goal: Patient will remain free of falls  Description: INTERVENTIONS:  - Educate patient/family on patient safety including physical limitations  - Instruct patient to call for assistance with activity   - Consult OT/PT to assist with strengthening/mobility   - Keep Call bell within reach  - Keep bed low and locked with side rails adjusted as appropriate  - Keep care items and personal belongings within reach  - Initiate and maintain comfort rounds  - Make Fall Risk Sign visible to staff  - Offer Toileting every 2 Hours, in advance of need  - Initiate/Maintain bed alarm  - Obtain necessary fall risk management equipment:   - Apply yellow socks and bracelet for high fall risk patients  - Consider moving patient to room near nurses station  Outcome: Progressing     Problem: Prexisting or High Potential for Compromised Skin Integrity  Goal: Skin integrity is maintained or improved  Description: INTERVENTIONS:  - Identify patients at risk for skin breakdown  - Assess and monitor skin integrity  - Assess and monitor nutrition and hydration status  - Monitor labs   - Assess for incontinence   - Turn and reposition patient  - Assist with mobility/ambulation  - Relieve pressure over bony prominences  - Avoid friction and shearing  - Provide appropriate hygiene as needed including keeping skin clean and dry  - Evaluate need for skin moisturizer/barrier cream  - Collaborate with interdisciplinary team   - Patient/family teaching  - Consider wound care consult   Outcome: Progressing     Problem: PAIN - ADULT  Goal: Verbalizes/displays adequate comfort level or baseline comfort level  Description: Interventions:  - Encourage patient to monitor pain and request assistance  - Assess pain using appropriate pain scale  - Administer analgesics based on type and severity of pain and evaluate response  - Implement non-pharmacological measures as appropriate and evaluate response  - Consider cultural and  social influences on pain and pain management  - Notify physician/advanced practitioner if interventions unsuccessful or patient reports new pain  Outcome: Progressing     Problem: INFECTION - ADULT  Goal: Absence or prevention of progression during hospitalization  Description: INTERVENTIONS:  - Assess and monitor for signs and symptoms of infection  - Monitor lab/diagnostic results  - Monitor all insertion sites, i.e. indwelling lines, tubes, and drains  - Monitor endotracheal if appropriate and nasal secretions for changes in amount and color  - [place] appropriate cooling/warming therapies per order  - Administer medications as ordered  - Instruct and encourage patient and family to use good hand hygiene technique  - Identify and instruct in appropriate isolation precautions for identified infection/condition  Outcome: Progressing     Problem: DISCHARGE PLANNING  Goal: Discharge to home or other facility with appropriate resources  Description: INTERVENTIONS:  - Identify barriers to discharge w/patient and caregiver  - Arrange for needed discharge resources and transportation as appropriate  - Identify discharge learning needs (meds, wound care, etc.)  - Arrange for interpretive services to assist at discharge as needed  - Refer to Case Management Department for coordinating discharge planning if the patient needs post-hospital services based on physician/advanced practitioner order or complex needs related to functional status, cognitive ability, or social support system  Outcome: Progressing     Problem: Knowledge Deficit  Goal: Patient/family/caregiver demonstrates understanding of disease process, treatment plan, medications, and discharge instructions  Description: Complete learning assessment and assess knowledge base.  Interventions:  - Provide teaching at level of understanding  - Provide teaching via preferred learning methods  Outcome: Progressing     Problem: Nutrition/Hydration-ADULT  Goal:  Nutrient/Hydration intake appropriate for improving, restoring or maintaining nutritional needs  Description: Monitor and assess patient's nutrition/hydration status for malnutrition. Collaborate with interdisciplinary team and initiate plan and interventions as ordered.  Monitor patient's weight and dietary intake as ordered or per policy. Utilize nutrition screening tool and intervene as necessary. Determine patient's food preferences and provide high-protein, high-caloric foods as appropriate.     INTERVENTIONS:  - Monitor oral intake, urinary output, labs, and treatment plans  - Assess nutrition and hydration status and recommend course of action  - Evaluate amount of meals eaten  - Assist patient with eating if necessary   - Allow adequate time for meals  - Recommend/ encourage appropriate diets, oral nutritional supplements, and vitamin/mineral supplements  - Order, calculate, and assess calorie counts as needed  - Recommend, monitor, and adjust tube feedings and TPN/PPN based on assessed needs  - Assess need for intravenous fluids  - Provide specific nutrition/hydration education as appropriate  - Include patient/family/caregiver in decisions related to nutrition  Outcome: Progressing

## 2024-10-21 NOTE — PROGRESS NOTES
Progress Note - Surgery-General   Name: Drew Santos 73 y.o. male I MRN: [id number]  Unit/Bed#: E2 -01 I Date of Admission: 10/18/2024   Date of Service: 10/21/2024 I Hospital Day: 3    Assessment & Plan  Sepsis  Leukocytosis at admission WBC=21. CXR collected without convincing evidence of pneumonia. Although sacral ulcer could be contributing to elevated WBC.    -daily CBC  -trend fever curve  -blood cultures positive for GNR, presumed Proteus  -Urince culture +Proteus mirabilis  -daily wound care w/ Dakins as written  -abx per primary: vanco/cefepime/flagyl (10/18- )  Type 2 diabetes mellitus without complication, without long-term current use of insulin ([facility])  Lab Results   Component Value Date    HGBA1C 6.2 (H) 09/17/2024       Recent Labs     10/20/24  1125 10/20/24  1614 10/20/24  2131 10/21/24  0709   POCGLU 143* 165* 168* 187*       Blood Sugar Average: Last 72 hrs:  (P) 154.8  -continue to trend sugars  -agree with SSI while inpatient  -hypoglycemic protocol  Paroxysmal atrial fibrillation (HCC)  Tachycardic in ED. EKG collected 10/18.    -recommend holding eliquis while inpatient if patient requires surgical debridement of sacral ulcer  -VTE ppx to be initiated by primary service, however appropriate for SQH 5000 TID from surgical perspective  -CTM vitals  -management per primary  Sacral ulcer (HCC)  Sacral ulcer previously debrided by our surgical service at bedside (most recently on 9/25) where granulation tissue was exposed. Gross size/depth of wound is visually unchanged between photos taken on 10/18 and last hospitalization. Patient discharged to nursing care facility with wound care service follow up. Return to ED w/ necrotic superficial slough of wound. Improvement of wound appearance w/ dakin soaked dressings with removal of superficial necrotic tissue.    -recommend initiation of 0.25% Dakin soaked guaze packing TID while inpatient  -surgical debridement may be required given radiographic  findings, however will proceed once Eliquis has been held for multiple days. Until then will sanitize and softly debride with dressing changes  -would recommend wound care consult for continued assistance for continued services while inpatient  -agree with continuing abx per primary: vanc/cefepime/flagyl (10/18- )   -follow cultures  -rotate patient q2hrs to prevent worsening of or development of new sacral ulcer  -pain and nausea control prn  -patient needs nutritional supplementation. SLP evaluated with poor engagement with swallowing   -would recommend placement of NGT and initiation of tube feeds for enteric nutrition   -consult nutrition   -patient may need evaluation for PEG if unable to progress oral intake  -palliative care consultation/family meeting for goals of care discussion pending  -surgical debridement for further source control likely to be extensive with large skin defect; surgical plan to follow palliative care discussion  Severe protein-calorie malnutrition (HCC)  Malnutrition Findings:   Adult Malnutrition type: Chronic illness  Adult Degree of Malnutrition: Other severe protein calorie malnutrition  Malnutrition Characteristics: Weight loss, Inadequate energy                  360 Statement: Severe protein calorie malnutrition in context of chronic illness r/t poor appetite, inadequate PO intake as evidance by energy intake less than 75% compared to estimated needs>1 month, 14% wt loss in 1 month (9/17/24 114 kg, 10/19/24 97.7 kg) treated with PO diet. Once able to have po treat with Mechanical Soft diet, Magic Cup BID, Ensure Compact 1x daily.    BMI Findings:           Body mass index is 25.54 kg/m².     -patient needs nutritional supplementation. SLP evaluated with poor engagement with swallowing   -would recommend placement of NGT and initiation of tube feeds for enteric nutrition   -consult nutrition, appreciate recs   -patient may need evaluation for PEG if unable to progress oral  intake  Pyuria  Urine culture positive for Proteus mirabilis.    -management for sepsis as above  Advanced care planning/counseling discussion  Recommend consulting palliative care team for discussion of goals of care in medically complex case. Team to lead discussion about desiring PEG tube feeds and other potential future interventions (ostomy diversion).    Surgery-General service will follow.    Peter Holland MD  General Surgery  10/21/24  7:52 AM      Subjective   Afebrile. Vitals stable. Excised superficial eschar along superior aspect of sacral wound with replacement of packing. Patient tolerating enteral feeds. Nutrition following. Approached consulting palliative care service for goals of care discussion to be had. Wife agreed to speak to them yesterday.    Objective :  Temp:  [97 °F (36.1 °C)-97.7 °F (36.5 °C)] 97.2 °F (36.2 °C)  HR:  [] 100  BP: (100-137)/(58-80) 110/65  Resp:  [16-18] 16  SpO2:  [94 %-98 %] 98 %  O2 Device: None (Room air)    I/O         10/19 0701  10/20 0700 10/20 0701  10/21 0700    P.O.  0    I.V. (mL/kg)      NG/GT  100    IV Piggyback      Total Intake(mL/kg)  100 (1)    Urine (mL/kg/hr) 1700 (0.7) 250 (0.2)    Total Output 1700 250    Net -1700 -150                Lines/Drains/Airways       Active Status       Name Placement date Placement time Site Days    Urethral Catheter Latex 20 Fr. 10/19/24  1209  Latex  1    NG/OG Tube Nasogastric Left nare 10/20/24  1518  Left nare  less than 1                  Physical Exam:  GENERAL APPEARANCE: well developed, elderly, non-verbal male lying supine in bed in NAD. Appears comfortable  HEENT: NCAT; EOMI; normal external nose & ears; MMM  NECK: Supple, normal ROM.  CARDIOVASCULAR: Regular rate. Grossly well perfused.  LUNGS/CHEST: Symmetric chest rise/fall with respirations.  ABD: Soft; non-distended; non-tender.  EXT: No observable deformities in 4/4 extremities. No edema.  NEURO: attentive to exam, nodding head yes/no when asked  questions.  SKIN: Warm, dry and well perfused; no jaundice. Sacral ulcer with exposed coccyx and necrotic appearance of wound base and superficial eschar; unchanged 1x1 cm focus of different sacral wound on L gluteal cheek      Lab Results: I have reviewed the following results:  Recent Labs     10/18/24  1445 10/18/24  2244 10/19/24  0554 10/20/24  0429 10/21/24  0439   WBC 21.57* 19.52* 18.79*   < > 15.62*   HGB 10.0* 9.2* 8.5*   < > 8.3*   HCT 33.2* 30.1* 28.1*   < > 28.1*   * 445* 450*   < > 539*   SODIUM 147 147 149*   < > 150*   K 3.9 3.7 3.3*   < > 3.4*   * 113* 114*   < > 118*   CO2 28 26 28   < > 28   BUN 60* 59* 50*   < > 26*   CREATININE 1.07 1.10 0.92   < > 0.74   GLUC 204* 208* 168*   < > 200*   MG 2.4  --  2.4  --   --    PHOS 4.2*  --   --   --  2.9   * 68* 67*   < > 26   ALT 99* 76* 70*   < > 36   ALB 2.5* 2.2* 2.1*   < > 2.0*   TBILI 0.74 0.81 0.77   < > 0.62   ALKPHOS 94 80 81   < > 66   PTT 38*  --   --   --   --    INR 1.57*  --   --   --   --    LACTICACID 1.8 1.6  --   --   --     < > = values in this interval not displayed.       Imaging Results Review: I reviewed radiology reports from this admission including: CT abdomen/pelvis.  US right upper quadrant   Final Result      Cholelithiasis without acute cholecystitis.      Workstation performed: [id number]         XR chest portable   Final Result      Limited study. Some atelectasis is present at the right base. No convincing evidence of pneumonia.            Workstation performed: [id number]         CT abdomen pelvis with contrast   Final Result      Large sacral decubitus ulcer extending to the coccyx with soft tissue gas now seen extending into the right and left medial aspects of the gluteus jac muscles. No drainable fluid collection.      Erosion of the coccyx suggestive of osteomyelitis.      Additional findings as above.         Workstation performed: [id number]         XR chest portable    (Results Pending)      Other Study Results Review: EKG was reviewed.     VTE Pharmacologic Prophylaxis: VTE covered by:  heparin (porcine), Subcutaneous, 5,000 Units at 10/21/24 0504    VTE Mechanical Prophylaxis: sequential compression device

## 2024-10-21 NOTE — ASSESSMENT & PLAN NOTE
"Noted with innumerable WBC from chronic hanks catheter sample, which cannot alone determine UTI in the setting of chronic catheter. CT scan showing \"Tiny bilateral renal cysts. Nonobstructing left renal calculi. No hydronephrosis or hydroureter. Chronic bilateral perinephric stranding. And Relatively collapsed around a Hanks catheter limiting evaluation\"  Urine culture growing Proteus  Continue IV Rocephin  "

## 2024-10-21 NOTE — ASSESSMENT & PLAN NOTE
Sodium 154, likely hypovolemic hypernatremia related to mild dehydration secondary to poor water intake  Received IV fluids with D5  NG tube in place, continue free water flushes and tube feeds  Continue daily monitoring

## 2024-10-21 NOTE — ASSESSMENT & PLAN NOTE
Blood culture identification panel has identified both E. coli and Proteus thus far.  Patient had a recent polymicrobial bacteremia during last admission with source felt to be sacral wound.  Surgery has examined patient's sacral wound and lower concern for acute infection, though may be source of translocation.  Also consider gallbladder source given transaminitis and gallstones on CT, though no obvious RUQ tenderness on exam. I personally reviewed patient's RUQ US which showed no evidence of acute cholecystitis or choledocholithiasis. Less likely is urinary source. Patient does have a chronic Parra catheter to promote drainage and culture will likely have component of colonization. CT abdomen/pelvis with contrast without any evidence of pyelonephritis, intra-abdominal abscess, or abscess beneath the sacral wound. The patient has been receiving IV cefepime, IV flagyl, and IV vancomycin. He's been tolerating antibiotic without difficulty. Given lack of growth of GPC on cultures I will stop his vancomycin now. Will also de-escalate by discontinuing flagyl and transitioning his cefepime to IV ceftriaxone.  -stop IV vancomycin  -stop IV flagyl  -stop IV cefepime  -start IV ceftriaxone  -monitor CBCD and CMP  -follow up final culture results and sensitivities  -monitor vitals

## 2024-10-21 NOTE — ASSESSMENT & PLAN NOTE
Malnutrition Findings:   Adult Malnutrition type: Chronic illness  Adult Degree of Malnutrition: Other severe protein calorie malnutrition  Malnutrition Characteristics: Weight loss, Inadequate energy                  360 Statement: Severe protein calorie malnutrition in context of chronic illness r/t poor appetite, inadequate PO intake as evidance by energy intake less than 75% compared to estimated needs>1 month, 14% wt loss in 1 month (9/17/24 114 kg, 10/19/24 97.7 kg) treated with PO diet. Once able to have po treat with Mechanical Soft diet, Magic Cup BID, Ensure Compact 1x daily.    BMI Findings:           Body mass index is 25.54 kg/m².     -patient needs nutritional supplementation. SLP evaluated with poor engagement with swallowing   -would recommend placement of NGT and initiation of tube feeds for enteric nutrition   -consult nutrition, appreciate recs   -patient may need evaluation for PEG if unable to progress oral intake

## 2024-10-21 NOTE — ASSESSMENT & PLAN NOTE
Patient has at least 2 sacral ulcers. At least one of them is stage IV with palpable bone. Possible osteomyelitis. Surgery has examined patient's sacral wound and lower concern for acute infection, though may be source of translocation.   Surgery consult appreciated; recommend initiation of 0.25% Dakin soaked guaze packing TID while inpatient   Q2hr turns as patient tolerates  OR debridement scheduled for tomorrow

## 2024-10-22 ENCOUNTER — ANESTHESIA EVENT (INPATIENT)
Dept: PERIOP | Facility: HOSPITAL | Age: 74
DRG: 853 | End: 2024-10-22
Payer: COMMERCIAL

## 2024-10-22 LAB
ANION GAP SERPL CALCULATED.3IONS-SCNC: 5 MMOL/L (ref 4–13)
ANION GAP SERPL CALCULATED.3IONS-SCNC: 7 MMOL/L (ref 4–13)
BUN SERPL-MCNC: 20 MG/DL (ref 5–25)
BUN SERPL-MCNC: 21 MG/DL (ref 5–25)
CALCIUM SERPL-MCNC: 8.1 MG/DL (ref 8.4–10.2)
CALCIUM SERPL-MCNC: 8.1 MG/DL (ref 8.4–10.2)
CHLORIDE SERPL-SCNC: 117 MMOL/L (ref 96–108)
CHLORIDE SERPL-SCNC: 118 MMOL/L (ref 96–108)
CO2 SERPL-SCNC: 26 MMOL/L (ref 21–32)
CO2 SERPL-SCNC: 27 MMOL/L (ref 21–32)
CREAT SERPL-MCNC: 0.74 MG/DL (ref 0.6–1.3)
CREAT SERPL-MCNC: 0.85 MG/DL (ref 0.6–1.3)
ERYTHROCYTE [DISTWIDTH] IN BLOOD BY AUTOMATED COUNT: 16.9 % (ref 11.6–15.1)
GFR SERPL CREATININE-BSD FRML MDRD: 85 ML/MIN/1.73SQ M
GFR SERPL CREATININE-BSD FRML MDRD: 90 ML/MIN/1.73SQ M
GLUCOSE SERPL-MCNC: 106 MG/DL (ref 65–140)
GLUCOSE SERPL-MCNC: 130 MG/DL (ref 65–140)
GLUCOSE SERPL-MCNC: 157 MG/DL (ref 65–140)
GLUCOSE SERPL-MCNC: 161 MG/DL (ref 65–140)
GLUCOSE SERPL-MCNC: 162 MG/DL (ref 65–140)
GLUCOSE SERPL-MCNC: 176 MG/DL (ref 65–140)
HCT VFR BLD AUTO: 29.5 % (ref 36.5–49.3)
HGB BLD-MCNC: 8.4 G/DL (ref 12–17)
LACTATE SERPL-SCNC: 1.8 MMOL/L (ref 0.5–2)
MAGNESIUM SERPL-MCNC: 2.2 MG/DL (ref 1.9–2.7)
MAGNESIUM SERPL-MCNC: 2.3 MG/DL (ref 1.9–2.7)
MCH RBC QN AUTO: 29.5 PG (ref 26.8–34.3)
MCHC RBC AUTO-ENTMCNC: 28.5 G/DL (ref 31.4–37.4)
MCV RBC AUTO: 104 FL (ref 82–98)
PHOSPHATE SERPL-MCNC: 3.1 MG/DL (ref 2.3–4.1)
PHOSPHATE SERPL-MCNC: 3.1 MG/DL (ref 2.3–4.1)
PLATELET # BLD AUTO: 503 THOUSANDS/UL (ref 149–390)
PMV BLD AUTO: 10.8 FL (ref 8.9–12.7)
POTASSIUM SERPL-SCNC: 3.7 MMOL/L (ref 3.5–5.3)
POTASSIUM SERPL-SCNC: 4.1 MMOL/L (ref 3.5–5.3)
RBC # BLD AUTO: 2.85 MILLION/UL (ref 3.88–5.62)
SODIUM SERPL-SCNC: 149 MMOL/L (ref 135–147)
SODIUM SERPL-SCNC: 151 MMOL/L (ref 135–147)
WBC # BLD AUTO: 18.41 THOUSAND/UL (ref 4.31–10.16)

## 2024-10-22 PROCEDURE — 85027 COMPLETE CBC AUTOMATED: CPT

## 2024-10-22 PROCEDURE — 83735 ASSAY OF MAGNESIUM: CPT

## 2024-10-22 PROCEDURE — 84100 ASSAY OF PHOSPHORUS: CPT

## 2024-10-22 PROCEDURE — 99232 SBSQ HOSP IP/OBS MODERATE 35: CPT | Performed by: PHYSICIAN ASSISTANT

## 2024-10-22 PROCEDURE — 80048 BASIC METABOLIC PNL TOTAL CA: CPT

## 2024-10-22 PROCEDURE — 99233 SBSQ HOSP IP/OBS HIGH 50: CPT | Performed by: STUDENT IN AN ORGANIZED HEALTH CARE EDUCATION/TRAINING PROGRAM

## 2024-10-22 PROCEDURE — 82948 REAGENT STRIP/BLOOD GLUCOSE: CPT

## 2024-10-22 PROCEDURE — 99233 SBSQ HOSP IP/OBS HIGH 50: CPT | Performed by: SURGERY

## 2024-10-22 RX ORDER — POTASSIUM CHLORIDE 14.9 MG/ML
20 INJECTION INTRAVENOUS ONCE
Status: COMPLETED | OUTPATIENT
Start: 2024-10-22 | End: 2024-10-22

## 2024-10-22 RX ORDER — METRONIDAZOLE 500 MG/100ML
500 INJECTION, SOLUTION INTRAVENOUS
Status: COMPLETED | OUTPATIENT
Start: 2024-10-23 | End: 2024-10-23

## 2024-10-22 RX ORDER — POLYETHYLENE GLYCOL 3350 17 G/17G
238 POWDER, FOR SOLUTION ORAL ONCE
Status: COMPLETED | OUTPATIENT
Start: 2024-10-22 | End: 2024-10-22

## 2024-10-22 RX ORDER — CEFAZOLIN SODIUM 2 G/50ML
2000 SOLUTION INTRAVENOUS
Status: COMPLETED | OUTPATIENT
Start: 2024-10-23 | End: 2024-10-23

## 2024-10-22 RX ORDER — METRONIDAZOLE 500 MG/100ML
500 INJECTION, SOLUTION INTRAVENOUS EVERY 8 HOURS
Status: DISCONTINUED | OUTPATIENT
Start: 2024-10-22 | End: 2024-10-24

## 2024-10-22 RX ORDER — SODIUM CHLORIDE, SODIUM GLUCONATE, SODIUM ACETATE, POTASSIUM CHLORIDE, MAGNESIUM CHLORIDE, SODIUM PHOSPHATE, DIBASIC, AND POTASSIUM PHOSPHATE .53; .5; .37; .037; .03; .012; .00082 G/100ML; G/100ML; G/100ML; G/100ML; G/100ML; G/100ML; G/100ML
125 INJECTION, SOLUTION INTRAVENOUS CONTINUOUS
Status: DISCONTINUED | OUTPATIENT
Start: 2024-10-22 | End: 2024-10-25

## 2024-10-22 RX ORDER — METRONIDAZOLE 500 MG/1
500 TABLET ORAL EVERY 12 HOURS SCHEDULED
Status: DISCONTINUED | OUTPATIENT
Start: 2024-10-22 | End: 2024-10-22

## 2024-10-22 RX ADMIN — DEXTROSE 2000 MG: 50 INJECTION, SOLUTION INTRAVENOUS at 12:16

## 2024-10-22 RX ADMIN — THIAMINE HYDROCHLORIDE 100 MG: 100 INJECTION, SOLUTION INTRAMUSCULAR; INTRAVENOUS at 08:17

## 2024-10-22 RX ADMIN — SODIUM CHLORIDE, SODIUM GLUCONATE, SODIUM ACETATE, POTASSIUM CHLORIDE, MAGNESIUM CHLORIDE, SODIUM PHOSPHATE, DIBASIC, AND POTASSIUM PHOSPHATE 125 ML/HR: .53; .5; .37; .037; .03; .012; .00082 INJECTION, SOLUTION INTRAVENOUS at 01:12

## 2024-10-22 RX ADMIN — METRONIDAZOLE 500 MG: 500 INJECTION, SOLUTION INTRAVENOUS at 11:39

## 2024-10-22 RX ADMIN — METRONIDAZOLE 500 MG: 500 INJECTION, SOLUTION INTRAVENOUS at 20:38

## 2024-10-22 RX ADMIN — PIPERACILLIN SODIUM AND TAZOBACTAM SODIUM 4.5 G: 36; 4.5 INJECTION, POWDER, LYOPHILIZED, FOR SOLUTION INTRAVENOUS at 03:52

## 2024-10-22 RX ADMIN — ESCITALOPRAM OXALATE 10 MG: 10 TABLET ORAL at 08:24

## 2024-10-22 RX ADMIN — ASPIRIN 81 MG CHEWABLE TABLET 81 MG: 81 TABLET CHEWABLE at 08:24

## 2024-10-22 RX ADMIN — POTASSIUM CHLORIDE 20 MEQ: 14.9 INJECTION, SOLUTION INTRAVENOUS at 01:12

## 2024-10-22 RX ADMIN — HEPARIN SODIUM 5000 UNITS: 5000 INJECTION INTRAVENOUS; SUBCUTANEOUS at 05:00

## 2024-10-22 RX ADMIN — POLYETHYLENE GLYCOL 3350 238 G: 17 POWDER, FOR SOLUTION ORAL at 18:49

## 2024-10-22 RX ADMIN — HEPARIN SODIUM 5000 UNITS: 5000 INJECTION INTRAVENOUS; SUBCUTANEOUS at 22:53

## 2024-10-22 RX ADMIN — HEPARIN SODIUM 5000 UNITS: 5000 INJECTION INTRAVENOUS; SUBCUTANEOUS at 16:45

## 2024-10-22 RX ADMIN — INSULIN LISPRO 1 UNITS: 100 INJECTION, SOLUTION INTRAVENOUS; SUBCUTANEOUS at 11:39

## 2024-10-22 RX ADMIN — CLINDAMYCIN IN 5 PERCENT DEXTROSE 600 MG: 12 INJECTION, SOLUTION INTRAVENOUS at 03:18

## 2024-10-22 RX ADMIN — B-COMPLEX W/ C & FOLIC ACID TAB 1 TABLET: TAB at 08:24

## 2024-10-22 RX ADMIN — VANCOMYCIN HYDROCHLORIDE 750 MG: 750 INJECTION, SOLUTION INTRAVENOUS at 08:16

## 2024-10-22 RX ADMIN — FINASTERIDE 5 MG: 5 TABLET, FILM COATED ORAL at 08:24

## 2024-10-22 NOTE — ASSESSMENT & PLAN NOTE
Malnutrition Findings:   Adult Malnutrition type: Chronic illness  Adult Degree of Malnutrition: Other severe protein calorie malnutrition  Malnutrition Characteristics: Weight loss, Inadequate energy                  360 Statement: Severe protein calorie malnutrition in context of chronic illness r/t poor appetite, inadequate PO intake as evidance by energy intake less than 75% compared to estimated needs>1 month, 14% wt loss in 1 month (9/17/24 114 kg, 10/19/24 97.7 kg) treated with PO diet. Once able to have po treat with Mechanical Soft diet, Magic Cup BID, Ensure Compact 1x daily.    BMI Findings:           Body mass index is 25.54 kg/m².   -Surgery evaluating for possible feeding access today

## 2024-10-22 NOTE — ASSESSMENT & PLAN NOTE
This is likely source of patient's bacteremia above. Wound was felt to have initially developed during patient stay in skilled nursing facility for rehab.  During recent hospitalization in September there was concern for fistulous connection from rectum to the sacral wound in setting of colitis/proctitis.  Wound was debrided by general surgery during last admission and was noted to be stage IV with palpable bone.  Now latest CT showing soft tissue gas extending into right and left medial aspects of gluteus jac muscle, but no abscess. There is erosion of coccyx suggestive of osteomyelitis.  Sacral wound was initially examined by surgery and felt it appeared noninfected at the time.  They have reassessed and pursued operative debridement in the OR last night. Multiple pus pockets and necrotic tissue/bone were debrided. New operative cultures are pending.  -antibiotic as above  -serial skin exams  -frequent turning/repositioning to off-load pressure from the wound  -local wound care per general surgery  -continue follow up with general surgery

## 2024-10-22 NOTE — ANESTHESIA PREPROCEDURE EVALUATION
Procedure:  Lap loop colostomy, psb open loop colostomy (Abdomen)  DEBRIDEMENT WOUND (WASH OUT), sacrum (Buttocks)  INSERTION PEG TUBE, psb lap gastrostomy tube placement (Abdomen)    Relevant Problems   ANESTHESIA (within normal limits)      CARDIO   (+) Mixed hyperlipidemia   (+) Paroxysmal atrial fibrillation (HCC)   (+) Primary hypertension      ENDO   (+) Type 2 diabetes mellitus without complication, without long-term current use of insulin (HCC)      HEMATOLOGY   (+) Anemia of chronic disease      NEURO/PSYCH  Non verbal    (+) Depression      PULMONARY (within normal limits)      Neurology/Sleep   (+) History of CVA (cerebrovascular accident)      Urinary   (+) Chronic indwelling Parra catheter      Blood   (+) Sepsis      Dermatology   (+) Sacral ulcer (HCC)      Other   (+) Gram-negative bacteremia   (+) Hypernatremia   (+) Polymicrobial bacteremia   (+) Severe protein-calorie malnutrition (HCC)        Physical Exam    Airway       Dental       Cardiovascular  Cardiovascular exam normal    Pulmonary  Pulmonary exam normal Decreased breath sounds    Other Findings        Anesthesia Plan  ASA Score- 3     Anesthesia Type- general with ASA Monitors.         Additional Monitors:     Airway Plan: ETT.    Comment: Phone consent with wife Kristi.       Plan Factors-    Chart reviewed.   Existing labs reviewed. Patient summary reviewed.    Patient is not a current smoker.              Induction- intravenous.    Postoperative Plan-     Perioperative Resuscitation Plan - Level 1 - Full Code.       Informed Consent- Anesthetic plan and risks discussed with patient.

## 2024-10-22 NOTE — PROGRESS NOTES
Drew Santos is a 73 y.o. male who is currently ordered Vancomycin IV with management by the Pharmacy Consult service.  Relevant clinical data and objective / subjective history reviewed.  Vancomycin Assessment:  Indication and Goal AUC/Trough: Soft tissue (goal -600, trough >10)  Micro:     Renal Function:  SCr: 0.8 mg/dL  CrCl: 99.2 mL/min  Renal replacement: Not on dialysis  Days of Therapy: 4  Current Dose: 1000 mg IV every 12 hours  Vancomycin Plan:  New Dosin mg IV every 12 hours  Estimated AUC: 426 mcg*hr/mL  Estimated Trough: 13.2 mcg/mL  Next Level: 10/23/2024 0600  Renal Function Monitoring: Daily BMP and UOP  Pharmacy will continue to follow closely for s/sx of nephrotoxicity, infusion reactions and appropriateness of therapy.  BMP and CBC will be ordered per protocol. We will continue to follow the patient’s culture results and clinical progress daily.    Maira Sherman, Pharmacist

## 2024-10-22 NOTE — ASSESSMENT & PLAN NOTE
Tachycardia and leukocytosis. Secondary to e col bacteremia from unclear source. Sacral wound was examined by surgery and felt it appeared noninfected at the time.  CT scan is also without any evidence of a drainable fluid collection/abscess but did show mild proctitis.  Consider urinary source in setting of chronic Parra catheter although this should promote good urinary drainage and prevent retention.  Chest x-ray without any consolidation to suggest pneumonia. LFTs mildly elevated, but no pericholecystic fluid on CT. Despite being systemically ill he's remained hemodynamically stable. This morning he is afebrile and his WBC count is trending down. General surgery did reassess yesterday and pursued operative debridement in the OR. Pus pockets and necrotic tissue/bone were encountered and debrided. New operative cultures are pending.  -antibiotic as below  -check CBCD and CMP tomorrow  -follow up blood cultures  -follow up operative cultures  -monitor vitals  -supportive care  -recommend goals of care conversation

## 2024-10-22 NOTE — OP NOTE
OPERATIVE REPORT  PATIENT NAME: Drew Santos    :  1950  MRN: [id number]  Pt Location: [facility] OR ROOM 01    SURGERY DATE: 10/21/2024    Surgeons and Role:     * Beka King MD - Primary     * Kenney Mcgregor MD - Assisting     * Robert Andre MD    Preop Diagnosis:  Sepsis (HCC) [A41.9]  Sacral ulcer (HCC) [L98.429]    Post-Op Diagnosis Codes:     * Sepsis (HCC) [A41.9]     * Sacral ulcer (HCC) [L98.429]    Procedure(s):  INCISION AND DRAINAGE (I&D) BUTTOCK. SACRAL WOUND DEBRIDEMENT. COCCYGECTOMY    Specimen(s):  ID Type Source Tests Collected by Time Destination   A :  Wound Sacrum ANAEROBIC CULTURE AND GRAM STAIN, WOUND CULTURE Beka King MD 10/21/2024 2112        Estimated Blood Loss:   Minimal    Drains:  Urethral Catheter Latex 20 Fr. (Active)   Reasons to continue Urinary Catheter  Chronic urinary catheter 10/20/24 0741   Goal for Removal N/A- chronic hanks 10/20/24 0741   Site Assessment Clean;Skin intact 10/20/24 0741   [facility] Care Done 10/21/24 0900   Output (mL) 400 mL 10/21/24 2030   Number of days: 2       NG/OG Tube Nasogastric Left nare (Active)   Site Assessment Clean;Dry;Intact 10/20/24 2330   Status Clamped 10/20/24 1802   Number of days: 1       Continuous Bladder Irrigation Three-way (Active)   Number of days: 51       Anesthesia Type:   General    Operative Indications:  Sepsis (HCC) [A41.9]  Sacral ulcer (HCC) [L98.429]      Operative Findings:  Sharp excisional debridement with electrocautery and scissors, 20 x 15 cm, depth of bone, depth 4 cm  Partial coccygectomy  BLUE PROLENE MARKS RECTAL WALL  Aerobic anaerobic cultures    Complications:   None apparent      Procedure and Technique:  Patient was taken the operating room, general endotracheal anesthesia was induced on the bed.  Patient was pronated onto the operating table and secured supine.  Patient was prepped and draped in the usual sterile fashion, timeout was held per protocol.    Incision of necrotic tissue revealed  foul-smelling necrotic tissue with pockets of pus.  Extensive sharp excisional debridement was carried out to healthy bleeding tissue circumferentially.  Near the midline we were close to the wall of the rectum.  Anus was digitized and confirmed that a full-thickness rectal injury did not occur.  3-0 silk stitch figure-of-eight x 1 was used to approximate soft tissue directly over the rectal wall.  This site was marked with a Prolene for easy identification.    There is some undermining circumferentially.  The anus was not involved.  The tip of the coccyx was noted to the frankly necrotic and this was excised, discarded.    Cultures of purulent output were taken, sent to the lab.    Pulse lavage irrigation was carried out with 3 L of saline until the effluent clear.  Hemostasis was obtained with electrocautery.  We did not use electrocautery near the wall of the rectum.  Hemostasis was excellent.      Dr. King was scrubbed for the entire operation.        Patient Disposition:  PACU       SIGNATURE: Kenney Mcgregor MD  DATE: October 21, 2024  TIME: 10:19 PM

## 2024-10-22 NOTE — ANESTHESIA PREPROCEDURE EVALUATION
Procedure:  INCISION AND DRAINAGE (I&D) BUTTOCK (Buttocks)  APPLICATION VAC DRESSING (Buttocks)    Relevant Problems   CARDIO   (+) Mixed hyperlipidemia   (+) Paroxysmal atrial fibrillation (HCC)   (+) Primary hypertension      ENDO   (+) Type 2 diabetes mellitus without complication, without long-term current use of insulin (HCC)      HEMATOLOGY   (+) Anemia of chronic disease      NEURO/PSYCH   (+) Depression        Physical Exam    Airway    Mallampati score: II         Dental       Cardiovascular      Pulmonary      Other Findings        Anesthesia Plan  ASA Score- 4 Emergent    Anesthesia Type- general with ASA Monitors.         Additional Monitors:     Airway Plan: ETT.    Comment: Telephone consent obtained from Kristi Santos (wife).       Plan Factors-    Chart reviewed.        Patient is not a current smoker.              Induction- intravenous.    Postoperative Plan- . Planned trial extubation    Perioperative Resuscitation Plan - Level 1 - Full Code.       Informed Consent- Anesthetic plan and risks discussed with healthcare power of  and spouse.

## 2024-10-22 NOTE — PLAN OF CARE
Problem: Potential for Falls  Goal: Patient will remain free of falls  Description: INTERVENTIONS:  - Educate patient/family on patient safety including physical limitations  - Instruct patient to call for assistance with activity   - Consult OT/PT to assist with strengthening/mobility   - Keep Call bell within reach  - Keep bed low and locked with side rails adjusted as appropriate  - Keep care items and personal belongings within reach  - Initiate and maintain comfort rounds  - Make Fall Risk Sign visible to staff  - Offer Toileting every  Hours, in advance of need  - Initiate/Maintain alarm  - Obtain necessary fall risk management equipment  - Apply yellow socks and bracelet for high fall risk patients  - Consider moving patient to room near nurses station  Outcome: Progressing     Problem: Prexisting or High Potential for Compromised Skin Integrity  Goal: Skin integrity is maintained or improved  Description: INTERVENTIONS:  - Identify patients at risk for skin breakdown  - Assess and monitor skin integrity  - Assess and monitor nutrition and hydration status  - Monitor labs   - Assess for incontinence   - Turn and reposition patient  - Assist with mobility/ambulation  - Relieve pressure over bony prominences  - Avoid friction and shearing  - Provide appropriate hygiene as needed including keeping skin clean and dry  - Evaluate need for skin moisturizer/barrier cream  - Collaborate with interdisciplinary team   - Patient/family teaching  - Consider wound care consult   Outcome: Progressing     Problem: PAIN - ADULT  Goal: Verbalizes/displays adequate comfort level or baseline comfort level  Description: Interventions:  - Encourage patient to monitor pain and request assistance  - Assess pain using appropriate pain scale  - Administer analgesics based on type and severity of pain and evaluate response  - Implement non-pharmacological measures as appropriate and evaluate response  - Consider cultural and social  influences on pain and pain management  - Notify physician/advanced practitioner if interventions unsuccessful or patient reports new pain  Outcome: Progressing     Problem: INFECTION - ADULT  Goal: Absence or prevention of progression during hospitalization  Description: INTERVENTIONS:  - Assess and monitor for signs and symptoms of infection  - Monitor lab/diagnostic results  - Monitor all insertion sites, i.e. indwelling lines, tubes, and drains  - Monitor endotracheal if appropriate and nasal secretions for changes in amount and color  - [place] appropriate cooling/warming therapies per order  - Administer medications as ordered  - Instruct and encourage patient and family to use good hand hygiene technique  - Identify and instruct in appropriate isolation precautions for identified infection/condition  Outcome: Progressing     Problem: DISCHARGE PLANNING  Goal: Discharge to home or other facility with appropriate resources  Description: INTERVENTIONS:  - Identify barriers to discharge w/patient and caregiver  - Arrange for needed discharge resources and transportation as appropriate  - Identify discharge learning needs (meds, wound care, etc.)  - Arrange for interpretive services to assist at discharge as needed  - Refer to Case Management Department for coordinating discharge planning if the patient needs post-hospital services based on physician/advanced practitioner order or complex needs related to functional status, cognitive ability, or social support system  Outcome: Progressing     Problem: Knowledge Deficit  Goal: Patient/family/caregiver demonstrates understanding of disease process, treatment plan, medications, and discharge instructions  Description: Complete learning assessment and assess knowledge base.  Interventions:  - Provide teaching at level of understanding  - Provide teaching via preferred learning methods  Outcome: Progressing     Problem: Nutrition/Hydration-ADULT  Goal:  Nutrient/Hydration intake appropriate for improving, restoring or maintaining nutritional needs  Description: Monitor and assess patient's nutrition/hydration status for malnutrition. Collaborate with interdisciplinary team and initiate plan and interventions as ordered.  Monitor patient's weight and dietary intake as ordered or per policy. Utilize nutrition screening tool and intervene as necessary. Determine patient's food preferences and provide high-protein, high-caloric foods as appropriate.     INTERVENTIONS:  - Monitor oral intake, urinary output, labs, and treatment plans  - Assess nutrition and hydration status and recommend course of action  - Evaluate amount of meals eaten  - Assist patient with eating if necessary   - Allow adequate time for meals  - Recommend/ encourage appropriate diets, oral nutritional supplements, and vitamin/mineral supplements  - Order, calculate, and assess calorie counts as needed  - Recommend, monitor, and adjust tube feedings and TPN/PPN based on assessed needs  - Assess need for intravenous fluids  - Provide specific nutrition/hydration education as appropriate  - Include patient/family/caregiver in decisions related to nutrition  Outcome: Progressing

## 2024-10-22 NOTE — ASSESSMENT & PLAN NOTE
Has chronic anemia likely secondary to poor nutritional status underlying chronic illness.   Hemoglobin stable at 8.4  Hemodynamically stable at this time  Trend with daily CBC  Will type and screen prior to the OR tomorrow

## 2024-10-22 NOTE — PROGRESS NOTES
Progress Note - Surgery-General   Name: Drew Santos 73 y.o. male I MRN: [id number]  Unit/Bed#: E4 -01 I Date of Admission: 10/18/2024   Date of Service: 10/22/2024 I Hospital Day: 4     Assessment & Plan  Sepsis  Leukocytosis at admission WBC=21. CXR collected without convincing evidence of pneumonia. Although sacral ulcer could be contributing to elevated WBC.    -daily CBC  -trend fever curve  -blood cultures positive for GNR, presumed Proteus  -Urince culture +Proteus mirabilis  -daily wound care w/ Dakins as written  -abx per primary: vanco/cefepime/flagyl (10/18- )  Type 2 diabetes mellitus without complication, without long-term current use of insulin (Formerly [facility])  Lab Results   Component Value Date    HGBA1C 6.2 (H) 09/17/2024       Recent Labs     10/21/24  1133 10/21/24  1459 10/21/24  2333 10/22/24  0704   POCGLU 191* 229* 155* 161*       Blood Sugar Average: Last 72 hrs:  (P) [id number]  -continue to trend sugars  -agree with SSI while inpatient  -hypoglycemic protocol  Sacral ulcer (HCC)  Sacral ulcer previously debrided by our surgical service at bedside (most recently on 9/25) where granulation tissue was exposed. Gross size/depth of wound is visually unchanged between photos taken on 10/18 and last hospitalization. Patient discharged to nursing care facility with wound care service follow up. Return to ED w/ necrotic superficial slough of wound. Improvement of wound appearance w/ dakin soaked dressings with removal of superficial necrotic tissue.  -10/21 status post debridement in OR    Plan  -N.p.o.  -Follow-up a.m. labs  -OR today for wound check/further debridement of sacral wound, timing TBD   Sacral wound packing -Kerlix x 4  -Multimodal pain regimen  -Continue IV antibiotics  -Rest of care per primary team  Severe protein-calorie malnutrition (HCC)  Malnutrition Findings:   Adult Malnutrition type: Chronic illness  Adult Degree of Malnutrition: Other severe protein calorie  malnutrition  Malnutrition Characteristics: Weight loss, Inadequate energy                  360 Statement: Severe protein calorie malnutrition in context of chronic illness r/t poor appetite, inadequate PO intake as evidance by energy intake less than 75% compared to estimated needs>1 month, 14% wt loss in 1 month (9/17/24 114 kg, 10/19/24 97.7 kg) treated with PO diet. Once able to have po treat with Mechanical Soft diet, Magic Cup BID, Ensure Compact 1x daily.    BMI Findings:           Body mass index is 25.54 kg/m².   -Surgery evaluating for possible feeding access today  Pyuria  Urine culture positive for Proteus mirabilis.    -management for sepsis as above  Advanced care planning/counseling discussion  Recommend consulting palliative care team for discussion of goals of care in medically complex case. Team to lead discussion about desiring PEG tube feeds and other potential future interventions (ostomy diversion).  Gram-negative bacteremia  Plan  -Continue IV antibiotics  -Management per primary team    Surgery-General service will follow.    Subjective   Patient seen and examined at bedside.  Patient appeared to be resting comfortably.    Objective :  Temp:  [97.9 °F (36.6 °C)-98.6 °F (37 °C)] 98.2 °F (36.8 °C)  HR:  [] 106  BP: ()/(54-70) 107/59  Resp:  [16-18] 18  SpO2:  [94 %-100 %] 96 %  O2 Device: None (Room air)    I/O         10/20 0701  10/21 0700 10/21 0701  10/22 0700 10/22 0701  10/23 0700    P.O. 0      I.V. (mL/kg)  700 (7.2)     NG/      IV Piggyback  100     Total Intake(mL/kg) 100 (1) 800 (8.2)     Urine (mL/kg/hr) 250 (0.1) 1775 (0.8)     Total Output 250 1775     Net -150 -975                  Lines/Drains/Airways       Active Status       Name Placement date Placement time Site Days    Urethral Catheter Latex 20 Fr. 10/19/24  1209  Latex  2    NG/OG Tube Nasogastric Left nare 10/20/24  1518  Left nare  1                  Physical Exam  Vitals and nursing note reviewed.    Constitutional:       General: He is not in acute distress.     Appearance: Normal appearance. He is normal weight. He is ill-appearing. He is not toxic-appearing.   HENT:      Head: Normocephalic and atraumatic.   Eyes:      Conjunctiva/sclera: Conjunctivae normal.   Cardiovascular:      Rate and Rhythm: Tachycardia present.   Pulmonary:      Effort: Pulmonary effort is normal.      Comments: On room air  Abdominal:      General: Abdomen is flat. There is no distension.      Palpations: Abdomen is soft.      Tenderness: There is no abdominal tenderness. There is no guarding or rebound.   Musculoskeletal:      Right lower leg: No edema.      Left lower leg: No edema.   Skin:     General: Skin is warm and dry.      Capillary Refill: Capillary refill takes less than 2 seconds.      Coloration: Skin is not jaundiced.      Findings: No lesion.   Neurological:      Mental Status: He is alert.         Lab Results: I have reviewed the following results:  Recent Labs     10/21/24  1733 10/21/24  1734 10/21/24  2327 10/22/24  0446   WBC 16.77*  --  18.96* 18.41*   HGB 8.4*  --  9.0* 8.4*   HCT 28.8*  --  31.3* 29.5*   *  --  560* 503*   SODIUM 148*  --  149* 151*   K 3.4*  --  3.7 4.1   *  --  117* 118*   CO2 29  --  27 26   BUN 25  --  21 20   CREATININE 0.80  --  0.85 0.74   GLUC 265*  --  176* 157*   MG 2.1  --  2.2 2.3   PHOS 2.5  --  3.1 3.1   AST 21  --   --   --    ALT 32  --   --   --    ALB 2.0*  --   --   --    TBILI 0.48  --   --   --    ALKPHOS 76  --   --   --    LACTICACID  --    < > 1.8  --     < > = values in this interval not displayed.       Imaging Results Review: No pertinent imaging studies reviewed.  Other Study Results Review: No additional pertinent studies reviewed.    VTE Pharmacologic Prophylaxis: VTE covered by:  heparin (porcine), Subcutaneous, 5,000 Units at 10/22/24 0500     VTE Mechanical Prophylaxis: sequential compression device

## 2024-10-22 NOTE — PROGRESS NOTES
Progress Note - Infectious Disease   Name: Drew Santos 73 y.o. male I MRN: [id number]  Unit/Bed#: E4 -01 I Date of Admission: 10/18/2024   Date of Service: 10/22/2024 I Hospital Day: 4     Assessment & Plan  Sepsis  Tachycardia and leukocytosis. Secondary to e col bacteremia from unclear source. Sacral wound was examined by surgery and felt it appeared noninfected at the time.  CT scan is also without any evidence of a drainable fluid collection/abscess but did show mild proctitis.  Consider urinary source in setting of chronic Parra catheter although this should promote good urinary drainage and prevent retention.  Chest x-ray without any consolidation to suggest pneumonia. LFTs mildly elevated, but no pericholecystic fluid on CT. Despite being systemically ill he's remained hemodynamically stable. This morning he is afebrile and his WBC count is trending down. General surgery did reassess yesterday and pursued operative debridement in the OR. Pus pockets and necrotic tissue/bone were encountered and debrided. New operative cultures are pending.  -antibiotic as below  -check CBCD and CMP tomorrow  -follow up blood cultures  -follow up operative cultures  -monitor vitals  -supportive care  -recommend goals of care conversation  Polymicrobial bacteremia  Blood cultures now showing preliminary growth of both E. Coli, Proteus, and bacteroids. Past episode of bacteremia felt to be secondary to sacral wound. Pathogens do match stool pathogens again. Sacral wound was initially examined by surgery and felt it appeared noninfected at the time.  They have reassessed and pursued operative debridement in the OR last night. Multiple pus pockets and necrotic tissue/bone were debrided. New operative cultures are pending. The patient's antibiotics were broadened post-operatively to Zosyn, Clindamycin, and Vancomycin. Concern this regimen can contribute to renal dysfunction and additionally raise patient's risk for c diff. I  will transition him back to Ceftriaxone and Flagyl for now to target blood pathogens. Can readjust regimen based on final operative culture results as needed.  -stop IV vancomycin  -stop IV zosyn  -stop IV clindamycin  -restart IV ceftriaxone  -restart PO flagyl  -check CBCD and CMP tomorrow  -follow up final blood culture results and sensitivities  -follow up operative culture results  -monitor vitals  Sacral ulcer (HCC)  This is likely source of patient's bacteremia above. Wound was felt to have initially developed during patient stay in skilled nursing facility for rehab.  During recent hospitalization in September there was concern for fistulous connection from rectum to the sacral wound in setting of colitis/proctitis.  Wound was debrided by general surgery during last admission and was noted to be stage IV with palpable bone.  Now latest CT showing soft tissue gas extending into right and left medial aspects of gluteus jac muscle, but no abscess. There is erosion of coccyx suggestive of osteomyelitis.  Sacral wound was initially examined by surgery and felt it appeared noninfected at the time.  They have reassessed and pursued operative debridement in the OR last night. Multiple pus pockets and necrotic tissue/bone were debrided. New operative cultures are pending.  -antibiotic as above  -serial skin exams  -frequent turning/repositioning to off-load pressure from the wound  -local wound care per general surgery  -continue follow up with general surgery  Pyuria  UA sent from patient's chronic Hanks catheter had innumerable WBCs. This is expected finding in setting of chronic catheter and cannot be used alone to determine UTI.  CT abdomen/pelvis was without any hydronephrosis or hydroureter.  There was some chronic appearing bilateral perinephric stranding.  The urinary bladder was noted to be collapsed around a Hanks catheter.  Hanks was exchanged this admission.  -serial exams and care of hanks  -monitor  urine output  Chronic indwelling Hanks catheter  In setting of chronic urinary retention and BPH. Catheter was exchanged this admission.  -serial exams and care of hanks  -monitor urine output  -continue follow up with urology as needed  Type 2 diabetes mellitus without complication, without long-term current use of insulin (Formerly [facility])  Lab Results   Component Value Date     HGBA1C 6.2 (H) 2024   Elevated blood glucose is risk factor for wounds and infection. Recommend tight glycemic control.  -blood glucose management per primary service   Proctitis  CT A/P from 10/18/2024 showing mild wall thickening of the rectum. CT scan back in 2024 also noted proctocolitis.  Given persistence, this may be a somewhat chronic finding.  He is not having any current diarrhea.  -antibiotic as above  -monitor GI symptoms  -monitor stool output    Above plan was discussed in detail with SLIM regarding plan to de-escalate antibiotics.    Antibiotics:  Zosyn - stop  Vancomycin - stop  Clindamycin - stop  Ceftriaxone 2  Flagyl 5  Antibiotics 5    Subjective:  Unable to perform ROS, patient somnolent at time of my visit.    Objective:  Vitals:  Temp:  [97.9 °F (36.6 °C)-98.6 °F (37 °C)] 98.2 °F (36.8 °C)  HR:  [] 106  Resp:  [16-18] 18  BP: ()/(54-70) 107/59  SpO2:  [94 %-100 %] 96 %  Temp (24hrs), Av.1 °F (36.7 °C), Min:97.9 °F (36.6 °C), Max:98.6 °F (37 °C)  Current: Temperature: 98.2 °F (36.8 °C)    Physical Exam:   General Appearance:  Somnolent, appears comfortable sitting up in bed. Offered no purposeful interaction during my visit. CAREN morris NG intact, getting meds from RN.   Lungs:   Clear to auscultation bilaterally; no wheezes, rhonchi or rales; respirations unlabored on room air.   Heart:  Tachycardic; no murmur, rub or gallop.   Genitourinary: Hanks intact.    Skin: No new rashes noted on exposed skin.     Labs, Imaging, & Other studies:   All pertinent labs and imaging studies were personally  reviewed  Results from last 7 days   Lab Units 10/22/24  0446 10/21/24  2327 10/21/24  1733   WBC Thousand/uL 18.41* 18.96* 16.77*   HEMOGLOBIN g/dL 8.4* 9.0* 8.4*   PLATELETS Thousands/uL 503* 560* 505*     Results from last 7 days   Lab Units 10/22/24  0446 10/21/24  2327 10/21/24  1733 10/21/24  0439 10/20/24  1552 10/20/24  0429   POTASSIUM mmol/L 4.1   < > 3.4* 3.4*   < > 3.5   CHLORIDE mmol/L 118*   < > 117* 118*   < > 120*   CO2 mmol/L 26   < > 29 28   < > 30   BUN mg/dL 20   < > 25 26*   < > 37*   CREATININE mg/dL 0.74   < > 0.80 0.74   < > 0.74   EGFR ml/min/1.73sq m 90   < > 87 90   < > 90   CALCIUM mg/dL 8.1*   < > 8.2* 8.2*   < > 8.4   AST U/L  --   --  21 26  --  47*   ALT U/L  --   --  32 36  --  54*   ALK PHOS U/L  --   --  76 66  --  83    < > = values in this interval not displayed.     Results from last 7 days   Lab Units 10/21/24  2112 10/19/24  0938 10/18/24  1500 10/18/24  1445 10/18/24  1335   BLOOD CULTURE   --   --   --  Escherichia coli*  Proteus mirabilis*  Bacteroides vulgatus group*  Bacteroides thetaiotaomicron group* Escherichia coli*  Proteus mirabilis*  Bacteroides vulgatus group*  Bacteroides thetaiotaomicron group*   GRAM STAIN RESULT  Rare Polys*  2+ Gram negative rods*  1+ Gram positive cocci in pairs*  Rare Yeast*  --   --  Gram negative rods* Gram negative rods*   URINE CULTURE   --   --  >100,000 cfu/ml Proteus mirabilis*  --   --    MRSA CULTURE ONLY   --  No Methicillin Resistant Staphlyococcus aureus (MRSA) isolated  --   --   --

## 2024-10-22 NOTE — ASSESSMENT & PLAN NOTE
Lab Results   Component Value Date    HGBA1C 6.2 (H) 09/17/2024     Recent Labs     10/21/24  1459 10/21/24  2333 10/22/24  0704 10/22/24  1053   POCGLU 229* 155* 161* 162*     Blood Sugar Average: Last 72 hrs:  (P) 161  Last HgbA1C 6.2%  Continue sliding scale insulin   Hypoglycemia protocol  Monitor while on tube feeds (on hold for OR tomorrow)

## 2024-10-22 NOTE — ASSESSMENT & PLAN NOTE
Blood cultures growing gram-negative rods, E. coli and Proteus  Infectious disease following for antibiotic management  Continue IV Rocephin  Right upper quadrant ultrasound performed, no evidence of acute cholecystitis  Surgery took patient to the OR emergently last night for concern of necrotizing soft tissue infection  Plan for repeat washout tomorrow with end colostomy and PEG tube placement

## 2024-10-22 NOTE — ASSESSMENT & PLAN NOTE
Blood cultures now showing preliminary growth of both E. Coli, Proteus, and bacteroids. Past episode of bacteremia felt to be secondary to sacral wound. Pathogens do match stool pathogens again. Sacral wound was initially examined by surgery and felt it appeared noninfected at the time.  They have reassessed and pursued operative debridement in the OR last night. Multiple pus pockets and necrotic tissue/bone were debrided. New operative cultures are pending. The patient's antibiotics were broadened post-operatively to Zosyn, Clindamycin, and Vancomycin. Concern this regimen can contribute to renal dysfunction and additionally raise patient's risk for c diff. I will transition him back to Ceftriaxone and Flagyl for now to target blood pathogens. Can readjust regimen based on final operative culture results as needed.  -stop IV vancomycin  -stop IV zosyn  -stop IV clindamycin  -restart IV ceftriaxone  -restart PO flagyl  -check CBCD and CMP tomorrow  -follow up final blood culture results and sensitivities  -follow up operative culture results  -monitor vitals

## 2024-10-22 NOTE — ANESTHESIA POSTPROCEDURE EVALUATION
Post-Op Assessment Note    CV Status:  Stable    Pain management: adequate       Mental Status:  Alert and awake   Hydration Status:  Euvolemic   PONV Controlled:  Controlled   Airway Patency:  Patent     Post Op Vitals Reviewed: Yes    No anethesia notable event occurred.            Last Filed PACU Vitals:  Vitals Value Taken Time   Temp 97.9 °F (36.6 °C) 10/21/24 2330   Pulse 99 10/22/24 0015   /56 10/22/24 0015   Resp 17 10/22/24 0015   SpO2 96 % 10/22/24 0015       Modified Nancy:  Activity: 1 (10/21/2024 11:30 PM)  Respiration: 2 (10/21/2024 11:30 PM)  Circulation: 2 (10/21/2024 11:30 PM)  Consciousness: 1 (10/21/2024 11:30 PM)  Oxygen Saturation: 2 (10/21/2024 11:30 PM)  Modified Nancy Score: 8 (10/21/2024 11:30 PM)

## 2024-10-22 NOTE — ASSESSMENT & PLAN NOTE
Sodium 154 at time of admission, likely hypovolemic hypernatremia related to mild dehydration secondary to poor water intake  Continue IV fluids  Start free water flushes once allowed diet

## 2024-10-22 NOTE — PLAN OF CARE
Problem: Potential for Falls  Goal: Patient will remain free of falls  Description: INTERVENTIONS:  - Educate patient/family on patient safety including physical limitations  - Instruct patient to call for assistance with activity   - Consult OT/PT to assist with strengthening/mobility   - Keep Call bell within reach  - Keep bed low and locked with side rails adjusted as appropriate  - Keep care items and personal belongings within reach  - Initiate and maintain comfort rounds  - Make Fall Risk Sign visible to staff  - Offer Toileting every 3 Hours, in advance of need  - Initiate/Maintain bed alarm  - Obtain necessary fall risk management equipment: alarm   - Apply yellow socks and bracelet for high fall risk patients  - Consider moving patient to room near nurses station  Outcome: Progressing     Problem: Prexisting or High Potential for Compromised Skin Integrity  Goal: Skin integrity is maintained or improved  Description: INTERVENTIONS:  - Identify patients at risk for skin breakdown  - Assess and monitor skin integrity  - Assess and monitor nutrition and hydration status  - Monitor labs   - Assess for incontinence   - Turn and reposition patient  - Assist with mobility/ambulation  - Relieve pressure over bony prominences  - Avoid friction and shearing  - Provide appropriate hygiene as needed including keeping skin clean and dry  - Evaluate need for skin moisturizer/barrier cream  - Collaborate with interdisciplinary team   - Patient/family teaching  - Consider wound care consult   Outcome: Progressing     Problem: PAIN - ADULT  Goal: Verbalizes/displays adequate comfort level or baseline comfort level  Description: Interventions:  - Encourage patient to monitor pain and request assistance  - Assess pain using appropriate pain scale  - Administer analgesics based on type and severity of pain and evaluate response  - Implement non-pharmacological measures as appropriate and evaluate response  - Consider  cultural and social influences on pain and pain management  - Notify physician/advanced practitioner if interventions unsuccessful or patient reports new pain  Outcome: Progressing     Problem: INFECTION - ADULT  Goal: Absence or prevention of progression during hospitalization  Description: INTERVENTIONS:  - Assess and monitor for signs and symptoms of infection  - Monitor lab/diagnostic results  - Monitor all insertion sites, i.e. indwelling lines, tubes, and drains  - Monitor endotracheal if appropriate and nasal secretions for changes in amount and color  - [place] appropriate cooling/warming therapies per order  - Administer medications as ordered  - Instruct and encourage patient and family to use good hand hygiene technique  - Identify and instruct in appropriate isolation precautions for identified infection/condition  Outcome: Progressing     Problem: DISCHARGE PLANNING  Goal: Discharge to home or other facility with appropriate resources  Description: INTERVENTIONS:  - Identify barriers to discharge w/patient and caregiver  - Arrange for needed discharge resources and transportation as appropriate  - Identify discharge learning needs (meds, wound care, etc.)  - Arrange for interpretive services to assist at discharge as needed  - Refer to Case Management Department for coordinating discharge planning if the patient needs post-hospital services based on physician/advanced practitioner order or complex needs related to functional status, cognitive ability, or social support system  Outcome: Progressing     Problem: Knowledge Deficit  Goal: Patient/family/caregiver demonstrates understanding of disease process, treatment plan, medications, and discharge instructions  Description: Complete learning assessment and assess knowledge base.  Interventions:  - Provide teaching at level of understanding  - Provide teaching via preferred learning methods  Outcome: Progressing     Problem:  Nutrition/Hydration-ADULT  Goal: Nutrient/Hydration intake appropriate for improving, restoring or maintaining nutritional needs  Description: Monitor and assess patient's nutrition/hydration status for malnutrition. Collaborate with interdisciplinary team and initiate plan and interventions as ordered.  Monitor patient's weight and dietary intake as ordered or per policy. Utilize nutrition screening tool and intervene as necessary. Determine patient's food preferences and provide high-protein, high-caloric foods as appropriate.     INTERVENTIONS:  - Monitor oral intake, urinary output, labs, and treatment plans  - Assess nutrition and hydration status and recommend course of action  - Evaluate amount of meals eaten  - Assist patient with eating if necessary   - Allow adequate time for meals  - Recommend/ encourage appropriate diets, oral nutritional supplements, and vitamin/mineral supplements  - Order, calculate, and assess calorie counts as needed  - Recommend, monitor, and adjust tube feedings and TPN/PPN based on assessed needs  - Assess need for intravenous fluids  - Provide specific nutrition/hydration education as appropriate  - Include patient/family/caregiver in decisions related to nutrition  Outcome: Progressing

## 2024-10-22 NOTE — ASSESSMENT & PLAN NOTE
Patient has at least 2 sacral ulcers. At least one of them is stage IV with palpable bone. Possible osteomyelitis. Surgery has examined patient's sacral wound and lower concern for acute infection, though may be source of translocation.   Surgery consult appreciated; recommend initiation of 0.25% Dakin soaked guaze packing TID while inpatient   Q2hr turns as patient tolerates  Taken to the OR yesterday for debridement, plan for repeat debridement tomorrow

## 2024-10-22 NOTE — ASSESSMENT & PLAN NOTE
Sacral ulcer previously debrided by our surgical service at bedside (most recently on 9/25) where granulation tissue was exposed. Gross size/depth of wound is visually unchanged between photos taken on 10/18 and last hospitalization. Patient discharged to nursing care facility with wound care service follow up. Return to ED w/ necrotic superficial slough of wound. Improvement of wound appearance w/ dakin soaked dressings with removal of superficial necrotic tissue.  -10/21 status post debridement in OR    Plan  -N.p.o.  -Follow-up a.m. labs  -OR today for wound check/further debridement of sacral wound, timing TBD   Sacral wound packing -Kerlix x 4  -Multimodal pain regimen  -Continue IV antibiotics  -Rest of care per primary team

## 2024-10-22 NOTE — ASSESSMENT & PLAN NOTE
History of stroke with L sided hemiparesis recently hospitalized at [facility] in July 2024  Baseline non-verbal, alert, at times selective in answers, can nod yes/no however unsure accuracy most information obtained per patients wife  Baseline Dysphagia 2 mechanical soft, thin liquids, patient well known to speech therapy

## 2024-10-22 NOTE — ASSESSMENT & PLAN NOTE
Lab Results   Component Value Date    HGBA1C 6.2 (H) 09/17/2024       Recent Labs     10/21/24  1133 10/21/24  1459 10/21/24  2333 10/22/24  0704   POCGLU 191* 229* 155* 161*       Blood Sugar Average: Last 72 hrs:  (P) [id number]  -continue to trend sugars  -agree with SSI while inpatient  -hypoglycemic protocol

## 2024-10-22 NOTE — PROGRESS NOTES
"Progress Note - Hospitalist   Name: Drew Santos 73 y.o. male I MRN: [id number]  Unit/Bed#: E4 -01 I Date of Admission: 10/18/2024   Date of Service: 10/22/2024 I Hospital Day: 4    Assessment & Plan  Gram-negative bacteremia  Blood cultures growing gram-negative rods, E. coli and Proteus  Infectious disease following for antibiotic management  Continue IV Rocephin  Right upper quadrant ultrasound performed, no evidence of acute cholecystitis  Surgery took patient to the OR emergently last night for concern of necrotizing soft tissue infection  Plan for repeat washout tomorrow with end colostomy and PEG tube placement  Sepsis  Patient recently discharged 10/4 with polymicrobial bacteremia secondary to sacral wound and completed course of IV cefepime, flagyl and vancomycin followed by ampicillin. Blood cultures are growing Enterococcus, Enterobacter and Proteus last month. Patient presented to [facility] ED 10/18 from SNF due to worsening sacral wounds and possible osteomyelitis. Patient received cefepime, Flagyl and vancomycin in the ER.   CXR \"Limited study. Some atelectasis is present at the right base. No convincing evidence of pneumonia.\"  CT abdomen pelvis showing \"Large sacral decubitus ulcer extending to the coccyx with soft tissue gas now seen extending into the right and left medial aspects of the gluteus jac muscles. No drainable fluid collection. Erosion of the coccyx suggestive of osteomyelitis.\"  Blood culture x2 obtained upon admission growing Proteus and E. coli  MRSA negative  Has been afebrile since admission.  But developed tachycardia yesterday and was emergently taken to the OR for concern of necrotizing soft tissue infection  Infectious disease recs appreciated  Continue IV antibiotics with IV Rocephin  Elevated liver enzymes  POA mild elevation , ALT 99, without guarding    Resolved  Right upper quadrant ultrasound without evidence of acute cholecystitis  Sacral ulcer (HCC)  Patient " "has at least 2 sacral ulcers. At least one of them is stage IV with palpable bone. Possible osteomyelitis. Surgery has examined patient's sacral wound and lower concern for acute infection, though may be source of translocation.   Surgery consult appreciated; recommend initiation of 0.25% Dakin soaked guaze packing TID while inpatient   Q2hr turns as patient tolerates  Taken to the OR yesterday for debridement, plan for repeat debridement tomorrow  Pyuria  Noted with innumerable WBC from chronic hanks catheter sample, which cannot alone determine UTI in the setting of chronic catheter. CT scan showing \"Tiny bilateral renal cysts. Nonobstructing left renal calculi. No hydronephrosis or hydroureter. Chronic bilateral perinephric stranding. And Relatively collapsed around a Hanks catheter limiting evaluation\"  Urine culture growing Proteus  Continue IV Rocephin  Hypernatremia  Sodium 154 at time of admission, likely hypovolemic hypernatremia related to mild dehydration secondary to poor water intake  Continue IV fluids  Start free water flushes once allowed diet  Chronic indwelling Hanks catheter  Chronic urethral hanks catheter secondary to chronic urinary retention, BPH.  Hanks patent at this time   Hanks was exchanged 10/19  Continue tamsulosin and finasteride  Proctitis  Noted on CT scan with history of this as possibly chronic. No diarrhea or clinical evidence of colitis.   Type 2 diabetes mellitus without complication, without long-term current use of insulin (HCC)  Lab Results   Component Value Date    HGBA1C 6.2 (H) 09/17/2024     Recent Labs     10/21/24  1459 10/21/24  2333 10/22/24  0704 10/22/24  1053   POCGLU 229* 155* 161* 162*     Blood Sugar Average: Last 72 hrs:  (P) 161  Last HgbA1C 6.2%  Continue sliding scale insulin   Hypoglycemia protocol  Monitor while on tube feeds (on hold for OR tomorrow)  Paroxysmal atrial fibrillation (HCC)  Eliquis held for surgery  Continue pta metoprolol with hold " parameters  Anemia of chronic disease  Has chronic anemia likely secondary to poor nutritional status underlying chronic illness.   Hemoglobin stable at 8.4  Hemodynamically stable at this time  Trend with daily CBC  Will type and screen prior to the OR tomorrow  Severe protein-calorie malnutrition (HCC)  Malnutrition Findings:   ChronicAdult Malnutrition type: Chronic illness  Adult Degree of Malnutrition: Other severe protein calorie malnutrition  Malnutrition Characteristics: Weight loss, Inadequate energy  360 Statement: Severe protein calorie malnutrition in context of chronic illness r/t poor appetite, inadequate PO intake as evidance by energy intake less than 75% compared to estimated needs>1 month, 14% wt loss in 1 month (9/17/24 114 kg, 10/19/24 97.7 kg) treated with PO diet. Once able to have po treat with Mechanical Soft diet, Magic Cup BID, Ensure Compact 1x daily.  Patient with significant weight loss and per wife with significantly decreased oral intake over the past few months and has not been eating for 1 week at the nursing home.  Weight noted in April to be 275 lb, and now weighing 215 lb  Speech therapy consult and recommend dysphagia level 1-puree with thin liquids  NG tube placed temporarily  Tube feed ordered with PO diet, Dietary to adjust based on patient nutritional needs.  General Surgery planning PEG tube placement in the OR tomorrow  Continue high-dose thiamine and electrolyte monitoring for refeeding syndrome    History of CVA (cerebrovascular accident)  History of stroke with L sided hemiparesis recently hospitalized at [facility] in July 2024  Baseline non-verbal, alert, at times selective in answers, can nod yes/no however unsure accuracy most information obtained per patients wife  Baseline Dysphagia 2 mechanical soft, thin liquids, patient well known to speech therapy  Advanced care planning/counseling discussion  Patient readmitted with worsening prognosis with large sacral wound  and developing osteomyelitis secondary to bacteremia, severe malnutrition. Extensive discussion with wife regarding patients guarded prognosis and goals of care for her . Wife opting to proceed with full medical care at this time.   Started on tube feeds with NG tube, PEG tube to be placed tomorrow  Seen in consult by palliative care, family remains goal oriented  Polymicrobial bacteremia      VTE Pharmacologic Prophylaxis: VTE Score: 8 High Risk (Score >/= 5) - Pharmacological DVT Prophylaxis Ordered: heparin. Sequential Compression Devices Ordered.    Mobility:   Basic Mobility Inpatient Raw Score: 6  JH-HLM Goal: 2: Bed activities/Dependent transfer  JH-HLM Achieved: 2: Bed activities/Dependent transfer  JH-HLM Goal achieved. Continue to encourage appropriate mobility.    Patient Centered Rounds: I performed bedside rounds with nursing staff today.   Discussions with Specialists or Other Care Team Provider: Infectious disease, general surgery    Current Length of Stay: 4 day(s)  Current Patient Status: Inpatient   Certification Statement: The patient will continue to require additional inpatient hospital stay due to bacteremia  Discharge Plan: Anticipate discharge in >72 hrs to discharge location to be determined pending rehab evaluations.    Code Status: Level 1 - Full Code    Subjective   Patient seen and examined at bedside.  Remains minimally responsive.  NG tube in place.    Objective :  Temp:  [97.9 °F (36.6 °C)-98.6 °F (37 °C)] 98.2 °F (36.8 °C)  HR:  [] 106  BP: ()/(54-70) 107/59  Resp:  [16-18] 18  SpO2:  [94 %-100 %] 96 %  O2 Device: None (Room air)    Body mass index is 25.54 kg/m².     Input and Output Summary (last 24 hours):     Intake/Output Summary (Last 24 hours) at 10/22/2024 1143  Last data filed at 10/22/2024 0454  Gross per 24 hour   Intake 800 ml   Output 1775 ml   Net -975 ml       Physical Exam  Vitals reviewed.   Constitutional:       Appearance: He is ill-appearing.       Comments: NGT   HENT:      Head: Normocephalic and atraumatic.   Eyes:      General: No scleral icterus.     Conjunctiva/sclera: Conjunctivae normal.   Cardiovascular:      Rate and Rhythm: Regular rhythm. Tachycardia present.      Heart sounds: No murmur heard.  Pulmonary:      Effort: Pulmonary effort is normal. No respiratory distress.      Breath sounds: Normal breath sounds. No wheezing.   Abdominal:      General: Bowel sounds are normal. There is no distension.      Palpations: Abdomen is soft.      Tenderness: There is no abdominal tenderness.   Musculoskeletal:      Cervical back: Neck supple.      Right lower leg: No edema.      Left lower leg: No edema.   Skin:     General: Skin is warm and dry.      Comments: Sacral wound   Neurological:      Motor: Weakness present.   Psychiatric:         Mood and Affect: Mood normal.         Behavior: Behavior normal.           Lines/Drains:  Lines/Drains/Airways       Active Status       Name Placement date Placement time Site Days    Urethral Catheter Latex 20 Fr. 10/19/24  1209  Latex  2    NG/OG Tube Nasogastric Left nare 10/20/24  1518  Left nare  1                  Urinary Catheter:  Goal for removal: N/A - Chronic Parra                 Lab Results: I have reviewed the following results:   Results from last 7 days   Lab Units 10/22/24  0446 10/21/24  2327   WBC Thousand/uL 18.41* 18.96*   HEMOGLOBIN g/dL 8.4* 9.0*   HEMATOCRIT % 29.5* 31.3*   PLATELETS Thousands/uL 503* 560*   SEGS PCT %  --  80*   LYMPHO PCT %  --  14   MONO PCT %  --  4   EOS PCT %  --  1     Results from last 7 days   Lab Units 10/22/24  0446 10/21/24  2327 10/21/24  1733   SODIUM mmol/L 151*   < > 148*   POTASSIUM mmol/L 4.1   < > 3.4*   CHLORIDE mmol/L 118*   < > 117*   CO2 mmol/L 26   < > 29   BUN mg/dL 20   < > 25   CREATININE mg/dL 0.74   < > 0.80   ANION GAP mmol/L 7   < > 2*   CALCIUM mg/dL 8.1*   < > 8.2*   ALBUMIN g/dL  --   --  2.0*   TOTAL BILIRUBIN mg/dL  --   --  0.48   ALK PHOS  U/L  --   --  76   ALT U/L  --   --  32   AST U/L  --   --  21   GLUCOSE RANDOM mg/dL 157*   < > 265*    < > = values in this interval not displayed.     Results from last 7 days   Lab Units 10/18/24  1445   INR  1.57*     Results from last 7 days   Lab Units 10/22/24  1053 10/22/24  0704 10/21/24  2333 10/21/24  1459 10/21/24  1133 10/21/24  0709 10/20/24  2131 10/20/24  1614 10/20/24  1125 10/20/24  0633 10/19/24  2024 10/19/24  1604   POC GLUCOSE mg/dl 162* 161* 155* 229* 191* 187* 168* 165* 143* 117 118 139         Results from last 7 days   Lab Units 10/21/24  2327 10/21/24  1734 10/18/24  2244 10/18/24  1445   LACTIC ACID mmol/L 1.8 1.5 1.6 1.8   PROCALCITONIN ng/ml  --   --   --  0.72*       Recent Cultures (last 7 days):   Results from last 7 days   Lab Units 10/21/24  2112 10/18/24  1500 10/18/24  1445 10/18/24  1335   BLOOD CULTURE   --   --  Escherichia coli*  Proteus mirabilis*  Bacteroides vulgatus group*  Bacteroides thetaiotaomicron group* Escherichia coli*  Proteus mirabilis*  Bacteroides vulgatus group*  Bacteroides thetaiotaomicron group*   GRAM STAIN RESULT  Rare Polys*  2+ Gram negative rods*  1+ Gram positive cocci in pairs*  Rare Yeast*  --  Gram negative rods* Gram negative rods*   URINE CULTURE   --  >100,000 cfu/ml Proteus mirabilis*  --   --              Last 24 Hours Medication List:     Current Facility-Administered Medications:     acetaminophen (TYLENOL) tablet 650 mg, Q4H PRN    aspirin chewable tablet 81 mg, Daily    atorvastatin (LIPITOR) tablet 80 mg, QPM    bisacodyl (DULCOLAX) rectal suppository 10 mg, Daily PRN    cefTRIAXone (ROCEPHIN) 2,000 mg in dextrose 5 % 50 mL IVPB, Q24H    escitalopram (LEXAPRO) tablet 10 mg, Daily    finasteride (PROSCAR) tablet 5 mg, Daily    heparin (porcine) subcutaneous injection 5,000 Units, Q8H [facility]    insulin lispro (HumALOG/ADMELOG) 100 units/mL subcutaneous injection 1-5 Units, TID AC **AND** Fingerstick Glucose (POCT), TID AC    insulin  lispro (HumALOG/ADMELOG) 100 units/mL subcutaneous injection 1-5 Units, HS    [Held by provider] lisinopril (ZESTRIL) tablet 10 mg, Daily    metoprolol (LOPRESSOR) injection 2.5 mg, Q6H PRN    metoprolol succinate (TOPROL-XL) 24 hr tablet 25 mg, Daily    metroNIDAZOLE (FLAGYL) IVPB (premix) 500 mg 100 mL, Q8H, Last Rate: 500 mg (10/22/24 1139)    multi-electrolyte (PLASMALYTE-A/ISOLYTE-S PH 7.4) IV solution, Continuous, Last Rate: 125 mL/hr (10/22/24 0112)    multivitamin stress formula tablet 1 tablet, Daily    ondansetron (ZOFRAN) injection 4 mg, Q6H PRN    senna (SENOKOT) tablet 17.2 mg, Daily PRN    tamsulosin (FLOMAX) capsule 0.4 mg, Daily With Dinner    thiamine (VITAMIN B1) 100 mg in sodium chloride 0.9 % 50 mL IVPB, Daily, Last Rate: 100 mg (10/22/24 2218)    Administrative Statements   Today, Patient Was Seen By: Esperanza Arizmendi PA-C      **Please Note: This note may have been constructed using a voice recognition system.**

## 2024-10-22 NOTE — ASSESSMENT & PLAN NOTE
"Patient recently discharged 10/4 with polymicrobial bacteremia secondary to sacral wound and completed course of IV cefepime, flagyl and vancomycin followed by ampicillin. Blood cultures are growing Enterococcus, Enterobacter and Proteus last month. Patient presented to [facility] ED 10/18 from SNF due to worsening sacral wounds and possible osteomyelitis. Patient received cefepime, Flagyl and vancomycin in the ER.   CXR \"Limited study. Some atelectasis is present at the right base. No convincing evidence of pneumonia.\"  CT abdomen pelvis showing \"Large sacral decubitus ulcer extending to the coccyx with soft tissue gas now seen extending into the right and left medial aspects of the gluteus jac muscles. No drainable fluid collection. Erosion of the coccyx suggestive of osteomyelitis.\"  Blood culture x2 obtained upon admission growing Proteus and E. coli  MRSA negative  Has been afebrile since admission.  But developed tachycardia yesterday and was emergently taken to the OR for concern of necrotizing soft tissue infection  Infectious disease recs appreciated  Continue IV antibiotics with IV Rocephin  "

## 2024-10-22 NOTE — ANESTHESIA POSTPROCEDURE EVALUATION
Post-Op Assessment Note    CV Status:  Stable    Pain management: adequate       Mental Status:  Lethargic (Patient is lethargic at baseline)   Hydration Status:  Stable   PONV Controlled:  None   Airway Patency:  Patent  Airway: intubated     Post Op Vitals Reviewed: Yes    No anethesia notable event occurred.    Staff: CRNA           Last Filed PACU Vitals:  Vitals Value Taken Time   Temp     Pulse     BP     Resp     SpO2         Modified Nancy:  No data recorded

## 2024-10-22 NOTE — QUICK NOTE
Patient seen and examined at bedside.  Patient appears to be resting comfortably.  Side patient saturating 95% on room air.  Tachycardic to the low 100s.  On exam abdomen soft, nontender, nondistended.  No strikethrough on dressing.    Postop labs  WBC 18.96 from 16.7  Hemoglobin 9 from 8.4  Creatinine 0.85 from 0.8    Plan  -Monitor hemodynamics  -Continue broad-spectrum antibiotics  -N.p.o. midnight  -OR for sacral wound check today  -Isolyte at 100  -Monitor replete electrolytes as needed  -Follow-up a.m. labs

## 2024-10-22 NOTE — ASSESSMENT & PLAN NOTE
Malnutrition Findings:   ChronicAdult Malnutrition type: Chronic illness  Adult Degree of Malnutrition: Other severe protein calorie malnutrition  Malnutrition Characteristics: Weight loss, Inadequate energy  360 Statement: Severe protein calorie malnutrition in context of chronic illness r/t poor appetite, inadequate PO intake as evidance by energy intake less than 75% compared to estimated needs>1 month, 14% wt loss in 1 month (9/17/24 114 kg, 10/19/24 97.7 kg) treated with PO diet. Once able to have po treat with Mechanical Soft diet, Magic Cup BID, Ensure Compact 1x daily.  Patient with significant weight loss and per wife with significantly decreased oral intake over the past few months and has not been eating for 1 week at the nursing home.  Weight noted in April to be 275 lb, and now weighing 215 lb  Speech therapy consult and recommend dysphagia level 1-puree with thin liquids  NG tube placed temporarily  Tube feed ordered with PO diet, Dietary to adjust based on patient nutritional needs.  General Surgery planning PEG tube placement in the OR tomorrow  Continue high-dose thiamine and electrolyte monitoring for refeeding syndrome

## 2024-10-22 NOTE — PROGRESS NOTES
Vancomycin IV Pharmacy-to-Dose Consultation    Drew Santos is a 73 y.o. male who was receiving Vancomycin IV with management by the Pharmacy Consult service for treatment of Soft tissue (goal -600, trough >10).     The patient’s Vancomycin therapy has been discontinued. Thank you for allowing us to take part in this patient's care. Pharmacy will sign-off now; please call or re-consult if there are any questions.    Natasha Quesada, PharmD

## 2024-10-22 NOTE — PERIOPERATIVE NURSING NOTE
Waiting for labs to result to make sure pt is still med/surg level of care per covering resident Dr Andre

## 2024-10-23 ENCOUNTER — ANESTHESIA (INPATIENT)
Dept: PERIOP | Facility: HOSPITAL | Age: 74
DRG: 853 | End: 2024-10-23
Payer: COMMERCIAL

## 2024-10-23 LAB
ABO GROUP BLD: NORMAL
ANION GAP SERPL CALCULATED.3IONS-SCNC: 5 MMOL/L (ref 4–13)
BACTERIA BLD CULT: ABNORMAL
BLD GP AB SCN SERPL QL: NEGATIVE
BUN SERPL-MCNC: 14 MG/DL (ref 5–25)
CALCIUM SERPL-MCNC: 7.7 MG/DL (ref 8.4–10.2)
CHLORIDE SERPL-SCNC: 118 MMOL/L (ref 96–108)
CO2 SERPL-SCNC: 27 MMOL/L (ref 21–32)
CREAT SERPL-MCNC: 0.59 MG/DL (ref 0.6–1.3)
E COLI DNA BLD POS QL NAA+NON-PROBE: DETECTED
ERYTHROCYTE [DISTWIDTH] IN BLOOD BY AUTOMATED COUNT: 17.2 % (ref 11.6–15.1)
GFR SERPL CREATININE-BSD FRML MDRD: 99 ML/MIN/1.73SQ M
GLUCOSE SERPL-MCNC: 125 MG/DL (ref 65–140)
GLUCOSE SERPL-MCNC: 170 MG/DL (ref 65–140)
GLUCOSE SERPL-MCNC: 89 MG/DL (ref 65–140)
GLUCOSE SERPL-MCNC: 95 MG/DL (ref 65–140)
GRAM STN SPEC: ABNORMAL
GRAM STN SPEC: ABNORMAL
HCT VFR BLD AUTO: 31 % (ref 36.5–49.3)
HGB BLD-MCNC: 8.4 G/DL (ref 12–17)
MCH RBC QN AUTO: 28.9 PG (ref 26.8–34.3)
MCHC RBC AUTO-ENTMCNC: 27.1 G/DL (ref 31.4–37.4)
MCV RBC AUTO: 107 FL (ref 82–98)
PLATELET # BLD AUTO: 456 THOUSANDS/UL (ref 149–390)
PMV BLD AUTO: 9.3 FL (ref 8.9–12.7)
POTASSIUM SERPL-SCNC: 3.6 MMOL/L (ref 3.5–5.3)
PROTEUS SP DNA BLD POS QL NAA+NON-PROBE: DETECTED
RBC # BLD AUTO: 2.91 MILLION/UL (ref 3.88–5.62)
RH BLD: POSITIVE
SODIUM SERPL-SCNC: 150 MMOL/L (ref 135–147)
SPECIMEN EXPIRATION DATE: NORMAL
WBC # BLD AUTO: 16.33 THOUSAND/UL (ref 4.31–10.16)

## 2024-10-23 PROCEDURE — 80048 BASIC METABOLIC PNL TOTAL CA: CPT | Performed by: PHYSICIAN ASSISTANT

## 2024-10-23 PROCEDURE — 85027 COMPLETE CBC AUTOMATED: CPT | Performed by: PHYSICIAN ASSISTANT

## 2024-10-23 PROCEDURE — 44188 LAP COLOSTOMY: CPT | Performed by: SURGERY

## 2024-10-23 PROCEDURE — 86900 BLOOD TYPING SEROLOGIC ABO: CPT | Performed by: ANESTHESIOLOGY

## 2024-10-23 PROCEDURE — 11045 DBRDMT SUBQ TISS EACH ADDL: CPT | Performed by: SURGERY

## 2024-10-23 PROCEDURE — 99233 SBSQ HOSP IP/OBS HIGH 50: CPT | Performed by: STUDENT IN AN ORGANIZED HEALTH CARE EDUCATION/TRAINING PROGRAM

## 2024-10-23 PROCEDURE — 43246 EGD PLACE GASTROSTOMY TUBE: CPT | Performed by: SURGERY

## 2024-10-23 PROCEDURE — 82948 REAGENT STRIP/BLOOD GLUCOSE: CPT

## 2024-10-23 PROCEDURE — 0QB10ZZ EXCISION OF SACRUM, OPEN APPROACH: ICD-10-PCS | Performed by: SURGERY

## 2024-10-23 PROCEDURE — 99232 SBSQ HOSP IP/OBS MODERATE 35: CPT

## 2024-10-23 PROCEDURE — NC001 PR NO CHARGE

## 2024-10-23 PROCEDURE — 11042 DBRDMT SUBQ TIS 1ST 20SQCM/<: CPT | Performed by: SURGERY

## 2024-10-23 PROCEDURE — 0D1N0Z4 BYPASS SIGMOID COLON TO CUTANEOUS, OPEN APPROACH: ICD-10-PCS | Performed by: SURGERY

## 2024-10-23 PROCEDURE — 99024 POSTOP FOLLOW-UP VISIT: CPT | Performed by: SURGERY

## 2024-10-23 PROCEDURE — 86901 BLOOD TYPING SEROLOGIC RH(D): CPT | Performed by: ANESTHESIOLOGY

## 2024-10-23 PROCEDURE — 86923 COMPATIBILITY TEST ELECTRIC: CPT

## 2024-10-23 PROCEDURE — 86850 RBC ANTIBODY SCREEN: CPT | Performed by: ANESTHESIOLOGY

## 2024-10-23 PROCEDURE — 0DH63UZ INSERTION OF FEEDING DEVICE INTO STOMACH, PERCUTANEOUS APPROACH: ICD-10-PCS | Performed by: SURGERY

## 2024-10-23 PROCEDURE — 3E0G76Z INTRODUCTION OF NUTRITIONAL SUBSTANCE INTO UPPER GI, VIA NATURAL OR ARTIFICIAL OPENING: ICD-10-PCS | Performed by: SURGERY

## 2024-10-23 DEVICE — PERCUTANEOUS ENDOSCOPIC GASTROSTOMY KIT ENFIT
Type: IMPLANTABLE DEVICE | Site: ABDOMEN | Status: FUNCTIONAL
Brand: ENDOVIVE SAFETY PEG KIT

## 2024-10-23 RX ORDER — FENTANYL CITRATE 50 UG/ML
INJECTION, SOLUTION INTRAMUSCULAR; INTRAVENOUS AS NEEDED
Status: DISCONTINUED | OUTPATIENT
Start: 2024-10-23 | End: 2024-10-23

## 2024-10-23 RX ORDER — ROCURONIUM BROMIDE 10 MG/ML
INJECTION, SOLUTION INTRAVENOUS AS NEEDED
Status: DISCONTINUED | OUTPATIENT
Start: 2024-10-23 | End: 2024-10-23

## 2024-10-23 RX ORDER — SODIUM CHLORIDE, SODIUM GLUCONATE, SODIUM ACETATE, POTASSIUM CHLORIDE, MAGNESIUM CHLORIDE, SODIUM PHOSPHATE, DIBASIC, AND POTASSIUM PHOSPHATE .53; .5; .37; .037; .03; .012; .00082 G/100ML; G/100ML; G/100ML; G/100ML; G/100ML; G/100ML; G/100ML
1000 INJECTION, SOLUTION INTRAVENOUS ONCE
Status: COMPLETED | OUTPATIENT
Start: 2024-10-23 | End: 2024-10-23

## 2024-10-23 RX ORDER — ONDANSETRON 2 MG/ML
INJECTION INTRAMUSCULAR; INTRAVENOUS AS NEEDED
Status: DISCONTINUED | OUTPATIENT
Start: 2024-10-23 | End: 2024-10-23

## 2024-10-23 RX ORDER — PROPOFOL 10 MG/ML
INJECTION, EMULSION INTRAVENOUS AS NEEDED
Status: DISCONTINUED | OUTPATIENT
Start: 2024-10-23 | End: 2024-10-23

## 2024-10-23 RX ORDER — FENTANYL CITRATE 50 UG/ML
25 INJECTION, SOLUTION INTRAMUSCULAR; INTRAVENOUS
Status: DISCONTINUED | OUTPATIENT
Start: 2024-10-23 | End: 2024-10-23 | Stop reason: HOSPADM

## 2024-10-23 RX ORDER — ONDANSETRON 2 MG/ML
4 INJECTION INTRAMUSCULAR; INTRAVENOUS EVERY 6 HOURS PRN
Status: DISCONTINUED | OUTPATIENT
Start: 2024-10-23 | End: 2024-10-23 | Stop reason: HOSPADM

## 2024-10-23 RX ORDER — SUCCINYLCHOLINE/SOD CL,ISO/PF 100 MG/5ML
SYRINGE (ML) INTRAVENOUS AS NEEDED
Status: DISCONTINUED | OUTPATIENT
Start: 2024-10-23 | End: 2024-10-23

## 2024-10-23 RX ORDER — HYDROMORPHONE HCL/PF 1 MG/ML
SYRINGE (ML) INJECTION AS NEEDED
Status: DISCONTINUED | OUTPATIENT
Start: 2024-10-23 | End: 2024-10-23

## 2024-10-23 RX ORDER — ALBUMIN, HUMAN INJ 5% 5 %
SOLUTION INTRAVENOUS CONTINUOUS PRN
Status: DISCONTINUED | OUTPATIENT
Start: 2024-10-23 | End: 2024-10-23

## 2024-10-23 RX ORDER — BUPIVACAINE HYDROCHLORIDE 5 MG/ML
INJECTION, SOLUTION EPIDURAL; INTRACAUDAL AS NEEDED
Status: DISCONTINUED | OUTPATIENT
Start: 2024-10-23 | End: 2024-10-23 | Stop reason: HOSPADM

## 2024-10-23 RX ORDER — SODIUM CHLORIDE, SODIUM LACTATE, POTASSIUM CHLORIDE, CALCIUM CHLORIDE 600; 310; 30; 20 MG/100ML; MG/100ML; MG/100ML; MG/100ML
INJECTION, SOLUTION INTRAVENOUS CONTINUOUS PRN
Status: DISCONTINUED | OUTPATIENT
Start: 2024-10-23 | End: 2024-10-23

## 2024-10-23 RX ORDER — METOPROLOL TARTRATE 1 MG/ML
5 INJECTION, SOLUTION INTRAVENOUS EVERY 6 HOURS
Status: DISCONTINUED | OUTPATIENT
Start: 2024-10-23 | End: 2024-10-27

## 2024-10-23 RX ORDER — DEXAMETHASONE SODIUM PHOSPHATE 10 MG/ML
INJECTION, SOLUTION INTRAMUSCULAR; INTRAVENOUS AS NEEDED
Status: DISCONTINUED | OUTPATIENT
Start: 2024-10-23 | End: 2024-10-23

## 2024-10-23 RX ORDER — LIDOCAINE HYDROCHLORIDE 10 MG/ML
INJECTION, SOLUTION EPIDURAL; INFILTRATION; INTRACAUDAL; PERINEURAL AS NEEDED
Status: DISCONTINUED | OUTPATIENT
Start: 2024-10-23 | End: 2024-10-23

## 2024-10-23 RX ADMIN — Medication 100 MG: at 09:15

## 2024-10-23 RX ADMIN — PROPOFOL 40 MG: 10 INJECTION, EMULSION INTRAVENOUS at 11:40

## 2024-10-23 RX ADMIN — ROCURONIUM 50 MG: 50 INJECTION, SOLUTION INTRAVENOUS at 09:55

## 2024-10-23 RX ADMIN — SODIUM CHLORIDE, SODIUM LACTATE, POTASSIUM CHLORIDE, AND CALCIUM CHLORIDE: .6; .31; .03; .02 INJECTION, SOLUTION INTRAVENOUS at 09:08

## 2024-10-23 RX ADMIN — ROCURONIUM 10 MG: 50 INJECTION, SOLUTION INTRAVENOUS at 09:15

## 2024-10-23 RX ADMIN — HEPARIN SODIUM 5000 UNITS: 5000 INJECTION INTRAVENOUS; SUBCUTANEOUS at 22:07

## 2024-10-23 RX ADMIN — ROCURONIUM 30 MG: 50 INJECTION, SOLUTION INTRAVENOUS at 10:55

## 2024-10-23 RX ADMIN — METOROPROLOL TARTRATE 5 MG: 5 INJECTION, SOLUTION INTRAVENOUS at 01:21

## 2024-10-23 RX ADMIN — ONDANSETRON 4 MG: 2 INJECTION INTRAMUSCULAR; INTRAVENOUS at 09:31

## 2024-10-23 RX ADMIN — ROCURONIUM 40 MG: 50 INJECTION, SOLUTION INTRAVENOUS at 09:19

## 2024-10-23 RX ADMIN — PHENYLEPHRINE HYDROCHLORIDE 40 MCG/MIN: 50 INJECTION INTRAVENOUS at 09:34

## 2024-10-23 RX ADMIN — DEXAMETHASONE SODIUM PHOSPHATE 4 MG: 10 INJECTION INTRAMUSCULAR; INTRAVENOUS at 09:19

## 2024-10-23 RX ADMIN — METRONIDAZOLE 500 MG: 500 INJECTION, SOLUTION INTRAVENOUS at 02:58

## 2024-10-23 RX ADMIN — METRONIDAZOLE: 500 INJECTION, SOLUTION INTRAVENOUS at 09:17

## 2024-10-23 RX ADMIN — CEFAZOLIN SODIUM 2000 MG: 2 SOLUTION INTRAVENOUS at 05:47

## 2024-10-23 RX ADMIN — METRONIDAZOLE 500 MG: 500 INJECTION, SOLUTION INTRAVENOUS at 20:15

## 2024-10-23 RX ADMIN — INSULIN LISPRO 1 UNITS: 100 INJECTION, SOLUTION INTRAVENOUS; SUBCUTANEOUS at 22:07

## 2024-10-23 RX ADMIN — SODIUM CHLORIDE, SODIUM GLUCONATE, SODIUM ACETATE, POTASSIUM CHLORIDE, MAGNESIUM CHLORIDE, SODIUM PHOSPHATE, DIBASIC, AND POTASSIUM PHOSPHATE 125 ML/HR: .53; .5; .37; .037; .03; .012; .00082 INJECTION, SOLUTION INTRAVENOUS at 23:43

## 2024-10-23 RX ADMIN — SUGAMMADEX 300 MG: 100 INJECTION, SOLUTION INTRAVENOUS at 11:33

## 2024-10-23 RX ADMIN — SODIUM CHLORIDE, SODIUM LACTATE, POTASSIUM CHLORIDE, AND CALCIUM CHLORIDE: .6; .31; .03; .02 INJECTION, SOLUTION INTRAVENOUS at 10:31

## 2024-10-23 RX ADMIN — FENTANYL CITRATE 100 MCG: 50 INJECTION INTRAMUSCULAR; INTRAVENOUS at 09:15

## 2024-10-23 RX ADMIN — METOROPROLOL TARTRATE 5 MG: 5 INJECTION, SOLUTION INTRAVENOUS at 08:12

## 2024-10-23 RX ADMIN — SODIUM CHLORIDE, SODIUM GLUCONATE, SODIUM ACETATE, POTASSIUM CHLORIDE, MAGNESIUM CHLORIDE, SODIUM PHOSPHATE, DIBASIC, AND POTASSIUM PHOSPHATE 1000 ML: .53; .5; .37; .037; .03; .012; .00082 INJECTION, SOLUTION INTRAVENOUS at 01:21

## 2024-10-23 RX ADMIN — ATORVASTATIN CALCIUM 80 MG: 80 TABLET, FILM COATED ORAL at 16:58

## 2024-10-23 RX ADMIN — LIDOCAINE HYDROCHLORIDE 100 MG: 10 INJECTION, SOLUTION EPIDURAL; INFILTRATION; INTRACAUDAL; PERINEURAL at 09:15

## 2024-10-23 RX ADMIN — HYDROMORPHONE HYDROCHLORIDE 0.5 MG: 1 INJECTION, SOLUTION INTRAMUSCULAR; INTRAVENOUS; SUBCUTANEOUS at 10:31

## 2024-10-23 RX ADMIN — PROPOFOL 120 MG: 10 INJECTION, EMULSION INTRAVENOUS at 09:15

## 2024-10-23 RX ADMIN — METOROPROLOL TARTRATE 5 MG: 5 INJECTION, SOLUTION INTRAVENOUS at 20:14

## 2024-10-23 RX ADMIN — ALBUMIN (HUMAN): 12.5 INJECTION, SOLUTION INTRAVENOUS at 09:37

## 2024-10-23 NOTE — ASSESSMENT & PLAN NOTE
Tachycardia and leukocytosis. Secondary to e col bacteremia from unclear source. Sacral wound was examined by surgery and felt it appeared noninfected at the time.  CT scan is also without any evidence of a drainable fluid collection/abscess but did show mild proctitis.  Consider urinary source in setting of chronic Parra catheter although this should promote good urinary drainage and prevent retention.  Chest x-ray without any consolidation to suggest pneumonia. LFTs mildly elevated, but no pericholecystic fluid on CT. Despite being systemically ill he's remained hemodynamically stable. This morning he is afebrile and his WBC count is trending down. General surgery did reassess and pursued operative debridement in the OR. Pus pockets and necrotic tissue/bone were encountered and debrided. New operative cultures are pending.  -antibiotic as below  -check CBCD and CMP tomorrow  -follow up blood cultures  -follow up operative cultures  -monitor vitals  -supportive care

## 2024-10-23 NOTE — ASSESSMENT & PLAN NOTE
This is likely source of patient's bacteremia above. Wound was felt to have initially developed during patient stay in skilled nursing facility for rehab.  During recent hospitalization in September there was concern for fistulous connection from rectum to the sacral wound in setting of colitis/proctitis.  Wound was debrided by general surgery during last admission and was noted to be stage IV with palpable bone.  Now latest CT showing soft tissue gas extending into right and left medial aspects of gluteus jac muscle, but no abscess. There is erosion of coccyx suggestive of osteomyelitis.  Sacral wound was initially examined by surgery and felt it appeared noninfected at the time.  They have reassessed and pursued operative debridement. Multiple pus pockets and necrotic tissue/bone were debrided. New operative cultures are pending. Given patient's failure to thrive and immobility, wound will be difficult to heal patient's family has agreed with PEG placement and loop colostomy creation. He is planned for the OR again this morning.  -antibiotic as above  -follow up operative culture  -serial skin exams  -frequent turning/repositioning to off-load pressure from the wound  -local wound care per general surgery  -continue follow up with general surgery

## 2024-10-23 NOTE — CASE MANAGEMENT
Case Management Discharge Planning Note    Patient name Drew Santos  [facility] /[facility] -* MRN [id number]  : 1950 Date 10/23/2024       Current Admission Date: 10/18/2024  Current Admission Diagnosis:Gram-negative bacteremia   Patient Active Problem List    Diagnosis Date Noted Date Diagnosed    Elevated liver enzymes 10/20/2024     Pyuria 10/19/2024     Chronic indwelling Parra catheter 10/19/2024     Hypernatremia 10/19/2024     Sacral ulcer (HCC) 10/18/2024     Advanced care planning/counseling discussion 2024     Severe protein-calorie malnutrition (HCC) 2024     Polymicrobial bacteremia 2024     Acute metabolic encephalopathy 2024     History of CVA (cerebrovascular accident) 2024     Paroxysmal atrial fibrillation (HCC) 2024     Anemia of chronic disease 2024     Gram-negative bacteremia 2024     Type 2 diabetes mellitus without complication, without long-term current use of insulin (HCC) 2024     COVID-19 2024     Proctitis 2024     Dysgeusia 2024     Gross hematuria 2024     Depression 2024     Sacral wound 2024     Primary hypertension 2024     Mixed hyperlipidemia 2024     Urinary retention 2024     Syncope 2024     Elevated lactic acid level 2024     Sepsis 2024     Abnormal CPK 2024       LOS (days): 5  Geometric Mean LOS (GMLOS) (days):   Days to GMLOS:     OBJECTIVE:  Risk of Unplanned Readmission Score: 33.93         Current admission status: Inpatient   Preferred Pharmacy:   [id number] - [place] - [address]    [place] [id number]  Phone: [phone] Fax: [phone]    Primary Care Provider: Joe Lyon MD    Primary Insurance: [facility]  Secondary Insurance: [employer]    DISCHARGE DETAILS:                                                                                                  Additional Comments: CM had been notified the wife had requested to speak with CM member.  CM called spouse.  she expressed that she was considering being able to take her  after being the hospital.  We discussed the complexity of his sacral wounds, the intensive treatment that he currently is receiving and the resources that would be required to safely manage just the wounds.  She stated that she has no other help locally.  She was informed that her  meets the definition of skilled criteria.  She verbalized understanding that he meets criteria for skilled nursing, and that since his [facility] insurance is primary he would qualify to [facility].  She also expressed concern that she received a letter from her secondary insurance that he will no longer be covered by his secondary by the start of 2025.  She was instructed to contact [facility] if necessary to determine her next steps to obtain secondary coverage starting 1/2025.  She verbalized understanding.

## 2024-10-23 NOTE — ANESTHESIA POSTPROCEDURE EVALUATION
Post-Op Assessment Note    CV Status:  Stable    Pain management: adequate       Mental Status:  Alert and awake   Hydration Status:  Euvolemic   PONV Controlled:  Controlled   Airway Patency:  Patent     Post Op Vitals Reviewed: Yes    No anethesia notable event occurred.    Staff: Anesthesiologist, CRNA           Last Filed PACU Vitals:  Vitals Value Taken Time   Temp 96.9 °F (36.1 °C) 10/23/24 1254   Pulse 68 10/23/24 1300   /63 10/23/24 1254   Resp 13 10/23/24 1300   SpO2 96 % 10/23/24 1300   Vitals shown include unfiled device data.    Modified Nancy:  Activity: 1 (10/23/2024 12:54 PM)  Respiration: 2 (10/23/2024 12:54 PM)  Circulation: 2 (10/23/2024 12:54 PM)  Consciousness: 1 (10/23/2024 12:54 PM)  Oxygen Saturation: 2 (10/23/2024 12:54 PM)  Modified Nancy Score: 8 (10/23/2024 12:54 PM)

## 2024-10-23 NOTE — ASSESSMENT & PLAN NOTE
History of stroke with L sided hemiparesis recently hospitalized at [facility] in July 2024  Baseline non-verbal, alert, at times selective in answers, can nod yes/no however unsure accuracy most information obtained per patients wife  Baseline Dysphagia 2 mechanical soft, thin liquids, patient well known to speech therapy

## 2024-10-23 NOTE — ASSESSMENT & PLAN NOTE
Lab Results   Component Value Date    HGBA1C 6.2 (H) 09/17/2024     Recent Labs     10/22/24  1053 10/22/24  1640 10/22/24  2117 10/23/24  0726   POCGLU 162* 130 106 89     Blood Sugar Average: Last 72 hrs:  (P) 154.1641780431575140  Last HgbA1C 6.2%  Continue sliding scale insulin   Hypoglycemia protocol  Monitor while on tube feeds (on hold for OR tomorrow)

## 2024-10-23 NOTE — PROGRESS NOTES
Progress Note - Surgery-General   Name: Drew Santos 73 y.o. male I MRN: [id number]  Unit/Bed#: E4 -01 I Date of Admission: 10/18/2024   Date of Service: 10/23/2024 I Hospital Day: 5     Assessment & Plan  Sacral ulcer (HCC)  Sacral ulcer previously debrided by our surgical service at bedside (most recently on 9/25) where granulation tissue was exposed. Gross size/depth of wound is visually unchanged between photos taken on 10/18 and last hospitalization. Patient discharged to nursing care facility with wound care service follow up. Return to ED w/ necrotic superficial slough of wound. Improvement of wound appearance w/ dakin soaked dressings with removal of superficial necrotic tissue.  -10/21 status post debridement in OR    Will require continued debridement due to extent. Given of wound with involvement of rectal wall, as well as patient being non ambulatory which would make this wound difficult to heal, decision made to undergo diversion with loop colostomy, further sacral debridement and PEG tube for feeding    Plan  -N.p.o preoperatively  - OR today for loop colostomy, PEG and sacral wound washout  -Multimodal pain regimen  -Continue IV antibiotics  -Rest of care per primary team  Type 2 diabetes mellitus without complication, without long-term current use of insulin (HCC)  Lab Results   Component Value Date    HGBA1C 6.2 (H) 09/17/2024       Recent Labs     10/22/24  0704 10/22/24  1053 10/22/24  1640 10/22/24  2117   POCGLU 161* 162* 130 106       Blood Sugar Average: Last 72 hrs:  (P) 159.5  -continue to trend sugars  -agree with SSI while inpatient  -hypoglycemic protocol  Severe protein-calorie malnutrition (HCC)  Malnutrition Findings:   Adult Malnutrition type: Chronic illness  Adult Degree of Malnutrition: Other severe protein calorie malnutrition  Malnutrition Characteristics: Weight loss, Inadequate energy                  360 Statement: Severe protein calorie malnutrition in context of chronic  "illness r/t poor appetite, inadequate PO intake as evidance by energy intake less than 75% compared to estimated needs>1 month, 14% wt loss in 1 month (24 114 kg, 10/19/24 97.7 kg) treated with PO diet. Once able to have po treat with Mechanical Soft diet, Magic Cup BID, Ensure Compact 1x daily.    BMI Findings:           Body mass index is 25.54 kg/m².   -OR today for feeding access with PEG  Gram-negative bacteremia  Plan  -Continue IV antibiotics  -Management per primary team      Subjective/Objective     Subjective:   Patient seen and examined at bedside, in no acute distress. No acute events overnight.     Objective:   Vitals:Blood pressure 114/86, pulse (!) 131, temperature 97.5 °F (36.4 °C), temperature source Temporal, resp. rate 18, height 6' 5\" (1.956 m), weight 97.7 kg (215 lb 6.2 oz), SpO2 96%.  Temp (24hrs), Av °F (36.7 °C), Min:97.5 °F (36.4 °C), Max:98.3 °F (36.8 °C)        Intake/Output Summary (Last 24 hours) at 10/23/2024 0641  Last data filed at 10/22/2024 2255  Gross per 24 hour   Intake --   Output 800 ml   Net -800 ml       Invasive Devices       Peripheral Intravenous Line  Duration             Peripheral IV 10/20/24 Distal;Dorsal (posterior);Left Forearm 2 days    Peripheral IV 10/21/24 Left Hand 1 day              Drain  Duration             Urethral Catheter Latex 20 Fr. 3 days    NG/OG Tube Nasogastric Left nare 2 days                    Physical Exam:  General: No acute distress  CV: Well perfused, regular rate and rhythm  Lungs: Normal work of breathing, no increased respiratory effort on RA  Abdomen: Soft, non-tender, non-distended.   Extremities: No edema, clubbing or cyanosis  Skin: Warm, dry    Lab Results: BMP/CMP:   Lab Results   Component Value Date    SODIUM 150 (H) 10/23/2024    K 3.6 10/23/2024     (H) 10/23/2024    CO2 27 10/23/2024    BUN 14 10/23/2024    CREATININE 0.59 (L) 10/23/2024    CALCIUM 7.7 (L) 10/23/2024    EGFR 99 10/23/2024    and CBC:   Lab " Results   Component Value Date    WBC 16.33 (H) 10/23/2024    HGB 8.4 (L) 10/23/2024    HCT 31.0 (L) 10/23/2024     (H) 10/23/2024     (H) 10/23/2024    RBC 2.91 (L) 10/23/2024    MCH 28.9 10/23/2024    MCHC 27.1 (L) 10/23/2024    RDW 17.2 (H) 10/23/2024    MPV 9.3 10/23/2024     VTE Prophylaxis: Sequential compression device (Venodyne)  and Heparin    Sarita Juarez MD  10/23/2024

## 2024-10-23 NOTE — PROGRESS NOTES
Progress Note - Infectious Disease   Name: Drew Santos 73 y.o. male I MRN: [id number]  Unit/Bed#: E4 -01 I Date of Admission: 10/18/2024   Date of Service: 10/23/2024 I Hospital Day: 5     Assessment & Plan  Sepsis  Tachycardia and leukocytosis. Secondary to e col bacteremia from unclear source. Sacral wound was examined by surgery and felt it appeared noninfected at the time.  CT scan is also without any evidence of a drainable fluid collection/abscess but did show mild proctitis.  Consider urinary source in setting of chronic Parra catheter although this should promote good urinary drainage and prevent retention.  Chest x-ray without any consolidation to suggest pneumonia. LFTs mildly elevated, but no pericholecystic fluid on CT. Despite being systemically ill he's remained hemodynamically stable. This morning he is afebrile and his WBC count is trending down. General surgery did reassess and pursued operative debridement in the OR. Pus pockets and necrotic tissue/bone were encountered and debrided. New operative cultures are pending.  -antibiotic as below  -check CBCD and CMP tomorrow  -follow up blood cultures  -follow up operative cultures  -monitor vitals  -supportive care  Polymicrobial bacteremia  Blood cultures now showing preliminary growth of both E. Coli, Proteus, and multiple species of bacteroids. Past episode of bacteremia felt to be secondary to sacral wound. Pathogens do match stool pathogens again. Sacral wound was initially examined by surgery and felt it appeared noninfected at the time.  They have reassessed and pursued operative debridement in the OR. Multiple pus pockets and necrotic tissue/bone were debrided. New operative cultures are pending. The patient's currently receiving  IV Ceftriaxone and PO Flagyl. He appears to be tolerating antibiotic without difficulty. I will continue this regimen for now and readjust as needed for operative cultures.  -continue IV ceftriaxone  -continue  PO flagyl  -check CBCD and CMP tomorrow  -follow up final blood culture results and sensitivities  -follow up operative culture results  -monitor vitals  Sacral ulcer (HCC)  This is likely source of patient's bacteremia above. Wound was felt to have initially developed during patient stay in skilled nursing facility for rehab.  During recent hospitalization in September there was concern for fistulous connection from rectum to the sacral wound in setting of colitis/proctitis.  Wound was debrided by general surgery during last admission and was noted to be stage IV with palpable bone.  Now latest CT showing soft tissue gas extending into right and left medial aspects of gluteus jac muscle, but no abscess. There is erosion of coccyx suggestive of osteomyelitis.  Sacral wound was initially examined by surgery and felt it appeared noninfected at the time.  They have reassessed and pursued operative debridement. Multiple pus pockets and necrotic tissue/bone were debrided. New operative cultures are pending. Given patient's failure to thrive and immobility, wound will be difficult to heal patient's family has agreed with PEG placement and loop colostomy creation. He is planned for the OR again this morning.  -antibiotic as above  -follow up operative culture  -serial skin exams  -frequent turning/repositioning to off-load pressure from the wound  -local wound care per general surgery  -continue follow up with general surgery  Pyuria  UA sent from patient's chronic Hanks catheter had innumerable WBCs. This is expected finding in setting of chronic catheter and cannot be used alone to determine UTI.  CT abdomen/pelvis was without any hydronephrosis or hydroureter.  There was some chronic appearing bilateral perinephric stranding.  The urinary bladder was noted to be collapsed around a Hanks catheter.  Hanks was exchanged this admission.  -serial exams and care of hanks  -monitor urine output  Chronic indwelling Hanks  catheter  In setting of chronic urinary retention and BPH. Catheter was exchanged this admission.  -serial exams and care of hanks  -monitor urine output  -continue follow up with urology as needed  Type 2 diabetes mellitus without complication, without long-term current use of insulin ([facility])  Lab Results   Component Value Date     HGBA1C 6.2 (H) 2024   Elevated blood glucose is risk factor for wounds and infection. Recommend tight glycemic control.  -blood glucose management per primary service   Proctitis  CT A/P from 10/18/2024 showing mild wall thickening of the rectum. CT scan back in 2024 also noted proctocolitis.  Given persistence, this may be a somewhat chronic finding.  He is not having any current diarrhea.  -antibiotic as above  -monitor GI symptoms  -monitor stool output    Above plan was discussed in detail with JACK who agrees with plan for ongoing antibiotic.  Above plan was discussed in detail with surgery, I did have to remove his sacral packing at time of my visit due to heavy stool saturation. I replaced with a single saline soaked Kerlix as patient was planned for OR shortly after my visit.    Antibiotics:  Ceftriaxone 3  Flagyl 6  Antibiotics 6    Subjective:  Unable to perform full ROS, patient more awake this morning but was not offering meaningful verbal interaction. He completed bowel prep overnight and has had multiple large liquid stools.     Objective:  Vitals:  Temp:  [97.5 °F (36.4 °C)-98.3 °F (36.8 °C)] 97.5 °F (36.4 °C)  HR:  [] 131  Resp:  [18] 18  BP: (107-114)/(59-86) 114/86  SpO2:  [95 %-96 %] 96 %  Temp (24hrs), Av °F (36.7 °C), Min:97.5 °F (36.4 °C), Max:98.3 °F (36.8 °C)  Current: Temperature: 97.5 °F (36.4 °C)    Physical Exam:   General Appearance:  More awake but minimally interactive. He appears chronically ill and debilitated. He appears comfortable supine in bed in no acute distress. NG intact.   Lungs:   Respirations unlabored on room air.    Back:   Wound dressing was heavily saturated in stool, I did remove at bedside with assistance of nursing staff and after applying saline I replaced dressing with a fluffed saline soaked kerlix and new ABD.   Genitourinary: Parra intact.   Skin: No new rashes noted on exposed skin.     Labs, Imaging, & Other studies:   All pertinent labs and imaging studies were personally reviewed  Results from last 7 days   Lab Units 10/23/24  0548 10/22/24  0446 10/21/24  2327   WBC Thousand/uL 16.33* 18.41* 18.96*   HEMOGLOBIN g/dL 8.4* 8.4* 9.0*   PLATELETS Thousands/uL 456* 503* 560*     Results from last 7 days   Lab Units 10/23/24  0548 10/21/24  2327 10/21/24  1733 10/21/24  0439 10/20/24  1552 10/20/24  0429   POTASSIUM mmol/L 3.6   < > 3.4* 3.4*   < > 3.5   CHLORIDE mmol/L 118*   < > 117* 118*   < > 120*   CO2 mmol/L 27   < > 29 28   < > 30   BUN mg/dL 14   < > 25 26*   < > 37*   CREATININE mg/dL 0.59*   < > 0.80 0.74   < > 0.74   EGFR ml/min/1.73sq m 99   < > 87 90   < > 90   CALCIUM mg/dL 7.7*   < > 8.2* 8.2*   < > 8.4   AST U/L  --   --  21 26  --  47*   ALT U/L  --   --  32 36  --  54*   ALK PHOS U/L  --   --  76 66  --  83    < > = values in this interval not displayed.     Results from last 7 days   Lab Units 10/21/24  2112 10/19/24  0938 10/18/24  1500 10/18/24  1445 10/18/24  1335   BLOOD CULTURE   --   --   --  Escherichia coli*  Proteus mirabilis*  Bacteroides vulgatus group*  Bacteroides thetaiotaomicron group*  Bacteroides caccae* Escherichia coli*  Proteus mirabilis*  Bacteroides vulgatus group*  Bacteroides thetaiotaomicron group*  Bacteroides caccae*  Bacteroides ovatus group*   GRAM STAIN RESULT  Rare Polys*  2+ Gram negative rods*  1+ Gram positive cocci in pairs*  Rare Yeast*  --   --  Gram negative rods* Gram negative rods*   URINE CULTURE   --   --  >100,000 cfu/ml Proteus mirabilis*  --   --    MRSA CULTURE ONLY   --  No Methicillin Resistant Staphlyococcus aureus (MRSA) isolated  --    --   --

## 2024-10-23 NOTE — ANESTHESIA POSTPROCEDURE EVALUATION
Post-Op Assessment Note    CV Status:  Stable  Pain Score: 0    Pain management: adequate       Mental Status:  Sleepy   Hydration Status:  Stable   PONV Controlled:  None   Airway Patency:  Patent and adequate     Post Op Vitals Reviewed: Yes    No anethesia notable event occurred.    Staff: CRNA           Last Filed PACU Vitals:  Vitals Value Taken Time   Temp 98    Pulse 74 10/23/24 1155   BP 97/53 10/23/24 1154   Resp 14    SpO2 99 % 10/23/24 1155   Vitals shown include unfiled device data.    Modified Nancy:  No data recorded

## 2024-10-23 NOTE — PLAN OF CARE
Problem: Potential for Falls  Goal: Patient will remain free of falls  Description: INTERVENTIONS:  - Educate patient/family on patient safety including physical limitations  - Instruct patient to call for assistance with activity   - Consult OT/PT to assist with strengthening/mobility   - Keep Call bell within reach  - Keep bed low and locked with side rails adjusted as appropriate  - Keep care items and personal belongings within reach  - Initiate and maintain comfort rounds  - Make Fall Risk Sign visible to staff  - Offer Toileting every 3 Hours, in advance of need  - Initiate/Maintain bed alarm  - Obtain necessary fall risk management equipment: alarm   - Apply yellow socks and bracelet for high fall risk patients  - Consider moving patient to room near nurses station  Outcome: Progressing     Problem: Prexisting or High Potential for Compromised Skin Integrity  Goal: Skin integrity is maintained or improved  Description: INTERVENTIONS:  - Identify patients at risk for skin breakdown  - Assess and monitor skin integrity  - Assess and monitor nutrition and hydration status  - Monitor labs   - Assess for incontinence   - Turn and reposition patient  - Assist with mobility/ambulation  - Relieve pressure over bony prominences  - Avoid friction and shearing  - Provide appropriate hygiene as needed including keeping skin clean and dry  - Evaluate need for skin moisturizer/barrier cream  - Collaborate with interdisciplinary team   - Patient/family teaching  - Consider wound care consult   Outcome: Progressing     Problem: PAIN - ADULT  Goal: Verbalizes/displays adequate comfort level or baseline comfort level  Description: Interventions:  - Encourage patient to monitor pain and request assistance  - Assess pain using appropriate pain scale  - Administer analgesics based on type and severity of pain and evaluate response  - Implement non-pharmacological measures as appropriate and evaluate response  - Consider  cultural and social influences on pain and pain management  - Notify physician/advanced practitioner if interventions unsuccessful or patient reports new pain  Outcome: Progressing     Problem: INFECTION - ADULT  Goal: Absence or prevention of progression during hospitalization  Description: INTERVENTIONS:  - Assess and monitor for signs and symptoms of infection  - Monitor lab/diagnostic results  - Monitor all insertion sites, i.e. indwelling lines, tubes, and drains  - Monitor endotracheal if appropriate and nasal secretions for changes in amount and color  - [place] appropriate cooling/warming therapies per order  - Administer medications as ordered  - Instruct and encourage patient and family to use good hand hygiene technique  - Identify and instruct in appropriate isolation precautions for identified infection/condition  Outcome: Progressing     Problem: DISCHARGE PLANNING  Goal: Discharge to home or other facility with appropriate resources  Description: INTERVENTIONS:  - Identify barriers to discharge w/patient and caregiver  - Arrange for needed discharge resources and transportation as appropriate  - Identify discharge learning needs (meds, wound care, etc.)  - Arrange for interpretive services to assist at discharge as needed  - Refer to Case Management Department for coordinating discharge planning if the patient needs post-hospital services based on physician/advanced practitioner order or complex needs related to functional status, cognitive ability, or social support system  Outcome: Progressing     Problem: Knowledge Deficit  Goal: Patient/family/caregiver demonstrates understanding of disease process, treatment plan, medications, and discharge instructions  Description: Complete learning assessment and assess knowledge base.  Interventions:  - Provide teaching at level of understanding  - Provide teaching via preferred learning methods  Outcome: Progressing     Problem:  Nutrition/Hydration-ADULT  Goal: Nutrient/Hydration intake appropriate for improving, restoring or maintaining nutritional needs  Description: Monitor and assess patient's nutrition/hydration status for malnutrition. Collaborate with interdisciplinary team and initiate plan and interventions as ordered.  Monitor patient's weight and dietary intake as ordered or per policy. Utilize nutrition screening tool and intervene as necessary. Determine patient's food preferences and provide high-protein, high-caloric foods as appropriate.     INTERVENTIONS:  - Monitor oral intake, urinary output, labs, and treatment plans  - Assess nutrition and hydration status and recommend course of action  - Evaluate amount of meals eaten  - Assist patient with eating if necessary   - Allow adequate time for meals  - Recommend/ encourage appropriate diets, oral nutritional supplements, and vitamin/mineral supplements  - Order, calculate, and assess calorie counts as needed  - Recommend, monitor, and adjust tube feedings and TPN/PPN based on assessed needs  - Assess need for intravenous fluids  - Provide specific nutrition/hydration education as appropriate  - Include patient/family/caregiver in decisions related to nutrition  Outcome: Progressing

## 2024-10-23 NOTE — ASSESSMENT & PLAN NOTE
Blood cultures now showing preliminary growth of both E. Coli, Proteus, and multiple species of bacteroids. Past episode of bacteremia felt to be secondary to sacral wound. Pathogens do match stool pathogens again. Sacral wound was initially examined by surgery and felt it appeared noninfected at the time.  They have reassessed and pursued operative debridement in the OR. Multiple pus pockets and necrotic tissue/bone were debrided. New operative cultures are pending. The patient's currently receiving  IV Ceftriaxone and PO Flagyl. He appears to be tolerating antibiotic without difficulty. I will continue this regimen for now and readjust as needed for operative cultures.  -continue IV ceftriaxone  -continue PO flagyl  -check CBCD and CMP tomorrow  -follow up final blood culture results and sensitivities  -follow up operative culture results  -monitor vitals

## 2024-10-23 NOTE — ASSESSMENT & PLAN NOTE
Sacral ulcer previously debrided by our surgical service at bedside (most recently on 9/25) where granulation tissue was exposed. Gross size/depth of wound is visually unchanged between photos taken on 10/18 and last hospitalization. Patient discharged to nursing care facility with wound care service follow up. Return to ED w/ necrotic superficial slough of wound. Improvement of wound appearance w/ dakin soaked dressings with removal of superficial necrotic tissue.  -10/21 status post debridement in OR    Will require continued debridement due to extent. Given of wound with involvement of rectal wall, as well as patient being non ambulatory which would make this wound difficult to heal, decision made to undergo diversion with loop colostomy, further sacral debridement and PEG tube for feeding    Plan  -N.p.o preoperatively  - OR today for loop colostomy, PEG and sacral wound washout  -Multimodal pain regimen  -Continue IV antibiotics  -Rest of care per primary team

## 2024-10-23 NOTE — ASSESSMENT & PLAN NOTE
Malnutrition Findings:   Adult Malnutrition type: Chronic illness  Adult Degree of Malnutrition: Other severe protein calorie malnutrition  Malnutrition Characteristics: Weight loss, Inadequate energy                  360 Statement: Severe protein calorie malnutrition in context of chronic illness r/t poor appetite, inadequate PO intake as evidance by energy intake less than 75% compared to estimated needs>1 month, 14% wt loss in 1 month (9/17/24 114 kg, 10/19/24 97.7 kg) treated with PO diet. Once able to have po treat with Mechanical Soft diet, Magic Cup BID, Ensure Compact 1x daily.    BMI Findings:           Body mass index is 25.54 kg/m².   -OR today for feeding access with PEG

## 2024-10-23 NOTE — PROGRESS NOTES
Deterioration Index Critical Care Recommendations  Room #: 434  Deterioration index score: 72.43%    Critical Care recommends give 1 L Isolyte. Start lopressor 5mg q6 scheduled given cannot crush patient' s home Toprol XL which has been appropriately held for this reason.    Suspect etiology of tachycardia profound volume depletion vs rebound tachycardia 2/2 metoprolol XL being held. If persistently sinus tachy, consider repeat IVF. Restart TF post procedure.    Spoke with SMITH Pittman from primary team.    Brief summary:   Critical care was brought to the patient's bedside via deterioration index alert. The alert was concerning for tachycardia, hypernatremia.     Please contact critical care via [facility] with any questions or concerns.

## 2024-10-23 NOTE — ASSESSMENT & PLAN NOTE
"Patient recently discharged 10/4 with polymicrobial bacteremia secondary to sacral wound and completed course of IV cefepime, flagyl and vancomycin followed by ampicillin. Blood cultures are growing Enterococcus, Enterobacter and Proteus last month. Patient presented to [facility] ED 10/18 from SNF due to worsening sacral wounds and possible osteomyelitis. Patient received cefepime, Flagyl and vancomycin in the ER.   CXR \"Limited study. Some atelectasis is present at the right base. No convincing evidence of pneumonia.\"  CT abdomen pelvis showing \"Large sacral decubitus ulcer extending to the coccyx with soft tissue gas now seen extending into the right and left medial aspects of the gluteus jac muscles. No drainable fluid collection. Erosion of the coccyx suggestive of osteomyelitis.\"  Blood culture x2 obtained upon admission growing Proteus and E. coli  MRSA negative  Has been afebrile since admission.  But developed tachycardia yesterday and was emergently taken to the OR for concern of necrotizing soft tissue infection  Infectious disease recs appreciated- Continue IV antibiotics with IV Rocephin. Currently Day 3. Flagyl Day 6  "

## 2024-10-23 NOTE — ASSESSMENT & PLAN NOTE
Patient readmitted with worsening prognosis with large sacral wound and developing osteomyelitis secondary to bacteremia, severe malnutrition. Extensive discussion with wife regarding patients guarded prognosis and goals of care for her . Wife opting to proceed with full medical care at this time.   Started on tube feeds with NG tube, PEG tube to be placed today 10/23   Seen in consult by palliative care, family remains goal oriented

## 2024-10-23 NOTE — ASSESSMENT & PLAN NOTE
"Noted with innumerable WBC from chronic hanks catheter sample, which cannot alone determine UTI in the setting of chronic catheter. CT scan showing \"Tiny bilateral renal cysts. Nonobstructing left renal calculi. No hydronephrosis or hydroureter. Chronic bilateral perinephric stranding. And Relatively collapsed around a Hanks catheter limiting evaluation\"  Urine culture growing Proteus  Continue IV Rocephin D 3  "

## 2024-10-23 NOTE — WOUND OSTOMY CARE
Progress Note - Stoma Marking  Drew Santos 73 y.o. male MRN: [id number]  Unit/Bed#: OR POOL Encounter: [id number]      Patient seen for stoma marking while in OR holding along with surgery team--scheduled to have loop colostomy, PEG tube and sacral wound washout today.      Reviewed rationale of stoma marking with patient, he nodded in agreement.       The patient was visualized in the laying and sitting up positions while in bed, to assess the contours of the abdomen. Patient was unable to engage his rectus muscles.  Nipple line used for rectus reference point.  Due to the insertion of PEG tube as well, a LUQ stoma would be difficult to pouch due to lack of territory.  There is a small crease just below the umbilicus on visible in full sitting position.  No obvious hernias or abdominal scars appreciated.      The abdominal crease just below the umbilicus and the level at which the abdomen is visible when sitting up was marked with a skin marker.  It was decided the ideal placement would be between these two markings.  This area is relatively flat, likely within the rectus muscle, and is in good view for ostomy care. It is away from bony prominences, scars, PEG insertion site, and the umbilicus.      Stoma marking is a guide, and the final location of the stoma will be determined by the surgeon.      Patient verbalized understanding.     Wound care team will follow along with patient during inpatient stay for ostomy teaching and education.     Genevieve SRN, RN, CWON

## 2024-10-23 NOTE — ASSESSMENT & PLAN NOTE
Lab Results   Component Value Date    HGBA1C 6.2 (H) 09/17/2024       Recent Labs     10/22/24  0704 10/22/24  1053 10/22/24  1640 10/22/24  2117   POCGLU 161* 162* 130 106       Blood Sugar Average: Last 72 hrs:  (P) 159.5  -continue to trend sugars  -agree with SSI while inpatient  -hypoglycemic protocol

## 2024-10-23 NOTE — PLAN OF CARE
Problem: Potential for Falls  Goal: Patient will remain free of falls  Description: INTERVENTIONS:  - Educate patient/family on patient safety including physical limitations  - Instruct patient to call for assistance with activity   - Consult OT/PT to assist with strengthening/mobility   - Keep Call bell within reach  - Keep bed low and locked with side rails adjusted as appropriate  - Keep care items and personal belongings within reach  - Initiate and maintain comfort rounds  - Make Fall Risk Sign visible to staff  - Offer Toileting every Hours, in advance of need  - Initiate/Maintain alarm  - Obtain necessary fall risk management equipment:   - Apply yellow socks and bracelet for high fall risk patients  - Consider moving patient to room near nurses station  Outcome: Progressing     Problem: Prexisting or High Potential for Compromised Skin Integrity  Goal: Skin integrity is maintained or improved  Description: INTERVENTIONS:  - Identify patients at risk for skin breakdown  - Assess and monitor skin integrity  - Assess and monitor nutrition and hydration status  - Monitor labs   - Assess for incontinence   - Turn and reposition patient  - Assist with mobility/ambulation  - Relieve pressure over bony prominences  - Avoid friction and shearing  - Provide appropriate hygiene as needed including keeping skin clean and dry  - Evaluate need for skin moisturizer/barrier cream  - Collaborate with interdisciplinary team   - Patient/family teaching  - Consider wound care consult   Outcome: Progressing     Problem: PAIN - ADULT  Goal: Verbalizes/displays adequate comfort level or baseline comfort level  Description: Interventions:  - Encourage patient to monitor pain and request assistance  - Assess pain using appropriate pain scale  - Administer analgesics based on type and severity of pain and evaluate response  - Implement non-pharmacological measures as appropriate and evaluate response  - Consider cultural and social  influences on pain and pain management  - Notify physician/advanced practitioner if interventions unsuccessful or patient reports new pain  Outcome: Progressing     Problem: INFECTION - ADULT  Goal: Absence or prevention of progression during hospitalization  Description: INTERVENTIONS:  - Assess and monitor for signs and symptoms of infection  - Monitor lab/diagnostic results  - Monitor all insertion sites, i.e. indwelling lines, tubes, and drains  - Monitor endotracheal if appropriate and nasal secretions for changes in amount and color  - [place] appropriate cooling/warming therapies per order  - Administer medications as ordered  - Instruct and encourage patient and family to use good hand hygiene technique  - Identify and instruct in appropriate isolation precautions for identified infection/condition  Outcome: Progressing     Problem: DISCHARGE PLANNING  Goal: Discharge to home or other facility with appropriate resources  Description: INTERVENTIONS:  - Identify barriers to discharge w/patient and caregiver  - Arrange for needed discharge resources and transportation as appropriate  - Identify discharge learning needs (meds, wound care, etc.)  - Arrange for interpretive services to assist at discharge as needed  - Refer to Case Management Department for coordinating discharge planning if the patient needs post-hospital services based on physician/advanced practitioner order or complex needs related to functional status, cognitive ability, or social support system  Outcome: Progressing     Problem: Knowledge Deficit  Goal: Patient/family/caregiver demonstrates understanding of disease process, treatment plan, medications, and discharge instructions  Description: Complete learning assessment and assess knowledge base.  Interventions:  - Provide teaching at level of understanding  - Provide teaching via preferred learning methods  Outcome: Progressing     Problem: Nutrition/Hydration-ADULT  Goal:  Nutrient/Hydration intake appropriate for improving, restoring or maintaining nutritional needs  Description: Monitor and assess patient's nutrition/hydration status for malnutrition. Collaborate with interdisciplinary team and initiate plan and interventions as ordered.  Monitor patient's weight and dietary intake as ordered or per policy. Utilize nutrition screening tool and intervene as necessary. Determine patient's food preferences and provide high-protein, high-caloric foods as appropriate.     INTERVENTIONS:  - Monitor oral intake, urinary output, labs, and treatment plans  - Assess nutrition and hydration status and recommend course of action  - Evaluate amount of meals eaten  - Assist patient with eating if necessary   - Allow adequate time for meals  - Recommend/ encourage appropriate diets, oral nutritional supplements, and vitamin/mineral supplements  - Order, calculate, and assess calorie counts as needed  - Recommend, monitor, and adjust tube feedings and TPN/PPN based on assessed needs  - Assess need for intravenous fluids  - Provide specific nutrition/hydration education as appropriate  - Include patient/family/caregiver in decisions related to nutrition  Outcome: Progressing

## 2024-10-23 NOTE — PROGRESS NOTES
"Progress Note - Hospitalist   Name: Drew Santos 73 y.o. male I MRN: [id number]  Unit/Bed#: OR POOL I Date of Admission: 10/18/2024   Date of Service: 10/23/2024 I Hospital Day: 5    Assessment & Plan  Gram-negative bacteremia  Blood cultures growing gram-negative rods, E. coli and Proteus  Infectious disease following for antibiotic management  Continue IV Rocephin  Right upper quadrant ultrasound performed, no evidence of acute cholecystitis  Surgery took patient to the OR emergently 10/21 for concern of necrotizing soft tissue infection  Plan for repeat washout with end colostomy and PEG tube placement on 10/23/24   Sepsis  Patient recently discharged 10/4 with polymicrobial bacteremia secondary to sacral wound and completed course of IV cefepime, flagyl and vancomycin followed by ampicillin. Blood cultures are growing Enterococcus, Enterobacter and Proteus last month. Patient presented to [facility] ED 10/18 from SNF due to worsening sacral wounds and possible osteomyelitis. Patient received cefepime, Flagyl and vancomycin in the ER.   CXR \"Limited study. Some atelectasis is present at the right base. No convincing evidence of pneumonia.\"  CT abdomen pelvis showing \"Large sacral decubitus ulcer extending to the coccyx with soft tissue gas now seen extending into the right and left medial aspects of the gluteus jac muscles. No drainable fluid collection. Erosion of the coccyx suggestive of osteomyelitis.\"  Blood culture x2 obtained upon admission growing Proteus and E. coli  MRSA negative  Has been afebrile since admission.  But developed tachycardia yesterday and was emergently taken to the OR for concern of necrotizing soft tissue infection  Infectious disease recs appreciated- Continue IV antibiotics with IV Rocephin. Currently Day 3. Flagyl Day 6  Elevated liver enzymes  POA mild elevation , ALT 99, without guarding    Resolved  Right upper quadrant ultrasound without evidence of acute " "cholecystitis  Sacral ulcer (HCC)  Patient has at least 2 sacral ulcers. At least one of them is stage IV with palpable bone. Possible osteomyelitis. Surgery has examined patient's sacral wound and lower concern for acute infection, though may be source of translocation.   Surgery consult appreciated; recommend initiation of 0.25% Dakin soaked guaze packing TID while inpatient   Q2hr turns as patient tolerates  Taken to the OR on 10/22 for debridement, plan for repeat debridement tomorrow  Pyuria  Noted with innumerable WBC from chronic hanks catheter sample, which cannot alone determine UTI in the setting of chronic catheter. CT scan showing \"Tiny bilateral renal cysts. Nonobstructing left renal calculi. No hydronephrosis or hydroureter. Chronic bilateral perinephric stranding. And Relatively collapsed around a Hanks catheter limiting evaluation\"  Urine culture growing Proteus  Continue IV Rocephin D 3  Hypernatremia  Sodium 154 at time of admission, likely hypovolemic hypernatremia related to mild dehydration secondary to poor water intake  Continue IV fluids  Start free water flushes once allowed diet  Chronic indwelling Hanks catheter  Chronic urethral hanks catheter secondary to chronic urinary retention, BPH.  Hanks patent at this time   Hanks was exchanged 10/19  Continue tamsulosin and finasteride  Proctitis  Noted on CT scan with history of this as possibly chronic. No diarrhea or clinical evidence of colitis.   Type 2 diabetes mellitus without complication, without long-term current use of insulin ([facility])  Lab Results   Component Value Date    HGBA1C 6.2 (H) 09/17/2024     Recent Labs     10/22/24  1053 10/22/24  1640 10/22/24  2117 10/23/24  0726   POCGLU 162* 130 106 89     Blood Sugar Average: Last 72 hrs:  (P) [id number]  Last HgbA1C 6.2%  Continue sliding scale insulin   Hypoglycemia protocol  Monitor while on tube feeds (on hold for OR tomorrow)  Paroxysmal atrial fibrillation (HCC)  Eliquis " held for surgery  Continue pta metoprolol with hold parameters  Anemia of chronic disease  Has chronic anemia likely secondary to poor nutritional status underlying chronic illness.   Hemoglobin stable at 8.4  Hemodynamically stable at this time  Trend with daily CBC  Will type and screen prior to the OR tomorrow  Severe protein-calorie malnutrition (HCC)  Malnutrition Findings:   ChronicAdult Malnutrition type: Chronic illness  Adult Degree of Malnutrition: Other severe protein calorie malnutrition  Malnutrition Characteristics: Weight loss, Inadequate energy  360 Statement: Severe protein calorie malnutrition in context of chronic illness r/t poor appetite, inadequate PO intake as evidance by energy intake less than 75% compared to estimated needs>1 month, 14% wt loss in 1 month (9/17/24 114 kg, 10/19/24 97.7 kg) treated with PO diet. Once able to have po treat with Mechanical Soft diet, Magic Cup BID, Ensure Compact 1x daily.  Patient with significant weight loss and per wife with significantly decreased oral intake over the past few months and has not been eating for 1 week at the nursing home.  Weight noted in April to be 275 lb, and now weighing 215 lb  Speech therapy consult and recommend dysphagia level 1-puree with thin liquids  NG tube placed temporarily  Tube feed ordered with PO diet, Dietary to adjust based on patient nutritional needs.  General Surgery planning PEG tube placement in the OR tomorrow  Continue high-dose thiamine and electrolyte monitoring for refeeding syndrome    History of CVA (cerebrovascular accident)  History of stroke with L sided hemiparesis recently hospitalized at [facility] in July 2024  Baseline non-verbal, alert, at times selective in answers, can nod yes/no however unsure accuracy most information obtained per patients wife  Baseline Dysphagia 2 mechanical soft, thin liquids, patient well known to speech therapy  Advanced care planning/counseling discussion  Patient  readmitted with worsening prognosis with large sacral wound and developing osteomyelitis secondary to bacteremia, severe malnutrition. Extensive discussion with wife regarding patients guarded prognosis and goals of care for her . Wife opting to proceed with full medical care at this time.   Started on tube feeds with NG tube, PEG tube to be placed today 10/23   Seen in consult by palliative care, family remains goal oriented  Polymicrobial bacteremia      VTE Pharmacologic Prophylaxis: VTE Score: 8 High Risk (Score >/= 5) - Pharmacological DVT Prophylaxis Ordered: apixaban (Eliquis). Sequential Compression Devices Ordered.    Mobility:   Basic Mobility Inpatient Raw Score: 6  JH-HLM Goal: 2: Bed activities/Dependent transfer  JH-HLM Achieved: 2: Bed activities/Dependent transfer  JH-HLM Goal achieved. Continue to encourage appropriate mobility.    Patient Centered Rounds: I performed bedside rounds with nursing staff today.   Discussions with Specialists or Other Care Team Provider: Infectious Disease     Education and Discussions with Family / Patient: Updated  (wife) via phone.    Current Length of Stay: 5 day(s)  Current Patient Status: Inpatient   Certification Statement: The patient will continue to require additional inpatient hospital stay due to IV antibiotics, OR for PEG tube placement   Discharge Plan: Anticipate discharge in >72 hrs to pending rehab evals     Code Status: Level 1 - Full Code    Subjective   Patient seen at bedside. He is alert this morning. Continues to remain minimally responsive. Updated Wife via phone of plan for OR today and continuation of IV antibiotics     Objective :  Temp:  [97.5 °F (36.4 °C)-98.3 °F (36.8 °C)] 97.7 °F (36.5 °C)  HR:  [] 113  BP: (110-130)/(64-87) 130/87  Resp:  [18] 18  SpO2:  [95 %-98 %] 98 %  O2 Device: None (Room air)    Body mass index is 25.54 kg/m².     Input and Output Summary (last 24 hours):     Intake/Output Summary (Last 24  hours) at 10/23/2024 1116  Last data filed at 10/23/2024 1102  Gross per 24 hour   Intake 2050 ml   Output 1575 ml   Net 475 ml       Physical Exam  Constitutional:       Appearance: He is not diaphoretic.   HENT:      Head:      Comments: NG tube in place   Eyes:      General: No scleral icterus.     Pupils: Pupils are equal, round, and reactive to light.   Cardiovascular:      Rate and Rhythm: Normal rate and regular rhythm.      Heart sounds: No murmur heard.  Pulmonary:      Effort: No respiratory distress.      Breath sounds: Normal breath sounds.   Abdominal:      General: There is no distension.      Palpations: Abdomen is soft.   Neurological:      Mental Status: He is alert.           Lines/Drains:  Lines/Drains/Airways       Active Status       Name Placement date Placement time Site Days    Colostomy Loop LLQ 10/23/24  1038  LLQ  less than 1    Urethral Catheter Latex 20 Fr. 10/19/24  1209  Latex  3    NG/OG Tube Nasogastric Left nare 10/20/24  1518  Left nare  2                  Urinary Catheter:  Goal for removal: N/A - Chronic Parra                 Lab Results: I have reviewed the following results:   Results from last 7 days   Lab Units 10/23/24  0548 10/22/24  0446 10/21/24  2327   WBC Thousand/uL 16.33*   < > 18.96*   HEMOGLOBIN g/dL 8.4*   < > 9.0*   HEMATOCRIT % 31.0*   < > 31.3*   PLATELETS Thousands/uL 456*   < > 560*   SEGS PCT %  --   --  80*   LYMPHO PCT %  --   --  14   MONO PCT %  --   --  4   EOS PCT %  --   --  1    < > = values in this interval not displayed.     Results from last 7 days   Lab Units 10/23/24  0548 10/21/24  2327 10/21/24  1733   SODIUM mmol/L 150*   < > 148*   POTASSIUM mmol/L 3.6   < > 3.4*   CHLORIDE mmol/L 118*   < > 117*   CO2 mmol/L 27   < > 29   BUN mg/dL 14   < > 25   CREATININE mg/dL 0.59*   < > 0.80   ANION GAP mmol/L 5   < > 2*   CALCIUM mg/dL 7.7*   < > 8.2*   ALBUMIN g/dL  --   --  2.0*   TOTAL BILIRUBIN mg/dL  --   --  0.48   ALK PHOS U/L  --   --  76   ALT  U/L  --   --  32   AST U/L  --   --  21   GLUCOSE RANDOM mg/dL 95   < > 265*    < > = values in this interval not displayed.     Results from last 7 days   Lab Units 10/18/24  1445   INR  1.57*     Results from last 7 days   Lab Units 10/23/24  0726 10/22/24  2117 10/22/24  1640 10/22/24  1053 10/22/24  0704 10/21/24  2333 10/21/24  1459 10/21/24  1133 10/21/24  0709 10/20/24  2131 10/20/24  1614 10/20/24  1125   POC GLUCOSE mg/dl 89 106 130 162* 161* 155* 229* 191* 187* 168* 165* 143*         Results from last 7 days   Lab Units 10/21/24  2327 10/21/24  1734 10/18/24  2244 10/18/24  1445   LACTIC ACID mmol/L 1.8 1.5 1.6 1.8   PROCALCITONIN ng/ml  --   --   --  0.72*       Recent Cultures (last 7 days):   Results from last 7 days   Lab Units 10/21/24  2112 10/18/24  1500 10/18/24  1445 10/18/24  1335   BLOOD CULTURE   --   --  Escherichia coli*  Proteus mirabilis*  Bacteroides vulgatus group*  Bacteroides thetaiotaomicron group*  Bacteroides caccae* Escherichia coli*  Proteus mirabilis*  Bacteroides vulgatus group*  Bacteroides thetaiotaomicron group*  Bacteroides caccae*  Bacteroides ovatus group*   GRAM STAIN RESULT  Rare Polys*  2+ Gram negative rods*  1+ Gram positive cocci in pairs*  Rare Yeast*  --  Gram negative rods* Gram negative rods*   URINE CULTURE   --  >100,000 cfu/ml Proteus mirabilis*  --   --        Imaging Results Review: No pertinent imaging studies reviewed.  Other Study Results Review: No additional pertinent studies reviewed.    Last 24 Hours Medication List:     Current Facility-Administered Medications:     [Transfer Hold] acetaminophen (TYLENOL) tablet 650 mg, Q4H PRN    [Transfer Hold] aspirin chewable tablet 81 mg, Daily    [Transfer Hold] atorvastatin (LIPITOR) tablet 80 mg, QPM    [Transfer Hold] bisacodyl (DULCOLAX) rectal suppository 10 mg, Daily PRN    bupivacaine (PF) (MARCAINE) 0.5 % injection, PRN    [Transfer Hold] cefTRIAXone (ROCEPHIN) 2,000 mg in dextrose 5 % 50 mL  IVPB, Q24H, Last Rate: 2,000 mg (10/22/24 1216)    [Transfer Hold] escitalopram (LEXAPRO) tablet 10 mg, Daily    [Transfer Hold] finasteride (PROSCAR) tablet 5 mg, Daily    [Transfer Hold] heparin (porcine) subcutaneous injection 5,000 Units, Q8H ELISEO    [Transfer Hold] insulin lispro (HumALOG/ADMELOG) 100 units/mL subcutaneous injection 1-5 Units, TID AC **AND** Fingerstick Glucose (POCT), TID AC    [Transfer Hold] insulin lispro (HumALOG/ADMELOG) 100 units/mL subcutaneous injection 1-5 Units, HS    [Held by provider] lisinopril (ZESTRIL) tablet 10 mg, Daily    [Transfer Hold] metoprolol (LOPRESSOR) injection 2.5 mg, Q6H PRN    [Transfer Hold] metoprolol (LOPRESSOR) injection 5 mg, Q6H    [Transfer Hold] metroNIDAZOLE (FLAGYL) IVPB (premix) 500 mg 100 mL, Q8H, Last Rate: 500 mg (10/23/24 0258)    multi-electrolyte (PLASMALYTE-A/ISOLYTE-S PH 7.4) IV solution, Continuous, Last Rate: Stopped (10/23/24 0908)    [Transfer Hold] multivitamin stress formula tablet 1 tablet, Daily    [Transfer Hold] ondansetron (ZOFRAN) injection 4 mg, Q6H PRN    [Transfer Hold] senna (SENOKOT) tablet 17.2 mg, Daily PRN    [Transfer Hold] tamsulosin (FLOMAX) capsule 0.4 mg, Daily With Dinner    [Transfer Hold] thiamine (VITAMIN B1) 100 mg in sodium chloride 0.9 % 50 mL IVPB, Daily, Last Rate: 100 mg (10/22/24 0817)    Facility-Administered Medications Ordered in Other Encounters:     albumin human (FLEXBUMIN) 5 % injection, Continuous PRN, Last Rate: Stopped (10/23/24 0955)    dexamethasone (PF) (DECADRON) injection, PRN    fentaNYL injection, PRN    HYDROmorphone (DILAUDID) injection, PRN    lactated ringers infusion, Continuous PRN    lidocaine (PF) (XYLOCAINE-MPF) 1 % injection, PRN    ondansetron (ZOFRAN) injection, PRN    phenylephrine (ALEXIS-SYNEPHRINE) 50 mg (STANDARD CONCENTRATION) in sodium chloride 0.9% 250 mL, Continuous PRN, Last Rate: 20 mcg/min (10/23/24 1040)    phenylephrine bolus from bag, PRN    propofol (DIPRIVAN) 200  MG/20ML bolus injection, PRN    ROCuronium (ZEMURON) injection, PRN    Succinylcholine Chloride 100 mg/5 mL syringe, PRN    Administrative Statements   Today, Patient Was Seen By: Shiela Flowers PA-C      **Please Note: This note may have been constructed using a voice recognition system.**

## 2024-10-23 NOTE — OP NOTE
OPERATIVE REPORT  PATIENT NAME: Drew Santos    :  1950  MRN: [id number]  Pt Location: [facility] OR ROOM 03    SURGERY DATE: 10/23/2024    Surgeons and Role:     * Davonte Banegas,  - Primary     * Alfred Torres MD - Assisting    Preop Diagnosis:  Wound of sacral region, initial encounter [S31.000A]  Severe protein-calorie malnutrition (HCC) [E43]    Post-Op Diagnosis Codes:     * Wound of sacral region, initial encounter [S31.000A]     * Severe protein-calorie malnutrition (HCC) [E43]    Procedure(s):  Laparoscopic loop colostomy, sacral debridement and washout. Percutaneous Endoscopic Gastrostomy tube placement.    Specimen(s):  * No specimens in log *    Estimated Blood Loss:   75 mL    Drains:  Colostomy Loop LLQ (Active)   Stomal Appliance 2 piece;Clean;Dry;Intact 10/23/24 1154   Stoma Assessment Red;Budded 10/23/24 1154   Stoma Shape Round;Budded 10/23/24 1154   Peristomal Assessment Clean;Intact 10/23/24 1154   Number of days: 0       Urethral Catheter Latex 20 Fr. (Active)   Reasons to continue Urinary Catheter  Chronic urinary catheter 10/22/24 2301   Goal for Removal N/A- chronic hanks 10/22/24 2301   Site Assessment Clean;Skin intact 10/23/24 1154   Hanks Care Done 10/22/24 2100   Collection Container Standard drainage bag 10/23/24 1154   Securement Method Securing device (Describe) 10/23/24 1154   Output (mL) 200 mL 10/22/24 0454   Number of days: 4       NG/OG Tube Nasogastric Left nare (Active)   Site Assessment Clean;Dry;Intact 10/22/24 2316   Status Clamped 10/20/24 1802   Number of days: 3       [REMOVED] Urethral Catheter 16 Fr. (Removed)   Number of days: 5       [REMOVED] Urethral Catheter Double-lumen 16 Fr. (Removed)   Number of days: 7       [REMOVED] Urethral Catheter Three way 22 Fr. (Removed)   Number of days: 49       [REMOVED] External Urinary Catheter (Removed)   Number of days: 0       [REMOVED] Continuous Bladder Irrigation Three-way (Removed)   Number of days:  52       Anesthesia Type:   General    Operative Indications:  Wound of sacral region, initial encounter [S31.000A]  Severe protein-calorie malnutrition (HCC) [E43]    Operative Findings:  Redundant sigmoid colon easily reaching to abdominal wall without tension. PEG placed with appropriate trans illumination and movement with palpation, 3cm at the skin. Sacral wound debrided to bleeding, viable tissues, rectum able to be visualized at deep extent of wound.      Complications:   None    Procedure and Technique:  The patient was brought to the operating room and placed prone on the table and prepped and draped in usual sterile manner. Timeout was performed and all elements of timeout reviewed and confirmed. The sacral wound was debrided of non-viable tissue, a prolene suture was noted in the wound bed marking the rectum. Electrocautery was used to excise the necrotic tissue. Hemostasis was obtained using electrocautery. The wound was then copiously irrigated using pulse lavage. The wound was packed with saline moistened kerlix and covered with ABD pads.    The patient was then switched to the supine positioned and again prepped and draped. A second timeout was performed and all elements of timeout reviewed and confirmed. Laparoscopic equipment was checked and deemed adequate. An infraumbilical incision was made and carried down to the level of the fascia using blunt dissection. The fascia was grasped with two kocher's and incised sharply. A 5 mm trocar was then placed through this incision. The laparoscope was inserted and the surrounding intra-abdominal contents were inspected for injury upon entry, and none were appreciated. An additional right lower quadrant 5 mm trocar was placed under laparoscopic visualization. The abdomen was inspected and showed a redundant sigmoid colon. The sigmoid colon was easily able to be held up to the abdominal wall without tension. An appropriate site on the skin was marked in the  left lower quadrant and excised with electrocautery down to the fascia. The fascia was then incised in a cruciate shape. The muscle was spread open with a hemostat. Two fingers were placed through the opening to ensure it was appropriate in size. The sigmoid colon was then delivered through this opening. An ostomy bar was then placed underneath the colon. The colon was opened transversely until two lumens were evident. The colostomy was then matured using the hortensia technique using 3-0 vicryl sutures. The proximal and distal lumen were then digitized below the level of fasica. The ostomy was then covered to exclude it from the field. Attention was then turned to PEG placement.     The gastroscopic equipment was checked and deemed adequate. The gastroscope was then inserted orally and into the esophagus, entirety of the stomach were inspected and no gross abnormalities were noted. The abdomen was then transilluminated and a finding needle was inserted into the gastric lumen under direct visualization. A guidewire was then passed through the needle and snared with an endoscopic snare, brought out through the oral cavity and using the pull technique a PEG tube was passed into the stomach and brought out a stab incision on the abdominal wall. The PEG was visualized in the stomach with the gastroscope and spun easily. The PEG depth was 3cm at the skin. The stomach was decompressed and the gastroscope removed. The skin incisions were then closed using sterile gloves and instruments using 4-0 monocryl subcuticular sutures and covered with skin glue. An ostomy appliance was fitted over the colostomy. All counts were correct at the conclusion of the case.    Sacral Debridement Method:  _X_ Excisional  ___ Non-excisional debridement  Instrument:  ___  Blade  _X_ Scalpel  ___ Scissors  ___Lavage  _X__Other, please specify: Electrocautery  Depth:  ___ Subcutaneous  ___ Fascia  ___ Muscle  _X_ Bone    Size of the wound: 15 cm x  20 cm x 3.5 cm  Wound description: Necrotic tissue debrided to healthy bleeding tissue. Rectum evident in deepest extent of the wound.    Indicate if the procedure extended to the wound margins? _X_ Yes  ___ No  Indicate if the procedure extended to bleeding tissue? _X_ Yes  ___ No     Dr. Banegas was present for the entire procedure.    Patient Disposition:  PACU  and extubated and stable    This procedure was not performed to treat colon cancer through resection       SIGNATURE: Alfred Torres MD  DATE: October 23, 2024  TIME: 12:11 PM

## 2024-10-23 NOTE — ASSESSMENT & PLAN NOTE
Blood cultures growing gram-negative rods, E. coli and Proteus  Infectious disease following for antibiotic management  Continue IV Rocephin  Right upper quadrant ultrasound performed, no evidence of acute cholecystitis  Surgery took patient to the OR emergently 10/21 for concern of necrotizing soft tissue infection  Plan for repeat washout with end colostomy and PEG tube placement on 10/23/24

## 2024-10-23 NOTE — ASSESSMENT & PLAN NOTE
Patient has at least 2 sacral ulcers. At least one of them is stage IV with palpable bone. Possible osteomyelitis. Surgery has examined patient's sacral wound and lower concern for acute infection, though may be source of translocation.   Surgery consult appreciated; recommend initiation of 0.25% Dakin soaked guaze packing TID while inpatient   Q2hr turns as patient tolerates  Taken to the OR on 10/22 for debridement, plan for repeat debridement tomorrow

## 2024-10-24 PROBLEM — Z93.1 S/P PERCUTANEOUS ENDOSCOPIC GASTROSTOMY (PEG) TUBE PLACEMENT (HCC): Status: ACTIVE | Noted: 2024-10-24

## 2024-10-24 LAB
ANION GAP SERPL CALCULATED.3IONS-SCNC: 3 MMOL/L (ref 4–13)
BACTERIA SPEC ANAEROBE CULT: ABNORMAL
BASOPHILS # BLD AUTO: 0.02 THOUSANDS/ΜL (ref 0–0.1)
BASOPHILS NFR BLD AUTO: 0 % (ref 0–1)
BUN SERPL-MCNC: 17 MG/DL (ref 5–25)
CALCIUM SERPL-MCNC: 7.2 MG/DL (ref 8.4–10.2)
CHLORIDE SERPL-SCNC: 116 MMOL/L (ref 96–108)
CO2 SERPL-SCNC: 29 MMOL/L (ref 21–32)
CREAT SERPL-MCNC: 0.66 MG/DL (ref 0.6–1.3)
EOSINOPHIL # BLD AUTO: 0.03 THOUSAND/ΜL (ref 0–0.61)
EOSINOPHIL NFR BLD AUTO: 0 % (ref 0–6)
ERYTHROCYTE [DISTWIDTH] IN BLOOD BY AUTOMATED COUNT: 17.2 % (ref 11.6–15.1)
GFR SERPL CREATININE-BSD FRML MDRD: 94 ML/MIN/1.73SQ M
GLUCOSE SERPL-MCNC: 174 MG/DL (ref 65–140)
GLUCOSE SERPL-MCNC: 181 MG/DL (ref 65–140)
GLUCOSE SERPL-MCNC: 206 MG/DL (ref 65–140)
GLUCOSE SERPL-MCNC: 210 MG/DL (ref 65–140)
GLUCOSE SERPL-MCNC: 213 MG/DL (ref 65–140)
HCT VFR BLD AUTO: 21.4 % (ref 36.5–49.3)
HCT VFR BLD AUTO: 24.8 % (ref 36.5–49.3)
HGB BLD-MCNC: 6.2 G/DL (ref 12–17)
HGB BLD-MCNC: 7.5 G/DL (ref 12–17)
IMM GRANULOCYTES # BLD AUTO: 0.16 THOUSAND/UL (ref 0–0.2)
IMM GRANULOCYTES NFR BLD AUTO: 1 % (ref 0–2)
LYMPHOCYTES # BLD AUTO: 2.34 THOUSANDS/ΜL (ref 0.6–4.47)
LYMPHOCYTES NFR BLD AUTO: 14 % (ref 14–44)
MAGNESIUM SERPL-MCNC: 2 MG/DL (ref 1.9–2.7)
MCH RBC QN AUTO: 28.7 PG (ref 26.8–34.3)
MCHC RBC AUTO-ENTMCNC: 29 G/DL (ref 31.4–37.4)
MCV RBC AUTO: 99 FL (ref 82–98)
MONOCYTES # BLD AUTO: 0.54 THOUSAND/ΜL (ref 0.17–1.22)
MONOCYTES NFR BLD AUTO: 3 % (ref 4–12)
NEUTROPHILS # BLD AUTO: 13.12 THOUSANDS/ΜL (ref 1.85–7.62)
NEUTS SEG NFR BLD AUTO: 82 % (ref 43–75)
NRBC BLD AUTO-RTO: 0 /100 WBCS
PHOSPHATE SERPL-MCNC: 2.4 MG/DL (ref 2.3–4.1)
PLATELET # BLD AUTO: 425 THOUSANDS/UL (ref 149–390)
PMV BLD AUTO: 9.9 FL (ref 8.9–12.7)
POTASSIUM SERPL-SCNC: 3.6 MMOL/L (ref 3.5–5.3)
RBC # BLD AUTO: 2.16 MILLION/UL (ref 3.88–5.62)
SODIUM SERPL-SCNC: 148 MMOL/L (ref 135–147)
WBC # BLD AUTO: 16.21 THOUSAND/UL (ref 4.31–10.16)

## 2024-10-24 PROCEDURE — 83735 ASSAY OF MAGNESIUM: CPT | Performed by: NURSE PRACTITIONER

## 2024-10-24 PROCEDURE — 85014 HEMATOCRIT: CPT

## 2024-10-24 PROCEDURE — 92526 ORAL FUNCTION THERAPY: CPT

## 2024-10-24 PROCEDURE — 85018 HEMOGLOBIN: CPT

## 2024-10-24 PROCEDURE — 99233 SBSQ HOSP IP/OBS HIGH 50: CPT | Performed by: STUDENT IN AN ORGANIZED HEALTH CARE EDUCATION/TRAINING PROGRAM

## 2024-10-24 PROCEDURE — 85025 COMPLETE CBC W/AUTO DIFF WBC: CPT | Performed by: NURSE PRACTITIONER

## 2024-10-24 PROCEDURE — 80048 BASIC METABOLIC PNL TOTAL CA: CPT | Performed by: NURSE PRACTITIONER

## 2024-10-24 PROCEDURE — 30233N1 TRANSFUSION OF NONAUTOLOGOUS RED BLOOD CELLS INTO PERIPHERAL VEIN, PERCUTANEOUS APPROACH: ICD-10-PCS

## 2024-10-24 PROCEDURE — 99232 SBSQ HOSP IP/OBS MODERATE 35: CPT

## 2024-10-24 PROCEDURE — 82948 REAGENT STRIP/BLOOD GLUCOSE: CPT

## 2024-10-24 PROCEDURE — P9016 RBC LEUKOCYTES REDUCED: HCPCS

## 2024-10-24 PROCEDURE — 99024 POSTOP FOLLOW-UP VISIT: CPT | Performed by: SURGERY

## 2024-10-24 PROCEDURE — 84100 ASSAY OF PHOSPHORUS: CPT | Performed by: NURSE PRACTITIONER

## 2024-10-24 RX ADMIN — THIAMINE HYDROCHLORIDE 100 MG: 100 INJECTION, SOLUTION INTRAMUSCULAR; INTRAVENOUS at 11:03

## 2024-10-24 RX ADMIN — METRONIDAZOLE 500 MG: 500 INJECTION, SOLUTION INTRAVENOUS at 04:02

## 2024-10-24 RX ADMIN — INSULIN LISPRO 1 UNITS: 100 INJECTION, SOLUTION INTRAVENOUS; SUBCUTANEOUS at 12:31

## 2024-10-24 RX ADMIN — HEPARIN SODIUM 5000 UNITS: 5000 INJECTION INTRAVENOUS; SUBCUTANEOUS at 22:32

## 2024-10-24 RX ADMIN — ESCITALOPRAM OXALATE 10 MG: 10 TABLET ORAL at 09:49

## 2024-10-24 RX ADMIN — INSULIN LISPRO 1 UNITS: 100 INJECTION, SOLUTION INTRAVENOUS; SUBCUTANEOUS at 22:32

## 2024-10-24 RX ADMIN — FINASTERIDE 5 MG: 5 TABLET, FILM COATED ORAL at 09:48

## 2024-10-24 RX ADMIN — B-COMPLEX W/ C & FOLIC ACID TAB 1 TABLET: TAB at 09:48

## 2024-10-24 RX ADMIN — INSULIN LISPRO 1 UNITS: 100 INJECTION, SOLUTION INTRAVENOUS; SUBCUTANEOUS at 09:53

## 2024-10-24 RX ADMIN — DEXTROSE 2000 MG: 50 INJECTION, SOLUTION INTRAVENOUS at 12:43

## 2024-10-24 RX ADMIN — AMPICILLIN SODIUM AND SULBACTAM SODIUM 3 G: 100; 50 INJECTION, POWDER, FOR SOLUTION INTRAVENOUS at 20:35

## 2024-10-24 RX ADMIN — METOROPROLOL TARTRATE 5 MG: 5 INJECTION, SOLUTION INTRAVENOUS at 20:36

## 2024-10-24 RX ADMIN — ATORVASTATIN CALCIUM 80 MG: 80 TABLET, FILM COATED ORAL at 17:46

## 2024-10-24 RX ADMIN — INSULIN LISPRO 1 UNITS: 100 INJECTION, SOLUTION INTRAVENOUS; SUBCUTANEOUS at 17:47

## 2024-10-24 RX ADMIN — SODIUM CHLORIDE, SODIUM GLUCONATE, SODIUM ACETATE, POTASSIUM CHLORIDE, MAGNESIUM CHLORIDE, SODIUM PHOSPHATE, DIBASIC, AND POTASSIUM PHOSPHATE 125 ML/HR: .53; .5; .37; .037; .03; .012; .00082 INJECTION, SOLUTION INTRAVENOUS at 19:09

## 2024-10-24 RX ADMIN — CEFEPIME 2000 MG: 2 INJECTION, POWDER, FOR SOLUTION INTRAVENOUS at 20:36

## 2024-10-24 RX ADMIN — HEPARIN SODIUM 5000 UNITS: 5000 INJECTION INTRAVENOUS; SUBCUTANEOUS at 05:57

## 2024-10-24 RX ADMIN — METRONIDAZOLE 500 MG: 500 INJECTION, SOLUTION INTRAVENOUS at 12:16

## 2024-10-24 RX ADMIN — METOROPROLOL TARTRATE 5 MG: 5 INJECTION, SOLUTION INTRAVENOUS at 02:24

## 2024-10-24 RX ADMIN — ASPIRIN 81 MG CHEWABLE TABLET 81 MG: 81 TABLET CHEWABLE at 09:49

## 2024-10-24 RX ADMIN — TAMSULOSIN HYDROCHLORIDE 0.4 MG: 0.4 CAPSULE ORAL at 17:45

## 2024-10-24 RX ADMIN — METOROPROLOL TARTRATE 5 MG: 5 INJECTION, SOLUTION INTRAVENOUS at 10:03

## 2024-10-24 RX ADMIN — HEPARIN SODIUM 5000 UNITS: 5000 INJECTION INTRAVENOUS; SUBCUTANEOUS at 15:11

## 2024-10-24 RX ADMIN — METOROPROLOL TARTRATE 5 MG: 5 INJECTION, SOLUTION INTRAVENOUS at 14:59

## 2024-10-24 NOTE — CASE MANAGEMENT
Case Management Discharge Planning Note    Patient name Drew Santos  Location [facility] /E4 -* MRN [id number]  : 1950 Date 10/24/2024       Current Admission Date: 10/18/2024  Current Admission Diagnosis:Gram-negative bacteremia   Patient Active Problem List    Diagnosis Date Noted Date Diagnosed    S/P percutaneous endoscopic gastrostomy (PEG) tube placement (HCC) 10/24/2024     Elevated liver enzymes 10/20/2024     Pyuria 10/19/2024     Chronic indwelling Parra catheter 10/19/2024     Hypernatremia 10/19/2024     Sacral ulcer (HCC) 10/18/2024     Advanced care planning/counseling discussion 2024     Severe protein-calorie malnutrition (HCC) 2024     Polymicrobial bacteremia 2024     Acute metabolic encephalopathy 2024     History of CVA (cerebrovascular accident) 2024     Paroxysmal atrial fibrillation (HCC) 2024     Anemia of chronic disease 2024     Gram-negative bacteremia 2024     Type 2 diabetes mellitus without complication, without long-term current use of insulin (HCC) 2024     COVID-19 2024     Proctitis 2024     Dysgeusia 2024     Gross hematuria 2024     Depression 2024     Sacral wound 2024     Primary hypertension 2024     Mixed hyperlipidemia 2024     Urinary retention 2024     Syncope 2024     Elevated lactic acid level 2024     Sepsis 2024     Abnormal CPK 2024       LOS (days): 6  Geometric Mean LOS (GMLOS) (days):   Days to GMLOS:     OBJECTIVE:  Risk of Unplanned Readmission Score: 33.14         Current admission status: Inpatient   Preferred Pharmacy:   [id number] - [place] - [address]   [place] [address]  Phone: [phone] Fax: [phone]    Primary Care Provider: Joe Lyon MD    [facility]  Secondary Insurance: [employer]  REP    DISCHARGE  DETAILS:    CM messaged [facility] via [facility] information, they are able to accommodate this.  They are not able to accommodate TPN, only PEG tube feeds.  If patient needs long term IV ABX, PICC line will need to be placed.  CM will continue to update them as CM awaiting nutrition, ID, and speech recommendations. Facility requesting patient be sent with colostomy supplies at time of DC.    CM spoke with patient's wife via PC, she is in agreement that patient is in need of SNF to meet his needs.  CM communicating with [facility] to ensure they can meet his needs.    [facility] [phone], no answer CM left voicemail requesting call back to discuss SNF and potential need for a different facility that could accommodate patient's needs.    CM also sent referral to [facility]ACH to see if patient is appropriate.   CM will continue to follow.    CM received [facility] from [facility] [phone].  [facility], if they are unable to meet his needs the [facility] may explore other SN[facility]'s.  [facility] will reach out to [facility] to inquire if appropriate.  [facility] requesting an update tomorrow once more is known about feeing and IV abx.

## 2024-10-24 NOTE — ASSESSMENT & PLAN NOTE
Blood cultures growing gram-negative rods, E. coli and Proteus  Infectious disease following for antibiotic management  Right upper quadrant ultrasound performed, no evidence of acute cholecystitis  Surgery took patient to the OR emergently 10/21 for concern of necrotizing soft tissue infection, repeat washout with end colostomy and PEG tube placement on 10/23/24   10/24/24 - Continue antibiotics IV ceftriaxone and PO flagyl, follow up on intraoperative cultures

## 2024-10-24 NOTE — ASSESSMENT & PLAN NOTE
"Patient recently discharged 10/4 with polymicrobial bacteremia secondary to sacral wound and completed course of IV cefepime, flagyl and vancomycin followed by ampicillin. Blood cultures are growing Enterococcus, Enterobacter and Proteus last month. Patient presented to [facility] ED 10/18 from SNF due to worsening sacral wounds and possible osteomyelitis. Patient received cefepime, Flagyl and vancomycin in the ER.   CXR \"Limited study. Some atelectasis is present at the right base. No convincing evidence of pneumonia.\"  CT abdomen pelvis showing \"Large sacral decubitus ulcer extending to the coccyx with soft tissue gas now seen extending into the right and left medial aspects of the gluteus jac muscles. No drainable fluid collection. Erosion of the coccyx suggestive of osteomyelitis.\"  Blood culture x2 obtained upon admission growing Proteus and E. coli  MRSA negative  Has been afebrile since admission.  But developed tachycardia yesterday and was emergently taken to the OR for concern of necrotizing soft tissue infection  Infectious disease recs appreciated- Continue IV antibiotics with IV Rocephin. Currently Day 4. Flagyl Day 7  Pending intraoperative cultures   "

## 2024-10-24 NOTE — RESTORATIVE TECHNICIAN NOTE
Restorative Technician Note      Patient Name: Drew Santos     Restorative Tech Visit Date: 10/24/24  Note Type: Mobility  Patient Position Upon Consult: Supine  Activity Performed: Ambulated  Assistive Device: Roller walker  Patient Position at End of Consult: All needs within reach; Supine; Bed/Chair alarm activated          ns

## 2024-10-24 NOTE — ASSESSMENT & PLAN NOTE
Tachycardia and leukocytosis. Secondary to polymicrobial bacteremia. Sacral wound was examined by surgery and felt it appeared noninfected at the time.  CT scan is also without any evidence of a drainable fluid collection/abscess but did show mild proctitis.  Consider urinary source in setting of chronic Parra catheter although this should promote good urinary drainage and prevent retention.  Chest x-ray without any consolidation to suggest pneumonia. LFTs mildly elevated, but no pericholecystic fluid on CT. Despite being systemically ill he's remained hemodynamically stable. This morning he is afebrile and his WBC count is trending down. General surgery did reassess and pursued operative debridement in the OR. Pus pockets and necrotic tissue/bone were encountered and debrided. New operative cultures are pending.  -antibiotic as below  -monitor CBCD and BMP  -follow up operative cultures  -monitor vitals  -supportive care

## 2024-10-24 NOTE — QUICK NOTE
Patient examined at bedside. Sacral wound packing removed. No grossly infected tissue present. Wound packed with Vashe moistened kerlix x2. Covered with ABD pads. Patient tolerated dressing change well. Continue daily packing changes per nursing.    Alfred Torres MD  General Surgery   10/24/24

## 2024-10-24 NOTE — ASSESSMENT & PLAN NOTE
Has chronic anemia likely secondary to poor nutritional status underlying chronic illness.   Recent Labs     10/22/24  0446 10/23/24  0548 10/24/24  0645   HGB 8.4* 8.4* 6.2*      Hgb 6.2 this AM, will transfuse 1 unit at this time. Blood consent obtained via verbal consent from patient's spouse as patient is non-verbal and unable to consent at this time.   No evidence of active bleed, vital signs stable   Type and Screen obtained: B, RH factor +   Continue to monitor H&H   Continue to transfuse < 7

## 2024-10-24 NOTE — PROGRESS NOTES
"Progress Note - Hospitalist   Name: Drew Santos 73 y.o. male I MRN: [id number]  Unit/Bed#: E4 -01 I Date of Admission: 10/18/2024   Date of Service: 10/24/2024 I Hospital Day: 6    Assessment & Plan  Gram-negative bacteremia  Blood cultures growing gram-negative rods, E. coli and Proteus  Infectious disease following for antibiotic management  Right upper quadrant ultrasound performed, no evidence of acute cholecystitis  Surgery took patient to the OR emergently 10/21 for concern of necrotizing soft tissue infection, repeat washout with end colostomy and PEG tube placement on 10/23/24   10/24/24 - Continue antibiotics IV ceftriaxone and PO flagyl, follow up on intraoperative cultures   Sepsis  Patient recently discharged 10/4 with polymicrobial bacteremia secondary to sacral wound and completed course of IV cefepime, flagyl and vancomycin followed by ampicillin. Blood cultures are growing Enterococcus, Enterobacter and Proteus last month. Patient presented to [facility] ED 10/18 from SNF due to worsening sacral wounds and possible osteomyelitis. Patient received cefepime, Flagyl and vancomycin in the ER.   CXR \"Limited study. Some atelectasis is present at the right base. No convincing evidence of pneumonia.\"  CT abdomen pelvis showing \"Large sacral decubitus ulcer extending to the coccyx with soft tissue gas now seen extending into the right and left medial aspects of the gluteus jac muscles. No drainable fluid collection. Erosion of the coccyx suggestive of osteomyelitis.\"  Blood culture x2 obtained upon admission growing Proteus and E. coli  MRSA negative  Has been afebrile since admission.  But developed tachycardia yesterday and was emergently taken to the OR for concern of necrotizing soft tissue infection  Infectious disease recs appreciated- Continue IV antibiotics with IV Rocephin. Currently Day 4. Flagyl Day 7  Pending intraoperative cultures   S/P percutaneous endoscopic gastrostomy (PEG) tube " "placement (HCC)  10/23 s/p lap loop colostomy with PEG tube placement   Oral diet per SLP- re-evaluate for speech and swallow exam as patient is more alert  Advance tube feeds to goal rate   WOCN for ostomy care   Nutrition consulted   Anemia of chronic disease  Has chronic anemia likely secondary to poor nutritional status underlying chronic illness.   Recent Labs     10/22/24  0446 10/23/24  0548 10/24/24  0645   HGB 8.4* 8.4* 6.2*      Hgb 6.2 this AM, will transfuse 1 unit at this time. Blood consent obtained via verbal consent from patient's spouse as patient is non-verbal and unable to consent at this time.   No evidence of active bleed, vital signs stable   Type and Screen obtained: B, RH factor +   Continue to monitor H&H   Continue to transfuse < 7     Sacral ulcer (HCC)  Patient has at least 2 sacral ulcers. At least one of them is stage IV with palpable bone. Possible osteomyelitis. Surgery has examined patient's sacral wound and lower concern for acute infection, though may be source of translocation.   Surgery consult appreciated; recommend initiation of 0.25% Dakin soaked guaze packing TID while inpatient   Q2hr turns as patient tolerates  Taken to the OR on 10/22 for debridement and 10/23 for debridement, loop colostomy   Elevated liver enzymes  POA mild elevation , ALT 99, without guarding    Resolved  Right upper quadrant ultrasound without evidence of acute cholecystitis  Pyuria  Noted with innumerable WBC from chronic hanks catheter sample, which cannot alone determine UTI in the setting of chronic catheter. CT scan showing \"Tiny bilateral renal cysts. Nonobstructing left renal calculi. No hydronephrosis or hydroureter. Chronic bilateral perinephric stranding. And Relatively collapsed around a Hanks catheter limiting evaluation\"  Urine culture growing Proteus  Continue IV Rocephin D 3  Hypernatremia  Sodium 154 at time of admission, likely hypovolemic hypernatremia related to mild " dehydration secondary to poor water intake  Continue IV fluids  Start free water flushes once allowed diet  Chronic indwelling Hanks catheter  Chronic urethral hanks catheter secondary to chronic urinary retention, BPH.  Hanks patent at this time   Hanks was exchanged 10/19  Continue tamsulosin and finasteride  Proctitis  Noted on CT scan with history of this as possibly chronic. No diarrhea or clinical evidence of colitis.   Type 2 diabetes mellitus without complication, without long-term current use of insulin (HCC)  Lab Results   Component Value Date    HGBA1C 6.2 (H) 09/17/2024     Recent Labs     10/23/24  0726 10/23/24  1617 10/23/24  2040 10/24/24  0748   POCGLU 89 125 170* 181*     Blood Sugar Average: Last 72 hrs:  (P) 157.3586304136336302  Last HgbA1C 6.2%  Continue sliding scale insulin   Hypoglycemia protocol  Monitor while on tube feeds (on hold for OR tomorrow)  Paroxysmal atrial fibrillation (HCC)  Eliquis held for surgery  Continue pta metoprolol with hold parameters  Severe protein-calorie malnutrition (HCC)  Malnutrition Findings:   ChronicAdult Malnutrition type: Chronic illness  Adult Degree of Malnutrition: Other severe protein calorie malnutrition  Malnutrition Characteristics: Weight loss, Inadequate energy  360 Statement: Severe protein calorie malnutrition in context of chronic illness r/t poor appetite, inadequate PO intake as evidance by energy intake less than 75% compared to estimated needs>1 month, 14% wt loss in 1 month (9/17/24 114 kg, 10/19/24 97.7 kg) treated with PO diet. Once able to have po treat with Mechanical Soft diet, Magic Cup BID, Ensure Compact 1x daily.  Patient with significant weight loss and per wife with significantly decreased oral intake over the past few months and has not been eating for 1 week at the nursing home.  Weight noted in April to be 275 lb, and now weighing 215 lb  PEG tube placed 10/24  - continue with tube feeds, will re-assess for PO diet today     Dietary to adjust based on patient nutritional needs.  Continue high-dose thiamine and electrolyte monitoring for refeeding syndrome    History of CVA (cerebrovascular accident)  History of stroke with L sided hemiparesis recently hospitalized at [facility] in July 2024  Baseline non-verbal, alert, at times selective in answers, can nod yes/no however unsure accuracy most information obtained per patients wife  Baseline Dysphagia 2 mechanical soft, thin liquids, patient well known to speech therapy  Advanced care planning/counseling discussion  Patient readmitted with worsening prognosis with large sacral wound and developing osteomyelitis secondary to bacteremia, severe malnutrition. Extensive discussion with wife regarding patients guarded prognosis and goals of care for her . Wife opting to proceed with full medical care at this time.   Started on tube feeds with NG tube, PEG tube to be placed today 10/23   Seen in consult by palliative care, family remains goal oriented    VTE Pharmacologic Prophylaxis: VTE Score: 8 Moderate Risk (Score 3-4) - Pharmacological DVT Prophylaxis Ordered: heparin.    Mobility:   Basic Mobility Inpatient Raw Score: 6  JH-HLM Goal: 2: Bed activities/Dependent transfer  JH-HLM Achieved: 1: Laying in bed  JH-HLM Goal NOT achieved. Continue with multidisciplinary rounding and encourage appropriate mobility to improve upon JH-HLM goals.    Patient Centered Rounds: I performed bedside rounds with nursing staff today.   Discussions with Specialists or Other Care Team Provider: Infectious Disease     Education and Discussions with Family / Patient: Updated  (wife) at bedside.    Current Length of Stay: 6 day(s)  Current Patient Status: Inpatient   Certification Statement: The patient will continue to require additional inpatient hospital stay due to transfusion, IV  antibiotics, pending rehab evals   Discharge Plan: Anticipate discharge in 48-72 hrs to pending rehab  evals     Code Status: Level 1 - Full Code    Subjective   Patient examined at bedside. He is not in any acute distress, awakens to voice. Patient has PEG tube placement and loop ostomy completed yesterday. Hgb had dropped this morning, spoke to patient's wife. Agreeable to blood transfusion at this time, consent obtained. Did discuss disposition planning as patient will benefit from acute rehab placement for ongoing medical needs after stabilization.     Objective :  Temp:  [96.9 °F (36.1 °C)-98.2 °F (36.8 °C)] 98.2 °F (36.8 °C)  HR:  [68-78] 74  BP: ()/(53-72) 109/62  Resp:  [13-18] 18  SpO2:  [93 %-100 %] 99 %  O2 Device: None (Room air)    Body mass index is 25.54 kg/m².     Input and Output Summary (last 24 hours):     Intake/Output Summary (Last 24 hours) at 10/24/2024 0804  Last data filed at 10/23/2024 2030  Gross per 24 hour   Intake 3150 ml   Output 975 ml   Net 2175 ml       Physical Exam  Vitals reviewed.   Constitutional:       General: He is not in acute distress.     Appearance: He is not diaphoretic.   Cardiovascular:      Rate and Rhythm: Normal rate and regular rhythm.      Heart sounds: No murmur heard.  Abdominal:      General: There is no distension.      Tenderness: There is no abdominal tenderness.   Neurological:      Mental Status: He is alert.           Lines/Drains:  Lines/Drains/Airways       Active Status       Name Placement date Placement time Site Days    Gastrostomy/Enterostomy LUQ 10/23/24  1135  LUQ  less than 1    Colostomy Loop LLQ 10/23/24  1038  LLQ  less than 1    Urethral Catheter Latex 20 Fr. 10/19/24  1209  Latex  4                  Urinary Catheter:  Goal for removal: N/A - Chronic Parra                 Lab Results: I have reviewed the following results:   Results from last 7 days   Lab Units 10/24/24  0645   WBC Thousand/uL 16.21*   HEMOGLOBIN g/dL 6.2*   HEMATOCRIT % 21.4*   PLATELETS Thousands/uL 425*   SEGS PCT % 82*   LYMPHO PCT % 14   MONO PCT % 3*   EOS PCT %  0     Results from last 7 days   Lab Units 10/24/24  0645 10/21/24  2327 10/21/24  1733   SODIUM mmol/L 148*   < > 148*   POTASSIUM mmol/L 3.6   < > 3.4*   CHLORIDE mmol/L 116*   < > 117*   CO2 mmol/L 29   < > 29   BUN mg/dL 17   < > 25   CREATININE mg/dL 0.66   < > 0.80   ANION GAP mmol/L 3*   < > 2*   CALCIUM mg/dL 7.2*   < > 8.2*   ALBUMIN g/dL  --   --  2.0*   TOTAL BILIRUBIN mg/dL  --   --  0.48   ALK PHOS U/L  --   --  76   ALT U/L  --   --  32   AST U/L  --   --  21   GLUCOSE RANDOM mg/dL 210*   < > 265*    < > = values in this interval not displayed.     Results from last 7 days   Lab Units 10/18/24  1445   INR  1.57*     Results from last 7 days   Lab Units 10/24/24  0748 10/23/24  2040 10/23/24  1617 10/23/24  0726 10/22/24  2117 10/22/24  1640 10/22/24  1053 10/22/24  0704 10/21/24  2333 10/21/24  1459 10/21/24  1133 10/21/24  0709   POC GLUCOSE mg/dl 181* 170* 125 89 106 130 162* 161* 155* 229* 191* 187*         Results from last 7 days   Lab Units 10/21/24  2327 10/21/24  1734 10/18/24  2244 10/18/24  1445   LACTIC ACID mmol/L 1.8 1.5 1.6 1.8   PROCALCITONIN ng/ml  --   --   --  0.72*       Recent Cultures (last 7 days):   Results from last 7 days   Lab Units 10/21/24  2112 10/18/24  1500 10/18/24  1445 10/18/24  1335   BLOOD CULTURE   --   --  Escherichia coli*  Proteus mirabilis*  Bacteroides vulgatus group*  Bacteroides thetaiotaomicron group*  Bacteroides caccae*  Clostridium species, not perfringens or septicum* Escherichia coli*  Proteus mirabilis*  Bacteroides vulgatus group*  Bacteroides thetaiotaomicron group*  Bacteroides caccae*  Bacteroides ovatus group*  Clostridium species, not perfringens or septicum*   GRAM STAIN RESULT  Rare Polys*  2+ Gram negative rods*  1+ Gram positive cocci in pairs*  Rare Yeast*  --  Gram negative rods* Gram negative rods*   URINE CULTURE   --  >100,000 cfu/ml Proteus mirabilis*  --   --    WOUND CULTURE  Culture results to follow.  --   --   --         Imaging Results Review: No pertinent imaging studies reviewed.  Other Study Results Review: No additional pertinent studies reviewed.    Last 24 Hours Medication List:     Current Facility-Administered Medications:     acetaminophen (TYLENOL) tablet 650 mg, Q4H PRN    aspirin chewable tablet 81 mg, Daily    atorvastatin (LIPITOR) tablet 80 mg, QPM    cefTRIAXone (ROCEPHIN) 2,000 mg in dextrose 5 % 50 mL IVPB, Q24H, Last Rate: 2,000 mg (10/22/24 1216)    escitalopram (LEXAPRO) tablet 10 mg, Daily    finasteride (PROSCAR) tablet 5 mg, Daily    heparin (porcine) subcutaneous injection 5,000 Units, Q8H ELISEO    insulin lispro (HumALOG/ADMELOG) 100 units/mL subcutaneous injection 1-5 Units, TID AC **AND** Fingerstick Glucose (POCT), TID AC    insulin lispro (HumALOG/ADMELOG) 100 units/mL subcutaneous injection 1-5 Units, HS    [Held by provider] lisinopril (ZESTRIL) tablet 10 mg, Daily    metoprolol (LOPRESSOR) injection 2.5 mg, Q6H PRN    metoprolol (LOPRESSOR) injection 5 mg, Q6H    metroNIDAZOLE (FLAGYL) IVPB (premix) 500 mg 100 mL, Q8H, Last Rate: 500 mg (10/24/24 0402)    multi-electrolyte (PLASMALYTE-A/ISOLYTE-S PH 7.4) IV solution, Continuous, Last Rate: 125 mL/hr (10/23/24 2343)    multivitamin stress formula tablet 1 tablet, Daily    ondansetron (ZOFRAN) injection 4 mg, Q6H PRN    senna (SENOKOT) tablet 17.2 mg, Daily PRN    tamsulosin (FLOMAX) capsule 0.4 mg, Daily With Dinner    thiamine (VITAMIN B1) 100 mg in sodium chloride 0.9 % 50 mL IVPB, Daily, Last Rate: 100 mg (10/22/24 0817)    Administrative Statements   Today, Patient Was Seen By: Shiela Flowers PA-C      **Please Note: This note may have been constructed using a voice recognition system.**

## 2024-10-24 NOTE — ASSESSMENT & PLAN NOTE
Lab Results   Component Value Date    HGBA1C 6.2 (H) 09/17/2024     Recent Labs     10/23/24  0726 10/23/24  1617 10/23/24  2040 10/24/24  0748   POCGLU 89 125 170* 181*     Blood Sugar Average: Last 72 hrs:  (P) [id number]  Last HgbA1C 6.2%  Continue sliding scale insulin   Hypoglycemia protocol  Monitor while on tube feeds (on hold for OR tomorrow)

## 2024-10-24 NOTE — PROGRESS NOTES
Progress Note - Surgery-General   Name: Derw Santos 73 y.o. male I MRN: [id number]  Unit/Bed#: E4 -01 I Date of Admission: 10/18/2024   Date of Service: 10/24/2024 I Hospital Day: 6    Assessment & Plan  Sacral ulcer (HCC)  Sacral ulcer previously debrided by our surgical service at bedside (most recently on 9/25) where granulation tissue was exposed. Gross size/depth of wound is visually unchanged between photos taken on 10/18 and last hospitalization. Patient discharged to nursing care facility with wound care service follow up. Return to ED w/ necrotic superficial slough of wound. Improvement of wound appearance w/ dakin soaked dressings with removal of superficial necrotic tissue.  -10/21 status post debridement in OR  -10/23 s/p sacral debridement, lap loop colostomy, PEG    Plan  - Oral diet per SLP/Primary  - Sacral wound check today. Nursing wound care orders placed.  - Advance tube feeds to goal rate   -Continue IV antibiotics per ID  - WOCN for ostomy care  - Rest of care per primary team  - Dispo planning  Type 2 diabetes mellitus without complication, without long-term current use of insulin (HCC)  Lab Results   Component Value Date    HGBA1C 6.2 (H) 09/17/2024       Recent Labs     10/22/24  2117 10/23/24  0726 10/23/24  1617 10/23/24  2040   POCGLU 106 89 125 170*       Blood Sugar Average: Last 72 hrs:  (P) 155  - continue to trend sugars  - SSI  - hypoglycemic protocol  Severe protein-calorie malnutrition (HCC)  Malnutrition Findings:   Adult Malnutrition type: Chronic illness  Adult Degree of Malnutrition: Other severe protein calorie malnutrition  Malnutrition Characteristics: Weight loss, Inadequate energy                  360 Statement: Severe protein calorie malnutrition in context of chronic illness r/t poor appetite, inadequate PO intake as evidance by energy intake less than 75% compared to estimated needs>1 month, 14% wt loss in 1 month (9/17/24 114 kg, 10/19/24 97.7 kg) treated with  PO diet. Once able to have po treat with Mechanical Soft diet, Magic Cup BID, Ensure Compact 1x daily.    BMI Findings:           Body mass index is 25.54 kg/m².   -OR today for feeding access with PEG  Gram-negative bacteremia  Plan  -Continue IV antibiotics  -Management per primary team    Surgery-General service will follow.    Subjective   No acute events overnight. Afebrile, hemodynamically stable. Tolerating tube feeds. No emesis. Ostomy pink, patent, gas in bag, bowel sweat.      Objective :  Temp:  [96.9 °F (36.1 °C)-97.7 °F (36.5 °C)] 97.6 °F (36.4 °C)  HR:  [] 78  BP: ()/(53-87) 106/60  Resp:  [13-18] 18  SpO2:  [93 %-100 %] 95 %  O2 Device: None (Room air)    I/O         10/22 0701  10/23 0700 10/23 0701  10/24 0700 10/24 0701  10/25 0700    I.V. (mL/kg)  2500 (25.6)     NG/GT  300     IV Piggyback  350     Total Intake(mL/kg)  3150 (32.2)     Urine (mL/kg/hr) 800 (0.3) 800 (0.3)     Emesis/NG output  100     Blood  75     Total Output 800 975     Net -800 +2175                  Lines/Drains/Airways       Active Status       Name Placement date Placement time Site Days    Gastrostomy/Enterostomy LUQ 10/23/24  1135  LUQ  less than 1    Colostomy Loop LLQ 10/23/24  1038  LLQ  less than 1    Urethral Catheter Latex 20 Fr. 10/19/24  1209  Latex  4                    Physical Exam:  General: No acute distress, alert   CV: Well perfused, regular rate and rhythm  Lungs: Normal work of breathing, no increased respiratory effort  Abdomen: Soft, non-tender, mildly distended. Incision(s) clean, dry and intact.Ostomy, pink, patent, gas in bag and bowel sweat  Extremities: No clubbing or cyanosis  Skin: Warm, dry      Lab Results: I have reviewed the following results:  Recent Labs     10/21/24  1733 10/21/24  1734 10/21/24  2327 10/22/24  0446 10/23/24  0548   WBC 16.77*  --  18.96* 18.41* 16.33*   HGB 8.4*  --  9.0* 8.4* 8.4*   HCT 28.8*  --  31.3* 29.5* 31.0*   *  --  560* 503* 456*   SODIUM 148*   --  149* 151* 150*   K 3.4*  --  3.7 4.1 3.6   *  --  117* 118* 118*   CO2 29  --  27 26 27   BUN 25  --  21 20 14   CREATININE 0.80  --  0.85 0.74 0.59*   GLUC 265*  --  176* 157* 95   MG 2.1  --  2.2 2.3  --    PHOS 2.5  --  3.1 3.1  --    AST 21  --   --   --   --    ALT 32  --   --   --   --    ALB 2.0*  --   --   --   --    TBILI 0.48  --   --   --   --    ALKPHOS 76  --   --   --   --    LACTICACID  --    < > 1.8  --   --     < > = values in this interval not displayed.       Imaging Results Review: No pertinent imaging studies reviewed.  Other Study Results Review: No additional pertinent studies reviewed.    VTE Pharmacologic Prophylaxis: VTE covered by:  heparin (porcine), Subcutaneous, 5,000 Units at 10/24/24 0557     VTE Mechanical Prophylaxis: sequential compression device    Alfred Torres MD  General Surgery   10/24/24

## 2024-10-24 NOTE — ASSESSMENT & PLAN NOTE
Blood cultures now showing preliminary growth of both E. Coli, Proteus, clostridium, and multiple species of bacteroids. Past episode of bacteremia felt to be secondary to sacral wound. Pathogens do match stool pathogens again. Sacral wound was initially examined by surgery and felt it appeared noninfected at the time.  They have reassessed and pursued operative debridement in the OR. Multiple pus pockets and necrotic tissue/bone were debrided. New operative cultures are pending. The patient's currently receiving  IV Ceftriaxone and PO Flagyl. He appears to be tolerating antibiotic without difficulty. I will continue this regimen for now and readjust as needed for operative cultures. No plan for prolonged abx for osteomyelitis at this time as patient is not planned for formal flap wound closure.  -continue IV ceftriaxone  -continue PO flagyl  -check CBCD and CMP tomorrow  -follow up operative culture results and adjust antibiotic as needed  -monitor vitals

## 2024-10-24 NOTE — ASSESSMENT & PLAN NOTE
This is likely source of patient's bacteremia above. Wound was felt to have initially developed during patient stay in skilled nursing facility for rehab.  During recent hospitalization in September there was concern for fistulous connection from rectum to the sacral wound in setting of colitis/proctitis.  Wound was debrided by general surgery during last admission and was noted to be stage IV with palpable bone.  Now latest CT showing soft tissue gas extending into right and left medial aspects of gluteus jac muscle, but no abscess. There is erosion of coccyx suggestive of osteomyelitis.  Sacral wound was initially examined by surgery and felt it appeared noninfected at the time.  They have reassessed and pursued operative debridement. Multiple pus pockets and necrotic tissue/bone were debrided. New operative cultures are pending with preliminary polymicrobial growth, anaerobes now showing as well. Given patient's failure to thrive and immobility, wound will be difficult to heal. He went back to the OR yesterday for additional wound debridement, PEG placement, and loop colostomy creation.  -antibiotic as above  -follow up operative cultures  -serial skin exams  -serial exams and care of colostomy  -monitor stool output  -serial exams and care of PEG  -frequent turning/repositioning to off-load pressure from the wound  -local wound care per general surgery  -continue follow up with general surgery

## 2024-10-24 NOTE — ASSESSMENT & PLAN NOTE
History of stroke with L sided hemiparesis recently hospitalized at [facility] in July 2024  Baseline non-verbal, alert, at times selective in answers, can nod yes/no however unsure accuracy most information obtained per patients wife  Baseline Dysphagia 2 mechanical soft, thin liquids, patient well known to speech therapy

## 2024-10-24 NOTE — PLAN OF CARE
Problem: Potential for Falls  Goal: Patient will remain free of falls  Description: INTERVENTIONS:  - Educate patient/family on patient safety including physical limitations  - Instruct patient to call for assistance with activity   - Consult OT/PT to assist with strengthening/mobility   - Keep Call bell within reach  - Keep bed low and locked with side rails adjusted as appropriate  - Keep care items and personal belongings within reach  - Initiate and maintain comfort rounds  - Make Fall Risk Sign visible to staff  - Offer Toileting every 2 Hours, in advance of need  - Initiate/Maintain bed alarm  - Obtain necessary fall risk management equipment:   - Apply yellow socks and bracelet for high fall risk patients  - Consider moving patient to room near nurses station  Outcome: Progressing     Problem: Prexisting or High Potential for Compromised Skin Integrity  Goal: Skin integrity is maintained or improved  Description: INTERVENTIONS:  - Identify patients at risk for skin breakdown  - Assess and monitor skin integrity  - Assess and monitor nutrition and hydration status  - Monitor labs   - Assess for incontinence   - Turn and reposition patient  - Assist with mobility/ambulation  - Relieve pressure over bony prominences  - Avoid friction and shearing  - Provide appropriate hygiene as needed including keeping skin clean and dry  - Evaluate need for skin moisturizer/barrier cream  - Collaborate with interdisciplinary team   - Patient/family teaching  - Consider wound care consult   Outcome: Progressing     Problem: PAIN - ADULT  Goal: Verbalizes/displays adequate comfort level or baseline comfort level  Description: Interventions:  - Encourage patient to monitor pain and request assistance  - Assess pain using appropriate pain scale  - Administer analgesics based on type and severity of pain and evaluate response  - Implement non-pharmacological measures as appropriate and evaluate response  - Consider cultural and  social influences on pain and pain management  - Notify physician/advanced practitioner if interventions unsuccessful or patient reports new pain  Outcome: Progressing     Problem: INFECTION - ADULT  Goal: Absence or prevention of progression during hospitalization  Description: INTERVENTIONS:  - Assess and monitor for signs and symptoms of infection  - Monitor lab/diagnostic results  - Monitor all insertion sites, i.e. indwelling lines, tubes, and drains  - Monitor endotracheal if appropriate and nasal secretions for changes in amount and color  - [place] appropriate cooling/warming therapies per order  - Administer medications as ordered  - Instruct and encourage patient and family to use good hand hygiene technique  - Identify and instruct in appropriate isolation precautions for identified infection/condition  Outcome: Progressing     Problem: DISCHARGE PLANNING  Goal: Discharge to home or other facility with appropriate resources  Description: INTERVENTIONS:  - Identify barriers to discharge w/patient and caregiver  - Arrange for needed discharge resources and transportation as appropriate  - Identify discharge learning needs (meds, wound care, etc.)  - Arrange for interpretive services to assist at discharge as needed  - Refer to Case Management Department for coordinating discharge planning if the patient needs post-hospital services based on physician/advanced practitioner order or complex needs related to functional status, cognitive ability, or social support system  Outcome: Progressing     Problem: Knowledge Deficit  Goal: Patient/family/caregiver demonstrates understanding of disease process, treatment plan, medications, and discharge instructions  Description: Complete learning assessment and assess knowledge base.  Interventions:  - Provide teaching at level of understanding  - Provide teaching via preferred learning methods  Outcome: Progressing     Problem: Nutrition/Hydration-ADULT  Goal:  Nutrient/Hydration intake appropriate for improving, restoring or maintaining nutritional needs  Description: Monitor and assess patient's nutrition/hydration status for malnutrition. Collaborate with interdisciplinary team and initiate plan and interventions as ordered.  Monitor patient's weight and dietary intake as ordered or per policy. Utilize nutrition screening tool and intervene as necessary. Determine patient's food preferences and provide high-protein, high-caloric foods as appropriate.     INTERVENTIONS:  - Monitor oral intake, urinary output, labs, and treatment plans  - Assess nutrition and hydration status and recommend course of action  - Evaluate amount of meals eaten  - Assist patient with eating if necessary   - Allow adequate time for meals  - Recommend/ encourage appropriate diets, oral nutritional supplements, and vitamin/mineral supplements  - Order, calculate, and assess calorie counts as needed  - Recommend, monitor, and adjust tube feedings and TPN/PPN based on assessed needs  - Assess need for intravenous fluids  - Provide specific nutrition/hydration education as appropriate  - Include patient/family/caregiver in decisions related to nutrition  Outcome: Progressing

## 2024-10-24 NOTE — ASSESSMENT & PLAN NOTE
Malnutrition Findings:   ChronicAdult Malnutrition type: Chronic illness  Adult Degree of Malnutrition: Other severe protein calorie malnutrition  Malnutrition Characteristics: Weight loss, Inadequate energy  360 Statement: Severe protein calorie malnutrition in context of chronic illness r/t poor appetite, inadequate PO intake as evidance by energy intake less than 75% compared to estimated needs>1 month, 14% wt loss in 1 month (9/17/24 114 kg, 10/19/24 97.7 kg) treated with PO diet. Once able to have po treat with Mechanical Soft diet, Magic Cup BID, Ensure Compact 1x daily.  Patient with significant weight loss and per wife with significantly decreased oral intake over the past few months and has not been eating for 1 week at the nursing home.  Weight noted in April to be 275 lb, and now weighing 215 lb  PEG tube placed 10/24  - continue with tube feeds, will re-assess for PO diet today    Dietary to adjust based on patient nutritional needs.  Continue high-dose thiamine and electrolyte monitoring for refeeding syndrome

## 2024-10-24 NOTE — ASSESSMENT & PLAN NOTE
Patient has at least 2 sacral ulcers. At least one of them is stage IV with palpable bone. Possible osteomyelitis. Surgery has examined patient's sacral wound and lower concern for acute infection, though may be source of translocation.   Surgery consult appreciated; recommend initiation of 0.25% Dakin soaked guaze packing TID while inpatient   Q2hr turns as patient tolerates  Taken to the OR on 10/22 for debridement and 10/23 for debridement, loop colostomy

## 2024-10-24 NOTE — ASSESSMENT & PLAN NOTE
Lab Results   Component Value Date    HGBA1C 6.2 (H) 09/17/2024       Recent Labs     10/22/24  2117 10/23/24  0726 10/23/24  1617 10/23/24  2040   POCGLU 106 89 125 170*       Blood Sugar Average: Last 72 hrs:  (P) 155  - continue to trend sugars  - SSI  - hypoglycemic protocol

## 2024-10-24 NOTE — SPEECH THERAPY NOTE
Speech Language/Pathology    Speech/Language Pathology Progress Note    Patient Name: Drew Santos  Today's Date: 10/24/2024     Problem List  Principal Problem:    Gram-negative bacteremia  Active Problems:    Sepsis    Type 2 diabetes mellitus without complication, without long-term current use of insulin (HCC)    Proctitis    Polymicrobial bacteremia    History of CVA (cerebrovascular accident)    Paroxysmal atrial fibrillation (HCC)    Anemia of chronic disease    Severe protein-calorie malnutrition (HCC)    Advanced care planning/counseling discussion    Sacral ulcer (HCC)    Pyuria    Chronic indwelling Parra catheter    Hypernatremia    Elevated liver enzymes    S/P percutaneous endoscopic gastrostomy (PEG) tube placement (HCC)       Past Medical History  Past Medical History:   Diagnosis Date    Atherosclerotic heart disease of native coronary artery without angina pectoris     Atrial fibrillation (HCC)     Cerebral infarction (HCC)     Constipation     Depression     Diabetes mellitus (HCC)     Hemiplegia and hemiparesis following cerebral infarction affecting left non-dominant side (HCC)     Hyperlipidemia     Hypertension     Pressure ulcer of sacral region, stage 3 (HCC)     Prostatic hyperplasia     Sepsis (HCC)     Stroke (HCC)     TIA (transient ischemic attack)     Urinary retention     Vitamin D deficiency         Past Surgical History  Past Surgical History:   Procedure Laterality Date    COLOSTOMY N/A 10/23/2024    Procedure: Lap loop colostomy, psb open loop colostomy;  Surgeon: Davonte Banegas DO;  Location: [facility];  Service: General    GASTROSTOMY TUBE PLACEMENT N/A 10/23/2024    Procedure: INSERTION PEG TUBE, psb lap gastrostomy tube placement;  Surgeon: Davonte Banegas DO;  Location: [facility];  Service: General    INCISION AND DRAINAGE OF WOUND N/A 10/21/2024    Procedure: INCISION AND DRAINAGE (I&D) BUTTOCK, SACRAL WOUND DEBRIDEMENT, COCCYGECTOMY;  Surgeon: Beka SHERMAN  MD Fernando;  Location: [facility];  Service: General    WOUND DEBRIDEMENT N/A 10/23/2024    Procedure: DEBRIDEMENT WOUND (WASH OUT), sacrum;  Surgeon: Davonte Banegas DO;  Location: [facility];  Service: General         Subjective:  Pt was alert and positioned upright.   Objective:  Pt was seen for f/u dysphagia therapy for po potential. More alert since last seen by this SLP. PEG tube placed on hold for trials. Trialed ice chips, thin liquids via straw. Unneeded extended mastication with puree, vertical chew.  Bolus control and formation were WFL. However as trials progressed with puree pt increased pocketing. Transfer and swallow initiation were prompt with liquids and mod delayed with solids. Pt unable to follow verbal and tactile cues to initiate swallow to clear puree. Removed trials via suction. Discontinued trials as pt risk of aspiration. PEG placed off hold.   Assessment:  Pt more alert. Slow rate over all. Pocketing present with puree, pt unable to follow verbal and tatile commands to initiate swallow. Removed trials via suction.     Plan/Recommendations:  Recommend continue NPO ice chips and sips of thin water with nursing supervision. Complete oral care prior to trials. Discontinue if overt s/s of aspiration.   Ensure good oral care  ST will f/u as indicated,

## 2024-10-24 NOTE — ASSESSMENT & PLAN NOTE
Sacral ulcer previously debrided by our surgical service at bedside (most recently on 9/25) where granulation tissue was exposed. Gross size/depth of wound is visually unchanged between photos taken on 10/18 and last hospitalization. Patient discharged to nursing care facility with wound care service follow up. Return to ED w/ necrotic superficial slough of wound. Improvement of wound appearance w/ dakin soaked dressings with removal of superficial necrotic tissue.  -10/21 status post debridement in OR  -10/23 s/p sacral debridement, lap loop colostomy, PEG    Plan  - Oral diet per SLP/Primary  - Sacral wound check today. Nursing wound care orders placed.  - Advance tube feeds to goal rate   -Continue IV antibiotics per ID  - WOCN for ostomy care  - Rest of care per primary team  - Dispo planning

## 2024-10-24 NOTE — PROGRESS NOTES
Progress Note - Infectious Disease   Name: Drew Santos 73 y.o. male I MRN: [id number]  Unit/Bed#: E4 -01 I Date of Admission: 10/18/2024   Date of Service: 10/24/2024 I Hospital Day: 6     Assessment & Plan  Sepsis  Tachycardia and leukocytosis. Secondary to polymicrobial bacteremia. Sacral wound was examined by surgery and felt it appeared noninfected at the time.  CT scan is also without any evidence of a drainable fluid collection/abscess but did show mild proctitis.  Consider urinary source in setting of chronic Parra catheter although this should promote good urinary drainage and prevent retention.  Chest x-ray without any consolidation to suggest pneumonia. LFTs mildly elevated, but no pericholecystic fluid on CT. Despite being systemically ill he's remained hemodynamically stable. This morning he is afebrile and his WBC count is trending down. General surgery did reassess and pursued operative debridement in the OR. Pus pockets and necrotic tissue/bone were encountered and debrided. New operative cultures are pending.  -antibiotic as below  -monitor CBCD and BMP  -follow up operative cultures  -monitor vitals  -supportive care  Polymicrobial bacteremia  Blood cultures now showing preliminary growth of both E. Coli, Proteus, clostridium, and multiple species of bacteroids. Past episode of bacteremia felt to be secondary to sacral wound. Pathogens do match stool pathogens again. Sacral wound was initially examined by surgery and felt it appeared noninfected at the time.  They have reassessed and pursued operative debridement in the OR. Multiple pus pockets and necrotic tissue/bone were debrided. New operative cultures are pending. The patient's currently receiving  IV Ceftriaxone and PO Flagyl. He appears to be tolerating antibiotic without difficulty. I will continue this regimen for now and readjust as needed for operative cultures. No plan for prolonged abx for osteomyelitis at this time as patient is  not planned for formal flap wound closure.  -continue IV ceftriaxone  -continue PO flagyl  -check CBCD and CMP tomorrow  -follow up operative culture results and adjust antibiotic as needed  -monitor vitals  Sacral ulcer (HCC)  This is likely source of patient's bacteremia above. Wound was felt to have initially developed during patient stay in skilled nursing facility for rehab.  During recent hospitalization in September there was concern for fistulous connection from rectum to the sacral wound in setting of colitis/proctitis.  Wound was debrided by general surgery during last admission and was noted to be stage IV with palpable bone.  Now latest CT showing soft tissue gas extending into right and left medial aspects of gluteus jac muscle, but no abscess. There is erosion of coccyx suggestive of osteomyelitis.  Sacral wound was initially examined by surgery and felt it appeared noninfected at the time.  They have reassessed and pursued operative debridement. Multiple pus pockets and necrotic tissue/bone were debrided. New operative cultures are pending with preliminary polymicrobial growth, anaerobes now showing as well. Given patient's failure to thrive and immobility, wound will be difficult to heal. He went back to the OR yesterday for additional wound debridement, PEG placement, and loop colostomy creation.  -antibiotic as above  -follow up operative cultures  -serial skin exams  -serial exams and care of colostomy  -monitor stool output  -serial exams and care of PEG  -frequent turning/repositioning to off-load pressure from the wound  -local wound care per general surgery  -continue follow up with general surgery  Pyuria  UA sent from patient's chronic Parra catheter had innumerable WBCs. This is expected finding in setting of chronic catheter and cannot be used alone to determine UTI.  CT abdomen/pelvis was without any hydronephrosis or hydroureter.  There was some chronic appearing bilateral  perinephric stranding.  The urinary bladder was noted to be collapsed around a Hanks catheter.  Hanks was exchanged this admission.  -serial exams and care of hanks  -monitor urine output  Chronic indwelling Hanks catheter  In setting of chronic urinary retention and BPH. Catheter was exchanged this admission.  -serial exams and care of hanks  -monitor urine output  -continue follow up with urology as needed  Type 2 diabetes mellitus without complication, without long-term current use of insulin ([facility])  Lab Results   Component Value Date     HGBA1C 6.2 (H) 2024   Elevated blood glucose is risk factor for wounds and infection. Recommend tight glycemic control.  -blood glucose management per primary service   Proctitis  CT A/P from 10/18/2024 showing mild wall thickening of the rectum. CT scan back in 2024 also noted proctocolitis.  Given persistence, this may be a somewhat chronic finding.  He is not having any current diarrhea.  -antibiotic as above  -monitor GI symptoms  -monitor stool output    Above plan was discussed in detail with JACK who agrees with plan for ongoing antibiotic.    Antibiotics:  Ceftriaxone 4  Flagyl 7  Antibiotics 7    Subjective:  Patient slightly more interactive this morning, answered some yes/no questions but ROS overall limited as patient did not elaborate on symptoms and did not answer at times. He denied abdominal pain and pain at new stoma site. He denied difficulty breathing. He offered no other symptoms.    Objective:  Vitals:  Temp:  [96.9 °F (36.1 °C)-97.7 °F (36.5 °C)] 97.6 °F (36.4 °C)  HR:  [] 78  Resp:  [13-18] 18  BP: ()/(53-87) 106/60  SpO2:  [93 %-100 %] 95 %  Temp (24hrs), Av.3 °F (36.3 °C), Min:96.9 °F (36.1 °C), Max:97.7 °F (36.5 °C)  Current: Temperature: 97.6 °F (36.4 °C)    Physical Exam:   General Appearance:  More alert but minimally interactive. He appears chronically ill and debilitated. He appeared comfortable sitting up in bed in   no acute distress.   Lungs:   Clear to auscultation bilaterally; no wheezes, rhonchi or rales; respirations unlabored on room air.   Heart:  RRR; no murmur, rub or gallop.   Abdomen:   Soft, non-distended, positive bowel sounds.  No moaning or facial grimace with abdominal palpation. New L PEG intact, infusing tube feed. New LLQ colostomy intact, stoma pale pink and moist, serous sweating in pouch.   Extremities: No clubbing or cyanosis, trace peripheral edema.   Skin: No new rashes noted on exposed skin.     Labs, Imaging, & Other studies:   All pertinent labs and imaging studies were personally reviewed  Results from last 7 days   Lab Units 10/23/24  0548 10/22/24  0446 10/21/24  2327   WBC Thousand/uL 16.33* 18.41* 18.96*   HEMOGLOBIN g/dL 8.4* 8.4* 9.0*   PLATELETS Thousands/uL 456* 503* 560*     Results from last 7 days   Lab Units 10/23/24  0548 10/21/24  2327 10/21/24  1733 10/21/24  0439 10/20/24  1552 10/20/24  0429   POTASSIUM mmol/L 3.6   < > 3.4* 3.4*   < > 3.5   CHLORIDE mmol/L 118*   < > 117* 118*   < > 120*   CO2 mmol/L 27   < > 29 28   < > 30   BUN mg/dL 14   < > 25 26*   < > 37*   CREATININE mg/dL 0.59*   < > 0.80 0.74   < > 0.74   EGFR ml/min/1.73sq m 99   < > 87 90   < > 90   CALCIUM mg/dL 7.7*   < > 8.2* 8.2*   < > 8.4   AST U/L  --   --  21 26  --  47*   ALT U/L  --   --  32 36  --  54*   ALK PHOS U/L  --   --  76 66  --  83    < > = values in this interval not displayed.     Results from last 7 days   Lab Units 10/21/24  2112 10/19/24  0938 10/18/24  1500 10/18/24  1445 10/18/24  1335   BLOOD CULTURE   --   --   --  Escherichia coli*  Proteus mirabilis*  Bacteroides vulgatus group*  Bacteroides thetaiotaomicron group*  Bacteroides caccae*  Clostridium species, not perfringens or septicum* Escherichia coli*  Proteus mirabilis*  Bacteroides vulgatus group*  Bacteroides thetaiotaomicron group*  Bacteroides caccae*  Bacteroides ovatus group*  Clostridium species, not perfringens or  septicum*   GRAM STAIN RESULT  Rare Polys*  2+ Gram negative rods*  1+ Gram positive cocci in pairs*  Rare Yeast*  --   --  Gram negative rods* Gram negative rods*   URINE CULTURE   --   --  >100,000 cfu/ml Proteus mirabilis*  --   --    WOUND CULTURE  Culture results to follow.  --   --   --   --    MRSA CULTURE ONLY   --  No Methicillin Resistant Staphlyococcus aureus (MRSA) isolated  --   --   --

## 2024-10-24 NOTE — QUICK NOTE
Post Op Check:    73 y.o. male Day of Surgery s/p sacral debridement, PEG, laparoscopic loop colostomy. Patient non-verbal. Afebrile, HDS. PEG 3cm at the skin, tolerating trickle feeds at 20 cc/hr. Ostomy with bowel sweat, healthy appearing.    Temp:  [96.9 °F (36.1 °C)-97.7 °F (36.5 °C)] 97.2 °F (36.2 °C)  HR:  [] 75  BP: ()/(53-87) 121/72  Resp:  [13-18] 16  SpO2:  [93 %-100 %] 93 %  O2 Device: None (Room air)      Physical Exam:  General: No acute distress  CV: Well perfused, regular rate  Lungs: Normal work of breathing, no increased respiratory effort  Abdomen: Soft, non-tender, mildly-distended. Incision(s) clean, dry and intact. PEG 3 cm at the skin  Extremities: No clubbing or cyanosis  Skin: Warm, dry      Plan:  Diet Enteral/Parenteral; Tube Feeding No Oral Diet; Jevity 1.2 Mata; Continuous; 75; Prosource Protein Liquid - One Packet; BID; Teddy Unflavored - One Packet; BID; 150; Water; Every 4 hours  Continue to advance tube feeds to goal  Nursing wound care orders in place  WOCN for ostomy care  Nutrition consulted for tube feed francie Torres MD  General Surgery   10/23/24

## 2024-10-24 NOTE — ASSESSMENT & PLAN NOTE
10/23 s/p lap loop colostomy with PEG tube placement   Oral diet per SLP- re-evaluate for speech and swallow exam as patient is more alert  Advance tube feeds to goal rate   WOCN for ostomy care   Nutrition consulted

## 2024-10-25 PROBLEM — R74.8 ELEVATED LIVER ENZYMES: Status: RESOLVED | Noted: 2024-10-20 | Resolved: 2024-10-25

## 2024-10-25 LAB
ABO GROUP BLD BPU: NORMAL
ALBUMIN SERPL BCG-MCNC: 1.8 G/DL (ref 3.5–5)
ALP SERPL-CCNC: 48 U/L (ref 34–104)
ALT SERPL W P-5'-P-CCNC: 12 U/L (ref 7–52)
ANION GAP SERPL CALCULATED.3IONS-SCNC: 4 MMOL/L (ref 4–13)
AST SERPL W P-5'-P-CCNC: 23 U/L (ref 13–39)
BILIRUB SERPL-MCNC: 0.3 MG/DL (ref 0.2–1)
BPU ID: NORMAL
BUN SERPL-MCNC: 14 MG/DL (ref 5–25)
CALCIUM ALBUM COR SERPL-MCNC: 9 MG/DL (ref 8.3–10.1)
CALCIUM SERPL-MCNC: 7.2 MG/DL (ref 8.4–10.2)
CHLORIDE SERPL-SCNC: 117 MMOL/L (ref 96–108)
CO2 SERPL-SCNC: 27 MMOL/L (ref 21–32)
CREAT SERPL-MCNC: 0.62 MG/DL (ref 0.6–1.3)
CROSSMATCH: NORMAL
ERYTHROCYTE [DISTWIDTH] IN BLOOD BY AUTOMATED COUNT: 17.3 % (ref 11.6–15.1)
GFR SERPL CREATININE-BSD FRML MDRD: 97 ML/MIN/1.73SQ M
GLUCOSE SERPL-MCNC: 220 MG/DL (ref 65–140)
GLUCOSE SERPL-MCNC: 236 MG/DL (ref 65–140)
GLUCOSE SERPL-MCNC: 240 MG/DL (ref 65–140)
GLUCOSE SERPL-MCNC: 246 MG/DL (ref 65–140)
GLUCOSE SERPL-MCNC: 253 MG/DL (ref 65–140)
GLUCOSE SERPL-MCNC: 269 MG/DL (ref 65–140)
HCT VFR BLD AUTO: 26.1 % (ref 36.5–49.3)
HGB BLD-MCNC: 7.8 G/DL (ref 12–17)
MCH RBC QN AUTO: 29.8 PG (ref 26.8–34.3)
MCHC RBC AUTO-ENTMCNC: 29.9 G/DL (ref 31.4–37.4)
MCV RBC AUTO: 100 FL (ref 82–98)
PLATELET # BLD AUTO: 437 THOUSANDS/UL (ref 149–390)
PMV BLD AUTO: 9.6 FL (ref 8.9–12.7)
POTASSIUM SERPL-SCNC: 3.6 MMOL/L (ref 3.5–5.3)
PROT SERPL-MCNC: 6 G/DL (ref 6.4–8.4)
RBC # BLD AUTO: 2.62 MILLION/UL (ref 3.88–5.62)
SODIUM SERPL-SCNC: 148 MMOL/L (ref 135–147)
UNIT DISPENSE STATUS: NORMAL
UNIT PRODUCT CODE: NORMAL
UNIT PRODUCT VOLUME: 350 ML
UNIT RH: NORMAL
WBC # BLD AUTO: 14.71 THOUSAND/UL (ref 4.31–10.16)

## 2024-10-25 PROCEDURE — 85027 COMPLETE CBC AUTOMATED: CPT

## 2024-10-25 PROCEDURE — 82948 REAGENT STRIP/BLOOD GLUCOSE: CPT

## 2024-10-25 PROCEDURE — 92526 ORAL FUNCTION THERAPY: CPT

## 2024-10-25 PROCEDURE — 99233 SBSQ HOSP IP/OBS HIGH 50: CPT | Performed by: STUDENT IN AN ORGANIZED HEALTH CARE EDUCATION/TRAINING PROGRAM

## 2024-10-25 PROCEDURE — 99024 POSTOP FOLLOW-UP VISIT: CPT | Performed by: SURGERY

## 2024-10-25 PROCEDURE — 80053 COMPREHEN METABOLIC PANEL: CPT

## 2024-10-25 PROCEDURE — 99232 SBSQ HOSP IP/OBS MODERATE 35: CPT | Performed by: INTERNAL MEDICINE

## 2024-10-25 RX ORDER — LANOLIN ALCOHOL/MO/W.PET/CERES
100 CREAM (GRAM) TOPICAL DAILY
Status: DISCONTINUED | OUTPATIENT
Start: 2024-10-26 | End: 2024-10-29 | Stop reason: HOSPADM

## 2024-10-25 RX ORDER — INSULIN GLARGINE 100 [IU]/ML
6 INJECTION, SOLUTION SUBCUTANEOUS
Status: DISCONTINUED | OUTPATIENT
Start: 2024-10-25 | End: 2024-10-26

## 2024-10-25 RX ORDER — HYDROMORPHONE HCL IN WATER/PF 6 MG/30 ML
0.2 PATIENT CONTROLLED ANALGESIA SYRINGE INTRAVENOUS EVERY 6 HOURS PRN
Status: DISCONTINUED | OUTPATIENT
Start: 2024-10-25 | End: 2024-10-29 | Stop reason: HOSPADM

## 2024-10-25 RX ORDER — INSULIN LISPRO 100 [IU]/ML
1-5 INJECTION, SOLUTION INTRAVENOUS; SUBCUTANEOUS EVERY 6 HOURS
Status: DISCONTINUED | OUTPATIENT
Start: 2024-10-25 | End: 2024-10-27

## 2024-10-25 RX ADMIN — INSULIN GLARGINE 6 UNITS: 100 INJECTION, SOLUTION SUBCUTANEOUS at 22:29

## 2024-10-25 RX ADMIN — INSULIN LISPRO 2 UNITS: 100 INJECTION, SOLUTION INTRAVENOUS; SUBCUTANEOUS at 13:49

## 2024-10-25 RX ADMIN — METOROPROLOL TARTRATE 5 MG: 5 INJECTION, SOLUTION INTRAVENOUS at 19:42

## 2024-10-25 RX ADMIN — INSULIN LISPRO 2 UNITS: 100 INJECTION, SOLUTION INTRAVENOUS; SUBCUTANEOUS at 18:34

## 2024-10-25 RX ADMIN — AMPICILLIN SODIUM AND SULBACTAM SODIUM 3 G: 100; 50 INJECTION, POWDER, FOR SOLUTION INTRAVENOUS at 13:49

## 2024-10-25 RX ADMIN — HEPARIN SODIUM 5000 UNITS: 5000 INJECTION INTRAVENOUS; SUBCUTANEOUS at 13:49

## 2024-10-25 RX ADMIN — FINASTERIDE 5 MG: 5 TABLET, FILM COATED ORAL at 08:48

## 2024-10-25 RX ADMIN — B-COMPLEX W/ C & FOLIC ACID TAB 1 TABLET: TAB at 08:48

## 2024-10-25 RX ADMIN — METOROPROLOL TARTRATE 5 MG: 5 INJECTION, SOLUTION INTRAVENOUS at 02:20

## 2024-10-25 RX ADMIN — AMPICILLIN SODIUM AND SULBACTAM SODIUM 3 G: 100; 50 INJECTION, POWDER, FOR SOLUTION INTRAVENOUS at 08:47

## 2024-10-25 RX ADMIN — AMPICILLIN SODIUM AND SULBACTAM SODIUM 3 G: 100; 50 INJECTION, POWDER, FOR SOLUTION INTRAVENOUS at 17:27

## 2024-10-25 RX ADMIN — CEFEPIME 2000 MG: 2 INJECTION, POWDER, FOR SOLUTION INTRAVENOUS at 08:47

## 2024-10-25 RX ADMIN — ASPIRIN 81 MG CHEWABLE TABLET 81 MG: 81 TABLET CHEWABLE at 08:48

## 2024-10-25 RX ADMIN — SODIUM CHLORIDE, SODIUM GLUCONATE, SODIUM ACETATE, POTASSIUM CHLORIDE, MAGNESIUM CHLORIDE, SODIUM PHOSPHATE, DIBASIC, AND POTASSIUM PHOSPHATE 125 ML/HR: .53; .5; .37; .037; .03; .012; .00082 INJECTION, SOLUTION INTRAVENOUS at 02:22

## 2024-10-25 RX ADMIN — THIAMINE HYDROCHLORIDE 100 MG: 100 INJECTION, SOLUTION INTRAMUSCULAR; INTRAVENOUS at 08:47

## 2024-10-25 RX ADMIN — METOROPROLOL TARTRATE 5 MG: 5 INJECTION, SOLUTION INTRAVENOUS at 08:48

## 2024-10-25 RX ADMIN — CEFEPIME 2000 MG: 2 INJECTION, POWDER, FOR SOLUTION INTRAVENOUS at 17:26

## 2024-10-25 RX ADMIN — METOROPROLOL TARTRATE 5 MG: 5 INJECTION, SOLUTION INTRAVENOUS at 13:49

## 2024-10-25 RX ADMIN — HEPARIN SODIUM 5000 UNITS: 5000 INJECTION INTRAVENOUS; SUBCUTANEOUS at 22:29

## 2024-10-25 RX ADMIN — ESCITALOPRAM OXALATE 10 MG: 10 TABLET ORAL at 08:48

## 2024-10-25 RX ADMIN — INSULIN LISPRO 2 UNITS: 100 INJECTION, SOLUTION INTRAVENOUS; SUBCUTANEOUS at 08:48

## 2024-10-25 RX ADMIN — ATORVASTATIN CALCIUM 80 MG: 80 TABLET, FILM COATED ORAL at 17:27

## 2024-10-25 RX ADMIN — HEPARIN SODIUM 5000 UNITS: 5000 INJECTION INTRAVENOUS; SUBCUTANEOUS at 05:27

## 2024-10-25 RX ADMIN — AMPICILLIN SODIUM AND SULBACTAM SODIUM 3 G: 100; 50 INJECTION, POWDER, FOR SOLUTION INTRAVENOUS at 02:20

## 2024-10-25 NOTE — ASSESSMENT & PLAN NOTE
Has chronic anemia likely secondary to poor nutritional status underlying chronic illness.   Recent Labs     10/24/24  0645 10/24/24  1515 10/25/24  0452   HGB 6.2* 7.5* 7.8*      Patient was transfused 1 unit of packed red blood cells on 10/24 for hemoglobin of 6.2  Patient with chronic anemia, acutely worse with expected procedural blood loss  After transfusion hemoglobin returned to baseline of approximately 7-8

## 2024-10-25 NOTE — PROGRESS NOTES
"Progress Note - Hospitalist   Name: Drew Santos 73 y.o. male I MRN: [id number]  Unit/Bed#: E4 -01 I Date of Admission: 10/18/2024   Date of Service: 10/25/2024 I Hospital Day: 7    Assessment & Plan  Sepsis  Patient is a 73-year-old male who was recently admitted 9/17-10/4 with polymicrobial bacteremia secondary to sacral wound who had completed a course of IV cefepime, flagyl and vancomycin followed by ampicillin.   Was also diagnosed with covid.  Pt was readmitted for failure to thrive, and concerns over worsening sacral wounds and posterior osteomyelitis    Patient met criteria for sepsis, present on admission with tachycardia and leukocytosis.  Sepsis secondary to sacral decubitus ulcer with underlying osteomyelitis and polymicrobial bacteremia  CT abdomen pelvis showing \"Large sacral decubitus ulcer extending to the coccyx with soft tissue gas now seen extending into the right and left medial aspects of the gluteus jac muscles. No drainable fluid collection. Erosion of the coccyx suggestive of osteomyelitis.\"  Blood culture x2 obtained upon admission growing Proteus and E. Coli  10/21 patient underwent operative incision and drainage of buttock wound, and sacral wound debridement and coccyxectomy  10/23 patient returned to the OR for sacral debridement and washout.  Patient also had diverting loop colostomy due to his sacral wound, and percutaneous G-tube placement for nutrition  10/21 operative cultures grew Bacteroides, Clostridium inoculum, E. coli, Enterobacter, Proteus, Enterococcus faecalis  Infectious disease recs appreciated- Continue IV antibiotics with IV Rocephin and Unasyn  Gram-negative bacteremia  Blood cultures growing E. coli and Proteus and bacteroides, and Clostridium  Most likely secondary to infected sacral decubitus ulcer with probable osteomyelitis of coccyx  Infectious disease following for antibiotic management  Right upper quadrant ultrasound performed, no evidence of acute " cholecystitis  Surgery took patient to the OR emergently 10/21 for concern of necrotizing soft tissue infection, and return to the OR for additional debridement, washout with end colostomy and PEG tube placement on 10/23/24   Continue antibiotics IV ceftriaxone and Unasyn as recommended by infectious disease  ID recommends a 7-day course of antibiotics, from the most recent adjustment, last dose 10/30  Sacral ulcer (HCC)  Patient presented with a large sacral decubitus ulcer extending to the coccyx with concurrent sepsis, and gram-negative bacteremia  Was taken to the operating room 10/21 for sacral debridement, coccyx ectomy, and return to the OR 10/23 for additional debridement diverting loop colostomy and PEG tube placement  Per infectious disease recommendations: Operative cultures were polymicrobial  Antibiotics with cefepime/Unasyn for 7-day additional course, last dose 10/30  No indication for chronic antibiotics for chronic osteo as patient's wound is unlikely to heal: Long-term antibiotic therapy futile without future flap coverage  S/P percutaneous endoscopic gastrostomy (PEG) tube placement (HCC)  10/23 s/p lap loop colostomy with PEG tube placement for nutrition  Oral diet per SLP: Sips of liquid, and ice chips  Nutrition consulted appreciated:  pt tolerating at goal rate  Anemia of chronic disease  Has chronic anemia likely secondary to poor nutritional status underlying chronic illness.   Recent Labs     10/24/24  0645 10/24/24  1515 10/25/24  0452   HGB 6.2* 7.5* 7.8*      Patient was transfused 1 unit of packed red blood cells on 10/24 for hemoglobin of 6.2  Patient with chronic anemia, acutely worse with expected procedural blood loss  After transfusion hemoglobin returned to baseline of approximately 7-8    Elevated liver enzymes (Resolved: 10/25/2024)  POA mild elevation , ALT 99, without guarding    Resolved  Right upper quadrant ultrasound without evidence of acute  cholecystitis  Pyuria  Patient with chronic Hanks  Urine culture growing Proteus  Seed IV antibiotics with ceftriaxone  Hypernatremia  Sodium 154 at time of admission, likely hypovolemic hypernatremia related to mild dehydration secondary to poor water intake  Patient was on IV fluid support  Patient had PEG tube placed 10/23: Currently tolerating tube feedings and flushes  Will Hep-Lock IV fluids and increase free water flushing to gradually improve hypernatremia  Chronic indwelling Hanks catheter  Chronic urethral hanks catheter secondary to chronic urinary retention, BPH.  Hanks patent at this time   Hanks was exchanged 10/19  Continue tamsulosin and finasteride  Proctitis  Noted on CT scan with history of this as possibly chronic. No diarrhea or clinical evidence of colitis.   Type 2 diabetes mellitus without complication, without long-term current use of insulin ([facility])  Lab Results   Component Value Date    HGBA1C 6.2 (H) 09/17/2024     Recent Labs     10/24/24  1600 10/24/24  2111 10/25/24  0745 10/25/24  1315   POCGLU 206* 213* 253* 220*     Blood Sugar Average: Last 72 hrs:  (P) [id number]  Last HgbA1C 6.2%  Continue sliding scale insulin   Hypoglycemia protocol  Monitor while on tube feeds   Paroxysmal atrial fibrillation (HCC)  Rate controlled on metoprolol: Currently 5 mg IV every 6 hours  Eliquis was temporarily placed on hold: Will restart when approved by surgery  Severe protein-calorie malnutrition (HCC)  Malnutrition Findings:   ChronicAdult Malnutrition type: Chronic illness  Adult Degree of Malnutrition: Other severe protein calorie malnutrition  Malnutrition Characteristics: Weight loss, Inadequate energy  360 Statement: Severe protein calorie malnutrition in context of chronic illness r/t poor appetite, inadequate PO intake as evidance by energy intake less than 75% compared to estimated needs>1 month, 14% wt loss in 1 month (9/17/24 114 kg, 10/19/24 97.7 kg) treated with PO diet. Once  able to have po treat with Mechanical Soft diet, Magic Cup BID, Ensure Compact 1x daily.  Patient with significant weight loss and per wife with significantly decreased oral intake over the past few months and has not been eating for 1 week at the nursing home.  Weight noted in April to be 275 lb, and now weighing 215 lb  PEG tube placed 10/24  - continue with tube feeds, will re-assess for PO diet today    Dietary to adjust based on patient nutritional needs.  Continue high-dose thiamine and electrolyte monitoring for refeeding syndrome    History of CVA (cerebrovascular accident)  History of stroke with L sided hemiparesis recently hospitalized at [facility] in July 2024  Baseline non-verbal, alert, at times selective in answers, can nod yes/no however unsure accuracy most information obtained per patients wife  Baseline Dysphagia 2 mechanical soft, thin liquids, patient well known to speech therapy:  cont diet as per speech  Currently NPO but sips of liquid with observation, and ice chips  Advanced care planning/counseling discussion  Patient readmitted with worsening prognosis with large sacral wound and developing osteomyelitis secondary to bacteremia, severe malnutrition. Extensive discussion with wife regarding patients guarded prognosis and goals of care for her . Wife opting to proceed with full medical care at this time.   Started on tube feeds with NG tube, PEG tube placed 10/23   Seen in consult by palliative care, family remains goal oriented    VTE Pharmacologic Prophylaxis: VTE Score: 8 High Risk (Score >/= 5) - Pharmacological DVT Prophylaxis Ordered: heparin. Sequential Compression Devices Ordered.    Mobility:   Basic Mobility Inpatient Raw Score: 6  JH-HLM Goal: 2: Bed activities/Dependent transfer  JH-HLM Achieved: 1: Laying in bed  JH-HLM Goal NOT achieved. Continue with multidisciplinary rounding and encourage appropriate mobility to improve upon JH-HLM goals.    Patient Centered  Rounds: I performed bedside rounds with nursing staff today.   Discussions with Specialists or Other Care Team Provider: ID: Dr. Lantigua    Education and Discussions with Family / Patient:   Updated wife Kristi Santos via phone.    Current Length of Stay: 7 day(s)  Current Patient Status: Inpatient   Certification Statement: The patient will continue to require additional inpatient hospital stay due to sepsis, iv abx  Discharge Plan: Anticipate discharge in >72 hrs to rehab facility.    Code Status: Level 1 - Full Code    Subjective   Pt awake.  Makes eye contact.  Awake . Answers yes to all questions. (Wife notes that he has been doing that)    Objective :  Temp:  [97.6 °F (36.4 °C)-98.7 °F (37.1 °C)] 97.6 °F (36.4 °C)  HR:  [] 110  BP: (122-127)/(76-81) 124/81  Resp:  [18-20] 20  SpO2:  [90 %-98 %] 98 %  O2 Device: None (Room air)    Body mass index is 25.54 kg/m².     Input and Output Summary (last 24 hours):     Intake/Output Summary (Last 24 hours) at 10/25/2024 1603  Last data filed at 10/25/2024 1201  Gross per 24 hour   Intake 300 ml   Output 1400 ml   Net -1100 ml       Physical Exam  General: Pleasant male.  No acute distress.  Nontachypneic and nondyspneic.  Lying supine with head of the bed at 45 degrees.  Opens eyes to voice.  Makes eye contact.  Heart: Regular rate and rhythm.  S1-S2 present.  No murmur, rub, gallop  Lungs: Clear to auscultation bilaterally.  Good air movement.  No accessory muscle use or respiratory distress  Abdomen: Soft, nontender with palpation.  Nondistended.  Normal active bowel sounds present.  No guarding or rebound.  No peritoneal signs or mass  Extremities: No clubbing, cyanosis, edema.  2+ pedal pulses bilaterally  Neurologic: Awake.  Alert.  No somnolence or lethargy.  Makes eye contact.  Moving his right arm purposefully.  Squeezes my hand.    Lines/Drains:  Lines/Drains/Airways       Active Status       Name Placement date Placement time Site Days     Gastrostomy/Enterostomy LUQ 10/23/24  1135  LUQ  2    Colostomy Loop LLQ 10/23/24  1038  LLQ  2    Urethral Catheter Latex 20 Fr. 10/19/24  1209  Latex  6                  Urinary Catheter:  Goal for removal: N/A - Chronic Parra                 Lab Results: I have reviewed the following results:   Results from last 7 days   Lab Units 10/25/24  0452 10/24/24  1515 10/24/24  0645   WBC Thousand/uL 14.71*  --  16.21*   HEMOGLOBIN g/dL 7.8*   < > 6.2*   HEMATOCRIT % 26.1*   < > 21.4*   PLATELETS Thousands/uL 437*  --  425*   SEGS PCT %  --   --  82*   LYMPHO PCT %  --   --  14   MONO PCT %  --   --  3*   EOS PCT %  --   --  0    < > = values in this interval not displayed.     Results from last 7 days   Lab Units 10/25/24  0452   SODIUM mmol/L 148*   POTASSIUM mmol/L 3.6   CHLORIDE mmol/L 117*   CO2 mmol/L 27   BUN mg/dL 14   CREATININE mg/dL 0.62   ANION GAP mmol/L 4   CALCIUM mg/dL 7.2*   ALBUMIN g/dL 1.8*   TOTAL BILIRUBIN mg/dL 0.30   ALK PHOS U/L 48   ALT U/L 12   AST U/L 23   GLUCOSE RANDOM mg/dL 246*         Results from last 7 days   Lab Units 10/25/24  1315 10/25/24  0745 10/24/24  2111 10/24/24  1600 10/24/24  1146 10/24/24  0748 10/23/24  2040 10/23/24  1617 10/23/24  0726 10/22/24  2117 10/22/24  1640 10/22/24  1053   POC GLUCOSE mg/dl 220* 253* 213* 206* 174* 181* 170* 125 89 106 130 162*         Results from last 7 days   Lab Units 10/21/24  2327 10/21/24  1734 10/18/24  2244   LACTIC ACID mmol/L 1.8 1.5 1.6       Recent Cultures (last 7 days):   Results from last 7 days   Lab Units 10/21/24  2112   GRAM STAIN RESULT  Rare Polys*  2+ Gram negative rods*  1+ Gram positive cocci in pairs*  Rare Yeast*   WOUND CULTURE  1+ Growth of Escherichia coli*  1+ Growth of Enterobacter cloacae*  1+ Growth of Proteus mirabilis*  1+ Growth of Enterococcus faecalis*       ==================================================  Imaging  10/21 CT abd/pelvis  Reidentified large sacral decubitus ulcer with erosion of the  coccyx in keeping with chronic osteomyelitis.  Extensive subcutaneous emphysema about the ulcer extending into into the left gluteal musculature keeping with infectious myositis. Necrotizing fasciitis is not excluded.  No focal collection to indicate an abscess.  Circumferential rectal thickening keeping with a nonspecific proctitis.  Suspected cystitis.    10/20 CXR  Enteric tube tip projects over the gastric fundus.     1019 RUQ US  Cholelithiasis without acute cholecystitis.     10/18 CXR  Limited study. Some atelectasis is present at the right base. No convincing evidence of pneumonia.     10/18 CT abd pelvis  Large sacral decubitus ulcer extending to the coccyx with soft tissue gas now seen extending into the right and left medial aspects of the gluteus jac muscles. No drainable fluid collection.  Erosion of the coccyx suggestive of osteomyelitis.       Micro  10/21: Anaerobic culture: Bacteroides, Clostridium inoculum  10/21 wound culture: E. coli, Enterobacter, Proteus, Enterococcus  10/19: MRSA culture: Negative  10/18: Urine culture: Greater than 100,000 Proteus  10/18 blood culture: E. coli, Proteus, Bacteroides, Clostridium species, not perfringens or Septicum x 2    procedures        ==================================================    Last 24 Hours Medication List:     Current Facility-Administered Medications:     acetaminophen (TYLENOL) tablet 650 mg, Q4H PRN    ampicillin-sulbactam (UNASYN) 3 g in sodium chloride 0.9 % 100 mL IVPB, Q6H, Last Rate: 3 g (10/25/24 1349)    aspirin chewable tablet 81 mg, Daily    atorvastatin (LIPITOR) tablet 80 mg, QPM    ceFEPime (MAXIPIME) 2,000 mg in dextrose 5 % 50 mL IVPB, Q12H, Last Rate: 2,000 mg (10/25/24 9105)    escitalopram (LEXAPRO) tablet 10 mg, Daily    finasteride (PROSCAR) tablet 5 mg, Daily    heparin (porcine) subcutaneous injection 5,000 Units, Q8H ELISEO    HYDROmorphone HCl (DILAUDID) injection 0.2 mg, Q6H PRN    insulin lispro (HumALOG/ADMELOG)  100 units/mL subcutaneous injection 1-5 Units, Q6H **AND** Fingerstick Glucose (POCT), Q6H    [Transfer Hold] lisinopril (ZESTRIL) tablet 10 mg, Daily    metoprolol (LOPRESSOR) injection 2.5 mg, Q6H PRN    metoprolol (LOPRESSOR) injection 5 mg, Q6H    multivitamin stress formula tablet 1 tablet, Daily    ondansetron (ZOFRAN) injection 4 mg, Q6H PRN    senna (SENOKOT) tablet 17.2 mg, Daily PRN    tamsulosin (FLOMAX) capsule 0.4 mg, Daily With Dinner    [START ON 10/26/2024] thiamine tablet 100 mg, Daily    Administrative Statements   Today, Patient Was Seen By: Stacy Ba MD      **Please Note: This note may have been constructed using a voice recognition system.**

## 2024-10-25 NOTE — ASSESSMENT & PLAN NOTE
Sacral ulcer previously debrided by our surgical service at bedside where granulation tissue was exposed. Patient was discharged to nursing care facility with wound care service follow up. Return to ED on 10/18 w/ necrotic superficial slough of wound. Improvement of wound appearance w/ dakin soaked dressings with removal of superficial necrotic tissue but ultimately required operative debridement. Now s/p:  10/21 status post debridement in OR  10/23 sacral debridement, lap loop colostomy, PEG    WBC 14.7 (16.2)  Hgb 7.8 (7.5)    UOP 1350  Colostomy: 450 cc - clear liquid, no true stool    Plan  - Oral diet per SLP/Primary  - Sacral wound checks per general surgery. Daily nursing wound care orders placed.  - Continue tube feeds at goal   -Continue IV antibiotics per ID  - WOCN for ostomy care  - Rest of care per primary team

## 2024-10-25 NOTE — ASSESSMENT & PLAN NOTE
History of stroke with L sided hemiparesis recently hospitalized at [facility] in July 2024  Baseline non-verbal, alert, at times selective in answers, can nod yes/no however unsure accuracy most information obtained per patients wife  Baseline Dysphagia 2 mechanical soft, thin liquids, patient well known to speech therapy:  cont diet as per speech  Currently NPO but sips of liquid with observation, and ice chips

## 2024-10-25 NOTE — ASSESSMENT & PLAN NOTE
Patient readmitted with worsening prognosis with large sacral wound and developing osteomyelitis secondary to bacteremia, severe malnutrition. Extensive discussion with wife regarding patients guarded prognosis and goals of care for her . Wife opting to proceed with full medical care at this time.   Started on tube feeds with NG tube, PEG tube placed 10/23   Seen in consult by palliative care, family remains goal oriented

## 2024-10-25 NOTE — PROGRESS NOTES
Patient:    MRN:  [id number]    Alanis Request ID:  [id number]    Level of care reserved:  Skilled Nursing Facility    Partner Reserved:  [facility], [place] [address] [phone]    Clinical needs requested:    Geography searched:  10 miles around [id number]    Start of Service:    Request sent:  5:06pm EDT on 10/21/2024 by Ava Joe    Partner reserved:  11:56am EDT on 10/25/2024 by Jaye Sheffield    Choice list shared:  9:41am EDT on 10/24/2024 by Jaye Sheffield

## 2024-10-25 NOTE — ASSESSMENT & PLAN NOTE
Lab Results   Component Value Date    HGBA1C 6.2 (H) 09/17/2024     Recent Labs     10/24/24  1600 10/24/24  2111 10/25/24  0745 10/25/24  1315   POCGLU 206* 213* 253* 220*     Blood Sugar Average: Last 72 hrs:  (P) 168.8245735569425914  Last HgbA1C 6.2%  Continue sliding scale insulin   Hypoglycemia protocol  Monitor while on tube feeds

## 2024-10-25 NOTE — PROGRESS NOTES
Progress Note - Surgery-General   Name: Drew Santos 73 y.o. male I MRN: [id number]  Unit/Bed#: E4 -01 I Date of Admission: 10/18/2024   Date of Service: 10/25/2024 I Hospital Day: 7     Assessment & Plan  Sacral ulcer (HCC)  Sacral ulcer previously debrided by our surgical service at bedside where granulation tissue was exposed. Patient was discharged to nursing care facility with wound care service follow up. Return to ED on 10/18 w/ necrotic superficial slough of wound. Improvement of wound appearance w/ dakin soaked dressings with removal of superficial necrotic tissue but ultimately required operative debridement. Now s/p:  10/21 status post debridement in OR  10/23 sacral debridement, lap loop colostomy, PEG    WBC 14.7 (16.2)  Hgb 7.8 (7.5)    UOP 1350  Colostomy: 450 cc - clear liquid, no true stool    Plan  - Oral diet per SLP/Primary  - Sacral wound checks per general surgery. Daily nursing wound care orders placed.  - Continue tube feeds at goal   -Continue IV antibiotics per ID  - WOCN for ostomy care  - Rest of care per primary team    Type 2 diabetes mellitus without complication, without long-term current use of insulin (HCC)  Lab Results   Component Value Date    HGBA1C 6.2 (H) 09/17/2024       Recent Labs     10/24/24  0748 10/24/24  1146 10/24/24  1600 10/24/24  2111   POCGLU 181* 174* 206* 213*       Blood Sugar Average: Last 72 hrs:  (P) [id number]  - continue to trend sugars  - SSI  - hypoglycemic protocol    Severe protein-calorie malnutrition (HCC)  Malnutrition Findings:   Adult Malnutrition type: Chronic illness  Adult Degree of Malnutrition: Other severe protein calorie malnutrition  Malnutrition Characteristics: Weight loss, Inadequate energy     360 Statement: Severe protein calorie malnutrition in context of chronic illness r/t poor appetite, inadequate PO intake as evidance by energy intake less than 75% compared to estimated needs>1 month, 14% wt loss in 1 month (9/17/24  114 kg, 10/19/24 97.7 kg) treated with PO diet. Once able to have po treat with Mechanical Soft diet, Magic Cup BID, Ensure Compact 1x daily.    BMI Findings:  Body mass index is 25.54 kg/m².   Continue TF at goal  S/P percutaneous endoscopic gastrostomy (PEG) tube placement (HCC)        Subjective   Patient seen and examined at bedside, in no acute distress. Tube feeds running at goal rate. Patient has clear liquid drainage in ostomy but without formed stool. Significant amount of gas in appliance.    Objective :  Temp:  [97.2 °F (36.2 °C)-98.3 °F (36.8 °C)] 98.3 °F (36.8 °C)  HR:  [70-90] 90  BP: (103-127)/(60-76) 127/76  Resp:  [18] 18  SpO2:  [90 %-99 %] 90 %  O2 Device: None (Room air)    I/O         10/23 0701  10/24 0700 10/24 0701  10/25 0700    I.V. (mL/kg) 2500 (25.6)     Blood  326    NG/ 300    IV Piggyback 350     Total Intake(mL/kg) 3150 (32.2) 626 (6.4)    Urine (mL/kg/hr) 800 (0.3) 1350 (0.6)    Emesis/NG output 100     Stool  450    Blood 75     Total Output 975 1800    Net +2175 -1174                Lines/Drains/Airways       Active Status       Name Placement date Placement time Site Days    Gastrostomy/Enterostomy LUQ 10/23/24  1135  LUQ  1    Colostomy Loop LLQ 10/23/24  1038  LLQ  1    Urethral Catheter Latex 20 Fr. 10/19/24  1209  Latex  5                  Physical Exam:  General: No acute distress  CV: Well perfused, regular rate and rhythm  Lungs: Normal work of breathing, no increased respiratory effort  Abdomen: Soft, non-tender, non-distended. RLQ ostomy pink and healthy, gas and liquid in appliance. PEG tube clean at skin site with TF running   Extremities: No edema, clubbing or cyanosis  Skin: Warm, dry        Lab Results: I have reviewed the following results:  Recent Labs     10/24/24  0645 10/24/24  1515 10/25/24  0452   WBC 16.21*  --  14.71*   HGB 6.2*   < > 7.8*   HCT 21.4*   < > 26.1*   *  --  437*   SODIUM 148*  --  148*   K 3.6  --  3.6   *  --  117*   CO2 29   --  27   BUN 17  --  14   CREATININE 0.66  --  0.62   GLUC 210*  --  246*   MG 2.0  --   --    PHOS 2.4  --   --    AST  --   --  23   ALT  --   --  12   ALB  --   --  1.8*   TBILI  --   --  0.30   ALKPHOS  --   --  48    < > = values in this interval not displayed.         VTE Pharmacologic Prophylaxis: Heparin  VTE Mechanical Prophylaxis: sequential compression device

## 2024-10-25 NOTE — PLAN OF CARE
Problem: Potential for Falls  Goal: Patient will remain free of falls  Description: INTERVENTIONS:  - Educate patient/family on patient safety including physical limitations  - Instruct patient to call for assistance with activity   - Consult OT/PT to assist with strengthening/mobility   - Keep Call bell within reach  - Keep bed low and locked with side rails adjusted as appropriate  - Keep care items and personal belongings within reach  - Initiate and maintain comfort rounds  - Make Fall Risk Sign visible to staff  - Offer Toileting every 3 Hours, in advance of need  - Initiate/Maintain bed alarm  - Obtain necessary fall risk management equipment: alarm   - Apply yellow socks and bracelet for high fall risk patients  - Consider moving patient to room near nurses station  Outcome: Progressing     Problem: Prexisting or High Potential for Compromised Skin Integrity  Goal: Skin integrity is maintained or improved  Description: INTERVENTIONS:  - Identify patients at risk for skin breakdown  - Assess and monitor skin integrity  - Assess and monitor nutrition and hydration status  - Monitor labs   - Assess for incontinence   - Turn and reposition patient  - Assist with mobility/ambulation  - Relieve pressure over bony prominences  - Avoid friction and shearing  - Provide appropriate hygiene as needed including keeping skin clean and dry  - Evaluate need for skin moisturizer/barrier cream  - Collaborate with interdisciplinary team   - Patient/family teaching  - Consider wound care consult   Outcome: Progressing     Problem: PAIN - ADULT  Goal: Verbalizes/displays adequate comfort level or baseline comfort level  Description: Interventions:  - Encourage patient to monitor pain and request assistance  - Assess pain using appropriate pain scale  - Administer analgesics based on type and severity of pain and evaluate response  - Implement non-pharmacological measures as appropriate and evaluate response  - Consider  cultural and social influences on pain and pain management  - Notify physician/advanced practitioner if interventions unsuccessful or patient reports new pain  Outcome: Progressing     Problem: INFECTION - ADULT  Goal: Absence or prevention of progression during hospitalization  Description: INTERVENTIONS:  - Assess and monitor for signs and symptoms of infection  - Monitor lab/diagnostic results  - Monitor all insertion sites, i.e. indwelling lines, tubes, and drains  - Monitor endotracheal if appropriate and nasal secretions for changes in amount and color  - Spangle appropriate cooling/warming therapies per order  - Administer medications as ordered  - Instruct and encourage patient and family to use good hand hygiene technique  - Identify and instruct in appropriate isolation precautions for identified infection/condition  Outcome: Progressing     Problem: DISCHARGE PLANNING  Goal: Discharge to home or other facility with appropriate resources  Description: INTERVENTIONS:  - Identify barriers to discharge w/patient and caregiver  - Arrange for needed discharge resources and transportation as appropriate  - Identify discharge learning needs (meds, wound care, etc.)  - Arrange for interpretive services to assist at discharge as needed  - Refer to Case Management Department for coordinating discharge planning if the patient needs post-hospital services based on physician/advanced practitioner order or complex needs related to functional status, cognitive ability, or social support system  Outcome: Progressing     Problem: Knowledge Deficit  Goal: Patient/family/caregiver demonstrates understanding of disease process, treatment plan, medications, and discharge instructions  Description: Complete learning assessment and assess knowledge base.  Interventions:  - Provide teaching at level of understanding  - Provide teaching via preferred learning methods  Outcome: Progressing     Problem:  Nutrition/Hydration-ADULT  Goal: Nutrient/Hydration intake appropriate for improving, restoring or maintaining nutritional needs  Description: Monitor and assess patient's nutrition/hydration status for malnutrition. Collaborate with interdisciplinary team and initiate plan and interventions as ordered.  Monitor patient's weight and dietary intake as ordered or per policy. Utilize nutrition screening tool and intervene as necessary. Determine patient's food preferences and provide high-protein, high-caloric foods as appropriate.     INTERVENTIONS:  - Monitor oral intake, urinary output, labs, and treatment plans  - Assess nutrition and hydration status and recommend course of action  - Evaluate amount of meals eaten  - Assist patient with eating if necessary   - Allow adequate time for meals  - Recommend/ encourage appropriate diets, oral nutritional supplements, and vitamin/mineral supplements  - Order, calculate, and assess calorie counts as needed  - Recommend, monitor, and adjust tube feedings and TPN/PPN based on assessed needs  - Assess need for intravenous fluids  - Provide specific nutrition/hydration education as appropriate  - Include patient/family/caregiver in decisions related to nutrition  Outcome: Progressing

## 2024-10-25 NOTE — SPEECH THERAPY NOTE
Speech Language/Pathology    Speech/Language Pathology Progress Note    Patient Name: Drew Santos  Today's Date: 10/25/2024     Problem List  Principal Problem:    Gram-negative bacteremia  Active Problems:    Sepsis    Type 2 diabetes mellitus without complication, without long-term current use of insulin (HCC)    Proctitis    Polymicrobial bacteremia    History of CVA (cerebrovascular accident)    Paroxysmal atrial fibrillation (HCC)    Anemia of chronic disease    Severe protein-calorie malnutrition (HCC)    Advanced care planning/counseling discussion    Sacral ulcer (HCC)    Pyuria    Chronic indwelling Parra catheter    Hypernatremia    Elevated liver enzymes    S/P percutaneous endoscopic gastrostomy (PEG) tube placement (HCC)       Past Medical History  Past Medical History:   Diagnosis Date    Atherosclerotic heart disease of native coronary artery without angina pectoris     Atrial fibrillation (HCC)     Cerebral infarction (HCC)     Constipation     Depression     Diabetes mellitus (HCC)     Hemiplegia and hemiparesis following cerebral infarction affecting left non-dominant side (HCC)     Hyperlipidemia     Hypertension     Pressure ulcer of sacral region, stage 3 (HCC)     Prostatic hyperplasia     Sepsis (HCC)     Stroke (HCC)     TIA (transient ischemic attack)     Urinary retention     Vitamin D deficiency         Past Surgical History  Past Surgical History:   Procedure Laterality Date    COLOSTOMY N/A 10/23/2024    Procedure: Lap loop colostomy, psb open loop colostomy;  Surgeon: Davonte Banegas DO;  Location: [facility];  Service: General    GASTROSTOMY TUBE PLACEMENT N/A 10/23/2024    Procedure: INSERTION PEG TUBE, psb lap gastrostomy tube placement;  Surgeon: Davonte Banegas DO;  [facility];  Service: General    INCISION AND DRAINAGE OF WOUND N/A 10/21/2024    Procedure: INCISION AND DRAINAGE (I&D) BUTTOCK, SACRAL WOUND DEBRIDEMENT, COCCYGECTOMY;  Surgeon: Beka SHERMAN  "MD Fernando;  Location[facility];  Service: General    WOUND DEBRIDEMENT N/A 10/23/2024    Procedure: DEBRIDEMENT WOUND (WASH OUT), sacrum;  Surgeon: Davonte Banegas DO;  Location[facility];  Service: General         Subjective:  Pt resting but opened eyes when I spoke to him.   Objective:  Pt seen for oral care prior to tolerance of ice chips/small sips water, potential for other PO. Pt responded \"mm hmm:\" when  asked if he wanted some ice chips, and then some water. Oral care w/ suction completed. Pt was given trials of ice x 3. Slow mastication and mildly delayed transfer. Prompt appearing swallows. No cough or throat clear. Took 2 trials of water by small controlled sips. Again w/ prompt appearing swallow, no cough or wet vocal quality. Pt then stopped responding if he wanted any more though his eyes were open and he was alert. Did not open for additional presentations.   Assessment:  No overt s/s w/ ice chip trials and small single sips of water.   Plan/Recommendations:  Please do frequent oral care. Allow ice chips and small single sips of watar as pt requests and as tolerated for comfort and to avoid disuse atrophy. Will f/u as able for advancement potential. PEG for primary.      "

## 2024-10-25 NOTE — PROGRESS NOTES
Progress Note - Infectious Disease   Name: Drew Santos 73 y.o. male I MRN: [id number]  Unit/Bed#: E4 -01 I Date of Admission: 10/18/2024   Date of Service: 10/25/2024 I Hospital Day: 7     Assessment & Plan  Sepsis  Tachycardia and leukocytosis. Secondary to polymicrobial bacteremia and infected sacral decubitus ulcer.  Initial CT scan without any evidence of a drainable fluid collection/abscess but did show mild proctitis. Chest x-ray without any consolidation to suggest pneumonia. LFTs mildly elevated, but no pericholecystic fluid on CT. repeat CT A/P again showed large sacral decubitus ulcer with erosion of the coccyx, subcutaneous emphysema about the ulcer.  Now status post or debridement with general surgery.  -antibiotic as below  -monitor CBCD and BMP  -follow up operative cultures  -monitor vitals  -supportive care  Polymicrobial bacteremia  Blood cultures now showing preliminary growth of both E. Coli, Proteus, clostridium, and multiple species of bacteroides. Past episode of bacteremia felt to be secondary to sacral wound. Pathogens do match stool pathogens again. Sacral wound was initially examined by surgery and felt it appeared noninfected at the time.  They have reassessed and pursued operative debridement in the OR 10/21. Multiple pus pockets and necrotic tissue/bone were debrided.  Operative cultures are also polymicrobial including anaerobes, E. coli, Enterobacter cloacae, Proteus, Enterococcus. No plan for prolonged abx for osteomyelitis at this time as patient is not planned for formal flap wound closure.  -Continue IV cefepime and Unasyn  -Recommend a 7-day course of antibiotics from antibiotic change, through 10/30/2024  -Patient can be switched to p.o. antibiotics if ready for discharge prior  -check CBCD and CMP tomorrow  -monitor vitals  Sacral ulcer (HCC)  This is likely source of patient's bacteremia above. Wound was felt to have initially developed during patient stay in skilled  nursing facility for rehab.  During recent hospitalization in September there was concern for fistulous connection from rectum to the sacral wound in setting of colitis/proctitis.  Wound was debrided by general surgery during last admission and was noted to be stage IV with palpable bone.  Now latest CT showing soft tissue gas extending into right and left medial aspects of gluteus jac muscle, but no abscess. There is erosion of coccyx suggestive of osteomyelitis.  Sacral wound was initially examined by surgery and felt it appeared noninfected at the time.  They have reassessed and pursued operative debridement 10/21. Multiple pus pockets and necrotic tissue/bone were debrided.  New operative cultures with polymicrobial growth as above.. Given patient's failure to thrive and immobility, wound will be difficult to heal. Now status post debridement of sacral wound, PEG placement and diverting colostomy 10/23/2024 with general surgery   -antibiotic as above  -follow up operative cultures  -frequent turning/repositioning to off-load pressure from the wound  -local wound care per general surgery  -continue follow up with general surgery  -No indication for long-term antibiotics for sacral osteomyelitis as this would be futile without flap coverage  Pyuria  UA sent from patient's chronic Hanks catheter had innumerable WBCs. This is expected finding in setting of chronic catheter and cannot be used alone to determine UTI.  CT abdomen/pelvis was without any hydronephrosis or hydroureter.  There was some chronic appearing bilateral perinephric stranding.  The urinary bladder was noted to be collapsed around a Hanks catheter.  Hanks was exchanged this admission.  -serial exams and care of hanks  -monitor urine output  Chronic indwelling Hanks catheter  In setting of chronic urinary retention and BPH. Catheter was exchanged this admission.  -serial exams and care of hanks  -monitor urine output  -continue follow up with  urology as needed  Type 2 diabetes mellitus without complication, without long-term current use of insulin ([facility])  Lab Results   Component Value Date     HGBA1C 6.2 (H) 09/17/2024   Elevated blood glucose is risk factor for wounds and infection. Recommend tight glycemic control.  -blood glucose management per primary service   Proctitis  CT A/P from 10/18/2024 showing mild wall thickening of the rectum. CT scan back in September 2024 also noted proctocolitis.  Given persistence, this may be a somewhat chronic finding.  He is not having any current diarrhea.  -antibiotic as above  -monitor GI symptoms  -monitor stool output    I have discussed the above management plan to continue IV antibiotics with Dr. Ba. ID will see again 10/28/24, please call with questions.    Antibiotics:  Day 2 IV Unasyn/Cefepime  Day 8 antibiotics     Subjective   The patient remains lethargic but did mumble in response to my questions.  He is afebrile with improving white blood cell count.  Does not appear to be in pain.    Objective :  Temp:  [97.6 °F (36.4 °C)-98.7 °F (37.1 °C)] 97.6 °F (36.4 °C)  HR:  [] 110  BP: (122-127)/(76-81) 124/81  Resp:  [18-20] 20  SpO2:  [90 %-98 %] 98 %  O2 Device: None (Room air)    General: chronically ill appearing, lethargic but arousable  Head: normocephalic, atraumatic  Mouth: no oral or pharyngeal lesions   CV: RRR, no murmurs   Lungs: clear to auscultation bilaterally   Abdomen: no distension or tenderness to palpation.  Left lower quadrant ostomy with bowel sweat, PEG in place  : hanks in place  Skin: Large sacral decubitus ulceration    Lab Results: I have reviewed the following results:  Results from last 7 days   Lab Units 10/25/24  0452 10/24/24  1515 10/24/24  0645 10/23/24  0548   WBC Thousand/uL 14.71*  --  16.21* 16.33*   HEMOGLOBIN g/dL 7.8* 7.5* 6.2* 8.4*   PLATELETS Thousands/uL 437*  --  425* 456*     Results from last 7 days   Lab Units 10/25/24  0452 10/24/24  0645  10/23/24  0548 10/21/24  2327 10/21/24  1733 10/21/24  0439   SODIUM mmol/L 148* 148* 150*   < > 148* 150*   POTASSIUM mmol/L 3.6 3.6 3.6   < > 3.4* 3.4*   CHLORIDE mmol/L 117* 116* 118*   < > 117* 118*   CO2 mmol/L 27 29 27   < > 29 28   BUN mg/dL 14 17 14   < > 25 26*   CREATININE mg/dL 0.62 0.66 0.59*   < > 0.80 0.74   EGFR ml/min/1.73sq m 97 94 99   < > 87 90   CALCIUM mg/dL 7.2* 7.2* 7.7*   < > 8.2* 8.2*   AST U/L 23  --   --   --  21 26   ALT U/L 12  --   --   --  32 36   ALK PHOS U/L 48  --   --   --  76 66   ALBUMIN g/dL 1.8*  --   --   --  2.0* 2.0*    < > = values in this interval not displayed.     Results from last 7 days   Lab Units 10/21/24  2112 10/19/24  0938   GRAM STAIN RESULT  Rare Polys*  2+ Gram negative rods*  1+ Gram positive cocci in pairs*  Rare Yeast*  --    WOUND CULTURE  1+ Growth of Escherichia coli*  1+ Growth of Enterobacter cloacae*  1+ Growth of Proteus mirabilis*  1+ Growth of Enterococcus faecalis*  --    MRSA CULTURE ONLY   --  No Methicillin Resistant Staphlyococcus aureus (MRSA) isolated

## 2024-10-25 NOTE — CASE MANAGEMENT
Case Management Discharge Planning Note    Patient name Drew Santos  [facility] /[facility] -* MRN [id number]  : 1950 Date 10/25/2024       Current Admission Date: 10/18/2024  Current Admission Diagnosis:Gram-negative bacteremia   Patient Active Problem List    Diagnosis Date Noted Date Diagnosed    S/P percutaneous endoscopic gastrostomy (PEG) tube placement (HCC) 10/24/2024     Elevated liver enzymes 10/20/2024     Pyuria 10/19/2024     Chronic indwelling Parra catheter 10/19/2024     Hypernatremia 10/19/2024     Sacral ulcer (HCC) 10/18/2024     Advanced care planning/counseling discussion 2024     Severe protein-calorie malnutrition (HCC) 2024     Polymicrobial bacteremia 2024     Acute metabolic encephalopathy 2024     History of CVA (cerebrovascular accident) 2024     Paroxysmal atrial fibrillation (HCC) 2024     Anemia of chronic disease 2024     Gram-negative bacteremia 2024     Type 2 diabetes mellitus without complication, without long-term current use of insulin (HCC) 2024     COVID-19 2024     Proctitis 2024     Dysgeusia 2024     Gross hematuria 2024     Depression 2024     Sacral wound 2024     Primary hypertension 2024     Mixed hyperlipidemia 2024     Urinary retention 2024     Syncope 2024     Elevated lactic acid level 2024     Sepsis 2024     Abnormal CPK 2024       LOS (days): 7  Geometric Mean LOS (GMLOS) (days): 9.6  Days to GMLOS:2.8     OBJECTIVE:  Risk of Unplanned Readmission Score: 33.25         Current admission status: Inpatient   Preferred Pharmacy:   [id number]  [place] - [address]   [facility] [place] [facility] [address]  Phone: [phone] Fax: [phone]    Primary Care Provider: Joe Lyon MD    Primary Insurance: [facility]  Secondary Insurance: [employer]  REP    DISCHARGE DETAILS:    Discharge planning discussed with:: Spouse, Kristi  Freedom of Choice: Yes  Comments - Freedom of Choice: Return to [facility]  CM contacted family/caregiver?: Yes  Were Treatment Team discharge recommendations reviewed with patient/caregiver?: Yes  Did patient/caregiver verbalize understanding of patient care needs?: N/A- going to facility  Were patient/caregiver advised of the risks associated with not following Treatment Team discharge recommendations?: Yes    Contacts  Patient Contacts: Kristi Santos (Spouse)  Relationship to Patient:: Family  Contact Method: In Person  Reason/Outcome: Discharge Planning, Emergency Contact    Requested Home Health Care         Is the patient interested in HHC at discharge?: No    DME Referral Provided  Referral made for DME?: No    Other Referral/Resources/Interventions Provided:  Interventions: Short Term Rehab  Referral Comments: [facility]    Would you like to participate in our [facility] service program?  : No - Declined    Treatment Team Recommendation: Short Term Rehab  Discharge Destination Plan:: Short Term Rehab                                              Accepting Facility Name, City & State : [facility]        CM uploaded nutrition note via [employer].  CM messaged PEG tube information and colostomy supply information to [facility] so they can order appropriate supplies.  The earliest facility could accept is Monday 10.28.24.    [facility] called [facility] [phone].  CM left voicemail updating her that plan is to DC to STR at [facility], the earliest they could accept is Monday 10.28.24 as they need to order supplies.    CM placed PC to patient's wife Kristi, she is in agreement with plan to DC to STR at [facility] early next week. CM will continue to follow.

## 2024-10-25 NOTE — ASSESSMENT & PLAN NOTE
This is likely source of patient's bacteremia above. Wound was felt to have initially developed during patient stay in skilled nursing facility for rehab.  During recent hospitalization in September there was concern for fistulous connection from rectum to the sacral wound in setting of colitis/proctitis.  Wound was debrided by general surgery during last admission and was noted to be stage IV with palpable bone.  Now latest CT showing soft tissue gas extending into right and left medial aspects of gluteus jac muscle, but no abscess. There is erosion of coccyx suggestive of osteomyelitis.  Sacral wound was initially examined by surgery and felt it appeared noninfected at the time.  They have reassessed and pursued operative debridement 10/21. Multiple pus pockets and necrotic tissue/bone were debrided.  New operative cultures with polymicrobial growth as above.. Given patient's failure to thrive and immobility, wound will be difficult to heal. Now status post debridement of sacral wound, PEG placement and diverting colostomy 10/23/2024 with general surgery   -antibiotic as above  -follow up operative cultures  -frequent turning/repositioning to off-load pressure from the wound  -local wound care per general surgery  -continue follow up with general surgery  -No indication for long-term antibiotics for sacral osteomyelitis as this would be futile without flap coverage

## 2024-10-25 NOTE — ASSESSMENT & PLAN NOTE
Sodium 154 at time of admission, likely hypovolemic hypernatremia related to mild dehydration secondary to poor water intake  Patient was on IV fluid support  Patient had PEG tube placed 10/23: Currently tolerating tube feedings and flushes  Will Hep-Lock IV fluids and increase free water flushing to gradually improve hypernatremia

## 2024-10-25 NOTE — ASSESSMENT & PLAN NOTE
Tachycardia and leukocytosis. Secondary to polymicrobial bacteremia and infected sacral decubitus ulcer.  Initial CT scan without any evidence of a drainable fluid collection/abscess but did show mild proctitis. Chest x-ray without any consolidation to suggest pneumonia. LFTs mildly elevated, but no pericholecystic fluid on CT. repeat CT A/P again showed large sacral decubitus ulcer with erosion of the coccyx, subcutaneous emphysema about the ulcer.  Now status post or debridement with general surgery.  -antibiotic as below  -monitor CBCD and BMP  -follow up operative cultures  -monitor vitals  -supportive care

## 2024-10-25 NOTE — WOUND OSTOMY CARE
Progress Note - Ostomy  Drew Santos 73 y.o. male MRN: [id number]  Unit/Bed#: E4 -01 Encounter: [id number]        Assessment:   Patient seen for ostomy assessment.  No family at bedside.  Contacted patient's wife to arrange ostomy education--she declines education today.  Requesting education be next week.  Wound care RN to contact her with potential times for education early next week.      Present on admission stage 4 pressure injury to sacrum--Surgery team managing sacral wound per Dr. Torres.  Assisted nursing team with dressing change (see media for wound photograph and flowsheet for wound details).  Patient on P500 low air-loss mattress with positioning system in use.    Bilateral heels intact with offloading heel boots in place.    Skin folds intact.    Parra catheter in place.    Nutrition team following, patient receiving tube feeding via PEG tube.      Surgical incision--abdominal trocar sites approximated with surgical glue and dry, open to air.  Cherelle-wound intact without erythema.     Ostomy (10/23/24)--LLQ loop colostomy stoma is oval, pink, edematous.  Measuring 2.75 x 3 inches.  There is a stoma support bar in place.  Peristomal skin and mucocutaneous junction are intact.  No visible creases in peristomal area.  There is small amount of serous effluent in the pouch along with flatus.       Education:  Discharge plan pending--currently plan is for patient to return to [facility].  Patient is not able to perform his own or participate in ostomy care.      Ostomy education handouts left at bedside.    Performed pouch change using convatec 4 inch 1 piece cut-to-fit pouch.  Patient tolerated well.       Ostomy supplies at bedside.        Ostomy Care Plan:  Pouch change every 3-4 days & with signs of leakage (use large Convatec/tan 4 inch pouch #4119--stoma is too large for any other pouches):    Remove pouch using push/pull method.   Cleanse stoma & surrounding skin with warm water, pat dry.     Measure stoma and cut pouch barrier to appropriate size.    Apply skin prep to skin around stoma.    Apply pouch to skin and hold gentle pressure with warm hand for 2-5 minutes for best adherence.    Empty pouch when 1/3-1/2 full of gas/stool.    Wound care team to follow for ongoing ostomy education and assessment.    Genevieve SRN, RN, CWON

## 2024-10-25 NOTE — ASSESSMENT & PLAN NOTE
10/23 s/p lap loop colostomy with PEG tube placement for nutrition  Oral diet per SLP: Sips of liquid, and ice chips  Nutrition consulted appreciated:  pt tolerating at goal rate

## 2024-10-25 NOTE — ASSESSMENT & PLAN NOTE
Lab Results   Component Value Date    HGBA1C 6.2 (H) 09/17/2024       Recent Labs     10/24/24  0748 10/24/24  1146 10/24/24  1600 10/24/24  2111   POCGLU 181* 174* 206* 213*       Blood Sugar Average: Last 72 hrs:  (P) [id number]  - continue to trend sugars  - SSI  - hypoglycemic protocol

## 2024-10-25 NOTE — ASSESSMENT & PLAN NOTE
Blood cultures growing E. coli and Proteus and bacteroides, and Clostridium  Most likely secondary to infected sacral decubitus ulcer with probable osteomyelitis of coccyx  Infectious disease following for antibiotic management  Right upper quadrant ultrasound performed, no evidence of acute cholecystitis  Surgery took patient to the OR emergently 10/21 for concern of necrotizing soft tissue infection, and return to the OR for additional debridement, washout with end colostomy and PEG tube placement on 10/23/24   Continue antibiotics IV ceftriaxone and Unasyn as recommended by infectious disease  ID recommends a 7-day course of antibiotics, from the most recent adjustment, last dose 10/30

## 2024-10-25 NOTE — ASSESSMENT & PLAN NOTE
Blood cultures now showing preliminary growth of both E. Coli, Proteus, clostridium, and multiple species of bacteroides. Past episode of bacteremia felt to be secondary to sacral wound. Pathogens do match stool pathogens again. Sacral wound was initially examined by surgery and felt it appeared noninfected at the time.  They have reassessed and pursued operative debridement in the OR 10/21. Multiple pus pockets and necrotic tissue/bone were debrided.  Operative cultures are also polymicrobial including anaerobes, E. coli, Enterobacter cloacae, Proteus, Enterococcus. No plan for prolonged abx for osteomyelitis at this time as patient is not planned for formal flap wound closure.  -Continue IV cefepime and Unasyn  -Recommend a 7-day course of antibiotics from antibiotic change, through 10/30/2024  -Patient can be switched to p.o. antibiotics if ready for discharge prior  -check CBCD and CMP tomorrow  -monitor vitals

## 2024-10-25 NOTE — PLAN OF CARE
Problem: Potential for Falls  Goal: Patient will remain free of falls  Description: INTERVENTIONS:  - Educate patient/family on patient safety including physical limitations  - Instruct patient to call for assistance with activity   - Consult OT/PT to assist with strengthening/mobility   - Keep Call bell within reach  - Keep bed low and locked with side rails adjusted as appropriate  - Keep care items and personal belongings within reach  - Initiate and maintain comfort rounds  - Make Fall Risk Sign visible to staff  - Offer Toileting every 2 Hours, in advance of need  - Initiate/Maintain bed alarm  - Obtain necessary fall risk management equipment:   - Apply yellow socks and bracelet for high fall risk patients  - Consider moving patient to room near nurses station  Outcome: Progressing     Problem: Prexisting or High Potential for Compromised Skin Integrity  Goal: Skin integrity is maintained or improved  Description: INTERVENTIONS:  - Identify patients at risk for skin breakdown  - Assess and monitor skin integrity  - Assess and monitor nutrition and hydration status  - Monitor labs   - Assess for incontinence   - Turn and reposition patient  - Assist with mobility/ambulation  - Relieve pressure over bony prominences  - Avoid friction and shearing  - Provide appropriate hygiene as needed including keeping skin clean and dry  - Evaluate need for skin moisturizer/barrier cream  - Collaborate with interdisciplinary team   - Patient/family teaching  - Consider wound care consult   Outcome: Progressing     Problem: PAIN - ADULT  Goal: Verbalizes/displays adequate comfort level or baseline comfort level  Description: Interventions:  - Encourage patient to monitor pain and request assistance  - Assess pain using appropriate pain scale  - Administer analgesics based on type and severity of pain and evaluate response  - Implement non-pharmacological measures as appropriate and evaluate response  - Consider cultural and  social influences on pain and pain management  - Notify physician/advanced practitioner if interventions unsuccessful or patient reports new pain  Outcome: Progressing     Problem: INFECTION - ADULT  Goal: Absence or prevention of progression during hospitalization  Description: INTERVENTIONS:  - Assess and monitor for signs and symptoms of infection  - Monitor lab/diagnostic results  - Monitor all insertion sites, i.e. indwelling lines, tubes, and drains  - Monitor endotracheal if appropriate and nasal secretions for changes in amount and color  - [place] appropriate cooling/warming therapies per order  - Administer medications as ordered  - Instruct and encourage patient and family to use good hand hygiene technique  - Identify and instruct in appropriate isolation precautions for identified infection/condition  Outcome: Progressing     Problem: DISCHARGE PLANNING  Goal: Discharge to home or other facility with appropriate resources  Description: INTERVENTIONS:  - Identify barriers to discharge w/patient and caregiver  - Arrange for needed discharge resources and transportation as appropriate  - Identify discharge learning needs (meds, wound care, etc.)  - Arrange for interpretive services to assist at discharge as needed  - Refer to Case Management Department for coordinating discharge planning if the patient needs post-hospital services based on physician/advanced practitioner order or complex needs related to functional status, cognitive ability, or social support system  Outcome: Progressing     Problem: Knowledge Deficit  Goal: Patient/family/caregiver demonstrates understanding of disease process, treatment plan, medications, and discharge instructions  Description: Complete learning assessment and assess knowledge base.  Interventions:  - Provide teaching at level of understanding  - Provide teaching via preferred learning methods  Outcome: Progressing     Problem: Nutrition/Hydration-ADULT  Goal:  Nutrient/Hydration intake appropriate for improving, restoring or maintaining nutritional needs  Description: Monitor and assess patient's nutrition/hydration status for malnutrition. Collaborate with interdisciplinary team and initiate plan and interventions as ordered.  Monitor patient's weight and dietary intake as ordered or per policy. Utilize nutrition screening tool and intervene as necessary. Determine patient's food preferences and provide high-protein, high-caloric foods as appropriate.     INTERVENTIONS:  - Monitor oral intake, urinary output, labs, and treatment plans  - Assess nutrition and hydration status and recommend course of action  - Evaluate amount of meals eaten  - Assist patient with eating if necessary   - Allow adequate time for meals  - Recommend/ encourage appropriate diets, oral nutritional supplements, and vitamin/mineral supplements  - Order, calculate, and assess calorie counts as needed  - Recommend, monitor, and adjust tube feedings and TPN/PPN based on assessed needs  - Assess need for intravenous fluids  - Provide specific nutrition/hydration education as appropriate  - Include patient/family/caregiver in decisions related to nutrition  Outcome: Progressing

## 2024-10-25 NOTE — ASSESSMENT & PLAN NOTE
"Patient is a 73-year-old male who was recently admitted 9/17-10/4 with polymicrobial bacteremia secondary to sacral wound who had completed a course of IV cefepime, flagyl and vancomycin followed by ampicillin.   Was also diagnosed with covid.  Pt was readmitted for failure to thrive, and concerns over worsening sacral wounds and posterior osteomyelitis    Patient met criteria for sepsis, present on admission with tachycardia and leukocytosis.  Sepsis secondary to sacral decubitus ulcer with underlying osteomyelitis and polymicrobial bacteremia  CT abdomen pelvis showing \"Large sacral decubitus ulcer extending to the coccyx with soft tissue gas now seen extending into the right and left medial aspects of the gluteus jac muscles. No drainable fluid collection. Erosion of the coccyx suggestive of osteomyelitis.\"  Blood culture x2 obtained upon admission growing Proteus and E. Coli  10/21 patient underwent operative incision and drainage of buttock wound, and sacral wound debridement and coccyxectomy  10/23 patient returned to the OR for sacral debridement and washout.  Patient also had diverting loop colostomy due to his sacral wound, and percutaneous G-tube placement for nutrition  10/21 operative cultures grew Bacteroides, Clostridium inoculum, E. coli, Enterobacter, Proteus, Enterococcus faecalis  Infectious disease recs appreciated- Continue IV antibiotics with IV Rocephin and Unasyn  "

## 2024-10-25 NOTE — ASSESSMENT & PLAN NOTE
Rate controlled on metoprolol: Currently 5 mg IV every 6 hours  Eliquis was temporarily placed on hold: Will restart when approved by surgery

## 2024-10-25 NOTE — ASSESSMENT & PLAN NOTE
Patient presented with a large sacral decubitus ulcer extending to the coccyx with concurrent sepsis, and gram-negative bacteremia  Was taken to the operating room 10/21 for sacral debridement, coccyx ectomy, and return to the OR 10/23 for additional debridement diverting loop colostomy and PEG tube placement  Per infectious disease recommendations: Operative cultures were polymicrobial  Antibiotics with cefepime/Unasyn for 7-day additional course, last dose 10/30  No indication for chronic antibiotics for chronic osteo as patient's wound is unlikely to heal: Long-term antibiotic therapy futile without future flap coverage

## 2024-10-25 NOTE — ASSESSMENT & PLAN NOTE
Malnutrition Findings:   Adult Malnutrition type: Chronic illness  Adult Degree of Malnutrition: Other severe protein calorie malnutrition  Malnutrition Characteristics: Weight loss, Inadequate energy     360 Statement: Severe protein calorie malnutrition in context of chronic illness r/t poor appetite, inadequate PO intake as evidance by energy intake less than 75% compared to estimated needs>1 month, 14% wt loss in 1 month (9/17/24 114 kg, 10/19/24 97.7 kg) treated with PO diet. Once able to have po treat with Mechanical Soft diet, Magic Cup BID, Ensure Compact 1x daily.    BMI Findings:  Body mass index is 25.54 kg/m².   Continue TF at goal

## 2024-10-26 LAB
ANION GAP SERPL CALCULATED.3IONS-SCNC: 3 MMOL/L (ref 4–13)
BACTERIA WND AEROBE CULT: ABNORMAL
BASOPHILS # BLD AUTO: 0.06 THOUSANDS/ΜL (ref 0–0.1)
BASOPHILS NFR BLD AUTO: 1 % (ref 0–1)
BUN SERPL-MCNC: 15 MG/DL (ref 5–25)
CALCIUM SERPL-MCNC: 7.4 MG/DL (ref 8.4–10.2)
CHLORIDE SERPL-SCNC: 117 MMOL/L (ref 96–108)
CO2 SERPL-SCNC: 28 MMOL/L (ref 21–32)
CREAT SERPL-MCNC: 0.58 MG/DL (ref 0.6–1.3)
EOSINOPHIL # BLD AUTO: 0.19 THOUSAND/ΜL (ref 0–0.61)
EOSINOPHIL NFR BLD AUTO: 2 % (ref 0–6)
ERYTHROCYTE [DISTWIDTH] IN BLOOD BY AUTOMATED COUNT: 17.2 % (ref 11.6–15.1)
GFR SERPL CREATININE-BSD FRML MDRD: 99 ML/MIN/1.73SQ M
GLUCOSE SERPL-MCNC: 215 MG/DL (ref 65–140)
GLUCOSE SERPL-MCNC: 221 MG/DL (ref 65–140)
GLUCOSE SERPL-MCNC: 256 MG/DL (ref 65–140)
GLUCOSE SERPL-MCNC: 260 MG/DL (ref 65–140)
GLUCOSE SERPL-MCNC: 287 MG/DL (ref 65–140)
GRAM STN SPEC: ABNORMAL
HCT VFR BLD AUTO: 24.4 % (ref 36.5–49.3)
HGB BLD-MCNC: 7.3 G/DL (ref 12–17)
IMM GRANULOCYTES # BLD AUTO: 0.25 THOUSAND/UL (ref 0–0.2)
IMM GRANULOCYTES NFR BLD AUTO: 2 % (ref 0–2)
LYMPHOCYTES # BLD AUTO: 2.37 THOUSANDS/ΜL (ref 0.6–4.47)
LYMPHOCYTES NFR BLD AUTO: 19 % (ref 14–44)
MAGNESIUM SERPL-MCNC: 1.8 MG/DL (ref 1.9–2.7)
MCH RBC QN AUTO: 29.8 PG (ref 26.8–34.3)
MCHC RBC AUTO-ENTMCNC: 29.9 G/DL (ref 31.4–37.4)
MCV RBC AUTO: 100 FL (ref 82–98)
MONOCYTES # BLD AUTO: 0.56 THOUSAND/ΜL (ref 0.17–1.22)
MONOCYTES NFR BLD AUTO: 5 % (ref 4–12)
NEUTROPHILS # BLD AUTO: 9.11 THOUSANDS/ΜL (ref 1.85–7.62)
NEUTS SEG NFR BLD AUTO: 71 % (ref 43–75)
NRBC BLD AUTO-RTO: 1 /100 WBCS
PHOSPHATE SERPL-MCNC: 1.9 MG/DL (ref 2.3–4.1)
PLATELET # BLD AUTO: 443 THOUSANDS/UL (ref 149–390)
PMV BLD AUTO: 9.6 FL (ref 8.9–12.7)
POTASSIUM SERPL-SCNC: 3.5 MMOL/L (ref 3.5–5.3)
RBC # BLD AUTO: 2.45 MILLION/UL (ref 3.88–5.62)
SODIUM SERPL-SCNC: 148 MMOL/L (ref 135–147)
WBC # BLD AUTO: 12.54 THOUSAND/UL (ref 4.31–10.16)

## 2024-10-26 PROCEDURE — 83735 ASSAY OF MAGNESIUM: CPT | Performed by: INTERNAL MEDICINE

## 2024-10-26 PROCEDURE — 99232 SBSQ HOSP IP/OBS MODERATE 35: CPT | Performed by: INTERNAL MEDICINE

## 2024-10-26 PROCEDURE — 82948 REAGENT STRIP/BLOOD GLUCOSE: CPT

## 2024-10-26 PROCEDURE — 80048 BASIC METABOLIC PNL TOTAL CA: CPT | Performed by: INTERNAL MEDICINE

## 2024-10-26 PROCEDURE — 85025 COMPLETE CBC W/AUTO DIFF WBC: CPT | Performed by: INTERNAL MEDICINE

## 2024-10-26 PROCEDURE — 84100 ASSAY OF PHOSPHORUS: CPT | Performed by: INTERNAL MEDICINE

## 2024-10-26 RX ORDER — DOXYCYCLINE 100 MG/1
100 CAPSULE ORAL EVERY 12 HOURS SCHEDULED
Status: DISCONTINUED | OUTPATIENT
Start: 2024-10-26 | End: 2024-10-27

## 2024-10-26 RX ORDER — MAGNESIUM SULFATE HEPTAHYDRATE 40 MG/ML
2 INJECTION, SOLUTION INTRAVENOUS ONCE
Status: COMPLETED | OUTPATIENT
Start: 2024-10-26 | End: 2024-10-26

## 2024-10-26 RX ORDER — INSULIN GLARGINE 100 [IU]/ML
12 INJECTION, SOLUTION SUBCUTANEOUS
Status: DISCONTINUED | OUTPATIENT
Start: 2024-10-26 | End: 2024-10-27

## 2024-10-26 RX ORDER — SODIUM CHLORIDE 450 MG/100ML
50 INJECTION, SOLUTION INTRAVENOUS CONTINUOUS
Status: DISCONTINUED | OUTPATIENT
Start: 2024-10-26 | End: 2024-10-27

## 2024-10-26 RX ADMIN — AMPICILLIN SODIUM AND SULBACTAM SODIUM 3 G: 100; 50 INJECTION, POWDER, FOR SOLUTION INTRAVENOUS at 13:17

## 2024-10-26 RX ADMIN — INSULIN LISPRO 2 UNITS: 100 INJECTION, SOLUTION INTRAVENOUS; SUBCUTANEOUS at 18:29

## 2024-10-26 RX ADMIN — FINASTERIDE 5 MG: 5 TABLET, FILM COATED ORAL at 08:06

## 2024-10-26 RX ADMIN — POTASSIUM PHOSPHATE 12 MMOL: 236; 224 INJECTION, SOLUTION INTRAVENOUS at 10:49

## 2024-10-26 RX ADMIN — AMPICILLIN SODIUM AND SULBACTAM SODIUM 3 G: 100; 50 INJECTION, POWDER, FOR SOLUTION INTRAVENOUS at 00:30

## 2024-10-26 RX ADMIN — INSULIN GLARGINE 12 UNITS: 100 INJECTION, SOLUTION SUBCUTANEOUS at 21:44

## 2024-10-26 RX ADMIN — B-COMPLEX W/ C & FOLIC ACID TAB 1 TABLET: TAB at 08:05

## 2024-10-26 RX ADMIN — INSULIN LISPRO 2 UNITS: 100 INJECTION, SOLUTION INTRAVENOUS; SUBCUTANEOUS at 00:35

## 2024-10-26 RX ADMIN — HEPARIN SODIUM 5000 UNITS: 5000 INJECTION INTRAVENOUS; SUBCUTANEOUS at 21:44

## 2024-10-26 RX ADMIN — METOROPROLOL TARTRATE 5 MG: 5 INJECTION, SOLUTION INTRAVENOUS at 14:36

## 2024-10-26 RX ADMIN — ASPIRIN 81 MG CHEWABLE TABLET 81 MG: 81 TABLET CHEWABLE at 08:05

## 2024-10-26 RX ADMIN — HEPARIN SODIUM 5000 UNITS: 5000 INJECTION INTRAVENOUS; SUBCUTANEOUS at 05:50

## 2024-10-26 RX ADMIN — INSULIN LISPRO 2 UNITS: 100 INJECTION, SOLUTION INTRAVENOUS; SUBCUTANEOUS at 08:06

## 2024-10-26 RX ADMIN — METOROPROLOL TARTRATE 5 MG: 5 INJECTION, SOLUTION INTRAVENOUS at 08:06

## 2024-10-26 RX ADMIN — DOXYCYCLINE 100 MG: 100 CAPSULE ORAL at 21:44

## 2024-10-26 RX ADMIN — AMOXICILLIN AND CLAVULANATE POTASSIUM 1 TABLET: 875; 125 TABLET, FILM COATED ORAL at 17:20

## 2024-10-26 RX ADMIN — CEFEPIME 2000 MG: 2 INJECTION, POWDER, FOR SOLUTION INTRAVENOUS at 06:01

## 2024-10-26 RX ADMIN — HEPARIN SODIUM 5000 UNITS: 5000 INJECTION INTRAVENOUS; SUBCUTANEOUS at 13:17

## 2024-10-26 RX ADMIN — METOROPROLOL TARTRATE 5 MG: 5 INJECTION, SOLUTION INTRAVENOUS at 00:35

## 2024-10-26 RX ADMIN — SODIUM CHLORIDE 50 ML/HR: 0.45 INJECTION, SOLUTION INTRAVENOUS at 10:49

## 2024-10-26 RX ADMIN — ESCITALOPRAM OXALATE 10 MG: 10 TABLET ORAL at 08:05

## 2024-10-26 RX ADMIN — DOXYCYCLINE 100 MG: 100 CAPSULE ORAL at 14:36

## 2024-10-26 RX ADMIN — INSULIN LISPRO 2 UNITS: 100 INJECTION, SOLUTION INTRAVENOUS; SUBCUTANEOUS at 13:17

## 2024-10-26 RX ADMIN — ATORVASTATIN CALCIUM 80 MG: 80 TABLET, FILM COATED ORAL at 17:20

## 2024-10-26 RX ADMIN — AMPICILLIN SODIUM AND SULBACTAM SODIUM 3 G: 100; 50 INJECTION, POWDER, FOR SOLUTION INTRAVENOUS at 06:40

## 2024-10-26 RX ADMIN — THIAMINE HCL TAB 100 MG 100 MG: 100 TAB at 08:05

## 2024-10-26 RX ADMIN — MAGNESIUM SULFATE HEPTAHYDRATE 2 G: 40 INJECTION, SOLUTION INTRAVENOUS at 08:05

## 2024-10-26 RX ADMIN — METOROPROLOL TARTRATE 5 MG: 5 INJECTION, SOLUTION INTRAVENOUS at 19:50

## 2024-10-26 NOTE — ASSESSMENT & PLAN NOTE
"Patient is a 73-year-old male who was recently admitted 9/17-10/4 with polymicrobial bacteremia secondary to sacral wound who had completed a course of IV cefepime, flagyl and vancomycin followed by ampicillin.   Was also diagnosed with covid.  Pt was readmitted for failure to thrive, and concerns over worsening sacral wounds and posterior osteomyelitis    Patient met criteria for sepsis, present on admission with tachycardia and leukocytosis.  Sepsis secondary to sacral decubitus ulcer with underlying osteomyelitis and polymicrobial bacteremia  CT abdomen pelvis showing \"Large sacral decubitus ulcer extending to the coccyx with soft tissue gas now seen extending into the right and left medial aspects of the gluteus jac muscles. No drainable fluid collection. Erosion of the coccyx suggestive of osteomyelitis.\"  Blood culture x2 obtained upon admission growing Proteus and E. Coli  10/21 patient underwent operative incision and drainage of buttock wound, and sacral wound debridement and coccyxectomy  10/23 patient returned to the OR for sacral debridement and washout.  Patient also had diverting loop colostomy due to his sacral wound, and percutaneous G-tube placement for nutrition  10/21 operative cultures grew Bacteroides, Clostridium inoculum, E. coli, Enterobacter, Proteus, Enterococcus faecalis  Infectious disease recs appreciated- was placed on IV antibiotics with Rocephin and Unasyn, and has since been transitioned to oral doxycycline 100 mg every 12 hours and Augmentin 875-125 every 2 hours through 10/30/2024    "

## 2024-10-26 NOTE — ASSESSMENT & PLAN NOTE
Has chronic anemia likely secondary to poor nutritional status underlying chronic illness.   Recent Labs     10/24/24  1515 10/25/24  0452 10/26/24  0522   HGB 7.5* 7.8* 7.3*      Patient was transfused 1 unit of packed red blood cells on 10/24 for hemoglobin of 6.2  Patient with chronic anemia, acutely worse with expected procedural blood loss  After transfusion hemoglobin returned to baseline of approximately 7-8

## 2024-10-26 NOTE — QUICK NOTE
ID Plan of Care:    Updated wound cultures reviewed. The patient is afebrile and WBC count continues to improve. Will adjust antibiotics to oral doxycycline 100mg q12hr and Augmentin 875-125mg q12hr through 7/30/24.     ID will formally re-evaluate 10/28/24, please call with questions.

## 2024-10-26 NOTE — ASSESSMENT & PLAN NOTE
Patient presented with a large sacral decubitus ulcer extending to the coccyx with concurrent sepsis, and gram-negative bacteremia  Was taken to the operating room 10/21 for sacral debridement, coccyx ectomy, and returned to the OR 10/23 for additional debridement diverting loop colostomy and PEG tube placement  Per infectious disease recommendations: Operative cultures were polymicrobial  Antibiotics for 7-day additional course, was initially on ceftriaxone/Unasyn has been transitioned to doxycycline/Augmentin with last dose 10/30  No indication for chronic antibiotics for chronic osteo as patient's wound is unlikely to heal: Long-term antibiotic therapy futile without future flap coverage  Discussed with surgery team today: Sacral wound check planned for Monday: If patient does not require any additional debridement he would be acceptable for discharge at that time from a surgical standpoint

## 2024-10-26 NOTE — PLAN OF CARE
Problem: Potential for Falls  Goal: Patient will remain free of falls  Description: INTERVENTIONS:  - Educate patient/family on patient safety including physical limitations  - Instruct patient to call for assistance with activity   - Consult OT/PT to assist with strengthening/mobility   - Keep Call bell within reach  - Keep bed low and locked with side rails adjusted as appropriate  - Keep care items and personal belongings within reach  - Initiate and maintain comfort rounds  - Make Fall Risk Sign visible to staff  - Offer Toileting every 2 Hours, in advance of need  - Initiate/Maintain bed alarm  - Obtain necessary fall risk management equipment:   - Apply yellow socks and bracelet for high fall risk patients  - Consider moving patient to room near nurses station  Outcome: Progressing     Problem: Prexisting or High Potential for Compromised Skin Integrity  Goal: Skin integrity is maintained or improved  Description: INTERVENTIONS:  - Identify patients at risk for skin breakdown  - Assess and monitor skin integrity  - Assess and monitor nutrition and hydration status  - Monitor labs   - Assess for incontinence   - Turn and reposition patient  - Assist with mobility/ambulation  - Relieve pressure over bony prominences  - Avoid friction and shearing  - Provide appropriate hygiene as needed including keeping skin clean and dry  - Evaluate need for skin moisturizer/barrier cream  - Collaborate with interdisciplinary team   - Patient/family teaching  - Consider wound care consult   Outcome: Progressing     Problem: PAIN - ADULT  Goal: Verbalizes/displays adequate comfort level or baseline comfort level  Description: Interventions:  - Encourage patient to monitor pain and request assistance  - Assess pain using appropriate pain scale  - Administer analgesics based on type and severity of pain and evaluate response  - Implement non-pharmacological measures as appropriate and evaluate response  - Consider cultural and  social influences on pain and pain management  - Notify physician/advanced practitioner if interventions unsuccessful or patient reports new pain  Outcome: Progressing     Problem: INFECTION - ADULT  Goal: Absence or prevention of progression during hospitalization  Description: INTERVENTIONS:  - Assess and monitor for signs and symptoms of infection  - Monitor lab/diagnostic results  - Monitor all insertion sites, i.e. indwelling lines, tubes, and drains  - Monitor endotracheal if appropriate and nasal secretions for changes in amount and color  - [place] appropriate cooling/warming therapies per order  - Administer medications as ordered  - Instruct and encourage patient and family to use good hand hygiene technique  - Identify and instruct in appropriate isolation precautions for identified infection/condition  Outcome: Progressing     Problem: DISCHARGE PLANNING  Goal: Discharge to home or other facility with appropriate resources  Description: INTERVENTIONS:  - Identify barriers to discharge w/patient and caregiver  - Arrange for needed discharge resources and transportation as appropriate  - Identify discharge learning needs (meds, wound care, etc.)  - Arrange for interpretive services to assist at discharge as needed  - Refer to Case Management Department for coordinating discharge planning if the patient needs post-hospital services based on physician/advanced practitioner order or complex needs related to functional status, cognitive ability, or social support system  Outcome: Progressing     Problem: Knowledge Deficit  Goal: Patient/family/caregiver demonstrates understanding of disease process, treatment plan, medications, and discharge instructions  Description: Complete learning assessment and assess knowledge base.  Interventions:  - Provide teaching at level of understanding  - Provide teaching via preferred learning methods  Outcome: Progressing     Problem: Nutrition/Hydration-ADULT  Goal:  Nutrient/Hydration intake appropriate for improving, restoring or maintaining nutritional needs  Description: Monitor and assess patient's nutrition/hydration status for malnutrition. Collaborate with interdisciplinary team and initiate plan and interventions as ordered.  Monitor patient's weight and dietary intake as ordered or per policy. Utilize nutrition screening tool and intervene as necessary. Determine patient's food preferences and provide high-protein, high-caloric foods as appropriate.     INTERVENTIONS:  - Monitor oral intake, urinary output, labs, and treatment plans  - Assess nutrition and hydration status and recommend course of action  - Evaluate amount of meals eaten  - Assist patient with eating if necessary   - Allow adequate time for meals  - Recommend/ encourage appropriate diets, oral nutritional supplements, and vitamin/mineral supplements  - Order, calculate, and assess calorie counts as needed  - Recommend, monitor, and adjust tube feedings and TPN/PPN based on assessed needs  - Assess need for intravenous fluids  - Provide specific nutrition/hydration education as appropriate  - Include patient/family/caregiver in decisions related to nutrition  Outcome: Progressing

## 2024-10-26 NOTE — ASSESSMENT & PLAN NOTE
Blood cultures growing E. coli and Proteus and bacteroides, and Clostridium  Most likely secondary to infected sacral decubitus ulcer with probable osteomyelitis of coccyx  Infectious disease following for antibiotic management  Right upper quadrant ultrasound performed, no evidence of acute cholecystitis  Surgery took patient to the OR emergently 10/21 for concern of necrotizing soft tissue infection, and return to the OR for additional debridement, washout with end colostomy and PEG tube placement on 10/23/24   Continue antibiotics as recommended by the infectious disease team  IV ceftriaxone and Unasyn have been transitioned to doxycycline and Augmentin  ID recommends a 7-day course of antibiotics, from the most recent adjustment, last dose 10/30

## 2024-10-26 NOTE — ASSESSMENT & PLAN NOTE
Lab Results   Component Value Date    HGBA1C 6.2 (H) 09/17/2024     Recent Labs     10/25/24  2035 10/25/24  2346 10/26/24  0558 10/26/24  1224   POCGLU 236* 269* 256* 260*     Blood Sugar Average: Last 72 hrs:  (P) [id number]  Last HgbA1C 6.2%  Continue sliding scale insulin   Patient was started on Lantus  Hypoglycemia protocol  Monitor while on tube feeds  Continue Accu-Cheks, sliding scale insulin, and titrate as needed   Additional Notes: If lesion is persistent after 3 months, recommend further evaluation for potential biospy Render Risk Assessment In Note?: no Detail Level: Simple

## 2024-10-26 NOTE — ASSESSMENT & PLAN NOTE
History of stroke with L sided hemiparesis recently hospitalized at [facility] in July 2024  Baseline non-verbal, alert, at times selective in answers, can nod yes/no however unsure accuracy most information obtained per patients wife  Baseline Dysphagia 2 mechanical soft, thin liquids, patient well known to speech therapy:  cont diet as per speech  Currently NPO but sips of liquid with observation, and ice chips

## 2024-10-26 NOTE — ASSESSMENT & PLAN NOTE
Patient with chronic Parra  Urine culture growing Proteus  Had been placed on IV antibiotics with ceftriaxone

## 2024-10-26 NOTE — ASSESSMENT & PLAN NOTE
Rate controlled on metoprolol: Currently 5 mg IV every 6 hours  Eliquis was temporarily placed on hold: Will restart when approved by surgery  Surgery sacral wound re-eval planned Monday 10/28

## 2024-10-26 NOTE — ASSESSMENT & PLAN NOTE
Malnutrition Findings:   ChronicAdult Malnutrition type: Chronic illness  Adult Degree of Malnutrition: Other severe protein calorie malnutrition  Malnutrition Characteristics: Weight loss, Inadequate energy  360 Statement: Severe protein calorie malnutrition in context of chronic illness r/t poor appetite, inadequate PO intake as evidance by energy intake less than 75% compared to estimated needs>1 month, 14% wt loss in 1 month (9/17/24 114 kg, 10/19/24 97.7 kg) treated with PO diet. Once able to have po treat with Mechanical Soft diet, Magic Cup BID, Ensure Compact 1x daily.  Patient with significant weight loss and per wife with significantly decreased oral intake over the past few months and has not been eating for 1 week at the nursing home.  Weight noted in April to be 275 lb, and now weighing 215 lb  PEG tube placed 10/24  - continue with tube feeds   Dietary to adjust based on patient nutritional needs.  Continue high-dose thiamine and electrolyte monitoring for refeeding syndrome

## 2024-10-26 NOTE — PROGRESS NOTES
"Progress Note - Hospitalist   Name: Drew Santos 73 y.o. male I MRN: [id number]  Unit/Bed#: E4 -01 I Date of Admission: 10/18/2024   Date of Service: 10/26/2024 I Hospital Day: 8    Assessment & Plan  Sepsis  Patient is a 73-year-old male who was recently admitted 9/17-10/4 with polymicrobial bacteremia secondary to sacral wound who had completed a course of IV cefepime, flagyl and vancomycin followed by ampicillin.   Was also diagnosed with covid.  Pt was readmitted for failure to thrive, and concerns over worsening sacral wounds and posterior osteomyelitis    Patient met criteria for sepsis, present on admission with tachycardia and leukocytosis.  Sepsis secondary to sacral decubitus ulcer with underlying osteomyelitis and polymicrobial bacteremia  CT abdomen pelvis showing \"Large sacral decubitus ulcer extending to the coccyx with soft tissue gas now seen extending into the right and left medial aspects of the gluteus jac muscles. No drainable fluid collection. Erosion of the coccyx suggestive of osteomyelitis.\"  Blood culture x2 obtained upon admission growing Proteus and E. Coli  10/21 patient underwent operative incision and drainage of buttock wound, and sacral wound debridement and coccyxectomy  10/23 patient returned to the OR for sacral debridement and washout.  Patient also had diverting loop colostomy due to his sacral wound, and percutaneous G-tube placement for nutrition  10/21 operative cultures grew Bacteroides, Clostridium inoculum, E. coli, Enterobacter, Proteus, Enterococcus faecalis  Infectious disease recs appreciated- was placed on IV antibiotics with Rocephin and Unasyn, and has since been transitioned to oral doxycycline 100 mg every 12 hours and Augmentin 875-125 every 2 hours through 10/30/2024    Gram-negative bacteremia  Blood cultures growing E. coli and Proteus and bacteroides, and Clostridium  Most likely secondary to infected sacral decubitus ulcer with probable osteomyelitis " of coccyx  Infectious disease following for antibiotic management  Right upper quadrant ultrasound performed, no evidence of acute cholecystitis  Surgery took patient to the OR emergently 10/21 for concern of necrotizing soft tissue infection, and return to the OR for additional debridement, washout with end colostomy and PEG tube placement on 10/23/24   Continue antibiotics as recommended by the infectious disease team  IV ceftriaxone and Unasyn have been transitioned to doxycycline and Augmentin  ID recommends a 7-day course of antibiotics, from the most recent adjustment, last dose 10/30  Sacral ulcer (HCC)  Patient presented with a large sacral decubitus ulcer extending to the coccyx with concurrent sepsis, and gram-negative bacteremia  Was taken to the operating room 10/21 for sacral debridement, coccyx ectomy, and returned to the OR 10/23 for additional debridement diverting loop colostomy and PEG tube placement  Per infectious disease recommendations: Operative cultures were polymicrobial  Antibiotics for 7-day additional course, was initially on ceftriaxone/Unasyn has been transitioned to doxycycline/Augmentin with last dose 10/30  No indication for chronic antibiotics for chronic osteo as patient's wound is unlikely to heal: Long-term antibiotic therapy futile without future flap coverage  Discussed with surgery team today: Sacral wound check planned for Monday: If patient does not require any additional debridement he would be acceptable for discharge at that time from a surgical standpoint  S/P percutaneous endoscopic gastrostomy (PEG) tube placement (HCC)  10/23 s/p lap loop colostomy with PEG tube placement for nutrition  Oral diet per SLP: Sips of liquid, and ice chips  Nutrition consulted appreciated:  pt tolerating at goal rate  Anemia of chronic disease  Has chronic anemia likely secondary to poor nutritional status underlying chronic illness.   Recent Labs     10/24/24  1515 10/25/24  3267  10/26/24  0522   HGB 7.5* 7.8* 7.3*      Patient was transfused 1 unit of packed red blood cells on 10/24 for hemoglobin of 6.2  Patient with chronic anemia, acutely worse with expected procedural blood loss  After transfusion hemoglobin returned to baseline of approximately 7-8    Pyuria  Patient with chronic Hanks  Urine culture growing Proteus  Had been placed on IV antibiotics with ceftriaxone  Hypernatremia  Sodium 154 at time of admission, likely hypovolemic hypernatremia related to mild dehydration secondary to poor water intake  Patient was on IV fluid support  Patient had PEG tube placed 10/23: Currently tolerating tube feedings and flushes  Hypernatremia not significantly improved: Will increase free water flushes,  Started hypotonic saline today briefly  Chronic indwelling Hanks catheter  Chronic urethral hanks catheter secondary to chronic urinary retention, BPH.  Hanks patent at this time   Hanks was exchanged 10/19  Continue tamsulosin and finasteride  Proctitis  Noted on CT scan with history of this as possibly chronic. No diarrhea or clinical evidence of colitis.   Type 2 diabetes mellitus without complication, without long-term current use of insulin (HCC)  Lab Results   Component Value Date    HGBA1C 6.2 (H) 09/17/2024     Recent Labs     10/25/24  2035 10/25/24  2346 10/26/24  0558 10/26/24  1224   POCGLU 236* 269* 256* 260*     Blood Sugar Average: Last 72 hrs:  (P) [id number]  Last HgbA1C 6.2%  Continue sliding scale insulin   Patient was started on Lantus  Hypoglycemia protocol  Monitor while on tube feeds  Continue Accu-Cheks, sliding scale insulin, and titrate as needed  Paroxysmal atrial fibrillation (HCC)  Rate controlled on metoprolol: Currently 5 mg IV every 6 hours  Eliquis was temporarily placed on hold: Will restart when approved by surgery  Surgery sacral wound re-eval planned Monday 10/28  Severe protein-calorie malnutrition (HCC)  Malnutrition Findings:   ChronicAdult  Malnutrition type: Chronic illness  Adult Degree of Malnutrition: Other severe protein calorie malnutrition  Malnutrition Characteristics: Weight loss, Inadequate energy  360 Statement: Severe protein calorie malnutrition in context of chronic illness r/t poor appetite, inadequate PO intake as evidance by energy intake less than 75% compared to estimated needs>1 month, 14% wt loss in 1 month (9/17/24 114 kg, 10/19/24 97.7 kg) treated with PO diet. Once able to have po treat with Mechanical Soft diet, Magic Cup BID, Ensure Compact 1x daily.  Patient with significant weight loss and per wife with significantly decreased oral intake over the past few months and has not been eating for 1 week at the nursing home.  Weight noted in April to be 275 lb, and now weighing 215 lb  PEG tube placed 10/24  - continue with tube feeds   Dietary to adjust based on patient nutritional needs.  Continue high-dose thiamine and electrolyte monitoring for refeeding syndrome    History of CVA (cerebrovascular accident)  History of stroke with L sided hemiparesis recently hospitalized at [facility] in July 2024  Baseline non-verbal, alert, at times selective in answers, can nod yes/no however unsure accuracy most information obtained per patients wife  Baseline Dysphagia 2 mechanical soft, thin liquids, patient well known to speech therapy:  cont diet as per speech  Currently NPO but sips of liquid with observation, and ice chips  Advanced care planning/counseling discussion  Patient readmitted with worsening prognosis with large sacral wound and developing osteomyelitis secondary to bacteremia, severe malnutrition. Extensive discussion with wife regarding patients guarded prognosis and goals of care for her . Wife opting to proceed with full medical care at this time.   Started on tube feeds with NG tube, PEG tube placed 10/23   Seen in consult by palliative care, family remains goal oriented    VTE Pharmacologic Prophylaxis: VTE  "Score: 8 High Risk (Score >/= 5) - Pharmacological DVT Prophylaxis Ordered: heparin. Sequential Compression Devices Ordered.    Mobility:   Basic Mobility Inpatient Raw Score: 6  JH-HLM Goal: 2: Bed activities/Dependent transfer  JH-HLM Achieved: 2: Bed activities/Dependent transfer  JH-HLM Goal achieved. Continue to encourage appropriate mobility.    Patient Centered Rounds: I performed bedside rounds with nursing staff today.   Discussions with Specialists or Other Care Team Provider: surgery dr laureano: Sacral wound recheck Monday.  Discussed with ID, Dr. Lantigua: Transitioned to oral antibiotics    Education and Discussions with Family / Patient: Called patient's wife Krsiti, and gave an update.  Reviewed input from consultants    Current Length of Stay: 8 day(s)  Current Patient Status: Inpatient   Certification Statement: The patient will continue to require additional inpatient hospital stay due to postoperative care, wound care, hyponatremia  Discharge Plan: Anticipate discharge in 48-72 hrs to rehab facility.    Code Status: Level 1 - Full Code    Subjective   Patient is lying supine, with head of bed at 45 degrees.  Opens eyes upon my entrance.  Responds \"mmmmmm\" to questions.    Objective :  Temp:  [97.9 °F (36.6 °C)] 97.9 °F (36.6 °C)  HR:  [78-87] 78  BP: (113-148)/(75-87) 130/79  Resp:  [18-20] 20  SpO2:  [96 %-100 %] 100 %  O2 Device: None (Room air)    Body mass index is 25.54 kg/m².     Input and Output Summary (last 24 hours):     Intake/Output Summary (Last 24 hours) at 10/26/2024 1637  Last data filed at 10/26/2024 1449  Gross per 24 hour   Intake 550 ml   Output 1650 ml   Net -1100 ml       Physical Exam  General: Pleasant male.  No acute distress.  Nontachypneic and nondyspneic.  Heart: Regular rate and rhythm.  S1-S2 present.  No murmur, rub, gallop  Lungs: Clear to auscultation bilaterally.  No wheezes, crackles, rhonchi.  Good air movement.  No accessory muscle use or respiratory " distress  Abdomen: Soft, nontender, nondistended, normoactive bowel sounds present.  No guarding or rebound.  No peritoneal signs or mass  Extremities: No clubbing, cyanosis, edema.  2+ pedal pulses  Neurologic: Opens eyes to voice.  Moving right upper extremity    Lines/Drains:  Lines/Drains/Airways       Active Status       Name Placement date Placement time Site Days    Gastrostomy/Enterostomy LUQ 10/23/24  1135  LUQ  3    Colostomy Loop LLQ 10/23/24  1038  LLQ  3    Urethral Catheter Latex 20 Fr. 10/19/24  1209  Latex  7                  Urinary Catheter:  Goal for removal: N/A - Chronic Parra                 Lab Results: I have reviewed the following results:   Results from last 7 days   Lab Units 10/26/24  0522   WBC Thousand/uL 12.54*   HEMOGLOBIN g/dL 7.3*   HEMATOCRIT % 24.4*   PLATELETS Thousands/uL 443*   SEGS PCT % 71   LYMPHO PCT % 19   MONO PCT % 5   EOS PCT % 2     Results from last 7 days   Lab Units 10/26/24  0522 10/25/24  0452   SODIUM mmol/L 148* 148*   POTASSIUM mmol/L 3.5 3.6   CHLORIDE mmol/L 117* 117*   CO2 mmol/L 28 27   BUN mg/dL 15 14   CREATININE mg/dL 0.58* 0.62   ANION GAP mmol/L 3* 4   CALCIUM mg/dL 7.4* 7.2*   ALBUMIN g/dL  --  1.8*   TOTAL BILIRUBIN mg/dL  --  0.30   ALK PHOS U/L  --  48   ALT U/L  --  12   AST U/L  --  23   GLUCOSE RANDOM mg/dL 287* 246*         Results from last 7 days   Lab Units 10/26/24  1224 10/26/24  0558 10/25/24  2346 10/25/24  2035 10/25/24  1754 10/25/24  1315 10/25/24  0745 10/24/24  2111 10/24/24  1600 10/24/24  1146 10/24/24  0748 10/23/24  2040   POC GLUCOSE mg/dl 260* 256* 269* 236* 240* 220* 253* 213* 206* 174* 181* 170*         Results from last 7 days   Lab Units 10/21/24  2327 10/21/24  1734   LACTIC ACID mmol/L 1.8 1.5       Recent Cultures (last 7 days):   Results from last 7 days   Lab Units 10/21/24  8312   GRAM STAIN RESULT  Rare Polys*  2+ Gram negative rods*  1+ Gram positive cocci in pairs*  Rare Yeast*   WOUND CULTURE  1+ Growth of  Escherichia coli*  1+ Growth of Enterobacter cloacae*  1+ Growth of Proteus mirabilis*  1+ Growth of Enterococcus faecalis*     ======================================================    Imaging  10/21 CT abd/pelvis  Reidentified large sacral decubitus ulcer with erosion of the coccyx in keeping with chronic osteomyelitis.  Extensive subcutaneous emphysema about the ulcer extending into into the left gluteal musculature keeping with infectious myositis. Necrotizing fasciitis is not excluded.  No focal collection to indicate an abscess.  Circumferential rectal thickening keeping with a nonspecific proctitis.  Suspected cystitis.     10/20 CXR  Enteric tube tip projects over the gastric fundus.      1019 RUQ US  Cholelithiasis without acute cholecystitis.      10/18 CXR  Limited study. Some atelectasis is present at the right base. No convincing evidence of pneumonia.      10/18 CT abd pelvis  Large sacral decubitus ulcer extending to the coccyx with soft tissue gas now seen extending into the right and left medial aspects of the gluteus jac muscles. No drainable fluid collection.  Erosion of the coccyx suggestive of osteomyelitis.        Micro  10/21: Anaerobic culture: Bacteroides, Clostridium inoculum  10/21 wound culture: E. coli, Enterobacter, Proteus, Enterococcus  10/19: MRSA culture: Negative  10/18: Urine culture: Greater than 100,000 Proteus  10/18 blood culture: E. coli, Proteus, Bacteroides, Clostridium species, not perfringens or Septicum x 2     procedures  10/23:Laparoscopic loop colostomy, sacral debridement and washout. Percutaneous Endoscopic Gastrostomy tube placement.     10/21:INCISION AND DRAINAGE (I&D) BUTTOCK. SACRAL WOUND DEBRIDEMENT. COCCYGECTOMY     ======================================================    Last 24 Hours Medication List:     Current Facility-Administered Medications:     acetaminophen (TYLENOL) tablet 650 mg, Q4H PRN    amoxicillin-clavulanate (AUGMENTIN) 875-125 mg per  tablet 1 tablet, Q12H ELISEO    aspirin chewable tablet 81 mg, Daily    atorvastatin (LIPITOR) tablet 80 mg, QPM    doxycycline hyclate (VIBRAMYCIN) capsule 100 mg, Q12H ELISEO    escitalopram (LEXAPRO) tablet 10 mg, Daily    finasteride (PROSCAR) tablet 5 mg, Daily    heparin (porcine) subcutaneous injection 5,000 Units, Q8H ELISEO    HYDROmorphone HCl (DILAUDID) injection 0.2 mg, Q6H PRN    insulin glargine (LANTUS) subcutaneous injection 6 Units 0.06 mL, HS    insulin lispro (HumALOG/ADMELOG) 100 units/mL subcutaneous injection 1-5 Units, Q6H **AND** Fingerstick Glucose (POCT), Q6H    lisinopril (ZESTRIL) tablet 10 mg, Daily    metoprolol (LOPRESSOR) injection 2.5 mg, Q6H PRN    metoprolol (LOPRESSOR) injection 5 mg, Q6H    multivitamin stress formula tablet 1 tablet, Daily    ondansetron (ZOFRAN) injection 4 mg, Q6H PRN    senna (SENOKOT) tablet 17.2 mg, Daily PRN    sodium chloride infusion 0.45 %, Continuous, Last Rate: 50 mL/hr (10/26/24 1049)    tamsulosin (FLOMAX) capsule 0.4 mg, Daily With Dinner    thiamine tablet 100 mg, Daily    Administrative Statements   Today, Patient Was Seen By: Stacy Ba MD      **Please Note: This note may have been constructed using a voice recognition system.**

## 2024-10-27 LAB
ANION GAP SERPL CALCULATED.3IONS-SCNC: 1 MMOL/L (ref 4–13)
BUN SERPL-MCNC: 15 MG/DL (ref 5–25)
CALCIUM SERPL-MCNC: 7.2 MG/DL (ref 8.4–10.2)
CHLORIDE SERPL-SCNC: 116 MMOL/L (ref 96–108)
CO2 SERPL-SCNC: 28 MMOL/L (ref 21–32)
CREAT SERPL-MCNC: 0.51 MG/DL (ref 0.6–1.3)
GFR SERPL CREATININE-BSD FRML MDRD: 105 ML/MIN/1.73SQ M
GLUCOSE SERPL-MCNC: 213 MG/DL (ref 65–140)
GLUCOSE SERPL-MCNC: 219 MG/DL (ref 65–140)
GLUCOSE SERPL-MCNC: 234 MG/DL (ref 65–140)
GLUCOSE SERPL-MCNC: 239 MG/DL (ref 65–140)
GLUCOSE SERPL-MCNC: 242 MG/DL (ref 65–140)
GLUCOSE SERPL-MCNC: 250 MG/DL (ref 65–140)
MAGNESIUM SERPL-MCNC: 1.8 MG/DL (ref 1.9–2.7)
PHOSPHATE SERPL-MCNC: 2.1 MG/DL (ref 2.3–4.1)
POTASSIUM SERPL-SCNC: 3.8 MMOL/L (ref 3.5–5.3)
SODIUM SERPL-SCNC: 145 MMOL/L (ref 135–147)

## 2024-10-27 PROCEDURE — 82948 REAGENT STRIP/BLOOD GLUCOSE: CPT

## 2024-10-27 PROCEDURE — 83735 ASSAY OF MAGNESIUM: CPT | Performed by: INTERNAL MEDICINE

## 2024-10-27 PROCEDURE — 80048 BASIC METABOLIC PNL TOTAL CA: CPT | Performed by: INTERNAL MEDICINE

## 2024-10-27 PROCEDURE — 99232 SBSQ HOSP IP/OBS MODERATE 35: CPT | Performed by: INTERNAL MEDICINE

## 2024-10-27 PROCEDURE — 84100 ASSAY OF PHOSPHORUS: CPT | Performed by: INTERNAL MEDICINE

## 2024-10-27 RX ORDER — SENNOSIDES 8.6 MG
2 TABLET ORAL DAILY PRN
Status: DISCONTINUED | OUTPATIENT
Start: 2024-10-27 | End: 2024-10-29 | Stop reason: HOSPADM

## 2024-10-27 RX ORDER — ACETAMINOPHEN 325 MG/1
650 TABLET ORAL EVERY 4 HOURS PRN
Status: DISCONTINUED | OUTPATIENT
Start: 2024-10-27 | End: 2024-10-27 | Stop reason: SDUPTHER

## 2024-10-27 RX ORDER — INSULIN LISPRO 100 [IU]/ML
1-5 INJECTION, SOLUTION INTRAVENOUS; SUBCUTANEOUS EVERY 6 HOURS
Status: DISCONTINUED | OUTPATIENT
Start: 2024-10-28 | End: 2024-10-29 | Stop reason: HOSPADM

## 2024-10-27 RX ORDER — ATORVASTATIN CALCIUM 80 MG/1
80 TABLET, FILM COATED ORAL EVERY EVENING
Status: DISCONTINUED | OUTPATIENT
Start: 2024-10-28 | End: 2024-10-29 | Stop reason: HOSPADM

## 2024-10-27 RX ORDER — DOXYCYCLINE 100 MG/1
100 CAPSULE ORAL EVERY 12 HOURS SCHEDULED
Status: DISCONTINUED | OUTPATIENT
Start: 2024-10-28 | End: 2024-10-29 | Stop reason: HOSPADM

## 2024-10-27 RX ORDER — SODIUM CHLORIDE 450 MG/100ML
50 INJECTION, SOLUTION INTRAVENOUS CONTINUOUS
Status: DISPENSED | OUTPATIENT
Start: 2024-10-27 | End: 2024-10-27

## 2024-10-27 RX ORDER — ESCITALOPRAM OXALATE 10 MG/1
10 TABLET ORAL DAILY
Status: DISCONTINUED | OUTPATIENT
Start: 2024-10-28 | End: 2024-10-29 | Stop reason: HOSPADM

## 2024-10-27 RX ORDER — INSULIN GLARGINE 100 [IU]/ML
16 INJECTION, SOLUTION SUBCUTANEOUS
Status: DISCONTINUED | OUTPATIENT
Start: 2024-10-27 | End: 2024-10-29 | Stop reason: HOSPADM

## 2024-10-27 RX ORDER — LISINOPRIL 10 MG/1
10 TABLET ORAL DAILY
Status: DISCONTINUED | OUTPATIENT
Start: 2024-10-28 | End: 2024-10-29 | Stop reason: HOSPADM

## 2024-10-27 RX ORDER — ACETAMINOPHEN 160 MG/5ML
650 SUSPENSION ORAL EVERY 4 HOURS PRN
Status: DISCONTINUED | OUTPATIENT
Start: 2024-10-27 | End: 2024-10-29 | Stop reason: HOSPADM

## 2024-10-27 RX ORDER — ASPIRIN 81 MG/1
81 TABLET, CHEWABLE ORAL DAILY
Status: DISCONTINUED | OUTPATIENT
Start: 2024-10-28 | End: 2024-10-29 | Stop reason: HOSPADM

## 2024-10-27 RX ORDER — MAGNESIUM SULFATE HEPTAHYDRATE 40 MG/ML
2 INJECTION, SOLUTION INTRAVENOUS ONCE
Status: COMPLETED | OUTPATIENT
Start: 2024-10-27 | End: 2024-10-27

## 2024-10-27 RX ORDER — DOXYCYCLINE 100 MG/1
100 CAPSULE ORAL EVERY 12 HOURS SCHEDULED
Status: DISCONTINUED | OUTPATIENT
Start: 2024-10-27 | End: 2024-10-27

## 2024-10-27 RX ADMIN — ASPIRIN 81 MG CHEWABLE TABLET 81 MG: 81 TABLET CHEWABLE at 08:04

## 2024-10-27 RX ADMIN — INSULIN GLARGINE 16 UNITS: 100 INJECTION, SOLUTION SUBCUTANEOUS at 22:08

## 2024-10-27 RX ADMIN — SODIUM CHLORIDE 50 ML/HR: 0.45 INJECTION, SOLUTION INTRAVENOUS at 06:47

## 2024-10-27 RX ADMIN — DOXYCYCLINE 100 MG: 100 CAPSULE ORAL at 21:50

## 2024-10-27 RX ADMIN — METOROPROLOL TARTRATE 5 MG: 5 INJECTION, SOLUTION INTRAVENOUS at 01:25

## 2024-10-27 RX ADMIN — METOROPROLOL TARTRATE 5 MG: 5 INJECTION, SOLUTION INTRAVENOUS at 20:02

## 2024-10-27 RX ADMIN — HEPARIN SODIUM 5000 UNITS: 5000 INJECTION INTRAVENOUS; SUBCUTANEOUS at 13:04

## 2024-10-27 RX ADMIN — HEPARIN SODIUM 5000 UNITS: 5000 INJECTION INTRAVENOUS; SUBCUTANEOUS at 05:37

## 2024-10-27 RX ADMIN — POTASSIUM PHOSPHATE 12 MMOL: 236; 224 INJECTION, SOLUTION INTRAVENOUS at 10:59

## 2024-10-27 RX ADMIN — DOXYCYCLINE 100 MG: 100 CAPSULE ORAL at 08:04

## 2024-10-27 RX ADMIN — INSULIN LISPRO 2 UNITS: 100 INJECTION, SOLUTION INTRAVENOUS; SUBCUTANEOUS at 01:25

## 2024-10-27 RX ADMIN — INSULIN LISPRO 2 UNITS: 100 INJECTION, SOLUTION INTRAVENOUS; SUBCUTANEOUS at 18:18

## 2024-10-27 RX ADMIN — INSULIN LISPRO 2 UNITS: 100 INJECTION, SOLUTION INTRAVENOUS; SUBCUTANEOUS at 08:28

## 2024-10-27 RX ADMIN — ESCITALOPRAM OXALATE 10 MG: 10 TABLET ORAL at 08:12

## 2024-10-27 RX ADMIN — AMOXICILLIN AND CLAVULANATE POTASSIUM 1 TABLET: 875; 125 TABLET, FILM COATED ORAL at 08:04

## 2024-10-27 RX ADMIN — ATORVASTATIN CALCIUM 80 MG: 80 TABLET, FILM COATED ORAL at 17:13

## 2024-10-27 RX ADMIN — FINASTERIDE 5 MG: 5 TABLET, FILM COATED ORAL at 08:04

## 2024-10-27 RX ADMIN — B-COMPLEX W/ C & FOLIC ACID TAB 1 TABLET: TAB at 08:04

## 2024-10-27 RX ADMIN — METOROPROLOL TARTRATE 5 MG: 5 INJECTION, SOLUTION INTRAVENOUS at 08:10

## 2024-10-27 RX ADMIN — INSULIN LISPRO 2 UNITS: 100 INJECTION, SOLUTION INTRAVENOUS; SUBCUTANEOUS at 13:06

## 2024-10-27 RX ADMIN — MAGNESIUM SULFATE HEPTAHYDRATE 2 G: 40 INJECTION, SOLUTION INTRAVENOUS at 08:04

## 2024-10-27 RX ADMIN — LISINOPRIL 10 MG: 10 TABLET ORAL at 08:04

## 2024-10-27 RX ADMIN — AMOXICILLIN AND CLAVULANATE POTASSIUM 1 TABLET: 875; 125 TABLET, FILM COATED ORAL at 21:50

## 2024-10-27 RX ADMIN — METOROPROLOL TARTRATE 5 MG: 5 INJECTION, SOLUTION INTRAVENOUS at 13:04

## 2024-10-27 RX ADMIN — THIAMINE HCL TAB 100 MG 100 MG: 100 TAB at 08:04

## 2024-10-27 RX ADMIN — HEPARIN SODIUM 5000 UNITS: 5000 INJECTION INTRAVENOUS; SUBCUTANEOUS at 22:08

## 2024-10-27 NOTE — PLAN OF CARE
Problem: Potential for Falls  Goal: Patient will remain free of falls  Description: INTERVENTIONS:  - Educate patient/family on patient safety including physical limitations  - Instruct patient to call for assistance with activity   - Consult OT/PT to assist with strengthening/mobility   - Keep Call bell within reach  - Keep bed low and locked with side rails adjusted as appropriate  - Keep care items and personal belongings within reach  - Initiate and maintain comfort rounds  - Make Fall Risk Sign visible to staff  - Offer Toileting every 2 Hours, in advance of need  - Initiate/Maintain bed alarm  - Obtain necessary fall risk management equipment:   - Apply yellow socks and bracelet for high fall risk patients  - Consider moving patient to room near nurses station  Outcome: Progressing     Problem: Prexisting or High Potential for Compromised Skin Integrity  Goal: Skin integrity is maintained or improved  Description: INTERVENTIONS:  - Identify patients at risk for skin breakdown  - Assess and monitor skin integrity  - Assess and monitor nutrition and hydration status  - Monitor labs   - Assess for incontinence   - Turn and reposition patient  - Assist with mobility/ambulation  - Relieve pressure over bony prominences  - Avoid friction and shearing  - Provide appropriate hygiene as needed including keeping skin clean and dry  - Evaluate need for skin moisturizer/barrier cream  - Collaborate with interdisciplinary team   - Patient/family teaching  - Consider wound care consult   Outcome: Progressing     Problem: PAIN - ADULT  Goal: Verbalizes/displays adequate comfort level or baseline comfort level  Description: Interventions:  - Encourage patient to monitor pain and request assistance  - Assess pain using appropriate pain scale  - Administer analgesics based on type and severity of pain and evaluate response  - Implement non-pharmacological measures as appropriate and evaluate response  - Consider cultural and  social influences on pain and pain management  - Notify physician/advanced practitioner if interventions unsuccessful or patient reports new pain  Outcome: Progressing     Problem: INFECTION - ADULT  Goal: Absence or prevention of progression during hospitalization  Description: INTERVENTIONS:  - Assess and monitor for signs and symptoms of infection  - Monitor lab/diagnostic results  - Monitor all insertion sites, i.e. indwelling lines, tubes, and drains  - Monitor endotracheal if appropriate and nasal secretions for changes in amount and color  - [place] appropriate cooling/warming therapies per order  - Administer medications as ordered  - Instruct and encourage patient and family to use good hand hygiene technique  - Identify and instruct in appropriate isolation precautions for identified infection/condition  Outcome: Progressing     Problem: DISCHARGE PLANNING  Goal: Discharge to home or other facility with appropriate resources  Description: INTERVENTIONS:  - Identify barriers to discharge w/patient and caregiver  - Arrange for needed discharge resources and transportation as appropriate  - Identify discharge learning needs (meds, wound care, etc.)  - Arrange for interpretive services to assist at discharge as needed  - Refer to Case Management Department for coordinating discharge planning if the patient needs post-hospital services based on physician/advanced practitioner order or complex needs related to functional status, cognitive ability, or social support system  Outcome: Progressing     Problem: Knowledge Deficit  Goal: Patient/family/caregiver demonstrates understanding of disease process, treatment plan, medications, and discharge instructions  Description: Complete learning assessment and assess knowledge base.  Interventions:  - Provide teaching at level of understanding  - Provide teaching via preferred learning methods  Outcome: Progressing     Problem: Nutrition/Hydration-ADULT  Goal:  Nutrient/Hydration intake appropriate for improving, restoring or maintaining nutritional needs  Description: Monitor and assess patient's nutrition/hydration status for malnutrition. Collaborate with interdisciplinary team and initiate plan and interventions as ordered.  Monitor patient's weight and dietary intake as ordered or per policy. Utilize nutrition screening tool and intervene as necessary. Determine patient's food preferences and provide high-protein, high-caloric foods as appropriate.     INTERVENTIONS:  - Monitor oral intake, urinary output, labs, and treatment plans  - Assess nutrition and hydration status and recommend course of action  - Evaluate amount of meals eaten  - Assist patient with eating if necessary   - Allow adequate time for meals  - Recommend/ encourage appropriate diets, oral nutritional supplements, and vitamin/mineral supplements  - Order, calculate, and assess calorie counts as needed  - Recommend, monitor, and adjust tube feedings and TPN/PPN based on assessed needs  - Assess need for intravenous fluids  - Provide specific nutrition/hydration education as appropriate  - Include patient/family/caregiver in decisions related to nutrition  Outcome: Progressing

## 2024-10-27 NOTE — ASSESSMENT & PLAN NOTE
History of stroke with L sided hemiparesis recently hospitalized at [facility] in July 2024  Baseline non-verbal, alert, at times selective in answers, can nod yes/no however unsure accuracy most information obtained per patients wife  Baseline Dysphagia 2 mechanical soft, thin liquids, patient well known to speech therapy:  cont diet as per speech  Currently NPO but sips of liquid with observation, and ice chips  Per speech:  will cont to eval

## 2024-10-27 NOTE — ASSESSMENT & PLAN NOTE
Lab Results   Component Value Date    HGBA1C 6.2 (H) 09/17/2024     Recent Labs     10/26/24  2036 10/27/24  0013 10/27/24  0636 10/27/24  1201   POCGLU 221* 242* 239* 234*     Blood Sugar Average: Last 72 hrs:  (P) 228.6875  Last HgbA1C 6.2%  Pt with hyperglycemia once TF started  Placed on lantus:  cont to titrate  Continue Accu-Cheks, sliding scale insulin, and titrate as needed

## 2024-10-27 NOTE — PLAN OF CARE
Problem: Potential for Falls  Goal: Patient will remain free of falls  Description: INTERVENTIONS:  - Educate patient/family on patient safety including physical limitations  - Instruct patient to call for assistance with activity   - Consult OT/PT to assist with strengthening/mobility   - Keep Call bell within reach  - Keep bed low and locked with side rails adjusted as appropriate  - Keep care items and personal belongings within reach  - Initiate and maintain comfort rounds  - Make Fall Risk Sign visible to staff  - Offer Toileting every 2 Hours, in advance of need  - Initiate/Maintain bed alarm  - Obtain necessary fall risk management equipment: alarms  - Apply yellow socks and bracelet for high fall risk patients  - Consider moving patient to room near nurses station  Outcome: Progressing     Problem: Prexisting or High Potential for Compromised Skin Integrity  Goal: Skin integrity is maintained or improved  Description: INTERVENTIONS:  - Identify patients at risk for skin breakdown  - Assess and monitor skin integrity  - Assess and monitor nutrition and hydration status  - Monitor labs   - Assess for incontinence   - Turn and reposition patient  - Assist with mobility/ambulation  - Relieve pressure over bony prominences  - Avoid friction and shearing  - Provide appropriate hygiene as needed including keeping skin clean and dry  - Evaluate need for skin moisturizer/barrier cream  - Collaborate with interdisciplinary team   - Patient/family teaching  - Consider wound care consult   Outcome: Progressing     Problem: PAIN - ADULT  Goal: Verbalizes/displays adequate comfort level or baseline comfort level  Description: Interventions:  - Encourage patient to monitor pain and request assistance  - Assess pain using appropriate pain scale  - Administer analgesics based on type and severity of pain and evaluate response  - Implement non-pharmacological measures as appropriate and evaluate response  - Consider  cultural and social influences on pain and pain management  - Notify physician/advanced practitioner if interventions unsuccessful or patient reports new pain  Outcome: Progressing     Problem: INFECTION - ADULT  Goal: Absence or prevention of progression during hospitalization  Description: INTERVENTIONS:  - Assess and monitor for signs and symptoms of infection  - Monitor lab/diagnostic results  - Monitor all insertion sites, i.e. indwelling lines, tubes, and drains  - Monitor endotracheal if appropriate and nasal secretions for changes in amount and color  - [place] appropriate cooling/warming therapies per order  - Administer medications as ordered  - Instruct and encourage patient and family to use good hand hygiene technique  - Identify and instruct in appropriate isolation precautions for identified infection/condition  Outcome: Progressing     Problem: DISCHARGE PLANNING  Goal: Discharge to home or other facility with appropriate resources  Description: INTERVENTIONS:  - Identify barriers to discharge w/patient and caregiver  - Arrange for needed discharge resources and transportation as appropriate  - Identify discharge learning needs (meds, wound care, etc.)  - Arrange for interpretive services to assist at discharge as needed  - Refer to Case Management Department for coordinating discharge planning if the patient needs post-hospital services based on physician/advanced practitioner order or complex needs related to functional status, cognitive ability, or social support system  Outcome: Progressing     Problem: Knowledge Deficit  Goal: Patient/family/caregiver demonstrates understanding of disease process, treatment plan, medications, and discharge instructions  Description: Complete learning assessment and assess knowledge base.  Interventions:  - Provide teaching at level of understanding  - Provide teaching via preferred learning methods  Outcome: Progressing     Problem:  Nutrition/Hydration-ADULT  Goal: Nutrient/Hydration intake appropriate for improving, restoring or maintaining nutritional needs  Description: Monitor and assess patient's nutrition/hydration status for malnutrition. Collaborate with interdisciplinary team and initiate plan and interventions as ordered.  Monitor patient's weight and dietary intake as ordered or per policy. Utilize nutrition screening tool and intervene as necessary. Determine patient's food preferences and provide high-protein, high-caloric foods as appropriate.     INTERVENTIONS:  - Monitor oral intake, urinary output, labs, and treatment plans  - Assess nutrition and hydration status and recommend course of action  - Evaluate amount of meals eaten  - Assist patient with eating if necessary   - Allow adequate time for meals  - Recommend/ encourage appropriate diets, oral nutritional supplements, and vitamin/mineral supplements  - Order, calculate, and assess calorie counts as needed  - Recommend, monitor, and adjust tube feedings and TPN/PPN based on assessed needs  - Assess need for intravenous fluids  - Provide specific nutrition/hydration education as appropriate  - Include patient/family/caregiver in decisions related to nutrition  Outcome: Progressing

## 2024-10-27 NOTE — ASSESSMENT & PLAN NOTE
Sodium 154 at time of admission, likely hypovolemic hypernatremia related to mild dehydration secondary to poor water intake  Patient was on IV fluid support  Patient had PEG tube placed 10/23: Currently tolerating tube feedings and flushes  Patient was given increased free water flushes, and started on isotonic IV fluids with improvement in hypernatremia  Continue current regimen and continue to monitor

## 2024-10-27 NOTE — QUICK NOTE
Patient examined at bedside. Previous ostomy appliance removed. Ostomy is pink, patent, productive. Slightly edematous but no signs of ischemia. Ostomy bar was removed. Peristomal wound care performed and new ostomy appliance placed.       Alfred Torres MD  General Surgery   10/27/24

## 2024-10-27 NOTE — ASSESSMENT & PLAN NOTE
Patient presented with a large sacral decubitus ulcer extending to the coccyx with concurrent sepsis, and gram-negative bacteremia  Was taken to the operating room 10/21 for sacral debridement, coccyx ectomy, and returned to the OR 10/23 for additional debridement diverting loop colostomy and PEG tube placement  Per infectious disease recommendations: Operative cultures were polymicrobial  Antibiotics for 7-day additional course, was initially on ceftriaxone/Unasyn has been transitioned to doxycycline/Augmentin with last dose 10/30  No indication for chronic antibiotics for chronic osteo as patient's wound is unlikely to heal: Long-term antibiotic therapy futile without future flap coverage  Update per surgery team : Sacral wound check planned for Monday: If patient does not require any additional debridement he would be acceptable for discharge at that time from a surgical standpoint

## 2024-10-27 NOTE — ASSESSMENT & PLAN NOTE
"Patient is a 73-year-old male who was recently admitted 9/17-10/4 with polymicrobial bacteremia secondary to sacral wound who had completed a course of IV cefepime, flagyl and vancomycin followed by ampicillin.   Was also diagnosed with covid.  Pt was readmitted for failure to thrive, and concerns over worsening sacral wounds and posterior osteomyelitis    Patient met criteria for sepsis, present on admission with tachycardia and leukocytosis.  Sepsis secondary to sacral decubitus ulcer with underlying osteomyelitis and polymicrobial bacteremia  CT abdomen pelvis showing \"Large sacral decubitus ulcer extending to the coccyx with soft tissue gas now seen extending into the right and left medial aspects of the gluteus jac muscles. No drainable fluid collection. Erosion of the coccyx suggestive of osteomyelitis.\"  Blood culture x2 obtained upon admission growing Proteus and E. Coli  10/21 patient underwent operative incision and drainage of buttock wound, and sacral wound debridement and coccyxectomy  10/23 patient returned to the OR for sacral debridement and washout.  Patient also had diverting loop colostomy due to his sacral wound, and percutaneous G-tube placement for nutrition  10/21 operative cultures grew Bacteroides, Clostridium inoculum, E. coli, Enterobacter, Proteus, Enterococcus faecalis  Infectious disease recs appreciated- was placed on IV antibiotics with Rocephin and Unasyn, and has since been transitioned to oral doxycycline 100 mg every 12 hours and Augmentin 875-125 every 2 hours through 10/30/2024  Patient clinically improving, afebrile, with improving leukocytosis    "

## 2024-10-27 NOTE — PROGRESS NOTES
"Progress Note - Hospitalist   Name: Drew Santos 73 y.o. male I MRN: [id number]  Unit/Bed#: E4 -01 I Date of Admission: 10/18/2024   Date of Service: 10/27/2024 I Hospital Day: 9    Assessment & Plan  Sepsis  Patient is a 73-year-old male who was recently admitted 9/17-10/4 with polymicrobial bacteremia secondary to sacral wound who had completed a course of IV cefepime, flagyl and vancomycin followed by ampicillin.   Was also diagnosed with covid.  Pt was readmitted for failure to thrive, and concerns over worsening sacral wounds and posterior osteomyelitis    Patient met criteria for sepsis, present on admission with tachycardia and leukocytosis.  Sepsis secondary to sacral decubitus ulcer with underlying osteomyelitis and polymicrobial bacteremia  CT abdomen pelvis showing \"Large sacral decubitus ulcer extending to the coccyx with soft tissue gas now seen extending into the right and left medial aspects of the gluteus jac muscles. No drainable fluid collection. Erosion of the coccyx suggestive of osteomyelitis.\"  Blood culture x2 obtained upon admission growing Proteus and E. Coli  10/21 patient underwent operative incision and drainage of buttock wound, and sacral wound debridement and coccyxectomy  10/23 patient returned to the OR for sacral debridement and washout.  Patient also had diverting loop colostomy due to his sacral wound, and percutaneous G-tube placement for nutrition  10/21 operative cultures grew Bacteroides, Clostridium inoculum, E. coli, Enterobacter, Proteus, Enterococcus faecalis  Infectious disease recs appreciated- was placed on IV antibiotics with Rocephin and Unasyn, and has since been transitioned to oral doxycycline 100 mg every 12 hours and Augmentin 875-125 every 2 hours through 10/30/2024  Patient clinically improving, afebrile, with improving leukocytosis    Gram-negative bacteremia  Blood cultures growing E. coli and Proteus and bacteroides, and Clostridium  Most likely " secondary to infected sacral decubitus ulcer with probable osteomyelitis of coccyx  Infectious disease following for antibiotic management  Right upper quadrant ultrasound performed, no evidence of acute cholecystitis  Surgery took patient to the OR emergently 10/21 for concern of necrotizing soft tissue infection, and return to the OR for additional debridement, washout with end colostomy and PEG tube placement on 10/23/24   Continue antibiotics as recommended by the infectious disease team  IV ceftriaxone and Unasyn have been transitioned to doxycycline and Augmentin  ID recommends a 7-day course of antibiotics, from the most recent adjustment, last dose 10/30  Sacral ulcer (HCC)  Patient presented with a large sacral decubitus ulcer extending to the coccyx with concurrent sepsis, and gram-negative bacteremia  Was taken to the operating room 10/21 for sacral debridement, coccyx ectomy, and returned to the OR 10/23 for additional debridement diverting loop colostomy and PEG tube placement  Per infectious disease recommendations: Operative cultures were polymicrobial  Antibiotics for 7-day additional course, was initially on ceftriaxone/Unasyn has been transitioned to doxycycline/Augmentin with last dose 10/30  No indication for chronic antibiotics for chronic osteo as patient's wound is unlikely to heal: Long-term antibiotic therapy futile without future flap coverage  Update per surgery team : Sacral wound check planned for Monday: If patient does not require any additional debridement he would be acceptable for discharge at that time from a surgical standpoint  S/P percutaneous endoscopic gastrostomy (PEG) tube placement (HCC)  10/23 s/p lap loop colostomy with PEG tube placement for nutrition  Oral diet per SLP: Sips of liquid, and ice chips  Nutrition consulted appreciated:  pt tolerating at goal rate  Anemia of chronic disease  Has chronic anemia likely secondary to poor nutritional status underlying chronic  illness.   Recent Labs     10/24/24  1515 10/25/24  0452 10/26/24  0522   HGB 7.5* 7.8* 7.3*      Patient was transfused 1 unit of packed red blood cells on 10/24 for hemoglobin of 6.2  Patient with chronic anemia, acutely worse with expected procedural blood loss  After transfusion hemoglobin returned to baseline of approximately 7-8    Pyuria  Patient with chronic Hanks  Urine culture growing Proteus  Had been placed on IV antibiotics with ceftriaxone  Hypernatremia  Sodium 154 at time of admission, likely hypovolemic hypernatremia related to mild dehydration secondary to poor water intake  Patient was on IV fluid support  Patient had PEG tube placed 10/23: Currently tolerating tube feedings and flushes  Patient was given increased free water flushes, and started on isotonic IV fluids with improvement in hypernatremia  Continue current regimen and continue to monitor  Chronic indwelling Hanks catheter  Chronic urethral hanks catheter secondary to chronic urinary retention, BPH.  Hanks patent at this time   Hanks was exchanged 10/19  Continue tamsulosin and finasteride  Proctitis  Noted on CT scan with history of this as possibly chronic. No diarrhea or clinical evidence of colitis.   Type 2 diabetes mellitus without complication, without long-term current use of insulin (HCC)  Lab Results   Component Value Date    HGBA1C 6.2 (H) 09/17/2024     Recent Labs     10/26/24  2036 10/27/24  0013 10/27/24  0636 10/27/24  1201   POCGLU 221* 242* 239* 234*     Blood Sugar Average: Last 72 hrs:  (P) 228.6875  Last HgbA1C 6.2%  Pt with hyperglycemia once TF started  Placed on lantus:  cont to titrate  Continue Accu-Cheks, sliding scale insulin, and titrate as needed  Paroxysmal atrial fibrillation (HCC)  Rate controlled on metoprolol: Currently 5 mg IV every 6 hours  Eliquis was temporarily placed on hold: Will restart when approved by surgery  Surgery sacral wound re-eval planned Monday 10/28  Severe protein-calorie  malnutrition (HCC)  Malnutrition Findings:   ChronicAdult Malnutrition type: Chronic illness  Adult Degree of Malnutrition: Other severe protein calorie malnutrition  Malnutrition Characteristics: Weight loss, Inadequate energy  360 Statement: Severe protein calorie malnutrition in context of chronic illness r/t poor appetite, inadequate PO intake as evidance by energy intake less than 75% compared to estimated needs>1 month, 14% wt loss in 1 month (9/17/24 114 kg, 10/19/24 97.7 kg) treated with PO diet. Once able to have po treat with Mechanical Soft diet, Magic Cup BID, Ensure Compact 1x daily.  Patient with significant weight loss and per wife with significantly decreased oral intake over the past few months and has not been eating for 1 week at the nursing home.  Weight noted in April to be 275 lb, and now weighing 215 lb  PEG tube placed 10/24  - continue with tube feeds   Dietary to adjust based on patient nutritional needs.  Continue high-dose thiamine and electrolyte monitoring for refeeding syndrome    History of CVA (cerebrovascular accident)  History of stroke with L sided hemiparesis recently hospitalized at [facility] in July 2024  Baseline non-verbal, alert, at times selective in answers, can nod yes/no however unsure accuracy most information obtained per patients wife  Baseline Dysphagia 2 mechanical soft, thin liquids, patient well known to speech therapy:  cont diet as per speech  Currently NPO but sips of liquid with observation, and ice chips  Per speech:  will cont to eval  Advanced care planning/counseling discussion  Patient readmitted with worsening prognosis with large sacral wound and developing osteomyelitis secondary to bacteremia, severe malnutrition. Extensive discussion with wife regarding patients guarded prognosis and goals of care for her . Wife opting to proceed with full medical care at this time.   Started on tube feeds with NG tube, PEG tube placed 10/23   Seen in  "consult by palliative care, family remains goal oriented    VTE Pharmacologic Prophylaxis: VTE Score: 8 High Risk (Score >/= 5) - Pharmacological DVT Prophylaxis Ordered: heparin. Sequential Compression Devices Ordered.    Mobility:   Basic Mobility Inpatient Raw Score: 6  JH-HLM Goal: 2: Bed activities/Dependent transfer  JH-HLM Achieved: 1: Laying in bed  JH-HLM Goal NOT achieved. Continue with multidisciplinary rounding and encourage appropriate mobility to improve upon JH-HLM goals.    Patient Centered Rounds: I performed bedside rounds with nursing staff today.   Discussions with Specialists or Other Care Team Provider: josefa    Education and Discussions with Family / Patient: called pts wife Kristi Santos and gave update    Current Length of Stay: 9 day(s)  Current Patient Status: Inpatient   Certification Statement: The patient will continue to require additional inpatient hospital stay due to wound check   Discharge Plan: Anticipate discharge in 24-48 hrs to rehab facility.    Code Status: Level 1 - Full Code    Subjective   Patient opens his eyes to voice.  Communicates by shaking his head to express \"no\".  He indicates he does not have any pain anywhere.  He indicates he does not have any upset stomach.  Does not respond to additional questions and closes his eyes.      Objective :  Temp:  [98.3 °F (36.8 °C)-98.4 °F (36.9 °C)] 98.4 °F (36.9 °C)  HR:  [74-87] 87  BP: (130-148)/(75-84) 135/81  Resp:  [18-20] 20  SpO2:  [96 %-100 %] 96 %  O2 Device: None (Room air)    Body mass index is 25.54 kg/m².     Input and Output Summary (last 24 hours):     Intake/Output Summary (Last 24 hours) at 10/27/2024 1416  Last data filed at 10/27/2024 1301  Gross per 24 hour   Intake 450 ml   Output 2175 ml   Net -1725 ml       Physical Exam  General: Pleasant male.  Lying supine with head of the bed at 45 degrees.  Nontachypneic and nondistended.  Heart: Regular rate and rhythm.  S1-S2 present.  No murmur, rub, gallop  Lungs: Clear " to auscultation bilaterally.  Good air movement.  No wheezes, crackles, rhonchi.  No accessory muscle use or respiratory distress  Abdomen: Soft, nontender, nondistended, normoactive bowel sounds present.  No guarding or rebound.  No peritoneal signs or mass  Extremities: No clubbing, cyanosis, edema.  Tubes pedal pulses bilaterally    Lines/Drains:  Lines/Drains/Airways       Active Status       Name Placement date Placement time Site Days    Gastrostomy/Enterostomy LUQ 10/23/24  1135  LUQ  4    Colostomy Loop LLQ 10/23/24  1038  LLQ  4    Urethral Catheter Latex 20 Fr. 10/19/24  1209  Latex  8                  Urinary Catheter:  Goal for removal: N/A - Chronic Parra                 Lab Results: I have reviewed the following results:   Results from last 7 days   Lab Units 10/26/24  0522   WBC Thousand/uL 12.54*   HEMOGLOBIN g/dL 7.3*   HEMATOCRIT % 24.4*   PLATELETS Thousands/uL 443*   SEGS PCT % 71   LYMPHO PCT % 19   MONO PCT % 5   EOS PCT % 2     Results from last 7 days   Lab Units 10/27/24  0444 10/26/24  0522 10/25/24  0452   SODIUM mmol/L 145   < > 148*   POTASSIUM mmol/L 3.8   < > 3.6   CHLORIDE mmol/L 116*   < > 117*   CO2 mmol/L 28   < > 27   BUN mg/dL 15   < > 14   CREATININE mg/dL 0.51*   < > 0.62   ANION GAP mmol/L 1*   < > 4   CALCIUM mg/dL 7.2*   < > 7.2*   ALBUMIN g/dL  --   --  1.8*   TOTAL BILIRUBIN mg/dL  --   --  0.30   ALK PHOS U/L  --   --  48   ALT U/L  --   --  12   AST U/L  --   --  23   GLUCOSE RANDOM mg/dL 250*   < > 246*    < > = values in this interval not displayed.         Results from last 7 days   Lab Units 10/27/24  1201 10/27/24  0636 10/27/24  0013 10/26/24  2036 10/26/24  1759 10/26/24  1224 10/26/24  0558 10/25/24  2346 10/25/24  2035 10/25/24  1754 10/25/24  1315 10/25/24  0745   POC GLUCOSE mg/dl 234* 239* 242* 221* 215* 260* 256* 269* 236* 240* 220* 253*         Results from last 7 days   Lab Units 10/21/24  2327 10/21/24  1734   LACTIC ACID mmol/L 1.8 1.5       Recent  Cultures (last 7 days):   Results from last 7 days   Lab Units 10/21/24  7782   GRAM STAIN RESULT  Rare Polys*  2+ Gram negative rods*  1+ Gram positive cocci in pairs*  Rare Yeast*   WOUND CULTURE  1+ Growth of Escherichia coli*  1+ Growth of Enterobacter cloacae*  1+ Growth of Proteus mirabilis*  1+ Growth of Enterococcus faecalis*     =====================================================  Imaging  10/21 CT abd/pelvis  Reidentified large sacral decubitus ulcer with erosion of the coccyx in keeping with chronic osteomyelitis.  Extensive subcutaneous emphysema about the ulcer extending into into the left gluteal musculature keeping with infectious myositis. Necrotizing fasciitis is not excluded.  No focal collection to indicate an abscess.  Circumferential rectal thickening keeping with a nonspecific proctitis.  Suspected cystitis.     10/20 CXR  Enteric tube tip projects over the gastric fundus.      1019 RUQ US  Cholelithiasis without acute cholecystitis.      10/18 CXR  Limited study. Some atelectasis is present at the right base. No convincing evidence of pneumonia.      10/18 CT abd pelvis  Large sacral decubitus ulcer extending to the coccyx with soft tissue gas now seen extending into the right and left medial aspects of the gluteus jac muscles. No drainable fluid collection.  Erosion of the coccyx suggestive of osteomyelitis.        Micro  10/21: Anaerobic culture: Bacteroides, Clostridium inoculum  10/21 wound culture: E. coli, Enterobacter, Proteus, Enterococcus  10/19: MRSA culture: Negative  10/18: Urine culture: Greater than 100,000 Proteus  10/18 blood culture: E. coli, Proteus, Bacteroides, Clostridium species, not perfringens or Septicum x 2     procedures  10/23:Laparoscopic loop colostomy, sacral debridement and washout. Percutaneous Endoscopic Gastrostomy tube placement.      10/21:INCISION AND DRAINAGE (I&D) BUTTOCK. SACRAL WOUND DEBRIDEMENT. COCCYGECTOMY        =====================================================    Last 24 Hours Medication List:     Current Facility-Administered Medications:     acetaminophen (TYLENOL) tablet 650 mg, Q4H PRN    amoxicillin-clavulanate (AUGMENTIN) 875-125 mg per tablet 1 tablet, Q12H ELISEO    aspirin chewable tablet 81 mg, Daily    atorvastatin (LIPITOR) tablet 80 mg, QPM    doxycycline hyclate (VIBRAMYCIN) capsule 100 mg, Q12H ELISEO    escitalopram (LEXAPRO) tablet 10 mg, Daily    finasteride (PROSCAR) tablet 5 mg, Daily    heparin (porcine) subcutaneous injection 5,000 Units, Q8H ELISEO    HYDROmorphone HCl (DILAUDID) injection 0.2 mg, Q6H PRN    insulin glargine (LANTUS) subcutaneous injection 12 Units 0.12 mL, HS    insulin lispro (HumALOG/ADMELOG) 100 units/mL subcutaneous injection 1-5 Units, Q6H **AND** Fingerstick Glucose (POCT), Q6H    lisinopril (ZESTRIL) tablet 10 mg, Daily    metoprolol (LOPRESSOR) injection 2.5 mg, Q6H PRN    metoprolol (LOPRESSOR) injection 5 mg, Q6H    multivitamin stress formula tablet 1 tablet, Daily    ondansetron (ZOFRAN) injection 4 mg, Q6H PRN    potassium phosphates 12 mmol in sodium chloride 0.9 % 250 mL infusion, Once, Last Rate: 12 mmol (10/27/24 1059)    senna (SENOKOT) tablet 17.2 mg, Daily PRN    sodium chloride infusion 0.45 %, Continuous, Last Rate: 50 mL/hr (10/27/24 0752)    tamsulosin (FLOMAX) capsule 0.4 mg, Daily With Dinner    thiamine tablet 100 mg, Daily    Administrative Statements   Today, Patient Was Seen By: Stacy Ba MD      **Please Note: This note may have been constructed using a voice recognition system.**

## 2024-10-28 LAB
ANION GAP SERPL CALCULATED.3IONS-SCNC: 1 MMOL/L (ref 4–13)
BASOPHILS # BLD AUTO: 0.05 THOUSANDS/ΜL (ref 0–0.1)
BASOPHILS NFR BLD AUTO: 1 % (ref 0–1)
BUN SERPL-MCNC: 14 MG/DL (ref 5–25)
CALCIUM SERPL-MCNC: 7.4 MG/DL (ref 8.4–10.2)
CHLORIDE SERPL-SCNC: 112 MMOL/L (ref 96–108)
CO2 SERPL-SCNC: 28 MMOL/L (ref 21–32)
CREAT SERPL-MCNC: 0.46 MG/DL (ref 0.6–1.3)
EOSINOPHIL # BLD AUTO: 0.18 THOUSAND/ΜL (ref 0–0.61)
EOSINOPHIL NFR BLD AUTO: 2 % (ref 0–6)
ERYTHROCYTE [DISTWIDTH] IN BLOOD BY AUTOMATED COUNT: 17.7 % (ref 11.6–15.1)
GFR SERPL CREATININE-BSD FRML MDRD: 109 ML/MIN/1.73SQ M
GLUCOSE SERPL-MCNC: 185 MG/DL (ref 65–140)
GLUCOSE SERPL-MCNC: 191 MG/DL (ref 65–140)
GLUCOSE SERPL-MCNC: 193 MG/DL (ref 65–140)
GLUCOSE SERPL-MCNC: 195 MG/DL (ref 65–140)
GLUCOSE SERPL-MCNC: 202 MG/DL (ref 65–140)
GLUCOSE SERPL-MCNC: 235 MG/DL (ref 65–140)
HCT VFR BLD AUTO: 24.3 % (ref 36.5–49.3)
HGB BLD-MCNC: 7.3 G/DL (ref 12–17)
IMM GRANULOCYTES # BLD AUTO: 0.22 THOUSAND/UL (ref 0–0.2)
IMM GRANULOCYTES NFR BLD AUTO: 2 % (ref 0–2)
LYMPHOCYTES # BLD AUTO: 2.28 THOUSANDS/ΜL (ref 0.6–4.47)
LYMPHOCYTES NFR BLD AUTO: 22 % (ref 14–44)
MAGNESIUM SERPL-MCNC: 1.6 MG/DL (ref 1.9–2.7)
MCH RBC QN AUTO: 30 PG (ref 26.8–34.3)
MCHC RBC AUTO-ENTMCNC: 30 G/DL (ref 31.4–37.4)
MCV RBC AUTO: 100 FL (ref 82–98)
MONOCYTES # BLD AUTO: 0.6 THOUSAND/ΜL (ref 0.17–1.22)
MONOCYTES NFR BLD AUTO: 6 % (ref 4–12)
NEUTROPHILS # BLD AUTO: 6.88 THOUSANDS/ΜL (ref 1.85–7.62)
NEUTS SEG NFR BLD AUTO: 67 % (ref 43–75)
NRBC BLD AUTO-RTO: 1 /100 WBCS
PHOSPHATE SERPL-MCNC: 2.2 MG/DL (ref 2.3–4.1)
PLATELET # BLD AUTO: 421 THOUSANDS/UL (ref 149–390)
PMV BLD AUTO: 9.7 FL (ref 8.9–12.7)
POTASSIUM SERPL-SCNC: 4.1 MMOL/L (ref 3.5–5.3)
RBC # BLD AUTO: 2.43 MILLION/UL (ref 3.88–5.62)
SODIUM SERPL-SCNC: 141 MMOL/L (ref 135–147)
WBC # BLD AUTO: 10.21 THOUSAND/UL (ref 4.31–10.16)

## 2024-10-28 PROCEDURE — 82948 REAGENT STRIP/BLOOD GLUCOSE: CPT

## 2024-10-28 PROCEDURE — 80048 BASIC METABOLIC PNL TOTAL CA: CPT | Performed by: INTERNAL MEDICINE

## 2024-10-28 PROCEDURE — 83735 ASSAY OF MAGNESIUM: CPT | Performed by: INTERNAL MEDICINE

## 2024-10-28 PROCEDURE — 99233 SBSQ HOSP IP/OBS HIGH 50: CPT

## 2024-10-28 PROCEDURE — 84100 ASSAY OF PHOSPHORUS: CPT | Performed by: INTERNAL MEDICINE

## 2024-10-28 PROCEDURE — 99232 SBSQ HOSP IP/OBS MODERATE 35: CPT | Performed by: STUDENT IN AN ORGANIZED HEALTH CARE EDUCATION/TRAINING PROGRAM

## 2024-10-28 PROCEDURE — 85025 COMPLETE CBC W/AUTO DIFF WBC: CPT | Performed by: INTERNAL MEDICINE

## 2024-10-28 RX ORDER — MAGNESIUM SULFATE HEPTAHYDRATE 40 MG/ML
2 INJECTION, SOLUTION INTRAVENOUS ONCE
Status: COMPLETED | OUTPATIENT
Start: 2024-10-28 | End: 2024-10-28

## 2024-10-28 RX ADMIN — INSULIN LISPRO 2 UNITS: 100 INJECTION, SOLUTION INTRAVENOUS; SUBCUTANEOUS at 00:28

## 2024-10-28 RX ADMIN — DOXYCYCLINE 100 MG: 100 CAPSULE ORAL at 22:16

## 2024-10-28 RX ADMIN — MAGNESIUM SULFATE HEPTAHYDRATE 2 G: 40 INJECTION, SOLUTION INTRAVENOUS at 09:20

## 2024-10-28 RX ADMIN — HEPARIN SODIUM 5000 UNITS: 5000 INJECTION INTRAVENOUS; SUBCUTANEOUS at 22:16

## 2024-10-28 RX ADMIN — INSULIN LISPRO 1 UNITS: 100 INJECTION, SOLUTION INTRAVENOUS; SUBCUTANEOUS at 12:20

## 2024-10-28 RX ADMIN — HYDROMORPHONE HYDROCHLORIDE 0.2 MG: 0.2 INJECTION, SOLUTION INTRAMUSCULAR; INTRAVENOUS; SUBCUTANEOUS at 17:31

## 2024-10-28 RX ADMIN — INSULIN LISPRO 1 UNITS: 100 INJECTION, SOLUTION INTRAVENOUS; SUBCUTANEOUS at 17:29

## 2024-10-28 RX ADMIN — LISINOPRIL 10 MG: 10 TABLET ORAL at 09:17

## 2024-10-28 RX ADMIN — POTASSIUM PHOSPHATE 21 MMOL: 236; 224 INJECTION, SOLUTION INTRAVENOUS at 11:44

## 2024-10-28 RX ADMIN — THIAMINE HCL TAB 100 MG 100 MG: 100 TAB at 09:17

## 2024-10-28 RX ADMIN — HEPARIN SODIUM 5000 UNITS: 5000 INJECTION INTRAVENOUS; SUBCUTANEOUS at 06:15

## 2024-10-28 RX ADMIN — ASPIRIN 81 MG CHEWABLE TABLET 81 MG: 81 TABLET CHEWABLE at 09:17

## 2024-10-28 RX ADMIN — AMOXICILLIN AND CLAVULANATE POTASSIUM 1 TABLET: 875; 125 TABLET, FILM COATED ORAL at 22:16

## 2024-10-28 RX ADMIN — DOXYCYCLINE 100 MG: 100 CAPSULE ORAL at 09:17

## 2024-10-28 RX ADMIN — TAMSULOSIN HYDROCHLORIDE 0.4 MG: 0.4 CAPSULE ORAL at 17:23

## 2024-10-28 RX ADMIN — INSULIN GLARGINE 16 UNITS: 100 INJECTION, SOLUTION SUBCUTANEOUS at 22:16

## 2024-10-28 RX ADMIN — ESCITALOPRAM OXALATE 10 MG: 10 TABLET ORAL at 09:17

## 2024-10-28 RX ADMIN — Medication 12.5 MG: at 09:17

## 2024-10-28 RX ADMIN — INSULIN LISPRO 1 UNITS: 100 INJECTION, SOLUTION INTRAVENOUS; SUBCUTANEOUS at 06:15

## 2024-10-28 RX ADMIN — AMOXICILLIN AND CLAVULANATE POTASSIUM 1 TABLET: 875; 125 TABLET, FILM COATED ORAL at 09:20

## 2024-10-28 RX ADMIN — ATORVASTATIN CALCIUM 80 MG: 80 TABLET, FILM COATED ORAL at 17:23

## 2024-10-28 RX ADMIN — Medication 12.5 MG: at 22:16

## 2024-10-28 RX ADMIN — B-COMPLEX W/ C & FOLIC ACID TAB 1 TABLET: TAB at 09:17

## 2024-10-28 RX ADMIN — FINASTERIDE 5 MG: 5 TABLET, FILM COATED ORAL at 09:17

## 2024-10-28 RX ADMIN — HEPARIN SODIUM 5000 UNITS: 5000 INJECTION INTRAVENOUS; SUBCUTANEOUS at 17:23

## 2024-10-28 NOTE — PLAN OF CARE
Problem: Potential for Falls  Goal: Patient will remain free of falls  Description: INTERVENTIONS:  - Educate patient/family on patient safety including physical limitations  - Instruct patient to call for assistance with activity   - Consult OT/PT to assist with strengthening/mobility   - Keep Call bell within reach  - Keep bed low and locked with side rails adjusted as appropriate  - Keep care items and personal belongings within reach  - Initiate and maintain comfort rounds  - Make Fall Risk Sign visible to staff  - Initiate/Maintain bed alarm  - Obtain necessary fall risk management equipment: alarms  - Apply yellow socks and bracelet for high fall risk patients  - Consider moving patient to room near nurses station  Outcome: Progressing     Problem: Prexisting or High Potential for Compromised Skin Integrity  Goal: Skin integrity is maintained or improved  Description: INTERVENTIONS:  - Identify patients at risk for skin breakdown  - Assess and monitor skin integrity  - Assess and monitor nutrition and hydration status  - Monitor labs   - Assess for incontinence   - Turn and reposition patient  - Assist with mobility/ambulation  - Relieve pressure over bony prominences  - Avoid friction and shearing  - Provide appropriate hygiene as needed including keeping skin clean and dry  - Evaluate need for skin moisturizer/barrier cream  - Collaborate with interdisciplinary team   - Patient/family teaching  - Consider wound care consult   Outcome: Progressing     Problem: PAIN - ADULT  Goal: Verbalizes/displays adequate comfort level or baseline comfort level  Description: Interventions:  - Encourage patient to monitor pain and request assistance  - Assess pain using appropriate pain scale  - Administer analgesics based on type and severity of pain and evaluate response  - Implement non-pharmacological measures as appropriate and evaluate response  - Consider cultural and social influences on pain and pain  management  - Notify physician/advanced practitioner if interventions unsuccessful or patient reports new pain  Outcome: Progressing     Problem: INFECTION - ADULT  Goal: Absence or prevention of progression during hospitalization  Description: INTERVENTIONS:  - Assess and monitor for signs and symptoms of infection  - Monitor lab/diagnostic results  - Monitor all insertion sites, i.e. indwelling lines, tubes, and drains  - [place] appropriate cooling/warming therapies per order  - Administer medications as ordered  - Instruct and encourage patient and family to use good hand hygiene technique  - Identify and instruct in appropriate isolation precautions for identified infection/condition  Outcome: Progressing     Problem: DISCHARGE PLANNING  Goal: Discharge to home or other facility with appropriate resources  Description: INTERVENTIONS:  - Identify barriers to discharge w/patient and caregiver  - Arrange for needed discharge resources and transportation as appropriate  - Identify discharge learning needs (meds, wound care, etc.)  - Refer to Case Management Department for coordinating discharge planning if the patient needs post-hospital services based on physician/advanced practitioner order or complex needs related to functional status, cognitive ability, or social support system  Outcome: Progressing     Problem: Knowledge Deficit  Goal: Patient/family/caregiver demonstrates understanding of disease process, treatment plan, medications, and discharge instructions  Description: Complete learning assessment and assess knowledge base.  Interventions:  - Provide teaching at level of understanding  - Provide teaching via preferred learning methods  Outcome: Progressing     Problem: Nutrition/Hydration-ADULT  Goal: Nutrient/Hydration intake appropriate for improving, restoring or maintaining nutritional needs  Description: Monitor and assess patient's nutrition/hydration status for malnutrition. Collaborate with  interdisciplinary team and initiate plan and interventions as ordered.  Monitor patient's weight and dietary intake as ordered or per policy. Utilize nutrition screening tool and intervene as necessary. Determine patient's food preferences and provide high-protein, high-caloric foods as appropriate.     INTERVENTIONS:  - Monitor oral intake, urinary output, labs, and treatment plans  - Assess nutrition and hydration status and recommend course of action  - Evaluate amount of meals eaten  - Assist patient with eating if necessary   - Allow adequate time for meals  - Recommend/ encourage appropriate diets, oral nutritional supplements, and vitamin/mineral supplements  - Order, calculate, and assess calorie counts as needed  - Recommend, monitor, and adjust tube feedings and TPN/PPN based on assessed needs  - Assess need for intravenous fluids  - Provide specific nutrition/hydration education as appropriate  - Include patient/family/caregiver in decisions related to nutrition  Outcome: Progressing     Problem: GENITOURINARY - ADULT  Goal: Urinary catheter remains patent  Description: INTERVENTIONS:  - Assess patency of urinary catheter  - If patient has a chronic hanks, consider changing catheter if non-functioning  - Follow guidelines for intermittent irrigation of non-functioning urinary catheter  Outcome: Progressing     Problem: METABOLIC, FLUID AND ELECTROLYTES - ADULT  Goal: Glucose maintained within target range  Description: INTERVENTIONS:  - Monitor Blood Glucose as ordered  - Assess for signs and symptoms of hyperglycemia and hypoglycemia  - Administer ordered medications to maintain glucose within target range  - Assess nutritional intake and initiate nutrition service referral as needed  Outcome: Progressing     Problem: SKIN/TISSUE INTEGRITY - ADULT  Goal: Pressure injury heals and does not worsen  Description: Interventions:  - Implement low air loss mattress or specialty surface (Criteria met)  - Apply  silicone foam dressing  -- Perform passive or active ROM every shift  - Turn and reposition patient & offload bony prominences every 2 hours   - Utilize friction reducing device or surface for transfers   - Consider nutrition services referral as needed  Outcome: Progressing     Problem: GASTROINTESTINAL - ADULT  Goal: Establish and maintain optimal ostomy function  Description: INTERVENTIONS:  - Assess bowel function  - Encourage oral fluids to ensure adequate hydration  - Administer IV fluids if ordered to ensure adequate hydration   - Administer ordered medications as needed  - Encourage mobilization and activity  - Nutrition services referral to assist patient with appropriate food choices  - Assess stoma site  - Consider wound care consult   Outcome: Progressing

## 2024-10-28 NOTE — PLAN OF CARE
Problem: Potential for Falls  Goal: Patient will remain free of falls  Description: INTERVENTIONS:  - Educate patient/family on patient safety including physical limitations  - Instruct patient to call for assistance with activity   - Consult OT/PT to assist with strengthening/mobility   - Keep Call bell within reach  - Keep bed low and locked with side rails adjusted as appropriate  - Keep care items and personal belongings within reach  - Initiate and maintain comfort rounds  - Make Fall Risk Sign visible to staff  - Initiate/Maintain bed alarm  - Obtain necessary fall risk management equipment: alarms  - Apply yellow socks and bracelet for high fall risk patients  - Consider moving patient to room near nurses station  Outcome: Progressing     Problem: Prexisting or High Potential for Compromised Skin Integrity  Goal: Skin integrity is maintained or improved  Description: INTERVENTIONS:  - Identify patients at risk for skin breakdown  - Assess and monitor skin integrity  - Assess and monitor nutrition and hydration status  - Monitor labs   - Assess for incontinence   - Turn and reposition patient  - Assist with mobility/ambulation  - Relieve pressure over bony prominences  - Avoid friction and shearing  - Provide appropriate hygiene as needed including keeping skin clean and dry  - Evaluate need for skin moisturizer/barrier cream  - Collaborate with interdisciplinary team   - Patient/family teaching  - Consider wound care consult   Outcome: Progressing     Problem: PAIN - ADULT  Goal: Verbalizes/displays adequate comfort level or baseline comfort level  Description: Interventions:  - Encourage patient to monitor pain and request assistance  - Assess pain using appropriate pain scale  - Administer analgesics based on type and severity of pain and evaluate response  - Implement non-pharmacological measures as appropriate and evaluate response  - Consider cultural and social influences on pain and pain  management  - Notify physician/advanced practitioner if interventions unsuccessful or patient reports new pain  Outcome: Progressing     Problem: INFECTION - ADULT  Goal: Absence or prevention of progression during hospitalization  Description: INTERVENTIONS:  - Assess and monitor for signs and symptoms of infection  - Monitor lab/diagnostic results  - Monitor all insertion sites, i.e. indwelling lines, tubes, and drains  - [place] appropriate cooling/warming therapies per order  - Administer medications as ordered  - Instruct and encourage patient and family to use good hand hygiene technique  - Identify and instruct in appropriate isolation precautions for identified infection/condition  Outcome: Progressing     Problem: DISCHARGE PLANNING  Goal: Discharge to home or other facility with appropriate resources  Description: INTERVENTIONS:  - Identify barriers to discharge w/patient and caregiver  - Arrange for needed discharge resources and transportation as appropriate  - Identify discharge learning needs (meds, wound care, etc.)  - Refer to Case Management Department for coordinating discharge planning if the patient needs post-hospital services based on physician/advanced practitioner order or complex needs related to functional status, cognitive ability, or social support system  Outcome: Progressing     Problem: Knowledge Deficit  Goal: Patient/family/caregiver demonstrates understanding of disease process, treatment plan, medications, and discharge instructions  Description: Complete learning assessment and assess knowledge base.  Interventions:  - Provide teaching at level of understanding  - Provide teaching via preferred learning methods  Outcome: Progressing     Problem: Nutrition/Hydration-ADULT  Goal: Nutrient/Hydration intake appropriate for improving, restoring or maintaining nutritional needs  Description: Monitor and assess patient's nutrition/hydration status for malnutrition. Collaborate with  interdisciplinary team and initiate plan and interventions as ordered.  Monitor patient's weight and dietary intake as ordered or per policy. Utilize nutrition screening tool and intervene as necessary. Determine patient's food preferences and provide high-protein, high-caloric foods as appropriate.     INTERVENTIONS:  - Monitor oral intake, urinary output, labs, and treatment plans  - Assess nutrition and hydration status and recommend course of action  - Evaluate amount of meals eaten  - Assist patient with eating if necessary   - Allow adequate time for meals  - Recommend/ encourage appropriate diets, oral nutritional supplements, and vitamin/mineral supplements  - Order, calculate, and assess calorie counts as needed  - Recommend, monitor, and adjust tube feedings and TPN/PPN based on assessed needs  - Assess need for intravenous fluids  - Provide specific nutrition/hydration education as appropriate  - Include patient/family/caregiver in decisions related to nutrition  Outcome: Progressing     Problem: GENITOURINARY - ADULT  Goal: Urinary catheter remains patent  Description: INTERVENTIONS:  - Assess patency of urinary catheter  - If patient has a chronic hanks, consider changing catheter if non-functioning  - Follow guidelines for intermittent irrigation of non-functioning urinary catheter  Outcome: Progressing     Problem: METABOLIC, FLUID AND ELECTROLYTES - ADULT  Goal: Glucose maintained within target range  Description: INTERVENTIONS:  - Monitor Blood Glucose as ordered  - Assess for signs and symptoms of hyperglycemia and hypoglycemia  - Administer ordered medications to maintain glucose within target range  - Assess nutritional intake and initiate nutrition service referral as needed  Outcome: Progressing     Problem: SKIN/TISSUE INTEGRITY - ADULT  Goal: Pressure injury heals and does not worsen  Description: Interventions:  - Implement low air loss mattress or specialty surface (Criteria met)  - Apply  silicone foam dressing  -- Perform passive or active ROM every shift  - Turn and reposition patient & offload bony prominences every 2 hours   - Utilize friction reducing device or surface for transfers   - Consider nutrition services referral as needed  Outcome: Progressing     Problem: GASTROINTESTINAL - ADULT  Goal: Establish and maintain optimal ostomy function  Description: INTERVENTIONS:  - Assess bowel function  - Encourage oral fluids to ensure adequate hydration  - Administer IV fluids if ordered to ensure adequate hydration   - Administer ordered medications as needed  - Encourage mobilization and activity  - Nutrition services referral to assist patient with appropriate food choices  - Assess stoma site  - Consider wound care consult   Outcome: Progressing

## 2024-10-28 NOTE — ASSESSMENT & PLAN NOTE
Blood cultures now showing preliminary growth of both E. Coli, Proteus, clostridium, and multiple species of bacteroides. Past episode of bacteremia felt to be secondary to sacral wound. Pathogens do match stool pathogens again. Sacral wound was initially examined by surgery and felt it appeared noninfected at the time.  They have reassessed and pursued operative debridement in the OR on  10/21/2024. Multiple pus pockets and necrotic tissue/bone were debrided.  Operative cultures are also polymicrobial including anaerobes, E. coli, Enterobacter cloacae, Proteus, Enterococcus. No plan for prolonged abx for osteomyelitis at this time as patient is not planned for formal flap wound closure.  -continue PO Augmentin through 10/30/2024   -continue PO Doxycycline through 10/30/2024  -monitor CBCD and BMP  -monitor vitals

## 2024-10-28 NOTE — ASSESSMENT & PLAN NOTE
Lab Results   Component Value Date    HGBA1C 6.2 (H) 09/17/2024     Recent Labs     10/27/24  2055 10/28/24  0025 10/28/24  0614 10/28/24  1218   POCGLU 219* 235* 193* 185*     Blood Sugar Average: Last 72 hrs:  (P) [id number]  Last HgbA1C 6.2%  Pt with hyperglycemia once TF started  Placed on lantus:  cont to titrate  Continue Accu-Cheks, sliding scale insulin, and titrate as needed

## 2024-10-28 NOTE — ASSESSMENT & PLAN NOTE
10/23 s/p lap loop colostomy with PEG tube placement for nutrition  Oral diet per SLP: Sips of liquid, and ice chips  Nutrition consulted appreciated:  pt tolerating at goal rate   Air care via [address] taken [facility] to [facility]  03/06/22 [id number]

## 2024-10-28 NOTE — ASSESSMENT & PLAN NOTE
Resolved  Patient is a 73-year-old male who was recently admitted 9/17-10/4 with polymicrobial bacteremia secondary to sacral wound who had completed a course of IV cefepime, flagyl and vancomycin followed by ampicillin.   Was also diagnosed with covid.  Pt was readmitted for failure to thrive, and concerns over worsening sacral wounds and posterior osteomyelitis    Patient met criteria for sepsis, present on admission with tachycardia and leukocytosis.  Sepsis secondary to sacral decubitus ulcer with underlying osteomyelitis and polymicrobial bacteremia

## 2024-10-28 NOTE — ASSESSMENT & PLAN NOTE
Has chronic anemia likely secondary to poor nutritional status underlying chronic illness.   Recent Labs     10/26/24  0522 10/28/24  0616   HGB 7.3* 7.3*      Patient was transfused 1 unit of packed red blood cells on 10/24 for hemoglobin of 6.2  Patient with chronic anemia, acutely worse with expected procedural blood loss  After transfusion hemoglobin returned to baseline of approximately 7-8

## 2024-10-28 NOTE — ASSESSMENT & PLAN NOTE
Malnutrition Findings:   ChronicAdult Malnutrition type: Chronic illness  Adult Degree of Malnutrition: Other severe protein calorie malnutrition  Malnutrition Characteristics: Weight loss, Inadequate energy  360 Statement: Severe protein calorie malnutrition in context of chronic illness r/t poor appetite, inadequate PO intake as evidance by energy intake less than 75% compared to estimated needs>1 month, 14% wt loss in 1 month (9/17/24 114 kg, 10/19/24 97.7 kg) treated with PO diet. Once able to have po treat with Mechanical Soft diet, Magic Cup BID, Ensure Compact 1x daily.  Patient with significant weight loss and per wife with significantly decreased oral intake over the past few months and has not been eating for 1 week at the nursing home.  Weight noted in April to be 275 lb, and now weighing 235 lb  PEG tube placed 10/23  - continue with tube feeds   Dietary to adjust based on patient nutritional needs.  Continue high-dose thiamine and electrolyte monitoring for refeeding syndrome

## 2024-10-28 NOTE — PROGRESS NOTES
Progress Note - Infectious Disease   Name: Drew Santos 73 y.o. male I MRN: [id number]  Unit/Bed#: E4 -01 I Date of Admission: 10/18/2024   Date of Service: 10/28/2024 I Hospital Day: 10     Assessment & Plan  Sepsis  Tachycardia and leukocytosis. Secondary to polymicrobial bacteremia and infected sacral decubitus ulcer.  Initial CT scan without any evidence of a drainable fluid collection/abscess but did show mild proctitis. Repeat CT A/P again showed large sacral decubitus ulcer with erosion of the coccyx, subcutaneous emphysema about the ulcer.  Now status post or debridement with general surgery. The patient remains clinically stable. This morning his is afebrile. WBC count has improved.  -antibiotic as below  -monitor CBCD and BMP  -monitor vitals  -supportive care  Polymicrobial bacteremia  Blood cultures now showing preliminary growth of both E. Coli, Proteus, clostridium, and multiple species of bacteroides. Past episode of bacteremia felt to be secondary to sacral wound. Pathogens do match stool pathogens again. Sacral wound was initially examined by surgery and felt it appeared noninfected at the time.  They have reassessed and pursued operative debridement in the OR on  10/21/2024. Multiple pus pockets and necrotic tissue/bone were debrided.  Operative cultures are also polymicrobial including anaerobes, E. coli, Enterobacter cloacae, Proteus, Enterococcus. No plan for prolonged abx for osteomyelitis at this time as patient is not planned for formal flap wound closure.  -continue PO Augmentin through 10/30/2024   -continue PO Doxycycline through 10/30/2024  -monitor CBCD and BMP  -monitor vitals  Sacral ulcer (HCC)  This is likely source of patient's bacteremia above. Wound was felt to have initially developed during patient stay in skilled nursing facility for rehab.  During recent hospitalization in September there was concern for fistulous connection from rectum to the sacral wound in setting of  colitis/proctitis.  Wound was debrided by general surgery during last admission and was noted to be stage IV with palpable bone.  Now latest CT showing soft tissue gas extending into right and left medial aspects of gluteus jac muscle, but no abscess. There is erosion of coccyx suggestive of osteomyelitis.  Sacral wound was initially examined by surgery and felt it appeared noninfected at the time.  They have reassessed and pursued operative debridement 10/21. Multiple pus pockets and necrotic tissue/bone were debrided.  New operative cultures with polymicrobial growth as above. Given patient's failure to thrive and immobility, wound will be difficult to heal. Now status post debridement of sacral wound, PEG placement and diverting colostomy 10/23/2024 with general surgery   -antibiotic as above  -frequent turning/repositioning to off-load pressure from the wound  -local wound care per general surgery  -continue follow up with general surgery  -no indication for long-term antibiotics for sacral osteomyelitis as this would be futile without flap coverage  Pyuria  UA sent from patient's chronic Hanks catheter had innumerable WBCs. This is expected finding in setting of chronic catheter and cannot be used alone to determine UTI.  CT abdomen/pelvis was without any hydronephrosis or hydroureter.  There was some chronic appearing bilateral perinephric stranding.  The urinary bladder was noted to be collapsed around a Hanks catheter.  Hanks was exchanged this admission.  -serial exams and care of hanks  -monitor urine output  Chronic indwelling Hanks catheter  In setting of chronic urinary retention and BPH. Catheter was exchanged this admission.  -serial exams and care of hanks  -monitor urine output  -continue follow up with urology as needed  Type 2 diabetes mellitus without complication, without long-term current use of insulin ([facility])  Lab Results   Component Value Date     HGBA1C 6.2 (H) 09/17/2024   Elevated blood  glucose is risk factor for wounds and infection. Recommend tight glycemic control.  -blood glucose management per primary service   Proctitis  CT A/P from 10/18/2024 showing mild wall thickening of the rectum. CT scan back in 2024 also noted proctocolitis.  Given persistence, this may be a somewhat chronic finding.  He is not having any current diarrhea.  -antibiotic as above  -monitor GI symptoms  -monitor stool output    The patient is stable for discharge from ID standpoint.  Above plan was discussed in detail with patient at the bedside.  Above plan was discussed in detail with SLIM who agrees with plan for antibiotics.    Antibiotics:  Doxycycline 3  Augmentin 3  Antibiotics 11    Subjective:  Unable to perform ROS as patient is minimally interactive during my visit, offered no verbal responses to my questions.     Objective:  Vitals:  Temp:  [97.6 °F (36.4 °C)-98.4 °F (36.9 °C)] 97.9 °F (36.6 °C)  HR:  [78-87] 84  Resp:  [20] 20  BP: (128-140)/(75-82) 128/75  SpO2:  [96 %] 96 %  Temp (24hrs), Av °F (36.7 °C), Min:97.6 °F (36.4 °C), Max:98.4 °F (36.9 °C)  Current: Temperature: 97.9 °F (36.6 °C)    Physical Exam:   General Appearance:  Eyes open but offers no verbal interactive. He appears comfortable sitting up in bed in no acute distress.   Lungs:   Clear to auscultation bilaterally; no wheezes, rhonchi or rales; respirations unlabored on room air.   Heart:  RRR; no murmur, rub or gallop.   Abdomen:   Soft, no facial grimace with abdominal palpation, non-distended, positive bowel sounds.     Extremities: No clubbing or cyanosis, trace peripheral edema.   Skin: No new rashes noted on exposed skin.     Labs, Imaging, & Other studies:   All pertinent labs and imaging studies were personally reviewed  Results from last 7 days   Lab Units 10/28/24  0616 10/26/24  0522 10/25/24  0452   WBC Thousand/uL 10.21* 12.54* 14.71*   HEMOGLOBIN g/dL 7.3* 7.3* 7.8*   PLATELETS Thousands/uL 421* 443* 437*      Results from last 7 days   Lab Units 10/27/24  0444 10/26/24  0522 10/25/24  0452 10/21/24  2327 10/21/24  1733   POTASSIUM mmol/L 3.8   < > 3.6   < > 3.4*   CHLORIDE mmol/L 116*   < > 117*   < > 117*   CO2 mmol/L 28   < > 27   < > 29   BUN mg/dL 15   < > 14   < > 25   CREATININE mg/dL 0.51*   < > 0.62   < > 0.80   EGFR ml/min/1.73sq m 105   < > 97   < > 87   CALCIUM mg/dL 7.2*   < > 7.2*   < > 8.2*   AST U/L  --   --  23  --  21   ALT U/L  --   --  12  --  32   ALK PHOS U/L  --   --  48  --  76    < > = values in this interval not displayed.     Results from last 7 days   Lab Units 10/21/24  2112   GRAM STAIN RESULT  Rare Polys*  2+ Gram negative rods*  1+ Gram positive cocci in pairs*  Rare Yeast*   WOUND CULTURE  1+ Growth of Escherichia coli*  1+ Growth of Enterobacter cloacae*  1+ Growth of Proteus mirabilis*  1+ Growth of Enterococcus faecalis*

## 2024-10-28 NOTE — ASSESSMENT & PLAN NOTE
"Patient presented with a large sacral decubitus ulcer extending to the coccyx with concurrent sepsis, and gram-negative bacteremia  CT abdomen pelvis showing \"Large sacral decubitus ulcer extending to the coccyx with soft tissue gas now seen extending into the right and left medial aspects of the gluteus jac muscles. No drainable fluid collection. Erosion of the coccyx suggestive of osteomyelitis.\"  Was taken to the operating room 10/21 for sacral debridement, coccyx ectomy, and returned to the OR 10/23 for additional debridement diverting loop colostomy and PEG tube placement  Per infectious disease recommendations: Operative cultures were polymicrobial  Antibiotics for 7-day additional course, was initially on ceftriaxone/Unasyn has been transitioned to doxycycline/Augmentin with last dose 10/30  No indication for chronic antibiotics for chronic osteo as patient's wound is unlikely to heal: Long-term antibiotic therapy futile without future flap coverage  Update per surgery team : Sacral wound check planned for Monday: If patient does not require any additional debridement he would be acceptable for discharge at that time from a surgical standpoint.  "

## 2024-10-28 NOTE — ASSESSMENT & PLAN NOTE
This is likely source of patient's bacteremia above. Wound was felt to have initially developed during patient stay in skilled nursing facility for rehab.  During recent hospitalization in September there was concern for fistulous connection from rectum to the sacral wound in setting of colitis/proctitis.  Wound was debrided by general surgery during last admission and was noted to be stage IV with palpable bone.  Now latest CT showing soft tissue gas extending into right and left medial aspects of gluteus jac muscle, but no abscess. There is erosion of coccyx suggestive of osteomyelitis.  Sacral wound was initially examined by surgery and felt it appeared noninfected at the time.  They have reassessed and pursued operative debridement 10/21. Multiple pus pockets and necrotic tissue/bone were debrided.  New operative cultures with polymicrobial growth as above. Given patient's failure to thrive and immobility, wound will be difficult to heal. Now status post debridement of sacral wound, PEG placement and diverting colostomy 10/23/2024 with general surgery   -antibiotic as above  -frequent turning/repositioning to off-load pressure from the wound  -local wound care per general surgery  -continue follow up with general surgery  -no indication for long-term antibiotics for sacral osteomyelitis as this would be futile without flap coverage

## 2024-10-28 NOTE — ASSESSMENT & PLAN NOTE
History of stroke with L sided hemiparesis recently hospitalized at [facility] in July 2024  Baseline non-verbal, alert, at times selective in answers, can nod yes/no however unsure accuracy most information obtained per patients wife  Baseline Dysphagia 2 mechanical soft, thin liquids, patient well known to speech therapy:  cont diet as per speech  Currently NPO but sips of liquid with observation, and ice chips  Per speech:  will cont to eval

## 2024-10-28 NOTE — PROGRESS NOTES
"Progress Note - Hospitalist   Name: Drew Santos 73 y.o. male I MRN: [id number]  Unit/Bed#: E4 -01 I Date of Admission: 10/18/2024   Date of Service: 10/28/2024 I Hospital Day: 10    Assessment & Plan  Sepsis  Resolved  Patient is a 73-year-old male who was recently admitted 9/17-10/4 with polymicrobial bacteremia secondary to sacral wound who had completed a course of IV cefepime, flagyl and vancomycin followed by ampicillin.   Was also diagnosed with covid.  Pt was readmitted for failure to thrive, and concerns over worsening sacral wounds and posterior osteomyelitis    Patient met criteria for sepsis, present on admission with tachycardia and leukocytosis.  Sepsis secondary to sacral decubitus ulcer with underlying osteomyelitis and polymicrobial bacteremia    Gram-negative bacteremia  Blood cultures growing E. coli and Proteus and bacteroides, and Clostridium  Most likely secondary to infected sacral decubitus ulcer with probable osteomyelitis of coccyx  Infectious disease following for antibiotic management  Right upper quadrant ultrasound performed, no evidence of acute cholecystitis  Surgery took patient to the OR emergently 10/21 for concern of necrotizing soft tissue infection, and return to the OR for additional debridement, washout with end colostomy and PEG tube placement on 10/23/24   Continue antibiotics as recommended by the infectious disease team  IV ceftriaxone and Unasyn have been transitioned to doxycycline and Augmentin  ID recommends a 7-day course of antibiotics, from the most recent adjustment, last dose 10/30  Sacral ulcer (HCC)  Patient presented with a large sacral decubitus ulcer extending to the coccyx with concurrent sepsis, and gram-negative bacteremia  CT abdomen pelvis showing \"Large sacral decubitus ulcer extending to the coccyx with soft tissue gas now seen extending into the right and left medial aspects of the gluteus jac muscles. No drainable fluid collection. Erosion " "of the coccyx suggestive of osteomyelitis.\"  Was taken to the operating room 10/21 for sacral debridement, coccyx ectomy, and returned to the OR 10/23 for additional debridement diverting loop colostomy and PEG tube placement  Per infectious disease recommendations: Operative cultures were polymicrobial  Antibiotics for 7-day additional course, was initially on ceftriaxone/Unasyn has been transitioned to doxycycline/Augmentin with last dose 10/30  No indication for chronic antibiotics for chronic osteo as patient's wound is unlikely to heal: Long-term antibiotic therapy futile without future flap coverage  Update per surgery team : Sacral wound check planned for Monday: If patient does not require any additional debridement he would be acceptable for discharge at that time from a surgical standpoint.  S/P percutaneous endoscopic gastrostomy (PEG) tube placement (HCC)  10/23 s/p lap loop colostomy with PEG tube placement for nutrition  Oral diet per SLP: Sips of liquid, and ice chips  Nutrition consulted appreciated:  pt tolerating at goal rate  Anemia of chronic disease  Has chronic anemia likely secondary to poor nutritional status underlying chronic illness.   Recent Labs     10/26/24  0522 10/28/24  0616   HGB 7.3* 7.3*      Patient was transfused 1 unit of packed red blood cells on 10/24 for hemoglobin of 6.2  Patient with chronic anemia, acutely worse with expected procedural blood loss  After transfusion hemoglobin returned to baseline of approximately 7-8    Pyuria  Patient with chronic Parra  Urine culture growing Proteus  Had been placed on IV antibiotics with ceftriaxone  Hypernatremia  Sodium 154 at time of admission, likely hypovolemic hypernatremia related to mild dehydration secondary to poor water intake  Patient was on IV fluid support  Patient had PEG tube placed 10/23: Currently tolerating tube feedings and flushes  Patient was given increased free water flushes, and started on isotonic IV fluids " with improvement in hypernatremia  Continue current regimen and continue to monitor  Chronic indwelling Hanks catheter  Chronic urethral hanks catheter secondary to chronic urinary retention, BPH.  Hanks patent at this time   Hanks was exchanged 10/19  Continue tamsulosin and finasteride  Proctitis  Noted on CT scan with history of this as possibly chronic. No diarrhea or clinical evidence of colitis.   Type 2 diabetes mellitus without complication, without long-term current use of insulin (HCC)  Lab Results   Component Value Date    HGBA1C 6.2 (H) 09/17/2024     Recent Labs     10/27/24  2055 10/28/24  0025 10/28/24  0614 10/28/24  1218   POCGLU 219* 235* 193* 185*     Blood Sugar Average: Last 72 hrs:  (P) [id number]  Last HgbA1C 6.2%  Pt with hyperglycemia once TF started  Placed on lantus:  cont to titrate  Continue Accu-Cheks, sliding scale insulin, and titrate as needed  Paroxysmal atrial fibrillation (HCC)  Rate controlled on metoprolol: Currently 5 mg IV every 6 hours  Eliquis was temporarily placed on hold: Will restart when approved by surgery  Surgery sacral wound re-eval planned Monday 10/28  Severe protein-calorie malnutrition (HCC)  Malnutrition Findings:   ChronicAdult Malnutrition type: Chronic illness  Adult Degree of Malnutrition: Other severe protein calorie malnutrition  Malnutrition Characteristics: Weight loss, Inadequate energy  360 Statement: Severe protein calorie malnutrition in context of chronic illness r/t poor appetite, inadequate PO intake as evidance by energy intake less than 75% compared to estimated needs>1 month, 14% wt loss in 1 month (9/17/24 114 kg, 10/19/24 97.7 kg) treated with PO diet. Once able to have po treat with Mechanical Soft diet, Magic Cup BID, Ensure Compact 1x daily.  Patient with significant weight loss and per wife with significantly decreased oral intake over the past few months and has not been eating for 1 week at the nursing home.  Weight noted in  April to be 275 lb, and now weighing 235 lb  PEG tube placed 10/23  - continue with tube feeds   Dietary to adjust based on patient nutritional needs.  Continue high-dose thiamine and electrolyte monitoring for refeeding syndrome    History of CVA (cerebrovascular accident)  History of stroke with L sided hemiparesis recently hospitalized at [facility] in July 2024  Baseline non-verbal, alert, at times selective in answers, can nod yes/no however unsure accuracy most information obtained per patients wife  Baseline Dysphagia 2 mechanical soft, thin liquids, patient well known to speech therapy:  cont diet as per speech  Currently NPO but sips of liquid with observation, and ice chips  Per speech:  will cont to eval  Advanced care planning/counseling discussion  Patient readmitted with worsening prognosis with large sacral wound and developing osteomyelitis secondary to bacteremia, severe malnutrition. Extensive discussion with wife regarding patients guarded prognosis and goals of care for her . Wife opting to proceed with full medical care at this time.   Started on tube feeds with NG tube, PEG tube placed 10/23   Seen in consult by palliative care, family remains goal oriented    VTE Pharmacologic Prophylaxis: VTE Score: 8 High Risk (Score >/= 5) - Pharmacological DVT Prophylaxis Ordered: heparin. Sequential Compression Devices Ordered.    Mobility:   Basic Mobility Inpatient Raw Score: 6  JH-HLM Goal: 2: Bed activities/Dependent transfer  JH-HLM Achieved: 2: Bed activities/Dependent transfer      Discussions with Specialists or Other Care Team Provider: ID and surgery    Education and Discussions with Family / Patient: Updated  (wife) via phone.    Current Length of Stay: 10 day(s)  Current Patient Status: Inpatient     Discharge Plan: Anticipate discharge in 24-48 hrs to rehab facility.  Pending surgical reevaluation of the sacral wound along with appropriate measures for discharge like  tube feeding.    Code Status: Level 1 - Full Code    Subjective   Patient was seen and examined at bedside.  Patient responds to verbal stimuli by opening his eyes and shaking his head slightly.      Objective :  Temp:  [97.6 °F (36.4 °C)-99.1 °F (37.3 °C)] 99.1 °F (37.3 °C)  HR:  [81-89] 89  BP: (128-131)/(70-82) 128/70  Resp:  [20] 20  SpO2:  [96 %-99 %] 99 %  O2 Device: None (Room air)    Body mass index is 27.89 kg/m².     Input and Output Summary (last 24 hours):     Intake/Output Summary (Last 24 hours) at 10/28/2024 1424  Last data filed at 10/28/2024 0625  Gross per 24 hour   Intake 2229.17 ml   Output 2000 ml   Net 229.17 ml       Physical Exam  Vitals and nursing note reviewed.   Constitutional:       General: He is not in acute distress.     Appearance: He is well-developed.   HENT:      Head: Normocephalic and atraumatic.   Eyes:      Conjunctiva/sclera: Conjunctivae normal.   Cardiovascular:      Rate and Rhythm: Normal rate and regular rhythm.      Heart sounds: No murmur heard.  Pulmonary:      Effort: Pulmonary effort is normal. No respiratory distress.      Breath sounds: Normal breath sounds.   Abdominal:      General: Abdomen is flat. Bowel sounds are normal.      Palpations: Abdomen is soft.      Tenderness: There is no abdominal tenderness.      Comments: PEG tube in place   Musculoskeletal:         General: No swelling.      Cervical back: Neck supple.   Skin:     General: Skin is warm and dry.      Capillary Refill: Capillary refill takes less than 2 seconds.   Neurological:      Mental Status: He is alert.   Psychiatric:         Mood and Affect: Mood normal.           Lines/Drains:  Lines/Drains/Airways       Active Status       Name Placement date Placement time Site Days    Gastrostomy/Enterostomy LUQ 10/23/24  1135  LUQ  5    Colostomy Loop LLQ 10/23/24  1038  LLQ  5    Urethral Catheter Latex 20 Fr. 10/19/24  1209  Latex  9                  Urinary Catheter:  Goal for removal: N/A -  Chronic Parra                 Lab Results: I have reviewed the following results:   Results from last 7 days   Lab Units 10/28/24  0616   WBC Thousand/uL 10.21*   HEMOGLOBIN g/dL 7.3*   HEMATOCRIT % 24.3*   PLATELETS Thousands/uL 421*   SEGS PCT % 67   LYMPHO PCT % 22   MONO PCT % 6   EOS PCT % 2     Results from last 7 days   Lab Units 10/28/24  0616 10/26/24  0522 10/25/24  0452   SODIUM mmol/L 141   < > 148*   POTASSIUM mmol/L 4.1   < > 3.6   CHLORIDE mmol/L 112*   < > 117*   CO2 mmol/L 28   < > 27   BUN mg/dL 14   < > 14   CREATININE mg/dL 0.46*   < > 0.62   ANION GAP mmol/L 1*   < > 4   CALCIUM mg/dL 7.4*   < > 7.2*   ALBUMIN g/dL  --   --  1.8*   TOTAL BILIRUBIN mg/dL  --   --  0.30   ALK PHOS U/L  --   --  48   ALT U/L  --   --  12   AST U/L  --   --  23   GLUCOSE RANDOM mg/dL 202*   < > 246*    < > = values in this interval not displayed.         Results from last 7 days   Lab Units 10/28/24  1218 10/28/24  0614 10/28/24  0025 10/27/24  2055 10/27/24  1641 10/27/24  1201 10/27/24  0636 10/27/24  0013 10/26/24  2036 10/26/24  1759 10/26/24  1224 10/26/24  0558   POC GLUCOSE mg/dl 185* 193* 235* 219* 213* 234* 239* 242* 221* 215* 260* 256*         Results from last 7 days   Lab Units 10/21/24  2327 10/21/24  1734   LACTIC ACID mmol/L 1.8 1.5       Recent Cultures (last 7 days):   Results from last 7 days   Lab Units 10/21/24  2112   GRAM STAIN RESULT  Rare Polys*  2+ Gram negative rods*  1+ Gram positive cocci in pairs*  Rare Yeast*   WOUND CULTURE  1+ Growth of Escherichia coli*  1+ Growth of Enterobacter cloacae*  1+ Growth of Proteus mirabilis*  1+ Growth of Enterococcus faecalis*     CT abdomen pelvis w contrast    Result Date: 10/21/2024  Impression: Reidentified large sacral decubitus ulcer with erosion of the coccyx in keeping with chronic osteomyelitis. Extensive subcutaneous emphysema about the ulcer extending into into the left gluteal musculature keeping with infectious myositis. Necrotizing  fasciitis is not excluded. No focal collection to indicate an abscess. Circumferential rectal thickening keeping with a nonspecific proctitis. Suspected cystitis. The study was marked in EPIC for immediate notification. Workstation performed: [id number]     XR chest portable    Result Date: 10/21/2024  Impression: Enteric tube tip projects over the gastric fundus. Resident: Rafal Vargas I, the attending radiologist, have reviewed the images and agree with the final report above. Workstation performed: [id number]     US right upper quadrant    Result Date: 10/20/2024  Impression: Cholelithiasis without acute cholecystitis. Workstation performed: [id number]     XR chest portable    Result Date: 10/18/2024  Impression: Limited study. Some atelectasis is present at the right base. No convincing evidence of pneumonia. Workstation performed: [id number]     CT abdomen pelvis with contrast    Result Date: 10/18/2024  Impression: Large sacral decubitus ulcer extending to the coccyx with soft tissue gas now seen extending into the right and left medial aspects of the gluteus jac muscles. No drainable fluid collection. Erosion of the coccyx suggestive of osteomyelitis. Additional findings as above. Workstation performed: [id number]      Last 24 Hours Medication List:     Current Facility-Administered Medications:     acetaminophen (TYLENOL) oral suspension 650 mg, Q4H PRN    amoxicillin-clavulanate (AUGMENTIN) 875-125 mg per tablet 1 tablet, Q12H ELISEO    aspirin chewable tablet 81 mg, Daily    atorvastatin (LIPITOR) tablet 80 mg, QPM    doxycycline hyclate (VIBRAMYCIN) capsule 100 mg, Q12H ELISEO    escitalopram (LEXAPRO) tablet 10 mg, Daily    finasteride (PROSCAR) tablet 5 mg, Daily    heparin (porcine) subcutaneous injection 5,000 Units, Q8H ELISEO    HYDROmorphone HCl (DILAUDID) injection 0.2 mg, Q6H PRN    insulin glargine (LANTUS) subcutaneous injection 16 Units 0.16 mL, HS    insulin lispro (HumALOG/ADMELOG) 100  units/mL subcutaneous injection 1-5 Units, Q6H **AND** Fingerstick Glucose (POCT), Q6H    lisinopril (ZESTRIL) tablet 10 mg, Daily    metoprolol (LOPRESSOR) injection 2.5 mg, Q6H PRN    metoprolol tartrate (LOPRESSOR) partial tablet 12.5 mg, Q12H ELISEO    multivitamin stress formula tablet 1 tablet, Daily    ondansetron (ZOFRAN) injection 4 mg, Q6H PRN    potassium phosphates 21 mmol in sodium chloride 0.9 % 250 mL infusion, Once, Last Rate: 21 mmol (10/28/24 1144)    senna (SENOKOT) tablet 17.2 mg, Daily PRN    tamsulosin (FLOMAX) capsule 0.4 mg, Daily With Dinner    thiamine tablet 100 mg, Daily    Administrative Statements   Today, Patient Was Seen By: Felipa Perales MD  I have spent a total time of >35 minutes in caring for this patient on the day of the visit/encounter including Diagnostic results, Prognosis, Risks and benefits of tx options, Instructions for management, Patient and family education, Importance of tx compliance, Risk factor reductions, Impressions, Counseling / Coordination of care, Documenting in the medical record, Reviewing / ordering tests, medicine, procedures  , Obtaining or reviewing history  , and Communicating with other healthcare professionals .    **Please Note: This note may have been constructed using a voice recognition system.**

## 2024-10-28 NOTE — ASSESSMENT & PLAN NOTE
Tachycardia and leukocytosis. Secondary to polymicrobial bacteremia and infected sacral decubitus ulcer.  Initial CT scan without any evidence of a drainable fluid collection/abscess but did show mild proctitis. Repeat CT A/P again showed large sacral decubitus ulcer with erosion of the coccyx, subcutaneous emphysema about the ulcer.  Now status post or debridement with general surgery. The patient remains clinically stable. This morning his is afebrile. WBC count has improved.  -antibiotic as below  -monitor CBCD and BMP  -monitor vitals  -supportive care

## 2024-10-29 VITALS
DIASTOLIC BLOOD PRESSURE: 73 MMHG | TEMPERATURE: 98.3 F | HEART RATE: 95 BPM | HEIGHT: 77 IN | WEIGHT: 235.23 LBS | OXYGEN SATURATION: 95 % | BODY MASS INDEX: 27.77 KG/M2 | RESPIRATION RATE: 18 BRPM | SYSTOLIC BLOOD PRESSURE: 128 MMHG

## 2024-10-29 LAB
ANION GAP SERPL CALCULATED.3IONS-SCNC: 2 MMOL/L (ref 4–13)
BUN SERPL-MCNC: 13 MG/DL (ref 5–25)
CALCIUM SERPL-MCNC: 7.3 MG/DL (ref 8.4–10.2)
CHLORIDE SERPL-SCNC: 107 MMOL/L (ref 96–108)
CO2 SERPL-SCNC: 26 MMOL/L (ref 21–32)
CREAT SERPL-MCNC: 0.49 MG/DL (ref 0.6–1.3)
ERYTHROCYTE [DISTWIDTH] IN BLOOD BY AUTOMATED COUNT: 18.2 % (ref 11.6–15.1)
GFR SERPL CREATININE-BSD FRML MDRD: 106 ML/MIN/1.73SQ M
GLUCOSE SERPL-MCNC: 196 MG/DL (ref 65–140)
GLUCOSE SERPL-MCNC: 206 MG/DL (ref 65–140)
GLUCOSE SERPL-MCNC: 209 MG/DL (ref 65–140)
GLUCOSE SERPL-MCNC: 230 MG/DL (ref 65–140)
HCT VFR BLD AUTO: 23.7 % (ref 36.5–49.3)
HGB BLD-MCNC: 7.2 G/DL (ref 12–17)
MAGNESIUM SERPL-MCNC: 1.6 MG/DL (ref 1.9–2.7)
MCH RBC QN AUTO: 29.6 PG (ref 26.8–34.3)
MCHC RBC AUTO-ENTMCNC: 30.4 G/DL (ref 31.4–37.4)
MCV RBC AUTO: 98 FL (ref 82–98)
PHOSPHATE SERPL-MCNC: 2.8 MG/DL (ref 2.3–4.1)
PLATELET # BLD AUTO: 402 THOUSANDS/UL (ref 149–390)
PMV BLD AUTO: 9.9 FL (ref 8.9–12.7)
POTASSIUM SERPL-SCNC: 4.5 MMOL/L (ref 3.5–5.3)
RBC # BLD AUTO: 2.43 MILLION/UL (ref 3.88–5.62)
SARS-COV-2 AG UPPER RESP QL IA: NEGATIVE
SARS-COV-2 AG UPPER RESP QL IA: NORMAL
SODIUM SERPL-SCNC: 135 MMOL/L (ref 135–147)
WBC # BLD AUTO: 10.37 THOUSAND/UL (ref 4.31–10.16)

## 2024-10-29 PROCEDURE — 82948 REAGENT STRIP/BLOOD GLUCOSE: CPT

## 2024-10-29 PROCEDURE — 80048 BASIC METABOLIC PNL TOTAL CA: CPT

## 2024-10-29 PROCEDURE — 85027 COMPLETE CBC AUTOMATED: CPT

## 2024-10-29 PROCEDURE — 99239 HOSP IP/OBS DSCHRG MGMT >30: CPT

## 2024-10-29 PROCEDURE — 83735 ASSAY OF MAGNESIUM: CPT

## 2024-10-29 PROCEDURE — 84100 ASSAY OF PHOSPHORUS: CPT

## 2024-10-29 RX ORDER — DOXYCYCLINE 100 MG/1
100 CAPSULE ORAL EVERY 12 HOURS SCHEDULED
Start: 2024-10-29 | End: 2024-10-31

## 2024-10-29 RX ORDER — CALCIUM CARBONATE 1250 MG/5ML
1250 SUSPENSION ORAL 2 TIMES DAILY WITH MEALS
Status: DISCONTINUED | OUTPATIENT
Start: 2024-10-29 | End: 2024-10-29 | Stop reason: HOSPADM

## 2024-10-29 RX ORDER — METOPROLOL TARTRATE 25 MG/1
12.5 TABLET, FILM COATED ORAL EVERY 12 HOURS SCHEDULED
Start: 2024-10-29 | End: 2024-11-28

## 2024-10-29 RX ORDER — LANOLIN ALCOHOL/MO/W.PET/CERES
100 CREAM (GRAM) TOPICAL DAILY
Start: 2024-10-30 | End: 2024-11-29

## 2024-10-29 RX ADMIN — LISINOPRIL 10 MG: 10 TABLET ORAL at 08:47

## 2024-10-29 RX ADMIN — INSULIN LISPRO 1 UNITS: 100 INJECTION, SOLUTION INTRAVENOUS; SUBCUTANEOUS at 00:35

## 2024-10-29 RX ADMIN — INSULIN LISPRO 1 UNITS: 100 INJECTION, SOLUTION INTRAVENOUS; SUBCUTANEOUS at 05:56

## 2024-10-29 RX ADMIN — CALCIUM CARBONATE 1250 MG: 1250 SUSPENSION ORAL at 16:47

## 2024-10-29 RX ADMIN — HEPARIN SODIUM 5000 UNITS: 5000 INJECTION INTRAVENOUS; SUBCUTANEOUS at 05:57

## 2024-10-29 RX ADMIN — AMOXICILLIN AND CLAVULANATE POTASSIUM 1 TABLET: 875; 125 TABLET, FILM COATED ORAL at 08:47

## 2024-10-29 RX ADMIN — Medication 12.5 MG: at 08:47

## 2024-10-29 RX ADMIN — INSULIN LISPRO 1 UNITS: 100 INJECTION, SOLUTION INTRAVENOUS; SUBCUTANEOUS at 11:31

## 2024-10-29 RX ADMIN — ESCITALOPRAM OXALATE 10 MG: 10 TABLET ORAL at 08:47

## 2024-10-29 RX ADMIN — FINASTERIDE 5 MG: 5 TABLET, FILM COATED ORAL at 08:47

## 2024-10-29 RX ADMIN — APIXABAN 5 MG: 5 TABLET, FILM COATED ORAL at 11:31

## 2024-10-29 RX ADMIN — THIAMINE HCL TAB 100 MG 100 MG: 100 TAB at 08:47

## 2024-10-29 RX ADMIN — ASPIRIN 81 MG CHEWABLE TABLET 81 MG: 81 TABLET CHEWABLE at 08:47

## 2024-10-29 RX ADMIN — B-COMPLEX W/ C & FOLIC ACID TAB 1 TABLET: TAB at 08:49

## 2024-10-29 RX ADMIN — DOXYCYCLINE 100 MG: 100 CAPSULE ORAL at 08:47

## 2024-10-29 NOTE — ASSESSMENT & PLAN NOTE
"Patient presented with a large sacral decubitus ulcer extending to the coccyx with concurrent sepsis, and gram-negative bacteremia  CT abdomen pelvis showing \"Large sacral decubitus ulcer extending to the coccyx with soft tissue gas now seen extending into the right and left medial aspects of the gluteus jac muscles. No drainable fluid collection. Erosion of the coccyx suggestive of osteomyelitis.\"  Was taken to the operating room 10/21 for sacral debridement, coccyx ectomy, and returned to the OR 10/23 for additional debridement diverting loop colostomy and PEG tube placement  Per infectious disease recommendations: Operative cultures were polymicrobial  Antibiotics for 7-day additional course, was initially on ceftriaxone/Unasyn has been transitioned to doxycycline/Augmentin with last dose 10/30  No indication for chronic antibiotics for chronic osteo as patient's wound is unlikely to heal: Long-term antibiotic therapy futile without future flap coverage  Update per surgery team : No further debridement is needed as per 10/28/2024  "

## 2024-10-29 NOTE — PLAN OF CARE
Problem: Potential for Falls  Goal: Patient will remain free of falls  Description: INTERVENTIONS:  - Educate patient/family on patient safety including physical limitations  - Instruct patient to call for assistance with activity   - Consult OT/PT to assist with strengthening/mobility   - Keep Call bell within reach  - Keep bed low and locked with side rails adjusted as appropriate  - Keep care items and personal belongings within reach  - Initiate and maintain comfort rounds  - Make Fall Risk Sign visible to staff  - Initiate/Maintain bed alarm  - Obtain necessary fall risk management equipment: alarms  - Apply yellow socks and bracelet for high fall risk patients  - Consider moving patient to room near nurses station  Outcome: Progressing     Problem: Prexisting or High Potential for Compromised Skin Integrity  Goal: Skin integrity is maintained or improved  Description: INTERVENTIONS:  - Identify patients at risk for skin breakdown  - Assess and monitor skin integrity  - Assess and monitor nutrition and hydration status  - Monitor labs   - Assess for incontinence   - Turn and reposition patient  - Assist with mobility/ambulation  - Relieve pressure over bony prominences  - Avoid friction and shearing  - Provide appropriate hygiene as needed including keeping skin clean and dry  - Evaluate need for skin moisturizer/barrier cream  - Collaborate with interdisciplinary team   - Patient/family teaching  - Consider wound care consult   Outcome: Progressing     Problem: PAIN - ADULT  Goal: Verbalizes/displays adequate comfort level or baseline comfort level  Description: Interventions:  - Encourage patient to monitor pain and request assistance  - Assess pain using appropriate pain scale  - Administer analgesics based on type and severity of pain and evaluate response  - Implement non-pharmacological measures as appropriate and evaluate response  - Consider cultural and social influences on pain and pain  management  - Notify physician/advanced practitioner if interventions unsuccessful or patient reports new pain  Outcome: Progressing     Problem: INFECTION - ADULT  Goal: Absence or prevention of progression during hospitalization  Description: INTERVENTIONS:  - Assess and monitor for signs and symptoms of infection  - Monitor lab/diagnostic results  - Monitor all insertion sites, i.e. indwelling lines, tubes, and drains  - [place] appropriate cooling/warming therapies per order  - Administer medications as ordered  - Instruct and encourage patient and family to use good hand hygiene technique  - Identify and instruct in appropriate isolation precautions for identified infection/condition  Outcome: Progressing     Problem: DISCHARGE PLANNING  Goal: Discharge to home or other facility with appropriate resources  Description: INTERVENTIONS:  - Identify barriers to discharge w/patient and caregiver  - Arrange for needed discharge resources and transportation as appropriate  - Identify discharge learning needs (meds, wound care, etc.)  - Refer to Case Management Department for coordinating discharge planning if the patient needs post-hospital services based on physician/advanced practitioner order or complex needs related to functional status, cognitive ability, or social support system  Outcome: Progressing     Problem: Knowledge Deficit  Goal: Patient/family/caregiver demonstrates understanding of disease process, treatment plan, medications, and discharge instructions  Description: Complete learning assessment and assess knowledge base.  Interventions:  - Provide teaching at level of understanding  - Provide teaching via preferred learning methods  Outcome: Progressing     Problem: Nutrition/Hydration-ADULT  Goal: Nutrient/Hydration intake appropriate for improving, restoring or maintaining nutritional needs  Description: Monitor and assess patient's nutrition/hydration status for malnutrition. Collaborate with  interdisciplinary team and initiate plan and interventions as ordered.  Monitor patient's weight and dietary intake as ordered or per policy. Utilize nutrition screening tool and intervene as necessary. Determine patient's food preferences and provide high-protein, high-caloric foods as appropriate.     INTERVENTIONS:  - Monitor oral intake, urinary output, labs, and treatment plans  - Assess nutrition and hydration status and recommend course of action  - Evaluate amount of meals eaten  - Assist patient with eating if necessary   - Allow adequate time for meals  - Recommend/ encourage appropriate diets, oral nutritional supplements, and vitamin/mineral supplements  - Order, calculate, and assess calorie counts as needed  - Recommend, monitor, and adjust tube feedings and TPN/PPN based on assessed needs  - Assess need for intravenous fluids  - Provide specific nutrition/hydration education as appropriate  - Include patient/family/caregiver in decisions related to nutrition  Outcome: Progressing     Problem: GENITOURINARY - ADULT  Goal: Urinary catheter remains patent  Description: INTERVENTIONS:  - Assess patency of urinary catheter  - If patient has a chronic hanks, consider changing catheter if non-functioning  - Follow guidelines for intermittent irrigation of non-functioning urinary catheter  Outcome: Progressing     Problem: METABOLIC, FLUID AND ELECTROLYTES - ADULT  Goal: Glucose maintained within target range  Description: INTERVENTIONS:  - Monitor Blood Glucose as ordered  - Assess for signs and symptoms of hyperglycemia and hypoglycemia  - Administer ordered medications to maintain glucose within target range  - Assess nutritional intake and initiate nutrition service referral as needed  Outcome: Progressing     Problem: SKIN/TISSUE INTEGRITY - ADULT  Goal: Pressure injury heals and does not worsen  Description: Interventions:  - Implement low air loss mattress or specialty surface (Criteria met)  - Apply  silicone foam dressing  -- Perform passive or active ROM every shift  - Turn and reposition patient & offload bony prominences every 2 hours   - Utilize friction reducing device or surface for transfers   - Consider nutrition services referral as needed  Outcome: Progressing     Problem: GASTROINTESTINAL - ADULT  Goal: Establish and maintain optimal ostomy function  Description: INTERVENTIONS:  - Assess bowel function  - Encourage oral fluids to ensure adequate hydration  - Administer IV fluids if ordered to ensure adequate hydration   - Administer ordered medications as needed  - Encourage mobilization and activity  - Nutrition services referral to assist patient with appropriate food choices  - Assess stoma site  - Consider wound care consult   Outcome: Progressing

## 2024-10-29 NOTE — NURSING NOTE
AVS printed and sent W/ pt to receiving facility  IV was removed/ No tele box to remove   Pt was picked by trx team via stretcher

## 2024-10-29 NOTE — ASSESSMENT & PLAN NOTE
Malnutrition Findings:   Adult Malnutrition type: Chronic illness  Adult Degree of Malnutrition: Other severe protein calorie malnutrition  Malnutrition Characteristics: Weight loss, Inadequate energy     360 Statement: Severe protein calorie malnutrition in context of chronic illness r/t poor appetite, inadequate PO intake as evidance by energy intake less than 75% compared to estimated needs>1 month, 14% wt loss in 1 month (9/17/24 114 kg, 10/19/24 97.7 kg) treated with PO diet. Once able to have po treat with Mechanical Soft diet, Magic Cup BID, Ensure Compact 1x daily.    BMI Findings:  Body mass index is 27.89 kg/m².   Continue TF at goal

## 2024-10-29 NOTE — PLAN OF CARE
Problem: Potential for Falls  Goal: Patient will remain free of falls  Description: INTERVENTIONS:  - Educate patient/family on patient safety including physical limitations  - Instruct patient to call for assistance with activity   - Consult OT/PT to assist with strengthening/mobility   - Keep Call bell within reach  - Keep bed low and locked with side rails adjusted as appropriate  - Keep care items and personal belongings within reach  - Initiate and maintain comfort rounds  - Make Fall Risk Sign visible to staff  - Initiate/Maintain bed alarm  - Obtain necessary fall risk management equipment: alarms  - Apply yellow socks and bracelet for high fall risk patients  - Consider moving patient to room near nurses station  Outcome: Progressing     Problem: Prexisting or High Potential for Compromised Skin Integrity  Goal: Skin integrity is maintained or improved  Description: INTERVENTIONS:  - Identify patients at risk for skin breakdown  - Assess and monitor skin integrity  - Assess and monitor nutrition and hydration status  - Monitor labs   - Assess for incontinence   - Turn and reposition patient  - Assist with mobility/ambulation  - Relieve pressure over bony prominences  - Avoid friction and shearing  - Provide appropriate hygiene as needed including keeping skin clean and dry  - Evaluate need for skin moisturizer/barrier cream  - Collaborate with interdisciplinary team   - Patient/family teaching  - Consider wound care consult   Outcome: Progressing     Problem: PAIN - ADULT  Goal: Verbalizes/displays adequate comfort level or baseline comfort level  Description: Interventions:  - Encourage patient to monitor pain and request assistance  - Assess pain using appropriate pain scale  - Administer analgesics based on type and severity of pain and evaluate response  - Implement non-pharmacological measures as appropriate and evaluate response  - Consider cultural and social influences on pain and pain  management  - Notify physician/advanced practitioner if interventions unsuccessful or patient reports new pain  Outcome: Progressing     Problem: INFECTION - ADULT  Goal: Absence or prevention of progression during hospitalization  Description: INTERVENTIONS:  - Assess and monitor for signs and symptoms of infection  - Monitor lab/diagnostic results  - Monitor all insertion sites, i.e. indwelling lines, tubes, and drains  - [place] appropriate cooling/warming therapies per order  - Administer medications as ordered  - Instruct and encourage patient and family to use good hand hygiene technique  - Identify and instruct in appropriate isolation precautions for identified infection/condition  Outcome: Progressing     Problem: DISCHARGE PLANNING  Goal: Discharge to home or other facility with appropriate resources  Description: INTERVENTIONS:  - Identify barriers to discharge w/patient and caregiver  - Arrange for needed discharge resources and transportation as appropriate  - Identify discharge learning needs (meds, wound care, etc.)  - Refer to Case Management Department for coordinating discharge planning if the patient needs post-hospital services based on physician/advanced practitioner order or complex needs related to functional status, cognitive ability, or social support system  Outcome: Progressing     Problem: Knowledge Deficit  Goal: Patient/family/caregiver demonstrates understanding of disease process, treatment plan, medications, and discharge instructions  Description: Complete learning assessment and assess knowledge base.  Interventions:  - Provide teaching at level of understanding  - Provide teaching via preferred learning methods  Outcome: Progressing     Problem: Nutrition/Hydration-ADULT  Goal: Nutrient/Hydration intake appropriate for improving, restoring or maintaining nutritional needs  Description: Monitor and assess patient's nutrition/hydration status for malnutrition. Collaborate with  interdisciplinary team and initiate plan and interventions as ordered.  Monitor patient's weight and dietary intake as ordered or per policy. Utilize nutrition screening tool and intervene as necessary. Determine patient's food preferences and provide high-protein, high-caloric foods as appropriate.     INTERVENTIONS:  - Monitor oral intake, urinary output, labs, and treatment plans  - Assess nutrition and hydration status and recommend course of action  - Evaluate amount of meals eaten  - Assist patient with eating if necessary   - Allow adequate time for meals  - Recommend/ encourage appropriate diets, oral nutritional supplements, and vitamin/mineral supplements  - Order, calculate, and assess calorie counts as needed  - Recommend, monitor, and adjust tube feedings and TPN/PPN based on assessed needs  - Assess need for intravenous fluids  - Provide specific nutrition/hydration education as appropriate  - Include patient/family/caregiver in decisions related to nutrition  Outcome: Progressing     Problem: GENITOURINARY - ADULT  Goal: Urinary catheter remains patent  Description: INTERVENTIONS:  - Assess patency of urinary catheter  - If patient has a chronic hanks, consider changing catheter if non-functioning  - Follow guidelines for intermittent irrigation of non-functioning urinary catheter  Outcome: Progressing     Problem: METABOLIC, FLUID AND ELECTROLYTES - ADULT  Goal: Glucose maintained within target range  Description: INTERVENTIONS:  - Monitor Blood Glucose as ordered  - Assess for signs and symptoms of hyperglycemia and hypoglycemia  - Administer ordered medications to maintain glucose within target range  - Assess nutritional intake and initiate nutrition service referral as needed  Outcome: Progressing     Problem: SKIN/TISSUE INTEGRITY - ADULT  Goal: Pressure injury heals and does not worsen  Description: Interventions:  - Implement low air loss mattress or specialty surface (Criteria met)  - Apply  silicone foam dressing  -- Perform passive or active ROM every shift  - Turn and reposition patient & offload bony prominences every 2 hours   - Utilize friction reducing device or surface for transfers   - Consider nutrition services referral as needed  Outcome: Progressing     Problem: GASTROINTESTINAL - ADULT  Goal: Establish and maintain optimal ostomy function  Description: INTERVENTIONS:  - Assess bowel function  - Encourage oral fluids to ensure adequate hydration  - Administer IV fluids if ordered to ensure adequate hydration   - Administer ordered medications as needed  - Encourage mobilization and activity  - Nutrition services referral to assist patient with appropriate food choices  - Assess stoma site  - Consider wound care consult   Outcome: Progressing

## 2024-10-29 NOTE — ASSESSMENT & PLAN NOTE
Lab Results   Component Value Date    HGBA1C 6.2 (H) 09/17/2024       Recent Labs     10/28/24  0614 10/28/24  1218 10/28/24  1725 10/28/24  2235   POCGLU 193* 185* 195* 191*       Blood Sugar Average: Last 72 hrs:  (P) [id number]  - continue to trend sugars  - SSI  - hypoglycemic protocol

## 2024-10-29 NOTE — WOUND OSTOMY CARE
Progress Note- Ostomy  Drew Santos 73 y.o. male  [id number]  [facility] -E4 -[id number]        Assessment:  Patient and patient's wife seen today for ostomy teaching. Patient's wife is primary learner. Patient's wife plan for patient is to go to [facility] and eventually home where wife will care for ostomy.  Instructed on surgery and how stoma was created, healthy characteristics of a stoma, and when to notify the physician.  Instructed on daily care of ostomy pouch--emptying when 1/3 full of gas or stool, cleaning, showering with pouch on/off, changing pouch every 3-4 days or if leaking.  Demonstrated pouch emptying/cleaning.  Demonstrated pouching system change using one piece cut-to-fit pouch--removal of pouch with push/pull method, cleansing with warm water only, measuring stoma, cutting pouch barrier to appropriate size, application of skin prep, application of pouch, holding gentle pressure on pouch after application for best adherence. Patient's wife followed along and used video recording with her cell phone to video steps. All questions answered at this time. Patient to d/c today to [facility], supplies left with both patient's wife to bring home and for staff at [facility] until they can order patient's supplies.        Pouch Change Steps:  1. Peel back pouch using push-pull method, may use non-alcohol adhesive remover. Remove pouch from top to bottom.   2. Use warm water only to cleanse skin around the stoma (carmen-stomal skin).   3. Make sure all adhesive residue is removed and skin is dry and not oily.  4. Measure stoma size using measuring guide and trace correct measurements onto back of pouch.  5. Then cut or mold the backing of pouch out to correct shape/size.  6. Place pouch over stoma and onto skin.  7. Use warmth of hand to apply gentle pressure to help backing of pouch to adhere well to skin.        Stoma:     Red, well budded stoma measuring 2.75 inches by 3 inches, well connected to skin junction, center os,  draining, soft brown stool. 300 ml emptied from ostomy pouch before changing.         Gail Wharton BSN, RN, CWOCN

## 2024-10-29 NOTE — ASSESSMENT & PLAN NOTE
Rate controlled on metoprolol: Currently 5 mg IV every 6 hours  Eliquis was temporarily placed on hold-restarted after discussion with surgery  Surgery sacral wound re-eval-no further debridement needed

## 2024-10-29 NOTE — ASSESSMENT & PLAN NOTE
Lab Results   Component Value Date    HGBA1C 6.2 (H) 09/17/2024     Recent Labs     10/28/24  2235 10/29/24  0006 10/29/24  0555 10/29/24  1129   POCGLU 191* 209* 206* 196*     Blood Sugar Average: Last 72 hrs:  (P) [id number]  Last HgbA1C 6.2%  Pt with hyperglycemia once TF started  Placed on lantus:  cont to titrate  Continue Accu-Cheks, sliding scale insulin, and titrate as needed

## 2024-10-29 NOTE — ASSESSMENT & PLAN NOTE
History of stroke with L sided hemiparesis recently hospitalized at [facility] in July 2024  Baseline non-verbal, alert, at times selective in answers, can nod yes/no however unsure accuracy most information obtained per patients wife  Baseline Dysphagia 2 mechanical soft, thin liquids, patient well known to speech therapy:  cont diet as per speech  Currently NPO but sips of liquid with observation, and ice chips  Per speech:  will cont to eval

## 2024-10-29 NOTE — SPEECH THERAPY NOTE
Speech Language/Pathology  Attempted SLP evaluation at this time. Pt and family participating in family education/training. Will continue to f/u as able/appropriate.

## 2024-10-29 NOTE — ASSESSMENT & PLAN NOTE
Malnutrition Findings:   ChronicAdult Malnutrition type: Chronic illness  Adult Degree of Malnutrition: Other severe protein calorie malnutrition  Malnutrition Characteristics: Weight loss, Inadequate energy  360 Statement: Severe protein calorie malnutrition in context of chronic illness r/t poor appetite, inadequate PO intake as evidance by energy intake less than 75% compared to estimated needs>1 month, 14% wt loss in 1 month (9/17/24 114 kg, 10/19/24 97.7 kg) treated with PO diet. Once able to have po treat with Mechanical Soft diet, Magic Cup BID, Ensure Compact 1x daily.  Patient with significant weight loss and per wife with significantly decreased oral intake over the past few months and has not been eating for 1 week at the nursing home.  Weight noted in April to be 275 lb, and now weighing 235 lb  PEG tube placed 10/23- continue with tube feeds-Jevity 1.2 Mata goal rate at 75 mL/h.Recommended adding Prosource known carb twice daily and Teddy twice daily.Free water flushes 150 mL every 4 hours  Monitor potassium phosphorus magnesium and replete as needed   Dietary to adjust based on patient nutritional needs.  Continue high-dose thiamine and electrolyte monitoring for refeeding syndrome

## 2024-10-29 NOTE — DISCHARGE SUMMARY
"Discharge Summary - Hospitalist   Name: Drew Santos 73 y.o. male I MRN: [id number]  Unit/Bed#: E4 -01 I Date of Admission: 10/18/2024   Date of Service: 10/29/2024 I Hospital Day: 11     Assessment & Plan  Sepsis  Resolved  Patient is a 73-year-old male who was recently admitted 9/17-10/4 with polymicrobial bacteremia secondary to sacral wound who had completed a course of IV cefepime, flagyl and vancomycin followed by ampicillin.   Was also diagnosed with covid.  Pt was readmitted for failure to thrive, and concerns over worsening sacral wounds and posterior osteomyelitis    Patient met criteria for sepsis, present on admission with tachycardia and leukocytosis.  Sepsis secondary to sacral decubitus ulcer with underlying osteomyelitis and polymicrobial bacteremia    Gram-negative bacteremia  Blood cultures growing E. coli and Proteus and bacteroides, and Clostridium  Most likely secondary to infected sacral decubitus ulcer with probable osteomyelitis of coccyx  Infectious disease following for antibiotic management  Right upper quadrant ultrasound performed, no evidence of acute cholecystitis  Surgery took patient to the OR emergently 10/21 for concern of necrotizing soft tissue infection, and return to the OR for additional debridement, washout with end colostomy and PEG tube placement on 10/23/24   Continue antibiotics as recommended by the infectious disease team  IV ceftriaxone and Unasyn have been transitioned to doxycycline and Augmentin  ID recommends a 7-day course of antibiotics, from the most recent adjustment, last dose 10/30  Sacral ulcer (HCC)  Patient presented with a large sacral decubitus ulcer extending to the coccyx with concurrent sepsis, and gram-negative bacteremia  CT abdomen pelvis showing \"Large sacral decubitus ulcer extending to the coccyx with soft tissue gas now seen extending into the right and left medial aspects of the gluteus jac muscles. No drainable fluid collection. " "Erosion of the coccyx suggestive of osteomyelitis.\"  Was taken to the operating room 10/21 for sacral debridement, coccyx ectomy, and returned to the OR 10/23 for additional debridement diverting loop colostomy and PEG tube placement  Per infectious disease recommendations: Operative cultures were polymicrobial  Antibiotics for 7-day additional course, was initially on ceftriaxone/Unasyn has been transitioned to doxycycline/Augmentin with last dose 10/30  No indication for chronic antibiotics for chronic osteo as patient's wound is unlikely to heal: Long-term antibiotic therapy futile without future flap coverage  Update per surgery team : No further debridement is needed as per 10/28/2024  S/P percutaneous endoscopic gastrostomy (PEG) tube placement (HCC)  10/23 s/p lap loop colostomy with PEG tube placement for nutrition  Oral diet per SLP: Sips of liquid, and ice chips  Nutrition consulted appreciated:  pt tolerating at goal rate  Anemia of chronic disease  Has chronic anemia likely secondary to poor nutritional status underlying chronic illness.   Recent Labs     10/28/24  0616 10/29/24  0519   HGB 7.3* 7.2*      Patient was transfused 1 unit of packed red blood cells on 10/24 for hemoglobin of 6.2  Patient with chronic anemia, acutely worse with expected procedural blood loss  After transfusion hemoglobin returned to baseline of approximately 7-8    Pyuria  Patient with chronic Parra  Urine culture growing Proteus  Had been placed on IV antibiotics with ceftriaxone  Hypernatremia  Sodium 154 at time of admission, likely hypovolemic hypernatremia related to mild dehydration secondary to poor water intake  Patient was on IV fluid support  Patient had PEG tube placed 10/23: Currently tolerating tube feedings and flushes  Patient was given increased free water flushes, and started on isotonic IV fluids with improvement in hypernatremia  Continue current regimen and continue to monitor  Chronic indwelling Parra " catheter  Chronic urethral hanks catheter secondary to chronic urinary retention, BPH.  Hanks patent at this time   Hanks was exchanged 10/19  Continue tamsulosin and finasteride  Proctitis  Noted on CT scan with history of this as possibly chronic. No diarrhea or clinical evidence of colitis.   Type 2 diabetes mellitus without complication, without long-term current use of insulin (HCC)  Lab Results   Component Value Date    HGBA1C 6.2 (H) 09/17/2024     Recent Labs     10/28/24  2235 10/29/24  0006 10/29/24  0555 10/29/24  1129   POCGLU 191* 209* 206* 196*     Blood Sugar Average: Last 72 hrs:  (P) [id number]  Last HgbA1C 6.2%  Pt with hyperglycemia once TF started  Placed on lantus:  cont to titrate  Continue Accu-Cheks, sliding scale insulin, and titrate as needed  Paroxysmal atrial fibrillation (HCC)  Rate controlled on metoprolol: Currently 5 mg IV every 6 hours  Eliquis was temporarily placed on hold-restarted after discussion with surgery  Surgery sacral wound re-eval-no further debridement needed  Severe protein-calorie malnutrition (HCC)  Malnutrition Findings:   ChronicAdult Malnutrition type: Chronic illness  Adult Degree of Malnutrition: Other severe protein calorie malnutrition  Malnutrition Characteristics: Weight loss, Inadequate energy  360 Statement: Severe protein calorie malnutrition in context of chronic illness r/t poor appetite, inadequate PO intake as evidance by energy intake less than 75% compared to estimated needs>1 month, 14% wt loss in 1 month (9/17/24 114 kg, 10/19/24 97.7 kg) treated with PO diet. Once able to have po treat with Mechanical Soft diet, Magic Cup BID, Ensure Compact 1x daily.  Patient with significant weight loss and per wife with significantly decreased oral intake over the past few months and has not been eating for 1 week at the nursing home.  Weight noted in April to be 275 lb, and now weighing 235 lb  PEG tube placed 10/23- continue with tube feeds-Jevity  1.2 Mata goal rate at 75 mL/h.Recommended adding Prosource known carb twice daily and Teddy twice daily.Free water flushes 150 mL every 4 hours  Monitor potassium phosphorus magnesium and replete as needed   Dietary to adjust based on patient nutritional needs.  Continue high-dose thiamine and electrolyte monitoring for refeeding syndrome    History of CVA (cerebrovascular accident)  History of stroke with L sided hemiparesis recently hospitalized at [facility] in July 2024  Baseline non-verbal, alert, at times selective in answers, can nod yes/no however unsure accuracy most information obtained per patients wife  Baseline Dysphagia 2 mechanical soft, thin liquids, patient well known to speech therapy:  cont diet as per speech  Currently NPO but sips of liquid with observation, and ice chips  Per speech:  will cont to eval  Advanced care planning/counseling discussion  Patient readmitted with worsening prognosis with large sacral wound and developing osteomyelitis secondary to bacteremia, severe malnutrition. Extensive discussion with wife regarding patients guarded prognosis and goals of care for her . Wife opting to proceed with full medical care at this time.   Started on tube feeds with NG tube, PEG tube placed 10/23   Seen in consult by palliative care, family remains goal oriented     Medical Problems       Resolved Problems  Date Reviewed: 10/24/2024            Resolved    Elevated liver enzymes 10/25/2024     Resolved by  Stacy Ba MD        Discharging Physician / Practitioner: Felipa Perales MD  PCP: Joe Lyon MD  Admission Date:   Admission Orders (From admission, onward)       Ordered        10/18/24 1844  INPATIENT ADMISSION  Once                          Discharge Date: 10/29/24    Consultations During Hospital Stay:  Infectious disease  General Surgery  Palliative care  Nutrition    Procedures Performed:   Sacral wound debridement due to concern for necrotizing soft tissue  infection on 10/21/2024 and additional debridement, washout with end colostomy and PEG tube placement on 10/23/2024    Significant Findings / Test Results:   N/A    Incidental Findings:   N/A    Test Results Pending at Discharge (will require follow up):   N/A     Outpatient Tests Requested:  CBC    Complications: N/A    Reason for Admission: Failure to thrive    Hospital Course:   Drew Santos is a 73 y.o. male patient who originally presented to the hospital on 10/18/2024 due to failure to thrive from nursing facility.  Prior to this admission he was hospitalized due to polymicrobial bacteremia from the sacral wound and completed full course of IV antibiotics with ampicillin as blood cultures during that admission grew Enterococcus Enterobacter and Proteus.  He was sent back to the ER because as per the nursing home patient was getting worse but no fevers rigors or chills were reported.  This was confirmed from the wife.  CAT scan of the abdomen and pelvis during this hospitalization showed large sacral decubitus ulcer extending to the coccyx with soft tissue gas now seen extending into the right and the left medial aspect of the gluteus jac muscle no drainable fluid collection.  Erosion of the coccyx suggestive of osteomyelitis.  Following which patient was taken to OR by general surgery on 10/21/2024 followed by additional debridement diverting loop colostomy and PEG tube placement on 10/23rd/2024.  Infectious disease were consulted in regards to the cultures from the operative procedures showing polymicrobial.  Patient had received initially ceftriaxone and Unasyn that we will transition to doxycycline and Augmentin for the last dose to be on 10/30/2024.    During the hospital course General Surgery kept on checking sacral wound, recommended appropriate wound care and reinitiation of the anticoagulation.  Later was also concerned along with nutrition for appropriate feedings.  Patient also received 1 packed  "of red blood cells on 10/24/2024 and since then hemoglobin has been stable around 7.  Outpatient follow-up with CBC ordered    Patient showed improvement and WBC started to trend down patient became afebrile wound started to look much better therefore decision was made collectively that patient can be discharged home.  Wife was involved during the discussion.  Getting back discharge to nursing facility.    Condition at Discharge: fair    Discharge Day Visit / Exam:   Subjective: Patient was seen and examined at bedside.  Patient responded to verbal stimuli by opening his eyes and moving his head and shaking it.  Vitals: Blood Pressure: 128/71 (10/29/24 0839)  Pulse: 98 (10/29/24 0839)  Temperature: 98.7 °F (37.1 °C) (10/29/24 0839)  Temp Source: Temporal (10/29/24 0839)  Respirations: 18 (10/29/24 0839)  Height: 6' 5\" (195.6 cm) (10/27/24 1950)  Weight - Scale: 107 kg (235 lb 3.7 oz) (10/27/24 1950)  SpO2: 96 % (10/29/24 0839)  Physical Exam  Vitals and nursing note reviewed.   Constitutional:       General: He is not in acute distress.     Appearance: He is well-developed.   HENT:      Head: Normocephalic and atraumatic.   Eyes:      Conjunctiva/sclera: Conjunctivae normal.   Cardiovascular:      Rate and Rhythm: Normal rate and regular rhythm.      Heart sounds: No murmur heard.  Pulmonary:      Effort: Pulmonary effort is normal. No respiratory distress.      Breath sounds: Normal breath sounds.   Abdominal:      Palpations: Abdomen is soft.      Tenderness: There is no abdominal tenderness.      Comments: PEG tube in place   Musculoskeletal:         General: No swelling.      Cervical back: Neck supple.   Skin:     General: Skin is warm and dry.      Capillary Refill: Capillary refill takes less than 2 seconds.   Neurological:      Mental Status: He is alert.   Psychiatric:         Mood and Affect: Mood normal.          Discussion with Family: Updated  (wife) via phone.    Discharge " instructions/Information to patient and family:   See after visit summary for information provided to patient and family.      Provisions for Follow-Up Care:  See after visit summary for information related to follow-up care and any pertinent home health orders.      Mobility at time of Discharge:   Basic Mobility Inpatient Raw Score: 6  JH-HLM Goal: 2: Bed activities/Dependent transfer  JH-HLM Achieved: 2: Bed activities/Dependent transfer     Disposition:   Nursing facility    Planned Readmission: N/A    Discharge Medications:  See after visit summary for reconciled discharge medications provided to patient and/or family.      Administrative Statements   Discharge Statement:  I have spent a total time of >40 minutes in caring for this patient on the day of the visit/encounter. >30 minutes of time was spent on: Diagnostic results, Prognosis, Risks and benefits of tx options, Instructions for management, Patient and family education, Importance of tx compliance, Risk factor reductions, Impressions, Counseling / Coordination of care, Documenting in the medical record, Reviewing / ordering tests, medicine, procedures  , and Communicating with other healthcare professionals .    **Please Note: This note may have been constructed using a voice recognition system**

## 2024-10-29 NOTE — CASE MANAGEMENT
Case Management Discharge Planning Note    Patient name Drew Santos  Location [facility] 4 /E4 -* MRN [id number]  : 1950 Date 10/29/2024       Current Admission Date: 10/18/2024  Current Admission Diagnosis:Sepsis   Patient Active Problem List    Diagnosis Date Noted Date Diagnosed    S/P percutaneous endoscopic gastrostomy (PEG) tube placement (HCC) 10/24/2024     Pyuria 10/19/2024     Chronic indwelling Parra catheter 10/19/2024     Hypernatremia 10/19/2024     Sacral ulcer (HCC) 10/18/2024     Advanced care planning/counseling discussion 2024     Severe protein-calorie malnutrition (HCC) 2024     Polymicrobial bacteremia 2024     Acute metabolic encephalopathy 2024     History of CVA (cerebrovascular accident) 2024     Paroxysmal atrial fibrillation (HCC) 2024     Anemia of chronic disease 2024     Gram-negative bacteremia 2024     Type 2 diabetes mellitus without complication, without long-term current use of insulin (HCC) 2024     COVID-19 2024     Proctitis 2024     Dysgeusia 2024     Gross hematuria 2024     Depression 2024     Sacral wound 2024     Primary hypertension 2024     Mixed hyperlipidemia 2024     Urinary retention 2024     Syncope 2024     Elevated lactic acid level 2024     Sepsis 2024     Abnormal CPK 2024       LOS (days): 11  Geometric Mean LOS (GMLOS) (days): 9.6  Days to GMLOS:-1.2     OBJECTIVE:  Risk of Unplanned Readmission Score: 35.68         Current admission status: Inpatient   Preferred Pharmacy:   [id number] - [place] - [address]    [place] [address]  Phone: [phone] Fax: [phone]    Primary Care Provider: Joe Lyon MD    Primary Insurance: [facility]  Secondary Insurance: [employer]    DISCHARGE DETAILS:    Discharge planning discussed with::  Spouse, kristi  Freedom of Choice: Yes  Comments - Freedom of Choice: [facility] to [facility]  CM contacted family/caregiver?: Yes  Were Treatment Team discharge recommendations reviewed with patient/caregiver?: Yes  Did patient/caregiver verbalize understanding of patient care needs?: N/A- going to facility  Were patient/caregiver advised of the risks associated with not following Treatment Team discharge recommendations?: Yes    Contacts  Patient Contacts: Kristi Santos (Spouse)  Relationship to Patient:: Family  Contact Method: Phone  Phone Number: [phone] (Mobile)  Reason/Outcome: Discharge Planning, Emergency Contact    Requested Home Health Care         Is the patient interested in HHC at discharge?: No    DME Referral Provided  Referral made for DME?: No    Other Referral/Resources/Interventions Provided:  Interventions: Transportation, Short Term Rehab  Referral Comments: [facility] Reserved in [facility]    Would you like to participate in our [facility] service program?  : No - Declined    Treatment Team Recommendation: Short Term Rehab  Discharge Destination Plan:: Short Term Rehab  Transport at Discharge : [facility]     Number/Name of Dispatcher: [facility]  Transported by (Company and Unit #): [employer]  ETA of Transport (Date): 10/29/24  ETA of Transport (Time): 1700              IMM Given (Date):: 10/29/24  IMM Given to:: Family  Family notified:: Reviewed with patient's wife       IMM reviewed with patient, patient agrees with discharge determination.    CM messaged [facility], they are able to accept patient today with colostomy supplies and day of tube feed being sent with patient.  CM confirmed with RN this is possible.     [facility] contacted [facility] FOSTER [phone], CM left voicemail informing her that patient will DC back to [facility].    CM spoke with [facility] [phone], [facility] arranged [facility] via [employer] for 10.29.24 at  5:00pm. CM notified SLIM, RN, facility, and patient's wife.  MN placed in chart.  If any issues with transport, [facility] can be reached at [phone].

## 2024-10-29 NOTE — ASSESSMENT & PLAN NOTE
Has chronic anemia likely secondary to poor nutritional status underlying chronic illness.   Recent Labs     10/28/24  0616 10/29/24  0519   HGB 7.3* 7.2*      Patient was transfused 1 unit of packed red blood cells on 10/24 for hemoglobin of 6.2  Patient with chronic anemia, acutely worse with expected procedural blood loss  After transfusion hemoglobin returned to baseline of approximately 7-8

## 2024-10-30 NOTE — UTILIZATION REVIEW
NOTIFICATION OF ADMISSION DISCHARGE   This is a Notification of Discharge from [facility]. Please be advised that this patient has been discharge from our facility. Below you will find the admission and discharge date and time including the patient’s disposition.   UTILIZATION REVIEW CONTACT:  Anne Davila MA  Utilization   Network Utilization Review Department  Phone: [phone] x carefully listen to the prompts. All voicemails are confidential.  Email: [email or web address]     ADMISSION INFORMATION  PRESENTATION DATE: 10/18/2024  1:24 PM  OBERVATION ADMISSION DATE: N/A  INPATIENT ADMISSION DATE: 10/18/24  6:44 PM   DISCHARGE DATE: 10/29/2024  5:31 PM   DISPOSITION:Non [facility] SNF/TCU/SNU    Network Utilization Review Department  ATTENTION: Please call with any questions or concerns to [phone] and carefully listen to the prompts so that you are directed to the right person. All voicemails are confidential.   For Discharge needs, contact Care Management DC Support Team at [phone] opt. 2  Send all requests for admission clinical reviews, approved or denied determinations and any other requests to dedicated fax number below belonging to the campus where the patient is receiving treatment. List of dedicated fax numbers for the Facilities:  FACILITY NAME UR FAX NUMBER   ADMISSION DENIALS (Administrative/Medical Necessity) [phone]   DISCHARGE SUPPORT TEAM ([facility]) [phone]   PARENT CHILD HEALTH (Maternity/NICU/Pediatrics) [phone]   [facility] [phone]   [facility] [phone]   [facility] [phone]   [facility] [phone]   [facility] [phone]   [facility] [phone]   [facility] [phone]   [facility] [phone]   [facility] [phone]   [facility] [phone]   [facility] [phone]   [facility] [phone]

## 2024-11-07 ENCOUNTER — OFFICE VISIT (OUTPATIENT)
Dept: SURGERY | Facility: CLINIC | Age: 74
End: 2024-11-07

## 2024-11-07 VITALS — OXYGEN SATURATION: 96 % | TEMPERATURE: 97.9 F | RESPIRATION RATE: 20 BRPM

## 2024-11-07 DIAGNOSIS — L98.423 SKIN ULCER OF SACRUM WITH NECROSIS OF MUSCLE (HCC): Primary | ICD-10-CM

## 2024-11-07 DIAGNOSIS — Z93.1 S/P PERCUTANEOUS ENDOSCOPIC GASTROSTOMY (PEG) TUBE PLACEMENT (HCC): ICD-10-CM

## 2024-11-07 DIAGNOSIS — Z93.3 COLOSTOMY IN PLACE (HCC): ICD-10-CM

## 2024-11-07 PROCEDURE — 99024 POSTOP FOLLOW-UP VISIT: CPT | Performed by: SURGERY

## 2024-11-11 PROBLEM — Z93.3 COLOSTOMY IN PLACE (HCC): Status: ACTIVE | Noted: 2024-11-11

## 2024-11-11 NOTE — PROGRESS NOTES
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 male who presents s/p I&D of sacral pressure ulcer and PEG with diverting colostomy. He has a hx of CVA with significant cognitive and motor deficits, but he is currently at his baseline health and tolerated tube feeds with functioning mature colostomy.    History obtained from : patient's Significant Other  Review of Systems   Reason unable to perform ROS: From Wife and caretakers.   Constitutional: Negative.    Gastrointestinal: Negative.  Negative for abdominal pain and diarrhea.       Past Medical History   Past Medical History:   Diagnosis Date    Atherosclerotic heart disease of native coronary artery without angina pectoris     Atrial fibrillation (HCC)     Cerebral infarction (HCC)     Constipation     Depression     Diabetes mellitus (HCC)     Hemiplegia and hemiparesis following cerebral infarction affecting left non-dominant side (HCC)     Hyperlipidemia     Hypertension     Pressure ulcer of sacral region, stage 3 (HCC)     Prostatic hyperplasia     Sepsis (HCC)     Stroke (HCC)     TIA (transient ischemic attack)     Urinary retention     Vitamin D deficiency      Past Surgical History:   Procedure Laterality Date    COLOSTOMY N/A 10/23/2024    Procedure: Lap loop colostomy, psb open loop colostomy;  Surgeon: Davonte Banegas DO;  Location: [facility];  Service: General    GASTROSTOMY TUBE PLACEMENT N/A 10/23/2024    Procedure:  INSERTION PEG TUBE, psb lap gastrostomy tube placement;  Surgeon: Davonte Banegas DO;  Location: [facility];  Service: General    INCISION AND DRAINAGE OF WOUND N/A 10/21/2024    Procedure: INCISION AND DRAINAGE (I&D) BUTTOCK, SACRAL WOUND DEBRIDEMENT, COCCYGECTOMY;  Surgeon: Beka King MD;  Location: [facility];  Service: General    WOUND DEBRIDEMENT N/A 10/23/2024    Procedure: DEBRIDEMENT WOUND (WASH OUT), sacrum;  Surgeon: Davonte Banegas DO;  Location: [facility];  Service: General     No family history on file.  Current Outpatient Medications on File Prior to Visit   Medication Sig Dispense Refill    ASPIRIN 81 PO Take 81 mg by mouth daily      atorvastatin (LIPITOR) 40 mg tablet Take 2 tablets (80 mg total) by mouth every evening Per AVS, 80mg daily (Patient taking differently: Take 80 mg by mouth every evening Per AVS, 80mg daily)      bisacodyl (Bisacodyl Laxative) 10 mg suppository Insert 10 mg into the rectum daily as needed for constipation (if no results from mom).      bisacodyl (FLEET) 10 MG/30ML ENEM Insert 10 mg into the rectum daily as needed for constipation Only use if no response from Dulcolax after 2 hours      Calcium Carb-Cholecalciferol (calcium carbonate-vitamin D) 500 mg-5 mcg tablet Take 1 tablet by mouth 2 (two) times a day with meals      Cholecalciferol (Vitamin D3) 50 MCG (2000 UT) CAPS Take 2,000 Units by mouth daily      collagenase (SANTYL) ointment Apply topically daily      docusate sodium (COLACE) 100 mg capsule Take 100 mg by mouth daily Per OSR 9/17, take 2 capsules by mouth once daily.      Ertugliflozin L-PyroglutamicAc (Steglatro) 15 MG TABS Take 15 mg by mouth daily      escitalopram (LEXAPRO) 10 mg tablet Take 10 mg by mouth daily      ezetimibe (ZETIA) 10 mg tablet Take 10 mg by mouth daily      finasteride (PROSCAR) 5 mg tablet Take 1 tablet (5 mg total) by mouth daily (Patient taking differently: Take 5 mg by mouth daily)      Glucagon, rDNA,  (Glucagon Emergency) 1 MG KIT Inject as directed      glucose 40 % Take 15 g by mouth once      lisinopril (ZESTRIL) 10 mg tablet Take 1 tablet (10 mg total) by mouth daily (Patient taking differently: Take 10 mg by mouth daily) 30 tablet 0    magnesium hydroxide (Milk of Magnesia) 400 mg/5 mL oral suspension Take 30 mL by mouth daily as needed for constipation Use if no bowel movement x 3 days. Give at bedtime. Separate from other medications x 2 hours.      metoprolol tartrate (LOPRESSOR) 25 mg tablet 0.5 tablets (12.5 mg total) by Per G Tube route every 12 (twelve) hours      mirtazapine (REMERON) 15 mg tablet Take 1 tablet (15 mg total) by mouth daily at bedtime      MULTIPLE VITAMIN PO Take 1 tablet by mouth in the morning. Indications: Vitamin A deficiency      nystatin (MYCOSTATIN) 500,000 units/5 mL suspension Apply 500,000 Units to the mouth or throat 4 (four) times a day      Polyethylene Glycol 1000 POWD Take 17 g by mouth daily as needed (constipation) Dissolve in 4-8oz of water, juice, coffee or tea      tamsulosin (FLOMAX) 0.4 mg Take 0.4 mg by mouth daily      thiamine 100 MG tablet 1 tablet (100 mg total) by Per G Tube route daily      acetaminophen (TYLENOL) 325 mg tablet Take 650 mg by mouth every 4 (four) hours as needed for fever or mild pain.      apixaban (Eliquis) 5 mg Take 1 tablet (5 mg total) by mouth 2 (two) times a day 180 tablet 3    potassium chloride (MICRO-K) 10 MEQ CR capsule Take 2 capsules (20 mEq total) by mouth daily for 2 days      senna (Senna-Tabs) 8.6 MG tablet Take 2 tablets by mouth daily as needed for constipation. (Patient not taking: Reported on 11/7/2024)      Silver (SilvaSorb) GEL Apply 1 Application topically daily. Apply to buttock wound bed daily. (Patient not taking: Reported on 10/18/2024)       No current facility-administered medications on file prior to visit.     Allergies   Allergen Reactions    Primaquine Other (See Comments) and Hives      Current  Outpatient Medications on File Prior to Visit   Medication Sig Dispense Refill    ASPIRIN 81 PO Take 81 mg by mouth daily      atorvastatin (LIPITOR) 40 mg tablet Take 2 tablets (80 mg total) by mouth every evening Per AVS, 80mg daily (Patient taking differently: Take 80 mg by mouth every evening Per AVS, 80mg daily)      bisacodyl (Bisacodyl Laxative) 10 mg suppository Insert 10 mg into the rectum daily as needed for constipation (if no results from mom).      bisacodyl (FLEET) 10 MG/30ML ENEM Insert 10 mg into the rectum daily as needed for constipation Only use if no response from Dulcolax after 2 hours      Calcium Carb-Cholecalciferol (calcium carbonate-vitamin D) 500 mg-5 mcg tablet Take 1 tablet by mouth 2 (two) times a day with meals      Cholecalciferol (Vitamin D3) 50 MCG (2000 UT) CAPS Take 2,000 Units by mouth daily      collagenase (SANTYL) ointment Apply topically daily      docusate sodium (COLACE) 100 mg capsule Take 100 mg by mouth daily Per OSR 9/17, take 2 capsules by mouth once daily.      Ertugliflozin L-PyroglutamicAc (Steglatro) 15 MG TABS Take 15 mg by mouth daily      escitalopram (LEXAPRO) 10 mg tablet Take 10 mg by mouth daily      ezetimibe (ZETIA) 10 mg tablet Take 10 mg by mouth daily      finasteride (PROSCAR) 5 mg tablet Take 1 tablet (5 mg total) by mouth daily (Patient taking differently: Take 5 mg by mouth daily)      Glucagon, rDNA, (Glucagon Emergency) 1 MG KIT Inject as directed      glucose 40 % Take 15 g by mouth once      lisinopril (ZESTRIL) 10 mg tablet Take 1 tablet (10 mg total) by mouth daily (Patient taking differently: Take 10 mg by mouth daily) 30 tablet 0    magnesium hydroxide (Milk of Magnesia) 400 mg/5 mL oral suspension Take 30 mL by mouth daily as needed for constipation Use if no bowel movement x 3 days. Give at bedtime. Separate from other medications x 2 hours.      metoprolol tartrate (LOPRESSOR) 25 mg tablet 0.5 tablets (12.5 mg total) by Per G Tube route  every 12 (twelve) hours      mirtazapine (REMERON) 15 mg tablet Take 1 tablet (15 mg total) by mouth daily at bedtime      MULTIPLE VITAMIN PO Take 1 tablet by mouth in the morning. Indications: Vitamin A deficiency      nystatin (MYCOSTATIN) 500,000 units/5 mL suspension Apply 500,000 Units to the mouth or throat 4 (four) times a day      Polyethylene Glycol 1000 POWD Take 17 g by mouth daily as needed (constipation) Dissolve in 4-8oz of water, juice, coffee or tea      tamsulosin (FLOMAX) 0.4 mg Take 0.4 mg by mouth daily      thiamine 100 MG tablet 1 tablet (100 mg total) by Per G Tube route daily      acetaminophen (TYLENOL) 325 mg tablet Take 650 mg by mouth every 4 (four) hours as needed for fever or mild pain.      apixaban (Eliquis) 5 mg Take 1 tablet (5 mg total) by mouth 2 (two) times a day 180 tablet 3    potassium chloride (MICRO-K) 10 MEQ CR capsule Take 2 capsules (20 mEq total) by mouth daily for 2 days      senna (Senna-Tabs) 8.6 MG tablet Take 2 tablets by mouth daily as needed for constipation. (Patient not taking: Reported on 11/7/2024)      Silver (SilvaSorb) GEL Apply 1 Application topically daily. Apply to buttock wound bed daily. (Patient not taking: Reported on 10/18/2024)       No current facility-administered medications on file prior to visit.      Social History     Tobacco Use    Smoking status: Never     Passive exposure: Never    Smokeless tobacco: Never   Vaping Use    Vaping status: Never Used   Substance and Sexual Activity    Alcohol use: Not Currently    Drug use: Never    Sexual activity: Not on file         Objective     Temp 97.9 °F (36.6 °C) (Temporal)   Resp 20   SpO2 96%     Physical Exam  Constitutional:       General: He is not in acute distress.  Abdominal:      General: Abdomen is flat. There is no distension.      Palpations: Abdomen is soft. There is no mass.      Comments: PEG in place - Site within normal limits  Colostomy mature and functioning   Skin:      Comments: VAC in place  No periwound necrosis or edema  No purulent drainage

## 2024-11-23 ENCOUNTER — HOSPITAL ENCOUNTER (INPATIENT)
Facility: HOSPITAL | Age: 74
LOS: 4 days | Discharge: NURSING, REHAB OR LONG-TERM CARE | DRG: 570 | End: 2024-11-27
Attending: EMERGENCY MEDICINE | Admitting: INTERNAL MEDICINE
Payer: COMMERCIAL

## 2024-11-23 ENCOUNTER — APPOINTMENT (EMERGENCY)
Dept: CT IMAGING | Facility: HOSPITAL | Age: 74
DRG: 570 | End: 2024-11-23
Payer: COMMERCIAL

## 2024-11-23 DIAGNOSIS — S31.000D WOUND OF SACRAL REGION, SUBSEQUENT ENCOUNTER: ICD-10-CM

## 2024-11-23 DIAGNOSIS — R00.0 TACHYCARDIA: Primary | ICD-10-CM

## 2024-11-23 DIAGNOSIS — D72.829 LEUKOCYTOSIS: ICD-10-CM

## 2024-11-23 DIAGNOSIS — L98.423 SKIN ULCER OF SACRUM WITH NECROSIS OF MUSCLE (HCC): ICD-10-CM

## 2024-11-23 PROBLEM — E87.1 HYPONATREMIA: Status: ACTIVE | Noted: 2024-11-23

## 2024-11-23 PROBLEM — E87.5 HYPERKALEMIA: Status: ACTIVE | Noted: 2024-11-23

## 2024-11-23 PROBLEM — L89.154 DECUBITUS ULCER OF SACRAL REGION, STAGE 4 (HCC): Status: ACTIVE | Noted: 2024-08-23

## 2024-11-23 LAB
2HR DELTA HS TROPONIN: -1 NG/L
ALBUMIN SERPL BCG-MCNC: 2.7 G/DL (ref 3.5–5)
ALP SERPL-CCNC: 85 U/L (ref 34–104)
ALT SERPL W P-5'-P-CCNC: 24 U/L (ref 7–52)
AMORPH URATE CRY URNS QL MICRO: ABNORMAL
ANION GAP SERPL CALCULATED.3IONS-SCNC: 4 MMOL/L (ref 4–13)
AST SERPL W P-5'-P-CCNC: 58 U/L (ref 13–39)
ATRIAL RATE: 113 BPM
ATRIAL RATE: 91 BPM
BACTERIA UR QL AUTO: ABNORMAL /HPF
BASE EX.OXY STD BLDV CALC-SCNC: 42.5 % (ref 60–80)
BASE EXCESS BLDV CALC-SCNC: 2.8 MMOL/L
BASOPHILS # BLD AUTO: 0.07 THOUSANDS/ÂΜL (ref 0–0.1)
BASOPHILS NFR BLD AUTO: 1 % (ref 0–1)
BILIRUB DIRECT SERPL-MCNC: 0.16 MG/DL (ref 0–0.2)
BILIRUB SERPL-MCNC: 0.4 MG/DL (ref 0.2–1)
BILIRUB UR QL STRIP: NEGATIVE
BNP SERPL-MCNC: 66 PG/ML (ref 0–100)
BUN SERPL-MCNC: 30 MG/DL (ref 5–25)
CALCIUM SERPL-MCNC: 9.6 MG/DL (ref 8.4–10.2)
CARDIAC TROPONIN I PNL SERPL HS: 4 NG/L (ref ?–50)
CARDIAC TROPONIN I PNL SERPL HS: 5 NG/L (ref ?–50)
CHLORIDE SERPL-SCNC: 96 MMOL/L (ref 96–108)
CLARITY UR: CLEAR
CO2 SERPL-SCNC: 30 MMOL/L (ref 21–32)
COLOR UR: YELLOW
CREAT SERPL-MCNC: 0.77 MG/DL (ref 0.6–1.3)
EOSINOPHIL # BLD AUTO: 0.24 THOUSAND/ÂΜL (ref 0–0.61)
EOSINOPHIL NFR BLD AUTO: 2 % (ref 0–6)
ERYTHROCYTE [DISTWIDTH] IN BLOOD BY AUTOMATED COUNT: 15.7 % (ref 11.6–15.1)
GFR SERPL CREATININE-BSD FRML MDRD: 88 ML/MIN/1.73SQ M
GLUCOSE SERPL-MCNC: 194 MG/DL (ref 65–140)
GLUCOSE SERPL-MCNC: 216 MG/DL (ref 65–140)
GLUCOSE SERPL-MCNC: 264 MG/DL (ref 65–140)
GLUCOSE UR STRIP-MCNC: ABNORMAL MG/DL
HCO3 BLDV-SCNC: 28.7 MMOL/L (ref 24–30)
HCT VFR BLD AUTO: 31.1 % (ref 36.5–49.3)
HGB BLD-MCNC: 9.5 G/DL (ref 12–17)
HGB UR QL STRIP.AUTO: ABNORMAL
IMM GRANULOCYTES # BLD AUTO: 0.1 THOUSAND/UL (ref 0–0.2)
IMM GRANULOCYTES NFR BLD AUTO: 1 % (ref 0–2)
KETONES UR STRIP-MCNC: NEGATIVE MG/DL
LACTATE SERPL-SCNC: 1.6 MMOL/L (ref 0.5–2)
LEUKOCYTE ESTERASE UR QL STRIP: ABNORMAL
LIPASE SERPL-CCNC: 121 U/L (ref 11–82)
LYMPHOCYTES # BLD AUTO: 2.83 THOUSANDS/ÂΜL (ref 0.6–4.47)
LYMPHOCYTES NFR BLD AUTO: 22 % (ref 14–44)
MAGNESIUM SERPL-MCNC: 2.2 MG/DL (ref 1.9–2.7)
MCH RBC QN AUTO: 28.5 PG (ref 26.8–34.3)
MCHC RBC AUTO-ENTMCNC: 30.5 G/DL (ref 31.4–37.4)
MCV RBC AUTO: 93 FL (ref 82–98)
MONOCYTES # BLD AUTO: 0.8 THOUSAND/ÂΜL (ref 0.17–1.22)
MONOCYTES NFR BLD AUTO: 6 % (ref 4–12)
NEUTROPHILS # BLD AUTO: 9.11 THOUSANDS/ÂΜL (ref 1.85–7.62)
NEUTS SEG NFR BLD AUTO: 68 % (ref 43–75)
NITRITE UR QL STRIP: NEGATIVE
NON-SQ EPI CELLS URNS QL MICRO: ABNORMAL /HPF
NRBC BLD AUTO-RTO: 0 /100 WBCS
O2 CT BLDV-SCNC: 6.3 ML/DL
P AXIS: 77 DEGREES
P AXIS: 78 DEGREES
PCO2 BLDV: 50.3 MM HG (ref 42–50)
PH BLDV: 7.37 [PH] (ref 7.3–7.4)
PH UR STRIP.AUTO: 6.5 [PH]
PLATELET # BLD AUTO: 593 THOUSANDS/UL (ref 149–390)
PMV BLD AUTO: 9.2 FL (ref 8.9–12.7)
PO2 BLDV: 28 MM HG (ref 35–45)
POTASSIUM SERPL-SCNC: 4.9 MMOL/L (ref 3.5–5.3)
POTASSIUM SERPL-SCNC: 5.8 MMOL/L (ref 3.5–5.3)
PR INTERVAL: 150 MS
PR INTERVAL: 164 MS
PROCALCITONIN SERPL-MCNC: 0.13 NG/ML
PROT SERPL-MCNC: 9.5 G/DL (ref 6.4–8.4)
PROT UR STRIP-MCNC: ABNORMAL MG/DL
QRS AXIS: 43 DEGREES
QRS AXIS: 66 DEGREES
QRSD INTERVAL: 70 MS
QRSD INTERVAL: 70 MS
QT INTERVAL: 328 MS
QT INTERVAL: 352 MS
QTC INTERVAL: 432 MS
QTC INTERVAL: 449 MS
RBC # BLD AUTO: 3.33 MILLION/UL (ref 3.88–5.62)
RBC #/AREA URNS AUTO: ABNORMAL /HPF
SODIUM SERPL-SCNC: 130 MMOL/L (ref 135–147)
SP GR UR STRIP.AUTO: 1.02 (ref 1–1.03)
T WAVE AXIS: 81 DEGREES
T WAVE AXIS: 82 DEGREES
UROBILINOGEN UR STRIP-ACNC: <2 MG/DL
VENTRICULAR RATE: 113 BPM
VENTRICULAR RATE: 91 BPM
WBC # BLD AUTO: 13.15 THOUSAND/UL (ref 4.31–10.16)
WBC #/AREA URNS AUTO: ABNORMAL /HPF
WBC CLUMPS # UR AUTO: PRESENT /UL

## 2024-11-23 PROCEDURE — 93005 ELECTROCARDIOGRAM TRACING: CPT

## 2024-11-23 PROCEDURE — 84484 ASSAY OF TROPONIN QUANT: CPT | Performed by: EMERGENCY MEDICINE

## 2024-11-23 PROCEDURE — NC001 PR NO CHARGE: Performed by: SPECIALIST

## 2024-11-23 PROCEDURE — 87081 CULTURE SCREEN ONLY: CPT | Performed by: INTERNAL MEDICINE

## 2024-11-23 PROCEDURE — 71260 CT THORAX DX C+: CPT

## 2024-11-23 PROCEDURE — 96361 HYDRATE IV INFUSION ADD-ON: CPT

## 2024-11-23 PROCEDURE — 99285 EMERGENCY DEPT VISIT HI MDM: CPT

## 2024-11-23 PROCEDURE — 87077 CULTURE AEROBIC IDENTIFY: CPT | Performed by: EMERGENCY MEDICINE

## 2024-11-23 PROCEDURE — 83880 ASSAY OF NATRIURETIC PEPTIDE: CPT | Performed by: EMERGENCY MEDICINE

## 2024-11-23 PROCEDURE — 80076 HEPATIC FUNCTION PANEL: CPT | Performed by: EMERGENCY MEDICINE

## 2024-11-23 PROCEDURE — 0JB70ZZ EXCISION OF BACK SUBCUTANEOUS TISSUE AND FASCIA, OPEN APPROACH: ICD-10-PCS

## 2024-11-23 PROCEDURE — 83735 ASSAY OF MAGNESIUM: CPT | Performed by: EMERGENCY MEDICINE

## 2024-11-23 PROCEDURE — 84132 ASSAY OF SERUM POTASSIUM: CPT | Performed by: INTERNAL MEDICINE

## 2024-11-23 PROCEDURE — 81001 URINALYSIS AUTO W/SCOPE: CPT | Performed by: EMERGENCY MEDICINE

## 2024-11-23 PROCEDURE — 82805 BLOOD GASES W/O2 SATURATION: CPT | Performed by: EMERGENCY MEDICINE

## 2024-11-23 PROCEDURE — 96360 HYDRATION IV INFUSION INIT: CPT

## 2024-11-23 PROCEDURE — 99285 EMERGENCY DEPT VISIT HI MDM: CPT | Performed by: EMERGENCY MEDICINE

## 2024-11-23 PROCEDURE — 87186 SC STD MICRODIL/AGAR DIL: CPT | Performed by: EMERGENCY MEDICINE

## 2024-11-23 PROCEDURE — 83690 ASSAY OF LIPASE: CPT | Performed by: EMERGENCY MEDICINE

## 2024-11-23 PROCEDURE — 74177 CT ABD & PELVIS W/CONTRAST: CPT

## 2024-11-23 PROCEDURE — 83605 ASSAY OF LACTIC ACID: CPT | Performed by: EMERGENCY MEDICINE

## 2024-11-23 PROCEDURE — 87147 CULTURE TYPE IMMUNOLOGIC: CPT | Performed by: EMERGENCY MEDICINE

## 2024-11-23 PROCEDURE — 80048 BASIC METABOLIC PNL TOTAL CA: CPT | Performed by: EMERGENCY MEDICINE

## 2024-11-23 PROCEDURE — 36415 COLL VENOUS BLD VENIPUNCTURE: CPT | Performed by: EMERGENCY MEDICINE

## 2024-11-23 PROCEDURE — 99223 1ST HOSP IP/OBS HIGH 75: CPT | Performed by: INTERNAL MEDICINE

## 2024-11-23 PROCEDURE — 82948 REAGENT STRIP/BLOOD GLUCOSE: CPT

## 2024-11-23 PROCEDURE — 85025 COMPLETE CBC W/AUTO DIFF WBC: CPT | Performed by: EMERGENCY MEDICINE

## 2024-11-23 PROCEDURE — 93010 ELECTROCARDIOGRAM REPORT: CPT | Performed by: INTERNAL MEDICINE

## 2024-11-23 PROCEDURE — 84145 PROCALCITONIN (PCT): CPT | Performed by: EMERGENCY MEDICINE

## 2024-11-23 PROCEDURE — 87086 URINE CULTURE/COLONY COUNT: CPT | Performed by: EMERGENCY MEDICINE

## 2024-11-23 PROCEDURE — 87040 BLOOD CULTURE FOR BACTERIA: CPT | Performed by: EMERGENCY MEDICINE

## 2024-11-23 RX ORDER — ATORVASTATIN CALCIUM 80 MG/1
80 TABLET, FILM COATED ORAL EVERY EVENING
Status: DISCONTINUED | OUTPATIENT
Start: 2024-11-23 | End: 2024-11-27 | Stop reason: HOSPADM

## 2024-11-23 RX ORDER — ACETAMINOPHEN 325 MG/1
650 TABLET ORAL EVERY 6 HOURS PRN
Status: DISCONTINUED | OUTPATIENT
Start: 2024-11-23 | End: 2024-11-27 | Stop reason: HOSPADM

## 2024-11-23 RX ORDER — INSULIN LISPRO 100 [IU]/ML
1-5 INJECTION, SOLUTION INTRAVENOUS; SUBCUTANEOUS
Status: DISCONTINUED | OUTPATIENT
Start: 2024-11-23 | End: 2024-11-23

## 2024-11-23 RX ORDER — INSULIN LISPRO 100 [IU]/ML
1-5 INJECTION, SOLUTION INTRAVENOUS; SUBCUTANEOUS EVERY 6 HOURS
Status: DISCONTINUED | OUTPATIENT
Start: 2024-11-23 | End: 2024-11-24 | Stop reason: SDUPTHER

## 2024-11-23 RX ORDER — SODIUM CHLORIDE 9 MG/ML
75 INJECTION, SOLUTION INTRAVENOUS CONTINUOUS
Status: DISCONTINUED | OUTPATIENT
Start: 2024-11-23 | End: 2024-11-26

## 2024-11-23 RX ORDER — ASPIRIN 81 MG/1
81 TABLET, CHEWABLE ORAL DAILY
Status: DISCONTINUED | OUTPATIENT
Start: 2024-11-23 | End: 2024-11-27 | Stop reason: HOSPADM

## 2024-11-23 RX ORDER — LANOLIN ALCOHOL/MO/W.PET/CERES
100 CREAM (GRAM) TOPICAL DAILY
Status: DISCONTINUED | OUTPATIENT
Start: 2024-11-23 | End: 2024-11-27 | Stop reason: HOSPADM

## 2024-11-23 RX ORDER — LISINOPRIL 10 MG/1
10 TABLET ORAL DAILY
Status: DISCONTINUED | OUTPATIENT
Start: 2024-11-23 | End: 2024-11-27 | Stop reason: HOSPADM

## 2024-11-23 RX ORDER — INSULIN LISPRO 100 [IU]/ML
3 INJECTION, SOLUTION INTRAVENOUS; SUBCUTANEOUS 4 TIMES DAILY
COMMUNITY
Start: 2024-11-12

## 2024-11-23 RX ORDER — BISACODYL 10 MG
10 SUPPOSITORY, RECTAL RECTAL DAILY PRN
Status: DISCONTINUED | OUTPATIENT
Start: 2024-11-23 | End: 2024-11-27 | Stop reason: HOSPADM

## 2024-11-23 RX ORDER — ESCITALOPRAM OXALATE 10 MG/1
10 TABLET ORAL DAILY
Status: DISCONTINUED | OUTPATIENT
Start: 2024-11-23 | End: 2024-11-27 | Stop reason: HOSPADM

## 2024-11-23 RX ORDER — EZETIMIBE 10 MG/1
10 TABLET ORAL DAILY
Status: DISCONTINUED | OUTPATIENT
Start: 2024-11-23 | End: 2024-11-27 | Stop reason: HOSPADM

## 2024-11-23 RX ORDER — MIRTAZAPINE 15 MG/1
15 TABLET, FILM COATED ORAL
Status: DISCONTINUED | OUTPATIENT
Start: 2024-11-23 | End: 2024-11-27 | Stop reason: HOSPADM

## 2024-11-23 RX ORDER — SENNOSIDES 8.6 MG
2 TABLET ORAL
Status: DISCONTINUED | OUTPATIENT
Start: 2024-11-23 | End: 2024-11-27 | Stop reason: HOSPADM

## 2024-11-23 RX ORDER — ONDANSETRON 2 MG/ML
4 INJECTION INTRAMUSCULAR; INTRAVENOUS EVERY 6 HOURS PRN
Status: DISCONTINUED | OUTPATIENT
Start: 2024-11-23 | End: 2024-11-27 | Stop reason: HOSPADM

## 2024-11-23 RX ORDER — SODIUM HYPOCHLORITE 2.5 MG/ML
1 SOLUTION TOPICAL DAILY
Status: DISCONTINUED | OUTPATIENT
Start: 2024-11-23 | End: 2024-11-23

## 2024-11-23 RX ADMIN — THIAMINE HCL TAB 100 MG 100 MG: 100 TAB at 15:25

## 2024-11-23 RX ADMIN — CEFEPIME 2000 MG: 2 INJECTION, POWDER, FOR SOLUTION INTRAVENOUS at 12:30

## 2024-11-23 RX ADMIN — SODIUM CHLORIDE 75 ML/HR: 0.9 INJECTION, SOLUTION INTRAVENOUS at 15:10

## 2024-11-23 RX ADMIN — CEFEPIME 2000 MG: 2 INJECTION, POWDER, FOR SOLUTION INTRAVENOUS at 23:34

## 2024-11-23 RX ADMIN — INSULIN LISPRO 1 UNITS: 100 INJECTION, SOLUTION INTRAVENOUS; SUBCUTANEOUS at 16:47

## 2024-11-23 RX ADMIN — MIRTAZAPINE 15 MG: 15 TABLET, FILM COATED ORAL at 22:02

## 2024-11-23 RX ADMIN — APIXABAN 5 MG: 5 TABLET, FILM COATED ORAL at 16:47

## 2024-11-23 RX ADMIN — ATORVASTATIN CALCIUM 80 MG: 80 TABLET, FILM COATED ORAL at 17:03

## 2024-11-23 RX ADMIN — INSULIN LISPRO 2 UNITS: 100 INJECTION, SOLUTION INTRAVENOUS; SUBCUTANEOUS at 23:48

## 2024-11-23 RX ADMIN — ESCITALOPRAM OXALATE 10 MG: 10 TABLET ORAL at 15:24

## 2024-11-23 RX ADMIN — IOHEXOL 100 ML: 350 INJECTION, SOLUTION INTRAVENOUS at 09:24

## 2024-11-23 RX ADMIN — SENNOSIDES 17.2 MG: 8.6 TABLET, FILM COATED ORAL at 22:02

## 2024-11-23 RX ADMIN — EZETIMIBE 10 MG: 10 TABLET ORAL at 15:25

## 2024-11-23 RX ADMIN — ASPIRIN 81 MG CHEWABLE TABLET 81 MG: 81 TABLET CHEWABLE at 15:02

## 2024-11-23 RX ADMIN — SODIUM CHLORIDE 500 ML: 0.9 INJECTION, SOLUTION INTRAVENOUS at 10:22

## 2024-11-23 RX ADMIN — SODIUM CHLORIDE 500 ML: 0.9 INJECTION, SOLUTION INTRAVENOUS at 08:45

## 2024-11-23 NOTE — ED PROVIDER NOTES
Time reflects when diagnosis was documented in both MDM as applicable and the Disposition within this note       Time User Action Codes Description Comment    11/23/2024 12:12 PM Link, Shelton E Add [R00.0] Tachycardia     11/23/2024 12:12 PM Link Shelton E Add [D72.829] Leukocytosis     11/23/2024 12:13 PM Link Shelton E Add [S31.000A] Wound of sacral region, initial encounter     11/23/2024 12:13 PM LinkShelton marin E Remove [S31.000A] Wound of sacral region, initial encounter     11/23/2024 12:13 PM Link Shelton E Add [S31.000D] Wound of sacral region, subsequent encounter     11/23/2024 12:13 PM LinkEn marinick E Add [L98.423] 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 Ba .               Assessment & Plan       Medical Decision Making  1. Medical problem - Patient offers no complaints, concern from facility for possible sepsis, patient is mildly tachycardic on arrival. Will order ECG and troponin to rule out acute MI, CBC for leukocytosis, metabolic panel for electrolyte abnormalities and dehydration,  LFT's to assess GB dysfunction, lipase for pancreatitis, lactic acid and procalcitonin, urine for infection.     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 Decision-making details documented in ED Course.  Radiology: ordered.    Risk  Prescription drug management.  Decision regarding hospitalization.        ED Course as of 11/23/24 1620   Sat Nov 23, 2024   0904 WBC(!): 13.15   0920 Potassium(!): 5.8   0957 ECG evaluated by myself,  interpretation included in procedure section of note.    1018 Bacteria, UA: None Seen       Medications   sodium chloride 0.9 % bolus 500 mL (0 mL Intravenous Stopped 11/23/24 0933)   iohexol (OMNIPAQUE) 350 MG/ML injection (MULTI-DOSE) 100 mL (100 mL Intravenous Given 11/23/24 0924)   sodium chloride 0.9 % bolus 500 mL (0 mL Intravenous Stopped 11/23/24 1218)   cefepime (MAXIPIME) 2 g/50 mL dextrose IVPB (0 mg Intravenous Stopped 11/23/24 1300)       ED Risk Strat Scores                           SBIRT 22yo+      Flowsheet Row Most Recent Value   Initial Alcohol Screen: US AUDIT-C     1. How often do you have a drink containing alcohol? 0 Filed at: 11/23/2024 1225   2. How many drinks containing alcohol do you have on a typical day you are drinking?  0 Filed at: 11/23/2024 1225   3a. Male UNDER 65: How often do you have five or more drinks on one occasion? 0 Filed at: 11/23/2024 1225   3b. FEMALE Any Age, or MALE 65+: How often do you have 4 or more drinks on one occassion? 0 Filed at: 11/23/2024 1225   Audit-C Score 0 Filed at: 11/23/2024 1225   NIR: How many times in the past year have you...    Used an illegal drug or used a prescription medication for non-medical reasons? Never Filed at: 11/23/2024 1225                            History of Present Illness       Chief Complaint   Patient presents with    Medical Problem     Patient arrives via ems from [facility]. Per staff at facility patients O2 was 78% on room air. EMS reported O2 in the 90s the whole ride here on room air. Facility is concerned for sepsis. Pt has no complaints        Past Medical History:   Diagnosis Date    Atherosclerotic heart disease of native coronary artery without angina pectoris     Atrial fibrillation (HCC)     Bacteremia     Cerebral infarction (HCC)     Constipation     Depression     Diabetes mellitus (HCC)     Hemiplegia and hemiparesis following cerebral infarction affecting left non-dominant side (HCC)     Hyperlipidemia      Hypertension     Osteomyelitis (HCC)     Pressure ulcer of sacral region, stage 3 (HCC)     Prostatic hyperplasia     Sepsis (HCC)     Stage 4 decubitus ulcer (HCC)     Stroke (HCC)     TIA (transient ischemic attack)     Urinary retention     Vitamin D deficiency       Past Surgical History:   Procedure Laterality Date    COLOSTOMY N/A 10/23/2024    Procedure: Lap loop colostomy, psb open loop colostomy;  Surgeon: Davonte Banegas DO;  Location: [facility];  Service: General    GASTROSTOMY TUBE PLACEMENT N/A 10/23/2024    Procedure: INSERTION PEG TUBE, psb lap gastrostomy tube placement;  Surgeon: Davonte Banegas DO;  Location: [facility];  Service: General    INCISION AND DRAINAGE OF WOUND N/A 10/21/2024    Procedure: INCISION AND DRAINAGE (I&D) BUTTOCK, SACRAL WOUND DEBRIDEMENT, COCCYGECTOMY;  Surgeon: Beka King MD;  Location: [facility];  Service: General    WOUND DEBRIDEMENT N/A 10/23/2024    Procedure: DEBRIDEMENT WOUND (WASH OUT), sacrum;  Surgeon: Davonte Banegas DO;  Location: [facility];  Service: General      History reviewed. No pertinent family history.   Social History     Tobacco Use    Smoking status: Never     Passive exposure: Never    Smokeless tobacco: Never   Vaping Use    Vaping status: Never Used   Substance Use Topics    Alcohol use: Not Currently    Drug use: Never      E-Cigarette/Vaping    E-Cigarette Use Never User       E-Cigarette/Vaping Substances    Nicotine No     THC No     CBD No     Flavoring No     Other No     Unknown No       I have reviewed and agree with the history as documented.     74 YO male presents from a care facility with concern for sepsis. Patient is bedbound due to previous CVA with Left hemiplegia, he has an indwelling Parra catheter, feeding tube and ostomy. Per EMS patient was found to be hypoxic by pulse oximetry, this prompted a call to emergency services. EMS states, on arrival they found the pulse oximetry probe was on the  same extremity as the blood pressure cuff. During EMS transport patient's pulse ox remained above 90% and on initial physician evaluation it was 98% on room air. Patient admits to occaional shortness of breath and chills, currently denies any new symptoms. He does have a stage IV sacral wound that has a VACC in place, patient's facility unhooked the dressing from the mechanism.       History provided by:  Patient   used: No        Review of Systems   Constitutional:  Negative for fever.   HENT:  Negative for dental problem.    Eyes:  Negative for visual disturbance.   Respiratory:  Negative for shortness of breath.    Cardiovascular:  Negative for chest pain.   Gastrointestinal:  Negative for abdominal pain, nausea and vomiting.   Genitourinary:  Negative for dysuria and frequency.   Musculoskeletal:  Negative for neck pain and neck stiffness.   Skin:  Negative for rash.   Neurological:  Negative for dizziness, weakness and light-headedness.   Psychiatric/Behavioral:  Negative for agitation, behavioral problems and confusion.    All other systems reviewed and are negative.          Objective       ED Triage Vitals   Temperature Pulse Blood Pressure Respirations SpO2 Patient Position - Orthostatic VS   11/23/24 0831 11/23/24 0825 11/23/24 0825 11/23/24 0825 11/23/24 0825 11/23/24 0825   97.9 °F (36.6 °C) 105 102/65 19 98 % Lying      Temp Source Heart Rate Source BP Location FiO2 (%) Pain Score    11/23/24 0831 11/23/24 0900 11/23/24 0825 -- 11/23/24 1444    Oral Monitor Left arm  No Pain      Vitals      Date and Time Temp Pulse SpO2 Resp BP Pain Score FACES Pain Rating User   11/23/24 1444 -- -- -- -- -- No Pain --    11/23/24 1444 98 °F (36.7 °C) 68 100 % 20 112/70 -- -- DII   11/23/24 1444 -- -- -- -- -- No Pain -- KB   11/23/24 1415 -- 114 97 % 20 103/63 -- --    11/23/24 1345 -- 114 95 % 20 112/70 -- --    11/23/24 1315 -- 109 97 % 16 107/69 -- --    11/23/24 1230 -- 98 97 % 17  106/55 -- --    11/23/24 1130 -- 84 96 % 15 110/62 -- --    11/23/24 1045 -- 83 97 % 17 104/66 -- --    11/23/24 1030 -- 81 97 % 13 97/60 -- --    11/23/24 0945 -- 94 98 % 16 100/51 -- --    11/23/24 0900 -- 93 98 % 15 104/66 -- --    11/23/24 0845 -- 102 98 % 16 97/63 -- --    11/23/24 0831 97.9 °F (36.6 °C) -- -- -- -- -- --    11/23/24 0825 -- 105 98 % 19 102/65 -- --             Physical Exam  Vitals and nursing note reviewed.   Constitutional:       Appearance: He is well-developed. He is ill-appearing.   HENT:      Head: Normocephalic and atraumatic.   Eyes:      Extraocular Movements: Extraocular movements intact.   Cardiovascular:      Rate and Rhythm: Tachycardia present.      Pulses: Normal pulses.      Heart sounds: Normal heart sounds.   Pulmonary:      Effort: Pulmonary effort is normal.      Breath sounds: Normal breath sounds.   Abdominal:      General: Abdomen is flat. There is no distension.      Comments: G-tube site intact without signs of erythema or induration, colostomy with normal color stool, no erythema.   Genitourinary:     Comments: Indwelling hanks in place, erosion of penis.  Musculoskeletal:         General: Normal range of motion.      Cervical back: Normal range of motion.   Skin:     Findings: No rash.   Neurological:      Mental Status: He is alert and oriented to person, place, and time.   Psychiatric:         Behavior: Behavior normal.         Results Reviewed       Procedure Component Value Units Date/Time    Potassium [[id number]]  (Normal) Collected: 11/23/24 1312    Lab Status: Final result Specimen: Blood from Arm, Right Updated: 11/23/24 1334     Potassium 4.9 mmol/L     Blood culture #2 [[id number]] Collected: 11/23/24 1216    Lab Status: In process Specimen: Blood from Arm, Right Updated: 11/23/24 1222    Blood culture #1 [[id number]] Collected: 11/23/24 1216    Lab Status: In process Specimen: Blood from Arm, Right Updated: 11/23/24 1222    HS Troponin I  2hr [[id number]]  (Normal) Collected: 11/23/24 1059    Lab Status: Final result Specimen: Blood from Arm, Right Updated: 11/23/24 1129     hs TnI 2hr 4 ng/L      Delta 2hr hsTnI -1 ng/L     Urine Microscopic [[id number]]  (Abnormal) Collected: 11/23/24 0938    Lab Status: Final result Specimen: Urine, Indwelling Parra Catheter Updated: 11/23/24 1013     RBC, UA 30-50 /hpf      WBC, UA Innumerable /hpf      Epithelial Cells Occasional /hpf      Bacteria, UA None Seen /hpf      Amorphous Crystals, UA Occasional     WBC Clumps Present    UA (URINE) with reflex to Scope [[id number]]  (Abnormal) Collected: 11/23/24 0938    Lab Status: Final result Specimen: Urine, Indwelling Parra Catheter Updated: 11/23/24 1006     Color, UA Yellow     Clarity, UA Clear     Specific Gravity, UA 1.021     pH, UA 6.5     Leukocytes, UA Moderate     Nitrite, UA Negative     Protein,  (2+) mg/dl      Glucose, UA >=1000 (1%) mg/dl      Ketones, UA Negative mg/dl      Urobilinogen, UA <2.0 mg/dl      Bilirubin, UA Negative     Occult Blood, UA Moderate    Urine culture [[id number]] Collected: 11/23/24 0938    Lab Status: In process Specimen: Urine, Indwelling Parra Catheter Updated: 11/23/24 0942    Procalcitonin [[id number]]  (Normal) Collected: 11/23/24 0841    Lab Status: Final result Specimen: Blood from Arm, Right Updated: 11/23/24 0925     Procalcitonin 0.13 ng/ml     B-Type Natriuretic Peptide(BNP) [[id number]]  (Normal) Collected: 11/23/24 0841    Lab Status: Final result Specimen: Blood from Arm, Right Updated: 11/23/24 0922     BNP 66 pg/mL     HS Troponin 0hr (reflex protocol) [[id number]]  (Normal) Collected: 11/23/24 0841    Lab Status: Final result Specimen: Blood from Arm, Right Updated: 11/23/24 0921     hs TnI 0hr 5 ng/L     Basic metabolic panel [[id number]]  (Abnormal) Collected: 11/23/24 0841    Lab Status: Final result Specimen: Blood from Arm, Right Updated: 11/23/24 0917     Sodium 130 mmol/L      Potassium 5.8 mmol/L       Chloride 96 mmol/L      CO2 30 mmol/L      ANION GAP 4 mmol/L      BUN 30 mg/dL      Creatinine 0.77 mg/dL      Glucose 216 mg/dL      Calcium 9.6 mg/dL      eGFR 88 ml/min/1.73sq m     Narrative:      National Kid[employer] Disease Foundation guidelines for Chronic Kidney Disease (CKD):     Stage 1 with normal or high GFR (GFR > 90 mL/min/1.73 square meters)    Stage 2 Mild CKD (GFR = 60-89 mL/min/1.73 square meters)    Stage 3A Moderate CKD (GFR = 45-59 mL/min/1.73 square meters)    Stage 3B Moderate CKD (GFR = 30-44 mL/min/1.73 square meters)    Stage 4 Severe CKD (GFR = 15-29 mL/min/1.73 square meters)    Stage 5 End Stage CKD (GFR <15 mL/min/1.73 square meters)  Note: GFR calculation is accurate only with a steady state creatinine    Hepatic function panel [[id number]]  (Abnormal) Collected: 11/23/24 0841    Lab Status: Final result Specimen: Blood from Arm, Right Updated: 11/23/24 0917     Total Bilirubin 0.40 mg/dL      Bilirubin, Direct 0.16 mg/dL      Alkaline Phosphatase 85 U/L      AST 58 U/L      ALT 24 U/L      Total Protein 9.5 g/dL      Albumin 2.7 g/dL     Magnesium [[id number]]  (Normal) Collected: 11/23/24 0841    Lab Status: Final result Specimen: Blood from Arm, Right Updated: 11/23/24 0917     Magnesium 2.2 mg/dL     Lipase [[id number]]  (Abnormal) Collected: 11/23/24 0841    Lab Status: Final result Specimen: Blood from Arm, Right Updated: 11/23/24 0917     Lipase 121 u/L     Lactic acid, plasma (w/reflex if result > 2.0) [[id number]]  (Normal) Collected: 11/23/24 0841    Lab Status: Final result Specimen: Blood from Arm, Right Updated: 11/23/24 0914     LACTIC ACID 1.6 mmol/L     Narrative:      Result may be elevated if tourniquet was used during collection.    CBC and differential [[id number]]  (Abnormal) Collected: 11/23/24 0841    Lab Status: Final result Specimen: Blood from Arm, Right Updated: 11/23/24 0853     WBC 13.15 Thousand/uL      RBC 3.33 Million/uL      Hemoglobin 9.5 g/dL      Hematocrit  31.1 %      MCV 93 fL      MCH 28.5 pg      MCHC 30.5 g/dL      RDW 15.7 %      MPV 9.2 fL      Platelets 593 Thousands/uL      nRBC 0 /100 WBCs      Segmented % 68 %      Immature Grans % 1 %      Lymphocytes % 22 %      Monocytes % 6 %      Eosinophils Relative 2 %      Basophils Relative 1 %      Absolute Neutrophils 9.11 Thousands/µL      Absolute Immature Grans 0.10 Thousand/uL      Absolute Lymphocytes 2.83 Thousands/µL      Absolute Monocytes 0.80 Thousand/µL      Eosinophils Absolute 0.24 Thousand/µL      Basophils Absolute 0.07 Thousands/µL     Blood gas, venous [[id number]]  (Abnormal) Collected: 11/23/24 0841    Lab Status: Final result Specimen: Blood from Arm, Right Updated: 11/23/24 0853     pH, Kasi 7.374     pCO2, Kasi 50.3 mm Hg      pO2, Kasi 28.0 mm Hg      HCO3, Kasi 28.7 mmol/L      Base Excess, Kasi 2.8 mmol/L      O2 Content, Kasi 6.3 ml/dL      O2 HGB, VENOUS 42.5 %             CT chest abdomen pelvis w contrast   Final Interpretation by Moshe Gentile DO (11/23 1129)      1. Large sacral decubitus ulcer with absent of the inferior sacrum and sharply demarcated bony margin, likely reflecting osteotomy. This can be used as patient's new baseline. No organized fluid collection.      2. Rectal wall thickening may represent underdistention versus proctitis.      3. Bladder wall thickening, without perivesical inflammatory changes. If there is clinical concern for cystitis, correlate with urinalysis.      4. No acute findings in the chest.      Additional incidental findings as above.         Resident: FREDDY Mcdonnell I, the attending radiologist, have reviewed the images and agree with the final report above.      Workstation performed: [id number]             Procedures    ED Medication and Procedure Management   Prior to Admission Medications   Prescriptions Last Dose Informant Patient Reported? Taking?   ASPIRIN 81 PO   Yes Yes   Sig: Take 81 mg by mouth daily   Calcium Carb-Cholecalciferol  (calcium carbonate-vitamin D) 500 mg-5 mcg tablet   No Yes   Sig: Take 1 tablet by mouth 2 (two) times a day with meals   Cholecalciferol (Vitamin D3) 50 MCG (2000 UT) CAPS  Spouse/Significant Other, Self Yes No   Sig: Take 2,000 Units by mouth daily   Ertugliflozin L-PyroglutamicAc (Steglatro) 15 MG TABS  Outside Facility (Specify) Yes No   Sig: Take 15 mg by mouth daily   Glucagon, rDNA, (Glucagon Emergency) 1 MG KIT  Outside Facility (Specify) Yes Yes   Sig: Inject as directed   MULTIPLE VITAMIN PO  Spouse/Significant Other, Self Yes Yes   Sig: Take 1 tablet by mouth in the morning. Indications: Vitamin A deficiency   Polyethylene Glycol 1000 POWD   Yes No   Sig: Take 17 g by mouth daily as needed (constipation) Dissolve in 4-8oz of water, juice, coffee or tea   Silver (SilvaSorb) GEL Not Taking  Yes No   Sig: Apply 1 Application topically daily. Apply to buttock wound bed daily.   Patient not taking: Reported on 11/23/2024   acetaminophen (TYLENOL) 325 mg tablet   Yes Yes   Sig: Take 650 mg by mouth every 4 (four) hours as needed for fever or mild pain.   apixaban (Eliquis) 5 mg   No Yes   Sig: Take 1 tablet (5 mg total) by mouth 2 (two) times a day   atorvastatin (LIPITOR) 40 mg tablet   No Yes   Sig: Take 2 tablets (80 mg total) by mouth every evening Per AVS, 80mg daily   Patient taking differently: Take 80 mg by mouth every evening Per AVS, 80mg daily   bisacodyl (Bisacodyl Laxative) 10 mg suppository   Yes No   Sig: Insert 10 mg into the rectum daily as needed for constipation (if no results from mom).   bisacodyl (FLEET) 10 MG/30ML ENEM   Yes No   Sig: Insert 10 mg into the rectum daily as needed for constipation Only use if no response from Dulcolax after 2 hours   collagenase (SANTYL) ointment   No No   Sig: Apply topically daily   docusate sodium (COLACE) 100 mg capsule   Yes No   Sig: Take 100 mg by mouth daily Per OSR 9/17, take 2 capsules by mouth once daily.   escitalopram (LEXAPRO) 10 mg tablet    Yes Yes   Sig: Take 10 mg by mouth daily   ezetimibe (ZETIA) 10 mg tablet  Spouse/Significant Other, Self Yes Yes   Sig: Take 10 mg by mouth daily   finasteride (PROSCAR) 5 mg tablet   No No   Sig: Take 1 tablet (5 mg total) by mouth daily   Patient taking differently: Take 5 mg by mouth daily   glucose 40 %   Yes No   Sig: Take 15 g by mouth once   insulin lispro (HumALOG/ADMELOG) 100 units/mL injection   Yes Yes   Sig: Inject 3 Units under the skin 4 (four) times a day Inject 3 units subcutaneously four times a day for uncontrolled DM2 hold for glucose less than 110   lisinopril (ZESTRIL) 10 mg tablet   No Yes   Sig: Take 1 tablet (10 mg total) by mouth daily   Patient taking differently: Take 10 mg by mouth daily   magnesium hydroxide (Milk of Magnesia) 400 mg/5 mL oral suspension   Yes Yes   Sig: Take 30 mL by mouth daily as needed for constipation Use if no bowel movement x 3 days. Give at bedtime. Separate from other medications x 2 hours.   metoprolol tartrate (LOPRESSOR) 25 mg tablet   No Yes   Si.5 tablets (12.5 mg total) by Per G Tube route every 12 (twelve) hours   mirtazapine (REMERON) 15 mg tablet   No Yes   Sig: Take 1 tablet (15 mg total) by mouth daily at bedtime   nystatin (MYCOSTATIN) 500,000 units/5 mL suspension Not Taking  Yes No   Sig: Apply 500,000 Units to the mouth or throat 4 (four) times a day   Patient not taking: Reported on 2024   potassium chloride (MICRO-K) 10 MEQ CR capsule   No No   Sig: Take 2 capsules (20 mEq total) by mouth daily for 2 days   senna (Senna-Tabs) 8.6 MG tablet Not Taking  Yes No   Sig: Take 2 tablets by mouth daily as needed for constipation.   Patient not taking: Reported on 2024   tamsulosin (FLOMAX) 0.4 mg Not Taking  Yes No   Sig: Take 0.4 mg by mouth daily   Patient not taking: Reported on 2024   thiamine 100 MG tablet   No Yes   Si tablet (100 mg total) by Per G Tube route daily      Facility-Administered Medications: None      Current Discharge Medication List        CONTINUE these medications which have NOT CHANGED    Details   acetaminophen (TYLENOL) 325 mg tablet Take 650 mg by mouth every 4 (four) hours as needed for fever or mild pain.      apixaban (Eliquis) 5 mg Take 1 tablet (5 mg total) by mouth 2 (two) times a day  Qty: 180 tablet, Refills: 3    Associated Diagnoses: Paroxysmal atrial fibrillation (HCC)      ASPIRIN 81 PO Take 81 mg by mouth daily      atorvastatin (LIPITOR) 40 mg tablet Take 2 tablets (80 mg total) by mouth every evening Per AVS, 80mg daily    Associated Diagnoses: CVA (cerebral vascular accident) (HCC)      Calcium Carb-Cholecalciferol (calcium carbonate-vitamin D) 500 mg-5 mcg tablet Take 1 tablet by mouth 2 (two) times a day with meals    Associated Diagnoses: Wound of sacral region, initial encounter      escitalopram (LEXAPRO) 10 mg tablet Take 10 mg by mouth daily    Comments: depression      ezetimibe (ZETIA) 10 mg tablet Take 10 mg by mouth daily      Glucagon, rDNA, (Glucagon Emergency) 1 MG KIT Inject as directed      insulin lispro (HumALOG/ADMELOG) 100 units/mL injection Inject 3 Units under the skin 4 (four) times a day Inject 3 units subcutaneously four times a day for uncontrolled DM2 hold for glucose less than 110      lisinopril (ZESTRIL) 10 mg tablet Take 1 tablet (10 mg total) by mouth daily  Qty: 30 tablet, Refills: 0    Associated Diagnoses: CVA (cerebral vascular accident) (HCC)      magnesium hydroxide (Milk of Magnesia) 400 mg/5 mL oral suspension Take 30 mL by mouth daily as needed for constipation Use if no bowel movement x 3 days. Give at bedtime. Separate from other medications x 2 hours.      metoprolol tartrate (LOPRESSOR) 25 mg tablet 0.5 tablets (12.5 mg total) by Per G Tube route every 12 (twelve) hours    Associated Diagnoses: Paroxysmal atrial fibrillation (HCC)      mirtazapine (REMERON) 15 mg tablet Take 1 tablet (15 mg total) by mouth daily at bedtime    Associated  Diagnoses: Depression      MULTIPLE VITAMIN PO Take 1 tablet by mouth in the morning. Indications: Vitamin A deficiency      thiamine 100 MG tablet 1 tablet (100 mg total) by Per G Tube route daily    Associated Diagnoses: Malnutrition (HCC)      bisacodyl (Bisacodyl Laxative) 10 mg suppository Insert 10 mg into the rectum daily as needed for constipation (if no results from mom).    Comments: pt not taking at this time      bisacodyl (FLEET) 10 MG/30ML ENEM Insert 10 mg into the rectum daily as needed for constipation Only use if no response from Dulcolax after 2 hours    Comments: pt not taking at this time      Cholecalciferol (Vitamin D3) 50 MCG (2000 UT) CAPS Take 2,000 Units by mouth daily      collagenase (SANTYL) ointment Apply topically daily    Associated Diagnoses: Wound of sacral region, initial encounter      docusate sodium (COLACE) 100 mg capsule Take 100 mg by mouth daily Per OSR 9/17, take 2 capsules by mouth once daily.      Ertugliflozin L-PyroglutamicAc (Steglatro) 15 MG TABS Take 15 mg by mouth daily      finasteride (PROSCAR) 5 mg tablet Take 1 tablet (5 mg total) by mouth daily    Associated Diagnoses: Hematuria      glucose 40 % Take 15 g by mouth once      nystatin (MYCOSTATIN) 500,000 units/5 mL suspension Apply 500,000 Units to the mouth or throat 4 (four) times a day      Polyethylene Glycol 1000 POWD Take 17 g by mouth daily as needed (constipation) Dissolve in 4-8oz of water, juice, coffee or tea      potassium chloride (MICRO-K) 10 MEQ CR capsule Take 2 capsules (20 mEq total) by mouth daily for 2 days    Associated Diagnoses: Hypokalemia      senna (Senna-Tabs) 8.6 MG tablet Take 2 tablets by mouth daily as needed for constipation.      Silver (SilvaSorb) GEL Apply 1 Application topically daily. Apply to buttock wound bed daily.      tamsulosin (FLOMAX) 0.4 mg Take 0.4 mg by mouth daily           No discharge procedures on file.  ED SEPSIS DOCUMENTATION   Time reflects when diagnosis  was documented in both MDM as applicable and the Disposition within this note       Time User Action Codes Description Comment    11/23/2024 12:12 PM Shelton Link E Add [R00.0] Tachycardia     11/23/2024 12:12 PM LinkShelton marin E Add [D72.829] Leukocytosis     11/23/2024 12:13 PM Link, Shelton E Add [S31.000A] Wound of sacral region, initial encounter     11/23/2024 12:13 PM Nikolay Shelton E Remove [S31.000A] Wound of sacral region, initial encounter     11/23/2024 12:13 PM Link, Shelton E Add [S31.000D] Wound of sacral region, subsequent encounter     11/23/2024 12:13 PM Shelton Link E Add [L98.423] Skin ulcer of sacrum with necrosis of muscle (HCC)            Initial Sepsis Screening       Row Name 11/23/24 1037                Is the patient's history suggestive of a new or worsening infection? Yes (Proceed)  -DH        Suspected source of infection suspect infection, source unknown  -DH        Indicate SIRS criteria Leukocytosis (WBC > 83576 IJL) OR Leukopenia (WBC <4000 IJL) OR Bandemia (WBC >10% bands);Tachycardia > 90 bpm  -DH        Are two or more of the above signs & symptoms of infection both present and new to the patient? Yes (Proceed)  -DH        Assess for evidence of organ dysfunction: Are any of the below criteria present within 6 hours of suspected infection and SIRS criteria that are NOT considered to be chronic conditions? --                  User Key  (r) = Recorded By, (t) = Taken By, (c) = Cosigned By      Initials Name Provider Type     Shelton Link MD Physician                       Shelton Link MD  11/23/24 2104

## 2024-11-23 NOTE — ASSESSMENT & PLAN NOTE
Hyponatremia, in the setting of tube feedings with free water flushes, as well as SSRI  Suspect secondary to free water excess  Tube feeding flushes 150 cc of water every 4 hours  Will decrease free water flushes  Check osmolalities

## 2024-11-23 NOTE — QUICK NOTE
Discussed case with surgical consultant at the bedside who is currently evaluating patient's sacral decubitus wound    No evidence of acute bacterial infection, or purulence.  Not noted to be acutely infected  Deep cultures not indicated as will be positive for skin renny

## 2024-11-23 NOTE — CONSULTS
Consultation - Surgery-General   Name: Drew Santos 73 y.o. male I MRN: [id number]  Unit/Bed#: ED-15 I Date of Admission: 11/23/2024   Date of Service: 11/23/2024 I Hospital Day: 0   Consult to surgery general  Consult performed by: Benton Lima MD  Consult ordered by: Shelton Link MD        Physician Requesting Evaluation: Stacy Ba MD   Reason for Evaluation / Principal Problem: Sacral Decubitus ulcer     Assessment & Plan  Decubitus ulcer of sacral region, stage 4 (HCC)  Hx of sacral wound debridement, coccygectomy who presents with sepsis, leukocytosis and tachycardia, sacral wound vac in place    Sacral decubitus ulcer appears well healing with >85% of wound base with healthy pink appearing granulation tissues. Few areas at skin edge with fibrinous slough sharply excised. No overt eschar or areas of necrosis. Unlikely source of leukocytosis      -bedside debridement  -general surgery wound    Surgery-General service will follow.    History of Present Illness   Drew Santos is a 73 y.o. male who presents with sepsis, leukocytosis tachycardia with c/f from nursing facility for psb sepsis. Pt has hx of cva with residual deficits, afib, stage IV sacral decubitus ulcer s/p debridement and coccygectomy, s/p peg tube placement and colostomy. Pt had sacral wound vac placed and transferred to ED disconnected. Pt admitted to [facility] for sepsis work up and general surgery consulted for evaluation of sacral decubitus ulcer    Review of Systems  I have reviewed the patient's PMH, PSH, Social History, Family History, Meds, and Allergies    Objective :  Temp:  [97.9 °F (36.6 °C)] 97.9 °F (36.6 °C)  HR:  [] 98  BP: ()/(51-66) 106/55  Resp:  [13-19] 17  SpO2:  [96 %-98 %] 97 %  O2 Device: None (Room air)      Physical Exam  Constitutional:       General: He is not in acute distress.     Appearance: He is ill-appearing. He is not toxic-appearing.   Cardiovascular:      Rate and Rhythm: Normal rate.   Pulmonary:       Effort: Pulmonary effort is normal.   Abdominal:      General: Abdomen is flat. There is no distension.      Palpations: Abdomen is soft.      Tenderness: There is no abdominal tenderness.   Musculoskeletal:      Cervical back: Normal range of motion.   Skin:     Comments: See image below of sacral wound   Neurological:      Mental Status: Mental status is at baseline.         Lab Results: I have reviewed the following results:  Recent Labs     11/23/24  0841 11/23/24  1059   WBC 13.15*  --    HGB 9.5*  --    HCT 31.1*  --    *  --    SODIUM 130*  --    K 5.8*  --    CL 96  --    CO2 30  --    BUN 30*  --    CREATININE 0.77  --    GLUC 216*  --    MG 2.2  --    AST 58*  --    ALT 24  --    ALB 2.7*  --    TBILI 0.40  --    ALKPHOS 85  --    HSTNI0 5  --    HSTNI2  --  4   BNP 66  --    LACTICACID 1.6  --        Imaging Results Review: I reviewed radiology reports from this admission including: CT abdomen/pelvis.  Other Study Results Review: No additional pertinent studies reviewed.    VTE Pharmacologic Prophylaxis: VTE covered by:    None     VTE Mechanical Prophylaxis: sequential compression device

## 2024-11-23 NOTE — ASSESSMENT & PLAN NOTE
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

## 2024-11-23 NOTE — ASSESSMENT & PLAN NOTE
Patient is a 73-year-old male well-known to our service, with past medical history significant for previous CVA with residual left hemiparesis, stage IV decubitus ulcer with recent hospitalization for debridement, diverting ostomy tube and G-tube placement patient presented from his nursing home with a question of sepsis    Patient is being admitted for evaluation SIRS with potential sepsis  Currently afebrile, heart rate 80s-90s however upon initial arrival was 105.    Reportedly at the nursing home his saturations were in the 70s however when EMS arrived they noticed the pulse ox was on the same arm as a blood pressure cuff: Per EMS his saturations were >90% on room air the entire time he was under their care  leukocytosis of 13, however has had a chronic leukocytosis approximately 10-12  Patient was recently admitted 9/2024 with polymicrobial bacteremia secondary to sacral wound: Treated with IV cefepime/Flagyl/Vanco discharged on ampicillin  Patient admitted 10/2024 with  polymicrobial bacteremia with E. 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  Underwent operative debridement, end colostomy and PEG tube placed  Patient was discharged 10/29 on 7-day course of doxycycline/Augmentin  Followed by surgery as an outpatient with wound VAC in place  A) Sacral Decubitus Ulcer: With recurrent episodes for superimposed infection and polymicrobial bacteremia  Consult surgical team  Obtain wound cultures  With history of bacteremia check blood cultures  B) chronic Parra catheter:  Previous admission grew Proteus  Current urinalysis with leukocytes/nitrates however in the presence of chronic catheter that has hypermobility of colonization  C) CT scan chest/abdomen/pelvis: Lungs are clear.  Rectal wall thickening with previously diagnosed proctitis, bladder wall thickening with potential cystitis  Patient was started empirically on cefepime in  the ER  Pancultures pending

## 2024-11-23 NOTE — ASSESSMENT & PLAN NOTE
Lab Results   Component Value Date    HGBA1C 6.2 (H) 09/17/2024   Home regimen is Humalog 3 units 4 times daily as well as oral steglatro 15mg daily  Patient will be on Accu-Cheks, sliding scale insulin and will titrate regimen as needed

## 2024-11-23 NOTE — PROCEDURES
"Debridement   Wound 08/23/24 Pressure Injury Coccyx    Universal Protocol:  procedure performed by consultantConsent: Verbal consent obtained.  Risks and benefits: risks, benefits and alternatives were discussed  Consent given by: spouse  Time out: Immediately prior to procedure a \"time out\" was called to verify the correct patient, procedure, equipment, support staff and site/side marked as required.  Timeout called at: 11/23/2024 1:13 PM.    Debridement Details  Performed by: physician  Debridement type: selective  Pain control: none      Post-debridement measurements  Length (cm): 16  Width (cm): 12  Depth (cm): 8  Percent debrided: 2%  Surface Area (cm^2): 192  Area Debrided (cm^2): 3.84  Volume (cm^3): 1536    Devitalized tissue debrided: slough  Instrument(s) utilized: blade and forceps  Technique utilized: excisionalBleeding: small  Hemostasis obtained with: pressure  Procedural pain (0-10): insensate  Post-procedural pain: insensate   Debridement Comments: Wound base does not appear to be acutely infected, no exudate or significant foul odor, no areas of ginny necrosis of ischemia. Approximatley 2-5% of wound edges with sloughing sharply excised. Area packed with saline soaked kerlix x1 and covered with 4x4 and multiple abd pads.    Removed x3 white songes, x1 blue vac sponge and associated tubing.          "

## 2024-11-23 NOTE — ASSESSMENT & PLAN NOTE
Rate controlled on home metoprolol 12.5 mg every 12 hours  Temporarily on hold for borderline blood pressure  Continue chronic anticoagulation with Eliquis

## 2024-11-23 NOTE — ASSESSMENT & PLAN NOTE
Patient with a stage IV sacral decubitus ulcer  Previous admissions 9/2024, in 10/2024 for polymicrobial bacteremia secondary to infected sacral wound  Probable underlying osteomyelitis  With most recently seen in the surgical office 10/7: Wound VAC in place  Concurrent leukocytosis with concerns for possible superimposed infection  Will confer with wound care surgery

## 2024-11-23 NOTE — ASSESSMENT & PLAN NOTE
home metoprolol 12.5 mg every 12 hours and lisinopril 10 mg daily  Patient with borderline low blood pressure upon arrival: Will hold medications at this time  Monitor blood pressure and titrate as needed

## 2024-11-23 NOTE — ASSESSMENT & PLAN NOTE
Placed during 10/24 admission due to sacral decubitus ulcer with possible osteomyelitis  Continue routine maintenance and care

## 2024-11-23 NOTE — H&P
H&P - Hospitalist   Name: Drew Santos 73 y.o. male I MRN: [id number]  Unit/Bed#: ED-15 I Date of Admission: 11/23/2024   Date of Service: 11/23/2024 I Hospital Day: 0     Assessment & Plan  Leukocytosis  Patient is a 73-year-old male well-known to our service, with past medical history significant for previous CVA with residual left hemiparesis, stage IV decubitus ulcer with recent hospitalization for debridement, diverting ostomy tube and G-tube placement patient presented from his nursing home with a question of sepsis    Patient is being admitted for evaluation SIRS with potential sepsis  Currently afebrile, heart rate 80s-90s however upon initial arrival was 105.    Reportedly at the nursing home his saturations were in the 70s however when EMS arrived they noticed the pulse ox was on the same arm as a blood pressure cuff: Per EMS his saturations were >90% on room air the entire time he was under their care  leukocytosis of 13, however has had a chronic leukocytosis approximately 10-12  Patient was recently admitted 9/2024 with polymicrobial bacteremia secondary to sacral wound: Treated with IV cefepime/Flagyl/Vanco discharged on ampicillin  Patient admitted 10/2024 with  polymicrobial bacteremia with E. 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  Underwent operative debridement, end colostomy and PEG tube placed  Patient was discharged 10/29 on 7-day course of doxycycline/Augmentin  Followed by surgery as an outpatient with wound VAC in place  A) Sacral Decubitus Ulcer: With recurrent episodes for superimposed infection and polymicrobial bacteremia  Consult surgical team  Obtain wound cultures  With history of bacteremia check blood cultures  B) chronic Parra catheter:  Previous admission grew Proteus  Current urinalysis with leukocytes/nitrates however in the presence of chronic catheter that has hypermobility of colonization  C)  CT scan chest/abdomen/pelvis: Lungs are clear.  Rectal wall thickening with previously diagnosed proctitis, bladder wall thickening with potential cystitis  Patient was started empirically on cefepime in the ER  Pancultures pending  History of CVA (cerebrovascular accident)  Patient with history of CVA 7/2024 with residual left hemiparesis  Baseline nonverbal however can nod/shake head in response to questions  Currently bedbound, with PEG tube feedings and chronic Parra catheter  Continue home aspirin, statin, Eliquis  Called nursing home confirmed he had not yet received his medications this morning: Will give first dose now  Primary hypertension  home metoprolol 12.5 mg every 12 hours and lisinopril 10 mg daily  Patient with borderline low blood pressure upon arrival: Will hold medications at this time  Monitor blood pressure and titrate as needed  Decubitus ulcer of sacral region, stage 4 (HCC)  Patient with a stage IV sacral decubitus ulcer  Previous admissions 9/2024, in 10/2024 for polymicrobial bacteremia secondary to infected sacral wound  Probable underlying osteomyelitis  With most recently seen in the surgical office 10/7: Wound VAC in place  Concurrent leukocytosis with concerns for possible superimposed infection  Will confer with wound care surgery  Type 2 diabetes mellitus without complication, without long-term current use of insulin (HCC)    Lab Results   Component Value Date    HGBA1C 6.2 (H) 09/17/2024   Home regimen is Humalog 3 units 4 times daily as well as oral steglatro 15mg daily  Patient will be on Accu-Cheks, sliding scale insulin and will titrate regimen as needed  Paroxysmal atrial fibrillation (HCC)  Rate controlled on home metoprolol 12.5 mg every 12 hours  Temporarily on hold for borderline blood pressure  Continue chronic anticoagulation with Eliquis  Anemia of chronic disease  Patient has a history of anemia of chronic disease  Baseline hemoglobin appears to range 7-8  "predominantly  During previous admission 10/24 he required transfusion of 1 unit of packed red blood cells  Currently 9.5  Continue to monitor, especially as patient is on chronic anticoagulation with Eliquis as well as aspirin  Chronic indwelling Parra catheter  History of urinary retention and chronic Parra catheter  S/P percutaneous endoscopic gastrostomy (PEG) tube placement (HCC)  Placed 10/2024  Continue for tube feedings, routine care, and aspiration precaution  Per previous speech eval: Sips of liquids and ice chips  Confirmed tube feedings with nursing home: Jevity 1.2 at 85 cc/h with water flush 150 every 4 hours  Colostomy in place (HCC)  Placed during 10/24 admission due to sacral decubitus ulcer with possible osteomyelitis  Continue routine maintenance and care  Hyponatremia  Hyponatremia, in the setting of tube feedings with free water flushes, as well as SSRI  Suspect secondary to free water excess  Tube feeding flushes 150 cc of water every 4 hours  Will decrease free water flushes  Check osmolalities    Hyperkalemia  Sample was hemolyzed: Suspect spurious  Repeat pending      VTE Pharmacologic Prophylaxis: VTE Score: 4 Moderate Risk (Score 3-4) - Pharmacological DVT Prophylaxis Ordered: apixaban (Eliquis).  Code Status: full code  Discussion with family: called wife Kristi Santos    D/w pts ER nurse    Anticipated Length of Stay: Patient will be admitted on an inpatient basis with an anticipated length of stay of greater than 2 midnights secondary to possible sepsis.      History of Present Illness   Chief Complaint: tachycardia    Drew Santos is a 73 y.o. male with a PMH of prior CVA with residual left hemiparesis who was sent from his skilled nursing facility to the ER due to concerns over \"sepsis\".    Per his nurse at [facility] care: This morning she noticed his oxygen saturation dropped abruptly.  She also noted he seems tachycardic.  Due to recent admissions for sepsis she was concerned and called " "EMS    Per ER report when EMS arrived they noticed his pulse ox was on the same arm as the blood pressure cuff.  Per EMS during their entire encounter with patient as well as transporting him to the ER his saturations remained greater than 90%.    Patient denies any pain anywhere.  He denies any difficulty breathing.  He denies any \"upset stomach\" he denies any cough.    Review of Systems    Historical Information   Past Medical History:   Diagnosis Date    Atherosclerotic heart disease of native coronary artery without angina pectoris     Atrial fibrillation (HCC)     Bacteremia     Cerebral infarction (HCC)     Constipation     Depression     Diabetes mellitus (HCC)     Hemiplegia and hemiparesis following cerebral infarction affecting left non-dominant side (HCC)     Hyperlipidemia     Hypertension     Osteomyelitis (HCC)     Pressure ulcer of sacral region, stage 3 (HCC)     Prostatic hyperplasia     Sepsis (HCC)     Stage 4 decubitus ulcer (HCC)     Stroke (HCC)     TIA (transient ischemic attack)     Urinary retention     Vitamin D deficiency      Past Surgical History:   Procedure Laterality Date    COLOSTOMY N/A 10/23/2024    Procedure: Lap loop colostomy, psb open loop colostomy;  Surgeon: Davonte Banegas DO;  Location: [facility];  Service: General    GASTROSTOMY TUBE PLACEMENT N/A 10/23/2024    Procedure: INSERTION PEG TUBE, psb lap gastrostomy tube placement;  Surgeon: Davonte Banegas DO;  Location: [facility];  Service: General    INCISION AND DRAINAGE OF WOUND N/A 10/21/2024    Procedure: INCISION AND DRAINAGE (I&D) BUTTOCK, SACRAL WOUND DEBRIDEMENT, COCCYGECTOMY;  Surgeon: Beka King MD;  Location: [facility];  Service: General    WOUND DEBRIDEMENT N/A 10/23/2024    Procedure: DEBRIDEMENT WOUND (WASH OUT), sacrum;  Surgeon: Davonte Banegas DO;  Location: [facility];  Service: General     Social History     Tobacco Use    Smoking status: Never     Passive exposure: Never "    Smokeless tobacco: Never   Vaping Use    Vaping status: Never Used   Substance and Sexual Activity    Alcohol use: Not Currently    Drug use: Never    Sexual activity: Not on file     E-Cigarette/Vaping    E-Cigarette Use Never User      E-Cigarette/Vaping Substances    Nicotine No     THC No     CBD No     Flavoring No     Other No     Unknown No      Family history non-contributory  Social History:  Marital Status: /Civil Union       Meds/Allergies   Confirmed personally with papers from SNF and called and confirmed TF with nurse at     Prior to Admission medications    Medication Sig Start Date End Date Taking? Authorizing Provider   acetaminophen (TYLENOL) 325 mg tablet Take 650 mg by mouth every 4 (four) hours as needed for fever or mild pain.   Yes Historical Provider, MD   apixaban (Eliquis) 5 mg Take 1 tablet (5 mg total) by mouth 2 (two) times a day 4/8/24  Yes Wes Muhammad PA-C   ASPIRIN 81 PO Take 81 mg by mouth daily   Yes Historical Provider, MD   atorvastatin (LIPITOR) 40 mg tablet Take 2 tablets (80 mg total) by mouth every evening Per AVS, 80mg daily  Patient taking differently: Take 80 mg by mouth every evening Per AVS, 80mg daily 8/28/24  Yes Brendan Nuno DO   Calcium Carb-Cholecalciferol (calcium carbonate-vitamin D) 500 mg-5 mcg tablet Take 1 tablet by mouth 2 (two) times a day with meals 10/4/24  Yes Esperanza Arizmendi PA-C   escitalopram (LEXAPRO) 10 mg tablet Take 10 mg by mouth daily   Yes Historical Provider, MD   ezetimibe (ZETIA) 10 mg tablet Take 10 mg by mouth daily   Yes Historical Provider, MD   Glucagon, rDNA, (Glucagon Emergency) 1 MG KIT Inject as directed   Yes Historical Provider, MD   insulin lispro (HumALOG/ADMELOG) 100 units/mL injection Inject 3 Units under the skin 4 (four) times a day Inject 3 units subcutaneously four times a day for uncontrolled DM2 hold for glucose less than 110 11/12/24  Yes Historical Provider, MD   lisinopril (ZESTRIL) 10 mg  tablet Take 1 tablet (10 mg total) by mouth daily  Patient taking differently: Take 10 mg by mouth daily 8/28/24  Yes Brendan Nuno DO   magnesium hydroxide (Milk of Magnesia) 400 mg/5 mL oral suspension Take 30 mL by mouth daily as needed for constipation Use if no bowel movement x 3 days. Give at bedtime. Separate from other medications x 2 hours.   Yes Historical Provider, MD   metoprolol tartrate (LOPRESSOR) 25 mg tablet 0.5 tablets (12.5 mg total) by Per G Tube route every 12 (twelve) hours 10/29/24 11/28/24 Yes Felipa Perales MD   mirtazapine (REMERON) 15 mg tablet Take 1 tablet (15 mg total) by mouth daily at bedtime 9/4/24  Yes Azael Monzon MD   MULTIPLE VITAMIN PO Take 1 tablet by mouth in the morning. Indications: Vitamin A deficiency   Yes Historical Provider, MD   thiamine 100 MG tablet 1 tablet (100 mg total) by Per G Tube route daily 10/30/24 11/29/24 Yes Felipa Perales MD   bisacodyl (Bisacodyl Laxative) 10 mg suppository Insert 10 mg into the rectum daily as needed for constipation (if no results from mom).    Historical Provider, MD   bisacodyl (FLEET) 10 MG/30ML ENEM Insert 10 mg into the rectum daily as needed for constipation Only use if no response from Dulcolax after 2 hours    Historical Provider, MD   Cholecalciferol (Vitamin D3) 50 MCG (2000 UT) CAPS Take 2,000 Units by mouth daily    Historical Provider, MD   collagenase (SANTYL) ointment Apply topically daily 10/5/24   Esperanza Arizmendi PA-C   docusate sodium (COLACE) 100 mg capsule Take 100 mg by mouth daily Per OSR 9/17, take 2 capsules by mouth once daily.    Historical Provider, MD   Ertugliflozin L-PyroglutamicAc (Steglatro) 15 MG TABS Take 15 mg by mouth daily    Historical Provider, MD   finasteride (PROSCAR) 5 mg tablet Take 1 tablet (5 mg total) by mouth daily  Patient taking differently: Take 5 mg by mouth daily 9/5/24   Azael Monzon MD   glucose 40 % Take 15 g by mouth once    Historical Provider,  MD   nystatin (MYCOSTATIN) 500,000 units/5 mL suspension Apply 500,000 Units to the mouth or throat 4 (four) times a day  Patient not taking: Reported on 11/23/2024    Historical Provider, MD   Polyethylene Glycol 1000 POWD Take 17 g by mouth daily as needed (constipation) Dissolve in 4-8oz of water, juice, coffee or tea    Historical Provider, MD   potassium chloride (MICRO-K) 10 MEQ CR capsule Take 2 capsules (20 mEq total) by mouth daily for 2 days 9/4/24 9/6/24  Azael Monzon MD   senna (Senna-Tabs) 8.6 MG tablet Take 2 tablets by mouth daily as needed for constipation.  Patient not taking: Reported on 11/23/2024    Historical Provider, MD   Silver (SilvaSorb) GEL Apply 1 Application topically daily. Apply to buttock wound bed daily.  Patient not taking: Reported on 11/23/2024    Historical Provider, MD   tamsulosin (FLOMAX) 0.4 mg Take 0.4 mg by mouth daily  Patient not taking: Reported on 11/23/2024    Historical Provider, MD     Allergies   Allergen Reactions    Primaquine Other (See Comments) and Hives       Objective :  Temp:  [97.9 °F (36.6 °C)] 97.9 °F (36.6 °C)  HR:  [] 98  BP: ()/(51-66) 106/55  Resp:  [13-19] 17  SpO2:  [96 %-98 %] 97 %  O2 Device: None (Room air)    Physical Exam   General: Pleasant male.  No acute distress.  Nontachypneic and nondyspneic.  Currently sleeping.  Opens eyes to voice and touch.  Communicates by nodding and shaking his head  HEENT: EOMI, sclera anicteric, very dry mucous membranes  Neck: Supple  Heart: Regular rate and rhythm.  S1-S2 present.  No murmur, rub, gallop  Lungs: Clear to auscultation bilaterally.  Good air movement.  No wheezes, crackles, rhonchi.  No accessory muscle use or respiratory distress  Abdomen: Soft, nontender, nondistended, normoactive bowel sounds present.  No guarding or rebound.  No peritoneal signs or mass.  Colostomy bag in place with soft brown stool  Extremities: No clubbing, cyanosis, edema.  2+ pedal  pulses  Neurologic: Opens eyes to voice.  Communicates by nodding/shaking his head.  Left hemiplegia noted.    Lines/Drains:    Urinary Catheter:  Goal for removal: N/A - Chronic Parra               Lab Results: I have reviewed the following results:  Results from last 7 days   Lab Units 11/23/24  0841   WBC Thousand/uL 13.15*   HEMOGLOBIN g/dL 9.5*   HEMATOCRIT % 31.1*   PLATELETS Thousands/uL 593*   SEGS PCT % 68   LYMPHO PCT % 22   MONO PCT % 6   EOS PCT % 2     Results from last 7 days   Lab Units 11/23/24  0841   SODIUM mmol/L 130*   POTASSIUM mmol/L 5.8*   CHLORIDE mmol/L 96   CO2 mmol/L 30   BUN mg/dL 30*   CREATININE mg/dL 0.77   ANION GAP mmol/L 4   CALCIUM mg/dL 9.6   ALBUMIN g/dL 2.7*   TOTAL BILIRUBIN mg/dL 0.40   ALK PHOS U/L 85   ALT U/L 24   AST U/L 58*   GLUCOSE RANDOM mg/dL 216*             Lab Results   Component Value Date    HGBA1C 6.2 (H) 09/17/2024    HGBA1C 6.0 (H) 07/04/2024    HGBA1C 7.1 (H) 03/08/2024     Results from last 7 days   Lab Units 11/23/24  0841   LACTIC ACID mmol/L 1.6   PROCALCITONIN ng/ml 0.13       Imaging  CT chest/abdomen/pelvis  1. Large sacral decubitus ulcer with absent of the inferior sacrum and sharply demarcated bony margin, likely reflecting osteotomy. This can be used as patient's new baseline. No organized fluid collection.     2. Rectal wall thickening may represent underdistention versus proctitis.     3. Bladder wall thickening, without perivesical inflammatory changes. If there is clinical concern for cystitis, correlate with urinalysis.     4. No acute findings in the chest.    Administrative Statements       ** Please Note: This note has been constructed using a voice recognition system. **

## 2024-11-23 NOTE — ED NOTES
Spoke to complete care and aware of admission and diagnosis      Deidre Samayoa, SMITH  11/23/24 [id number]

## 2024-11-23 NOTE — ASSESSMENT & PLAN NOTE
Hx of sacral wound debridement, coccygectomy who presents with sepsis, leukocytosis and tachycardia, sacral wound vac in place    Sacral decubitus ulcer appears well healing with >85% of wound base with healthy pink appearing granulation tissues. Few areas at skin edge with fibrinous slough sharply excised. No overt eschar or areas of necrosis. Unlikely source of leukocytosis      -bedside debridement  -general surgery wound

## 2024-11-23 NOTE — ASSESSMENT & PLAN NOTE
Patient has a history of anemia of chronic disease  Baseline hemoglobin appears to range 7-8 predominantly  During previous admission 10/24 he required transfusion of 1 unit of packed red blood cells  Currently 9.5  Continue to monitor, especially as patient is on chronic anticoagulation with Eliquis as well as aspirin

## 2024-11-24 LAB
ANION GAP SERPL CALCULATED.3IONS-SCNC: 5 MMOL/L (ref 4–13)
BUN SERPL-MCNC: 28 MG/DL (ref 5–25)
CALCIUM SERPL-MCNC: 8.6 MG/DL (ref 8.4–10.2)
CHLORIDE SERPL-SCNC: 103 MMOL/L (ref 96–108)
CO2 SERPL-SCNC: 25 MMOL/L (ref 21–32)
CREAT SERPL-MCNC: 0.65 MG/DL (ref 0.6–1.3)
ERYTHROCYTE [DISTWIDTH] IN BLOOD BY AUTOMATED COUNT: 15.9 % (ref 11.6–15.1)
GFR SERPL CREATININE-BSD FRML MDRD: 95 ML/MIN/1.73SQ M
GLUCOSE SERPL-MCNC: 220 MG/DL (ref 65–140)
GLUCOSE SERPL-MCNC: 232 MG/DL (ref 65–140)
GLUCOSE SERPL-MCNC: 232 MG/DL (ref 65–140)
GLUCOSE SERPL-MCNC: 246 MG/DL (ref 65–140)
GLUCOSE SERPL-MCNC: 259 MG/DL (ref 65–140)
HCT VFR BLD AUTO: 24.4 % (ref 36.5–49.3)
HGB BLD-MCNC: 7.4 G/DL (ref 12–17)
MCH RBC QN AUTO: 28.1 PG (ref 26.8–34.3)
MCHC RBC AUTO-ENTMCNC: 30.3 G/DL (ref 31.4–37.4)
MCV RBC AUTO: 93 FL (ref 82–98)
PLATELET # BLD AUTO: 519 THOUSANDS/UL (ref 149–390)
PMV BLD AUTO: 9.3 FL (ref 8.9–12.7)
POTASSIUM SERPL-SCNC: 4.4 MMOL/L (ref 3.5–5.3)
RBC # BLD AUTO: 2.63 MILLION/UL (ref 3.88–5.62)
SODIUM SERPL-SCNC: 133 MMOL/L (ref 135–147)
WBC # BLD AUTO: 10.52 THOUSAND/UL (ref 4.31–10.16)

## 2024-11-24 PROCEDURE — 82948 REAGENT STRIP/BLOOD GLUCOSE: CPT

## 2024-11-24 PROCEDURE — 80048 BASIC METABOLIC PNL TOTAL CA: CPT | Performed by: INTERNAL MEDICINE

## 2024-11-24 PROCEDURE — 99233 SBSQ HOSP IP/OBS HIGH 50: CPT | Performed by: PHYSICIAN ASSISTANT

## 2024-11-24 PROCEDURE — 92610 EVALUATE SWALLOWING FUNCTION: CPT

## 2024-11-24 PROCEDURE — 85027 COMPLETE CBC AUTOMATED: CPT | Performed by: INTERNAL MEDICINE

## 2024-11-24 RX ORDER — INSULIN LISPRO 100 [IU]/ML
1-6 INJECTION, SOLUTION INTRAVENOUS; SUBCUTANEOUS EVERY 6 HOURS SCHEDULED
Status: DISCONTINUED | OUTPATIENT
Start: 2024-11-24 | End: 2024-11-27 | Stop reason: HOSPADM

## 2024-11-24 RX ORDER — INSULIN LISPRO 100 [IU]/ML
3 INJECTION, SOLUTION INTRAVENOUS; SUBCUTANEOUS
Status: DISCONTINUED | OUTPATIENT
Start: 2024-11-24 | End: 2024-11-24

## 2024-11-24 RX ADMIN — INSULIN LISPRO 2 UNITS: 100 INJECTION, SOLUTION INTRAVENOUS; SUBCUTANEOUS at 14:32

## 2024-11-24 RX ADMIN — SODIUM CHLORIDE 75 ML/HR: 0.9 INJECTION, SOLUTION INTRAVENOUS at 22:50

## 2024-11-24 RX ADMIN — ATORVASTATIN CALCIUM 80 MG: 80 TABLET, FILM COATED ORAL at 18:22

## 2024-11-24 RX ADMIN — ESCITALOPRAM OXALATE 10 MG: 10 TABLET ORAL at 09:16

## 2024-11-24 RX ADMIN — INSULIN LISPRO 3 UNITS: 100 INJECTION, SOLUTION INTRAVENOUS; SUBCUTANEOUS at 18:39

## 2024-11-24 RX ADMIN — EZETIMIBE 10 MG: 10 TABLET ORAL at 09:16

## 2024-11-24 RX ADMIN — ASPIRIN 81 MG CHEWABLE TABLET 81 MG: 81 TABLET CHEWABLE at 09:16

## 2024-11-24 RX ADMIN — SENNOSIDES 17.2 MG: 8.6 TABLET, FILM COATED ORAL at 22:51

## 2024-11-24 RX ADMIN — CEFEPIME 2000 MG: 2 INJECTION, POWDER, FOR SOLUTION INTRAVENOUS at 11:44

## 2024-11-24 RX ADMIN — INSULIN LISPRO 2 UNITS: 100 INJECTION, SOLUTION INTRAVENOUS; SUBCUTANEOUS at 09:16

## 2024-11-24 RX ADMIN — THIAMINE HCL TAB 100 MG 100 MG: 100 TAB at 09:16

## 2024-11-24 RX ADMIN — APIXABAN 5 MG: 5 TABLET, FILM COATED ORAL at 09:16

## 2024-11-24 RX ADMIN — APIXABAN 5 MG: 5 TABLET, FILM COATED ORAL at 18:22

## 2024-11-24 RX ADMIN — INSULIN LISPRO 3 UNITS: 100 INJECTION, SOLUTION INTRAVENOUS; SUBCUTANEOUS at 11:39

## 2024-11-24 RX ADMIN — MIRTAZAPINE 15 MG: 15 TABLET, FILM COATED ORAL at 22:51

## 2024-11-24 NOTE — PLAN OF CARE
Problem: INFECTION - ADULT  Goal: Absence or prevention of progression during hospitalization  Description: INTERVENTIONS:  - Assess and monitor for signs and symptoms of infection  - Monitor lab/diagnostic results  - Monitor all insertion sites, i.e. indwelling lines, tubes, and drains  - Monitor endotracheal if appropriate and nasal secretions for changes in amount and color  - Shock appropriate cooling/warming therapies per order  - Administer medications as ordered  - Instruct and encourage patient and family to use good hand hygiene technique  - Identify and instruct in appropriate isolation precautions for identified infection/condition  Outcome: Progressing     Problem: SAFETY ADULT  Goal: Patient will remain free of falls  Description: INTERVENTIONS:  - Educate patient/family on patient safety including physical limitations  - Instruct patient to call for assistance with activity   - Consult OT/PT to assist with strengthening/mobility   - Keep Call bell within reach  - Keep bed low and locked with side rails adjusted as appropriate  - Keep care items and personal belongings within reach  - Initiate and maintain comfort rounds  - Make Fall Risk Sign visible to staff  - Offer Toileting every 2 Hours, in advance of need  - Initiate/Maintain bed alarm  - Obtain necessary fall risk management equipment:    - Apply yellow socks and bracelet for high fall risk patients  - Consider moving patient to room near nurses station  Outcome: Progressing  Goal: Maintain or return to baseline ADL function  Description: INTERVENTIONS:  -  Assess patient's ability to carry out ADLs; assess patient's baseline for ADL function and identify physical deficits which impact ability to perform ADLs (bathing, care of mouth/teeth, toileting, grooming, dressing, etc.)  - Assess/evaluate cause of self-care deficits   - Assess range of motion  - Assess patient's mobility; develop plan if impaired  - Assess patient's need for  assistive devices and provide as appropriate  - Encourage maximum independence but intervene and supervise when necessary  - Involve family in performance of ADLs  - Assess for home care needs following discharge   - Consider OT consult to assist with ADL evaluation and planning for discharge  - Provide patient education as appropriate  Outcome: Progressing  Goal: Maintains/Returns to pre admission functional level  Description: INTERVENTIONS:  - Perform AM-PAC 6 Click Basic Mobility/ Daily Activity assessment daily.  - Set and communicate daily mobility goal to care team and patient/family/caregiver.   - Collaborate with rehabilitation services on mobility goals if consulted  - Perform Range of Motion 3 times a day.  - Reposition patient every 2 hours.  - Record patient progress and toleration of activity level   Outcome: Progressing     Problem: DISCHARGE PLANNING  Goal: Discharge to home or other facility with appropriate resources  Description: INTERVENTIONS:  - Identify barriers to discharge w/patient and caregiver  - Arrange for needed discharge resources and transportation as appropriate  - Identify discharge learning needs (meds, wound care, etc.)  - Arrange for interpretive services to assist at discharge as needed  - Refer to Case Management Department for coordinating discharge planning if the patient needs post-hospital services based on physician/advanced practitioner order or complex needs related to functional status, cognitive ability, or social support system  Outcome: Progressing     Problem: CARDIOVASCULAR - ADULT  Goal: Maintains optimal cardiac output and hemodynamic stability  Description: INTERVENTIONS:  - Monitor I/O, vital signs and rhythm  - Monitor for S/S and trends of decreased cardiac output  - Administer and titrate ordered vasoactive medications to optimize hemodynamic stability  - Assess quality of pulses, skin color and temperature  - Assess for signs of decreased coronary artery  perfusion  - Instruct patient to report change in severity of symptoms  Outcome: Progressing  Goal: Absence of cardiac dysrhythmias or at baseline rhythm  Description: INTERVENTIONS:  - Continuous cardiac monitoring, vital signs, obtain 12 lead EKG if ordered  - Administer antiarrhythmic and heart rate control medications as ordered  - Monitor electrolytes and administer replacement therapy as ordered  Outcome: Progressing     Problem: GASTROINTESTINAL - ADULT  Goal: Establish and maintain optimal ostomy function  Description: INTERVENTIONS:  - Assess bowel function  - Encourage oral fluids to ensure adequate hydration  - Administer IV fluids if ordered to ensure adequate hydration   - Administer ordered medications as needed  - Encourage mobilization and activity  - Nutrition services referral to assist patient with appropriate food choices  - Assess stoma site  - Consider wound care consult   Outcome: Progressing     Problem: GENITOURINARY - ADULT  Goal: Urinary catheter remains patent  Description: INTERVENTIONS:  - Assess patency of urinary catheter  - If patient has a chronic hanks, consider changing catheter if non-functioning  - Follow guidelines for intermittent irrigation of non-functioning urinary catheter  Outcome: Progressing     Problem: METABOLIC, FLUID AND ELECTROLYTES - ADULT  Goal: Electrolytes maintained within normal limits  Description: INTERVENTIONS:  - Monitor labs and assess patient for signs and symptoms of electrolyte imbalances  - Administer electrolyte replacement as ordered  - Monitor response to electrolyte replacements, including repeat lab results as appropriate  - Instruct patient on fluid and nutrition as appropriate  Outcome: Progressing  Goal: Glucose maintained within target range  Description: INTERVENTIONS:  - Monitor Blood Glucose as ordered  - Assess for signs and symptoms of hyperglycemia and hypoglycemia  - Administer ordered medications to maintain glucose within target  range  - Assess nutritional intake and initiate nutrition service referral as needed  Outcome: Progressing     Problem: SKIN/TISSUE INTEGRITY - ADULT  Goal: Incision(s), wounds(s) or drain site(s) healing without S/S of infection  Description: INTERVENTIONS  - Assess and document dressing, incision, wound bed, drain sites and surrounding tissue  - Provide patient and family education  - Perform skin care/dressing changes as ordered  Outcome: Progressing  Goal: Pressure injury heals and does not worsen  Description: Interventions:  - Implement low air loss mattress or specialty surface (Criteria met)  - Apply silicone foam dressing  - Instruct/assist with weight shifting every 60 minutes when in chair   - Limit chair time to 2 hour intervals  - Use special pressure reducing interventions such as waffle cushion when in chair   - Apply fecal or urinary incontinence containment device   - Perform passive or active ROM every shift  - Turn and reposition patient & offload bony prominences every 2 hours   - Utilize friction reducing device or surface for transfers     - Consider nutrition services referral as needed  Outcome: Progressing     Problem: Nutrition/Hydration-ADULT  Goal: Nutrient/Hydration intake appropriate for improving, restoring or maintaining nutritional needs  Description: Monitor and assess patient's nutrition/hydration status for malnutrition. Collaborate with interdisciplinary team and initiate plan and interventions as ordered.  Monitor patient's weight and dietary intake as ordered or per policy. Utilize nutrition screening tool and intervene as necessary. Determine patient's food preferences and provide high-protein, high-caloric foods as appropriate.     INTERVENTIONS:  - Monitor oral intake, urinary output, labs, and treatment plans  - Assess nutrition and hydration status and recommend course of action  - Evaluate amount of meals eaten  - Assist patient with eating if necessary   - Allow adequate  time for meals  - Recommend/ encourage appropriate diets, oral nutritional supplements, and vitamin/mineral supplements  - Order, calculate, and assess calorie counts as needed  - Recommend, monitor, and adjust tube feedings and TPN/PPN based on assessed needs  - Assess need for intravenous fluids  - Provide specific nutrition/hydration education as appropriate  - Include patient/family/caregiver in decisions related to nutrition  Outcome: Progressing

## 2024-11-24 NOTE — ASSESSMENT & PLAN NOTE
Patient with a stage IV sacral decubitus ulcer  Previous admissions 9/2024, in 10/2024 for polymicrobial bacteremia secondary to infected sacral wound  Probable underlying osteomyelitis  With most recently seen in the surgical office 10/7/24: Wound VAC in place  Concurrent leukocytosis with concerns for possible superimposed infection  Underwent bedside debridement yesterday 11/23/2024  Ongoing IV antibiotics

## 2024-11-24 NOTE — DISCHARGE INSTR - DIET
PEG for primary. Dysphagia 3 dental soft w/ thin. (Soft fruits and veggies when alert, with full supervision). Meds via PEG.

## 2024-11-24 NOTE — SPEECH THERAPY NOTE
Speech Language/Pathology  Speech/Language Pathology  Assessment    Patient Name: Drew Santos  Today's Date: 11/24/2024     Problem List  Principal Problem:    Leukocytosis  Active Problems:    Primary hypertension    Decubitus ulcer of sacral region, stage 4 (HCC)    Type 2 diabetes mellitus without complication, without long-term current use of insulin (HCC)    History of CVA (cerebrovascular accident)    Paroxysmal atrial fibrillation (HCC)    Anemia of chronic disease    Chronic indwelling Parra catheter    S/P percutaneous endoscopic gastrostomy (PEG) tube placement (HCC)    Colostomy in place (HCC)    Hyponatremia    Hyperkalemia    Past Medical History  Past Medical History:   Diagnosis Date    Atherosclerotic heart disease of native coronary artery without angina pectoris     Atrial fibrillation (HCC)     Bacteremia     Cerebral infarction (HCC)     Constipation     Depression     Diabetes mellitus (HCC)     Hemiplegia and hemiparesis following cerebral infarction affecting left non-dominant side (HCC)     Hyperlipidemia     Hypertension     Osteomyelitis (HCC)     Pressure ulcer of sacral region, stage 3 (HCC)     Prostatic hyperplasia     Sepsis (HCC)     Stage 4 decubitus ulcer (HCC)     Stroke (HCC)     TIA (transient ischemic attack)     Urinary retention     Vitamin D deficiency      Past Surgical History  Past Surgical History:   Procedure Laterality Date    COLOSTOMY N/A 10/23/2024    Procedure: Lap loop colostomy, psb open loop colostomy;  Surgeon: Davonte Banegas DO;  Location: [facility];  Service: General    GASTROSTOMY TUBE PLACEMENT N/A 10/23/2024    Procedure: INSERTION PEG TUBE, psb lap gastrostomy tube placement;  Surgeon: Davonte Banegas DO;  Location: [facility];  Service: General    INCISION AND DRAINAGE OF WOUND N/A 10/21/2024    Procedure: INCISION AND DRAINAGE (I&D) BUTTOCK, SACRAL WOUND DEBRIDEMENT, COCCYGECTOMY;  Surgeon: Beka King MD;  Location: [facility];   "Service: General    WOUND DEBRIDEMENT N/A 10/23/2024    Procedure: DEBRIDEMENT WOUND (WASH OUT), sacrum;  Surgeon: Davonte Banegas DO;  Location: [facility];  Service: General      Bedside Swallow Evaluation:    Summary:  Pt presented w/ mod oropharyngeal dysphagia. Pt known to department and with PEG during recent admit 10/24. Prior recommendations NPO and sips of thins for comfort. Per nursing home documents, pt NPO at facility with sips of thin for comfort. Spoke with [facility] in detail. Wife has been giving pt \"pleasure foods\" at facility such as vegetables.     Trials of puree and thin liquids via cup and tsp initiated. With increased trials, no pharyngeal swallow felt with palpation. Weakened labial seal and retrieval of bolus. Oral manipulation noted with puree. Delayed pharyngeal swallow. Max verbal cues for swallow x2. Recommend continue NPO at this time with sips of water for comfort. Per discussion with [facility] will continue assess if/when pt appropriate for PO intake.     Recommendations:  Diet: NPO, sips of water for comfort. PEG as primary  Meds: via PEG   Positioning:Upright  Pt to take PO/Meds only when fully alert and upright.   Oral care: Frequent 3-5x/wk  Aspiration precautions  Reflux precautions  Therapy Prognosis: Guarded   Prognosis considerations: Hx of dysphagia, cognition, JANETH   Frequency: 3-5x/wk as able/appropriate     Consider consult w/:  Nutrition    Goal(s):  Pt will tolerate least restrictive diet w/out s/s aspiration or oral/pharyngeal difficulties.     Dysphagia LTG  -Patient will demonstrate safe and effective oral intake (without overt s/s significant oral/pharyngeal dysphagia including s/s penetration or aspiration) for the highest appropriate diet level.     Short Term Goals:    -Patient will tolerate trials of upgraded food and/or liquid texture with no significant s/s of oral or pharyngeal dysphagia including aspiration across 1-3 diagnostic sessions     -Patient will " "comply with a Video/Modified Barium Swallow study if indicated for more complete assessment of swallowing anatomy/physiology/aspiration risk and to assess efficacy of treatment techniques    H&P/Admit info/ pertinent provider notes: (PMH noted above)  Chief Complaint: tachycardia     Drew Santos is a 73 y.o. male with a PMH of prior CVA with residual left hemiparesis who was sent from his skilled nursing facility to the ER due to concerns over \"sepsis\".     Per his nurse at [facility] care: This morning she noticed his oxygen saturation dropped abruptly.  She also noted he seems tachycardic.  Due to recent admissions for sepsis she was concerned and called EMS     Per ER report when EMS arrived they noticed his pulse ox was on the same arm as the blood pressure cuff.  Per EMS during their entire encounter with patient as well as transporting him to the ER his saturations remained greater than 90%.     Patient denies any pain anywhere.  He denies any difficulty breathing.  He denies any \"upset stomach\" he denies any cough.      Special Studies:  Chest CT 11/23/24:  IMPRESSION:     1. Large sacral decubitus ulcer with absent of the inferior sacrum and sharply demarcated bony margin, likely reflecting osteotomy. This can be used as patient's new baseline. No organized fluid collection.     2. Rectal wall thickening may represent underdistention versus proctitis.     3. Bladder wall thickening, without perivesical inflammatory changes. If there is clinical concern for cystitis, correlate with urinalysis.     4. No acute findings in the chest.    Previous MBS:  8/23/24:  Recommendations:   Recommended Diet: regular diet and thin liquids - avoid mixed consistencies   Recommended Form of Medications: 1 at a time with liquid, if pocketing whole with puree cut/crush larger   Aspiration precautions and compensatory swallowing strategies: upright posture, slow rate of feeding, small bites/sips, and alternating bites and " sips  Consider referral to:  [facility]  [facility]P Dysphagia therapy recommended: ST will continue to follow while in the acute care setting x1-3    Did the pt report pain? None   If yes, was nursing notified/was it addressed?    Reason for consult:  R/o aspiration  Determine safest and least restrictive diet  h/o dysphagia       Precautions:  Contact  Aspiration      Food Allergies: NKFA   Current Diet: NPO- PEG    Premorbid diet: NPO-PEG receiving pleasure feeds per wife    O2 requirement: NC   Social/Prior living SNF   Voice/Speech: AUBRIE   Follows commands: 1 step    Cognitive status: Impaired      Oral [facility] exam:  Dentition: Natural  Oral care     Items administered: Tsp/cup of water, puree       Oral stage:  Lip closure:-  Mastication:-  Bolus formation:-  Bolus control:-  Transfer:-  Oral residue:-  Pocketing:-    Pharyngeal stage:  Swallow promptness:-  Laryngeal rise:-  Wet voice:-  Throat clear:-  Cough:-  Secondary swallows:-  Audible swallows:-  No overt s/s aspiration    Esophageal stage:  No s/s reported    Results d/w:  Pt, nursing, physician

## 2024-11-24 NOTE — PROGRESS NOTES
Progress Note - Hospitalist   Name: Drew Santos 73 y.o. male I MRN: [id number]  Unit/Bed#: E5 -01 I Date of Admission: 11/23/2024   Date of Service: 11/24/2024 I Hospital Day: 1    Assessment & Plan  Leukocytosis  Patient is a 73-year-old male well-known to our service, with past medical history significant for previous CVA with residual left hemiparesis, stage IV decubitus ulcer with recent hospitalization for debridement, diverting ostomy tube and G-tube placement patient presented from his nursing home with a question of sepsis    Patient is being admitted for evaluation SIRS with potential sepsis  Currently afebrile, heart rate 80s-90s however upon initial arrival was 105.    Reportedly at the nursing home his saturations were in the 70s however when EMS arrived they noticed the pulse ox was on the same arm as a blood pressure cuff: Per EMS his saturations were >90% on room air the entire time he was under their care  Leukocytosis of 13, however has had a chronic leukocytosis approximately 10-12  Patient was recently admitted 9/2024 with polymicrobial bacteremia secondary to sacral wound: Treated with IV cefepime/Flagyl/Vanco discharged on ampicillin  Patient admitted 10/2024 with  polymicrobial bacteremia with E. 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  Underwent operative debridement, end colostomy and PEG tube placed  Patient was discharged 10/29/24 on 7-day course of doxycycline/Augmentin  Followed by surgery as an outpatient with wound VAC in place  A) Sacral Decubitus Ulcer: With recurrent episodes for superimposed infection and polymicrobial bacteremia  Consult surgical team  Obtain wound cultures  With history of bacteremia check blood cultures  B) Chronic Parra catheter:  Previous admission grew Proteus  Current urinalysis with leukocytes/nitrates however in the presence of chronic catheter that has hypermobility  of colonization  C) CT scan chest/abdomen/pelvis: Lungs are clear.  Rectal wall thickening with previously diagnosed proctitis, bladder wall thickening with potential cystitis  Patient was started empirically on cefepime in the ER- IV cefepime day #2  Pancultures pending- blood, urine  History of CVA (cerebrovascular accident)  Patient with history of CVA 7/2024 with residual left hemiparesis  Baseline nonverbal however can nod/shake head in response to questions  Currently bedbound, with PEG tube feedings and chronic Parra catheter  Continue home aspirin, statin, Eliquis  S/P percutaneous endoscopic gastrostomy (PEG) tube placement (HCC)  Placed 10/2024  Continue for tube feedings, routine care, and aspiration precaution  Per previous speech eval: Sips of liquids and ice chips  Confirmed tube feedings with nursing home: Jevity 1.2 at 85 cc/h with water flush 150 every 4 hours  Discussed with speech therapy as patient should continue with clear liquids at this time, previous admissions patient did have difficulty with swallowing  Ongoing recommendations from speech therapy on- if and when diet could further be advanced  Updated wife via telephone about diet with need for clears and avoiding foods that she would bring in for him (she wanted to bring in fruit and veggies)  Primary hypertension  Home metoprolol 12.5 mg every 12 hours, and lisinopril 10 mg daily  Patient with borderline low blood pressure upon arrival: meds currently on hold   Most recent BP 24/77 106/60  Decubitus ulcer of sacral region, stage 4 (HCC)  Patient with a stage IV sacral decubitus ulcer  Previous admissions 9/2024, in 10/2024 for polymicrobial bacteremia secondary to infected sacral wound  Probable underlying osteomyelitis  With most recently seen in the surgical office 10/7/24: Wound VAC in place  Concurrent leukocytosis with concerns for possible superimposed infection  Underwent bedside debridement yesterday 11/23/2024  Ongoing IV  antibiotics  Type 2 diabetes mellitus without complication, without long-term current use of insulin (HCC)  Home regimen Humalog 3 units 4 times daily as well as oral steglatro 15 mg daily  Lab Results   Component Value Date    HGBA1C 6.2 (H) 09/17/2024   Continue with insulin sliding scale  AddHumalog 3 units 3 times daily as patient has had some elevated blood sugars in the 200s  Paroxysmal atrial fibrillation (HCC)  Rate controlled on home metoprolol 12.5 mg every 12 hours  Temporarily on hold for borderline blood pressure  Continue chronic anticoagulation with Eliquis  Anemia of chronic disease  Patient has a history of anemia of chronic disease  Baseline hemoglobin appears to range 7-8 predominantly  During previous admission 10/24 he required transfusion of 1 unit of packed red blood cells  On admission hgb 9.5 -- noted drop to 7.4  We will recheck H&H this afternoon and transfuse for hemoglobin less than 7  Ongoing monitoring of hemoglobin, especially as patient is on chronic anticoagulation with Eliquis as well as aspirin  Chronic indwelling Parra catheter  History of urinary retention and chronic Parra catheter in place  Colostomy in place (HCC)  Placed during 10/24 admission due to sacral decubitus ulcer with possible osteomyelitis  Continue routine maintenance and care  Hyponatremia  Hyponatremia, in the setting of tube feedings with free water flushes, as well as SSRI  Suspect secondary to free water excess  Tube feeding flushes 150 cc of water every 4 hours  Decreased free water flushes  Hyperkalemia  Sample was hemolyzed: Suspect spurious  Repeat resolved and is currently 4.4    VTE Pharmacologic Prophylaxis: VTE Score: 4     Mobility:   Basic Mobility Inpatient Raw Score: 12  -[facility] Goal: 4: Move to chair/commode  -[facility] Achieved: 2: Bed activities/Dependent transfer    Patient Centered Rounds: nursing     Education and Discussions with Family / Patient: patient, wife via telephone called    Current  Length of Stay: 1 day(s)  Current Patient Status: Inpatient   Certification Statement: Need for continued inpatient stay for ongoing IV antibiotics and wound evaluation  Discharge Plan: 24 to 48 hours    Code Status: Level 1 - Full Code    Subjective   Resting in bed.  Has no complaints and denies any pain at this time.  Parra catheter and colostomy tube are draining without difficulty.  Updated wife via telephone and discussed diet with need for avoidance of solid food at this time.    Objective :  Temp:  [98 °F (36.7 °C)-98.7 °F (37.1 °C)] 98.7 °F (37.1 °C)  HR:  [] 109  BP: ()/(55-77) 124/77  Resp:  [13-20] 18  SpO2:  [95 %-100 %] 98 %  O2 Device: None (Room air)    Body mass index is 24.44 kg/m².     Input and Output Summary (last 24 hours):     Intake/Output Summary (Last 24 hours) at 11/24/2024 1013  Last data filed at 11/24/2024 0601  Gross per 24 hour   Intake 2368.75 ml   Output 1600 ml   Net 768.75 ml       Physical Exam  Vitals and nursing note reviewed.   Constitutional:       General: He is not in acute distress.     Appearance: He is obese. He is not ill-appearing, toxic-appearing or diaphoretic.   HENT:      Head: Normocephalic and atraumatic.   Eyes:      General: No scleral icterus.  Cardiovascular:      Rate and Rhythm: Normal rate and regular rhythm.   Pulmonary:      Effort: Pulmonary effort is normal. No respiratory distress.      Breath sounds: Normal breath sounds. No wheezing.   Abdominal:      General: Bowel sounds are normal. There is no distension.      Palpations: Abdomen is soft. There is no mass.      Tenderness: There is no abdominal tenderness.      Hernia: No hernia is present.      Comments: Colostomy in place   Genitourinary:     Comments: Parra in place draining yellow urine  Musculoskeletal:         General: No swelling.      Cervical back: Neck supple.   Neurological:      Mental Status: He is alert. Mental status is at baseline.      Comments: Oriented to person and  place   Psychiatric:         Mood and Affect: Mood normal.         Lines/Drains:  Lines/Drains/Airways       Active Status       Name Placement date Placement time Site Days    Urethral Catheter Latex 20 Fr. 10/19/24  1209  Latex  35                  Urinary Catheter:  Goal for removal: N/A - Chronic Parra                 Lab Results: I have reviewed the following results:   Results from last 7 days   Lab Units 11/24/24  0426 11/23/24  0841   WBC Thousand/uL 10.52* 13.15*   HEMOGLOBIN g/dL 7.4* 9.5*   HEMATOCRIT % 24.4* 31.1*   PLATELETS Thousands/uL 519* 593*   SEGS PCT %  --  68   LYMPHO PCT %  --  22   MONO PCT %  --  6   EOS PCT %  --  2     Results from last 7 days   Lab Units 11/24/24  0426 11/23/24  1312 11/23/24  0841   SODIUM mmol/L 133*  --  130*   POTASSIUM mmol/L 4.4   < > 5.8*   CHLORIDE mmol/L 103  --  96   CO2 mmol/L 25  --  30   BUN mg/dL 28*  --  30*   CREATININE mg/dL 0.65  --  0.77   ANION GAP mmol/L 5  --  4   CALCIUM mg/dL 8.6  --  9.6   ALBUMIN g/dL  --   --  2.7*   TOTAL BILIRUBIN mg/dL  --   --  0.40   ALK PHOS U/L  --   --  85   ALT U/L  --   --  24   AST U/L  --   --  58*   GLUCOSE RANDOM mg/dL 232*  --  216*    < > = values in this interval not displayed.         Results from last 7 days   Lab Units 11/24/24  0831 11/23/24  2345 11/23/24  1624   POC GLUCOSE mg/dl 246* 264* 194*         Results from last 7 days   Lab Units 11/23/24  0841   LACTIC ACID mmol/L 1.6   PROCALCITONIN ng/ml 0.13       Recent Cultures (last 7 days):   Results from last 7 days   Lab Units 11/23/24  1216   BLOOD CULTURE  Received in Microbiology Lab. Culture in Progress.  Received in Microbiology Lab. Culture in Progress.             Last 24 Hours Medication List:     Current Facility-Administered Medications:     acetaminophen (TYLENOL) tablet 650 mg, Q6H PRN    apixaban (ELIQUIS) tablet 5 mg, BID    aspirin chewable tablet 81 mg, Daily    atorvastatin (LIPITOR) tablet 80 mg, QPM    bisacodyl (DULCOLAX) rectal  suppository 10 mg, Daily PRN    ceFEPime (MAXIPIME) 2,000 mg in dextrose 5 % 50 mL IVPB, Q12H, Last Rate: 2,000 mg (11/23/24 2334)    escitalopram (LEXAPRO) tablet 10 mg, Daily    ezetimibe (ZETIA) tablet 10 mg, Daily    insulin lispro (HumALOG/ADMELOG) 100 units/mL subcutaneous injection 1-5 Units, Q6H **AND** Fingerstick Glucose (POCT), Q6H    [Held by provider] lisinopril (ZESTRIL) tablet 10 mg, Daily    magnesium hydroxide (MILK OF MAGNESIA) oral suspension 30 mL, Daily PRN    [Held by provider] metoprolol tartrate (LOPRESSOR) partial tablet 12.5 mg, Q12H ELISEO    mirtazapine (REMERON) tablet 15 mg, HS    ondansetron (ZOFRAN) injection 4 mg, Q6H PRN    senna (SENOKOT) tablet 17.2 mg, HS    sodium chloride 0.9 % infusion, Continuous, Last Rate: 75 mL/hr (11/23/24 1510)    thiamine tablet 100 mg, Daily    Administrative Statements   Today, Patient Was Seen By: Whit Kimble PA-C      **Please Note: This note may have been constructed using a voice recognition system.**

## 2024-11-24 NOTE — ASSESSMENT & PLAN NOTE
Placed 10/2024  Continue for tube feedings, routine care, and aspiration precaution  Per previous speech eval: Sips of liquids and ice chips  Confirmed tube feedings with nursing home: Jevity 1.2 at 85 cc/h with water flush 150 every 4 hours  Discussed with speech therapy as patient should continue with clear liquids at this time, previous admissions patient did have difficulty with swallowing  Ongoing recommendations from speech therapy on- if and when diet could further be advanced  Updated wife via telephone about diet with need for clears and avoiding foods that she would bring in for him (she wanted to bring in fruit and veggies)

## 2024-11-24 NOTE — TREATMENT PLAN
Acute Care Surgery  Bedside V.A.C. Procedure Note    No prior wound vac was present so no sponge count was applicable upon this wound vac placement. 1 Kerlix packing was removed from the wound.    Location of wound: Sacrum    Dimensions of wound: 9 cm x 11 cm x 4 cm    Description of wound: On inspection of the wound today, bleeding from wound bed was appreciated without slough, necrosis, or non-viable tissue. The wound extends down to muscle. Approximately 95% of the wound base is now pink and healthy and there is granulation tissue. The periwound skin remains clean and intact and unremarkable.    VAC dressing application:  The periwound skin was cleaned and dried.  1 black foam, including bridge, 0 white foam were cut to size of the wound and placed into the wound. The dressings were then covered with VAC drape. Additional VAC drape was used to create a bridge to the patient's L hip and a base for the track pad. The track pad was then placed over the base of black foam. The VAC was then set to 125 mmHg low Continuous suction. The patient tolerated the procedure well and there were no complications. The patient did not require any excisional debridement during today's dressing change.    VAC settings:  125 mmHg  Continuous      Additional Notes:  The next VAC dressing change will be planned for Tuesday, 11/26.    Cady Coley RN, was present for the dressing change.    This dressing change took greater than 30 minutes to complete.    Kb Kay MD  11/24/2024 5:13 PM

## 2024-11-24 NOTE — PLAN OF CARE
Problem: INFECTION - ADULT  Goal: Absence or prevention of progression during hospitalization  Description: INTERVENTIONS:  - Assess and monitor for signs and symptoms of infection  - Monitor lab/diagnostic results  - Monitor all insertion sites, i.e. indwelling lines, tubes, and drains  - Monitor endotracheal if appropriate and nasal secretions for changes in amount and color  - [place] appropriate cooling/warming therapies per order  - Administer medications as ordered  - Instruct and encourage patient and family to use good hand hygiene technique  - Identify and instruct in appropriate isolation precautions for identified infection/condition  Outcome: Progressing     Problem: SAFETY ADULT  Goal: Patient will remain free of falls  Description: INTERVENTIONS:  - Educate patient/family on patient safety including physical limitations  - Instruct patient to call for assistance with activity   - Consult OT/PT to assist with strengthening/mobility   - Keep Call bell within reach  - Keep bed low and locked with side rails adjusted as appropriate  - Keep care items and personal belongings within reach  - Initiate and maintain comfort rounds  - Make Fall Risk Sign visible to staff  - Offer Toileting every 2 Hours, in advance of need  - Initiate/Maintain bed alarm  - Obtain necessary fall risk management equipment:    - Apply yellow socks and bracelet for high fall risk patients  - Consider moving patient to room near nurses station  Outcome: Progressing  Goal: Maintain or return to baseline ADL function  Description: INTERVENTIONS:  -  Assess patient's ability to carry out ADLs; assess patient's baseline for ADL function and identify physical deficits which impact ability to perform ADLs (bathing, care of mouth/teeth, toileting, grooming, dressing, etc.)  - Assess/evaluate cause of self-care deficits   - Assess range of motion  - Assess patient's mobility; develop plan if impaired  - Assess patient's need for  assistive devices and provide as appropriate  - Encourage maximum independence but intervene and supervise when necessary  - Involve family in performance of ADLs  - Assess for home care needs following discharge   - Consider OT consult to assist with ADL evaluation and planning for discharge  - Provide patient education as appropriate  Outcome: Progressing  Goal: Maintains/Returns to pre admission functional level  Description: INTERVENTIONS:  - Perform AM-PAC 6 Click Basic Mobility/ Daily Activity assessment daily.  - Set and communicate daily mobility goal to care team and patient/family/caregiver.   - Collaborate with rehabilitation services on mobility goals if consulted  - Perform Range of Motion 3 times a day.  - Reposition patient every 2 hours.  - Record patient progress and toleration of activity level   Outcome: Progressing     Problem: DISCHARGE PLANNING  Goal: Discharge to home or other facility with appropriate resources  Description: INTERVENTIONS:  - Identify barriers to discharge w/patient and caregiver  - Arrange for needed discharge resources and transportation as appropriate  - Identify discharge learning needs (meds, wound care, etc.)  - Arrange for interpretive services to assist at discharge as needed  - Refer to Case Management Department for coordinating discharge planning if the patient needs post-hospital services based on physician/advanced practitioner order or complex needs related to functional status, cognitive ability, or social support system  Outcome: Progressing     Problem: CARDIOVASCULAR - ADULT  Goal: Maintains optimal cardiac output and hemodynamic stability  Description: INTERVENTIONS:  - Monitor I/O, vital signs and rhythm  - Monitor for S/S and trends of decreased cardiac output  - Administer and titrate ordered vasoactive medications to optimize hemodynamic stability  - Assess quality of pulses, skin color and temperature  - Assess for signs of decreased coronary artery  perfusion  - Instruct patient to report change in severity of symptoms  Outcome: Progressing  Goal: Absence of cardiac dysrhythmias or at baseline rhythm  Description: INTERVENTIONS:  - Continuous cardiac monitoring, vital signs, obtain 12 lead EKG if ordered  - Administer antiarrhythmic and heart rate control medications as ordered  - Monitor electrolytes and administer replacement therapy as ordered  Outcome: Progressing     Problem: GASTROINTESTINAL - ADULT  Goal: Establish and maintain optimal ostomy function  Description: INTERVENTIONS:  - Assess bowel function  - Encourage oral fluids to ensure adequate hydration  - Administer IV fluids if ordered to ensure adequate hydration   - Administer ordered medications as needed  - Encourage mobilization and activity  - Nutrition services referral to assist patient with appropriate food choices  - Assess stoma site  - Consider wound care consult   Outcome: Progressing     Problem: GENITOURINARY - ADULT  Goal: Urinary catheter remains patent  Description: INTERVENTIONS:  - Assess patency of urinary catheter  - If patient has a chronic hanks, consider changing catheter if non-functioning  - Follow guidelines for intermittent irrigation of non-functioning urinary catheter  Outcome: Progressing     Problem: METABOLIC, FLUID AND ELECTROLYTES - ADULT  Goal: Electrolytes maintained within normal limits  Description: INTERVENTIONS:  - Monitor labs and assess patient for signs and symptoms of electrolyte imbalances  - Administer electrolyte replacement as ordered  - Monitor response to electrolyte replacements, including repeat lab results as appropriate  - Instruct patient on fluid and nutrition as appropriate  Outcome: Progressing  Goal: Glucose maintained within target range  Description: INTERVENTIONS:  - Monitor Blood Glucose as ordered  - Assess for signs and symptoms of hyperglycemia and hypoglycemia  - Administer ordered medications to maintain glucose within target  range  - Assess nutritional intake and initiate nutrition service referral as needed  Outcome: Progressing     Problem: SKIN/TISSUE INTEGRITY - ADULT  Goal: Incision(s), wounds(s) or drain site(s) healing without S/S of infection  Description: INTERVENTIONS  - Assess and document dressing, incision, wound bed, drain sites and surrounding tissue  - Provide patient and family education  - Perform skin care/dressing changes as ordered  Outcome: Progressing  Goal: Pressure injury heals and does not worsen  Description: Interventions:  - Implement low air loss mattress or specialty surface (Criteria met)  - Apply silicone foam dressing  - Instruct/assist with weight shifting every 60 minutes when in chair   - Limit chair time to 2 hour intervals  - Use special pressure reducing interventions such as waffle cushion when in chair   - Apply fecal or urinary incontinence containment device   - Perform passive or active ROM every shift  - Turn and reposition patient & offload bony prominences every 2 hours   - Utilize friction reducing device or surface for transfers     - Consider nutrition services referral as needed  Outcome: Progressing

## 2024-11-24 NOTE — MALNUTRITION/BMI
This medical record reflects one or more clinical indicators suggestive of malnutrition.    Malnutrition Findings:   Adult Malnutrition type: Chronic illness  Adult Degree of Malnutrition: Other severe protein calorie malnutrition  Malnutrition Characteristics: Inadequate energy, Weight loss              360 Statement: Chronic/severe malnutrition r/t inadequate intake/condition, as evidenced by 27.9% wt loss x 1 year (11/23/24: 206#, 11/17/23: 286#), and intake meeting <75% of estimated needs > 1 month. Treated with EN support       Body mass index is 24.44 kg/m².     See Nutrition note dated 11/24/24 for additional details.  Completed nutrition assessment is viewable in the nutrition documentation.    Continue TF at current goal: Jevity 1.2 @ 85ml/hr + ProSource No Carb liquid protein BID + Teddy (unflavored) BID. 100ml free water flush q 6 hrs. Provides 2728kcal, 148gPRO, 2046ml free water. Monitor electrolytes and weight

## 2024-11-24 NOTE — ASSESSMENT & PLAN NOTE
Home regimen Humalog 3 units 4 times daily as well as oral steglatro 15 mg daily  Lab Results   Component Value Date    HGBA1C 6.2 (H) 09/17/2024   Continue with insulin sliding scale  AddHumalog 3 units 3 times daily as patient has had some elevated blood sugars in the 200s

## 2024-11-24 NOTE — ASSESSMENT & PLAN NOTE
Patient with history of CVA 7/2024 with residual left hemiparesis  Baseline nonverbal however can nod/shake head in response to questions  Currently bedbound, with PEG tube feedings and chronic Parra catheter  Continue home aspirin, statin, Eliquis

## 2024-11-24 NOTE — ASSESSMENT & PLAN NOTE
Hyponatremia, in the setting of tube feedings with free water flushes, as well as SSRI  Suspect secondary to free water excess  Tube feeding flushes 150 cc of water every 4 hours  Decreased free water flushes

## 2024-11-24 NOTE — ASSESSMENT & PLAN NOTE
Home metoprolol 12.5 mg every 12 hours, and lisinopril 10 mg daily  Patient with borderline low blood pressure upon arrival: meds currently on hold   Most recent BP 24/77 106/60

## 2024-11-24 NOTE — ASSESSMENT & PLAN NOTE
Patient is a 73-year-old male well-known to our service, with past medical history significant for previous CVA with residual left hemiparesis, stage IV decubitus ulcer with recent hospitalization for debridement, diverting ostomy tube and G-tube placement patient presented from his nursing home with a question of sepsis    Patient is being admitted for evaluation SIRS with potential sepsis  Currently afebrile, heart rate 80s-90s however upon initial arrival was 105.    Reportedly at the nursing home his saturations were in the 70s however when EMS arrived they noticed the pulse ox was on the same arm as a blood pressure cuff: Per EMS his saturations were >90% on room air the entire time he was under their care  Leukocytosis of 13, however has had a chronic leukocytosis approximately 10-12  Patient was recently admitted 9/2024 with polymicrobial bacteremia secondary to sacral wound: Treated with IV cefepime/Flagyl/Vanco discharged on ampicillin  Patient admitted 10/2024 with  polymicrobial bacteremia with E. 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  Underwent operative debridement, end colostomy and PEG tube placed  Patient was discharged 10/29/24 on 7-day course of doxycycline/Augmentin  Followed by surgery as an outpatient with wound VAC in place  A) Sacral Decubitus Ulcer: With recurrent episodes for superimposed infection and polymicrobial bacteremia  Consult surgical team  Obtain wound cultures  With history of bacteremia check blood cultures  B) Chronic Parra catheter:  Previous admission grew Proteus  Current urinalysis with leukocytes/nitrates however in the presence of chronic catheter that has hypermobility of colonization  C) CT scan chest/abdomen/pelvis: Lungs are clear.  Rectal wall thickening with previously diagnosed proctitis, bladder wall thickening with potential cystitis  Patient was started empirically on cefepime  in the ER- IV cefepime day #2  Pancultures pending- blood, urine

## 2024-11-24 NOTE — ASSESSMENT & PLAN NOTE
Patient has a history of anemia of chronic disease  Baseline hemoglobin appears to range 7-8 predominantly  During previous admission 10/24 he required transfusion of 1 unit of packed red blood cells  On admission hgb 9.5 -- noted drop to 7.4  We will recheck H&H this afternoon and transfuse for hemoglobin less than 7  Ongoing monitoring of hemoglobin, especially as patient is on chronic anticoagulation with Eliquis as well as aspirin

## 2024-11-24 NOTE — CASE MANAGEMENT
Case Management Assessment & Discharge Planning Note    Patient name Drew Santos  [place] [address] /E5 -* MRN [id number]  : 1950 Date 2024       Current Admission Date: 2024  Current Admission Diagnosis:Leukocytosis   Patient Active Problem List    Diagnosis Date Noted Date Diagnosed    Hyponatremia 2024     Hyperkalemia 2024     Leukocytosis 2024     Colostomy in place ([facility]) 2024     S/P percutaneous endoscopic gastrostomy (PEG) tube placement ([facility]) 10/24/2024     Pyuria 10/19/2024     Chronic indwelling Parra catheter 10/19/2024     Hypernatremia 10/19/2024     Sacral ulcer ([facility]) 10/18/2024     Advanced care planning/counseling discussion 2024     Severe protein-calorie malnutrition (HCC) 2024     Polymicrobial bacteremia 2024     Acute metabolic encephalopathy 2024     History of CVA (cerebrovascular accident) 2024     Paroxysmal atrial fibrillation ([facility]) 2024     Anemia of chronic disease 2024     Gram-negative bacteremia 2024     Type 2 diabetes mellitus without complication, without long-term current use of insulin ([facility]) 2024     COVID-19 2024     Proctitis 2024     Dysgeusia 2024     Gross hematuria 2024     Depression 2024     Decubitus ulcer of sacral region, stage 4 ([facility]) 2024     Primary hypertension 2024     Mixed hyperlipidemia 2024     Urinary retention 2024     Syncope 2024     Elevated lactic acid level 2024     Abnormal CPK 2024       LOS (days): 1  Geometric Mean LOS (GMLOS) (days):   Days to GMLOS:     OBJECTIVE:  PATIENT READMITTED TO HOSPITAL  Risk of Unplanned Readmission Score: 32.37         Current admission status: Inpatient       Preferred Pharmacy:   [id number] - [place] - [address]    [facility] [place] [address]  Phone: [phone] Fax: [phone]    Primary Care  Provider: Joe Lyon MD    Primary Insurance: [employer]  Secondary Insurance: [employer]    ASSESSMENT:  Active Health Care Proxies       Kristi Santos Health Care Representative - Spouse   Primary Phone: [phone] (Mobile)  Home Phone: [phone]                           Readmission Root Cause  30 Day Readmission: Yes  During your hospital stay, did someone (provider, nurse, ) explain your care to you in a way you could understand?: Yes  Did you feel medically stable to leave the hospital?: Yes  Did you have reliable transportation to take you to your appointments?: Yes  During previous admission, was a post-acute recommendation made?: Yes  What post-acute resources were offered?: STR  Patient was readmitted due to: Leukocytosis -- evaluation for sepsis  Action Plan: admit to med/surg    Patient Information  Admitted from:: Facility  Mental Status: Alert  During Assessment patient was accompanied by: Not accompanied during assessment  Assessment information provided by:: Spouse, Other - please comment (facility staff)  Primary Caregiver: Other (Comment)  Caregiver's Name:: staff at [facility]  Support Systems: Spouse/significant other, Family members  County of Residence: [place]  What city do you live in?: [place]  Home entry access options. Select all that apply.: No steps to enter home  Type of Current Residence: Facility ([facility] [place])  Living Arrangements: Lives in Facility    Activities of Daily Living Prior to Admission  Functional Status: Total dependent  Completes ADLs independently?: No  Level of ADL dependence: Total Dependent  Ambulates independently?: No  Level of ambulatory dependence: Total Dependent  Does patient use assisted devices?: Yes  Assisted Devices (DME) used: [place] lift  Does the patient have a history of Short-Term Rehab?: Yes ([facility] [place])         Patient Information Continued  Income Source: Pension/residential  Does patient have prescription  coverage?: Yes  Does patient receive dialysis treatments?: No  Does patient have a history of Mental Health Diagnosis?: No         Means of Transportation  Means of Transport to Appts:: Other (Comment) (BLS)          DISCHARGE DETAILS:    Discharge planning discussed with:: staff at [facility], wife Kristi via phone  Freedom of Choice: Yes  Comments - Freedom of Choice: return to [facility] [place]  CM contacted family/caregiver?: Yes  Were Treatment Team discharge recommendations reviewed with patient/caregiver?: Yes  Did patient/caregiver verbalize understanding of patient care needs?: Yes  Were patient/caregiver advised of the risks associated with not following Treatment Team discharge recommendations?: Yes    Contacts  Patient Contacts: Kristi Santos (Spouse)  Relationship to Patient:: Family  Contact Method: Phone  Phone Number: [phone] (Mobile)  Reason/Outcome: Continuity of Care, Emergency Contact, Discharge Planning              Other Referral/Resources/Interventions Provided:  Interventions: Facility Return         Treatment Team Recommendation: Facility Return  Discharge Destination Plan:: Facility Return  Transport at Discharge : BLS Ambulance                                      Additional Comments: Introduced self and role to patient's wife Kristi via phone, and spoke to staff at [facility].  Patient is bedbound, totally dependent, staff uses a [place] lift for transfers.  Patient is non verbal.  Wife's plan is to have patient return to [facility] when cleared for d/c.  Return referral placed in [facility].  Assigned CM to follow.

## 2024-11-25 LAB
ANION GAP SERPL CALCULATED.3IONS-SCNC: 4 MMOL/L (ref 4–13)
BUN SERPL-MCNC: 18 MG/DL (ref 5–25)
CALCIUM SERPL-MCNC: 8.1 MG/DL (ref 8.4–10.2)
CHLORIDE SERPL-SCNC: 106 MMOL/L (ref 96–108)
CO2 SERPL-SCNC: 24 MMOL/L (ref 21–32)
CREAT SERPL-MCNC: 0.57 MG/DL (ref 0.6–1.3)
ERYTHROCYTE [DISTWIDTH] IN BLOOD BY AUTOMATED COUNT: 15.8 % (ref 11.6–15.1)
GFR SERPL CREATININE-BSD FRML MDRD: 100 ML/MIN/1.73SQ M
GLUCOSE SERPL-MCNC: 195 MG/DL (ref 65–140)
GLUCOSE SERPL-MCNC: 203 MG/DL (ref 65–140)
GLUCOSE SERPL-MCNC: 218 MG/DL (ref 65–140)
GLUCOSE SERPL-MCNC: 229 MG/DL (ref 65–140)
GLUCOSE SERPL-MCNC: 249 MG/DL (ref 65–140)
HCT VFR BLD AUTO: 28 % (ref 36.5–49.3)
HGB BLD-MCNC: 8.6 G/DL (ref 12–17)
MAGNESIUM SERPL-MCNC: 1.7 MG/DL (ref 1.9–2.7)
MCH RBC QN AUTO: 28.9 PG (ref 26.8–34.3)
MCHC RBC AUTO-ENTMCNC: 30.7 G/DL (ref 31.4–37.4)
MCV RBC AUTO: 94 FL (ref 82–98)
MRSA NOSE QL CULT: NORMAL
PHOSPHATE SERPL-MCNC: 3.3 MG/DL (ref 2.3–4.1)
PLATELET # BLD AUTO: 461 THOUSANDS/UL (ref 149–390)
PMV BLD AUTO: 8.8 FL (ref 8.9–12.7)
POTASSIUM SERPL-SCNC: 4.2 MMOL/L (ref 3.5–5.3)
RBC # BLD AUTO: 2.98 MILLION/UL (ref 3.88–5.62)
SARS-COV-2 RNA RESP QL NAA+PROBE: NEGATIVE
SODIUM SERPL-SCNC: 134 MMOL/L (ref 135–147)
WBC # BLD AUTO: 10.48 THOUSAND/UL (ref 4.31–10.16)

## 2024-11-25 PROCEDURE — 92526 ORAL FUNCTION THERAPY: CPT

## 2024-11-25 PROCEDURE — 85027 COMPLETE CBC AUTOMATED: CPT

## 2024-11-25 PROCEDURE — 82948 REAGENT STRIP/BLOOD GLUCOSE: CPT

## 2024-11-25 PROCEDURE — 83735 ASSAY OF MAGNESIUM: CPT

## 2024-11-25 PROCEDURE — 99233 SBSQ HOSP IP/OBS HIGH 50: CPT

## 2024-11-25 PROCEDURE — 84100 ASSAY OF PHOSPHORUS: CPT

## 2024-11-25 PROCEDURE — 80048 BASIC METABOLIC PNL TOTAL CA: CPT

## 2024-11-25 PROCEDURE — 87635 SARS-COV-2 COVID-19 AMP PRB: CPT

## 2024-11-25 RX ADMIN — ESCITALOPRAM OXALATE 10 MG: 10 TABLET ORAL at 09:53

## 2024-11-25 RX ADMIN — INSULIN LISPRO 2 UNITS: 100 INJECTION, SOLUTION INTRAVENOUS; SUBCUTANEOUS at 01:54

## 2024-11-25 RX ADMIN — MIRTAZAPINE 15 MG: 15 TABLET, FILM COATED ORAL at 21:21

## 2024-11-25 RX ADMIN — INSULIN LISPRO 2 UNITS: 100 INJECTION, SOLUTION INTRAVENOUS; SUBCUTANEOUS at 12:48

## 2024-11-25 RX ADMIN — APIXABAN 5 MG: 5 TABLET, FILM COATED ORAL at 18:43

## 2024-11-25 RX ADMIN — CEFEPIME 2000 MG: 2 INJECTION, POWDER, FOR SOLUTION INTRAVENOUS at 01:53

## 2024-11-25 RX ADMIN — INSULIN LISPRO 2 UNITS: 100 INJECTION, SOLUTION INTRAVENOUS; SUBCUTANEOUS at 05:30

## 2024-11-25 RX ADMIN — EZETIMIBE 10 MG: 10 TABLET ORAL at 09:53

## 2024-11-25 RX ADMIN — APIXABAN 5 MG: 5 TABLET, FILM COATED ORAL at 09:53

## 2024-11-25 RX ADMIN — INSULIN LISPRO 3 UNITS: 100 INJECTION, SOLUTION INTRAVENOUS; SUBCUTANEOUS at 18:45

## 2024-11-25 RX ADMIN — ASPIRIN 81 MG CHEWABLE TABLET 81 MG: 81 TABLET CHEWABLE at 09:53

## 2024-11-25 RX ADMIN — ATORVASTATIN CALCIUM 80 MG: 80 TABLET, FILM COATED ORAL at 18:43

## 2024-11-25 RX ADMIN — SENNOSIDES 17.2 MG: 8.6 TABLET, FILM COATED ORAL at 21:21

## 2024-11-25 RX ADMIN — THIAMINE HCL TAB 100 MG 100 MG: 100 TAB at 09:53

## 2024-11-25 RX ADMIN — SODIUM CHLORIDE 75 ML/HR: 0.9 INJECTION, SOLUTION INTRAVENOUS at 12:46

## 2024-11-25 NOTE — ASSESSMENT & PLAN NOTE
Patient with a stage IV sacral decubitus ulcer  Previous admissions 9/2024, in 10/2024 for polymicrobial bacteremia secondary to infected sacral wound  Probable underlying osteomyelitis  With most recently seen in the surgical office 10/7/24: Wound VAC in place  Concurrent leukocytosis with concerns for possible superimposed infection  Underwent bedside debridement 11/23/2024

## 2024-11-25 NOTE — PLAN OF CARE
Problem: INFECTION - ADULT  Goal: Absence or prevention of progression during hospitalization  Description: INTERVENTIONS:  - Assess and monitor for signs and symptoms of infection  - Monitor lab/diagnostic results  - Monitor all insertion sites, i.e. indwelling lines, tubes, and drains  - Monitor endotracheal if appropriate and nasal secretions for changes in amount and color  - [place] appropriate cooling/warming therapies per order  - Administer medications as ordered  - Instruct and encourage patient and family to use good hand hygiene technique  - Identify and instruct in appropriate isolation precautions for identified infection/condition  Outcome: Progressing     Problem: SAFETY ADULT  Goal: Patient will remain free of falls  Description: INTERVENTIONS:  - Educate patient/family on patient safety including physical limitations  - Instruct patient to call for assistance with activity   - Consult OT/PT to assist with strengthening/mobility   - Keep Call bell within reach  - Keep bed low and locked with side rails adjusted as appropriate  - Keep care items and personal belongings within reach  - Initiate and maintain comfort rounds  - Make Fall Risk Sign visible to staff  - Offer Toileting every 2 Hours, in advance of need  - Initiate/Maintain bed alarm  - Obtain necessary fall risk management equipment:    - Apply yellow socks and bracelet for high fall risk patients  - Consider moving patient to room near nurses station  Outcome: Progressing  Goal: Maintain or return to baseline ADL function  Description: INTERVENTIONS:  -  Assess patient's ability to carry out ADLs; assess patient's baseline for ADL function and identify physical deficits which impact ability to perform ADLs (bathing, care of mouth/teeth, toileting, grooming, dressing, etc.)  - Assess/evaluate cause of self-care deficits   - Assess range of motion  - Assess patient's mobility; develop plan if impaired  - Assess patient's need for  assistive devices and provide as appropriate  - Encourage maximum independence but intervene and supervise when necessary  - Involve family in performance of ADLs  - Assess for home care needs following discharge   - Consider OT consult to assist with ADL evaluation and planning for discharge  - Provide patient education as appropriate  Outcome: Progressing  Goal: Maintains/Returns to pre admission functional level  Description: INTERVENTIONS:  - Perform AM-PAC 6 Click Basic Mobility/ Daily Activity assessment daily.  - Set and communicate daily mobility goal to care team and patient/family/caregiver.   - Collaborate with rehabilitation services on mobility goals if consulted  - Perform Range of Motion 3 times a day.  - Reposition patient every 2 hours.  - Record patient progress and toleration of activity level   Outcome: Progressing     Problem: DISCHARGE PLANNING  Goal: Discharge to home or other facility with appropriate resources  Description: INTERVENTIONS:  - Identify barriers to discharge w/patient and caregiver  - Arrange for needed discharge resources and transportation as appropriate  - Identify discharge learning needs (meds, wound care, etc.)  - Arrange for interpretive services to assist at discharge as needed  - Refer to Case Management Department for coordinating discharge planning if the patient needs post-hospital services based on physician/advanced practitioner order or complex needs related to functional status, cognitive ability, or social support system  Outcome: Progressing     Problem: CARDIOVASCULAR - ADULT  Goal: Maintains optimal cardiac output and hemodynamic stability  Description: INTERVENTIONS:  - Monitor I/O, vital signs and rhythm  - Monitor for S/S and trends of decreased cardiac output  - Administer and titrate ordered vasoactive medications to optimize hemodynamic stability  - Assess quality of pulses, skin color and temperature  - Assess for signs of decreased coronary artery  perfusion  - Instruct patient to report change in severity of symptoms  Outcome: Progressing  Goal: Absence of cardiac dysrhythmias or at baseline rhythm  Description: INTERVENTIONS:  - Continuous cardiac monitoring, vital signs, obtain 12 lead EKG if ordered  - Administer antiarrhythmic and heart rate control medications as ordered  - Monitor electrolytes and administer replacement therapy as ordered  Outcome: Progressing     Problem: GASTROINTESTINAL - ADULT  Goal: Establish and maintain optimal ostomy function  Description: INTERVENTIONS:  - Assess bowel function  - Encourage oral fluids to ensure adequate hydration  - Administer IV fluids if ordered to ensure adequate hydration   - Administer ordered medications as needed  - Encourage mobilization and activity  - Nutrition services referral to assist patient with appropriate food choices  - Assess stoma site  - Consider wound care consult   Outcome: Progressing     Problem: GENITOURINARY - ADULT  Goal: Urinary catheter remains patent  Description: INTERVENTIONS:  - Assess patency of urinary catheter  - If patient has a chronic hanks, consider changing catheter if non-functioning  - Follow guidelines for intermittent irrigation of non-functioning urinary catheter  Outcome: Progressing     Problem: METABOLIC, FLUID AND ELECTROLYTES - ADULT  Goal: Electrolytes maintained within normal limits  Description: INTERVENTIONS:  - Monitor labs and assess patient for signs and symptoms of electrolyte imbalances  - Administer electrolyte replacement as ordered  - Monitor response to electrolyte replacements, including repeat lab results as appropriate  - Instruct patient on fluid and nutrition as appropriate  Outcome: Progressing  Goal: Glucose maintained within target range  Description: INTERVENTIONS:  - Monitor Blood Glucose as ordered  - Assess for signs and symptoms of hyperglycemia and hypoglycemia  - Administer ordered medications to maintain glucose within target  range  - Assess nutritional intake and initiate nutrition service referral as needed  Outcome: Progressing     Problem: SKIN/TISSUE INTEGRITY - ADULT  Goal: Incision(s), wounds(s) or drain site(s) healing without S/S of infection  Description: INTERVENTIONS  - Assess and document dressing, incision, wound bed, drain sites and surrounding tissue  - Provide patient and family education  - Perform skin care/dressing changes as ordered  Outcome: Progressing  Goal: Pressure injury heals and does not worsen  Description: Interventions:  - Implement low air loss mattress or specialty surface (Criteria met)  - Apply silicone foam dressing  - Instruct/assist with weight shifting every 60 minutes when in chair   - Limit chair time to 2 hour intervals  - Use special pressure reducing interventions such as waffle cushion when in chair   - Apply fecal or urinary incontinence containment device   - Perform passive or active ROM every shift  - Turn and reposition patient & offload bony prominences every 2 hours   - Utilize friction reducing device or surface for transfers     - Consider nutrition services referral as needed  Outcome: Progressing     Problem: Nutrition/Hydration-ADULT  Goal: Nutrient/Hydration intake appropriate for improving, restoring or maintaining nutritional needs  Description: Monitor and assess patient's nutrition/hydration status for malnutrition. Collaborate with interdisciplinary team and initiate plan and interventions as ordered.  Monitor patient's weight and dietary intake as ordered or per policy. Utilize nutrition screening tool and intervene as necessary. Determine patient's food preferences and provide high-protein, high-caloric foods as appropriate.     INTERVENTIONS:  - Monitor oral intake, urinary output, labs, and treatment plans  - Assess nutrition and hydration status and recommend course of action  - Evaluate amount of meals eaten  - Assist patient with eating if necessary   - Allow adequate  time for meals  - Recommend/ encourage appropriate diets, oral nutritional supplements, and vitamin/mineral supplements  - Order, calculate, and assess calorie counts as needed  - Recommend, monitor, and adjust tube feedings and TPN/PPN based on assessed needs  - Assess need for intravenous fluids  - Provide specific nutrition/hydration education as appropriate  - Include patient/family/caregiver in decisions related to nutrition  Outcome: Progressing

## 2024-11-25 NOTE — CASE MANAGEMENT
Case Management Progress Note    Patient name Drew Santos  [place] /E5 -* MRN [id number]  : 1950 Date 2024       LOS (days): 2  Geometric Mean LOS (GMLOS) (days):   Days to GMLOS:        OBJECTIVE:     Current admission status: Inpatient  Preferred Pharmacy:   [id number] [place], [place] - [address]   [facility] [address]  Phone: [phone] Fax: [phone]    Primary Care Provider: Joe Lyon MD    [facility] Insurance: [facility]  Secondary Insurance: [employer] [facility]    PROGRESS NOTE:    Cm reserved [facility] where pt is resident. Pt had wound vac at facility. Pt can dc with wet to dry dressing for transport and they will reapply.

## 2024-11-25 NOTE — SPEECH THERAPY NOTE
Speech Language/Pathology    Speech/Language Pathology Progress Note    Patient Name: Drew Santos  Today's Date: 11/25/2024     Problem List  Principal Problem:    Leukocytosis  Active Problems:    Primary hypertension    Decubitus ulcer of sacral region, stage 4 (HCC)    Type 2 diabetes mellitus without complication, without long-term current use of insulin (HCC)    History of CVA (cerebrovascular accident)    Paroxysmal atrial fibrillation (HCC)    Anemia of chronic disease    Chronic indwelling Parra catheter    S/P percutaneous endoscopic gastrostomy (PEG) tube placement (HCC)    Colostomy in place (HCC)    Hyponatremia    Hyperkalemia       Past Medical History  Past Medical History:   Diagnosis Date    Atherosclerotic heart disease of native coronary artery without angina pectoris     Atrial fibrillation (HCC)     Bacteremia     Cerebral infarction (HCC)     Constipation     Depression     Diabetes mellitus (HCC)     Hemiplegia and hemiparesis following cerebral infarction affecting left non-dominant side (HCC)     Hyperlipidemia     Hypertension     Osteomyelitis (HCC)     Pressure ulcer of sacral region, stage 3 (HCC)     Prostatic hyperplasia     Sepsis (HCC)     Stage 4 decubitus ulcer (HCC)     Stroke (HCC)     TIA (transient ischemic attack)     Urinary retention     Vitamin D deficiency         Past Surgical History  Past Surgical History:   Procedure Laterality Date    COLOSTOMY N/A 10/23/2024    Procedure: Lap loop colostomy, psb open loop colostomy;  Surgeon: Davonte Banegas DO;  Location: [facility];  Service: General    GASTROSTOMY TUBE PLACEMENT N/A 10/23/2024    Procedure: INSERTION PEG TUBE, psb lap gastrostomy tube placement;  Surgeon: Davonte Banegas DO;  [facility];  Service: General    INCISION AND DRAINAGE OF WOUND N/A 10/21/2024    Procedure: INCISION AND DRAINAGE (I&D) BUTTOCK, SACRAL WOUND DEBRIDEMENT, COCCYGECTOMY;  Surgeon: Beka King MD;  Location: [facility];  Service: General    WOUND DEBRIDEMENT N/A 10/23/2024    Procedure: DEBRIDEMENT WOUND (WASH OUT), sacrum;  Surgeon: Davonte Banegas DO;  Location: [facility];  Service: General         Subjective:  Pt seen in am. Much more alert and verbal than he has been since I have known him (several admits)  Objective:  Pt seen for po tolerance and additional recs. TF running at 85. Pt tolerated 6 ounces of juice and water by straw w/ prompt trasnfer nad swallow. No cough or wet vocal qaulity. Agreeable to a small amount of jello and then chopped peaches. Mastication was quite prolonged (has been by history) but functional. Pt had a small amount and refused any more. Oral control appeared adequate. Swallows prompt. Spoke w/ wife later in the afternoon, She has been giving him watermelon, strawberries, blueberries, blackberries when he is alert.  Wife stated he has not been wearing his upper plate.   Assessment:  Much more alert and verbal than previous stay. I suspect status will continue to fluctuate but he was able to take liquids and soft fruit WFL w/out s/s aspiration.   Plan/Recommendations:  PEG for primary. Dysphagia 3 dental soft, thi liquids.  Offer softer fruits and veggies. Feed only when fully alert. D/w wife. Spoke w/ nurse and SLIM, posted aspiration precautions/instructions.

## 2024-11-25 NOTE — ASSESSMENT & PLAN NOTE
Patient has a history of anemia of chronic disease  Baseline hemoglobin appears to range 7-8 predominantly  During previous admission 10/24 he required transfusion of 1 unit of packed red blood cells  On admission hgb 9.5 --hemoglobin at 8.6 we will continue to monitor  We will recheck H&H this afternoon and transfuse for hemoglobin less than 7  Ongoing monitoring of hemoglobin, especially as patient is on chronic anticoagulation with Eliquis as well as aspirin

## 2024-11-25 NOTE — ASSESSMENT & PLAN NOTE
Patient is a 73-year-old male well-known to our service, with past medical history significant for previous CVA with residual left hemiparesis, stage IV decubitus ulcer with recent hospitalization for debridement, diverting ostomy tube and G-tube placement patient presented from his nursing home with a question of sepsis. Patient was being admitted for evaluation SIRS with potential sepsis    Currently afebrile, heart rate 80s-90s however upon initial arrival was 105.    Reportedly at the nursing home his saturations were in the 70s however when EMS arrived they noticed the pulse ox was on the same arm as a blood pressure cuff: Per EMS his saturations were >90% on room air the entire time he was under their care  Leukocytosis of 13 trending down to 10.48, however has had a chronic leukocytosis approximately 10-12  Patient was recently admitted 9/2024 with polymicrobial bacteremia secondary to sacral wound: Treated with IV cefepime/Flagyl/Vanco discharged on ampicillin  Patient admitted 10/2024 with  polymicrobial bacteremia with E. 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  Underwent operative debridement, end colostomy and PEG tube placed  Patient was discharged 10/29/24 on 7-day course of doxycycline/Augmentin  Followed by surgery as an outpatient with wound VAC in place  A) Sacral Decubitus Ulcer: With recurrent episodes for superimposed infection and polymicrobial bacteremia  Consult surgical team  Obtain wound cultures  Blood cultures negative  B) Chronic Parra catheter:  Previous admission grew Proteus  Current urinalysis with leukocytes/nitrates however in the presence of chronic catheter that has hypermobility of colonization, urine culture showing Enterococcus faecalis.  C) CT scan chest/abdomen/pelvis: Lungs are clear.  Rectal wall thickening with previously diagnosed proctitis, bladder wall thickening with potential  cystitis  Patient was started on cefepime-discontinued as per ID   Pancultures pending- blood, urine

## 2024-11-25 NOTE — PHYSICAL THERAPY NOTE
PHYSICAL THERAPY SCREEN          Patient Name: Drew Santos  Today's Date: 11/25/2024 11/25/24 0816   PT Last Visit   PT Visit Date 11/25/24   Note Type   Note type Screen    Per chart review pt is bed bound, requires alexander lift for transfers. Does not demonstrate need for acute PT services. Will dc orders.       Traci Minaya, PT

## 2024-11-25 NOTE — ASSESSMENT & PLAN NOTE
Placed 10/2024  Continue for tube feedings, routine care, and aspiration precaution  Per previous speech eval: Sips of liquids and ice chips  Confirmed tube feedings with nursing home: Jevity 1.2 at 85 cc/h with water flush 150 every 4 hours.  Along with dysphagia diet dental soft thin liquids.  As per speech therapy  Updated wife via telephone about diet with need for clears and avoiding foods that she would bring in for him (she wanted to bring in fruit and veggies)

## 2024-11-25 NOTE — UTILIZATION REVIEW
Initial Clinical Review    Admission: Date/Time/Statement:   Admission Orders (From admission, onward)       Ordered        11/23/24 1213  INPATIENT ADMISSION  Once                          Orders Placed This Encounter   Procedures    INPATIENT ADMISSION     Standing Status:   Standing     Number of Occurrences:   1     Level of Care:   Med Surg [16]     Estimated length of stay:   More than 2 Midnights     Certification:   I certify that inpatient services are medically necessary for this patient for a duration of greater than two midnights. See H&P and MD Progress Notes for additional information about the patient's course of treatment.     ED Arrival Information       Expected   -    Arrival   11/23/2024 08:16    Acuity   Emergent              Means of arrival   Ambulance    Escorted by   [facility]    Service   Hospitalist    Admission type   Emergency              Arrival complaint   Medical problem             Chief Complaint   Patient presents with    Medical Problem     Patient arrives via ems from [facility]. Per staff at facility patients O2 was 78% on room air. EMS reported O2 in the 90s the whole ride here on room air. Facility is concerned for sepsis. Pt has no complaints        Initial Presentation: 73 y.o. male presents to ED  via  EMS   from facility with concern for sepsis.  Staff noticed  O2 sats  dropped abruptly,  tachycardic.  Recently admitted with sepsis.    Per EMS,  O2  sats >  90%.  PMH  is  CVA  with residual  L hemiparesis, S/P  colostomy, peg tube,  PAF,  chronic anemia, Dm2, chronic  hanks  and  HTN.   Labs  reveal  WBC  13,  hmgb  9.5, low sodium.  Admit  Ip with  Leukocytosis, SIRS, potential sepsis   and plan is   monitor labs, wound cultures, blood cultures, surgical consult, IVF  and continue  current meds.     Surgical consult  Sacral decub  and debrided at bedside.  Unlikely  source of leukocytosis.   Sacral wound vac in place.          Anticipated Length of  Stay/Certification Statement:  Patient will be admitted on an inpatient basis with an anticipated length of stay of greater than 2 midnights secondary to possible sepsis.     Date:   11/24   Day 2:   Colostomy/Parra  draining  well.  Oriented to person and place.   Sacral wound VAC  in place.  Needs  PT/OT.   Continue tube feeds/current meds.   F/U  cultures.      Date:  11/25  Day 3: Has surpassed a 2nd midnight with active treatments and services.  Needs  PT/OT.     Wound VAC  intact to sacrum.  Afebrile.   Continue  current meds.    F/U final cultures. Peg tube intact.   On dysphagia  3 dental  soft, thin liq, feed only when fully awake.   Much more alert today.         ED Treatment-Medication Administration from 11/23/2024 0816 to 11/23/2024 1438         Date/Time Order Dose Route Action     11/23/2024 0845 sodium chloride 0.9 % bolus 500 mL 500 mL Intravenous New Bag     11/23/2024 0924 iohexol (OMNIPAQUE) 350 MG/ML injection (MULTI-DOSE) 100 mL 100 mL Intravenous Given     11/23/2024 1022 sodium chloride 0.9 % bolus 500 mL 500 mL Intravenous New Bag     11/23/2024 1230 cefepime (MAXIPIME) 2 g/50 mL dextrose IVPB 2,000 mg Intravenous New Bag            Scheduled Medications:  apixaban, 5 mg, Per G Tube, BID  aspirin, 81 mg, Per G Tube, Daily  atorvastatin, 80 mg, Per G Tube, QPM  escitalopram, 10 mg, Per G Tube, Daily  ezetimibe, 10 mg, Per G Tube, Daily  insulin lispro, 1-6 Units, Subcutaneous, Q6H ELISEO  [Held by provider] lisinopril, 10 mg, Oral, Daily  [Held by provider] metoprolol tartrate, 12.5 mg, Per G Tube, Q12H ELISEO  mirtazapine, 15 mg, Per G Tube, HS  senna, 2 tablet, Per G Tube, HS  thiamine, 100 mg, Per G Tube, Daily      Continuous IV Infusions:  sodium chloride, 75 mL/hr, Intravenous, Continuous      PRN Meds:  acetaminophen, 650 mg, Oral, Q6H PRN  bisacodyl, 10 mg, Rectal, Daily PRN  magnesium hydroxide, 30 mL, Per G Tube, Daily PRN  ondansetron, 4 mg, Intravenous, Q6H PRN      ED Triage Vitals    Temperature Pulse Respirations Blood Pressure SpO2 Pain Score   11/23/24 0831 11/23/24 0825 11/23/24 0825 11/23/24 0825 11/23/24 0825 11/23/24 1444   97.9 °F (36.6 °C) 105 19 102/65 98 % No Pain     Weight (last 2 days)       Date/Time Weight    11/23/24 0825 93.5 (206.13)            Vital Signs (last 3 days)       Date/Time Temp Pulse Resp BP MAP (mmHg) SpO2 O2 Device Patient Position - Orthostatic VS Pain    11/25/24 07:52:19 -- 90 20 138/75 96 98 % None (Room air) Lying --    11/25/24 0723 -- -- -- -- -- -- -- -- No Pain    11/24/24 23:09:05 97.5 °F (36.4 °C) 51 18 123/73 90 99 % -- -- --    11/24/24 2019 -- -- -- -- -- -- -- -- No Pain    11/24/24 15:24:09 98.9 °F (37.2 °C) 99 -- 110/62 78 99 % -- -- --    11/24/24 0915 -- -- -- -- -- -- -- -- No Pain    11/24/24 07:24:40 98.7 °F (37.1 °C) 109 18 124/77 93 98 % -- -- --    11/23/24 23:29:19 98.7 °F (37.1 °C) -- -- 107/60 76 -- -- -- --    11/23/24 23:28:41 98.7 °F (37.1 °C) 113 -- 107/60 76 98 % -- -- --    11/23/24 2000 -- -- -- -- -- -- -- -- No Pain    11/23/24 14:44:10 98 °F (36.7 °C) 68 20 112/70 84 100 % None (Room air) Lying No Pain    11/23/24 1444 -- -- -- -- -- -- -- -- No Pain    11/23/24 1415 -- 114 20 103/63 76 97 % None (Room air) Sitting --    11/23/24 1345 -- 114 20 112/70 86 95 % None (Room air) Sitting --    11/23/24 1315 -- 109 16 107/69 82 97 % None (Room air) Lying --    11/23/24 1230 -- 98 17 106/55 74 97 % None (Room air) Lying --    11/23/24 1130 -- 84 15 110/62 81 96 % None (Room air) Lying --    11/23/24 1045 -- 83 17 104/66 80 97 % None (Room air) Lying --    11/23/24 1030 -- 81 13 97/60 74 97 % None (Room air) Lying --    11/23/24 0945 -- 94 16 100/51 71 98 % None (Room air) Lying --    11/23/24 0900 -- 93 15 104/66 80 98 % None (Room air) Lying --    11/23/24 0845 -- 102 16 97/63 75 98 % -- -- --    11/23/24 0831 97.9 °F (36.6 °C) -- -- -- -- -- -- -- --    11/23/24 0825 -- 105 19 102/65 -- 98 % None (Room air) Lying --               Pertinent Labs/Diagnostic Test Results:   Radiology:  CT chest abdomen pelvis w contrast   Final Interpretation by Moshe Gentile DO (11/23 1129)      1. Large sacral decubitus ulcer with absent of the inferior sacrum and sharply demarcated bony margin, likely reflecting osteotomy. This can be used as patient's new baseline. No organized fluid collection.      2. Rectal wall thickening may represent underdistention versus proctitis.      3. Bladder wall thickening, without perivesical inflammatory changes. If there is clinical concern for cystitis, correlate with urinalysis.      4. No acute findings in the chest.      Additional incidental findings as above.         Resident: FREDDY Mcdonnell I, the attending radiologist, have reviewed the images and agree with the final report above.      Workstation performed: [id number]           Cardiology:  ECG 12 lead   Final Result by Mickey May MD (11/23 2215)   Normal sinus rhythm   Normal ECG   When compared with ECG of 23-Nov-2024 08:30, (unconfirmed)   Premature ventricular complexes are no longer Present   Confirmed by Mickey May ([id number]) on 11/23/2024 10:15:56 PM      ECG 12 lead   Final Result by Mickey May MD (11/23 2216)   Sinus tachycardia   Abnormal ECG   When compared with ECG of 21-Oct-2024 17:14,   Nonspecific T wave abnormality no longer evident in Inferior leads   T wave amplitude has increased in Anterior leads   Confirmed by Mickey May ([id number]) on 11/23/2024 10:16:30 PM          Results from last 7 days   Lab Units 11/25/24  1023 11/24/24  0426 11/23/24  0841   WBC Thousand/uL 10.48* 10.52* 13.15*   HEMOGLOBIN g/dL 8.6* 7.4* 9.5*   HEMATOCRIT % 28.0* 24.4* 31.1*   PLATELETS Thousands/uL 461* 519* 593*   TOTAL NEUT ABS Thousands/µL  --   --  9.11*         Results from last 7 days   Lab Units 11/25/24  1023 11/24/24  0426 11/23/24  1312 11/23/24  0841   SODIUM mmol/L 134* 133*  --  130*   POTASSIUM mmol/L 4.2 4.4 4.9 5.8*    CHLORIDE mmol/L 106 103  --  96   CO2 mmol/L 24 25  --  30   ANION GAP mmol/L 4 5  --  4   BUN mg/dL 18 28*  --  30*   CREATININE mg/dL 0.57* 0.65  --  0.77   EGFR ml/min/1.73sq m 100 95  --  88   CALCIUM mg/dL 8.1* 8.6  --  9.6   MAGNESIUM mg/dL 1.7*  --   --  2.2   PHOSPHORUS mg/dL 3.3  --   --   --      Results from last 7 days   Lab Units 11/23/24  0841   AST U/L 58*   ALT U/L 24   ALK PHOS U/L 85   TOTAL PROTEIN g/dL 9.5*   ALBUMIN g/dL 2.7*   TOTAL BILIRUBIN mg/dL 0.40   BILIRUBIN DIRECT mg/dL 0.16     Results from last 7 days   Lab Units 11/25/24  1106 11/25/24  0529 11/25/24  0007 11/24/24  2001 11/24/24  1835 11/24/24  1416 11/24/24  0831 11/23/24  2345 11/23/24  1624   POC GLUCOSE mg/dl 218* 229* 195* 232* 259* 220* 246* 264* 194*     Results from last 7 days   Lab Units 11/25/24  1023 11/24/24  0426 11/23/24  0841   GLUCOSE RANDOM mg/dL 203* 232* 216*              Results from last 7 days   Lab Units 11/23/24  0841   PH BRITT  7.374   PCO2 BRITT mm Hg 50.3*   PO2 BRITT mm Hg 28.0*   HCO3 BRITT mmol/L 28.7   BASE EXC BRITT mmol/L 2.8   O2 CONTENT BRITT ml/dL 6.3   O2 HGB, VENOUS % 42.5*             Results from last 7 days   Lab Units 11/23/24  1059 11/23/24  0841   HS TNI 0HR ng/L  --  5   HS TNI 2HR ng/L 4  --    HSTNI D2 ng/L -1  --                  Results from last 7 days   Lab Units 11/23/24  0841   PROCALCITONIN ng/ml 0.13     Results from last 7 days   Lab Units 11/23/24  0841   LACTIC ACID mmol/L 1.6             Results from last 7 days   Lab Units 11/23/24  0841   BNP pg/mL 66                     Results from last 7 days   Lab Units 11/23/24  0841   LIPASE u/L 121*                 Results from last 7 days   Lab Units 11/23/24  0938   CLARITY UA  Clear   COLOR UA  Yellow   SPEC GRAV UA  1.021   PH UA  6.5   GLUCOSE UA mg/dl >=1000 (1%)*   KETONES UA mg/dl Negative   BLOOD UA  Moderate*   PROTEIN UA mg/dl 100 (2+)*   NITRITE UA  Negative   BILIRUBIN UA  Negative   UROBILINOGEN UA (BE) mg/dl <2.0   LEUKOCYTES  UA  Moderate*   WBC UA /hpf Innumerable*   RBC UA /hpf 30-50*   BACTERIA UA /hpf None Seen   EPITHELIAL CELLS WET PREP /hpf Occasional           Results from last 7 days   Lab Units 11/23/24  1216 11/23/24  0938   BLOOD CULTURE  No Growth at 24 hrs.  No Growth at 24 hrs.  --    URINE CULTURE   --  >100,000 cfu/ml Enterococcus faecalis*               Present on Admission:   Primary hypertension   Decubitus ulcer of sacral region, stage 4 (HCC)   Type 2 diabetes mellitus without complication, without long-term current use of insulin (HCC)   Paroxysmal atrial fibrillation (HCC)   Anemia of chronic disease      Admitting Diagnosis: Leukocytosis [D72.829]  Tachycardia [R00.0]  Relational problem due to medical condition [Z63.8]  Wound of sacral region, subsequent encounter [S31.000D]  Skin ulcer of sacrum with necrosis of muscle (HCC) [L98.423]  Age/Sex: 73 y.o. male    Network Utilization Review Department  ATTENTION: Please call with any questions or concerns to [phone] and carefully listen to the prompts so that you are directed to the right person. All voicemails are confidential.   For Discharge needs, contact Care Management DC Support Team at [phone] opt. [phone]  Send all requests for admission clinical reviews, approved or denied determinations and any other requests to dedicated fax number below belonging to the campus where the patient is receiving treatment. List of dedicated fax numbers for the Facilities:  FACILITY NAME UR FAX NUMBER   ADMISSION DENIALS (Administrative/Medical Necessity) [phone]   DISCHARGE SUPPORT TEAM (NETWORK) [phone]   PARENT CHILD HEALTH (Maternity/NICU/Pediatrics) [phone]   [facility] [phone]   [facility] [phone]   [facility] [phone]   [facility] [phone]   [facility] [phone]   [facility]  [place] [phone]   [facility] [phone]   [facility] [phone]   [facility] [phone]   [facility] [phone]   [facility] [phone]   [facility] [place] [phone]

## 2024-11-25 NOTE — OCCUPATIONAL THERAPY NOTE
Occupational Therapy         Patient Name: Drew Santos  Today's Date: 11/25/2024 11/25/24 0828   OT Last Visit   OT Visit Date 11/25/24   Note Type   Note type Screen   Additional Comments Per chart review pt is bed bound, requires alexander lift for transfers. Does not demonstrate need for acute OT services. Will dc orders.     Mine Wiseman

## 2024-11-25 NOTE — PROGRESS NOTES
Progress Note - Hospitalist   Name: Drew Santos 73 y.o. male I MRN: [id number]  Unit/Bed#: E5 -01 I Date of Admission: 11/23/2024   Date of Service: 11/25/2024 I Hospital Day: 2    Assessment & Plan  Leukocytosis  Patient is a 73-year-old male well-known to our service, with past medical history significant for previous CVA with residual left hemiparesis, stage IV decubitus ulcer with recent hospitalization for debridement, diverting ostomy tube and G-tube placement patient presented from his nursing home with a question of sepsis. Patient was being admitted for evaluation SIRS with potential sepsis    Currently afebrile, heart rate 80s-90s however upon initial arrival was 105.    Reportedly at the nursing home his saturations were in the 70s however when EMS arrived they noticed the pulse ox was on the same arm as a blood pressure cuff: Per EMS his saturations were >90% on room air the entire time he was under their care  Leukocytosis of 13 trending down to 10.48, however has had a chronic leukocytosis approximately 10-12  Patient was recently admitted 9/2024 with polymicrobial bacteremia secondary to sacral wound: Treated with IV cefepime/Flagyl/Vanco discharged on ampicillin  Patient admitted 10/2024 with  polymicrobial bacteremia with E. 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  Underwent operative debridement, end colostomy and PEG tube placed  Patient was discharged 10/29/24 on 7-day course of doxycycline/Augmentin  Followed by surgery as an outpatient with wound VAC in place  A) Sacral Decubitus Ulcer: With recurrent episodes for superimposed infection and polymicrobial bacteremia  Consult surgical team  Obtain wound cultures  Blood cultures negative  B) Chronic Parra catheter:  Previous admission grew Proteus  Current urinalysis with leukocytes/nitrates however in the presence of chronic catheter that has hypermobility  of colonization, urine culture showing Enterococcus faecalis.  C) CT scan chest/abdomen/pelvis: Lungs are clear.  Rectal wall thickening with previously diagnosed proctitis, bladder wall thickening with potential cystitis  Patient was started on cefepime-discontinued as per ID   Pancultures pending- blood, urine  History of CVA (cerebrovascular accident)  Patient with history of CVA 7/2024 with residual left hemiparesis  Baseline nonverbal however can nod/shake head in response to questions  Currently bedbound, with PEG tube feedings and chronic Parra catheter  Continue home aspirin, statin, Eliquis  S/P percutaneous endoscopic gastrostomy (PEG) tube placement (HCC)  Placed 10/2024  Continue for tube feedings, routine care, and aspiration precaution  Per previous speech eval: Sips of liquids and ice chips  Confirmed tube feedings with nursing home: Jevity 1.2 at 85 cc/h with water flush 150 every 4 hours.  Along with dysphagia diet dental soft thin liquids.  As per speech therapy  Updated wife via telephone about diet with need for clears and avoiding foods that she would bring in for him (she wanted to bring in fruit and veggies)  Primary hypertension  Home metoprolol 12.5 mg every 12 hours, and lisinopril 10 mg daily  Patient with borderline low blood pressure upon arrival: meds currently on hold   Most recent BP 24/77 106/60  Decubitus ulcer of sacral region, stage 4 ([facility])  Patient with a stage IV sacral decubitus ulcer  Previous admissions 9/2024, in 10/2024 for polymicrobial bacteremia secondary to infected sacral wound  Probable underlying osteomyelitis  With most recently seen in the surgical office 10/7/24: Wound VAC in place  Concurrent leukocytosis with concerns for possible superimposed infection  Underwent bedside debridement 11/23/2024  Type 2 diabetes mellitus without complication, without long-term current use of insulin ([facility])  Home regimen Humalog 3 units 4 times daily as well as oral steglatro 15 mg  daily  Lab Results   Component Value Date    HGBA1C 6.2 (H) 09/17/2024   Continue with insulin sliding scale  AddHumalog 3 units 3 times daily as patient has had some elevated blood sugars in the 200s  Paroxysmal atrial fibrillation (HCC)  Rate controlled on home metoprolol 12.5 mg every 12 hours  Temporarily on hold for borderline blood pressure  Continue chronic anticoagulation with Eliquis  Anemia of chronic disease  Patient has a history of anemia of chronic disease  Baseline hemoglobin appears to range 7-8 predominantly  During previous admission 10/24 he required transfusion of 1 unit of packed red blood cells  On admission hgb 9.5 --hemoglobin at 8.6 we will continue to monitor  We will recheck H&H this afternoon and transfuse for hemoglobin less than 7  Ongoing monitoring of hemoglobin, especially as patient is on chronic anticoagulation with Eliquis as well as aspirin  Chronic indwelling Parra catheter  History of urinary retention and chronic Parra catheter in place  Colostomy in place (HCC)  Placed during 10/24 admission due to sacral decubitus ulcer with possible osteomyelitis  Continue routine maintenance and care  Hyponatremia  Hyponatremia, in the setting of tube feedings with free water flushes, as well as SSRI  Suspect secondary to free water excess  Tube feeding flushes 150 cc of water every 4 hours  Decreased free water flushes  Hyperkalemia  Sample was hemolyzed: Suspect spurious  Repeat resolved and is currently 4.4    VTE Pharmacologic Prophylaxis: VTE Score: 4  apixaban    Mobility:   Basic Mobility Inpatient Raw Score: 10  -[facility] Goal: 4: Move to chair/commode  -[facility] Achieved: 1: Laying in bed      Education and Discussions with Family / Patient: Updated  (wife) via phone.    Current Length of Stay: 2 day(s)  Current Patient Status: Inpatient     Discharge Plan: Anticipate discharge in 24-48 hrs to facility    Code Status: Level 1 - Full Code    Subjective   Patient was seen  and examined at bedside.  Patient shook his head but was not verbal.    Objective :  Temp:  [97.2 °F (36.2 °C)-97.5 °F (36.4 °C)] 97.2 °F (36.2 °C)  HR:  [] 103  BP: (111-138)/(62-75) 111/62  Resp:  [16-20] 16  SpO2:  [98 %-99 %] 98 %  O2 Device: None (Room air)    Body mass index is 24.44 kg/m².     Input and Output Summary (last 24 hours):     Intake/Output Summary (Last 24 hours) at 11/25/2024 1703  Last data filed at 11/25/2024 1157  Gross per 24 hour   Intake 100 ml   Output 1750 ml   Net -1650 ml       Physical Exam  Vitals and nursing note reviewed.   Constitutional:       General: He is not in acute distress.     Appearance: He is well-developed. He is ill-appearing.   HENT:      Head: Normocephalic and atraumatic.   Eyes:      Conjunctiva/sclera: Conjunctivae normal.   Cardiovascular:      Rate and Rhythm: Normal rate and regular rhythm.      Heart sounds: No murmur heard.  Pulmonary:      Effort: Pulmonary effort is normal. No respiratory distress.      Breath sounds: Normal breath sounds.   Abdominal:      General: Bowel sounds are normal.      Palpations: Abdomen is soft.      Tenderness: There is no abdominal tenderness.      Comments: PEG tube in place.   Genitourinary:     Comments: Parra's catheter in place.  Musculoskeletal:         General: No swelling.      Cervical back: Neck supple.   Skin:     General: Skin is warm and dry.      Capillary Refill: Capillary refill takes less than 2 seconds.   Neurological:      Mental Status: He is alert. Mental status is at baseline.   Psychiatric:         Mood and Affect: Mood normal.           Lines/Drains:  Lines/Drains/Airways       Active Status       Name Placement date Placement time Site Days    Gastrostomy/Enterostomy LUQ 10/23/24  1135  LUQ  33    Colostomy Loop LLQ 10/23/24  1038  LLQ  33    Urethral Catheter Latex 20 Fr. 10/19/24  1209  Latex  37                  Urinary Catheter:  Goal for removal: Voiding trial when ambulation improves                  Lab Results: I have reviewed the following results:   Results from last 7 days   Lab Units 11/25/24  1023 11/24/24  0426 11/23/24  0841   WBC Thousand/uL 10.48*   < > 13.15*   HEMOGLOBIN g/dL 8.6*   < > 9.5*   HEMATOCRIT % 28.0*   < > 31.1*   PLATELETS Thousands/uL 461*   < > 593*   SEGS PCT %  --   --  68   LYMPHO PCT %  --   --  22   MONO PCT %  --   --  6   EOS PCT %  --   --  2    < > = values in this interval not displayed.     Results from last 7 days   Lab Units 11/25/24  1023 11/23/24  1312 11/23/24  0841   SODIUM mmol/L 134*   < > 130*   POTASSIUM mmol/L 4.2   < > 5.8*   CHLORIDE mmol/L 106   < > 96   CO2 mmol/L 24   < > 30   BUN mg/dL 18   < > 30*   CREATININE mg/dL 0.57*   < > 0.77   ANION GAP mmol/L 4   < > 4   CALCIUM mg/dL 8.1*   < > 9.6   ALBUMIN g/dL  --   --  2.7*   TOTAL BILIRUBIN mg/dL  --   --  0.40   ALK PHOS U/L  --   --  85   ALT U/L  --   --  24   AST U/L  --   --  58*   GLUCOSE RANDOM mg/dL 203*   < > 216*    < > = values in this interval not displayed.         Results from last 7 days   Lab Units 11/25/24  1106 11/25/24  0529 11/25/24  0007 11/24/24 2001 11/24/24  1835 11/24/24  1416 11/24/24  0831 11/23/24  2345 11/23/24  1624   POC GLUCOSE mg/dl 218* 229* 195* 232* 259* 220* 246* 264* 194*         Results from last 7 days   Lab Units 11/23/24  0841   LACTIC ACID mmol/L 1.6   PROCALCITONIN ng/ml 0.13       Recent Cultures (last 7 days):   Results from last 7 days   Lab Units 11/23/24  1216 11/23/24  0938   BLOOD CULTURE  No Growth at 48 hrs.  No Growth at 48 hrs.  --    URINE CULTURE   --  >100,000 cfu/ml Enterococcus faecalis*       CT chest abdomen pelvis w contrast  Result Date: 11/23/2024  Impression: 1. Large sacral decubitus ulcer with absent of the inferior sacrum and sharply demarcated bony margin, likely reflecting osteotomy. This can be used as patient's new baseline. No organized fluid collection. 2. Rectal wall thickening may represent underdistention versus  proctitis. 3. Bladder wall thickening, without perivesical inflammatory changes. If there is clinical concern for cystitis, correlate with urinalysis. 4. No acute findings in the chest. Additional incidental findings as above. Resident: FREDDY Mcdonnell I, the attending radiologist, have reviewed the images and agree with the final report above. Workstation performed: [id number]         Last 24 Hours Medication List:     Current Facility-Administered Medications:     acetaminophen (TYLENOL) tablet 650 mg, Q6H PRN    apixaban (ELIQUIS) tablet 5 mg, BID    aspirin chewable tablet 81 mg, Daily    atorvastatin (LIPITOR) tablet 80 mg, QPM    bisacodyl (DULCOLAX) rectal suppository 10 mg, Daily PRN    escitalopram (LEXAPRO) tablet 10 mg, Daily    ezetimibe (ZETIA) tablet 10 mg, Daily    insulin lispro (HumALOG/ADMELOG) 100 units/mL subcutaneous injection 1-6 Units, Q6H ELISEO **AND** Fingerstick Glucose (POCT), Q6H    [Held by provider] lisinopril (ZESTRIL) tablet 10 mg, Daily    magnesium hydroxide (MILK OF MAGNESIA) oral suspension 30 mL, Daily PRN    [Held by provider] metoprolol tartrate (LOPRESSOR) partial tablet 12.5 mg, Q12H ELISEO    mirtazapine (REMERON) tablet 15 mg, HS    ondansetron (ZOFRAN) injection 4 mg, Q6H PRN    senna (SENOKOT) tablet 17.2 mg, HS    sodium chloride 0.9 % infusion, Continuous, Last Rate: 75 mL/hr (11/25/24 1246)    thiamine tablet 100 mg, Daily    Administrative Statements   Today, Patient Was Seen By: Felipa Perales MD  I have spent a total time of >35 minutes in caring for this patient on the day of the visit/encounter including Diagnostic results, Prognosis, Risks and benefits of tx options, Instructions for management, Patient and family education, Importance of tx compliance, Risk factor reductions, Impressions, Counseling / Coordination of care, Documenting in the medical record, Reviewing / ordering tests, medicine, procedures  , Obtaining or reviewing history  , and Communicating with  other healthcare professionals .    **Please Note: This note may have been constructed using a voice recognition system.**

## 2024-11-25 NOTE — ASSESSMENT & PLAN NOTE
How Many Skin Cancers Have You Had?: more than one Patient with history of CVA 7/2024 with residual left hemiparesis  Baseline nonverbal however can nod/shake head in response to questions  Currently bedbound, with PEG tube feedings and chronic Parra catheter  Continue home aspirin, statin, Eliquis   What Is The Reason For Today's Visit?: Follow Up Non-Melanoma Skin Cancer When Was Your Last Cancer Diagnosed?: 5/14/21

## 2024-11-26 LAB
ABO GROUP BLD: NORMAL
ANION GAP SERPL CALCULATED.3IONS-SCNC: 4 MMOL/L (ref 4–13)
BLD GP AB SCN SERPL QL: NEGATIVE
BUN SERPL-MCNC: 18 MG/DL (ref 5–25)
CALCIUM SERPL-MCNC: 8.3 MG/DL (ref 8.4–10.2)
CHLORIDE SERPL-SCNC: 104 MMOL/L (ref 96–108)
CO2 SERPL-SCNC: 24 MMOL/L (ref 21–32)
CREAT SERPL-MCNC: 0.55 MG/DL (ref 0.6–1.3)
ERYTHROCYTE [DISTWIDTH] IN BLOOD BY AUTOMATED COUNT: 15.8 % (ref 11.6–15.1)
GFR SERPL CREATININE-BSD FRML MDRD: 101 ML/MIN/1.73SQ M
GLUCOSE SERPL-MCNC: 188 MG/DL (ref 65–140)
GLUCOSE SERPL-MCNC: 193 MG/DL (ref 65–140)
GLUCOSE SERPL-MCNC: 195 MG/DL (ref 65–140)
GLUCOSE SERPL-MCNC: 246 MG/DL (ref 65–140)
GLUCOSE SERPL-MCNC: 247 MG/DL (ref 65–140)
HCT VFR BLD AUTO: 23.6 % (ref 36.5–49.3)
HCT VFR BLD AUTO: 25.1 % (ref 36.5–49.3)
HGB BLD-MCNC: 7.2 G/DL (ref 12–17)
HGB BLD-MCNC: 7.6 G/DL (ref 12–17)
MCH RBC QN AUTO: 29.5 PG (ref 26.8–34.3)
MCHC RBC AUTO-ENTMCNC: 30.3 G/DL (ref 31.4–37.4)
MCV RBC AUTO: 97 FL (ref 82–98)
PLATELET # BLD AUTO: 515 THOUSANDS/UL (ref 149–390)
PMV BLD AUTO: 9.2 FL (ref 8.9–12.7)
POTASSIUM SERPL-SCNC: 4.5 MMOL/L (ref 3.5–5.3)
RBC # BLD AUTO: 2.58 MILLION/UL (ref 3.88–5.62)
RH BLD: POSITIVE
SARS-COV-2 RNA RESP QL NAA+PROBE: NEGATIVE
SODIUM SERPL-SCNC: 132 MMOL/L (ref 135–147)
SPECIMEN EXPIRATION DATE: NORMAL
WBC # BLD AUTO: 12.6 THOUSAND/UL (ref 4.31–10.16)

## 2024-11-26 PROCEDURE — 86901 BLOOD TYPING SEROLOGIC RH(D): CPT

## 2024-11-26 PROCEDURE — 82948 REAGENT STRIP/BLOOD GLUCOSE: CPT

## 2024-11-26 PROCEDURE — 80048 BASIC METABOLIC PNL TOTAL CA: CPT

## 2024-11-26 PROCEDURE — 85014 HEMATOCRIT: CPT

## 2024-11-26 PROCEDURE — 86923 COMPATIBILITY TEST ELECTRIC: CPT

## 2024-11-26 PROCEDURE — 99233 SBSQ HOSP IP/OBS HIGH 50: CPT

## 2024-11-26 PROCEDURE — 86900 BLOOD TYPING SEROLOGIC ABO: CPT

## 2024-11-26 PROCEDURE — 87635 SARS-COV-2 COVID-19 AMP PRB: CPT

## 2024-11-26 PROCEDURE — 86850 RBC ANTIBODY SCREEN: CPT

## 2024-11-26 PROCEDURE — 85027 COMPLETE CBC AUTOMATED: CPT

## 2024-11-26 PROCEDURE — 97606 NEG PRS WND THER DME>50 SQCM: CPT | Performed by: PHYSICIAN ASSISTANT

## 2024-11-26 PROCEDURE — 85018 HEMOGLOBIN: CPT

## 2024-11-26 RX ADMIN — INSULIN LISPRO 2 UNITS: 100 INJECTION, SOLUTION INTRAVENOUS; SUBCUTANEOUS at 12:21

## 2024-11-26 RX ADMIN — SENNOSIDES 17.2 MG: 8.6 TABLET, FILM COATED ORAL at 22:09

## 2024-11-26 RX ADMIN — Medication 12.5 MG: at 22:09

## 2024-11-26 RX ADMIN — INSULIN LISPRO 1 UNITS: 100 INJECTION, SOLUTION INTRAVENOUS; SUBCUTANEOUS at 17:34

## 2024-11-26 RX ADMIN — EZETIMIBE 10 MG: 10 TABLET ORAL at 09:33

## 2024-11-26 RX ADMIN — SODIUM CHLORIDE 75 ML/HR: 0.9 INJECTION, SOLUTION INTRAVENOUS at 02:41

## 2024-11-26 RX ADMIN — APIXABAN 5 MG: 5 TABLET, FILM COATED ORAL at 09:33

## 2024-11-26 RX ADMIN — ATORVASTATIN CALCIUM 80 MG: 80 TABLET, FILM COATED ORAL at 17:36

## 2024-11-26 RX ADMIN — INSULIN LISPRO 3 UNITS: 100 INJECTION, SOLUTION INTRAVENOUS; SUBCUTANEOUS at 06:33

## 2024-11-26 RX ADMIN — APIXABAN 5 MG: 5 TABLET, FILM COATED ORAL at 17:36

## 2024-11-26 RX ADMIN — MIRTAZAPINE 15 MG: 15 TABLET, FILM COATED ORAL at 22:09

## 2024-11-26 RX ADMIN — ASPIRIN 81 MG CHEWABLE TABLET 81 MG: 81 TABLET CHEWABLE at 09:33

## 2024-11-26 RX ADMIN — INSULIN LISPRO 2 UNITS: 100 INJECTION, SOLUTION INTRAVENOUS; SUBCUTANEOUS at 00:32

## 2024-11-26 RX ADMIN — ESCITALOPRAM OXALATE 10 MG: 10 TABLET ORAL at 09:33

## 2024-11-26 RX ADMIN — THIAMINE HCL TAB 100 MG 100 MG: 100 TAB at 09:33

## 2024-11-26 NOTE — CASE MANAGEMENT
Case Management Discharge Planning Note    Patient name Drew Santos  [place] [address] /E5 -* MRN [id number]  : 1950 Date 2024       Current Admission Date: 2024  Current Admission Diagnosis:Leukocytosis   Patient Active Problem List    Diagnosis Date Noted Date Diagnosed    Hyponatremia 2024     Hyperkalemia 2024     Leukocytosis 2024     Colostomy in place ([facility]) 2024     S/P percutaneous endoscopic gastrostomy (PEG) tube placement ([facility]) 10/24/2024     Pyuria 10/19/2024     Chronic indwelling Parra catheter 10/19/2024     Hypernatremia 10/19/2024     Sacral ulcer ([facility]) 10/18/2024     Advanced care planning/counseling discussion 2024     Severe protein-calorie malnutrition (HCC) 2024     Polymicrobial bacteremia 2024     Acute metabolic encephalopathy 2024     History of CVA (cerebrovascular accident) 2024     Paroxysmal atrial fibrillation ([facility]) 2024     Anemia of chronic disease 2024     Gram-negative bacteremia 2024     Type 2 diabetes mellitus without complication, without long-term current use of insulin ([facility]) 2024     COVID-19 2024     Proctitis 2024     Dysgeusia 2024     Gross hematuria 2024     Depression 2024     Decubitus ulcer of sacral region, stage 4 ([facility]) 2024     Primary hypertension 2024     Mixed hyperlipidemia 2024     Urinary retention 2024     Syncope 2024     Elevated lactic acid level 2024     Abnormal CPK 2024       LOS (days): 3  Geometric Mean LOS (GMLOS) (days): 4.2  Days to GMLOS:1.1     OBJECTIVE:  Risk of Unplanned Readmission Score: 34.96         Current admission status: Inpatient   Preferred Pharmacy:   [id number] - [place] - [address] [facility]  [address] [facility]  [facility] [place] [address]  Phone: [phone] Fax: [phone]    Primary Care Provider: Joe Lyon MD    Primary  Insurance: [facility] [employer] [facility]  Secondary Insurance: [employer]  REP    DISCHARGE DETAILS:                                                                                                 Additional Comments: Patient may be cleared for d/c tomorrow back [facility].  COVID swab requested, [facility] to [facility] the [facility] [phone] to set up transportation.  Assigned CM to follow.

## 2024-11-26 NOTE — PROGRESS NOTES
Progress Note - Hospitalist   Name: Drew Santos 73 y.o. male I MRN: [id number]  Unit/Bed#: E5 -01 I Date of Admission: 11/23/2024   Date of Service: 11/26/2024 I Hospital Day: 3    Assessment & Plan  Leukocytosis  Patient is a 73-year-old male well-known to our service, with past medical history significant for previous CVA with residual left hemiparesis, stage IV decubitus ulcer with recent hospitalization for debridement, diverting ostomy tube and G-tube placement patient presented from his nursing home with a question of sepsis. Patient was being admitted for evaluation SIRS with potential sepsis    Currently afebrile, heart rate 80s-90s however upon initial arrival was 105.    Reportedly at the nursing home his saturations were in the 70s however when EMS arrived they noticed the pulse ox was on the same arm as a blood pressure cuff: Per EMS his saturations were >90% on room air the entire time he was under their care  Leukocytosis of 13 trending down to 10.48, however has had a chronic leukocytosis approximately 10-12  Patient was recently admitted 9/2024 with polymicrobial bacteremia secondary to sacral wound: Treated with IV cefepime/Flagyl/Vanco discharged on ampicillin  Patient admitted 10/2024 with  polymicrobial bacteremia with E. 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  Underwent operative debridement, end colostomy and PEG tube placed  Patient was discharged 10/29/24 on 7-day course of doxycycline/Augmentin  Followed by surgery as an outpatient with wound VAC in place  A) Sacral Decubitus Ulcer: With recurrent episodes for superimposed infection and polymicrobial bacteremia  Consult surgical team  Obtain wound cultures  Blood cultures negative  B) Chronic Parra catheter:  Previous admission grew Proteus  Current urinalysis with leukocytes/nitrates however in the presence of chronic catheter that has hypermobility  of colonization, urine culture showing Enterococcus faecalis.  C) CT scan chest/abdomen/pelvis: Lungs are clear.  Rectal wall thickening with previously diagnosed proctitis, bladder wall thickening with potential cystitis  Patient was started on cefepime-discontinued as per ID   Pancultures pending- blood, urine  History of CVA (cerebrovascular accident)  Patient with history of CVA 7/2024 with residual left hemiparesis  Baseline nonverbal however can nod/shake head in response to questions  Currently bedbound, with PEG tube feedings and chronic Parra catheter  Continue home aspirin, statin, Eliquis  S/P percutaneous endoscopic gastrostomy (PEG) tube placement (HCC)  Placed 10/2024  Continue for tube feedings, routine care, and aspiration precaution  Per previous speech eval: Sips of liquids and ice chips  Confirmed tube feedings with nursing home: Jevity 1.2 at 85 cc/h with water flush 150 every 4 hours.  Along with dysphagia diet dental soft thin liquids.  As per speech therapy  Updated wife via telephone about diet with need for clears and avoiding foods that she would bring in for him (she wanted to bring in fruit and veggies)  Primary hypertension  Home metoprolol 12.5 mg every 12 hours, and lisinopril 10 mg daily  Patient with borderline low blood pressure upon arrival: meds currently on hold   Most recent BP 24/77 106/60  Decubitus ulcer of sacral region, stage 4 ([facility])  Patient with a stage IV sacral decubitus ulcer  Previous admissions 9/2024, in 10/2024 for polymicrobial bacteremia secondary to infected sacral wound  Probable underlying osteomyelitis  With most recently seen in the surgical office 10/7/24: Wound VAC in place  Concurrent leukocytosis with concerns for possible superimposed infection  Underwent bedside debridement 11/23/2024  Type 2 diabetes mellitus without complication, without long-term current use of insulin ([facility])  Home regimen Humalog 3 units 4 times daily as well as oral steglatro 15 mg  daily  Lab Results   Component Value Date    HGBA1C 6.2 (H) 09/17/2024   Continue with insulin sliding scale  AddHumalog 3 units 3 times daily as patient has had some elevated blood sugars in the 200s  Paroxysmal atrial fibrillation (HCC)  Rate controlled on home metoprolol 12.5 mg every 12 hours  Temporarily on hold for borderline blood pressure  Continue chronic anticoagulation with Eliquis  Anemia of chronic disease  Patient has a history of anemia of chronic disease  Baseline hemoglobin appears to range 7-8 predominantly  During previous admission 10/24 he required transfusion of 1 unit of packed red blood cells  On admission hgb 9.5 --hemoglobin at 8.6 we will continue to monitor  We will recheck H&H this afternoon and transfuse for hemoglobin less than 7  Ongoing monitoring of hemoglobin, especially as patient is on chronic anticoagulation with Eliquis as well as aspirin  Chronic indwelling Parra catheter  History of urinary retention and chronic Parra catheter in place  Colostomy in place (HCC)  Placed during 10/24 admission due to sacral decubitus ulcer with possible osteomyelitis  Continue routine maintenance and care  Hyponatremia  Hyponatremia, in the setting of tube feedings with free water flushes, as well as SSRI  Suspect secondary to free water excess  Tube feeding flushes 150 cc of water every 4 hours  Decreased free water flushes  Hyperkalemia  Sample was hemolyzed: Suspect spurious  Repeat resolved and is currently 4.4    VTE Pharmacologic Prophylaxis: VTE Score: 4  apixaban    Mobility:   Basic Mobility Inpatient Raw Score: 10  -[facility] Goal: 4: Move to chair/commode  -[facility] Achieved: 1: Laying in bed      Education and Discussions with Family / Patient:  Called wife and given update on phone.     Current Length of Stay: 3 day(s)  Current Patient Status: Inpatient     Discharge Plan: Anticipate discharge tomorrow to facility    Code Status: Level 1 - Full Code    Subjective   Patient was seen and  examined at bedside.  Patient nodded his head and shake.  Patient open his eyes and responded to verbal stimuli by nodding his head but remained nonverbal.    Objective :  Temp:  [97.2 °F (36.2 °C)-98.4 °F (36.9 °C)] 98.4 °F (36.9 °C)  HR:  [] 95  BP: (111-155)/(62-86) 155/86  Resp:  [16-18] 18  SpO2:  [97 %-98 %] 98 %  O2 Device: None (Room air)    Body mass index is 24.44 kg/m².     Input and Output Summary (last 24 hours):     Intake/Output Summary (Last 24 hours) at 11/26/2024 1318  Last data filed at 11/26/2024 1301  Gross per 24 hour   Intake 6373 ml   Output 2210 ml   Net 4163 ml       Physical Exam  Vitals and nursing note reviewed.   Constitutional:       General: He is not in acute distress.     Appearance: He is well-developed. He is ill-appearing.   HENT:      Head: Normocephalic and atraumatic.   Eyes:      Conjunctiva/sclera: Conjunctivae normal.   Cardiovascular:      Rate and Rhythm: Normal rate and regular rhythm.      Heart sounds: No murmur heard.  Pulmonary:      Effort: Pulmonary effort is normal. No respiratory distress.      Breath sounds: Normal breath sounds.   Abdominal:      General: Bowel sounds are normal.      Palpations: Abdomen is soft.      Tenderness: There is no abdominal tenderness.      Comments: PEG tube in place   Musculoskeletal:         General: No swelling.      Cervical back: Neck supple.   Skin:     General: Skin is warm and dry.      Capillary Refill: Capillary refill takes less than 2 seconds.   Neurological:      Mental Status: He is alert.      Comments: Shakes his head on verbal stimuli tries to turn his head as well but does not respond verbally   Psychiatric:         Mood and Affect: Mood normal.           Lines/Drains:  Lines/Drains/Airways       Active Status       Name Placement date Placement time Site Days    Gastrostomy/Enterostomy LUQ 10/23/24  1135  LUQ  34    Colostomy Loop LLQ 10/23/24  1038  LLQ  34    Urethral Catheter Latex 20 Fr. 10/19/24  1209   Latex  38                  Urinary Catheter:  Goal for removal: Voiding trial when ambulation improves                 Lab Results: I have reviewed the following results:   Results from last 7 days   Lab Units 11/26/24  1243 11/26/24  0515 11/24/24  0426 11/23/24  0841   WBC Thousand/uL  --  12.60*   < > 13.15*   HEMOGLOBIN g/dL 7.2* 7.6*   < > 9.5*   HEMATOCRIT % 23.6* 25.1*   < > 31.1*   PLATELETS Thousands/uL  --  515*   < > 593*   SEGS PCT %  --   --   --  68   LYMPHO PCT %  --   --   --  22   MONO PCT %  --   --   --  6   EOS PCT %  --   --   --  2    < > = values in this interval not displayed.     Results from last 7 days   Lab Units 11/26/24  0515 11/23/24  1312 11/23/24  0841   SODIUM mmol/L 132*   < > 130*   POTASSIUM mmol/L 4.5   < > 5.8*   CHLORIDE mmol/L 104   < > 96   CO2 mmol/L 24   < > 30   BUN mg/dL 18   < > 30*   CREATININE mg/dL 0.55*   < > 0.77   ANION GAP mmol/L 4   < > 4   CALCIUM mg/dL 8.3*   < > 9.6   ALBUMIN g/dL  --   --  2.7*   TOTAL BILIRUBIN mg/dL  --   --  0.40   ALK PHOS U/L  --   --  85   ALT U/L  --   --  24   AST U/L  --   --  58*   GLUCOSE RANDOM mg/dL 247*   < > 216*    < > = values in this interval not displayed.         Results from last 7 days   Lab Units 11/26/24  1113 11/26/24  0623 11/26/24  0029 11/25/24  1801 11/25/24  1106 11/25/24  0529 11/25/24  0007 11/24/24  2001 11/24/24  1835 11/24/24  1416 11/24/24  0831 11/23/24  2345   POC GLUCOSE mg/dl 193* 246* 195* 249* 218* 229* 195* 232* 259* 220* 246* 264*         Results from last 7 days   Lab Units 11/23/24  0841   LACTIC ACID mmol/L 1.6   PROCALCITONIN ng/ml 0.13       Recent Cultures (last 7 days):   Results from last 7 days   Lab Units 11/23/24  1216 11/23/24  0938   BLOOD CULTURE  No Growth at 48 hrs.  No Growth at 48 hrs.  --    URINE CULTURE   --  >100,000 cfu/ml Enterococcus  faecalis VRE*  30,000-39,000 cfu/ml Pseudomonas aeruginosa*     CT chest abdomen pelvis w contrast  Result Date: 11/23/2024  Impression: 1.  Large sacral decubitus ulcer with absent of the inferior sacrum and sharply demarcated bony margin, likely reflecting osteotomy. This can be used as patient's new baseline. No organized fluid collection. 2. Rectal wall thickening may represent underdistention versus proctitis. 3. Bladder wall thickening, without perivesical inflammatory changes. If there is clinical concern for cystitis, correlate with urinalysis. 4. No acute findings in the chest. Additional incidental findings as above. Resident: FREDDY Mcdonnell I, the attending radiologist, have reviewed the images and agree with the final report above. Workstation performed: [id number]         Last 24 Hours Medication List:     Current Facility-Administered Medications:     acetaminophen (TYLENOL) tablet 650 mg, Q6H PRN    apixaban (ELIQUIS) tablet 5 mg, BID    aspirin chewable tablet 81 mg, Daily    atorvastatin (LIPITOR) tablet 80 mg, QPM    bisacodyl (DULCOLAX) rectal suppository 10 mg, Daily PRN    escitalopram (LEXAPRO) tablet 10 mg, Daily    ezetimibe (ZETIA) tablet 10 mg, Daily    insulin lispro (HumALOG/ADMELOG) 100 units/mL subcutaneous injection 1-6 Units, Q6H ELISEO **AND** Fingerstick Glucose (POCT), Q6H    [Held by provider] lisinopril (ZESTRIL) tablet 10 mg, Daily    magnesium hydroxide (MILK OF MAGNESIA) oral suspension 30 mL, Daily PRN    metoprolol tartrate (LOPRESSOR) partial tablet 12.5 mg, Q12H ELISEO    mirtazapine (REMERON) tablet 15 mg, HS    ondansetron (ZOFRAN) injection 4 mg, Q6H PRN    senna (SENOKOT) tablet 17.2 mg, HS    sodium chloride 0.9 % infusion, Continuous, Last Rate: 75 mL/hr (11/26/24 0241)    thiamine tablet 100 mg, Daily    Administrative Statements   Today, Patient Was Seen By: Felipa Perales MD  I have spent a total time of >35 minutes in caring for this patient on the day of the visit/encounter including Diagnostic results, Prognosis, Risks and benefits of tx options, Instructions for management, Patient and family education,  Importance of tx compliance, Risk factor reductions, Impressions, Counseling / Coordination of care, Documenting in the medical record, Reviewing / ordering tests, medicine, procedures  , Obtaining or reviewing history  , and Communicating with other healthcare professionals .    **Please Note: This note may have been constructed using a voice recognition system.**

## 2024-11-26 NOTE — PROCEDURES
Acute Care Surgery  Bedside V.A.C. Procedure Note    A timeout was performed with the patient's nurse prior to beginning the dressing change. The nurse remained present to confirm the correct dressing counts on removal of the VAC dressing and was debriefed at the completion of the dressing change.    Location of wound: sacrum     Dressings and Foam removed:  2 Black Foam (1 in wound, 1 bridge)  0 White Foam    Dimensions of wound: 9.5 cm x 11 cm x 7.5 cm with 3cm undermining in the 12 o'clock position    Description of wound: On inspection of the wound today, there is carmen wound skin breakdown/maceration with a small (1cm) area of skin maceration where the black foam bridge came in contact with the skin over the L hip. The sacral wound extends down to muscle/fat. Approximately 98% of the wound base is now pink and healthy and there is granulation tissue. Minimal non adherent slough debrided at bedside.    VAC dressing application:  The periwound skin was cleaned and dried. 2 black foam, 0 white foam dressings were cut to size of the wound and placed into the wound. The dressings were then covered with VAC drape. Additional VAC drape and black foam was used to create a bridge to the patient's L hip just under the previous vac site and a base for the track pad. The track pad was then placed over the base of black foam. The VAC was then set to 125 mmHg low Continuous suction. The patient tolerated the procedure well and there were no complications. The patient did not require any excisional debridement during today's dressing change.    VAC settings:  125 mmHg  Continuous    Wound Images:      Additional Notes:  The VAC sticker placed over the dressing per protocol.  The next VAC dressing change will be planned for 11/28.    Mallory Castorena PA-C was present and assisting for the dressing change.    This dressing change took greater than 30 minutes to complete.    Gio Santos PA-C  11/26/2024 12:09 PM

## 2024-11-26 NOTE — PLAN OF CARE
Problem: INFECTION - ADULT  Goal: Absence or prevention of progression during hospitalization  Description: INTERVENTIONS:  - Assess and monitor for signs and symptoms of infection  - Monitor lab/diagnostic results  - Monitor all insertion sites, i.e. indwelling lines, tubes, and drains  - Monitor endotracheal if appropriate and nasal secretions for changes in amount and color  - [place] appropriate cooling/warming therapies per order  - Administer medications as ordered  - Instruct and encourage patient and family to use good hand hygiene technique  - Identify and instruct in appropriate isolation precautions for identified infection/condition  Outcome: Progressing     Problem: SAFETY ADULT  Goal: Patient will remain free of falls  Description: INTERVENTIONS:  - Educate patient/family on patient safety including physical limitations  - Instruct patient to call for assistance with activity   - Consult OT/PT to assist with strengthening/mobility   - Keep Call bell within reach  - Keep bed low and locked with side rails adjusted as appropriate  - Keep care items and personal belongings within reach  - Initiate and maintain comfort rounds  - Make Fall Risk Sign visible to staff  - Offer Toileting every 2 Hours, in advance of need  - Initiate/Maintain bed alarm  - Obtain necessary fall risk management equipment:    - Apply yellow socks and bracelet for high fall risk patients  - Consider moving patient to room near nurses station  Outcome: Progressing  Goal: Maintain or return to baseline ADL function  Description: INTERVENTIONS:  -  Assess patient's ability to carry out ADLs; assess patient's baseline for ADL function and identify physical deficits which impact ability to perform ADLs (bathing, care of mouth/teeth, toileting, grooming, dressing, etc.)  - Assess/evaluate cause of self-care deficits   - Assess range of motion  - Assess patient's mobility; develop plan if impaired  - Assess patient's need for  assistive devices and provide as appropriate  - Encourage maximum independence but intervene and supervise when necessary  - Involve family in performance of ADLs  - Assess for home care needs following discharge   - Consider OT consult to assist with ADL evaluation and planning for discharge  - Provide patient education as appropriate  Outcome: Progressing  Goal: Maintains/Returns to pre admission functional level  Description: INTERVENTIONS:  - Perform AM-PAC 6 Click Basic Mobility/ Daily Activity assessment daily.  - Set and communicate daily mobility goal to care team and patient/family/caregiver.   - Collaborate with rehabilitation services on mobility goals if consulted  - Perform Range of Motion 3 times a day.  - Reposition patient every 2 hours.  - Record patient progress and toleration of activity level   Outcome: Progressing     Problem: DISCHARGE PLANNING  Goal: Discharge to home or other facility with appropriate resources  Description: INTERVENTIONS:  - Identify barriers to discharge w/patient and caregiver  - Arrange for needed discharge resources and transportation as appropriate  - Identify discharge learning needs (meds, wound care, etc.)  - Arrange for interpretive services to assist at discharge as needed  - Refer to Case Management Department for coordinating discharge planning if the patient needs post-hospital services based on physician/advanced practitioner order or complex needs related to functional status, cognitive ability, or social support system  Outcome: Progressing     Problem: CARDIOVASCULAR - ADULT  Goal: Maintains optimal cardiac output and hemodynamic stability  Description: INTERVENTIONS:  - Monitor I/O, vital signs and rhythm  - Monitor for S/S and trends of decreased cardiac output  - Administer and titrate ordered vasoactive medications to optimize hemodynamic stability  - Assess quality of pulses, skin color and temperature  - Assess for signs of decreased coronary artery  perfusion  - Instruct patient to report change in severity of symptoms  Outcome: Progressing  Goal: Absence of cardiac dysrhythmias or at baseline rhythm  Description: INTERVENTIONS:  - Continuous cardiac monitoring, vital signs, obtain 12 lead EKG if ordered  - Administer antiarrhythmic and heart rate control medications as ordered  - Monitor electrolytes and administer replacement therapy as ordered  Outcome: Progressing     Problem: GASTROINTESTINAL - ADULT  Goal: Establish and maintain optimal ostomy function  Description: INTERVENTIONS:  - Assess bowel function  - Encourage oral fluids to ensure adequate hydration  - Administer IV fluids if ordered to ensure adequate hydration   - Administer ordered medications as needed  - Encourage mobilization and activity  - Nutrition services referral to assist patient with appropriate food choices  - Assess stoma site  - Consider wound care consult   Outcome: Progressing     Problem: GENITOURINARY - ADULT  Goal: Urinary catheter remains patent  Description: INTERVENTIONS:  - Assess patency of urinary catheter  - If patient has a chronic hanks, consider changing catheter if non-functioning  - Follow guidelines for intermittent irrigation of non-functioning urinary catheter  Outcome: Progressing     Problem: METABOLIC, FLUID AND ELECTROLYTES - ADULT  Goal: Electrolytes maintained within normal limits  Description: INTERVENTIONS:  - Monitor labs and assess patient for signs and symptoms of electrolyte imbalances  - Administer electrolyte replacement as ordered  - Monitor response to electrolyte replacements, including repeat lab results as appropriate  - Instruct patient on fluid and nutrition as appropriate  Outcome: Progressing  Goal: Glucose maintained within target range  Description: INTERVENTIONS:  - Monitor Blood Glucose as ordered  - Assess for signs and symptoms of hyperglycemia and hypoglycemia  - Administer ordered medications to maintain glucose within target  range  - Assess nutritional intake and initiate nutrition service referral as needed  Outcome: Progressing     Problem: SKIN/TISSUE INTEGRITY - ADULT  Goal: Incision(s), wounds(s) or drain site(s) healing without S/S of infection  Description: INTERVENTIONS  - Assess and document dressing, incision, wound bed, drain sites and surrounding tissue  - Provide patient and family education  - Perform skin care/dressing changes as ordered  Outcome: Progressing  Goal: Pressure injury heals and does not worsen  Description: Interventions:  - Implement low air loss mattress or specialty surface (Criteria met)  - Apply silicone foam dressing  - Instruct/assist with weight shifting every 60 minutes when in chair   - Limit chair time to 2 hour intervals  - Use special pressure reducing interventions such as waffle cushion when in chair   - Apply fecal or urinary incontinence containment device   - Perform passive or active ROM every shift  - Turn and reposition patient & offload bony prominences every 2 hours   - Utilize friction reducing device or surface for transfers     - Consider nutrition services referral as needed  Outcome: Progressing     Problem: Nutrition/Hydration-ADULT  Goal: Nutrient/Hydration intake appropriate for improving, restoring or maintaining nutritional needs  Description: Monitor and assess patient's nutrition/hydration status for malnutrition. Collaborate with interdisciplinary team and initiate plan and interventions as ordered.  Monitor patient's weight and dietary intake as ordered or per policy. Utilize nutrition screening tool and intervene as necessary. Determine patient's food preferences and provide high-protein, high-caloric foods as appropriate.     INTERVENTIONS:  - Monitor oral intake, urinary output, labs, and treatment plans  - Assess nutrition and hydration status and recommend course of action  - Evaluate amount of meals eaten  - Assist patient with eating if necessary   - Allow adequate  time for meals  - Recommend/ encourage appropriate diets, oral nutritional supplements, and vitamin/mineral supplements  - Order, calculate, and assess calorie counts as needed  - Recommend, monitor, and adjust tube feedings and TPN/PPN based on assessed needs  - Assess need for intravenous fluids  - Provide specific nutrition/hydration education as appropriate  - Include patient/family/caregiver in decisions related to nutrition  Outcome: Progressing     Problem: Prexisting or High Potential for Compromised Skin Integrity  Goal: Skin integrity is maintained or improved  Description: INTERVENTIONS:  - Identify patients at risk for skin breakdown  - Assess and monitor skin integrity  - Assess and monitor nutrition and hydration status  - Monitor labs   - Assess for incontinence   - Turn and reposition patient  - Assist with mobility/ambulation  - Relieve pressure over bony prominences  - Avoid friction and shearing  - Provide appropriate hygiene as needed including keeping skin clean and dry  - Evaluate need for skin moisturizer/barrier cream  - Collaborate with interdisciplinary team   - Patient/family teaching  - Consider wound care consult   Outcome: Progressing

## 2024-11-26 NOTE — PLAN OF CARE
Problem: INFECTION - ADULT  Goal: Absence or prevention of progression during hospitalization  Description: INTERVENTIONS:  - Assess and monitor for signs and symptoms of infection  - Monitor lab/diagnostic results  - Monitor all insertion sites, i.e. indwelling lines, tubes, and drains  - Monitor endotracheal if appropriate and nasal secretions for changes in amount and color  - [place] appropriate cooling/warming therapies per order  - Administer medications as ordered  - Instruct and encourage patient and family to use good hand hygiene technique  - Identify and instruct in appropriate isolation precautions for identified infection/condition  Outcome: Progressing     Problem: DISCHARGE PLANNING  Goal: Discharge to home or other facility with appropriate resources  Description: INTERVENTIONS:  - Identify barriers to discharge w/patient and caregiver  - Arrange for needed discharge resources and transportation as appropriate  - Identify discharge learning needs (meds, wound care, etc.)  - Arrange for interpretive services to assist at discharge as needed  - Refer to Case Management Department for coordinating discharge planning if the patient needs post-hospital services based on physician/advanced practitioner order or complex needs related to functional status, cognitive ability, or social support system  Outcome: Progressing

## 2024-11-27 VITALS
SYSTOLIC BLOOD PRESSURE: 123 MMHG | OXYGEN SATURATION: 99 % | TEMPERATURE: 97.6 F | BODY MASS INDEX: 24.44 KG/M2 | RESPIRATION RATE: 16 BRPM | HEART RATE: 93 BPM | DIASTOLIC BLOOD PRESSURE: 77 MMHG | WEIGHT: 206.13 LBS

## 2024-11-27 LAB
ANION GAP SERPL CALCULATED.3IONS-SCNC: 4 MMOL/L (ref 4–13)
BACTERIA UR CULT: ABNORMAL
BUN SERPL-MCNC: 15 MG/DL (ref 5–25)
CALCIUM SERPL-MCNC: 8.5 MG/DL (ref 8.4–10.2)
CHLORIDE SERPL-SCNC: 103 MMOL/L (ref 96–108)
CO2 SERPL-SCNC: 26 MMOL/L (ref 21–32)
CREAT SERPL-MCNC: 0.56 MG/DL (ref 0.6–1.3)
ERYTHROCYTE [DISTWIDTH] IN BLOOD BY AUTOMATED COUNT: 15.9 % (ref 11.6–15.1)
GFR SERPL CREATININE-BSD FRML MDRD: 101 ML/MIN/1.73SQ M
GLUCOSE SERPL-MCNC: 203 MG/DL (ref 65–140)
GLUCOSE SERPL-MCNC: 204 MG/DL (ref 65–140)
GLUCOSE SERPL-MCNC: 213 MG/DL (ref 65–140)
GLUCOSE SERPL-MCNC: 219 MG/DL (ref 65–140)
HCT VFR BLD AUTO: 25.8 % (ref 36.5–49.3)
HGB BLD-MCNC: 7.9 G/DL (ref 12–17)
MCH RBC QN AUTO: 28.9 PG (ref 26.8–34.3)
MCHC RBC AUTO-ENTMCNC: 30.6 G/DL (ref 31.4–37.4)
MCV RBC AUTO: 95 FL (ref 82–98)
PLATELET # BLD AUTO: 554 THOUSANDS/UL (ref 149–390)
PMV BLD AUTO: 9.2 FL (ref 8.9–12.7)
POTASSIUM SERPL-SCNC: 4.6 MMOL/L (ref 3.5–5.3)
RBC # BLD AUTO: 2.73 MILLION/UL (ref 3.88–5.62)
SODIUM SERPL-SCNC: 133 MMOL/L (ref 135–147)
WBC # BLD AUTO: 12.16 THOUSAND/UL (ref 4.31–10.16)

## 2024-11-27 PROCEDURE — 99239 HOSP IP/OBS DSCHRG MGMT >30: CPT

## 2024-11-27 PROCEDURE — 82948 REAGENT STRIP/BLOOD GLUCOSE: CPT

## 2024-11-27 PROCEDURE — 80048 BASIC METABOLIC PNL TOTAL CA: CPT

## 2024-11-27 PROCEDURE — 85027 COMPLETE CBC AUTOMATED: CPT

## 2024-11-27 RX ADMIN — EZETIMIBE 10 MG: 10 TABLET ORAL at 10:17

## 2024-11-27 RX ADMIN — INSULIN LISPRO 2 UNITS: 100 INJECTION, SOLUTION INTRAVENOUS; SUBCUTANEOUS at 05:53

## 2024-11-27 RX ADMIN — INSULIN LISPRO 2 UNITS: 100 INJECTION, SOLUTION INTRAVENOUS; SUBCUTANEOUS at 00:49

## 2024-11-27 RX ADMIN — INSULIN LISPRO 2 UNITS: 100 INJECTION, SOLUTION INTRAVENOUS; SUBCUTANEOUS at 12:17

## 2024-11-27 RX ADMIN — Medication 12.5 MG: at 10:17

## 2024-11-27 RX ADMIN — THIAMINE HCL TAB 100 MG 100 MG: 100 TAB at 10:17

## 2024-11-27 RX ADMIN — ESCITALOPRAM OXALATE 10 MG: 10 TABLET ORAL at 10:17

## 2024-11-27 RX ADMIN — ASPIRIN 81 MG CHEWABLE TABLET 81 MG: 81 TABLET CHEWABLE at 10:17

## 2024-11-27 RX ADMIN — APIXABAN 5 MG: 5 TABLET, FILM COATED ORAL at 10:17

## 2024-11-27 NOTE — ASSESSMENT & PLAN NOTE
Malnutrition Findings:   Adult Malnutrition type: Chronic illness  Adult Degree of Malnutrition: Other severe protein calorie malnutrition  Malnutrition Characteristics: Inadequate energy, Weight loss                  360 Statement: Chronic/severe malnutrition r/t inadequate intake/condition, as evidenced by 27.9% wt loss x 1 year (11/23/24: 206#, 11/17/23: 286#), and intake meeting <75% of estimated needs > 1 month. Treated with EN support    BMI Findings:           Body mass index is 24.44 kg/m².   Malnutrition Findings:   Severe protein-calorie malnutrition in the setting of chronic illness, as evidenced by 27.9% weight loss over 1 year, and intake meeting <75% of estimated needs >1 month, requiring dietician consult, tube feeds and ProSource.   ChronicAdult Malnutrition type: Chronic illness  Adult Degree of Malnutrition: Other severe protein calorie malnutrition  Malnutrition Characteristics: Weight loss, Inadequate energy  360 Statement: Severe protein calorie malnutrition in context of chronic illness r/t poor appetite, inadequate PO intake as evidance by energy intake less than 75% compared to estimated needs>1 month, 14% wt loss in 1 month (9/17/24 114 kg, 10/19/24 97.7 kg) treated with PO diet. Once able to have po treat with Mechanical Soft diet, Magic Cup BID, Ensure Compact 1x daily.  Patient with significant weight loss and per wife with significantly decreased oral intake over the past few months and has not been eating for 1 week at the nursing home.  Weight noted in April to be 275 lb, and now weighing 235 lb  PEG tube placed 10/23- continue with tube feeds-Jevity 1.2 Mata goal rate at 85 mL/h.Prosource  TF 1 packet twice daily and Teddy twice daily.Free water flushes 100 mL every 6 hours  Monitor potassium phosphorus magnesium and replete as needed   Dietary to adjust based on patient nutritional needs.  Continue high-dose thiamine and electrolyte monitoring for refeeding syndrome

## 2024-11-27 NOTE — DISCHARGE SUMMARY
Discharge Summary - Hospitalist   Name: Drew Santos 73 y.o. male I MRN: [id number]  Unit/Bed#: E5 -01 I Date of Admission: 11/23/2024   Date of Service: 11/27/2024 I Hospital Day: 4     Assessment & Plan  Leukocytosis  Patient is a 73-year-old male well-known to our service, with past medical history significant for previous CVA with residual left hemiparesis, stage IV decubitus ulcer with recent hospitalization for debridement, diverting ostomy tube and G-tube placement patient presented from his nursing home with a question of sepsis.  Patient was evaluated as met the criteria of SIRS on admission, during the hospital course sepsis was ruled out as patient continued to improve without any antibiotics.    Currently afebrile, heart rate 80s-90s however upon initial arrival was 105.    Reportedly at the nursing home his saturations were in the 70s however when EMS arrived they noticed the pulse ox was on the same arm as a blood pressure cuff: Per EMS his saturations were >90% on room air the entire time he was under their care  Leukocytosis of 13 trending down,however has had a chronic leukocytosis approximately 10-12  Patient was recently admitted 9/2024 with polymicrobial bacteremia secondary to sacral wound: Treated with IV cefepime/Flagyl/Vanco discharged on ampicillin  Patient admitted 10/2024 with  polymicrobial bacteremia with E. 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  Underwent operative debridement, end colostomy and PEG tube placed  Patient was discharged 10/29/24 on 7-day course of doxycycline/Augmentin  Followed by surgery as an outpatient with wound VAC in place  A) Sacral Decubitus Ulcer: With recurrent episodes for superimposed infection and polymicrobial bacteremia  Consult surgical team-placed on wound VAC  Obtain wound cultures  Blood cultures negative  B) Chronic Parra catheter:  Previous admission grew  Proteus  Current urinalysis with leukocytes/nitrates however in the presence of chronic catheter that has hypermobility of colonization, urine culture showing Enterococcus faecalis.  C) CT scan chest/abdomen/pelvis: Lungs are clear.  Rectal wall thickening with previously diagnosed proctitis, bladder wall thickening with potential cystitis  Patient was started on cefepime-discontinued as per ID   Pancultures pending- blood, urine  History of CVA (cerebrovascular accident)  Patient with history of CVA 7/2024 with residual left hemiparesis  Baseline nonverbal however can nod/shake head in response to questions  Currently bedbound, with PEG tube feedings and chronic Parra catheter  Continue home aspirin, statin, Eliquis  S/P percutaneous endoscopic gastrostomy (PEG) tube placement (HCC)  Placed 10/2024  Continue for tube feedings, routine care, and aspiration precaution  Per previous speech eval: Sips of liquids and ice chips  Confirmed tube feedings with nursing home: Jevity 1.2 at 85 cc/h with water flush 150 every 4 hours.  Along with dysphagia diet dental soft thin liquids.  As per speech therapy  Updated wife via telephone about diet with need for clears and avoiding foods that she would bring in for him (she wanted to bring in fruit and veggies)  Primary hypertension  Home metoprolol 12.5 mg every 12 hours, and lisinopril 10 mg daily  During the hospital course patient's blood pressure was initially borderline then reinitiated 1 antihypertensive medication, remaining medication can be reinitiated as per patient's blood pressure requirement.    Decubitus ulcer of sacral region, stage 4 ([facility])  Patient with a stage IV sacral decubitus ulcer  Previous admissions 9/2024, in 10/2024 for polymicrobial bacteremia secondary to infected sacral wound  Probable underlying osteomyelitis  With most recently seen in the surgical office 10/7/24: Wound VAC in place  Underwent bedside debridement 11/23/2024  Surgery was on board  recommended wound VAC,  Type 2 diabetes mellitus without complication, without long-term current use of insulin (HCC)  Home regimen Humalog 3 units 4 times daily as well as oral steglatro 15 mg daily  Lab Results   Component Value Date    HGBA1C 6.2 (H) 09/17/2024   Continue with insulin sliding scale  AddHumalog 3 units 3 times daily as patient has had some elevated blood sugars in the 200s  Paroxysmal atrial fibrillation (HCC)  Rate controlled on home metoprolol 12.5 mg every 12 hours  Temporarily on hold for borderline blood pressure  Continue chronic anticoagulation with Eliquis  Anemia of chronic disease  Patient has a history of anemia of chronic disease  Baseline hemoglobin appears to range 7-8 predominantly  During previous admission 10/24 he required transfusion of 1 unit of packed red blood cells  On admission hgb 9.5 --hemoglobin trended down but has been stabilized, 7.9 as for now.  We will recheck H&H this afternoon and transfuse for hemoglobin less than 7  Ongoing monitoring of hemoglobin, especially as patient is on chronic anticoagulation with Eliquis as well as aspirin  Chronic indwelling Parra catheter  History of urinary retention and chronic Parra catheter in place  Colostomy in place (HCC)  Placed during 10/24 admission due to sacral decubitus ulcer with possible osteomyelitis  Continue routine maintenance and care  Hyponatremia  Hyponatremia, in the setting of tube feedings with free water flushes, as well as SSRI  Suspect secondary to free water excess  Tube feeding flushes 150 cc of water every 4 hours  Decreased free water flushes  Hyperkalemia  Sample was hemolyzed: Suspect spurious  Repeat resolved and is currently 4.4  Severe protein-calorie malnutrition (HCC)  Malnutrition Findings:   Adult Malnutrition type: Chronic illness  Adult Degree of Malnutrition: Other severe protein calorie malnutrition  Malnutrition Characteristics: Inadequate energy, Weight loss                  360  Statement: Chronic/severe malnutrition r/t inadequate intake/condition, as evidenced by 27.9% wt loss x 1 year (11/23/24: 206#, 11/17/23: 286#), and intake meeting <75% of estimated needs > 1 month. Treated with EN support    BMI Findings:           Body mass index is 24.44 kg/m².   Malnutrition Findings:   Severe protein-calorie malnutrition in the setting of chronic illness, as evidenced by 27.9% weight loss over 1 year, and intake meeting <75% of estimated needs >1 month, requiring dietician consult, tube feeds and ProSource.   ChronicAdult Malnutrition type: Chronic illness  Adult Degree of Malnutrition: Other severe protein calorie malnutrition  Malnutrition Characteristics: Weight loss, Inadequate energy  360 Statement: Severe protein calorie malnutrition in context of chronic illness r/t poor appetite, inadequate PO intake as evidance by energy intake less than 75% compared to estimated needs>1 month, 14% wt loss in 1 month (9/17/24 114 kg, 10/19/24 97.7 kg) treated with PO diet. Once able to have po treat with Mechanical Soft diet, Magic Cup BID, Ensure Compact 1x daily.  Patient with significant weight loss and per wife with significantly decreased oral intake over the past few months and has not been eating for 1 week at the nursing home.  Weight noted in April to be 275 lb, and now weighing 235 lb  PEG tube placed 10/23- continue with tube feeds-Jevity 1.2 Mata goal rate at 85 mL/h.Prosource  TF 1 packet twice daily and Teddy twice daily.Free water flushes 100 mL every 6 hours  Monitor potassium phosphorus magnesium and replete as needed   Dietary to adjust based on patient nutritional needs.  Continue high-dose thiamine and electrolyte monitoring for refeeding syndrome     Medical Problems       Resolved Problems  Date Reviewed: 11/23/2024   None       Discharging Physician / Practitioner: Felipa Perales MD  PCP: Joe Loyn MD  Admission Date:   Admission Orders (From admission, onward)       Ordered  "       11/23/24 1213  INPATIENT ADMISSION  Once                          Discharge Date: 11/27/24    Consultations During Hospital Stay:  General surgery      Procedures Performed:   Sacral wound debridement    Significant Findings / Test Results:   CT chest abdomen pelvis w contrast  Result Date: 11/23/2024  Impression: 1. Large sacral decubitus ulcer with absent of the inferior sacrum and sharply demarcated bony margin, likely reflecting osteotomy. This can be used as patient's new baseline. No organized fluid collection. 2. Rectal wall thickening may represent underdistention versus proctitis. 3. Bladder wall thickening, without perivesical inflammatory changes. If there is clinical concern for cystitis, correlate with urinalysis. 4. No acute findings in the chest. Additional incidental findings as above. Resident: FREDDY Mcdonnell I, the attending radiologist, have reviewed the images and agree with the final report above. Workstation performed: [id number]       Lab Results   Component Value Date    WBC 12.16 (H) 11/27/2024    HGB 7.9 (L) 11/27/2024    HCT 25.8 (L) 11/27/2024    MCV 95 11/27/2024     (H) 11/27/2024     Lab Results   Component Value Date    SODIUM 133 (L) 11/27/2024    K 4.6 11/27/2024     11/27/2024    CO2 26 11/27/2024    BUN 15 11/27/2024    CREATININE 0.56 (L) 11/27/2024    GLUC 213 (H) 11/27/2024    CALCIUM 8.5 11/27/2024     Lab Results   Component Value Date    WBC 12.16 (H) 11/27/2024    HGB 7.9 (L) 11/27/2024    HCT 25.8 (L) 11/27/2024    MCV 95 11/27/2024     (H) 11/27/2024     Lab Results   Component Value Date    HGBA1C 6.2 (H) 09/17/2024     Lab Results   Component Value Date    CHOLESTEROL 196 03/08/2024     Lab Results   Component Value Date    HDL 42 03/08/2024     Lab Results   Component Value Date    TRIG 111 03/08/2024     No results found for: \"NONHDLC\"  Lab Results   Component Value Date    RFR3TCGCPLEN 5.228 (H) 03/08/2024    TSH 1.90 07/04/2024     Lab " Results   Component Value Date    ALT 24 11/23/2024    AST 58 (H) 11/23/2024    ALKPHOS 85 11/23/2024         Incidental Findings:   N/A    Test Results Pending at Discharge (will require follow up):   N/A     Outpatient Tests Requested:  N/A    Complications: N/A    Reason for Admission: Tachycardia    Hospital Course:   Drew Santos is a 73 y.o. male patient who originally presented to the hospital on 11/23/2024 due to tachycardia.    Rest as above under assessment and plan  Condition at Discharge: fair    Discharge Day Visit / Exam:   Subjective: Patient seen and examined at bedside.  Patient today responded verbally.  When asked how he was doing stated-better.    Vitals: Blood Pressure: 123/77 (11/27/24 1007)  Pulse: 93 (11/27/24 1007)  Temperature: 97.6 °F (36.4 °C) (11/27/24 0734)  Temp Source: Axillary (11/27/24 0734)  Respirations: 16 (11/27/24 0734)  Weight - Scale: 93.5 kg (206 lb 2.1 oz) (11/23/24 0825)  SpO2: 99 % (11/27/24 1007)  Physical Exam  Vitals and nursing note reviewed.   Constitutional:       General: He is not in acute distress.     Appearance: He is well-developed. He is obese.   HENT:      Head: Normocephalic and atraumatic.   Eyes:      Conjunctiva/sclera: Conjunctivae normal.   Cardiovascular:      Rate and Rhythm: Normal rate and regular rhythm.      Heart sounds: No murmur heard.  Pulmonary:      Effort: Pulmonary effort is normal. No respiratory distress.      Breath sounds: Normal breath sounds.   Abdominal:      General: Bowel sounds are normal.      Palpations: Abdomen is soft.      Tenderness: There is no abdominal tenderness.   Musculoskeletal:         General: No swelling.      Cervical back: Neck supple.   Skin:     General: Skin is warm and dry.      Capillary Refill: Capillary refill takes less than 2 seconds.   Neurological:      Mental Status: He is alert. Mental status is at baseline.   Psychiatric:         Mood and Affect: Mood normal.         Discussion with Family: Updated   (wife) via phone.    Discharge instructions/Information to patient and family:   See after visit summary for information provided to patient and family.      Provisions for Follow-Up Care:  See after visit summary for information related to follow-up care and any pertinent home health orders.      Mobility at time of Discharge:   Basic Mobility Inpatient Raw Score: 6  -HLM Goal: 2: Bed activities/Dependent transfer  JH-HLM Achieved: 2: Bed activities/Dependent transfer       Disposition:   Other: Facility    Planned Readmission: N/A    Discharge Medications:  See after visit summary for reconciled discharge medications provided to patient and/or family.      Administrative Statements   Discharge Statement:  I have spent a total time of >40 minutes in caring for this patient on the day of the visit/encounter. >30 minutes of time was spent on: Diagnostic results, Prognosis, Risks and benefits of tx options, Instructions for management, Patient and family education, Importance of tx compliance, Risk factor reductions, Impressions, Counseling / Coordination of care, Documenting in the medical record, Reviewing / ordering tests, medicine, procedures  , and Communicating with other healthcare professionals .    **Please Note: This note may have been constructed using a voice recognition system**

## 2024-11-27 NOTE — ASSESSMENT & PLAN NOTE
Patient has a history of anemia of chronic disease  Baseline hemoglobin appears to range 7-8 predominantly  During previous admission 10/24 he required transfusion of 1 unit of packed red blood cells  On admission hgb 9.5 --hemoglobin trended down but has been stabilized, 7.9 as for now.  We will recheck H&H this afternoon and transfuse for hemoglobin less than 7  Ongoing monitoring of hemoglobin, especially as patient is on chronic anticoagulation with Eliquis as well as aspirin

## 2024-11-27 NOTE — NURSING NOTE
Report called to receiving facility. Wound vac removed by surgery resident prior to pt departure and wet-to-dry dressing applied. PEG tube clamped. Ostomy bag was CDI, Parra draining clear yellow urine. No prescriptions written for DC. Pt is aware of transfer and has no questions or concerns.

## 2024-11-27 NOTE — ASSESSMENT & PLAN NOTE
Home metoprolol 12.5 mg every 12 hours, and lisinopril 10 mg daily  During the hospital course patient's blood pressure was initially borderline then reinitiated 1 antihypertensive medication, remaining medication can be reinitiated as per patient's blood pressure requirement.

## 2024-11-27 NOTE — PLAN OF CARE
Problem: INFECTION - ADULT  Goal: Absence or prevention of progression during hospitalization  Description: INTERVENTIONS:  - Assess and monitor for signs and symptoms of infection  - Monitor lab/diagnostic results  - Monitor all insertion sites, i.e. indwelling lines, tubes, and drains  - Monitor endotracheal if appropriate and nasal secretions for changes in amount and color  - [place] appropriate cooling/warming therapies per order  - Administer medications as ordered  - Instruct and encourage patient and family to use good hand hygiene technique  - Identify and instruct in appropriate isolation precautions for identified infection/condition  Outcome: Progressing     Problem: SAFETY ADULT  Goal: Patient will remain free of falls  Description: INTERVENTIONS:  - Educate patient/family on patient safety including physical limitations  - Instruct patient to call for assistance with activity   - Consult OT/PT to assist with strengthening/mobility   - Keep Call bell within reach  - Keep bed low and locked with side rails adjusted as appropriate  - Keep care items and personal belongings within reach  - Initiate and maintain comfort rounds  - Make Fall Risk Sign visible to staff  - Offer Toileting every 2 Hours, in advance of need  - Initiate/Maintain bed alarm  - Obtain necessary fall risk management equipment:    - Apply yellow socks and bracelet for high fall risk patients  - Consider moving patient to room near nurses station  Outcome: Progressing  Goal: Maintain or return to baseline ADL function  Description: INTERVENTIONS:  -  Assess patient's ability to carry out ADLs; assess patient's baseline for ADL function and identify physical deficits which impact ability to perform ADLs (bathing, care of mouth/teeth, toileting, grooming, dressing, etc.)  - Assess/evaluate cause of self-care deficits   - Assess range of motion  - Assess patient's mobility; develop plan if impaired  - Assess patient's need for  assistive devices and provide as appropriate  - Encourage maximum independence but intervene and supervise when necessary  - Involve family in performance of ADLs  - Assess for home care needs following discharge   - Consider OT consult to assist with ADL evaluation and planning for discharge  - Provide patient education as appropriate  Outcome: Progressing  Goal: Maintains/Returns to pre admission functional level  Description: INTERVENTIONS:  - Perform AM-PAC 6 Click Basic Mobility/ Daily Activity assessment daily.  - Set and communicate daily mobility goal to care team and patient/family/caregiver.   - Collaborate with rehabilitation services on mobility goals if consulted  - Perform Range of Motion 3 times a day.  - Reposition patient every 2 hours.  - Record patient progress and toleration of activity level   Outcome: Progressing     Problem: DISCHARGE PLANNING  Goal: Discharge to home or other facility with appropriate resources  Description: INTERVENTIONS:  - Identify barriers to discharge w/patient and caregiver  - Arrange for needed discharge resources and transportation as appropriate  - Identify discharge learning needs (meds, wound care, etc.)  - Arrange for interpretive services to assist at discharge as needed  - Refer to Case Management Department for coordinating discharge planning if the patient needs post-hospital services based on physician/advanced practitioner order or complex needs related to functional status, cognitive ability, or social support system  Outcome: Progressing     Problem: CARDIOVASCULAR - ADULT  Goal: Maintains optimal cardiac output and hemodynamic stability  Description: INTERVENTIONS:  - Monitor I/O, vital signs and rhythm  - Monitor for S/S and trends of decreased cardiac output  - Administer and titrate ordered vasoactive medications to optimize hemodynamic stability  - Assess quality of pulses, skin color and temperature  - Assess for signs of decreased coronary artery  perfusion  - Instruct patient to report change in severity of symptoms  Outcome: Progressing  Goal: Absence of cardiac dysrhythmias or at baseline rhythm  Description: INTERVENTIONS:  - Continuous cardiac monitoring, vital signs, obtain 12 lead EKG if ordered  - Administer antiarrhythmic and heart rate control medications as ordered  - Monitor electrolytes and administer replacement therapy as ordered  Outcome: Progressing     Problem: GASTROINTESTINAL - ADULT  Goal: Establish and maintain optimal ostomy function  Description: INTERVENTIONS:  - Assess bowel function  - Encourage oral fluids to ensure adequate hydration  - Administer IV fluids if ordered to ensure adequate hydration   - Administer ordered medications as needed  - Encourage mobilization and activity  - Nutrition services referral to assist patient with appropriate food choices  - Assess stoma site  - Consider wound care consult   Outcome: Progressing     Problem: GENITOURINARY - ADULT  Goal: Urinary catheter remains patent  Description: INTERVENTIONS:  - Assess patency of urinary catheter  - If patient has a chronic hanks, consider changing catheter if non-functioning  - Follow guidelines for intermittent irrigation of non-functioning urinary catheter  Outcome: Progressing     Problem: METABOLIC, FLUID AND ELECTROLYTES - ADULT  Goal: Electrolytes maintained within normal limits  Description: INTERVENTIONS:  - Monitor labs and assess patient for signs and symptoms of electrolyte imbalances  - Administer electrolyte replacement as ordered  - Monitor response to electrolyte replacements, including repeat lab results as appropriate  - Instruct patient on fluid and nutrition as appropriate  Outcome: Progressing  Goal: Glucose maintained within target range  Description: INTERVENTIONS:  - Monitor Blood Glucose as ordered  - Assess for signs and symptoms of hyperglycemia and hypoglycemia  - Administer ordered medications to maintain glucose within target  range  - Assess nutritional intake and initiate nutrition service referral as needed  Outcome: Progressing     Problem: SKIN/TISSUE INTEGRITY - ADULT  Goal: Incision(s), wounds(s) or drain site(s) healing without S/S of infection  Description: INTERVENTIONS  - Assess and document dressing, incision, wound bed, drain sites and surrounding tissue  - Provide patient and family education  - Perform skin care/dressing changes as ordered  Outcome: Progressing  Goal: Pressure injury heals and does not worsen  Description: Interventions:  - Implement low air loss mattress or specialty surface (Criteria met)  - Apply silicone foam dressing  - Instruct/assist with weight shifting every 60 minutes when in chair   - Limit chair time to 2 hour intervals  - Use special pressure reducing interventions such as waffle cushion when in chair   - Apply fecal or urinary incontinence containment device   - Perform passive or active ROM every shift  - Turn and reposition patient & offload bony prominences every 2 hours   - Utilize friction reducing device or surface for transfers     - Consider nutrition services referral as needed  Outcome: Progressing     Problem: Nutrition/Hydration-ADULT  Goal: Nutrient/Hydration intake appropriate for improving, restoring or maintaining nutritional needs  Description: Monitor and assess patient's nutrition/hydration status for malnutrition. Collaborate with interdisciplinary team and initiate plan and interventions as ordered.  Monitor patient's weight and dietary intake as ordered or per policy. Utilize nutrition screening tool and intervene as necessary. Determine patient's food preferences and provide high-protein, high-caloric foods as appropriate.     INTERVENTIONS:  - Monitor oral intake, urinary output, labs, and treatment plans  - Assess nutrition and hydration status and recommend course of action  - Evaluate amount of meals eaten  - Assist patient with eating if necessary   - Allow adequate  time for meals  - Recommend/ encourage appropriate diets, oral nutritional supplements, and vitamin/mineral supplements  - Order, calculate, and assess calorie counts as needed  - Recommend, monitor, and adjust tube feedings and TPN/PPN based on assessed needs  - Assess need for intravenous fluids  - Provide specific nutrition/hydration education as appropriate  - Include patient/family/caregiver in decisions related to nutrition  Outcome: Progressing     Problem: Prexisting or High Potential for Compromised Skin Integrity  Goal: Skin integrity is maintained or improved  Description: INTERVENTIONS:  - Identify patients at risk for skin breakdown  - Assess and monitor skin integrity  - Assess and monitor nutrition and hydration status  - Monitor labs   - Assess for incontinence   - Turn and reposition patient  - Assist with mobility/ambulation  - Relieve pressure over bony prominences  - Avoid friction and shearing  - Provide appropriate hygiene as needed including keeping skin clean and dry  - Evaluate need for skin moisturizer/barrier cream  - Collaborate with interdisciplinary team   - Patient/family teaching  - Consider wound care consult   Outcome: Progressing

## 2024-11-27 NOTE — CASE MANAGEMENT
Case Management Discharge Planning Note    Patient name Drew Santos  [place] [address] /E5 -* MRN [id number]  : 1950 Date 2024       Current Admission Date: 2024  Current Admission Diagnosis:Leukocytosis   Patient Active Problem List    Diagnosis Date Noted Date Diagnosed    Hyponatremia 2024     Hyperkalemia 2024     Leukocytosis 2024     Colostomy in place ([facility]) 2024     S/P percutaneous endoscopic gastrostomy (PEG) tube placement ([facility]) 10/24/2024     Pyuria 10/19/2024     Chronic indwelling Parra catheter 10/19/2024     Hypernatremia 10/19/2024     Sacral ulcer ([facility]) 10/18/2024     Advanced care planning/counseling discussion 2024     Severe protein-calorie malnutrition ([facility]) 2024     Polymicrobial bacteremia 2024     Acute metabolic encephalopathy 2024     History of CVA (cerebrovascular accident) 2024     Paroxysmal atrial fibrillation ([facility]) 2024     Anemia of chronic disease 2024     Gram-negative bacteremia 2024     Type 2 diabetes mellitus without complication, without long-term current use of insulin ([facility]) 2024     COVID-19 2024     Proctitis 2024     Dysgeusia 2024     Gross hematuria 2024     Depression 2024     Decubitus ulcer of sacral region, stage 4 ([facility]) 2024     Primary hypertension 2024     Mixed hyperlipidemia 2024     Urinary retention 2024     Syncope 2024     Elevated lactic acid level 2024     Abnormal CPK 2024       LOS (days): 4  Geometric Mean LOS (GMLOS) (days): 4.2  Days to GMLOS:0.2     OBJECTIVE:  Risk of Unplanned Readmission Score: 31.39      Current admission status: Inpatient   Preferred Pharmacy:   [id number] - [place] - [address]  [address]  [place] [address]  Phone: [phone] Fax: [phone]    Primary Care Provider: Joe Lyon MD    Primary  Insurance: [facility] [employer] [facility]  Secondary Insurance: [employer]  REP    DISCHARGE DETAILS:    Discharge planning discussed with:: staff at [facility], wife Kristi via phone  Freedom of Choice: Yes     CM contacted family/caregiver?: Yes  Were Treatment Team discharge recommendations reviewed with patient/caregiver?: Yes  Did patient/caregiver verbalize understanding of patient care needs?: Yes  Were patient/caregiver advised of the risks associated with not following Treatment Team discharge recommendations?: Yes    Contacts  Patient Contacts: Kristi Santos (Spouse)  Relationship to Patient:: Family  Contact Method: Phone  Phone Number: [phone] (Mobile)  Reason/Outcome: Continuity of Care, Emergency Contact, Discharge Planning    Requested Home Health Care         Is the patient interested in HHC at discharge?: No    DME Referral Provided  Referral made for DME?: No    Other Referral/Resources/Interventions Provided:  Interventions: Facility Return    Treatment Team Recommendation: Facility Return  Discharge Destination Plan:: Facility Return  Transport at Discharge : [facility]     [facility] - [phone]. [phone]  Transported by (Company and Unit #): left message for [facility] asking which company but they arranged  ETA of Transport (Date): 11/27/24  ETA of Transport (Time): 1400  Transported By: [email or web address]     IMM Given (Date):: 11/25/24  IMM Given to:: Patient  Family notified:: Was reviewed on 11/25 within 48 hours of dc. Spouse in agreement with dc.  Additional Comments: Pt is cleared for discharge to [facility]. [facility]m [facility] with Fab[facility] at [facility] at [phone]. [facility] transport set up for 2pm. Please have report called to [phone] and orders faxed to [phone] to pass information to [facility].    Medical Necessity given to RN.

## 2024-11-27 NOTE — ASSESSMENT & PLAN NOTE
Patient with a stage IV sacral decubitus ulcer  Previous admissions 9/2024, in 10/2024 for polymicrobial bacteremia secondary to infected sacral wound  Probable underlying osteomyelitis  With most recently seen in the surgical office 10/7/24: Wound VAC in place  Underwent bedside debridement 11/23/2024  Surgery was on board recommended wound VAC,

## 2024-11-27 NOTE — PLAN OF CARE
Problem: INFECTION - ADULT  Goal: Absence or prevention of progression during hospitalization  Description: INTERVENTIONS:  - Assess and monitor for signs and symptoms of infection  - Monitor lab/diagnostic results  - Monitor all insertion sites, i.e. indwelling lines, tubes, and drains  - Monitor endotracheal if appropriate and nasal secretions for changes in amount and color  - [employer] appropriate cooling/warming therapies per order  - Administer medications as ordered  - Instruct and encourage patient and family to use good hand hygiene technique  - Identify and instruct in appropriate isolation precautions for identified infection/condition  Outcome: Progressing     Problem: SAFETY ADULT  Goal: Patient will remain free of falls  Description: INTERVENTIONS:  - Educate patient/family on patient safety including physical limitations  - Instruct patient to call for assistance with activity   - Consult OT/PT to assist with strengthening/mobility   - Keep Call bell within reach  - Keep bed low and locked with side rails adjusted as appropriate  - Keep care items and personal belongings within reach  - Initiate and maintain comfort rounds  - Make Fall Risk Sign visible to staff  - Offer Toileting every 2 Hours, in advance of need  - Initiate/Maintain bed alarm  - Obtain necessary fall risk management equipment:    - Apply yellow socks and bracelet for high fall risk patients  - Consider moving patient to room near nurses station  Outcome: Progressing  Goal: Maintain or return to baseline ADL function  Description: INTERVENTIONS:  -  Assess patient's ability to carry out ADLs; assess patient's baseline for ADL function and identify physical deficits which impact ability to perform ADLs (bathing, care of mouth/teeth, toileting, grooming, dressing, etc.)  - Assess/evaluate cause of self-care deficits   - Assess range of motion  - Assess patient's mobility; develop plan if impaired  - Assess patient's need for  assistive devices and provide as appropriate  - Encourage maximum independence but intervene and supervise when necessary  - Involve family in performance of ADLs  - Assess for home care needs following discharge   - Consider OT consult to assist with ADL evaluation and planning for discharge  - Provide patient education as appropriate  Outcome: Progressing  Goal: Maintains/Returns to pre admission functional level  Description: INTERVENTIONS:  - Perform AM-PAC 6 Click Basic Mobility/ Daily Activity assessment daily.  - Set and communicate daily mobility goal to care team and patient/family/caregiver.   - Collaborate with rehabilitation services on mobility goals if consulted  - Perform Range of Motion 3 times a day.  - Reposition patient every 2 hours.  - Record patient progress and toleration of activity level   Outcome: Progressing     Problem: DISCHARGE PLANNING  Goal: Discharge to home or other facility with appropriate resources  Description: INTERVENTIONS:  - Identify barriers to discharge w/patient and caregiver  - Arrange for needed discharge resources and transportation as appropriate  - Identify discharge learning needs (meds, wound care, etc.)  - Arrange for interpretive services to assist at discharge as needed  - Refer to Case Management Department for coordinating discharge planning if the patient needs post-hospital services based on physician/advanced practitioner order or complex needs related to functional status, cognitive ability, or social support system  Outcome: Progressing     Problem: CARDIOVASCULAR - ADULT  Goal: Maintains optimal cardiac output and hemodynamic stability  Description: INTERVENTIONS:  - Monitor I/O, vital signs and rhythm  - Monitor for S/S and trends of decreased cardiac output  - Administer and titrate ordered vasoactive medications to optimize hemodynamic stability  - Assess quality of pulses, skin color and temperature  - Assess for signs of decreased coronary artery  perfusion  - Instruct patient to report change in severity of symptoms  Outcome: Progressing  Goal: Absence of cardiac dysrhythmias or at baseline rhythm  Description: INTERVENTIONS:  - Continuous cardiac monitoring, vital signs, obtain 12 lead EKG if ordered  - Administer antiarrhythmic and heart rate control medications as ordered  - Monitor electrolytes and administer replacement therapy as ordered  Outcome: Progressing     Problem: GASTROINTESTINAL - ADULT  Goal: Establish and maintain optimal ostomy function  Description: INTERVENTIONS:  - Assess bowel function  - Encourage oral fluids to ensure adequate hydration  - Administer IV fluids if ordered to ensure adequate hydration   - Administer ordered medications as needed  - Encourage mobilization and activity  - Nutrition services referral to assist patient with appropriate food choices  - Assess stoma site  - Consider wound care consult   Outcome: Progressing     Problem: GENITOURINARY - ADULT  Goal: Urinary catheter remains patent  Description: INTERVENTIONS:  - Assess patency of urinary catheter  - If patient has a chronic hanks, consider changing catheter if non-functioning  - Follow guidelines for intermittent irrigation of non-functioning urinary catheter  Outcome: Progressing     Problem: METABOLIC, FLUID AND ELECTROLYTES - ADULT  Goal: Electrolytes maintained within normal limits  Description: INTERVENTIONS:  - Monitor labs and assess patient for signs and symptoms of electrolyte imbalances  - Administer electrolyte replacement as ordered  - Monitor response to electrolyte replacements, including repeat lab results as appropriate  - Instruct patient on fluid and nutrition as appropriate  Outcome: Progressing  Goal: Glucose maintained within target range  Description: INTERVENTIONS:  - Monitor Blood Glucose as ordered  - Assess for signs and symptoms of hyperglycemia and hypoglycemia  - Administer ordered medications to maintain glucose within target  range  - Assess nutritional intake and initiate nutrition service referral as needed  Outcome: Progressing     Problem: SKIN/TISSUE INTEGRITY - ADULT  Goal: Incision(s), wounds(s) or drain site(s) healing without S/S of infection  Description: INTERVENTIONS  - Assess and document dressing, incision, wound bed, drain sites and surrounding tissue  - Provide patient and family education  - Perform skin care/dressing changes as ordered  Outcome: Progressing  Goal: Pressure injury heals and does not worsen  Description: Interventions:  - Implement low air loss mattress or specialty surface (Criteria met)  - Apply silicone foam dressing  - Instruct/assist with weight shifting every 60 minutes when in chair   - Limit chair time to 2 hour intervals  - Use special pressure reducing interventions such as waffle cushion when in chair   - Apply fecal or urinary incontinence containment device   - Perform passive or active ROM every shift  - Turn and reposition patient & offload bony prominences every 2 hours   - Utilize friction reducing device or surface for transfers     - Consider nutrition services referral as needed  Outcome: Progressing     Problem: Nutrition/Hydration-ADULT  Goal: Nutrient/Hydration intake appropriate for improving, restoring or maintaining nutritional needs  Description: Monitor and assess patient's nutrition/hydration status for malnutrition. Collaborate with interdisciplinary team and initiate plan and interventions as ordered.  Monitor patient's weight and dietary intake as ordered or per policy. Utilize nutrition screening tool and intervene as necessary. Determine patient's food preferences and provide high-protein, high-caloric foods as appropriate.     INTERVENTIONS:  - Monitor oral intake, urinary output, labs, and treatment plans  - Assess nutrition and hydration status and recommend course of action  - Evaluate amount of meals eaten  - Assist patient with eating if necessary   - Allow adequate  time for meals  - Recommend/ encourage appropriate diets, oral nutritional supplements, and vitamin/mineral supplements  - Order, calculate, and assess calorie counts as needed  - Recommend, monitor, and adjust tube feedings and TPN/PPN based on assessed needs  - Assess need for intravenous fluids  - Provide specific nutrition/hydration education as appropriate  - Include patient/family/caregiver in decisions related to nutrition  Outcome: Progressing     Problem: Prexisting or High Potential for Compromised Skin Integrity  Goal: Skin integrity is maintained or improved  Description: INTERVENTIONS:  - Identify patients at risk for skin breakdown  - Assess and monitor skin integrity  - Assess and monitor nutrition and hydration status  - Monitor labs   - Assess for incontinence   - Turn and reposition patient  - Assist with mobility/ambulation  - Relieve pressure over bony prominences  - Avoid friction and shearing  - Provide appropriate hygiene as needed including keeping skin clean and dry  - Evaluate need for skin moisturizer/barrier cream  - Collaborate with interdisciplinary team   - Patient/family teaching  - Consider wound care consult   Outcome: Progressing

## 2024-11-27 NOTE — ASSESSMENT & PLAN NOTE
Patient is a 73-year-old male well-known to our service, with past medical history significant for previous CVA with residual left hemiparesis, stage IV decubitus ulcer with recent hospitalization for debridement, diverting ostomy tube and G-tube placement patient presented from his nursing home with a question of sepsis.  Patient was evaluated as met the criteria of SIRS on admission, during the hospital course sepsis was ruled out as patient continued to improve without any antibiotics.    Currently afebrile, heart rate 80s-90s however upon initial arrival was 105.    Reportedly at the nursing home his saturations were in the 70s however when EMS arrived they noticed the pulse ox was on the same arm as a blood pressure cuff: Per EMS his saturations were >90% on room air the entire time he was under their care  Leukocytosis of 13 trending down,however has had a chronic leukocytosis approximately 10-12  Patient was recently admitted 9/2024 with polymicrobial bacteremia secondary to sacral wound: Treated with IV cefepime/Flagyl/Vanco discharged on ampicillin  Patient admitted 10/2024 with  polymicrobial bacteremia with E. 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  Underwent operative debridement, end colostomy and PEG tube placed  Patient was discharged 10/29/24 on 7-day course of doxycycline/Augmentin  Followed by surgery as an outpatient with wound VAC in place  A) Sacral Decubitus Ulcer: With recurrent episodes for superimposed infection and polymicrobial bacteremia  Consult surgical team-placed on wound VAC  Obtain wound cultures  Blood cultures negative  B) Chronic Parra catheter:  Previous admission grew Proteus  Current urinalysis with leukocytes/nitrates however in the presence of chronic catheter that has hypermobility of colonization, urine culture showing Enterococcus faecalis.  C) CT scan chest/abdomen/pelvis: Lungs are  clear.  Rectal wall thickening with previously diagnosed proctitis, bladder wall thickening with potential cystitis  Patient was started on cefepime-discontinued as per ID   Pancultures pending- blood, urine

## 2024-11-27 NOTE — TRANSPORTATION MEDICAL NECESSITY
"Section I - General Information    Name of Patient: Drew Santos                 : 1950    Medicare #: [id number]  Transport Date: 24 (PCS is valid for round trips on this date and for all repetitive trips in the 60-day range as noted below.)  Origin: [place] [address]                                                         Destination: [facility] [place] [address]  Is the pt's stay covered under Medicare Part A (PPS/DRG)   []     Closest appropriate facility? If no, why is transport to more distant facility required? Yes  If hospice pt, is this transport related to pt's terminal illness? NA       Section II - Medical Necessity Questionnaire  Ambulance transportation is medically necessary only if other means of transport are contraindicated or would be potentially harmful to the patient. To meet this requirement, the patient must either be \"bed confined\" or suffer from a condition such that transport by means other than ambulance is contraindicated by the patient's condition. The following questions must be answered by the medical professional signing below for this form to be valid:    1)  Describe the MEDICAL CONDITION (physical and/or mental) of this patient AT THE TIME OF AMBULANCE TRANSPORT that requires the patient to be transported in an ambulance and why transport by other means is contraindicated by the patient's condition: Pt is total dependent for transfers, bedbound and uses alexander lift. Pt has confusion.     2) Is the patient \"bed confined\" as defined below?     Yes  To be \"be confined\" the patient must satisfy all three of the following conditions: (1) unable to get up from bed without Assistance; AND (2) unable to ambulate; AND (3) unable to sit in a chair or wheelchair.    3) Can this patient safely be transported by car or wheelchair van (i.e., seated during transport without a medical attendant or monitoring)?   No    4) In " addition to completing questions 1-3 above, please check any of the following conditions that apply*:   *Note: supporting documentation for any boxes checked must be maintained in the patient's medical records.  If hosp-hosp transfer, describe services needed at 2nd facility not available at 1st facility?   Patient is confused  Unable to tolerate seated position for time needed to transport       Section III - Signature of Physician or Healthcare Professional  I certify that the above information is true and correct based on my evaluation of this patient, and represent that the patient requires transport by ambulance and that other forms of transport are contraindicated. I understand that this information will be used by the Centers for Medicare and Medicaid Services (CMS) to support the determination of medical necessity for ambulance services, and I represent that I have personal knowledge of the patient's condition at time of transport.    []  If this box is checked, I also certify that the patient is physically or mentally incapable of signing the ambulance service's claim and that the institution with which I am affiliated has furnished care, services, or assistance to the patient.    My signature below is made on behalf of the patient pursuant to 42 CFR §424.36(b)(4). In accordance with 42 CFR §424.37, the specific reason(s) that the patient is physically or mentally incapable of signing the claim form is as follows: .      Signature of Physician* or Healthcare Professional______________________________________________________________  Signature Date 11/27/24 (For scheduled repetitive transports, this form is not valid for transports performed more than 60 days after this date)    Printed Name & Credentials of Physician or Healthcare Professional (MD, DO, RN, etc.)_____ROLF Larios___________________________  *Form must be signed by patient's attending physician for scheduled, repetitive transports.  For non-repetitive, unscheduled ambulance transports, if unable to obtain the signature of the attending physician, any of the following may sign (choose appropriate option below)  [] Physician Assistant []  Clinical Nurse Specialist []  Registered Nurse  []  Nurse Practitioner  [x] Discharge Planner

## 2024-11-27 NOTE — DISCHARGE INSTR - AVS FIRST PAGE
Follow-up:    -Follow-up with your primary care physician within a week of discharge  -Follow-up with your wound care    Wound care:    Patient was on wound VAC during hospitalization, resume wound VAC.  Patient will require outpatient wound care as well.

## 2024-11-28 LAB
BACTERIA BLD CULT: NORMAL
BACTERIA BLD CULT: NORMAL

## 2024-11-30 LAB
ABO GROUP BLD BPU: NORMAL
BPU ID: NORMAL
CROSSMATCH: NORMAL
UNIT DISPENSE STATUS: NORMAL
UNIT PRODUCT CODE: NORMAL
UNIT PRODUCT VOLUME: 350 ML
UNIT RH: NORMAL

## 2024-12-15 ENCOUNTER — APPOINTMENT (EMERGENCY)
Dept: RADIOLOGY | Facility: HOSPITAL | Age: 74
End: 2024-12-15
Payer: COMMERCIAL

## 2024-12-15 ENCOUNTER — HOSPITAL ENCOUNTER (EMERGENCY)
Facility: HOSPITAL | Age: 74
Discharge: OTHER INSTITUTION | End: 2024-12-15
Attending: EMERGENCY MEDICINE
Payer: COMMERCIAL

## 2024-12-15 VITALS
DIASTOLIC BLOOD PRESSURE: 70 MMHG | BODY MASS INDEX: 26.09 KG/M2 | WEIGHT: 220.02 LBS | RESPIRATION RATE: 16 BRPM | HEART RATE: 86 BPM | SYSTOLIC BLOOD PRESSURE: 117 MMHG | TEMPERATURE: 98.4 F | OXYGEN SATURATION: 99 %

## 2024-12-15 DIAGNOSIS — Z46.59 ENCOUNTER FOR FEEDING TUBE PLACEMENT: Primary | ICD-10-CM

## 2024-12-15 PROCEDURE — 99284 EMERGENCY DEPT VISIT MOD MDM: CPT | Performed by: EMERGENCY MEDICINE

## 2024-12-15 PROCEDURE — 74018 RADEX ABDOMEN 1 VIEW: CPT

## 2024-12-15 PROCEDURE — 43762 RPLC GTUBE NO REVJ TRC: CPT | Performed by: EMERGENCY MEDICINE

## 2024-12-15 PROCEDURE — 99283 EMERGENCY DEPT VISIT LOW MDM: CPT

## 2024-12-15 RX ORDER — EZETIMIBE 10 MG/1
10 TABLET ORAL DAILY
Status: DISCONTINUED | OUTPATIENT
Start: 2024-12-15 | End: 2024-12-15 | Stop reason: HOSPADM

## 2024-12-15 RX ORDER — LISINOPRIL 10 MG/1
10 TABLET ORAL DAILY
Status: DISCONTINUED | OUTPATIENT
Start: 2024-12-15 | End: 2024-12-15 | Stop reason: HOSPADM

## 2024-12-15 RX ORDER — FINASTERIDE 5 MG/1
5 TABLET, FILM COATED ORAL DAILY
Status: DISCONTINUED | OUTPATIENT
Start: 2024-12-15 | End: 2024-12-15 | Stop reason: HOSPADM

## 2024-12-15 RX ORDER — LIDOCAINE HYDROCHLORIDE 20 MG/ML
15 SOLUTION OROPHARYNGEAL ONCE
Status: COMPLETED | OUTPATIENT
Start: 2024-12-15 | End: 2024-12-15

## 2024-12-15 RX ORDER — ESCITALOPRAM OXALATE 10 MG/1
10 TABLET ORAL DAILY
Status: DISCONTINUED | OUTPATIENT
Start: 2024-12-15 | End: 2024-12-15 | Stop reason: HOSPADM

## 2024-12-15 RX ORDER — LANOLIN ALCOHOL/MO/W.PET/CERES
100 CREAM (GRAM) TOPICAL DAILY
Status: DISCONTINUED | OUTPATIENT
Start: 2024-12-15 | End: 2024-12-15 | Stop reason: HOSPADM

## 2024-12-15 RX ADMIN — Medication 12.5 MG: at 12:10

## 2024-12-15 RX ADMIN — IOHEXOL 50 ML: 300 INJECTION, SOLUTION INTRAVENOUS at 11:12

## 2024-12-15 RX ADMIN — EMPAGLIFLOZIN 10 MG: 10 TABLET, FILM COATED ORAL at 12:44

## 2024-12-15 RX ADMIN — EZETIMIBE 10 MG: 10 TABLET ORAL at 12:10

## 2024-12-15 RX ADMIN — LISINOPRIL 10 MG: 10 TABLET ORAL at 12:10

## 2024-12-15 RX ADMIN — APIXABAN 5 MG: 5 TABLET, FILM COATED ORAL at 12:03

## 2024-12-15 RX ADMIN — THIAMINE HCL TAB 100 MG 100 MG: 100 TAB at 12:07

## 2024-12-15 RX ADMIN — FINASTERIDE 5 MG: 5 TABLET, FILM COATED ORAL at 12:07

## 2024-12-15 RX ADMIN — ESCITALOPRAM OXALATE 10 MG: 10 TABLET ORAL at 12:02

## 2024-12-15 RX ADMIN — LIDOCAINE HYDROCHLORIDE 15 ML: 20 SOLUTION ORAL at 10:49

## 2024-12-15 NOTE — ED PROVIDER NOTES
Time reflects when diagnosis was documented in both MDM as applicable and the Disposition within this note       Time User Action Codes Description Comment    12/15/2024 11:07 AM Rebecca Sprague Add [Z46.59] Encounter for feeding tube placement           ED Disposition       ED Disposition   Discharge    Condition   Stable    Date/Time   Sun Dec 15, 2024 11:15 AM    Comment   Drew Santos discharge to home/self care.                   Assessment & Plan       Medical Decision Making  PEG tube dislodged at NH - here for replacement. No complaints.  Placed 10/23 by general surgery    Problems Addressed:  Encounter for feeding tube placement: acute illness or injury    Amount and/or Complexity of Data Reviewed  External Data Reviewed: notes.     Details: Operative note reviewed  Radiology: ordered and independent interpretation performed.    Risk  Prescription drug management.        ED Course as of 12/15/24 1122   Sun Dec 15, 2024   1035 Unfortunately PEG tube not sent with patient and no documentation as to what size tube.     Called [facility] and unable to tell me what size was placed.    Record reviewed and unable to find documentation about tube size.  Sent message to Gen surg to see if they are able to tell me the size   1121 XR abdomen 1 view kub  Xray reviewed and independently interpreted by me: PEG in appropriate place.  Formal reading per radiology       Medications   Lidocaine Viscous HCl (XYLOCAINE) 2 % mucosal solution 15 mL (15 mL Swish & Spit Given 12/15/24 1049)       ED Risk Strat Scores                                              History of Present Illness       Chief Complaint   Patient presents with    Feeding Tube Problem     Pt's feeding tube fell out. No complaints.        Past Medical History:   Diagnosis Date    Atherosclerotic heart disease of native coronary artery without angina pectoris     Atrial fibrillation (HCC)     Bacteremia     Cerebral infarction (HCC)     Constipation      Depression     Diabetes mellitus (HCC)     Hemiplegia and hemiparesis following cerebral infarction affecting left non-dominant side (HCC)     Hyperlipidemia     Hypertension     Osteomyelitis (HCC)     Pressure ulcer of sacral region, stage 3 (HCC)     Prostatic hyperplasia     Sepsis (HCC)     Stage 4 decubitus ulcer (HCC)     Stroke (HCC)     TIA (transient ischemic attack)     Urinary retention     Vitamin D deficiency       Past Surgical History:   Procedure Laterality Date    COLOSTOMY N/A 10/23/2024    Procedure: Lap loop colostomy, psb open loop colostomy;  Surgeon: Davonte Banegas DO;  Location: [facility];  Service: General    GASTROSTOMY TUBE PLACEMENT N/A 10/23/2024    Procedure: INSERTION PEG TUBE, psb lap gastrostomy tube placement;  Surgeon: Davonte Banegas DO;  Location[facility];  Service: General    INCISION AND DRAINAGE OF WOUND N/A 10/21/2024    Procedure: INCISION AND DRAINAGE (I&D) BUTTOCK, SACRAL WOUND DEBRIDEMENT, COCCYGECTOMY;  Surgeon: Beka King MD;  Location: [facility];  Service: General    WOUND DEBRIDEMENT N/A 10/23/2024    Procedure: DEBRIDEMENT WOUND (WASH OUT), sacrum;  Surgeon: Davonte Banegas DO;  Location: [facility];  Service: General      History reviewed. No pertinent family history.   Social History     Tobacco Use    Smoking status: Never     Passive exposure: Never    Smokeless tobacco: Never   Vaping Use    Vaping status: Never Used   Substance Use Topics    Alcohol use: Not Currently    Drug use: Never      E-Cigarette/Vaping    E-Cigarette Use Never User       E-Cigarette/Vaping Substances    Nicotine No     THC No     CBD No     Flavoring No     Other No     Unknown No       I have reviewed and agree with the history as documented.     Patient presents with:  Feeding Tube Problem: Pt's feeding tube fell out. No complaints.     74y M sent in from [facility] [place] for PEG tube replacement.  Per report, pt found w/ displaced tube at  0900 today and sent in for replacement. Pt w/ hx of CVA, L hemiparesis, previously unstagable sacral wound, diverting loop colostomy w/ PEG placement 10/23/24 at [facility] by Gen Surg.  Has been following w/ surg/wound care for continued care of sacral wound. No other reported concerns other than PEG tube.  Pt w/ no complaints      History provided by:  Medical records   used: No    Feeding Tube Problem      Review of Systems   All other systems reviewed and are negative.          Objective       ED Triage Vitals [12/15/24 1014]   Temperature Pulse Blood Pressure Respirations SpO2 Patient Position - Orthostatic VS   98.4 °F (36.9 °C) 71 118/69 18 98 % Lying      Temp Source Heart Rate Source BP Location FiO2 (%) Pain Score    Oral Monitor Right arm -- No Pain      Vitals      Date and Time Temp Pulse SpO2 Resp BP Pain Score FACES Pain Rating User   12/15/24 1014 98.4 °F (36.9 °C) 71 98 % 18 118/69 No Pain -- JS            Physical Exam  Vitals and nursing note reviewed.   Constitutional:       Appearance: Normal appearance.   HENT:      Mouth/Throat:      Mouth: Mucous membranes are moist.   Eyes:      Conjunctiva/sclera: Conjunctivae normal.   Cardiovascular:      Rate and Rhythm: Normal rate.   Pulmonary:      Effort: Pulmonary effort is normal.   Abdominal:      Palpations: Abdomen is soft.      Comments: PEG stoma, no surrounding erythema, no bleeding   Musculoskeletal:      Cervical back: Normal range of motion.   Skin:     General: Skin is warm.      Findings: No erythema.   Neurological:      Mental Status: He is alert. Mental status is at baseline.   Psychiatric:         Mood and Affect: Mood normal.         Results Reviewed       None            XR abdomen 1 view kub    (Results Pending)       Feeding Tube    Date/Time: 12/15/2024 11:10 AM    Performed by: Rebecca Sprague DO  Authorized by: Rebecca pSrague DO  [place] Protocol:  procedure performed by consultantConsent given by:  patient  Patient identity confirmed: verbally with patient    Patient location:  ED  Pre-procedure details:     Old tube type:  Gastrostomy    Old tube size: 20F.  Indications:     Indications: tube dislodged    Anesthesia (see MAR for exact dosages):     Anesthesia method:  Topical application    Topical anesthetic:  Lidocaine gel  Procedure details:     Patient position:  Recumbent    Procedure type:  Replacement    Tube type:  Gastrostomy    Tube size: 20F.    Bulb inflation volume:  10ml    Bulb inflation fluid:  Normal saline  Post-procedure details:     Placement/position confirmation:  Gastric contents aspirated, x-ray and contrast    Placement difficulty:  None    Bleeding:  None    Patient tolerance of procedure:  Tolerated well, no immediate complications      ED Medication and Procedure Management   Prior to Admission Medications   Prescriptions Last Dose Informant Patient Reported? Taking?   ASPIRIN 81 PO   Yes No   Sig: Take 81 mg by mouth daily   Calcium Carb-Cholecalciferol (calcium carbonate-vitamin D) 500 mg-5 mcg tablet   No No   Sig: Take 1 tablet by mouth 2 (two) times a day with meals   Cholecalciferol (Vitamin D3) 50 MCG (2000 UT) CAPS  Spouse/Significant Other, Self Yes No   Sig: Take 2,000 Units by mouth daily   Ertugliflozin L-PyroglutamicAc (Steglatro) 15 MG TABS  Outside Facility (Specify) Yes No   Sig: Take 15 mg by mouth daily   Glucagon, rDNA, (Glucagon Emergency) 1 MG KIT  Outside Facility (Specify) Yes No   Sig: Inject as directed   MULTIPLE VITAMIN PO  Spouse/Significant Other, Self Yes No   Sig: Take 1 tablet by mouth in the morning. Indications: Vitamin A deficiency   Polyethylene Glycol 1000 POWD   Yes No   Sig: Take 17 g by mouth daily as needed (constipation) Dissolve in 4-8oz of water, juice, coffee or tea   Silver (SilvaSorb) GEL   Yes No   Sig: Apply 1 Application topically daily. Apply to buttock wound bed daily.   Patient not taking: Reported on 11/23/2024   acetaminophen  (TYLENOL) 325 mg tablet   Yes No   Sig: Take 650 mg by mouth every 4 (four) hours as needed for fever or mild pain.   apixaban (Eliquis) 5 mg   No No   Sig: Take 1 tablet (5 mg total) by mouth 2 (two) times a day   atorvastatin (LIPITOR) 40 mg tablet   No No   Sig: Take 2 tablets (80 mg total) by mouth every evening Per AVS, 80mg daily   Patient taking differently: Take 80 mg by mouth every evening Per AVS, 80mg daily   bisacodyl (FLEET) 10 MG/30ML ENEM   Yes No   Sig: Insert 10 mg into the rectum daily as needed for constipation Only use if no response from Dulcolax after 2 hours   collagenase (SANTYL) ointment   No No   Sig: Apply topically daily   docusate sodium (COLACE) 100 mg capsule   Yes No   Sig: Take 100 mg by mouth daily Per OSR , take 2 capsules by mouth once daily.   escitalopram (LEXAPRO) 10 mg tablet   Yes No   Sig: Take 10 mg by mouth daily   ezetimibe (ZETIA) 10 mg tablet  Spouse/Significant Other, Self Yes No   Sig: Take 10 mg by mouth daily   finasteride (PROSCAR) 5 mg tablet   No No   Sig: Take 1 tablet (5 mg total) by mouth daily   Patient taking differently: Take 5 mg by mouth daily   glucose 40 %   Yes No   Sig: Take 15 g by mouth once   insulin lispro (HumALOG/ADMELOG) 100 units/mL injection   Yes No   Sig: Inject 3 Units under the skin 4 (four) times a day Inject 3 units subcutaneously four times a day for uncontrolled DM2 hold for glucose less than 110   lisinopril (ZESTRIL) 10 mg tablet   No No   Sig: Take 1 tablet (10 mg total) by mouth daily   Patient taking differently: Take 10 mg by mouth daily   magnesium hydroxide (Milk of Magnesia) 400 mg/5 mL oral suspension   Yes No   Sig: Take 30 mL by mouth daily as needed for constipation Use if no bowel movement x 3 days. Give at bedtime. Separate from other medications x 2 hours.   metoprolol tartrate (LOPRESSOR) 25 mg tablet   No No   Si.5 tablets (12.5 mg total) by Per G Tube route every 12 (twelve) hours   mirtazapine (REMERON) 15  mg tablet   No No   Sig: Take 1 tablet (15 mg total) by mouth daily at bedtime   nystatin (MYCOSTATIN) 500,000 units/5 mL suspension   Yes No   Sig: Apply 500,000 Units to the mouth or throat 4 (four) times a day   Patient not taking: Reported on 2024   potassium chloride (MICRO-K) 10 MEQ CR capsule   No No   Sig: Take 2 capsules (20 mEq total) by mouth daily for 2 days   senna (Senna-Tabs) 8.6 MG tablet   Yes No   Sig: Take 2 tablets by mouth daily as needed for constipation.   Patient not taking: Reported on 2024   tamsulosin (FLOMAX) 0.4 mg   Yes No   Sig: Take 0.4 mg by mouth daily   Patient not taking: Reported on 2024   thiamine 100 MG tablet   No No   Si tablet (100 mg total) by Per G Tube route daily      Facility-Administered Medications: None     Patient's Medications   Discharge Prescriptions    No medications on file     No discharge procedures on file.  ED SEPSIS DOCUMENTATION   Time reflects when diagnosis was documented in both MDM as applicable and the Disposition within this note       Time User Action Codes Description Comment    12/15/2024 11:07 AM Rebecca Sprague Add [Z46.59] Encounter for feeding tube placement                  Rebecca Sprague,   12/15/24 1122

## 2024-12-19 ENCOUNTER — OFFICE VISIT (OUTPATIENT)
Dept: SURGERY | Facility: CLINIC | Age: 74
End: 2024-12-19

## 2024-12-19 DIAGNOSIS — Z93.3 COLOSTOMY IN PLACE (HCC): ICD-10-CM

## 2024-12-19 DIAGNOSIS — L98.423 SKIN ULCER OF SACRUM WITH NECROSIS OF MUSCLE (HCC): Primary | ICD-10-CM

## 2024-12-19 PROCEDURE — 99024 POSTOP FOLLOW-UP VISIT: CPT | Performed by: STUDENT IN AN ORGANIZED HEALTH CARE EDUCATION/TRAINING PROGRAM

## 2024-12-19 NOTE — PROGRESS NOTES
Office Visit - General Surgery  Drew Santos MRN: [id number]  Encounter: [id number]  Assessment & Plan    Problem List Items Addressed This Visit          Musculoskeletal and Integument    Sacral ulcer (HCC) - Primary    Relevant Orders    Ambulatory Referral to Wound Care       Surgery/Wound/Pain    Colostomy in place (HCC)    S/p loop diverting colostomy and peg insertion  - doing well  - tolerating TF  - colostomy functioning   - wound center referral placed  - f/u in general surgery clinic PRN             Subjective    Drew Santos is a 74 y.o. male who presents s/p loop diverting colostomy and peg insertion. Doing well. Tolerating TF at goal,no f/c/n/v.       Pt  has a past medical history of Atherosclerotic heart disease of native coronary artery without angina pectoris, Atrial fibrillation (HCC), Bacteremia, Cerebral infarction (HCC), Constipation, Depression, Diabetes mellitus (HCC), Hemiplegia and hemiparesis following cerebral infarction affecting left non-dominant side (HCC), Hyperlipidemia, Hypertension, Osteomyelitis (HCC), Pressure ulcer of sacral region, stage 3 (HCC), Prostatic hyperplasia, Sepsis (HCC), Stage 4 decubitus ulcer (HCC), Stroke (HCC), TIA (transient ischemic attack), Urinary retention, and Vitamin D deficiency.    Pt  has a past surgical history that includes Incision and drainage of wound (N/A, 10/21/2024); Colostomy (N/A, 10/23/2024); Wound debridement (N/A, 10/23/2024); and Gastrostomy tube placement (N/A, 10/23/2024).    family history is not on file.    Drew Santos reports that he has never smoked. He has never been exposed to tobacco smoke. He has never used smokeless tobacco. He reports that he does not currently use alcohol. He reports that he does not use drugs.      Current Outpatient Medications on File Prior to Visit   Medication Sig Dispense Refill    acetaminophen (TYLENOL) 325 mg tablet Take 650 mg by mouth every 4 (four) hours as needed for fever or mild pain.      apixaban  (Eliquis) 5 mg Take 1 tablet (5 mg total) by mouth 2 (two) times a day 180 tablet 3    ASPIRIN 81 PO Take 81 mg by mouth daily      atorvastatin (LIPITOR) 40 mg tablet Take 2 tablets (80 mg total) by mouth every evening Per AVS, 80mg daily (Patient taking differently: Take 80 mg by mouth every evening Per AVS, 80mg daily)      bisacodyl (FLEET) 10 MG/30ML ENEM Insert 10 mg into the rectum daily as needed for constipation Only use if no response from Dulcolax after 2 hours      Calcium Carb-Cholecalciferol (calcium carbonate-vitamin D) 500 mg-5 mcg tablet Take 1 tablet by mouth 2 (two) times a day with meals      Cholecalciferol (Vitamin D3) 50 MCG (2000 UT) CAPS Take 2,000 Units by mouth daily      collagenase (SANTYL) ointment Apply topically daily      docusate sodium (COLACE) 100 mg capsule Take 100 mg by mouth daily Per OSR 9/17, take 2 capsules by mouth once daily.      Ertugliflozin L-PyroglutamicAc (Steglatro) 15 MG TABS Take 15 mg by mouth daily      escitalopram (LEXAPRO) 10 mg tablet Take 10 mg by mouth daily      ezetimibe (ZETIA) 10 mg tablet Take 10 mg by mouth daily      finasteride (PROSCAR) 5 mg tablet Take 1 tablet (5 mg total) by mouth daily (Patient taking differently: Take 5 mg by mouth daily)      Glucagon, rDNA, (Glucagon Emergency) 1 MG KIT Inject as directed      glucose 40 % Take 15 g by mouth once      insulin lispro (HumALOG/ADMELOG) 100 units/mL injection Inject 3 Units under the skin 4 (four) times a day Inject 3 units subcutaneously four times a day for uncontrolled DM2 hold for glucose less than 110      lisinopril (ZESTRIL) 10 mg tablet Take 1 tablet (10 mg total) by mouth daily (Patient taking differently: Take 10 mg by mouth daily) 30 tablet 0    magnesium hydroxide (Milk of Magnesia) 400 mg/5 mL oral suspension Take 30 mL by mouth daily as needed for constipation Use if no bowel movement x 3 days. Give at bedtime. Separate from other medications x 2 hours.      metoprolol tartrate  (LOPRESSOR) 25 mg tablet 0.5 tablets (12.5 mg total) by Per G Tube route every 12 (twelve) hours      mirtazapine (REMERON) 15 mg tablet Take 1 tablet (15 mg total) by mouth daily at bedtime      MULTIPLE VITAMIN PO Take 1 tablet by mouth in the morning. Indications: Vitamin A deficiency      nystatin (MYCOSTATIN) 500,000 units/5 mL suspension Apply 500,000 Units to the mouth or throat 4 (four) times a day (Patient not taking: Reported on 11/23/2024)      Polyethylene Glycol 1000 POWD Take 17 g by mouth daily as needed (constipation) Dissolve in 4-8oz of water, juice, coffee or tea      potassium chloride (MICRO-K) 10 MEQ CR capsule Take 2 capsules (20 mEq total) by mouth daily for 2 days      senna (Senna-Tabs) 8.6 MG tablet Take 2 tablets by mouth daily as needed for constipation. (Patient not taking: Reported on 11/23/2024)      Silver (SilvaSorb) GEL Apply 1 Application topically daily. Apply to buttock wound bed daily. (Patient not taking: Reported on 11/23/2024)      tamsulosin (FLOMAX) 0.4 mg Take 0.4 mg by mouth daily (Patient not taking: Reported on 11/23/2024)      thiamine 100 MG tablet 1 tablet (100 mg total) by Per G Tube route daily       No current facility-administered medications on file prior to visit.     Allergies   Allergen Reactions    Primaquine Other (See Comments) and Hives       Review of Systems   Constitutional:  Negative for activity change, appetite change, diaphoresis and fever.   Respiratory: Negative.     Cardiovascular: Negative.    Gastrointestinal: Negative.    Musculoskeletal: Negative.    Skin: Negative.    All other systems reviewed and are negative.      Objective    There were no vitals filed for this visit.    Physical Exam  Constitutional:       Appearance: Normal appearance.   Cardiovascular:      Rate and Rhythm: Normal rate.   Pulmonary:      Effort: Pulmonary effort is normal.   Abdominal:      General: There is no distension.      Palpations: Abdomen is soft.       Tenderness: There is no abdominal tenderness. There is no guarding or rebound.      Comments: PEG - 3cm at the skin   Ostomy - pink/viable, stool in bag   Skin:     General: Skin is warm and dry.

## 2025-04-01 ENCOUNTER — HOSPITAL ENCOUNTER (EMERGENCY)
Facility: HOSPITAL | Age: 75
Discharge: HOME | End: 2025-04-01
Attending: EMERGENCY MEDICINE
Payer: COMMERCIAL

## 2025-04-01 ENCOUNTER — APPOINTMENT (EMERGENCY)
Dept: RADIOLOGY | Facility: HOSPITAL | Age: 75
End: 2025-04-01
Payer: COMMERCIAL

## 2025-04-01 VITALS
DIASTOLIC BLOOD PRESSURE: 70 MMHG | RESPIRATION RATE: 18 BRPM | OXYGEN SATURATION: 99 % | TEMPERATURE: 97.8 F | SYSTOLIC BLOOD PRESSURE: 113 MMHG | HEART RATE: 77 BPM

## 2025-04-01 DIAGNOSIS — Z46.59 ENCOUNTER FOR FEEDING TUBE PLACEMENT: Primary | ICD-10-CM

## 2025-04-01 PROCEDURE — 99284 EMERGENCY DEPT VISIT MOD MDM: CPT

## 2025-04-01 PROCEDURE — 43762 RPLC GTUBE NO REVJ TRC: CPT

## 2025-04-01 PROCEDURE — 74018 RADEX ABDOMEN 1 VIEW: CPT

## 2025-04-01 PROCEDURE — 99283 EMERGENCY DEPT VISIT LOW MDM: CPT

## 2025-04-01 RX ORDER — LIDOCAINE HYDROCHLORIDE 20 MG/ML
15 SOLUTION OROPHARYNGEAL ONCE
Status: COMPLETED | OUTPATIENT
Start: 2025-04-01 | End: 2025-04-01

## 2025-04-01 RX ADMIN — LIDOCAINE HYDROCHLORIDE 15 ML: 20 SOLUTION ORAL at 05:12

## 2025-04-01 NOTE — ED PROVIDER NOTES
Time reflects when diagnosis was documented in both MDM as applicable and the Disposition within this note       Time User Action Codes Description Comment    4/1/2025  6:01 AM Vincent Edmond Add [Z46.59] Encounter for feeding tube placement           ED Disposition       ED Disposition   Discharge    Condition   Stable    Date/Time   Tue Apr 1, 2025  6:01 AM    Comment   Drew Santos discharge to home/self care.                   Assessment & Plan       Medical Decision Making  74-year-old male presents with displaced G-tube.  On exam he is in no apparent distress, at baseline mental status, vitals WNL.  Parra catheter in place within gastrostomy.  10 cc saline evacuated from Parra catheter balloon, Parra catheter removed.  Most recently had a 20 Ecu[place]orean G-tube in place from a visit in December.  A new 20 Ecuadorean G-tube was placed, well-tolerated.  Confirmed with x-ray as well as with evacuation of stomach contents.  Will discharge patient back to sending facility.    Signed out to Dina Drew PA-C, pending transport back to sending facility.    Amount and/or Complexity of Data Reviewed  Radiology: ordered.    Risk  Prescription drug management.             Medications   Lidocaine Viscous HCl (XYLOCAINE) 2 % mucosal solution 15 mL (15 mL Swish & Spit Given 4/1/25 0512)       ED Risk Strat Scores                            SBIRT 20yo+      Flowsheet Row Most Recent Value   Initial Alcohol Screen: US AUDIT-C     1. How often do you have a drink containing alcohol? 0 Filed at: 04/01/2025 0408   2. How many drinks containing alcohol do you have on a typical day you are drinking?  0 Filed at: 04/01/2025 0408   3a. Male UNDER 65: How often do you have five or more drinks on one occasion? 0 Filed at: 04/01/2025 0408   3b. FEMALE Any Age, or MALE 65+: How often do you have 4 or more drinks on one occassion? 0 Filed at: 04/01/2025 0408   Audit-C Score 0 Filed at: 04/01/2025 0408   NIR: How many times in the past year have  you...    Used an illegal drug or used a prescription medication for non-medical reasons? Never Filed at: 04/01/2025 0408                            History of Present Illness       Chief Complaint   Patient presents with    Feeding Tube Problem     Patient arrived via ems from assisted living facility after staff reported he fell out of bed and pulled his gtube out.        Past Medical History:   Diagnosis Date    Atherosclerotic heart disease of native coronary artery without angina pectoris     Atrial fibrillation (HCC)     Bacteremia     Cerebral infarction (HCC)     Constipation     Depression     Diabetes mellitus (HCC)     Hemiplegia and hemiparesis following cerebral infarction affecting left non-dominant side (HCC)     Hyperlipidemia     Hypertension     Osteomyelitis (HCC)     Pressure ulcer of sacral region, stage 3 (HCC)     Prostatic hyperplasia     Sepsis (HCC)     Stage 4 decubitus ulcer (HCC)     Stroke (HCC)     TIA (transient ischemic attack)     Urinary retention     Vitamin D deficiency       Past Surgical History:   Procedure Laterality Date    COLOSTOMY N/A 10/23/2024    Procedure: Lap loop colostomy, psb open loop colostomy;  Surgeon: Davonte Banegas DO;  Location: [facility];  Service: General    GASTROSTOMY TUBE PLACEMENT N/A 10/23/2024    Procedure: INSERTION PEG TUBE, psb lap gastrostomy tube placement;  Surgeon: Davonte Banegas DO;  Location: [facility];  Service: General    INCISION AND DRAINAGE OF WOUND N/A 10/21/2024    Procedure: INCISION AND DRAINAGE (I&D) BUTTOCK, SACRAL WOUND DEBRIDEMENT, COCCYGECTOMY;  Surgeon: Beka King MD;  Location: [facility];  Service: General    WOUND DEBRIDEMENT N/A 10/23/2024    Procedure: DEBRIDEMENT WOUND (WASH OUT), sacrum;  Surgeon: Davonte Banegas DO;  Location: [facility];  Service: General      No family history on file.   Social History     Tobacco Use    Smoking status: Never     Passive exposure: Never     Smokeless tobacco: Never   Vaping Use    Vaping status: Never Used   Substance Use Topics    Alcohol use: Not Currently    Drug use: Never      E-Cigarette/Vaping    E-Cigarette Use Never User       E-Cigarette/Vaping Substances    Nicotine No     THC No     CBD No     Flavoring No     Other No     Unknown No       I have reviewed and agree with the history as documented.     74-year-old male with PMH of stroke who resides at [facility] presents with displaced G-tube.  Discussed with member of staff on the phone.  Patient reportedly was getting out of bed this morning and slid off the bed, no trauma however this accidentally pulled out his G-tube.  They placed a Parra catheter to maintain the stoma.  Patient offers no complaints.  Staff member has no further concerns.        Review of Systems   All other systems reviewed and are negative.          Objective       ED Triage Vitals   Temperature Pulse Blood Pressure Respirations SpO2 Patient Position - Orthostatic VS   04/01/25 0408 04/01/25 0405 04/01/25 0405 04/01/25 0405 04/01/25 0405 04/01/25 0430   97.8 °F (36.6 °C) 77 114/63 17 98 % Sitting      Temp Source Heart Rate Source BP Location FiO2 (%) Pain Score    04/01/25 0408 04/01/25 0405 04/01/25 0405 -- 04/01/25 0405    Oral Monitor Right arm  No Pain      Vitals      Date and Time Temp Pulse SpO2 Resp BP Pain Score FACES Pain Rating User   04/01/25 0600 -- 77 99 % 18 113/70 -- -- KA   04/01/25 0530 -- 76 98 % 17 111/59 -- -- KA   04/01/25 0430 -- 67 97 % 18 100/55 -- -- KA   04/01/25 0411 -- -- -- -- -- No Pain -- KA   04/01/25 0408 97.8 °F (36.6 °C) -- -- -- -- -- --    04/01/25 0405 -- 77 98 % 17 114/63 No Pain -- KA            Physical Exam  Vitals and nursing note reviewed.   Constitutional:       General: He is not in acute distress.     Appearance: He is well-developed.   HENT:      Head: Normocephalic and atraumatic.   Eyes:      Conjunctiva/sclera: Conjunctivae normal.    Cardiovascular:      Rate and Rhythm: Normal rate and regular rhythm.      Heart sounds: No murmur heard.  Pulmonary:      Effort: Pulmonary effort is normal. No respiratory distress.      Breath sounds: Normal breath sounds.   Abdominal:      Palpations: Abdomen is soft.      Tenderness: There is no abdominal tenderness.      Comments: PEG stoma. Parra catheter initially in place within gastrostomy. Site without erythema, swelling, tenderness.   Musculoskeletal:         General: No swelling.      Cervical back: Neck supple.   Skin:     General: Skin is warm and dry.      Capillary Refill: Capillary refill takes less than 2 seconds.   Neurological:      Mental Status: He is alert.   Psychiatric:         Mood and Affect: Mood normal.         Results Reviewed       None            XR abdomen 1 view kub    (Results Pending)       Feeding Tube    Date/Time: 4/1/2025 5:00 AM    Performed by: Vincent Edmond PA-C  Authorized by: Vincent Edmond PA-C  [place] Protocol:  Procedure performed by: (Dr. Saini)  Consent: Verbal consent obtained.  Consent given by: patient  Patient identity confirmed: arm band    Patient location:  ED  Pre-procedure details:     Old tube type:  Gastrostomy    Old tube size: 20 fr.  Indications:     Indications: tube removed by patient    Anesthesia (see MAR for exact dosages):     Anesthesia method:  Topical application    Topical anesthetic:  Lidocaine gel  Procedure details:     Patient position:  Supine    Procedure type:  Replacement    Tube type:  Gastrostomy    Tube size: 20 fr.    Bulb inflation volume:  10 cc    Bulb inflation fluid:  Normal saline  Post-procedure details:     Placement/position confirmation:  Gastric contents aspirated and x-ray    Placement difficulty:  Minimal    Bleeding:  Minimal    Patient tolerance of procedure:  Tolerated well, no immediate complications      ED Medication and Procedure Management   Prior to Admission Medications   Prescriptions  Last Dose Informant Patient Reported? Taking?   ASPIRIN 81 PO 3/31/2025 Morning  Yes Yes   Sig: Take 81 mg by mouth daily   Calcium Carb-Cholecalciferol (calcium carbonate-vitamin D) 500 mg-5 mcg tablet 3/31/2025 Evening  No Yes   Sig: Take 1 tablet by mouth 2 (two) times a day with meals   Cholecalciferol (Vitamin D3) 50 MCG (2000 UT) CAPS 3/31/2025 Morning Spouse/Significant Other, Self Yes Yes   Sig: Take 2,000 Units by mouth daily   Ertugliflozin L-PyroglutamicAc (Steglatro) 15 MG TABS 3/31/2025 Morning Outside Facility (Specify) Yes Yes   Sig: Take 15 mg by mouth daily   Glucagon, rDNA, (Glucagon Emergency) 1 MG KIT Unknown Outside Facility (Specify) Yes No   Sig: Inject as directed   MULTIPLE VITAMIN PO 3/31/2025 Morning Spouse/Significant Other, Self Yes Yes   Sig: Take 1 tablet by mouth in the morning. Indications: Vitamin A deficiency   Polyethylene Glycol 1000 POWD Not Taking  Yes No   Sig: Take 17 g by mouth daily as needed (constipation) Dissolve in 4-8oz of water, juice, coffee or tea   Patient not taking: Reported on 4/1/2025   Silver (SilvaSorb) GEL Not Taking  Yes No   Sig: Apply 1 Application topically daily. Apply to buttock wound bed daily.   Patient not taking: Reported on 10/18/2024   acetaminophen (TYLENOL) 325 mg tablet   Yes No   Sig: Take 650 mg by mouth every 4 (four) hours as needed for fever or mild pain.   apixaban (Eliquis) 5 mg 3/31/2025 Evening  No Yes   Sig: Take 1 tablet (5 mg total) by mouth 2 (two) times a day   atorvastatin (LIPITOR) 40 mg tablet 3/31/2025 Evening  No Yes   Sig: Take 2 tablets (80 mg total) by mouth every evening Per AVS, 80mg daily   Patient taking differently: Take 80 mg by mouth every evening Per AVS, 80mg daily   bisacodyl (FLEET) 10 MG/30ML ENEM Unknown  Yes No   Sig: Insert 10 mg into the rectum daily as needed for constipation Only use if no response from Dulcolax after 2 hours   collagenase (SANTYL) ointment Unknown  No No   Sig: Apply topically daily    docusate sodium (COLACE) 100 mg capsule 3/31/2025 Morning  Yes Yes   Sig: Take 100 mg by mouth daily Per OSR , take 2 capsules by mouth once daily.   escitalopram (LEXAPRO) 10 mg tablet 3/31/2025 Morning  Yes Yes   Sig: Take 10 mg by mouth daily   ezetimibe (ZETIA) 10 mg tablet 3/31/2025 Morning Spouse/Significant Other, Self Yes Yes   Sig: Take 10 mg by mouth daily   finasteride (PROSCAR) 5 mg tablet Not Taking  No No   Sig: Take 1 tablet (5 mg total) by mouth daily   Patient not taking: Reported on 2025   glucose 40 % Unknown  Yes No   Sig: Take 15 g by mouth once   insulin lispro (HumALOG/ADMELOG) 100 units/mL injection 3/31/2025  Yes Yes   Sig: Inject 3 Units under the skin 4 (four) times a day Inject 3 units subcutaneously four times a day for uncontrolled DM2 hold for glucose less than 110   lisinopril (ZESTRIL) 10 mg tablet 3/31/2025 Morning  No Yes   Sig: Take 1 tablet (10 mg total) by mouth daily   Patient taking differently: Take 10 mg by mouth daily   magnesium hydroxide (Milk of Magnesia) 400 mg/5 mL oral suspension Unknown  Yes No   Sig: Take 30 mL by mouth daily as needed for constipation Use if no bowel movement x 3 days. Give at bedtime. Separate from other medications x 2 hours.   metoprolol tartrate (LOPRESSOR) 25 mg tablet   No No   Si.5 tablets (12.5 mg total) by Per G Tube route every 12 (twelve) hours   mirtazapine (REMERON) 15 mg tablet 3/31/2025 Bedtime  No Yes   Sig: Take 1 tablet (15 mg total) by mouth daily at bedtime   nystatin (MYCOSTATIN) 500,000 units/5 mL suspension Not Taking  Yes No   Sig: Apply 500,000 Units to the mouth or throat 4 (four) times a day   Patient not taking: Reported on 2025   potassium chloride (MICRO-K) 10 MEQ CR capsule   No No   Sig: Take 2 capsules (20 mEq total) by mouth daily for 2 days   senna (Senna-Tabs) 8.6 MG tablet Not Taking  Yes No   Sig: Take 2 tablets by mouth daily as needed for constipation.   Patient not taking: Reported on  2024   tamsulosin (FLOMAX) 0.4 mg Not Taking  Yes No   Sig: Take 0.4 mg by mouth daily   Patient not taking: Reported on 2025   thiamine 100 MG tablet   No No   Si tablet (100 mg total) by Per G Tube route daily      Facility-Administered Medications: None     Patient's Medications   Discharge Prescriptions    No medications on file     No discharge procedures on file.  ED SEPSIS DOCUMENTATION   Time reflects when diagnosis was documented in both MDM as applicable and the Disposition within this note       Time User Action Codes Description Comment    2025  6:01 AM Vincent Edmond Add [Z46.59] Encounter for feeding tube placement                  Vincent Edmond PA-C  25 [id number]

## 2025-04-01 NOTE — ED ATTENDING ATTESTATION
4/1/2025  I, Fito Saini Jr, DO, saw and evaluated the patient. I have discussed the patient with the resident/non-physician practitioner and agree with the resident's/non-physician practitioner's findings, Plan of Care, and MDM as documented in the resident's/non-physician practitioner's note, except where noted. All available labs and Radiology studies were reviewed.  I was present for key portions of any procedure(s) performed by the resident/non-physician practitioner and I was immediately available to provide assistance.       At this point I agree with the current assessment done in the Emergency Department.  I have conducted an independent evaluation of this patient a history and physical is as follows:    I supervised the Advanced Practitioner.? I performed, in its entirety, the assessment and plan component of the visit.  I agree with the Advanced Practitioner's note with the following assessment and plan:      G-Tube Displacement   - Replaced at bedside with a 20 Danish    - Confirmed replacement with X-ray    Fito Saini Jr, DO 04/01/25       ED Course         Critical Care Time  Procedures

## 2025-05-29 NOTE — ASSESSMENT & PLAN NOTE
Sodium 154 at time of admission, likely hypovolemic hypernatremia related to mild dehydration secondary to poor water intake  Patient was on IV fluid support  Patient had PEG tube placed 10/23: Currently tolerating tube feedings and flushes  Hypernatremia not significantly improved: Will increase free water flushes,  Started hypotonic saline today briefly   The patient requests a call back from [facility] regarding if she will be able to get the prescription before 3 PM today.      Millie Sanchez on 5/29/2025 at 12:48 PM

## 2025-06-20 ENCOUNTER — APPOINTMENT (EMERGENCY)
Dept: RADIOLOGY | Facility: HOSPITAL | Age: 75
DRG: 064 | End: 2025-06-20
Payer: COMMERCIAL

## 2025-06-20 ENCOUNTER — APPOINTMENT (EMERGENCY)
Dept: CT IMAGING | Facility: HOSPITAL | Age: 75
DRG: 064 | End: 2025-06-20
Payer: COMMERCIAL

## 2025-06-20 ENCOUNTER — HOSPITAL ENCOUNTER (INPATIENT)
Facility: HOSPITAL | Age: 75
LOS: 6 days | Discharge: NURSING, REHAB OR LONG-TERM CARE | DRG: 064 | End: 2025-06-26
Attending: EMERGENCY MEDICINE | Admitting: INTERNAL MEDICINE
Payer: COMMERCIAL

## 2025-06-20 DIAGNOSIS — F32.A DEPRESSION: ICD-10-CM

## 2025-06-20 DIAGNOSIS — I10 PRIMARY HYPERTENSION: ICD-10-CM

## 2025-06-20 DIAGNOSIS — L89.154 DECUBITUS ULCER OF SACRAL REGION, STAGE 4 (HCC): ICD-10-CM

## 2025-06-20 DIAGNOSIS — I63.9 CVA (CEREBROVASCULAR ACCIDENT) (HCC): ICD-10-CM

## 2025-06-20 DIAGNOSIS — I63.9 CVA (CEREBRAL VASCULAR ACCIDENT) (HCC): Primary | ICD-10-CM

## 2025-06-20 PROBLEM — L98.429 SACRAL ULCER (HCC): Status: RESOLVED | Noted: 2024-10-18 | Resolved: 2025-06-20

## 2025-06-20 LAB
2HR DELTA HS TROPONIN: -1 NG/L
ALBUMIN SERPL BCG-MCNC: 2.7 G/DL (ref 3.5–5)
ALP SERPL-CCNC: 64 U/L (ref 34–104)
ALT SERPL W P-5'-P-CCNC: 9 U/L (ref 7–52)
AMMONIA PLAS-SCNC: 30 UMOL/L (ref 18–72)
ANION GAP SERPL CALCULATED.3IONS-SCNC: 6 MMOL/L (ref 4–13)
APTT PPP: 34 SECONDS (ref 23–34)
AST SERPL W P-5'-P-CCNC: 16 U/L (ref 13–39)
ATRIAL RATE: 75 BPM
BASOPHILS # BLD AUTO: 0.06 THOUSANDS/ÂΜL (ref 0–0.1)
BASOPHILS NFR BLD AUTO: 1 % (ref 0–1)
BILIRUB SERPL-MCNC: 1.01 MG/DL (ref 0.2–1)
BUN SERPL-MCNC: 37 MG/DL (ref 5–25)
CALCIUM ALBUM COR SERPL-MCNC: 10 MG/DL (ref 8.3–10.1)
CALCIUM SERPL-MCNC: 9 MG/DL (ref 8.4–10.2)
CARDIAC TROPONIN I PNL SERPL HS: 4 NG/L (ref ?–50)
CARDIAC TROPONIN I PNL SERPL HS: 5 NG/L (ref ?–50)
CHLORIDE SERPL-SCNC: 104 MMOL/L (ref 96–108)
CO2 SERPL-SCNC: 25 MMOL/L (ref 21–32)
CREAT SERPL-MCNC: 0.91 MG/DL (ref 0.6–1.3)
EOSINOPHIL # BLD AUTO: 0.43 THOUSAND/ÂΜL (ref 0–0.61)
EOSINOPHIL NFR BLD AUTO: 5 % (ref 0–6)
ERYTHROCYTE [DISTWIDTH] IN BLOOD BY AUTOMATED COUNT: 17.2 % (ref 11.6–15.1)
GFR SERPL CREATININE-BSD FRML MDRD: 81 ML/MIN/1.73SQ M
GLUCOSE SERPL-MCNC: 104 MG/DL (ref 65–140)
GLUCOSE SERPL-MCNC: 122 MG/DL (ref 65–140)
GLUCOSE SERPL-MCNC: 136 MG/DL (ref 65–140)
HCT VFR BLD AUTO: 23.5 % (ref 36.5–49.3)
HGB BLD-MCNC: 6.9 G/DL (ref 12–17)
IMM GRANULOCYTES # BLD AUTO: 0.03 THOUSAND/UL (ref 0–0.2)
IMM GRANULOCYTES NFR BLD AUTO: 0 % (ref 0–2)
INR PPP: 1.35 (ref 0.85–1.19)
LYMPHOCYTES # BLD AUTO: 2.72 THOUSANDS/ÂΜL (ref 0.6–4.47)
LYMPHOCYTES NFR BLD AUTO: 28 % (ref 14–44)
MAGNESIUM SERPL-MCNC: 2.1 MG/DL (ref 1.9–2.7)
MCH RBC QN AUTO: 27.2 PG (ref 26.8–34.3)
MCHC RBC AUTO-ENTMCNC: 29.4 G/DL (ref 31.4–37.4)
MCV RBC AUTO: 93 FL (ref 82–98)
MONOCYTES # BLD AUTO: 0.74 THOUSAND/ÂΜL (ref 0.17–1.22)
MONOCYTES NFR BLD AUTO: 8 % (ref 4–12)
NEUTROPHILS # BLD AUTO: 5.63 THOUSANDS/ÂΜL (ref 1.85–7.62)
NEUTS SEG NFR BLD AUTO: 58 % (ref 43–75)
NRBC BLD AUTO-RTO: 0 /100 WBCS
P AXIS: 58 DEGREES
PLATELET # BLD AUTO: 484 THOUSANDS/UL (ref 149–390)
PMV BLD AUTO: 9.6 FL (ref 8.9–12.7)
POTASSIUM SERPL-SCNC: 4.4 MMOL/L (ref 3.5–5.3)
PR INTERVAL: 184 MS
PROT SERPL-MCNC: 8.3 G/DL (ref 6.4–8.4)
PROTHROMBIN TIME: 16.8 SECONDS (ref 12.3–15)
QRS AXIS: 22 DEGREES
QRSD INTERVAL: 82 MS
QT INTERVAL: 378 MS
QTC INTERVAL: 422 MS
RBC # BLD AUTO: 2.54 MILLION/UL (ref 3.88–5.62)
SODIUM SERPL-SCNC: 135 MMOL/L (ref 135–147)
T WAVE AXIS: 43 DEGREES
VENTRICULAR RATE: 75 BPM
WBC # BLD AUTO: 9.61 THOUSAND/UL (ref 4.31–10.16)

## 2025-06-20 PROCEDURE — 93010 ELECTROCARDIOGRAM REPORT: CPT | Performed by: STUDENT IN AN ORGANIZED HEALTH CARE EDUCATION/TRAINING PROGRAM

## 2025-06-20 PROCEDURE — 85610 PROTHROMBIN TIME: CPT | Performed by: PHYSICIAN ASSISTANT

## 2025-06-20 PROCEDURE — 36415 COLL VENOUS BLD VENIPUNCTURE: CPT | Performed by: PHYSICIAN ASSISTANT

## 2025-06-20 PROCEDURE — 99285 EMERGENCY DEPT VISIT HI MDM: CPT

## 2025-06-20 PROCEDURE — 85014 HEMATOCRIT: CPT | Performed by: NURSE PRACTITIONER

## 2025-06-20 PROCEDURE — 82948 REAGENT STRIP/BLOOD GLUCOSE: CPT

## 2025-06-20 PROCEDURE — 99285 EMERGENCY DEPT VISIT HI MDM: CPT | Performed by: PHYSICIAN ASSISTANT

## 2025-06-20 PROCEDURE — 71045 X-RAY EXAM CHEST 1 VIEW: CPT

## 2025-06-20 PROCEDURE — 85730 THROMBOPLASTIN TIME PARTIAL: CPT | Performed by: INTERNAL MEDICINE

## 2025-06-20 PROCEDURE — 83735 ASSAY OF MAGNESIUM: CPT | Performed by: PHYSICIAN ASSISTANT

## 2025-06-20 PROCEDURE — 99223 1ST HOSP IP/OBS HIGH 75: CPT | Performed by: INTERNAL MEDICINE

## 2025-06-20 PROCEDURE — 93005 ELECTROCARDIOGRAM TRACING: CPT

## 2025-06-20 PROCEDURE — 82140 ASSAY OF AMMONIA: CPT | Performed by: PHYSICIAN ASSISTANT

## 2025-06-20 PROCEDURE — 83036 HEMOGLOBIN GLYCOSYLATED A1C: CPT | Performed by: NURSE PRACTITIONER

## 2025-06-20 PROCEDURE — 70450 CT HEAD/BRAIN W/O DYE: CPT

## 2025-06-20 PROCEDURE — 85018 HEMOGLOBIN: CPT | Performed by: NURSE PRACTITIONER

## 2025-06-20 PROCEDURE — 85025 COMPLETE CBC W/AUTO DIFF WBC: CPT | Performed by: PHYSICIAN ASSISTANT

## 2025-06-20 PROCEDURE — 85730 THROMBOPLASTIN TIME PARTIAL: CPT | Performed by: PHYSICIAN ASSISTANT

## 2025-06-20 PROCEDURE — 80053 COMPREHEN METABOLIC PANEL: CPT | Performed by: PHYSICIAN ASSISTANT

## 2025-06-20 PROCEDURE — 87081 CULTURE SCREEN ONLY: CPT | Performed by: INTERNAL MEDICINE

## 2025-06-20 PROCEDURE — 84484 ASSAY OF TROPONIN QUANT: CPT | Performed by: PHYSICIAN ASSISTANT

## 2025-06-20 RX ORDER — MULTIVIT WITH MINERALS/LUTEIN
250 TABLET ORAL DAILY
Status: DISCONTINUED | OUTPATIENT
Start: 2025-06-21 | End: 2025-06-26 | Stop reason: HOSPADM

## 2025-06-20 RX ORDER — ATORVASTATIN CALCIUM 80 MG/1
80 TABLET, FILM COATED ORAL DAILY
Status: DISCONTINUED | OUTPATIENT
Start: 2025-06-21 | End: 2025-06-20

## 2025-06-20 RX ORDER — CALCIUM CARB/VITAMIN D3/VIT K1 500-500-40
15 TABLET,CHEWABLE ORAL DAILY
Status: DISCONTINUED | OUTPATIENT
Start: 2025-06-21 | End: 2025-06-26 | Stop reason: HOSPADM

## 2025-06-20 RX ORDER — INSULIN LISPRO 100 [IU]/ML
5 INJECTION, SOLUTION INTRAVENOUS; SUBCUTANEOUS EVERY 6 HOURS SCHEDULED
Status: DISCONTINUED | OUTPATIENT
Start: 2025-06-21 | End: 2025-06-24

## 2025-06-20 RX ORDER — INSULIN LISPRO 100 [IU]/ML
1-6 INJECTION, SOLUTION INTRAVENOUS; SUBCUTANEOUS EVERY 6 HOURS SCHEDULED
Status: DISCONTINUED | OUTPATIENT
Start: 2025-06-21 | End: 2025-06-22

## 2025-06-20 RX ORDER — SACCHAROMYCES BOULARDII 250 MG
250 CAPSULE ORAL 2 TIMES DAILY
COMMUNITY

## 2025-06-20 RX ORDER — ONDANSETRON 2 MG/ML
4 INJECTION INTRAMUSCULAR; INTRAVENOUS EVERY 6 HOURS PRN
Status: DISCONTINUED | OUTPATIENT
Start: 2025-06-20 | End: 2025-06-26 | Stop reason: HOSPADM

## 2025-06-20 RX ORDER — LORATADINE ORAL 5 MG/5ML
10 SOLUTION ORAL DAILY
Status: DISCONTINUED | OUTPATIENT
Start: 2025-06-21 | End: 2025-06-26 | Stop reason: HOSPADM

## 2025-06-20 RX ORDER — FERROUS SULFATE 300 MG/5ML
300 LIQUID (ML) ORAL DAILY
Status: DISCONTINUED | OUTPATIENT
Start: 2025-06-21 | End: 2025-06-26 | Stop reason: HOSPADM

## 2025-06-20 RX ORDER — MIRTAZAPINE 7.5 MG/1
7.5 TABLET, FILM COATED ORAL
Status: DISCONTINUED | OUTPATIENT
Start: 2025-06-20 | End: 2025-06-26 | Stop reason: HOSPADM

## 2025-06-20 RX ORDER — CETIRIZINE HYDROCHLORIDE 10 MG/1
10 TABLET ORAL DAILY
COMMUNITY

## 2025-06-20 RX ORDER — HEPARIN SODIUM 10000 [USP'U]/100ML
3-24 INJECTION, SOLUTION INTRAVENOUS
Status: DISCONTINUED | OUTPATIENT
Start: 2025-06-20 | End: 2025-06-24

## 2025-06-20 RX ORDER — SACCHAROMYCES BOULARDII 250 MG
250 CAPSULE ORAL 2 TIMES DAILY
Status: DISCONTINUED | OUTPATIENT
Start: 2025-06-20 | End: 2025-06-26 | Stop reason: HOSPADM

## 2025-06-20 RX ORDER — ACETAMINOPHEN 650 MG/20.3ML
650 SUSPENSION ORAL EVERY 4 HOURS PRN
Status: DISCONTINUED | OUTPATIENT
Start: 2025-06-20 | End: 2025-06-26 | Stop reason: HOSPADM

## 2025-06-20 RX ORDER — ESCITALOPRAM OXALATE 10 MG/1
10 TABLET ORAL DAILY
Status: DISCONTINUED | OUTPATIENT
Start: 2025-06-21 | End: 2025-06-26 | Stop reason: HOSPADM

## 2025-06-20 RX ORDER — ATORVASTATIN CALCIUM 80 MG/1
80 TABLET, FILM COATED ORAL DAILY
Status: DISCONTINUED | OUTPATIENT
Start: 2025-06-21 | End: 2025-06-26 | Stop reason: HOSPADM

## 2025-06-20 RX ORDER — EZETIMIBE 10 MG/1
10 TABLET ORAL DAILY
Status: DISCONTINUED | OUTPATIENT
Start: 2025-06-21 | End: 2025-06-26 | Stop reason: HOSPADM

## 2025-06-20 RX ORDER — ASPIRIN 81 MG/1
81 TABLET, CHEWABLE ORAL DAILY
Status: DISCONTINUED | OUTPATIENT
Start: 2025-06-21 | End: 2025-06-26 | Stop reason: HOSPADM

## 2025-06-20 RX ORDER — MAGNESIUM HYDROXIDE/ALUMINUM HYDROXICE/SIMETHICONE 120; 1200; 1200 MG/30ML; MG/30ML; MG/30ML
30 SUSPENSION ORAL EVERY 6 HOURS PRN
Status: DISCONTINUED | OUTPATIENT
Start: 2025-06-20 | End: 2025-06-20

## 2025-06-20 RX ORDER — FERROUS SULFATE 300 MG/5ML
300 LIQUID (ML) ORAL DAILY
COMMUNITY

## 2025-06-20 RX ORDER — MAGNESIUM HYDROXIDE/ALUMINUM HYDROXICE/SIMETHICONE 120; 1200; 1200 MG/30ML; MG/30ML; MG/30ML
30 SUSPENSION ORAL EVERY 6 HOURS PRN
Status: DISCONTINUED | OUTPATIENT
Start: 2025-06-20 | End: 2025-06-26 | Stop reason: HOSPADM

## 2025-06-20 RX ORDER — BISACODYL 10 MG
10 SUPPOSITORY, RECTAL RECTAL DAILY PRN
Status: DISCONTINUED | OUTPATIENT
Start: 2025-06-20 | End: 2025-06-26 | Stop reason: HOSPADM

## 2025-06-20 RX ADMIN — Medication 12.5 MG: at 22:17

## 2025-06-20 RX ADMIN — MIRTAZAPINE 7.5 MG: 7.5 TABLET, FILM COATED ORAL at 22:17

## 2025-06-20 RX ADMIN — Medication 250 MG: at 22:17

## 2025-06-20 RX ADMIN — HEPARIN SODIUM 15 UNITS/KG/HR: 10000 INJECTION, SOLUTION INTRAVENOUS at 19:31

## 2025-06-20 NOTE — ASSESSMENT & PLAN NOTE
Chronic anemia with thrombocytosis.  Hemoglobin 6.9.  Baseline 7-8  Repeat Hg. Check T&S and FIT  Blood consent obtained (signed by wife)

## 2025-06-20 NOTE — ASSESSMENT & PLAN NOTE
Lab Results   Component Value Date    HGBA1C 6.2 (H) 09/17/2024     Continue PTA Humalog 5 units QID. On tube feeding, add sliding scale insulin Q6H    Recent Labs     06/20/25  1658   POCGLU 122       Blood Sugar Average: Last 72 hrs:  (P) 122

## 2025-06-20 NOTE — ASSESSMENT & PLAN NOTE
S/p Peg Oct 2024.  Diet at facility:  Tube feeding Jevity 1.2 85 mL/h x22hrs, water flush 150 mL every 4 hours  Dysphagia advanced texture with nectar thick liquids for lunch only  N.p.o. for now  Consult nutrition services and SLP  Aspiration precautions

## 2025-06-20 NOTE — ASSESSMENT & PLAN NOTE
S/p loop diverting colostomy; placed due to sacral decubiti ulcer with suspected coccygeal OM   Ostomy care.  Monitor stool output

## 2025-06-20 NOTE — ASSESSMENT & PLAN NOTE
Malnutrition Findings: Body mass index is 28.36 kg/m².   S/p PEG.  Continue tube feeding  Nutrition consult

## 2025-06-20 NOTE — ASSESSMENT & PLAN NOTE
Urinary retention managed with chronic Parra catheter.  Catheter exchanged at facility  Monitor urine output

## 2025-06-20 NOTE — ASSESSMENT & PLAN NOTE
POA. Previous admissions for polymicrobial bacteremia and OM, previous wound VAC.  Follows with surgery for wound care  Offload.  Turn and reposition.  Local wound care  Wound care consult  P500 mattress

## 2025-06-20 NOTE — H&P
H&P - Hospitalist   Name: Drew Santos 74 y.o. male I MRN: [id number]  Unit/Bed#: ED-06 I Date of Admission: 6/20/2025   Date of Service: 6/20/2025 I Hospital Day: 0     ADDENDUM: Hemoglobin 6.9->-6.6.  Will transfuse 1 unit PRBC.  Consent obtained from wife at bedside.  iFOBT requested  Assessment & Plan  CVA (cerebrovascular accident) (HCC)  Patient with hx of Afib, CVA x2 with left-sided deficits, bedbound, PEG, colostomy, chronic indwelling catheter, St IV sacral decubiti, transferred from [facility] at [facility] for increased weakness and less communicative    CTH showing acute to subacute left PCA distribution infarct. No confluent secondary parenchymal hematoma. Findings are new since the prior examination   Admit on stroke pathway: Brain MRI, echo, PT OT SLP   Maintained on ASA, Eliquis and high intensity statin.    Started on IV heparin.  Hold Eliquis.  Monitor PTT  Permissive HTN  Will obtain lipid panel and A1c  Telemetry monitoring  Neurology consult  Decubitus ulcer of sacral region, stage 4 (HCC)  POA. Previous admissions for polymicrobial bacteremia and OM, previous wound VAC.  Follows with surgery for wound care  Offload.  Turn and reposition.  Local wound care  Wound care consult  P500 mattress  Type 2 diabetes mellitus without complication, without long-term current use of insulin ([facility])  Lab Results   Component Value Date    HGBA1C 6.2 (H) 09/17/2024     Continue PTA Humalog 5 units QID. On tube feeding, add sliding scale insulin Q6H    Recent Labs     06/20/25  1658   POCGLU 122       Blood Sugar Average: Last 72 hrs:  (P) 122    Paroxysmal atrial fibrillation (HCC)  On rate control with metoprolol 12.5mg bid  Anticoagulated with Eliquis 5mg BID  Urinary retention  Urinary retention managed with chronic Parra catheter.  Catheter exchanged at facility  Monitor urine output  Primary hypertension  Permissive hypertension on stroke pathway  Anemia of chronic disease  Chronic anemia with  thrombocytosis.  Hemoglobin 6.9.  Baseline 7-8  Repeat Hg. Check T&S and FIT  Blood consent obtained (signed by wife)  Severe protein-calorie malnutrition (HCC)  Malnutrition Findings: Body mass index is 28.36 kg/m².   S/p PEG.  Continue tube feeding  Nutrition consult  S/P percutaneous endoscopic gastrostomy (PEG) tube placement ([facility])  S/p Peg Oct 2024.  Diet at facility:  Tube feeding Jevity 1.2 85 mL/h x22hrs, water flush 150 mL every 4 hours  Dysphagia advanced texture with nectar thick liquids for lunch only  N.p.o. for now  Consult nutrition services and SLP  Aspiration precautions  Colostomy in place ([facility])  S/p loop diverting colostomy; placed due to sacral decubiti ulcer with suspected coccygeal OM   Ostomy care.  Monitor stool output      VTE Pharmacologic Prophylaxis:   High Risk (Score >/= 5) - Pharmacological DVT Prophylaxis Ordered: apixaban (Eliquis). Sequential Compression Devices Ordered.  Code Status: Prior FC, lengthy discussion with spouse regarding CODE STATUS  Discussion with family: Updated  (wife) at bedside.    Anticipated Length of Stay: Patient will be admitted on an inpatient basis with an anticipated length of stay of greater than 2 midnights secondary to CVA, anticoagulated on Eliquis for history of A-fib and 2 prior strokes, bedbound.    History of Present Illness   Chief Complaint:     Drew Santos is a 74 y.o. male with a PMH of above who was transferred from long-term care facility, [facility] due to increased weakness and change in mentation and communication.  History of CVA with left-sided weakness, minimally verbal.  Spouse reports patient has not been himself since last Thursday, he is listless, somnolent, was previously able to make needs known but now noncommunicative.  He is allowed dysphagia diet for lunch but has not been able to tolerate p.o. over the last few days.  Reports chronic left-sided weakness although weaker than baseline and now with  right sided weakness.    Review of Systems   Unable to perform ROS: Patient nonverbal       Historical Information   Past Medical History[1]  Past Surgical History[2]  Social History[3]  E-Cigarette/Vaping    E-Cigarette Use Never User      E-Cigarette/Vaping Substances    Nicotine No     THC No     CBD No     Flavoring No     Other No     Unknown No      Family History[4]  Social History:  Marital Status: /Civil Union   Occupation:   Patient Pre-hospital Living Situation: [facility] care [facility]  Patient Pre-hospital Level of Mobility: Wheelchair, bedbound  Patient Pre-hospital Diet Restrictions: Tube feeding, dysphagia diet for lunch    Meds/Allergies   I have reveiwed home medications using records provided by [facility].  Prior to Admission medications    Medication Sig Start Date End Date Taking? Authorizing Provider   acetaminophen (TYLENOL) 325 mg tablet Take 650 mg by mouth every 4 (four) hours as needed for fever or mild pain.    Historical Provider, MD   apixaban (Eliquis) 5 mg Take 1 tablet (5 mg total) by mouth 2 (two) times a day 4/8/24   Wes Muhammad PA-C   ASPIRIN 81 PO Take 81 mg by mouth daily    Historical Provider, MD   atorvastatin (LIPITOR) 40 mg tablet Take 2 tablets (80 mg total) by mouth every evening Per AVS, 80mg daily  Patient taking differently: Take 80 mg by mouth every evening Per AVS, 80mg daily 8/28/24   Brendan Nuno DO   bisacodyl (FLEET) 10 MG/30ML ENEM Insert 10 mg into the rectum daily as needed for constipation Only use if no response from Dulcolax after 2 hours    Historical Provider, MD   Calcium Carb-Cholecalciferol (calcium carbonate-vitamin D) 500 mg-5 mcg tablet Take 1 tablet by mouth 2 (two) times a day with meals 10/4/24   Esperanza Arizmendi PA-C   Cholecalciferol (Vitamin D3) 50 MCG (2000 UT) CAPS Take 2,000 Units by mouth daily    Historical Provider, MD   collagenase (SANTYL) ointment Apply topically daily 10/5/24   Esperanza Arizmendi PA-C    docusate sodium (COLACE) 100 mg capsule Take 100 mg by mouth daily Per OSR 9/17, take 2 capsules by mouth once daily.    Historical Provider, MD   Ertugliflozin L-PyroglutamicAc (Steglatro) 15 MG TABS Take 15 mg by mouth daily    Historical Provider, MD   escitalopram (LEXAPRO) 10 mg tablet Take 10 mg by mouth daily    Historical Provider, MD   ezetimibe (ZETIA) 10 mg tablet Take 10 mg by mouth daily    Historical Provider, MD   finasteride (PROSCAR) 5 mg tablet Take 1 tablet (5 mg total) by mouth daily  Patient not taking: Reported on 4/1/2025 9/5/24   Azael Monzon MD   Glucagon, rDNA, (Glucagon Emergency) 1 MG KIT Inject as directed    Historical Provider, MD   glucose 40 % Take 15 g by mouth once    Historical Provider, MD   insulin lispro (HumALOG/ADMELOG) 100 units/mL injection Inject 3 Units under the skin 4 (four) times a day Inject 3 units subcutaneously four times a day for uncontrolled DM2 hold for glucose less than 110 11/12/24   Historical Provider, MD   lisinopril (ZESTRIL) 10 mg tablet Take 1 tablet (10 mg total) by mouth daily  Patient taking differently: Take 10 mg by mouth daily 8/28/24   Brendan Nuno DO   magnesium hydroxide (Milk of Magnesia) 400 mg/5 mL oral suspension Take 30 mL by mouth daily as needed for constipation Use if no bowel movement x 3 days. Give at bedtime. Separate from other medications x 2 hours.    Historical Provider, MD   metoprolol tartrate (LOPRESSOR) 25 mg tablet 0.5 tablets (12.5 mg total) by Per G Tube route every 12 (twelve) hours 10/29/24 11/28/24  Felipa Perales MD   mirtazapine (REMERON) 15 mg tablet Take 1 tablet (15 mg total) by mouth daily at bedtime 9/4/24   Azael Monzon MD   MULTIPLE VITAMIN PO Take 1 tablet by mouth in the morning. Indications: Vitamin A deficiency    Historical Provider, MD   nystatin (MYCOSTATIN) 500,000 units/5 mL suspension Apply 500,000 Units to the mouth or throat 4 (four) times a day  Patient not taking:  Reported on 4/1/2025    Historical Provider, MD   Polyethylene Glycol 1000 POWD Take 17 g by mouth daily as needed (constipation) Dissolve in 4-8oz of water, juice, coffee or tea  Patient not taking: Reported on 4/1/2025    Historical Provider, MD   potassium chloride (MICRO-K) 10 MEQ CR capsule Take 2 capsules (20 mEq total) by mouth daily for 2 days 9/4/24 9/6/24  Azael Monzon MD   senna (Senna-Tabs) 8.6 MG tablet Take 2 tablets by mouth daily as needed for constipation.  Patient not taking: Reported on 11/7/2024    Historical Provider, MD   Silver (SilvaSorb) GEL Apply 1 Application topically daily. Apply to buttock wound bed daily.  Patient not taking: Reported on 10/18/2024    Historical Provider, MD   tamsulosin (FLOMAX) 0.4 mg Take 0.4 mg by mouth daily  Patient not taking: Reported on 4/1/2025    Historical Provider, MD   thiamine 100 MG tablet 1 tablet (100 mg total) by Per G Tube route daily 10/30/24 11/29/24  Felipa Perales MD     Allergies   Allergen Reactions    Primaquine Other (See Comments) and Hives       Objective :  Temp:  [98.3 °F (36.8 °C)] 98.3 °F (36.8 °C)  HR:  [76] 76  BP: (128)/(63) 128/63  Resp:  [18] 18  SpO2:  [99 %] 99 %  O2 Device: None (Room air)    Physical Exam  Constitutional:       General: He is not in acute distress.     Appearance: Normal appearance. He is normal weight. He is not ill-appearing, toxic-appearing or diaphoretic.   HENT:      Head: Normocephalic and atraumatic.      Mouth/Throat:      Mouth: Mucous membranes are moist.     Eyes:      Conjunctiva/sclera: Conjunctivae normal.       Cardiovascular:      Rate and Rhythm: Normal rate and regular rhythm.      Heart sounds: Normal heart sounds.   Pulmonary:      Effort: Pulmonary effort is normal.      Breath sounds: Normal breath sounds.   Abdominal:      Palpations: Abdomen is soft.      Comments: PEG.  Colostomy, dark brown soft output   Genitourinary:     Comments: Copious yellow UO    Musculoskeletal:       Right lower leg: No edema.      Left lower leg: No edema.     Skin:     General: Skin is warm.      Comments: Sacral decubitus     Neurological:      Mental Status: He is alert.      Comments: Nonverbal      Lines/Drains:    Urinary Catheter:  Goal for removal: N/A - Chronic Parra               Lab Results: I have reviewed the following results:  Results from last 7 days   Lab Units 06/20/25  1655   WBC Thousand/uL 9.61   HEMOGLOBIN g/dL 6.9*   HEMATOCRIT % 23.5*   PLATELETS Thousands/uL 484*   SEGS PCT % 58   LYMPHO PCT % 28   MONO PCT % 8   EOS PCT % 5     Results from last 7 days   Lab Units 06/20/25  1655   SODIUM mmol/L 135   POTASSIUM mmol/L 4.4   CHLORIDE mmol/L 104   CO2 mmol/L 25   BUN mg/dL 37*   CREATININE mg/dL 0.91   ANION GAP mmol/L 6   CALCIUM mg/dL 9.0   ALBUMIN g/dL 2.7*   TOTAL BILIRUBIN mg/dL 1.01*   ALK PHOS U/L 64   ALT U/L 9   AST U/L 16   GLUCOSE RANDOM mg/dL 136     Results from last 7 days   Lab Units 06/20/25  1913   INR  1.35*     Results from last 7 days   Lab Units 06/20/25  1658   POC GLUCOSE mg/dl 122     Lab Results   Component Value Date    HGBA1C 6.2 (H) 09/17/2024    HGBA1C 6.0 (H) 07/04/2024    HGBA1C 7.1 (H) 03/08/2024           Imaging Results Review: I reviewed radiology reports from this admission including: CT head and Echocardiogram.  Other Study Results Review: EKG was reviewed.     Administrative Statements       ** Please Note: This note has been constructed using a voice recognition system. **         [1]   Past Medical History:  Diagnosis Date    Atherosclerotic heart disease of native coronary artery without angina pectoris     Atrial fibrillation (HCC)     Bacteremia     Cerebral infarction (HCC)     Constipation     Depression     Diabetes mellitus (HCC)     Hemiplegia and hemiparesis following cerebral infarction affecting left non-dominant side (HCC)     Hyperlipidemia     Hypertension     Osteomyelitis (HCC)     Pressure ulcer of sacral region, stage 3 (HCC)      Prostatic hyperplasia     Sepsis (HCC)     Stage 4 decubitus ulcer (HCC)     Stroke (HCC)     TIA (transient ischemic attack)     Urinary retention     Vitamin D deficiency    [2]   Past Surgical History:  Procedure Laterality Date    COLOSTOMY N/A 10/23/2024    Procedure: Lap loop colostomy, psb open loop colostomy;  Surgeon: Davonte Banegas DO;  Location: [facility];  Service: General    GASTROSTOMY TUBE PLACEMENT N/A 10/23/2024    Procedure: INSERTION PEG TUBE, psb lap gastrostomy tube placement;  Surgeon: Davonte Banegas DO;  Location: [facility];  Service: General    INCISION AND DRAINAGE OF WOUND N/A 10/21/2024    Procedure: INCISION AND DRAINAGE (I&D) BUTTOCK, SACRAL WOUND DEBRIDEMENT, COCCYGECTOMY;  Surgeon: Beka King MD;  Location: [facility];  Service: General    WOUND DEBRIDEMENT N/A 10/23/2024    Procedure: DEBRIDEMENT WOUND (WASH OUT), sacrum;  Surgeon: Davonte Banegas DO;  Location: [facility];  Service: General   [3]   Social History  Tobacco Use    Smoking status: Never     Passive exposure: Never    Smokeless tobacco: Never   Vaping Use    Vaping status: Never Used   Substance and Sexual Activity    Alcohol use: Not Currently    Drug use: Never   [4] No family history on file.

## 2025-06-20 NOTE — ASSESSMENT & PLAN NOTE
Patient with hx of Afib, CVA x2 with left-sided deficits, bedbound, PEG, colostomy, chronic indwelling catheter, St IV sacral decubiti, transferred from [facility] for increased weakness and less communicative    CTH showing acute to subacute left PCA distribution infarct. No confluent secondary parenchymal hematoma. Findings are new since the prior examination   Admit on stroke pathway: Brain MRI, echo, PT OT SLP   Maintained on ASA, Eliquis and high intensity statin.    Started on IV heparin.  Hold Eliquis.  Monitor PTT  Permissive HTN  Will obtain lipid panel and A1c  Telemetry monitoring  Neurology consult

## 2025-06-21 ENCOUNTER — APPOINTMENT (INPATIENT)
Dept: CT IMAGING | Facility: HOSPITAL | Age: 75
DRG: 064 | End: 2025-06-21
Payer: COMMERCIAL

## 2025-06-21 ENCOUNTER — APPOINTMENT (INPATIENT)
Dept: MRI IMAGING | Facility: HOSPITAL | Age: 75
DRG: 064 | End: 2025-06-21
Payer: COMMERCIAL

## 2025-06-21 LAB
ABO GROUP BLD BPU: NORMAL
ABO GROUP BLD: NORMAL
ANION GAP SERPL CALCULATED.3IONS-SCNC: 5 MMOL/L (ref 4–13)
APTT PPP: 101 SECONDS (ref 23–34)
APTT PPP: 113 SECONDS (ref 23–34)
APTT PPP: 67 SECONDS (ref 23–34)
APTT PPP: 68 SECONDS (ref 23–34)
ATRIAL RATE: 75 BPM
BASOPHILS # BLD AUTO: 0.07 THOUSANDS/ÂΜL (ref 0–0.1)
BASOPHILS NFR BLD AUTO: 1 % (ref 0–1)
BLD GP AB SCN SERPL QL: NEGATIVE
BPU ID: NORMAL
BUN SERPL-MCNC: 35 MG/DL (ref 5–25)
CALCIUM SERPL-MCNC: 8.7 MG/DL (ref 8.4–10.2)
CHLORIDE SERPL-SCNC: 107 MMOL/L (ref 96–108)
CHOLEST SERPL-MCNC: 82 MG/DL (ref ?–200)
CO2 SERPL-SCNC: 25 MMOL/L (ref 21–32)
CREAT SERPL-MCNC: 0.98 MG/DL (ref 0.6–1.3)
CROSSMATCH: NORMAL
EOSINOPHIL # BLD AUTO: 0.39 THOUSAND/ÂΜL (ref 0–0.61)
EOSINOPHIL NFR BLD AUTO: 4 % (ref 0–6)
ERYTHROCYTE [DISTWIDTH] IN BLOOD BY AUTOMATED COUNT: 16.7 % (ref 11.6–15.1)
EST. AVERAGE GLUCOSE BLD GHB EST-MCNC: 108 MG/DL
GFR SERPL CREATININE-BSD FRML MDRD: 74 ML/MIN/1.73SQ M
GLUCOSE SERPL-MCNC: 100 MG/DL (ref 65–140)
GLUCOSE SERPL-MCNC: 114 MG/DL (ref 65–140)
GLUCOSE SERPL-MCNC: 114 MG/DL (ref 65–140)
GLUCOSE SERPL-MCNC: 119 MG/DL (ref 65–140)
GLUCOSE SERPL-MCNC: 122 MG/DL (ref 65–140)
GLUCOSE SERPL-MCNC: 127 MG/DL (ref 65–140)
GLUCOSE SERPL-MCNC: 77 MG/DL (ref 65–140)
HBA1C MFR BLD: 5.4 %
HCT VFR BLD AUTO: 22.7 % (ref 36.5–49.3)
HCT VFR BLD AUTO: 25.1 % (ref 36.5–49.3)
HDLC SERPL-MCNC: 20 MG/DL
HEMOCCULT STL QL IA: NEGATIVE
HGB BLD-MCNC: 6.6 G/DL (ref 12–17)
HGB BLD-MCNC: 7.7 G/DL (ref 12–17)
IMM GRANULOCYTES # BLD AUTO: 0.03 THOUSAND/UL (ref 0–0.2)
IMM GRANULOCYTES NFR BLD AUTO: 0 % (ref 0–2)
LDLC SERPL CALC-MCNC: 44 MG/DL (ref 0–100)
LYMPHOCYTES # BLD AUTO: 2.54 THOUSANDS/ÂΜL (ref 0.6–4.47)
LYMPHOCYTES NFR BLD AUTO: 25 % (ref 14–44)
MCH RBC QN AUTO: 28.9 PG (ref 26.8–34.3)
MCHC RBC AUTO-ENTMCNC: 30.7 G/DL (ref 31.4–37.4)
MCV RBC AUTO: 94 FL (ref 82–98)
MONOCYTES # BLD AUTO: 0.8 THOUSAND/ÂΜL (ref 0.17–1.22)
MONOCYTES NFR BLD AUTO: 8 % (ref 4–12)
NEUTROPHILS # BLD AUTO: 6.51 THOUSANDS/ÂΜL (ref 1.85–7.62)
NEUTS SEG NFR BLD AUTO: 62 % (ref 43–75)
NRBC BLD AUTO-RTO: 0 /100 WBCS
P AXIS: 30 DEGREES
PLATELET # BLD AUTO: 403 THOUSANDS/UL (ref 149–390)
PMV BLD AUTO: 9.2 FL (ref 8.9–12.7)
POTASSIUM SERPL-SCNC: 4.5 MMOL/L (ref 3.5–5.3)
PR INTERVAL: 194 MS
QRS AXIS: 29 DEGREES
QRSD INTERVAL: 70 MS
QT INTERVAL: 380 MS
QTC INTERVAL: 424 MS
RBC # BLD AUTO: 2.66 MILLION/UL (ref 3.88–5.62)
RH BLD: POSITIVE
SODIUM SERPL-SCNC: 137 MMOL/L (ref 135–147)
SPECIMEN EXPIRATION DATE: NORMAL
T WAVE AXIS: 61 DEGREES
TRIGL SERPL-MCNC: 89 MG/DL (ref ?–150)
UNIT DISPENSE STATUS: NORMAL
UNIT PRODUCT CODE: NORMAL
UNIT PRODUCT VOLUME: 350 ML
UNIT RH: NORMAL
VENTRICULAR RATE: 75 BPM
WBC # BLD AUTO: 10.34 THOUSAND/UL (ref 4.31–10.16)

## 2025-06-21 PROCEDURE — 99233 SBSQ HOSP IP/OBS HIGH 50: CPT | Performed by: INTERNAL MEDICINE

## 2025-06-21 PROCEDURE — G0328 FECAL BLOOD SCRN IMMUNOASSAY: HCPCS | Performed by: NURSE PRACTITIONER

## 2025-06-21 PROCEDURE — 85730 THROMBOPLASTIN TIME PARTIAL: CPT | Performed by: INTERNAL MEDICINE

## 2025-06-21 PROCEDURE — 82948 REAGENT STRIP/BLOOD GLUCOSE: CPT

## 2025-06-21 PROCEDURE — 86850 RBC ANTIBODY SCREEN: CPT | Performed by: NURSE PRACTITIONER

## 2025-06-21 PROCEDURE — 86900 BLOOD TYPING SEROLOGIC ABO: CPT | Performed by: NURSE PRACTITIONER

## 2025-06-21 PROCEDURE — 85025 COMPLETE CBC W/AUTO DIFF WBC: CPT | Performed by: NURSE PRACTITIONER

## 2025-06-21 PROCEDURE — 70496 CT ANGIOGRAPHY HEAD: CPT

## 2025-06-21 PROCEDURE — 80048 BASIC METABOLIC PNL TOTAL CA: CPT | Performed by: NURSE PRACTITIONER

## 2025-06-21 PROCEDURE — P9016 RBC LEUKOCYTES REDUCED: HCPCS

## 2025-06-21 PROCEDURE — 70551 MRI BRAIN STEM W/O DYE: CPT

## 2025-06-21 PROCEDURE — 80061 LIPID PANEL: CPT | Performed by: NURSE PRACTITIONER

## 2025-06-21 PROCEDURE — 70498 CT ANGIOGRAPHY NECK: CPT

## 2025-06-21 PROCEDURE — 86901 BLOOD TYPING SEROLOGIC RH(D): CPT | Performed by: NURSE PRACTITIONER

## 2025-06-21 PROCEDURE — 86923 COMPATIBILITY TEST ELECTRIC: CPT

## 2025-06-21 PROCEDURE — 93010 ELECTROCARDIOGRAM REPORT: CPT | Performed by: STUDENT IN AN ORGANIZED HEALTH CARE EDUCATION/TRAINING PROGRAM

## 2025-06-21 PROCEDURE — 99223 1ST HOSP IP/OBS HIGH 75: CPT | Performed by: PSYCHIATRY & NEUROLOGY

## 2025-06-21 PROCEDURE — 30233N1 TRANSFUSION OF NONAUTOLOGOUS RED BLOOD CELLS INTO PERIPHERAL VEIN, PERCUTANEOUS APPROACH: ICD-10-PCS | Performed by: STUDENT IN AN ORGANIZED HEALTH CARE EDUCATION/TRAINING PROGRAM

## 2025-06-21 RX ORDER — ERTUGLIFLOZIN 15 MG/1
TABLET, FILM COATED ORAL DAILY
COMMUNITY

## 2025-06-21 RX ORDER — LANOLIN ALCOHOL/MO/W.PET/CERES
100 CREAM (GRAM) TOPICAL DAILY
Status: DISCONTINUED | OUTPATIENT
Start: 2025-06-21 | End: 2025-06-26 | Stop reason: HOSPADM

## 2025-06-21 RX ADMIN — DOCUSATE SODIUM LIQUID 200 MG: 50 LIQUID ORAL at 09:01

## 2025-06-21 RX ADMIN — MIRTAZAPINE 7.5 MG: 7.5 TABLET, FILM COATED ORAL at 21:55

## 2025-06-21 RX ADMIN — Medication 250 MG: at 09:01

## 2025-06-21 RX ADMIN — ESCITALOPRAM OXALATE 10 MG: 10 TABLET ORAL at 09:01

## 2025-06-21 RX ADMIN — INSULIN LISPRO 5 UNITS: 100 INJECTION, SOLUTION INTRAVENOUS; SUBCUTANEOUS at 12:07

## 2025-06-21 RX ADMIN — Medication 15 ML: at 09:02

## 2025-06-21 RX ADMIN — ASPIRIN 81 MG CHEWABLE TABLET 81 MG: 81 TABLET CHEWABLE at 09:01

## 2025-06-21 RX ADMIN — Medication 250 MG: at 09:00

## 2025-06-21 RX ADMIN — Medication 250 MG: at 21:55

## 2025-06-21 RX ADMIN — Medication 12.5 MG: at 21:55

## 2025-06-21 RX ADMIN — IOHEXOL 85 ML: 350 INJECTION, SOLUTION INTRAVENOUS at 16:44

## 2025-06-21 RX ADMIN — EZETIMIBE 10 MG: 10 TABLET ORAL at 09:01

## 2025-06-21 RX ADMIN — Medication 1000 UNITS: at 09:01

## 2025-06-21 RX ADMIN — LORATADINE 10 MG: 5 SOLUTION ORAL at 09:01

## 2025-06-21 RX ADMIN — ATORVASTATIN CALCIUM 80 MG: 80 TABLET, FILM COATED ORAL at 09:01

## 2025-06-21 RX ADMIN — THIAMINE HCL TAB 100 MG 100 MG: 100 TAB at 09:01

## 2025-06-21 RX ADMIN — Medication 300 MG: at 09:01

## 2025-06-21 RX ADMIN — HEPARIN SODIUM 9 UNITS/KG/HR: 10000 INJECTION, SOLUTION INTRAVENOUS at 14:00

## 2025-06-21 NOTE — ASSESSMENT & PLAN NOTE
S/p Peg Oct 2024.  Diet at facility:  Tube feeding Jevity 1.2 85 mL/h x22hrs, water flush 150 mL every 4 hours  Dysphagia advanced texture with nectar thick liquids for lunch only  Continue tube feeds

## 2025-06-21 NOTE — ED PROVIDER NOTES
Time reflects when diagnosis was documented in both MDM as applicable and the Disposition within this note       Time User Action Codes Description Comment    6/20/2025  7:11 PM Juan Carlos Ahsan Add [I63.9] CVA (cerebral vascular accident) (HCC)     6/20/2025  8:46 PM Ifeoma Smart Add [I10] Primary hypertension     6/20/2025  8:48 PM Ifeoma Smart Add [I63.9] CVA (cerebrovascular accident) (HCC)     6/21/2025  8:15 AM Nico Rodriguezun Add [L89.154] Decubitus ulcer of sacral region, stage 4 (HCC)           ED Disposition       ED Disposition   Admit    Condition   Stable    Date/Time   Fri Jun 20, 2025  7:11 PM    Comment   Case was discussed with Dr. Nichols and the patient's admission status was agreed to be Admission Status: inpatient status to the service of Dr. Nichols.               Assessment & Plan       Medical Decision Making  Pt presents from SNF with reportedly about a week of acute on chronic cognitive decline as well as generalized weakness. He has chronic L sided weakness and limited interaction at baseline due to previous CVA's however per wife she noticed an acute worsening. On exam he alerts to voice, is oriented to self, disoriented to time and largely non participative in neuro exam. Vitals largely WNL. Lab workup here does reveal anemia to hgb of 6.9, this appears likely to represent ACD as no known sources of bleeding and similar to prior values. CT head reveals acute to subacute PCA distribution CVA. Case discussed with neurology on call, recommendation for admission, stroke pathway with heparin GTT. Pt's spouse and facility updated on findings, agreeable to plan.    Amount and/or Complexity of Data Reviewed  Labs: ordered.  Radiology: ordered.    Risk  Prescription drug management.  Decision regarding hospitalization.             Medications   heparin (porcine) 25,000 units in 0.45% NaCl 250 mL infusion (premix) (15 Units/kg/hr × 109 kg Intravenous New Bag 6/20/25 1931)        ED Risk Strat Scores                    No data recorded                            History of Present Illness       Chief Complaint   Patient presents with    Lethargy     Pt from facility who states pt is oriented to baseline but seems more tired than normal and is not as vocal as normal. Pt has no complaints       Past Medical History[1]   Past Surgical History[2]   Family History[3]   Social History[4]   E-Cigarette/Vaping    E-Cigarette Use Never User       E-Cigarette/Vaping Substances    Nicotine No     THC No     CBD No     Flavoring No     Other No     Unknown No       I have reviewed and agree with the history as documented.     Drew  is a 75 yo male with HTN, HLD, DM2, chronic indwelling Parra catheter, decubitus ulcer of sacral region, a fib, anemia of chronic disease, severe protein-caloric malnutrition, PEG tube, colostomy, prior CVA, bedbound at baseline, presenting from SNF facility with reportedly decreased interaction/verbal response and increased lethargy/weakness generally over the past week or so. Wife reports he normally communicates to some degree, but has been largely unable to answer all but direct questions or respond in usual fashion. He has still been using R side however appears more weak generally. Per faciility, he has had longstanding cognitive decline and is normally limited in verbal interactions, so unclear if progression of baseline mental status decline vs acute worsening.      History provided by:  Relative and nursing home  History limited by:  Mental status change      Review of Systems   Unable to perform ROS: Mental status change   Neurological:  Positive for weakness.   Psychiatric/Behavioral:  Positive for confusion.            Objective       ED Triage Vitals   Temperature Pulse Blood Pressure Respirations SpO2 Patient Position - Orthostatic VS   06/20/25 1523 06/20/25 1518 06/20/25 1518 06/20/25 1518 06/20/25 1518 06/20/25 1518   98.3 °F (36.8 °C) 76 128/63 18 99 %  Lying      Temp Source Heart Rate Source BP Location FiO2 (%) Pain Score    06/20/25 1523 06/20/25 1518 06/20/25 1518 -- 06/20/25 1518    Oral Monitor Right arm  No Pain      Vitals      Date and Time Temp Pulse SpO2 Resp BP Pain Score FACES Pain Rating User   06/21/25 0748 97.7 °F (36.5 °C) 84 97 % 18 102/76 -- -- JH   06/21/25 0641 97.5 °F (36.4 °C) 85 98 % 18 122/75 -- -- DA   06/21/25 0453 97.7 °F (36.5 °C) 77 97 % 18 102/65 -- -- DA   06/21/25 0416 97.6 °F (36.4 °C) 78 -- 18 96/57 -- -- MM   06/21/25 0318 98 °F (36.7 °C) 75 -- 16 95/63 -- -- MM   06/21/25 0258 98.2 °F (36.8 °C) 78 98 % 16 92/58 -- -- MM   06/21/25 0232 97.9 °F (36.6 °C) 81 96 % 18 111/76 -- -- DA   06/21/25 0047 97.9 °F (36.6 °C) 84 97 % 18 112/74 -- -- DA   06/20/25 2245 -- 88 -- -- -- -- -- MM   06/20/25 2245 -- -- 98 % 18 120/98 -- -- CH   06/20/25 2216 -- 90 -- 18 112/69 -- -- MM   06/20/25 2145 -- 87 99 % 16 108/65 -- -- CH   06/20/25 2055 -- -- -- 16 113/71 No Pain -- CH   06/20/25 2045 -- -- -- -- -- No Pain -- MM   06/20/25 2045 -- 75 99 % 18 108/66 -- -- CH   06/20/25 2044 -- -- -- -- -- No Pain -- MM   06/20/25 1523 98.3 °F (36.8 °C) -- -- -- -- -- -- AW   06/20/25 1518 -- 76 99 % 18 128/63 No Pain -- AW            Physical Exam  Constitutional:       General: He is not in acute distress.     Appearance: He is well-developed. He is ill-appearing. He is not diaphoretic.      Comments: Chronically ill appearing   HENT:      Head: Normocephalic and atraumatic.      Right Ear: External ear normal.      Left Ear: External ear normal.     Eyes:      Extraocular Movements:      Right eye: Normal extraocular motion.      Left eye: Normal extraocular motion.      Conjunctiva/sclera: Conjunctivae normal.      Pupils: Pupils are equal, round, and reactive to light.       Cardiovascular:      Rate and Rhythm: Normal rate and regular rhythm.   Pulmonary:      Effort: Pulmonary effort is normal. No accessory muscle usage or respiratory distress.       Breath sounds: No wheezing.   Abdominal:      General: Abdomen is flat. There is no distension.      Comments: Colostomy in place with brown stool in bag     Musculoskeletal:      Cervical back: Normal range of motion. No rigidity.     Skin:     General: Skin is warm and dry.      Capillary Refill: Capillary refill takes less than 2 seconds.      Findings: No erythema or rash.     Neurological:      Mental Status: He is alert.      Comments: Pt is alert to voice. He responds to some direct questions, is able to tell me his name. Disoriented to place and time. Chronic L sided weakness noted. Unable to follow commands regarding CN exam or R sided strength, although appears to have normal R sided tone and moves R side spontaneously. No gross facial asymmetry   Psychiatric:         Behavior: Behavior normal.         Thought Content: Thought content normal.         Judgment: Judgment normal.         Results Reviewed       Procedure Component Value Units Date/Time    HS Troponin I 2hr [[id number]]  (Normal) Collected: 06/20/25 1913    Lab Status: Final result Specimen: Blood from Arm, Left Updated: 06/20/25 1947     hs TnI 2hr 4 ng/L      Delta 2hr hsTnI -1 ng/L     APTT [[id number]]  (Normal) Collected: 06/20/25 1913    Lab Status: Final result Specimen: Blood from Arm, Left Updated: 06/20/25 1939     PTT 34 seconds     Protime-INR [[id number]]  (Abnormal) Collected: 06/20/25 1913    Lab Status: Final result Specimen: Blood from Arm, Left Updated: 06/20/25 1939     Protime 16.8 seconds      INR 1.35    Narrative:      INR Therapeutic Range    Indication                                             INR Range      Atrial Fibrillation                                               2.0-3.0  Hypercoagulable State                                    2.0.2.3  Left Ventricular Asist Device                            2.0-3.0  Mechanical Heart Valve                                  -    Aortic(with afib, MI, embolism, HF, LA  enlargement,    and/or coagulopathy)                                     2.0-3.0 (2.5-3.5)     Mitral                                                             2.5-3.5  Prosthetic/Bioprosthetic Heart Valve               2.0-3.0  Venous thromboembolism (VTE: VT, PE        2.0-3.0    Magnesium [[id number]]  (Normal) Collected: 06/20/25 1655    Lab Status: Final result Specimen: Blood from Arm, Left Updated: 06/20/25 1759     Magnesium 2.1 mg/dL     Comprehensive metabolic panel [[id number]]  (Abnormal) Collected: 06/20/25 1655    Lab Status: Final result Specimen: Blood from Arm, Left Updated: 06/20/25 1759     Sodium 135 mmol/L      Potassium 4.4 mmol/L      Chloride 104 mmol/L      CO2 25 mmol/L      ANION GAP 6 mmol/L      BUN 37 mg/dL      Creatinine 0.91 mg/dL      Glucose 136 mg/dL      Calcium 9.0 mg/dL      Corrected Calcium 10.0 mg/dL      AST 16 U/L      ALT 9 U/L      Alkaline Phosphatase 64 U/L      Total Protein 8.3 g/dL      Albumin 2.7 g/dL      Total Bilirubin 1.01 mg/dL      eGFR 81 ml/min/1.73sq m     Narrative:      [employer] guidelines for Chronic Kidney Disease (CKD):     Stage 1 with normal or high GFR (GFR > 90 mL/min/1.73 square meters)    Stage 2 Mild CKD (GFR = 60-89 mL/min/1.73 square meters)    Stage 3A Moderate CKD (GFR = 45-59 mL/min/1.73 square meters)    Stage 3B Moderate CKD (GFR = 30-44 mL/min/1.73 square meters)    Stage 4 Severe CKD (GFR = 15-29 mL/min/1.73 square meters)    Stage 5 End Stage CKD (GFR <15 mL/min/1.73 square meters)  Note: GFR calculation is accurate only with a steady state creatinine    HS Troponin 0hr (reflex protocol) [[id number]]  (Normal) Collected: 06/20/25 1655    Lab Status: Final result Specimen: Blood from Arm, Left Updated: 06/20/25 1727     hs TnI 0hr 5 ng/L     Ammonia [[id number]]  (Normal) Collected: 06/20/25 1655    Lab Status: Final result Specimen: Blood from Arm, Left Updated: 06/20/25 1722     Ammonia 30 umol/L     CBC and  differential [[id number]]  (Abnormal) Collected: 25    Lab Status: Final result Specimen: Blood from Arm, Left Updated: 25 170     WBC 9.61 Thousand/uL      RBC 2.54 Million/uL      Hemoglobin 6.9 g/dL      Hematocrit 23.5 %      MCV 93 fL      MCH 27.2 pg      MCHC 29.4 g/dL      RDW 17.2 %      MPV 9.6 fL      Platelets 484 Thousands/uL      nRBC 0 /100 WBCs      Segmented % 58 %      Immature Grans % 0 %      Lymphocytes % 28 %      Monocytes % 8 %      Eosinophils Relative 5 %      Basophils Relative 1 %      Absolute Neutrophils 5.63 Thousands/µL      Absolute Immature Grans 0.03 Thousand/uL      Absolute Lymphocytes 2.72 Thousands/µL      Absolute Monocytes 0.74 Thousand/µL      Eosinophils Absolute 0.43 Thousand/µL      Basophils Absolute 0.06 Thousands/µL     Fingerstick Glucose (POCT) [[id number]]  (Normal) Collected: 25    Lab Status: Final result Specimen: Blood Updated: 25     POC Glucose 122 mg/dl             CT head without contrast   Final Interpretation by Jose Angel Epps MD ([id number])      Acute to subacute left PCA distribution infarct. No confluent secondary parenchymal hematoma. Findings are new since the prior examination.      The study was marked in EPIC for immediate notification.                  Workstation performed: [id number]         XR chest 1 view portable    (Results Pending)   MRI Inpatient Order    (Results Pending)       Procedures    ED Medication and Procedure Management   Prior to Admission Medications   Prescriptions Last Dose Informant Patient Reported? Taking?   Cholecalciferol (Vitamin D3) 50 MCG ( UT) CAPS  Spouse/Significant Other, Self Yes No   Si,000 Units by Per G Tube route in the morning.   Ertugliflozin L-PyroglutamicAc (Steglatro) 15 MG TABS   Yes No   Sig: Take by mouth in the morning   Ferrous Sulfate 300 (60 Fe) mg/5 mL solution   Yes Yes   Si mg by Per G Tube route daily   Glucagon, rDNA, (Glucagon Emergency) 1 MG  KIT  Outside Facility (Specify) Yes No   Sig: Inject as directed   MULTIPLE VITAMIN PO  Spouse/Significant Other, Self Yes No   Si tablet by Per PEG Tube route in the morning   Silver (SilvaSorb) GEL   Yes No   Sig: Apply 1 Application topically daily. Apply to buttock wound bed daily.   Patient not taking: Reported on 10/18/2024   acetaminophen (TYLENOL) 325 mg tablet   Yes No   Sig: Take 650 mg by mouth every 4 (four) hours as needed for fever or mild pain.   apixaban (Eliquis) 5 mg   No No   Sig: Take 1 tablet (5 mg total) by mouth 2 (two) times a day   Patient taking differently: 5 mg by Per G Tube route in the morning and 5 mg in the evening.   ascorbic acid (VITAMIN C) 250 MG tablet   Yes Yes   Si mg by Per G Tube route daily   aspirin 81 mg chewable tablet   Yes No   Si mg by Per PEG Tube route in the morning.   atorvastatin (LIPITOR) 40 mg tablet   No No   Sig: Take 2 tablets (80 mg total) by mouth every evening Per AVS, 80mg daily   Patient taking differently: 80 mg by Per G Tube route every evening Per AVS, 80mg daily   bisacodyl (FLEET) 10 MG/30ML ENEM   Yes No   Sig: Insert 10 mg into the rectum daily as needed for constipation Only use if no response from Dulcolax after 2 hours   cetirizine (ZyrTEC) 10 mg tablet   Yes Yes   Sig: 10 mg by Per G Tube route daily   collagenase (SANTYL) ointment   No No   Sig: Apply topically daily   docusate (Docusate Sodium) 50 mg/5 mL liquid   Yes Yes   Si mg by Per G Tube route daily   escitalopram (LEXAPRO) 10 mg tablet   Yes No   Sig: 10 mg by Per PEG Tube route in the morning.   ezetimibe (ZETIA) 10 mg tablet  Spouse/Significant Other, Self Yes No   Sig: 10 mg by Per G Tube route in the morning.   glucose 40 %   Yes No   Sig: Take 15 g by mouth once   insulin lispro (HumALOG/ADMELOG) 100 units/mL injection   Yes No   Sig: Inject 5 Units under the skin in the morning and 5 Units at noon and 5 Units in the evening and 5 Units before bedtime. Hold  for glucose less than 110.   magnesium hydroxide (Milk of Magnesia) 400 mg/5 mL oral suspension   Yes No   Sig: Take 30 mL by mouth daily as needed for constipation Use if no bowel movement x 3 days. Give at bedtime. Separate from other medications x 2 hours.   metoprolol tartrate (LOPRESSOR) 25 mg tablet   No No   Si.5 tablets (12.5 mg total) by Per G Tube route every 12 (twelve) hours   mirtazapine (REMERON) 15 mg tablet   No No   Sig: Take 1 tablet (15 mg total) by mouth daily at bedtime   Patient taking differently: 7.5 mg by Per G Tube route daily at bedtime   potassium chloride (MICRO-K) 10 MEQ CR capsule   No No   Sig: Take 2 capsules (20 mEq total) by mouth daily for 2 days   saccharomyces boulardii (FLORASTOR) 250 mg capsule   Yes Yes   Si mg by Per G Tube route 2 (two) times a day   thiamine 100 MG tablet   No No   Si tablet (100 mg total) by Per G Tube route daily      Facility-Administered Medications: None     Current Discharge Medication List        CONTINUE these medications which have NOT CHANGED    Details   ascorbic acid (VITAMIN C) 250 MG tablet 250 mg by Per G Tube route daily      cetirizine (ZyrTEC) 10 mg tablet 10 mg by Per G Tube route daily      docusate (Docusate Sodium) 50 mg/5 mL liquid 200 mg by Per G Tube route daily      Ferrous Sulfate 300 (60 Fe) mg/5 mL solution 300 mg by Per G Tube route daily      saccharomyces boulardii (FLORASTOR) 250 mg capsule 250 mg by Per G Tube route 2 (two) times a day      acetaminophen (TYLENOL) 325 mg tablet Take 650 mg by mouth every 4 (four) hours as needed for fever or mild pain.      apixaban (Eliquis) 5 mg Take 1 tablet (5 mg total) by mouth 2 (two) times a day  Qty: 180 tablet, Refills: 3    Associated Diagnoses: Paroxysmal atrial fibrillation (HCC)      aspirin 81 mg chewable tablet 81 mg by Per PEG Tube route in the morning.      atorvastatin (LIPITOR) 40 mg tablet Take 2 tablets (80 mg total) by mouth every evening Per AVS, 80mg  daily    Associated Diagnoses: CVA (cerebral vascular accident) ([facility])      bisacodyl (FLEET) 10 MG/30ML ENEM Insert 10 mg into the rectum daily as needed for constipation Only use if no response from Dulcolax after 2 hours    Comments: pt not taking at this time      Cholecalciferol (Vitamin D3) 50 MCG (2000 UT) CAPS 2,000 Units by Per G Tube route in the morning.      collagenase (SANTYL) ointment Apply topically daily    Associated Diagnoses: Wound of sacral region, initial encounter      Ertugliflozin L-PyroglutamicAc (Steglatro) 15 MG TABS Take by mouth in the morning      escitalopram (LEXAPRO) 10 mg tablet 10 mg by Per PEG Tube route in the morning.    Comments: depression      ezetimibe (ZETIA) 10 mg tablet 10 mg by Per G Tube route in the morning.      Glucagon, rDNA, (Glucagon Emergency) 1 MG KIT Inject as directed      glucose 40 % Take 15 g by mouth once      insulin lispro (HumALOG/ADMELOG) 100 units/mL injection Inject 5 Units under the skin in the morning and 5 Units at noon and 5 Units in the evening and 5 Units before bedtime. Hold for glucose less than 110.      magnesium hydroxide (Milk of Magnesia) 400 mg/5 mL oral suspension Take 30 mL by mouth daily as needed for constipation Use if no bowel movement x 3 days. Give at bedtime. Separate from other medications x 2 hours.      metoprolol tartrate (LOPRESSOR) 25 mg tablet 0.5 tablets (12.5 mg total) by Per G Tube route every 12 (twelve) hours    Associated Diagnoses: Paroxysmal atrial fibrillation (HCC)      mirtazapine (REMERON) 15 mg tablet Take 1 tablet (15 mg total) by mouth daily at bedtime    Associated Diagnoses: Depression      MULTIPLE VITAMIN PO 1 tablet by Per PEG Tube route in the morning      potassium chloride (MICRO-K) 10 MEQ CR capsule Take 2 capsules (20 mEq total) by mouth daily for 2 days    Associated Diagnoses: Hypokalemia      Silver (SilvaSorb) GEL Apply 1 Application topically daily. Apply to buttock wound bed daily.       thiamine 100 MG tablet 1 tablet (100 mg total) by Per G Tube route daily    Associated Diagnoses: Malnutrition (HCC)           No discharge procedures on file.  ED SEPSIS DOCUMENTATION   Time reflects when diagnosis was documented in both MDM as applicable and the Disposition within this note       Time User Action Codes Description Comment    6/20/2025  7:11 PM Ahsan Rangel Add [I63.9] CVA (cerebral vascular accident) (HCC)     6/20/2025  8:46 PM Ifeoma Smart Add [I10] Primary hypertension     6/20/2025  8:48 PM Ifeoma Smart Add [I63.9] CVA (cerebrovascular accident) (HCC)     6/21/2025  8:15 AM Taye Rodriguez Add [L89.154] Decubitus ulcer of sacral region, stage 4 (HCC)                    [1]   Past Medical History:  Diagnosis Date    Atherosclerotic heart disease of native coronary artery without angina pectoris     Atrial fibrillation (HCC)     Bacteremia     Cerebral infarction (HCC)     Constipation     Depression     Diabetes mellitus (HCC)     Hemiplegia and hemiparesis following cerebral infarction affecting left non-dominant side (HCC)     Hyperlipidemia     Hypertension     Osteomyelitis (HCC)     Pressure ulcer of sacral region, stage 3 (HCC)     Prostatic hyperplasia     Sepsis (HCC)     Stage 4 decubitus ulcer (HCC)     Stroke (HCC)     TIA (transient ischemic attack)     Urinary retention     Vitamin D deficiency    [2]   Past Surgical History:  Procedure Laterality Date    COLOSTOMY N/A 10/23/2024    Procedure: Lap loop colostomy, psb open loop colostomy;  Surgeon: Davonte Banegas DO;  Location: [facility];  Service: General    GASTROSTOMY TUBE PLACEMENT N/A 10/23/2024    Procedure: INSERTION PEG TUBE, psb lap gastrostomy tube placement;  Surgeon: Davonte Banegas DO;  Location: [facility];  Service: General    INCISION AND DRAINAGE OF WOUND N/A 10/21/2024    Procedure: INCISION AND DRAINAGE (I&D) BUTTOCK, SACRAL WOUND DEBRIDEMENT, COCCYGECTOMY;  Surgeon: Beka SHERMAN  MD Fernando;  Location: [facility];  Service: General    WOUND DEBRIDEMENT N/A 10/23/2024    Procedure: DEBRIDEMENT WOUND (WASH OUT), sacrum;  Surgeon: Davonte Banegas DO;  Location: [facility];  Service: General   [3] No family history on file.  [4]   Social History  Tobacco Use    Smoking status: Never     Passive exposure: Never    Smokeless tobacco: Never   Vaping Use    Vaping status: Never Used   Substance Use Topics    Alcohol use: Not Currently    Drug use: Never        Ahsan Rangel PA-C  06/21/25 1041

## 2025-06-21 NOTE — OCCUPATIONAL THERAPY NOTE
Occupational Therapy         Patient Name: Drew Santos  Today's Date: 6/21/2025 06/21/25 0810   OT Last Visit   OT Visit Date 06/21/25   Note Type   Note type Screen   Additional Comments OT Consult received. Chart reviewed. Screen completed. Per EMR, pt is from [facility] and is total assist for all ADLS and mechanical lift for transfers. Not skilled OT warranted at this time. DC OT. Re consult as appropriate.     Rosina Nair, OT

## 2025-06-21 NOTE — ASSESSMENT & PLAN NOTE
Chronic anemia with thrombocytosis.  Baseline 7-8  FIT Test Normal  Blood consent obtained (signed by wife)  No need for transfusion

## 2025-06-21 NOTE — CASE MANAGEMENT
Case Management Assessment & Discharge Planning Note    Patient name Drew Santos  Location [facility] /E4 -* MRN [id number]  : 1950 Date 2025       Current Admission Date: 2025  Current Admission Diagnosis:CVA (cerebrovascular accident) (HCC)   Patient Active Problem List    Diagnosis Date Noted    CVA (cerebrovascular accident) ([facility]) 2025    Hyponatremia 2024    Hyperkalemia 2024    Leukocytosis 2024    Colostomy in place ([facility]) 2024    S/P percutaneous endoscopic gastrostomy (PEG) tube placement ([facility]) 10/24/2024    Pyuria 10/19/2024    Chronic indwelling Parra catheter 10/19/2024    Hypernatremia 10/19/2024    Advanced care planning/counseling discussion 2024    Severe protein-calorie malnutrition (HCC) 2024    Polymicrobial bacteremia 2024    Acute metabolic encephalopathy 2024    History of CVA (cerebrovascular accident) 2024    Paroxysmal atrial fibrillation (HCC) 2024    Anemia of chronic disease 2024    Gram-negative bacteremia 2024    Type 2 diabetes mellitus without complication, without long-term current use of insulin ([facility]) 2024    COVID-19 2024    Proctitis 2024    Dysgeusia 2024    Gross hematuria 2024    Depression 2024    Decubitus ulcer of sacral region, stage 4 ([facility]) 2024    Primary hypertension 2024    Mixed hyperlipidemia 2024    Urinary retention 2024    Syncope 2024    Elevated lactic acid level 2024    Abnormal CPK 2024      LOS (days): 1  Geometric Mean LOS (GMLOS) (days):   Days to GMLOS:     OBJECTIVE:    Risk of Unplanned Readmission Score: 30.05         Current admission status: Inpatient  Referral Reason: Stroke, Other (Facility return)    Preferred Pharmacy:   [id number]  [place] - [address]  [facility] [place] [address]  Phone: [phone] Fax:  [phone]    Primary Care Provider: Joe Lyon MD    [facility] Insurance: [facility]  Secondary Insurance: [employer]    ASSESSMENT:  Active Health Care Proxies       Kristi Santos Health Care Representative - Spouse   Primary Phone: [phone] (Mobile)  Home Phone: [phone]                 Advance Directives  Does patient have a Health Care POA?: Yes  Does patient have Advance Directives?: Yes  Advance Directives: Living will, Power of  for health care  Primary Contact: Kristi Santos (Spouse)  [phone] (Mobile)    Readmission Root Cause  30 Day Readmission: No    Patient Information  Admitted from:: Facility  Mental Status: Other (Comment) (non verbal)  During Assessment patient was accompanied by: Not accompanied during assessment  Assessment information provided by:: Spouse  Primary Caregiver: Other (Comment) (Facility staff)  Caregiver's Name:: [facility]  Caregiver's Relationship to Patient:: Facility Staff  Support Systems: Spouse/significant other, Family members, Other (Comment) (Facility staff)  County of Residence: [place]  What city do you live in?: [place]  Home entry access options. Select all that apply.: No steps to enter home  Type of Current Residence: Facility  Upon entering residence, is there a bedroom on the main floor (no further steps)?: Yes  Upon entering residence, is there a bathroom on the main floor (no further steps)?: Yes  Living Arrangements: Lives in Facility  Is patient a ?: Yes  Is patient active with VA ([facility])?: Yes  Is patient service connected?: Yes (LORETTA Loaiza)    Activities of Daily Living Prior to Admission  Functional Status: Total dependent  Completes ADLs independently?: No  Level of ADL dependence: Total Dependent  Ambulates independently?: No  Level of ambulatory dependence: Total Dependent  Does patient use assisted devices?: Yes  Assisted Devices (DME) used: [place] lift  Does patient currently  own DME?: Yes  What DME does the patient currently own?: [place] lift, [facility]  Does patient have a history of Outpatient Therapy (PT/OT)?: No  Does the patient have a history of Short-Term Rehab?: Yes ([facility])  Does patient have a history of HHC?: No  Does patient currently have HHC?: No         Patient Information Continued  Income Source: Pension/snf  Does patient have prescription coverage?: Yes  Can the patient afford their medications and any related supplies (such as glucometers or test strips)?: N/A  Does patient receive dialysis treatments?: No  Does patient have a history of substance abuse?: No  Does patient have a history of Mental Health Diagnosis?: No    Means of Transportation  Means of Transport to Appts:: Other (Comment) (BLS)    DISCHARGE DETAILS:    Discharge planning discussed with:: Spouse Kristi  Freedom of Choice: Yes     CM contacted family/caregiver?: Yes  Were Treatment Team discharge recommendations reviewed with patient/caregiver?: Yes  Did patient/caregiver verbalize understanding of patient care needs?: N/A- going to facility  Were patient/caregiver advised of the risks associated with not following Treatment Team discharge recommendations?: Yes    Contacts  Patient Contacts: Kristi Santos (Spouse)  Relationship to Patient:: Family  Contact Method: Phone  Phone Number: [phone] (Mobile)  Reason/Outcome: Continuity of Care, Emergency Contact, Discharge Planning    Requested Home Health Care         Is the patient interested in HHC at discharge?: No    DME Referral Provided  Referral made for DME?: No    Other Referral/Resources/Interventions Provided:  Interventions: Facility Return    Would you like to participate in our [facility] service program?  : No - Declined    Treatment Team Recommendation: Facility Return  Expected Discharge Disposition: Skilled Nursing Facility  Additional Discharge Dispositions: Skilled Nursing Facility     Additional Comments:  Met with patient at bedside and asleep and non-verbal.  Call to spouse Kristi and completed assessment.  Patient [facility] a [facility] at [facility].  Referral made back to Facility Is VA connected will need VA to schedule transport at discharge VA transport [phone] ext [phone].  Patient is bedbound at baseline.  No therapy needs,  CM department following thru discharge

## 2025-06-21 NOTE — ASSESSMENT & PLAN NOTE
POA. Previous admissions for polymicrobial bacteremia and OM, previous wound VAC.  Follows with surgery for wound care  Offload.  Turn and reposition.  Local wound care  Wound care consult  P500 mattress  Discussed with general surgery, they recommend localized wound care as they state it appears not infected-discussed with Dr. Bunny Guido

## 2025-06-21 NOTE — PHYSICAL THERAPY NOTE
Physical Therapy Screen    Patient Name: Drew Santos    Today's Date: 6/21/2025     Problem List  Principal Problem:    CVA (cerebrovascular accident) (HCC)  Active Problems:    Urinary retention    Primary hypertension    Decubitus ulcer of sacral region, stage 4 (HCC)    Type 2 diabetes mellitus without complication, without long-term current use of insulin (HCC)    Paroxysmal atrial fibrillation (HCC)    Anemia of chronic disease    Severe protein-calorie malnutrition (HCC)    S/P percutaneous endoscopic gastrostomy (PEG) tube placement (HCC)    Colostomy in place (HCC)       Past Medical History  Past Medical History[1]     Past Surgical History  Past Surgical History[2]        06/21/25 0812   PT Last Visit   PT Visit Date 06/21/25   Note Type   Note type Screen   Additional Comments PT screen completed. PTA, pt bed bound & requires alexander lift for OOB transfers at baseline. Pt functioning at baseline. No skilled IPPT warranted. Will D/C PT. Pt may return to facility when medically cleared.     Pablito Ferguson        [1]   Past Medical History:  Diagnosis Date    Atherosclerotic heart disease of native coronary artery without angina pectoris     Atrial fibrillation (HCC)     Bacteremia     Cerebral infarction (HCC)     Constipation     Depression     Diabetes mellitus (HCC)     Hemiplegia and hemiparesis following cerebral infarction affecting left non-dominant side (HCC)     Hyperlipidemia     Hypertension     Osteomyelitis (HCC)     Pressure ulcer of sacral region, stage 3 (HCC)     Prostatic hyperplasia     Sepsis (HCC)     Stage 4 decubitus ulcer (HCC)     Stroke (HCC)     TIA (transient ischemic attack)     Urinary retention     Vitamin D deficiency    [2]   Past Surgical History:  Procedure Laterality Date    COLOSTOMY N/A 10/23/2024    Procedure: Lap loop colostomy, psb open loop colostomy;  Surgeon: Davonte Banegas DO;  [facility]: [facility];   Service: General    GASTROSTOMY TUBE PLACEMENT N/A 10/23/2024    Procedure: INSERTION PEG TUBE, psb lap gastrostomy tube placement;  Surgeon: Davonte Banegas DO;  [facility];  Service: General    INCISION AND DRAINAGE OF WOUND N/A 10/21/2024    Procedure: INCISION AND DRAINAGE (I&D) BUTTOCK, SACRAL WOUND DEBRIDEMENT, COCCYGECTOMY;  Surgeon: Beka King MD;  [facility];  Service: General    WOUND DEBRIDEMENT N/A 10/23/2024    Procedure: DEBRIDEMENT WOUND (WASH OUT), sacrum;  Surgeon: Davonte Banegas DO;  [facility]: [facility];  Service: General

## 2025-06-21 NOTE — PLAN OF CARE
Problem: NEUROSENSORY - ADULT  Goal: Achieves stable or improved neurological status  Description: INTERVENTIONS  - Monitor and report changes in neurological status  - Monitor vital signs such as temperature, blood pressure, glucose, and any other labs ordered   - Initiate measures to prevent increased intracranial pressure  - Monitor for seizure activity and implement precautions if appropriate      6/21/2025 0334 by Lenora Melendez RN  Outcome: Progressing  6/21/2025 0334 by Lenora Melendez RN  Outcome: Progressing  Goal: Achieves maximal functionality and self care  Description: INTERVENTIONS  - Monitor swallowing and airway patency with patient fatigue and changes in neurological status  - Encourage and assist patient to increase activity and self care.   - Encourage visually impaired, hearing impaired and aphasic patients to use assistive/communication devices  6/21/2025 0334 by Lenora Melendez RN  Outcome: Progressing  6/21/2025 0334 by Lenora Melendez RN  Outcome: Progressing     Problem: CARDIOVASCULAR - ADULT  Goal: Maintains optimal cardiac output and hemodynamic stability  Description: INTERVENTIONS:  - Monitor I/O, vital signs and rhythm  - Monitor for S/S and trends of decreased cardiac output  - Administer and titrate ordered vasoactive medications to optimize hemodynamic stability  - Assess quality of pulses, skin color and temperature  - Assess for signs of decreased coronary artery perfusion  - Instruct patient to report change in severity of symptoms  6/21/2025 0334 by Lenora Melendez RN  Outcome: Progressing  6/21/2025 0334 by Lenora Melendez RN  Outcome: Progressing  Goal: Absence of cardiac dysrhythmias or at baseline rhythm  Description: INTERVENTIONS:  - Continuous cardiac monitoring, vital signs, obtain 12 lead EKG if ordered  - Administer antiarrhythmic and heart rate control medications as ordered  - Monitor electrolytes and administer  replacement therapy as ordered  Outcome: Progressing     Problem: RESPIRATORY - ADULT  Goal: Achieves optimal ventilation and oxygenation  Description: INTERVENTIONS:  - Assess for changes in respiratory status  - Assess for changes in mentation and behavior  - Position to facilitate oxygenation and minimize respiratory effort  - Oxygen administered by appropriate delivery if ordered  - Initiate smoking cessation education as indicated  - Encourage broncho-pulmonary hygiene including cough, deep breathe, Incentive Spirometry  - Assess the need for suctioning and aspirate as needed  - Assess and instruct to report SOB or any respiratory difficulty  - Respiratory Therapy support as indicated  Outcome: Progressing     Problem: GASTROINTESTINAL - ADULT  Goal: Minimal or absence of nausea and/or vomiting  Description: INTERVENTIONS:  - Administer IV fluids if ordered to ensure adequate hydration  - Maintain NPO status until nausea and vomiting are resolved  - Nasogastric tube if ordered  - Administer ordered antiemetic medications as needed  - Provide nonpharmacologic comfort measures as appropriate  - Advance diet as tolerated, if ordered  - Consider nutrition services referral to assist patient with adequate nutrition and appropriate food choices  Outcome: Progressing  Goal: Maintains or returns to baseline bowel function  Description: INTERVENTIONS:  - Assess bowel function  - Encourage oral fluids to ensure adequate hydration  - Administer IV fluids if ordered to ensure adequate hydration  - Administer ordered medications as needed  - Encourage mobilization and activity  - Consider nutritional services referral to assist patient with adequate nutrition and appropriate food choices  Outcome: Progressing  Goal: Maintains adequate nutritional intake  Description: INTERVENTIONS:  - Monitor percentage of each meal consumed  - Identify factors contributing to decreased intake, treat as appropriate  - Assist with meals as  needed  - Monitor I&O, weight, and lab values if indicated  - Obtain nutrition services referral as needed  Outcome: Progressing  Goal: Establish and maintain optimal ostomy function  Description: INTERVENTIONS:  - Assess bowel function  - Encourage oral fluids to ensure adequate hydration  - Administer IV fluids if ordered to ensure adequate hydration   - Administer ordered medications as needed  - Encourage mobilization and activity  - Nutrition services referral to assist patient with appropriate food choices  - Assess stoma site  - Consider wound care consult   Outcome: Progressing  Goal: Oral mucous membranes remain intact  Description: INTERVENTIONS  - Assess oral mucosa and hygiene practices  - Implement preventative oral hygiene regimen  - Implement oral medicated treatments as ordered  - Initiate Nutrition services referral as needed  Outcome: Progressing     Problem: GENITOURINARY - ADULT  Goal: Maintains or returns to baseline urinary function  Description: INTERVENTIONS:  - Assess urinary function  - Encourage oral fluids to ensure adequate hydration if ordered  - Administer IV fluids as ordered to ensure adequate hydration  - Administer ordered medications as needed  - Offer frequent toileting  - Follow urinary retention protocol if ordered  Outcome: Progressing  Goal: Absence of urinary retention  Description: INTERVENTIONS:  - Assess patient’s ability to void and empty bladder  - Monitor I/O  - Bladder scan as needed  - Discuss with physician/AP medications to alleviate retention as needed  - Discuss catheterization for long term situations as appropriate  Outcome: Progressing  Goal: Urinary catheter remains patent  Description: INTERVENTIONS:  - Assess patency of urinary catheter  - If patient has a chronic hanks, consider changing catheter if non-functioning  - Follow guidelines for intermittent irrigation of non-functioning urinary catheter  Outcome: Progressing     Problem: METABOLIC, FLUID AND  ELECTROLYTES - ADULT  Goal: Electrolytes maintained within normal limits  Description: INTERVENTIONS:  - Monitor labs and assess patient for signs and symptoms of electrolyte imbalances  - Administer electrolyte replacement as ordered  - Monitor response to electrolyte replacements, including repeat lab results as appropriate  - Instruct patient on fluid and nutrition as appropriate  Outcome: Progressing  Goal: Fluid balance maintained  Description: INTERVENTIONS:  - Monitor labs   - Monitor I/O and WT  - Instruct patient on fluid and nutrition as appropriate  - Assess for signs & symptoms of volume excess or deficit  Outcome: Progressing  Goal: Glucose maintained within target range  Description: INTERVENTIONS:  - Monitor Blood Glucose as ordered  - Assess for signs and symptoms of hyperglycemia and hypoglycemia  - Administer ordered medications to maintain glucose within target range  - Assess nutritional intake and initiate nutrition service referral as needed  Outcome: Progressing     Problem: SKIN/TISSUE INTEGRITY - ADULT  Goal: Incision(s), wounds(s) or drain site(s) healing without S/S of infection  Description: INTERVENTIONS  - Assess and document dressing, incision, wound bed, drain sites and surrounding tissue  - Provide patient and family education  - Perform skin care/dressing changes as ordered  Outcome: Progressing  Goal: Pressure injury heals and does not worsen  Description: Interventions:  - Implement low air loss mattress or specialty surface (Criteria met)  - Apply silicone foam dressing  - Instruct/assist with weight shifting every 30-60 minutes when in chair   - Use special pressure reducing interventions - Apply fecal or urinary incontinence containment device   - Perform passive or active ROM   - Turn and reposition patient & offload bony prominences every 2 hours   - Utilize friction reducing device or surface for transfers   - Consider consults to  interdisciplinary teams  - Use incontinent  care products after each incontinent episode   - Consider nutrition services referral as needed  Outcome: Progressing

## 2025-06-21 NOTE — PLAN OF CARE
Problem: PAIN - ADULT  Goal: Verbalizes/displays adequate comfort level or baseline comfort level  Description: Interventions:  - Encourage patient to monitor pain and request assistance  - Assess pain using appropriate pain scale  - Administer analgesics as ordered based on type and severity of pain and evaluate response  - Implement non-pharmacological measures as appropriate and evaluate response  - Consider cultural and social influences on pain and pain management  - Notify physician/advanced practitioner if interventions unsuccessful or patient reports new pain  - Educate patient/family on pain management process including their role and importance of  reporting pain   - Provide non-pharmacologic/complimentary pain relief interventions  6/21/2025 0755 by Caity Drummond RN  Outcome: Progressing  6/21/2025 0755 by Caity Drummond RN  Outcome: Progressing     Problem: INFECTION - ADULT  Goal: Absence or prevention of progression during hospitalization  Description: INTERVENTIONS:  - Assess and monitor for signs and symptoms of infection  - Monitor lab/diagnostic results  - Monitor all insertion sites, i.e. indwelling lines, tubes, and drains  - Monitor endotracheal if appropriate and nasal secretions for changes in amount and color  - [place] appropriate cooling/warming therapies per order  - Administer medications as ordered  - Instruct and encourage patient and family to use good hand hygiene technique  - Identify and instruct in appropriate isolation precautions for identified infection/condition  6/21/2025 0755 by Caity Drummond RN  Outcome: Progressing  6/21/2025 0755 by Caity Drummond RN  Outcome: Progressing  Goal: Absence of fever/infection during neutropenic period  Description: INTERVENTIONS:  - Monitor WBC  - Perform strict hand hygiene  - Limit to healthy visitors only  - No plants, dried, fresh or silk flowers with gonzalez in patient room  6/21/2025 0755 by Caity Drummond RN  Outcome:  Progressing  6/21/2025 0755 by Caity Drummond RN  Outcome: Progressing     Problem: SAFETY ADULT  Goal: Patient will remain free of falls  Description: INTERVENTIONS:  - Educate patient/family on patient safety including physical limitations  - Instruct patient to call for assistance with activity   - Consider consulting OT/PT to assist with strengthening/mobility based on AM PAC & JH-HLM score  - Consult OT/PT to assist with strengthening/mobility   - Keep Call bell within reach  - Keep bed low and locked with side rails adjusted as appropriate  - Keep care items and personal belongings within reach  - Initiate and maintain comfort rounds  - Make Fall Risk Sign visible to staff  - Offer Toileting every  Hours, in advance of need  - Initiate/Maintain alarm  - Obtain necessary fall risk management equipment:   - Apply yellow socks and bracelet for high fall risk patients  - Consider moving patient to room near nurses station  6/21/2025 0755 by Caity Drummond RN  Outcome: Progressing  6/21/2025 0755 by Caity Drummond RN  Outcome: Progressing  Goal: Maintain or return to baseline ADL function  Description: INTERVENTIONS:  -  Assess patient's ability to carry out ADLs; assess patient's baseline for ADL function and identify physical deficits which impact ability to perform ADLs (bathing, care of mouth/teeth, toileting, grooming, dressing, etc.)  - Assess/evaluate cause of self-care deficits   - Assess range of motion  - Assess patient's mobility; develop plan if impaired  - Assess patient's need for assistive devices and provide as appropriate  - Encourage maximum independence but intervene and supervise when necessary  - Involve family in performance of ADLs  - Assess for home care needs following discharge   - Consider OT consult to assist with ADL evaluation and planning for discharge  - Provide patient education as appropriate  - Monitor functional capacity and physical performance, use of AM PAC & JH-HLM   -  Monitor gait, balance and fatigue with ambulation    6/21/2025 0755 by Caity Drummond RN  Outcome: Progressing  6/21/2025 0755 by Caity Drummond RN  Outcome: Progressing  Goal: Maintains/Returns to pre admission functional level  Description: INTERVENTIONS:  - Perform AM-PAC 6 Click Basic Mobility/ Daily Activity assessment daily.  - Set and communicate daily mobility goal to care team and patient/family/caregiver.   - Collaborate with rehabilitation services on mobility goals if consulted  - Perform Range of Motion  times a day.  - Reposition patient every  hours.  - Dangle patient  times a day  - Stand patient  times a day  - Ambulate patient  times a day  - Out of bed to chair  times a day   - Out of bed for meals  times a day  - Out of bed for toileting  - Record patient progress and toleration of activity level   6/21/2025 0755 by Caity Drummond RN  Outcome: Progressing  6/21/2025 0755 by Caity Drummond RN  Outcome: Progressing     Problem: DISCHARGE PLANNING  Goal: Discharge to home or other facility with appropriate resources  Description: INTERVENTIONS:  - Identify barriers to discharge w/patient and caregiver  - Arrange for needed discharge resources and transportation as appropriate  - Identify discharge learning needs (meds, wound care, etc.)  - Arrange for interpretive services to assist at discharge as needed  - Refer to Case Management Department for coordinating discharge planning if the patient needs post-hospital services based on physician/advanced practitioner order or complex needs related to functional status, cognitive ability, or social support system  6/21/2025 0755 by Caity Drummond RN  Outcome: Progressing  6/21/2025 0755 by Caity Drummond RN  Outcome: Progressing     Problem: Knowledge Deficit  Goal: Patient/family/caregiver demonstrates understanding of disease process, treatment plan, medications, and discharge instructions  Description: Complete learning assessment and assess  knowledge base.  Interventions:  - Provide teaching at level of understanding  - Provide teaching via preferred learning methods  6/21/2025 0755 by Caity Drummond RN  Outcome: Progressing  6/21/2025 0755 by Caity Drummond RN  Outcome: Progressing     Problem: NEUROSENSORY - ADULT  Goal: Achieves stable or improved neurological status  Description: INTERVENTIONS  - Monitor and report changes in neurological status  - Monitor vital signs such as temperature, blood pressure, glucose, and any other labs ordered   - Initiate measures to prevent increased intracranial pressure  - Monitor for seizure activity and implement precautions if appropriate      6/21/2025 0755 by Caity Drummond RN  Outcome: Progressing  6/21/2025 0755 by Caity Drummond RN  Outcome: Progressing  Goal: Achieves maximal functionality and self care  Description: INTERVENTIONS  - Monitor swallowing and airway patency with patient fatigue and changes in neurological status  - Encourage and assist patient to increase activity and self care.   - Encourage visually impaired, hearing impaired and aphasic patients to use assistive/communication devices  6/21/2025 0755 by Caity Drummond RN  Outcome: Progressing  6/21/2025 0755 by Caity Drummond RN  Outcome: Progressing     Problem: CARDIOVASCULAR - ADULT  Goal: Maintains optimal cardiac output and hemodynamic stability  Description: INTERVENTIONS:  - Monitor I/O, vital signs and rhythm  - Monitor for S/S and trends of decreased cardiac output  - Administer and titrate ordered vasoactive medications to optimize hemodynamic stability  - Assess quality of pulses, skin color and temperature  - Assess for signs of decreased coronary artery perfusion  - Instruct patient to report change in severity of symptoms  Outcome: Progressing  Goal: Absence of cardiac dysrhythmias or at baseline rhythm  Description: INTERVENTIONS:  - Continuous cardiac monitoring, vital signs, obtain 12 lead EKG if ordered  - Administer  antiarrhythmic and heart rate control medications as ordered  - Monitor electrolytes and administer replacement therapy as ordered  Outcome: Progressing     Problem: RESPIRATORY - ADULT  Goal: Achieves optimal ventilation and oxygenation  Description: INTERVENTIONS:  - Assess for changes in respiratory status  - Assess for changes in mentation and behavior  - Position to facilitate oxygenation and minimize respiratory effort  - Oxygen administered by appropriate delivery if ordered  - Initiate smoking cessation education as indicated  - Encourage broncho-pulmonary hygiene including cough, deep breathe, Incentive Spirometry  - Assess the need for suctioning and aspirate as needed  - Assess and instruct to report SOB or any respiratory difficulty  - Respiratory Therapy support as indicated  Outcome: Progressing     Problem: GASTROINTESTINAL - ADULT  Goal: Minimal or absence of nausea and/or vomiting  Description: INTERVENTIONS:  - Administer IV fluids if ordered to ensure adequate hydration  - Maintain NPO status until nausea and vomiting are resolved  - Nasogastric tube if ordered  - Administer ordered antiemetic medications as needed  - Provide nonpharmacologic comfort measures as appropriate  - Advance diet as tolerated, if ordered  - Consider nutrition services referral to assist patient with adequate nutrition and appropriate food choices  Outcome: Progressing  Goal: Maintains or returns to baseline bowel function  Description: INTERVENTIONS:  - Assess bowel function  - Encourage oral fluids to ensure adequate hydration  - Administer IV fluids if ordered to ensure adequate hydration  - Administer ordered medications as needed  - Encourage mobilization and activity  - Consider nutritional services referral to assist patient with adequate nutrition and appropriate food choices  Outcome: Progressing  Goal: Maintains adequate nutritional intake  Description: INTERVENTIONS:  - Monitor percentage of each meal  consumed  - Identify factors contributing to decreased intake, treat as appropriate  - Assist with meals as needed  - Monitor I&O, weight, and lab values if indicated  - Obtain nutrition services referral as needed  Outcome: Progressing  Goal: Establish and maintain optimal ostomy function  Description: INTERVENTIONS:  - Assess bowel function  - Encourage oral fluids to ensure adequate hydration  - Administer IV fluids if ordered to ensure adequate hydration   - Administer ordered medications as needed  - Encourage mobilization and activity  - Nutrition services referral to assist patient with appropriate food choices  - Assess stoma site  - Consider wound care consult   Outcome: Progressing  Goal: Oral mucous membranes remain intact  Description: INTERVENTIONS  - Assess oral mucosa and hygiene practices  - Implement preventative oral hygiene regimen  - Implement oral medicated treatments as ordered  - Initiate Nutrition services referral as needed  Outcome: Progressing     Problem: GENITOURINARY - ADULT  Goal: Maintains or returns to baseline urinary function  Description: INTERVENTIONS:  - Assess urinary function  - Encourage oral fluids to ensure adequate hydration if ordered  - Administer IV fluids as ordered to ensure adequate hydration  - Administer ordered medications as needed  - Offer frequent toileting  - Follow urinary retention protocol if ordered  Outcome: Progressing  Goal: Absence of urinary retention  Description: INTERVENTIONS:  - Assess patient’s ability to void and empty bladder  - Monitor I/O  - Bladder scan as needed  - Discuss with physician/AP medications to alleviate retention as needed  - Discuss catheterization for long term situations as appropriate  Outcome: Progressing  Goal: Urinary catheter remains patent  Description: INTERVENTIONS:  - Assess patency of urinary catheter  - If patient has a chronic hanks, consider changing catheter if non-functioning  - Follow guidelines for  intermittent irrigation of non-functioning urinary catheter  Outcome: Progressing     Problem: METABOLIC, FLUID AND ELECTROLYTES - ADULT  Goal: Electrolytes maintained within normal limits  Description: INTERVENTIONS:  - Monitor labs and assess patient for signs and symptoms of electrolyte imbalances  - Administer electrolyte replacement as ordered  - Monitor response to electrolyte replacements, including repeat lab results as appropriate  - Instruct patient on fluid and nutrition as appropriate  Outcome: Progressing  Goal: Fluid balance maintained  Description: INTERVENTIONS:  - Monitor labs   - Monitor I/O and WT  - Instruct patient on fluid and nutrition as appropriate  - Assess for signs & symptoms of volume excess or deficit  Outcome: Progressing  Goal: Glucose maintained within target range  Description: INTERVENTIONS:  - Monitor Blood Glucose as ordered  - Assess for signs and symptoms of hyperglycemia and hypoglycemia  - Administer ordered medications to maintain glucose within target range  - Assess nutritional intake and initiate nutrition service referral as needed  Outcome: Progressing     Problem: SKIN/TISSUE INTEGRITY - ADULT  Goal: Incision(s), wounds(s) or drain site(s) healing without S/S of infection  Description: INTERVENTIONS  - Assess and document dressing, incision, wound bed, drain sites and surrounding tissue  - Provide patient and family education  - Perform skin care/dressing changes every   Outcome: Progressing  Goal: Pressure injury heals and does not worsen  Description: Interventions:  - Implement low air loss mattress or specialty surface (Criteria met)  - Apply silicone foam dressing  - Instruct/assist with weight shifting every  minutes when in chair   - Limit chair time to  hour intervals  - Use special pressure reducing interventions such as  when in chair   - Apply fecal or urinary incontinence containment device   - Perform passive or active ROM every   - Turn and reposition  patient & offload bony prominences every  hours   - Utilize friction reducing device or surface for transfers   - Consider consults to  interdisciplinary teams such as   - Use incontinent care products after each incontinent episode such as  - Consider nutrition services referral as needed  Outcome: Progressing     Problem: HEMATOLOGIC - ADULT  Goal: Maintains hematologic stability  Description: INTERVENTIONS  - Assess for signs and symptoms of bleeding or hemorrhage  - Monitor labs  - Administer supportive blood products/factors as ordered and appropriate  Outcome: Progressing     Problem: MUSCULOSKELETAL - ADULT  Goal: Maintain or return mobility to safest level of function  Description: INTERVENTIONS:  - Assess patient's ability to carry out ADLs; assess patient's baseline for ADL function and identify physical deficits which impact ability to perform ADLs (bathing, care of mouth/teeth, toileting, grooming, dressing, etc.)  - Assess/evaluate cause of self-care deficits   - Assess range of motion  - Assess patient's mobility  - Assess patient's need for assistive devices and provide as appropriate  - Encourage maximum independence but intervene and supervise when necessary  - Involve family in performance of ADLs  - Assess for home care needs following discharge   - Consider OT consult to assist with ADL evaluation and planning for discharge  - Provide patient education as appropriate  Outcome: Progressing  Goal: Maintain proper alignment of affected body part  Description: INTERVENTIONS:  - Support, maintain and protect limb and body alignment  - Provide patient/ family with appropriate education  Outcome: Progressing     Problem: Prexisting or High Potential for Compromised Skin Integrity  Goal: Skin integrity is maintained or improved  Description: INTERVENTIONS:  - Identify patients at risk for skin breakdown  - Assess and monitor skin integrity including under and around medical devices   - Assess and monitor  nutrition and hydration status  - Monitor labs  - Assess for incontinence   - Turn and reposition patient  - Assist with mobility/ambulation  - Relieve pressure over nate prominences   - Avoid friction and shearing  - Provide appropriate hygiene as needed including keeping skin clean and dry  - Evaluate need for skin moisturizer/barrier cream  - Collaborate with interdisciplinary team  - Patient/family teaching  - Consider wound care consult    Assess:  - Review Goldy scale daily  - Clean and moisturize skin every   - Inspect skin when repositioning, toileting, and assisting with ADLS  - Assess under medical devices such as  every  - Assess extremities for adequate circulation and sensation     Bed Management:  - Have minimal linens on bed & keep smooth, unwrinkled  - Change linens as needed when moist or perspiring  - Avoid sitting or lying in one position for more than  hours while in bed?Keep HOB at degrees   - Toileting:  - Offer bedside commode  - Assess for incontinence every   - Use incontinent care products after each incontinent episode such as     Activity:  - Mobilize patient  times a day  - Encourage activity and walks on unit  - Encourage or provide ROM exercises   - Turn and reposition patient every  Hours  - Use appropriate equipment to lift or move patient in bed  - Instruct/ Assist with weight shifting every  when out of bed in chair  - Consider limitation of chair time hour intervals    Skin Care:  - Avoid use of baby powder, tape, friction and shearing, hot water or constrictive clothing  - Relieve pressure over bony prominences using   - Do not massage red bony areas    Next Steps:  - Teach patient strategies to minimize risks such as   - Consider consults to  interdisciplinary teams such as   Outcome: Progressing

## 2025-06-21 NOTE — NUTRITION
06/21/25 1047   Recommendations/Interventions   Recommendations to Provider SLP assessessment pending, if EN remains sole source of nutrition,  recommend Jevity 1.2 @95 ml/hr for 22 hrs, add 1 pkt prosource TF. Will provide 2548 kcal, 127 g pro, 2586ml total free H2O. If pt able to tolerate oral diet + EN, continue EN as ordered +1 pkt Prosource TF added to TF to provide additional 40 kcal/11 g Pro

## 2025-06-21 NOTE — CONSULTS
SLP assessessment pending, if EN remains sole source of nutrition, recommend Jevity 1.2 @95 ml/hr for 22 hrs, add 1 pkt prosource TF. Will provide 2548 kcal, 127 g pro, 2586ml total free H2O. If pt able to tolerate oral diet + EN, continue EN as ordered +1 pkt Prosource TF added to TF to provide additional 40 kcal/11 g Pro.

## 2025-06-21 NOTE — PLAN OF CARE
Problem: PAIN - ADULT  Goal: Verbalizes/displays adequate comfort level or baseline comfort level  Description: Interventions:  - Encourage patient to monitor pain and request assistance  - Assess pain using appropriate pain scale  - Administer analgesics as ordered based on type and severity of pain and evaluate response  - Implement non-pharmacological measures as appropriate and evaluate response  - Consider cultural and social influences on pain and pain management  - Notify physician/advanced practitioner if interventions unsuccessful or patient reports new pain  - Educate patient/family on pain management process including their role and importance of  reporting pain   - Provide non-pharmacologic/complimentary pain relief interventions  Outcome: Progressing     Problem: INFECTION - ADULT  Goal: Absence or prevention of progression during hospitalization  Description: INTERVENTIONS:  - Assess and monitor for signs and symptoms of infection  - Monitor lab/diagnostic results  - Monitor all insertion sites, i.e. indwelling lines, tubes, and drains  - Monitor endotracheal if appropriate and nasal secretions for changes in amount and color  - [place] appropriate cooling/warming therapies per order  - Administer medications as ordered  - Instruct and encourage patient and family to use good hand hygiene technique  - Identify and instruct in appropriate isolation precautions for identified infection/condition  Outcome: Progressing     Problem: SAFETY ADULT  Goal: Patient will remain free of falls  Description: INTERVENTIONS:  - Educate patient/family on patient safety including physical limitations  - Instruct patient to call for assistance with activity   - Consider consulting OT/PT to assist with strengthening/mobility based on AM PAC & JH-HLM score  - Consult OT/PT to assist with strengthening/mobility   - Keep Call bell within reach  - Keep bed low and locked with side rails adjusted as appropriate  - Keep  care items and personal belongings within reach  - Initiate and maintain comfort rounds  - Make Fall Risk Sign visible to staff  - Offer Toileting every 2 Hours, in advance of need  - Initiate/Maintain bed alarm  - Obtain necessary fall risk management equipment:   - Apply yellow socks and bracelet for high fall risk patients  - Consider moving patient to room near nurses station  Outcome: Progressing  Goal: Maintain or return to baseline ADL function  Description: INTERVENTIONS:  -  Assess patient's ability to carry out ADLs; assess patient's baseline for ADL function and identify physical deficits which impact ability to perform ADLs (bathing, care of mouth/teeth, toileting, grooming, dressing, etc.)  - Assess/evaluate cause of self-care deficits   - Assess range of motion  - Assess patient's mobility; develop plan if impaired  - Assess patient's need for assistive devices and provide as appropriate  - Encourage maximum independence but intervene and supervise when necessary  - Involve family in performance of ADLs  - Assess for home care needs following discharge   - Consider OT consult to assist with ADL evaluation and planning for discharge  - Provide patient education as appropriate  - Monitor functional capacity and physical performance, use of AM PAC & JH-HLM   - Monitor gait, balance and fatigue with ambulation    Outcome: Not Progressing  Goal: Maintains/Returns to pre admission functional level  Description: INTERVENTIONS:  - Perform AM-PAC 6 Click Basic Mobility/ Daily Activity assessment daily.  - Set and communicate daily mobility goal to care team and patient/family/caregiver.   - Collaborate with rehabilitation services on mobility goals if consulted  - Perform Range of Motion 3 times a day.  - Reposition patient every 2 hours.  - Out of bed for toileting  - Record patient progress and toleration of activity level   Outcome: Not Progressing     Problem: DISCHARGE PLANNING  Goal: Discharge to home or  other facility with appropriate resources  Description: INTERVENTIONS:  - Identify barriers to discharge w/patient and caregiver  - Arrange for needed discharge resources and transportation as appropriate  - Identify discharge learning needs (meds, wound care, etc.)  - Arrange for interpretive services to assist at discharge as needed  - Refer to Case Management Department for coordinating discharge planning if the patient needs post-hospital services based on physician/advanced practitioner order or complex needs related to functional status, cognitive ability, or social support system  Outcome: Progressing

## 2025-06-21 NOTE — PROGRESS NOTES
Progress Note - Hospitalist   Name: Drew Santos 74 y.o. male I MRN: [id number]  Unit/Bed#: E4 -01 I Date of Admission: 6/20/2025   Date of Service: 6/21/2025 I Hospital Day: 1    Assessment & Plan  CVA (cerebrovascular accident) (HCC)  Patient with hx of Afib, CVA x2 with left-sided deficits, bedbound, PEG, colostomy, chronic indwelling catheter, St IV sacral decubiti, transferred from [facility] for increased weakness and less communicative    CTH showing acute to subacute left PCA distribution infarct.   No confluent secondary parenchymal hematoma.   Findings are new since the prior examination   MRI of the brain confirms stroke  Continue heparin neuro stroke dosing with PTT goal of 50-70  Check CTA of head and neck  PTA Maintained on ASA, Eliquis and high intensity statin  Eliquis failure and will need bridging to warfarin which should be started 4 to 6 days from stroke date.  Permissive HTN  Telemetry monitoring  Neurology consult  Decubitus ulcer of sacral region, stage 4 (HCC)  POA. Previous admissions for polymicrobial bacteremia and OM, previous wound VAC.  Follows with surgery for wound care  Offload.  Turn and reposition.  Local wound care  Wound care consult  P500 mattress  Discussed with general surgery, they recommend localized wound care as they state it appears not infected-discussed with Dr. Bunny Guido  Type 2 diabetes mellitus without complication, without long-term current use of insulin (formerly [facility])  Lab Results   Component Value Date    HGBA1C 5.4 06/20/2025     Continue PTA Humalog 5 units QID. On tube feeding, add sliding scale insulin Q6H    Recent Labs     06/21/25  0022 06/21/25  0603 06/21/25  0751 06/21/25  1153   POCGLU 100 114 122 127       Blood Sugar Average: Last 72 hrs:  (P) [id number]    Paroxysmal atrial fibrillation (HCC)  On rate control with metoprolol 12.5mg bid  Prior to admission on Eliquis, continue heparin  Urinary retention  Urinary  retention managed with chronic Parra catheter.  Catheter exchanged at facility  Monitor urine output  Primary hypertension  Permissive hypertension on stroke pathway  Anemia of chronic disease  Chronic anemia with thrombocytosis.  Baseline 7-8  FIT Test Normal  Blood consent obtained (signed by wife)  No need for transfusion  Severe protein-calorie malnutrition (HCC)  Malnutrition Findings: Body mass index is 28.36 kg/m².   S/p PEG.  Continue tube feeding  Nutrition consult  S/P percutaneous endoscopic gastrostomy (PEG) tube placement ([facility])  S/p Peg Oct 2024.  Diet at facility:  Tube feeding Jevity 1.2 85 mL/h x22hrs, water flush 150 mL every 4 hours  Dysphagia advanced texture with nectar thick liquids for lunch only  Continue tube feeds  Colostomy in place ([facility])  S/p loop diverting colostomy; placed due to sacral decubiti ulcer with suspected coccygeal OM   Ostomy care.  Monitor stool output            Hospital Course:     74-year-old male patient presenting with lethargy and weakness and found to have acute to subacute left PCA distribution infarct on head CT.     Assessment:      Principal Problem:    CVA (cerebrovascular accident) ([facility])  Active Problems:    Urinary retention    Primary hypertension    Decubitus ulcer of sacral region, stage 4 ([facility])    Type 2 diabetes mellitus without complication, without long-term current use of insulin ([facility])    Paroxysmal atrial fibrillation ([facility])    Anemia of chronic disease    Severe protein-calorie malnutrition (HCC)    S/P percutaneous endoscopic gastrostomy (PEG) tube placement ([facility])    Colostomy in place ([facility])      Plan:    Await neurologic recommendation  MRI of the brain  Resume tube feeds         VTE Pharmacologic Prophylaxis:   Pharmacologic: Heparin Drip  Mechanical VTE Prophylaxis in Place: Yes    AM-PAC Basic Mobility:  Basic Mobility Inpatient Raw Score: 6    -[facility] Achieved: 1: Laying in bed  -HLM Goal: 2: Bed activities/Dependent transfer    HLM Goal listed  above. Continue with multidisciplinary rounding and encourage appropriate mobility to improve upon HLM goals.         Patient Centered Rounds: Case discussed and reviewed with nursing    Discussions with Specialists or Other Care Team Provider: Case management, reviewed note from  consultant neurology name Dr. Foreman    Education and Discussions with Family / Patient: Patient wife at 2:26 PM      Current Length of Stay: 1 day(s)    Current Patient Status: Inpatient   Certification Statement: The patient will continue to require additional inpatient hospital stay due to need for additional evaluation of stroke    Discharge Plan / Estimated Discharge Date: Dissipate discharge in 48 hours back to facility    Code Status: Level 1 - Full Code      Subjective:   Seen and examined, patient unable to provide real history, able to nod head,    A complete and comprehensive 14 point organ system review has been performed and all other systems are negative other than stated above.    Objective:     Vitals:   Temp (24hrs), Av.9 °F (36.6 °C), Min:97.5 °F (36.4 °C), Max:98.3 °F (36.8 °C)    Temp:  [97.5 °F (36.4 °C)-98.3 °F (36.8 °C)] 97.8 °F (36.6 °C)  HR:  [75-90] 86  Resp:  [16-18] 18  BP: ()/(57-98) 102/64  SpO2:  [96 %-99 %] 97 %  Body mass index is 28.36 kg/m².     Input and Output Summary (last 24 hours):       Intake/Output Summary (Last 24 hours) at 2025 1425  Last data filed at 2025 1201  Gross per 24 hour   Intake 560.55 ml   Output 900 ml   Net -339.45 ml       Physical Exam:     General: well appearing, no acute distress  HEENT: atraumatic, PERRLA, moist mucosa, normal pharynx, normal tonsils and adenoids, normal tongue, no fluid in sinuses  Neck: Trachea midline, no carotid bruit, no masses  Respiratory: normal chest wall expansion, CTA B, no r/r/w, no rubs  Cardiovascular: RRR, no m/r/g, Normal S1 and S2  Abdomen: Soft, non-tender, non-distended, normal bowel sounds in all quadrants, no  hepatosplenomegaly, no tympany  Rectal: deferred  Musculoskeletal: Deferred  Integumentary: warm, dry, and pink, with no rash, purpura, or petechia  Heme/Lymph: no lymphadenopathy, no bruises  Neurological: Unable to assess psychiatric: Unable to assess      Additional Data:     Labs:    Results from last 7 days   Lab Units 06/21/25  0545   WBC Thousand/uL 10.34*   HEMOGLOBIN g/dL 7.7*   HEMATOCRIT % 25.1*   PLATELETS Thousands/uL 403*   SEGS PCT % 62   LYMPHO PCT % 25   MONO PCT % 8   EOS PCT % 4     Results from last 7 days   Lab Units 06/21/25  0545 06/20/25  1655   POTASSIUM mmol/L 4.5 4.4   CHLORIDE mmol/L 107 104   CO2 mmol/L 25 25   BUN mg/dL 35* 37*   CREATININE mg/dL 0.98 0.91   CALCIUM mg/dL 8.7 9.0   ALK PHOS U/L  --  64   ALT U/L  --  9   AST U/L  --  16     Results from last 7 days   Lab Units 06/20/25  1913   INR  1.35*       * I Have Reviewed All Lab Data Listed Above.  * Additional Pertinent Lab Tests Reviewed: All Labs For Current Hospital Admission Reviewed      Lines/Drains:  Invasive Devices       Peripheral Intravenous Line  Duration             Peripheral IV 06/20/25 Left;Ventral (anterior) Forearm <1 day    Peripheral IV 06/21/25 Proximal;Right;Ventral (anterior) Forearm <1 day              Drain  Duration             Colostomy Loop  days    Gastrostomy/Enterostomy  days    Urethral Catheter 1 day                  Urinary Catheter:  Goal for removal: N/A - Chronic Parra               Imaging:  Imaging Reviewed Today Include:   Personally reviewed and found     Chest radiograph with no pneumonia    Head CT with acute to subacute left PCA distribution infarct    XR chest 1 view portable  Result Date: 6/21/2025  Impression: No acute cardiopulmonary disease. Resident: Siva Menjivar I, the attending radiologist, have reviewed the images and agree with the final report above. Workstation performed: [id number]     CT head without contrast  Result Date: 6/20/2025  Impression: Acute to  subacute left PCA distribution infarct. No confluent secondary parenchymal hematoma. Findings are new since the prior examination. The study was marked in EPIC for immediate notification. Workstation performed: [id number]       Telemetry:   Telemetry:  Telemetry Orders (From admission, onward)               24 Hour Telemetry Monitoring  (ED Bridging Orders Panel)  Continuous x 24 Hours (Telem)        Expiring   Question:  Reason for 24 Hour Telemetry  Answer:  TIA/Suspected CVA/ Confirmed CVA                     Telemetry Reviewed: Normal Sinus Rhythm  Indication for Continued Telemetry Use: Arrthymias requiring medical therapy             Recent Cultures (last 7 days):         Last 24 Hours Medication List:   Current Facility-Administered Medications   Medication Dose Route Frequency Provider Last Rate    Acetaminophen  650 mg Per G Tube Q4H PRN Ifeoma Smart, JUANNP      aluminum-magnesium hydroxide-simethicone  30 mL Per G Tube Q6H PRN Ifeoma Smart, JUANNP      ascorbic acid  250 mg Per G Tube Daily Ifeoma Smart, JUANNP      aspirin  81 mg Per PEG Tube Daily Ifeoma Smart, CRNP      atorvastatin  80 mg Per G Tube Daily Ifeoma Smart, JUANNP      bisacodyl  10 mg Rectal Daily PRN Ifeoma Smart, CRNP      Cholecalciferol  1,000 Units Per G Tube Daily Ifeoma Smart, CRNP      docusate  200 mg Per G Tube Daily Ifeoma Smart, CRNP      escitalopram  10 mg Per PEG Tube Daily Ifeoma Smart, CRNP      ezetimibe  10 mg Per G Tube Daily Ifeoma Smart, CRNP      Ferrous Sulfate  300 mg Per G Tube Daily Ifeoma Smart, JUANNP      heparin  3-24 Units/kg/hr Intravenous Titrated Ahsan Rangel PA-C 9 Units/kg/hr (06/21/25 1225)    insulin lispro  1-6 Units Subcutaneous Q6H [facility] Ifeoma Smart, CRNP      insulin lispro  5 Units Subcutaneous Q6H [facility] Ifeoma Smart, AVELINA      Loratadine  10 mg Per G Tube Daily Ifeoma Smart, AVELINA      metoprolol tartrate  12.5 mg Per  G Tube Q12H ELISEO AVELINA Louie      mirtazapine  7.5 mg Per G Tube HS AVELINA Louie      Multivitamin  15 mL Per G Tube Daily AVELINA Louie      ondansetron  4 mg Intravenous Q6H PRN AVELINA Louie      saccharomyces boulardii  250 mg Per G Tube BID AVELINA Louie      thiamine  100 mg Per G Tube Daily Taye Rodriguez DO         AM-PAC Basic Mobility:  Basic Mobility Inpatient Raw Score: 6    JH-HLM Achieved: 1: Laying in bed  -HLM Goal: 2: Bed activities/Dependent transfer    HLM Goal listed above. Continue with multidisciplinary rounding and encourage appropriate mobility to improve upon HLM goals.     Today, Patient Was Seen By: Taye Rodriguez DO    ** Please Note: This note was completed in part utilizing [employer] software dictation.  Grammatical errors, random word insertions, spelling mistakes, and incomplete sentences may be an occasional consequence of this system secondary to software limitations, ambient noise, and hardware issues.  If you have any questions or concerns about the content, text, or information contained within the body of this dictation, please contact the provider for clarification. **

## 2025-06-21 NOTE — PLAN OF CARE
Problem: PAIN - ADULT  Goal: Verbalizes/displays adequate comfort level or baseline comfort level  Description: Interventions:  - Encourage patient to monitor pain and request assistance  - Assess pain using appropriate pain scale  - Administer analgesics as ordered based on type and severity of pain and evaluate response  - Implement non-pharmacological measures as appropriate and evaluate response  - Consider cultural and social influences on pain and pain management  - Notify physician/advanced practitioner if interventions unsuccessful or patient reports new pain  - Educate patient/family on pain management process including their role and importance of  reporting pain   - Provide non-pharmacologic/complimentary pain relief interventions  Outcome: Progressing     Problem: INFECTION - ADULT  Goal: Absence or prevention of progression during hospitalization  Description: INTERVENTIONS:  - Assess and monitor for signs and symptoms of infection  - Monitor lab/diagnostic results  - Monitor all insertion sites, i.e. indwelling lines, tubes, and drains  - Monitor endotracheal if appropriate and nasal secretions for changes in amount and color  - Beccaria appropriate cooling/warming therapies per order  - Administer medications as ordered  - Instruct and encourage patient and family to use good hand hygiene technique  - Identify and instruct in appropriate isolation precautions for identified infection/condition  Outcome: Progressing  Goal: Absence of fever/infection during neutropenic period  Description: INTERVENTIONS:  - Monitor WBC  - Perform strict hand hygiene  - Limit to healthy visitors only  - No plants, dried, fresh or silk flowers with gonzalez in patient room  Outcome: Progressing     Problem: SAFETY ADULT  Goal: Patient will remain free of falls  Description: INTERVENTIONS:  - Educate patient/family on patient safety including physical limitations  - Instruct patient to call for assistance with activity   -  Consider consulting OT/PT to assist with strengthening/mobility based on AM PAC & JH-HLM score  - Consult OT/PT to assist with strengthening/mobility   - Keep Call bell within reach  - Keep bed low and locked with side rails adjusted as appropriate  - Keep care items and personal belongings within reach  - Initiate and maintain comfort rounds  - Make Fall Risk Sign visible to staff  - Offer Toileting every  Hours, in advance of need  - Initiate/Maintain alarm  - Obtain necessary fall risk management equipment:   - Apply yellow socks and bracelet for high fall risk patients  - Consider moving patient to room near nurses station  Outcome: Progressing  Goal: Maintain or return to baseline ADL function  Description: INTERVENTIONS:  -  Assess patient's ability to carry out ADLs; assess patient's baseline for ADL function and identify physical deficits which impact ability to perform ADLs (bathing, care of mouth/teeth, toileting, grooming, dressing, etc.)  - Assess/evaluate cause of self-care deficits   - Assess range of motion  - Assess patient's mobility; develop plan if impaired  - Assess patient's need for assistive devices and provide as appropriate  - Encourage maximum independence but intervene and supervise when necessary  - Involve family in performance of ADLs  - Assess for home care needs following discharge   - Consider OT consult to assist with ADL evaluation and planning for discharge  - Provide patient education as appropriate  - Monitor functional capacity and physical performance, use of AM PAC & JH-HLM   - Monitor gait, balance and fatigue with ambulation    Outcome: Progressing  Goal: Maintains/Returns to pre admission functional level  Description: INTERVENTIONS:  - Perform AM-PAC 6 Click Basic Mobility/ Daily Activity assessment daily.  - Set and communicate daily mobility goal to care team and patient/family/caregiver.   - Collaborate with rehabilitation services on mobility goals if consulted  -  Perform Range of Motion  times a day.  - Reposition patient every hours.  - Dangle patient  times a day  - Stand patient  times a day  - Ambulate patient  times a day  - Out of bed to chair  times a day   - Out of bed for meals  times a day  - Out of bed for toileting  - Record patient progress and toleration of activity level   Outcome: Progressing     Problem: DISCHARGE PLANNING  Goal: Discharge to home or other facility with appropriate resources  Description: INTERVENTIONS:  - Identify barriers to discharge w/patient and caregiver  - Arrange for needed discharge resources and transportation as appropriate  - Identify discharge learning needs (meds, wound care, etc.)  - Arrange for interpretive services to assist at discharge as needed  - Refer to Case Management Department for coordinating discharge planning if the patient needs post-hospital services based on physician/advanced practitioner order or complex needs related to functional status, cognitive ability, or social support system  Outcome: Progressing     Problem: Knowledge Deficit  Goal: Patient/family/caregiver demonstrates understanding of disease process, treatment plan, medications, and discharge instructions  Description: Complete learning assessment and assess knowledge base.  Interventions:  - Provide teaching at level of understanding  - Provide teaching via preferred learning methods  Outcome: Progressing     Problem: NEUROSENSORY - ADULT  Goal: Achieves stable or improved neurological status  Description: INTERVENTIONS  - Monitor and report changes in neurological status  - Monitor vital signs such as temperature, blood pressure, glucose, and any other labs ordered   - Initiate measures to prevent increased intracranial pressure  - Monitor for seizure activity and implement precautions if appropriate      Outcome: Progressing  Goal: Achieves maximal functionality and self care  Description: INTERVENTIONS  - Monitor swallowing and airway patency  with patient fatigue and changes in neurological status  - Encourage and assist patient to increase activity and self care.   - Encourage visually impaired, hearing impaired and aphasic patients to use assistive/communication devices  Outcome: Progressing     Problem: CARDIOVASCULAR - ADULT  Goal: Maintains optimal cardiac output and hemodynamic stability  Description: INTERVENTIONS:  - Monitor I/O, vital signs and rhythm  - Monitor for S/S and trends of decreased cardiac output  - Administer and titrate ordered vasoactive medications to optimize hemodynamic stability  - Assess quality of pulses, skin color and temperature  - Assess for signs of decreased coronary artery perfusion  - Instruct patient to report change in severity of symptoms  Outcome: Progressing  Goal: Absence of cardiac dysrhythmias or at baseline rhythm  Description: INTERVENTIONS:  - Continuous cardiac monitoring, vital signs, obtain 12 lead EKG if ordered  - Administer antiarrhythmic and heart rate control medications as ordered  - Monitor electrolytes and administer replacement therapy as ordered  Outcome: Progressing     Problem: RESPIRATORY - ADULT  Goal: Achieves optimal ventilation and oxygenation  Description: INTERVENTIONS:  - Assess for changes in respiratory status  - Assess for changes in mentation and behavior  - Position to facilitate oxygenation and minimize respiratory effort  - Oxygen administered by appropriate delivery if ordered  - Initiate smoking cessation education as indicated  - Encourage broncho-pulmonary hygiene including cough, deep breathe, Incentive Spirometry  - Assess the need for suctioning and aspirate as needed  - Assess and instruct to report SOB or any respiratory difficulty  - Respiratory Therapy support as indicated  Outcome: Progressing     Problem: GASTROINTESTINAL - ADULT  Goal: Minimal or absence of nausea and/or vomiting  Description: INTERVENTIONS:  - Administer IV fluids if ordered to ensure adequate  hydration  - Maintain NPO status until nausea and vomiting are resolved  - Nasogastric tube if ordered  - Administer ordered antiemetic medications as needed  - Provide nonpharmacologic comfort measures as appropriate  - Advance diet as tolerated, if ordered  - Consider nutrition services referral to assist patient with adequate nutrition and appropriate food choices  Outcome: Progressing  Goal: Maintains or returns to baseline bowel function  Description: INTERVENTIONS:  - Assess bowel function  - Encourage oral fluids to ensure adequate hydration  - Administer IV fluids if ordered to ensure adequate hydration  - Administer ordered medications as needed  - Encourage mobilization and activity  - Consider nutritional services referral to assist patient with adequate nutrition and appropriate food choices  Outcome: Progressing  Goal: Maintains adequate nutritional intake  Description: INTERVENTIONS:  - Monitor percentage of each meal consumed  - Identify factors contributing to decreased intake, treat as appropriate  - Assist with meals as needed  - Monitor I&O, weight, and lab values if indicated  - Obtain nutrition services referral as needed  Outcome: Progressing  Goal: Establish and maintain optimal ostomy function  Description: INTERVENTIONS:  - Assess bowel function  - Encourage oral fluids to ensure adequate hydration  - Administer IV fluids if ordered to ensure adequate hydration   - Administer ordered medications as needed  - Encourage mobilization and activity  - Nutrition services referral to assist patient with appropriate food choices  - Assess stoma site  - Consider wound care consult   Outcome: Progressing  Goal: Oral mucous membranes remain intact  Description: INTERVENTIONS  - Assess oral mucosa and hygiene practices  - Implement preventative oral hygiene regimen  - Implement oral medicated treatments as ordered  - Initiate Nutrition services referral as needed  Outcome: Progressing     Problem:  GENITOURINARY - ADULT  Goal: Maintains or returns to baseline urinary function  Description: INTERVENTIONS:  - Assess urinary function  - Encourage oral fluids to ensure adequate hydration if ordered  - Administer IV fluids as ordered to ensure adequate hydration  - Administer ordered medications as needed  - Offer frequent toileting  - Follow urinary retention protocol if ordered  Outcome: Progressing  Goal: Absence of urinary retention  Description: INTERVENTIONS:  - Assess patient’s ability to void and empty bladder  - Monitor I/O  - Bladder scan as needed  - Discuss with physician/AP medications to alleviate retention as needed  - Discuss catheterization for long term situations as appropriate  Outcome: Progressing  Goal: Urinary catheter remains patent  Description: INTERVENTIONS:  - Assess patency of urinary catheter  - If patient has a chronic hanks, consider changing catheter if non-functioning  - Follow guidelines for intermittent irrigation of non-functioning urinary catheter  Outcome: Progressing     Problem: METABOLIC, FLUID AND ELECTROLYTES - ADULT  Goal: Electrolytes maintained within normal limits  Description: INTERVENTIONS:  - Monitor labs and assess patient for signs and symptoms of electrolyte imbalances  - Administer electrolyte replacement as ordered  - Monitor response to electrolyte replacements, including repeat lab results as appropriate  - Instruct patient on fluid and nutrition as appropriate  Outcome: Progressing  Goal: Fluid balance maintained  Description: INTERVENTIONS:  - Monitor labs   - Monitor I/O and WT  - Instruct patient on fluid and nutrition as appropriate  - Assess for signs & symptoms of volume excess or deficit  Outcome: Progressing  Goal: Glucose maintained within target range  Description: INTERVENTIONS:  - Monitor Blood Glucose as ordered  - Assess for signs and symptoms of hyperglycemia and hypoglycemia  - Administer ordered medications to maintain glucose within target  range  - Assess nutritional intake and initiate nutrition service referral as needed  Outcome: Progressing     Problem: SKIN/TISSUE INTEGRITY - ADULT  Goal: Incision(s), wounds(s) or drain site(s) healing without S/S of infection  Description: INTERVENTIONS  - Assess and document dressing, incision, wound bed, drain sites and surrounding tissue  - Provide patient and family education  - Perform skin care/dressing changes every   Outcome: Progressing  Goal: Pressure injury heals and does not worsen  Description: Interventions:  - Implement low air loss mattress or specialty surface (Criteria met)  - Apply silicone foam dressing  - Instruct/assist with weight shifting every  minutes when in chair   - Limit chair time to  hour intervals  - Use special pressure reducing interventions such as  when in chair   - Apply fecal or urinary incontinence containment device   - Perform passive or active ROM every   - Turn and reposition patient & offload bony prominences every  hours   - Utilize friction reducing device or surface for transfers   - Consider consults to  interdisciplinary teams such as   - Use incontinent care products after each incontinent episode such as   - Consider nutrition services referral as needed  Outcome: Progressing     Problem: HEMATOLOGIC - ADULT  Goal: Maintains hematologic stability  Description: INTERVENTIONS  - Assess for signs and symptoms of bleeding or hemorrhage  - Monitor labs  - Administer supportive blood products/factors as ordered and appropriate  Outcome: Progressing     Problem: MUSCULOSKELETAL - ADULT  Goal: Maintain or return mobility to safest level of function  Description: INTERVENTIONS:  - Assess patient's ability to carry out ADLs; assess patient's baseline for ADL function and identify physical deficits which impact ability to perform ADLs (bathing, care of mouth/teeth, toileting, grooming, dressing, etc.)  - Assess/evaluate cause of self-care deficits   - Assess range of  motion  - Assess patient's mobility  - Assess patient's need for assistive devices and provide as appropriate  - Encourage maximum independence but intervene and supervise when necessary  - Involve family in performance of ADLs  - Assess for home care needs following discharge   - Consider OT consult to assist with ADL evaluation and planning for discharge  - Provide patient education as appropriate  Outcome: Progressing  Goal: Maintain proper alignment of affected body part  Description: INTERVENTIONS:  - Support, maintain and protect limb and body alignment  - Provide patient/ family with appropriate education  Outcome: Progressing     Problem: Prexisting or High Potential for Compromised Skin Integrity  Goal: Skin integrity is maintained or improved  Description: INTERVENTIONS:  - Identify patients at risk for skin breakdown  - Assess and monitor skin integrity including under and around medical devices   - Assess and monitor nutrition and hydration status  - Monitor labs  - Assess for incontinence   - Turn and reposition patient  - Assist with mobility/ambulation  - Relieve pressure over nate prominences   - Avoid friction and shearing  - Provide appropriate hygiene as needed including keeping skin clean and dry  - Evaluate need for skin moisturizer/barrier cream  - Collaborate with interdisciplinary team  - Patient/family teaching  - Consider wound care consult    Assess:  - Review Goldy scale daily  - Clean and moisturize skin every   - Inspect skin when repositioning, toileting, and assisting with ADLS  - Assess under medical devices such as every   - Assess extremities for adequate circulation and sensation     Bed Management:  - Have minimal linens on bed & keep smooth, unwrinkled  - Change linens as needed when moist or perspiring  - Avoid sitting or lying in one position for more than  hours while in bed?Keep HOB at degrees   - Toileting:  - Offer bedside commode  - Assess for incontinence every   - Use  incontinent care products after each incontinent episode such as     Activity:  - Mobilize patient  times a day  - Encourage activity and walks on unit  - Encourage or provide ROM exercises   - Turn and reposition patient every  Hours  - Use appropriate equipment to lift or move patient in bed  - Instruct/ Assist with weight shifting every  when out of bed in chair  - Consider limitation of chair time hour intervals    Skin Care:  - Avoid use of baby powder, tape, friction and shearing, hot water or constrictive clothing  - Relieve pressure over bony prominences using   - Do not massage red bony areas    Next Steps:  - Teach patient strategies to minimize risks such as   - Consider consults to  interdisciplinary teams such as   Outcome: Progressing     Problem: Nutrition/Hydration-ADULT  Goal: Nutrient/Hydration intake appropriate for improving, restoring or maintaining nutritional needs  Description: Monitor and assess patient's nutrition/hydration status for malnutrition. Collaborate with interdisciplinary team and initiate plan and interventions as ordered.  Monitor patient's weight and dietary intake as ordered or per policy. Utilize nutrition screening tool and intervene as necessary. Determine patient's food preferences and provide high-protein, high-caloric foods as appropriate.     INTERVENTIONS:  - Monitor oral intake, urinary output, labs, and treatment plans  - Assess nutrition and hydration status and recommend course of action  - Evaluate amount of meals eaten  - Assist patient with eating if necessary   - Allow adequate time for meals  - Recommend/ encourage appropriate diets, oral nutritional supplements, and vitamin/mineral supplements  - Order, calculate, and assess calorie counts as needed  - Recommend, monitor, and adjust tube feedings and TPN/PPN based on assessed needs  - Assess need for intravenous fluids  - Provide specific nutrition/hydration education as appropriate  - Include  patient/family/caregiver in decisions related to nutrition  Outcome: Progressing

## 2025-06-21 NOTE — ASSESSMENT & PLAN NOTE
Mr. Santos is a 75 yo male with HTN, HLD, DM2, syncope, urinary retention with chronic indwelling Parra catheter, decubitus ulcer of sacral region, stage IV,AFib, anemia of chronic disease, severe protein-caloric malnutrition, PEG tube, colostomy, depression, watershed territory infarcts in the right NELL/MCA region in 3/2024 with residual left-sided deficits and bedbound, EF 65 % at that time, partially calcified atheromatous plaque along the bilateral carotid bulbs without a hemodynamically significant stenosis, hypernatremia/hyponatremia/hypokalemia, who presented to [facility]'s ED on 6/20/25 as a transfer from [facility] for increased weakness and less communicative.    During the evaluation, patient was found to have acute to subacute left PCA territory ischemic stroke without hemorrhagic conversion considering patient has been taking Eliquis 5 mg in addition to aspirin 81 mg since prior hospitalization for stroke and A-fib.    PLAN:    Admit to Stroke pathway:   Recommend MRI brain  Consider Echo  Recommend the following Labs  Lipid Panel- LDL 44 mg/dL  Hemoglobin A1c  TSH  UA  Agree with heparin gtt and holding off Eliquis and aspirin  continue Lipitor 80 and Zetia 10 mg  Permissive HTN, allow for SBP up to 220 x 24 hours or Goal MAP> 100 from onset of stroke like symptoms, then goal normotension.  Goal normotension sooner if MRI brain completed and does not reveal acute infarct.  Euglycemic, normothermic goal  Continue telemetry.  PT/OT/ST  Stroke education  Frequent neuro checks. Continue to monitor and notify neurology with any changes.  STAT CT head for any acute change in neuro exam  Medical management and supportive care per primary team. Correction of any metabolic or infectious disturbances.   I discussed this case with the managing team from a remote location. I did not personally see or examine this patient. I have provided limited recommendations as above, but ultimate patient  disposition as per managing team.

## 2025-06-21 NOTE — ASSESSMENT & PLAN NOTE
Patient with hx of Afib, CVA x2 with left-sided deficits, bedbound, PEG, colostomy, chronic indwelling catheter, St IV sacral decubiti, transferred from [facility] for increased weakness and less communicative    CTH showing acute to subacute left PCA distribution infarct.   No confluent secondary parenchymal hematoma.   Findings are new since the prior examination   MRI of the brain confirms stroke  Continue heparin neuro stroke dosing with PTT goal of 50-70  Check CTA of head and neck  PTA Maintained on ASA, Eliquis and high intensity statin  Eliquis failure and will need bridging to warfarin which should be started 4 to 6 days from stroke date.  Permissive HTN  Telemetry monitoring  Neurology consult

## 2025-06-21 NOTE — UTILIZATION REVIEW
Initial Clinical Review    Admission: Date/Time/Statement:   Admission Orders (From admission, onward)       Ordered        06/20/25 1912  INPATIENT ADMISSION  Once                          Orders Placed This Encounter   Procedures    INPATIENT ADMISSION     Standing Status:   Standing     Number of Occurrences:   1     Level of Care:   Level 2 Stepdown / HOT [14]     Estimated length of stay:   More than 2 Midnights     Certification:   I certify that inpatient services are medically necessary for this patient for a duration of greater than two midnights. See H&P and MD Progress Notes for additional information about the patient's course of treatment.     ED Arrival Information       Expected   -    Arrival   6/20/2025 15:14    Acuity   Urgent              Means of arrival   Ambulance    Escorted by   [facility]    Service   Hospitalist    Admission type   Emergency              Arrival complaint   Lethargic             Chief Complaint   Patient presents with    Lethargy     Pt from facility who states pt is oriented to baseline but seems more tired than normal and is not as vocal as normal. Pt has no complaints       Initial Presentation: 74 y.o. male w/ PMH of HTN, anemia, urinary retention on chronic hanks, PAF on Eliquis, DM2, stage 4 sacral ulcer, h/o CVA x 2 w/ L sided deficits, bedbound, PEG, colostomy presented to the ED from the nursing home via EMS w/  increased weakness and change in mentation and communication.   Pt was listless and somnolent since Thursday. Now noncommunicative. On dysphagia diet but unable to tolerate po over the last few days. Has chronic left-sided weakness although appears weaker than baseline and now with right sided weakness.   In the ED, afebrile. Hgb 6.9, baseline 7-8.   CTH showing acute to subacute left PCA distribution infarct. No confluent secondary parenchymal hematoma. Findings are new since the prior examination   Started on hep gtt.    Admit as Inpatient for  evaluation and treatment of generalized weakness, AMS, Anemia.  Plan: stroke pathway: Brain MRI, echo, PT OT SLP. Maintained on ASA, Eliquis and high intensity statin. Cont hep gtt. Hold Eliquis.  Monitor PTT. Permissive HTN. obtain lipid panel and A1c. Telemetry monitoring. Neurology consult.  Repeat Hg. Continue tube feeding.  Ostomy care. Monitor stool output       Anticipated Length of Stay/Certification Statement: Patient will be admitted on an inpatient basis with an anticipated length of stay of greater than 2 midnights secondary to CVA, anticoagulated on Eliquis for history of A-fib and 2 prior strokes, bedbound.     Date: 06/21   Day 2:   Neurology Consult: Pt w/ watershed territory infarcts in the right NELL/MCA region in 3/2024 with residual left-sided deficits and bedbound presented w/  increased weakness and less communicative. On eval,  found to have acute to subacute left PCA territory ischemic stroke without hemorrhagic conversion considering patient has been taking Eliquis 5 mg in addition to aspirin 81 mg since prior hospitalization for stroke and A-fib.    Plan: Stroke pathway:   Recommend MRI brain  Consider Echo  Recommend the following Labs  Lipid Panel- LDL 44 mg/dL  Hemoglobin A1c  TSH  UA  heparin gtt and holding off Eliquis and aspirin  continue Lipitor 80 and Zetia 10 mg  Permissive HTN, allow for SBP up to 220 x 24 hours or Goal MAP> 100 from onset of stroke like symptoms, then goal normotension.  Continue telemetry.  PT/OT/ST                        Frequent neuro checks.     Date: 06/22  Day 3: Has surpassed a 2nd midnight with active treatments and services.  DX: CVA  MRI of the brain confirms stroke.   CTA of head and neck showed occlusion of the left posterior cerebral artery P3 segment. Occlusion of the distal intradural right vertebral artery near the vertebrobasilar junction. Focal high-grade stenosis or short segment occlusion of the bilateral cervical vertebral artery origins due  to atherosclerotic plaque. Remaining cervical vertebral arteries appear widely patent.   Plan:Continue heparin neuro stroke dosing with PTT goal of 50-70.  will need bridging to warfarin which should be started 4 to 6 days from stroke date. Permissive HTN followed by normotension. Follow echocardiogram. Telemetry monitoring. Offload.  Turn and reposition.  Local wound care. Wound care consult. P500 mattress. Hgb 7.1 today. Follow CBC tomorrow. Transfuse if hemoglobin < 7.      ED Treatment-Medication Administration from 06/20/2025 1514 to 06/20/2025 2042         Date/Time Order Dose Route Action     06/20/2025 1931 heparin (porcine) 25,000 units in 0.45% NaCl 250 mL infusion (premix) 15 Units/kg/hr Intravenous New Bag            Scheduled Medications:  ascorbic acid, 250 mg, Per G Tube, Daily  aspirin, 81 mg, Per PEG Tube, Daily  atorvastatin, 80 mg, Per G Tube, Daily  Cholecalciferol, 1,000 Units, Per G Tube, Daily  docusate, 200 mg, Per G Tube, Daily  escitalopram, 10 mg, Per PEG Tube, Daily  ezetimibe, 10 mg, Per G Tube, Daily  Ferrous Sulfate, 300 mg, Per G Tube, Daily  insulin lispro, 1-6 Units, Subcutaneous, Q6H ELISEO  insulin lispro, 5 Units, Subcutaneous, Q6H ELISEO  Loratadine, 10 mg, Per G Tube, Daily  metoprolol tartrate, 12.5 mg, Per G Tube, Q12H ELISEO  mirtazapine, 7.5 mg, Per G Tube, HS  Multivitamin, 15 mL, Per G Tube, Daily  saccharomyces boulardii, 250 mg, Per G Tube, BID  thiamine, 100 mg, Per G Tube, Daily      Continuous IV Infusions:  heparin, 3-24 Units/kg/hr, Intravenous, Titrated      PRN Meds:  Acetaminophen, 650 mg, Per G Tube, Q4H PRN  aluminum-magnesium hydroxide-simethicone, 30 mL, Per G Tube, Q6H PRN  bisacodyl, 10 mg, Rectal, Daily PRN  ondansetron, 4 mg, Intravenous, Q6H PRN      ED Triage Vitals   Temperature Pulse Respirations Blood Pressure SpO2 Pain Score   06/20/25 1523 06/20/25 1518 06/20/25 1518 06/20/25 1518 06/20/25 1518 06/20/25 1518   98.3 °F (36.8 °C) 76 18 128/63 99 % No Pain      Weight (last 2 days)       Date/Time Weight    06/20/25 1518 109 (239.2)            Vital Signs (last 3 days)       Date/Time Temp Pulse Resp BP MAP (mmHg) SpO2 O2 Device Patient Position - Orthostatic VS Swapnil Coma Scale Score Pain    06/21/25 1150 -- -- -- -- -- -- -- -- 11 --    06/21/25 1147 97.8 °F (36.6 °C) 86 18 102/64 -- 97 % None (Room air) Lying -- --    06/21/25 0900 -- -- -- -- -- -- -- -- 11 --    06/21/25 0748 97.7 °F (36.5 °C) 84 18 102/76 81 97 % None (Room air) Lying -- --    06/21/25 0641 97.5 °F (36.4 °C) 85 18 122/75 86 98 % None (Room air) Lying 9 --    06/21/25 0453 97.7 °F (36.5 °C) 77 18 102/65 71 97 % None (Room air) Lying -- --    06/21/25 0445 -- -- -- -- -- -- -- -- 9 --    06/21/25 0416 97.6 °F (36.4 °C) 78 18 96/57 65 -- -- Lying -- --    06/21/25 0318 98 °F (36.7 °C) 75 16 95/63 69 -- -- Lying -- --    06/21/25 0258 98.2 °F (36.8 °C) 78 16 92/58 65 98 % None (Room air) Lying -- --    06/21/25 0245 -- -- -- -- -- -- -- -- 9 --    06/21/25 0232 97.9 °F (36.6 °C) 81 18 111/76 81 96 % None (Room air) Lying -- --    06/21/25 0047 97.9 °F (36.6 °C) 84 18 112/74 79 97 % None (Room air) Lying 9 --    06/20/25 2345 -- -- -- -- -- -- -- -- 9 --    06/20/25 2245 -- 88 18 120/98 -- 98 % None (Room air) Lying 9 --    06/20/25 2216 -- 90 18 112/69 78 -- -- -- -- --    06/20/25 2145 -- 87 16 108/65 -- 99 % None (Room air) Lying 9 --    06/20/25 2055 -- -- 16 113/71 83 -- -- Lying -- No Pain    06/20/25 2045 -- 75 18 108/66 -- 99 % None (Room air) Lying 9 No Pain    06/20/25 2044 -- -- -- -- -- -- -- -- -- No Pain    06/20/25 1930 -- -- -- -- -- -- -- -- 12 --    06/20/25 1830 -- -- -- -- -- -- -- -- 12 --    06/20/25 1730 -- -- -- -- -- -- -- -- 12 --    06/20/25 1630 -- -- -- -- -- -- None (Room air) -- 12 --    06/20/25 1523 98.3 °F (36.8 °C) -- -- -- -- -- -- -- -- --    06/20/25 1518 -- 76 18 128/63 -- 99 % None (Room air) Lying -- No Pain              Pertinent Labs/Diagnostic Test  Results:   Radiology:  CT head without contrast   Final Interpretation by Jose Angel Epps MD (06/20 1748)      Acute to subacute left PCA distribution infarct. No confluent secondary parenchymal hematoma. Findings are new since the prior examination.      The study was marked in EPIC for immediate notification.                  Workstation performed: [id number]         XR chest 1 view portable    (Results Pending)   MRI Inpatient Order    (Results Pending)     Cardiology:  ECG 12 lead   Final Result by Beka Ritchie MD (06/21 0815)   Normal sinus rhythm   Normal ECG   When compared with ECG of 20-Jun-2025 15:18,   No significant change was found   Confirmed by Beka Ritchie ([id number]) on 6/21/2025 8:15:40 AM      ECG 12 lead   Final Result by Beka Ritchie MD (06/20 1733)   Normal sinus rhythm   Normal ECG   When compared with ECG of 23-Nov-2024 10:57,   No significant change was found   Confirmed by Beka Ritchie ([id number]) on 6/20/2025 5:33:54 PM        GI:  No orders to display           Results from last 7 days   Lab Units 06/21/25  0545 06/20/25  2342 06/20/25  1655   WBC Thousand/uL 10.34*  --  9.61   HEMOGLOBIN g/dL 7.7* 6.6* 6.9*   HEMATOCRIT % 25.1* 22.7* 23.5*   PLATELETS Thousands/uL 403*  --  484*   TOTAL NEUT ABS Thousands/µL 6.51  --  5.63         Results from last 7 days   Lab Units 06/21/25  0545 06/20/25  1655   SODIUM mmol/L 137 135   POTASSIUM mmol/L 4.5 4.4   CHLORIDE mmol/L 107 104   CO2 mmol/L 25 25   ANION GAP mmol/L 5 6   BUN mg/dL 35* 37*   CREATININE mg/dL 0.98 0.91   EGFR ml/min/1.73sq m 74 81   CALCIUM mg/dL 8.7 9.0   MAGNESIUM mg/dL  --  2.1     Results from last 7 days   Lab Units 06/20/25  1655   AST U/L 16   ALT U/L 9   ALK PHOS U/L 64   TOTAL PROTEIN g/dL 8.3   ALBUMIN g/dL 2.7*   TOTAL BILIRUBIN mg/dL 1.01*   AMMONIA umol/L 30     Results from last 7 days   Lab Units 06/21/25  1153 06/21/25  0751 06/21/25  0603 06/21/25  0022 06/20/25  2132 06/20/25  1658   POC GLUCOSE  "mg/dl 127 122 114 100 104 122     Results from last 7 days   Lab Units 06/21/25  0545 06/20/25  1655   GLUCOSE RANDOM mg/dL 114 136         Results from last 7 days   Lab Units 06/20/25  2342   HEMOGLOBIN A1C % 5.4   EAG mg/dl 108     No results found for: \"BETA-HYDROXYBUTYRATE\"                   Results from last 7 days   Lab Units 06/20/25  1913 06/20/25  1655   HS TNI 0HR ng/L  --  5   HS TNI 2HR ng/L 4  --    HSTNI D2 ng/L -1  --          Results from last 7 days   Lab Units 06/21/25  1041 06/21/25  0545 06/20/25  2342 06/20/25  1913   PROTIME seconds  --   --   --  16.8*   INR   --   --   --  1.35*   PTT seconds 113* 68* 101* 34                                     Results from last 7 days   Lab Units 06/21/25  0530   UNIT PRODUCT CODE  [id number]   UNIT NUMBER  [id number]   UNITABO  B   UNITRH  POS   CROSSMATCH  Compatible   UNIT DISPENSE STATUS  Presumed Trans   UNIT PRODUCT VOL ml 350                                                                           Past Medical History[1]  Present on Admission:   CVA (cerebrovascular accident) (HCC)   Paroxysmal atrial fibrillation (HCC)   Primary hypertension   Severe protein-calorie malnutrition (HCC)   Type 2 diabetes mellitus without complication, without long-term current use of insulin (HCC)   Urinary retention   Decubitus ulcer of sacral region, stage 4 (HCC)   Anemia of chronic disease      Admitting Diagnosis: CVA (cerebral vascular accident) (HCC) [I63.9]  Age/Sex: 74 y.o. male    Network Utilization Review Department  ATTENTION: Please call with any questions or concerns to [phone] and carefully listen to the prompts so that you are directed to the right person. All voicemails are confidential.   For Discharge needs, contact Care Management DC Support Team at [phone] opt. [phone]  Send all requests for admission clinical reviews, approved or denied determinations and any other requests to dedicated fax number below belonging to the campus where the " patient is receiving treatment. List of dedicated fax numbers for the Facilities:  FACILITY NAME UR FAX NUMBER   ADMISSION DENIALS (Administrative/Medical Necessity) [phone]   DISCHARGE SUPPORT TEAM (NETWORK) [phone]   PARENT CHILD HEALTH (Maternity/NICU/Pediatrics) [phone]   [facility] [phone]   [facility] [phone]   [facility] [phone]   [facility] [phone]   [facility] [phone]   [facility] [phone]   [facility] [phone]   [facility] [phone]   [facility] [phone]   [facility] [phone]   [facility] [phone]   [facility] [phone]              [1]   Past Medical History:  Diagnosis Date    Atherosclerotic heart disease of native coronary artery without angina pectoris     Atrial fibrillation (HCC)     Bacteremia     Cerebral infarction (HCC)     Constipation     Depression     Diabetes mellitus (HCC)     Hemiplegia and hemiparesis following cerebral infarction affecting left non-dominant side (HCC)     Hyperlipidemia     Hypertension     Osteomyelitis (HCC)     Pressure ulcer of sacral region, stage 3 (HCC)     Prostatic hyperplasia     Sepsis (HCC)     Stage 4 decubitus ulcer (HCC)     Stroke (HCC)     TIA (transient ischemic attack)     Urinary retention     Vitamin D deficiency

## 2025-06-21 NOTE — CONSULTS
Consultation - Neurology   Name: Drew Santos 74 y.o. male I MRN: [id number]  Unit/Bed#: E4 -01 I Date of Admission: 6/20/2025   Date of Service: 6/21/2025 I Hospital Day: 1   Inpatient consult to Neurology  Consult performed by: Trupti Foreman MD  Consult ordered by: AVELINA Louie        Physician Requesting Evaluation: Taye Rodriguez DO   Reason for Evaluation / Principal Problem: stroke-like symptoms    Assessment & Plan  CVA (cerebrovascular accident) (HCC)  Mr. Santos is a 73 yo male with HTN, HLD, DM2, syncope, urinary retention with chronic indwelling Parra catheter, decubitus ulcer of sacral region, stage IV,AFib, anemia of chronic disease, severe protein-caloric malnutrition, PEG tube, colostomy, depression, watershed territory infarcts in the right NELL/MCA region in 3/2024 with residual left-sided deficits and bedbound, EF 65 % at that time, partially calcified atheromatous plaque along the bilateral carotid bulbs without a hemodynamically significant stenosis, hypernatremia/hyponatremia/hypokalemia, who presented to [facility]'s ED on 6/20/25 as a transfer from [facility] for increased weakness and less communicative.    During the evaluation, patient was found to have acute to subacute left PCA territory ischemic stroke without hemorrhagic conversion considering patient has been taking Eliquis 5 mg in addition to aspirin 81 mg since prior hospitalization for stroke and A-fib.    PLAN:    Admit to Stroke pathway:   Recommend MRI brain  Consider Echo  Recommend the following Labs  Lipid Panel- LDL 44 mg/dL  Hemoglobin A1c  TSH  UA  Agree with heparin gtt and holding off Eliquis and aspirin  continue Lipitor 80 and Zetia 10 mg  Permissive HTN, allow for SBP up to 220 x 24 hours or Goal MAP> 100 from onset of stroke like symptoms, then goal normotension.  Goal normotension sooner if MRI brain completed and does not reveal acute infarct.  Euglycemic, normothermic  goal  Continue telemetry.  PT/OT/ST  Stroke education  Frequent neuro checks. Continue to monitor and notify neurology with any changes.  STAT CT head for any acute change in neuro exam  Medical management and supportive care per primary team. Correction of any metabolic or infectious disturbances.   I discussed this case with the managing team from a remote location. I did not personally see or examine this patient. I have provided limited recommendations as above, but ultimate patient disposition as per managing team.         Drew Santos will need follow up in in 6 weeks with neurovascular attending or advance practitioner. He will not require outpatient neurological testing.    History of Present Illness   Drew Santos is a 74 y.o. right handed male who presents with AMS and worsening weakness.    74-year-old male with HTN, HLD, DM2, syncope, urinary retention with chronic indwelling Parra catheter, decubitus ulcer of sacral region, stage IV,AFib, anemia of chronic disease, severe protein-caloric malnutrition, PEG tube, colostomy, depression, watershed territory infarcts in the right NELL/MCA region in 3/2024 with residual left-sided deficits and bedbound, EF 65 % at that time, partially calcified atheromatous plaque along the bilateral carotid bulbs without a hemodynamically significant stenosis, hypernatremia/hyponatremia/hypokalemia, who presented to [facility]'s ED on 6/20/25 as a transfer from [facility] for increased weakness and less communicative.    Patient has been taking Eliquis 5 mg per G-tube with aspirin 81 mg per G-tube and Lipitor 80 mg with Zetia 10 mg.       CTH: Acute to subacute left PCA distribution infarct. No confluent secondary parenchymal hematoma. Findings are new since the prior examination.       Review of Systems   Patient is not capable to answer questions or follow recommendation due to underlying medical conditions.  Medical History Review: I have reviewed the  patient's PMH, PSH, Social History, Family History, Meds, and Allergies   Historical Information   Past Medical History[1]  Past Surgical History[2]  Social History[3]  E-Cigarette/Vaping    E-Cigarette Use Never User      E-Cigarette/Vaping Substances    Nicotine No     THC No     CBD No     Flavoring No     Other No     Unknown No      Family history non-contributory  Social History[4]    Current Facility-Administered Medications:     Acetaminophen (TYLENOL) oral suspension 650 mg, Q4H PRN    aluminum-magnesium hydroxide-simethicone (MAALOX) oral suspension 30 mL, Q6H PRN    ascorbic acid (VITAMIN C) tablet 250 mg, Daily    aspirin chewable tablet 81 mg, Daily    atorvastatin (LIPITOR) tablet 80 mg, Daily    bisacodyl (DULCOLAX) rectal suppository 10 mg, Daily PRN    Cholecalciferol (VITAMIN D3) tablet 1,000 Units, Daily    docusate (COLACE) oral liquid 200 mg, Daily    escitalopram (LEXAPRO) tablet 10 mg, Daily    ezetimibe (ZETIA) tablet 10 mg, Daily    Ferrous Sulfate oral syrup 300 mg, Daily    heparin (porcine) 25,000 units in 0.45% NaCl 250 mL infusion (premix), Titrated, Last Rate: 12 Units/kg/hr (25 0149)    insulin lispro (HumALOG/ADMELOG) 100 units/mL subcutaneous injection 1-6 Units, Q6H ELISEO **AND** Fingerstick Glucose (POCT), Q6H    insulin lispro (HumALOG/ADMELOG) 100 units/mL subcutaneous injection 5 Units, Q6H ELISEO    Loratadine (CLARITIN) oral soln 10 mg, Daily    metoprolol tartrate (LOPRESSOR) partial tablet 12.5 mg, Q12H ELISEO    mirtazapine (REMERON) tablet 7.5 mg, HS    Multivitamin liquid 15 mL, Daily    ondansetron (ZOFRAN) injection 4 mg, Q6H PRN    saccharomyces boulardii (FLORASTOR) capsule 250 mg, BID  Prior to Admission Medications   Prescriptions Last Dose Informant Patient Reported? Taking?   Cholecalciferol (Vitamin D3) 50 MCG (2000 UT) CAPS  Spouse/Significant Other, Self Yes No   Si,000 Units by Per G Tube route in the morning.   Ertugliflozin L-PyroglutamicAc (Steglatro) 15  MG TABS   Yes No   Sig: Take by mouth in the morning   Ferrous Sulfate 300 (60 Fe) mg/5 mL solution   Yes Yes   Si mg by Per G Tube route daily   Glucagon, rDNA, (Glucagon Emergency) 1 MG KIT  Outside Facility (Specify) Yes No   Sig: Inject as directed   MULTIPLE VITAMIN PO  Spouse/Significant Other, Self Yes No   Si tablet by Per PEG Tube route in the morning   Silver (SilvaSorb) GEL   Yes No   Sig: Apply 1 Application topically daily. Apply to buttock wound bed daily.   Patient not taking: Reported on 10/18/2024   acetaminophen (TYLENOL) 325 mg tablet   Yes No   Sig: Take 650 mg by mouth every 4 (four) hours as needed for fever or mild pain.   apixaban (Eliquis) 5 mg   No No   Sig: Take 1 tablet (5 mg total) by mouth 2 (two) times a day   Patient taking differently: 5 mg by Per G Tube route in the morning and 5 mg in the evening.   ascorbic acid (VITAMIN C) 250 MG tablet   Yes Yes   Si mg by Per G Tube route daily   aspirin 81 mg chewable tablet   Yes No   Si mg by Per PEG Tube route in the morning.   atorvastatin (LIPITOR) 40 mg tablet   No No   Sig: Take 2 tablets (80 mg total) by mouth every evening Per AVS, 80mg daily   Patient taking differently: 80 mg by Per G Tube route every evening Per AVS, 80mg daily   bisacodyl (FLEET) 10 MG/30ML ENEM   Yes No   Sig: Insert 10 mg into the rectum daily as needed for constipation Only use if no response from Dulcolax after 2 hours   cetirizine (ZyrTEC) 10 mg tablet   Yes Yes   Sig: 10 mg by Per G Tube route daily   collagenase (SANTYL) ointment   No No   Sig: Apply topically daily   docusate (Docusate Sodium) 50 mg/5 mL liquid   Yes Yes   Si mg by Per G Tube route daily   escitalopram (LEXAPRO) 10 mg tablet   Yes No   Sig: 10 mg by Per PEG Tube route in the morning.   ezetimibe (ZETIA) 10 mg tablet  Spouse/Significant Other, Self Yes No   Sig: 10 mg by Per G Tube route in the morning.   glucose 40 %   Yes No   Sig: Take 15 g by mouth once    insulin lispro (HumALOG/ADMELOG) 100 units/mL injection   Yes No   Sig: Inject 5 Units under the skin in the morning and 5 Units at noon and 5 Units in the evening and 5 Units before bedtime. Hold for glucose less than 110.   magnesium hydroxide (Milk of Magnesia) 400 mg/5 mL oral suspension   Yes No   Sig: Take 30 mL by mouth daily as needed for constipation Use if no bowel movement x 3 days. Give at bedtime. Separate from other medications x 2 hours.   metoprolol tartrate (LOPRESSOR) 25 mg tablet   No No   Si.5 tablets (12.5 mg total) by Per G Tube route every 12 (twelve) hours   mirtazapine (REMERON) 15 mg tablet   No No   Sig: Take 1 tablet (15 mg total) by mouth daily at bedtime   Patient taking differently: 7.5 mg by Per G Tube route daily at bedtime   potassium chloride (MICRO-K) 10 MEQ CR capsule   No No   Sig: Take 2 capsules (20 mEq total) by mouth daily for 2 days   saccharomyces boulardii (FLORASTOR) 250 mg capsule   Yes Yes   Si mg by Per G Tube route 2 (two) times a day   thiamine 100 MG tablet   No No   Si tablet (100 mg total) by Per G Tube route daily      Facility-Administered Medications: None         Objective :  Temp:  [97.5 °F (36.4 °C)-98.3 °F (36.8 °C)] 97.5 °F (36.4 °C)  HR:  [75-90] 85  BP: ()/(57-98) 122/75  Resp:  [16-18] 18  SpO2:  [96 %-99 %] 98 %  O2 Device: None (Room air)    Physical ExamNeurological Exam  CONSTITUTIONAL: NECK: supple, no lymphadenopathy, SKIN: decubital ulcer feet. EXTREMITIES: no edema, NEUROLOGIC COMPREHENSIVE EXAM: Patient is stuporous CN II, III, IV, V, VI, VII,VIII,IX,X,XI-XII intact with EOMI, PERRLA, symmetric face noted. Motor:no stiffness /rigidity, patient is not following simple commands;     Lab Results: I have reviewed the following results:CBC:   Results from last 7 days   Lab Units 25  0545 25  2342 25  1655   WBC Thousand/uL 10.34*  --  9.61   RBC Million/uL 2.66*  --  2.54*   HEMOGLOBIN g/dL 7.7* 6.6* 6.9*    HEMATOCRIT % 25.1* 22.7* 23.5*   MCV fL 94  --  93   PLATELETS Thousands/uL 403*  --  484*   , BMP/CMP:   Results from last 7 days   Lab Units 06/21/25  0545 06/20/25  1655   SODIUM mmol/L 137 135   POTASSIUM mmol/L 4.5 4.4   CHLORIDE mmol/L 107 104   CO2 mmol/L 25 25   BUN mg/dL 35* 37*   CREATININE mg/dL 0.98 0.91   CALCIUM mg/dL 8.7 9.0   AST U/L  --  16   ALT U/L  --  9   ALK PHOS U/L  --  64   EGFR ml/min/1.73sq m 74 81   , Vitamin B12:   , HgBA1C:   , TSH:   , Lipid Profile:   Results from last 7 days   Lab Units 06/21/25  0545   HDL mg/dL 20*   LDL CALC mg/dL 44   TRIGLYCERIDES mg/dL 89     Recent Labs     06/20/25  1655 06/20/25  2342 06/21/25  0545   WBC 9.61  --  10.34*   HGB 6.9*   < > 7.7*   HCT 23.5*   < > 25.1*   *  --  403*   SODIUM 135  --  137   K 4.4  --  4.5     --  107   CO2 25  --  25   BUN 37*  --  35*   CREATININE 0.91  --  0.98   GLUC 136  --  114   MG 2.1  --   --     < > = values in this interval not displayed.     Imaging Results Review: I personally reviewed the following image studies/reports in PACS and discussed pertinent findings with Radiology: CT head. My interpretation of the radiology images/reports is: Acute stroke in left PCA distribution.      VTE Prophylaxis: Sequential compression device (Venodyne)     Administrative Statements   I have spent a total time of 40 minutes in caring for this patient on the day of the visit/encounter including Diagnostic results, Prognosis, Documenting in the medical record, Reviewing/placing orders in the medical record (including tests, medications, and/or procedures), and Obtaining or reviewing history  .         [1]   Past Medical History:  Diagnosis Date    Atherosclerotic heart disease of native coronary artery without angina pectoris     Atrial fibrillation (HCC)     Bacteremia     Cerebral infarction (HCC)     Constipation     Depression     Diabetes mellitus (HCC)     Hemiplegia and hemiparesis following cerebral infarction  affecting left non-dominant side (HCC)     Hyperlipidemia     Hypertension     Osteomyelitis (HCC)     Pressure ulcer of sacral region, stage 3 (HCC)     Prostatic hyperplasia     Sepsis (HCC)     Stage 4 decubitus ulcer (HCC)     Stroke (HCC)     TIA (transient ischemic attack)     Urinary retention     Vitamin D deficiency    [2]   Past Surgical History:  Procedure Laterality Date    COLOSTOMY N/A 10/23/2024    Procedure: Lap loop colostomy, psb open loop colostomy;  Surgeon: Davonte Banegas DO;  Location: [facility];  Service: General    GASTROSTOMY TUBE PLACEMENT N/A 10/23/2024    Procedure: INSERTION PEG TUBE, psb lap gastrostomy tube placement;  Surgeon: Davonte Banegas DO;  Location: [facility];  Service: General    INCISION AND DRAINAGE OF WOUND N/A 10/21/2024    Procedure: INCISION AND DRAINAGE (I&D) BUTTOCK, SACRAL WOUND DEBRIDEMENT, COCCYGECTOMY;  Surgeon: Beka King MD;  Location: [facility];  Service: General    WOUND DEBRIDEMENT N/A 10/23/2024    Procedure: DEBRIDEMENT WOUND (WASH OUT), sacrum;  Surgeon: Davonte Banegas DO;  Location: [facility];  Service: General   [3]   Social History  Tobacco Use    Smoking status: Never     Passive exposure: Never    Smokeless tobacco: Never   Vaping Use    Vaping status: Never Used   Substance and Sexual Activity    Alcohol use: Not Currently    Drug use: Never   [4]   Social History  Tobacco Use    Smoking status: Never     Passive exposure: Never    Smokeless tobacco: Never   Vaping Use    Vaping status: Never Used   Substance and Sexual Activity    Alcohol use: Not Currently    Drug use: Never

## 2025-06-21 NOTE — ASSESSMENT & PLAN NOTE
Lab Results   Component Value Date    HGBA1C 5.4 06/20/2025     Continue PTA Humalog 5 units QID. On tube feeding, add sliding scale insulin Q6H    Recent Labs     06/21/25  0022 06/21/25  0603 06/21/25  0751 06/21/25  1153   POCGLU 100 114 122 127       Blood Sugar Average: Last 72 hrs:  (P) [id number]

## 2025-06-22 ENCOUNTER — APPOINTMENT (INPATIENT)
Dept: NON INVASIVE DIAGNOSTICS | Facility: HOSPITAL | Age: 75
DRG: 064 | End: 2025-06-22
Payer: COMMERCIAL

## 2025-06-22 LAB
ANION GAP SERPL CALCULATED.3IONS-SCNC: 4 MMOL/L (ref 4–13)
APTT PPP: 49 SECONDS (ref 23–34)
APTT PPP: 51 SECONDS (ref 23–34)
APTT PPP: 55 SECONDS (ref 23–34)
APTT PPP: 57 SECONDS (ref 23–34)
APTT PPP: 77 SECONDS (ref 23–34)
BUN SERPL-MCNC: 31 MG/DL (ref 5–25)
CALCIUM SERPL-MCNC: 8.6 MG/DL (ref 8.4–10.2)
CHLORIDE SERPL-SCNC: 108 MMOL/L (ref 96–108)
CO2 SERPL-SCNC: 26 MMOL/L (ref 21–32)
CREAT SERPL-MCNC: 0.99 MG/DL (ref 0.6–1.3)
ERYTHROCYTE [DISTWIDTH] IN BLOOD BY AUTOMATED COUNT: 17.1 % (ref 11.6–15.1)
GFR SERPL CREATININE-BSD FRML MDRD: 73 ML/MIN/1.73SQ M
GLUCOSE SERPL-MCNC: 101 MG/DL (ref 65–140)
GLUCOSE SERPL-MCNC: 122 MG/DL (ref 65–140)
GLUCOSE SERPL-MCNC: 136 MG/DL (ref 65–140)
GLUCOSE SERPL-MCNC: 142 MG/DL (ref 65–140)
GLUCOSE SERPL-MCNC: 144 MG/DL (ref 65–140)
GLUCOSE SERPL-MCNC: 174 MG/DL (ref 65–140)
HCT VFR BLD AUTO: 24 % (ref 36.5–49.3)
HGB BLD-MCNC: 7.1 G/DL (ref 12–17)
MCH RBC QN AUTO: 28.2 PG (ref 26.8–34.3)
MCHC RBC AUTO-ENTMCNC: 29.6 G/DL (ref 31.4–37.4)
MCV RBC AUTO: 95 FL (ref 82–98)
MRSA NOSE QL CULT: NORMAL
PLATELET # BLD AUTO: 384 THOUSANDS/UL (ref 149–390)
PMV BLD AUTO: 9.2 FL (ref 8.9–12.7)
POTASSIUM SERPL-SCNC: 4.2 MMOL/L (ref 3.5–5.3)
RBC # BLD AUTO: 2.52 MILLION/UL (ref 3.88–5.62)
SODIUM SERPL-SCNC: 138 MMOL/L (ref 135–147)
WBC # BLD AUTO: 8.41 THOUSAND/UL (ref 4.31–10.16)

## 2025-06-22 PROCEDURE — 85730 THROMBOPLASTIN TIME PARTIAL: CPT | Performed by: INTERNAL MEDICINE

## 2025-06-22 PROCEDURE — 82948 REAGENT STRIP/BLOOD GLUCOSE: CPT

## 2025-06-22 PROCEDURE — 99232 SBSQ HOSP IP/OBS MODERATE 35: CPT | Performed by: INTERNAL MEDICINE

## 2025-06-22 PROCEDURE — 80048 BASIC METABOLIC PNL TOTAL CA: CPT

## 2025-06-22 PROCEDURE — 92610 EVALUATE SWALLOWING FUNCTION: CPT | Performed by: SPEECH-LANGUAGE PATHOLOGIST

## 2025-06-22 PROCEDURE — 99232 SBSQ HOSP IP/OBS MODERATE 35: CPT | Performed by: PSYCHIATRY & NEUROLOGY

## 2025-06-22 PROCEDURE — 93306 TTE W/DOPPLER COMPLETE: CPT

## 2025-06-22 PROCEDURE — 85027 COMPLETE CBC AUTOMATED: CPT

## 2025-06-22 PROCEDURE — 93306 TTE W/DOPPLER COMPLETE: CPT | Performed by: STUDENT IN AN ORGANIZED HEALTH CARE EDUCATION/TRAINING PROGRAM

## 2025-06-22 RX ORDER — INSULIN LISPRO 100 [IU]/ML
1-6 INJECTION, SOLUTION INTRAVENOUS; SUBCUTANEOUS
Status: DISCONTINUED | OUTPATIENT
Start: 2025-06-23 | End: 2025-06-26 | Stop reason: HOSPADM

## 2025-06-22 RX ADMIN — Medication 250 MG: at 08:40

## 2025-06-22 RX ADMIN — THIAMINE HCL TAB 100 MG 100 MG: 100 TAB at 08:38

## 2025-06-22 RX ADMIN — Medication 15 ML: at 08:40

## 2025-06-22 RX ADMIN — Medication 250 MG: at 08:38

## 2025-06-22 RX ADMIN — ESCITALOPRAM OXALATE 10 MG: 10 TABLET ORAL at 08:38

## 2025-06-22 RX ADMIN — Medication 12.5 MG: at 22:00

## 2025-06-22 RX ADMIN — Medication 12.5 MG: at 08:38

## 2025-06-22 RX ADMIN — EZETIMIBE 10 MG: 10 TABLET ORAL at 08:38

## 2025-06-22 RX ADMIN — HEPARIN SODIUM 9 UNITS/KG/HR: 10000 INJECTION, SOLUTION INTRAVENOUS at 16:30

## 2025-06-22 RX ADMIN — INSULIN LISPRO 1 UNITS: 100 INJECTION, SOLUTION INTRAVENOUS; SUBCUTANEOUS at 12:16

## 2025-06-22 RX ADMIN — DOCUSATE SODIUM LIQUID 200 MG: 50 LIQUID ORAL at 08:39

## 2025-06-22 RX ADMIN — ATORVASTATIN CALCIUM 80 MG: 80 TABLET, FILM COATED ORAL at 08:38

## 2025-06-22 RX ADMIN — INSULIN LISPRO 5 UNITS: 100 INJECTION, SOLUTION INTRAVENOUS; SUBCUTANEOUS at 17:38

## 2025-06-22 RX ADMIN — Medication 300 MG: at 08:40

## 2025-06-22 RX ADMIN — INSULIN LISPRO 5 UNITS: 100 INJECTION, SOLUTION INTRAVENOUS; SUBCUTANEOUS at 12:17

## 2025-06-22 RX ADMIN — Medication 1000 UNITS: at 08:38

## 2025-06-22 RX ADMIN — Medication 250 MG: at 22:00

## 2025-06-22 RX ADMIN — LORATADINE 10 MG: 5 SOLUTION ORAL at 08:39

## 2025-06-22 RX ADMIN — MIRTAZAPINE 7.5 MG: 7.5 TABLET, FILM COATED ORAL at 22:00

## 2025-06-22 RX ADMIN — ASPIRIN 81 MG CHEWABLE TABLET 81 MG: 81 TABLET CHEWABLE at 08:37

## 2025-06-22 NOTE — ASSESSMENT & PLAN NOTE
Chronic anemia with thrombocytosis.  Baseline 7-8  FIT Test Normal  Blood consent obtained (signed by wife)  Hemoglobin 7.1 6/22.  No evidence of bleeding  Follow CBC tomorrow  Transfuse if hemoglobin < 7.

## 2025-06-22 NOTE — PROGRESS NOTES
Progress Note - Neurology   Name: Drew Santos 74 y.o. male I MRN: [id number]  Unit/Bed#: E4 -01 I Date of Admission: 6/20/2025   Date of Service: 6/22/2025 I Hospital Day: 2    Assessment & Plan  CVA (cerebrovascular accident) (HCC)  Mr. Santos is a 75 yo male with HTN, HLD, DM2, syncope, urinary retention with chronic indwelling Parra catheter, decubitus ulcer of sacral region, stage IV,AFib, anemia of chronic disease, severe protein-caloric malnutrition, PEG tube, colostomy, depression, watershed territory infarcts in the right NELL/MCA region in 3/2024 with residual left-sided deficits and bedbound, EF 65 % at that time, partially calcified atheromatous plaque along the bilateral carotid bulbs without a hemodynamically significant stenosis, hypernatremia/hyponatremia/hypokalemia, who presented to [facility] ED on 6/20/25 as a transfer from [facility] for increased weakness and less communicative.    During the patient evaluation with CTH, patient was found to have acute to subacute left PCA territory ischemic stroke without hemorrhagic conversion considering patient has been taking Eliquis 5 mg in addition to aspirin 81 mg since prior hospitalization for stroke and A-fib. Further imaging were confirmative of left PCA ischemic stroke with left occipital lobe and left thalamus ischemic changes due to left P3 PCA occlusion; left cerebellar peduncle stroke noted as well.     WORK UP:     MRI brain: Multiple acute/recent infarcts involving the left cerebral hemisphere, the largest involving the left occipital lobe, left thalamus, and left middle cerebellar peduncle, with associated cytotoxic edema. No associated hemorrhage noted.     CTA H/N: CT Brain: Acute/recent infarcts involving the left occipital lobe, and the left thalamus, corresponding to the findings on brain MRI. Additional punctate infarcts involving the left cerebral hemisphere and left middle cerebellar peduncle are harder to    visualize on this examination.     No acute intracranial hemorrhage noted.     CT Angiography:     Occlusion of the left posterior cerebral artery P3 segment.     Occlusion of the distal intradural right vertebral artery near the vertebrobasilar junction.     Focal high-grade stenosis or short segment occlusion of the bilateral cervical vertebral artery origins due to atherosclerotic plaque. Remaining cervical vertebral arteries appear widely patent.          PLAN:    Admit to Stroke pathway:   MRI brain- as above  Consider Echo  Recommend the following Labs  Lipid Panel- LDL 44 mg/dL  Hemoglobin A1c  TSH  UA  Agree with heparin gtt PTT goals 50-70 and holding off Eliquis and aspirin; Patient is likely Eliquis non-responder, coumadin might be considered as per the primary team in 4-6 days since initial stroke date.   Continue Lipitor 80 and Zetia 10 mg  Permissive HTN, allow for SBP up to 220 x 24 hours or Goal MAP> 100 from onset of stroke like symptoms, then goal normotension.  Goal normotension sooner if MRI brain completed and does not reveal acute infarct.  Euglycemic, normothermic goal  Continue telemetry.  PT/OT/ST  Stroke education  Frequent neuro checks. Continue to monitor and notify neurology with any changes.  STAT CT head for any acute change in neuro exam  Medical management and supportive care per primary team. Correction of any metabolic or infectious disturbances.   I discussed this case with the managing team from a remote location. I did not personally see or examine this patient. I have provided limited recommendations as above, but ultimate patient disposition as per managing team.       Drew Santos will need follow up in in 3 months with neurovascular attending or advance practitioner. He will not require outpatient neurological testing.  I have discussed the above management plan in detail with the primary service.     Subjective   Patient with no acute findings overnight. Limited response to  follow commands.     Review of Systems  Limited due to stuporous state    Objective :  Temp:  [97.1 °F (36.2 °C)-97.9 °F (36.6 °C)] 97.9 °F (36.6 °C)  HR:  [68-82] 76  BP: (102-115)/(65-76) 115/72  Resp:  [16-18] 18  SpO2:  [97 %-100 %] 100 %  O2 Device: None (Room air)    Physical ExamNeurological Exam      Lab Results: I have reviewed the following results:        VTE Pharmacologic Prophylaxis: Heparin

## 2025-06-22 NOTE — SPEECH THERAPY NOTE
Speech-Language Pathology Bedside Swallow Evaluation        Patient Name: Drew Santos    Today's Date: 6/22/2025     Problem List  Problem List[1]    Past Medical History  Past Medical History[2]    Past Surgical History  Past Surgical History[3]      Current Medical Status  Mr. Santos is a 73 yo male with HTN, HLD, DM2, syncope, urinary retention with chronic indwelling Parra catheter, decubitus ulcer of sacral region, stage IV,AFib, anemia of chronic disease, severe protein-caloric malnutrition, PEG tube, colostomy, depression, watershed territory infarcts in the right NELL/MCA region in 3/2024 with residual left-sided deficits and bedbound, EF 65 % at that time, partially calcified atheromatous plaque along the bilateral carotid bulbs without a hemodynamically significant stenosis, hypernatremia/hyponatremia/hypokalemia, who presented to [facility]'s ED on 6/20/25 as a transfer from [facility] at [facility] for increased weakness and less communicative.     During the evaluation, patient was found to have acute to subacute left PCA territory ischemic stroke without hemorrhagic conversion considering patient has been taking Eliquis 5 mg in addition to aspirin 81 mg since prior hospitalization for stroke and A-fib.      SLP consult requested given stroke pathway. He is known to this department from prior admissions. He was last seen Nov of 2024. He was on a regular diet with thin liquids initially - diet fluctuated as well as intake ultimately leading to PEG placement for primary means. Upon d/c in Nov he was on a dysphagia 3 diet with thin liquids for pleasure.       Past medical history:   Please see H&P for details    Special Studies:  See MAR    Social/Education/Vocational Hx:  Pt lives in SNF/ECF    Swallow Information   Current Risks for Dysphagia & Aspiration: CVA, known history of dysphagia, and AMS     Current Symptoms/Concerns: AMS    Current Diet: NPO with tube feeds      Baseline Diet:  soft/level 3 diet and thin liquids with PEG feedings      Baseline Assessment   Behavior/Cognition: alert, oriented to self    Speech/Language Status: able to participate in basic conversation and able to follow commands- limited verbal output but answers Y/N questions    Patient Positioning: upright in bed    Swallow Mechanism Exam   Facial: masked facies ( pt with R lean- rigid when attempting to reposition)  Labial: unable to test 2/2 limited command following  Lingual: unable to test 2/2 limited command following  Velum: symmetrical  Mandible: adequate ROM  Dentition: natural lower dentition- edentulous upper  Vocal quality:hypophonia   Volitional Cough: unable to initiate volitional cough   Resp: RA    Consistencies Assessed and Performance   Consistencies Administered: thin liquids, puree, soft solids, and hard solids  Specific materials administered included: applesauce, santa cracker, turkey sandwich ( x1 bite)    Oral Stage:   Min reduced anterior control/bolus retrieval with head lean to the R but adequate. Mastication was moderately prolonged but eventually adequate with the materials administered today.  Bolus formation and transfer were reduced with soft/hard solids with bolus retention noted turkey sand> crackers. He did benefit from liquid/puree washes to clear. No overt s/s reduced oral control.    Pharyngeal Stage:   Swallowing initiation appeared prompt.  Laryngeal rise was palpated and judged to be within functional limits.  No coughing, throat clearing, change in vocal quality or respiratory status noted today.     Esophageal Concerns: none reported    Summary   s/s suggestive of mild-moderate oral dysphagia. Aspiration risk appears low.     Recommendations: mechanically altered/level 2 diet and thin liquids     Recommended Form of Meds: as desired/tolerated     Aspiration precautions and compensatory swallowing strategies: upright posture, only feed when fully alert, slow rate of feeding, small  bites/sips, and alternating bites and sips    Results Reviewed with: patient and MD       Dysphagia Goals: pt will tolerate dysphagia 3/regular textures with thin liquids without s/s of aspiration x3    Plan  Will f/u    Genia Perez M.S., CCC-SLP  Speech Language Pathologist   Available via Secure Chat  [place] #[id number]  [place] #[id number]         [1]   Patient Active Problem List  Diagnosis    Syncope    Elevated lactic acid level    Abnormal CPK    Urinary retention    Primary hypertension    Mixed hyperlipidemia    Decubitus ulcer of sacral region, stage 4 (HCC)    Gross hematuria    Depression    Dysgeusia    Gram-negative bacteremia    Type 2 diabetes mellitus without complication, without long-term current use of insulin (HCC)    COVID-19    Proctitis    Polymicrobial bacteremia    Acute metabolic encephalopathy    History of CVA (cerebrovascular accident)    Paroxysmal atrial fibrillation (HCC)    Anemia of chronic disease    Severe protein-calorie malnutrition (HCC)    Advanced care planning/counseling discussion    Pyuria    Chronic indwelling Parra catheter    Hypernatremia    S/P percutaneous endoscopic gastrostomy (PEG) tube placement (HCC)    Colostomy in place (HCC)    Hyponatremia    Hyperkalemia    Leukocytosis    CVA (cerebrovascular accident) (HCC)   [2]   Past Medical History:  Diagnosis Date    Atherosclerotic heart disease of native coronary artery without angina pectoris     Atrial fibrillation (HCC)     Bacteremia     Cerebral infarction (HCC)     Constipation     Depression     Diabetes mellitus (HCC)     Hemiplegia and hemiparesis following cerebral infarction affecting left non-dominant side (HCC)     Hyperlipidemia     Hypertension     Osteomyelitis (HCC)     Pressure ulcer of sacral region, stage 3 (HCC)     Prostatic hyperplasia     Sepsis (HCC)     Stage 4 decubitus ulcer (HCC)     Stroke (HCC)     TIA (transient ischemic attack)     Urinary retention     Vitamin D deficiency    [3]   Past  Surgical History:  Procedure Laterality Date    COLOSTOMY N/A 10/23/2024    Procedure: Lap loop colostomy, psb open loop colostomy;  Surgeon: Davonte Banegas DO;  [facility]: [facility];  Service: General    GASTROSTOMY TUBE PLACEMENT N/A 10/23/2024    Procedure: INSERTION PEG TUBE, psb lap gastrostomy tube placement;  Surgeon: Davonte Banegas DO;  [facility];  Service: General    INCISION AND DRAINAGE OF WOUND N/A 10/21/2024    Procedure: INCISION AND DRAINAGE (I&D) BUTTOCK, SACRAL WOUND DEBRIDEMENT, COCCYGECTOMY;  Surgeon: Beka King MD;  [facility]: [facility];  Service: General    WOUND DEBRIDEMENT N/A 10/23/2024    Procedure: DEBRIDEMENT WOUND (WASH OUT), sacrum;  Surgeon: Davonte Banegas DO;  [facility]: [facility];  Service: General

## 2025-06-22 NOTE — ASSESSMENT & PLAN NOTE
Mr. Santos is a 75 yo male with HTN, HLD, DM2, syncope, urinary retention with chronic indwelling Parra catheter, decubitus ulcer of sacral region, stage IV,AFib, anemia of chronic disease, severe protein-caloric malnutrition, PEG tube, colostomy, depression, watershed territory infarcts in the right NELL/MCA region in 3/2024 with residual left-sided deficits and bedbound, EF 65 % at that time, partially calcified atheromatous plaque along the bilateral carotid bulbs without a hemodynamically significant stenosis, hypernatremia/hyponatremia/hypokalemia, who presented to [facility]'s ED on 6/20/25 as a transfer from [facility] at [facility] for increased weakness and less communicative.    During the patient evaluation with CTH, patient was found to have acute to subacute left PCA territory ischemic stroke without hemorrhagic conversion considering patient has been taking Eliquis 5 mg in addition to aspirin 81 mg since prior hospitalization for stroke and A-fib. Further imaging were confirmative of left PCA ischemic stroke with left occipital lobe and left thalamus ischemic changes due to left P3 PCA occlusion; left cerebellar peduncle stroke noted as well.     WORK UP:     MRI brain: Multiple acute/recent infarcts involving the left cerebral hemisphere, the largest involving the left occipital lobe, left thalamus, and left middle cerebellar peduncle, with associated cytotoxic edema. No associated hemorrhage noted.     CTA H/N: CT Brain: Acute/recent infarcts involving the left occipital lobe, and the left thalamus, corresponding to the findings on brain MRI. Additional punctate infarcts involving the left cerebral hemisphere and left middle cerebellar peduncle are harder to   visualize on this examination.     No acute intracranial hemorrhage noted.     CT Angiography:     Occlusion of the left posterior cerebral artery P3 segment.     Occlusion of the distal intradural right vertebral artery near the  vertebrobasilar junction.     Focal high-grade stenosis or short segment occlusion of the bilateral cervical vertebral artery origins due to atherosclerotic plaque. Remaining cervical vertebral arteries appear widely patent.          PLAN:    Admit to Stroke pathway:   MRI brain- as above  Consider Echo  Recommend the following Labs  Lipid Panel- LDL 44 mg/dL  Hemoglobin A1c  TSH  UA  Agree with heparin gtt PTT goals 50-70 and holding off Eliquis and aspirin; Patient is likely Eliquis non-responder, coumadin might be considered as per the primary team in 4-6 days since initial stroke date.   Continue Lipitor 80 and Zetia 10 mg  Permissive HTN, allow for SBP up to 220 x 24 hours or Goal MAP> 100 from onset of stroke like symptoms, then goal normotension.  Goal normotension sooner if MRI brain completed and does not reveal acute infarct.  Euglycemic, normothermic goal  Continue telemetry.  PT/OT/ST  Stroke education  Frequent neuro checks. Continue to monitor and notify neurology with any changes.  STAT CT head for any acute change in neuro exam  Medical management and supportive care per primary team. Correction of any metabolic or infectious disturbances.   I discussed this case with the managing team from a remote location. I did not personally see or examine this patient. I have provided limited recommendations as above, but ultimate patient disposition as per managing team.

## 2025-06-22 NOTE — ASSESSMENT & PLAN NOTE
Patient with hx of Afib, CVA x2 with left-sided deficits, bedbound, PEG, colostomy, chronic indwelling catheter, St IV sacral decubiti, transferred from [facility] for increased weakness and less communicative    CTH showing acute to subacute left PCA distribution infarct.   No confluent secondary parenchymal hematoma.   MRI of the brain confirms stroke  CTA of head and neck showed occlusion of the left posterior cerebral artery P3 segment. Occlusion of the distal intradural right vertebral artery near the vertebrobasilar junction. Focal high-grade stenosis or short segment occlusion of the bilateral cervical vertebral artery origins due to atherosclerotic plaque. Remaining cervical vertebral arteries appear widely patent.   PTA Maintained on ASA, Eliquis and high intensity statin  Continue heparin neuro stroke dosing with PTT goal of 50-70  Eliquis failure and will need bridging to warfarin which should be started 4 to 6 days from stroke date.  Permissive HTN followed by normotension  Follow echocardiogram  Telemetry monitoring  Neurology on board - appreciate recommendations.

## 2025-06-22 NOTE — PROGRESS NOTES
Progress Note - Hospitalist   Name: Drew Santos 74 y.o. male I MRN: [id number]  Unit/Bed#: E4 -01 I Date of Admission: 6/20/2025   Date of Service: 6/22/2025 I Hospital Day: 2    Assessment & Plan  CVA (cerebrovascular accident) (HCC)  Patient with hx of Afib, CVA x2 with left-sided deficits, bedbound, PEG, colostomy, chronic indwelling catheter, St IV sacral decubiti, transferred from [facility] at [facility] for increased weakness and less communicative    CTH showing acute to subacute left PCA distribution infarct.   No confluent secondary parenchymal hematoma.   MRI of the brain confirms stroke  CTA of head and neck showed occlusion of the left posterior cerebral artery P3 segment. Occlusion of the distal intradural right vertebral artery near the vertebrobasilar junction. Focal high-grade stenosis or short segment occlusion of the bilateral cervical vertebral artery origins due to atherosclerotic plaque. Remaining cervical vertebral arteries appear widely patent.   PTA Maintained on ASA, Eliquis and high intensity statin  Continue heparin neuro stroke dosing with PTT goal of 50-70  Eliquis failure and will need bridging to warfarin which should be started 4 to 6 days from stroke date.  Permissive HTN followed by normotension  Follow echocardiogram  Telemetry monitoring  Neurology on board - appreciate recommendations.    Decubitus ulcer of sacral region, stage 4 ([facility])  POA. Previous admissions for polymicrobial bacteremia and OM, previous wound VAC.  Follows with surgery for wound care  Offload.  Turn and reposition.  Local wound care  Wound care consult  P500 mattress  Discussed with general surgery, they recommend localized wound care as they state it appears not infected-discussed with Dr. Bunny Guido  Type 2 diabetes mellitus without complication, without long-term current use of insulin ([facility])  Lab Results   Component Value Date    HGBA1C 5.4 06/20/2025     Continue PTA Humalog 5 units QID.  On tube feeding, add sliding scale insulin Q6H    Recent Labs     06/22/25  0048 06/22/25  0513 06/22/25  0716 06/22/25  1132   POCGLU 122 101 142* 174*       Blood Sugar Average: Last 72 hrs:  (P) 118.2064528906466425    Paroxysmal atrial fibrillation (HCC)  On rate control with metoprolol 12.5mg bid  Prior to admission on Eliquis, continue heparin  Urinary retention  Urinary retention managed with chronic Parra catheter.  Catheter exchanged at facility  Monitor urine output  Primary hypertension  Permissive hypertension for 24 hours followed by normotension  Anemia of chronic disease  Chronic anemia with thrombocytosis.  Baseline 7-8  FIT Test Normal  Blood consent obtained (signed by wife)  Hemoglobin 7.1 6/22.  No evidence of bleeding  Follow CBC tomorrow  Transfuse if hemoglobin < 7.  Severe protein-calorie malnutrition (HCC)  Malnutrition Findings: Body mass index is 28.36 kg/m².   S/p PEG.  Continue tube feeding  Nutrition consult  S/P percutaneous endoscopic gastrostomy (PEG) tube placement ([facility])  S/p Peg Oct 2024.  Diet at facility:  Tube feeding Jevity 1.2 85 mL/h x22hrs, water flush 150 mL every 4 hours  Dysphagia advanced texture with nectar thick liquids for lunch only  Continue tube feeds  Colostomy in place ([facility])  S/p loop diverting colostomy; placed due to sacral decubiti ulcer with suspected coccygeal OM   Ostomy care.  Monitor stool output    VTE Pharmacologic Prophylaxis: VTE Score: 9 High Risk (Score >/= 5) - Pharmacological DVT Prophylaxis Ordered: heparin drip. Sequential Compression Devices Ordered.    Mobility:   Basic Mobility Inpatient Raw Score: 6  JH-HLM Goal: 2: Bed activities/Dependent transfer  JH-HLM Achieved: 2: Bed activities/Dependent transfer  JH-HLM Goal NOT achieved. Continue with multidisciplinary rounding and encourage appropriate mobility to improve upon JH-HLM goals.    Patient Centered Rounds: I performed bedside rounds with nursing staff today.   Discussions with  Specialists or Other Care Team Provider: Discussed with RN    Education and Discussions with Family / Patient:     Current Length of Stay: 2 day(s)  Current Patient Status: Inpatient   Certification Statement: The patient will continue to require additional inpatient hospital stay due to stroke  Discharge Plan: Anticipate discharge in 24-48 hrs to rehab facility.    Code Status: Level 1 - Full Code    Subjective:   Patient was seen and examined at bedside. The patient denies any major complaint.  No acute overnight changes.    Objective:     Vitals:   Temp (24hrs), Av.6 °F (36.4 °C), Min:97.1 °F (36.2 °C), Max:97.9 °F (36.6 °C)    Temp:  [97.1 °F (36.2 °C)-97.9 °F (36.6 °C)] 97.9 °F (36.6 °C)  HR:  [68-82] 76  Resp:  [16-18] 18  BP: (102-115)/(65-76) 115/72  SpO2:  [97 %-100 %] 100 %  Body mass index is 28.36 kg/m².     Input and Output Summary (last 24 hours):       Intake/Output Summary (Last 24 hours) at 2025 1324  Last data filed at 2025 0527  Gross per 24 hour   Intake --   Output 1000 ml   Net -1000 ml       Physical Exam:     Physical Exam  General: no acute distress  HEENT: NC/AT, PERRL, EOM - normal  Neck: Supple  Pulm/Chest: Normal chest wall expansion, clear breath sounds on both side  CVS: normal S1&S2, capillary refill <2s  Abd: soft, non tender, non distended, bowel sounds +  Skin: warm  CNS: Patient is alert and awake.  Unable to follow commands.      Additional Data:     Labs:    Results from last 7 days   Lab Units 25  0625  0545   WBC Thousand/uL 8.41 10.34*   HEMOGLOBIN g/dL 7.1* 7.7*   HEMATOCRIT % 24.0* 25.1*   PLATELETS Thousands/uL 384 403*   SEGS PCT %  --  62   LYMPHO PCT %  --  25   MONO PCT %  --  8   EOS PCT %  --  4     Results from last 7 days   Lab Units 25  0623 25  0545 06/20/25  1655   POTASSIUM mmol/L 4.2   < > 4.4   CHLORIDE mmol/L 108   < > 104   CO2 mmol/L 26   < > 25   BUN mg/dL 31*   < > 37*   CREATININE mg/dL 0.99   < > 0.91   CALCIUM  mg/dL 8.6   < > 9.0   ALK PHOS U/L  --   --  64   ALT U/L  --   --  9   AST U/L  --   --  16    < > = values in this interval not displayed.     Results from last 7 days   Lab Units 06/20/25  1913   INR  1.35*       * I Have Reviewed All Lab Data Listed Above.  * Additional Pertinent Lab Tests Reviewed: All Labs For Current Hospital Admission Reviewed    Imaging:      I have reviewed pertinent imaging.      Recent Cultures (last 7 days):           Last 24 Hours Medication List:   Current Facility-Administered Medications   Medication Dose Route Frequency Provider Last Rate    Acetaminophen  650 mg Per G Tube Q4H PRN Ifeoma Smart, CRNP      aluminum-magnesium hydroxide-simethicone  30 mL Per G Tube Q6H PRN Ifeoma Smart, JUANNP      ascorbic acid  250 mg Per G Tube Daily Ifeoma Smart, CRNP      aspirin  81 mg Per PEG Tube Daily Ifeoma Smart, CRNP      atorvastatin  80 mg Per G Tube Daily Ifeoma Smart, CRNP      bisacodyl  10 mg Rectal Daily PRN Ifeoma Smart, CRNP      Cholecalciferol  1,000 Units Per G Tube Daily Ifeoma Smart, CRNP      docusate  200 mg Per G Tube Daily Ifeoma Smart, CRNP      escitalopram  10 mg Per PEG Tube Daily Ifeoma Smart, CRNP      ezetimibe  10 mg Per G Tube Daily Ifeoma Smart, CRNP      Ferrous Sulfate  300 mg Per G Tube Daily Ifeoma Smart, CRNP      heparin  3-24 Units/kg/hr Intravenous Titrated Ahsan Rangel PA-C 9 Units/kg/hr (06/22/25 1047)    insulin lispro  1-6 Units Subcutaneous Q6H [facility] Ifeoma Smart, CRNP      insulin lispro  5 Units Subcutaneous Q6H [facility] Ifeoma Smart, AVELINA      Loratadine  10 mg Per G Tube Daily Ifeoma Smart, JUANNP      metoprolol tartrate  12.5 mg Per G Tube Q12H [facility] Ifeoma Smart, AVELINA      mirtazapine  7.5 mg Per G Tube HS Ifeoma Smart, CRNP      Multivitamin  15 mL Per G Tube Daily Ifeoma Smart, JUANNP      ondansetron  4 mg Intravenous Q6H PRN Ifeoma Smart,  CRNP      saccharomyces boulardii  250 mg Per G Tube BID JUAN LouieNP      thiamine  100 mg Per G Tube Daily Taye Rodriguez, DO          Today, Patient Was Seen By: Ruth Love MD    ** Please Note: Dragon 360 Dictation voice to text software may have been used in the creation of this document. **

## 2025-06-22 NOTE — ASSESSMENT & PLAN NOTE
Lab Results   Component Value Date    HGBA1C 5.4 06/20/2025     Continue PTA Humalog 5 units QID. On tube feeding, add sliding scale insulin Q6H    Recent Labs     06/22/25  0048 06/22/25  0513 06/22/25  0716 06/22/25  1132   POCGLU 122 101 142* 174*       Blood Sugar Average: Last 72 hrs:  (P) [id number]

## 2025-06-23 ENCOUNTER — TELEPHONE (OUTPATIENT)
Age: 75
End: 2025-06-23

## 2025-06-23 LAB
ANION GAP SERPL CALCULATED.3IONS-SCNC: 5 MMOL/L (ref 4–13)
AORTIC ROOT: 3.5 CM
AORTIC VALVE MEAN VELOCITY: 11.7 M/S
APTT PPP: 60 SECONDS (ref 23–34)
APTT PPP: 63 SECONDS (ref 23–34)
AV AREA BY CONTINUOUS VTI: 2.8 CM2
AV AREA PEAK VELOCITY: 2.5 CM2
AV LVOT MEAN GRADIENT: 2 MMHG
AV LVOT PEAK GRADIENT: 4 MMHG
AV MEAN PRESS GRAD SYS DOP V1V2: 6 MMHG
AV ORIFICE AREA US: 2.76 CM2
AV PEAK GRADIENT: 13 MMHG
AV VELOCITY RATIO: 0.61
AV VMAX SYS DOP: 1.77 M/S
BASOPHILS # BLD AUTO: 0.04 THOUSANDS/ÂΜL (ref 0–0.1)
BASOPHILS NFR BLD AUTO: 1 % (ref 0–1)
BSA FOR ECHO PROCEDURE: 2.41 M2
BUN SERPL-MCNC: 27 MG/DL (ref 5–25)
CALCIUM SERPL-MCNC: 8.6 MG/DL (ref 8.4–10.2)
CHLORIDE SERPL-SCNC: 107 MMOL/L (ref 96–108)
CO2 SERPL-SCNC: 25 MMOL/L (ref 21–32)
CREAT SERPL-MCNC: 0.87 MG/DL (ref 0.6–1.3)
DOP CALC AO VTI: 36.17 CM
DOP CALC LVOT AREA: 4.52 CM2
DOP CALC LVOT CARDIAC INDEX: 3.02 L/MIN/M2
DOP CALC LVOT CARDIAC OUTPUT: 7.31 L/MIN
DOP CALC LVOT DIAMETER: 2.4 CM
DOP CALC LVOT PEAK VEL VTI: 22.05 CM
DOP CALC LVOT PEAK VEL: 0.96 M/S
DOP CALC LVOT STROKE INDEX: 42.1 ML/M2
DOP CALC LVOT STROKE VOLUME: 99.7
E WAVE DECELERATION TIME: 240 MS
E/A RATIO: 0.92
EOSINOPHIL # BLD AUTO: 0.47 THOUSAND/ÂΜL (ref 0–0.61)
EOSINOPHIL NFR BLD AUTO: 5 % (ref 0–6)
ERYTHROCYTE [DISTWIDTH] IN BLOOD BY AUTOMATED COUNT: 17.2 % (ref 11.6–15.1)
FRACTIONAL SHORTENING: 53 (ref 28–44)
GFR SERPL CREATININE-BSD FRML MDRD: 83 ML/MIN/1.73SQ M
GLUCOSE SERPL-MCNC: 126 MG/DL (ref 65–140)
GLUCOSE SERPL-MCNC: 137 MG/DL (ref 65–140)
GLUCOSE SERPL-MCNC: 167 MG/DL (ref 65–140)
GLUCOSE SERPL-MCNC: 172 MG/DL (ref 65–140)
GLUCOSE SERPL-MCNC: 175 MG/DL (ref 65–140)
HCT VFR BLD AUTO: 25.1 % (ref 36.5–49.3)
HGB BLD-MCNC: 7.4 G/DL (ref 12–17)
IMM GRANULOCYTES # BLD AUTO: 0.03 THOUSAND/UL (ref 0–0.2)
IMM GRANULOCYTES NFR BLD AUTO: 0 % (ref 0–2)
INTERVENTRICULAR SEPTUM IN DIASTOLE (PARASTERNAL SHORT AXIS VIEW): 1.2 CM
INTERVENTRICULAR SEPTUM: 1.7 CM (ref 0.6–1.1)
LAAS-AP2: 17.3 CM2
LAAS-AP4: 16.6 CM2
LEFT ATRIUM SIZE: 3.9 CM
LEFT ATRIUM VOLUME (MOD BIPLANE): 45 ML
LEFT ATRIUM VOLUME INDEX (MOD BIPLANE): 18.6 ML/M2
LEFT INTERNAL DIMENSION IN SYSTOLE: 2 CM (ref 2.1–4)
LEFT VENTRICULAR INTERNAL DIMENSION IN DIASTOLE: 4.3 CM (ref 3.5–6)
LEFT VENTRICULAR POSTERIOR WALL IN END DIASTOLE: 1.2 CM
LEFT VENTRICULAR STROKE VOLUME: 69 ML
LV EF US.2D.A4C+ESTIMATED: 54 %
LVSV (TEICH): 69 ML
LYMPHOCYTES # BLD AUTO: 2.02 THOUSANDS/ÂΜL (ref 0.6–4.47)
LYMPHOCYTES NFR BLD AUTO: 23 % (ref 14–44)
MCH RBC QN AUTO: 28.9 PG (ref 26.8–34.3)
MCHC RBC AUTO-ENTMCNC: 29.5 G/DL (ref 31.4–37.4)
MCV RBC AUTO: 98 FL (ref 82–98)
MONOCYTES # BLD AUTO: 0.61 THOUSAND/ÂΜL (ref 0.17–1.22)
MONOCYTES NFR BLD AUTO: 7 % (ref 4–12)
MV E'TISSUE VEL-LAT: 11 CM/S
MV E'TISSUE VEL-SEP: 9 CM/S
MV PEAK A VEL: 0.91 M/S
MV PEAK E VEL: 84 CM/S
NEUTROPHILS # BLD AUTO: 5.69 THOUSANDS/ÂΜL (ref 1.85–7.62)
NEUTS SEG NFR BLD AUTO: 64 % (ref 43–75)
NRBC BLD AUTO-RTO: 0 /100 WBCS
PLATELET # BLD AUTO: 363 THOUSANDS/UL (ref 149–390)
PMV BLD AUTO: 9.4 FL (ref 8.9–12.7)
POTASSIUM SERPL-SCNC: 4.3 MMOL/L (ref 3.5–5.3)
RBC # BLD AUTO: 2.56 MILLION/UL (ref 3.88–5.62)
RIGHT ATRIUM AREA SYSTOLE A4C: 13.6 CM2
RIGHT VENTRICLE ID DIMENSION: 3.8 CM
SL CV LEFT ATRIUM LENGTH A2C: 5.1 CM
SL CV LV EF: 60
SL CV PED ECHO LEFT VENTRICLE DIASTOLIC VOLUME (MOD BIPLANE) 2D: 82 ML
SL CV PED ECHO LEFT VENTRICLE SYSTOLIC VOLUME (MOD BIPLANE) 2D: 13 ML
SODIUM SERPL-SCNC: 137 MMOL/L (ref 135–147)
TRICUSPID ANNULAR PLANE SYSTOLIC EXCURSION: 2.3 CM
WBC # BLD AUTO: 8.86 THOUSAND/UL (ref 4.31–10.16)

## 2025-06-23 PROCEDURE — 99232 SBSQ HOSP IP/OBS MODERATE 35: CPT | Performed by: STUDENT IN AN ORGANIZED HEALTH CARE EDUCATION/TRAINING PROGRAM

## 2025-06-23 PROCEDURE — 80048 BASIC METABOLIC PNL TOTAL CA: CPT | Performed by: INTERNAL MEDICINE

## 2025-06-23 PROCEDURE — 99232 SBSQ HOSP IP/OBS MODERATE 35: CPT | Performed by: PSYCHIATRY & NEUROLOGY

## 2025-06-23 PROCEDURE — 92526 ORAL FUNCTION THERAPY: CPT

## 2025-06-23 PROCEDURE — 85730 THROMBOPLASTIN TIME PARTIAL: CPT | Performed by: STUDENT IN AN ORGANIZED HEALTH CARE EDUCATION/TRAINING PROGRAM

## 2025-06-23 PROCEDURE — 85730 THROMBOPLASTIN TIME PARTIAL: CPT | Performed by: INTERNAL MEDICINE

## 2025-06-23 PROCEDURE — 85025 COMPLETE CBC W/AUTO DIFF WBC: CPT | Performed by: INTERNAL MEDICINE

## 2025-06-23 PROCEDURE — 82948 REAGENT STRIP/BLOOD GLUCOSE: CPT

## 2025-06-23 RX ORDER — DABIGATRAN ETEXILATE 150 MG/1
150 CAPSULE ORAL 2 TIMES DAILY
Qty: 60 CAPSULE | Refills: 0 | Status: SHIPPED | OUTPATIENT
Start: 2025-06-23 | End: 2025-06-23

## 2025-06-23 RX ORDER — DABIGATRAN ETEXILATE 150 MG/1
150 CAPSULE ORAL 2 TIMES DAILY
Qty: 60 CAPSULE | Refills: 0 | Status: SHIPPED | OUTPATIENT
Start: 2025-06-23

## 2025-06-23 RX ADMIN — Medication 250 MG: at 08:37

## 2025-06-23 RX ADMIN — INSULIN LISPRO 5 UNITS: 100 INJECTION, SOLUTION INTRAVENOUS; SUBCUTANEOUS at 12:23

## 2025-06-23 RX ADMIN — ASPIRIN 81 MG CHEWABLE TABLET 81 MG: 81 TABLET CHEWABLE at 08:36

## 2025-06-23 RX ADMIN — THIAMINE HCL TAB 100 MG 100 MG: 100 TAB at 08:36

## 2025-06-23 RX ADMIN — ESCITALOPRAM OXALATE 10 MG: 10 TABLET ORAL at 08:36

## 2025-06-23 RX ADMIN — DOCUSATE SODIUM LIQUID 200 MG: 50 LIQUID ORAL at 08:36

## 2025-06-23 RX ADMIN — INSULIN LISPRO 1 UNITS: 100 INJECTION, SOLUTION INTRAVENOUS; SUBCUTANEOUS at 12:23

## 2025-06-23 RX ADMIN — ATORVASTATIN CALCIUM 80 MG: 80 TABLET, FILM COATED ORAL at 08:36

## 2025-06-23 RX ADMIN — Medication 300 MG: at 08:36

## 2025-06-23 RX ADMIN — INSULIN LISPRO 1 UNITS: 100 INJECTION, SOLUTION INTRAVENOUS; SUBCUTANEOUS at 08:36

## 2025-06-23 RX ADMIN — Medication 15 ML: at 08:36

## 2025-06-23 RX ADMIN — Medication 1000 UNITS: at 08:36

## 2025-06-23 RX ADMIN — LORATADINE 10 MG: 5 SOLUTION ORAL at 08:36

## 2025-06-23 RX ADMIN — MIRTAZAPINE 7.5 MG: 7.5 TABLET, FILM COATED ORAL at 22:00

## 2025-06-23 RX ADMIN — EZETIMIBE 10 MG: 10 TABLET ORAL at 08:36

## 2025-06-23 RX ADMIN — Medication 250 MG: at 22:00

## 2025-06-23 RX ADMIN — Medication 12.5 MG: at 08:36

## 2025-06-23 RX ADMIN — HEPARIN SODIUM 9 UNITS/KG/HR: 10000 INJECTION, SOLUTION INTRAVENOUS at 18:15

## 2025-06-23 RX ADMIN — Medication 250 MG: at 08:36

## 2025-06-23 NOTE — ASSESSMENT & PLAN NOTE
"Drew Santos is a 74 y.o. male with HTN, HLD, DM type 2, syncope, urinary retention with chronic indwelling Parra catheter, stage IV decubitus ulcer of sacral region, AFib on Eliquis, anemia of chronic disease, severe protein-caloric malnutrition, s/p PEG tube, colostomy, depression, history of watershed territory infarcts in the right NELL/MCA region (March 2024) on ASA with residual left-sided deficits and bedbound at baseline, partially calcified atheromatous plaque along the bilateral carotid bulbs without a hemodynamically significant stenosis, who presented to [facility] ED on 6/20/25 as a transfer from [facility] Care at [facility] for increased weakness and less communicative.    During the patient evaluation with CT, patient was found to have acute to subacute left PCA territory ischemic stroke without hemorrhagic conversion considering patient has been taking Eliquis 5 mg in addition to aspirin 81 mg since prior hospitalization for stroke and A-fib. Further imaging were confirmative of left PCA ischemic stroke with left occipital lobe and left thalamus ischemic changes due to left P3 PCA occlusion; left cerebellar peduncle stroke noted as well.     Workup:  - CT head:  \"Acute to subacute left PCA distribution infarct. No confluent secondary parenchymal hematoma. Findings are new since the prior examination.\"  - MRI brain wo:  \"Multiple acute/recent infarcts involving the left cerebral hemisphere, the largest involving the left occipital lobe, left thalamus, and left middle cerebellar peduncle, with associated cytotoxic edema. No associated hemorrhage noted.  Mild chronic white matter microangiopathic changes involving both cerebral hemispheres.  Paranasal sinus disease as described above, worst involving the left maxillary sinus.\"  - CTA head and neck:  \"CT Brain: Acute/recent infarcts involving the left occipital lobe, and the left thalamus, corresponding to the findings on brain MRI. Additional " "punctate infarcts involving the left cerebral hemisphere and left middle cerebellar peduncle are harder to   visualize on this examination.  No acute intracranial hemorrhage noted.     CT Angiography:  Occlusion of the left posterior cerebral artery P3 segment.  Occlusion of the distal intradural right vertebral artery near the vertebrobasilar junction.  Focal high-grade stenosis or short segment occlusion of the bilateral cervical vertebral artery origins due to atherosclerotic plaque. Remaining cervical vertebral arteries appear widely patent\"  - Echo: 60%, no diagnostic regional wall motion abnormalities identified; left atrium size WNL  - Lipid panel: Total cholesterol 82, triglycerides 89, HDL 20, LDL 44  - Hemoglobin A1c: 5.4    Multiple acute infarcts involving the left occipital lobe, left thalamus, and left middle cerebral peduncle with associated cytotoxic edema.  Found to have PCA P3 segment occlusion and distal right vertebral artery occlusion on CTA. Concern for possible Eliquis failure, currently on heparin gtt.    Plan:  - Hold off on JOSEFINA as patient is ultimately going to be on both AC and AP therapy  - Continue to hold home Eliquis and aspirin at this time  - Currently on heparin gtt, PTT goals 50-70  Plan for repeat CT head tomorrow AM 6/24 per discussion with attending neurologist; if stable, recommend starting Pradaxa  - Continue Lipitor 80 and Zetia 10 mg  - Goal normotension, euglycemia, normothermia  - Telemetry.  - PT/OT/ST  - Stroke education  - Frequent neuro checks. Continue to monitor and notify neurology with any changes.  - STAT CT head for any acute change in neuro exam  - Medical management and supportive care per primary team. Correction of any metabolic or infectious disturbances.   "

## 2025-06-23 NOTE — DISCHARGE INSTR - OTHER ORDERS
Wound Care Plan:   1-Apply Remedy silicone cream to bilateral heels twice daily for skin protection/hydration.  2-Elevate heels off of bed/chair surface--offloading heel boots.  3-P500 low air-loss mattress with ATR  positioning system.  4-Apply lotion to body daily and as needed.  5-Turn/reposition every 2 hours for pressure re-distribution on skin.   6-Routine ostomy care.  7-Left buttock and mid sacrum--apply Vashe soaked gauze for 5 minutes, remove, pat dry.  Apply skin prep to carmen-wound area.  Fluff silver alginate into wound bed and cover with silicone bordered foam dressing.  Change dressing daily and as needed with soilage.    Follow-up at the [facility]--[phone].

## 2025-06-23 NOTE — PROGRESS NOTES
"Progress Note - Neurology   Name: Drew Santos 74 y.o. male I MRN: [id number]  Unit/Bed#: E4 -01 I Date of Admission: 6/20/2025   Date of Service: 6/23/2025 I Hospital Day: 3     Assessment & Plan  CVA (cerebrovascular accident) (HCC)  Drew Santos is a 74 y.o. male with HTN, HLD, DM type 2, syncope, urinary retention with chronic indwelling Parra catheter, stage IV decubitus ulcer of sacral region, AFib on Eliquis, anemia of chronic disease, severe protein-caloric malnutrition, s/p PEG tube, colostomy, depression, history of watershed territory infarcts in the right NELL/MCA region (March 2024) on ASA with residual left-sided deficits and bedbound at baseline, partially calcified atheromatous plaque along the bilateral carotid bulbs without a hemodynamically significant stenosis, who presented to [facility] ED on 6/20/25 as a transfer from [facility] for increased weakness and less communicative.    During the patient evaluation with CTH, patient was found to have acute to subacute left PCA territory ischemic stroke without hemorrhagic conversion considering patient has been taking Eliquis 5 mg in addition to aspirin 81 mg since prior hospitalization for stroke and A-fib. Further imaging were confirmative of left PCA ischemic stroke with left occipital lobe and left thalamus ischemic changes due to left P3 PCA occlusion; left cerebellar peduncle stroke noted as well.     Workup:  - CT head:  \"Acute to subacute left PCA distribution infarct. No confluent secondary parenchymal hematoma. Findings are new since the prior examination.\"  - MRI brain wo:  \"Multiple acute/recent infarcts involving the left cerebral hemisphere, the largest involving the left occipital lobe, left thalamus, and left middle cerebellar peduncle, with associated cytotoxic edema. No associated hemorrhage noted.  Mild chronic white matter microangiopathic changes involving both cerebral hemispheres.  Paranasal sinus " "disease as described above, worst involving the left maxillary sinus.\"  - CTA head and neck:  \"CT Brain: Acute/recent infarcts involving the left occipital lobe, and the left thalamus, corresponding to the findings on brain MRI. Additional punctate infarcts involving the left cerebral hemisphere and left middle cerebellar peduncle are harder to   visualize on this examination.  No acute intracranial hemorrhage noted.     CT Angiography:  Occlusion of the left posterior cerebral artery P3 segment.  Occlusion of the distal intradural right vertebral artery near the vertebrobasilar junction.  Focal high-grade stenosis or short segment occlusion of the bilateral cervical vertebral artery origins due to atherosclerotic plaque. Remaining cervical vertebral arteries appear widely patent\"  - Echo: 60%, no diagnostic regional wall motion abnormalities identified; left atrium size WNL  - Lipid panel: Total cholesterol 82, triglycerides 89, HDL 20, LDL 44  - Hemoglobin A1c: 5.4    Multiple acute infarcts involving the left occipital lobe, left thalamus, and left middle cerebral peduncle with associated cytotoxic edema.  Found to have PCA P3 segment occlusion and distal right vertebral artery occlusion on CTA. Concern for possible Eliquis failure, currently on heparin gtt.    Plan:  - Hold off on JOSEFINA as patient is ultimately going to be on both AC and AP therapy  - Continue to hold home Eliquis and aspirin at this time  - Currently on heparin gtt, PTT goals 50-70  Plan for repeat CT head tomorrow AM 6/24 per discussion with attending neurologist; if stable, recommend starting Pradaxa  - Continue Lipitor 80 and Zetia 10 mg  - Goal normotension, euglycemia, normothermia  - Telemetry.  - PT/OT/ST  - Stroke education  - Frequent neuro checks. Continue to monitor and notify neurology with any changes.  - STAT CT head for any acute change in neuro exam  - Medical management and supportive care per primary team. Correction of any " metabolic or infectious disturbances.     Neurology follow-up:  Drew Santos will need follow-up in in 6 weeks with neurovascular team for Other in 60 minute appointment. They will not require outpatient neurological testing.     Message sent to schedulers    Subjective   Patient unable to offer complaints, unable to state his name.    Objective :  Temp:  [96.9 °F (36.1 °C)-97.9 °F (36.6 °C)] 96.9 °F (36.1 °C)  HR:  [66-81] 81  BP: ()/(65-72) 113/70  Resp:  [16-18] 18  SpO2:  [97 %-100 %] 97 %  O2 Device: None (Room air)    Physical Exam  Vitals and nursing note reviewed.   Constitutional:       General: He is not in acute distress.     Appearance: He is normal weight. He is ill-appearing. He is not diaphoretic.      Comments: Somnolent throughout exam, requiring repetitive verbal and tactile stimuli to awaken   HENT:      Head: Normocephalic and atraumatic.     Eyes:      General:         Right eye: No discharge.         Left eye: No discharge.      Conjunctiva/sclera: Conjunctivae normal.       Musculoskeletal:      Comments: Podus boots in place     Skin:     General: Skin is warm and dry.       Neurological Exam  Mental Status    Patient is asleep upon entering the room  Minimally opens eyes to repetitive verbal and tactile stimuli, quickly goes back to sleep  Lethargic throughout encounter  Able to state his first and last name  Unable to state location, month, or year  Repeatedly states the #89 when asked orientation questions  Difficulty with following both central and appendicular commands  Hypophonic speech noted  Mild to moderate dysarthria.    Cranial Nerves  Primary gaze midline  Possible disconjugate gaze, however eye exam limited to significant lethargy  Appears to track examiner, horizontal eye movements intact   No clear evidence of nystagmus  Blink to threat intact bilaterally  Difficulty assessing facial asymmetry as patient's head is leaning to the right with difficulty straightening  out.    Motor    Spontaneous movements noted in RUE, patient grabbing for blanket, lifting hand off the bed while keeping right elbow on the bed  Does not follow commands involving RUE  Minimal movement in LUE with proximal noxious stimuli, appears to slightly bend at the elbow and minimal shrug of shoulder    Able to wiggle right toes on command  No movement noted in LLE  No withdrawal to noxious stimuli of LLE.    Sensory  Grimaces to noxious stimuli of LUE  No grimace to noxious stimuli of LLE.    Reflexes  Action tremor noted in RUE when attempting to grab blanket    No rhythmic seizure-like activity noted throughout exam.    Coordination    Unable to assess coordination as patient had difficulty following commands.      Lab Results: I have personally reviewed pertinent reports.  Recent Results (from the past 24 hours)   APTT    Collection Time: 06/22/25  9:40 AM   Result Value Ref Range    PTT 49 (H) 23 - 34 seconds   Fingerstick Glucose (POCT)    Collection Time: 06/22/25 11:32 AM   Result Value Ref Range    POC Glucose 174 (H) 65 - 140 mg/dl   APTT    Collection Time: 06/22/25  2:32 PM   Result Value Ref Range    PTT 55 (H) 23 - 34 seconds   Echo complete w/ contrast if indicated    Collection Time: 06/22/25  4:32 PM   Result Value Ref Range    RAA A4C 13.6 cm2    LA Volume Index (BP) 18.6 mL/m2    MV Peak A Pardeep 0.91 m/s    MV Peak E Pardeep 84 cm/s    AV peak gradient 13 mmHg    LVOT stroke volume 99.70     Ao VTI 36.17 cm    Aortic valve peak velocity 1.77 m/s    LVOT peak VTI 22.05 cm    LVOT peak pardeep 0.96 m/s    LVOT diameter 2.4 cm    E wave deceleration time 240 ms    E/A ratio 0.92     AV LVOT peak gradient 4 mmHg    AV mean gradient 6 mmHg    AV area peak pardeep 2.5 cm2    AV area by cont VTI 2.8 cm2    LVOT mn grad 2.0 mmHg    RVID d 3.8 cm    A4C EF 54 %    Aortic valve mean velocity 11.70 m/s    Left ventricular stroke volume (2D) 69.00 mL    IVSd 1.20 cm    Tricuspid annular plane systolic excursion 2.30  cm    Ao root 3.50 cm    LVPWd 1.20 cm    LA size 3.9 cm    LA volume (BP) 45 mL    FS 53 28 - 44    LVIDS 2.00 cm    IVS 1.7 cm    LVIDd 4.30 cm    LA length (A2C) 5.10 cm    LEFT VENTRICLE SYSTOLIC VOLUME (MOD BIPLANE) 2D 13 mL    LV DIASTOLIC VOLUME (MOD BIPLANE) 2D 82 mL    LVOT Cardiac Index 3.02 l/min/m2    LVOT stroke volume index 42.10 ml/m2    LVOT Cardiac Output 7.31 l/min    Left Atrium Area-systolic Four Chamber 16.6 cm2    Left Atrium Area-systolic Apical Two Chamber 17.3 cm2    MV E' Tissue Velocity Lateral 11 cm/s    MV E' Tissue Velocity Septal 9 cm/s    LVSV, 2D 69 mL    BSA 2.41 m2    LVOT area 4.52 cm2    DVI 0.61     AV valve area 2.76 cm2    LV EF 60    Fingerstick Glucose (POCT)    Collection Time: 06/22/25  5:36 PM   Result Value Ref Range    POC Glucose 144 (H) 65 - 140 mg/dl   APTT    Collection Time: 06/22/25  7:40 PM   Result Value Ref Range    PTT 57 (H) 23 - 34 seconds   APTT    Collection Time: 06/23/25  7:52 AM   Result Value Ref Range    PTT 63 (H) 23 - 34 seconds   Basic metabolic panel    Collection Time: 06/23/25  7:52 AM   Result Value Ref Range    Sodium 137 135 - 147 mmol/L    Potassium 4.3 3.5 - 5.3 mmol/L    Chloride 107 96 - 108 mmol/L    CO2 25 21 - 32 mmol/L    ANION GAP 5 4 - 13 mmol/L    BUN 27 (H) 5 - 25 mg/dL    Creatinine 0.87 0.60 - 1.30 mg/dL    Glucose 167 (H) 65 - 140 mg/dL    Calcium 8.6 8.4 - 10.2 mg/dL    eGFR 83 ml/min/1.73sq m   CBC and differential    Collection Time: 06/23/25  7:53 AM   Result Value Ref Range    WBC 8.86 4.31 - 10.16 Thousand/uL    RBC 2.56 (L) 3.88 - 5.62 Million/uL    Hemoglobin 7.4 (L) 12.0 - 17.0 g/dL    Hematocrit 25.1 (L) 36.5 - 49.3 %    MCV 98 82 - 98 fL    MCH 28.9 26.8 - 34.3 pg    MCHC 29.5 (L) 31.4 - 37.4 g/dL    RDW 17.2 (H) 11.6 - 15.1 %    MPV 9.4 8.9 - 12.7 fL    Platelets 363 149 - 390 Thousands/uL    nRBC 0 /100 WBCs    Segmented % 64 43 - 75 %    Immature Grans % 0 0 - 2 %    Lymphocytes % 23 14 - 44 %    Monocytes % 7 4 -  12 %    Eosinophils Relative 5 0 - 6 %    Basophils Relative 1 0 - 1 %    Absolute Neutrophils 5.69 1.85 - 7.62 Thousands/µL    Absolute Immature Grans 0.03 0.00 - 0.20 Thousand/uL    Absolute Lymphocytes 2.02 0.60 - 4.47 Thousands/µL    Absolute Monocytes 0.61 0.17 - 1.22 Thousand/µL    Eosinophils Absolute 0.47 0.00 - 0.61 Thousand/µL    Basophils Absolute 0.04 0.00 - 0.10 Thousands/µL   Fingerstick Glucose (POCT)    Collection Time: 06/23/25  8:02 AM   Result Value Ref Range    POC Glucose 172 (H) 65 - 140 mg/dl     Imaging Studies: I have personally reviewed pertinent reports and I have personally reviewed pertinent films in PACS.    EKG, Pathology, and Other Studies: I have personally reviewed pertinent reports.    VTE Pharmacologic Prophylaxis: Heparin    Dictation voice to text software has been used in the creation of this document.  Please consider this in light of any contextual or grammatical errors.

## 2025-06-23 NOTE — WOUND OSTOMY CARE
Consult Note - Wound   Drew Santos 74 y.o. male MRN: [id number]  Unit/Bed#: E4 -01 Encounter: [id number]      History and Present Illness:  74 year old male presented to the hospital with increased weakness and change in mentation.     Patient history significant for CVA, colostomy, PEG tube, anemia, HTN, urinary retention with hanks catheter, DM, sacral stage 4 pressure injury, atrial fibrillation.    Assessment Findings:   Patient agreeable to assessment--able to nod yes/no appropriately to questions.  Dependent for turning/repositioning in bed with ATR positioning system in use.  Awaiting delivery of P500 low air-loss mattress.  Hanks catheter in place.  Colostomy with 1 piece pouching system intact with no signs of leakage, brown stool in pouch.  Nutrition team following, patient receiving tube feeding, dietary supplements ordered.  Bilateral heels intact and blanchable--left heel with recently healed, blanchable area--preventative foam dressings and offloading heel boots in place.  Skin folds intact.  Present on admission stage 4 pressure injury to sacrum--50% beefy red, 10% pink, 40% yellow with moderate serosanguinous drainage.  Cherelle-wound macerated with dark hyperpigmentation and recently epithelialized area to right buttock.  No induration, fluctuance, undermining or tunneling at this time.    Present on admission unstageable pressure injury to left buttock--wound bed with 100% yellow slough.  Cherelle-wound intact with pink and hyperpigmented intact epidermis.  No induration or fluctuance.  No drainage.      See flowsheet for wound details.  Above wound with no signs of infection at this time.      Wound Care Plan:   1-Apply silicone bordered foam dressings to bilateral heels for prevention.  Eduardo with P.  Peel back for skin assessments at least daily and re-apply.  Change dressings every 3 days and as needed.  2-Elevate heels off of bed/chair surface--offloading heel boots.  3-P500 low air-loss mattress  with ATR  positioning system.  4-Apply moisturizing skin cream to body daily and as needed.  5-Turn/reposition every 2 hours for pressure re-distribution on skin.   6-Routine ostomy care.  7-Left buttock and mid sacrum--apply Vashe soaked gauze for 5 minutes, remove, pat dry.  Apply skin prep to carmen-wound area.  Fluff silver alginate into wound bed and cover with silicone bordered foam dressing.  Change dressing daily and as needed with soilage.    Wound care team to follow.  Plan of care reviewed with primary RN.    Patient should follow-up at the wound center on discharge.    Wound 06/20/25 Pressure Injury Coccyx (Active)   Wound Image   06/23/25 1149   Wound Description Beefy red;Yellow 06/23/25 1149   Pressure Injury Stage 4 06/23/25 1149   Non-staged Wound Description Full thickness 06/23/25 1149   Wound Length (cm) 5 cm 06/23/25 1149   Wound Width (cm) 6 cm 06/23/25 1149   Wound Depth (cm) 0.8 cm 06/23/25 1149   Wound Surface Area (cm^2) 23.56 cm^2 06/23/25 1149   Wound Volume (cm^3) 12.566 cm^3 06/23/25 1149   Calculated Wound Volume (cm^3) 24 cm^3 06/23/25 1149   Drainage Amount Moderate 06/23/25 1149   Drainage Description Serosanguineous 06/23/25 1149   Carmen-wound Assessment Hyperpigmented 06/23/25 1149   Treatments Cleansed;Irrigation with NSS 06/23/25 1149   Dressing Calcium Alginate with Silver;Foam, Silicon (eg. Allevyn, etc) 06/23/25 1149   Dressing Changed Changed 06/23/25 1149   Patient Tolerance Tolerated well 06/23/25 1149   Dressing Status Clean;Dry;Intact 06/23/25 1149       Wound 06/23/25 Pressure Injury Buttocks Left (Active)   Wound Image   06/23/25 1153   Wound Description Yellow;Slough 06/23/25 1153   Pressure Injury Stage U 06/23/25 1153   Wound Length (cm) 0.7 cm 06/23/25 1153   Wound Width (cm) 1.2 cm 06/23/25 1153   Wound Depth (cm) 0.1 cm 06/23/25 1153   Wound Surface Area (cm^2) 0.66 cm^2 06/23/25 1153   Wound Volume (cm^3) 0.044 cm^3 06/23/25 1153   Calculated Wound Volume (cm^3)  0.08 cm^3 06/23/25 1153   Drainage Amount None 06/23/25 1153   Cherelle-wound Assessment Hyperpigmented;Pink 06/23/25 1153   Treatments Cleansed 06/23/25 1153   Dressing Calcium Alginate with Silver;Foam, Silicon (eg. Allevyn, etc) 06/23/25 1153   Dressing Changed New 06/23/25 1153   Patient Tolerance Tolerated well 06/23/25 1153   Dressing Status Clean;Dry;Intact 06/23/25 1153       Genevieve Mcdonald RN, BSN, CWON

## 2025-06-23 NOTE — ASSESSMENT & PLAN NOTE
Patient with hx of Afib, CVA x2 with left-sided deficits, bedbound, PEG, colostomy, chronic indwelling catheter, St IV sacral decubiti, transferred from [facility] for increased weakness and AMS    CTH showing acute to subacute left PCA distribution infarct.   MRI of the brain confirms stroke  2D Echo unremarkable, deferred JOSEFINA with plans for dual AP and DOAC  PTA Maintained on ASA, Eliquis and high intensity statin  Neurology suspects Eliquis failure, transition eliquis to heparin gtt  Plan to repeat CT imaging tomorrow, if stable can transition to pradaxa tomorrow  Will need to follow outpatient neurology in 6 weeks

## 2025-06-23 NOTE — SPEECH THERAPY NOTE
Speech Language/Pathology    Speech/Language Pathology Progress Note    Patient Name: Drew Santos  Today's Date: 6/23/2025     Problem List  Principal Problem:    CVA (cerebrovascular accident) (HCC)  Active Problems:    Urinary retention    Primary hypertension    Decubitus ulcer of sacral region, stage 4 (HCC)    Type 2 diabetes mellitus without complication, without long-term current use of insulin (HCC)    Paroxysmal atrial fibrillation (HCC)    Anemia of chronic disease    Severe protein-calorie malnutrition (HCC)    S/P percutaneous endoscopic gastrostomy (PEG) tube placement (HCC)    Colostomy in place (HCC)       Past Medical History  Past Medical History[1]     Past Surgical History  Past Surgical History[2]      Subjective:  Pt more alert than I have seen on previous stays. Verbalized and smiled. When I returned and spoke w/ wife he was not as interactive.   Objective:  Pt well known to me. Diet at complete Care was NDD3 dysphagia advanced and nectar thick liquids. Received only a lunch tray had PEG feedings. Wife stated he refused to drink the nectar. He was seen yesterday by another SLP and tolerated thin well. Seen again today w/ thin juice by straw. Prompt transfer and swallow. No cough. Known pt likes fresh fruit, as wife has always brought fruit in for the pt. He only eats certain foods. He did not want much of the lunch he received (minced meat, chopped green beans,mashed potatoes, ...and  pureed pear which he refused).Wife stated he likes the fresh fruit cup. Known to chew slowly w/ munch-like mastication. Does best w/ wife.   Assessment:  Appeared similar or improved since last seen by this SLP.  Plan/Recommendations:  Regular diet w/ thin liquids. Full supervision for meals. Set up tray/assist as needed. Adjust PEG feedings accordingly. ST to f/u.          [1]   Past Medical History:  Diagnosis Date    Atherosclerotic heart disease of native coronary artery without angina pectoris     Atrial  fibrillation (HCC)     Bacteremia     Cerebral infarction (HCC)     Constipation     Depression     Diabetes mellitus (HCC)     Hemiplegia and hemiparesis following cerebral infarction affecting left non-dominant side (HCC)     Hyperlipidemia     Hypertension     Osteomyelitis (HCC)     Pressure ulcer of sacral region, stage 3 (HCC)     Prostatic hyperplasia     Sepsis (HCC)     Stage 4 decubitus ulcer (HCC)     Stroke (HCC)     TIA (transient ischemic attack)     Urinary retention     Vitamin D deficiency    [2]   Past Surgical History:  Procedure Laterality Date    COLOSTOMY N/A 10/23/2024    Procedure: Lap loop colostomy, psb open loop colostomy;  Surgeon: Davonte Banegas DO;  Location: [facility];  Service: General    GASTROSTOMY TUBE PLACEMENT N/A 10/23/2024    Procedure: INSERTION PEG TUBE, psb lap gastrostomy tube placement;  Surgeon: Davonte Banegas DO;  Location: [facility];  Service: General    INCISION AND DRAINAGE OF WOUND N/A 10/21/2024    Procedure: INCISION AND DRAINAGE (I&D) BUTTOCK, SACRAL WOUND DEBRIDEMENT, COCCYGECTOMY;  Surgeon: Beka King MD;  Location: [facility];  Service: General    WOUND DEBRIDEMENT N/A 10/23/2024    Procedure: DEBRIDEMENT WOUND (WASH OUT), sacrum;  Surgeon: Davonte Banegas DO;  Location: [facility];  Service: General

## 2025-06-23 NOTE — PROGRESS NOTES
Progress Note - Hospitalist   Name: Drew Santos 74 y.o. male I MRN: [id number]  Unit/Bed#: E4 -01 I Date of Admission: 6/20/2025   Date of Service: 6/23/2025 I Hospital Day: 3     Assessment & Plan  CVA (cerebrovascular accident) (HCC)  Patient with hx of Afib, CVA x2 with left-sided deficits, bedbound, PEG, colostomy, chronic indwelling catheter, St IV sacral decubiti, transferred from [facility] for increased weakness and AMS    CTH showing acute to subacute left PCA distribution infarct.   MRI of the brain confirms stroke  2D Echo unremarkable, deferred JOSEFINA with plans for dual AP and DOAC  PTA Maintained on ASA, Eliquis and high intensity statin  Neurology suspects Eliquis failure, transition eliquis to heparin gtt  Plan to repeat CT imaging tomorrow, if stable can transition to pradaxa tomorrow  Will need to follow outpatient neurology in 6 weeks  Decubitus ulcer of sacral region, stage 4 (HCC)  POA. Previous admissions for polymicrobial bacteremia and OM, previous wound VAC.  Follows with surgery for wound care  Offload.  Turn and reposition.  Local wound care  Wound care following  General surgery recommend localized wound care as they state it appears not infected  Type 2 diabetes mellitus without complication, without long-term current use of insulin ([facility])  Lab Results   Component Value Date    HGBA1C 5.4 06/20/2025     Continue PTA Humalog 5 units QID. On tube feeding, add sliding scale insulin Q6H    Recent Labs     06/22/25  1132 06/22/25  1736 06/23/25  0802 06/23/25  1041   POCGLU 174* 144* 172* 175*       Blood Sugar Average: Last 72 hrs:  (P) [id number]    Paroxysmal atrial fibrillation (HCC)  On rate control with metoprolol 12.5mg bid  Prior to admission on Eliquis, continue heparin  Urinary retention  Urinary retention managed with chronic Parra catheter.  Catheter exchanged at facility  Monitor urine output  Primary hypertension  BP soft, on metoprolol with hold  parameters  Anemia of chronic disease  Chronic anemia with thrombocytosis.  Baseline 7-8  FIT Test Normal  S/P 1 unit transfused, no evidence of infection  Anemia labs ordered  Severe protein-calorie malnutrition (HCC)  Malnutrition Findings: Body mass index is 28.34 kg/m².   S/p PEG.  Continue tube feeding along with regular diet  Nutrition consult  S/P percutaneous endoscopic gastrostomy (PEG) tube placement ([facility])  S/p Peg Oct 2024.  Diet at facility:  Tube feeding Jevity 1.2 85 mL/h x22hrs, water flush 150 mL every 4 hours  Oral Regular diet  Continue tube feeds  Colostomy in place ([facility])  S/p loop diverting colostomy; placed due to sacral decubiti ulcer with suspected coccygeal OM   Ostomy care.  Monitor stool output    VTE Pharmacologic Prophylaxis:   Pharmacologic: Heparin Drip  Mechanical VTE Prophylaxis in Place: Yes    Current Length of Stay: 3 day(s)    Current Patient Status: Inpatient   Certification Statement: The patient will continue to require additional inpatient hospital stay due to pending repeat CT    Discharge Plan: 48 hours    Code Status: Level 1 - Full Code      Subjective:   No events overnight. Able to work with speech therapy today. Diet advanced. Wife at bedside, questions answered.    Objective:     Vitals:   Temp (24hrs), Av.3 °F (36.3 °C), Min:96.9 °F (36.1 °C), Max:97.7 °F (36.5 °C)    Temp:  [96.9 °F (36.1 °C)-97.7 °F (36.5 °C)] 97.7 °F (36.5 °C)  HR:  [66-81] 73  Resp:  [16-18] 18  BP: ()/(62-72) 95/62  SpO2:  [97 %-100 %] 100 %  Body mass index is 28.34 kg/m².     Input and Output Summary (last 24 hours):       Intake/Output Summary (Last 24 hours) at 2025 1550  Last data filed at 2025 0505  Gross per 24 hour   Intake 100 ml   Output 1325 ml   Net -1225 ml       Physical Exam:     Physical Exam  Vitals and nursing note reviewed.   Constitutional:       Appearance: He is ill-appearing (chronically).   HENT:      Head: Normocephalic.     Eyes:       Conjunctiva/sclera: Conjunctivae normal.       Cardiovascular:      Rate and Rhythm: Normal rate.   Pulmonary:      Effort: Pulmonary effort is normal.      Breath sounds: No wheezing.   Abdominal:      General: Bowel sounds are normal. There is no distension.      Palpations: Abdomen is soft.      Comments: G Tube, Ostomy   Genitourinary:     Comments: Parra catheter    Musculoskeletal:         General: No swelling.      Right lower leg: No edema.      Left lower leg: No edema.     Skin:     General: Skin is warm.      Comments: Feet in podus     Neurological:      Mental Status: He is alert. Mental status is at baseline.         Additional Data:     Labs:    Results from last 7 days   Lab Units 06/23/25  0753   WBC Thousand/uL 8.86   HEMOGLOBIN g/dL 7.4*   HEMATOCRIT % 25.1*   PLATELETS Thousands/uL 363   SEGS PCT % 64   LYMPHO PCT % 23   MONO PCT % 7   EOS PCT % 5     Results from last 7 days   Lab Units 06/23/25  0752 06/21/25  0545 06/20/25  1655   SODIUM mmol/L 137   < > 135   POTASSIUM mmol/L 4.3   < > 4.4   CHLORIDE mmol/L 107   < > 104   CO2 mmol/L 25   < > 25   BUN mg/dL 27*   < > 37*   CREATININE mg/dL 0.87   < > 0.91   ANION GAP mmol/L 5   < > 6   CALCIUM mg/dL 8.6   < > 9.0   ALBUMIN g/dL  --   --  2.7*   TOTAL BILIRUBIN mg/dL  --   --  1.01*   ALK PHOS U/L  --   --  64   ALT U/L  --   --  9   AST U/L  --   --  16   GLUCOSE RANDOM mg/dL 167*   < > 136    < > = values in this interval not displayed.     Results from last 7 days   Lab Units 06/20/25  1913   INR  1.35*     Results from last 7 days   Lab Units 06/23/25  1041 06/23/25  0802 06/22/25  1736 06/22/25  1132 06/22/25  0716 06/22/25  0513 06/22/25  0048 06/21/25  2151 06/21/25  1609 06/21/25  1153 06/21/25  0751 06/21/25  0603   POC GLUCOSE mg/dl 175* 172* 144* 174* 142* 101 122 119 77 127 122 114     Results from last 7 days   Lab Units 06/20/25  9977   HEMOGLOBIN A1C % 5.4               * I Have Reviewed All Lab Data Listed Above.  * Additional  Pertinent Lab Tests Reviewed: All Labs For Current Hospital Admission Reviewed    Mobility:  Basic Mobility Inpatient Raw Score: 6  -[facility] Goal: 2: Bed activities/Dependent transfer  -[facility] Achieved: 1: Laying in bed    Lines:     Invasive Devices       Peripheral Intravenous Line  Duration             Peripheral IV 06/20/25 Left;Ventral (anterior) Forearm 2 days    Peripheral IV 06/21/25 Proximal;Right;Ventral (anterior) Forearm 2 days              Drain  Duration             Colostomy Loop  days    Gastrostomy/Enterostomy  days    Urethral Catheter 3 days                       Imaging:    Imaging Reports Reviewed Today Include:     CTA head and neck w wo contrast  Result Date: 6/21/2025  Impression: CT Brain: Acute/recent infarcts involving the left occipital lobe, and the left thalamus, corresponding to the findings on brain MRI. Additional punctate infarcts involving the left cerebral hemisphere and left middle cerebellar peduncle are harder to visualize on this examination. No acute intracranial hemorrhage noted. CT Angiography: Occlusion of the left posterior cerebral artery P3 segment. Occlusion of the distal intradural right vertebral artery near the vertebrobasilar junction. Focal high-grade stenosis or short segment occlusion of the bilateral cervical vertebral artery origins due to atherosclerotic plaque. Remaining cervical vertebral arteries appear widely patent. The study was marked in EPIC for immediate notification. Workstation performed: [id number]     MRI brain wo contrast  Result Date: 6/21/2025  Impression: Multiple acute/recent infarcts involving the left cerebral hemisphere, the largest involving the left occipital lobe, left thalamus, and left middle cerebellar peduncle, with associated cytotoxic edema. No associated hemorrhage noted. Mild chronic white matter microangiopathic changes involving both cerebral hemispheres. Paranasal sinus disease as described above, worst involving  the left maxillary sinus. Workstation performed: [id number]     XR chest 1 view portable  Result Date: 6/21/2025  Impression: No acute cardiopulmonary disease. Resident: Siva Menjivar I, the attending radiologist, have reviewed the images and agree with the final report above. Workstation performed: [id number]     CT head without contrast  Result Date: 6/20/2025  Impression: Acute to subacute left PCA distribution infarct. No confluent secondary parenchymal hematoma. Findings are new since the prior examination. The study was marked in EPIC for immediate notification. Workstation performed: [id number]         Recent Cultures (last 7 days):           Last 24 Hours Medication List:   Current Facility-Administered Medications   Medication Dose Route Frequency Provider Last Rate    Acetaminophen  650 mg Per G Tube Q4H PRN Ifeoma Smart, JUANNP      aluminum-magnesium hydroxide-simethicone  30 mL Per G Tube Q6H PRN Ifeoma Smart, JUANNP      ascorbic acid  250 mg Per G Tube Daily fIeoma Smart, JUANNP      aspirin  81 mg Per PEG Tube Daily Ifeoma Smart, CRNP      atorvastatin  80 mg Per G Tube Daily Ifeoma Smart, CRNP      bisacodyl  10 mg Rectal Daily PRN Ifeoma Smart, CRNP      Cholecalciferol  1,000 Units Per G Tube Daily Ifeoma Smart, CRNP      docusate  200 mg Per G Tube Daily Ifeoma Smart, CRNP      escitalopram  10 mg Per PEG Tube Daily Ifeoma Smart, CRNP      ezetimibe  10 mg Per G Tube Daily Ifeoma Smart, CRNP      Ferrous Sulfate  300 mg Per G Tube Daily Ifeoma Smart, AVELINA      heparin  3-24 Units/kg/hr Intravenous Titrated Ahsan Rangel PA-C 9 Units/kg/hr (06/22/25 1630)    insulin lispro  1-6 Units Subcutaneous 4x Daily (AC & HS) Willem Gutierrez PA-C      insulin lispro  5 Units Subcutaneous Q6H ELISEO AVELINA Louie      Loratadine  10 mg Per G Tube Daily Ifeoma Smart, AVELINA      metoprolol tartrate  12.5 mg Per G Tube Q12H [facility] Ifeoma  AVELINA Smart      mirtazapine  7.5 mg Per G Tube HS AVELINA Louie      Multivitamin  15 mL Per G Tube Daily AVELINA Louie      ondansetron  4 mg Intravenous Q6H PRN AVELINA Louie      saccharomyces boulardii  250 mg Per G Tube BID AVELINA Louie      thiamine  100 mg Per G Tube Daily Taye Rodriguez, DO          Today, Patient Was Seen By: Chito Mora MD    ** Please Note: Dictation voice to text software may have been used in the creation of this document. **

## 2025-06-23 NOTE — ASSESSMENT & PLAN NOTE
Malnutrition Findings: Body mass index is 28.34 kg/m².   S/p PEG.  Continue tube feeding along with regular diet  Nutrition consult

## 2025-06-23 NOTE — PLAN OF CARE
Problem: PAIN - ADULT  Goal: Verbalizes/displays adequate comfort level or baseline comfort level  Description: Interventions:  - Encourage patient to monitor pain and request assistance  - Assess pain using appropriate pain scale  - Administer analgesics as ordered based on type and severity of pain and evaluate response  - Implement non-pharmacological measures as appropriate and evaluate response  - Consider cultural and social influences on pain and pain management  - Notify physician/advanced practitioner if interventions unsuccessful or patient reports new pain  - Educate patient/family on pain management process including their role and importance of  reporting pain   - Provide non-pharmacologic/complimentary pain relief interventions  Outcome: Progressing     Problem: INFECTION - ADULT  Goal: Absence or prevention of progression during hospitalization  Description: INTERVENTIONS:  - Assess and monitor for signs and symptoms of infection  - Monitor lab/diagnostic results  - Monitor all insertion sites, i.e. indwelling lines, tubes, and drains  - Monitor endotracheal if appropriate and nasal secretions for changes in amount and color  - [place] appropriate cooling/warming therapies per order  - Administer medications as ordered  - Instruct and encourage patient and family to use good hand hygiene technique  - Identify and instruct in appropriate isolation precautions for identified infection/condition  Outcome: Progressing  Goal: Absence of fever/infection during neutropenic period  Description: INTERVENTIONS:  - Monitor WBC  - Perform strict hand hygiene  - Limit to healthy visitors only  - No plants, dried, fresh or silk flowers with gonzalez in patient room  Outcome: Progressing     Problem: SAFETY ADULT  Goal: Patient will remain free of falls  Description: INTERVENTIONS:  - Educate patient/family on patient safety including physical limitations  - Instruct patient to call for assistance with activity   -  Consider consulting OT/PT to assist with strengthening/mobility based on AM PAC & JH-HLM score  - Consult OT/PT to assist with strengthening/mobility   - Keep Call bell within reach  - Keep bed low and locked with side rails adjusted as appropriate  - Keep care items and personal belongings within reach  - Initiate and maintain comfort rounds  - Make Fall Risk Sign visible to staff  - Offer Toileting every 3 Hours, in advance of need  - Initiate/Maintain bed alarm  - Obtain necessary fall risk management equipment: alarm   - Apply yellow socks and bracelet for high fall risk patients  - Consider moving patient to room near nurses station  Outcome: Progressing  Goal: Maintain or return to baseline ADL function  Description: INTERVENTIONS:  -  Assess patient's ability to carry out ADLs; assess patient's baseline for ADL function and identify physical deficits which impact ability to perform ADLs (bathing, care of mouth/teeth, toileting, grooming, dressing, etc.)  - Assess/evaluate cause of self-care deficits   - Assess range of motion  - Assess patient's mobility; develop plan if impaired  - Assess patient's need for assistive devices and provide as appropriate  - Encourage maximum independence but intervene and supervise when necessary  - Involve family in performance of ADLs  - Assess for home care needs following discharge   - Consider OT consult to assist with ADL evaluation and planning for discharge  - Provide patient education as appropriate  - Monitor functional capacity and physical performance, use of AM PAC & JH-HLM   - Monitor gait, balance and fatigue with ambulation    Outcome: Progressing  Goal: Maintains/Returns to pre admission functional level  Description: INTERVENTIONS:  - Perform AM-PAC 6 Click Basic Mobility/ Daily Activity assessment daily.  - Set and communicate daily mobility goal to care team and patient/family/caregiver.   - Collaborate with rehabilitation services on mobility goals if  consulted  - Perform Range of Motion 3 times a day.  - Reposition patient every 3 hours.  - Dangle patient 3 times a day  - Stand patient 3 times a day  - Ambulate patient 3 times a day  - Out of bed to chair 3 times a day   - Out of bed for meals 3 times a day  - Out of bed for toileting  - Record patient progress and toleration of activity level   Outcome: Progressing     Problem: DISCHARGE PLANNING  Goal: Discharge to home or other facility with appropriate resources  Description: INTERVENTIONS:  - Identify barriers to discharge w/patient and caregiver  - Arrange for needed discharge resources and transportation as appropriate  - Identify discharge learning needs (meds, wound care, etc.)  - Arrange for interpretive services to assist at discharge as needed  - Refer to Case Management Department for coordinating discharge planning if the patient needs post-hospital services based on physician/advanced practitioner order or complex needs related to functional status, cognitive ability, or social support system  Outcome: Progressing     Problem: Knowledge Deficit  Goal: Patient/family/caregiver demonstrates understanding of disease process, treatment plan, medications, and discharge instructions  Description: Complete learning assessment and assess knowledge base.  Interventions:  - Provide teaching at level of understanding  - Provide teaching via preferred learning methods  Outcome: Progressing     Problem: NEUROSENSORY - ADULT  Goal: Achieves stable or improved neurological status  Description: INTERVENTIONS  - Monitor and report changes in neurological status  - Monitor vital signs such as temperature, blood pressure, glucose, and any other labs ordered   - Initiate measures to prevent increased intracranial pressure  - Monitor for seizure activity and implement precautions if appropriate      Outcome: Progressing  Goal: Achieves maximal functionality and self care  Description: INTERVENTIONS  - Monitor  swallowing and airway patency with patient fatigue and changes in neurological status  - Encourage and assist patient to increase activity and self care.   - Encourage visually impaired, hearing impaired and aphasic patients to use assistive/communication devices  Outcome: Progressing     Problem: CARDIOVASCULAR - ADULT  Goal: Maintains optimal cardiac output and hemodynamic stability  Description: INTERVENTIONS:  - Monitor I/O, vital signs and rhythm  - Monitor for S/S and trends of decreased cardiac output  - Administer and titrate ordered vasoactive medications to optimize hemodynamic stability  - Assess quality of pulses, skin color and temperature  - Assess for signs of decreased coronary artery perfusion  - Instruct patient to report change in severity of symptoms  Outcome: Progressing  Goal: Absence of cardiac dysrhythmias or at baseline rhythm  Description: INTERVENTIONS:  - Continuous cardiac monitoring, vital signs, obtain 12 lead EKG if ordered  - Administer antiarrhythmic and heart rate control medications as ordered  - Monitor electrolytes and administer replacement therapy as ordered  Outcome: Progressing     Problem: RESPIRATORY - ADULT  Goal: Achieves optimal ventilation and oxygenation  Description: INTERVENTIONS:  - Assess for changes in respiratory status  - Assess for changes in mentation and behavior  - Position to facilitate oxygenation and minimize respiratory effort  - Oxygen administered by appropriate delivery if ordered  - Initiate smoking cessation education as indicated  - Encourage broncho-pulmonary hygiene including cough, deep breathe, Incentive Spirometry  - Assess the need for suctioning and aspirate as needed  - Assess and instruct to report SOB or any respiratory difficulty  - Respiratory Therapy support as indicated  Outcome: Progressing     Problem: GASTROINTESTINAL - ADULT  Goal: Minimal or absence of nausea and/or vomiting  Description: INTERVENTIONS:  - Administer IV fluids  if ordered to ensure adequate hydration  - Maintain NPO status until nausea and vomiting are resolved  - Nasogastric tube if ordered  - Administer ordered antiemetic medications as needed  - Provide nonpharmacologic comfort measures as appropriate  - Advance diet as tolerated, if ordered  - Consider nutrition services referral to assist patient with adequate nutrition and appropriate food choices  Outcome: Progressing  Goal: Maintains or returns to baseline bowel function  Description: INTERVENTIONS:  - Assess bowel function  - Encourage oral fluids to ensure adequate hydration  - Administer IV fluids if ordered to ensure adequate hydration  - Administer ordered medications as needed  - Encourage mobilization and activity  - Consider nutritional services referral to assist patient with adequate nutrition and appropriate food choices  Outcome: Progressing  Goal: Maintains adequate nutritional intake  Description: INTERVENTIONS:  - Monitor percentage of each meal consumed  - Identify factors contributing to decreased intake, treat as appropriate  - Assist with meals as needed  - Monitor I&O, weight, and lab values if indicated  - Obtain nutrition services referral as needed  Outcome: Progressing  Goal: Establish and maintain optimal ostomy function  Description: INTERVENTIONS:  - Assess bowel function  - Encourage oral fluids to ensure adequate hydration  - Administer IV fluids if ordered to ensure adequate hydration   - Administer ordered medications as needed  - Encourage mobilization and activity  - Nutrition services referral to assist patient with appropriate food choices  - Assess stoma site  - Consider wound care consult   Outcome: Progressing  Goal: Oral mucous membranes remain intact  Description: INTERVENTIONS  - Assess oral mucosa and hygiene practices  - Implement preventative oral hygiene regimen  - Implement oral medicated treatments as ordered  - Initiate Nutrition services referral as needed  Outcome:  Progressing     Problem: GENITOURINARY - ADULT  Goal: Maintains or returns to baseline urinary function  Description: INTERVENTIONS:  - Assess urinary function  - Encourage oral fluids to ensure adequate hydration if ordered  - Administer IV fluids as ordered to ensure adequate hydration  - Administer ordered medications as needed  - Offer frequent toileting  - Follow urinary retention protocol if ordered  Outcome: Progressing  Goal: Absence of urinary retention  Description: INTERVENTIONS:  - Assess patient’s ability to void and empty bladder  - Monitor I/O  - Bladder scan as needed  - Discuss with physician/AP medications to alleviate retention as needed  - Discuss catheterization for long term situations as appropriate  Outcome: Progressing  Goal: Urinary catheter remains patent  Description: INTERVENTIONS:  - Assess patency of urinary catheter  - If patient has a chronic hanks, consider changing catheter if non-functioning  - Follow guidelines for intermittent irrigation of non-functioning urinary catheter  Outcome: Progressing     Problem: METABOLIC, FLUID AND ELECTROLYTES - ADULT  Goal: Electrolytes maintained within normal limits  Description: INTERVENTIONS:  - Monitor labs and assess patient for signs and symptoms of electrolyte imbalances  - Administer electrolyte replacement as ordered  - Monitor response to electrolyte replacements, including repeat lab results as appropriate  - Instruct patient on fluid and nutrition as appropriate  Outcome: Progressing  Goal: Fluid balance maintained  Description: INTERVENTIONS:  - Monitor labs   - Monitor I/O and WT  - Instruct patient on fluid and nutrition as appropriate  - Assess for signs & symptoms of volume excess or deficit  Outcome: Progressing  Goal: Glucose maintained within target range  Description: INTERVENTIONS:  - Monitor Blood Glucose as ordered  - Assess for signs and symptoms of hyperglycemia and hypoglycemia  - Administer ordered medications to  maintain glucose within target range  - Assess nutritional intake and initiate nutrition service referral as needed  Outcome: Progressing     Problem: SKIN/TISSUE INTEGRITY - ADULT  Goal: Incision(s), wounds(s) or drain site(s) healing without S/S of infection  Description: INTERVENTIONS  - Assess and document dressing, incision, wound bed, drain sites and surrounding tissue  - Provide patient and family education  - Perform skin care/dressing changes every 3  Outcome: Progressing  Goal: Pressure injury heals and does not worsen  Description: Interventions:  - Implement low air loss mattress or specialty surface (Criteria met)  - Apply silicone foam dressing  - Instruct/assist with weight shifting every  minutes when in chair   - Limit chair time to  hour intervals  - Use special pressure reducing interventions such as  when in chair   - Apply fecal or urinary incontinence containment device   - Perform passive or active ROM every   - Turn and reposition patient & offload bony prominences every  hours   - Utilize friction reducing device or surface for transfers   - Consider consults to  interdisciplinary teams such as   - Use incontinent care products after each incontinent episode such as   - Consider nutrition services referral as needed  Outcome: Progressing     Problem: HEMATOLOGIC - ADULT  Goal: Maintains hematologic stability  Description: INTERVENTIONS  - Assess for signs and symptoms of bleeding or hemorrhage  - Monitor labs  - Administer supportive blood products/factors as ordered and appropriate  Outcome: Progressing     Problem: MUSCULOSKELETAL - ADULT  Goal: Maintain or return mobility to safest level of function  Description: INTERVENTIONS:  - Assess patient's ability to carry out ADLs; assess patient's baseline for ADL function and identify physical deficits which impact ability to perform ADLs (bathing, care of mouth/teeth, toileting, grooming, dressing, etc.)  - Assess/evaluate cause of self-care  deficits   - Assess range of motion  - Assess patient's mobility  - Assess patient's need for assistive devices and provide as appropriate  - Encourage maximum independence but intervene and supervise when necessary  - Involve family in performance of ADLs  - Assess for home care needs following discharge   - Consider OT consult to assist with ADL evaluation and planning for discharge  - Provide patient education as appropriate  Outcome: Progressing  Goal: Maintain proper alignment of affected body part  Description: INTERVENTIONS:  - Support, maintain and protect limb and body alignment  - Provide patient/ family with appropriate education  Outcome: Progressing     Problem: Prexisting or High Potential for Compromised Skin Integrity  Goal: Skin integrity is maintained or improved  Description: INTERVENTIONS:  - Identify patients at risk for skin breakdown  - Assess and monitor skin integrity including under and around medical devices   - Assess and monitor nutrition and hydration status  - Monitor labs  - Assess for incontinence   - Turn and reposition patient  - Assist with mobility/ambulation  - Relieve pressure over nate prominences   - Avoid friction and shearing  - Provide appropriate hygiene as needed including keeping skin clean and dry  - Evaluate need for skin moisturizer/barrier cream  - Collaborate with interdisciplinary team  - Patient/family teaching  - Consider wound care consult    Assess:  - Review Goldy scale daily  - Clean and moisturize skin every   - Inspect skin when repositioning, toileting, and assisting with ADLS  - Assess under medical devices such as  every   - Assess extremities for adequate circulation and sensation     Bed Management:  - Have minimal linens on bed & keep smooth, unwrinkled  - Change linens as needed when moist or perspiring  - Avoid sitting or lying in one position for more than  hours while in bed?Keep HOB at degrees   - Toileting:  - Offer bedside commode  - Assess  for incontinence every   - Use incontinent care products after each incontinent episode such as     Activity:  - Mobilize patient  times a day  - Encourage activity and walks on unit  - Encourage or provide ROM exercises   - Turn and reposition patient every  Hours  - Use appropriate equipment to lift or move patient in bed  - Instruct/ Assist with weight shifting every  when out of bed in chair  - Consider limitation of chair time  hour intervals    Skin Care:  - Avoid use of baby powder, tape, friction and shearing, hot water or constrictive clothing  - Relieve pressure over bony prominences using   - Do not massage red bony areas    Next Steps:  - Teach patient strategies to minimize risks such as   - Consider consults to  interdisciplinary teams such as  Outcome: Progressing     Problem: Nutrition/Hydration-ADULT  Goal: Nutrient/Hydration intake appropriate for improving, restoring or maintaining nutritional needs  Description: Monitor and assess patient's nutrition/hydration status for malnutrition. Collaborate with interdisciplinary team and initiate plan and interventions as ordered.  Monitor patient's weight and dietary intake as ordered or per policy. Utilize nutrition screening tool and intervene as necessary. Determine patient's food preferences and provide high-protein, high-caloric foods as appropriate.     INTERVENTIONS:  - Monitor oral intake, urinary output, labs, and treatment plans  - Assess nutrition and hydration status and recommend course of action  - Evaluate amount of meals eaten  - Assist patient with eating if necessary   - Allow adequate time for meals  - Recommend/ encourage appropriate diets, oral nutritional supplements, and vitamin/mineral supplements  - Order, calculate, and assess calorie counts as needed  - Recommend, monitor, and adjust tube feedings and TPN/PPN based on assessed needs  - Assess need for intravenous fluids  - Provide specific nutrition/hydration education as  appropriate  - Include patient/family/caregiver in decisions related to nutrition  Outcome: Progressing

## 2025-06-23 NOTE — ASSESSMENT & PLAN NOTE
Chronic anemia with thrombocytosis.  Baseline 7-8  FIT Test Normal  S/P 1 unit transfused, no evidence of infection  Anemia labs ordered

## 2025-06-23 NOTE — PLAN OF CARE
Problem: PAIN - ADULT  Goal: Verbalizes/displays adequate comfort level or baseline comfort level  Description: Interventions:  - Encourage patient to monitor pain and request assistance  - Assess pain using appropriate pain scale  - Administer analgesics as ordered based on type and severity of pain and evaluate response  - Implement non-pharmacological measures as appropriate and evaluate response  - Consider cultural and social influences on pain and pain management  - Notify physician/advanced practitioner if interventions unsuccessful or patient reports new pain  - Educate patient/family on pain management process including their role and importance of  reporting pain   - Provide non-pharmacologic/complimentary pain relief interventions  Outcome: Progressing     Problem: INFECTION - ADULT  Goal: Absence or prevention of progression during hospitalization  Description: INTERVENTIONS:  - Assess and monitor for signs and symptoms of infection  - Monitor lab/diagnostic results  - Monitor all insertion sites, i.e. indwelling lines, tubes, and drains  - Monitor endotracheal if appropriate and nasal secretions for changes in amount and color  - Forest appropriate cooling/warming therapies per order  - Administer medications as ordered  - Instruct and encourage patient and family to use good hand hygiene technique  - Identify and instruct in appropriate isolation precautions for identified infection/condition  Outcome: Progressing  Goal: Absence of fever/infection during neutropenic period  Description: INTERVENTIONS:  - Monitor WBC  - Perform strict hand hygiene  - Limit to healthy visitors only  - No plants, dried, fresh or silk flowers with gonzalez in patient room  Outcome: Progressing     Problem: SAFETY ADULT  Goal: Patient will remain free of falls  Description: INTERVENTIONS:  - Educate patient/family on patient safety including physical limitations  - Instruct patient to call for assistance with activity   -  Consider consulting OT/PT to assist with strengthening/mobility based on AM PAC & JH-HLM score  - Consult OT/PT to assist with strengthening/mobility   - Keep Call bell within reach  - Keep bed low and locked with side rails adjusted as appropriate  - Keep care items and personal belongings within reach  - Initiate and maintain comfort rounds  - Make Fall Risk Sign visible to staff  - Offer Toileting every  Hours, in advance of need  - Initiate/Maintain alarm  - Obtain necessary fall risk management equipment:   - Apply yellow socks and bracelet for high fall risk patients  - Consider moving patient to room near nurses station  Outcome: Progressing  Goal: Maintain or return to baseline ADL function  Description: INTERVENTIONS:  -  Assess patient's ability to carry out ADLs; assess patient's baseline for ADL function and identify physical deficits which impact ability to perform ADLs (bathing, care of mouth/teeth, toileting, grooming, dressing, etc.)  - Assess/evaluate cause of self-care deficits   - Assess range of motion  - Assess patient's mobility; develop plan if impaired  - Assess patient's need for assistive devices and provide as appropriate  - Encourage maximum independence but intervene and supervise when necessary  - Involve family in performance of ADLs  - Assess for home care needs following discharge   - Consider OT consult to assist with ADL evaluation and planning for discharge  - Provide patient education as appropriate  - Monitor functional capacity and physical performance, use of AM PAC & JH-HLM   - Monitor gait, balance and fatigue with ambulation    Outcome: Progressing  Goal: Maintains/Returns to pre admission functional level  Description: INTERVENTIONS:  - Perform AM-PAC 6 Click Basic Mobility/ Daily Activity assessment daily.  - Set and communicate daily mobility goal to care team and patient/family/caregiver.   - Collaborate with rehabilitation services on mobility goals if consulted  -  Perform Range of Motion  times a day.  - Reposition patient every hours.  - Dangle patient  times a day  - Stand patient  times a day  - Ambulate patient  times a day  - Out of bed to chair  times a day   - Out of bed for meals  times a day  - Out of bed for toileting  - Record patient progress and toleration of activity level   Outcome: Progressing     Problem: DISCHARGE PLANNING  Goal: Discharge to home or other facility with appropriate resources  Description: INTERVENTIONS:  - Identify barriers to discharge w/patient and caregiver  - Arrange for needed discharge resources and transportation as appropriate  - Identify discharge learning needs (meds, wound care, etc.)  - Arrange for interpretive services to assist at discharge as needed  - Refer to Case Management Department for coordinating discharge planning if the patient needs post-hospital services based on physician/advanced practitioner order or complex needs related to functional status, cognitive ability, or social support system  Outcome: Progressing     Problem: Knowledge Deficit  Goal: Patient/family/caregiver demonstrates understanding of disease process, treatment plan, medications, and discharge instructions  Description: Complete learning assessment and assess knowledge base.  Interventions:  - Provide teaching at level of understanding  - Provide teaching via preferred learning methods  Outcome: Progressing     Problem: NEUROSENSORY - ADULT  Goal: Achieves stable or improved neurological status  Description: INTERVENTIONS  - Monitor and report changes in neurological status  - Monitor vital signs such as temperature, blood pressure, glucose, and any other labs ordered   - Initiate measures to prevent increased intracranial pressure  - Monitor for seizure activity and implement precautions if appropriate      Outcome: Progressing  Goal: Achieves maximal functionality and self care  Description: INTERVENTIONS  - Monitor swallowing and airway patency  with patient fatigue and changes in neurological status  - Encourage and assist patient to increase activity and self care.   - Encourage visually impaired, hearing impaired and aphasic patients to use assistive/communication devices  Outcome: Progressing     Problem: CARDIOVASCULAR - ADULT  Goal: Maintains optimal cardiac output and hemodynamic stability  Description: INTERVENTIONS:  - Monitor I/O, vital signs and rhythm  - Monitor for S/S and trends of decreased cardiac output  - Administer and titrate ordered vasoactive medications to optimize hemodynamic stability  - Assess quality of pulses, skin color and temperature  - Assess for signs of decreased coronary artery perfusion  - Instruct patient to report change in severity of symptoms  Outcome: Progressing  Goal: Absence of cardiac dysrhythmias or at baseline rhythm  Description: INTERVENTIONS:  - Continuous cardiac monitoring, vital signs, obtain 12 lead EKG if ordered  - Administer antiarrhythmic and heart rate control medications as ordered  - Monitor electrolytes and administer replacement therapy as ordered  Outcome: Progressing     Problem: RESPIRATORY - ADULT  Goal: Achieves optimal ventilation and oxygenation  Description: INTERVENTIONS:  - Assess for changes in respiratory status  - Assess for changes in mentation and behavior  - Position to facilitate oxygenation and minimize respiratory effort  - Oxygen administered by appropriate delivery if ordered  - Initiate smoking cessation education as indicated  - Encourage broncho-pulmonary hygiene including cough, deep breathe, Incentive Spirometry  - Assess the need for suctioning and aspirate as needed  - Assess and instruct to report SOB or any respiratory difficulty  - Respiratory Therapy support as indicated  Outcome: Progressing     Problem: GASTROINTESTINAL - ADULT  Goal: Minimal or absence of nausea and/or vomiting  Description: INTERVENTIONS:  - Administer IV fluids if ordered to ensure adequate  hydration  - Maintain NPO status until nausea and vomiting are resolved  - Nasogastric tube if ordered  - Administer ordered antiemetic medications as needed  - Provide nonpharmacologic comfort measures as appropriate  - Advance diet as tolerated, if ordered  - Consider nutrition services referral to assist patient with adequate nutrition and appropriate food choices  Outcome: Progressing  Goal: Maintains or returns to baseline bowel function  Description: INTERVENTIONS:  - Assess bowel function  - Encourage oral fluids to ensure adequate hydration  - Administer IV fluids if ordered to ensure adequate hydration  - Administer ordered medications as needed  - Encourage mobilization and activity  - Consider nutritional services referral to assist patient with adequate nutrition and appropriate food choices  Outcome: Progressing  Goal: Maintains adequate nutritional intake  Description: INTERVENTIONS:  - Monitor percentage of each meal consumed  - Identify factors contributing to decreased intake, treat as appropriate  - Assist with meals as needed  - Monitor I&O, weight, and lab values if indicated  - Obtain nutrition services referral as needed  Outcome: Progressing  Goal: Establish and maintain optimal ostomy function  Description: INTERVENTIONS:  - Assess bowel function  - Encourage oral fluids to ensure adequate hydration  - Administer IV fluids if ordered to ensure adequate hydration   - Administer ordered medications as needed  - Encourage mobilization and activity  - Nutrition services referral to assist patient with appropriate food choices  - Assess stoma site  - Consider wound care consult   Outcome: Progressing  Goal: Oral mucous membranes remain intact  Description: INTERVENTIONS  - Assess oral mucosa and hygiene practices  - Implement preventative oral hygiene regimen  - Implement oral medicated treatments as ordered  - Initiate Nutrition services referral as needed  Outcome: Progressing     Problem:  GENITOURINARY - ADULT  Goal: Maintains or returns to baseline urinary function  Description: INTERVENTIONS:  - Assess urinary function  - Encourage oral fluids to ensure adequate hydration if ordered  - Administer IV fluids as ordered to ensure adequate hydration  - Administer ordered medications as needed  - Offer frequent toileting  - Follow urinary retention protocol if ordered  Outcome: Progressing  Goal: Absence of urinary retention  Description: INTERVENTIONS:  - Assess patient’s ability to void and empty bladder  - Monitor I/O  - Bladder scan as needed  - Discuss with physician/AP medications to alleviate retention as needed  - Discuss catheterization for long term situations as appropriate  Outcome: Progressing  Goal: Urinary catheter remains patent  Description: INTERVENTIONS:  - Assess patency of urinary catheter  - If patient has a chronic hanks, consider changing catheter if non-functioning  - Follow guidelines for intermittent irrigation of non-functioning urinary catheter  Outcome: Progressing     Problem: METABOLIC, FLUID AND ELECTROLYTES - ADULT  Goal: Electrolytes maintained within normal limits  Description: INTERVENTIONS:  - Monitor labs and assess patient for signs and symptoms of electrolyte imbalances  - Administer electrolyte replacement as ordered  - Monitor response to electrolyte replacements, including repeat lab results as appropriate  - Instruct patient on fluid and nutrition as appropriate  Outcome: Progressing  Goal: Fluid balance maintained  Description: INTERVENTIONS:  - Monitor labs   - Monitor I/O and WT  - Instruct patient on fluid and nutrition as appropriate  - Assess for signs & symptoms of volume excess or deficit  Outcome: Progressing  Goal: Glucose maintained within target range  Description: INTERVENTIONS:  - Monitor Blood Glucose as ordered  - Assess for signs and symptoms of hyperglycemia and hypoglycemia  - Administer ordered medications to maintain glucose within target  range  - Assess nutritional intake and initiate nutrition service referral as needed  Outcome: Progressing     Problem: SKIN/TISSUE INTEGRITY - ADULT  Goal: Incision(s), wounds(s) or drain site(s) healing without S/S of infection  Description: INTERVENTIONS  - Assess and document dressing, incision, wound bed, drain sites and surrounding tissue  - Provide patient and family education  - Perform skin care/dressing changes every   Outcome: Progressing  Goal: Pressure injury heals and does not worsen  Description: Interventions:  - Implement low air loss mattress or specialty surface (Criteria met)  - Apply silicone foam dressing  - Instruct/assist with weight shifting every  minutes when in chair   - Limit chair time to hour intervals  - Use special pressure reducing interventions such as  when in chair   - Apply fecal or urinary incontinence containment device   - Perform passive or active ROM every   - Turn and reposition patient & offload bony prominences every  hours   - Utilize friction reducing device or surface for transfers   - Consider consults to  interdisciplinary teams such as   - Use incontinent care products after each incontinent episode such as   - Consider nutrition services referral as needed  Outcome: Progressing     Problem: HEMATOLOGIC - ADULT  Goal: Maintains hematologic stability  Description: INTERVENTIONS  - Assess for signs and symptoms of bleeding or hemorrhage  - Monitor labs  - Administer supportive blood products/factors as ordered and appropriate  Outcome: Progressing     Problem: MUSCULOSKELETAL - ADULT  Goal: Maintain or return mobility to safest level of function  Description: INTERVENTIONS:  - Assess patient's ability to carry out ADLs; assess patient's baseline for ADL function and identify physical deficits which impact ability to perform ADLs (bathing, care of mouth/teeth, toileting, grooming, dressing, etc.)  - Assess/evaluate cause of self-care deficits   - Assess range of  motion  - Assess patient's mobility  - Assess patient's need for assistive devices and provide as appropriate  - Encourage maximum independence but intervene and supervise when necessary  - Involve family in performance of ADLs  - Assess for home care needs following discharge   - Consider OT consult to assist with ADL evaluation and planning for discharge  - Provide patient education as appropriate  Outcome: Progressing  Goal: Maintain proper alignment of affected body part  Description: INTERVENTIONS:  - Support, maintain and protect limb and body alignment  - Provide patient/ family with appropriate education  Outcome: Progressing     Problem: Prexisting or High Potential for Compromised Skin Integrity  Goal: Skin integrity is maintained or improved  Description: INTERVENTIONS:  - Identify patients at risk for skin breakdown  - Assess and monitor skin integrity including under and around medical devices   - Assess and monitor nutrition and hydration status  - Monitor labs  - Assess for incontinence   - Turn and reposition patient  - Assist with mobility/ambulation  - Relieve pressure over nate prominences   - Avoid friction and shearing  - Provide appropriate hygiene as needed including keeping skin clean and dry  - Evaluate need for skin moisturizer/barrier cream  - Collaborate with interdisciplinary team  - Patient/family teaching  - Consider wound care consult    Assess:  - Review Goldy scale daily  - Clean and moisturize skin every   - Inspect skin when repositioning, toileting, and assisting with ADLS  - Assess under medical devices such as  every - Assess extremities for adequate circulation and sensation     Bed Management:  - Have minimal linens on bed & keep smooth, unwrinkled  - Change linens as needed when moist or perspiring  - Avoid sitting or lying in one position for more than rs while in bed?Keep HOB at egrees   - Toileting:  - Offer bedside commode  - Assess for incontinence every   - Use  incontinent care products after each incontinent episode such as   Activity:  - Mobilize patient  times a day  - Encourage activity and walks on unit  - Encourage or provide ROM exercises   - Turn and reposition patient every  Hours  - Use appropriate equipment to lift or move patient in bed  - Instruct/ Assist with weight shifting every  when out of bed in chair  - Consider limitation of chair time hour intervals    Skin Care:  - Avoid use of baby powder, tape, friction and shearing, hot water or constrictive clothing  - Relieve pressure over bony prominences using   - Do not massage red bony areas    Next Steps:  - Teach patient strategies to minimize risks such as   - Consider consults to  interdisciplinary teams such as   Outcome: Progressing     Problem: Nutrition/Hydration-ADULT  Goal: Nutrient/Hydration intake appropriate for improving, restoring or maintaining nutritional needs  Description: Monitor and assess patient's nutrition/hydration status for malnutrition. Collaborate with interdisciplinary team and initiate plan and interventions as ordered.  Monitor patient's weight and dietary intake as ordered or per policy. Utilize nutrition screening tool and intervene as necessary. Determine patient's food preferences and provide high-protein, high-caloric foods as appropriate.     INTERVENTIONS:  - Monitor oral intake, urinary output, labs, and treatment plans  - Assess nutrition and hydration status and recommend course of action  - Evaluate amount of meals eaten  - Assist patient with eating if necessary   - Allow adequate time for meals  - Recommend/ encourage appropriate diets, oral nutritional supplements, and vitamin/mineral supplements  - Order, calculate, and assess calorie counts as needed  - Recommend, monitor, and adjust tube feedings and TPN/PPN based on assessed needs  - Assess need for intravenous fluids  - Provide specific nutrition/hydration education as appropriate  - Include  patient/family/caregiver in decisions related to nutrition  Outcome: Progressing

## 2025-06-23 NOTE — ASSESSMENT & PLAN NOTE
Lab Results   Component Value Date    HGBA1C 5.4 06/20/2025     Continue PTA Humalog 5 units QID. On tube feeding, add sliding scale insulin Q6H    Recent Labs     06/22/25  1132 06/22/25  1736 06/23/25  0802 06/23/25  1041   POCGLU 174* 144* 172* 175*       Blood Sugar Average: Last 72 hrs:  (P) [id number]

## 2025-06-23 NOTE — ASSESSMENT & PLAN NOTE
POA. Previous admissions for polymicrobial bacteremia and OM, previous wound VAC.  Follows with surgery for wound care  Offload.  Turn and reposition.  Local wound care  Wound care following  General surgery recommend localized wound care as they state it appears not infected

## 2025-06-23 NOTE — DISCHARGE INSTR - DIET
Regular diet w/ thin liquids. Full supervision for meals. Set up tray/assist as needed. Adjust PEG feedings accordingly.

## 2025-06-24 ENCOUNTER — APPOINTMENT (INPATIENT)
Dept: CT IMAGING | Facility: HOSPITAL | Age: 75
DRG: 064 | End: 2025-06-24
Payer: COMMERCIAL

## 2025-06-24 LAB
ANION GAP SERPL CALCULATED.3IONS-SCNC: 3 MMOL/L (ref 4–13)
APTT PPP: 37 SECONDS (ref 23–34)
BUN SERPL-MCNC: 23 MG/DL (ref 5–25)
CALCIUM SERPL-MCNC: 8.8 MG/DL (ref 8.4–10.2)
CHLORIDE SERPL-SCNC: 107 MMOL/L (ref 96–108)
CO2 SERPL-SCNC: 27 MMOL/L (ref 21–32)
CREAT SERPL-MCNC: 0.93 MG/DL (ref 0.6–1.3)
ERYTHROCYTE [DISTWIDTH] IN BLOOD BY AUTOMATED COUNT: 17.3 % (ref 11.6–15.1)
FERRITIN SERPL-MCNC: 15 NG/ML (ref 30–336)
FOLATE SERPL-MCNC: 15.4 NG/ML
GFR SERPL CREATININE-BSD FRML MDRD: 79 ML/MIN/1.73SQ M
GLUCOSE SERPL-MCNC: 125 MG/DL (ref 65–140)
GLUCOSE SERPL-MCNC: 141 MG/DL (ref 65–140)
GLUCOSE SERPL-MCNC: 154 MG/DL (ref 65–140)
GLUCOSE SERPL-MCNC: 156 MG/DL (ref 65–140)
GLUCOSE SERPL-MCNC: 160 MG/DL (ref 65–140)
HCT VFR BLD AUTO: 24.4 % (ref 36.5–49.3)
HGB BLD-MCNC: 7.2 G/DL (ref 12–17)
IRON SATN MFR SERPL: 9 % (ref 15–50)
IRON SERPL-MCNC: 27 UG/DL (ref 50–212)
MAGNESIUM SERPL-MCNC: 1.9 MG/DL (ref 1.9–2.7)
MCH RBC QN AUTO: 28.7 PG (ref 26.8–34.3)
MCHC RBC AUTO-ENTMCNC: 29.5 G/DL (ref 31.4–37.4)
MCV RBC AUTO: 97 FL (ref 82–98)
PLATELET # BLD AUTO: 391 THOUSANDS/UL (ref 149–390)
PMV BLD AUTO: 9.9 FL (ref 8.9–12.7)
POTASSIUM SERPL-SCNC: 4.4 MMOL/L (ref 3.5–5.3)
RBC # BLD AUTO: 2.51 MILLION/UL (ref 3.88–5.62)
SODIUM SERPL-SCNC: 137 MMOL/L (ref 135–147)
TIBC SERPL-MCNC: 306.6 UG/DL (ref 250–450)
TRANSFERRIN SERPL-MCNC: 219 MG/DL (ref 203–362)
TSH SERPL DL<=0.05 MIU/L-ACNC: 2.76 UIU/ML (ref 0.45–4.5)
UIBC SERPL-MCNC: 280 UG/DL (ref 155–355)
VIT B12 SERPL-MCNC: 925 PG/ML (ref 180–914)
WBC # BLD AUTO: 7.51 THOUSAND/UL (ref 4.31–10.16)

## 2025-06-24 PROCEDURE — 85027 COMPLETE CBC AUTOMATED: CPT | Performed by: STUDENT IN AN ORGANIZED HEALTH CARE EDUCATION/TRAINING PROGRAM

## 2025-06-24 PROCEDURE — 83540 ASSAY OF IRON: CPT | Performed by: STUDENT IN AN ORGANIZED HEALTH CARE EDUCATION/TRAINING PROGRAM

## 2025-06-24 PROCEDURE — 99232 SBSQ HOSP IP/OBS MODERATE 35: CPT | Performed by: PSYCHIATRY & NEUROLOGY

## 2025-06-24 PROCEDURE — 99232 SBSQ HOSP IP/OBS MODERATE 35: CPT | Performed by: STUDENT IN AN ORGANIZED HEALTH CARE EDUCATION/TRAINING PROGRAM

## 2025-06-24 PROCEDURE — 83735 ASSAY OF MAGNESIUM: CPT | Performed by: STUDENT IN AN ORGANIZED HEALTH CARE EDUCATION/TRAINING PROGRAM

## 2025-06-24 PROCEDURE — 70450 CT HEAD/BRAIN W/O DYE: CPT

## 2025-06-24 PROCEDURE — 84443 ASSAY THYROID STIM HORMONE: CPT | Performed by: STUDENT IN AN ORGANIZED HEALTH CARE EDUCATION/TRAINING PROGRAM

## 2025-06-24 PROCEDURE — 82607 VITAMIN B-12: CPT | Performed by: STUDENT IN AN ORGANIZED HEALTH CARE EDUCATION/TRAINING PROGRAM

## 2025-06-24 PROCEDURE — 82948 REAGENT STRIP/BLOOD GLUCOSE: CPT

## 2025-06-24 PROCEDURE — 83550 IRON BINDING TEST: CPT | Performed by: STUDENT IN AN ORGANIZED HEALTH CARE EDUCATION/TRAINING PROGRAM

## 2025-06-24 PROCEDURE — 80048 BASIC METABOLIC PNL TOTAL CA: CPT | Performed by: STUDENT IN AN ORGANIZED HEALTH CARE EDUCATION/TRAINING PROGRAM

## 2025-06-24 PROCEDURE — 82728 ASSAY OF FERRITIN: CPT | Performed by: STUDENT IN AN ORGANIZED HEALTH CARE EDUCATION/TRAINING PROGRAM

## 2025-06-24 PROCEDURE — 82746 ASSAY OF FOLIC ACID SERUM: CPT | Performed by: STUDENT IN AN ORGANIZED HEALTH CARE EDUCATION/TRAINING PROGRAM

## 2025-06-24 PROCEDURE — 85730 THROMBOPLASTIN TIME PARTIAL: CPT | Performed by: NURSE PRACTITIONER

## 2025-06-24 RX ORDER — INSULIN LISPRO 100 [IU]/ML
5 INJECTION, SOLUTION INTRAVENOUS; SUBCUTANEOUS
Status: DISCONTINUED | OUTPATIENT
Start: 2025-06-24 | End: 2025-06-26 | Stop reason: HOSPADM

## 2025-06-24 RX ORDER — DABIGATRAN ETEXILATE 150 MG/1
150 CAPSULE ORAL EVERY 12 HOURS SCHEDULED
Status: DISCONTINUED | OUTPATIENT
Start: 2025-06-24 | End: 2025-06-26 | Stop reason: HOSPADM

## 2025-06-24 RX ADMIN — Medication 250 MG: at 21:45

## 2025-06-24 RX ADMIN — ASPIRIN 81 MG CHEWABLE TABLET 81 MG: 81 TABLET CHEWABLE at 09:09

## 2025-06-24 RX ADMIN — MIRTAZAPINE 7.5 MG: 7.5 TABLET, FILM COATED ORAL at 21:45

## 2025-06-24 RX ADMIN — LORATADINE 10 MG: 5 SOLUTION ORAL at 09:09

## 2025-06-24 RX ADMIN — Medication 250 MG: at 09:09

## 2025-06-24 RX ADMIN — Medication 15 ML: at 09:09

## 2025-06-24 RX ADMIN — INSULIN LISPRO 1 UNITS: 100 INJECTION, SOLUTION INTRAVENOUS; SUBCUTANEOUS at 16:39

## 2025-06-24 RX ADMIN — ATORVASTATIN CALCIUM 80 MG: 80 TABLET, FILM COATED ORAL at 09:09

## 2025-06-24 RX ADMIN — Medication 12.5 MG: at 21:45

## 2025-06-24 RX ADMIN — DOCUSATE SODIUM LIQUID 200 MG: 50 LIQUID ORAL at 09:09

## 2025-06-24 RX ADMIN — EZETIMIBE 10 MG: 10 TABLET ORAL at 09:09

## 2025-06-24 RX ADMIN — Medication 12.5 MG: at 09:09

## 2025-06-24 RX ADMIN — Medication 1000 UNITS: at 09:09

## 2025-06-24 RX ADMIN — DABIGATRAN ETEXILATE MESYLATE 150 MG: 150 CAPSULE ORAL at 21:44

## 2025-06-24 RX ADMIN — Medication 300 MG: at 09:09

## 2025-06-24 RX ADMIN — INSULIN LISPRO 1 UNITS: 100 INJECTION, SOLUTION INTRAVENOUS; SUBCUTANEOUS at 09:09

## 2025-06-24 RX ADMIN — ESCITALOPRAM OXALATE 10 MG: 10 TABLET ORAL at 09:09

## 2025-06-24 RX ADMIN — THIAMINE HCL TAB 100 MG 100 MG: 100 TAB at 09:09

## 2025-06-24 NOTE — PROGRESS NOTES
Progress Note - Hospitalist   Name: Drew Santos 74 y.o. male I MRN: [id number]  Unit/Bed#: E4 -01 I Date of Admission: 6/20/2025   Date of Service: 6/24/2025 I Hospital Day: 4     Assessment & Plan  CVA (cerebrovascular accident) (HCC)  Patient with hx of Afib, CVA x2 with left-sided deficits, bedbound, PEG, colostomy, chronic indwelling catheter, St IV sacral decubiti, transferred from [facility] Care at [facility] for increased weakness and AMS    CTH showing acute to subacute left PCA distribution infarct.   MRI of the brain confirms stroke  2D Echo unremarkable, deferred JOSEFINA with plans for dual AP and DOAC  PTA Maintained on ASA, Eliquis and high intensity statin  Neurology suspects Eliquis failure, transition eliquis to heparin gtt  Repeat CTh showing evolving left PCA distribution infarcts with trace amount of petechial blood products and oral laminar necrosis  Discussed with neurology, plan to discontinue heparin drip and transition to Pradaxa  Plan to resume aspirin 81 mg daily in 1 week on July 1  Will need to follow outpatient neurology in 6 weeks  Decubitus ulcer of sacral region, stage 4 (HCC)  POA. Previous admissions for polymicrobial bacteremia and OM, previous wound VAC.  Follows with surgery for wound care  Offload.  Turn and reposition.  Local wound care  Wound care following  General surgery recommend localized wound care as they state it appears not infected  Type 2 diabetes mellitus without complication, without long-term current use of insulin ([facility])  Lab Results   Component Value Date    HGBA1C 5.4 06/20/2025     Continue PTA Humalog 5 units QID. On tube feeding, add sliding scale insulin Q6H    Recent Labs     06/23/25  1608 06/23/25  2110 06/24/25  0708 06/24/25  1044   POCGLU 126 137 156* 141*       Blood Sugar Average: Last 72 hrs:  (P) [id number]    Paroxysmal atrial fibrillation (HCC)  On rate control with metoprolol 12.5mg bid  Prior to admission on Eliquis, continue  heparin  Urinary retention  Urinary retention managed with chronic Parra catheter.  Catheter exchanged at facility  Monitor urine output  Primary hypertension  BP soft, on metoprolol with hold parameters  Anemia of chronic disease  Chronic anemia with thrombocytosis.  Baseline 7-8  FIT Test Normal  S/P 1 unit transfused, no evidence of infection  Anemia labs ordered  Severe protein-calorie malnutrition (HCC)  Malnutrition Findings: Body mass index is 28.34 kg/m².   S/p PEG.  Continue tube feeding along with regular diet  Nutrition consult  S/P percutaneous endoscopic gastrostomy (PEG) tube placement ([facility])  S/p Peg Oct 2024.  Diet at facility:  Tube feeding Jevity 1.2 85 mL/h x22hrs, water flush 150 mL every 4 hours  Oral Regular diet  Continue tube feeds  Colostomy in place ([facility])  S/p loop diverting colostomy; placed due to sacral decubiti ulcer with suspected coccygeal OM   Ostomy care.  Monitor stool output    VTE Pharmacologic Prophylaxis:   Pharmacologic: Heparin Drip  Mechanical VTE Prophylaxis in Place: Yes    Current Length of Stay: 4 day(s)    Current Patient Status: Inpatient   Certification Statement: The patient will continue to require additional inpatient hospital stay due to pending neurology evaluation    Discharge Plan: pending    Code Status: Level 1 - Full Code      Subjective:   No events overnight. No complaints at this time.    Objective:     Vitals:   Temp (24hrs), Av.2 °F (36.8 °C), Min:97.7 °F (36.5 °C), Max:99.4 °F (37.4 °C)    Temp:  [97.7 °F (36.5 °C)-99.4 °F (37.4 °C)] 98.2 °F (36.8 °C)  HR:  [69-83] 69  Resp:  [17-18] 18  BP: (100-133)/(55-79) 119/75  SpO2:  [96 %-98 %] 97 %  Body mass index is 28.34 kg/m².     Input and Output Summary (last 24 hours):       Intake/Output Summary (Last 24 hours) at 2025 1407  Last data filed at 2025 0142  Gross per 24 hour   Intake 120 ml   Output 1100 ml   Net -980 ml       Physical Exam:     Physical Exam  Vitals and nursing note  reviewed.   Constitutional:       Appearance: He is ill-appearing (chronically).   HENT:      Head: Normocephalic.     Eyes:      Conjunctiva/sclera: Conjunctivae normal.       Cardiovascular:      Rate and Rhythm: Normal rate.   Pulmonary:      Effort: Pulmonary effort is normal.      Breath sounds: No wheezing.   Abdominal:      General: Bowel sounds are normal. There is no distension.      Palpations: Abdomen is soft.      Comments: G Tube, Ostomy   Genitourinary:     Comments: Parra catheter    Musculoskeletal:         General: No swelling.      Right lower leg: No edema.      Left lower leg: No edema.     Skin:     General: Skin is warm.      Comments: Feet in podus     Neurological:      Mental Status: He is alert. Mental status is at baseline.         Additional Data:     Labs:    Results from last 7 days   Lab Units 06/24/25  0854 06/23/25  0753   WBC Thousand/uL 7.51 8.86   HEMOGLOBIN g/dL 7.2* 7.4*   HEMATOCRIT % 24.4* 25.1*   PLATELETS Thousands/uL 391* 363   SEGS PCT %  --  64   LYMPHO PCT %  --  23   MONO PCT %  --  7   EOS PCT %  --  5     Results from last 7 days   Lab Units 06/24/25  0858 06/21/25  0545 06/20/25  1655   SODIUM mmol/L 137   < > 135   POTASSIUM mmol/L 4.4   < > 4.4   CHLORIDE mmol/L 107   < > 104   CO2 mmol/L 27   < > 25   BUN mg/dL 23   < > 37*   CREATININE mg/dL 0.93   < > 0.91   ANION GAP mmol/L 3*   < > 6   CALCIUM mg/dL 8.8   < > 9.0   ALBUMIN g/dL  --   --  2.7*   TOTAL BILIRUBIN mg/dL  --   --  1.01*   ALK PHOS U/L  --   --  64   ALT U/L  --   --  9   AST U/L  --   --  16   GLUCOSE RANDOM mg/dL 160*   < > 136    < > = values in this interval not displayed.     Results from last 7 days   Lab Units 06/20/25  1913   INR  1.35*     Results from last 7 days   Lab Units 06/24/25  1044 06/24/25  0708 06/23/25  2110 06/23/25  1608 06/23/25  1041 06/23/25  0802 06/22/25  1736 06/22/25  1132 06/22/25  0716 06/22/25  0513 06/22/25  0048 06/21/25  2151   POC GLUCOSE mg/dl 141* 156* 137 126  175* 172* 144* 174* 142* 101 122 119     Results from last 7 days   Lab Units 06/20/25  2342   HEMOGLOBIN A1C % 5.4               * I Have Reviewed All Lab Data Listed Above.  * Additional Pertinent Lab Tests Reviewed: All Labs For Current Hospital Admission Reviewed    Mobility:  Basic Mobility Inpatient Raw Score: 6  -[facility] Goal: 2: Bed activities/Dependent transfer  -[facility] Achieved: 1: Laying in bed    Lines:     Invasive Devices       Peripheral Intravenous Line  Duration             Peripheral IV 06/20/25 Left;Ventral (anterior) Forearm 3 days    Peripheral IV 06/21/25 Proximal;Right;Ventral (anterior) Forearm 3 days              Drain  Duration             Colostomy Loop  days    Gastrostomy/Enterostomy  days    Urethral Catheter 4 days                       Imaging:    Imaging Reports Reviewed Today Include:     CT head wo contrast  Result Date: 6/24/2025  Impression: Evolving left PCA distribution infarcts. Trace amount of petechial blood products and/or laminar necrosis within the left occipital lobe is overall grossly stable dating back to 6/20/2025. Otherwise no confluent new secondary parenchymal hematoma identified. Resident: AYSHA AGGARWAL I, the attending radiologist, have reviewed the images and agree with the final report above. Workstation performed: [id number]     CTA head and neck w wo contrast  Result Date: 6/21/2025  Impression: CT Brain: Acute/recent infarcts involving the left occipital lobe, and the left thalamus, corresponding to the findings on brain MRI. Additional punctate infarcts involving the left cerebral hemisphere and left middle cerebellar peduncle are harder to visualize on this examination. No acute intracranial hemorrhage noted. CT Angiography: Occlusion of the left posterior cerebral artery P3 segment. Occlusion of the distal intradural right vertebral artery near the vertebrobasilar junction. Focal high-grade stenosis or short segment occlusion of the  bilateral cervical vertebral artery origins due to atherosclerotic plaque. Remaining cervical vertebral arteries appear widely patent. The study was marked in EPIC for immediate notification. Workstation performed: [id number]     MRI brain wo contrast  Result Date: 6/21/2025  Impression: Multiple acute/recent infarcts involving the left cerebral hemisphere, the largest involving the left occipital lobe, left thalamus, and left middle cerebellar peduncle, with associated cytotoxic edema. No associated hemorrhage noted. Mild chronic white matter microangiopathic changes involving both cerebral hemispheres. Paranasal sinus disease as described above, worst involving the left maxillary sinus. Workstation performed: [id number]     XR chest 1 view portable  Result Date: 6/21/2025  Impression: No acute cardiopulmonary disease. Resident: Siva Menjivar I, the attending radiologist, have reviewed the images and agree with the final report above. Workstation performed: [id number]     CT head without contrast  Result Date: 6/20/2025  Impression: Acute to subacute left PCA distribution infarct. No confluent secondary parenchymal hematoma. Findings are new since the prior examination. The study was marked in EPIC for immediate notification. Workstation performed: [id number]         Recent Cultures (last 7 days):           Last 24 Hours Medication List:   Current Facility-Administered Medications   Medication Dose Route Frequency Provider Last Rate    Acetaminophen  650 mg Per G Tube Q4H PRN AVELINA Louie      aluminum-magnesium hydroxide-simethicone  30 mL Per G Tube Q6H PRN AVELINA Louie      ascorbic acid  250 mg Per G Tube Daily AVELINA Louie      aspirin  81 mg Per PEG Tube Daily AVELINA Louie      atorvastatin  80 mg Per G Tube Daily AVELINA Louie      bisacodyl  10 mg Rectal Daily PRN AVELINA Louie      Cholecalciferol  1,000 Units Per G Tube Daily Ifeoma  AVELINA Smart      docusate  200 mg Per G Tube Daily AVELINA Louie      escitalopram  10 mg Per PEG Tube Daily AVELINA Louie      ezetimibe  10 mg Per G Tube Daily AVELINA Louie      Ferrous Sulfate  300 mg Per G Tube Daily AVELINA Louie      heparin  3-24 Units/kg/hr Intravenous Titrated Ahsan Rangel PA-C 12 Units/kg/hr (06/24/25 1255)    insulin lispro  1-6 Units Subcutaneous 4x Daily (AC & HS) Willem Gutierrez PA-C      insulin lispro  5 Units Subcutaneous TID With Meals Willem Gutierrez PA-C      Loratadine  10 mg Per G Tube Daily AVELINA Louie      metoprolol tartrate  12.5 mg Per G Tube Q12H ELISEO AVELINA Louie      mirtazapine  7.5 mg Per G Tube HS AVELINA Louie      Multivitamin  15 mL Per G Tube Daily AVELINA Louie      ondansetron  4 mg Intravenous Q6H PRN AVELINA Louie      saccharomyces boulardii  250 mg Per G Tube BID AVELINA Louie      thiamine  100 mg Per G Tube Daily Taye Rodriguez, DO          Today, Patient Was Seen By: Chito Mora MD    ** Please Note: Dictation voice to text software may have been used in the creation of this document. **

## 2025-06-24 NOTE — SPEECH THERAPY NOTE
Speech Language/Pathology  Spoke w/  re sending fruit to pt. Seen at lunch. Refused to eat or drink anything. Declined peaches and pears for me. Spoke w/ wife later. She brought In a banana and other fruit for the pt and he ate some of the pears. No concerns re chewing or swallowing. Reported he also likes vanilla ice cream. Pt typically does best when wife is present. ? F/u x 1.

## 2025-06-24 NOTE — ASSESSMENT & PLAN NOTE
"Drew Santos is a 74 y.o. male with HTN, HLD, DM type 2, syncope, urinary retention with chronic indwelling Parra catheter, stage IV decubitus ulcer of sacral region, AFib on Eliquis, anemia of chronic disease, severe protein-caloric malnutrition, s/p PEG tube, colostomy, depression, history of watershed territory infarcts in the right NELL/MCA region (March 2024) on ASA with residual left-sided deficits and bedbound at baseline, partially calcified atheromatous plaque along the bilateral carotid bulbs without a hemodynamically significant stenosis, who presented to [facility] ED on 6/20/25 as a transfer from [facility] Care at [facility] for increased weakness and less communicative.    During the patient evaluation with CT, patient was found to have acute to subacute left PCA territory ischemic stroke without hemorrhagic conversion considering patient has been taking Eliquis 5 mg in addition to aspirin 81 mg since prior hospitalization for stroke and A-fib. Further imaging were confirmative of left PCA ischemic stroke with left occipital lobe and left thalamus ischemic changes due to left P3 PCA occlusion; left cerebellar peduncle stroke noted as well.     Workup:  - CT head:  \"Acute to subacute left PCA distribution infarct. No confluent secondary parenchymal hematoma. Findings are new since the prior examination.\"  - MRI brain wo:  \"Multiple acute/recent infarcts involving the left cerebral hemisphere, the largest involving the left occipital lobe, left thalamus, and left middle cerebellar peduncle, with associated cytotoxic edema. No associated hemorrhage noted.  Mild chronic white matter microangiopathic changes involving both cerebral hemispheres.  Paranasal sinus disease as described above, worst involving the left maxillary sinus.\"  - CTA head and neck:  \"CT Brain: Acute/recent infarcts involving the left occipital lobe, and the left thalamus, corresponding to the findings on brain MRI. Additional " "punctate infarcts involving the left cerebral hemisphere and left middle cerebellar peduncle are harder to   visualize on this examination.  No acute intracranial hemorrhage noted.     CT Angiography:  Occlusion of the left posterior cerebral artery P3 segment.  Occlusion of the distal intradural right vertebral artery near the vertebrobasilar junction.  Focal high-grade stenosis or short segment occlusion of the bilateral cervical vertebral artery origins due to atherosclerotic plaque. Remaining cervical vertebral arteries appear widely patent\"  - Echo: 60%, no diagnostic regional wall motion abnormalities identified; left atrium size WNL  - Lipid panel: Total cholesterol 82, triglycerides 89, HDL 20, LDL 44  - Hemoglobin A1c: 5.4  - PTT trend 55-63 since 6/22 1432    Multiple acute infarcts involving the left occipital lobe, left thalamus, and left middle cerebral peduncle with associated cytotoxic edema.  Found to have PCA P3 segment occlusion and distal right vertebral artery occlusion on CTA. Concern for possible Eliquis failure, currently on heparin gtt.    - repeat CTH 6/24/25: Evolving left PCA distribution infarcts. Trace amount of petechial blood products and/or laminar necrosis within the left occipital lobe is overall grossly stable dating back to 6/20/2025. Otherwise no confluent new secondary parenchymal hematoma identified.    Plan:  - Hold off on JOSEFINA as patient is ultimately going to be on both AC and AP therapy  - Continue to hold home Eliquis and aspirin at this time  - Currently on heparin gtt, PTT goals 50-70  Repeat CTH 6/24 appears stable; per discussion with attending neurologist; recommend starting Pradaxa  - Continue Lipitor 80 and Zetia 10 mg  - Resume home ASA 81mg daily in 1 week to minimize risk of bleeding with possible petechial hemorrhage noted on CTH and CTA with cerebrovascular atherosclerosis.  - Goal normotension, euglycemia, normothermia  - Telemetry  - PT/OT/ST  - Stroke " education  - Continue to monitor and notify neurology with any changes.  - STAT CT head for any acute change in neuro exam  - Medical management and correction of any metabolic or infectious disturbances per primary service.   - No additional inpatient neurology recommendations

## 2025-06-24 NOTE — ASSESSMENT & PLAN NOTE
S/p Peg Oct 2024.  Diet at facility:  Tube feeding Jevity 1.2 85 mL/h x22hrs, water flush 150 mL every 4 hours  Oral Regular diet  Continue tube feeds

## 2025-06-24 NOTE — PROGRESS NOTES
"Progress Note - Neurology   Name: Drew Santos 74 y.o. male I MRN: [id number]  Unit/Bed#: E4 -01 I Date of Admission: 6/20/2025   Date of Service: 6/24/2025 I Hospital Day: 4    Assessment & Plan  CVA (cerebrovascular accident) (HCC)  Drew Santos is a 74 y.o. male with HTN, HLD, DM type 2, syncope, urinary retention with chronic indwelling Parra catheter, stage IV decubitus ulcer of sacral region, AFib on Eliquis, anemia of chronic disease, severe protein-caloric malnutrition, s/p PEG tube, colostomy, depression, history of watershed territory infarcts in the right NELL/MCA region (March 2024) on ASA with residual left-sided deficits and bedbound at baseline, partially calcified atheromatous plaque along the bilateral carotid bulbs without a hemodynamically significant stenosis, who presented to [facility] ED on 6/20/25 as a transfer from [facility] Care at [facility] for increased weakness and less communicative.    During the patient evaluation with CTH, patient was found to have acute to subacute left PCA territory ischemic stroke without hemorrhagic conversion considering patient has been taking Eliquis 5 mg in addition to aspirin 81 mg since prior hospitalization for stroke and A-fib. Further imaging were confirmative of left PCA ischemic stroke with left occipital lobe and left thalamus ischemic changes due to left P3 PCA occlusion; left cerebellar peduncle stroke noted as well.     Workup:  - CT head:  \"Acute to subacute left PCA distribution infarct. No confluent secondary parenchymal hematoma. Findings are new since the prior examination.\"  - MRI brain wo:  \"Multiple acute/recent infarcts involving the left cerebral hemisphere, the largest involving the left occipital lobe, left thalamus, and left middle cerebellar peduncle, with associated cytotoxic edema. No associated hemorrhage noted.  Mild chronic white matter microangiopathic changes involving both cerebral hemispheres.  Paranasal sinus " "disease as described above, worst involving the left maxillary sinus.\"  - CTA head and neck:  \"CT Brain: Acute/recent infarcts involving the left occipital lobe, and the left thalamus, corresponding to the findings on brain MRI. Additional punctate infarcts involving the left cerebral hemisphere and left middle cerebellar peduncle are harder to   visualize on this examination.  No acute intracranial hemorrhage noted.     CT Angiography:  Occlusion of the left posterior cerebral artery P3 segment.  Occlusion of the distal intradural right vertebral artery near the vertebrobasilar junction.  Focal high-grade stenosis or short segment occlusion of the bilateral cervical vertebral artery origins due to atherosclerotic plaque. Remaining cervical vertebral arteries appear widely patent\"  - Echo: 60%, no diagnostic regional wall motion abnormalities identified; left atrium size WNL  - Lipid panel: Total cholesterol 82, triglycerides 89, HDL 20, LDL 44  - Hemoglobin A1c: 5.4  - PTT trend 55-63 since 6/22 1432    Multiple acute infarcts involving the left occipital lobe, left thalamus, and left middle cerebral peduncle with associated cytotoxic edema.  Found to have PCA P3 segment occlusion and distal right vertebral artery occlusion on CTA. Concern for possible Eliquis failure, currently on heparin gtt.    - repeat CTH 6/24/25: Evolving left PCA distribution infarcts. Trace amount of petechial blood products and/or laminar necrosis within the left occipital lobe is overall grossly stable dating back to 6/20/2025. Otherwise no confluent new secondary parenchymal hematoma identified.    Plan:  - Hold off on JOSEFINA as patient is ultimately going to be on both AC and AP therapy  - Continue to hold home Eliquis and aspirin at this time  - Currently on heparin gtt, PTT goals 50-70  Repeat CTH 6/24 appears stable; per discussion with attending neurologist; recommend starting Pradaxa  - Continue Lipitor 80 and Zetia 10 mg  - Resume " home ASA 81mg daily in 1 week to minimize risk of bleeding with possible petechial hemorrhage noted on CTH and CTA with cerebrovascular atherosclerosis.  - Goal normotension, euglycemia, normothermia  - Telemetry  - PT/OT/ST  - Stroke education  - Continue to monitor and notify neurology with any changes.  - STAT CT head for any acute change in neuro exam  - Medical management and correction of any metabolic or infectious disturbances per primary service.   - No additional inpatient neurology recommendations    Drew Santos will need follow-up in about 6 weeks with neurovascular team for Other in 60 minute appointment. They will not require outpatient neurological testing. Message sent to schedulers by previous provider    Subjective   Patient is lethargic    Review of Systems  Denies headache    Objective :  Temp:  [97.7 °F (36.5 °C)-99.4 °F (37.4 °C)] 98 °F (36.7 °C)  HR:  [69-83] 75  BP: ()/(55-79) 133/79  Resp:  [17-18] 18  SpO2:  [96 %-100 %] 97 %  O2 Device: None (Room air)    Physical Exam  Vitals reviewed.   Constitutional:       Appearance: He is ill-appearing.   HENT:      Head: Normocephalic and atraumatic.   Pulmonary:      Effort: Pulmonary effort is normal.     Skin:     General: Skin is warm and dry.     Neurological Exam  Mental Status    Limited exam due to lethargy and lack of participation  Promptly opens eyes to voice and follows simple 1-step commands  Able to state his name but otherwise did not answer orientation questions or name objects  Limited speech appeared clear  Face symmetric at rest, tongue is midline  PERRL with conjugate gaze, did not participate with eom or visual field testing  Observed spontaneously moving RUE>RLE  Withdraws LUE/LLE to noxious stimuli  Did not participate in coordination testing; gait deferred.        I personally reviewed pertinent labs and neuroimaging as noted above    VTE Pharmacologic Prophylaxis: VTE covered by:  heparin, Intravenous, 9 Units/kg/hr at  06/23/25 2617       Assessment, images and plan reviewed with Dr. Meyers. Plan discussed with patient and primary service. Please refer to attending attestation for additional recommendations

## 2025-06-24 NOTE — PLAN OF CARE
Problem: PAIN - ADULT  Goal: Verbalizes/displays adequate comfort level or baseline comfort level  Description: Interventions:  - Encourage patient to monitor pain and request assistance  - Assess pain using appropriate pain scale  - Administer analgesics as ordered based on type and severity of pain and evaluate response  - Implement non-pharmacological measures as appropriate and evaluate response  - Consider cultural and social influences on pain and pain management  - Notify physician/advanced practitioner if interventions unsuccessful or patient reports new pain  - Educate patient/family on pain management process including their role and importance of  reporting pain   - Provide non-pharmacologic/complimentary pain relief interventions  Outcome: Progressing     Problem: INFECTION - ADULT  Goal: Absence or prevention of progression during hospitalization  Description: INTERVENTIONS:  - Assess and monitor for signs and symptoms of infection  - Monitor lab/diagnostic results  - Monitor all insertion sites, i.e. indwelling lines, tubes, and drains  - Monitor endotracheal if appropriate and nasal secretions for changes in amount and color  - [place] appropriate cooling/warming therapies per order  - Administer medications as ordered  - Instruct and encourage patient and family to use good hand hygiene technique  - Identify and instruct in appropriate isolation precautions for identified infection/condition  Outcome: Progressing  Goal: Absence of fever/infection during neutropenic period  Description: INTERVENTIONS:  - Monitor WBC  - Perform strict hand hygiene  - Limit to healthy visitors only  - No plants, dried, fresh or silk flowers with gonzalez in patient room  Outcome: Progressing

## 2025-06-24 NOTE — ASSESSMENT & PLAN NOTE
Patient with hx of Afib, CVA x2 with left-sided deficits, bedbound, PEG, colostomy, chronic indwelling catheter, St IV sacral decubiti, transferred from [facility] for increased weakness and AMS    CTH showing acute to subacute left PCA distribution infarct.   MRI of the brain confirms stroke  2D Echo unremarkable, deferred JOSEFINA with plans for dual AP and DOAC  PTA Maintained on ASA, Eliquis and high intensity statin  Neurology suspects Eliquis failure, transition eliquis to heparin gtt  Repeat CTh showing evolving left PCA distribution infarcts with trace amount of petechial blood products and oral laminar necrosis  Discussed with neurology, plan to discontinue heparin drip and transition to Pradaxa  Plan to resume aspirin 81 mg daily in 1 week on July 1  Will need to follow outpatient neurology in 6 weeks

## 2025-06-24 NOTE — PLAN OF CARE
Problem: PAIN - ADULT  Goal: Verbalizes/displays adequate comfort level or baseline comfort level  Description: Interventions:  - Encourage patient to monitor pain and request assistance  - Assess pain using appropriate pain scale  - Administer analgesics as ordered based on type and severity of pain and evaluate response  - Implement non-pharmacological measures as appropriate and evaluate response  - Consider cultural and social influences on pain and pain management  - Notify physician/advanced practitioner if interventions unsuccessful or patient reports new pain  - Educate patient/family on pain management process including their role and importance of  reporting pain   - Provide non-pharmacologic/complimentary pain relief interventions  Outcome: Progressing     Problem: INFECTION - ADULT  Goal: Absence or prevention of progression during hospitalization  Description: INTERVENTIONS:  - Assess and monitor for signs and symptoms of infection  - Monitor lab/diagnostic results  - Monitor all insertion sites, i.e. indwelling lines, tubes, and drains  - Monitor endotracheal if appropriate and nasal secretions for changes in amount and color  - [place] appropriate cooling/warming therapies per order  - Administer medications as ordered  - Instruct and encourage patient and family to use good hand hygiene technique  - Identify and instruct in appropriate isolation precautions for identified infection/condition  Outcome: Progressing  Goal: Absence of fever/infection during neutropenic period  Description: INTERVENTIONS:  - Monitor WBC  - Perform strict hand hygiene  - Limit to healthy visitors only  - No plants, dried, fresh or silk flowers with gonzalez in patient room  Outcome: Progressing     Problem: SAFETY ADULT  Goal: Patient will remain free of falls  Description: INTERVENTIONS:  - Educate patient/family on patient safety including physical limitations  - Instruct patient to call for assistance with activity   -  Consider consulting OT/PT to assist with strengthening/mobility based on AM PAC & JH-HLM score  - Consult OT/PT to assist with strengthening/mobility   - Keep Call bell within reach  - Keep bed low and locked with side rails adjusted as appropriate  - Keep care items and personal belongings within reach  - Initiate and maintain comfort rounds  - Make Fall Risk Sign visible to staff  - Offer Toileting every 3 Hours, in advance of need  - Initiate/Maintain bed alarm  - Obtain necessary fall risk management equipment: alarm   - Apply yellow socks and bracelet for high fall risk patients  - Consider moving patient to room near nurses station  Outcome: Progressing  Goal: Maintain or return to baseline ADL function  Description: INTERVENTIONS:  -  Assess patient's ability to carry out ADLs; assess patient's baseline for ADL function and identify physical deficits which impact ability to perform ADLs (bathing, care of mouth/teeth, toileting, grooming, dressing, etc.)  - Assess/evaluate cause of self-care deficits   - Assess range of motion  - Assess patient's mobility; develop plan if impaired  - Assess patient's need for assistive devices and provide as appropriate  - Encourage maximum independence but intervene and supervise when necessary  - Involve family in performance of ADLs  - Assess for home care needs following discharge   - Consider OT consult to assist with ADL evaluation and planning for discharge  - Provide patient education as appropriate  - Monitor functional capacity and physical performance, use of AM PAC & JH-HLM   - Monitor gait, balance and fatigue with ambulation    Outcome: Progressing  Goal: Maintains/Returns to pre admission functional level  Description: INTERVENTIONS:  - Perform AM-PAC 6 Click Basic Mobility/ Daily Activity assessment daily.  - Set and communicate daily mobility goal to care team and patient/family/caregiver.   - Collaborate with rehabilitation services on mobility goals if  consulted  - Perform Range of Motion 3 times a day.  - Reposition patient every 3 hours.  - Dangle patient 3 times a day  - Stand patient 3 times a day  - Ambulate patient 3 times a day  - Out of bed to chair 3 times a day   - Out of bed for meals 3 times a day  - Out of bed for toileting  - Record patient progress and toleration of activity level   Outcome: Progressing     Problem: DISCHARGE PLANNING  Goal: Discharge to home or other facility with appropriate resources  Description: INTERVENTIONS:  - Identify barriers to discharge w/patient and caregiver  - Arrange for needed discharge resources and transportation as appropriate  - Identify discharge learning needs (meds, wound care, etc.)  - Arrange for interpretive services to assist at discharge as needed  - Refer to Case Management Department for coordinating discharge planning if the patient needs post-hospital services based on physician/advanced practitioner order or complex needs related to functional status, cognitive ability, or social support system  Outcome: Progressing     Problem: Knowledge Deficit  Goal: Patient/family/caregiver demonstrates understanding of disease process, treatment plan, medications, and discharge instructions  Description: Complete learning assessment and assess knowledge base.  Interventions:  - Provide teaching at level of understanding  - Provide teaching via preferred learning methods  Outcome: Progressing     Problem: NEUROSENSORY - ADULT  Goal: Achieves stable or improved neurological status  Description: INTERVENTIONS  - Monitor and report changes in neurological status  - Monitor vital signs such as temperature, blood pressure, glucose, and any other labs ordered   - Initiate measures to prevent increased intracranial pressure  - Monitor for seizure activity and implement precautions if appropriate      Outcome: Progressing  Goal: Achieves maximal functionality and self care  Description: INTERVENTIONS  - Monitor  swallowing and airway patency with patient fatigue and changes in neurological status  - Encourage and assist patient to increase activity and self care.   - Encourage visually impaired, hearing impaired and aphasic patients to use assistive/communication devices  Outcome: Progressing     Problem: CARDIOVASCULAR - ADULT  Goal: Maintains optimal cardiac output and hemodynamic stability  Description: INTERVENTIONS:  - Monitor I/O, vital signs and rhythm  - Monitor for S/S and trends of decreased cardiac output  - Administer and titrate ordered vasoactive medications to optimize hemodynamic stability  - Assess quality of pulses, skin color and temperature  - Assess for signs of decreased coronary artery perfusion  - Instruct patient to report change in severity of symptoms  Outcome: Progressing  Goal: Absence of cardiac dysrhythmias or at baseline rhythm  Description: INTERVENTIONS:  - Continuous cardiac monitoring, vital signs, obtain 12 lead EKG if ordered  - Administer antiarrhythmic and heart rate control medications as ordered  - Monitor electrolytes and administer replacement therapy as ordered  Outcome: Progressing     Problem: RESPIRATORY - ADULT  Goal: Achieves optimal ventilation and oxygenation  Description: INTERVENTIONS:  - Assess for changes in respiratory status  - Assess for changes in mentation and behavior  - Position to facilitate oxygenation and minimize respiratory effort  - Oxygen administered by appropriate delivery if ordered  - Initiate smoking cessation education as indicated  - Encourage broncho-pulmonary hygiene including cough, deep breathe, Incentive Spirometry  - Assess the need for suctioning and aspirate as needed  - Assess and instruct to report SOB or any respiratory difficulty  - Respiratory Therapy support as indicated  Outcome: Progressing     Problem: GASTROINTESTINAL - ADULT  Goal: Minimal or absence of nausea and/or vomiting  Description: INTERVENTIONS:  - Administer IV fluids  if ordered to ensure adequate hydration  - Maintain NPO status until nausea and vomiting are resolved  - Nasogastric tube if ordered  - Administer ordered antiemetic medications as needed  - Provide nonpharmacologic comfort measures as appropriate  - Advance diet as tolerated, if ordered  - Consider nutrition services referral to assist patient with adequate nutrition and appropriate food choices  Outcome: Progressing  Goal: Maintains or returns to baseline bowel function  Description: INTERVENTIONS:  - Assess bowel function  - Encourage oral fluids to ensure adequate hydration  - Administer IV fluids if ordered to ensure adequate hydration  - Administer ordered medications as needed  - Encourage mobilization and activity  - Consider nutritional services referral to assist patient with adequate nutrition and appropriate food choices  Outcome: Progressing  Goal: Maintains adequate nutritional intake  Description: INTERVENTIONS:  - Monitor percentage of each meal consumed  - Identify factors contributing to decreased intake, treat as appropriate  - Assist with meals as needed  - Monitor I&O, weight, and lab values if indicated  - Obtain nutrition services referral as needed  Outcome: Progressing  Goal: Establish and maintain optimal ostomy function  Description: INTERVENTIONS:  - Assess bowel function  - Encourage oral fluids to ensure adequate hydration  - Administer IV fluids if ordered to ensure adequate hydration   - Administer ordered medications as needed  - Encourage mobilization and activity  - Nutrition services referral to assist patient with appropriate food choices  - Assess stoma site  - Consider wound care consult   Outcome: Progressing  Goal: Oral mucous membranes remain intact  Description: INTERVENTIONS  - Assess oral mucosa and hygiene practices  - Implement preventative oral hygiene regimen  - Implement oral medicated treatments as ordered  - Initiate Nutrition services referral as needed  Outcome:  Progressing     Problem: GENITOURINARY - ADULT  Goal: Maintains or returns to baseline urinary function  Description: INTERVENTIONS:  - Assess urinary function  - Encourage oral fluids to ensure adequate hydration if ordered  - Administer IV fluids as ordered to ensure adequate hydration  - Administer ordered medications as needed  - Offer frequent toileting  - Follow urinary retention protocol if ordered  Outcome: Progressing  Goal: Absence of urinary retention  Description: INTERVENTIONS:  - Assess patient’s ability to void and empty bladder  - Monitor I/O  - Bladder scan as needed  - Discuss with physician/AP medications to alleviate retention as needed  - Discuss catheterization for long term situations as appropriate  Outcome: Progressing  Goal: Urinary catheter remains patent  Description: INTERVENTIONS:  - Assess patency of urinary catheter  - If patient has a chronic hanks, consider changing catheter if non-functioning  - Follow guidelines for intermittent irrigation of non-functioning urinary catheter  Outcome: Progressing     Problem: METABOLIC, FLUID AND ELECTROLYTES - ADULT  Goal: Electrolytes maintained within normal limits  Description: INTERVENTIONS:  - Monitor labs and assess patient for signs and symptoms of electrolyte imbalances  - Administer electrolyte replacement as ordered  - Monitor response to electrolyte replacements, including repeat lab results as appropriate  - Instruct patient on fluid and nutrition as appropriate  Outcome: Progressing  Goal: Fluid balance maintained  Description: INTERVENTIONS:  - Monitor labs   - Monitor I/O and WT  - Instruct patient on fluid and nutrition as appropriate  - Assess for signs & symptoms of volume excess or deficit  Outcome: Progressing  Goal: Glucose maintained within target range  Description: INTERVENTIONS:  - Monitor Blood Glucose as ordered  - Assess for signs and symptoms of hyperglycemia and hypoglycemia  - Administer ordered medications to  maintain glucose within target range  - Assess nutritional intake and initiate nutrition service referral as needed  Outcome: Progressing     Problem: SKIN/TISSUE INTEGRITY - ADULT  Goal: Incision(s), wounds(s) or drain site(s) healing without S/S of infection  Description: INTERVENTIONS  - Assess and document dressing, incision, wound bed, drain sites and surrounding tissue  - Provide patient and family education  - Perform skin care/dressing changes every   Outcome: Progressing  Goal: Pressure injury heals and does not worsen  Description: Interventions:  - Implement low air loss mattress or specialty surface (Criteria met)  - Apply silicone foam dressing  - Instruct/assist with weight shifting every  minutes when in chair   - Limit chair time to  hour intervals  - Use special pressure reducing interventions such as  when in chair   - Apply fecal or urinary incontinence containment device   - Perform passive or active ROM every   - Turn and reposition patient & offload bony prominences every  hours   - Utilize friction reducing device or surface for transfers   - Consider consults to  interdisciplinary teams such as   - Use incontinent care products after each incontinent episode such as   - Consider nutrition services referral as needed  Outcome: Progressing     Problem: HEMATOLOGIC - ADULT  Goal: Maintains hematologic stability  Description: INTERVENTIONS  - Assess for signs and symptoms of bleeding or hemorrhage  - Monitor labs  - Administer supportive blood products/factors as ordered and appropriate  Outcome: Progressing     Problem: MUSCULOSKELETAL - ADULT  Goal: Maintain or return mobility to safest level of function  Description: INTERVENTIONS:  - Assess patient's ability to carry out ADLs; assess patient's baseline for ADL function and identify physical deficits which impact ability to perform ADLs (bathing, care of mouth/teeth, toileting, grooming, dressing, etc.)  - Assess/evaluate cause of self-care  deficits   - Assess range of motion  - Assess patient's mobility  - Assess patient's need for assistive devices and provide as appropriate  - Encourage maximum independence but intervene and supervise when necessary  - Involve family in performance of ADLs  - Assess for home care needs following discharge   - Consider OT consult to assist with ADL evaluation and planning for discharge  - Provide patient education as appropriate  Outcome: Progressing  Goal: Maintain proper alignment of affected body part  Description: INTERVENTIONS:  - Support, maintain and protect limb and body alignment  - Provide patient/ family with appropriate education  Outcome: Progressing     Problem: Prexisting or High Potential for Compromised Skin Integrity  Goal: Skin integrity is maintained or improved  Description: INTERVENTIONS:  - Identify patients at risk for skin breakdown  - Assess and monitor skin integrity including under and around medical devices   - Assess and monitor nutrition and hydration status  - Monitor labs  - Assess for incontinence   - Turn and reposition patient  - Assist with mobility/ambulation  - Relieve pressure over nate prominences   - Avoid friction and shearing  - Provide appropriate hygiene as needed including keeping skin clean and dry  - Evaluate need for skin moisturizer/barrier cream  - Collaborate with interdisciplinary team  - Patient/family teaching  - Consider wound care consult    Assess:  - Review Goldy scale daily  - Clean and moisturize skin every   - Inspect skin when repositioning, toileting, and assisting with ADLS  - Assess under medical devices such as  every   - Assess extremities for adequate circulation and sensation     Bed Management:  - Have minimal linens on bed & keep smooth, unwrinkled  - Change linens as needed when moist or perspiring  - Avoid sitting or lying in one position for more than  hours while in bed?Keep HOB at degrees   - Toileting:  - Offer bedside commode  - Assess  for incontinence every   - Use incontinent care products after each incontinent episode such as     Activity:  - Mobilize patient  times a day  - Encourage activity and walks on unit  - Encourage or provide ROM exercises   - Turn and reposition patient every  Hours  - Use appropriate equipment to lift or move patient in bed  - Instruct/ Assist with weight shifting every  when out of bed in chair  - Consider limitation of chair time  hour intervals    Skin Care:  - Avoid use of baby powder, tape, friction and shearing, hot water or constrictive clothing  - Relieve pressure over bony prominences using   - Do not massage red bony areas    Next Steps:  - Teach patient strategies to minimize risks such as   - Consider consults to  interdisciplinary teams such as  Outcome: Progressing     Problem: Nutrition/Hydration-ADULT  Goal: Nutrient/Hydration intake appropriate for improving, restoring or maintaining nutritional needs  Description: Monitor and assess patient's nutrition/hydration status for malnutrition. Collaborate with interdisciplinary team and initiate plan and interventions as ordered.  Monitor patient's weight and dietary intake as ordered or per policy. Utilize nutrition screening tool and intervene as necessary. Determine patient's food preferences and provide high-protein, high-caloric foods as appropriate.     INTERVENTIONS:  - Monitor oral intake, urinary output, labs, and treatment plans  - Assess nutrition and hydration status and recommend course of action  - Evaluate amount of meals eaten  - Assist patient with eating if necessary   - Allow adequate time for meals  - Recommend/ encourage appropriate diets, oral nutritional supplements, and vitamin/mineral supplements  - Order, calculate, and assess calorie counts as needed  - Recommend, monitor, and adjust tube feedings and TPN/PPN based on assessed needs  - Assess need for intravenous fluids  - Provide specific nutrition/hydration education as  appropriate  - Include patient/family/caregiver in decisions related to nutrition  Outcome: Progressing

## 2025-06-24 NOTE — ASSESSMENT & PLAN NOTE
Lab Results   Component Value Date    HGBA1C 5.4 06/20/2025     Continue PTA Humalog 5 units QID. On tube feeding, add sliding scale insulin Q6H    Recent Labs     06/23/25  1608 06/23/25  2110 06/24/25  0708 06/24/25  1044   POCGLU 126 137 156* 141*       Blood Sugar Average: Last 72 hrs:  (P) [id number]

## 2025-06-25 LAB
ANION GAP SERPL CALCULATED.3IONS-SCNC: 4 MMOL/L (ref 4–13)
APTT PPP: 47 SECONDS (ref 23–34)
BUN SERPL-MCNC: 19 MG/DL (ref 5–25)
CALCIUM SERPL-MCNC: 8.7 MG/DL (ref 8.4–10.2)
CHLORIDE SERPL-SCNC: 106 MMOL/L (ref 96–108)
CO2 SERPL-SCNC: 26 MMOL/L (ref 21–32)
CREAT SERPL-MCNC: 0.8 MG/DL (ref 0.6–1.3)
ERYTHROCYTE [DISTWIDTH] IN BLOOD BY AUTOMATED COUNT: 17 % (ref 11.6–15.1)
GFR SERPL CREATININE-BSD FRML MDRD: 86 ML/MIN/1.73SQ M
GLUCOSE SERPL-MCNC: 110 MG/DL (ref 65–140)
GLUCOSE SERPL-MCNC: 135 MG/DL (ref 65–140)
GLUCOSE SERPL-MCNC: 138 MG/DL (ref 65–140)
GLUCOSE SERPL-MCNC: 168 MG/DL (ref 65–140)
GLUCOSE SERPL-MCNC: 173 MG/DL (ref 65–140)
HCT VFR BLD AUTO: 25.4 % (ref 36.5–49.3)
HGB BLD-MCNC: 7.6 G/DL (ref 12–17)
MCH RBC QN AUTO: 28.4 PG (ref 26.8–34.3)
MCHC RBC AUTO-ENTMCNC: 29.9 G/DL (ref 31.4–37.4)
MCV RBC AUTO: 95 FL (ref 82–98)
PLATELET # BLD AUTO: 344 THOUSANDS/UL (ref 149–390)
PMV BLD AUTO: 9 FL (ref 8.9–12.7)
POTASSIUM SERPL-SCNC: 4.6 MMOL/L (ref 3.5–5.3)
RBC # BLD AUTO: 2.68 MILLION/UL (ref 3.88–5.62)
SODIUM SERPL-SCNC: 136 MMOL/L (ref 135–147)
WBC # BLD AUTO: 7.37 THOUSAND/UL (ref 4.31–10.16)

## 2025-06-25 PROCEDURE — 82948 REAGENT STRIP/BLOOD GLUCOSE: CPT

## 2025-06-25 PROCEDURE — 85027 COMPLETE CBC AUTOMATED: CPT | Performed by: STUDENT IN AN ORGANIZED HEALTH CARE EDUCATION/TRAINING PROGRAM

## 2025-06-25 PROCEDURE — 80048 BASIC METABOLIC PNL TOTAL CA: CPT | Performed by: STUDENT IN AN ORGANIZED HEALTH CARE EDUCATION/TRAINING PROGRAM

## 2025-06-25 PROCEDURE — 99232 SBSQ HOSP IP/OBS MODERATE 35: CPT | Performed by: STUDENT IN AN ORGANIZED HEALTH CARE EDUCATION/TRAINING PROGRAM

## 2025-06-25 PROCEDURE — 85730 THROMBOPLASTIN TIME PARTIAL: CPT | Performed by: STUDENT IN AN ORGANIZED HEALTH CARE EDUCATION/TRAINING PROGRAM

## 2025-06-25 RX ORDER — ASPIRIN 81 MG/1
81 TABLET, CHEWABLE ORAL DAILY
Start: 2025-07-01

## 2025-06-25 RX ORDER — MIRTAZAPINE 15 MG/1
7.5 TABLET, FILM COATED ORAL
Start: 2025-06-25

## 2025-06-25 RX ADMIN — INSULIN LISPRO 1 UNITS: 100 INJECTION, SOLUTION INTRAVENOUS; SUBCUTANEOUS at 09:08

## 2025-06-25 RX ADMIN — Medication 12.5 MG: at 08:45

## 2025-06-25 RX ADMIN — ATORVASTATIN CALCIUM 80 MG: 80 TABLET, FILM COATED ORAL at 08:45

## 2025-06-25 RX ADMIN — Medication 250 MG: at 21:20

## 2025-06-25 RX ADMIN — MIRTAZAPINE 7.5 MG: 7.5 TABLET, FILM COATED ORAL at 21:20

## 2025-06-25 RX ADMIN — ASPIRIN 81 MG CHEWABLE TABLET 81 MG: 81 TABLET CHEWABLE at 08:45

## 2025-06-25 RX ADMIN — Medication 1000 UNITS: at 08:45

## 2025-06-25 RX ADMIN — INSULIN LISPRO 5 UNITS: 100 INJECTION, SOLUTION INTRAVENOUS; SUBCUTANEOUS at 18:09

## 2025-06-25 RX ADMIN — ESCITALOPRAM OXALATE 10 MG: 10 TABLET ORAL at 08:45

## 2025-06-25 RX ADMIN — DABIGATRAN ETEXILATE MESYLATE 150 MG: 150 CAPSULE ORAL at 21:20

## 2025-06-25 RX ADMIN — Medication 12.5 MG: at 21:20

## 2025-06-25 RX ADMIN — Medication 250 MG: at 08:46

## 2025-06-25 RX ADMIN — Medication 300 MG: at 09:09

## 2025-06-25 RX ADMIN — EZETIMIBE 10 MG: 10 TABLET ORAL at 08:45

## 2025-06-25 RX ADMIN — LORATADINE 10 MG: 5 SOLUTION ORAL at 09:09

## 2025-06-25 RX ADMIN — DABIGATRAN ETEXILATE MESYLATE 150 MG: 150 CAPSULE ORAL at 08:45

## 2025-06-25 RX ADMIN — THIAMINE HCL TAB 100 MG 100 MG: 100 TAB at 08:45

## 2025-06-25 RX ADMIN — INSULIN LISPRO 5 UNITS: 100 INJECTION, SOLUTION INTRAVENOUS; SUBCUTANEOUS at 09:08

## 2025-06-25 RX ADMIN — Medication 250 MG: at 08:45

## 2025-06-25 RX ADMIN — INSULIN LISPRO 5 UNITS: 100 INJECTION, SOLUTION INTRAVENOUS; SUBCUTANEOUS at 12:39

## 2025-06-25 RX ADMIN — Medication 15 ML: at 09:09

## 2025-06-25 RX ADMIN — INSULIN LISPRO 1 UNITS: 100 INJECTION, SOLUTION INTRAVENOUS; SUBCUTANEOUS at 21:20

## 2025-06-25 NOTE — PROGRESS NOTES
Progress Note - Hospitalist   Name: Drew Santos 74 y.o. male I MRN: [id number]  Unit/Bed#: E4 -01 I Date of Admission: 6/20/2025   Date of Service: 6/25/2025 I Hospital Day: 5     Assessment & Plan  CVA (cerebrovascular accident) (HCC)  Patient with hx of Afib, CVA x2 with left-sided deficits, bedbound, PEG, colostomy, chronic indwelling catheter, St IV sacral decubiti, transferred from [facility] for increased weakness and AMS    CTH showing acute to subacute left PCA distribution infarct.   MRI of the brain confirms stroke  2D Echo unremarkable, deferred JOSEFINA with plans for dual AP and DOAC  PTA Maintained on ASA, Eliquis and high intensity statin  Neurology suspects Eliquis failure, transition eliquis to heparin gtt  Repeat CTh showing evolving left PCA distribution infarcts with trace amount of petechial blood products and oral laminar necrosis  Discussed with neurology, transition eliquis to Pradaxa  Plan to resume aspirin 81 mg daily in 1 week on July 1  Will need to follow outpatient neurology in 6 weeks  Decubitus ulcer of sacral region, stage 4 (HCC)  POA. Previous admissions for polymicrobial bacteremia and OM, previous wound VAC.  Follows with surgery for wound care  Offload.  Turn and reposition.  Local wound care  Wound care following  General surgery recommend localized wound care as they state it appears not infected  Type 2 diabetes mellitus without complication, without long-term current use of insulin (HCC)  Lab Results   Component Value Date    HGBA1C 5.4 06/20/2025     Continue PTA Humalog 5 units QID. On tube feeding, add sliding scale insulin Q6H    Recent Labs     06/24/25  1612 06/24/25  2104 06/25/25  0713 06/25/25  1040   POCGLU 154* 125 168* 135       Blood Sugar Average: Last 72 hrs:  (P) 144.8    Paroxysmal atrial fibrillation (HCC)  On rate control with metoprolol 12.5mg bid  Prior to admission on Eliquis, continue heparin  Urinary retention  Urinary retention  managed with chronic Parra catheter.  Catheter exchanged at facility  Monitor urine output  Primary hypertension  BP soft, on metoprolol with hold parameters  Anemia of chronic disease  Chronic anemia with thrombocytosis.  Baseline 7-8  FIT Test Normal  S/P 1 unit transfused, no evidence of infection  Severe protein-calorie malnutrition (HCC)  Malnutrition Findings: Body mass index is 28.34 kg/m².   S/p PEG.  Continue tube feeding along with regular diet  Nutrition consult  S/P percutaneous endoscopic gastrostomy (PEG) tube placement ([facility])  S/p Peg Oct 2024.  Diet at facility:  Tube feeding Jevity 1.2 85 mL/h x22hrs, water flush 150 mL every 4 hours  Oral Regular diet  Continue tube feeds  Colostomy in place ([facility])  S/p loop diverting colostomy; placed due to sacral decubiti ulcer with suspected coccygeal OM   Ostomy care.  Monitor stool output    VTE Pharmacologic Prophylaxis:   Pharmacologic: Dabigatran (Pradaxa)  Mechanical VTE Prophylaxis in Place: Yes    Current Length of Stay: 5 day(s)    Current Patient Status: Inpatient   Certification Statement: The patient will continue to require additional inpatient hospital stay due to pending transfer tomorrow    Discharge Plan: tomorrow    Code Status: Level 1 - Full Code      Subjective:   No events overnight, no complaints. Tolerating diet.    Objective:     Vitals:   Temp (24hrs), Av.6 °F (36.4 °C), Min:97 °F (36.1 °C), Max:98.6 °F (37 °C)    Temp:  [97 °F (36.1 °C)-98.6 °F (37 °C)] 98 °F (36.7 °C)  HR:  [65-70] 69  Resp:  [18] 18  BP: (112-135)/(64-85) 112/68  SpO2:  [93 %-99 %] 98 %  Body mass index is 28.34 kg/m².     Input and Output Summary (last 24 hours):       Intake/Output Summary (Last 24 hours) at 2025 1513  Last data filed at 2025 1318  Gross per 24 hour   Intake 340 ml   Output 700 ml   Net -360 ml       Physical Exam:     Physical Exam  Vitals and nursing note reviewed.   Constitutional:       Appearance: He is ill-appearing  (chronically).   HENT:      Head: Normocephalic.     Eyes:      Conjunctiva/sclera: Conjunctivae normal.       Cardiovascular:      Rate and Rhythm: Normal rate.   Pulmonary:      Effort: Pulmonary effort is normal.      Breath sounds: No wheezing.   Abdominal:      General: Bowel sounds are normal. There is no distension.      Palpations: Abdomen is soft.      Comments: G Tube, Ostomy   Genitourinary:     Comments: Parra catheter    Musculoskeletal:         General: No swelling.      Right lower leg: No edema.      Left lower leg: No edema.     Skin:     General: Skin is warm.      Comments: Feet in podus     Neurological:      Mental Status: He is alert. Mental status is at baseline.       Additional Data:     Labs:    Results from last 7 days   Lab Units 06/25/25  1008 06/24/25  0854 06/23/25  0753   WBC Thousand/uL 7.37   < > 8.86   HEMOGLOBIN g/dL 7.6*   < > 7.4*   HEMATOCRIT % 25.4*   < > 25.1*   PLATELETS Thousands/uL 344   < > 363   SEGS PCT %  --   --  64   LYMPHO PCT %  --   --  23   MONO PCT %  --   --  7   EOS PCT %  --   --  5    < > = values in this interval not displayed.     Results from last 7 days   Lab Units 06/25/25  1008 06/21/25  0545 06/20/25  1655   SODIUM mmol/L 136   < > 135   POTASSIUM mmol/L 4.6   < > 4.4   CHLORIDE mmol/L 106   < > 104   CO2 mmol/L 26   < > 25   BUN mg/dL 19   < > 37*   CREATININE mg/dL 0.80   < > 0.91   ANION GAP mmol/L 4   < > 6   CALCIUM mg/dL 8.7   < > 9.0   ALBUMIN g/dL  --   --  2.7*   TOTAL BILIRUBIN mg/dL  --   --  1.01*   ALK PHOS U/L  --   --  64   ALT U/L  --   --  9   AST U/L  --   --  16   GLUCOSE RANDOM mg/dL 138   < > 136    < > = values in this interval not displayed.     Results from last 7 days   Lab Units 06/20/25  1913   INR  1.35*     Results from last 7 days   Lab Units 06/25/25  1040 06/25/25  0713 06/24/25  2104 06/24/25  1612 06/24/25  1044 06/24/25  0708 06/23/25  2110 06/23/25  1608 06/23/25  1041 06/23/25  0802 06/22/25  1736 06/22/25  1132    POC GLUCOSE mg/dl 135 168* 125 154* 141* 156* 137 126 175* 172* 144* 174*     Results from last 7 days   Lab Units 06/20/25  2342   HEMOGLOBIN A1C % 5.4               * I Have Reviewed All Lab Data Listed Above.  * Additional Pertinent Lab Tests Reviewed: All Labs For Current Hospital Admission Reviewed    Mobility:  Basic Mobility Inpatient Raw Score: 6  -[facility] Goal: 2: Bed activities/Dependent transfer  -[facility] Achieved: 1: Laying in bed    Lines:     Invasive Devices       Peripheral Intravenous Line  Duration             Peripheral IV 06/20/25 Left;Ventral (anterior) Forearm 4 days    Peripheral IV 06/21/25 Proximal;Right;Ventral (anterior) Forearm 4 days              Drain  Duration             Colostomy Loop  days    Gastrostomy/Enterostomy  days    Urethral Catheter 5 days                       Imaging:    Imaging Reports Reviewed Today Include:     CT head wo contrast  Result Date: 6/24/2025  Impression: Evolving left PCA distribution infarcts. Trace amount of petechial blood products and/or laminar necrosis within the left occipital lobe is overall grossly stable dating back to 6/20/2025. Otherwise no confluent new secondary parenchymal hematoma identified. Resident: AYSHA AGGARWAL I, the attending radiologist, have reviewed the images and agree with the final report above. Workstation performed: [id number]     CTA head and neck w wo contrast  Result Date: 6/21/2025  Impression: CT Brain: Acute/recent infarcts involving the left occipital lobe, and the left thalamus, corresponding to the findings on brain MRI. Additional punctate infarcts involving the left cerebral hemisphere and left middle cerebellar peduncle are harder to visualize on this examination. No acute intracranial hemorrhage noted. CT Angiography: Occlusion of the left posterior cerebral artery P3 segment. Occlusion of the distal intradural right vertebral artery near the vertebrobasilar junction. Focal high-grade stenosis or  short segment occlusion of the bilateral cervical vertebral artery origins due to atherosclerotic plaque. Remaining cervical vertebral arteries appear widely patent. The study was marked in EPIC for immediate notification. Workstation performed: [id number]     MRI brain wo contrast  Result Date: 6/21/2025  Impression: Multiple acute/recent infarcts involving the left cerebral hemisphere, the largest involving the left occipital lobe, left thalamus, and left middle cerebellar peduncle, with associated cytotoxic edema. No associated hemorrhage noted. Mild chronic white matter microangiopathic changes involving both cerebral hemispheres. Paranasal sinus disease as described above, worst involving the left maxillary sinus. Workstation performed: [id number]     XR chest 1 view portable  Result Date: 6/21/2025  Impression: No acute cardiopulmonary disease. Resident: Siva Menjivar I, the attending radiologist, have reviewed the images and agree with the final report above. Workstation performed: [id number]     CT head without contrast  Result Date: 6/20/2025  Impression: Acute to subacute left PCA distribution infarct. No confluent secondary parenchymal hematoma. Findings are new since the prior examination. The study was marked in EPIC for immediate notification. Workstation performed: [id number]     Recent Cultures (last 7 days):           Last 24 Hours Medication List:   Current Facility-Administered Medications   Medication Dose Route Frequency Provider Last Rate    Acetaminophen  650 mg Per G Tube Q4H PRN AVELINA Louie      aluminum-magnesium hydroxide-simethicone  30 mL Per G Tube Q6H PRN AVELINA Louie      ascorbic acid  250 mg Per G Tube Daily AVELINA Louie      aspirin  81 mg Per PEG Tube Daily AVELINA Louie      atorvastatin  80 mg Per G Tube Daily AVELINA Louie      bisacodyl  10 mg Rectal Daily PRN AVELINA Louie      Cholecalciferol  1,000 Units  Per G Tube Daily AVELINA Louie      dabigatran etexilate  150 mg Oral Q12H [facility] Chito Mora MD      docusate  200 mg Per G Tube Daily AVELINA Louie      escitalopram  10 mg Per PEG Tube Daily AVELINA Louie      ezetimibe  10 mg Per G Tube Daily AVELINA Louie      Ferrous Sulfate  300 mg Per G Tube Daily AVELINA Louie      insulin lispro  1-6 Units Subcutaneous 4x Daily (AC & HS) Willem Gutierrez PA-C      insulin lispro  5 Units Subcutaneous TID With Meals Willem Gutierrez PA-C      Loratadine  10 mg Per G Tube Daily AVELINA Louie      metoprolol tartrate  12.5 mg Per G Tube Q12H ELISEO AVELINA Louie      mirtazapine  7.5 mg Per G Tube HS AVELINA Louie      Multivitamin  15 mL Per G Tube Daily AVELINA Louie      ondansetron  4 mg Intravenous Q6H PRN AVELINA Louie      saccharomyces boulardii  250 mg Per G Tube BID AVELINA Louie      thiamine  100 mg Per G Tube Daily Taye Rodriguez, DO          Today, Patient Was Seen By: Chito Mora MD    ** Please Note: Dictation voice to text software may have been used in the creation of this document. **

## 2025-06-25 NOTE — ASSESSMENT & PLAN NOTE
Patient with hx of Afib, CVA x2 with left-sided deficits, bedbound, PEG, colostomy, chronic indwelling catheter, St IV sacral decubiti, transferred from [facility] for increased weakness and AMS    CTH showing acute to subacute left PCA distribution infarct.   MRI of the brain confirms stroke  2D Echo unremarkable, deferred JOSEFINA with plans for dual AP and DOAC  PTA Maintained on ASA, Eliquis and high intensity statin  Neurology suspects Eliquis failure, transition eliquis to heparin gtt  Repeat CTh showing evolving left PCA distribution infarcts with trace amount of petechial blood products and oral laminar necrosis  Discussed with neurology, transition eliquis to Pradaxa  Plan to resume aspirin 81 mg daily in 1 week on July 1  Will need to follow outpatient neurology in 6 weeks

## 2025-06-25 NOTE — TRANSPORTATION MEDICAL NECESSITY
"Section I - General Information    Name of Patient: Drew Santos                 : 1950    Medicare #: [id number]  Transport Date:                          (PCS is valid for round trips on this date and for all repetitive trips in the 60-day range as noted below.)  Origin: [place] [address]                                                         Destination: [facility]  Is the pt's stay covered under Medicare Part A (PPS/DRG)   [x]     Closest appropriate facility? If no, why is transport to more distant facility required? Yes  If hospice pt, is this transport related to pt's terminal illness? N/A       Section II - Medical Necessity Questionnaire  Ambulance transportation is medically necessary only if other means of transport are contraindicated or would be potentially harmful to the patient. To meet this requirement, the patient must either be \"bed confined\" or suffer from a condition such that transport by means other than ambulance is contraindicated by the patient's condition. The following questions must be answered by the medical professional signing below for this form to be valid:    1)  Describe the MEDICAL CONDITION (physical and/or mental) of this patient AT THE TIME OF AMBULANCE TRANSPORT that requires the patient to be transported in an ambulance and why transport by other means is contraindicated by the patient's condition:Pt will be transferred to skilled nursing facility, pt diagnosed with a stroke and also has stage 4 pressure ulcer to the buttocks    2) Is the patient \"bed confined\" as defined below?     Yes  To be \"be confined\" the patient must satisfy all three of the following conditions: (1) unable to get up from bed without Assistance; AND (2) unable to ambulate; AND (3) unable to sit in a chair or wheelchair.    3) Can this patient safely be transported by car or wheelchair van (i.e., seated during transport without a medical attendant or " monitoring)?   No    4) In addition to completing questions 1-3 above, please check any of the following conditions that apply*:   *Note: supporting documentation for any boxes checked must be maintained in the patient's medical records.  If hosp-hosp transfer, describe services needed at 2nd facility not available at 1st facility?   Unable to tolerate seated position for time needed to transport   Other(specify) Stage 4 pressure ulcers to buttocks      Section III - Signature of Physician or Healthcare Professional  I certify that the above information is true and correct based on my evaluation of this patient, and represent that the patient requires transport by ambulance and that other forms of transport are contraindicated. I understand that this information will be used by the Centers for Medicare and Medicaid Services (CMS) to support the determination of medical necessity for ambulance services, and I represent that I have personal knowledge of the patient's condition at time of transport.    [x]  If this box is checked, I also certify that the patient is physically or mentally incapable of signing the ambulance service's claim and that the institution with which I am affiliated has furnished care, services, or assistance to the patient.    My signature below is made on behalf of the patient pursuant to 42 CFR §424.36(b)(4). In accordance with 42 CFR §424.37, the specific reason(s) that the patient is physically or mentally incapable of signing the claim form is as follows: Due to stroke pt is not able to sign.      Signature of Physician* or Healthcare Professional____Dereje Mauricio RN BSN__________________________________________________________  Signature Date 06/25/25 (For scheduled repetitive transports, this form is not valid for transports performed more than 60 days after this date)    Printed Name & Credentials of Physician or Healthcare Professional (MD, , RN, etc.)____Dereje Mauricio RN  BSN____________________________  *Form must be signed by patient's attending physician for scheduled, repetitive transports. For non-repetitive, unscheduled ambulance transports, if unable to obtain the signature of the attending physician, any of the following may sign (choose appropriate option below)  [] Physician Assistant []  Clinical Nurse Specialist [x]  Registered Nurse  []  Nurse Practitioner  [x] Discharge Planner

## 2025-06-25 NOTE — CASE MANAGEMENT
Case Management Discharge Planning Note    Patient name Drew Santos  Location [facility] 4 /E4 -* MRN [id number]  : 1950 Date 2025       Current Admission Date: 2025  Current Admission Diagnosis:CVA (cerebrovascular accident) ([facility])   Patient Active Problem List    Diagnosis Date Noted    CVA (cerebrovascular accident) ([facility]) 2025    Hyponatremia 2024    Hyperkalemia 2024    Leukocytosis 2024    Colostomy in place ([facility]) 2024    S/P percutaneous endoscopic gastrostomy (PEG) tube placement ([facility]) 10/24/2024    Pyuria 10/19/2024    Chronic indwelling Parra catheter 10/19/2024    Hypernatremia 10/19/2024    Advanced care planning/counseling discussion 2024    Severe protein-calorie malnutrition (HCC) 2024    Polymicrobial bacteremia 2024    Acute metabolic encephalopathy 2024    History of CVA (cerebrovascular accident) 2024    Paroxysmal atrial fibrillation ([facility]) 2024    Anemia of chronic disease 2024    Gram-negative bacteremia 2024    Type 2 diabetes mellitus without complication, without long-term current use of insulin ([facility]) 2024    COVID-19 2024    Proctitis 2024    Dysgeusia 2024    Gross hematuria 2024    Depression 2024    Decubitus ulcer of sacral region, stage 4 ([facility]) 2024    Primary hypertension 2024    Mixed hyperlipidemia 2024    Urinary retention 2024    Syncope 2024    Elevated lactic acid level 2024    Abnormal CPK 2024      LOS (days): 5  Geometric Mean LOS (GMLOS) (days):   Days to GMLOS:     OBJECTIVE:  Risk of Unplanned Readmission Score: 26.45         Current admission status: Inpatient   Preferred Pharmacy:   [id number] - [place] - [address]    [facility] [place] [address]  Phone: [phone] Fax: [phone]    [facility] - [place] - [address],  [address]  [facility],  First Floor [facility] [address]  Phone: [phone] Fax: [phone]    Primary Care Provider: Joe Lyon MD    Primary Insurance: [facility]  [facility]    DISCHARGE DETAILS:                                                             Transport at Discharge : [facility] Amb[facility]     Number[facility]Name of Dispatcher[facility]: [phone] ext [phone]  Transported by (Company and Unit #): [employer]  ETA of Transport (Date): 06/26/25  ETA of Transport (Time): 0800                       Additional Comments: Medical Neccesity form completed and placed in the chart.

## 2025-06-25 NOTE — PLAN OF CARE
Problem: PAIN - ADULT  Goal: Verbalizes/displays adequate comfort level or baseline comfort level  Description: Interventions:  - Encourage patient to monitor pain and request assistance  - Assess pain using appropriate pain scale  - Administer analgesics as ordered based on type and severity of pain and evaluate response  - Implement non-pharmacological measures as appropriate and evaluate response  - Consider cultural and social influences on pain and pain management  - Notify physician/advanced practitioner if interventions unsuccessful or patient reports new pain  - Educate patient/family on pain management process including their role and importance of  reporting pain   - Provide non-pharmacologic/complimentary pain relief interventions  Outcome: Progressing     Problem: INFECTION - ADULT  Goal: Absence or prevention of progression during hospitalization  Description: INTERVENTIONS:  - Assess and monitor for signs and symptoms of infection  - Monitor lab/diagnostic results  - Monitor all insertion sites, i.e. indwelling lines, tubes, and drains  - Monitor endotracheal if appropriate and nasal secretions for changes in amount and color  - [place] appropriate cooling/warming therapies per order  - Administer medications as ordered  - Instruct and encourage patient and family to use good hand hygiene technique  - Identify and instruct in appropriate isolation precautions for identified infection/condition  Outcome: Progressing  Goal: Absence of fever/infection during neutropenic period  Description: INTERVENTIONS:  - Monitor WBC  - Perform strict hand hygiene  - Limit to healthy visitors only  - No plants, dried, fresh or silk flowers with gonzalez in patient room  Outcome: Progressing

## 2025-06-25 NOTE — ASSESSMENT & PLAN NOTE
Lab Results   Component Value Date    HGBA1C 5.4 06/20/2025     Continue PTA Humalog 5 units QID. On tube feeding, add sliding scale insulin Q6H    Recent Labs     06/24/25  1612 06/24/25  2104 06/25/25  0713 06/25/25  1040   POCGLU 154* 125 168* 135       Blood Sugar Average: Last 72 hrs:  (P) 144.8

## 2025-06-25 NOTE — ASSESSMENT & PLAN NOTE
Chronic anemia with thrombocytosis.  Baseline 7-8  FIT Test Normal  S/P 1 unit transfused, no evidence of infection

## 2025-06-25 NOTE — PLAN OF CARE
Problem: PAIN - ADULT  Goal: Verbalizes/displays adequate comfort level or baseline comfort level  Description: Interventions:  - Encourage patient to monitor pain and request assistance  - Assess pain using appropriate pain scale  - Administer analgesics as ordered based on type and severity of pain and evaluate response  - Implement non-pharmacological measures as appropriate and evaluate response  - Consider cultural and social influences on pain and pain management  - Notify physician/advanced practitioner if interventions unsuccessful or patient reports new pain  - Educate patient/family on pain management process including their role and importance of  reporting pain   - Provide non-pharmacologic/complimentary pain relief interventions  Outcome: Progressing     Problem: INFECTION - ADULT  Goal: Absence or prevention of progression during hospitalization  Description: INTERVENTIONS:  - Assess and monitor for signs and symptoms of infection  - Monitor lab/diagnostic results  - Monitor all insertion sites, i.e. indwelling lines, tubes, and drains  - Monitor endotracheal if appropriate and nasal secretions for changes in amount and color  - [place] appropriate cooling/warming therapies per order  - Administer medications as ordered  - Instruct and encourage patient and family to use good hand hygiene technique  - Identify and instruct in appropriate isolation precautions for identified infection/condition  Outcome: Progressing  Goal: Absence of fever/infection during neutropenic period  Description: INTERVENTIONS:  - Monitor WBC  - Perform strict hand hygiene  - Limit to healthy visitors only  - No plants, dried, fresh or silk flowers with gonzalez in patient room  Outcome: Progressing     Problem: SAFETY ADULT  Goal: Patient will remain free of falls  Description: INTERVENTIONS:  - Educate patient/family on patient safety including physical limitations  - Instruct patient to call for assistance with activity   -  Consider consulting OT/PT to assist with strengthening/mobility based on AM PAC & JH-HLM score  - Consult OT/PT to assist with strengthening/mobility   - Keep Call bell within reach  - Keep bed low and locked with side rails adjusted as appropriate  - Keep care items and personal belongings within reach  - Initiate and maintain comfort rounds  - Make Fall Risk Sign visible to staff  - Offer Toileting every  Hours, in advance of need  - Initiate/Maintain alarm  - Obtain necessary fall risk management equipment:   - Apply yellow socks and bracelet for high fall risk patients  - Consider moving patient to room near nurses station  Outcome: Progressing  Goal: Maintain or return to baseline ADL function  Description: INTERVENTIONS:  -  Assess patient's ability to carry out ADLs; assess patient's baseline for ADL function and identify physical deficits which impact ability to perform ADLs (bathing, care of mouth/teeth, toileting, grooming, dressing, etc.)  - Assess/evaluate cause of self-care deficits   - Assess range of motion  - Assess patient's mobility; develop plan if impaired  - Assess patient's need for assistive devices and provide as appropriate  - Encourage maximum independence but intervene and supervise when necessary  - Involve family in performance of ADLs  - Assess for home care needs following discharge   - Consider OT consult to assist with ADL evaluation and planning for discharge  - Provide patient education as appropriate  - Monitor functional capacity and physical performance, use of AM PAC & JH-HLM   - Monitor gait, balance and fatigue with ambulation    Outcome: Progressing  Goal: Maintains/Returns to pre admission functional level  Description: INTERVENTIONS:  - Perform AM-PAC 6 Click Basic Mobility/ Daily Activity assessment daily.  - Set and communicate daily mobility goal to care team and patient/family/caregiver.   - Collaborate with rehabilitation services on mobility goals if consulted  -  Perform Range of Motion  times a day.  - Reposition patient every  hours.  - Dangle patient  times a day  - Stand patient  times a day  - Ambulate patient  times a day  - Out of bed to chair  times a day   - Out of bed for meals  times a day  - Out of bed for toileting  - Record patient progress and toleration of activity level   Outcome: Progressing     Problem: DISCHARGE PLANNING  Goal: Discharge to home or other facility with appropriate resources  Description: INTERVENTIONS:  - Identify barriers to discharge w/patient and caregiver  - Arrange for needed discharge resources and transportation as appropriate  - Identify discharge learning needs (meds, wound care, etc.)  - Arrange for interpretive services to assist at discharge as needed  - Refer to Case Management Department for coordinating discharge planning if the patient needs post-hospital services based on physician/advanced practitioner order or complex needs related to functional status, cognitive ability, or social support system  Outcome: Progressing     Problem: Knowledge Deficit  Goal: Patient/family/caregiver demonstrates understanding of disease process, treatment plan, medications, and discharge instructions  Description: Complete learning assessment and assess knowledge base.  Interventions:  - Provide teaching at level of understanding  - Provide teaching via preferred learning methods  Outcome: Progressing     Problem: NEUROSENSORY - ADULT  Goal: Achieves stable or improved neurological status  Description: INTERVENTIONS  - Monitor and report changes in neurological status  - Monitor vital signs such as temperature, blood pressure, glucose, and any other labs ordered   - Initiate measures to prevent increased intracranial pressure  - Monitor for seizure activity and implement precautions if appropriate      Outcome: Progressing  Goal: Achieves maximal functionality and self care  Description: INTERVENTIONS  - Monitor swallowing and airway  patency with patient fatigue and changes in neurological status  - Encourage and assist patient to increase activity and self care.   - Encourage visually impaired, hearing impaired and aphasic patients to use assistive/communication devices  Outcome: Progressing     Problem: CARDIOVASCULAR - ADULT  Goal: Maintains optimal cardiac output and hemodynamic stability  Description: INTERVENTIONS:  - Monitor I/O, vital signs and rhythm  - Monitor for S/S and trends of decreased cardiac output  - Administer and titrate ordered vasoactive medications to optimize hemodynamic stability  - Assess quality of pulses, skin color and temperature  - Assess for signs of decreased coronary artery perfusion  - Instruct patient to report change in severity of symptoms  Outcome: Progressing  Goal: Absence of cardiac dysrhythmias or at baseline rhythm  Description: INTERVENTIONS:  - Continuous cardiac monitoring, vital signs, obtain 12 lead EKG if ordered  - Administer antiarrhythmic and heart rate control medications as ordered  - Monitor electrolytes and administer replacement therapy as ordered  Outcome: Progressing     Problem: RESPIRATORY - ADULT  Goal: Achieves optimal ventilation and oxygenation  Description: INTERVENTIONS:  - Assess for changes in respiratory status  - Assess for changes in mentation and behavior  - Position to facilitate oxygenation and minimize respiratory effort  - Oxygen administered by appropriate delivery if ordered  - Initiate smoking cessation education as indicated  - Encourage broncho-pulmonary hygiene including cough, deep breathe, Incentive Spirometry  - Assess the need for suctioning and aspirate as needed  - Assess and instruct to report SOB or any respiratory difficulty  - Respiratory Therapy support as indicated  Outcome: Progressing     Problem: GASTROINTESTINAL - ADULT  Goal: Minimal or absence of nausea and/or vomiting  Description: INTERVENTIONS:  - Administer IV fluids if ordered to ensure  adequate hydration  - Maintain NPO status until nausea and vomiting are resolved  - Nasogastric tube if ordered  - Administer ordered antiemetic medications as needed  - Provide nonpharmacologic comfort measures as appropriate  - Advance diet as tolerated, if ordered  - Consider nutrition services referral to assist patient with adequate nutrition and appropriate food choices  Outcome: Progressing  Goal: Maintains or returns to baseline bowel function  Description: INTERVENTIONS:  - Assess bowel function  - Encourage oral fluids to ensure adequate hydration  - Administer IV fluids if ordered to ensure adequate hydration  - Administer ordered medications as needed  - Encourage mobilization and activity  - Consider nutritional services referral to assist patient with adequate nutrition and appropriate food choices  Outcome: Progressing  Goal: Maintains adequate nutritional intake  Description: INTERVENTIONS:  - Monitor percentage of each meal consumed  - Identify factors contributing to decreased intake, treat as appropriate  - Assist with meals as needed  - Monitor I&O, weight, and lab values if indicated  - Obtain nutrition services referral as needed  Outcome: Progressing  Goal: Establish and maintain optimal ostomy function  Description: INTERVENTIONS:  - Assess bowel function  - Encourage oral fluids to ensure adequate hydration  - Administer IV fluids if ordered to ensure adequate hydration   - Administer ordered medications as needed  - Encourage mobilization and activity  - Nutrition services referral to assist patient with appropriate food choices  - Assess stoma site  - Consider wound care consult   Outcome: Progressing  Goal: Oral mucous membranes remain intact  Description: INTERVENTIONS  - Assess oral mucosa and hygiene practices  - Implement preventative oral hygiene regimen  - Implement oral medicated treatments as ordered  - Initiate Nutrition services referral as needed  Outcome: Progressing      Problem: GENITOURINARY - ADULT  Goal: Maintains or returns to baseline urinary function  Description: INTERVENTIONS:  - Assess urinary function  - Encourage oral fluids to ensure adequate hydration if ordered  - Administer IV fluids as ordered to ensure adequate hydration  - Administer ordered medications as needed  - Offer frequent toileting  - Follow urinary retention protocol if ordered  Outcome: Progressing  Goal: Absence of urinary retention  Description: INTERVENTIONS:  - Assess patient’s ability to void and empty bladder  - Monitor I/O  - Bladder scan as needed  - Discuss with physician/AP medications to alleviate retention as needed  - Discuss catheterization for long term situations as appropriate  Outcome: Progressing  Goal: Urinary catheter remains patent  Description: INTERVENTIONS:  - Assess patency of urinary catheter  - If patient has a chronic hanks, consider changing catheter if non-functioning  - Follow guidelines for intermittent irrigation of non-functioning urinary catheter  Outcome: Progressing     Problem: METABOLIC, FLUID AND ELECTROLYTES - ADULT  Goal: Electrolytes maintained within normal limits  Description: INTERVENTIONS:  - Monitor labs and assess patient for signs and symptoms of electrolyte imbalances  - Administer electrolyte replacement as ordered  - Monitor response to electrolyte replacements, including repeat lab results as appropriate  - Instruct patient on fluid and nutrition as appropriate  Outcome: Progressing  Goal: Fluid balance maintained  Description: INTERVENTIONS:  - Monitor labs   - Monitor I/O and WT  - Instruct patient on fluid and nutrition as appropriate  - Assess for signs & symptoms of volume excess or deficit  Outcome: Progressing  Goal: Glucose maintained within target range  Description: INTERVENTIONS:  - Monitor Blood Glucose as ordered  - Assess for signs and symptoms of hyperglycemia and hypoglycemia  - Administer ordered medications to maintain glucose  within target range  - Assess nutritional intake and initiate nutrition service referral as needed  Outcome: Progressing     Problem: SKIN/TISSUE INTEGRITY - ADULT  Goal: Incision(s), wounds(s) or drain site(s) healing without S/S of infection  Description: INTERVENTIONS  - Assess and document dressing, incision, wound bed, drain sites and surrounding tissue  - Provide patient and family education  - Perform skin care/dressing changes every   Outcome: Progressing  Goal: Pressure injury heals and does not worsen  Description: Interventions:  - Implement low air loss mattress or specialty surface (Criteria met)  - Apply silicone foam dressing  - Instruct/assist with weight shifting every  minutes when in chair   - Limit chair time to  hour intervals  - Use special pressure reducing interventions such as  when in chair   - Apply fecal or urinary incontinence containment device   - Perform passive or active ROM every   - Turn and reposition patient & offload bony prominences every  hours   - Utilize friction reducing device or surface for transfers   - Consider consults to  interdisciplinary teams such as   - Use incontinent care products after each incontinent episode such as   - Consider nutrition services referral as needed  Outcome: Progressing     Problem: HEMATOLOGIC - ADULT  Goal: Maintains hematologic stability  Description: INTERVENTIONS  - Assess for signs and symptoms of bleeding or hemorrhage  - Monitor labs  - Administer supportive blood products/factors as ordered and appropriate  Outcome: Progressing     Problem: MUSCULOSKELETAL - ADULT  Goal: Maintain or return mobility to safest level of function  Description: INTERVENTIONS:  - Assess patient's ability to carry out ADLs; assess patient's baseline for ADL function and identify physical deficits which impact ability to perform ADLs (bathing, care of mouth/teeth, toileting, grooming, dressing, etc.)  - Assess/evaluate cause of self-care deficits   - Assess  range of motion  - Assess patient's mobility  - Assess patient's need for assistive devices and provide as appropriate  - Encourage maximum independence but intervene and supervise when necessary  - Involve family in performance of ADLs  - Assess for home care needs following discharge   - Consider OT consult to assist with ADL evaluation and planning for discharge  - Provide patient education as appropriate  Outcome: Progressing  Goal: Maintain proper alignment of affected body part  Description: INTERVENTIONS:  - Support, maintain and protect limb and body alignment  - Provide patient/ family with appropriate education  Outcome: Progressing     Problem: Prexisting or High Potential for Compromised Skin Integrity  Goal: Skin integrity is maintained or improved  Description: INTERVENTIONS:  - Identify patients at risk for skin breakdown  - Assess and monitor skin integrity including under and around medical devices   - Assess and monitor nutrition and hydration status  - Monitor labs  - Assess for incontinence   - Turn and reposition patient  - Assist with mobility/ambulation  - Relieve pressure over nate prominences   - Avoid friction and shearing  - Provide appropriate hygiene as needed including keeping skin clean and dry  - Evaluate need for skin moisturizer/barrier cream  - Collaborate with interdisciplinary team  - Patient/family teaching  - Consider wound care consult    Assess:  - Review Goldy scale daily  - Clean and moisturize skin every   - Inspect skin when repositioning, toileting, and assisting with ADLS  - Assess under medical devices such as  every   - Assess extremities for adequate circulation and sesation     Bed Management:  - Have minimal linens on bed & keep smooth, unwrinkled  - Change linens as needed when moist or perspiring  - Avoid sitting or lying in one position for more than  hours while in bed?Keep HOB at degrees   - Toileting:  - Offer bedside commode  - Assess for incontinence  every   - Use incontinent care products after each incontinent episode such as     Activity:  - Mobilize patient  times a day  - Encourage activity and walks on unit  - Encourage or provide ROM exercises   - Turn and reposition patient every  Hours  - Use appropriate equipment to lift or move patient in bed  - Instruct/ Assist with weight shifting every  when out of bed in chair  - Consider limitation of chair time  hour intervals    Skin Care:  - Avoid use of baby powder, tape, friction and shearing, hot water or constrictive clothing  - Relieve pressure over bony prominences using   - Do not massage red bony areas    Next Steps:  - Teach patient strategies to minimize risks such as   - Consider consults to  interdisciplinary teams such as   Outcome: Progressing     Problem: Nutrition/Hydration-ADULT  Goal: Nutrient/Hydration intake appropriate for improving, restoring or maintaining nutritional needs  Description: Monitor and assess patient's nutrition/hydration status for malnutrition. Collaborate with interdisciplinary team and initiate plan and interventions as ordered.  Monitor patient's weight and dietary intake as ordered or per policy. Utilize nutrition screening tool and intervene as necessary. Determine patient's food preferences and provide high-protein, high-caloric foods as appropriate.     INTERVENTIONS:  - Monitor oral intake, urinary output, labs, and treatment plans  - Assess nutrition and hydration status and recommend course of action  - Evaluate amount of meals eaten  - Assist patient with eating if necessary   - Allow adequate time for meals  - Recommend/ encourage appropriate diets, oral nutritional supplements, and vitamin/mineral supplements  - Order, calculate, and assess calorie counts as needed  - Recommend, monitor, and adjust tube feedings and TPN/PPN based on assessed needs  - Assess need for intravenous fluids  - Provide specific nutrition/hydration education as appropriate  - Include  patient/family/caregiver in decisions related to nutrition  Outcome: Progressing

## 2025-06-26 VITALS
OXYGEN SATURATION: 96 % | TEMPERATURE: 97.5 F | DIASTOLIC BLOOD PRESSURE: 61 MMHG | RESPIRATION RATE: 18 BRPM | HEART RATE: 67 BPM | BODY MASS INDEX: 28.22 KG/M2 | WEIGHT: 239 LBS | HEIGHT: 77 IN | SYSTOLIC BLOOD PRESSURE: 110 MMHG

## 2025-06-26 LAB — GLUCOSE SERPL-MCNC: 172 MG/DL (ref 65–140)

## 2025-06-26 PROCEDURE — 82948 REAGENT STRIP/BLOOD GLUCOSE: CPT

## 2025-06-26 PROCEDURE — 99239 HOSP IP/OBS DSCHRG MGMT >30: CPT | Performed by: INTERNAL MEDICINE

## 2025-06-26 RX ADMIN — Medication 15 ML: at 08:03

## 2025-06-26 RX ADMIN — DABIGATRAN ETEXILATE MESYLATE 150 MG: 150 CAPSULE ORAL at 08:01

## 2025-06-26 RX ADMIN — INSULIN LISPRO 1 UNITS: 100 INJECTION, SOLUTION INTRAVENOUS; SUBCUTANEOUS at 08:04

## 2025-06-26 RX ADMIN — EZETIMIBE 10 MG: 10 TABLET ORAL at 08:02

## 2025-06-26 RX ADMIN — ESCITALOPRAM OXALATE 10 MG: 10 TABLET ORAL at 08:02

## 2025-06-26 RX ADMIN — INSULIN LISPRO 5 UNITS: 100 INJECTION, SOLUTION INTRAVENOUS; SUBCUTANEOUS at 08:04

## 2025-06-26 RX ADMIN — Medication 12.5 MG: at 08:07

## 2025-06-26 RX ADMIN — Medication 250 MG: at 08:03

## 2025-06-26 RX ADMIN — ATORVASTATIN CALCIUM 80 MG: 80 TABLET, FILM COATED ORAL at 08:01

## 2025-06-26 RX ADMIN — Medication 1000 UNITS: at 08:01

## 2025-06-26 RX ADMIN — Medication 300 MG: at 08:02

## 2025-06-26 RX ADMIN — Medication 250 MG: at 08:01

## 2025-06-26 RX ADMIN — LORATADINE 10 MG: 5 SOLUTION ORAL at 08:03

## 2025-06-26 RX ADMIN — ASPIRIN 81 MG CHEWABLE TABLET 81 MG: 81 TABLET CHEWABLE at 08:01

## 2025-06-26 RX ADMIN — THIAMINE HCL TAB 100 MG 100 MG: 100 TAB at 08:01

## 2025-06-26 NOTE — ASSESSMENT & PLAN NOTE
Patient with hx of Afib, CVA x2 with left-sided deficits, bedbound, PEG, colostomy, chronic indwelling catheter, St IV sacral decubiti, transferred from [facility] for increased weakness and AMS    CTH showing acute to subacute left PCA distribution infarct.   MRI of the brain confirms stroke  2D Echo unremarkable, deferred JOSEFINA with plans for dual AP and DOAC  PTA Maintained on ASA, Eliquis and high intensity statin  Neurology suspects Eliquis failure, transition eliquis to heparin gtt  Repeat CTh showing evolving left PCA distribution infarcts with trace amount of petechial blood products and/or laminar necrosis  Discussed with neurology, transition eliquis to Pradaxa  Plan to resume aspirin 81 mg daily in 1 week on July 1  Will need to follow outpatient neurology in 6 weeks

## 2025-06-26 NOTE — ASSESSMENT & PLAN NOTE
Lab Results   Component Value Date    HGBA1C 5.4 06/20/2025     Continue PTA Humalog 5 units QID. On tube feeding, add sliding scale insulin Q6H    Recent Labs     06/25/25  1040 06/25/25  1625 06/25/25 2054 06/26/25  0723   POCGLU 135 110 173* 172*       Blood Sugar Average: Last 72 hrs:  (P) [id number]

## 2025-06-26 NOTE — PLAN OF CARE
Problem: PAIN - ADULT  Goal: Verbalizes/displays adequate comfort level or baseline comfort level  Description: Interventions:  - Encourage patient to monitor pain and request assistance  - Assess pain using appropriate pain scale  - Administer analgesics as ordered based on type and severity of pain and evaluate response  - Implement non-pharmacological measures as appropriate and evaluate response  - Consider cultural and social influences on pain and pain management  - Notify physician/advanced practitioner if interventions unsuccessful or patient reports new pain  - Educate patient/family on pain management process including their role and importance of  reporting pain   - Provide non-pharmacologic/complimentary pain relief interventions  Outcome: Progressing     Problem: INFECTION - ADULT  Goal: Absence or prevention of progression during hospitalization  Description: INTERVENTIONS:  - Assess and monitor for signs and symptoms of infection  - Monitor lab/diagnostic results  - Monitor all insertion sites, i.e. indwelling lines, tubes, and drains  - Monitor endotracheal if appropriate and nasal secretions for changes in amount and color  - [place] appropriate cooling/warming therapies per order  - Administer medications as ordered  - Instruct and encourage patient and family to use good hand hygiene technique  - Identify and instruct in appropriate isolation precautions for identified infection/condition  Outcome: Progressing  Goal: Absence of fever/infection during neutropenic period  Description: INTERVENTIONS:  - Monitor WBC  - Perform strict hand hygiene  - Limit to healthy visitors only  - No plants, dried, fresh or silk flowers with gonzalez in patient room  Outcome: Progressing     Problem: SAFETY ADULT  Goal: Patient will remain free of falls  Description: INTERVENTIONS:  - Educate patient/family on patient safety including physical limitations  - Instruct patient to call for assistance with activity   -  Consider consulting OT/PT to assist with strengthening/mobility based on AM PAC & JH-HLM score  - Consult OT/PT to assist with strengthening/mobility   - Keep Call bell within reach  - Keep bed low and locked with side rails adjusted as appropriate  - Keep care items and personal belongings within reach  - Initiate and maintain comfort rounds  - Make Fall Risk Sign visible to staff  - Offer Toileting every  Hours, in advance of need  - Initiate/Maintain alarm  - Obtain necessary fall risk management equipment:   - Apply yellow socks and bracelet for high fall risk patients  - Consider moving patient to room near nurses station  Outcome: Progressing  Goal: Maintain or return to baseline ADL function  Description: INTERVENTIONS:  -  Assess patient's ability to carry out ADLs; assess patient's baseline for ADL function and identify physical deficits which impact ability to perform ADLs (bathing, care of mouth/teeth, toileting, grooming, dressing, etc.)  - Assess/evaluate cause of self-care deficits   - Assess range of motion  - Assess patient's mobility; develop plan if impaired  - Assess patient's need for assistive devices and provide as appropriate  - Encourage maximum independence but intervene and supervise when necessary  - Involve family in performance of ADLs  - Assess for home care needs following discharge   - Consider OT consult to assist with ADL evaluation and planning for discharge  - Provide patient education as appropriate  - Monitor functional capacity and physical performance, use of AM PAC & JH-HLM   - Monitor gait, balance and fatigue with ambulation    Outcome: Progressing  Goal: Maintains/Returns to pre admission functional level  Description: INTERVENTIONS:  - Perform AM-PAC 6 Click Basic Mobility/ Daily Activity assessment daily.  - Set and communicate daily mobility goal to care team and patient/family/caregiver.   - Collaborate with rehabilitation services on mobility goals if consulted  -  Perform Range of Motion  times a day.  - Reposition patient every  hours.  - Dangle patient  times a day  - Stand patient  times a day  - Ambulate patient  times a day  - Out of bed to chair  times a day   - Out of bed for meals  times a day  - Out of bed for toileting  - Record patient progress and toleration of activity level   Outcome: Progressing     Problem: DISCHARGE PLANNING  Goal: Discharge to home or other facility with appropriate resources  Description: INTERVENTIONS:  - Identify barriers to discharge w/patient and caregiver  - Arrange for needed discharge resources and transportation as appropriate  - Identify discharge learning needs (meds, wound care, etc.)  - Arrange for interpretive services to assist at discharge as needed  - Refer to Case Management Department for coordinating discharge planning if the patient needs post-hospital services based on physician/advanced practitioner order or complex needs related to functional status, cognitive ability, or social support system  Outcome: Progressing     Problem: Knowledge Deficit  Goal: Patient/family/caregiver demonstrates understanding of disease process, treatment plan, medications, and discharge instructions  Description: Complete learning assessment and assess knowledge base.  Interventions:  - Provide teaching at level of understanding  - Provide teaching via preferred learning methods  Outcome: Progressing     Problem: NEUROSENSORY - ADULT  Goal: Achieves stable or improved neurological status  Description: INTERVENTIONS  - Monitor and report changes in neurological status  - Monitor vital signs such as temperature, blood pressure, glucose, and any other labs ordered   - Initiate measures to prevent increased intracranial pressure  - Monitor for seizure activity and implement precautions if appropriate      Outcome: Progressing  Goal: Achieves maximal functionality and self care  Description: INTERVENTIONS  - Monitor swallowing and airway  patency with patient fatigue and changes in neurological status  - Encourage and assist patient to increase activity and self care.   - Encourage visually impaired, hearing impaired and aphasic patients to use assistive/communication devices  Outcome: Progressing     Problem: CARDIOVASCULAR - ADULT  Goal: Maintains optimal cardiac output and hemodynamic stability  Description: INTERVENTIONS:  - Monitor I/O, vital signs and rhythm  - Monitor for S/S and trends of decreased cardiac output  - Administer and titrate ordered vasoactive medications to optimize hemodynamic stability  - Assess quality of pulses, skin color and temperature  - Assess for signs of decreased coronary artery perfusion  - Instruct patient to report change in severity of symptoms  Outcome: Progressing  Goal: Absence of cardiac dysrhythmias or at baseline rhythm  Description: INTERVENTIONS:  - Continuous cardiac monitoring, vital signs, obtain 12 lead EKG if ordered  - Administer antiarrhythmic and heart rate control medications as ordered  - Monitor electrolytes and administer replacement therapy as ordered  Outcome: Progressing     Problem: RESPIRATORY - ADULT  Goal: Achieves optimal ventilation and oxygenation  Description: INTERVENTIONS:  - Assess for changes in respiratory status  - Assess for changes in mentation and behavior  - Position to facilitate oxygenation and minimize respiratory effort  - Oxygen administered by appropriate delivery if ordered  - Initiate smoking cessation education as indicated  - Encourage broncho-pulmonary hygiene including cough, deep breathe, Incentive Spirometry  - Assess the need for suctioning and aspirate as needed  - Assess and instruct to report SOB or any respiratory difficulty  - Respiratory Therapy support as indicated  Outcome: Progressing     Problem: GASTROINTESTINAL - ADULT  Goal: Minimal or absence of nausea and/or vomiting  Description: INTERVENTIONS:  - Administer IV fluids if ordered to ensure  adequate hydration  - Maintain NPO status until nausea and vomiting are resolved  - Nasogastric tube if ordered  - Administer ordered antiemetic medications as needed  - Provide nonpharmacologic comfort measures as appropriate  - Advance diet as tolerated, if ordered  - Consider nutrition services referral to assist patient with adequate nutrition and appropriate food choices  Outcome: Progressing  Goal: Maintains or returns to baseline bowel function  Description: INTERVENTIONS:  - Assess bowel function  - Encourage oral fluids to ensure adequate hydration  - Administer IV fluids if ordered to ensure adequate hydration  - Administer ordered medications as needed  - Encourage mobilization and activity  - Consider nutritional services referral to assist patient with adequate nutrition and appropriate food choices  Outcome: Progressing  Goal: Maintains adequate nutritional intake  Description: INTERVENTIONS:  - Monitor percentage of each meal consumed  - Identify factors contributing to decreased intake, treat as appropriate  - Assist with meals as needed  - Monitor I&O, weight, and lab values if indicated  - Obtain nutrition services referral as needed  Outcome: Progressing  Goal: Establish and maintain optimal ostomy function  Description: INTERVENTIONS:  - Assess bowel function  - Encourage oral fluids to ensure adequate hydration  - Administer IV fluids if ordered to ensure adequate hydration   - Administer ordered medications as needed  - Encourage mobilization and activity  - Nutrition services referral to assist patient with appropriate food choices  - Assess stoma site  - Consider wound care consult   Outcome: Progressing  Goal: Oral mucous membranes remain intact  Description: INTERVENTIONS  - Assess oral mucosa and hygiene practices  - Implement preventative oral hygiene regimen  - Implement oral medicated treatments as ordered  - Initiate Nutrition services referral as needed  Outcome: Progressing      Problem: GENITOURINARY - ADULT  Goal: Maintains or returns to baseline urinary function  Description: INTERVENTIONS:  - Assess urinary function  - Encourage oral fluids to ensure adequate hydration if ordered  - Administer IV fluids as ordered to ensure adequate hydration  - Administer ordered medications as needed  - Offer frequent toileting  - Follow urinary retention protocol if ordered  Outcome: Progressing  Goal: Absence of urinary retention  Description: INTERVENTIONS:  - Assess patient’s ability to void and empty bladder  - Monitor I/O  - Bladder scan as needed  - Discuss with physician/AP medications to alleviate retention as needed  - Discuss catheterization for long term situations as appropriate  Outcome: Progressing  Goal: Urinary catheter remains patent  Description: INTERVENTIONS:  - Assess patency of urinary catheter  - If patient has a chronic hanks, consider changing catheter if non-functioning  - Follow guidelines for intermittent irrigation of non-functioning urinary catheter  Outcome: Progressing     Problem: METABOLIC, FLUID AND ELECTROLYTES - ADULT  Goal: Electrolytes maintained within normal limits  Description: INTERVENTIONS:  - Monitor labs and assess patient for signs and symptoms of electrolyte imbalances  - Administer electrolyte replacement as ordered  - Monitor response to electrolyte replacements, including repeat lab results as appropriate  - Instruct patient on fluid and nutrition as appropriate  Outcome: Progressing  Goal: Fluid balance maintained  Description: INTERVENTIONS:  - Monitor labs   - Monitor I/O and WT  - Instruct patient on fluid and nutrition as appropriate  - Assess for signs & symptoms of volume excess or deficit  Outcome: Progressing  Goal: Glucose maintained within target range  Description: INTERVENTIONS:  - Monitor Blood Glucose as ordered  - Assess for signs and symptoms of hyperglycemia and hypoglycemia  - Administer ordered medications to maintain glucose  within target range  - Assess nutritional intake and initiate nutrition service referral as needed  Outcome: Progressing     Problem: SKIN/TISSUE INTEGRITY - ADULT  Goal: Incision(s), wounds(s) or drain site(s) healing without S/S of infection  Description: INTERVENTIONS  - Assess and document dressing, incision, wound bed, drain sites and surrounding tissue  - Provide patient and family education  - Perform skin care/dressing changes every  Outcome: Progressing  Goal: Pressure injury heals and does not worsen  Description: Interventions:  - Implement low air loss mattress or specialty surface (Criteria met)  - Apply silicone foam dressing  - Instruct/assist with weight shifting every  minutes when in chair   - Limit chair time to  hour intervals  - Use special pressure reducing interventions such as  when in chair   - Apply fecal or urinary incontinence containment device   - Perform passive or active ROM every   - Turn and reposition patient & offload bony prominences every  hours  - Utilize friction reducing device or surface for transfers   - Consider consults to  interdisciplinary teams such as   - Use incontinent care products after each incontinent episode such as   - Consider nutrition services referral as needed  Outcome: Progressing     Problem: HEMATOLOGIC - ADULT  Goal: Maintains hematologic stability  Description: INTERVENTIONS  - Assess for signs and symptoms of bleeding or hemorrhage  - Monitor labs  - Administer supportive blood products/factors as ordered and appropriate  Outcome: Progressing     Problem: MUSCULOSKELETAL - ADULT  Goal: Maintain or return mobility to safest level of function  Description: INTERVENTIONS:  - Assess patient's ability to carry out ADLs; assess patient's baseline for ADL function and identify physical deficits which impact ability to perform ADLs (bathing, care of mouth/teeth, toileting, grooming, dressing, etc.)  - Assess/evaluate cause of self-care deficits   - Assess  range of motion  - Assess patient's mobility  - Assess patient's need for assistive devices and provide as appropriate  - Encourage maximum independence but intervene and supervise when necessary  - Involve family in performance of ADLs  - Assess for home care needs following discharge   - Consider OT consult to assist with ADL evaluation and planning for discharge  - Provide patient education as appropriate  Outcome: Progressing  Goal: Maintain proper alignment of affected body part  Description: INTERVENTIONS:  - Support, maintain and protect limb and body alignment  - Provide patient/ family with appropriate education  Outcome: Progressing     Problem: Prexisting or High Potential for Compromised Skin Integrity  Goal: Skin integrity is maintained or improved  Description: INTERVENTIONS:  - Identify patients at risk for skin breakdown  - Assess and monitor skin integrity including under and around medical devices   - Assess and monitor nutrition and hydration status  - Monitor labs  - Assess for incontinence   - Turn and reposition patient  - Assist with mobility/ambulation  - Relieve pressure over nate prominences   - Avoid friction and shearing  - Provide appropriate hygiene as needed including keeping skin clean and dry  - Evaluate need for skin moisturizer/barrier cream  - Collaborate with interdisciplinary team  - Patient/family teaching  - Consider wound care consult    Assess:  - Review Goldy scale daily  - Clean and moisturize skin every   - Inspect skin when repositioning, toileting, and assisting with ADLS  - Assess under medical devices such as  every   - Assess extremities for adequate circulation and sensation     Bed Management:  - Have minimal linens on bed & keep smooth, unwrinkled  - Change linens as needed when moist or perspiring  - Avoid sitting or lying in one position for more than  hours while in bed?Keep HOB at degrees   - Toileting:  - Offer bedside commode  - Assess for incontinence  every   - Use incontinent care products after each incontinent episode such as     Activity:  - Mobilize patient  times a day  - Encourage activity and walks on unit  - Encourage or provide ROM exercises   - Turn and reposition patient every  Hours  - Use appropriate equipment to lift or move patient in bed  - Instruct/ Assist with weight shifting every  when out of bed in chair  - Consider limitation of chair time  hour intervals    Skin Care:  - Avoid use of baby powder, tape, friction and shearing, hot water or constrictive clothing  - Relieve pressure over bony prominences using   - Do not massage red bony areas    Next Steps:  - Teach patient strategies to minimize risks such as   - Consider consults to  interdisciplinary teams such as   Outcome: Progressing     Problem: Nutrition/Hydration-ADULT  Goal: Nutrient/Hydration intake appropriate for improving, restoring or maintaining nutritional needs  Description: Monitor and assess patient's nutrition/hydration status for malnutrition. Collaborate with interdisciplinary team and initiate plan and interventions as ordered.  Monitor patient's weight and dietary intake as ordered or per policy. Utilize nutrition screening tool and intervene as necessary. Determine patient's food preferences and provide high-protein, high-caloric foods as appropriate.     INTERVENTIONS:  - Monitor oral intake, urinary output, labs, and treatment plans  - Assess nutrition and hydration status and recommend course of action  - Evaluate amount of meals eaten  - Assist patient with eating if necessary   - Allow adequate time for meals  - Recommend/ encourage appropriate diets, oral nutritional supplements, and vitamin/mineral supplements  - Order, calculate, and assess calorie counts as needed  - Recommend, monitor, and adjust tube feedings and TPN/PPN based on assessed needs  - Assess need for intravenous fluids  - Provide specific nutrition/hydration education as appropriate  - Include  patient/family/caregiver in decisions related to nutrition  Outcome: Progressing

## 2025-06-26 NOTE — DISCHARGE SUMMARY
Discharge Summary - Hospitalist   Name: Drew Santos 74 y.o. male I MRN: [id number]  Unit/Bed#: E4 -01 I Date of Admission: 6/20/2025   Date of Service: 6/26/2025 I Hospital Day: 6     Assessment & Plan  CVA (cerebrovascular accident) (HCC)  Patient with hx of Afib, CVA x2 with left-sided deficits, bedbound, PEG, colostomy, chronic indwelling catheter, St IV sacral decubiti, transferred from [facility] Care at [facility] for increased weakness and AMS    CTH showing acute to subacute left PCA distribution infarct.   MRI of the brain confirms stroke  2D Echo unremarkable, deferred JOSEFINA with plans for dual AP and DOAC  PTA Maintained on ASA, Eliquis and high intensity statin  Neurology suspects Eliquis failure, transition eliquis to heparin gtt  Repeat CTh showing evolving left PCA distribution infarcts with trace amount of petechial blood products and/or laminar necrosis  Discussed with neurology, transition eliquis to Pradaxa  Plan to resume aspirin 81 mg daily in 1 week on July 1  Will need to follow outpatient neurology in 6 weeks  Decubitus ulcer of sacral region, stage 4 (HCC)  POA. Previous admissions for polymicrobial bacteremia and OM, previous wound VAC.  Follows with surgery for wound care  Offload.  Turn and reposition.  Local wound care  Wound care following  General surgery recommend localized wound care as they state it appears not infected  Type 2 diabetes mellitus without complication, without long-term current use of insulin (HCC)  Lab Results   Component Value Date    HGBA1C 5.4 06/20/2025     Continue PTA Humalog 5 units QID. On tube feeding, add sliding scale insulin Q6H    Recent Labs     06/25/25  1040 06/25/25  1625 06/25/25  2054 06/26/25  0723   POCGLU 135 110 173* 172*       Blood Sugar Average: Last 72 hrs:  (P) [id number]    Paroxysmal atrial fibrillation (HCC)  On rate control with metoprolol 12.5mg bid  Prior to admission on Eliquis, transitioned to Pradaxa  Urinary  retention  Urinary retention managed with chronic Parra catheter.  Catheter exchanged at facility  Monitor urine output  Primary hypertension  BP soft, on metoprolol with hold parameters  Anemia of chronic disease  Chronic anemia with thrombocytosis.  Baseline 7-8  FIT Test Normal  S/P 1 unit transfused  Severe protein-calorie malnutrition (HCC)  Malnutrition Findings: Body mass index is 28.34 kg/m².   S/p PEG.  Continue tube feeding along with regular diet  Nutrition consult  S/P percutaneous endoscopic gastrostomy (PEG) tube placement ([facility])  S/p Peg Oct 2024.  Diet at facility:  Tube feeding Jevity 1.2 85 mL/h x22hrs, water flush 150 mL every 4 hours  Oral Regular diet  Continue tube feeds  Colostomy in place ([facility])  S/p loop diverting colostomy; placed due to sacral decubiti ulcer with suspected coccygeal OM   Ostomy care.  Monitor stool output     Discharging Physician / Practitioner: Ruth Love MD  PCP: Joe Lyon MD  Admission Date:   Admission Orders (From admission, onward)       Ordered        06/20/25 1912  INPATIENT ADMISSION  Once                          Discharge Date: 06/26/25    Medical Problems       Resolved Problems  Date Reviewed: 6/20/2025   None         Reason for Admission: Altered mental status    Hospital Course:     Drew Santos is a 74 y.o. male patient with PMH of A-fib on Eliquis, CVA x2 with residual left-sided deficit, bedbound, dysphagia s/p PEG tube, colostomy, chronic Parra's catheter, stage IV sacral decubitus ulcer who originally presented to the hospital on 6/20/2025 due to altered mental status.  Patient was found to have acute to subacute left PCA distribution infarct which was confirmed on MRI brain.  2D echo was unremarkable.  JOSEFINA was deferred due to plans for dual AP and DOAC.  Repeat CT head showed evolving left PCA distribution infarcts with trace amount of petechial blood products and or laminar necrosis. Neurology suspected Eliquis failure so patient was initially  "started on heparin drip and DOAC was transitioned to Pradaxa.  Plan was to resume aspirin 81 mg daily in 1 week on   July 1.  Patient will need to follow-up with outpatient neurology in 6 weeks.      Please see above list of diagnoses and related plan for additional information.     Condition at Discharge: stable     Discharge Day Visit / Exam:     Subjective:  Patient was seen and examined at bedside. The patient denies any major complaints.  No acute overnight changes.  Vitals: Blood Pressure: 110/61 (06/26/25 0720)  Pulse: 67 (06/26/25 0720)  Temperature: 97.5 °F (36.4 °C) (06/26/25 0720)  Temp Source: Temporal (06/26/25 0720)  Respirations: 18 (06/26/25 0720)  Height: 6' 5\" (195.6 cm) (06/22/25 1619)  Weight - Scale: 108 kg (239 lb) (06/22/25 1619)  SpO2: 96 % (06/26/25 0720)  Exam:   Physical Exam  General: no acute distress  HEENT: NC/AT, PERRL, EOM - normal  Neck: Supple  Pulm/Chest: Normal chest wall expansion, clear breath sounds on both side  CVS: normal S1&S2, capillary refill <2s  Abd: soft, non tender, non distended, bowel sounds +, PEG tube in place, ostomy noted  Genitourinary chronic Parra's catheter present:  MSK: move all 4 extremities spontaneously  Skin: warm  CNS: Patient is alert and awake    Discharge instructions/Information to patient and family:   See after visit summary for information provided to patient and family.      Provisions for Follow-Up Care:  See after visit summary for information related to follow-up care and any pertinent home health orders.      Disposition:     Other Skilled Nursing Facility at [facility]    For Discharges to [facility]:   Not Applicable to this Patient - Not Applicable to this Patient    Planned Readmission: No     Discharge Statement:  I spent 45 minutes discharging the patient. This time was spent on the day of discharge. I had direct contact with the patient on the day of discharge. Greater than 50% of the total time was " spent examining patient, answering all patient questions, arranging and discussing plan of care with patient as well as directly providing post-discharge instructions.  Additional time then spent on discharge activities.    Discharge Medications:  See after visit summary for reconciled discharge medications provided to patient and family.      ** Please Note: This note has been constructed using a voice recognition system **

## 2025-06-26 NOTE — ASSESSMENT & PLAN NOTE
On rate control with metoprolol 12.5mg bid  Prior to admission on Eliquis, transitioned to Pradaxa

## 2025-06-26 NOTE — PLAN OF CARE
Problem: PAIN - ADULT  Goal: Verbalizes/displays adequate comfort level or baseline comfort level  Description: Interventions:  - Encourage patient to monitor pain and request assistance  - Assess pain using appropriate pain scale  - Administer analgesics as ordered based on type and severity of pain and evaluate response  - Implement non-pharmacological measures as appropriate and evaluate response  - Consider cultural and social influences on pain and pain management  - Notify physician/advanced practitioner if interventions unsuccessful or patient reports new pain  - Educate patient/family on pain management process including their role and importance of  reporting pain   - Provide non-pharmacologic/complimentary pain relief interventions  Outcome: Progressing     Problem: INFECTION - ADULT  Goal: Absence or prevention of progression during hospitalization  Description: INTERVENTIONS:  - Assess and monitor for signs and symptoms of infection  - Monitor lab/diagnostic results  - Monitor all insertion sites, i.e. indwelling lines, tubes, and drains  - Monitor endotracheal if appropriate and nasal secretions for changes in amount and color  - [place] appropriate cooling/warming therapies per order  - Administer medications as ordered  - Instruct and encourage patient and family to use good hand hygiene technique  - Identify and instruct in appropriate isolation precautions for identified infection/condition  Outcome: Progressing  Goal: Absence of fever/infection during neutropenic period  Description: INTERVENTIONS:  - Monitor WBC  - Perform strict hand hygiene  - Limit to healthy visitors only  - No plants, dried, fresh or silk flowers with gonzalez in patient room  Outcome: Progressing

## 2025-06-27 NOTE — UTILIZATION REVIEW
NOTIFICATION OF ADMISSION DISCHARGE   This is a Notification of Discharge from [facility]. Please be advised that this patient has been discharge from our facility. Below you will find the admission and discharge date and time including the patient’s disposition.   UTILIZATION REVIEW CONTACT:  Utilization Review Assistants  Network Utilization Review Department  Phone: [phone] x carefully listen to the prompts. All voicemails are confidential.  Email: [email or web address]     ADMISSION INFORMATION  PRESENTATION DATE: 6/20/2025  3:14 PM  OBERVATION ADMISSION DATE: N/A  INPATIENT ADMISSION DATE: 6/20/25  7:12 PM   DISCHARGE DATE: 6/26/2025  9:15 AM   DISPOSITION:Non [facility] SNF/TCU/SNU    Network Utilization Review Department  ATTENTION: Please call with any questions or concerns to [phone] and carefully listen to the prompts so that you are directed to the right person. All voicemails are confidential.   For Discharge needs, contact Care Management DC Support Team at [phone] opt. 2  Send all requests for admission clinical reviews, approved or denied determinations and any other requests to dedicated fax number below belonging to the campus where the patient is receiving treatment. List of dedicated fax numbers for the Facilities:  FACILITY NAME UR FAX NUMBER   ADMISSION DENIALS (Administrative/Medical Necessity) [phone]   DISCHARGE SUPPORT TEAM ([facility]) [phone]   PARENT CHILD HEALTH (Maternity/NICU/Pediatrics) [phone]   [facility] [phone]   [facility] [phone]   [facility] [phone]   [facility] [phone]   [facility] [phone]   [facility] [phone]   [facility] [phone]   [facility] [phone]   [facility]  [facility] [phone]   [facility] [phone]   [facility] [phone]   [place] [phone]

## 2025-07-23 ENCOUNTER — TELEPHONE (OUTPATIENT)
Age: 75
End: 2025-07-23

## 2025-07-23 NOTE — TELEPHONE ENCOUNTER
New Patient      Insurance: [facility]  Current Insurance?yes    Insurance E-verified? yes  History:   Reason for appointment/active symptoms?     BPH/Parra Catheter w/penile erosion  Has the patient had any previous Urologist(s)?    Hospital Consult 08/31/2024  Was the patient seen in the ED? Yes     Labs/Imaging(Including Out Of Network)? yes     Records Requested? no  Records Visible in EPIC? yes     Personal history of cancer? no     Appointment:   [facility]  ?   Appointment Details:   Date:  07/31/25  Time:  11:00  Location:  [facility]  Provider:    Does the appointment need further review?

## 2025-07-31 ENCOUNTER — CONSULT (OUTPATIENT)
Dept: UROLOGY | Facility: MEDICAL CENTER | Age: 75
End: 2025-07-31
Payer: COMMERCIAL

## 2025-07-31 VITALS — DIASTOLIC BLOOD PRESSURE: 64 MMHG | SYSTOLIC BLOOD PRESSURE: 118 MMHG

## 2025-07-31 DIAGNOSIS — E11.622 TYPE 2 DIABETES MELLITUS WITH OTHER SKIN ULCER (CODE) (HCC): ICD-10-CM

## 2025-07-31 DIAGNOSIS — R31.0 GROSS HEMATURIA: ICD-10-CM

## 2025-07-31 DIAGNOSIS — R33.9 URINARY RETENTION: Primary | ICD-10-CM

## 2025-07-31 DIAGNOSIS — Q54.9 HYPOSPADIAS, UNSPECIFIED HYPOSPADIAS TYPE: ICD-10-CM

## 2025-07-31 PROCEDURE — 99204 OFFICE O/P NEW MOD 45 MIN: CPT | Performed by: STUDENT IN AN ORGANIZED HEALTH CARE EDUCATION/TRAINING PROGRAM

## 2025-08-01 ENCOUNTER — PREP FOR PROCEDURE (OUTPATIENT)
Dept: INTERVENTIONAL RADIOLOGY/VASCULAR | Facility: CLINIC | Age: 75
End: 2025-08-01

## 2025-08-01 DIAGNOSIS — R33.9 URINARY RETENTION: Primary | ICD-10-CM

## 2025-08-21 ENCOUNTER — TELEPHONE (OUTPATIENT)
Dept: RADIOLOGY | Facility: HOSPITAL | Age: 75
End: 2025-08-21

## (undated) DEVICE — SUT VICRYL 2-0 REEL 54 IN J286G

## (undated) DEVICE — GLOVE PI ULTRA TOUCH SZ.7.5

## (undated) DEVICE — GLOVE SRG BIOGEL 8

## (undated) DEVICE — OSTOMY LOOP ROD 65 MM

## (undated) DEVICE — TROCAR: Brand: KII® SLEEVE

## (undated) DEVICE — ADHESIVE SKIN HIGH VISCOSITY EXOFIN 1ML

## (undated) DEVICE — 1820 FOAM BLOCK NEEDLE COUNTER: Brand: DEVON

## (undated) DEVICE — CULTURE TUBE ANAEROBIC

## (undated) DEVICE — MEDI-VAC YANKAUER SUCTION HANDLE W/STRAIGHT TIP & CONTROL VENT: Brand: CARDINAL HEALTH

## (undated) DEVICE — CULTURE TUBE AEROBIC

## (undated) DEVICE — EXOFIN PRECISION PEN HIGH VISCOSITY TOPICAL SKIN ADHESIVE: Brand: EXOFIN PRECISION PEN, 1G

## (undated) DEVICE — SPONGE LAP 18 X 18 IN STRL RFD

## (undated) DEVICE — SYRINGE 10ML LL

## (undated) DEVICE — INSUFLATION TUBING INSUFLOW (LEXION)

## (undated) DEVICE — TROCAR: Brand: KII FIOS FIRST ENTRY

## (undated) DEVICE — MAYO STAND COVER: Brand: CONVERTORS

## (undated) DEVICE — POOLE SUCTION HANDLE: Brand: CARDINAL HEALTH

## (undated) DEVICE — Device: Brand: DEFENDO AIR/WATER/SUCTION AND BIOPSY VALVE

## (undated) DEVICE — ABDOMINAL PAD: Brand: DERMACEA

## (undated) DEVICE — 3M™ IOBAN™ 2 ANTIMICROBIAL INCISE DRAPE 6650EZ: Brand: IOBAN™ 2

## (undated) DEVICE — BETHLEHEM MAJOR GENERAL PACK: Brand: CARDINAL HEALTH

## (undated) DEVICE — ANTIBACTERIAL UNDYED BRAIDED (POLYGLACTIN 910), SYNTHETIC ABSORBABLE SUTURE: Brand: COATED VICRYL

## (undated) DEVICE — PLUMEPEN PRO 10FT

## (undated) DEVICE — DRAPE SURGIKIT SADDLE BAG

## (undated) DEVICE — ELECTRODE LAP J HOOK SPLIT STEM E-Z CLEAN 33CM -0021S

## (undated) DEVICE — SUT VICRYL PLUS 0 UR-6 27IN VCP603H

## (undated) DEVICE — TUBING SUCTION 5MM X 12 FT

## (undated) DEVICE — 3000CC GUARDIAN II: Brand: GUARDIAN

## (undated) DEVICE — BETHLEHEM UNIVERSAL MINOR GEN: Brand: CARDINAL HEALTH

## (undated) DEVICE — GLOVE SRG BIOGEL 7

## (undated) DEVICE — GLOVE PI ULTRA TOUCH SZ.8.5

## (undated) DEVICE — INTENDED FOR TISSUE SEPARATION, AND OTHER PROCEDURES THAT REQUIRE A SHARP SURGICAL BLADE TO PUNCTURE OR CUT.: Brand: BARD-PARKER SAFETY BLADES SIZE 15, STERILE

## (undated) DEVICE — ANTI-FOG SOLUTION WITH FOAM PAD: Brand: DEVON

## (undated) DEVICE — BULB SYRINGE,IRRIGATION WITH PROTECTIVE CAP: Brand: DOVER

## (undated) DEVICE — SUT PROLENE 2-0 SH 36 IN 8523H

## (undated) DEVICE — TRAVELKIT CONTAINS FIRST STEP KIT (200ML EP-4 KIT) AND SOILED SCOPE BAG - 1 KIT: Brand: TRAVELKIT CONTAINS FIRST STEP KIT AND SOILED SCOPE BAG

## (undated) DEVICE — KERLIX BANDAGE ROLL: Brand: KERLIX

## (undated) DEVICE — GLOVE INDICATOR PI UNDERGLOVE SZ 7.5 BLUE

## (undated) DEVICE — AIR AND WATER TUBING/CAP SET FOR OLYMPUS® SCOPES: Brand: ERBE

## (undated) DEVICE — CHLORAPREP HI-LITE 26ML ORANGE

## (undated) DEVICE — PACK,HEAVY DRAINAGE,STERILE: Brand: MEDLINE INDUSTRIES, INC.

## (undated) DEVICE — GLOVE SRG BIOGEL 7.5

## (undated) DEVICE — HANDPIECE SET WITH HIGH FLOW TIP AND SUCTION TUBE: Brand: INTERPULSE

## (undated) DEVICE — NEEDLE 25G X 1 1/2

## (undated) DEVICE — INTENDED FOR TISSUE SEPARATION, AND OTHER PROCEDURES THAT REQUIRE A SHARP SURGICAL BLADE TO PUNCTURE OR CUT.: Brand: BARD-PARKER SAFETY BLADES SIZE 11, STERILE

## (undated) DEVICE — TRAY FOLEY 16FR URIMETER SURESTEP

## (undated) DEVICE — HANDPIECE SET WITH RETRACTABLE COAXIAL FAN SPRAY TIP AND SUCTION TUBE: Brand: INTERPULSE

## (undated) DEVICE — 2, DISPOSABLE SUCTION/IRRIGATOR WITHOUT DISPOSABLE TIP: Brand: STRYKEFLOW

## (undated) DEVICE — STRL COTTON TIP APPLCTR 6IN PK: Brand: CARDINAL HEALTH